# Patient Record
Sex: MALE | Race: WHITE | NOT HISPANIC OR LATINO | URBAN - METROPOLITAN AREA
[De-identification: names, ages, dates, MRNs, and addresses within clinical notes are randomized per-mention and may not be internally consistent; named-entity substitution may affect disease eponyms.]

---

## 2023-10-09 DIAGNOSIS — I10 ESSENTIAL HYPERTENSION, BENIGN: Primary | ICD-10-CM

## 2023-10-09 RX ORDER — TORSEMIDE 20 MG/1
20 TABLET ORAL 2 TIMES DAILY
COMMUNITY
End: 2023-10-11 | Stop reason: SDUPTHER

## 2023-10-11 RX ORDER — TORSEMIDE 20 MG/1
TABLET ORAL
Qty: 240 TABLET | Refills: 1 | Status: SHIPPED | OUTPATIENT
Start: 2023-10-11

## 2023-12-04 ENCOUNTER — APPOINTMENT (OUTPATIENT)
Dept: CARDIOLOGY | Facility: CLINIC | Age: 58
End: 2023-12-04
Payer: COMMERCIAL

## 2024-01-29 ENCOUNTER — APPOINTMENT (OUTPATIENT)
Dept: CARDIOLOGY | Facility: CLINIC | Age: 59
End: 2024-01-29
Payer: COMMERCIAL

## 2024-02-05 ENCOUNTER — APPOINTMENT (OUTPATIENT)
Dept: CARDIOLOGY | Facility: CLINIC | Age: 59
End: 2024-02-05
Payer: COMMERCIAL

## 2024-03-13 DIAGNOSIS — E78.2 MIXED HYPERLIPIDEMIA: Primary | ICD-10-CM

## 2024-03-13 DIAGNOSIS — I10 BENIGN ESSENTIAL HYPERTENSION: ICD-10-CM

## 2024-03-13 PROBLEM — I25.10 CORONARY ARTERY DISEASE INVOLVING NATIVE CORONARY ARTERY OF NATIVE HEART WITHOUT ANGINA PECTORIS: Status: ACTIVE | Noted: 2024-03-13

## 2024-03-13 PROBLEM — E66.9 OBESITY: Status: ACTIVE | Noted: 2024-03-13

## 2024-03-13 PROBLEM — R06.02 SHORTNESS OF BREATH ON EXERTION: Status: ACTIVE | Noted: 2024-03-13

## 2024-03-13 PROBLEM — H54.7 VISION LOSS: Status: ACTIVE | Noted: 2024-03-13

## 2024-03-13 PROBLEM — I51.7 LVH (LEFT VENTRICULAR HYPERTROPHY): Status: ACTIVE | Noted: 2024-03-13

## 2024-03-13 PROBLEM — H52.10 MYOPIA: Status: ACTIVE | Noted: 2019-04-08

## 2024-03-13 RX ORDER — SPIRONOLACTONE 25 MG/1
25 TABLET ORAL 2 TIMES DAILY
Qty: 60 TABLET | Refills: 0 | Status: SHIPPED | OUTPATIENT
Start: 2024-03-13 | End: 2024-04-15 | Stop reason: SDUPTHER

## 2024-03-13 RX ORDER — CARVEDILOL 25 MG/1
25 TABLET ORAL 2 TIMES DAILY
Qty: 60 TABLET | Refills: 0 | Status: SHIPPED | OUTPATIENT
Start: 2024-03-13 | End: 2024-04-15 | Stop reason: SDUPTHER

## 2024-03-13 RX ORDER — SPIRONOLACTONE 25 MG/1
25 TABLET ORAL 2 TIMES DAILY
COMMUNITY
Start: 2024-02-17 | End: 2024-03-13 | Stop reason: SDUPTHER

## 2024-03-13 RX ORDER — HYDRALAZINE HYDROCHLORIDE 100 MG/1
100 TABLET, FILM COATED ORAL 3 TIMES DAILY
Qty: 90 TABLET | Refills: 0 | Status: SHIPPED | OUTPATIENT
Start: 2024-03-13 | End: 2024-04-15 | Stop reason: SDUPTHER

## 2024-03-13 RX ORDER — EZETIMIBE 10 MG/1
10 TABLET ORAL NIGHTLY
Qty: 30 TABLET | Refills: 0 | Status: SHIPPED | OUTPATIENT
Start: 2024-03-13 | End: 2024-04-15 | Stop reason: SDUPTHER

## 2024-03-13 RX ORDER — HYDRALAZINE HYDROCHLORIDE 100 MG/1
100 TABLET, FILM COATED ORAL 3 TIMES DAILY
COMMUNITY
Start: 2024-02-17 | End: 2024-03-13 | Stop reason: SDUPTHER

## 2024-03-13 RX ORDER — CARVEDILOL 25 MG/1
25 TABLET ORAL 2 TIMES DAILY
COMMUNITY
Start: 2024-01-07 | End: 2024-03-13 | Stop reason: SDUPTHER

## 2024-03-13 RX ORDER — EZETIMIBE 10 MG/1
10 TABLET ORAL NIGHTLY
COMMUNITY
Start: 2024-02-17 | End: 2024-03-13 | Stop reason: SDUPTHER

## 2024-04-15 ENCOUNTER — TELEPHONE (OUTPATIENT)
Dept: CARDIOLOGY | Facility: CLINIC | Age: 59
End: 2024-04-15
Payer: COMMERCIAL

## 2024-04-15 DIAGNOSIS — E78.2 MIXED HYPERLIPIDEMIA: ICD-10-CM

## 2024-04-15 DIAGNOSIS — I10 BENIGN ESSENTIAL HYPERTENSION: ICD-10-CM

## 2024-04-15 RX ORDER — CARVEDILOL 25 MG/1
25 TABLET ORAL 2 TIMES DAILY
Qty: 180 TABLET | Refills: 3 | Status: SHIPPED | OUTPATIENT
Start: 2024-04-15

## 2024-04-15 RX ORDER — HYDRALAZINE HYDROCHLORIDE 100 MG/1
100 TABLET, FILM COATED ORAL 3 TIMES DAILY
Qty: 270 TABLET | Refills: 3 | Status: SHIPPED | OUTPATIENT
Start: 2024-04-15

## 2024-04-15 RX ORDER — EZETIMIBE 10 MG/1
10 TABLET ORAL NIGHTLY
Qty: 90 TABLET | Refills: 3 | Status: SHIPPED | OUTPATIENT
Start: 2024-04-15

## 2024-04-15 RX ORDER — SPIRONOLACTONE 25 MG/1
25 TABLET ORAL 2 TIMES DAILY
Qty: 180 TABLET | Refills: 3 | Status: SHIPPED | OUTPATIENT
Start: 2024-04-15

## 2024-04-22 ENCOUNTER — OFFICE VISIT (OUTPATIENT)
Dept: CARDIOLOGY | Facility: CLINIC | Age: 59
End: 2024-04-22
Payer: COMMERCIAL

## 2024-04-22 ENCOUNTER — HOSPITAL ENCOUNTER (OUTPATIENT)
Dept: CARDIOLOGY | Facility: CLINIC | Age: 59
Discharge: HOME | End: 2024-04-22
Payer: COMMERCIAL

## 2024-04-22 VITALS
BODY MASS INDEX: 37.85 KG/M2 | HEIGHT: 71 IN | DIASTOLIC BLOOD PRESSURE: 72 MMHG | WEIGHT: 270.4 LBS | SYSTOLIC BLOOD PRESSURE: 132 MMHG | OXYGEN SATURATION: 98 % | HEART RATE: 78 BPM

## 2024-04-22 DIAGNOSIS — I25.10 CORONARY ARTERY DISEASE INVOLVING NATIVE CORONARY ARTERY OF NATIVE HEART WITHOUT ANGINA PECTORIS: ICD-10-CM

## 2024-04-22 DIAGNOSIS — I51.7 LVH (LEFT VENTRICULAR HYPERTROPHY): ICD-10-CM

## 2024-04-22 DIAGNOSIS — E78.2 MIXED HYPERLIPIDEMIA: ICD-10-CM

## 2024-04-22 DIAGNOSIS — I10 BENIGN ESSENTIAL HYPERTENSION: Primary | ICD-10-CM

## 2024-04-22 PROCEDURE — 93010 ELECTROCARDIOGRAM REPORT: CPT | Performed by: INTERNAL MEDICINE

## 2024-04-22 PROCEDURE — 99215 OFFICE O/P EST HI 40 MIN: CPT | Performed by: NURSE PRACTITIONER

## 2024-04-22 PROCEDURE — 3078F DIAST BP <80 MM HG: CPT | Performed by: NURSE PRACTITIONER

## 2024-04-22 PROCEDURE — 93005 ELECTROCARDIOGRAM TRACING: CPT

## 2024-04-22 PROCEDURE — 1036F TOBACCO NON-USER: CPT | Performed by: NURSE PRACTITIONER

## 2024-04-22 PROCEDURE — 3075F SYST BP GE 130 - 139MM HG: CPT | Performed by: NURSE PRACTITIONER

## 2024-04-22 ASSESSMENT — COLUMBIA-SUICIDE SEVERITY RATING SCALE - C-SSRS
6. HAVE YOU EVER DONE ANYTHING, STARTED TO DO ANYTHING, OR PREPARED TO DO ANYTHING TO END YOUR LIFE?: NO
1. IN THE PAST MONTH, HAVE YOU WISHED YOU WERE DEAD OR WISHED YOU COULD GO TO SLEEP AND NOT WAKE UP?: NO
2. HAVE YOU ACTUALLY HAD ANY THOUGHTS OF KILLING YOURSELF?: NO

## 2024-04-22 ASSESSMENT — PATIENT HEALTH QUESTIONNAIRE - PHQ9
2. FEELING DOWN, DEPRESSED OR HOPELESS: SEVERAL DAYS
1. LITTLE INTEREST OR PLEASURE IN DOING THINGS: NOT AT ALL
SUM OF ALL RESPONSES TO PHQ9 QUESTIONS 1 AND 2: 1

## 2024-04-22 ASSESSMENT — LIFESTYLE VARIABLES: HOW OFTEN DO YOU HAVE A DRINK CONTAINING ALCOHOL: NEVER

## 2024-04-22 ASSESSMENT — PAIN SCALES - GENERAL: PAINLEVEL: 0-NO PAIN

## 2024-04-22 NOTE — PROGRESS NOTES
Subjective   Colin Leonard is a 58 y.o. male.    Chief Complaint:  Increased stress    HPI  Mr. Leonard is seen to re-establish cardiovascular care.  He is seen in collaboration with Dr. Saravia.  Steven was last seen 2 years ago and has risk factors for coronary disease of type 2 diabetes hypertension and obesity and CKD.  He describes several years of increased stress due to a estrangement from his daughter.  He has been relatively noncompliant with healthcare follow-up.  However since January he states he has been able to lose about 20 pounds per his report and is trying to take better care of himself.  He is compliant with medications although I am uncertain that he is on both diuretics.  He denies any symptoms referable to angina.  He does not routinely exercise but is able to walk for activity and go up and down stairs  necessary without chest pain or significant shortness of breath.    Review of Systems   Constitutional: Negative.   Cardiovascular: Negative.    Respiratory: Negative.     All other systems reviewed and are negative.      Objective   Vitals reviewed.   Constitutional:       Appearance: Healthy appearance. Not in distress.   Eyes:      Pupils: Pupils are equal, round, and reactive to light.   Neck:      Vascular: No JVR. JVD normal.   Pulmonary:      Effort: Pulmonary effort is normal.      Breath sounds: Normal breath sounds. No wheezing. No rhonchi. No rales.   Chest:      Chest wall: Not tender to palpatation.   Cardiovascular:      Normal rate. Regular rhythm. Normal S1. Normal S2.       Murmurs: There is no murmur.      No gallop.  No click. No rub.   Pulses:     Intact distal pulses.   Edema:     Peripheral edema absent.   Abdominal:      Tenderness: There is no abdominal tenderness.   Musculoskeletal: Normal range of motion.         General: No tenderness. Skin:     General: Skin is warm and dry.   Neurological:      General: No focal deficit present.      Mental Status: Alert and  oriented to person, place and time.       ECG obtained and reviewed shows normal sinus rhythm with an age undetermined possible anterior infarct ventricular rate is 75 bpm.  Unchanged compared with 2/2023    Lab Review:   Lab Results   Component Value Date     04/29/2022    K 5.0 04/29/2022     04/29/2022    CO2 24 04/29/2022     (HH) 04/29/2022    CREATININE 6.90 (H) 04/29/2022    GLUCOSE 158 (H) 04/29/2022    CALCIUM 9.9 04/29/2022     Lab Results   Component Value Date    WBC 10.8 02/16/2022    HGB 10.7 (L) 02/16/2022    HCT 33.2 (L) 02/16/2022    MCV 89 02/16/2022     02/16/2022     Lab Results   Component Value Date    CHOL 97 02/01/2022    TRIG 117 02/01/2022    HDL 30.2 (A) 02/01/2022       Assessment/Plan   The encounter diagnosis was Benign essential hypertension.  Mr. Leonard is a 58 year old  male with a past medical history significant for hypertension, hyperlipidemia, diabetes, elevated coronary artery calcium score of 1014.84 in 3/2020 and CKD. Echocardiogram 2/2022 showed normal LV function with an EF of 60-65% with severe concentric LVH. LA is severely dilated. Right sided structures were poorly visualized.  He presents to re-establish cardiac care and to get medication refills.  He denies any specific cardiac concerns and denies any symptoms referable to angina.  He is active at work and can climb stairs without concern.  BP is improved after sitting for 30 min as he was rather anxious during this visit. He is very emotional and tearful during our visit discussing his current estranged relationship with his daughter.  Other VS are stable.  ECG is stable when compared to previous.  He has not had labs drawn for some time.  I would like him to obtain fasting labs as well as CMP and CBC. We will continue all medication for now and clarify his diuretic medication.  He will followup in 6 months. We will discuss repeat ischemic evaluation and medication adjustment  depending on his labs results and interim symptoms.  He knows to call for any concerns or questions.

## 2024-04-23 RX ORDER — ASPIRIN 81 MG/1
81 TABLET ORAL DAILY
COMMUNITY

## 2024-04-23 ASSESSMENT — ENCOUNTER SYMPTOMS
RESPIRATORY NEGATIVE: 1
CONSTITUTIONAL NEGATIVE: 1
CARDIOVASCULAR NEGATIVE: 1

## 2024-04-25 LAB
ATRIAL RATE: 75 BPM
P AXIS: 23 DEGREES
P OFFSET: 190 MS
P ONSET: 121 MS
PR INTERVAL: 194 MS
Q ONSET: 218 MS
QRS COUNT: 12 BEATS
QRS DURATION: 92 MS
QT INTERVAL: 432 MS
QTC CALCULATION(BAZETT): 482 MS
QTC FREDERICIA: 465 MS
R AXIS: 41 DEGREES
T AXIS: -9 DEGREES
T OFFSET: 434 MS
VENTRICULAR RATE: 75 BPM

## 2024-07-21 ENCOUNTER — APPOINTMENT (OUTPATIENT)
Dept: CARDIOLOGY | Facility: HOSPITAL | Age: 59
End: 2024-07-21
Payer: COMMERCIAL

## 2024-07-21 ENCOUNTER — HOSPITAL ENCOUNTER (INPATIENT)
Facility: HOSPITAL | Age: 59
LOS: 11 days | Discharge: HOME HEALTH | End: 2024-08-02
Attending: STUDENT IN AN ORGANIZED HEALTH CARE EDUCATION/TRAINING PROGRAM | Admitting: INTERNAL MEDICINE
Payer: COMMERCIAL

## 2024-07-21 ENCOUNTER — DOCUMENTATION (OUTPATIENT)
Dept: CARDIOLOGY | Facility: HOSPITAL | Age: 59
End: 2024-07-21
Payer: COMMERCIAL

## 2024-07-21 ENCOUNTER — APPOINTMENT (OUTPATIENT)
Dept: RADIOLOGY | Facility: HOSPITAL | Age: 59
End: 2024-07-21
Payer: COMMERCIAL

## 2024-07-21 DIAGNOSIS — I21.4 NSTEMI (NON-ST ELEVATED MYOCARDIAL INFARCTION) (MULTI): Primary | ICD-10-CM

## 2024-07-21 DIAGNOSIS — I25.10 CORONARY ARTERY DISEASE INVOLVING NATIVE CORONARY ARTERY OF NATIVE HEART WITHOUT ANGINA PECTORIS: ICD-10-CM

## 2024-07-21 DIAGNOSIS — R53.1 GENERALIZED WEAKNESS: ICD-10-CM

## 2024-07-21 DIAGNOSIS — N18.5 STAGE 5 CHRONIC KIDNEY DISEASE NOT ON CHRONIC DIALYSIS (MULTI): ICD-10-CM

## 2024-07-21 DIAGNOSIS — M86.072 ACUTE HEMATOGENOUS OSTEOMYELITIS OF LEFT FOOT (MULTI): ICD-10-CM

## 2024-07-21 DIAGNOSIS — M86.172 OSTEOMYELITIS OF ANKLE OR FOOT, LEFT, ACUTE (MULTI): ICD-10-CM

## 2024-07-21 DIAGNOSIS — I21.3 STEMI (ST ELEVATION MYOCARDIAL INFARCTION) (MULTI): ICD-10-CM

## 2024-07-21 LAB
ALBUMIN SERPL-MCNC: 3.5 G/DL (ref 3.5–5)
ALP BLD-CCNC: 85 U/L (ref 35–125)
ALT SERPL-CCNC: 27 U/L (ref 5–40)
ANION GAP SERPL CALC-SCNC: 17 MMOL/L
APTT PPP: 33.4 SECONDS (ref 22–32.5)
AST SERPL-CCNC: 24 U/L (ref 5–40)
BASOPHILS # BLD AUTO: 0.04 X10*3/UL (ref 0–0.1)
BASOPHILS NFR BLD AUTO: 0.2 %
BILIRUB SERPL-MCNC: 0.6 MG/DL (ref 0.1–1.2)
BUN SERPL-MCNC: 61 MG/DL (ref 8–25)
CALCIUM SERPL-MCNC: 9.6 MG/DL (ref 8.5–10.4)
CHLORIDE SERPL-SCNC: 102 MMOL/L (ref 97–107)
CO2 SERPL-SCNC: 14 MMOL/L (ref 24–31)
CREAT SERPL-MCNC: 6.4 MG/DL (ref 0.4–1.6)
EGFRCR SERPLBLD CKD-EPI 2021: 9 ML/MIN/1.73M*2
EOSINOPHIL # BLD AUTO: 0.15 X10*3/UL (ref 0–0.7)
EOSINOPHIL NFR BLD AUTO: 0.8 %
ERYTHROCYTE [DISTWIDTH] IN BLOOD BY AUTOMATED COUNT: 14.6 % (ref 11.5–14.5)
FLUAV RNA RESP QL NAA+PROBE: NOT DETECTED
FLUBV RNA RESP QL NAA+PROBE: NOT DETECTED
GLUCOSE SERPL-MCNC: 162 MG/DL (ref 65–99)
HCT VFR BLD AUTO: 29.1 % (ref 41–52)
HGB BLD-MCNC: 9.3 G/DL (ref 13.5–17.5)
IMM GRANULOCYTES # BLD AUTO: 0.09 X10*3/UL (ref 0–0.7)
IMM GRANULOCYTES NFR BLD AUTO: 0.5 % (ref 0–0.9)
LACTATE BLDV-SCNC: 1.9 MMOL/L (ref 0.4–2)
LYMPHOCYTES # BLD AUTO: 0.26 X10*3/UL (ref 1.2–4.8)
LYMPHOCYTES NFR BLD AUTO: 1.4 %
MAGNESIUM SERPL-MCNC: 1.8 MG/DL (ref 1.6–3.1)
MCH RBC QN AUTO: 29.1 PG (ref 26–34)
MCHC RBC AUTO-ENTMCNC: 32 G/DL (ref 32–36)
MCV RBC AUTO: 91 FL (ref 80–100)
MONOCYTES # BLD AUTO: 0.72 X10*3/UL (ref 0.1–1)
MONOCYTES NFR BLD AUTO: 3.9 %
NEUTROPHILS # BLD AUTO: 17.37 X10*3/UL (ref 1.2–7.7)
NEUTROPHILS NFR BLD AUTO: 93.2 %
NRBC BLD-RTO: 0 /100 WBCS (ref 0–0)
NT-PROBNP SERPL-MCNC: 8252 PG/ML (ref 0–177)
PLATELET # BLD AUTO: 335 X10*3/UL (ref 150–450)
POTASSIUM SERPL-SCNC: 4.6 MMOL/L (ref 3.4–5.1)
PROT SERPL-MCNC: 7.4 G/DL (ref 5.9–7.9)
RBC # BLD AUTO: 3.2 X10*6/UL (ref 4.5–5.9)
SARS-COV-2 RNA RESP QL NAA+PROBE: NOT DETECTED
SODIUM SERPL-SCNC: 133 MMOL/L (ref 133–145)
TROPONIN T SERPL-MCNC: 363 NG/L
WBC # BLD AUTO: 18.6 X10*3/UL (ref 4.4–11.3)

## 2024-07-21 PROCEDURE — 85730 THROMBOPLASTIN TIME PARTIAL: CPT | Performed by: STUDENT IN AN ORGANIZED HEALTH CARE EDUCATION/TRAINING PROGRAM

## 2024-07-21 PROCEDURE — 93010 ELECTROCARDIOGRAM REPORT: CPT | Performed by: INTERNAL MEDICINE

## 2024-07-21 PROCEDURE — 2500000004 HC RX 250 GENERAL PHARMACY W/ HCPCS (ALT 636 FOR OP/ED): Performed by: STUDENT IN AN ORGANIZED HEALTH CARE EDUCATION/TRAINING PROGRAM

## 2024-07-21 PROCEDURE — 96374 THER/PROPH/DIAG INJ IV PUSH: CPT

## 2024-07-21 PROCEDURE — 84484 ASSAY OF TROPONIN QUANT: CPT | Performed by: STUDENT IN AN ORGANIZED HEALTH CARE EDUCATION/TRAINING PROGRAM

## 2024-07-21 PROCEDURE — 93005 ELECTROCARDIOGRAM TRACING: CPT

## 2024-07-21 PROCEDURE — 36415 COLL VENOUS BLD VENIPUNCTURE: CPT | Performed by: STUDENT IN AN ORGANIZED HEALTH CARE EDUCATION/TRAINING PROGRAM

## 2024-07-21 PROCEDURE — 80053 COMPREHEN METABOLIC PANEL: CPT | Performed by: STUDENT IN AN ORGANIZED HEALTH CARE EDUCATION/TRAINING PROGRAM

## 2024-07-21 PROCEDURE — 87636 SARSCOV2 & INF A&B AMP PRB: CPT | Performed by: STUDENT IN AN ORGANIZED HEALTH CARE EDUCATION/TRAINING PROGRAM

## 2024-07-21 PROCEDURE — 71045 X-RAY EXAM CHEST 1 VIEW: CPT

## 2024-07-21 PROCEDURE — 2500000002 HC RX 250 W HCPCS SELF ADMINISTERED DRUGS (ALT 637 FOR MEDICARE OP, ALT 636 FOR OP/ED): Performed by: STUDENT IN AN ORGANIZED HEALTH CARE EDUCATION/TRAINING PROGRAM

## 2024-07-21 PROCEDURE — 83605 ASSAY OF LACTIC ACID: CPT | Performed by: STUDENT IN AN ORGANIZED HEALTH CARE EDUCATION/TRAINING PROGRAM

## 2024-07-21 PROCEDURE — 85025 COMPLETE CBC W/AUTO DIFF WBC: CPT | Performed by: STUDENT IN AN ORGANIZED HEALTH CARE EDUCATION/TRAINING PROGRAM

## 2024-07-21 PROCEDURE — 71045 X-RAY EXAM CHEST 1 VIEW: CPT | Performed by: RADIOLOGY

## 2024-07-21 PROCEDURE — 83735 ASSAY OF MAGNESIUM: CPT | Performed by: STUDENT IN AN ORGANIZED HEALTH CARE EDUCATION/TRAINING PROGRAM

## 2024-07-21 PROCEDURE — 83880 ASSAY OF NATRIURETIC PEPTIDE: CPT | Performed by: STUDENT IN AN ORGANIZED HEALTH CARE EDUCATION/TRAINING PROGRAM

## 2024-07-21 PROCEDURE — 87040 BLOOD CULTURE FOR BACTERIA: CPT | Mod: WESLAB | Performed by: STUDENT IN AN ORGANIZED HEALTH CARE EDUCATION/TRAINING PROGRAM

## 2024-07-21 PROCEDURE — 96361 HYDRATE IV INFUSION ADD-ON: CPT

## 2024-07-21 PROCEDURE — 99254 IP/OBS CNSLTJ NEW/EST MOD 60: CPT | Performed by: INTERNAL MEDICINE

## 2024-07-21 PROCEDURE — 99285 EMERGENCY DEPT VISIT HI MDM: CPT | Mod: 25

## 2024-07-21 RX ORDER — HEPARIN SODIUM 5000 [USP'U]/ML
4000 INJECTION, SOLUTION INTRAVENOUS; SUBCUTANEOUS ONCE
Status: COMPLETED | OUTPATIENT
Start: 2024-07-21 | End: 2024-07-21

## 2024-07-21 RX ADMIN — HEPARIN SODIUM 4000 UNITS: 5000 INJECTION, SOLUTION INTRAVENOUS; SUBCUTANEOUS at 22:25

## 2024-07-21 RX ADMIN — TICAGRELOR 180 MG: 90 TABLET ORAL at 22:25

## 2024-07-21 RX ADMIN — SODIUM CHLORIDE 1000 ML: 900 INJECTION, SOLUTION INTRAVENOUS at 22:18

## 2024-07-21 ASSESSMENT — PAIN SCALES - GENERAL: PAINLEVEL_OUTOF10: 0 - NO PAIN

## 2024-07-21 ASSESSMENT — PAIN DESCRIPTION - PROGRESSION: CLINICAL_PROGRESSION: NOT CHANGED

## 2024-07-21 ASSESSMENT — LIFESTYLE VARIABLES
TOTAL SCORE: 0
EVER FELT BAD OR GUILTY ABOUT YOUR DRINKING: NO
EVER HAD A DRINK FIRST THING IN THE MORNING TO STEADY YOUR NERVES TO GET RID OF A HANGOVER: NO
HAVE PEOPLE ANNOYED YOU BY CRITICIZING YOUR DRINKING: NO
HAVE YOU EVER FELT YOU SHOULD CUT DOWN ON YOUR DRINKING: NO

## 2024-07-21 ASSESSMENT — PAIN - FUNCTIONAL ASSESSMENT: PAIN_FUNCTIONAL_ASSESSMENT: 0-10

## 2024-07-22 ENCOUNTER — APPOINTMENT (OUTPATIENT)
Dept: CARDIOLOGY | Facility: HOSPITAL | Age: 59
End: 2024-07-22
Payer: COMMERCIAL

## 2024-07-22 ENCOUNTER — APPOINTMENT (OUTPATIENT)
Dept: RADIOLOGY | Facility: HOSPITAL | Age: 59
End: 2024-07-22
Payer: COMMERCIAL

## 2024-07-22 PROBLEM — D72.829 LEUKOCYTOSIS: Status: ACTIVE | Noted: 2024-07-22

## 2024-07-22 PROBLEM — E11.9 TYPE 2 DIABETES MELLITUS (MULTI): Status: ACTIVE | Noted: 2024-07-22
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
## 2024-07-22 PROBLEM — I21.4 NSTEMI (NON-ST ELEVATED MYOCARDIAL INFARCTION) (MULTI): Status: ACTIVE | Noted: 2024-07-22

## 2024-07-22 PROBLEM — N18.5 STAGE 5 CHRONIC KIDNEY DISEASE NOT ON CHRONIC DIALYSIS (MULTI): Status: ACTIVE | Noted: 2024-07-22

## 2024-07-22 LAB
ABO GROUP (TYPE) IN BLOOD: NORMAL
ALBUMIN SERPL-MCNC: 3.3 G/DL (ref 3.5–5)
ALP BLD-CCNC: 85 U/L (ref 35–125)
ALT SERPL-CCNC: 32 U/L (ref 5–40)
ANION GAP SERPL CALC-SCNC: 15 MMOL/L
ANTIBODY SCREEN: NORMAL
AORTIC VALVE MEAN GRADIENT: 10.6 MMHG
AORTIC VALVE PEAK VELOCITY: 2.26 M/S
AST SERPL-CCNC: 39 U/L (ref 5–40)
AV PEAK GRADIENT: 20.4 MMHG
AVA (PEAK VEL): 1.35 CM2
AVA (VTI): 1.43 CM2
BASOPHILS # BLD AUTO: 0.06 X10*3/UL (ref 0–0.1)
BASOPHILS NFR BLD AUTO: 0.3 %
BILIRUB SERPL-MCNC: 0.6 MG/DL (ref 0.1–1.2)
BUN SERPL-MCNC: 64 MG/DL (ref 8–25)
CALCIUM SERPL-MCNC: 9.4 MG/DL (ref 8.5–10.4)
CHLORIDE SERPL-SCNC: 104 MMOL/L (ref 97–107)
CK SERPL-CCNC: 1000 U/L (ref 24–195)
CO2 SERPL-SCNC: 15 MMOL/L (ref 24–31)
CREAT SERPL-MCNC: 6.3 MG/DL (ref 0.4–1.6)
CRP SERPL-MCNC: 24.7 MG/DL (ref 0–2)
EGFRCR SERPLBLD CKD-EPI 2021: 9 ML/MIN/1.73M*2
EJECTION FRACTION APICAL 4 CHAMBER: 47.6
EJECTION FRACTION: 63 %
EOSINOPHIL # BLD AUTO: 0.01 X10*3/UL (ref 0–0.7)
EOSINOPHIL NFR BLD AUTO: 0 %
ERYTHROCYTE [DISTWIDTH] IN BLOOD BY AUTOMATED COUNT: 14.7 % (ref 11.5–14.5)
ERYTHROCYTE [SEDIMENTATION RATE] IN BLOOD BY WESTERGREN METHOD: 60 MM/H (ref 0–20)
EST. AVERAGE GLUCOSE BLD GHB EST-MCNC: 111 MG/DL
GLUCOSE BLD MANUAL STRIP-MCNC: 176 MG/DL (ref 74–99)
GLUCOSE SERPL-MCNC: 190 MG/DL (ref 65–99)
HBA1C MFR BLD: 5.5 %
HCT VFR BLD AUTO: 28.2 % (ref 41–52)
HGB BLD-MCNC: 9 G/DL (ref 13.5–17.5)
IMM GRANULOCYTES # BLD AUTO: 0.23 X10*3/UL (ref 0–0.7)
IMM GRANULOCYTES NFR BLD AUTO: 1 % (ref 0–0.9)
INR PPP: 1.2 (ref 0.9–1.2)
LEFT ATRIUM VOLUME AREA LENGTH INDEX BSA: 36.7 ML/M2
LEFT VENTRICLE INTERNAL DIMENSION DIASTOLE: 3.86 CM (ref 3.5–6)
LEFT VENTRICULAR OUTFLOW TRACT DIAMETER: 2 CM
LV EJECTION FRACTION BIPLANE: 57 %
LYMPHOCYTES # BLD AUTO: 0.94 X10*3/UL (ref 1.2–4.8)
LYMPHOCYTES NFR BLD AUTO: 3.9 %
MAGNESIUM SERPL-MCNC: 2.2 MG/DL (ref 1.6–3.1)
MCH RBC QN AUTO: 29.6 PG (ref 26–34)
MCHC RBC AUTO-ENTMCNC: 31.9 G/DL (ref 32–36)
MCV RBC AUTO: 93 FL (ref 80–100)
MITRAL VALVE E/A RATIO: 0.83
MITRAL VALVE E/E' RATIO: 11.4
MONOCYTES # BLD AUTO: 1.55 X10*3/UL (ref 0.1–1)
MONOCYTES NFR BLD AUTO: 6.5 %
NEUTROPHILS # BLD AUTO: 21.19 X10*3/UL (ref 1.2–7.7)
NEUTROPHILS NFR BLD AUTO: 88.3 %
NRBC BLD-RTO: 0 /100 WBCS (ref 0–0)
PLATELET # BLD AUTO: 334 X10*3/UL (ref 150–450)
POTASSIUM SERPL-SCNC: 4.7 MMOL/L (ref 3.4–5.1)
PROT SERPL-MCNC: 7.2 G/DL (ref 5.9–7.9)
PROTHROMBIN TIME: 12.6 SECONDS (ref 9.3–12.7)
RBC # BLD AUTO: 3.04 X10*6/UL (ref 4.5–5.9)
RH FACTOR (ANTIGEN D): NORMAL
RIGHT VENTRICLE FREE WALL PEAK S': 14.8 CM/S
SODIUM SERPL-SCNC: 134 MMOL/L (ref 133–145)
TRICUSPID ANNULAR PLANE SYSTOLIC EXCURSION: 2.5 CM
TROPONIN T SERPL-MCNC: 343 NG/L
TROPONIN T SERPL-MCNC: 403 NG/L
WBC # BLD AUTO: 24 X10*3/UL (ref 4.4–11.3)

## 2024-07-22 PROCEDURE — 86140 C-REACTIVE PROTEIN: CPT | Performed by: INTERNAL MEDICINE

## 2024-07-22 PROCEDURE — 86850 RBC ANTIBODY SCREEN: CPT | Performed by: INTERNAL MEDICINE

## 2024-07-22 PROCEDURE — 2500000002 HC RX 250 W HCPCS SELF ADMINISTERED DRUGS (ALT 637 FOR MEDICARE OP, ALT 636 FOR OP/ED): Performed by: INTERNAL MEDICINE

## 2024-07-22 PROCEDURE — 99232 SBSQ HOSP IP/OBS MODERATE 35: CPT | Performed by: NURSE PRACTITIONER

## 2024-07-22 PROCEDURE — 80053 COMPREHEN METABOLIC PANEL: CPT | Performed by: INTERNAL MEDICINE

## 2024-07-22 PROCEDURE — 2500000001 HC RX 250 WO HCPCS SELF ADMINISTERED DRUGS (ALT 637 FOR MEDICARE OP): Performed by: INTERNAL MEDICINE

## 2024-07-22 PROCEDURE — 2060000001 HC INTERMEDIATE ICU ROOM DAILY

## 2024-07-22 PROCEDURE — 93306 TTE W/DOPPLER COMPLETE: CPT | Performed by: INTERNAL MEDICINE

## 2024-07-22 PROCEDURE — 82947 ASSAY GLUCOSE BLOOD QUANT: CPT

## 2024-07-22 PROCEDURE — 87070 CULTURE OTHR SPECIMN AEROBIC: CPT | Mod: WESLAB | Performed by: INTERNAL MEDICINE

## 2024-07-22 PROCEDURE — 83735 ASSAY OF MAGNESIUM: CPT | Performed by: INTERNAL MEDICINE

## 2024-07-22 PROCEDURE — 85025 COMPLETE CBC W/AUTO DIFF WBC: CPT | Performed by: INTERNAL MEDICINE

## 2024-07-22 PROCEDURE — 85610 PROTHROMBIN TIME: CPT | Performed by: INTERNAL MEDICINE

## 2024-07-22 PROCEDURE — 84484 ASSAY OF TROPONIN QUANT: CPT | Performed by: STUDENT IN AN ORGANIZED HEALTH CARE EDUCATION/TRAINING PROGRAM

## 2024-07-22 PROCEDURE — 2500000004 HC RX 250 GENERAL PHARMACY W/ HCPCS (ALT 636 FOR OP/ED): Performed by: INTERNAL MEDICINE

## 2024-07-22 PROCEDURE — 2500000001 HC RX 250 WO HCPCS SELF ADMINISTERED DRUGS (ALT 637 FOR MEDICARE OP): Performed by: STUDENT IN AN ORGANIZED HEALTH CARE EDUCATION/TRAINING PROGRAM

## 2024-07-22 PROCEDURE — 93306 TTE W/DOPPLER COMPLETE: CPT

## 2024-07-22 PROCEDURE — 73630 X-RAY EXAM OF FOOT: CPT | Mod: LEFT SIDE | Performed by: RADIOLOGY

## 2024-07-22 PROCEDURE — 87205 SMEAR GRAM STAIN: CPT | Mod: WESLAB | Performed by: INTERNAL MEDICINE

## 2024-07-22 PROCEDURE — 73630 X-RAY EXAM OF FOOT: CPT | Mod: LT

## 2024-07-22 PROCEDURE — 84075 ASSAY ALKALINE PHOSPHATASE: CPT | Performed by: INTERNAL MEDICINE

## 2024-07-22 PROCEDURE — 83036 HEMOGLOBIN GLYCOSYLATED A1C: CPT | Performed by: INTERNAL MEDICINE

## 2024-07-22 PROCEDURE — 36415 COLL VENOUS BLD VENIPUNCTURE: CPT | Performed by: INTERNAL MEDICINE

## 2024-07-22 PROCEDURE — 82550 ASSAY OF CK (CPK): CPT | Performed by: INTERNAL MEDICINE

## 2024-07-22 PROCEDURE — 85652 RBC SED RATE AUTOMATED: CPT | Performed by: INTERNAL MEDICINE

## 2024-07-22 PROCEDURE — 36415 COLL VENOUS BLD VENIPUNCTURE: CPT | Performed by: STUDENT IN AN ORGANIZED HEALTH CARE EDUCATION/TRAINING PROGRAM

## 2024-07-22 RX ORDER — DEXTROSE 50 % IN WATER (D50W) INTRAVENOUS SYRINGE
12.5
Status: DISCONTINUED | OUTPATIENT
Start: 2024-07-22 | End: 2024-08-02 | Stop reason: HOSPADM

## 2024-07-22 RX ORDER — DEXTROSE 50 % IN WATER (D50W) INTRAVENOUS SYRINGE
25
Status: DISCONTINUED | OUTPATIENT
Start: 2024-07-22 | End: 2024-08-02 | Stop reason: HOSPADM

## 2024-07-22 RX ORDER — GUAIFENESIN 600 MG/1
600 TABLET, EXTENDED RELEASE ORAL EVERY 12 HOURS PRN
Status: DISCONTINUED | OUTPATIENT
Start: 2024-07-22 | End: 2024-08-02 | Stop reason: HOSPADM

## 2024-07-22 RX ORDER — SPIRONOLACTONE 25 MG/1
25 TABLET ORAL 2 TIMES DAILY
Status: DISCONTINUED | OUTPATIENT
Start: 2024-07-22 | End: 2024-07-22

## 2024-07-22 RX ORDER — SODIUM BICARBONATE 650 MG/1
1300 TABLET ORAL 3 TIMES DAILY
Status: DISCONTINUED | OUTPATIENT
Start: 2024-07-22 | End: 2024-08-02 | Stop reason: HOSPADM

## 2024-07-22 RX ORDER — ACETAMINOPHEN 325 MG/1
650 TABLET ORAL EVERY 4 HOURS PRN
Status: DISCONTINUED | OUTPATIENT
Start: 2024-07-22 | End: 2024-08-02 | Stop reason: HOSPADM

## 2024-07-22 RX ORDER — ONDANSETRON 4 MG/1
4 TABLET, ORALLY DISINTEGRATING ORAL EVERY 8 HOURS PRN
Status: DISCONTINUED | OUTPATIENT
Start: 2024-07-22 | End: 2024-07-23

## 2024-07-22 RX ORDER — POLYETHYLENE GLYCOL 3350 17 G/17G
17 POWDER, FOR SOLUTION ORAL DAILY PRN
Status: DISCONTINUED | OUTPATIENT
Start: 2024-07-22 | End: 2024-08-02 | Stop reason: HOSPADM

## 2024-07-22 RX ORDER — ACETAMINOPHEN 500 MG
1000 TABLET ORAL ONCE
Status: COMPLETED | OUTPATIENT
Start: 2024-07-22 | End: 2024-07-22

## 2024-07-22 RX ORDER — EZETIMIBE 10 MG/1
10 TABLET ORAL NIGHTLY
Status: DISCONTINUED | OUTPATIENT
Start: 2024-07-22 | End: 2024-08-02 | Stop reason: HOSPADM

## 2024-07-22 RX ORDER — ACETAMINOPHEN 160 MG/5ML
650 SOLUTION ORAL EVERY 4 HOURS PRN
Status: DISCONTINUED | OUTPATIENT
Start: 2024-07-22 | End: 2024-08-02 | Stop reason: HOSPADM

## 2024-07-22 RX ORDER — ACETAMINOPHEN 650 MG/1
650 SUPPOSITORY RECTAL EVERY 4 HOURS PRN
Status: DISCONTINUED | OUTPATIENT
Start: 2024-07-22 | End: 2024-08-02 | Stop reason: HOSPADM

## 2024-07-22 RX ORDER — ATORVASTATIN CALCIUM 40 MG/1
40 TABLET, FILM COATED ORAL NIGHTLY
Status: DISCONTINUED | OUTPATIENT
Start: 2024-07-22 | End: 2024-08-02 | Stop reason: HOSPADM

## 2024-07-22 RX ORDER — ASPIRIN 81 MG/1
81 TABLET ORAL DAILY
Status: DISCONTINUED | OUTPATIENT
Start: 2024-07-22 | End: 2024-08-02 | Stop reason: HOSPADM

## 2024-07-22 RX ORDER — HYDRALAZINE HYDROCHLORIDE 50 MG/1
100 TABLET, FILM COATED ORAL 3 TIMES DAILY
Status: DISCONTINUED | OUTPATIENT
Start: 2024-07-22 | End: 2024-08-02 | Stop reason: HOSPADM

## 2024-07-22 RX ORDER — SODIUM CHLORIDE 9 MG/ML
75 INJECTION, SOLUTION INTRAVENOUS CONTINUOUS
Status: DISCONTINUED | OUTPATIENT
Start: 2024-07-22 | End: 2024-07-22

## 2024-07-22 RX ORDER — ONDANSETRON HYDROCHLORIDE 2 MG/ML
4 INJECTION, SOLUTION INTRAVENOUS EVERY 8 HOURS PRN
Status: DISCONTINUED | OUTPATIENT
Start: 2024-07-22 | End: 2024-07-23

## 2024-07-22 RX ORDER — HEPARIN SODIUM 5000 [USP'U]/ML
5000 INJECTION, SOLUTION INTRAVENOUS; SUBCUTANEOUS EVERY 8 HOURS
Status: DISCONTINUED | OUTPATIENT
Start: 2024-07-22 | End: 2024-07-25

## 2024-07-22 RX ORDER — TORSEMIDE 20 MG/1
20 TABLET ORAL DAILY
Status: DISCONTINUED | OUTPATIENT
Start: 2024-07-22 | End: 2024-08-02 | Stop reason: HOSPADM

## 2024-07-22 RX ORDER — CARVEDILOL 25 MG/1
25 TABLET ORAL 2 TIMES DAILY
Status: DISCONTINUED | OUTPATIENT
Start: 2024-07-22 | End: 2024-08-02 | Stop reason: HOSPADM

## 2024-07-22 RX ORDER — PANTOPRAZOLE SODIUM 20 MG/1
20 TABLET, DELAYED RELEASE ORAL
Status: DISCONTINUED | OUTPATIENT
Start: 2024-07-22 | End: 2024-08-02 | Stop reason: HOSPADM

## 2024-07-22 RX ORDER — SODIUM BICARBONATE 650 MG/1
650 TABLET ORAL 2 TIMES DAILY
Status: DISCONTINUED | OUTPATIENT
Start: 2024-07-22 | End: 2024-07-22

## 2024-07-22 RX ORDER — LINEZOLID 2 MG/ML
600 INJECTION, SOLUTION INTRAVENOUS ONCE
Status: COMPLETED | OUTPATIENT
Start: 2024-07-22 | End: 2024-07-22

## 2024-07-22 RX ORDER — DAPTOMYCIN IN SODIUM CHLORIDE 500 MG/50ML
500 INJECTION, SOLUTION INTRAVENOUS
Status: DISCONTINUED | OUTPATIENT
Start: 2024-07-22 | End: 2024-07-23

## 2024-07-22 RX ORDER — ASPIRIN 81 MG/1
81 TABLET ORAL DAILY
Status: DISCONTINUED | OUTPATIENT
Start: 2024-07-22 | End: 2024-07-22

## 2024-07-22 RX ORDER — GUAIFENESIN/DEXTROMETHORPHAN 100-10MG/5
5 SYRUP ORAL EVERY 4 HOURS PRN
Status: DISCONTINUED | OUTPATIENT
Start: 2024-07-22 | End: 2024-08-02 | Stop reason: HOSPADM

## 2024-07-22 RX ADMIN — HYDRALAZINE HYDROCHLORIDE 100 MG: 50 TABLET ORAL at 20:40

## 2024-07-22 RX ADMIN — HEPARIN SODIUM 5000 UNITS: 5000 INJECTION, SOLUTION INTRAVENOUS; SUBCUTANEOUS at 06:23

## 2024-07-22 RX ADMIN — EZETIMIBE 10 MG: 10 TABLET ORAL at 02:15

## 2024-07-22 RX ADMIN — ACETAMINOPHEN 650 MG: 325 TABLET ORAL at 11:11

## 2024-07-22 RX ADMIN — CARVEDILOL 25 MG: 25 TABLET, FILM COATED ORAL at 02:15

## 2024-07-22 RX ADMIN — PIPERACILLIN SODIUM AND TAZOBACTAM SODIUM 2.25 G: 2; .25 INJECTION, SOLUTION INTRAVENOUS at 15:09

## 2024-07-22 RX ADMIN — PIPERACILLIN SODIUM AND TAZOBACTAM SODIUM 2.25 G: 2; .25 INJECTION, SOLUTION INTRAVENOUS at 01:14

## 2024-07-22 RX ADMIN — PIPERACILLIN SODIUM AND TAZOBACTAM SODIUM 2.25 G: 2; .25 INJECTION, SOLUTION INTRAVENOUS at 22:18

## 2024-07-22 RX ADMIN — EZETIMIBE 10 MG: 10 TABLET ORAL at 20:40

## 2024-07-22 RX ADMIN — ATORVASTATIN CALCIUM 40 MG: 40 TABLET, FILM COATED ORAL at 20:40

## 2024-07-22 RX ADMIN — SODIUM CHLORIDE 75 ML/HR: 900 INJECTION, SOLUTION INTRAVENOUS at 03:06

## 2024-07-22 RX ADMIN — DAPTOMYCIN IN SODIUM CHLORIDE 500 MG: 500 INJECTION, SOLUTION INTRAVENOUS at 16:19

## 2024-07-22 RX ADMIN — ASPIRIN 81 MG: 81 TABLET, COATED ORAL at 11:12

## 2024-07-22 RX ADMIN — ACETAMINOPHEN 650 MG: 325 TABLET ORAL at 22:18

## 2024-07-22 RX ADMIN — CARVEDILOL 25 MG: 25 TABLET, FILM COATED ORAL at 20:40

## 2024-07-22 RX ADMIN — SODIUM BICARBONATE 650 MG TABLET 650 MG: at 11:11

## 2024-07-22 RX ADMIN — ACETAMINOPHEN 1000 MG: 500 TABLET ORAL at 00:40

## 2024-07-22 RX ADMIN — PANTOPRAZOLE SODIUM 20 MG: 20 TABLET, DELAYED RELEASE ORAL at 06:23

## 2024-07-22 RX ADMIN — LINEZOLID 600 MG: 600 INJECTION, SOLUTION INTRAVENOUS at 02:08

## 2024-07-22 RX ADMIN — HYDRALAZINE HYDROCHLORIDE 100 MG: 50 TABLET ORAL at 11:11

## 2024-07-22 RX ADMIN — TORSEMIDE 20 MG: 20 TABLET ORAL at 11:12

## 2024-07-22 RX ADMIN — SODIUM BICARBONATE 650 MG TABLET 1300 MG: at 20:40

## 2024-07-22 SDOH — SOCIAL STABILITY: SOCIAL NETWORK: IN A TYPICAL WEEK, HOW MANY TIMES DO YOU TALK ON THE PHONE WITH FAMILY, FRIENDS, OR NEIGHBORS?: THREE TIMES A WEEK

## 2024-07-22 SDOH — SOCIAL STABILITY: SOCIAL NETWORK: HOW OFTEN DO YOU ATTEND MEETINGS OF THE CLUBS OR ORGANIZATIONS YOU BELONG TO?: NEVER

## 2024-07-22 SDOH — SOCIAL STABILITY: SOCIAL INSECURITY: ARE YOU OR HAVE YOU BEEN THREATENED OR ABUSED PHYSICALLY, EMOTIONALLY, OR SEXUALLY BY ANYONE?: NO

## 2024-07-22 SDOH — SOCIAL STABILITY: SOCIAL NETWORK
DO YOU BELONG TO ANY CLUBS OR ORGANIZATIONS SUCH AS CHURCH GROUPS UNIONS, FRATERNAL OR ATHLETIC GROUPS, OR SCHOOL GROUPS?: NO

## 2024-07-22 SDOH — SOCIAL STABILITY: SOCIAL INSECURITY: ABUSE: ADULT

## 2024-07-22 SDOH — SOCIAL STABILITY: SOCIAL INSECURITY: ARE THERE ANY APPARENT SIGNS OF INJURIES/BEHAVIORS THAT COULD BE RELATED TO ABUSE/NEGLECT?: NO

## 2024-07-22 SDOH — HEALTH STABILITY: MENTAL HEALTH
DO YOU FEEL STRESS - TENSE, RESTLESS, NERVOUS, OR ANXIOUS, OR UNABLE TO SLEEP AT NIGHT BECAUSE YOUR MIND IS TROUBLED ALL THE TIME - THESE DAYS?: ONLY A LITTLE

## 2024-07-22 SDOH — SOCIAL STABILITY: SOCIAL INSECURITY: ARE YOU MARRIED, WIDOWED, DIVORCED, SEPARATED, NEVER MARRIED, OR LIVING WITH A PARTNER?: DIVORCED

## 2024-07-22 SDOH — ECONOMIC STABILITY: INCOME INSECURITY: IN THE PAST 12 MONTHS HAS THE ELECTRIC, GAS, OIL, OR WATER COMPANY THREATENED TO SHUT OFF SERVICES IN YOUR HOME?: NO

## 2024-07-22 SDOH — SOCIAL STABILITY: SOCIAL NETWORK
DO YOU BELONG TO ANY CLUBS OR ORGANIZATIONS SUCH AS CHURCH GROUPS, UNIONS, FRATERNAL OR ATHLETIC GROUPS, OR SCHOOL GROUPS?: NO

## 2024-07-22 SDOH — SOCIAL STABILITY: SOCIAL INSECURITY
WITHIN THE LAST YEAR, HAVE YOU BEEN KICKED, HIT, SLAPPED, OR OTHERWISE PHYSICALLY HURT BY YOUR PARTNER OR EX-PARTNER?: NO

## 2024-07-22 SDOH — ECONOMIC STABILITY: TRANSPORTATION INSECURITY
IN THE PAST 12 MONTHS, HAS THE LACK OF TRANSPORTATION KEPT YOU FROM MEDICAL APPOINTMENTS OR FROM GETTING MEDICATIONS?: NO

## 2024-07-22 SDOH — HEALTH STABILITY: MENTAL HEALTH: HOW OFTEN DO YOU HAVE SIX OR MORE DRINKS ON ONE OCCASION?: NEVER

## 2024-07-22 SDOH — ECONOMIC STABILITY: HOUSING INSECURITY: IN THE LAST 12 MONTHS, WAS THERE A TIME WHEN YOU WERE NOT ABLE TO PAY THE MORTGAGE OR RENT ON TIME?: NO

## 2024-07-22 SDOH — SOCIAL STABILITY: SOCIAL INSECURITY: WERE YOU ABLE TO COMPLETE ALL THE BEHAVIORAL HEALTH SCREENINGS?: YES

## 2024-07-22 SDOH — SOCIAL STABILITY: SOCIAL INSECURITY: WITHIN THE LAST YEAR, HAVE YOU BEEN AFRAID OF YOUR PARTNER OR EX-PARTNER?: NO

## 2024-07-22 SDOH — ECONOMIC STABILITY: FOOD INSECURITY: WITHIN THE PAST 12 MONTHS, THE FOOD YOU BOUGHT JUST DIDN'T LAST AND YOU DIDN'T HAVE MONEY TO GET MORE.: NEVER TRUE

## 2024-07-22 SDOH — ECONOMIC STABILITY: FOOD INSECURITY: WITHIN THE PAST 12 MONTHS, YOU WORRIED THAT YOUR FOOD WOULD RUN OUT BEFORE YOU GOT MONEY TO BUY MORE.: NEVER TRUE

## 2024-07-22 SDOH — SOCIAL STABILITY: SOCIAL INSECURITY: WITHIN THE LAST YEAR, HAVE YOU BEEN HUMILIATED OR EMOTIONALLY ABUSED IN OTHER WAYS BY YOUR PARTNER OR EX-PARTNER?: NO

## 2024-07-22 SDOH — HEALTH STABILITY: MENTAL HEALTH
STRESS IS WHEN SOMEONE FEELS TENSE, NERVOUS, ANXIOUS, OR CAN'T SLEEP AT NIGHT BECAUSE THEIR MIND IS TROUBLED. HOW STRESSED ARE YOU?: ONLY A LITTLE

## 2024-07-22 SDOH — ECONOMIC STABILITY: HOUSING INSECURITY: AT ANY TIME IN THE PAST 12 MONTHS, WERE YOU HOMELESS OR LIVING IN A SHELTER (INCLUDING NOW)?: NO

## 2024-07-22 SDOH — ECONOMIC STABILITY: TRANSPORTATION INSECURITY
IN THE PAST 12 MONTHS, HAS LACK OF TRANSPORTATION KEPT YOU FROM MEETINGS, WORK, OR FROM GETTING THINGS NEEDED FOR DAILY LIVING?: NO

## 2024-07-22 SDOH — SOCIAL STABILITY: SOCIAL INSECURITY
WITHIN THE LAST YEAR, HAVE YOU BEEN RAPED OR FORCED TO HAVE ANY KIND OF SEXUAL ACTIVITY BY YOUR PARTNER OR EX-PARTNER?: NO

## 2024-07-22 SDOH — ECONOMIC STABILITY: TRANSPORTATION INSECURITY: IN THE PAST 12 MONTHS, HAS LACK OF TRANSPORTATION KEPT YOU FROM MEDICAL APPOINTMENTS OR FROM GETTING MEDICATIONS?: NO

## 2024-07-22 SDOH — HEALTH STABILITY: MENTAL HEALTH: HOW OFTEN DO YOU HAVE 6 OR MORE DRINKS ON ONE OCCASION?: NEVER

## 2024-07-22 SDOH — SOCIAL STABILITY: SOCIAL INSECURITY: DO YOU FEEL ANYONE HAS EXPLOITED OR TAKEN ADVANTAGE OF YOU FINANCIALLY OR OF YOUR PERSONAL PROPERTY?: NO

## 2024-07-22 SDOH — SOCIAL STABILITY: SOCIAL NETWORK: HOW OFTEN DO YOU ATTEND CHURCH OR RELIGIOUS SERVICES?: NEVER

## 2024-07-22 SDOH — ECONOMIC STABILITY: FOOD INSECURITY: WITHIN THE PAST 12 MONTHS, YOU WORRIED THAT YOUR FOOD WOULD RUN OUT BEFORE YOU GOT THE MONEY TO BUY MORE.: NEVER TRUE

## 2024-07-22 SDOH — HEALTH STABILITY: MENTAL HEALTH: HOW OFTEN DO YOU HAVE A DRINK CONTAINING ALCOHOL?: NEVER

## 2024-07-22 SDOH — SOCIAL STABILITY: SOCIAL NETWORK: HOW OFTEN DO YOU ATTENT MEETINGS OF THE CLUB OR ORGANIZATION YOU BELONG TO?: NEVER

## 2024-07-22 SDOH — HEALTH STABILITY: MENTAL HEALTH: HOW MANY DRINKS CONTAINING ALCOHOL DO YOU HAVE ON A TYPICAL DAY WHEN YOU ARE DRINKING?: PATIENT DOES NOT DRINK

## 2024-07-22 SDOH — HEALTH STABILITY: PHYSICAL HEALTH
HOW OFTEN DO YOU NEED TO HAVE SOMEONE HELP YOU WHEN YOU READ INSTRUCTIONS, PAMPHLETS, OR OTHER WRITTEN MATERIAL FROM YOUR DOCTOR OR PHARMACY?: NEVER

## 2024-07-22 SDOH — ECONOMIC STABILITY: FOOD INSECURITY: HOW HARD IS IT FOR YOU TO PAY FOR THE VERY BASICS LIKE FOOD, HOUSING, MEDICAL CARE, AND HEATING?: NOT VERY HARD

## 2024-07-22 SDOH — HEALTH STABILITY: PHYSICAL HEALTH: ON AVERAGE, HOW MANY DAYS PER WEEK DO YOU ENGAGE IN MODERATE TO STRENUOUS EXERCISE (LIKE A BRISK WALK)?: 0 DAYS

## 2024-07-22 SDOH — ECONOMIC STABILITY: INCOME INSECURITY: IN THE LAST 12 MONTHS, WAS THERE A TIME WHEN YOU WERE NOT ABLE TO PAY THE MORTGAGE OR RENT ON TIME?: NO

## 2024-07-22 SDOH — SOCIAL STABILITY: SOCIAL INSECURITY: HAVE YOU HAD ANY THOUGHTS OF HARMING ANYONE ELSE?: NO

## 2024-07-22 SDOH — SOCIAL STABILITY: SOCIAL NETWORK: HOW OFTEN DO YOU GET TOGETHER WITH FRIENDS OR RELATIVES?: ONCE A WEEK

## 2024-07-22 SDOH — HEALTH STABILITY: PHYSICAL HEALTH: ON AVERAGE, HOW MANY MINUTES DO YOU ENGAGE IN EXERCISE AT THIS LEVEL?: 0 MIN

## 2024-07-22 SDOH — SOCIAL STABILITY: SOCIAL INSECURITY
WITHIN THE LAST YEAR, HAVE TO BEEN RAPED OR FORCED TO HAVE ANY KIND OF SEXUAL ACTIVITY BY YOUR PARTNER OR EX-PARTNER?: NO

## 2024-07-22 SDOH — ECONOMIC STABILITY: HOUSING INSECURITY: IN THE PAST 12 MONTHS, HOW MANY TIMES HAVE YOU MOVED WHERE YOU WERE LIVING?: 0

## 2024-07-22 SDOH — ECONOMIC STABILITY: INCOME INSECURITY: IN THE PAST 12 MONTHS, HAS THE ELECTRIC, GAS, OIL, OR WATER COMPANY THREATENED TO SHUT OFF SERVICE IN YOUR HOME?: NO

## 2024-07-22 SDOH — SOCIAL STABILITY: SOCIAL NETWORK: ARE YOU MARRIED, WIDOWED, DIVORCED, SEPARATED, NEVER MARRIED, OR LIVING WITH A PARTNER?: DIVORCED

## 2024-07-22 SDOH — ECONOMIC STABILITY: INCOME INSECURITY: HOW HARD IS IT FOR YOU TO PAY FOR THE VERY BASICS LIKE FOOD, HOUSING, MEDICAL CARE, AND HEATING?: NOT VERY HARD

## 2024-07-22 SDOH — SOCIAL STABILITY: SOCIAL INSECURITY: HAVE YOU HAD THOUGHTS OF HARMING ANYONE ELSE?: NO

## 2024-07-22 SDOH — SOCIAL STABILITY: SOCIAL INSECURITY: DO YOU FEEL UNSAFE GOING BACK TO THE PLACE WHERE YOU ARE LIVING?: YES

## 2024-07-22 SDOH — SOCIAL STABILITY: SOCIAL INSECURITY: DOES ANYONE TRY TO KEEP YOU FROM HAVING/CONTACTING OTHER FRIENDS OR DOING THINGS OUTSIDE YOUR HOME?: NO

## 2024-07-22 SDOH — SOCIAL STABILITY: SOCIAL INSECURITY: HAS ANYONE EVER THREATENED TO HURT YOUR FAMILY OR YOUR PETS?: NO

## 2024-07-22 SDOH — HEALTH STABILITY: MENTAL HEALTH: HOW MANY STANDARD DRINKS CONTAINING ALCOHOL DO YOU HAVE ON A TYPICAL DAY?: PATIENT DOES NOT DRINK

## 2024-07-22 ASSESSMENT — COGNITIVE AND FUNCTIONAL STATUS - GENERAL
MOBILITY SCORE: 24
PATIENT BASELINE BEDBOUND: NO
DAILY ACTIVITIY SCORE: 24

## 2024-07-22 ASSESSMENT — LIFESTYLE VARIABLES
HOW OFTEN DO YOU HAVE A DRINK CONTAINING ALCOHOL: NEVER
SKIP TO QUESTIONS 9-10: 1
HOW MANY STANDARD DRINKS CONTAINING ALCOHOL DO YOU HAVE ON A TYPICAL DAY: PATIENT DOES NOT DRINK
AUDIT-C TOTAL SCORE: 0
HOW OFTEN DO YOU HAVE 6 OR MORE DRINKS ON ONE OCCASION: NEVER
AUDIT-C TOTAL SCORE: 0
PRESCIPTION_ABUSE_PAST_12_MONTHS: NO
SUBSTANCE_ABUSE_PAST_12_MONTHS: NO
SKIP TO QUESTIONS 9-10: 1
AUDIT-C TOTAL SCORE: 0

## 2024-07-22 ASSESSMENT — PATIENT HEALTH QUESTIONNAIRE - PHQ9
1. LITTLE INTEREST OR PLEASURE IN DOING THINGS: NOT AT ALL
SUM OF ALL RESPONSES TO PHQ9 QUESTIONS 1 & 2: 0
2. FEELING DOWN, DEPRESSED OR HOPELESS: NOT AT ALL

## 2024-07-22 ASSESSMENT — ACTIVITIES OF DAILY LIVING (ADL)
JUDGMENT_ADEQUATE_SAFELY_COMPLETE_DAILY_ACTIVITIES: YES
HEARING - RIGHT EAR: DIFFICULTY WITH NOISE
GROOMING: INDEPENDENT
BATHING: INDEPENDENT
PATIENT'S MEMORY ADEQUATE TO SAFELY COMPLETE DAILY ACTIVITIES?: YES
WALKS IN HOME: INDEPENDENT
ADEQUATE_TO_COMPLETE_ADL: YES
HEARING - LEFT EAR: DIFFICULTY WITH NOISE
FEEDING YOURSELF: INDEPENDENT
LACK_OF_TRANSPORTATION: NO
TOILETING: INDEPENDENT
DRESSING YOURSELF: INDEPENDENT

## 2024-07-22 ASSESSMENT — ENCOUNTER SYMPTOMS
WOUND: 1
FATIGUE: 1

## 2024-07-22 ASSESSMENT — PAIN - FUNCTIONAL ASSESSMENT
PAIN_FUNCTIONAL_ASSESSMENT: 0-10

## 2024-07-22 ASSESSMENT — PAIN SCALES - GENERAL
PAINLEVEL_OUTOF10: 0 - NO PAIN
PAINLEVEL_OUTOF10: 3
PAINLEVEL_OUTOF10: 0 - NO PAIN
PAINLEVEL_OUTOF10: 2

## 2024-07-22 ASSESSMENT — PAIN DESCRIPTION - DESCRIPTORS: DESCRIPTORS: ACHING

## 2024-07-22 ASSESSMENT — PAIN DESCRIPTION - LOCATION: LOCATION: HEAD

## 2024-07-22 NOTE — PROGRESS NOTES
"Colin Leonard is a 58 y.o. male on day 2 of admission presenting with NSTEMI (non-ST elevated myocardial infarction) (Multi).    Subjective   Fatigued, awakens to verbal command, quickly returns to sleep.  No obvious distress.        Objective     Physical Exam  Vitals and nursing note reviewed.   Constitutional:       General: He is not in acute distress.     Appearance: He is obese. He is not ill-appearing or toxic-appearing.   HENT:      Head: Normocephalic and atraumatic.      Mouth/Throat:      Mouth: Mucous membranes are moist.      Pharynx: Oropharynx is clear.   Cardiovascular:      Rate and Rhythm: Normal rate and regular rhythm.      Pulses: Normal pulses.      Heart sounds: Normal heart sounds. No murmur heard.     No friction rub. No gallop.   Pulmonary:      Effort: Pulmonary effort is normal.      Breath sounds: Normal breath sounds. No wheezing, rhonchi or rales.   Abdominal:      General: Bowel sounds are normal.      Palpations: Abdomen is soft.   Musculoskeletal:      Cervical back: Normal range of motion.      Right lower leg: No edema.      Left lower leg: No edema.      Comments: Significant redness warmth and edema noted to second digit on left foot.  There is a clean dry dressing to the left foot   Skin:     General: Skin is warm and dry.      Capillary Refill: Capillary refill takes less than 2 seconds.   Neurological:      Mental Status: He is alert. Mental status is at baseline.         Last Recorded Vitals  Blood pressure 147/88, pulse 64, temperature 36.8 °C (98.2 °F), temperature source Temporal, resp. rate 19, height 1.803 m (5' 11\"), weight 119 kg (261 lb 7.5 oz), SpO2 95%.  Intake/Output last 3 Shifts:  No intake/output data recorded.    Relevant Results  Results for orders placed or performed during the hospital encounter of 07/21/24 (from the past 24 hour(s))   CBC and Auto Differential   Result Value Ref Range    WBC 18.6 (H) 4.4 - 11.3 x10*3/uL    nRBC 0.0 0.0 - 0.0 /100 WBCs "    RBC 3.20 (L) 4.50 - 5.90 x10*6/uL    Hemoglobin 9.3 (L) 13.5 - 17.5 g/dL    Hematocrit 29.1 (L) 41.0 - 52.0 %    MCV 91 80 - 100 fL    MCH 29.1 26.0 - 34.0 pg    MCHC 32.0 32.0 - 36.0 g/dL    RDW 14.6 (H) 11.5 - 14.5 %    Platelets 335 150 - 450 x10*3/uL    Neutrophils % 93.2 40.0 - 80.0 %    Immature Granulocytes %, Automated 0.5 0.0 - 0.9 %    Lymphocytes % 1.4 13.0 - 44.0 %    Monocytes % 3.9 2.0 - 10.0 %    Eosinophils % 0.8 0.0 - 6.0 %    Basophils % 0.2 0.0 - 2.0 %    Neutrophils Absolute 17.37 (H) 1.20 - 7.70 x10*3/uL    Immature Granulocytes Absolute, Automated 0.09 0.00 - 0.70 x10*3/uL    Lymphocytes Absolute 0.26 (L) 1.20 - 4.80 x10*3/uL    Monocytes Absolute 0.72 0.10 - 1.00 x10*3/uL    Eosinophils Absolute 0.15 0.00 - 0.70 x10*3/uL    Basophils Absolute 0.04 0.00 - 0.10 x10*3/uL   Comprehensive metabolic panel   Result Value Ref Range    Glucose 162 (H) 65 - 99 mg/dL    Sodium 133 133 - 145 mmol/L    Potassium 4.6 3.4 - 5.1 mmol/L    Chloride 102 97 - 107 mmol/L    Bicarbonate 14 (L) 24 - 31 mmol/L    Urea Nitrogen 61 (H) 8 - 25 mg/dL    Creatinine 6.40 (H) 0.40 - 1.60 mg/dL    eGFR 9 (L) >60 mL/min/1.73m*2    Calcium 9.6 8.5 - 10.4 mg/dL    Albumin 3.5 3.5 - 5.0 g/dL    Alkaline Phosphatase 85 35 - 125 U/L    Total Protein 7.4 5.9 - 7.9 g/dL    AST 24 5 - 40 U/L    Bilirubin, Total 0.6 0.1 - 1.2 mg/dL    ALT 27 5 - 40 U/L    Anion Gap 17 <=19 mmol/L   Magnesium   Result Value Ref Range    Magnesium 1.80 1.60 - 3.10 mg/dL   Sars-CoV-2 PCR   Result Value Ref Range    Coronavirus 2019, PCR Not Detected Not Detected   Influenza A, and B PCR   Result Value Ref Range    Flu A Result Not Detected Not Detected    Flu B Result Not Detected Not Detected   NT Pro-BNP   Result Value Ref Range    PROBNP 8,252 (H) 0 - 177 pg/mL   Serial Troponin, Initial ([facility])   Result Value Ref Range    Troponin T, High Sensitivity 363 (HH) <=14 ng/L   aPTT   Result Value Ref Range    aPTT 33.4 (H) 22.0 - 32.5 seconds   Blood  Gas Lactic Acid, Venous   Result Value Ref Range    POCT Lactate, Venous 1.9 0.4 - 2.0 mmol/L   Serial Troponin, 2 Hour ([facility])   Result Value Ref Range    Troponin T, High Sensitivity 403 (HH) <=14 ng/L   Serial Troponin, 6 Hour ([facility])   Result Value Ref Range    Troponin T, High Sensitivity 343 (HH) <=14 ng/L   CBC and Auto Differential   Result Value Ref Range    WBC 24.0 (H) 4.4 - 11.3 x10*3/uL    nRBC 0.0 0.0 - 0.0 /100 WBCs    RBC 3.04 (L) 4.50 - 5.90 x10*6/uL    Hemoglobin 9.0 (L) 13.5 - 17.5 g/dL    Hematocrit 28.2 (L) 41.0 - 52.0 %    MCV 93 80 - 100 fL    MCH 29.6 26.0 - 34.0 pg    MCHC 31.9 (L) 32.0 - 36.0 g/dL    RDW 14.7 (H) 11.5 - 14.5 %    Platelets 334 150 - 450 x10*3/uL    Neutrophils % 88.3 40.0 - 80.0 %    Immature Granulocytes %, Automated 1.0 (H) 0.0 - 0.9 %    Lymphocytes % 3.9 13.0 - 44.0 %    Monocytes % 6.5 2.0 - 10.0 %    Eosinophils % 0.0 0.0 - 6.0 %    Basophils % 0.3 0.0 - 2.0 %    Neutrophils Absolute 21.19 (H) 1.20 - 7.70 x10*3/uL    Immature Granulocytes Absolute, Automated 0.23 0.00 - 0.70 x10*3/uL    Lymphocytes Absolute 0.94 (L) 1.20 - 4.80 x10*3/uL    Monocytes Absolute 1.55 (H) 0.10 - 1.00 x10*3/uL    Eosinophils Absolute 0.01 0.00 - 0.70 x10*3/uL    Basophils Absolute 0.06 0.00 - 0.10 x10*3/uL   Comprehensive metabolic panel   Result Value Ref Range    Glucose 190 (H) 65 - 99 mg/dL    Sodium 134 133 - 145 mmol/L    Potassium 4.7 3.4 - 5.1 mmol/L    Chloride 104 97 - 107 mmol/L    Bicarbonate 15 (L) 24 - 31 mmol/L    Urea Nitrogen 64 (H) 8 - 25 mg/dL    Creatinine 6.30 (H) 0.40 - 1.60 mg/dL    eGFR 9 (L) >60 mL/min/1.73m*2    Calcium 9.4 8.5 - 10.4 mg/dL    Albumin 3.3 (L) 3.5 - 5.0 g/dL    Alkaline Phosphatase 85 35 - 125 U/L    Total Protein 7.2 5.9 - 7.9 g/dL    AST 39 5 - 40 U/L    Bilirubin, Total 0.6 0.1 - 1.2 mg/dL    ALT 32 5 - 40 U/L    Anion Gap 15 <=19 mmol/L   Sedimentation rate, automated   Result Value Ref Range    Sedimentation Rate 60 (H) 0 - 20 mm/h   C-reactive  protein   Result Value Ref Range    C-Reactive Protein 24.70 (H) 0.00 - 2.00 mg/dL   Magnesium   Result Value Ref Range    Magnesium 2.20 1.60 - 3.10 mg/dL   Protime-INR   Result Value Ref Range    Protime 12.6 9.3 - 12.7 seconds    INR 1.2 0.9 - 1.2   Type and screen   Result Value Ref Range    ABO TYPE O     Rh TYPE POS     ANTIBODY SCREEN NEG    Hemoglobin A1c   Result Value Ref Range    Hemoglobin A1C 5.5 See below %    Estimated Average Glucose 111 Not Established mg/dL         Assessment/Plan   Principal Problem:    NSTEMI (non-ST elevated myocardial infarction) (Multi)  Active Problems:    Obesity    LVH (left ventricular hypertrophy)    Mixed hyperlipidemia    Benign essential hypertension    Coronary artery disease involving native coronary artery of native heart without angina pectoris    Fall    Leukocytosis    Stage 5 chronic kidney disease not on chronic dialysis (Multi)    Type 2 diabetes mellitus (Multi)    Weakness, malaise, shortness of breath and diaphoresis  Essential hypertension  Hyperlipidemia  Diabetes mellitus  Chronic kidney disease  Anemia of chronic disease  Family history of ischemic heart disease     7/21: Noted in history of present illness patient currently does not scribe any chest pain or discomfort.  Has had mild shortness of breath symptoms over the last 4 days which she states feels like he is COVID-19 infection 4 years ago.  His twelve-lead EKG reveals a left bundle branch block pattern.  The fact that the patient has no chest discomfort and an EKG with a left bundle branch block pattern I do not feel this meets criteria for an ST segment elevation myocardial infarction.  Also he has chronic kidney disease with a serum creatinine 2022 6.9.  Thus cardiac catheterization would certainly carry with it significant risk of nephrotoxicity.  His hemodynamically stable and again has no chest discomfort.  Code STEMI will be deactivated and emergency department begin workup for the  patient's current medical illness.  Cardiology service will certainly be available for consultation if needed.    7/22: As above, stable overnight.  Was initially called as a code STEMI, found to be a left bundle branch block, patient had no chest pain or symptoms suggestive advancing coronary artery disease.  He does have elevated troponins which are relative to his chronic kidney disease with hemodialysis.  Creatinine is morning 6.3.  Hemoglobin low but stable at 9.0.  On telemetry monitor overnight the patient is noted to have a couple episodes of nonsustained SVT. He does remain on carvedilol 25 mg p.o. twice daily.  If he continues to have difficulty with SVT will consider electrophysiology consult.  His last echocardiogram on record is from 2022.  Was noted to have a preserved ejection fraction at that time.  Given his fatigue and malaise, will check an echo this hospitalization.  Will follow with you.    I spent 35 minutes in the professional and overall care of this patient.      Grey Fontana, APRN-CNP

## 2024-07-22 NOTE — PROGRESS NOTES
Colin Leonard is a 58 year old male presenting with weakness, NSTEMI.       07/22/24 1636   Current Planned Discharge Disposition   Current Planned Discharge Disposition Home     He lives with his 19 year old son in a single level house. No use of assistive devices. He is independent with ADLs, daily tasks, employed and drives. PCP is Bony Ballard.  ADOD 07/25/2024  Patient to have Echo.   Plan is to discharge home with no needs, he has no transportation needs.     Sheba Koch RN-BSN

## 2024-07-22 NOTE — PROGRESS NOTES
07/22/24 1632   Physical Activity   On average, how many days per week do you engage in moderate to strenuous exercise (like a brisk walk)? 0 days   On average, how many minutes do you engage in exercise at this level? 0 min   Financial Resource Strain   How hard is it for you to pay for the very basics like food, housing, medical care, and heating? Not very   Housing Stability   In the last 12 months, was there a time when you were not able to pay the mortgage or rent on time? N   In the past 12 months, how many times have you moved where you were living? 0   At any time in the past 12 months, were you homeless or living in a shelter (including now)? N   Transportation Needs   In the past 12 months, has lack of transportation kept you from medical appointments or from getting medications? no   In the past 12 months, has lack of transportation kept you from meetings, work, or from getting things needed for daily living? No   Food Insecurity   Within the past 12 months, you worried that your food would run out before you got the money to buy more. Never true   Within the past 12 months, the food you bought just didn't last and you didn't have money to get more. Never true   Stress   Do you feel stress - tense, restless, nervous, or anxious, or unable to sleep at night because your mind is troubled all the time - these days? Only a littl   Social Connections   In a typical week, how many times do you talk on the phone with family, friends, or neighbors? Three   How often do you get together with friends or relatives? Once   How often do you attend [employer] or [employer]ze services? Never   Do you belong to any clubs or organizations such as [employer] groups, unions, fraternal or athletic groups, or school groups? No   How often do you attend meetings of the clubs or organizations you belong to? Never   Are you , , , , never , or living with a partner?    Intimate Partner  Violence   Within the last year, have you been afraid of your partner or ex-partner? No   Within the last year, have you been humiliated or emotionally abused in other ways by your partner or ex-partner? No   Within the last year, have you been kicked, hit, slapped, or otherwise physically hurt by your partner or ex-partner? No   Within the last year, have you been raped or forced to have any kind of sexual activity by your partner or ex-partner? No   Alcohol Use   Q1: How often do you have a drink containing alcohol? Never   Q2: How many drinks containing alcohol do you have on a typical day when you are drinking? None   Q3: How often do you have six or more drinks on one occasion? Never   Utilities   In the past 12 months has the electric, gas, oil, or water company threatened to shut off services in your home? No   Health Literacy   How often do you need to have someone help you when you read instructions, pamphlets, or other written material from your doctor or pharmacy? Never

## 2024-07-22 NOTE — PROGRESS NOTES
07/22/24 1635   [facility] Disability Status   Are you deaf or do you have serious difficulty hearing? N   Are you blind or do you have serious difficulty seeing, even when wearing glasses? N   Because of a physical, mental, or emotional condition, do you have serious difficulty concentrating, remembering, or making decisions? (5 years old or older) N   Do you have serious difficulty walking or climbing stairs? N   Do you have serious difficulty dressing or bathing? N   Because of a physical, mental, or emotional condition, do you have serious difficulty doing errands alone such as visiting the doctor? N

## 2024-07-22 NOTE — CARE PLAN
The patient's goals for the shift include      The clinical goals for the shift include stay afebrile

## 2024-07-22 NOTE — PROGRESS NOTES
"Spiritual Care Visit    Clinical Encounter Type  Visited With: Patient  Routine Visit: Introduction    Yazdanism Encounters  Yazdanism Needs: Sacred text, Spiritual care brochure, Prayer     Annotation:  provided emotional and spiritual support for the patient. Patient was resting when the  arrived. Patient welcomed the visit today. Patient is spiritual and was sharing how the gospel and his relationship with \"Asim Chidi\" is so influential . Patient shared his life review and his current family dynamics. His son and sister are consistent in his life.   encourage Colin to reflect upon his jer and find confidence in God's presence during his hospital stay.  offered a prayer of blessing as requested by the patient.  reviewed the way to obtain spiritual care support as desired.                                           "

## 2024-07-22 NOTE — CONSULTS
Inpatient consult to Infectious Diseases  Consult performed by: Garry Rodrigez MD  Consult ordered by: Tc Gooden MD            Primary MD: Bony Ballard MD    Reason For Consult  Infected left foot ulcer    History Of Present Illness  Colin Leonard is a 58 y.o. male presenting with fatigue and generalized weakness  He has a background history of type 2 diabetes, chronic kidney disease stage V.  Onset was a couple of days prior to presentation, gradual, constant, interval worsening.  He has had left foot ulcer for quite some time.  He has mild left foot pain.  He denies any fever or chills.  He got a dose of Zyvox and Zosyn.       Past Medical History  He has no past medical history on file.    Surgical History  He has no past surgical history on file.     Social History     Occupational History    Not on file   Tobacco Use    Smoking status: Never    Smokeless tobacco: Never   Substance and Sexual Activity    Alcohol use: Not on file    Drug use: Not on file    Sexual activity: Not on file     Travel History   Travel since 06/22/24    No documented travel since 06/22/24           Family History  No family history on file.  Allergies  Amlodipine besylate     Immunization History   Administered Date(s) Administered    Flu vaccine (IIV4), preservative free *Check age/dose* 09/07/2020    Pfizer Purple Cap SARS-CoV-2 03/31/2021, 04/21/2021    Td vaccine, age 7 years and older (TDVAX) 03/22/2005     Medications  Home medications:  Medications Prior to Admission   Medication Sig Dispense Refill Last Dose    aspirin 81 mg EC tablet Take 1 tablet (81 mg) by mouth once daily.       carvedilol (Coreg) 25 mg tablet Take 1 tablet (25 mg) by mouth 2 times a day. 180 tablet 3     ezetimibe (Zetia) 10 mg tablet Take 1 tablet (10 mg) by mouth once daily at bedtime. 90 tablet 3     hydrALAZINE (Apresoline) 100 mg tablet Take 1 tablet (100 mg) by mouth 3 times a day. 270 tablet 3     spironolactone (Aldactone) 25 mg  "tablet Take 1 tablet (25 mg) by mouth 2 times a day. 180 tablet 3     torsemide (Demadex) 20 mg tablet Take 2 Tablets by Mouth Twice Daily 240 tablet 1      Current medications:  Scheduled medications  aspirin, 81 mg, oral, Daily  atorvastatin, 40 mg, oral, Nightly  carvedilol, 25 mg, oral, BID  ezetimibe, 10 mg, oral, Nightly  heparin (porcine), 5,000 Units, subcutaneous, q8h  hydrALAZINE, 100 mg, oral, TID  pantoprazole, 20 mg, oral, Daily before breakfast  perflutren lipid microspheres, 0.5-10 mL of dilution, intravenous, Once in imaging  sodium bicarbonate, 650 mg, oral, BID  torsemide, 20 mg, oral, Daily      Continuous medications  sodium chloride 0.9%, 75 mL/hr, Last Rate: 75 mL/hr (07/22/24 0306)      PRN medications  PRN medications: acetaminophen **OR** acetaminophen **OR** acetaminophen, benzocaine-menthol, dextromethorphan-guaifenesin, dextrose, dextrose, glucagon, glucagon, guaiFENesin, ondansetron ODT **OR** ondansetron, polyethylene glycol    Review of Systems   Constitutional:  Positive for fatigue.   Skin:  Positive for wound.   All other systems reviewed and are negative.       Objective  Range of Vitals (last 24 hours)  Heart Rate:  []   Temp:  [36.8 °C (98.2 °F)-38.6 °C (101.5 °F)]   Resp:  [13-25]   BP: (115-185)/()   Height:  [180.3 cm (5' 11\")]   Weight:  [119 kg (261 lb 7.5 oz)]   SpO2:  [95 %-97 %]   Daily Weight  07/21/24 : 119 kg (261 lb 7.5 oz)    Body mass index is 36.47 kg/m².     Physical Exam  Constitutional:       Appearance: Normal appearance.   HENT:      Head: Normocephalic and atraumatic.      Nose: Nose normal.   Eyes:      General: No scleral icterus.     Extraocular Movements: Extraocular movements intact.      Conjunctiva/sclera: Conjunctivae normal.   Cardiovascular:      Rate and Rhythm: Normal rate and regular rhythm.   Pulmonary:      Effort: Pulmonary effort is normal.      Breath sounds: Normal breath sounds.   Abdominal:      General: Bowel sounds are " "normal.      Palpations: Abdomen is soft.   Musculoskeletal:      Cervical back: Normal range of motion and neck supple.      Right lower leg: No edema.      Left lower leg: No edema.   Feet:      Left foot:      Skin integrity: Ulcer and callus present.      Comments: Left second met head  Skin:     General: Skin is warm and dry.   Neurological:      Mental Status: He is alert and oriented to person, place, and time.   Psychiatric:         Mood and Affect: Mood normal.         Behavior: Behavior normal.          Relevant Results  Outside Hospital Results    Labs  Results from last 72 hours   Lab Units 07/22/24  0619 07/21/24  2217   WBC AUTO x10*3/uL 24.0* 18.6*   HEMOGLOBIN g/dL 9.0* 9.3*   HEMATOCRIT % 28.2* 29.1*   PLATELETS AUTO x10*3/uL 334 335   NEUTROS PCT AUTO % 88.3 93.2   LYMPHS PCT AUTO % 3.9 1.4   MONOS PCT AUTO % 6.5 3.9   EOS PCT AUTO % 0.0 0.8     Results from last 72 hours   Lab Units 07/22/24  0619 07/21/24 2217   SODIUM mmol/L 134 133   POTASSIUM mmol/L 4.7 4.6   CHLORIDE mmol/L 104 102   CO2 mmol/L 15* 14*   BUN mg/dL 64* 61*   CREATININE mg/dL 6.30* 6.40*   GLUCOSE mg/dL 190* 162*   CALCIUM mg/dL 9.4 9.6   ANION GAP mmol/L 15 17   EGFR mL/min/1.73m*2 9* 9*     Results from last 72 hours   Lab Units 07/22/24  0619 07/21/24 2217   ALK PHOS U/L 85 85   BILIRUBIN TOTAL mg/dL 0.6 0.6   PROTEIN TOTAL g/dL 7.2 7.4   ALT U/L 32 27   AST U/L 39 24   ALBUMIN g/dL 3.3* 3.5     Estimated Creatinine Clearance: 16.4 mL/min (A) (by C-G formula based on SCr of 6.3 mg/dL (H)).  C-Reactive Protein   Date Value Ref Range Status   07/22/2024 24.70 (H) 0.00 - 2.00 mg/dL Final     CRP   Date Value Ref Range Status   09/09/2020 5.32 (A) mg/dL Final     Comment:     REF VALUE  < 1.00     09/08/2020 9.54 (A) mg/dL Final     Comment:     REF VALUE  < 1.00       Sedimentation Rate   Date Value Ref Range Status   07/22/2024 60 (H) 0 - 20 mm/h Final     No results found for: \"HIV1X2\", \"HIVCONF\", \"UIAQDM1KH\"  No results " "found for: \"HEPCABINIT\", \"HEPCAB\", \"HCVPCRQUANT\"  Microbiology  Blood cultures pending-7/21/2024  Imaging  Transthoracic Echo (TTE) Complete    Result Date: 7/22/2024           [place] [id number]            Phone [phone] TRANSTHORACIC ECHOCARDIOGRAM REPORT Patient Name:      ODALYS MELVIN     Reading Physician:    [id number] Kade Pedraza DO Study Date:        7/22/2024             Ordering Provider:    [id number] GERMAN KENNEY MRN/PID:           [id number]              Fellow: Accession#:        [id number]          Nurse: Date of Birth/Age: 1965 / 58 years Sonographer:          Vera Wynn RDCS Gender:            M                     Additional Staff: Height:            180.34 cm             Admit Date: Weight:            118.39 kg             Admission Status:     Inpatient -                                                                Routine BSA / BMI:         2.36 m2 / 36.40 kg/m2 Department Location:  [facility] Blood Pressure: 147 /88 mmHg Study Type:    TRANSTHORACIC ECHO (TTE) COMPLETE Diagnosis/ICD: Atherosclerotic heart disease of native coronary 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 Study Detail: The following Echo studies were performed: 2D, M-Mode, color flow               and Doppler. Unable to obtain suprasternal notch view.  PHYSICIAN INTERPRETATION: Left Ventricle: The left ventricular systolic function is normal, with a visually estimated ejection fraction of 60-65%. There are no regional wall motion abnormalities. The left ventricular cavity size is normal. Left " ventricular diastolic filling was indeterminate. Left Atrium: The left atrium is mildly dilated. Right Ventricle: The right ventricle is normal in size. There is normal right ventricular global systolic function. Right Atrium: The right atrium is normal in size. Aortic Valve: The aortic valve is trileaflet. There is evidence of mild aortic valve stenosis. The aortic valve dimensionless index is 0.45. There is no evidence of aortic valve regurgitation. The peak instantaneous gradient of the aortic valve is 20.4 mmHg. The mean gradient of the aortic valve is 10.6 mmHg. Mitral Valve: The mitral valve is normal in structure. There is mild mitral valve regurgitation. Tricuspid Valve: The tricuspid valve is structurally normal. There is trace tricuspid regurgitation. Pulmonic Valve: The pulmonic valve is not well visualized. The pulmonic valve regurgitation was not well visualized. Pericardium: There is no pericardial effusion noted. Aorta: The aortic root is normal.  CONCLUSIONS:  1. The left ventricular systolic function is normal, with a visually estimated ejection fraction of 60-65%.  2. Left ventricular diastolic filling was indeterminate.  3. There is normal right ventricular global systolic function.  4. Mild aortic valve stenosis.  5. Aortic stenosis mean gradient 10 mm Hg, peak gradient 40 mm Hg and ADÁN 1.43 cm2.  6. Aortic valve dimensionless index 0.43. QUANTITATIVE DATA SUMMARY: 2D MEASUREMENTS:                           Normal Ranges: LAs:           4.76 cm    (2.7-4.0cm) IVSd:          1.70 cm    (0.6-1.1cm) LVPWd:         1.51 cm    (0.6-1.1cm) LVIDd:         3.86 cm    (3.9-5.9cm) LVIDs:         2.85 cm LV Mass Index: 104.8 g/m2 LV % FS        26.2 % LA VOLUME:                               Normal Ranges: LA Vol A4C:        71.7 ml    (22+/-6mL/m2) LA Vol A2C:        92.5 ml LA Vol BP:         86.7 ml LA Vol Index A4C:  30.4 ml/m2 LA Vol Index A2C:  39.2 ml/m2 LA Vol Index BP:   36.7 ml/m2 LA Area A4C:        22.5 cm2 LA Area A2C:       27.2 cm2 LA Major Axis A4C: 6.0 cm LA Major Axis A2C: 6.8 cm LA Volume Index:   36.7 ml/m2 LA Vol A4C:        72.0 ml LA Vol A2C:        92.0 ml RA VOLUME BY A/L METHOD:                               Normal Ranges: RA Vol A4C:        39.5 ml    (8.3-19.5ml) RA Vol Index A4C:  16.7 ml/m2 RA Area A4C:       15.4 cm2 RA Major Axis A4C: 5.1 cm AORTA MEASUREMENTS:                      Normal Ranges: Ao Sinus, d: 3.30 cm (2.1-3.5cm) Ao STJ, d:   3.00 cm (1.7-3.4cm) Asc Ao, d:   3.80 cm (2.1-3.4cm) LV SYSTOLIC FUNCTION BY 2D PLANIMETRY (MOD):                      Normal Ranges: EF-A4C View:    48 % (>=55%) EF-A2C View:    65 % EF-Biplane:     57 % EF-Visual:      63 % LV EF Reported: 63 % LV DIASTOLIC FUNCTION:                         Normal Ranges: MV Peak E:    0.82 m/s  (0.7-1.2 m/s) MV Peak A:    0.99 m/s  (0.42-0.7 m/s) E/A Ratio:    0.83      (1.0-2.2) MV e'         0.071 m/s (>8.0) MV lateral e' 0.08 m/s MV medial e'  0.06 m/s E/e' Ratio:   11.61     (<8.0) MITRAL VALVE:                 Normal Ranges: MV DT: 221 msec (150-240msec) AORTIC VALVE:                                    Normal Ranges: AoV Vmax:                2.26 m/s  (<=1.7m/s) AoV Peak P.4 mmHg (<20mmHg) AoV Mean PG:             10.6 mmHg (1.7-11.5mmHg) LVOT Max Cirilo:            0.97 m/s  (<=1.1m/s) AoV VTI:                 46.31 cm  (18-25cm) LVOT VTI:                21.06 cm LVOT Diameter:           2.00 cm   (1.8-2.4cm) AoV Area, VTI:           1.43 cm2  (2.5-5.5cm2) AoV Area,Vmax:           1.35 cm2  (2.5-4.5cm2) AoV Dimensionless Index: 0.45  RIGHT VENTRICLE: TAPSE: 25.1 mm RV s'  0.15 m/s TRICUSPID VALVE/RVSP:                   Normal Ranges: IVC Diam: 2.02 cm AORTA: Asc Ao Diam 3.83 cm  [id number] [facility] [place] Electronically signed on 2024 at 10:49:35 AM  ** Final **     XR foot left 3+ views    Result Date: 2024  Interpreted By:  Ely Alanis, STUDY: XR FOOT LEFT 3+ VIEWS;  2024 2:44 am    INDICATION: Signs/Symptoms:Rule out infection.   COMPARISON: None.   ACCESSION NUMBER(S): [id number]   ORDERING CLINICIAN: BRENNAN SOLORIO   FINDINGS: 3 views of the left foot were obtained.   There is diffuse osteopenia. There is no acute fracture or dislocation. Degenerative changes are noted at the 1st metatarsophalangeal joint with hallux valgus. There is cortical lucency at the bases of the 2nd and 3rd distal phalanges. Degenerative changes are noted at the midfoot. There is calcaneal enthesopathy. Vascular calcifications are present. There is diffuse soft tissue swelling at the left foot. There is small amount of gas at the plantar soft tissues overlying the bases of the toes.       1. Cortical lucency at the bases of the distal phalanges of the left 2nd and 3rd toes, underlying osteomyelitis cannot be excluded. Contrast-enhanced MRI can help in further evaluation. 2. Diffuse soft tissue edema at the left foot. Small amount of gas at the plantar soft tissues overlying the bases of the toes, suggestive of ulcer.       MACRO: None.   Signed by: Ely Alanis 7/22/2024 2:52 AM Dictation workstation:   [id number]    XR chest 1 view    Result Date: 7/21/2024  Interpreted By:  Makeda Gurrola, STUDY: XR CHEST 1 VIEW;  7/21/2024 10:23 pm   INDICATION: Signs/Symptoms:weakness.   COMPARISON: 02/14/2022   ACCESSION NUMBER(S): [id number]   ORDERING CLINICIAN: MIHAELA AUGUSTIN   FINDINGS:     CARDIOMEDIASTINAL SILHOUETTE: Stable cardiomegaly.   LUNGS: No pulmonary consolidation, pleural effusion or pneumothorax. Low lung volumes with bronchovascular crowding.   ABDOMEN: No remarkable upper abdominal findings.   BONES: No acute osseous abnormality.       No acute cardiopulmonary process.   MACRO: None   Signed by: Makeda Gurrola 7/21/2024 10:43 PM Dictation workstation:   [id number]     Assessment/Plan   Chronic kidney disease stage V  Type 2 diabetes with peripheral angiopathy without gangrene  Left diabetic  foot ulcer, Brown 2 versus 3  Left foot cellulitis    IV daptomycin  CK level  IV Zosyn  Podiatry consult  MRI left foot-after evaluation by podiatry  Nephrology consult  Local care  Offloading  Monitor temperature and WBC    Garry Rodrgiez MD

## 2024-07-22 NOTE — PROGRESS NOTES
07/22/24 1634   Discharge Planning   Living Arrangements Children   Support Systems Family members   Assistance Needed none   Type of Residence Private residence   Number of Stairs to Enter Residence 2   Number of Stairs Within Residence 0   Do you have animals or pets at home? No   Who is requesting discharge planning? Provider   Home or Post Acute Services None   Expected Discharge Disposition Home   Does the patient need discharge transport arranged? No   Patient Choice   Provider Choice list and CMS website (https://medicare.gov/care-compare#search) for post-acute Quality and Resource Measure Data were provided and reviewed with: Other (Comment)  (no skilled needs)   Patient / Family choosing to utilize agency / facility established prior to hospitalization No

## 2024-07-22 NOTE — PROGRESS NOTES
Referred by Dr. Yan ref. provider found for No chief complaint on file.     History Of Present Illness:    Colin Leonard is a 58 y.o. male presenting with fatigue, weakness and shortness of breath.  Patient does have a number of cardiovascular risk factors.  He has a history of diabetes and chronic kidney disease as well as hypertension and an elevated coronary artery calcium score.  He has seen Dr. Mahendra Saravia risk modification and cardiology follow-up in the past.  He states that on Wednesday of this week he developed fatigue weakness some shortness of breath which felt like his COVID-19 infection back in 2020.  Also had episodes of diaphoresis on and on.  No chest pain or typical anginal type symptoms.  Today's symptoms seem to worsen where he was sweating more today than he had previously in the week and his son became quite concerned and called 911 and brought him to the [facility] emergency department.  Code STEMI was activated and I reported to the emergency department to evaluate the patient.  This time he is hemodynamically stable and has no chest pain or discomfort.  He states he had mild shortness of breath over the last 4 days but this has not changed.  His twelve-lead EKG reveals a sinus tachycardia with a left bundle branch block pattern..       Past Medical History:  Essential hypertension  Hyperlipidemia  Diabetes mellitus  Chronic kidney disease    Past Surgical History:  Any stone procedure and ureter surgical procedure many years ago.      Social History:  He reports that he has never smoked. He has never used smokeless tobacco. No history on file for alcohol use and drug use.  Rarely drinks alcohol  Unmarried    Family History:  Other is still alive at age 86 but he did have bypass surgery at the age of 50.  Alive at age 85 and has a history of atrial fibrillation.     Allergies:  Amlodipine besylate    Outpatient Medications:  Current Outpatient Medications   Medication Instructions     aspirin 81 mg, oral, Daily    carvedilol (COREG) 25 mg, oral, 2 times daily    ezetimibe (ZETIA) 10 mg, oral, Nightly    hydrALAZINE (APRESOLINE) 100 mg, oral, 3 times daily    spironolactone (ALDACTONE) 25 mg, oral, 2 times daily    torsemide (Demadex) 20 mg tablet Take 2 Tablets by Mouth Twice Daily        Last Recorded Vitals:  There were no vitals filed for this visit.    Physical Exam:  Constitutional:       Appearance: Not in distress.   Eyes:      Conjunctiva/sclera: Conjunctivae normal.   HENT:    Mouth/Throat:      Pharynx: Oropharynx is clear.   Neck:      Vascular: No carotid bruit. JVD normal.   Pulmonary:      Breath sounds: Normal breath sounds. No wheezing. No rales.   Cardiovascular:      Regular rhythm.      Murmurs: There is no murmur.      No gallop.  No click. No rub.   Abdominal:      Palpations: Abdomen is soft.      Tenderness: There is no abdominal tenderness.   Musculoskeletal:         General: No deformity. Neurological:      General: No focal deficit present.             Last Labs:  CBC -  Lab Results   Component Value Date    WBC 18.6 (H) 07/21/2024    HGB 9.3 (L) 07/21/2024    HCT 29.1 (L) 07/21/2024    MCV 91 07/21/2024     07/21/2024       CMP -  Lab Results   Component Value Date    CALCIUM 9.9 04/29/2022    PHOS 6.6 (H) 04/29/2022    PROT 7.0 02/14/2022    ALBUMIN 4.1 04/29/2022    AST 10 02/14/2022    ALT 10 02/14/2022    ALKPHOS 71 02/14/2022    BILITOT 0.8 02/14/2022       LIPID PANEL -   Lab Results   Component Value Date    CHOL 97 02/01/2022    TRIG 117 02/01/2022    HDL 30.2 (A) 02/01/2022    CHHDL 3.2 02/01/2022    LDLF 43 02/01/2022    VLDL 23 02/01/2022    NHDL 152 09/24/2019       RENAL FUNCTION PANEL -   Lab Results   Component Value Date    GLUCOSE 158 (H) 04/29/2022     04/29/2022    K 5.0 04/29/2022     04/29/2022    CO2 24 04/29/2022    ANIONGAP 17 04/29/2022     (HH) 04/29/2022    CREATININE 6.90 (H) 04/29/2022    GFRMALE 9 (A) 04/29/2022     "CALCIUM 9.9 04/29/2022    PHOS 6.6 (H) 04/29/2022    ALBUMIN 4.1 04/29/2022        Lab Results   Component Value Date     (H) 02/14/2022    HGBA1C 5.5 02/16/2022       Last Cardiology Tests:  ECG:  ECG 12 lead (Clinic Performed) 04/25/2024    EKG of 7/21/2024 with sinus tachycardia left bundle branch block pattern  Echo:  No results found for this or any previous visit from the past 1095 days.      Ejection Fractions:  No results found for: \"EF\"    Cath:  No results found for this or any previous visit from the past 1095 days.      Stress Test:  No results found for this or any previous visit from the past 1095 days.      Cardiac Imaging:  No results found for this or any previous visit from the past 1095 days.            Assessment/Plan     Weakness, malaise, shortness of breath and diaphoresis  Essential hypertension  Hyperlipidemia  Diabetes mellitus  Chronic kidney disease  Family history of ischemic heart disease    7/21: Noted in history of present illness patient currently does not scribe any chest pain or discomfort.  Has had mild shortness of breath symptoms over the last 4 days which she states feels like he is COVID-19 infection 4 years ago.  His twelve-lead EKG reveals a left bundle branch block pattern.  The fact that the patient has no chest discomfort and an EKG with a left bundle branch block pattern I do not feel this meets criteria for an ST segment elevation myocardial infarction.  Also he has chronic kidney disease with a serum creatinine 2022 6.9.  Thus cardiac catheterization would certainly carry with it significant risk of nephrotoxicity.  His hemodynamically stable and again has no chest discomfort.  Code STEMI will be deactivated and emergency department begin workup for the patient's current medical illness.  Cardiology service will certainly be available for consultation if needed.        Kade Pedraza,   "

## 2024-07-22 NOTE — PROGRESS NOTES
Colin Melvin is a 58 y.o. male on day 0 of admission presenting with NSTEMI (non-ST elevated myocardial infarction) (Multi).      Subjective   Patient reports he feels well and is resting. He denies any pain, says his foot does not bother him. Reports foot will occasionally 'throb' but only lasts a few seconds. Reports wound has been present for 2-3 wks. No CP or SOB.        Objective     Last Recorded Vitals  /71 (BP Location: Right arm, Patient Position: Lying)   Pulse 79   Temp 36.8 °C (98.2 °F) (Temporal)   Resp 18   Wt 120 kg (264 lb 5.3 oz)   SpO2 98%   Intake/Output last 3 Shifts:    Intake/Output Summary (Last 24 hours) at 7/22/2024 1626  Last data filed at 7/22/2024 1509  Gross per 24 hour   Intake 0 ml   Output 325 ml   Net -325 ml       Admission Weight  Weight: 119 kg (261 lb 7.5 oz) (07/21/24 2214)    Daily Weight  07/22/24 : 120 kg (264 lb 5.3 oz)    Image Results  Transthoracic Echo (TTE) [place] [address]             Phone [phone]    TRANSTHORACIC ECHOCARDIOGRAM REPORT    Patient Name:      COLIN MELVIN     Reading Physician:    [id number] [facility]  Study Date:        7/22/2024             Ordering Provider:    [id number] GERMAN KENNEY  MRN/PID:           [id number]              Fellow:  Accession#:        [id number]          Nurse:  Date of Birth/Age: 1965 / 58 years Sonographer:          Vera Wynn RDCS  Gender:            M                     Additional Staff:  Height:            180.34 cm             Admit Date:  Weight:            118.39 kg             Admission Status:     Inpatient -                                                                 Routine  BSA / BMI:         2.36 m2 / 36.40 kg/m2  Department Location:  [facility]  Blood Pressure: 147 /88 mmHg    Study Type:    TRANSTHORACIC ECHO (TTE) COMPLETE  Diagnosis/ICD: Atherosclerotic heart disease of native coronary artery without                 angina pectoris-I25.10  Indication:    Dyspnea, weakness fatigue  CPT Codes:     Echo Complete w Full [id number]    Patient History:  Pertinent History: LVH, HLD, HTN, CAD, Nstemi, SOB CKD, DM.    Study Detail: The following Echo studies were performed: 2D, M-Mode, color flow                and Doppler. Unable to obtain suprasternal notch view.       PHYSICIAN INTERPRETATION:  Left Ventricle: The left ventricular systolic function is normal, with a visually estimated ejection fraction of 60-65%. There are no regional wall motion abnormalities. The left ventricular cavity size is normal. Left ventricular diastolic filling was indeterminate.  Left Atrium: The left atrium is mildly dilated.  Right Ventricle: The right ventricle is normal in size. There is normal right ventricular global systolic function.  Right Atrium: The right atrium is normal in size.  Aortic Valve: The aortic valve is trileaflet. There is evidence of mild aortic valve stenosis.  The aortic valve dimensionless index is 0.45. There is no evidence of aortic valve regurgitation. The peak instantaneous gradient of the aortic valve is 20.4 mmHg. The mean gradient of the aortic valve is 10.6 mmHg.  Mitral Valve: The mitral valve is normal in structure. There is mild mitral valve regurgitation.  Tricuspid Valve: The tricuspid valve is structurally normal. There is trace tricuspid regurgitation.  Pulmonic Valve: The pulmonic valve is not well visualized. The pulmonic valve regurgitation was not well visualized.  Pericardium: There is no pericardial effusion noted.  Aorta: The aortic root is normal.       CONCLUSIONS:   1. The left ventricular systolic function is normal, with a visually estimated ejection fraction of 60-65%.   2. Left  ventricular diastolic filling was indeterminate.   3. There is normal right ventricular global systolic function.   4. Mild aortic valve stenosis.   5. Aortic stenosis mean gradient 10 mm Hg, peak gradient 40 mm Hg and ADÁN 1.43 cm2.   6. Aortic valve dimensionless index 0.43.    QUANTITATIVE DATA SUMMARY:  2D MEASUREMENTS:                            Normal Ranges:  LAs:           4.76 cm    (2.7-4.0cm)  IVSd:          1.70 cm    (0.6-1.1cm)  LVPWd:         1.51 cm    (0.6-1.1cm)  LVIDd:         3.86 cm    (3.9-5.9cm)  LVIDs:         2.85 cm  LV Mass Index: 104.8 g/m2  LV % FS        26.2 %    LA VOLUME:                                Normal Ranges:  LA Vol A4C:        71.7 ml    (22+/-6mL/m2)  LA Vol A2C:        92.5 ml  LA Vol BP:         86.7 ml  LA Vol Index A4C:  30.4 ml/m2  LA Vol Index A2C:  39.2 ml/m2  LA Vol Index BP:   36.7 ml/m2  LA Area A4C:       22.5 cm2  LA Area A2C:       27.2 cm2  LA Major Axis A4C: 6.0 cm  LA Major Axis A2C: 6.8 cm  LA Volume Index:   36.7 ml/m2  LA Vol A4C:        72.0 ml  LA Vol A2C:        92.0 ml    RA VOLUME BY A/L METHOD:                                Normal Ranges:  RA Vol A4C:        39.5 ml    (8.3-19.5ml)  RA Vol Index A4C:  16.7 ml/m2  RA Area A4C:       15.4 cm2  RA Major Axis A4C: 5.1 cm    AORTA MEASUREMENTS:                       Normal Ranges:  Ao Sinus, d: 3.30 cm (2.1-3.5cm)  Ao STJ, d:   3.00 cm (1.7-3.4cm)  Asc Ao, d:   3.80 cm (2.1-3.4cm)    LV SYSTOLIC FUNCTION BY 2D PLANIMETRY (MOD):                       Normal Ranges:  EF-A4C View:    48 % (>=55%)  EF-A2C View:    65 %  EF-Biplane:     57 %  EF-Visual:      63 %  LV EF Reported: 63 %    LV DIASTOLIC FUNCTION:                          Normal Ranges:  MV Peak E:    0.82 m/s  (0.7-1.2 m/s)  MV Peak A:    0.99 m/s  (0.42-0.7 m/s)  E/A Ratio:    0.83      (1.0-2.2)  MV e'         0.071 m/s (>8.0)  MV lateral e' 0.08 m/s  MV medial e'  0.06 m/s  E/e' Ratio:   11.61     (<8.0)    MITRAL VALVE:                   Normal Ranges:  MV DT: 221 msec (150-240msec)    AORTIC VALVE:                                     Normal Ranges:  AoV Vmax:                2.26 m/s  (<=1.7m/s)  AoV Peak P.4 mmHg (<20mmHg)  AoV Mean PG:             10.6 mmHg (1.7-11.5mmHg)  LVOT Max Cirilo:            0.97 m/s  (<=1.1m/s)  AoV VTI:                 46.31 cm  (18-25cm)  LVOT VTI:                21.06 cm  LVOT Diameter:           2.00 cm   (1.8-2.4cm)  AoV Area, VTI:           1.43 cm2  (2.5-5.5cm2)  AoV Area,Vmax:           1.35 cm2  (2.5-4.5cm2)  AoV Dimensionless Index: 0.45       RIGHT VENTRICLE:  TAPSE: 25.1 mm  RV s'  0.15 m/s    TRICUSPID VALVE/RVSP:                    Normal Ranges:  IVC Diam: 2.02 cm    AORTA:  Asc Ao Diam 3.83 cm       [address] [facility]  Electronically signed on 2024 at 10:49:35 AM       ** Final **  XR foot left 3+ views  Narrative: Interpreted By:  Ely Alanis,   STUDY:  XR FOOT LEFT 3+ VIEWS;  2024 2:44 am      INDICATION:  Signs/Symptoms:Rule out infection.      COMPARISON:  None.      ACCESSION NUMBER(S):  [id number]      ORDERING CLINICIAN:  BRENNAN SOLORIO      FINDINGS:  3 views of the left foot were obtained.      There is diffuse osteopenia. There is no acute fracture or  dislocation. Degenerative changes are noted at the 1st  metatarsophalangeal joint with hallux valgus. There is cortical  lucency at the bases of the 2nd and 3rd distal phalanges.  Degenerative changes are noted at the midfoot. There is calcaneal  enthesopathy. Vascular calcifications are present. There is diffuse  soft tissue swelling at the left foot. There is small amount of gas  at the plantar soft tissues overlying the bases of the toes.      Impression: 1. Cortical lucency at the bases of the distal phalanges of the left  2nd and 3rd toes, underlying osteomyelitis cannot be excluded.  Contrast-enhanced MRI can help in further evaluation.  2. Diffuse soft tissue edema at the left foot. Small amount of gas at  the  plantar soft tissues overlying the bases of the toes, suggestive  of ulcer.              MACRO:  None.      Signed by: Elymark Alanis 7/22/2024 2:52 AM  Dictation workstation:   [id number]      Physical Exam  General: alert, no diaphoresis   Lungs: CTA BL   Heart: RRR,  no LE edema BL   GI: abdomen soft, nontender, nondistended, BS present   MSK: no joint effusion or deformity   Skin: no rashes, erythema, or ecchymosis   Neuro: grossly normal cognition, motor strength, sensation      Relevant Results               Assessment/Plan                  Principal Problem:    NSTEMI (non-ST elevated myocardial infarction) (Multi)  Active Problems:    Obesity    LVH (left ventricular hypertrophy)    Mixed hyperlipidemia    Benign essential hypertension    Coronary artery disease involving native coronary artery of native heart without angina pectoris    Fall    Leukocytosis    Stage 5 chronic kidney disease not on chronic dialysis (Multi)    Type 2 diabetes mellitus (Multi)    Mechanical fall at home  - no obvious injury. No syncope.    NSTEMI vs Troponin elevation with CKD 5  - cardio feels more likely CKD 5 causing troponin elevation. No further cardiac work up recommended    Left sided diabetic foot ulcer  - podiatry to see. Culture ordered. Likely will need MRI but deferring to podiatry.  - broad sectrum abx and ID on consult  - initially tachycardic, febrile- now resolved    CKD 5  - nephrology saw patient. He has previously followed with Dr. Goldberg but hasn't seen him in about 1 yr. Has not been initiated on dialysis as of yet and no urgent needs for dialysis. IVF stopped. Dr. Bangura following.    Metabolic acidosis  - bicarb therapy increased by Dr. Bangura     DM type 2  - SSI, acuchecks    HLD  - statin, zetia    DVT ppx  - heparin         Isabelle Cook DO

## 2024-07-22 NOTE — H&P
History Of Present Illness      Colin Leonard is a 58 y.o. male presenting with Fatigue and Weakness.      Patient seen by Dr. Kade Pedraza in the ED.     Per Dr. Pedraza he has a history of diabetes and chronic kidney disease as well as hypertension and an elevated coronary artery calcium score.  He has seen Dr. Mahendra Saravia risk modification and cardiology follow-up in the past.  He states that on Wednesday of this week he developed fatigue weakness some shortness of breath which felt like his COVID-19 infection back in 2020.  Also had episodes of diaphoresis on and on.  No chest pain or typical anginal type symptoms.  Today's symptoms seem to worsen where he was sweating more today than he had previously in the week and his son became quite concerned and called 911 and brought him to the [facility] emergency department.  Code STEMI was activated and Dr. Pedraza reported to the emergency department to evaluate the patient.  This time he is hemodynamically stable and has no chest pain or discomfort.  He states he had mild shortness of breath over the last 4 days but this has not changed.  His twelve-lead EKG reveals a sinus tachycardia with a left bundle branch block pattern.      STEMI was cancelled.       In the ED the patient was given Tylenol 1 g.  Given heparin 4000 units subcu bolus.  He was given Brilinta 180 mg x 1.  He was given a normal saline bolus 1 L.      Upon arrival emergency room the patient's vital signs noted for Tmax 38.6, pulse rate 116, respiratory rate 25, /79.  Saturating 96% on room air.       Patient's ED diagnostic workup noted for a marked leukocytosis of 18.6.  The H&H was low normal at 9.3/29.1.  The platelet count was 335.  There is neutrophil predominance.  Patient's blood chemistry noted for an elevated glucose of 162.  The bicarbonate was low at 14.  The BUN and creatinine were 61/6.4, respectively.  Added on coagulation profile.  Cultures were obtained in the ED.  Rapid  influenza and coronavirus testing were negative.  Lactic acid level was normal at 1.9.  proBNP was elevated 8,252.  The patient's first troponin level was 363 followed by 403, respectively.  The patient denied any chest pain.    Patient's EKG noted for normal sinus rhythm at 75 bpm.  Possible anterior infarct.  QTc 482 ms.      No acute cardiopulmonary process noted on the chest x-ray.      Patient's son is Mr. River Leonard. He can reached at [phone].      We obtained a x-ray of the patient's left foot.      The following was noted:        IMPRESSION:      1. Cortical lucency at the bases of the distal phalanges of the left  2nd and 3rd toes, underlying osteomyelitis cannot be excluded.  Contrast-enhanced MRI can help in further evaluation.  2. Diffuse soft tissue edema at the left foot. Small amount of gas at  the plantar soft tissues overlying the bases of the toes, suggestive  of ulcer.          Past Medical History      Hypertension  CKD stage IV/V  Dyslipidemia  Diabetes mellitus type 2   · Benign essential hypertension (401.1) (I10)   · CKD (chronic kidney disease) stage 4, GFR 15-29 ml/min (585.4) (N18.4)   · Contact dermatitis (692.9) (L25.9)   · Contact dermatitis due to poison ivy (692.6) (L23.7)   · Diabetes mellitus (250.00) (E11.9)   · High blood pressure (401.9) (I10)   · History of obesity (V12.29) (Z86.39)   · HLD (hyperlipidemia) (272.4) (E78.5)   · Hyperuricemia (790.6) (E79.0)   · Leg swelling (729.81) (M79.89)   · LVH (left ventricular hypertrophy) (429.3) (I51.7)   · Myopia with presbyopia of both eyes (367.1,367.4) (H52.13,H52.4)   · Myopia, bilateral (367.1) (H52.13)   · Obesity, morbid (more than 100 lbs over ideal weight or BMI > 40) (278.01) (E66.01)   · Screen for colon cancer (V76.51) (Z12.11)   · Shortness of breath on exertion (786.05) (R06.02)   · Uncontrolled diabetes mellitus (250.02) (E11.65)   · Vision loss (369.9) (H54.7)   · Vitreous floaters of right eye (379.24)  (H43.391)        Surgical History        No past surgical history on file.         Social History      He reports that he has never smoked. He has never used smokeless tobacco. No history on file for alcohol use and drug use.    Problems    ·  (V61.03) (Z63.5)   · No alcohol use   · Non-smoker (V49.89) (Z78.9)  Works as an      Patient's son is Mr. River Leonard. He can reached at [phone]        Family History        Mother    · Family history of arthritis (V17.7) (Z82.61)   · Family history of cardiac disorder (V17.49) (Z82.49)  Father    · Family history of cardiac disorder (V17.49) (Z82.49)         Allergies        Amlodipine besylate        Review of Systems      14-point ROS otherwise negative, as per HPI/Interval History.    General: No change in weight. No weakenss. No Fevers/Chills/Night Sweats   Skin: No skin/hair/nail changes. No rashes or sores.  Head:  No trauma. No Headache/nasuea/vomitting.   Eyes: No visual changes. No tearing. No itching.   Ears: No hearing loss. No tinnitus. No vertigo. No discharge.  Nose, Sinuses: No rhinorrhea, No nasal congestion. No epistaxis.  Mouth, Throat, Neck: No bleeding gums, hoarseness, sore throat or swollen neck  Cardiac: No palpitations. No HUTCHINS. No PND. No Orthopnea.   Respiratory: No Shortness of Breath. No wheezing. No cough. No hemoptysis.   GI: No nausea/vomiting. No indigestion. No diarrhea. No constipation.   Extremities: No numbness or tingling. No paresthesias.   Urinary: No change in urinary frequency. No change in hesitancy. No hematuria. No incontinence.           Physical Exam        Constitutional:  Pleasant  Eyes: PERRL, EOMI,   ENMT: mucous membranes moist  Head/Neck: Neck supple, No JVD,   Respiratory/Thorax: Patent airways, CTAB,   Cardiovascular: Regular, rate and rhythm, no murmurs  Gastrointestinal: Soft, non-distended, +BS.  Musculoskeletal: ROM intact, no joint swelling, normal strength  Extremities: peripheral pulses  "intact; no edema. Left third toe is tender and swollen. Little feeling. Left Foot wrapped in dressing.   Neurological: Alert and Oriented x 3; no focal deficits; gross motor and sensation intact; CN II-XII intact. No asterixis.  Psychological: Appropriate mood and behavior  Skin: No lesions, No rashes.         Last Recorded Vitals  Blood pressure 115/79, pulse (!) 105, temperature (!) 38.6 °C (101.5 °F), temperature source Oral, resp. rate (!) 25, height 1.803 m (5' 11\"), weight 119 kg (261 lb 7.5 oz), SpO2 97%.    Relevant Results    Lab Results   Component Value Date    WBC 18.6 (H) 07/21/2024    HGB 9.3 (L) 07/21/2024    HCT 29.1 (L) 07/21/2024    MCV 91 07/21/2024     07/21/2024       Lab Results   Component Value Date    GLUCOSE 162 (H) 07/21/2024    CALCIUM 9.6 07/21/2024     07/21/2024    K 4.6 07/21/2024    CO2 14 (L) 07/21/2024     07/21/2024    BUN 61 (H) 07/21/2024    CREATININE 6.40 (H) 07/21/2024       Lab Results   Component Value Date    HGBA1C 5.5 02/16/2022         No CT head results found for the past 12 months      Scheduled medications  aspirin, 81 mg, oral, Daily  carvedilol, 25 mg, oral, BID  ezetimibe, 10 mg, oral, Nightly  heparin (porcine), 5,000 Units, subcutaneous, q8h  hydrALAZINE, 100 mg, oral, TID  linezolid, 600 mg, intravenous, Once  piperacillin-tazobactam, 2.25 g, intravenous, Once  torsemide, 20 mg, oral, Daily      Continuous medications     PRN medications  PRN medications: acetaminophen **OR** acetaminophen **OR** acetaminophen, benzocaine-menthol, dextromethorphan-guaifenesin, dextrose, dextrose, glucagon, glucagon, guaiFENesin, ondansetron **OR** ondansetron, polyethylene glycol        Assessment/Plan   Principal Problem:    NSTEMI (non-ST elevated myocardial infarction) (Multi)  Active Problems:    Obesity    LVH (left ventricular hypertrophy)    Mixed hyperlipidemia    Benign essential hypertension    Coronary artery disease involving native coronary " artery of native heart without angina pectoris    Fall    Leukocytosis    Stage 5 chronic kidney disease not on chronic dialysis (Multi)    Type 2 diabetes mellitus (Multi)          Colin Leonard is a 58 y.o. male presenting with Fatigue and Weakness.  Patient admitted for further evaluation and management.          Mechanical Fall at home    Denies any head trauma  No rib fractures noted on the chest x-ray  Denies any symptoms consistent with syncope      Possible NSTEMI versus Troponin Elevation 2/2 CKD 5 suggesting Type 2 Demand Physiology    Continue with cardiac monitoring  Patient evaluated by Dr. Patterson  Patient followed up with Dr. Saravia on April 27, 2020 following was noted : despite his significantly elevated coronary calcium score and borderline stress test. His EF by nuclear stress test was reduced but his echo last summer showed a normal EF. He continues to remain active and is getting around without any cardiac complaints such as angina or dyspnea. He denies any palpitations, stroke or TIA symptomatology.   Management per Cardiology  Continue home ASA and Statin therapy       LBBB    Management per cardiology      Leukocytosis/Fever    Will evaluate for infection of the left diabetic foot      Left-sided Diabetic Foot Ulcer    ESR/CRP added on  ID consult to evaluate the patient. Appreciate Recs.  Podiatry Consult   Defer Vascular evaluation to Podiatry   X-ray imaging originally not obtained by ED team  Obtain an x-ray of the  left  foot  Will defer MRI to podiatry service  Empiric ATB x 1 prior to ID Evaluation  Woundcare   Offloading       CKD Stage IV/V    Continue to hold all Nephrotoxic agents  Discontinued his Aldactone considering what his creatinine level currently is  Continue to Monitor Renal Function (BUN/Cr) + Urine Output.   Nephrology consultation for restratification prior to any contrast load  Holding torsemide home dose for now  Gentle IVF  He appears to be Oliguric        Metabolic Acidosis/Assuming Anemia of Kidney Disease    I'm going to start him on Oral NaHCO3 Therapy  Nephrology team to adjust as needed  EPO agents per Nephrology to maintain Hgb > 10      Diabetes Mellitus type 2    POCT every 4 hourly  Insulin sliding scale  Hypoglycemia protocol  Hemoglobin A1c for a.m.      Hypertension    Continue to monitor BP and adjust antihypertensive medications accordingly      Dyslipidemia    Continue home Zetia therapy  Continue statin therapy      GI + DVT Prophylaxis      PPI oral dose  Heparin subcu          This Dictation was Transcribed using a Nuance Dragon Voice Recognition System Device (with Compatible Computer + Software) and as such may contain Grammatical Errors and Unintentional Typing Misprints.      I spent 35 minutes in the professional and overall care of this patient.      Tc Gooden MD

## 2024-07-22 NOTE — CONSULTS
Inpatient consult to Nephrology  Consult performed by: Des Bangura MD  Consult ordered by: Tc Gooden MD  Reason for consult: Stage 5 CKD        History Of Present Illness  Colin Leonard is a 58-year-old  man with history of stage V CKD, type 2 diabetes mellitus, hypertension and HFpEF, admitted with generalized weakness and exertional dyspnea.  He also reported poorly-healing left foot ulcer but no fever or chills.  He has had poor appetite but no nausea or vomiting.    Renal consultation has been requested for management of advanced CKD.  I reviewed his past records and he has had progressive CKD over the last couple of years attributed to diabetic kidney disease.  He had been under the care of Dr. Goldberg but has not followed up in the office for almost a year.  His serum creatinine was in the 6s in 2022 and has been hovering between 6.3-6.4 mg/dL on this admission.  He has been on a loop diuretic and reports decent urine volumes.    Of note, he is not on a RAAS inhibitor or SGLT2 inhibitor and does not take NSAIDs.     Past Medical History  Stage V CKD  Type 2 diabetes mellitus  Hypertension  Hyperlipidemia  HFpEF    Surgical History  He has no past surgical history on file.     Social History  He reports that he has never smoked. He has never used smokeless tobacco. No history on file for alcohol use and drug use.    Family History  No known family history of kidney disease     Medications  Scheduled medications  aspirin, 81 mg, oral, Daily  atorvastatin, 40 mg, oral, Nightly  carvedilol, 25 mg, oral, BID  daptomycin, 500 mg, intravenous, q48h  ezetimibe, 10 mg, oral, Nightly  heparin (porcine), 5,000 Units, subcutaneous, q8h  hydrALAZINE, 100 mg, oral, TID  pantoprazole, 20 mg, oral, Daily before breakfast  perflutren lipid microspheres, 0.5-10 mL of dilution, intravenous, Once in imaging  piperacillin-tazobactam, 2.25 g, intravenous, q8h  sodium bicarbonate, 650 mg, oral,  "BID  torsemide, 20 mg, oral, Daily      Continuous medications     PRN medications  PRN medications: acetaminophen **OR** acetaminophen **OR** acetaminophen, benzocaine-menthol, dextromethorphan-guaifenesin, dextrose, dextrose, glucagon, glucagon, guaiFENesin, ondansetron ODT **OR** ondansetron, polyethylene glycol    Allergies  Amlodipine besylate    Review of Systems  10 point ROS negative except as stated in HPI.     Physical Exam  Vitals 24HR  Heart Rate:  []   Temp:  [36.5 °C (97.7 °F)-38.6 °C (101.5 °F)]   Resp:  [13-25]   BP: (115-185)/()   Height:  [180.3 cm (5' 11\")]   Weight:  [119 kg (261 lb 7.5 oz)-120 kg (264 lb 5.3 oz)]   SpO2:  [95 %-99 %]     General: Middle-aged man, not in distress  Eyes: Pale, anicteric  Neck: Supple, no JVD  Lungs: Clear bilaterally  Heart: S1 and S2, regular  Extremities dressing over L foot with trace proximal edema, no RLE edema  Neuro: Awake and interactive, no asterixis       I&O 24HR    Intake/Output Summary (Last 24 hours) at 7/22/2024 1455  Last data filed at 7/22/2024 1228  Gross per 24 hour   Intake 0 ml   Output --   Net 0 ml       Relevant Results  Results for orders placed or performed during the hospital encounter of 07/21/24 (from the past 24 hour(s))   CBC and Auto Differential   Result Value Ref Range    WBC 18.6 (H) 4.4 - 11.3 x10*3/uL    nRBC 0.0 0.0 - 0.0 /100 WBCs    RBC 3.20 (L) 4.50 - 5.90 x10*6/uL    Hemoglobin 9.3 (L) 13.5 - 17.5 g/dL    Hematocrit 29.1 (L) 41.0 - 52.0 %    MCV 91 80 - 100 fL    MCH 29.1 26.0 - 34.0 pg    MCHC 32.0 32.0 - 36.0 g/dL    RDW 14.6 (H) 11.5 - 14.5 %    Platelets 335 150 - 450 x10*3/uL    Neutrophils % 93.2 40.0 - 80.0 %    Immature Granulocytes %, Automated 0.5 0.0 - 0.9 %    Lymphocytes % 1.4 13.0 - 44.0 %    Monocytes % 3.9 2.0 - 10.0 %    Eosinophils % 0.8 0.0 - 6.0 %    Basophils % 0.2 0.0 - 2.0 %    Neutrophils Absolute 17.37 (H) 1.20 - 7.70 x10*3/uL    Immature Granulocytes Absolute, Automated 0.09 0.00 - " 0.70 x10*3/uL    Lymphocytes Absolute 0.26 (L) 1.20 - 4.80 x10*3/uL    Monocytes Absolute 0.72 0.10 - 1.00 x10*3/uL    Eosinophils Absolute 0.15 0.00 - 0.70 x10*3/uL    Basophils Absolute 0.04 0.00 - 0.10 x10*3/uL   Comprehensive metabolic panel   Result Value Ref Range    Glucose 162 (H) 65 - 99 mg/dL    Sodium 133 133 - 145 mmol/L    Potassium 4.6 3.4 - 5.1 mmol/L    Chloride 102 97 - 107 mmol/L    Bicarbonate 14 (L) 24 - 31 mmol/L    Urea Nitrogen 61 (H) 8 - 25 mg/dL    Creatinine 6.40 (H) 0.40 - 1.60 mg/dL    eGFR 9 (L) >60 mL/min/1.73m*2    Calcium 9.6 8.5 - 10.4 mg/dL    Albumin 3.5 3.5 - 5.0 g/dL    Alkaline Phosphatase 85 35 - 125 U/L    Total Protein 7.4 5.9 - 7.9 g/dL    AST 24 5 - 40 U/L    Bilirubin, Total 0.6 0.1 - 1.2 mg/dL    ALT 27 5 - 40 U/L    Anion Gap 17 <=19 mmol/L   Magnesium   Result Value Ref Range    Magnesium 1.80 1.60 - 3.10 mg/dL   Sars-CoV-2 PCR   Result Value Ref Range    Coronavirus 2019, PCR Not Detected Not Detected   Influenza A, and B PCR   Result Value Ref Range    Flu A Result Not Detected Not Detected    Flu B Result Not Detected Not Detected   NT Pro-BNP   Result Value Ref Range    PROBNP 8,252 (H) 0 - 177 pg/mL   Serial Troponin, Initial ([facility])   Result Value Ref Range    Troponin T, High Sensitivity 363 (HH) <=14 ng/L   aPTT   Result Value Ref Range    aPTT 33.4 (H) 22.0 - 32.5 seconds   Blood Gas Lactic Acid, Venous   Result Value Ref Range    POCT Lactate, Venous 1.9 0.4 - 2.0 mmol/L   Blood Culture    Specimen: Peripheral Venipuncture; Blood culture   Result Value Ref Range    Blood Culture Loaded on Instrument - Culture in progress    Blood Culture    Specimen: Peripheral Venipuncture; Blood culture   Result Value Ref Range    Blood Culture Loaded on Instrument - Culture in progress    Serial Troponin, 2 Hour ([facility])   Result Value Ref Range    Troponin T, High Sensitivity 403 (HH) <=14 ng/L   Serial Troponin, 6 Hour ([facility])   Result Value Ref Range    Troponin T, High  Sensitivity 343 (HH) <=14 ng/L   CBC and Auto Differential   Result Value Ref Range    WBC 24.0 (H) 4.4 - 11.3 x10*3/uL    nRBC 0.0 0.0 - 0.0 /100 WBCs    RBC 3.04 (L) 4.50 - 5.90 x10*6/uL    Hemoglobin 9.0 (L) 13.5 - 17.5 g/dL    Hematocrit 28.2 (L) 41.0 - 52.0 %    MCV 93 80 - 100 fL    MCH 29.6 26.0 - 34.0 pg    MCHC 31.9 (L) 32.0 - 36.0 g/dL    RDW 14.7 (H) 11.5 - 14.5 %    Platelets 334 150 - 450 x10*3/uL    Neutrophils % 88.3 40.0 - 80.0 %    Immature Granulocytes %, Automated 1.0 (H) 0.0 - 0.9 %    Lymphocytes % 3.9 13.0 - 44.0 %    Monocytes % 6.5 2.0 - 10.0 %    Eosinophils % 0.0 0.0 - 6.0 %    Basophils % 0.3 0.0 - 2.0 %    Neutrophils Absolute 21.19 (H) 1.20 - 7.70 x10*3/uL    Immature Granulocytes Absolute, Automated 0.23 0.00 - 0.70 x10*3/uL    Lymphocytes Absolute 0.94 (L) 1.20 - 4.80 x10*3/uL    Monocytes Absolute 1.55 (H) 0.10 - 1.00 x10*3/uL    Eosinophils Absolute 0.01 0.00 - 0.70 x10*3/uL    Basophils Absolute 0.06 0.00 - 0.10 x10*3/uL   Comprehensive metabolic panel   Result Value Ref Range    Glucose 190 (H) 65 - 99 mg/dL    Sodium 134 133 - 145 mmol/L    Potassium 4.7 3.4 - 5.1 mmol/L    Chloride 104 97 - 107 mmol/L    Bicarbonate 15 (L) 24 - 31 mmol/L    Urea Nitrogen 64 (H) 8 - 25 mg/dL    Creatinine 6.30 (H) 0.40 - 1.60 mg/dL    eGFR 9 (L) >60 mL/min/1.73m*2    Calcium 9.4 8.5 - 10.4 mg/dL    Albumin 3.3 (L) 3.5 - 5.0 g/dL    Alkaline Phosphatase 85 35 - 125 U/L    Total Protein 7.2 5.9 - 7.9 g/dL    AST 39 5 - 40 U/L    Bilirubin, Total 0.6 0.1 - 1.2 mg/dL    ALT 32 5 - 40 U/L    Anion Gap 15 <=19 mmol/L   Sedimentation rate, automated   Result Value Ref Range    Sedimentation Rate 60 (H) 0 - 20 mm/h   C-reactive protein   Result Value Ref Range    C-Reactive Protein 24.70 (H) 0.00 - 2.00 mg/dL   Magnesium   Result Value Ref Range    Magnesium 2.20 1.60 - 3.10 mg/dL   Protime-INR   Result Value Ref Range    Protime 12.6 9.3 - 12.7 seconds    INR 1.2 0.9 - 1.2   Type and screen   Result  Value Ref Range    ABO TYPE O     Rh TYPE POS     ANTIBODY SCREEN NEG    Hemoglobin A1c   Result Value Ref Range    Hemoglobin A1C 5.5 See below %    Estimated Average Glucose 111 Not Established mg/dL   Transthoracic Echo (TTE) Complete   Result Value Ref Range    AV pk gillian 2.26 m/s    LVOT diam 2.00 cm    AV mn grad 10.6 mmHg    MV E/A ratio 0.83     Tricuspid annular plane systolic excursion 2.5 cm    LV Biplane EF 57 %    LA vol index A/L 36.7 ml/m2    MV avg E/e' ratio 11.40     LV EF 63 %    RV free wall pk S' 14.80 cm/s    LVIDd 3.86 cm    AV pk grad 20.4 mmHg    Aortic Valve Area by Continuity of VTI 1.43 cm2    Aortic Valve Area by Continuity of Peak Velocity 1.35 cm2    LV A4C EF 47.6    Creatine Kinase   Result Value Ref Range    Creatine Kinase 1,000 (H) 24 - 195 U/L          Assessment/Plan   58-year-old  man with history of stage V CKD, type 2 diabetes mellitus, hypertension and HFpEF, admitted with generalized weakness and exertional dyspnea.     Stage IV CKD  Hypertension  Metabolic acidosis  Anemia in CKD, at goal    He has a history of progressive CKD attributed to diabetic kidney disease, and had been under the care of Dr. Goldberg but has not followed up in the office for almost a year.  His baseline SCr was in the 6s in 2022 and has been hovering between 6.3-6.4 mg/dL on this admission.      Although fatigue and poor appetite could represent uremic symptoms, there may be other explanations for these symptoms (? 2/2 foot infection).  He is not overtly hypervolemic and serum K is controlled.  He has no urgent need for dialysis at this time.      I will discontinue IV fluids and leave him on torsemide as ordered.    Increase serum bicarbonate to 1300 mg p.o. 3 times daily.    He is close to ESRD and had been educated regarding options for renal replacement therapy.  I spoke with Dr. Goldberg who stated that patient was previously thinking about peritoneal dialysis and was also being evaluated  for preemptive kidney transplantation a couple years ago.  He will need close follow-up at discharge.    Dose meds for GFR of less than 15 mL/min and avoid potential nephrotoxins as much as possible.    Thank you for the opportunity to participate in his care.      Des Bangura MD

## 2024-07-23 ENCOUNTER — APPOINTMENT (OUTPATIENT)
Dept: RADIOLOGY | Facility: HOSPITAL | Age: 59
End: 2024-07-23
Payer: COMMERCIAL

## 2024-07-23 ENCOUNTER — PREP FOR PROCEDURE (OUTPATIENT)
Dept: PODIATRY | Facility: HOSPITAL | Age: 59
End: 2024-07-23

## 2024-07-23 DIAGNOSIS — M86.072 ACUTE HEMATOGENOUS OSTEOMYELITIS OF LEFT FOOT (MULTI): Primary | ICD-10-CM

## 2024-07-23 LAB
ALBUMIN SERPL-MCNC: 3 G/DL (ref 3.5–5)
ANION GAP SERPL CALC-SCNC: 15 MMOL/L
BUN SERPL-MCNC: 65 MG/DL (ref 8–25)
CALCIUM SERPL-MCNC: 9.2 MG/DL (ref 8.5–10.4)
CHLORIDE SERPL-SCNC: 105 MMOL/L (ref 97–107)
CO2 SERPL-SCNC: 16 MMOL/L (ref 24–31)
CREAT SERPL-MCNC: 6.5 MG/DL (ref 0.4–1.6)
EGFRCR SERPLBLD CKD-EPI 2021: 9 ML/MIN/1.73M*2
ERYTHROCYTE [DISTWIDTH] IN BLOOD BY AUTOMATED COUNT: 14.9 % (ref 11.5–14.5)
GLUCOSE BLD MANUAL STRIP-MCNC: 145 MG/DL (ref 74–99)
GLUCOSE BLD MANUAL STRIP-MCNC: 146 MG/DL (ref 74–99)
GLUCOSE BLD MANUAL STRIP-MCNC: 151 MG/DL (ref 74–99)
GLUCOSE BLD MANUAL STRIP-MCNC: 157 MG/DL (ref 74–99)
GLUCOSE BLD MANUAL STRIP-MCNC: 162 MG/DL (ref 74–99)
GLUCOSE BLD MANUAL STRIP-MCNC: 190 MG/DL (ref 74–99)
GLUCOSE SERPL-MCNC: 138 MG/DL (ref 65–99)
HCT VFR BLD AUTO: 28.4 % (ref 41–52)
HGB BLD-MCNC: 8.5 G/DL (ref 13.5–17.5)
MCH RBC QN AUTO: 28.5 PG (ref 26–34)
MCHC RBC AUTO-ENTMCNC: 29.9 G/DL (ref 32–36)
MCV RBC AUTO: 95 FL (ref 80–100)
NRBC BLD-RTO: 0 /100 WBCS (ref 0–0)
PHOSPHATE SERPL-MCNC: 3.7 MG/DL (ref 2.5–4.5)
PLATELET # BLD AUTO: 309 X10*3/UL (ref 150–450)
POTASSIUM SERPL-SCNC: 4.5 MMOL/L (ref 3.4–5.1)
RBC # BLD AUTO: 2.98 X10*6/UL (ref 4.5–5.9)
SODIUM SERPL-SCNC: 136 MMOL/L (ref 133–145)
WBC # BLD AUTO: 14.6 X10*3/UL (ref 4.4–11.3)

## 2024-07-23 PROCEDURE — 2500000004 HC RX 250 GENERAL PHARMACY W/ HCPCS (ALT 636 FOR OP/ED): Performed by: INTERNAL MEDICINE

## 2024-07-23 PROCEDURE — 2500000001 HC RX 250 WO HCPCS SELF ADMINISTERED DRUGS (ALT 637 FOR MEDICARE OP): Performed by: HOSPITALIST

## 2024-07-23 PROCEDURE — 2500000002 HC RX 250 W HCPCS SELF ADMINISTERED DRUGS (ALT 637 FOR MEDICARE OP, ALT 636 FOR OP/ED): Performed by: INTERNAL MEDICINE

## 2024-07-23 PROCEDURE — 99232 SBSQ HOSP IP/OBS MODERATE 35: CPT | Performed by: NURSE PRACTITIONER

## 2024-07-23 PROCEDURE — 73718 MRI LOWER EXTREMITY W/O DYE: CPT | Mod: LT

## 2024-07-23 PROCEDURE — 2500000001 HC RX 250 WO HCPCS SELF ADMINISTERED DRUGS (ALT 637 FOR MEDICARE OP): Performed by: INTERNAL MEDICINE

## 2024-07-23 PROCEDURE — 36415 COLL VENOUS BLD VENIPUNCTURE: CPT | Performed by: INTERNAL MEDICINE

## 2024-07-23 PROCEDURE — 82947 ASSAY GLUCOSE BLOOD QUANT: CPT

## 2024-07-23 PROCEDURE — 85027 COMPLETE CBC AUTOMATED: CPT | Performed by: INTERNAL MEDICINE

## 2024-07-23 PROCEDURE — 2060000001 HC INTERMEDIATE ICU ROOM DAILY

## 2024-07-23 PROCEDURE — 73718 MRI LOWER EXTREMITY W/O DYE: CPT | Mod: LEFT SIDE | Performed by: RADIOLOGY

## 2024-07-23 PROCEDURE — 80069 RENAL FUNCTION PANEL: CPT | Performed by: INTERNAL MEDICINE

## 2024-07-23 RX ORDER — LINEZOLID 2 MG/ML
600 INJECTION, SOLUTION INTRAVENOUS EVERY 12 HOURS SCHEDULED
Status: DISCONTINUED | OUTPATIENT
Start: 2024-07-23 | End: 2024-07-26

## 2024-07-23 RX ORDER — OXYCODONE HYDROCHLORIDE 5 MG/1
5 TABLET ORAL EVERY 6 HOURS PRN
Status: DISCONTINUED | OUTPATIENT
Start: 2024-07-23 | End: 2024-07-31

## 2024-07-23 RX ADMIN — EZETIMIBE 10 MG: 10 TABLET ORAL at 21:13

## 2024-07-23 RX ADMIN — PIPERACILLIN SODIUM AND TAZOBACTAM SODIUM 2.25 G: 2; .25 INJECTION, SOLUTION INTRAVENOUS at 16:49

## 2024-07-23 RX ADMIN — PIPERACILLIN SODIUM AND TAZOBACTAM SODIUM 2.25 G: 2; .25 INJECTION, SOLUTION INTRAVENOUS at 22:00

## 2024-07-23 RX ADMIN — PIPERACILLIN SODIUM AND TAZOBACTAM SODIUM 2.25 G: 2; .25 INJECTION, SOLUTION INTRAVENOUS at 06:14

## 2024-07-23 RX ADMIN — LINEZOLID 600 MG: 600 INJECTION, SOLUTION INTRAVENOUS at 22:42

## 2024-07-23 RX ADMIN — SODIUM BICARBONATE 650 MG TABLET 1300 MG: at 09:28

## 2024-07-23 RX ADMIN — PANTOPRAZOLE SODIUM 20 MG: 20 TABLET, DELAYED RELEASE ORAL at 06:14

## 2024-07-23 RX ADMIN — HYDRALAZINE HYDROCHLORIDE 100 MG: 50 TABLET ORAL at 21:13

## 2024-07-23 RX ADMIN — SODIUM BICARBONATE 650 MG TABLET 1300 MG: at 16:38

## 2024-07-23 RX ADMIN — HYDRALAZINE HYDROCHLORIDE 100 MG: 50 TABLET ORAL at 09:28

## 2024-07-23 RX ADMIN — TORSEMIDE 20 MG: 20 TABLET ORAL at 09:28

## 2024-07-23 RX ADMIN — SODIUM BICARBONATE 650 MG TABLET 1300 MG: at 21:13

## 2024-07-23 RX ADMIN — ASPIRIN 81 MG: 81 TABLET, COATED ORAL at 09:28

## 2024-07-23 RX ADMIN — ATORVASTATIN CALCIUM 40 MG: 40 TABLET, FILM COATED ORAL at 21:13

## 2024-07-23 RX ADMIN — OXYCODONE 5 MG: 5 TABLET ORAL at 16:39

## 2024-07-23 RX ADMIN — ACETAMINOPHEN 650 MG: 325 TABLET ORAL at 06:17

## 2024-07-23 RX ADMIN — HYDRALAZINE HYDROCHLORIDE 100 MG: 50 TABLET ORAL at 16:38

## 2024-07-23 RX ADMIN — CARVEDILOL 25 MG: 25 TABLET, FILM COATED ORAL at 09:28

## 2024-07-23 RX ADMIN — CARVEDILOL 25 MG: 25 TABLET, FILM COATED ORAL at 21:13

## 2024-07-23 ASSESSMENT — COGNITIVE AND FUNCTIONAL STATUS - GENERAL
DAILY ACTIVITIY SCORE: 23
CLIMB 3 TO 5 STEPS WITH RAILING: A LITTLE
WALKING IN HOSPITAL ROOM: A LITTLE
MOBILITY SCORE: 22
HELP NEEDED FOR BATHING: A LITTLE

## 2024-07-23 ASSESSMENT — PAIN DESCRIPTION - LOCATION
LOCATION: TOE (COMMENT WHICH ONE)
LOCATION: HEAD

## 2024-07-23 ASSESSMENT — PAIN - FUNCTIONAL ASSESSMENT: PAIN_FUNCTIONAL_ASSESSMENT: 0-10

## 2024-07-23 ASSESSMENT — PAIN SCALES - GENERAL
PAINLEVEL_OUTOF10: 0 - NO PAIN
PAINLEVEL_OUTOF10: 7
PAINLEVEL_OUTOF10: 3

## 2024-07-23 ASSESSMENT — PAIN DESCRIPTION - ORIENTATION: ORIENTATION: LEFT

## 2024-07-23 ASSESSMENT — PAIN SCALES - PAIN ASSESSMENT IN ADVANCED DEMENTIA (PAINAD): TOTALSCORE: MEDICATION (SEE MAR)

## 2024-07-23 NOTE — PROGRESS NOTES
07/23/24 1250   Discharge Planning   Expected Discharge Disposition Home  (T[facility] will follow for possible ATB needs upon discharge)     ** do not discharge without speaking to care coordination**

## 2024-07-23 NOTE — CARE PLAN
The patient's goals for the shift include  rest and safety.    Problem: Skin  Goal: Decreased wound size/increased tissue granulation at next dressing change  Outcome: Progressing     Problem: Skin  Goal: Participates in plan/prevention/treatment measures  Outcome: Progressing     The clinical goals for the shift include stay afebrile    Over the shift, the patient did make progress towards goal.

## 2024-07-23 NOTE — PROGRESS NOTES
Colin Melvin is a 58 y.o. male on day 1 of admission presenting with NSTEMI (non-ST elevated myocardial infarction) (Multi).      Subjective   Patient reports he's doing okay. Says foot was very painful earlier but not as bad now. Has pain meds ordered but hasn't taken them so far. =       Objective     Last Recorded Vitals  /71 (BP Location: Right arm, Patient Position: Lying)   Pulse 73   Temp 36.5 °C (97.7 °F) (Temporal)   Resp 19   Wt 122 kg (268 lb 1.3 oz)   SpO2 98%   Intake/Output last 3 Shifts:    Intake/Output Summary (Last 24 hours) at 7/23/2024 1433  Last data filed at 7/23/2024 0935  Gross per 24 hour   Intake 290 ml   Output 700 ml   Net -410 ml       Admission Weight  Weight: 119 kg (261 lb 7.5 oz) (07/21/24 2214)    Daily Weight  07/23/24 : 122 kg (268 lb 1.3 oz)    Image Results  Transthoracic Echo (TTE) [place] [address]             Phone [phone]    TRANSTHORACIC ECHOCARDIOGRAM REPORT    Patient Name:      COLIN MELVIN     Reading Physician:    [id number] [facility]                                                                   Study Date:        7/22/2024             Ordering Provider:    [id number] GERMAN KENNEY  MRN/PID:           [id number]              Fellow:  Accession#:        [id number]          Nurse:  Date of Birth/Age: 1965 / 58 years Sonographer:          Vera Wynn RDCS  Gender:            M                     Additional Staff:  Height:            180.34 cm             Admit Date:  Weight:            118.39 kg             Admission Status:     Inpatient -                                                                 Routine  BSA / BMI:         2.36 m2 / 36.40 kg/m2 Department Location:  [facility]  Blood Pressure: 147 /88 mmHg    Study Type:     TRANSTHORACIC ECHO (TTE) COMPLETE  Diagnosis/ICD: Atherosclerotic heart disease of native coronary artery without                 angina pectoris-I25.10  Indication:    Dyspnea, weakness fatigue  CPT Codes:     Echo Complete w Full Doppler-[id number]    Patient History:  Pertinent History: LVH, HLD, HTN, CAD, Nstemi, SOB CKD, DM.    Study Detail: The following Echo studies were performed: 2D, M-Mode, color flow                and Doppler. Unable to obtain suprasternal notch view.       PHYSICIAN INTERPRETATION:  Left Ventricle: The left ventricular systolic function is normal, with a visually estimated ejection fraction of 60-65%. There are no regional wall motion abnormalities. The left ventricular cavity size is normal. Left ventricular diastolic filling was indeterminate.  Left Atrium: The left atrium is mildly dilated.  Right Ventricle: The right ventricle is normal in size. There is normal right ventricular global systolic function.  Right Atrium: The right atrium is normal in size.  Aortic Valve: The aortic valve is trileaflet. There is evidence of mild aortic valve stenosis.  The aortic valve dimensionless index is 0.45. There is no evidence of aortic valve regurgitation. The peak instantaneous gradient of the aortic valve is 20.4 mmHg. The mean gradient of the aortic valve is 10.6 mmHg.  Mitral Valve: The mitral valve is normal in structure. There is mild mitral valve regurgitation.  Tricuspid Valve: The tricuspid valve is structurally normal. There is trace tricuspid regurgitation.  Pulmonic Valve: The pulmonic valve is not well visualized. The pulmonic valve regurgitation was not well visualized.  Pericardium: There is no pericardial effusion noted.  Aorta: The aortic root is normal.       CONCLUSIONS:   1. The left ventricular systolic function is normal, with a visually estimated ejection fraction of 60-65%.   2. Left ventricular diastolic filling was indeterminate.   3. There is normal right ventricular  global systolic function.   4. Mild aortic valve stenosis.   5. Aortic stenosis mean gradient 10 mm Hg, peak gradient 40 mm Hg and ADÁN 1.43 cm2.   6. Aortic valve dimensionless index 0.43.    QUANTITATIVE DATA SUMMARY:  2D MEASUREMENTS:                            Normal Ranges:  LAs:           4.76 cm    (2.7-4.0cm)  IVSd:          1.70 cm    (0.6-1.1cm)  LVPWd:         1.51 cm    (0.6-1.1cm)  LVIDd:         3.86 cm    (3.9-5.9cm)  LVIDs:         2.85 cm  LV Mass Index: 104.8 g/m2  LV % FS        26.2 %    LA VOLUME:                                Normal Ranges:  LA Vol A4C:        71.7 ml    (22+/-6mL/m2)  LA Vol A2C:        92.5 ml  LA Vol BP:         86.7 ml  LA Vol Index A4C:  30.4 ml/m2  LA Vol Index A2C:  39.2 ml/m2  LA Vol Index BP:   36.7 ml/m2  LA Area A4C:       22.5 cm2  LA Area A2C:       27.2 cm2  LA Major Axis A4C: 6.0 cm  LA Major Axis A2C: 6.8 cm  LA Volume Index:   36.7 ml/m2  LA Vol A4C:        72.0 ml  LA Vol A2C:        92.0 ml    RA VOLUME BY A/L METHOD:                                Normal Ranges:  RA Vol A4C:        39.5 ml    (8.3-19.5ml)  RA Vol Index A4C:  16.7 ml/m2  RA Area A4C:       15.4 cm2  RA Major Axis A4C: 5.1 cm    AORTA MEASUREMENTS:                       Normal Ranges:  Ao Sinus, d: 3.30 cm (2.1-3.5cm)  Ao STJ, d:   3.00 cm (1.7-3.4cm)  Asc Ao, d:   3.80 cm (2.1-3.4cm)    LV SYSTOLIC FUNCTION BY 2D PLANIMETRY (MOD):                       Normal Ranges:  EF-A4C View:    48 % (>=55%)  EF-A2C View:    65 %  EF-Biplane:     57 %  EF-Visual:      63 %  LV EF Reported: 63 %    LV DIASTOLIC FUNCTION:                          Normal Ranges:  MV Peak E:    0.82 m/s  (0.7-1.2 m/s)  MV Peak A:    0.99 m/s  (0.42-0.7 m/s)  E/A Ratio:    0.83      (1.0-2.2)  MV e'         0.071 m/s (>8.0)  MV lateral e' 0.08 m/s  MV medial e'  0.06 m/s  E/e' Ratio:   11.61     (<8.0)    MITRAL VALVE:                  Normal Ranges:  MV DT: 221 msec (150-240msec)    AORTIC VALVE:                                      Normal Ranges:  AoV Vmax:                2.26 m/s  (<=1.7m/s)  AoV Peak P.4 mmHg (<20mmHg)  AoV Mean PG:             10.6 mmHg (1.7-11.5mmHg)  LVOT Max Cirilo:            0.97 m/s  (<=1.1m/s)  AoV VTI:                 46.31 cm  (18-25cm)  LVOT VTI:                21.06 cm  LVOT Diameter:           2.00 cm   (1.8-2.4cm)  AoV Area, VTI:           1.43 cm2  (2.5-5.5cm2)  AoV Area,Vmax:           1.35 cm2  (2.5-4.5cm2)  AoV Dimensionless Index: 0.45       RIGHT VENTRICLE:  TAPSE: 25.1 mm  RV s'  0.15 m/s    TRICUSPID VALVE/RVSP:                    Normal Ranges:  IVC Diam: 2.02 cm    AORTA:  Asc Ao Diam 3.83 cm       [id number] [facility]   Electronically signed on 2024 at 10:49:35 AM       ** Final **  XR foot left 3+ views  Narrative: Interpreted By:  Ely Alanis,   STUDY:  XR FOOT LEFT 3+ VIEWS;  2024 2:44 am      INDICATION:  Signs/Symptoms:Rule out infection.      COMPARISON:  None.      ACCESSION NUMBER(S):  [id number]      ORDERING CLINICIAN:  BRENNAN SOLORIO      FINDINGS:  3 views of the left foot were obtained.      There is diffuse osteopenia. There is no acute fracture or  dislocation. Degenerative changes are noted at the 1st  metatarsophalangeal joint with hallux valgus. There is cortical  lucency at the bases of the 2nd and 3rd distal phalanges.  Degenerative changes are noted at the midfoot. There is calcaneal  enthesopathy. Vascular calcifications are present. There is diffuse  soft tissue swelling at the left foot. There is small amount of gas  at the plantar soft tissues overlying the bases of the toes.      Impression: 1. Cortical lucency at the bases of the distal phalanges of the left  2nd and 3rd toes, underlying osteomyelitis cannot be excluded.  Contrast-enhanced MRI can help in further evaluation.  2. Diffuse soft tissue edema at the left foot. Small amount of gas at  the plantar soft tissues overlying the bases of the toes, suggestive  of ulcer.               MACRO:  None.      Signed by: Ely Alanis 7/22/2024 2:52 AM  Dictation workstation:   [id number]      Physical Exam  General: alert, no diaphoresis   Lungs: CTA BL   Heart: RRR,  no LE edema BL   GI: abdomen soft, nontender, nondistended, BS present   MSK: no joint effusion or deformity   Skin: left 2nd digit of foot is swollen and erythematous, extending proximally to mid dorsal foot   Neuro: grossly normal cognition, motor strength, sensation      Relevant Results               Assessment/Plan                  Principal Problem:    NSTEMI (non-ST elevated myocardial infarction) (Multi)  Active Problems:    Obesity    LVH (left ventricular hypertrophy)    Mixed hyperlipidemia    Benign essential hypertension    Coronary artery disease involving native coronary artery of native heart without angina pectoris    Fall    Leukocytosis    Stage 5 chronic kidney disease not on chronic dialysis (Multi)    Type 2 diabetes mellitus (Multi)    Mechanical fall at home  - no obvious injury. No syncope.    NSTEMI vs Troponin elevation with CKD 5  - cardio feels more likely CKD 5 causing troponin elevation. No further cardiac work up recommended    Left sided diabetic foot ulcer  - Culture sent.   - needs MRI-- will order today  - Podiatry consulted but not seen patient yet (for 2 days), called office but they did not think podiatrist was rounding today. Will consult new podiatrist and  to call in consult immediately. MRI being done this afternoon.  - broad sectrum abx and ID on consult  - initially tachycardic, febrile- now resolved    CKD 5  - nephrology saw patient. He has previously followed with Dr. Goldberg but hasn't seen him in about 1 yr. Has not been initiated on dialysis as of yet and no urgent needs for dialysis. IVF stopped. Dr. Bangura following.    Metabolic acidosis  - bicarb therapy increased by Dr. Bangura     DM type 2  - SSI, acuchecks    HLD  - statin, zetia    DVT ppx  - heparin         Isabelle J  Joey, DO

## 2024-07-23 NOTE — PROGRESS NOTES
Colin Leonard is a 58 y.o. male on day 1 of admission presenting with NSTEMI (non-ST elevated myocardial infarction) (Multi).    Subjective   Interval History:   Afebrile  Resting comfortably        Review of Systems   All other systems reviewed and are negative.      Objective   Range of Vitals (last 24 hours)  Heart Rate:  [66-87]   Temp:  [36.2 °C (97.2 °F)-36.9 °C (98.4 °F)]   Resp:  [16-18]   BP: (122-143)/(50-71)   Weight:  [120 kg (264 lb 5.3 oz)-122 kg (268 lb 1.3 oz)]   SpO2:  [98 %-100 %]   Daily Weight  07/23/24 : 122 kg (268 lb 1.3 oz)    Body mass index is 37.39 kg/m².    Physical Exam  Constitutional:       Appearance: Normal appearance.   HENT:      Head: Normocephalic and atraumatic.      Nose: Nose normal.   Eyes:      General: No scleral icterus.     Extraocular Movements: Extraocular movements intact.      Conjunctiva/sclera: Conjunctivae normal.   Cardiovascular:      Rate and Rhythm: Normal rate and regular rhythm.   Pulmonary:      Effort: Pulmonary effort is normal.      Breath sounds: Normal breath sounds.   Abdominal:      General: Bowel sounds are normal.      Palpations: Abdomen is soft.   Musculoskeletal:      Cervical back: Normal range of motion and neck supple.      Right lower leg: No edema.      Left lower leg: No edema.   Feet:      Left foot:      Skin integrity: Ulcer and callus present.      Comments: Left second met head  Skin:     General: Skin is warm and dry.   Neurological:      Mental Status: He is alert and oriented to person, place, and time.   Psychiatric:         Mood and Affect: Mood normal.         Behavior: Behavior normal.        Antibiotics  DAPTOmycin - 500 mg/50 mL  piperacillin-tazobactam - 2.25 gram/50 mL    Relevant Results  Labs  Results from last 72 hours   Lab Units 07/23/24  0439 07/22/24  0619 07/21/24  2217   WBC AUTO x10*3/uL 14.6* 24.0* 18.6*   HEMOGLOBIN g/dL 8.5* 9.0* 9.3*   HEMATOCRIT % 28.4* 28.2* 29.1*   PLATELETS AUTO x10*3/uL 309 334 335    NEUTROS PCT AUTO %  --  88.3 93.2   LYMPHS PCT AUTO %  --  3.9 1.4   MONOS PCT AUTO %  --  6.5 3.9   EOS PCT AUTO %  --  0.0 0.8     Results from last 72 hours   Lab Units 07/23/24 0439 07/22/24 0619 07/21/24  2217   SODIUM mmol/L 136 134 133   POTASSIUM mmol/L 4.5 4.7 4.6   CHLORIDE mmol/L 105 104 102   CO2 mmol/L 16* 15* 14*   BUN mg/dL 65* 64* 61*   CREATININE mg/dL 6.50* 6.30* 6.40*   GLUCOSE mg/dL 138* 190* 162*   CALCIUM mg/dL 9.2 9.4 9.6   ANION GAP mmol/L 15 15 17   EGFR mL/min/1.73m*2 9* 9* 9*   PHOSPHORUS mg/dL 3.7  --   --      Results from last 72 hours   Lab Units 07/23/24 0439 07/22/24 0619 07/21/24 2217   ALK PHOS U/L  --  85 85   BILIRUBIN TOTAL mg/dL  --  0.6 0.6   PROTEIN TOTAL g/dL  --  7.2 7.4   ALT U/L  --  32 27   AST U/L  --  39 24   ALBUMIN g/dL 3.0* 3.3* 3.5     Estimated Creatinine Clearance: 16.1 mL/min (A) (by C-G formula based on SCr of 6.5 mg/dL (H)).  C-Reactive Protein   Date Value Ref Range Status   07/22/2024 24.70 (H) 0.00 - 2.00 mg/dL Final     CRP   Date Value Ref Range Status   09/09/2020 5.32 (A) mg/dL Final     Comment:     REF VALUE  < 1.00     09/08/2020 9.54 (A) mg/dL Final     Comment:     REF VALUE  < 1.00       Microbiology  Reviewed-blood and wound cultures pending  Imaging  Transthoracic Echo (TTE) Complete    Result Date: 7/22/2024           [address] [address]            Phone [phone] TRANSTHORACIC ECHOCARDIOGRAM REPORT Patient Name:      ODALYS MELVIN     Reading Physician:    [id number] Kade Pedraza DO Study Date:        7/22/2024             Ordering Provider:    [id number] GERMAN KENNEY MRN/PID:           [id number]              Fellow: Accession#:        [id number]          Nurse: Date of Birth/Age: 1965 / 58 years Sonographer:          Vera Wynn                                                                 [facility] Gender:            M                     Additional Staff: Height:            180.34 cm             Admit Date: Weight:            118.39 kg             Admission Status:     Inpatient -                                                                Routine BSA / BMI:         2.36 m2 / 36.40 kg/m2 Department Location:  [facility] Blood Pressure: 147 /88 mmHg Study Type:    TRANSTHORACIC ECHO (TTE) COMPLETE Diagnosis/ICD: Atherosclerotic heart disease of native coronary artery without                angina pectoris-I25.10 Indication:    Dyspnea, weakness fatigue CPT Codes:     Echo Complete w Full Doppler-[id number] Patient History: Pertinent History: LVH, HLD, HTN, CAD, Nstemi, SOB CKD, DM. Study Detail: The following Echo studies were performed: 2D, M-Mode, color flow               and Doppler. Unable to obtain suprasternal notch view.  PHYSICIAN INTERPRETATION: Left Ventricle: The left ventricular systolic function is normal, with a visually estimated ejection fraction of 60-65%. There are no regional wall motion abnormalities. The left ventricular cavity size is normal. Left ventricular diastolic filling was indeterminate. Left Atrium: The left atrium is mildly dilated. Right Ventricle: The right ventricle is normal in size. There is normal right ventricular global systolic function. Right Atrium: The right atrium is normal in size. Aortic Valve: The aortic valve is trileaflet. There is evidence of mild aortic valve stenosis. The aortic valve dimensionless index is 0.45. There is no evidence of aortic valve regurgitation. The peak instantaneous gradient of the aortic valve is 20.4 mmHg. The mean gradient of the aortic valve is 10.6 mmHg. Mitral Valve: The mitral valve is normal in structure. There is mild mitral valve regurgitation. Tricuspid Valve: The tricuspid valve is structurally normal. There is trace tricuspid regurgitation. Pulmonic Valve: The pulmonic valve is not  well visualized. The pulmonic valve regurgitation was not well visualized. Pericardium: There is no pericardial effusion noted. Aorta: The aortic root is normal.  CONCLUSIONS:  1. The left ventricular systolic function is normal, with a visually estimated ejection fraction of 60-65%.  2. Left ventricular diastolic filling was indeterminate.  3. There is normal right ventricular global systolic function.  4. Mild aortic valve stenosis.  5. Aortic stenosis mean gradient 10 mm Hg, peak gradient 40 mm Hg and ADÁN 1.43 cm2.  6. Aortic valve dimensionless index 0.43. QUANTITATIVE DATA SUMMARY: 2D MEASUREMENTS:                           Normal Ranges: LAs:           4.76 cm    (2.7-4.0cm) IVSd:          1.70 cm    (0.6-1.1cm) LVPWd:         1.51 cm    (0.6-1.1cm) LVIDd:         3.86 cm    (3.9-5.9cm) LVIDs:         2.85 cm LV Mass Index: 104.8 g/m2 LV % FS        26.2 % LA VOLUME:                               Normal Ranges: LA Vol A4C:        71.7 ml    (22+/-6mL/m2) LA Vol A2C:        92.5 ml LA Vol BP:         86.7 ml LA Vol Index A4C:  30.4 ml/m2 LA Vol Index A2C:  39.2 ml/m2 LA Vol Index BP:   36.7 ml/m2 LA Area A4C:       22.5 cm2 LA Area A2C:       27.2 cm2 LA Major Axis A4C: 6.0 cm LA Major Axis A2C: 6.8 cm LA Volume Index:   36.7 ml/m2 LA Vol A4C:        72.0 ml LA Vol A2C:        92.0 ml RA VOLUME BY A/L METHOD:                               Normal Ranges: RA Vol A4C:        39.5 ml    (8.3-19.5ml) RA Vol Index A4C:  16.7 ml/m2 RA Area A4C:       15.4 cm2 RA Major Axis A4C: 5.1 cm AORTA MEASUREMENTS:                      Normal Ranges: Ao Sinus, d: 3.30 cm (2.1-3.5cm) Ao STJ, d:   3.00 cm (1.7-3.4cm) Asc Ao, d:   3.80 cm (2.1-3.4cm) LV SYSTOLIC FUNCTION BY 2D PLANIMETRY (MOD):                      Normal Ranges: EF-A4C View:    48 % (>=55%) EF-A2C View:    65 % EF-Biplane:     57 % EF-Visual:      63 % LV EF Reported: 63 % LV DIASTOLIC FUNCTION:                         Normal Ranges: MV Peak E:    0.82 m/s   (0.7-1.2 m/s) MV Peak A:    0.99 m/s  (0.42-0.7 m/s) E/A Ratio:    0.83      (1.0-2.2) MV e'         0.071 m/s (>8.0) MV lateral e' 0.08 m/s MV medial e'  0.06 m/s E/e' Ratio:   11.61     (<8.0) MITRAL VALVE:                 Normal Ranges: MV DT: 221 msec (150-240msec) AORTIC VALVE:                                    Normal Ranges: AoV Vmax:                2.26 m/s  (<=1.7m/s) AoV Peak P.4 mmHg (<20mmHg) AoV Mean PG:             10.6 mmHg (1.7-11.5mmHg) LVOT Max Cirilo:            0.97 m/s  (<=1.1m/s) AoV VTI:                 46.31 cm  (18-25cm) LVOT VTI:                21.06 cm LVOT Diameter:           2.00 cm   (1.8-2.4cm) AoV Area, VTI:           1.43 cm2  (2.5-5.5cm2) AoV Area,Vmax:           1.35 cm2  (2.5-4.5cm2) AoV Dimensionless Index: 0.45  RIGHT VENTRICLE: TAPSE: 25.1 mm RV s'  0.15 m/s TRICUSPID VALVE/RVSP:                   Normal Ranges: IVC Diam: 2.02 cm AORTA: Asc Ao Diam 3.83 cm  [id number] [facility] Electronically signed on 2024 at 10:49:35 AM  ** Final **     XR foot left 3+ views    Result Date: 2024  Interpreted By:  Ely Alanis, STUDY: XR FOOT LEFT 3+ VIEWS;  2024 2:44 am   INDICATION: Signs/Symptoms:Rule out infection.   COMPARISON: None.   ACCESSION NUMBER(S): [id number]   ORDERING CLINICIAN: BRENNAN SOLORIO   FINDINGS: 3 views of the left foot were obtained.   There is diffuse osteopenia. There is no acute fracture or dislocation. Degenerative changes are noted at the 1st metatarsophalangeal joint with hallux valgus. There is cortical lucency at the bases of the 2nd and 3rd distal phalanges. Degenerative changes are noted at the midfoot. There is calcaneal enthesopathy. Vascular calcifications are present. There is diffuse soft tissue swelling at the left foot. There is small amount of gas at the plantar soft tissues overlying the bases of the toes.       1. Cortical lucency at the bases of the distal phalanges of the left 2nd and 3rd toes, underlying  osteomyelitis cannot be excluded. Contrast-enhanced MRI can help in further evaluation. 2. Diffuse soft tissue edema at the left foot. Small amount of gas at the plantar soft tissues overlying the bases of the toes, suggestive of ulcer.       MACRO: None.   Signed by: Ely Alanis 7/22/2024 2:52 AM Dictation workstation:   [id number]    XR chest 1 view    Result Date: 7/21/2024  Interpreted By:  Makeda Gurrola, STUDY: XR CHEST 1 VIEW;  7/21/2024 10:23 pm   INDICATION: Signs/Symptoms:weakness.   COMPARISON: 02/14/2022   ACCESSION NUMBER(S): [id number]   ORDERING CLINICIAN: MIHAELA AUGUSTIN   FINDINGS:     CARDIOMEDIASTINAL SILHOUETTE: Stable cardiomegaly.   LUNGS: No pulmonary consolidation, pleural effusion or pneumothorax. Low lung volumes with bronchovascular crowding.   ABDOMEN: No remarkable upper abdominal findings.   BONES: No acute osseous abnormality.       No acute cardiopulmonary process.   MACRO: None   Signed by: Makeda Gurrola 7/21/2024 10:43 PM Dictation workstation:   [id number]       Assessment/Plan     Chronic kidney disease stage V  Type 2 diabetes with peripheral angiopathy without gangrene  Left diabetic foot ulcer, Brown 2 versus 3  Left foot cellulitis, rule out osteomyelitis  Elevated CK     Discontinue daptomycin  IV Zyvox-avoid medications that interact, Zofran discontinued   IV Zosyn  Podiatry follow-up  MRI left foot-after evaluation by podiatry  Nephrology follow-up  Local care  Offloading  Monitor temperature and WBC    Garry Rodrigez MD

## 2024-07-23 NOTE — PROGRESS NOTES
Colin Leonard is a 58 y.o. male on day 1 of admission presenting with NSTEMI (non-ST elevated myocardial infarction) (Multi).      Subjective   No new overnight events.  Denies chest pain or dyspnea at rest.  Stable BP.  No nausea or vomiting.       Scheduled medications  aspirin, 81 mg, oral, Daily  atorvastatin, 40 mg, oral, Nightly  carvedilol, 25 mg, oral, BID  daptomycin, 500 mg, intravenous, q48h  ezetimibe, 10 mg, oral, Nightly  heparin (porcine), 5,000 Units, subcutaneous, q8h  hydrALAZINE, 100 mg, oral, TID  pantoprazole, 20 mg, oral, Daily before breakfast  perflutren lipid microspheres, 0.5-10 mL of dilution, intravenous, Once in imaging  piperacillin-tazobactam, 2.25 g, intravenous, q8h  sodium bicarbonate, 1,300 mg, oral, TID  torsemide, 20 mg, oral, Daily      Continuous medications     PRN medications  PRN medications: acetaminophen **OR** acetaminophen **OR** acetaminophen, benzocaine-menthol, dextromethorphan-guaifenesin, dextrose, dextrose, glucagon, glucagon, guaiFENesin, ondansetron ODT **OR** ondansetron, oxyCODONE, polyethylene glycol      Objective     Vitals 24HR  Heart Rate:  [66-87]   Temp:  [36.2 °C (97.2 °F)-36.9 °C (98.4 °F)]   Resp:  [16-19]   BP: (122-143)/(59-73)   Weight:  [122 kg (268 lb 1.3 oz)]   SpO2:  [98 %-100 %]     General: Middle-aged man, not in distress  Eyes: Pale, anicteric  Lungs: Clear bilaterally  Heart: S1 and S2, regular  Abdomen: Soft, nontender  Extremities: Dressing over L foot with LLE edema, no RLE edema  Neuro: Awake and interactive, no asterixis      Intake/Output last 3 Shifts:    Intake/Output Summary (Last 24 hours) at 7/23/2024 7571  Last data filed at 7/23/2024 0935  Gross per 24 hour   Intake 290 ml   Output 375 ml   Net -85 ml       Relevant Results    Results for orders placed or performed during the hospital encounter of 07/21/24 (from the past 24 hour(s))   Tissue/Wound Culture/Smear    Specimen: Wound/Tissue; Tissue/Biopsy   Result Value Ref  Range    Gram Stain (4+) Abundant Polymorphonuclear leukocytes (A)     Gram Stain (2+) Few Gram positive cocci (A)    POCT GLUCOSE   Result Value Ref Range    POCT Glucose 176 (H) 74 - 99 mg/dL   POCT GLUCOSE   Result Value Ref Range    POCT Glucose 145 (H) 74 - 99 mg/dL   Renal Function Panel   Result Value Ref Range    Glucose 138 (H) 65 - 99 mg/dL    Sodium 136 133 - 145 mmol/L    Potassium 4.5 3.4 - 5.1 mmol/L    Chloride 105 97 - 107 mmol/L    Bicarbonate 16 (L) 24 - 31 mmol/L    Urea Nitrogen 65 (H) 8 - 25 mg/dL    Creatinine 6.50 (H) 0.40 - 1.60 mg/dL    eGFR 9 (L) >60 mL/min/1.73m*2    Calcium 9.2 8.5 - 10.4 mg/dL    Phosphorus 3.7 2.5 - 4.5 mg/dL    Albumin 3.0 (L) 3.5 - 5.0 g/dL    Anion Gap 15 <=19 mmol/L   CBC   Result Value Ref Range    WBC 14.6 (H) 4.4 - 11.3 x10*3/uL    nRBC 0.0 0.0 - 0.0 /100 WBCs    RBC 2.98 (L) 4.50 - 5.90 x10*6/uL    Hemoglobin 8.5 (L) 13.5 - 17.5 g/dL    Hematocrit 28.4 (L) 41.0 - 52.0 %    MCV 95 80 - 100 fL    MCH 28.5 26.0 - 34.0 pg    MCHC 29.9 (L) 32.0 - 36.0 g/dL    RDW 14.9 (H) 11.5 - 14.5 %    Platelets 309 150 - 450 x10*3/uL   POCT GLUCOSE   Result Value Ref Range    POCT Glucose 157 (H) 74 - 99 mg/dL   POCT GLUCOSE   Result Value Ref Range    POCT Glucose 190 (H) 74 - 99 mg/dL   POCT GLUCOSE   Result Value Ref Range    POCT Glucose 146 (H) 74 - 99 mg/dL   POCT GLUCOSE   Result Value Ref Range    POCT Glucose 151 (H) 74 - 99 mg/dL       Assessment/Plan      Stage V CKD  Hypertension  Metabolic acidosis  Anemia in CKD    SCr holding at his baseline around the mid to high 6s.  Serum K is controlled and he is not overtly uremic at this time.  No urgent need for dialysis at this time but approaching ESRD and will need close follow-up at discharge.    He will remain on a loop diuretic and other current antihypertensives.    Continue sodium bicarbonate supplementation as recently revised.    Continue other care.      Des Bangura MD

## 2024-07-23 NOTE — CONSULTS
Consults    Reason For Consult  Osteomyelitis left foot     History Of Present Illness  Colin Leonard is a 58 y.o. male presenting with new onset osteomyelitis left foot.  Patient states that he initially felt fever chills and malaise going into this past weekend.  He notes feeling nauseous and passing out on the couch at which point his family members alerted EMS patient was brought to the hospital with an NSTEMI.  Upon further examination it was determined the patient had changes consistent with abscess and underlying bone infection of his left foot.  Patient denies having noticed an underlying wound to his left foot.  Patient states that he is a diabetic of 3 to 4 years duration but notes that he is been controlled with diet.  Patient is alert and oriented at the time of bedside evaluation and in no acute distress     Past Medical History  He has no past medical history on file.    Surgical History  He has no past surgical history on file.     Social History  He reports that he has never smoked. He has never used smokeless tobacco. No history on file for alcohol use and drug use.    Family History  No family history on file.     Allergies  Amlodipine besylate    Review of Systems    REVIEW OF SYSTEMS  GENERAL:  Negative for malaise, significant weight loss  Objective:   Vasc: DP and PT pulses are palpable bilateral.  CFT is less than 3 seconds bilateral.  Skin temperature is warm to cool proximal to distal bilateral.      Neuro:  Light touch is intact to the foot bilateral.  Protective sensation is diminished to the foot when tested with the 5.07 SWM bilateral.  There is no clonus noted.  The hallux is downgoing bilateral.      Ortho: Muscle strength is 5/5 for all pedal groups tested.  Ankle joint, subtalar joint, 1st MPJ and lesser MPJ ROM is full and without pain or crepitus.  The foot type is rectus bilateral off weight bearing.  There are no structural deformities noted.    Derm: Circumferential erythema  "and edema to the base of the left second digit.  No appreciable crepitus on physical evaluation however the left second digit is Dr. Lytic swollen and warm.  There is a full-thickness ulceration to the base of the patient's left second metatarsal head there is pronounced hyperkeratotic tissue surrounding the ulcer.  The wound has mild serous drainage the wound bed measures 2 x 1.5 cm in dimension with significant undermining.  The wound bed communicates down to the level of joint capsule second metatarsal phalangeal joint.  No evidence of proximal lymphangitic streaking no evidence of tender lymphadenopathy to the left ankle or popliteal region     Physical Exam     Last Recorded Vitals  Blood pressure 134/73, pulse 76, temperature 36.2 °C (97.2 °F), temperature source Temporal, resp. rate 18, height 1.803 m (5' 11\"), weight 122 kg (268 lb 1.3 oz), SpO2 99%.    Relevant Results  ESR 60-7/22  CRP 24.7 - 7/22  WBC 14.6 down from 24.6 on 7/22    XR  There is diffuse osteopenia. There is no acute fracture or  dislocation. Degenerative changes are noted at the 1st  metatarsophalangeal joint with hallux valgus. There is cortical  lucency at the bases of the 2nd and 3rd distal phalanges.  Degenerative changes are noted at the midfoot. There is calcaneal  enthesopathy. Vascular calcifications are present. There is diffuse  soft tissue swelling at the left foot. There is small amount of gas  at the plantar soft tissues overlying the bases of the toes.      IMPRESSION:  1. Cortical lucency at the bases of the distal phalanges of the left  2nd and 3rd toes, underlying osteomyelitis cannot be excluded.  Contrast-enhanced MRI can help in further evaluation.  2. Diffuse soft tissue edema at the left foot. Small amount of gas at  the plantar soft tissues overlying the bases of the toes, suggestive  of ulcer.       Assessment/Plan   Acute osteomyelitis left foot  Abscess left foot with cellulitis   Non-healing chronic ulceration " with joint involvement   Leukocytosis down trending  DM II c Complication     Patient seen and evaluated bedside I discussed x-ray findings concerning for acute osteomyelitis of the second and third digits. I explained the need for surgical incision and debridement with possible amputation of second and third digit.  I explained at this time he is responding to IV antibiotics having down trended his white count from 24.6-14.6.  At this time we will get an MRI to determine the extent of the bone infection if the bone infection is relegated to the second and third digits we will proceed with incision and drainage with disarticulation of the left second and third digit however if the MRI demonstrates more proximal spread there is possibility for possible resection of metatarsals again pending MRI results.  I explained that this procedure will likely need to be staged into an initial incision and debridement with amputation and a subsequent closure once the interoperative cultures can finalized.  Plan for surgical resection of the affected digits and incision and debridement Thursday morning once MRI results have finalized    However if white count increases or proximal spread worsens within the next wealth to 24 hours we will consider urgent amputation and incision with debridement.  Patient understands and agrees.  Patient will be n.p.o. tomorrow at midnight we will reevaluate tomorrow morning to discuss MRI findings and determine extent of bone infection.  X-ray reviewed at this time the wound deficit is likely being called soft tissue gas there is no evidence of proximal soft tissue crepitus or emphysema on x-ray however there are extensive calcifications noted in the patient's vasculature. will continue to follow, dressing changes per podiatry      I spent 30 minutes in the professional and overall care of this patient.

## 2024-07-23 NOTE — PROGRESS NOTES
"Colin Leonard is a 58 y.o. male on day 2 of admission presenting with NSTEMI (non-ST elevated myocardial infarction) (Multi).    Subjective   Alert and oriented x 3, denies complaints chest pain or pressure, palpitations or feeling rapid heart rate.       Objective     Physical Exam  Vitals and nursing note reviewed.   Constitutional:       Appearance: He is obese. He is not ill-appearing.   HENT:      Head: Normocephalic and atraumatic.      Nose: Nose normal.      Mouth/Throat:      Mouth: Mucous membranes are moist.      Pharynx: Oropharynx is clear.   Cardiovascular:      Rate and Rhythm: Normal rate and regular rhythm.      Pulses: Normal pulses.      Heart sounds: Normal heart sounds.   Pulmonary:      Effort: Pulmonary effort is normal.      Breath sounds: Normal breath sounds.   Abdominal:      General: Bowel sounds are normal.      Palpations: Abdomen is soft.   Musculoskeletal:         General: Normal range of motion.      Cervical back: Normal range of motion.      Comments: Redness, edema and warmth noted to second digit left foot.  Clean dry dressing to left ankle.  Patient maintains movement and sensation distally   Skin:     General: Skin is warm and dry.      Capillary Refill: Capillary refill takes less than 2 seconds.   Neurological:      Mental Status: He is alert and oriented to person, place, and time. Mental status is at baseline.   Psychiatric:         Mood and Affect: Mood normal.         Behavior: Behavior normal.         Thought Content: Thought content normal.         Judgment: Judgment normal.         Last Recorded Vitals  Blood pressure 134/62, pulse 66, temperature 36.2 °C (97.2 °F), temperature source Temporal, resp. rate 18, height 1.803 m (5' 11\"), weight 122 kg (268 lb 1.3 oz), SpO2 99%.  Intake/Output last 3 Shifts:  I/O last 3 completed shifts:  In: 290 (2.4 mL/kg) [P.O.:240; IV Piggyback:50]  Out: 325 (2.7 mL/kg) [Urine:325 (0.1 mL/kg/hr)]  Weight: 121.6 kg     Relevant " Results  Transthoracic Echo (TTE) Complete    Result Date: 7/22/2024           [facility] [id number] [place], [place] [address]            Phone [phone] TRANSTHORACIC ECHOCARDIOGRAM REPORT Patient Name:      ODALYS MELVIN     Reading Physician:    [id number] Kade Pedraza DO Study Date:        7/22/2024             Ordering Provider:    [id number] GERMAN KENNEY MRN/PID:           [id number]              Fellow: Accession#:        [id number]          Nurse: Date of Birth/Age: 1965 / 58 years Sonographer:          Vera Wynn RDCS Gender:            M                     Additional Staff: Height:            180.34 cm             Admit Date: Weight:            118.39 kg             Admission Status:     Inpatient -                                                                Routine BSA / BMI:         2.36 m2 / 36.40 kg/m2 Department Location:  [facility] ([place]) Blood Pressure: 147 /88 mmHg Study Type:    TRANSTHORACIC ECHO (TTE) COMPLETE Diagnosis/ICD: Atherosclerotic heart disease of native coronary artery without                angina pectoris-I25.10 Indication:    Dyspnea, weakness fatigue CPT Codes:     Echo Complete w Full [id number] Patient History: Pertinent History: LVH, HLD, HTN, CAD, Nstemi, SOB CKD, DM. Study Detail: The following Echo studies were performed: 2D, M-Mode, color flow               and Doppler. Unable to obtain suprasternal notch view.  PHYSICIAN INTERPRETATION: Left Ventricle: The left ventricular systolic function is normal, with a visually estimated ejection fraction of 60-65%. There are no regional wall motion abnormalities. The left ventricular cavity size is normal. Left ventricular diastolic filling was indeterminate. Left Atrium: The left atrium is mildly dilated. Right  Ventricle: The right ventricle is normal in size. There is normal right ventricular global systolic function. Right Atrium: The right atrium is normal in size. Aortic Valve: The aortic valve is trileaflet. There is evidence of mild aortic valve stenosis. The aortic valve dimensionless index is 0.45. There is no evidence of aortic valve regurgitation. The peak instantaneous gradient of the aortic valve is 20.4 mmHg. The mean gradient of the aortic valve is 10.6 mmHg. Mitral Valve: The mitral valve is normal in structure. There is mild mitral valve regurgitation. Tricuspid Valve: The tricuspid valve is structurally normal. There is trace tricuspid regurgitation. Pulmonic Valve: The pulmonic valve is not well visualized. The pulmonic valve regurgitation was not well visualized. Pericardium: There is no pericardial effusion noted. Aorta: The aortic root is normal.  CONCLUSIONS:  1. The left ventricular systolic function is normal, with a visually estimated ejection fraction of 60-65%.  2. Left ventricular diastolic filling was indeterminate.  3. There is normal right ventricular global systolic function.  4. Mild aortic valve stenosis.  5. Aortic stenosis mean gradient 10 mm Hg, peak gradient 40 mm Hg and ADÁN 1.43 cm2.  6. Aortic valve dimensionless index 0.43. QUANTITATIVE DATA SUMMARY: 2D MEASUREMENTS:                           Normal Ranges: LAs:           4.76 cm    (2.7-4.0cm) IVSd:          1.70 cm    (0.6-1.1cm) LVPWd:         1.51 cm    (0.6-1.1cm) LVIDd:         3.86 cm    (3.9-5.9cm) LVIDs:         2.85 cm LV Mass Index: 104.8 g/m2 LV % FS        26.2 % LA VOLUME:                               Normal Ranges: LA Vol A4C:        71.7 ml    (22+/-6mL/m2) LA Vol A2C:        92.5 ml LA Vol BP:         86.7 ml LA Vol Index A4C:  30.4 ml/m2 LA Vol Index A2C:  39.2 ml/m2 LA Vol Index BP:   36.7 ml/m2 LA Area A4C:       22.5 cm2 LA Area A2C:       27.2 cm2 LA Major Axis A4C: 6.0 cm LA Major Axis A2C: 6.8 cm LA Volume  Index:   36.7 ml/m2 LA Vol A4C:        72.0 ml LA Vol A2C:        92.0 ml RA VOLUME BY A/L METHOD:                               Normal Ranges: RA Vol A4C:        39.5 ml    (8.3-19.5ml) RA Vol Index A4C:  16.7 ml/m2 RA Area A4C:       15.4 cm2 RA Major Axis A4C: 5.1 cm AORTA MEASUREMENTS:                      Normal Ranges: Ao Sinus, d: 3.30 cm (2.1-3.5cm) Ao STJ, d:   3.00 cm (1.7-3.4cm) Asc Ao, d:   3.80 cm (2.1-3.4cm) LV SYSTOLIC FUNCTION BY 2D PLANIMETRY (MOD):                      Normal Ranges: EF-A4C View:    48 % (>=55%) EF-A2C View:    65 % EF-Biplane:     57 % EF-Visual:      63 % LV EF Reported: 63 % LV DIASTOLIC FUNCTION:                         Normal Ranges: MV Peak E:    0.82 m/s  (0.7-1.2 m/s) MV Peak A:    0.99 m/s  (0.42-0.7 m/s) E/A Ratio:    0.83      (1.0-2.2) MV e'         0.071 m/s (>8.0) MV lateral e' 0.08 m/s MV medial e'  0.06 m/s E/e' Ratio:   11.61     (<8.0) MITRAL VALVE:                 Normal Ranges: MV DT: 221 msec (150-240msec) AORTIC VALVE:                                    Normal Ranges: AoV Vmax:                2.26 m/s  (<=1.7m/s) AoV Peak P.4 mmHg (<20mmHg) AoV Mean PG:             10.6 mmHg (1.7-11.5mmHg) LVOT Max Cirilo:            0.97 m/s  (<=1.1m/s) AoV VTI:                 46.31 cm  (18-25cm) LVOT VTI:                21.06 cm LVOT Diameter:           2.00 cm   (1.8-2.4cm) AoV Area, VTI:           1.43 cm2  (2.5-5.5cm2) AoV Area,Vmax:           1.35 cm2  (2.5-4.5cm2) AoV Dimensionless Index: 0.45  RIGHT VENTRICLE: TAPSE: 25.1 mm RV s'  0.15 m/s TRICUSPID VALVE/RVSP:                   Normal Ranges: IVC Diam: 2.02 cm AORTA: Asc Ao Diam 3.83 cm  [id number] Kade Pedraza DO Electronically signed on 2024 at 10:49:35 AM  ** Final **      Results for orders placed or performed during the hospital encounter of 24 (from the past 24 hour(s))   Creatine Kinase   Result Value Ref Range    Creatine Kinase 1,000 (H) 24 - 195 U/L   Tissue/Wound Culture/Smear     Specimen: Wound/Tissue; Tissue/Biopsy   Result Value Ref Range    Gram Stain (4+) Abundant Polymorphonuclear leukocytes (A)     Gram Stain (2+) Few Gram positive cocci (A)    POCT GLUCOSE   Result Value Ref Range    POCT Glucose 176 (H) 74 - 99 mg/dL   POCT GLUCOSE   Result Value Ref Range    POCT Glucose 145 (H) 74 - 99 mg/dL   Renal Function Panel   Result Value Ref Range    Glucose 138 (H) 65 - 99 mg/dL    Sodium 136 133 - 145 mmol/L    Potassium 4.5 3.4 - 5.1 mmol/L    Chloride 105 97 - 107 mmol/L    Bicarbonate 16 (L) 24 - 31 mmol/L    Urea Nitrogen 65 (H) 8 - 25 mg/dL    Creatinine 6.50 (H) 0.40 - 1.60 mg/dL    eGFR 9 (L) >60 mL/min/1.73m*2    Calcium 9.2 8.5 - 10.4 mg/dL    Phosphorus 3.7 2.5 - 4.5 mg/dL    Albumin 3.0 (L) 3.5 - 5.0 g/dL    Anion Gap 15 <=19 mmol/L   CBC   Result Value Ref Range    WBC 14.6 (H) 4.4 - 11.3 x10*3/uL    nRBC 0.0 0.0 - 0.0 /100 WBCs    RBC 2.98 (L) 4.50 - 5.90 x10*6/uL    Hemoglobin 8.5 (L) 13.5 - 17.5 g/dL    Hematocrit 28.4 (L) 41.0 - 52.0 %    MCV 95 80 - 100 fL    MCH 28.5 26.0 - 34.0 pg    MCHC 29.9 (L) 32.0 - 36.0 g/dL    RDW 14.9 (H) 11.5 - 14.5 %    Platelets 309 150 - 450 x10*3/uL   POCT GLUCOSE   Result Value Ref Range    POCT Glucose 157 (H) 74 - 99 mg/dL   POCT GLUCOSE   Result Value Ref Range    POCT Glucose 190 (H) 74 - 99 mg/dL       Assessment/Plan   Principal Problem:    NSTEMI (non-ST elevated myocardial infarction) (Multi)  Active Problems:    Obesity    LVH (left ventricular hypertrophy)    Mixed hyperlipidemia    Benign essential hypertension    Coronary artery disease involving native coronary artery of native heart without angina pectoris    Fall    Leukocytosis    Stage 5 chronic kidney disease not on chronic dialysis (Multi)    Type 2 diabetes mellitus (Multi)    Weakness, malaise, shortness of breath and diaphoresis  Essential hypertension  Hyperlipidemia  Diabetes mellitus  Chronic kidney disease  Anemia of chronic disease  Family history of ischemic  heart disease     7/21: Noted in history of present illness patient currently does not scribe any chest pain or discomfort.  Has had mild shortness of breath symptoms over the last 4 days which she states feels like he is COVID-19 infection 4 years ago.  His twelve-lead EKG reveals a left bundle branch block pattern.  The fact that the patient has no chest discomfort and an EKG with a left bundle branch block pattern I do not feel this meets criteria for an ST segment elevation myocardial infarction.  Also he has chronic kidney disease with a serum creatinine 2022 6.9.  Thus cardiac catheterization would certainly carry with it significant risk of nephrotoxicity.  His hemodynamically stable and again has no chest discomfort.  Code STEMI will be deactivated and emergency department begin workup for the patient's current medical illness.  Cardiology service will certainly be available for consultation if needed.     7/22: As above, stable overnight.  Was initially called as a code STEMI, found to be a left bundle branch block, patient had no chest pain or symptoms suggestive advancing coronary artery disease.  He does have elevated troponins which are relative to his chronic kidney disease with hemodialysis.  Creatinine is morning 6.3.  Hemoglobin low but stable at 9.0.  On telemetry monitor overnight the patient is noted to have a couple episodes of nonsustained SVT. He does remain on carvedilol 25 mg p.o. twice daily.  If he continues to have difficulty with SVT will consider electrophysiology consult.  His last echocardiogram on record is from 2022.  Was noted to have a preserved ejection fraction at that time.  Given his fatigue and malaise, will check an echo this hospitalization.  Will follow with you.    7/23: Stable overnight.  Denies complaints chest pain or pressure, palpitations or feeling rapid heart rate.  His creatinine remains elevated at most recent of 6.5.  He did undergo echocardiographic yesterday  which revealed a preserved ejection fraction with no new wall motion abnormalities.  He does have a noted mild attic valve stenosis with a peak pressure of 40 a mean of 10 and an ADÁN of 1.43 with a dimensionless index of 0.43.  This is in line with his chronic values.  His blood pressure has been stable with most recent 134/62.  He is generally euvolemic on examination.  He does continue with redness warmth and edema to his second digit on his left foot.  Receiving treatment as managed by admitting.  Overall is stable from a cardiac perspective, do not feel he had an acute cardiac event. Will sign off.  Patient to follow-up with his cardiologist Dr. Mahendra Saravia as per current outpatient schedule.      I spent 35 minutes in the professional and overall care of this patient.      Grey Fontana, SARAH-CNP

## 2024-07-23 NOTE — CARE PLAN
Problem: Skin  Goal: Decreased wound size/increased tissue granulation at next dressing change  Outcome: Progressing  Goal: Participates in plan/prevention/treatment measures  Outcome: Progressing  Goal: Prevent/manage excess moisture  Outcome: Progressing  Goal: Prevent/minimize sheer/friction injuries  Outcome: Progressing  Goal: Promote/optimize nutrition  Outcome: Progressing  Goal: Promote skin healing  Outcome: Progressing  Goal: Absence of infection at discharge  Outcome: Progressing   The patient's goals for the shift include      The clinical goals for the shift include have mri done

## 2024-07-23 NOTE — PROGRESS NOTES
Spiritual Care Visit    Clinical Encounter Type  Visited With: Patient  Routine Visit: Introduction    Buddhist Encounters  Buddhist Needs: Literature, Spiritual care brochure, Prayer     Spiritual care Note:     Patient received a visit from the Spiritual care volunteer while admitted.  This patient was triaged for their emotional and spiritual need consistent with their belief system and supported accordingly.

## 2024-07-23 NOTE — CONSULTS
"Nutrition Assessement Note    Nutrition Assessment    Reason for Assessment: Admission nursing screening (Pressure injury)    Reason for Hospital Admission:  Colin Leonard is a 58 y.o. male who is admitted for NSTEMI, fatigue and weakness. Patient reported presence of wound to left foot for 2-3 weeks. He is agreeable to Francisco J to aid in wound healing, will order BID. DM2 well controlled, A1c 5.5. Patient is agreeable to education on heart healthy diet at this time. Will continue to monitor.     No past medical history on file.   No past surgical history on file.    Nutrition History:  Food and Nutrient History: Patient reports good appetite and intake with no changes from when at home. Pt states his baseline as 2 meals/day.  Energy Intake: Good > 75 %  Food Allergies/Intolerances:  None  GI Symptoms: None  Oral Problems: None    Anthropometrics:  Ht: 180.3 cm (5' 11\"), Wt: 122 kg (268 lb 1.3 oz), BMI: 37.41  IBW/kg (Dietitian Calculated): 78.2 kg  Percent of IBW: 156 %  Adjusted Body Weight (kg): 89.1 kg    Weight Change:  Daily Weight  07/23/24 : 122 kg (268 lb 1.3 oz)  04/22/24 : 123 kg (270 lb 6.4 oz)  04/04/22 : 118 kg (261 lb)  02/28/22 : 123 kg (271 lb 8 oz)  02/22/22 : 124 kg (274 lb)  08/19/21 : 128 kg (282 lb 9.6 oz)  03/08/21 : 126 kg (278 lb)  02/22/21 : 126 kg (278 lb 6 oz)  10/19/20 : 126 kg (277 lb 1 oz)  10/01/20 : 124 kg (273 lb)     Weight History / % Weight Change: Patient reports a UBW of 260#. States he has not had any recent changes in wt.  Significant Weight Loss: No     Nutrition Focused Physical Exam Findings:      Nutrition Significant Labs:  Lab Results   Component Value Date    WBC 14.6 (H) 07/23/2024    HGB 8.5 (L) 07/23/2024    HCT 28.4 (L) 07/23/2024     07/23/2024    CHOL 97 02/01/2022    TRIG 117 02/01/2022    HDL 30.2 (A) 02/01/2022    ALT 32 07/22/2024    AST 39 07/22/2024     07/23/2024    K 4.5 07/23/2024     07/23/2024    CREATININE 6.50 (H) 07/23/2024    " BUN 65 (H) 07/23/2024    CO2 16 (L) 07/23/2024    TSH 1.81 08/07/2019    INR 1.2 07/22/2024    HGBA1C 5.5 07/22/2024     Nutrition Specific Medications:  aspirin, 81 mg, oral, Daily  atorvastatin, 40 mg, oral, Nightly  carvedilol, 25 mg, oral, BID  daptomycin, 500 mg, intravenous, q48h  ezetimibe, 10 mg, oral, Nightly  heparin (porcine), 5,000 Units, subcutaneous, q8h  hydrALAZINE, 100 mg, oral, TID  pantoprazole, 20 mg, oral, Daily before breakfast  perflutren lipid microspheres, 0.5-10 mL of dilution, intravenous, Once in imaging  piperacillin-tazobactam, 2.25 g, intravenous, q8h  sodium bicarbonate, 1,300 mg, oral, TID  torsemide, 20 mg, oral, Daily       Dietary Orders (From admission, onward)       Start     Ordered    07/23/24 1420  Oral nutritional supplements  Until discontinued        Comments: Fruit punch   Question Answer Comment   Deliver with Breakfast    Deliver with Dinner    Select supplement: Francisco J        07/23/24 1419    07/22/24 1346  Adult diet Regular  Diet effective now        Question:  Diet type  Answer:  Regular    07/22/24 1345                   Estimated Needs:   Estimated Energy Needs  Total Energy Estimated Needs (kCal): 2340 kCal  Total Estimated Energy Need per Day (kCal/kg): 30 kCal/kg  Method for Estimating Needs: IBW    Estimated Protein Needs  Total Protein Estimated Needs (g):  (78-94)  Total Protein Estimated Needs (g/kg):  (1-1.2)  Method for Estimating Needs: IBW    Estimated Fluid Needs  Total Fluid Estimated Needs (mL): 1950 mL  Total Fluid Estimated Needs (mL/kg): 25 mL/kg  Method for Estimating Needs: 1 mL/kcal      Nutrition Diagnosis   Nutrition Diagnosis:  Malnutrition Diagnosis  Patient has Malnutrition Diagnosis: No    Nutrition Diagnosis  Patient has Nutrition Diagnosis: Yes  Diagnosis Status (1): New  Nutrition Diagnosis 1: Increased nutrient needs  Related to (1): increased demand for nutrients  As Evidenced by (1): NSTEMI; Wounds  Additional Nutrition Diagnosis:  Diagnosis 2  Diagnosis Status (2): New  Nutrition Diagnosis 2: Food and nutrition related knowledge deficit  Related to (2): lack of prior exposure to accurate nutrition related information  As Evidenced by (2): Requested educaiton on heart healthy diet     Nutrition Interventions/Recommendations   Nutrition Interventions and Recommendations:    Nutrition Prescription:  Individualized Nutrition Prescription Provided for : 2340 calories/day and 117g protein/day to be providd via diet and supplements    Nutrition Interventions:   Food and/or Nutrient Delivery Interventions  Interventions: Meals and snacks, Medical food supplement  Meals and Snacks: Fat-modified diet, Mineral-modified diet  Goal: Recommend cardiac diet  Medical Food Supplement: Commercial beverage  Goal: adam BID to aid in wound healing (fruit punch)    Education Documentation  Nutrition Care Manual, taught by Autumn Katz RD, LD at 7/23/2024  2:18 PM.  Learner: Patient  Readiness: Acceptance  Method: Explanation, Handout  Response: Verbalizes Understanding  Comment: Provided handout and discussed heart heathy nutrition. Patient verbalizes understanding.           Nutrition Monitoring and Evaluation   Monitoring/Evaluation:   Food/Nutrient Related History Monitoring  Monitoring and Evaluation Plan: Energy intake  Energy Intake: Estimated energy intake  Criteria: pt will plan meals within recommended prescribed guidelines    Body Composition/Growth/Weight History  Monitoring and Evaluation Plan: Weight     Nutrition Focused Physical Findings  Monitoring and Evaluation Plan: Skin  Skin: Impaired wound healing  Criteria: pt will show signs of improvement in skin integrity       Time Spent/Follow-up:   Follow Up  Time Spent (min): 40 minutes  Last Date of Nutrition Visit: 07/23/24  Nutrition Follow-Up Needed?: 5-7 days  Follow up Comment: 7/29/24

## 2024-07-24 LAB
ALBUMIN SERPL-MCNC: 2.8 G/DL (ref 3.5–5)
ANION GAP SERPL CALC-SCNC: 12 MMOL/L
BUN SERPL-MCNC: 67 MG/DL (ref 8–25)
CALCIUM SERPL-MCNC: 8.7 MG/DL (ref 8.5–10.4)
CHLORIDE SERPL-SCNC: 104 MMOL/L (ref 97–107)
CO2 SERPL-SCNC: 17 MMOL/L (ref 24–31)
CREAT SERPL-MCNC: 6.5 MG/DL (ref 0.4–1.6)
EGFRCR SERPLBLD CKD-EPI 2021: 9 ML/MIN/1.73M*2
ERYTHROCYTE [DISTWIDTH] IN BLOOD BY AUTOMATED COUNT: 14.9 % (ref 11.5–14.5)
FOLATE SERPL-MCNC: 5 NG/ML (ref 4.2–19.9)
GLUCOSE BLD MANUAL STRIP-MCNC: 106 MG/DL (ref 74–99)
GLUCOSE BLD MANUAL STRIP-MCNC: 147 MG/DL (ref 74–99)
GLUCOSE BLD MANUAL STRIP-MCNC: 159 MG/DL (ref 74–99)
GLUCOSE BLD MANUAL STRIP-MCNC: 163 MG/DL (ref 74–99)
GLUCOSE BLD MANUAL STRIP-MCNC: 169 MG/DL (ref 74–99)
GLUCOSE BLD MANUAL STRIP-MCNC: 171 MG/DL (ref 74–99)
GLUCOSE SERPL-MCNC: 134 MG/DL (ref 65–99)
HCT VFR BLD AUTO: 23.9 % (ref 41–52)
HGB BLD-MCNC: 7.6 G/DL (ref 13.5–17.5)
IRON SATN MFR SERPL: 17 % (ref 12–50)
IRON SERPL-MCNC: 27 UG/DL (ref 45–160)
MCH RBC QN AUTO: 28.9 PG (ref 26–34)
MCHC RBC AUTO-ENTMCNC: 31.8 G/DL (ref 32–36)
MCV RBC AUTO: 91 FL (ref 80–100)
NRBC BLD-RTO: 0 /100 WBCS (ref 0–0)
PHOSPHATE SERPL-MCNC: 3.4 MG/DL (ref 2.5–4.5)
PLATELET # BLD AUTO: 299 X10*3/UL (ref 150–450)
POTASSIUM SERPL-SCNC: 4.6 MMOL/L (ref 3.4–5.1)
RBC # BLD AUTO: 2.63 X10*6/UL (ref 4.5–5.9)
SODIUM SERPL-SCNC: 133 MMOL/L (ref 133–145)
TIBC SERPL-MCNC: 161 UG/DL (ref 228–428)
UIBC SERPL-MCNC: 134 UG/DL (ref 110–370)
VIT B12 SERPL-MCNC: 1185 PG/ML (ref 211–946)
WBC # BLD AUTO: 12.1 X10*3/UL (ref 4.4–11.3)

## 2024-07-24 PROCEDURE — 2500000004 HC RX 250 GENERAL PHARMACY W/ HCPCS (ALT 636 FOR OP/ED): Performed by: INTERNAL MEDICINE

## 2024-07-24 PROCEDURE — 2500000001 HC RX 250 WO HCPCS SELF ADMINISTERED DRUGS (ALT 637 FOR MEDICARE OP): Performed by: HOSPITALIST

## 2024-07-24 PROCEDURE — 82607 VITAMIN B-12: CPT | Performed by: HOSPITALIST

## 2024-07-24 PROCEDURE — 2500000004 HC RX 250 GENERAL PHARMACY W/ HCPCS (ALT 636 FOR OP/ED): Performed by: HOSPITALIST

## 2024-07-24 PROCEDURE — 83540 ASSAY OF IRON: CPT | Performed by: HOSPITALIST

## 2024-07-24 PROCEDURE — 85027 COMPLETE CBC AUTOMATED: CPT | Performed by: HOSPITALIST

## 2024-07-24 PROCEDURE — 36415 COLL VENOUS BLD VENIPUNCTURE: CPT | Performed by: NURSE PRACTITIONER

## 2024-07-24 PROCEDURE — 80069 RENAL FUNCTION PANEL: CPT | Performed by: INTERNAL MEDICINE

## 2024-07-24 PROCEDURE — 82746 ASSAY OF FOLIC ACID SERUM: CPT | Performed by: HOSPITALIST

## 2024-07-24 PROCEDURE — 2500000002 HC RX 250 W HCPCS SELF ADMINISTERED DRUGS (ALT 637 FOR MEDICARE OP, ALT 636 FOR OP/ED): Performed by: HOSPITALIST

## 2024-07-24 PROCEDURE — 87040 BLOOD CULTURE FOR BACTERIA: CPT | Mod: WESLAB | Performed by: NURSE PRACTITIONER

## 2024-07-24 PROCEDURE — 1200000002 HC GENERAL ROOM WITH TELEMETRY DAILY

## 2024-07-24 PROCEDURE — 36415 COLL VENOUS BLD VENIPUNCTURE: CPT | Performed by: HOSPITALIST

## 2024-07-24 PROCEDURE — 2500000001 HC RX 250 WO HCPCS SELF ADMINISTERED DRUGS (ALT 637 FOR MEDICARE OP): Performed by: INTERNAL MEDICINE

## 2024-07-24 PROCEDURE — 82947 ASSAY GLUCOSE BLOOD QUANT: CPT

## 2024-07-24 RX ORDER — PROCHLORPERAZINE EDISYLATE 5 MG/ML
5 INJECTION INTRAMUSCULAR; INTRAVENOUS EVERY 6 HOURS PRN
Status: DISCONTINUED | OUTPATIENT
Start: 2024-07-24 | End: 2024-08-02 | Stop reason: HOSPADM

## 2024-07-24 RX ADMIN — HYDRALAZINE HYDROCHLORIDE 100 MG: 50 TABLET ORAL at 08:51

## 2024-07-24 RX ADMIN — EZETIMIBE 10 MG: 10 TABLET ORAL at 21:21

## 2024-07-24 RX ADMIN — ASPIRIN 81 MG: 81 TABLET, COATED ORAL at 08:51

## 2024-07-24 RX ADMIN — LINEZOLID 600 MG: 600 INJECTION, SOLUTION INTRAVENOUS at 21:48

## 2024-07-24 RX ADMIN — SODIUM BICARBONATE 650 MG TABLET 1300 MG: at 14:13

## 2024-07-24 RX ADMIN — HYDRALAZINE HYDROCHLORIDE 100 MG: 50 TABLET ORAL at 21:21

## 2024-07-24 RX ADMIN — LINEZOLID 600 MG: 600 INJECTION, SOLUTION INTRAVENOUS at 08:51

## 2024-07-24 RX ADMIN — PIPERACILLIN SODIUM AND TAZOBACTAM SODIUM 2.25 G: 2; .25 INJECTION, SOLUTION INTRAVENOUS at 06:00

## 2024-07-24 RX ADMIN — TORSEMIDE 20 MG: 20 TABLET ORAL at 08:51

## 2024-07-24 RX ADMIN — HEPARIN SODIUM 5000 UNITS: 5000 INJECTION, SOLUTION INTRAVENOUS; SUBCUTANEOUS at 14:13

## 2024-07-24 RX ADMIN — HYDRALAZINE HYDROCHLORIDE 100 MG: 50 TABLET ORAL at 14:13

## 2024-07-24 RX ADMIN — SODIUM BICARBONATE 650 MG TABLET 1300 MG: at 08:51

## 2024-07-24 RX ADMIN — CARVEDILOL 25 MG: 25 TABLET, FILM COATED ORAL at 21:21

## 2024-07-24 RX ADMIN — HEPARIN SODIUM 5000 UNITS: 5000 INJECTION, SOLUTION INTRAVENOUS; SUBCUTANEOUS at 21:21

## 2024-07-24 RX ADMIN — CARVEDILOL 25 MG: 25 TABLET, FILM COATED ORAL at 08:51

## 2024-07-24 RX ADMIN — PIPERACILLIN SODIUM AND TAZOBACTAM SODIUM 2.25 G: 2; .25 INJECTION, SOLUTION INTRAVENOUS at 14:13

## 2024-07-24 RX ADMIN — SODIUM BICARBONATE 650 MG TABLET 1300 MG: at 21:21

## 2024-07-24 RX ADMIN — PIPERACILLIN SODIUM AND TAZOBACTAM SODIUM 2.25 G: 2; .25 INJECTION, SOLUTION INTRAVENOUS at 21:21

## 2024-07-24 RX ADMIN — ATORVASTATIN CALCIUM 40 MG: 40 TABLET, FILM COATED ORAL at 21:21

## 2024-07-24 ASSESSMENT — COGNITIVE AND FUNCTIONAL STATUS - GENERAL
MOBILITY SCORE: 22
DAILY ACTIVITIY SCORE: 23
CLIMB 3 TO 5 STEPS WITH RAILING: A LITTLE
MOBILITY SCORE: 24
DAILY ACTIVITIY SCORE: 23
HELP NEEDED FOR BATHING: A LITTLE
HELP NEEDED FOR BATHING: A LITTLE
CLIMB 3 TO 5 STEPS WITH RAILING: A LOT
WALKING IN HOSPITAL ROOM: A LITTLE
DAILY ACTIVITIY SCORE: 23
MOBILITY SCORE: 22
HELP NEEDED FOR BATHING: A LITTLE

## 2024-07-24 ASSESSMENT — PAIN SCALES - PAIN ASSESSMENT IN ADVANCED DEMENTIA (PAINAD)
BREATHING: NORMAL
FACIALEXPRESSION: SMILING OR INEXPRESSIVE
BODYLANGUAGE: RELAXED
CONSOLABILITY: NO NEED TO CONSOLE
TOTALSCORE: 0

## 2024-07-24 ASSESSMENT — PAIN DESCRIPTION - DESCRIPTORS: DESCRIPTORS: ACHING

## 2024-07-24 ASSESSMENT — PAIN SCALES - GENERAL
PAINLEVEL_OUTOF10: 0 - NO PAIN
PAINLEVEL_OUTOF10: 4
PAINLEVEL_OUTOF10: 0 - NO PAIN

## 2024-07-24 ASSESSMENT — PAIN - FUNCTIONAL ASSESSMENT
PAIN_FUNCTIONAL_ASSESSMENT: 0-10
PAIN_FUNCTIONAL_ASSESSMENT: 0-10

## 2024-07-24 ASSESSMENT — PAIN SCALES - WONG BAKER: WONGBAKER_NUMERICALRESPONSE: NO HURT

## 2024-07-24 NOTE — PROGRESS NOTES
Colin Leonard is a 58 y.o. male on day 2 of admission presenting with NSTEMI (non-ST elevated myocardial infarction) (Multi).      Subjective   Patient reports he's doing okay. Reports foot pain well controlled. Reports postnasal drip in morning that makes him cough, but reports this is chronic. Has a basin with sputum in it next to the bed, some spots of blood noted.       Objective     Last Recorded Vitals  /63 (BP Location: Left arm, Patient Position: Lying)   Pulse 82   Temp 37.1 °C (98.8 °F) (Temporal)   Resp 18   Wt 122 kg (268 lb 15.4 oz)   SpO2 97%   Intake/Output last 3 Shifts:    Intake/Output Summary (Last 24 hours) at 7/24/2024 1107  Last data filed at 7/24/2024 0042  Gross per 24 hour   Intake --   Output 875 ml   Net -875 ml       Admission Weight  Weight: 119 kg (261 lb 7.5 oz) (07/21/24 2214)    Daily Weight  07/24/24 : 122 kg (268 lb 15.4 oz)    Image Results  MR foot left wo IV contrast  Narrative: Interpreted By:  Ricardo Nino,   STUDY:  MRI of the    left foot without contrast dated  7/23/2024.      INDICATION:  Signs/Symptoms:osteomyelitis/wound      COMPARISON:  None.      ACCESSION NUMBER(S):  [id number]      ORDERING CLINICIAN:  NOE DIAZ      TECHNIQUE:  Multiplanar multisequence MRI of the    left forefoot was performed  without intravenous gadolinium based contrast.      FINDINGS:  OSSEOUS STRUCTURES AND JOINTS:      There is increased T2/STIR signal intensity in the diaphysis into the  head the 2nd metatarsal. There is increased T2/STIR signal intensity  within the phalanges of the 2nd digit. There is some mottled low T1  signal intensity in the head of the 2nd metatarsal and in the  proximal phalanx of the 2nd digit. There is question of a degree  disruption of the cortex at the head of the 2nd metatarsal and along  the plantar diaphysis and base of the proximal phalanx of the 2nd  digit. There is a large volume 2nd digit metatarsophalangeal joint  effusion  with bulging of the joint capsule. Severe degenerative  changes seen of the 1st digit metatarsophalangeal joint. There is  some marginal erosion along the medial side of the head of the 1st  metatarsal which can be seen with sequelae of crystal arthropathy.  Mild degenerative changes seen in the midfoot.      SOFT TISSUES:      There is an ulcer at the plantar aspect of the forefoot superficial  to the 2nd digit metatarsophalangeal joint. As seen on this  noncontrast examination there appears to be a fistula tract through  the soft tissues extending to the 2nd digit metatarsophalangeal  joint. The fistula tract also appears to at least partially envelop  the flexor digitorum tendon sheath to the 2nd digit and possibly  involves the tendon sheath such as seen image 16 of the sagittal  plane. There is a degree of generalized ill-defined increased T2  signal intensity in the musculature. Generalized atrophy is seen in  the musculature. Reticular increased T2 signal intensity is seen in  the subcutaneous tissues of the visualized lower extremity greatest  on the dorsum of the foot and in the 2nd toe.      Impression: 1. Ulcer at the plantar forefoot with fistula tract of the 2nd digit  metatarsophalangeal joint with large volume 2nd digit  metatarsophalangeal joint effusion compatible with septic arthritis.  There are findings which are felt to be compatible with osteomyelitis  at least involving the head of the 2nd metatarsal and the base into  the diaphysis the proximal phalanx of the 2nd digit with the reactive  marrow changes seen in the distal metadiaphysis of the 2nd metatarsal  suspicious for higher likelihood of osteomyelitis involvement.  2. Reactive marrow changes in the middle and distal phalanx of the  2nd digit, which given not directly adjacent to the wound/ulcer are  probably of lower likelihood osteomyelitis.  3. The above described fistula tract partially envelops the flexor  digitorum tendon sheath of  the 2nd digit at the level of the  metatarsophalangeal joint and there may be a component of associated  at least focal infectious tenosynovitis.  4. Reactive edema in the musculature of the foot overall likely  related to ischemic/diabetic myopathy although a component of  infectious myositis particularly of the intrinsic musculature  adjacent to the distal aspect of the 2nd metatarsal is not excluded.  5. Cellulitis and/or lymphedema of the visualized lower extremity.      Signed by: Ricardo Nino 7/24/2024 8:41 AM  Dictation workstation:   [id number]      Physical Exam  General: alert, no diaphoresis   Lungs: CTA BL   Heart: RRR,  no LE edema BL   GI: abdomen soft, nontender, nondistended, BS present   MSK: no joint effusion or deformity   Skin: left 2nd digit of foot is swollen and erythematous, extending proximally to mid dorsal foot   Neuro: grossly normal cognition, motor strength, sensation      Relevant Results               Assessment/Plan                  Principal Problem:    NSTEMI (non-ST elevated myocardial infarction) (Multi)  Active Problems:    Obesity    LVH (left ventricular hypertrophy)    Mixed hyperlipidemia    Benign essential hypertension    Coronary artery disease involving native coronary artery of native heart without angina pectoris    Fall    Leukocytosis    Stage 5 chronic kidney disease not on chronic dialysis (Multi)    Type 2 diabetes mellitus (Multi)    Acute hematogenous osteomyelitis of left foot (Multi)    Mechanical fall at home  - no obvious injury. No syncope.    NSTEMI vs Troponin elevation with CKD 5  - cardio feels more likely CKD 5 causing troponin elevation. No further cardiac work up recommended    Left sided diabetic foot ulcer  Gram negative bacteremia  - Wound culture with mixed microbial including staph aureus. 2/2 blood cultures came back this morning with gram negative rods (drawn on 7/22). Repeat blood cultures sent  - remains on zyvox, zosyn  - MRI showed  multiple findings- ulcer with fistula tract second digit with likely septic arthritis 2nd digit septic arthritis. With reactive edema. Osteomyelitis is lower likelihood per read. See full report for more detail  - Dr. Rodrigez following closely  - plan for surgery tomorrow with Dr. Munoz, podiatry. Discussed with him yesterday  - NPO at midnight    CKD 5 - stable  - nephrology saw patient. He has previously followed with Dr. Goldberg but hasn't seen him in about 1 yr. Has not been initiated on dialysis as of yet and no urgent needs for dialysis. IVF stopped. Dr. Bangura following. Patient will need to follow up with Dr. Goldberg as outpatient    Metabolic acidosis  - bicarb therapy by Dr. Bangura     DM type 2  - SSI, acuchecks    HLD  - statin, zetia    DVT ppx  - heparin         Isabelle Cook DO

## 2024-07-24 NOTE — CARE PLAN
The patient's goals for the shift include      Problem: Skin  Goal: Decreased wound size/increased tissue granulation at next dressing change  Outcome: Progressing     Problem: Skin  Goal: Promote/optimize nutrition  Outcome: Progressing     Problem: Fall/Injury  Goal: Not fall by end of shift  Outcome: Progressing     The clinical goals for the shift include have mri done    Over the shift, the patient did make progress towards goals.  All needs met, patient safety maintained.

## 2024-07-24 NOTE — CARE PLAN
Problem: Skin  Goal: Decreased wound size/increased tissue granulation at next dressing change  7/24/2024 1026 by Marni Handley RN  Outcome: Progressing     Problem: Skin  Goal: Participates in plan/prevention/treatment measures  7/24/2024 1026 by Marni Handley RN  Outcome: Progressing     Problem: Skin  Goal: Promote/optimize nutrition  7/24/2024 1026 by Marni Handley RN  Outcome: Progressing     Problem: Skin  Goal: Promote skin healing  7/24/2024 1026 by Marni Handley RN  Outcome: Progressing     Problem: Skin  Goal: Absence of infection at discharge  7/24/2024 1026 by Marni Handley RN  Outcome: Progressing     Problem: Fall/Injury  Goal: Not fall by end of shift  7/24/2024 1026 by Marni Handley RN  Outcome: Progressing     Problem: Fall/Injury  Goal: Be free from injury by end of the shift  7/24/2024 1026 by Marni Handley RN  Outcome: Progressing     Problem: Pain  Goal: Performs ADL's with improved pain control throughout shift  Outcome: Progressing     Problem: Pain  Goal: Walks with improved pain control throughout the shift  Outcome: Progressing

## 2024-07-24 NOTE — CARE PLAN
The patient's goals for the shift include      The clinical goals for the shift include Patient will increase mobility.      Problem: Skin  Goal: Decreased wound size/increased tissue granulation at next dressing change  Outcome: Progressing  Flowsheets (Taken 7/24/2024 1706)  Decreased wound size/increased tissue granulation at next dressing change: Promote sleep for wound healing  Goal: Participates in plan/prevention/treatment measures  Outcome: Progressing  Flowsheets (Taken 7/24/2024 1026 by Marni Handley RN)  Participates in plan/prevention/treatment measures:   Elevate heels   Increase activity/out of bed for meals  Goal: Prevent/manage excess moisture  Outcome: Progressing  Flowsheets (Taken 7/24/2024 1026 by Marni Handley RN)  Prevent/manage excess moisture: Moisturize dry skin  Goal: Prevent/minimize sheer/friction injuries  Outcome: Progressing  Flowsheets (Taken 7/24/2024 1026 by Marni Handley RN)  Prevent/minimize sheer/friction injuries: HOB 30 degrees or less  Goal: Promote/optimize nutrition  Outcome: Progressing  Flowsheets (Taken 7/24/2024 1026 by Marni Handley RN)  Promote/optimize nutrition:   Consume > 50% meals/supplements   Monitor/record intake including meals  Goal: Promote skin healing  Outcome: Progressing  Flowsheets (Taken 7/24/2024 1026 by Marni Handley, RN)  Promote skin healing: Assess skin/pad under line(s)/device(s)  Goal: Absence of infection at discharge  Outcome: Progressing  Flowsheets (Taken 7/24/2024 1026 by Marni Handley RN)  Absence of infection at discharge:   Assess and monitor for signs and symptoms of infection   Monitor lab/diagnostic results   Monitor all insertion sites i.e., indwelling lines, tubes and drains   Administer medications as ordered     Problem: Fall/Injury  Goal: Not fall by end of shift  Outcome: Progressing  Goal: Be free from injury by end of the shift  Outcome: Progressing  Goal: Verbalize understanding of personal risk factors for fall in the  hospital  Outcome: Progressing  Goal: Verbalize understanding of risk factor reduction measures to prevent injury from fall in the home  Outcome: Progressing  Goal: Use assistive devices by end of the shift  Outcome: Progressing  Goal: Pace activities to prevent fatigue by end of the shift  Outcome: Progressing     Problem: Pain  Goal: Takes deep breaths with improved pain control throughout the shift  Outcome: Progressing  Goal: Turns in bed with improved pain control throughout the shift  Outcome: Progressing  Goal: Walks with improved pain control throughout the shift  Outcome: Progressing  Goal: Performs ADL's with improved pain control throughout shift  Outcome: Progressing  Goal: Participates in PT with improved pain control throughout the shift  Outcome: Progressing  Goal: Free from opioid side effects throughout the shift  Outcome: Progressing  Goal: Free from acute confusion related to pain meds throughout the shift  Outcome: Progressing     Problem: Pain - Adult  Goal: Verbalizes/displays adequate comfort level or baseline comfort level  Outcome: Progressing  Flowsheets (Taken 7/24/2024 1706)  Verbalizes/displays adequate comfort level or baseline comfort level:   Encourage patient to monitor pain and request assistance   Assess pain using appropriate pain scale   Administer analgesics based on type and severity of pain and evaluate response   Implement non-pharmacological measures as appropriate and evaluate response   Consider cultural and social influences on pain and pain management   Notify Licensed Independent Practitioner if interventions unsuccessful or patient reports new pain     Problem: Safety - Adult  Goal: Free from fall injury  Outcome: Progressing  Flowsheets (Taken 7/24/2024 1706)  Free from fall injury:   Instruct family/caregiver on patient safety   Based on caregiver fall risk screen, instruct family/caregiver to ask for assistance with transferring infant if caregiver noted to have  fall risk factors     Problem: Discharge Planning  Goal: Discharge to home or other facility with appropriate resources  Outcome: Progressing  Flowsheets (Taken 7/24/2024 1706)  Discharge to home or other facility with appropriate resources:   Identify barriers to discharge with patient and caregiver   Arrange for needed discharge resources and transportation as appropriate   Identify discharge learning needs (meds, wound care, etc)   Refer to discharge planning if patient needs post-hospital services based on physician order or complex needs related to functional status, cognitive ability or social support system     Problem: Chronic Conditions and Co-morbidities  Goal: Patient's chronic conditions and co-morbidity symptoms are monitored and maintained or improved  Outcome: Progressing  Flowsheets (Taken 7/24/2024 1706)  Care Plan - Patient's Chronic Conditions and Co-Morbidity Symptoms are Monitored and Maintained or Improved:   Monitor and assess patient's chronic conditions and comorbid symptoms for stability, deterioration, or improvement   Collaborate with multidisciplinary team to address chronic and comorbid conditions and prevent exacerbation or deterioration   Update acute care plan with appropriate goals if chronic or comorbid symptoms are exacerbated and prevent overall improvement and discharge

## 2024-07-24 NOTE — PROGRESS NOTES
Colin Leonard is a 58 y.o. male on day 2 of admission presenting with NSTEMI (non-ST elevated myocardial infarction) (Multi).    Subjective   Interval History:   Afebrile, no chills  Denies shortness of breath  Denies nausea, vomiting, diarrhea        Review of Systems   All other systems reviewed and are negative.      Objective   Range of Vitals (last 24 hours)  Heart Rate:  [73-84]   Temp:  [36.2 °C (97.2 °F)-37.1 °C (98.8 °F)]   Resp:  [18-19]   BP: (127-148)/(63-90)   Weight:  [122 kg (268 lb 15.4 oz)]   SpO2:  [95 %-99 %]   Daily Weight  07/24/24 : 122 kg (268 lb 15.4 oz)    Body mass index is 37.51 kg/m².    Physical Exam  Constitutional:       Appearance: Normal appearance.   HENT:      Head: Normocephalic and atraumatic.      Nose: Nose normal.   Eyes:      General: No scleral icterus.     Extraocular Movements: Extraocular movements intact.      Conjunctiva/sclera: Conjunctivae normal.   Cardiovascular:      Rate and Rhythm: Normal rate and regular rhythm.   Pulmonary:      Effort: Pulmonary effort is normal.      Breath sounds: Normal breath sounds.   Abdominal:      General: Bowel sounds are normal.      Palpations: Abdomen is soft.   Musculoskeletal:      Cervical back: Normal range of motion and neck supple.      Right lower leg: No edema.      Left lower leg: No edema.   Feet:      Left foot:      Skin integrity: Ulcer and callus present.      Comments: Left second met head  Skin:     General: Skin is warm and dry.   Neurological:      Mental Status: He is alert and oriented to person, place, and time.   Psychiatric:         Mood and Affect: Mood normal.         Behavior: Behavior normal.        Antibiotics  linezolid - 600 mg/300 mL  piperacillin-tazobactam - 2.25 gram/50 mL    Relevant Results  Labs  Results from last 72 hours   Lab Units 07/24/24  0514 07/23/24  0439 07/22/24  0619 07/21/24  2217   WBC AUTO x10*3/uL 12.1* 14.6* 24.0* 18.6*   HEMOGLOBIN g/dL 7.6* 8.5* 9.0* 9.3*   HEMATOCRIT %  23.9* 28.4* 28.2* 29.1*   PLATELETS AUTO x10*3/uL 299 309 334 335   NEUTROS PCT AUTO %  --   --  88.3 93.2   LYMPHS PCT AUTO %  --   --  3.9 1.4   MONOS PCT AUTO %  --   --  6.5 3.9   EOS PCT AUTO %  --   --  0.0 0.8     Results from last 72 hours   Lab Units 07/24/24  0514 07/23/24 0439 07/22/24 0619   SODIUM mmol/L 133 136 134   POTASSIUM mmol/L 4.6 4.5 4.7   CHLORIDE mmol/L 104 105 104   CO2 mmol/L 17* 16* 15*   BUN mg/dL 67* 65* 64*   CREATININE mg/dL 6.50* 6.50* 6.30*   GLUCOSE mg/dL 134* 138* 190*   CALCIUM mg/dL 8.7 9.2 9.4   ANION GAP mmol/L 12 15 15   EGFR mL/min/1.73m*2 9* 9* 9*   PHOSPHORUS mg/dL 3.4 3.7  --      Results from last 72 hours   Lab Units 07/24/24 0514 07/23/24 0439 07/22/24 0619 07/21/24  2217   ALK PHOS U/L  --   --  85 85   BILIRUBIN TOTAL mg/dL  --   --  0.6 0.6   PROTEIN TOTAL g/dL  --   --  7.2 7.4   ALT U/L  --   --  32 27   AST U/L  --   --  39 24   ALBUMIN g/dL 2.8* 3.0* 3.3* 3.5     Estimated Creatinine Clearance: 16.1 mL/min (A) (by C-G formula based on SCr of 6.5 mg/dL (H)).  C-Reactive Protein   Date Value Ref Range Status   07/22/2024 24.70 (H) 0.00 - 2.00 mg/dL Final     CRP   Date Value Ref Range Status   09/09/2020 5.32 (A) mg/dL Final     Comment:     REF VALUE  < 1.00     09/08/2020 9.54 (A) mg/dL Final     Comment:     REF VALUE  < 1.00       Microbiology  Wound culture pending with staph aureus  Blood cultures pending with gram negative cocci x 2  Imaging  MR foot left wo IV contrast    Result Date: 7/24/2024  Interpreted By:  Ricardo Nino, STUDY: MRI of the    left foot without contrast dated  7/23/2024.   INDICATION: Signs/Symptoms:osteomyelitis/wound   COMPARISON: None.   ACCESSION NUMBER(S): [id number]   ORDERING CLINICIAN: NOE DIAZ   TECHNIQUE: Multiplanar multisequence MRI of the    left forefoot was performed without intravenous gadolinium based contrast.   FINDINGS: OSSEOUS STRUCTURES AND JOINTS:   There is increased T2/STIR signal intensity in  the diaphysis into the head the 2nd metatarsal. There is increased T2/STIR signal intensity within the phalanges of the 2nd digit. There is some mottled low T1 signal intensity in the head of the 2nd metatarsal and in the proximal phalanx of the 2nd digit. There is question of a degree disruption of the cortex at the head of the 2nd metatarsal and along the plantar diaphysis and base of the proximal phalanx of the 2nd digit. There is a large volume 2nd digit metatarsophalangeal joint effusion with bulging of the joint capsule. Severe degenerative changes seen of the 1st digit metatarsophalangeal joint. There is some marginal erosion along the medial side of the head of the 1st metatarsal which can be seen with sequelae of crystal arthropathy. Mild degenerative changes seen in the midfoot.   SOFT TISSUES:   There is an ulcer at the plantar aspect of the forefoot superficial to the 2nd digit metatarsophalangeal joint. As seen on this noncontrast examination there appears to be a fistula tract through the soft tissues extending to the 2nd digit metatarsophalangeal joint. The fistula tract also appears to at least partially envelop the flexor digitorum tendon sheath to the 2nd digit and possibly involves the tendon sheath such as seen image 16 of the sagittal plane. There is a degree of generalized ill-defined increased T2 signal intensity in the musculature. Generalized atrophy is seen in the musculature. Reticular increased T2 signal intensity is seen in the subcutaneous tissues of the visualized lower extremity greatest on the dorsum of the foot and in the 2nd toe.       1. Ulcer at the plantar forefoot with fistula tract of the 2nd digit metatarsophalangeal joint with large volume 2nd digit metatarsophalangeal joint effusion compatible with septic arthritis. There are findings which are felt to be compatible with osteomyelitis at least involving the head of the 2nd metatarsal and the base into the diaphysis the  proximal phalanx of the 2nd digit with the reactive marrow changes seen in the distal metadiaphysis of the 2nd metatarsal suspicious for higher likelihood of osteomyelitis involvement. 2. Reactive marrow changes in the middle and distal phalanx of the 2nd digit, which given not directly adjacent to the wound/ulcer are probably of lower likelihood osteomyelitis. 3. The above described fistula tract partially envelops the flexor digitorum tendon sheath of the 2nd digit at the level of the metatarsophalangeal joint and there may be a component of associated at least focal infectious tenosynovitis. 4. Reactive edema in the musculature of the foot overall likely related to ischemic/diabetic myopathy although a component of infectious myositis particularly of the intrinsic musculature adjacent to the distal aspect of the 2nd metatarsal is not excluded. 5. Cellulitis and/or lymphedema of the visualized lower extremity.   Signed by: Ricardo Nino 7/24/2024 8:41 AM Dictation workstation:   [id number]      Assessment/Plan   Bacteremia-gram negative cocci  Chronic kidney disease stage V  Type 2 diabetes with peripheral angiopathy without gangrene  Left diabetic foot ulcer, Brown 3- MRI suspicious osteomyelitis at 2nd metatarsal  Left foot cellulitis, rule out osteomyelitis  Elevated CK       IV Zyvox-avoid medications that interact, Zofran discontinued   IV Zosyn  Follow-up finalized blood cultures and wound culture  Repeat blood cultures   Podiatry follow-up   Nephrology follow-up  Local care  Offloading  Monitor temperature and WBC  Further recommendations based on pending work-up    Discussed with Dr Rodrigez    Total time spent caring for the patient today was 20 minutes.  This includes time spent before the visit reviewing the chart, time spent during the visit, and time spent after the visit on documentation.    Lora Watters, APRN-CNP

## 2024-07-24 NOTE — PROGRESS NOTES
Colin Leonard is a 58 y.o. male on day 2 of admission presenting with NSTEMI (non-ST elevated myocardial infarction) (Multi).      Subjective   No new overnight events.  Appetite has been good.  No nausea or vomiting.  No dyspnea at rest.       Scheduled medications  aspirin, 81 mg, oral, Daily  atorvastatin, 40 mg, oral, Nightly  carvedilol, 25 mg, oral, BID  ezetimibe, 10 mg, oral, Nightly  heparin (porcine), 5,000 Units, subcutaneous, q8h  hydrALAZINE, 100 mg, oral, TID  linezolid, 600 mg, intravenous, q12h NHAN  pantoprazole, 20 mg, oral, Daily before breakfast  perflutren lipid microspheres, 0.5-10 mL of dilution, intravenous, Once in imaging  piperacillin-tazobactam, 2.25 g, intravenous, q8h  sodium bicarbonate, 1,300 mg, oral, TID  torsemide, 20 mg, oral, Daily      Continuous medications     PRN medications  PRN medications: acetaminophen **OR** acetaminophen **OR** acetaminophen, benzocaine-menthol, dextromethorphan-guaifenesin, dextrose, dextrose, glucagon, glucagon, guaiFENesin, oxyCODONE, polyethylene glycol      Objective     Vitals 24HR  Heart Rate:  [73-84]   Temp:  [36.2 °C (97.2 °F)-37.1 °C (98.8 °F)]   Resp:  [18-19]   BP: (127-148)/(63-90)   Weight:  [122 kg (268 lb 15.4 oz)]   SpO2:  [95 %-99 %]     General: Middle-aged man, not in distress  Eyes: Pale, anicteric  Lungs: Clear bilaterally  Heart: S1 and S2, regular  Abdomen: Soft, nontender  Extremities: Dressing over left foot with proximal edema  Neuro: Awake and interactive, no asterixis      Intake/Output last 3 Shifts:    Intake/Output Summary (Last 24 hours) at 7/24/2024 1017  Last data filed at 7/24/2024 0042  Gross per 24 hour   Intake --   Output 875 ml   Net -875 ml       Relevant Results    Results for orders placed or performed during the hospital encounter of 07/21/24 (from the past 24 hour(s))   POCT GLUCOSE   Result Value Ref Range    POCT Glucose 146 (H) 74 - 99 mg/dL   POCT GLUCOSE   Result Value Ref Range    POCT Glucose 151  (H) 74 - 99 mg/dL   POCT GLUCOSE   Result Value Ref Range    POCT Glucose 162 (H) 74 - 99 mg/dL   POCT GLUCOSE   Result Value Ref Range    POCT Glucose 171 (H) 74 - 99 mg/dL   POCT GLUCOSE   Result Value Ref Range    POCT Glucose 147 (H) 74 - 99 mg/dL   Renal Function Panel   Result Value Ref Range    Glucose 134 (H) 65 - 99 mg/dL    Sodium 133 133 - 145 mmol/L    Potassium 4.6 3.4 - 5.1 mmol/L    Chloride 104 97 - 107 mmol/L    Bicarbonate 17 (L) 24 - 31 mmol/L    Urea Nitrogen 67 (H) 8 - 25 mg/dL    Creatinine 6.50 (H) 0.40 - 1.60 mg/dL    eGFR 9 (L) >60 mL/min/1.73m*2    Calcium 8.7 8.5 - 10.4 mg/dL    Phosphorus 3.4 2.5 - 4.5 mg/dL    Albumin 2.8 (L) 3.5 - 5.0 g/dL    Anion Gap 12 <=19 mmol/L   CBC   Result Value Ref Range    WBC 12.1 (H) 4.4 - 11.3 x10*3/uL    nRBC 0.0 0.0 - 0.0 /100 WBCs    RBC 2.63 (L) 4.50 - 5.90 x10*6/uL    Hemoglobin 7.6 (L) 13.5 - 17.5 g/dL    Hematocrit 23.9 (L) 41.0 - 52.0 %    MCV 91 80 - 100 fL    MCH 28.9 26.0 - 34.0 pg    MCHC 31.8 (L) 32.0 - 36.0 g/dL    RDW 14.9 (H) 11.5 - 14.5 %    Platelets 299 150 - 450 x10*3/uL   POCT GLUCOSE   Result Value Ref Range    POCT Glucose 106 (H) 74 - 99 mg/dL         Assessment/Plan      Stage V CKD  Hypertension  Metabolic acidosis, improving  Anemia in CKD    Renal function has remained stable around his baseline.  Serum K is controlled and he is not clinically overloaded.  He has no overt uremic symptoms or other indication for dialysis at this time.    No objection to discharge from renal point of view.  He will need close follow-up with Dr. Goldberg (his outpatient nephrologist) at discharge.    Continue other care.      Des Bangura MD

## 2024-07-25 ENCOUNTER — ANESTHESIA EVENT (OUTPATIENT)
Dept: OPERATING ROOM | Facility: HOSPITAL | Age: 59
End: 2024-07-25

## 2024-07-25 ENCOUNTER — ANESTHESIA (OUTPATIENT)
Dept: OPERATING ROOM | Facility: HOSPITAL | Age: 59
End: 2024-07-25

## 2024-07-25 LAB
ALBUMIN SERPL-MCNC: 2.8 G/DL (ref 3.5–5)
ANION GAP SERPL CALC-SCNC: 15 MMOL/L
B-LACTAMASE ORGANISM ISLT: POSITIVE
BACTERIA SPEC CULT: ABNORMAL
BUN SERPL-MCNC: 72 MG/DL (ref 8–25)
CALCIUM SERPL-MCNC: 8.9 MG/DL (ref 8.5–10.4)
CHLORIDE SERPL-SCNC: 103 MMOL/L (ref 97–107)
CO2 SERPL-SCNC: 17 MMOL/L (ref 24–31)
CREAT SERPL-MCNC: 6.5 MG/DL (ref 0.4–1.6)
EGFRCR SERPLBLD CKD-EPI 2021: 9 ML/MIN/1.73M*2
ERYTHROCYTE [DISTWIDTH] IN BLOOD BY AUTOMATED COUNT: 14.6 % (ref 11.5–14.5)
GLUCOSE BLD MANUAL STRIP-MCNC: 115 MG/DL (ref 74–99)
GLUCOSE BLD MANUAL STRIP-MCNC: 119 MG/DL (ref 74–99)
GLUCOSE BLD MANUAL STRIP-MCNC: 136 MG/DL (ref 74–99)
GLUCOSE BLD MANUAL STRIP-MCNC: 173 MG/DL (ref 74–99)
GLUCOSE BLD MANUAL STRIP-MCNC: 85 MG/DL (ref 74–99)
GLUCOSE SERPL-MCNC: 140 MG/DL (ref 65–99)
GRAM STN SPEC: ABNORMAL
GRAM STN SPEC: ABNORMAL
HCT VFR BLD AUTO: 23.4 % (ref 41–52)
HGB BLD-MCNC: 7.4 G/DL (ref 13.5–17.5)
MCH RBC QN AUTO: 28.7 PG (ref 26–34)
MCHC RBC AUTO-ENTMCNC: 31.6 G/DL (ref 32–36)
MCV RBC AUTO: 91 FL (ref 80–100)
NRBC BLD-RTO: 0 /100 WBCS (ref 0–0)
PHOSPHATE SERPL-MCNC: 3.6 MG/DL (ref 2.5–4.5)
PLATELET # BLD AUTO: 289 X10*3/UL (ref 150–450)
POTASSIUM SERPL-SCNC: 4.3 MMOL/L (ref 3.4–5.1)
RBC # BLD AUTO: 2.58 X10*6/UL (ref 4.5–5.9)
SODIUM SERPL-SCNC: 135 MMOL/L (ref 133–145)
WBC # BLD AUTO: 10.6 X10*3/UL (ref 4.4–11.3)

## 2024-07-25 PROCEDURE — 87015 SPECIMEN INFECT AGNT CONCNTJ: CPT | Mod: WESLAB | Performed by: STUDENT IN AN ORGANIZED HEALTH CARE EDUCATION/TRAINING PROGRAM

## 2024-07-25 PROCEDURE — 82947 ASSAY GLUCOSE BLOOD QUANT: CPT

## 2024-07-25 PROCEDURE — 1210000001 HC SEMI-PRIVATE ROOM DAILY

## 2024-07-25 PROCEDURE — 3600000003 HC OR TIME - INITIAL BASE CHARGE - PROCEDURE LEVEL THREE: Performed by: STUDENT IN AN ORGANIZED HEALTH CARE EDUCATION/TRAINING PROGRAM

## 2024-07-25 PROCEDURE — 87116 MYCOBACTERIA CULTURE: CPT | Mod: WESLAB | Performed by: STUDENT IN AN ORGANIZED HEALTH CARE EDUCATION/TRAINING PROGRAM

## 2024-07-25 PROCEDURE — 3600000008 HC OR TIME - EACH INCREMENTAL 1 MINUTE - PROCEDURE LEVEL THREE: Performed by: STUDENT IN AN ORGANIZED HEALTH CARE EDUCATION/TRAINING PROGRAM

## 2024-07-25 PROCEDURE — 87102 FUNGUS ISOLATION CULTURE: CPT | Mod: WESLAB | Performed by: STUDENT IN AN ORGANIZED HEALTH CARE EDUCATION/TRAINING PROGRAM

## 2024-07-25 PROCEDURE — 2500000004 HC RX 250 GENERAL PHARMACY W/ HCPCS (ALT 636 FOR OP/ED): Performed by: HOSPITALIST

## 2024-07-25 PROCEDURE — 87070 CULTURE OTHR SPECIMN AEROBIC: CPT | Mod: WESLAB | Performed by: STUDENT IN AN ORGANIZED HEALTH CARE EDUCATION/TRAINING PROGRAM

## 2024-07-25 PROCEDURE — 88311 DECALCIFY TISSUE: CPT | Performed by: PATHOLOGY

## 2024-07-25 PROCEDURE — 87205 SMEAR GRAM STAIN: CPT | Mod: WESLAB | Performed by: STUDENT IN AN ORGANIZED HEALTH CARE EDUCATION/TRAINING PROGRAM

## 2024-07-25 PROCEDURE — 85027 COMPLETE CBC AUTOMATED: CPT | Performed by: HOSPITALIST

## 2024-07-25 PROCEDURE — 3700000002 HC GENERAL ANESTHESIA TIME - EACH INCREMENTAL 1 MINUTE: Performed by: STUDENT IN AN ORGANIZED HEALTH CARE EDUCATION/TRAINING PROGRAM

## 2024-07-25 PROCEDURE — 88305 TISSUE EXAM BY PATHOLOGIST: CPT | Performed by: PATHOLOGY

## 2024-07-25 PROCEDURE — 2500000002 HC RX 250 W HCPCS SELF ADMINISTERED DRUGS (ALT 637 FOR MEDICARE OP, ALT 636 FOR OP/ED): Performed by: STUDENT IN AN ORGANIZED HEALTH CARE EDUCATION/TRAINING PROGRAM

## 2024-07-25 PROCEDURE — 2500000002 HC RX 250 W HCPCS SELF ADMINISTERED DRUGS (ALT 637 FOR MEDICARE OP, ALT 636 FOR OP/ED): Performed by: HOSPITALIST

## 2024-07-25 PROCEDURE — 2500000004 HC RX 250 GENERAL PHARMACY W/ HCPCS (ALT 636 FOR OP/ED): Performed by: STUDENT IN AN ORGANIZED HEALTH CARE EDUCATION/TRAINING PROGRAM

## 2024-07-25 PROCEDURE — 88305 TISSUE EXAM BY PATHOLOGIST: CPT | Mod: TC | Performed by: STUDENT IN AN ORGANIZED HEALTH CARE EDUCATION/TRAINING PROGRAM

## 2024-07-25 PROCEDURE — 2500000001 HC RX 250 WO HCPCS SELF ADMINISTERED DRUGS (ALT 637 FOR MEDICARE OP): Performed by: HOSPITALIST

## 2024-07-25 PROCEDURE — 0Y6U0Z0 DETACHMENT AT LEFT 3RD TOE, COMPLETE, OPEN APPROACH: ICD-10-PCS | Performed by: INTERNAL MEDICINE

## 2024-07-25 PROCEDURE — 84100 ASSAY OF PHOSPHORUS: CPT | Performed by: HOSPITALIST

## 2024-07-25 PROCEDURE — 87206 SMEAR FLUORESCENT/ACID STAI: CPT | Mod: WESLAB | Performed by: STUDENT IN AN ORGANIZED HEALTH CARE EDUCATION/TRAINING PROGRAM

## 2024-07-25 PROCEDURE — 3700000001 HC GENERAL ANESTHESIA TIME - INITIAL BASE CHARGE: Performed by: STUDENT IN AN ORGANIZED HEALTH CARE EDUCATION/TRAINING PROGRAM

## 2024-07-25 PROCEDURE — 2500000001 HC RX 250 WO HCPCS SELF ADMINISTERED DRUGS (ALT 637 FOR MEDICARE OP): Performed by: STUDENT IN AN ORGANIZED HEALTH CARE EDUCATION/TRAINING PROGRAM

## 2024-07-25 PROCEDURE — 80069 RENAL FUNCTION PANEL: CPT | Performed by: HOSPITALIST

## 2024-07-25 PROCEDURE — 2720000007 HC OR 272 NO HCPCS: Performed by: STUDENT IN AN ORGANIZED HEALTH CARE EDUCATION/TRAINING PROGRAM

## 2024-07-25 PROCEDURE — 7100000001 HC RECOVERY ROOM TIME - INITIAL BASE CHARGE: Performed by: STUDENT IN AN ORGANIZED HEALTH CARE EDUCATION/TRAINING PROGRAM

## 2024-07-25 PROCEDURE — 2780000003 HC OR 278 NO HCPCS: Performed by: STUDENT IN AN ORGANIZED HEALTH CARE EDUCATION/TRAINING PROGRAM

## 2024-07-25 PROCEDURE — 36415 COLL VENOUS BLD VENIPUNCTURE: CPT | Performed by: HOSPITALIST

## 2024-07-25 PROCEDURE — 7100000002 HC RECOVERY ROOM TIME - EACH INCREMENTAL 1 MINUTE: Performed by: STUDENT IN AN ORGANIZED HEALTH CARE EDUCATION/TRAINING PROGRAM

## 2024-07-25 PROCEDURE — C1889 IMPLANT/INSERT DEVICE, NOC: HCPCS | Performed by: STUDENT IN AN ORGANIZED HEALTH CARE EDUCATION/TRAINING PROGRAM

## 2024-07-25 PROCEDURE — 0Y6S0Z0 DETACHMENT AT LEFT 2ND TOE, COMPLETE, OPEN APPROACH: ICD-10-PCS | Performed by: INTERNAL MEDICINE

## 2024-07-25 PROCEDURE — 2500000005 HC RX 250 GENERAL PHARMACY W/O HCPCS: Performed by: STUDENT IN AN ORGANIZED HEALTH CARE EDUCATION/TRAINING PROGRAM

## 2024-07-25 DEVICE — RESORBABLE MINI BEAD KIT
Type: IMPLANTABLE DEVICE | Site: FOOT | Status: FUNCTIONAL
Brand: OSTEOSET

## 2024-07-25 RX ORDER — ONDANSETRON HYDROCHLORIDE 2 MG/ML
4 INJECTION, SOLUTION INTRAVENOUS ONCE AS NEEDED
Status: DISCONTINUED | OUTPATIENT
Start: 2024-07-25 | End: 2024-07-25 | Stop reason: HOSPADM

## 2024-07-25 RX ORDER — BUPIVACAINE HYDROCHLORIDE 5 MG/ML
INJECTION, SOLUTION PERINEURAL AS NEEDED
Status: DISCONTINUED | OUTPATIENT
Start: 2024-07-25 | End: 2024-07-25 | Stop reason: HOSPADM

## 2024-07-25 RX ORDER — PROPOFOL 10 MG/ML
INJECTION, EMULSION INTRAVENOUS AS NEEDED
Status: DISCONTINUED | OUTPATIENT
Start: 2024-07-25 | End: 2024-07-25

## 2024-07-25 RX ORDER — MIDAZOLAM HYDROCHLORIDE 1 MG/ML
INJECTION, SOLUTION INTRAMUSCULAR; INTRAVENOUS AS NEEDED
Status: DISCONTINUED | OUTPATIENT
Start: 2024-07-25 | End: 2024-07-25

## 2024-07-25 RX ORDER — LIDOCAINE HYDROCHLORIDE 10 MG/ML
INJECTION INFILTRATION; PERINEURAL AS NEEDED
Status: DISCONTINUED | OUTPATIENT
Start: 2024-07-25 | End: 2024-07-25

## 2024-07-25 RX ORDER — FENTANYL CITRATE 50 UG/ML
INJECTION, SOLUTION INTRAMUSCULAR; INTRAVENOUS AS NEEDED
Status: DISCONTINUED | OUTPATIENT
Start: 2024-07-25 | End: 2024-07-25

## 2024-07-25 RX ORDER — SODIUM CHLORIDE, SODIUM LACTATE, POTASSIUM CHLORIDE, CALCIUM CHLORIDE 600; 310; 30; 20 MG/100ML; MG/100ML; MG/100ML; MG/100ML
100 INJECTION, SOLUTION INTRAVENOUS CONTINUOUS
Status: DISCONTINUED | OUTPATIENT
Start: 2024-07-25 | End: 2024-07-25 | Stop reason: HOSPADM

## 2024-07-25 RX ORDER — VANCOMYCIN HYDROCHLORIDE 1 G/20ML
INJECTION, POWDER, LYOPHILIZED, FOR SOLUTION INTRAVENOUS AS NEEDED
Status: DISCONTINUED | OUTPATIENT
Start: 2024-07-25 | End: 2024-07-25 | Stop reason: HOSPADM

## 2024-07-25 RX ORDER — LIDOCAINE HYDROCHLORIDE 10 MG/ML
0.1 INJECTION INFILTRATION; PERINEURAL ONCE
Status: DISCONTINUED | OUTPATIENT
Start: 2024-07-25 | End: 2024-07-25 | Stop reason: HOSPADM

## 2024-07-25 RX ORDER — KETAMINE HYDROCHLORIDE 50 MG/ML
INJECTION, SOLUTION INTRAMUSCULAR; INTRAVENOUS AS NEEDED
Status: DISCONTINUED | OUTPATIENT
Start: 2024-07-25 | End: 2024-07-25

## 2024-07-25 RX ORDER — HEPARIN SODIUM 5000 [USP'U]/ML
5000 INJECTION, SOLUTION INTRAVENOUS; SUBCUTANEOUS EVERY 8 HOURS
Status: DISCONTINUED | OUTPATIENT
Start: 2024-07-26 | End: 2024-08-02 | Stop reason: HOSPADM

## 2024-07-25 RX ORDER — HYDROMORPHONE HYDROCHLORIDE 0.2 MG/ML
0.1 INJECTION INTRAMUSCULAR; INTRAVENOUS; SUBCUTANEOUS EVERY 5 MIN PRN
Status: DISCONTINUED | OUTPATIENT
Start: 2024-07-25 | End: 2024-07-25 | Stop reason: HOSPADM

## 2024-07-25 RX ORDER — ALBUTEROL SULFATE 0.83 MG/ML
2.5 SOLUTION RESPIRATORY (INHALATION) ONCE AS NEEDED
Status: DISCONTINUED | OUTPATIENT
Start: 2024-07-25 | End: 2024-07-25 | Stop reason: HOSPADM

## 2024-07-25 RX ORDER — LIDOCAINE HYDROCHLORIDE 20 MG/ML
INJECTION, SOLUTION INFILTRATION; PERINEURAL AS NEEDED
Status: DISCONTINUED | OUTPATIENT
Start: 2024-07-25 | End: 2024-07-25 | Stop reason: HOSPADM

## 2024-07-25 RX ORDER — HYDRALAZINE HYDROCHLORIDE 20 MG/ML
5 INJECTION INTRAMUSCULAR; INTRAVENOUS EVERY 30 MIN PRN
Status: DISCONTINUED | OUTPATIENT
Start: 2024-07-25 | End: 2024-07-25 | Stop reason: HOSPADM

## 2024-07-25 RX ORDER — HYDROMORPHONE HYDROCHLORIDE 0.2 MG/ML
0.2 INJECTION INTRAMUSCULAR; INTRAVENOUS; SUBCUTANEOUS EVERY 5 MIN PRN
Status: DISCONTINUED | OUTPATIENT
Start: 2024-07-25 | End: 2024-07-25 | Stop reason: HOSPADM

## 2024-07-25 RX ADMIN — HYDRALAZINE HYDROCHLORIDE 100 MG: 50 TABLET ORAL at 21:27

## 2024-07-25 RX ADMIN — CARVEDILOL 25 MG: 25 TABLET, FILM COATED ORAL at 21:27

## 2024-07-25 RX ADMIN — LINEZOLID 600 MG: 600 INJECTION, SOLUTION INTRAVENOUS at 09:06

## 2024-07-25 RX ADMIN — ASPIRIN 81 MG: 81 TABLET, COATED ORAL at 09:52

## 2024-07-25 RX ADMIN — PIPERACILLIN SODIUM AND TAZOBACTAM SODIUM 2.25 G: 2; .25 INJECTION, SOLUTION INTRAVENOUS at 06:18

## 2024-07-25 RX ADMIN — ACETAMINOPHEN 650 MG: 325 TABLET ORAL at 06:36

## 2024-07-25 RX ADMIN — LINEZOLID 600 MG: 600 INJECTION, SOLUTION INTRAVENOUS at 21:26

## 2024-07-25 RX ADMIN — TORSEMIDE 20 MG: 20 TABLET ORAL at 09:52

## 2024-07-25 RX ADMIN — SODIUM BICARBONATE 650 MG TABLET 1300 MG: at 09:52

## 2024-07-25 RX ADMIN — HEPARIN SODIUM 5000 UNITS: 5000 INJECTION, SOLUTION INTRAVENOUS; SUBCUTANEOUS at 06:18

## 2024-07-25 RX ADMIN — PIPERACILLIN SODIUM AND TAZOBACTAM SODIUM 2.25 G: 2; .25 INJECTION, SOLUTION INTRAVENOUS at 15:34

## 2024-07-25 RX ADMIN — EZETIMIBE 10 MG: 10 TABLET ORAL at 21:27

## 2024-07-25 RX ADMIN — HYDRALAZINE HYDROCHLORIDE 100 MG: 50 TABLET ORAL at 09:52

## 2024-07-25 RX ADMIN — PANTOPRAZOLE SODIUM 20 MG: 20 TABLET, DELAYED RELEASE ORAL at 06:36

## 2024-07-25 RX ADMIN — CARVEDILOL 25 MG: 25 TABLET, FILM COATED ORAL at 09:52

## 2024-07-25 RX ADMIN — SODIUM BICARBONATE 650 MG TABLET 1300 MG: at 21:26

## 2024-07-25 RX ADMIN — ATORVASTATIN CALCIUM 40 MG: 40 TABLET, FILM COATED ORAL at 21:26

## 2024-07-25 SDOH — HEALTH STABILITY: MENTAL HEALTH: CURRENT SMOKER: 0

## 2024-07-25 ASSESSMENT — COGNITIVE AND FUNCTIONAL STATUS - GENERAL
MOBILITY SCORE: 24
MOBILITY SCORE: 24
DAILY ACTIVITIY SCORE: 24
DAILY ACTIVITIY SCORE: 24

## 2024-07-25 ASSESSMENT — PAIN SCALES - GENERAL
PAINLEVEL_OUTOF10: 0 - NO PAIN
PAINLEVEL_OUTOF10: 3
PAINLEVEL_OUTOF10: 0 - NO PAIN

## 2024-07-25 ASSESSMENT — PAIN SCALES - WONG BAKER: WONGBAKER_NUMERICALRESPONSE: HURTS LITTLE BIT

## 2024-07-25 ASSESSMENT — PAIN DESCRIPTION - LOCATION: LOCATION: HEAD

## 2024-07-25 NOTE — CARE PLAN
"The patient's goals for the shift include  \"LIVE THROUGH THIS SURGERY\"    The clinical goals for the shift include UNEVENTFUL PROCEDURE    Over the shift, the patient did make progress toward the PLAN OF CARE goals. Barriers to progression include DISEASE PROCESS. Recommendations to address these barriers include TREAT SIGNS & SYMPTOMS, EVALUATE EFFECTIVENESS.    "

## 2024-07-25 NOTE — PERIOPERATIVE NURSING NOTE
Patient received to [facility] #6 from OR. Anesthesia at bedside. Report received. Initial assessment complete.  VSS on Cardiac Monitor.  Patient denies pain. Resting comfortably at present.

## 2024-07-25 NOTE — ANESTHESIA PREPROCEDURE EVALUATION
Patient: Colin Leonard    Procedure Information       Date/Time: 07/25/24 1230    Procedure: Amputation Foot (Left: Foot)    Location: [facility] OR 05 / Virtual [facility]    Surgeons: Gavin Munoz DPM        No past medical history on file.     Relevant Problems   Anesthesia (within normal limits)      Cardiac   (+) Benign essential hypertension   (+) Coronary artery disease involving native coronary artery of native heart without angina pectoris   (+) Mixed hyperlipidemia   (+) NSTEMI (non-ST elevated myocardial infarction) (Multi)      Pulmonary   (+) Shortness of breath on exertion      Endocrine   (+) Obesity   (+) Type 2 diabetes mellitus (Multi)      HEENT   (+) Vision loss      ID   (+) Acute hematogenous osteomyelitis of left foot (Multi)   No past surgical history on file.     Clinical information reviewed:    Allergies  Meds  Problems              NPO Detail:  NPO/Void Status  Carbohydrate Drink Given Prior to Surgery? : N  Date of Last Liquid: 07/24/24  Time of Last Liquid: 0000  Date of Last Solid: 07/24/24  Time of Last Solid: 0000  Time of Last Void: 1000         Physical Exam    Airway  Mallampati: II  TM distance: >3 FB  Neck ROM: full     Cardiovascular    Dental    Pulmonary    Abdominal            Anesthesia Plan    History of general anesthesia?: yes  History of complications of general anesthesia?: no    ASA 3     general     The patient is not a current smoker.  Patient was not previously instructed to abstain from smoking on day of procedure.  Patient did not smoke on day of procedure.    intravenous induction   Postoperative administration of opioids is intended.  Anesthetic plan and risks discussed with patient.    Plan discussed with attending.

## 2024-07-25 NOTE — NURSING NOTE
Received patient back to room 438 from PACU s/p amputation 2nd toe left foot. Left foot wrapped with ace bandage. Dressing to left foot clean, dry, intact. Patient denies pain and numbness or tingling. Patient awake/alert, respirations even and unlabored, IV fluids infusing to 18g IV right antecubital space. IV site healthy. Bed low position, wheels locked, nurse call button within reach. Reviewed postop orders with patient to include no weight bearing to left foot until instructed otherwise by physician. Urinal placed within reach. No complaints or concerns communicated. No injuries, bleeding or distress noted.

## 2024-07-25 NOTE — NURSING NOTE
This RN dropping in to provide update on surgery schedule per , 12:30-13:45    Pt observed sitting up along side of bed...

## 2024-07-25 NOTE — NURSING NOTE
Pt observed reclined in bed, alert and oriented x 4, on room air with easy respirations, symmetrical chest expansions, exposed skin appropriate for ethnic background (with the exception of the observed Left Foot, which is dressed, dressing c/d/i, lower leg marked to monitor advancement of edema and erythema).    Pt denying pain/immediate needs.    Pt is NPO, awaiting surgery with Dr. Munoz (I&D), left foot bacteremia, gram negative cocci, with suspected osteomyelitis.    ID consulted, pt receiving 2 IV antibiotics...    This RN observing Dr. Rodrigez, ID arriving to bedside...    This RN noting that pt has a male visitor at the bedside...

## 2024-07-25 NOTE — PROGRESS NOTES
Colin Leonard is a 58 y.o. male on day 3 of admission presenting with NSTEMI (non-ST elevated myocardial infarction) (Multi).      Subjective   No new overnight events.  Scheduled for foot surgery today.  Stable BP.  No dyspnea at rest.       Scheduled medications  aspirin, 81 mg, oral, Daily  atorvastatin, 40 mg, oral, Nightly  carvedilol, 25 mg, oral, BID  ezetimibe, 10 mg, oral, Nightly  heparin (porcine), 5,000 Units, subcutaneous, q8h  hydrALAZINE, 100 mg, oral, TID  linezolid, 600 mg, intravenous, q12h NHAN  pantoprazole, 20 mg, oral, Daily before breakfast  perflutren lipid microspheres, 0.5-10 mL of dilution, intravenous, Once in imaging  piperacillin-tazobactam, 2.25 g, intravenous, q8h  sodium bicarbonate, 1,300 mg, oral, TID  torsemide, 20 mg, oral, Daily      Continuous medications     PRN medications  PRN medications: acetaminophen **OR** acetaminophen **OR** acetaminophen, benzocaine-menthol, dextromethorphan-guaifenesin, dextrose, dextrose, glucagon, glucagon, guaiFENesin, oxyCODONE, polyethylene glycol, prochlorperazine      Objective     Vitals 24HR  Heart Rate:  [71-79]   Temp:  [36.7 °C (98.1 °F)-37.6 °C (99.7 °F)]   Resp:  [16-18]   BP: (127-147)/(63-78)   SpO2:  [93 %-99 %]     General: middle-aged man, not in distress  Eyes: pale, anicteric  Lungs: clear bilaterally  Heart: S1 and S2, regular  Abdomen: soft, non tender  Extremities: no RLE edema, dressing over L foot with proximal edema  Neuro: awake and interactive      Intake/Output last 3 Shifts:    Intake/Output Summary (Last 24 hours) at 7/25/2024 0904  Last data filed at 7/25/2024 0618  Gross per 24 hour   Intake 680 ml   Output 1250 ml   Net -570 ml       Relevant Results    Results for orders placed or performed during the hospital encounter of 07/21/24 (from the past 24 hour(s))   Blood Culture    Specimen: Peripheral Venipuncture; Blood culture   Result Value Ref Range    Blood Culture Loaded on Instrument - Culture in progress     Blood Culture    Specimen: Peripheral Venipuncture; Blood culture   Result Value Ref Range    Blood Culture Loaded on Instrument - Culture in progress    POCT GLUCOSE   Result Value Ref Range    POCT Glucose 159 (H) 74 - 99 mg/dL   POCT GLUCOSE   Result Value Ref Range    POCT Glucose 163 (H) 74 - 99 mg/dL   POCT GLUCOSE   Result Value Ref Range    POCT Glucose 169 (H) 74 - 99 mg/dL   CBC   Result Value Ref Range    WBC 10.6 4.4 - 11.3 x10*3/uL    nRBC 0.0 0.0 - 0.0 /100 WBCs    RBC 2.58 (L) 4.50 - 5.90 x10*6/uL    Hemoglobin 7.4 (L) 13.5 - 17.5 g/dL    Hematocrit 23.4 (L) 41.0 - 52.0 %    MCV 91 80 - 100 fL    MCH 28.7 26.0 - 34.0 pg    MCHC 31.6 (L) 32.0 - 36.0 g/dL    RDW 14.6 (H) 11.5 - 14.5 %    Platelets 289 150 - 450 x10*3/uL   Renal Function Panel   Result Value Ref Range    Glucose 140 (H) 65 - 99 mg/dL    Sodium 135 133 - 145 mmol/L    Potassium 4.3 3.4 - 5.1 mmol/L    Chloride 103 97 - 107 mmol/L    Bicarbonate 17 (L) 24 - 31 mmol/L    Urea Nitrogen 72 (H) 8 - 25 mg/dL    Creatinine 6.50 (H) 0.40 - 1.60 mg/dL    eGFR 9 (L) >60 mL/min/1.73m*2    Calcium 8.9 8.5 - 10.4 mg/dL    Phosphorus 3.6 2.5 - 4.5 mg/dL    Albumin 2.8 (L) 3.5 - 5.0 g/dL    Anion Gap 15 <=19 mmol/L   POCT GLUCOSE   Result Value Ref Range    POCT Glucose 119 (H) 74 - 99 mg/dL         Assessment/Plan      Stage 5 CKD  Hypertension  Metabolic acidosis, on bicarb  Anemia in CKD, on ALFREDA    Stable renal function at his baseline and lytes are controlled. Approaching ESRD but no urgent need for dialysis at this time.    Scheduled for foot surgery today and I will continue to follow closely post-op.    Continue other care.      Des Bangura MD

## 2024-07-25 NOTE — ANESTHESIA POSTPROCEDURE EVALUATION
Patient: Colin Leonard    Procedure Summary       Date: 07/25/24 Room / Location: [facility] OR 05 / Virtual [facility]    Anesthesia Start: 1251 Anesthesia Stop: 1429    Procedure: Amputation Foot (Left: Foot) Diagnosis:       Acute hematogenous osteomyelitis of left foot (Multi)      (Acute hematogenous osteomyelitis of left foot (Multi) [M86.072])    Surgeons: Gavin Munoz DPM Responsible Provider: Lennox Bhatti MD    Anesthesia Type: general ASA Status: 3            Anesthesia Type: general    Vitals Value Taken Time   /72 07/25/24 1501   Temp 36 °C (96.8 °F) 07/25/24 1430   Pulse 61 07/25/24 1502   Resp 18 07/25/24 1502   SpO2 97 % 07/25/24 1502   Vitals shown include unfiled device data.    Anesthesia Post Evaluation    Patient location during evaluation: PACU  Patient participation: complete - patient participated  Level of consciousness: awake and alert  Pain management: adequate  Airway patency: patent  Cardiovascular status: acceptable  Respiratory status: acceptable  Hydration status: acceptable  Postoperative Nausea and Vomiting: none        There were no known notable events for this encounter.

## 2024-07-25 NOTE — PROGRESS NOTES
Colin Leonard is a 58 y.o. male on day 3 of admission presenting with NSTEMI (non-ST elevated myocardial infarction) (Multi).    Subjective   Interval History:   Afebrile, no chills  Seen prior to surgery  Brother present  No chest pain or shortness of breath.  No nausea vomiting or diarrhea        Review of Systems   All other systems reviewed and are negative.      Objective   Range of Vitals (last 24 hours)  Heart Rate:  [71-79]   Temp:  [36.7 °C (98.1 °F)-37.6 °C (99.7 °F)]   Resp:  [16-18]   BP: (127-147)/(63-78)   SpO2:  [93 %-99 %]   Daily Weight  07/24/24 : 122 kg (268 lb 15.4 oz)    Body mass index is 37.51 kg/m².    Physical Exam  Constitutional:       Appearance: Normal appearance.   HENT:      Head: Normocephalic and atraumatic.      Nose: Nose normal.   Eyes:      General: No scleral icterus.     Extraocular Movements: Extraocular movements intact.      Conjunctiva/sclera: Conjunctivae normal.   Cardiovascular:      Rate and Rhythm: Normal rate and regular rhythm.   Pulmonary:      Effort: Pulmonary effort is normal.      Breath sounds: Normal breath sounds.   Abdominal:      General: Bowel sounds are normal.      Palpations: Abdomen is soft.   Musculoskeletal:      Cervical back: Normal range of motion and neck supple.      Right lower leg: No edema.      Left lower leg: No edema.   Feet:      Left foot:      Skin integrity: Ulcer and callus present.      Comments: Left second met head  Skin:     General: Skin is warm and dry.   Neurological:      Mental Status: He is alert and oriented to person, place, and time.   Psychiatric:         Mood and Affect: Mood normal.         Behavior: Behavior normal.        Antibiotics  linezolid - 600 mg/300 mL  piperacillin-tazobactam - 2.25 gram/50 mL    Relevant Results  Labs  Results from last 72 hours   Lab Units 07/25/24  0537 07/24/24  0514 07/23/24  0439   WBC AUTO x10*3/uL 10.6 12.1* 14.6*   HEMOGLOBIN g/dL 7.4* 7.6* 8.5*   HEMATOCRIT % 23.4* 23.9* 28.4*    PLATELETS AUTO x10*3/uL 289 299 309     Results from last 72 hours   Lab Units 07/25/24  0537 07/24/24  0514 07/23/24  0439   SODIUM mmol/L 135 133 136   POTASSIUM mmol/L 4.3 4.6 4.5   CHLORIDE mmol/L 103 104 105   CO2 mmol/L 17* 17* 16*   BUN mg/dL 72* 67* 65*   CREATININE mg/dL 6.50* 6.50* 6.50*   GLUCOSE mg/dL 140* 134* 138*   CALCIUM mg/dL 8.9 8.7 9.2   ANION GAP mmol/L 15 12 15   EGFR mL/min/1.73m*2 9* 9* 9*   PHOSPHORUS mg/dL 3.6 3.4 3.7     Results from last 72 hours   Lab Units 07/25/24  0537 07/24/24  0514 07/23/24  0439   ALBUMIN g/dL 2.8* 2.8* 3.0*     Estimated Creatinine Clearance: 16.1 mL/min (A) (by C-G formula based on SCr of 6.5 mg/dL (H)).  C-Reactive Protein   Date Value Ref Range Status   07/22/2024 24.70 (H) 0.00 - 2.00 mg/dL Final     CRP   Date Value Ref Range Status   09/09/2020 5.32 (A) mg/dL Final     Comment:     REF VALUE  < 1.00     09/08/2020 9.54 (A) mg/dL Final     Comment:     REF VALUE  < 1.00       Microbiology  Susceptibility data from last 14 days.  Collected Specimen Info Organism Clindamycin Erythromycin Oxacillin Tetracycline Trimethoprim/Sulfamethoxazole Vancomycin   07/22/24 Tissue/Biopsy from Wound/Tissue Methicillin Susceptible Staphylococcus aureus (MSSA)  R  R  S  S  S  S     Mixed Gram-Positive Bacteria            Susceptibility data from last 90 days.  Collected Specimen Info Organism Clindamycin Erythromycin Oxacillin Tetracycline Trimethoprim/Sulfamethoxazole Vancomycin   07/22/24 Tissue/Biopsy from Wound/Tissue Methicillin Susceptible Staphylococcus aureus (MSSA)  R  R  S  S  S  S     Mixed Anaerobic Bacteria           Mixed Gram-Positive Bacteria          07/21/24 Blood culture from Peripheral Venipuncture Streptococcus viridans group         07/21/24 Blood culture from Peripheral Venipuncture Streptococcus viridans group             Imaging  MR foot left wo IV contrast    Result Date: 7/24/2024  Interpreted By:  Ricardo Nino, STUDY: MRI of the    left foot  without contrast dated  7/23/2024.   INDICATION: Signs/Symptoms:osteomyelitis/wound   COMPARISON: None.   ACCESSION NUMBER(S): [id number]   ORDERING CLINICIAN: NOE DIAZ   TECHNIQUE: Multiplanar multisequence MRI of the    left forefoot was performed without intravenous gadolinium based contrast.   FINDINGS: OSSEOUS STRUCTURES AND JOINTS:   There is increased T2/STIR signal intensity in the diaphysis into the head the 2nd metatarsal. There is increased T2/STIR signal intensity within the phalanges of the 2nd digit. There is some mottled low T1 signal intensity in the head of the 2nd metatarsal and in the proximal phalanx of the 2nd digit. There is question of a degree disruption of the cortex at the head of the 2nd metatarsal and along the plantar diaphysis and base of the proximal phalanx of the 2nd digit. There is a large volume 2nd digit metatarsophalangeal joint effusion with bulging of the joint capsule. Severe degenerative changes seen of the 1st digit metatarsophalangeal joint. There is some marginal erosion along the medial side of the head of the 1st metatarsal which can be seen with sequelae of crystal arthropathy. Mild degenerative changes seen in the midfoot.   SOFT TISSUES:   There is an ulcer at the plantar aspect of the forefoot superficial to the 2nd digit metatarsophalangeal joint. As seen on this noncontrast examination there appears to be a fistula tract through the soft tissues extending to the 2nd digit metatarsophalangeal joint. The fistula tract also appears to at least partially envelop the flexor digitorum tendon sheath to the 2nd digit and possibly involves the tendon sheath such as seen image 16 of the sagittal plane. There is a degree of generalized ill-defined increased T2 signal intensity in the musculature. Generalized atrophy is seen in the musculature. Reticular increased T2 signal intensity is seen in the subcutaneous tissues of the visualized lower extremity greatest on  the dorsum of the foot and in the 2nd toe.       1. Ulcer at the plantar forefoot with fistula tract of the 2nd digit metatarsophalangeal joint with large volume 2nd digit metatarsophalangeal joint effusion compatible with septic arthritis. There are findings which are felt to be compatible with osteomyelitis at least involving the head of the 2nd metatarsal and the base into the diaphysis the proximal phalanx of the 2nd digit with the reactive marrow changes seen in the distal metadiaphysis of the 2nd metatarsal suspicious for higher likelihood of osteomyelitis involvement. 2. Reactive marrow changes in the middle and distal phalanx of the 2nd digit, which given not directly adjacent to the wound/ulcer are probably of lower likelihood osteomyelitis. 3. The above described fistula tract partially envelops the flexor digitorum tendon sheath of the 2nd digit at the level of the metatarsophalangeal joint and there may be a component of associated at least focal infectious tenosynovitis. 4. Reactive edema in the musculature of the foot overall likely related to ischemic/diabetic myopathy although a component of infectious myositis particularly of the intrinsic musculature adjacent to the distal aspect of the 2nd metatarsal is not excluded. 5. Cellulitis and/or lymphedema of the visualized lower extremity.   Signed by: Ricardo Nino 7/24/2024 8:41 AM Dictation workstation:   [id number]    Transthoracic Echo (TTE) Complete    Result Date: 7/22/2024           [place] [address]            Phone [phone] TRANSTHORACIC ECHOCARDIOGRAM REPORT Patient Name:      ODALYS MELVIN     Reading Physician:    [id number] Kade Pedraza DO Study Date:        7/22/2024             Ordering Provider:    [id number] GERMAN KENNEY MRN/PID:           [id number]               Fellow: Accession#:        [id number]          Nurse: Date of Birth/Age: 1965 / 58 years Sonographer:          Vera Wynn RDCS Gender:            M                     Additional Staff: Height:            180.34 cm             Admit Date: Weight:            118.39 kg             Admission Status:     Inpatient -                                                                Routine BSA / BMI:         2.36 m2 / 36.40 kg/m2 Department Location:  [facility] Blood Pressure: 147 /88 mmHg Study Type:    TRANSTHORACIC ECHO (TTE) COMPLETE Diagnosis/ICD: Atherosclerotic heart disease of native coronary artery without                angina pectoris-I25.10 Indication:    Dyspnea, weakness fatigue CPT Codes:     Echo Complete w Full [id number] Patient History: Pertinent History: LVH, HLD, HTN, CAD, Nstemi, SOB CKD, DM. Study Detail: The following Echo studies were performed: 2D, M-Mode, color flow               and Doppler. Unable to obtain suprasternal notch view.  PHYSICIAN INTERPRETATION: Left Ventricle: The left ventricular systolic function is normal, with a visually estimated ejection fraction of 60-65%. There are no regional wall motion abnormalities. The left ventricular cavity size is normal. Left ventricular diastolic filling was indeterminate. Left Atrium: The left atrium is mildly dilated. Right Ventricle: The right ventricle is normal in size. There is normal right ventricular global systolic function. Right Atrium: The right atrium is normal in size. Aortic Valve: The aortic valve is trileaflet. There is evidence of mild aortic valve stenosis. The aortic valve dimensionless index is 0.45. There is no evidence of aortic valve regurgitation. The peak instantaneous gradient of the aortic valve is 20.4 mmHg. The mean gradient of the aortic valve is 10.6 mmHg. Mitral Valve: The mitral valve is normal in structure. There is mild mitral  valve regurgitation. Tricuspid Valve: The tricuspid valve is structurally normal. There is trace tricuspid regurgitation. Pulmonic Valve: The pulmonic valve is not well visualized. The pulmonic valve regurgitation was not well visualized. Pericardium: There is no pericardial effusion noted. Aorta: The aortic root is normal.  CONCLUSIONS:  1. The left ventricular systolic function is normal, with a visually estimated ejection fraction of 60-65%.  2. Left ventricular diastolic filling was indeterminate.  3. There is normal right ventricular global systolic function.  4. Mild aortic valve stenosis.  5. Aortic stenosis mean gradient 10 mm Hg, peak gradient 40 mm Hg and ADÁN 1.43 cm2.  6. Aortic valve dimensionless index 0.43. QUANTITATIVE DATA SUMMARY: 2D MEASUREMENTS:                           Normal Ranges: LAs:           4.76 cm    (2.7-4.0cm) IVSd:          1.70 cm    (0.6-1.1cm) LVPWd:         1.51 cm    (0.6-1.1cm) LVIDd:         3.86 cm    (3.9-5.9cm) LVIDs:         2.85 cm LV Mass Index: 104.8 g/m2 LV % FS        26.2 % LA VOLUME:                               Normal Ranges: LA Vol A4C:        71.7 ml    (22+/-6mL/m2) LA Vol A2C:        92.5 ml LA Vol BP:         86.7 ml LA Vol Index A4C:  30.4 ml/m2 LA Vol Index A2C:  39.2 ml/m2 LA Vol Index BP:   36.7 ml/m2 LA Area A4C:       22.5 cm2 LA Area A2C:       27.2 cm2 LA Major Axis A4C: 6.0 cm LA Major Axis A2C: 6.8 cm LA Volume Index:   36.7 ml/m2 LA Vol A4C:        72.0 ml LA Vol A2C:        92.0 ml RA VOLUME BY A/L METHOD:                               Normal Ranges: RA Vol A4C:        39.5 ml    (8.3-19.5ml) RA Vol Index A4C:  16.7 ml/m2 RA Area A4C:       15.4 cm2 RA Major Axis A4C: 5.1 cm AORTA MEASUREMENTS:                      Normal Ranges: Ao Sinus, d: 3.30 cm (2.1-3.5cm) Ao STJ, d:   3.00 cm (1.7-3.4cm) Asc Ao, d:   3.80 cm (2.1-3.4cm) LV SYSTOLIC FUNCTION BY 2D PLANIMETRY (MOD):                      Normal Ranges: EF-A4C View:    48 % (>=55%) EF-A2C View:     65 % EF-Biplane:     57 % EF-Visual:      63 % LV EF Reported: 63 % LV DIASTOLIC FUNCTION:                         Normal Ranges: MV Peak E:    0.82 m/s  (0.7-1.2 m/s) MV Peak A:    0.99 m/s  (0.42-0.7 m/s) E/A Ratio:    0.83      (1.0-2.2) MV e'         0.071 m/s (>8.0) MV lateral e' 0.08 m/s MV medial e'  0.06 m/s E/e' Ratio:   11.61     (<8.0) MITRAL VALVE:                 Normal Ranges: MV DT: 221 msec (150-240msec) AORTIC VALVE:                                    Normal Ranges: AoV Vmax:                2.26 m/s  (<=1.7m/s) AoV Peak P.4 mmHg (<20mmHg) AoV Mean PG:             10.6 mmHg (1.7-11.5mmHg) LVOT Max Cirilo:            0.97 m/s  (<=1.1m/s) AoV VTI:                 46.31 cm  (18-25cm) LVOT VTI:                21.06 cm LVOT Diameter:           2.00 cm   (1.8-2.4cm) AoV Area, VTI:           1.43 cm2  (2.5-5.5cm2) AoV Area,Vmax:           1.35 cm2  (2.5-4.5cm2) AoV Dimensionless Index: 0.45  RIGHT VENTRICLE: TAPSE: 25.1 mm RV s'  0.15 m/s TRICUSPID VALVE/RVSP:                   Normal Ranges: IVC Diam: 2.02 cm AORTA: Asc Ao Diam 3.83 cm  [id number] [facility] Electronically signed on 2024 at 10:49:35 AM  ** Final **     XR foot left 3+ views    Result Date: 2024  Interpreted By:  Ely Alanis, STUDY: XR FOOT LEFT 3+ VIEWS;  2024 2:44 am   INDICATION: Signs/Symptoms:Rule out infection.   COMPARISON: None.   ACCESSION NUMBER(S): [id number]   ORDERING CLINICIAN: BRENNAN SOLORIO   FINDINGS: 3 views of the left foot were obtained.   There is diffuse osteopenia. There is no acute fracture or dislocation. Degenerative changes are noted at the 1st metatarsophalangeal joint with hallux valgus. There is cortical lucency at the bases of the 2nd and 3rd distal phalanges. Degenerative changes are noted at the midfoot. There is calcaneal enthesopathy. Vascular calcifications are present. There is diffuse soft tissue swelling at the left foot. There is small amount of gas at the plantar  soft tissues overlying the bases of the toes.       1. Cortical lucency at the bases of the distal phalanges of the left 2nd and 3rd toes, underlying osteomyelitis cannot be excluded. Contrast-enhanced MRI can help in further evaluation. 2. Diffuse soft tissue edema at the left foot. Small amount of gas at the plantar soft tissues overlying the bases of the toes, suggestive of ulcer.       MACRO: None.   Signed by: Ely Alanis 7/22/2024 2:52 AM Dictation workstation:   [id number]    XR chest 1 view    Result Date: 7/21/2024  Interpreted By:  Makeda Gurrola, STUDY: XR CHEST 1 VIEW;  7/21/2024 10:23 pm   INDICATION: Signs/Symptoms:weakness.   COMPARISON: 02/14/2022   ACCESSION NUMBER(S): [id number]   ORDERING CLINICIAN: MIHAELA AUGUSTIN   FINDINGS:     CARDIOMEDIASTINAL SILHOUETTE: Stable cardiomegaly.   LUNGS: No pulmonary consolidation, pleural effusion or pneumothorax. Low lung volumes with bronchovascular crowding.   ABDOMEN: No remarkable upper abdominal findings.   BONES: No acute osseous abnormality.       No acute cardiopulmonary process.   MACRO: None   Signed by: Makeda Gurrola 7/21/2024 10:43 PM Dictation workstation:   [id number]     Assessment/Plan   Streptococcus viridans/gram-negative cocci bacteremia  Chronic kidney disease stage V  Type 2 diabetes with peripheral angiopathy without gangrene  Left diabetic foot ulcer, Brown 3- MRI suspicious osteomyelitis at 2nd metatarsal  Left foot osteomyelitis-wound culture growing MSSA  Elevated CK        IV Zyvox-avoid medications that interact, Zofran discontinued   IV Zosyn  Follow-up finalized blood cultures   Follow-up repeat blood cultures  Podiatry follow-up   Nephrology follow-up  Local care  Offloading  Monitor temperature and WBC  Further recommendations based on intraoperative cultures  Will finalize antibiotic plan after discussion with podiatry    Garry Rodrigez MD

## 2024-07-25 NOTE — PROGRESS NOTES
Colin Leonard is a 58 y.o. male on day 3 of admission presenting with NSTEMI (non-ST elevated myocardial infarction) (Multi).      Subjective   Seen after surgery. Denies any pain. Awaiting food.       Objective     Last Recorded Vitals  /69 (BP Location: Left arm, Patient Position: Sitting)   Pulse 67   Temp 36.4 °C (97.5 °F) (Oral)   Resp 17   Wt 122 kg (268 lb 15.4 oz)   SpO2 100%   Intake/Output last 3 Shifts:    Intake/Output Summary (Last 24 hours) at 7/25/2024 1631  Last data filed at 7/25/2024 1422  Gross per 24 hour   Intake 880 ml   Output 1250 ml   Net -370 ml       Admission Weight  Weight: 119 kg (261 lb 7.5 oz) (07/21/24 2214)    Daily Weight  07/24/24 : 122 kg (268 lb 15.4 oz)    Image Results  MR foot left wo IV contrast  Narrative: Interpreted By:  Ricardo Nino,   STUDY:  MRI of the    left foot without contrast dated  7/23/2024.      INDICATION:  Signs/Symptoms:osteomyelitis/wound      COMPARISON:  None.      ACCESSION NUMBER(S):  [id number]      ORDERING CLINICIAN:  NOE DIAZ      TECHNIQUE:  Multiplanar multisequence MRI of the    left forefoot was performed  without intravenous gadolinium based contrast.      FINDINGS:  OSSEOUS STRUCTURES AND JOINTS:      There is increased T2/STIR signal intensity in the diaphysis into the  head the 2nd metatarsal. There is increased T2/STIR signal intensity  within the phalanges of the 2nd digit. There is some mottled low T1  signal intensity in the head of the 2nd metatarsal and in the  proximal phalanx of the 2nd digit. There is question of a degree  disruption of the cortex at the head of the 2nd metatarsal and along  the plantar diaphysis and base of the proximal phalanx of the 2nd  digit. There is a large volume 2nd digit metatarsophalangeal joint  effusion with bulging of the joint capsule. Severe degenerative  changes seen of the 1st digit metatarsophalangeal joint. There is  some marginal erosion along the medial side of the  head of the 1st  metatarsal which can be seen with sequelae of crystal arthropathy.  Mild degenerative changes seen in the midfoot.      SOFT TISSUES:      There is an ulcer at the plantar aspect of the forefoot superficial  to the 2nd digit metatarsophalangeal joint. As seen on this  noncontrast examination there appears to be a fistula tract through  the soft tissues extending to the 2nd digit metatarsophalangeal  joint. The fistula tract also appears to at least partially envelop  the flexor digitorum tendon sheath to the 2nd digit and possibly  involves the tendon sheath such as seen image 16 of the sagittal  plane. There is a degree of generalized ill-defined increased T2  signal intensity in the musculature. Generalized atrophy is seen in  the musculature. Reticular increased T2 signal intensity is seen in  the subcutaneous tissues of the visualized lower extremity greatest  on the dorsum of the foot and in the 2nd toe.      Impression: 1. Ulcer at the plantar forefoot with fistula tract of the 2nd digit  metatarsophalangeal joint with large volume 2nd digit  metatarsophalangeal joint effusion compatible with septic arthritis.  There are findings which are felt to be compatible with osteomyelitis  at least involving the head of the 2nd metatarsal and the base into  the diaphysis the proximal phalanx of the 2nd digit with the reactive  marrow changes seen in the distal metadiaphysis of the 2nd metatarsal  suspicious for higher likelihood of osteomyelitis involvement.  2. Reactive marrow changes in the middle and distal phalanx of the  2nd digit, which given not directly adjacent to the wound/ulcer are  probably of lower likelihood osteomyelitis.  3. The above described fistula tract partially envelops the flexor  digitorum tendon sheath of the 2nd digit at the level of the  metatarsophalangeal joint and there may be a component of associated  at least focal infectious tenosynovitis.  4. Reactive edema in the  musculature of the foot overall likely  related to ischemic/diabetic myopathy although a component of  infectious myositis particularly of the intrinsic musculature  adjacent to the distal aspect of the 2nd metatarsal is not excluded.  5. Cellulitis and/or lymphedema of the visualized lower extremity.      Signed by: Ricardo Nino 7/24/2024 8:41 AM  Dictation workstation:   [id number]      Physical Exam  General: alert, no diaphoresis   Lungs: CTA BL   Heart: RRR,  no LE edema BL   GI: abdomen soft, nontender, nondistended, BS present   MSK: no joint effusion or deformity   Skin: left 2nd digit of foot is swollen and erythematous, extending proximally to mid dorsal foot   Neuro: grossly normal cognition, motor strength, sensation      Relevant Results               Assessment/Plan                  Principal Problem:    NSTEMI (non-ST elevated myocardial infarction) (Multi)  Active Problems:    Obesity    LVH (left ventricular hypertrophy)    Mixed hyperlipidemia    Benign essential hypertension    Coronary artery disease involving native coronary artery of native heart without angina pectoris    Fall    Leukocytosis    Stage 5 chronic kidney disease not on chronic dialysis (Multi)    Type 2 diabetes mellitus (Multi)    Acute hematogenous osteomyelitis of left foot (Multi)    Mechanical fall at home  - no obvious injury. No syncope.    NSTEMI vs Troponin elevation with CKD 5  - cardio feels more likely CKD 5 causing troponin elevation. No further cardiac work up recommended    Left sided diabetic foot ulcer  Mixed gram neg/gram pos bacteremia  - Wound culture with mixed microbial including staph aureus. 2/2 blood cultures came back this morning with gram negative rods (drawn on 7/22) and GPC. Repeat blood cultures sent  - remains on zyvox, zosyn  - MRI showed multiple findings- ulcer with fistula tract second digit with likely septic arthritis 2nd digit septic arthritis. With reactive edema. Osteomyelitis is lower  likelihood per read. See full report for more detail  - Dr. Rodrigez following closely-- on zyvox and zosyn  - s/p surgery today with Dr. Munoz    CKD 5 - stable  - nephrology saw patient. He has previously followed with Dr. Goldberg but hasn't seen him in about 1 yr. Has not been initiated on dialysis as of yet and no urgent needs for dialysis. IVF stopped. Dr. Bangura following. Patient will need to follow up with Dr. Goldberg as outpatient.   - started back on torsemide    Metabolic acidosis  - bicarb therapy by Dr. Bangura     DM type 2  - SSI, acuchecks    HLD  - statin, zetia    DVT ppx  - heparin         Isabelle Cook DO

## 2024-07-25 NOTE — PROGRESS NOTES
07/25/24 1650   Discharge Planning   Expected Discharge Disposition HH Services     Left foot surgery today.  On IV antibiotics. Waiting for recommendation from ID.  Will follow for possible IV antibiotic needs when discharged.

## 2024-07-25 NOTE — NURSING NOTE
Patient taken to surgery via bed. No complaints or concerns communicated. No injuries, bleeding or distress noted. Patient tearful. He had voiced concern earlier about losing his toes. His parents at bedside and offered up prayer with him which seemed to improve his spirits some.

## 2024-07-25 NOTE — CARE PLAN
The patient's goals for the shift include      The clinical goals for the shift include increase mobility

## 2024-07-25 NOTE — ED PROVIDER NOTES
HPI   Chief Complaint   Patient presents with    Weakness, Gen     Pt presents to ED with weakness and shortness of breath x1 week, states that he slid out of bed and was unable to get up at home with sons assistance. Pt also has tunneling wound on L foot that he has had for 3 weeks.        58-year-old male presents with weakness.  Patient was brought in by EMS after having developed significant weakness and slid out of bed.  Patient was unable to be lifted by his son.  When EMS arrived they noted the patient to be diaphoretic and short of breath.  Twelve-lead shows concern for possible STEMI and patient arrived as a STEMI alert.  Patient denies chest pain.  States last time he felt this way he was positive for COVID, is concerned that he may be COVID-positive again today.  Denies testing himself recently.              Patient History   No past medical history on file.  No past surgical history on file.  No family history on file.  Social History     Tobacco Use    Smoking status: Never    Smokeless tobacco: Never   Substance Use Topics    Alcohol use: Not on file    Drug use: Not on file       Physical Exam   ED Triage Vitals   Temp Heart Rate Resp BP   07/21/24 2214 07/21/24 2214 07/21/24 2214 07/21/24 2214   (!) 38.6 °C (101.5 °F) (!) 116 19 115/79      SpO2 Temp Source Heart Rate Source Patient Position   07/21/24 2214 07/21/24 2214 07/21/24 2214 07/22/24 1225   96 % Oral Monitor Lying      BP Location FiO2 (%)     07/22/24 1225 --     Right arm        Physical Exam  Vitals and nursing note reviewed.   Constitutional:       General: He is not in acute distress.     Appearance: He is ill-appearing and diaphoretic.   HENT:      Head: Normocephalic and atraumatic.      Mouth/Throat:      Mouth: Mucous membranes are moist.      Pharynx: Oropharynx is clear.   Eyes:      Extraocular Movements: Extraocular movements intact.      Conjunctiva/sclera: Conjunctivae normal.      Pupils: Pupils are equal, round, and  reactive to light.   Cardiovascular:      Rate and Rhythm: Regular rhythm. Tachycardia present.   Pulmonary:      Effort: Pulmonary effort is normal. No respiratory distress.      Breath sounds: Normal breath sounds.   Abdominal:      General: There is no distension.      Palpations: Abdomen is soft.      Tenderness: There is no abdominal tenderness. There is no guarding or rebound.   Musculoskeletal:         General: No swelling or deformity. Normal range of motion.      Cervical back: Normal range of motion and neck supple.   Feet:      Left foot:      Skin integrity: Ulcer (bottom of left foot) present.   Skin:     General: Skin is warm.      Capillary Refill: Capillary refill takes less than 2 seconds.   Neurological:      General: No focal deficit present.      Mental Status: He is alert and oriented to person, place, and time. Mental status is at baseline.   Psychiatric:         Mood and Affect: Mood normal.         Behavior: Behavior normal.           ED Course & [facility]   ED Course as of 07/25/24 0620   Sun Jul 21, 2024   2159 Pre-hospital EKG reviewed, no obvious STEMI as it does not meet Sgarbossa criteria.  ST depression to I and aVL []   2213 ECG 12 Lead  Performed at  2213, HR of 129, NSR, NAD, QTc 544.  ST elevation in V2-V5 but does not meet sgarbossa criteria.  ST depression to I and aVL    Reviewed and interpreted by me at time performed   []   Mon Jul 22, 2024   0106 Patient unable to tolerate full 30cc/kkg bolus due to renal disease and concern for fluid overload.  Patient also not in shock, given 1 L of IV fluid [JM]      ED Course User Index  [JM] Mary Em MD         Diagnoses as of 07/25/24 0620   NSTEMI (non-ST elevated myocardial infarction) (Multi)   Generalized weakness                       No data recorded                      Medical Decision Making  58 y.o. male presents with weakness. I have considered the following conditions in my assessment of this patient's  weakness: Stroke, TIA, electrolyte abnormalities (hypo/hyperkalemia, hypo/hypernatremia, hypo/hyperglycemia, hypomagnesemia, hypo/hypercalcemia), volume depletion, anemia, metabolic causes, infectious etiology, medication side effect.  Given concern for possible STEMI, STEMI alert called in the ED, and patient given heparin and Brilinta bolus.  EKG sent over to cardiologist and cardiology evaluated patient bedside.  At this time cancel code STEMI as it appears to be related to a left bundle branch block.  Patient also denies any tachycardiac chest pain, only notes significant weakness.  Elevated leukocytosis.  Sepsis criteria initiated.  Patient given broad-spectrum antibiotics.  Culture sent.  COVID-negative on screening.  Troponin significantly elevated but patient noted to have renal disease and likely poor clearance of troponin enzyme.  No obvious source of infection on chest x-ray.  Patient found to have diabetic ulcer to sole of left foot.  Likely source of patient's infection today.  Patient admitted for further management and antibiotic therapy.     I personally spent a total of 45 minutes of critical care time in obtaining history, performing a physical exam, bedside monitoring of interventions, collecting and interpreting tests and discussion with consultants but excluding time spent performing procedures, treating other patients and teaching time.           Clinical Concern sepsis          Procedure  Procedures     Mary Em MD  07/25/24 4992

## 2024-07-26 LAB
ALBUMIN SERPL-MCNC: 2.9 G/DL (ref 3.5–5)
ANION GAP SERPL CALC-SCNC: 13 MMOL/L
BUN SERPL-MCNC: 72 MG/DL (ref 8–25)
CALCIUM SERPL-MCNC: 8.8 MG/DL (ref 8.5–10.4)
CHLORIDE SERPL-SCNC: 105 MMOL/L (ref 97–107)
CO2 SERPL-SCNC: 18 MMOL/L (ref 24–31)
CREAT SERPL-MCNC: 6.8 MG/DL (ref 0.4–1.6)
EGFRCR SERPLBLD CKD-EPI 2021: 9 ML/MIN/1.73M*2
ERYTHROCYTE [DISTWIDTH] IN BLOOD BY AUTOMATED COUNT: 14.6 % (ref 11.5–14.5)
GLUCOSE BLD MANUAL STRIP-MCNC: 122 MG/DL (ref 74–99)
GLUCOSE BLD MANUAL STRIP-MCNC: 125 MG/DL (ref 74–99)
GLUCOSE BLD MANUAL STRIP-MCNC: 150 MG/DL (ref 74–99)
GLUCOSE BLD MANUAL STRIP-MCNC: 159 MG/DL (ref 74–99)
GLUCOSE BLD MANUAL STRIP-MCNC: 170 MG/DL (ref 74–99)
GLUCOSE BLD MANUAL STRIP-MCNC: 210 MG/DL (ref 74–99)
GLUCOSE SERPL-MCNC: 159 MG/DL (ref 65–99)
HCT VFR BLD AUTO: 22.3 % (ref 41–52)
HGB BLD-MCNC: 7 G/DL (ref 13.5–17.5)
MCH RBC QN AUTO: 28.9 PG (ref 26–34)
MCHC RBC AUTO-ENTMCNC: 31.4 G/DL (ref 32–36)
MCV RBC AUTO: 92 FL (ref 80–100)
NRBC BLD-RTO: 0 /100 WBCS (ref 0–0)
PHOSPHATE SERPL-MCNC: 3.8 MG/DL (ref 2.5–4.5)
PLATELET # BLD AUTO: 296 X10*3/UL (ref 150–450)
POTASSIUM SERPL-SCNC: 4.5 MMOL/L (ref 3.4–5.1)
RBC # BLD AUTO: 2.42 X10*6/UL (ref 4.5–5.9)
SODIUM SERPL-SCNC: 136 MMOL/L (ref 133–145)
WBC # BLD AUTO: 10.8 X10*3/UL (ref 4.4–11.3)

## 2024-07-26 PROCEDURE — 82947 ASSAY GLUCOSE BLOOD QUANT: CPT

## 2024-07-26 PROCEDURE — 2500000001 HC RX 250 WO HCPCS SELF ADMINISTERED DRUGS (ALT 637 FOR MEDICARE OP): Performed by: STUDENT IN AN ORGANIZED HEALTH CARE EDUCATION/TRAINING PROGRAM

## 2024-07-26 PROCEDURE — 1210000001 HC SEMI-PRIVATE ROOM DAILY

## 2024-07-26 PROCEDURE — 80069 RENAL FUNCTION PANEL: CPT | Performed by: STUDENT IN AN ORGANIZED HEALTH CARE EDUCATION/TRAINING PROGRAM

## 2024-07-26 PROCEDURE — 84100 ASSAY OF PHOSPHORUS: CPT | Performed by: STUDENT IN AN ORGANIZED HEALTH CARE EDUCATION/TRAINING PROGRAM

## 2024-07-26 PROCEDURE — 36415 COLL VENOUS BLD VENIPUNCTURE: CPT | Performed by: STUDENT IN AN ORGANIZED HEALTH CARE EDUCATION/TRAINING PROGRAM

## 2024-07-26 PROCEDURE — 85027 COMPLETE CBC AUTOMATED: CPT | Performed by: STUDENT IN AN ORGANIZED HEALTH CARE EDUCATION/TRAINING PROGRAM

## 2024-07-26 PROCEDURE — 2500000004 HC RX 250 GENERAL PHARMACY W/ HCPCS (ALT 636 FOR OP/ED): Performed by: STUDENT IN AN ORGANIZED HEALTH CARE EDUCATION/TRAINING PROGRAM

## 2024-07-26 PROCEDURE — 2500000002 HC RX 250 W HCPCS SELF ADMINISTERED DRUGS (ALT 637 FOR MEDICARE OP, ALT 636 FOR OP/ED): Performed by: STUDENT IN AN ORGANIZED HEALTH CARE EDUCATION/TRAINING PROGRAM

## 2024-07-26 PROCEDURE — 97530 THERAPEUTIC ACTIVITIES: CPT | Mod: GO

## 2024-07-26 PROCEDURE — 97165 OT EVAL LOW COMPLEX 30 MIN: CPT | Mod: GO

## 2024-07-26 RX ADMIN — EZETIMIBE 10 MG: 10 TABLET ORAL at 21:40

## 2024-07-26 RX ADMIN — TORSEMIDE 20 MG: 20 TABLET ORAL at 09:01

## 2024-07-26 RX ADMIN — PIPERACILLIN SODIUM AND TAZOBACTAM SODIUM 2.25 G: 2; .25 INJECTION, SOLUTION INTRAVENOUS at 15:41

## 2024-07-26 RX ADMIN — SODIUM BICARBONATE 650 MG TABLET 1300 MG: at 15:42

## 2024-07-26 RX ADMIN — HYDRALAZINE HYDROCHLORIDE 100 MG: 50 TABLET ORAL at 21:00

## 2024-07-26 RX ADMIN — PIPERACILLIN SODIUM AND TAZOBACTAM SODIUM 2.25 G: 2; .25 INJECTION, SOLUTION INTRAVENOUS at 05:52

## 2024-07-26 RX ADMIN — SODIUM BICARBONATE 650 MG TABLET 1300 MG: at 21:40

## 2024-07-26 RX ADMIN — CARVEDILOL 25 MG: 25 TABLET, FILM COATED ORAL at 21:40

## 2024-07-26 RX ADMIN — OXYCODONE 5 MG: 5 TABLET ORAL at 15:46

## 2024-07-26 RX ADMIN — LINEZOLID 600 MG: 600 INJECTION, SOLUTION INTRAVENOUS at 09:46

## 2024-07-26 RX ADMIN — ASPIRIN 81 MG: 81 TABLET, COATED ORAL at 09:01

## 2024-07-26 RX ADMIN — SODIUM BICARBONATE 650 MG TABLET 1300 MG: at 09:00

## 2024-07-26 RX ADMIN — HEPARIN SODIUM 5000 UNITS: 5000 INJECTION, SOLUTION INTRAVENOUS; SUBCUTANEOUS at 15:42

## 2024-07-26 RX ADMIN — HEPARIN SODIUM 5000 UNITS: 5000 INJECTION, SOLUTION INTRAVENOUS; SUBCUTANEOUS at 21:39

## 2024-07-26 RX ADMIN — PANTOPRAZOLE SODIUM 20 MG: 20 TABLET, DELAYED RELEASE ORAL at 05:52

## 2024-07-26 RX ADMIN — PIPERACILLIN SODIUM AND TAZOBACTAM SODIUM 2.25 G: 2; .25 INJECTION, SOLUTION INTRAVENOUS at 00:18

## 2024-07-26 RX ADMIN — PIPERACILLIN SODIUM AND TAZOBACTAM SODIUM 2.25 G: 2; .25 INJECTION, SOLUTION INTRAVENOUS at 21:43

## 2024-07-26 RX ADMIN — HYDRALAZINE HYDROCHLORIDE 100 MG: 50 TABLET ORAL at 09:01

## 2024-07-26 RX ADMIN — ATORVASTATIN CALCIUM 40 MG: 40 TABLET, FILM COATED ORAL at 21:40

## 2024-07-26 RX ADMIN — CARVEDILOL 25 MG: 25 TABLET, FILM COATED ORAL at 09:01

## 2024-07-26 RX ADMIN — OXYCODONE 5 MG: 5 TABLET ORAL at 05:52

## 2024-07-26 RX ADMIN — HYDRALAZINE HYDROCHLORIDE 100 MG: 50 TABLET ORAL at 15:42

## 2024-07-26 ASSESSMENT — ACTIVITIES OF DAILY LIVING (ADL)
BATHING_ASSISTANCE: MODERATE
EFFECT OF PAIN ON DAILY ACTIVITIES: YES
ADL_ASSISTANCE: INDEPENDENT

## 2024-07-26 ASSESSMENT — PAIN SCALES - GENERAL
PAINLEVEL_OUTOF10: 6
PAINLEVEL_OUTOF10: 0 - NO PAIN
PAINLEVEL_OUTOF10: 8
PAINLEVEL_OUTOF10: 0 - NO PAIN

## 2024-07-26 ASSESSMENT — COGNITIVE AND FUNCTIONAL STATUS - GENERAL
DAILY ACTIVITIY SCORE: 19
HELP NEEDED FOR BATHING: A LOT
TOILETING: A LITTLE
DRESSING REGULAR LOWER BODY CLOTHING: A LOT
STANDING UP FROM CHAIR USING ARMS: A LITTLE
CLIMB 3 TO 5 STEPS WITH RAILING: A LITTLE
DAILY ACTIVITIY SCORE: 24
WALKING IN HOSPITAL ROOM: A LITTLE
MOBILITY SCORE: 21

## 2024-07-26 ASSESSMENT — PAIN - FUNCTIONAL ASSESSMENT
PAIN_FUNCTIONAL_ASSESSMENT: 0-10
PAIN_FUNCTIONAL_ASSESSMENT: 0-10
PAIN_FUNCTIONAL_ASSESSMENT: WONG-BAKER FACES
PAIN_FUNCTIONAL_ASSESSMENT: 0-10
PAIN_FUNCTIONAL_ASSESSMENT: 0-10

## 2024-07-26 ASSESSMENT — PAIN DESCRIPTION - DESCRIPTORS
DESCRIPTORS: ACHING
DESCRIPTORS: ACHING

## 2024-07-26 ASSESSMENT — PAIN SCALES - WONG BAKER: WONGBAKER_NUMERICALRESPONSE: NO HURT

## 2024-07-26 NOTE — PROGRESS NOTES
Colin Leonard is a 58 y.o. male on day 4 of admission presenting with NSTEMI (non-ST elevated myocardial infarction) (Multi).      Subjective   Patient examined sitting up in bed with brother at bedside.  He states that his pain is well-controlled and his left foot after surgery yesterday.  He states that podiatry told him they plan to take him to the OR on Monday for closure.  Denies any chest pain, shortness of breath, or abdominal pain.  He further denies any headache, dizziness, nausea/vomiting.       Objective     Last Recorded Vitals  /75 (BP Location: Left arm, Patient Position: Sitting)   Pulse 73   Temp 36.9 °C (98.4 °F) (Oral)   Resp 16   Wt 122 kg (268 lb 15.4 oz)   SpO2 98%   Intake/Output last 3 Shifts:    Intake/Output Summary (Last 24 hours) at 7/26/2024 1038  Last data filed at 7/26/2024 0900  Gross per 24 hour   Intake 900 ml   Output 650 ml   Net 250 ml       Admission Weight  Weight: 119 kg (261 lb 7.5 oz) (07/21/24 2214)    Daily Weight  07/24/24 : 122 kg (268 lb 15.4 oz)    Image Results  MR foot left wo IV contrast  Narrative: Interpreted By:  Ricardo Nino,   STUDY:  MRI of the    left foot without contrast dated  7/23/2024.      INDICATION:  Signs/Symptoms:osteomyelitis/wound      COMPARISON:  None.      ACCESSION NUMBER(S):  [id number]      ORDERING CLINICIAN:  NOE DIAZ      TECHNIQUE:  Multiplanar multisequence MRI of the    left forefoot was performed  without intravenous gadolinium based contrast.      FINDINGS:  OSSEOUS STRUCTURES AND JOINTS:      There is increased T2/STIR signal intensity in the diaphysis into the  head the 2nd metatarsal. There is increased T2/STIR signal intensity  within the phalanges of the 2nd digit. There is some mottled low T1  signal intensity in the head of the 2nd metatarsal and in the  proximal phalanx of the 2nd digit. There is question of a degree  disruption of the cortex at the head of the 2nd metatarsal and along  the plantar  diaphysis and base of the proximal phalanx of the 2nd  digit. There is a large volume 2nd digit metatarsophalangeal joint  effusion with bulging of the joint capsule. Severe degenerative  changes seen of the 1st digit metatarsophalangeal joint. There is  some marginal erosion along the medial side of the head of the 1st  metatarsal which can be seen with sequelae of crystal arthropathy.  Mild degenerative changes seen in the midfoot.      SOFT TISSUES:      There is an ulcer at the plantar aspect of the forefoot superficial  to the 2nd digit metatarsophalangeal joint. As seen on this  noncontrast examination there appears to be a fistula tract through  the soft tissues extending to the 2nd digit metatarsophalangeal  joint. The fistula tract also appears to at least partially envelop  the flexor digitorum tendon sheath to the 2nd digit and possibly  involves the tendon sheath such as seen image 16 of the sagittal  plane. There is a degree of generalized ill-defined increased T2  signal intensity in the musculature. Generalized atrophy is seen in  the musculature. Reticular increased T2 signal intensity is seen in  the subcutaneous tissues of the visualized lower extremity greatest  on the dorsum of the foot and in the 2nd toe.      Impression: 1. Ulcer at the plantar forefoot with fistula tract of the 2nd digit  metatarsophalangeal joint with large volume 2nd digit  metatarsophalangeal joint effusion compatible with septic arthritis.  There are findings which are felt to be compatible with osteomyelitis  at least involving the head of the 2nd metatarsal and the base into  the diaphysis the proximal phalanx of the 2nd digit with the reactive  marrow changes seen in the distal metadiaphysis of the 2nd metatarsal  suspicious for higher likelihood of osteomyelitis involvement.  2. Reactive marrow changes in the middle and distal phalanx of the  2nd digit, which given not directly adjacent to the wound/ulcer are  probably  of lower likelihood osteomyelitis.  3. The above described fistula tract partially envelops the flexor  digitorum tendon sheath of the 2nd digit at the level of the  metatarsophalangeal joint and there may be a component of associated  at least focal infectious tenosynovitis.  4. Reactive edema in the musculature of the foot overall likely  related to ischemic/diabetic myopathy although a component of  infectious myositis particularly of the intrinsic musculature  adjacent to the distal aspect of the 2nd metatarsal is not excluded.  5. Cellulitis and/or lymphedema of the visualized lower extremity.      Signed by: Ricardo Nino 7/24/2024 8:41 AM  Dictation workstation:   [id number]      Physical Exam  Constitutional: No acute distress, calm, cooperative  Cardiovascular: Regular rhythm and rate,   Respiratory: Lungs clear to auscultation,   Gastrointestinal: Bowel sounds positive x 4, soft, nontender  Neurologic: Alert and oriented x 3 equal strength bilaterally  Musculoskeletal: Able to move all extremities, no edema  Skin: Foot dressing dry and intact  Relevant Results               Assessment/Plan                  Principal Problem:    NSTEMI (non-ST elevated myocardial infarction) (Multi)  Active Problems:    Obesity    LVH (left ventricular hypertrophy)    Mixed hyperlipidemia    Benign essential hypertension    Coronary artery disease involving native coronary artery of native heart without angina pectoris    Fall    Leukocytosis    Stage 5 chronic kidney disease not on chronic dialysis (Multi)    Type 2 diabetes mellitus (Multi)    Acute hematogenous osteomyelitis of left foot (Multi)    Mechanical fall at home  No obvious injury  No syncope    NSTEMI versus troponin elevation with CKD 5  Cardiology feels more likely CKD 5 causing troponin elevation  No further cardiac workup recommended    Left-sided diabetic foot ulcer  Mixed gram-negative/gram-positive bacteremia  Wound culture with mixed microbial  including Staph aureus  2/2 blood cultures came back gram-negative rods drawn on 7/22/2024  Repeat cultures no growth x 1 day  MRI with multiple findings, ulcer with fistulous track second digit likely septic arthritis, reactive edema, low likelihood of osteomyelitis  ID following closely, on Zyvox and Zosyn  Status post partial amputation yesterday  Follow tissue cultures  Plan is for closure on Monday    CKD 5  Stable  Previously followed with Dr. Goldberg  No urgent need for dialysis  Nephrology following    Hyperlipidemia  Statin, Zetia    DVT prophylaxis  Subcutaneous heparin                Michael Smith, APRN-CNP

## 2024-07-26 NOTE — CARE PLAN
The patient's goals for the shift include      The clinical goals for the shift include safety    Over the shift, the patient did not make progress toward the following goals. B  Problem: Skin  Goal: Decreased wound size/increased tissue granulation at next dressing change  Outcome: Progressing  Flowsheets (Taken 7/25/2024 2323)  Decreased wound size/increased tissue granulation at next dressing change: Promote sleep for wound healing  Goal: Participates in plan/prevention/treatment measures  Outcome: Progressing  Flowsheets (Taken 7/25/2024 2323)  Participates in plan/prevention/treatment measures:   Elevate heels   Increase activity/out of bed for meals  Goal: Prevent/manage excess moisture  Outcome: Progressing  Flowsheets (Taken 7/25/2024 2323)  Prevent/manage excess moisture: Moisturize dry skin  Goal: Prevent/minimize sheer/friction injuries  Outcome: Progressing  Flowsheets (Taken 7/25/2024 2323)  Prevent/minimize sheer/friction injuries: HOB 30 degrees or less  Goal: Promote/optimize nutrition  Outcome: Progressing  Flowsheets (Taken 7/25/2024 2323)  Promote/optimize nutrition:   Consume > 50% meals/supplements   Monitor/record intake including meals  Goal: Promote skin healing  Outcome: Progressing  Flowsheets (Taken 7/25/2024 2323)  Promote skin healing: Assess skin/pad under line(s)/device(s)  Goal: Absence of infection at discharge  Outcome: Progressing  Flowsheets (Taken 7/25/2024 2323)  Absence of infection at discharge:   Assess and monitor for signs and symptoms of infection   Monitor lab/diagnostic results   Administer medications as ordered

## 2024-07-26 NOTE — NURSING NOTE
Patient currently sitting on side of bed eating breakfast. Inform patient he has order for interventional radiology consult for Violet catheter placement. Explain this type of IV access device typically used for long term IV antibiotic therapy. Patient states no one spoke to him about this and he has no intention of having this catheter placed without a face-to-face discussion with the physician. Patient is frowning and tone of voice is terse as he states this.  Dr. Rodrigez on the unit. Notify him of this. Dr. Rodrigez states he will speak with the patient and goes to patient room.

## 2024-07-26 NOTE — PROGRESS NOTES
Colin Leonard is a 58 y.o. male on day 4 of admission presenting with NSTEMI (non-ST elevated myocardial infarction) (Multi).    Subjective   Interval History:   Afebrile, no chills  No foot pain  No chest pain or shortness of breath.  No nausea vomiting or diarrhea   Patient discussed with Dr. Munoz, IV antibiotics for up to 6 weeks    Review of Systems   All other systems reviewed and are negative.      Objective   Range of Vitals (last 24 hours)  Heart Rate:  [59-73]   Temp:  [35.8 °C (96.4 °F)-36.4 °C (97.5 °F)]   Resp:  [12-18]   BP: (114-152)/(69-95)   SpO2:  [97 %-100 %]   Daily Weight  07/24/24 : 122 kg (268 lb 15.4 oz)    Body mass index is 37.51 kg/m².    Physical Exam  Constitutional:       Appearance: Normal appearance.   HENT:      Head: Normocephalic and atraumatic.      Nose: Nose normal.   Eyes:      General: No scleral icterus.     Extraocular Movements: Extraocular movements intact.      Conjunctiva/sclera: Conjunctivae normal.   Cardiovascular:      Rate and Rhythm: Normal rate and regular rhythm.   Pulmonary:      Effort: Pulmonary effort is normal.      Breath sounds: Normal breath sounds.   Abdominal:      General: Bowel sounds are normal.      Palpations: Abdomen is soft.   Musculoskeletal:      Cervical back: Normal range of motion and neck supple.      Right lower leg: No edema.      Left lower leg: No edema.   Feet:      Left foot:      Skin integrity: Ulcer and callus present.      Comments: Left second met head  Skin:     General: Skin is warm and dry.   Neurological:      Mental Status: He is alert and oriented to person, place, and time.   Psychiatric:         Mood and Affect: Mood normal.         Behavior: Behavior normal.        Antibiotics  linezolid - 600 mg/300 mL  piperacillin-tazobactam - 2.25 gram/50 mL    Relevant Results  Labs  Results from last 72 hours   Lab Units 07/26/24  0600 07/25/24  0537 07/24/24  0514   WBC AUTO x10*3/uL 10.8 10.6 12.1*   HEMOGLOBIN g/dL 7.0*  7.4* 7.6*   HEMATOCRIT % 22.3* 23.4* 23.9*   PLATELETS AUTO x10*3/uL 296 289 299     Results from last 72 hours   Lab Units 07/26/24  0600 07/25/24  0537 07/24/24  0514   SODIUM mmol/L 136 135 133   POTASSIUM mmol/L 4.5 4.3 4.6   CHLORIDE mmol/L 105 103 104   CO2 mmol/L 18* 17* 17*   BUN mg/dL 72* 72* 67*   CREATININE mg/dL 6.80* 6.50* 6.50*   GLUCOSE mg/dL 159* 140* 134*   CALCIUM mg/dL 8.8 8.9 8.7   ANION GAP mmol/L 13 15 12   EGFR mL/min/1.73m*2 9* 9* 9*   PHOSPHORUS mg/dL 3.8 3.6 3.4     Results from last 72 hours   Lab Units 07/26/24  0600 07/25/24  0537 07/24/24  0514   ALBUMIN g/dL 2.9* 2.8* 2.8*     Estimated Creatinine Clearance: 15.4 mL/min (A) (by C-G formula based on SCr of 6.8 mg/dL (H)).  C-Reactive Protein   Date Value Ref Range Status   07/22/2024 24.70 (H) 0.00 - 2.00 mg/dL Final     CRP   Date Value Ref Range Status   09/09/2020 5.32 (A) mg/dL Final     Comment:     REF VALUE  < 1.00     09/08/2020 9.54 (A) mg/dL Final     Comment:     REF VALUE  < 1.00       Microbiology  Susceptibility data from last 14 days.  Collected Specimen Info Organism Clindamycin Erythromycin Oxacillin Tetracycline Trimethoprim/Sulfamethoxazole Vancomycin   07/22/24 Tissue/Biopsy from Wound/Tissue Methicillin Susceptible Staphylococcus aureus (MSSA)  R  R  S  S  S  S     Mixed Anaerobic Bacteria           Mixed Gram-Positive Bacteria          07/21/24 Blood culture from Peripheral Venipuncture Streptococcus viridans group         07/21/24 Blood culture from Peripheral Venipuncture Streptococcus viridans group           Imaging  MR foot left wo IV contrast    Result Date: 7/24/2024  Interpreted By:  Ricardo Nino, STUDY: MRI of the    left foot without contrast dated  7/23/2024.   INDICATION: Signs/Symptoms:osteomyelitis/wound   COMPARISON: None.   ACCESSION NUMBER(S): [id number]   ORDERING CLINICIAN: NOE DIAZ   TECHNIQUE: Multiplanar multisequence MRI of the    left forefoot was performed without intravenous  gadolinium based contrast.   FINDINGS: OSSEOUS STRUCTURES AND JOINTS:   There is increased T2/STIR signal intensity in the diaphysis into the head the 2nd metatarsal. There is increased T2/STIR signal intensity within the phalanges of the 2nd digit. There is some mottled low T1 signal intensity in the head of the 2nd metatarsal and in the proximal phalanx of the 2nd digit. There is question of a degree disruption of the cortex at the head of the 2nd metatarsal and along the plantar diaphysis and base of the proximal phalanx of the 2nd digit. There is a large volume 2nd digit metatarsophalangeal joint effusion with bulging of the joint capsule. Severe degenerative changes seen of the 1st digit metatarsophalangeal joint. There is some marginal erosion along the medial side of the head of the 1st metatarsal which can be seen with sequelae of crystal arthropathy. Mild degenerative changes seen in the midfoot.   SOFT TISSUES:   There is an ulcer at the plantar aspect of the forefoot superficial to the 2nd digit metatarsophalangeal joint. As seen on this noncontrast examination there appears to be a fistula tract through the soft tissues extending to the 2nd digit metatarsophalangeal joint. The fistula tract also appears to at least partially envelop the flexor digitorum tendon sheath to the 2nd digit and possibly involves the tendon sheath such as seen image 16 of the sagittal plane. There is a degree of generalized ill-defined increased T2 signal intensity in the musculature. Generalized atrophy is seen in the musculature. Reticular increased T2 signal intensity is seen in the subcutaneous tissues of the visualized lower extremity greatest on the dorsum of the foot and in the 2nd toe.       1. Ulcer at the plantar forefoot with fistula tract of the 2nd digit metatarsophalangeal joint with large volume 2nd digit metatarsophalangeal joint effusion compatible with septic arthritis. There are findings which are felt to be  compatible with osteomyelitis at least involving the head of the 2nd metatarsal and the base into the diaphysis the proximal phalanx of the 2nd digit with the reactive marrow changes seen in the distal metadiaphysis of the 2nd metatarsal suspicious for higher likelihood of osteomyelitis involvement. 2. Reactive marrow changes in the middle and distal phalanx of the 2nd digit, which given not directly adjacent to the wound/ulcer are probably of lower likelihood osteomyelitis. 3. The above described fistula tract partially envelops the flexor digitorum tendon sheath of the 2nd digit at the level of the metatarsophalangeal joint and there may be a component of associated at least focal infectious tenosynovitis. 4. Reactive edema in the musculature of the foot overall likely related to ischemic/diabetic myopathy although a component of infectious myositis particularly of the intrinsic musculature adjacent to the distal aspect of the 2nd metatarsal is not excluded. 5. Cellulitis and/or lymphedema of the visualized lower extremity.   Signed by: Ricardo Nino 7/24/2024 8:41 AM Dictation workstation:   [id number]    Transthoracic Echo (TTE) Complete    Result Date: 7/22/2024           [place] [address]            Phone [phone] TRANSTHORACIC ECHOCARDIOGRAM REPORT Patient Name:      ODALYS MELVIN     Reading Physician:    [id number] Kade Pedraza DO Study Date:        7/22/2024             Ordering Provider:    [id number] GERMAN KENNEY MRN/PID:           [id number]              Fellow: Accession#:        [id number]          Nurse: Date of Birth/Age: 1965 / 58 years Sonographer:          Vera Wynn RDCS Gender:            M                     Additional Staff: Height:             180.34 cm             Admit Date: Weight:            118.39 kg             Admission Status:     Inpatient -                                                                Routine BSA / BMI:         2.36 m2 / 36.40 kg/m2 Department Location:  [facility] Blood Pressure: 147 /88 mmHg Study Type:    TRANSTHORACIC ECHO (TTE) COMPLETE Diagnosis/ICD: Atherosclerotic heart disease of native coronary artery without                angina pectoris-I25.10 Indication:    Dyspnea, weakness fatigue CPT Codes:     Echo Complete w Full [id number] Patient History: Pertinent History: LVH, HLD, HTN, CAD, Nstemi, SOB CKD, DM. Study Detail: The following Echo studies were performed: 2D, M-Mode, color flow               and Doppler. Unable to obtain suprasternal notch view.  PHYSICIAN INTERPRETATION: Left Ventricle: The left ventricular systolic function is normal, with a visually estimated ejection fraction of 60-65%. There are no regional wall motion abnormalities. The left ventricular cavity size is normal. Left ventricular diastolic filling was indeterminate. Left Atrium: The left atrium is mildly dilated. Right Ventricle: The right ventricle is normal in size. There is normal right ventricular global systolic function. Right Atrium: The right atrium is normal in size. Aortic Valve: The aortic valve is trileaflet. There is evidence of mild aortic valve stenosis. The aortic valve dimensionless index is 0.45. There is no evidence of aortic valve regurgitation. The peak instantaneous gradient of the aortic valve is 20.4 mmHg. The mean gradient of the aortic valve is 10.6 mmHg. Mitral Valve: The mitral valve is normal in structure. There is mild mitral valve regurgitation. Tricuspid Valve: The tricuspid valve is structurally normal. There is trace tricuspid regurgitation. Pulmonic Valve: The pulmonic valve is not well visualized. The pulmonic valve regurgitation was not well visualized. Pericardium: There is no pericardial  effusion noted. Aorta: The aortic root is normal.  CONCLUSIONS:  1. The left ventricular systolic function is normal, with a visually estimated ejection fraction of 60-65%.  2. Left ventricular diastolic filling was indeterminate.  3. There is normal right ventricular global systolic function.  4. Mild aortic valve stenosis.  5. Aortic stenosis mean gradient 10 mm Hg, peak gradient 40 mm Hg and ADÁN 1.43 cm2.  6. Aortic valve dimensionless index 0.43. QUANTITATIVE DATA SUMMARY: 2D MEASUREMENTS:                           Normal Ranges: LAs:           4.76 cm    (2.7-4.0cm) IVSd:          1.70 cm    (0.6-1.1cm) LVPWd:         1.51 cm    (0.6-1.1cm) LVIDd:         3.86 cm    (3.9-5.9cm) LVIDs:         2.85 cm LV Mass Index: 104.8 g/m2 LV % FS        26.2 % LA VOLUME:                               Normal Ranges: LA Vol A4C:        71.7 ml    (22+/-6mL/m2) LA Vol A2C:        92.5 ml LA Vol BP:         86.7 ml LA Vol Index A4C:  30.4 ml/m2 LA Vol Index A2C:  39.2 ml/m2 LA Vol Index BP:   36.7 ml/m2 LA Area A4C:       22.5 cm2 LA Area A2C:       27.2 cm2 LA Major Axis A4C: 6.0 cm LA Major Axis A2C: 6.8 cm LA Volume Index:   36.7 ml/m2 LA Vol A4C:        72.0 ml LA Vol A2C:        92.0 ml RA VOLUME BY A/L METHOD:                               Normal Ranges: RA Vol A4C:        39.5 ml    (8.3-19.5ml) RA Vol Index A4C:  16.7 ml/m2 RA Area A4C:       15.4 cm2 RA Major Axis A4C: 5.1 cm AORTA MEASUREMENTS:                      Normal Ranges: Ao Sinus, d: 3.30 cm (2.1-3.5cm) Ao STJ, d:   3.00 cm (1.7-3.4cm) Asc Ao, d:   3.80 cm (2.1-3.4cm) LV SYSTOLIC FUNCTION BY 2D PLANIMETRY (MOD):                      Normal Ranges: EF-A4C View:    48 % (>=55%) EF-A2C View:    65 % EF-Biplane:     57 % EF-Visual:      63 % LV EF Reported: 63 % LV DIASTOLIC FUNCTION:                         Normal Ranges: MV Peak E:    0.82 m/s  (0.7-1.2 m/s) MV Peak A:    0.99 m/s  (0.42-0.7 m/s) E/A Ratio:    0.83      (1.0-2.2) MV e'         0.071 m/s (>8.0)  MV lateral e' 0.08 m/s MV medial e'  0.06 m/s E/e' Ratio:   11.61     (<8.0) MITRAL VALVE:                 Normal Ranges: MV DT: 221 msec (150-240msec) AORTIC VALVE:                                    Normal Ranges: AoV Vmax:                2.26 m/s  (<=1.7m/s) AoV Peak P.4 mmHg (<20mmHg) AoV Mean PG:             10.6 mmHg (1.7-11.5mmHg) LVOT Max Cirilo:            0.97 m/s  (<=1.1m/s) AoV VTI:                 46.31 cm  (18-25cm) LVOT VTI:                21.06 cm LVOT Diameter:           2.00 cm   (1.8-2.4cm) AoV Area, VTI:           1.43 cm2  (2.5-5.5cm2) AoV Area,Vmax:           1.35 cm2  (2.5-4.5cm2) AoV Dimensionless Index: 0.45  RIGHT VENTRICLE: TAPSE: 25.1 mm RV s'  0.15 m/s TRICUSPID VALVE/RVSP:                   Normal Ranges: IVC Diam: 2.02 cm AORTA: Asc Ao Diam 3.83 cm  [id number] [facility] Electronically signed on 2024 at 10:49:35 AM  ** Final **     XR foot left 3+ views    Result Date: 2024  Interpreted By:  Ely Alanis, STUDY: XR FOOT LEFT 3+ VIEWS;  2024 2:44 am   INDICATION: Signs/Symptoms:Rule out infection.   COMPARISON: None.   ACCESSION NUMBER(S): [id number]   ORDERING CLINICIAN: BRENNAN SOLORIO   FINDINGS: 3 views of the left foot were obtained.   There is diffuse osteopenia. There is no acute fracture or dislocation. Degenerative changes are noted at the 1st metatarsophalangeal joint with hallux valgus. There is cortical lucency at the bases of the 2nd and 3rd distal phalanges. Degenerative changes are noted at the midfoot. There is calcaneal enthesopathy. Vascular calcifications are present. There is diffuse soft tissue swelling at the left foot. There is small amount of gas at the plantar soft tissues overlying the bases of the toes.       1. Cortical lucency at the bases of the distal phalanges of the left 2nd and 3rd toes, underlying osteomyelitis cannot be excluded. Contrast-enhanced MRI can help in further evaluation. 2. Diffuse soft tissue edema at the  left foot. Small amount of gas at the plantar soft tissues overlying the bases of the toes, suggestive of ulcer.       MACRO: None.   Signed by: Ely Alanis 7/22/2024 2:52 AM Dictation workstation:   [id number]    XR chest 1 view    Result Date: 7/21/2024  Interpreted By:  Makeda Gurrola, STUDY: XR CHEST 1 VIEW;  7/21/2024 10:23 pm   INDICATION: Signs/Symptoms:weakness.   COMPARISON: 02/14/2022   ACCESSION NUMBER(S): [id number]   ORDERING CLINICIAN: MIHAELA AUGUSTIN   FINDINGS:     CARDIOMEDIASTINAL SILHOUETTE: Stable cardiomegaly.   LUNGS: No pulmonary consolidation, pleural effusion or pneumothorax. Low lung volumes with bronchovascular crowding.   ABDOMEN: No remarkable upper abdominal findings.   BONES: No acute osseous abnormality.       No acute cardiopulmonary process.   MACRO: None   Signed by: Makeda Gurrola 7/21/2024 10:43 PM Dictation workstation:   [id number]        Assessment/Plan   Streptococcus viridans/gram-negative cocci bacteremia  Chronic kidney disease stage V  Type 2 diabetes with peripheral angiopathy without gangrene  Left diabetic foot ulcer, Brown 3- MRI suspicious osteomyelitis at 2nd metatarsal  Left foot osteomyelitis-wound culture growing MSSA  Elevated CK        Discontinue Zyvox  IV Zosyn-coverage for Streptococcus viridans, MSSA, and gram-negative cocci  Violet catheter placement-patient will think about it  Follow-up repeat blood cultures  Podiatry follow-up   Nephrology follow-up  Local care  Offloading  Monitor temperature and WBC  Further recommendations based on intraoperative cultures  Will finalize antibiotic plan after final culture result    Garry Rodrigez MD

## 2024-07-26 NOTE — PROGRESS NOTES
07/26/24 1408   Discharge Planning   Expected Discharge Disposition  Services     POD 1 left foot surgery.  Per patient, plan is for surgical  wound closure on Monday. ID following.  Waiting for final recommendations from ID  for long term antibiotics.     DC PLAN IS NOT SECURE

## 2024-07-26 NOTE — NURSING NOTE
Patient's older brother at bedside. Patient states he has decided not to have the Violet catheter placed at this time.

## 2024-07-26 NOTE — PROGRESS NOTES
Colin Leonard is a 58 y.o. male on day 4 of admission presenting with NSTEMI (non-ST elevated myocardial infarction) (Multi).      Subjective   Underwent left foot surgery yesterday.  Stable BP.  No dyspnea at rest.  Appetite remains good.  No nausea or vomiting.       Scheduled medications  aspirin, 81 mg, oral, Daily  atorvastatin, 40 mg, oral, Nightly  carvedilol, 25 mg, oral, BID  ezetimibe, 10 mg, oral, Nightly  heparin (porcine), 5,000 Units, subcutaneous, q8h  hydrALAZINE, 100 mg, oral, TID  pantoprazole, 20 mg, oral, Daily before breakfast  perflutren lipid microspheres, 0.5-10 mL of dilution, intravenous, Once in imaging  piperacillin-tazobactam, 2.25 g, intravenous, q8h  sodium bicarbonate, 1,300 mg, oral, TID  torsemide, 20 mg, oral, Daily      Continuous medications     PRN medications  PRN medications: acetaminophen **OR** acetaminophen **OR** acetaminophen, benzocaine-menthol, dextromethorphan-guaifenesin, dextrose, dextrose, glucagon, glucagon, guaiFENesin, oxyCODONE, polyethylene glycol, prochlorperazine      Objective     Vitals 24HR  Heart Rate:  [59-73]   Temp:  [35.8 °C (96.4 °F)-36.9 °C (98.4 °F)]   Resp:  [12-18]   BP: (114-152)/(69-95)   SpO2:  [97 %-100 %]     General: Middle-aged man, not in distress  Eyes: Pale, anicteric  Lungs: Clear bilaterally  Heart: S1 and S2, regular  Abdomen: Soft, nontender  Extremities: Heavy dressing over left lower extremity, no RLE edema  Neuro: Awake and interactive, no asterixis      Intake/Output last 3 Shifts:    Intake/Output Summary (Last 24 hours) at 7/26/2024 1000  Last data filed at 7/26/2024 0900  Gross per 24 hour   Intake 900 ml   Output 650 ml   Net 250 ml       Relevant Results    Results for orders placed or performed during the hospital encounter of 07/21/24 (from the past 24 hour(s))   POCT GLUCOSE   Result Value Ref Range    POCT Glucose 136 (H) 74 - 99 mg/dL   Fungal Culture/Smear    Specimen: DIGIT SECOND, LEFT FOOT; Tissue   Result  Value Ref Range    Fungal Smear No fungal elements seen    Tissue/Wound Culture/Smear    Specimen: DIGIT SECOND, LEFT FOOT; Tissue   Result Value Ref Range    Tissue/Wound Culture/Smear No growth to date     Gram Stain (4+) Abundant Polymorphonuclear leukocytes     Gram Stain No organisms seen    Fungal Culture/Smear    Specimen: DIGIT SECOND, LEFT FOOT; Tissue   Result Value Ref Range    Fungal Smear No fungal elements seen    Tissue/Wound Culture/Smear    Specimen: DIGIT SECOND, LEFT FOOT; Tissue   Result Value Ref Range    Tissue/Wound Culture/Smear No growth to date     Gram Stain No polymorphonuclear leukocytes seen     Gram Stain No organisms seen    POCT GLUCOSE   Result Value Ref Range    POCT Glucose 115 (H) 74 - 99 mg/dL   POCT GLUCOSE   Result Value Ref Range    POCT Glucose 85 74 - 99 mg/dL   POCT GLUCOSE   Result Value Ref Range    POCT Glucose 173 (H) 74 - 99 mg/dL   POCT GLUCOSE   Result Value Ref Range    POCT Glucose 210 (H) 74 - 99 mg/dL   POCT GLUCOSE   Result Value Ref Range    POCT Glucose 159 (H) 74 - 99 mg/dL   CBC   Result Value Ref Range    WBC 10.8 4.4 - 11.3 x10*3/uL    nRBC 0.0 0.0 - 0.0 /100 WBCs    RBC 2.42 (L) 4.50 - 5.90 x10*6/uL    Hemoglobin 7.0 (L) 13.5 - 17.5 g/dL    Hematocrit 22.3 (L) 41.0 - 52.0 %    MCV 92 80 - 100 fL    MCH 28.9 26.0 - 34.0 pg    MCHC 31.4 (L) 32.0 - 36.0 g/dL    RDW 14.6 (H) 11.5 - 14.5 %    Platelets 296 150 - 450 x10*3/uL   Renal Function Panel   Result Value Ref Range    Glucose 159 (H) 65 - 99 mg/dL    Sodium 136 133 - 145 mmol/L    Potassium 4.5 3.4 - 5.1 mmol/L    Chloride 105 97 - 107 mmol/L    Bicarbonate 18 (L) 24 - 31 mmol/L    Urea Nitrogen 72 (H) 8 - 25 mg/dL    Creatinine 6.80 (H) 0.40 - 1.60 mg/dL    eGFR 9 (L) >60 mL/min/1.73m*2    Calcium 8.8 8.5 - 10.4 mg/dL    Phosphorus 3.8 2.5 - 4.5 mg/dL    Albumin 2.9 (L) 3.5 - 5.0 g/dL    Anion Gap 13 <=19 mmol/L   POCT GLUCOSE   Result Value Ref Range    POCT Glucose 125 (H) 74 - 99 mg/dL          Assessment/Plan      Stage V CKD  Hypertension  Metabolic acidosis, on bicarb  Anemia in CKD  L foot infection    SCr was marginally higher today but not very significant in terms of change in GFR.  He has no overt uremic symptoms or other indication for dialysis at this time.    Hb is drifting downward and I will defer to the primary service regarding PRBC transfusion.  Recent iron studies suggest combination of functional iron deficiency and anemia of chronic disease.  I will give a dose of Aranesp 60 mcg subcu and will avoid IV iron in the setting of an active infection.    Continue other care.      Des Bangura MD

## 2024-07-26 NOTE — NURSING NOTE
Mrs. Naylor is a 67 yo AAF here for high risk screening colonoscopy (sister with colon cancer in her 50s).   Podiatry at bedside now changing postop left foot dressing.

## 2024-07-26 NOTE — PROGRESS NOTES
Occupational Therapy    Evaluation/Treatment    Patient Name: Colin Leonard  MRN: [id number]  : 1965  Today's Date: 24  Time Calculation  Start Time: 1059  Stop Time: 1130  Time Calculation (min): 31 min       Assessment:  OT Assessment: OT order received,chart reviewed, evaluation completed. Pt demonstrated impaired functional mobility limited by NWB L LE and ADLs, would benefit from acute OT services to faciliate return to PLOF  Prognosis: Good  Barriers to Discharge: Decreased caregiver support, Inaccessible home environment  Evaluation/Treatment Tolerance: Patient limited by fatigue  Medical Staff Made Aware: Yes  End of Session Communication: Bedside nurse  End of Session Patient Position: Bed, 2 rail up, Alarm on  OT Assessment Results: Decreased ADL status, Decreased safe judgment during ADL, Decreased endurance, Decreased functional mobility, Decreased IADLs  Prognosis: Good  Barriers to Discharge: Decreased caregiver support, Inaccessible home environment  Evaluation/Treatment Tolerance: Patient limited by fatigue  Medical Staff Made Aware: Yes  Strengths: Premorbid level of function  Barriers to Participation: Attitude of self  Plan:  Treatment Interventions: ADL retraining, Functional transfer training, Endurance training, Patient/family training, Equipment evaluation/education, Compensatory technique education  OT Frequency: 3 times per week  OT Discharge Recommendations: Low intensity level of continued care  Equipment Recommended upon Discharge: Standard walker  OT Recommended Transfer Status: Minimal assist  OT - OK to Discharge: Yes  Treatment Interventions: ADL retraining, Functional transfer training, Endurance training, Patient/family training, Equipment evaluation/education, Compensatory technique education    Subjective     General:   OT Received On: 24  General  Reason for Referral: activities of daily living, Pt is a 59 yo male admit with NSTEMI also found to have L foot  infection, s/p L toe amputations, NWB L foot  Referred By: Isabelle Cook DO  Past Medical History Relevant to Rehab: Hypertension  CKD stage IV/V  Dyslipidemia  Diabetes mellitus type 2 , Benign essential hypertension,  CKD stage 4, obesity,  HLD Hyperuricemia, LVH Myopia with presbyopia of both eyes,· Vision loss  Family/Caregiver Present: No  Prior to Session Communication: Bedside nurse  Patient Position Received: Bed, 2 rail up, Alarm off, not on at start of session  Preferred Learning Style: auditory  General Comment: Pt cleared by nursing, agreeable to OT evaluation.  Precautions:  Hearing/Visual Limitations: WFL  LE Weight Bearing Status: Left Non-Weight Bearing  Medical Precautions: Fall precautions  Precautions Comment: Pt educated on NWB precautions, poor carryover during treatment  Vital Signs:  SpO2: 100 % (on RA)  Pain:  Pain Assessment  Pain Assessment: 0-10  0-10 (Numeric) Pain Score: 0 - No pain    Objective   Cognition:  Overall Cognitive Status: Within Functional Limits  Orientation Level: Oriented X4  Safety Judgment: Decreased awareness of need for assistance  Cognition Comments: Pt with delayed responses, flat affect, not making eye contact and hanging head down during entire assessment. Per nursing, pt's family has been trying to get him to speak with a psychologist but he refuses.  Safety/Judgement:  (impaired)  Insight: Mild  Impulsive: Mildly  Processing Speed: Delayed           Home Living:  Type of Home: House  Lives With:  (19 yr old son)  Home Adaptive Equipment: Walker rolling or standard  Home Layout: Two level (does not use upstairs or basement)  Home Access: Stairs to enter with rails  Entrance Stairs-Rails: Both  Entrance Stairs-Number of Steps: 2  Bathroom Shower/Tub: Walk-in shower  Bathroom Equipment: Grab bars in shower, Shower chair with back  Home Living Comments: denies falls  Prior Function:  Level of [place]: Independent with ADLs and functional transfers,  Independent with homemaking with ambulation  ADL Assistance: Independent  Homemaking Assistance: Independent  Ambulatory Assistance: Independent  IADL History:     ADL:  Eating Assistance: Independent  Grooming Assistance: Minimal  Grooming Deficit: Standing with assistive device  Bathing Assistance: Moderate  UE Dressing Assistance: Minimal  LE Dressing Assistance: Maximal  Toileting Assistance with Device: Maximal  Functional Assistance:  (limitede by NWB precautions)    Activity Tolerance:  Endurance: Tolerates 10 - 20 min exercise with multiple rests  Activity Tolerance Comments: Pt required extended rest break during functional mobility  Rate of Perceived Exertion (RPE): 6/10  Functional Standing Tolerance:     Bed Mobility/Transfers: Bed Mobility  Bed Mobility: Yes  Bed Mobility 1  Bed Mobility 1: Supine to sitting, Sitting to supine  Level of Assistance 1: Close supervision  Bed Mobility Comments 1: cues for transfer, bed rail use    Transfers  Transfer: Yes  Transfer 1  Transfer From 1: Bed to  Transfer to 1: Stand  Technique 1: Sit to stand  Transfer Device 1: Walker  Transfer Level of Assistance 1: Minimum assistance  Trials/Comments 1: Pt stood from bed with min A and SW support with cues for NWB  Transfers 2  Transfer From 2: Stand to  Transfer to 2: Chair with arms  Technique 2: Stand to sit, Sit to stand  Transfer Device 2: Walker  Transfer Level of Assistance 2: Minimum assistance  Trials/Comments 2: Pt transferred to chair for seated rest break with min A, stood with min A and cues for safe hand placement and transfer technique and NWB precautions.      Functional Mobility:  Functional Mobility  Functional Mobility Performed: Yes  Functional Mobility 1  Surface 1: Level tile  Device 1: Standard walker  Assistance 1: Maximum verbal cues, Moderate assistance  Quality of Functional Mobility 1: Inconsistent stride length  Comments 1: Pt edu on NWB precautions and sequencing, pt required max VCs  throughout to maintain NWB as pt attempts to step through L LE, towards bathroom in room, pt taking multiple small hops despite cues for safe sequencing. Pt required seasted rest break,then performed functional mobility back to EOB with improved sequencing and safety requiring min A and mod VCs.    Sensation:  Light Touch: Partial deficits in the LLE  Strength:  Strength Comments: 5/5 B UES    Coordination:  Movements are Fluid and Coordinated: Yes (for upper body)   Hand Function:  Hand Function  Gross Grasp: Functional  Coordination: Functional  Extremities: RUE   RUE : Within Functional Limits and LUE   LUE: Within Functional Limits    Outcome Measures: [facility] Daily Activity  Putting on and taking off regular lower body clothing: A lot  Bathing (including washing, rinsing, drying): A lot  Putting on and taking off regular upper body clothing: None  Toileting, which includes using toilet, bedpan or urinal: A little  Taking care of personal grooming such as brushing teeth: None  Eating Meals: None  Daily Activity - Total Score: 19      Education Documentation  Body Mechanics, taught by Vera Arriola OT at 7/26/2024  1:03 PM.  Learner: Patient  Readiness: Acceptance  Method: Explanation  Response: Verbalizes Understanding  Comment: Pt edu on OT POC and NWB precautions    Precautions, taught by Vera Arriola OT at 7/26/2024  1:03 PM.  Learner: Patient  Readiness: Acceptance  Method: Explanation  Response: Verbalizes Understanding  Comment: Pt edu on OT POC and NWB precautions    ADL Training, taught by Vera Arriola OT at 7/26/2024  1:03 PM.  Learner: Patient  Readiness: Acceptance  Method: Explanation  Response: Verbalizes Understanding  Comment: Pt edu on OT POC and NWB precautions    Goals:  Encounter Problems       Encounter Problems (Active)       OT Goals       ADLs (Progressing)       Start:  07/26/24    Expected End:  08/09/24       Pt will complete ADL tasks with Mod I, using AE as needed, in order to  complete self-care tasks.           Functional transfers (Progressing)       Start:  07/26/24    Expected End:  08/09/24       Pt will perform functional transfers at mod ind level with SW.           Functional mobility (Progressing)       Start:  07/26/24    Expected End:  08/09/24       Pt will perform functional mobility household distance at mod ind level with SW

## 2024-07-27 LAB
ALBUMIN SERPL-MCNC: 3.5 G/DL (ref 3.5–5)
ANION GAP SERPL CALC-SCNC: 15 MMOL/L
BACTERIA SPEC CULT: NORMAL
BACTERIA SPEC CULT: NORMAL
BUN SERPL-MCNC: 72 MG/DL (ref 8–25)
CALCIUM SERPL-MCNC: 9.7 MG/DL (ref 8.5–10.4)
CHLORIDE SERPL-SCNC: 103 MMOL/L (ref 97–107)
CO2 SERPL-SCNC: 20 MMOL/L (ref 24–31)
CREAT SERPL-MCNC: 6.8 MG/DL (ref 0.4–1.6)
EGFRCR SERPLBLD CKD-EPI 2021: 9 ML/MIN/1.73M*2
ERYTHROCYTE [DISTWIDTH] IN BLOOD BY AUTOMATED COUNT: 14.8 % (ref 11.5–14.5)
GLUCOSE BLD MANUAL STRIP-MCNC: 127 MG/DL (ref 74–99)
GLUCOSE BLD MANUAL STRIP-MCNC: 136 MG/DL (ref 74–99)
GLUCOSE BLD MANUAL STRIP-MCNC: 150 MG/DL (ref 74–99)
GLUCOSE SERPL-MCNC: 138 MG/DL (ref 65–99)
GRAM STN SPEC: NORMAL
HCT VFR BLD AUTO: 26.8 % (ref 41–52)
HGB BLD-MCNC: 8.4 G/DL (ref 13.5–17.5)
MCH RBC QN AUTO: 29.1 PG (ref 26–34)
MCHC RBC AUTO-ENTMCNC: 31.3 G/DL (ref 32–36)
MCV RBC AUTO: 93 FL (ref 80–100)
NRBC BLD-RTO: 0 /100 WBCS (ref 0–0)
PHOSPHATE SERPL-MCNC: 4.4 MG/DL (ref 2.5–4.5)
PLATELET # BLD AUTO: 359 X10*3/UL (ref 150–450)
POTASSIUM SERPL-SCNC: 4.6 MMOL/L (ref 3.4–5.1)
RBC # BLD AUTO: 2.89 X10*6/UL (ref 4.5–5.9)
SODIUM SERPL-SCNC: 138 MMOL/L (ref 133–145)
WBC # BLD AUTO: 11.5 X10*3/UL (ref 4.4–11.3)

## 2024-07-27 PROCEDURE — 80069 RENAL FUNCTION PANEL: CPT | Performed by: STUDENT IN AN ORGANIZED HEALTH CARE EDUCATION/TRAINING PROGRAM

## 2024-07-27 PROCEDURE — 82947 ASSAY GLUCOSE BLOOD QUANT: CPT

## 2024-07-27 PROCEDURE — 1210000001 HC SEMI-PRIVATE ROOM DAILY

## 2024-07-27 PROCEDURE — 85027 COMPLETE CBC AUTOMATED: CPT | Performed by: STUDENT IN AN ORGANIZED HEALTH CARE EDUCATION/TRAINING PROGRAM

## 2024-07-27 PROCEDURE — 2500000001 HC RX 250 WO HCPCS SELF ADMINISTERED DRUGS (ALT 637 FOR MEDICARE OP): Performed by: STUDENT IN AN ORGANIZED HEALTH CARE EDUCATION/TRAINING PROGRAM

## 2024-07-27 PROCEDURE — 2500000004 HC RX 250 GENERAL PHARMACY W/ HCPCS (ALT 636 FOR OP/ED): Performed by: STUDENT IN AN ORGANIZED HEALTH CARE EDUCATION/TRAINING PROGRAM

## 2024-07-27 PROCEDURE — 36415 COLL VENOUS BLD VENIPUNCTURE: CPT | Performed by: STUDENT IN AN ORGANIZED HEALTH CARE EDUCATION/TRAINING PROGRAM

## 2024-07-27 PROCEDURE — 6350000001 HC RX 635 EPOETIN >10,000 UNITS: Mod: JZ | Performed by: INTERNAL MEDICINE

## 2024-07-27 PROCEDURE — 2500000002 HC RX 250 W HCPCS SELF ADMINISTERED DRUGS (ALT 637 FOR MEDICARE OP, ALT 636 FOR OP/ED): Performed by: STUDENT IN AN ORGANIZED HEALTH CARE EDUCATION/TRAINING PROGRAM

## 2024-07-27 RX ADMIN — ATORVASTATIN CALCIUM 40 MG: 40 TABLET, FILM COATED ORAL at 21:00

## 2024-07-27 RX ADMIN — HYDRALAZINE HYDROCHLORIDE 100 MG: 50 TABLET ORAL at 08:27

## 2024-07-27 RX ADMIN — SODIUM BICARBONATE 650 MG TABLET 1300 MG: at 21:23

## 2024-07-27 RX ADMIN — SODIUM BICARBONATE 650 MG TABLET 1300 MG: at 15:10

## 2024-07-27 RX ADMIN — PANTOPRAZOLE SODIUM 20 MG: 20 TABLET, DELAYED RELEASE ORAL at 05:52

## 2024-07-27 RX ADMIN — TORSEMIDE 20 MG: 20 TABLET ORAL at 08:27

## 2024-07-27 RX ADMIN — CARVEDILOL 25 MG: 25 TABLET, FILM COATED ORAL at 08:27

## 2024-07-27 RX ADMIN — PIPERACILLIN SODIUM AND TAZOBACTAM SODIUM 2.25 G: 2; .25 INJECTION, SOLUTION INTRAVENOUS at 15:10

## 2024-07-27 RX ADMIN — HEPARIN SODIUM 5000 UNITS: 5000 INJECTION, SOLUTION INTRAVENOUS; SUBCUTANEOUS at 15:10

## 2024-07-27 RX ADMIN — EZETIMIBE 10 MG: 10 TABLET ORAL at 21:24

## 2024-07-27 RX ADMIN — ASPIRIN 81 MG: 81 TABLET, COATED ORAL at 08:27

## 2024-07-27 RX ADMIN — HEPARIN SODIUM 5000 UNITS: 5000 INJECTION, SOLUTION INTRAVENOUS; SUBCUTANEOUS at 21:24

## 2024-07-27 RX ADMIN — HYDRALAZINE HYDROCHLORIDE 100 MG: 50 TABLET ORAL at 21:23

## 2024-07-27 RX ADMIN — HEPARIN SODIUM 5000 UNITS: 5000 INJECTION, SOLUTION INTRAVENOUS; SUBCUTANEOUS at 05:52

## 2024-07-27 RX ADMIN — HYDRALAZINE HYDROCHLORIDE 100 MG: 50 TABLET ORAL at 15:10

## 2024-07-27 RX ADMIN — SODIUM BICARBONATE 650 MG TABLET 1300 MG: at 08:28

## 2024-07-27 RX ADMIN — CARVEDILOL 25 MG: 25 TABLET, FILM COATED ORAL at 21:24

## 2024-07-27 RX ADMIN — PIPERACILLIN SODIUM AND TAZOBACTAM SODIUM 2.25 G: 2; .25 INJECTION, SOLUTION INTRAVENOUS at 05:52

## 2024-07-27 RX ADMIN — PIPERACILLIN SODIUM AND TAZOBACTAM SODIUM 2.25 G: 2; .25 INJECTION, SOLUTION INTRAVENOUS at 21:27

## 2024-07-27 RX ADMIN — EPOETIN ALFA-EPBX 10000 UNITS: 10000 INJECTION, SOLUTION INTRAVENOUS; SUBCUTANEOUS at 15:10

## 2024-07-27 ASSESSMENT — COGNITIVE AND FUNCTIONAL STATUS - GENERAL
DRESSING REGULAR LOWER BODY CLOTHING: A LOT
TOILETING: A LITTLE
STANDING UP FROM CHAIR USING ARMS: A LITTLE
WALKING IN HOSPITAL ROOM: A LITTLE
MOBILITY SCORE: 21
CLIMB 3 TO 5 STEPS WITH RAILING: A LITTLE
DAILY ACTIVITIY SCORE: 19
HELP NEEDED FOR BATHING: A LOT

## 2024-07-27 ASSESSMENT — PAIN SCALES - GENERAL: PAINLEVEL_OUTOF10: 0 - NO PAIN

## 2024-07-27 NOTE — PROGRESS NOTES
Colin Leonard is a 58 y.o. male on day 5 of admission presenting with NSTEMI (non-ST elevated myocardial infarction) (Multi).      Subjective   Patient examined sitting on edge of bed with son at bedside.  Patient states that his left foot pain is well-controlled.       Objective     Last Recorded Vitals  /72 (BP Location: Left arm, Patient Position: Lying)   Pulse 63   Temp 36.5 °C (97.7 °F) (Oral)   Resp 18   Wt 122 kg (268 lb 15.4 oz)   SpO2 100%   Intake/Output last 3 Shifts:    Intake/Output Summary (Last 24 hours) at 7/27/2024 1330  Last data filed at 7/27/2024 1300  Gross per 24 hour   Intake 305 ml   Output 1000 ml   Net -695 ml       Admission Weight  Weight: 119 kg (261 lb 7.5 oz) (07/21/24 2214)    Daily Weight  07/24/24 : 122 kg (268 lb 15.4 oz)    Image Results  MR foot left wo IV contrast  Narrative: Interpreted By:  Ricardo Nino,   STUDY:  MRI of the    left foot without contrast dated  7/23/2024.      INDICATION:  Signs/Symptoms:osteomyelitis/wound      COMPARISON:  None.      ACCESSION NUMBER(S):  [id number]      ORDERING CLINICIAN:  NOE DIAZ      TECHNIQUE:  Multiplanar multisequence MRI of the    left forefoot was performed  without intravenous gadolinium based contrast.      FINDINGS:  OSSEOUS STRUCTURES AND JOINTS:      There is increased T2/STIR signal intensity in the diaphysis into the  head the 2nd metatarsal. There is increased T2/STIR signal intensity  within the phalanges of the 2nd digit. There is some mottled low T1  signal intensity in the head of the 2nd metatarsal and in the  proximal phalanx of the 2nd digit. There is question of a degree  disruption of the cortex at the head of the 2nd metatarsal and along  the plantar diaphysis and base of the proximal phalanx of the 2nd  digit. There is a large volume 2nd digit metatarsophalangeal joint  effusion with bulging of the joint capsule. Severe degenerative  changes seen of the 1st digit metatarsophalangeal  joint. There is  some marginal erosion along the medial side of the head of the 1st  metatarsal which can be seen with sequelae of crystal arthropathy.  Mild degenerative changes seen in the midfoot.      SOFT TISSUES:      There is an ulcer at the plantar aspect of the forefoot superficial  to the 2nd digit metatarsophalangeal joint. As seen on this  noncontrast examination there appears to be a fistula tract through  the soft tissues extending to the 2nd digit metatarsophalangeal  joint. The fistula tract also appears to at least partially envelop  the flexor digitorum tendon sheath to the 2nd digit and possibly  involves the tendon sheath such as seen image 16 of the sagittal  plane. There is a degree of generalized ill-defined increased T2  signal intensity in the musculature. Generalized atrophy is seen in  the musculature. Reticular increased T2 signal intensity is seen in  the subcutaneous tissues of the visualized lower extremity greatest  on the dorsum of the foot and in the 2nd toe.      Impression: 1. Ulcer at the plantar forefoot with fistula tract of the 2nd digit  metatarsophalangeal joint with large volume 2nd digit  metatarsophalangeal joint effusion compatible with septic arthritis.  There are findings which are felt to be compatible with osteomyelitis  at least involving the head of the 2nd metatarsal and the base into  the diaphysis the proximal phalanx of the 2nd digit with the reactive  marrow changes seen in the distal metadiaphysis of the 2nd metatarsal  suspicious for higher likelihood of osteomyelitis involvement.  2. Reactive marrow changes in the middle and distal phalanx of the  2nd digit, which given not directly adjacent to the wound/ulcer are  probably of lower likelihood osteomyelitis.  3. The above described fistula tract partially envelops the flexor  digitorum tendon sheath of the 2nd digit at the level of the  metatarsophalangeal joint and there may be a component of  associated  at least focal infectious tenosynovitis.  4. Reactive edema in the musculature of the foot overall likely  related to ischemic/diabetic myopathy although a component of  infectious myositis particularly of the intrinsic musculature  adjacent to the distal aspect of the 2nd metatarsal is not excluded.  5. Cellulitis and/or lymphedema of the visualized lower extremity.      Signed by: Ricardo Nino 7/24/2024 8:41 AM  Dictation workstation:   [id number]      Physical Exam  Constitutional: No acute distress, calm, cooperative  Cardiovascular: Regular rhythm and rate, no lower extremity edema  Respiratory: Lungs clear to auscultation,   Gastrointestinal: Bowel sounds positive x 4, soft, nontender  Neurologic: Alert and oriented x 3 equal strength bilaterally  Musculoskeletal: Able to move all extremities  Skin: Left foot dressing dry and intact  Relevant Results               Assessment/Plan                  Principal Problem:    NSTEMI (non-ST elevated myocardial infarction) (Multi)  Active Problems:    Obesity    LVH (left ventricular hypertrophy)    Mixed hyperlipidemia    Benign essential hypertension    Coronary artery disease involving native coronary artery of native heart without angina pectoris    Fall    Leukocytosis    Stage 5 chronic kidney disease not on chronic dialysis (Multi)    Type 2 diabetes mellitus (Multi)    Acute hematogenous osteomyelitis of left foot (Multi)    Mechanical fall at home  No obvious injury  No syncope     NSTEMI versus troponin elevation with CKD 5  Cardiology feels more likely CKD 5 causing troponin elevation  No further cardiac workup recommended     Left-sided diabetic foot ulcer  Mixed gram-negative/gram-positive bacteremia  Wound culture with mixed microbial including Staph aureus  2/2 blood cultures came back gram-negative rods drawn on 7/22/2024  Repeat cultures no growth x 2 day  MRI with multiple findings, ulcer with fistulous track second digit likely septic  arthritis, reactive edema, low likelihood of osteomyelitis  ID following closely, on Zosyn  Status post partial amputation yesterday  Follow tissue cultures  Plan is for closure on Monday     CKD 5  Stable  Previously followed with Dr. Goldberg  No urgent need for dialysis  Nephrology following     Hyperlipidemia  Statin, Zetia     DVT prophylaxis  Subcutaneous heparin              Michael Smith, APRN-CNP

## 2024-07-27 NOTE — CARE PLAN
Problem: Skin  Goal: Decreased wound size/increased tissue granulation at next dressing change  Outcome: Progressing  Flowsheets (Taken 7/25/2024 2323 by Mireya Narvaez LPN)  Decreased wound size/increased tissue granulation at next dressing change: Promote sleep for wound healing   The patient's goals for the shift include      The clinical goals for the shift include Safety    Over the shift, the patient did not make progress toward the following goals. Barriers to progression include . Recommendations to address these barriers include .

## 2024-07-27 NOTE — NURSING NOTE
Pt requested to wait to run iv bcz he want to sit in chair. I told him I can bring IV over but he prefers to wait. Pt also requested a shower.

## 2024-07-27 NOTE — NURSING NOTE
Assumed care of patient at this time, patient is resting in bed with brake in place and call light in reach and denies any needs at this time.

## 2024-07-27 NOTE — PROGRESS NOTES
Colin Leonard is a 58 y.o. male on day 5 of admission presenting with NSTEMI (non-ST elevated myocardial infarction) (Multi).    Subjective   Interval History:   Afebrile, no chills  No foot pain  No chest pain or shortness of breath.  No nausea vomiting or diarrhea   He reports still thinking about Violet catheter placement         Objective   Range of Vitals (last 24 hours)  Heart Rate:  [63-74]   Temp:  [36.4 °C (97.5 °F)-36.9 °C (98.4 °F)]   Resp:  [17-18]   BP: (141-162)/(69-73)   SpO2:  [99 %-100 %]   Daily Weight  07/24/24 : 122 kg (268 lb 15.4 oz)    Body mass index is 37.51 kg/m².    Physical Exam  Constitutional:       Appearance: Normal appearance.   HENT:      Head: Normocephalic and atraumatic.      Nose: Nose normal.   Eyes:      General: No scleral icterus.     Extraocular Movements: Extraocular movements intact.      Conjunctiva/sclera: Conjunctivae normal.   Cardiovascular:      Rate and Rhythm: Normal rate and regular rhythm.   Pulmonary:      Effort: Pulmonary effort is normal.      Breath sounds: Normal breath sounds.   Abdominal:      General: Bowel sounds are normal.      Palpations: Abdomen is soft.   Musculoskeletal:      Cervical back: Normal range of motion and neck supple.      Right lower leg: No edema.      Left lower leg: No edema.   Feet:      Left foot:      Skin integrity: Ulcer and callus present.      Comments: Left second met head-dressing intact   Skin:     General: Skin is warm and dry.   Neurological:      Mental Status: He is alert and oriented to person, place, and time.   Psychiatric:         Mood and Affect: Mood normal.         Behavior: Behavior normal.        Antibiotics  piperacillin-tazobactam - 2.25 gram/50 mL    Relevant Results  Labs  Results from last 72 hours   Lab Units 07/27/24  0645 07/26/24  0600 07/25/24  0537   WBC AUTO x10*3/uL 11.5* 10.8 10.6   HEMOGLOBIN g/dL 8.4* 7.0* 7.4*   HEMATOCRIT % 26.8* 22.3* 23.4*   PLATELETS AUTO x10*3/uL 359 296 289      Results from last 72 hours   Lab Units 07/27/24  0645 07/26/24  0600 07/25/24  0537   SODIUM mmol/L 138 136 135   POTASSIUM mmol/L 4.6 4.5 4.3   CHLORIDE mmol/L 103 105 103   CO2 mmol/L 20* 18* 17*   BUN mg/dL 72* 72* 72*   CREATININE mg/dL 6.80* 6.80* 6.50*   GLUCOSE mg/dL 138* 159* 140*   CALCIUM mg/dL 9.7 8.8 8.9   ANION GAP mmol/L 15 13 15   EGFR mL/min/1.73m*2 9* 9* 9*   PHOSPHORUS mg/dL 4.4 3.8 3.6     Results from last 72 hours   Lab Units 07/27/24  0645 07/26/24  0600 07/25/24  0537   ALBUMIN g/dL 3.5 2.9* 2.8*     Estimated Creatinine Clearance: 15.4 mL/min (A) (by C-G formula based on SCr of 6.8 mg/dL (H)).  C-Reactive Protein   Date Value Ref Range Status   07/22/2024 24.70 (H) 0.00 - 2.00 mg/dL Final     CRP   Date Value Ref Range Status   09/09/2020 5.32 (A) mg/dL Final     Comment:     REF VALUE  < 1.00     09/08/2020 9.54 (A) mg/dL Final     Comment:     REF VALUE  < 1.00       Microbiology  Susceptibility data from last 14 days.  Collected Specimen Info Organism Clindamycin Erythromycin Oxacillin Tetracycline Trimethoprim/Sulfamethoxazole Vancomycin   07/22/24 Tissue/Biopsy from Wound/Tissue Methicillin Susceptible Staphylococcus aureus (MSSA)  R  R  S  S  S  S     Mixed Anaerobic Bacteria           Mixed Gram-Positive Bacteria          07/21/24 Blood culture from Peripheral Venipuncture Streptococcus anginosus group           Peptococcus niger         07/21/24 Blood culture from Peripheral Venipuncture Streptococcus viridans group           Imaging  MR foot left wo IV contrast    Result Date: 7/24/2024  Interpreted By:  Ricardo Nino, STUDY: MRI of the    left foot without contrast dated  7/23/2024.   INDICATION: Signs/Symptoms:osteomyelitis/wound   COMPARISON: None.   ACCESSION NUMBER(S): [id number]   ORDERING CLINICIAN: NOE DIAZ   TECHNIQUE: Multiplanar multisequence MRI of the    left forefoot was performed without intravenous gadolinium based contrast.   FINDINGS: OSSEOUS  STRUCTURES AND JOINTS:   There is increased T2/STIR signal intensity in the diaphysis into the head the 2nd metatarsal. There is increased T2/STIR signal intensity within the phalanges of the 2nd digit. There is some mottled low T1 signal intensity in the head of the 2nd metatarsal and in the proximal phalanx of the 2nd digit. There is question of a degree disruption of the cortex at the head of the 2nd metatarsal and along the plantar diaphysis and base of the proximal phalanx of the 2nd digit. There is a large volume 2nd digit metatarsophalangeal joint effusion with bulging of the joint capsule. Severe degenerative changes seen of the 1st digit metatarsophalangeal joint. There is some marginal erosion along the medial side of the head of the 1st metatarsal which can be seen with sequelae of crystal arthropathy. Mild degenerative changes seen in the midfoot.   SOFT TISSUES:   There is an ulcer at the plantar aspect of the forefoot superficial to the 2nd digit metatarsophalangeal joint. As seen on this noncontrast examination there appears to be a fistula tract through the soft tissues extending to the 2nd digit metatarsophalangeal joint. The fistula tract also appears to at least partially envelop the flexor digitorum tendon sheath to the 2nd digit and possibly involves the tendon sheath such as seen image 16 of the sagittal plane. There is a degree of generalized ill-defined increased T2 signal intensity in the musculature. Generalized atrophy is seen in the musculature. Reticular increased T2 signal intensity is seen in the subcutaneous tissues of the visualized lower extremity greatest on the dorsum of the foot and in the 2nd toe.       1. Ulcer at the plantar forefoot with fistula tract of the 2nd digit metatarsophalangeal joint with large volume 2nd digit metatarsophalangeal joint effusion compatible with septic arthritis. There are findings which are felt to be compatible with osteomyelitis at least involving  the head of the 2nd metatarsal and the base into the diaphysis the proximal phalanx of the 2nd digit with the reactive marrow changes seen in the distal metadiaphysis of the 2nd metatarsal suspicious for higher likelihood of osteomyelitis involvement. 2. Reactive marrow changes in the middle and distal phalanx of the 2nd digit, which given not directly adjacent to the wound/ulcer are probably of lower likelihood osteomyelitis. 3. The above described fistula tract partially envelops the flexor digitorum tendon sheath of the 2nd digit at the level of the metatarsophalangeal joint and there may be a component of associated at least focal infectious tenosynovitis. 4. Reactive edema in the musculature of the foot overall likely related to ischemic/diabetic myopathy although a component of infectious myositis particularly of the intrinsic musculature adjacent to the distal aspect of the 2nd metatarsal is not excluded. 5. Cellulitis and/or lymphedema of the visualized lower extremity.   Signed by: Ricardo Nino 7/24/2024 8:41 AM Dictation workstation:   [id number]    Transthoracic Echo (TTE) Complete    Result Date: 7/22/2024           [place] [address]            Phone [phone] TRANSTHORACIC ECHOCARDIOGRAM REPORT Patient Name:      ODALYS MELVIN     Reading Physician:    [id number] Kade Pedraza DO Study Date:        7/22/2024             Ordering Provider:    [id number] GERMAN KENNEY MRN/PID:           [id number]              Fellow: Accession#:        [id number]          Nurse: Date of Birth/Age: 1965 / 58 years Sonographer:          Vera Wynn RDCS Gender:            M                     Additional Staff: Height:            180.34 cm             Admit Date: Weight:             118.39 kg             Admission Status:     Inpatient -                                                                Routine BSA / BMI:         2.36 m2 / 36.40 kg/m2 Department Location:  [facility] Blood Pressure: 147 /88 mmHg Study Type:    TRANSTHORACIC ECHO (TTE) COMPLETE Diagnosis/ICD: Atherosclerotic heart disease of native coronary artery without                angina pectoris-I25.10 Indication:    Dyspnea, weakness fatigue CPT Codes:     Echo Complete w Full [id number] Patient History: Pertinent History: LVH, HLD, HTN, CAD, Nstemi, SOB CKD, DM. Study Detail: The following Echo studies were performed: 2D, M-Mode, color flow               and Doppler. Unable to obtain suprasternal notch view.  PHYSICIAN INTERPRETATION: Left Ventricle: The left ventricular systolic function is normal, with a visually estimated ejection fraction of 60-65%. There are no regional wall motion abnormalities. The left ventricular cavity size is normal. Left ventricular diastolic filling was indeterminate. Left Atrium: The left atrium is mildly dilated. Right Ventricle: The right ventricle is normal in size. There is normal right ventricular global systolic function. Right Atrium: The right atrium is normal in size. Aortic Valve: The aortic valve is trileaflet. There is evidence of mild aortic valve stenosis. The aortic valve dimensionless index is 0.45. There is no evidence of aortic valve regurgitation. The peak instantaneous gradient of the aortic valve is 20.4 mmHg. The mean gradient of the aortic valve is 10.6 mmHg. Mitral Valve: The mitral valve is normal in structure. There is mild mitral valve regurgitation. Tricuspid Valve: The tricuspid valve is structurally normal. There is trace tricuspid regurgitation. Pulmonic Valve: The pulmonic valve is not well visualized. The pulmonic valve regurgitation was not well visualized. Pericardium: There is no pericardial effusion noted. Aorta: The aortic root is normal.   CONCLUSIONS:  1. The left ventricular systolic function is normal, with a visually estimated ejection fraction of 60-65%.  2. Left ventricular diastolic filling was indeterminate.  3. There is normal right ventricular global systolic function.  4. Mild aortic valve stenosis.  5. Aortic stenosis mean gradient 10 mm Hg, peak gradient 40 mm Hg and ADÁN 1.43 cm2.  6. Aortic valve dimensionless index 0.43. QUANTITATIVE DATA SUMMARY: 2D MEASUREMENTS:                           Normal Ranges: LAs:           4.76 cm    (2.7-4.0cm) IVSd:          1.70 cm    (0.6-1.1cm) LVPWd:         1.51 cm    (0.6-1.1cm) LVIDd:         3.86 cm    (3.9-5.9cm) LVIDs:         2.85 cm LV Mass Index: 104.8 g/m2 LV % FS        26.2 % LA VOLUME:                               Normal Ranges: LA Vol A4C:        71.7 ml    (22+/-6mL/m2) LA Vol A2C:        92.5 ml LA Vol BP:         86.7 ml LA Vol Index A4C:  30.4 ml/m2 LA Vol Index A2C:  39.2 ml/m2 LA Vol Index BP:   36.7 ml/m2 LA Area A4C:       22.5 cm2 LA Area A2C:       27.2 cm2 LA Major Axis A4C: 6.0 cm LA Major Axis A2C: 6.8 cm LA Volume Index:   36.7 ml/m2 LA Vol A4C:        72.0 ml LA Vol A2C:        92.0 ml RA VOLUME BY A/L METHOD:                               Normal Ranges: RA Vol A4C:        39.5 ml    (8.3-19.5ml) RA Vol Index A4C:  16.7 ml/m2 RA Area A4C:       15.4 cm2 RA Major Axis A4C: 5.1 cm AORTA MEASUREMENTS:                      Normal Ranges: Ao Sinus, d: 3.30 cm (2.1-3.5cm) Ao STJ, d:   3.00 cm (1.7-3.4cm) Asc Ao, d:   3.80 cm (2.1-3.4cm) LV SYSTOLIC FUNCTION BY 2D PLANIMETRY (MOD):                      Normal Ranges: EF-A4C View:    48 % (>=55%) EF-A2C View:    65 % EF-Biplane:     57 % EF-Visual:      63 % LV EF Reported: 63 % LV DIASTOLIC FUNCTION:                         Normal Ranges: MV Peak E:    0.82 m/s  (0.7-1.2 m/s) MV Peak A:    0.99 m/s  (0.42-0.7 m/s) E/A Ratio:    0.83      (1.0-2.2) MV e'         0.071 m/s (>8.0) MV lateral e' 0.08 m/s MV medial e'  0.06 m/s E/e'  Ratio:   11.61     (<8.0) MITRAL VALVE:                 Normal Ranges: MV DT: 221 msec (150-240msec) AORTIC VALVE:                                    Normal Ranges: AoV Vmax:                2.26 m/s  (<=1.7m/s) AoV Peak P.4 mmHg (<20mmHg) AoV Mean PG:             10.6 mmHg (1.7-11.5mmHg) LVOT Max Cirilo:            0.97 m/s  (<=1.1m/s) AoV VTI:                 46.31 cm  (18-25cm) LVOT VTI:                21.06 cm LVOT Diameter:           2.00 cm   (1.8-2.4cm) AoV Area, VTI:           1.43 cm2  (2.5-5.5cm2) AoV Area,Vmax:           1.35 cm2  (2.5-4.5cm2) AoV Dimensionless Index: 0.45  RIGHT VENTRICLE: TAPSE: 25.1 mm RV s'  0.15 m/s TRICUSPID VALVE/RVSP:                   Normal Ranges: IVC Diam: 2.02 cm AORTA: Asc Ao Diam 3.83 cm  [id number] [facility] Electronically signed on 2024 at 10:49:35 AM  ** Final **     XR foot left 3+ views    Result Date: 2024  Interpreted By:  Ely Alanis, STUDY: XR FOOT LEFT 3+ VIEWS;  2024 2:44 am   INDICATION: Signs/Symptoms:Rule out infection.   COMPARISON: None.   ACCESSION NUMBER(S): [id number]   ORDERING CLINICIAN: BRENNAN SOLORIO   FINDINGS: 3 views of the left foot were obtained.   There is diffuse osteopenia. There is no acute fracture or dislocation. Degenerative changes are noted at the 1st metatarsophalangeal joint with hallux valgus. There is cortical lucency at the bases of the 2nd and 3rd distal phalanges. Degenerative changes are noted at the midfoot. There is calcaneal enthesopathy. Vascular calcifications are present. There is diffuse soft tissue swelling at the left foot. There is small amount of gas at the plantar soft tissues overlying the bases of the toes.       1. Cortical lucency at the bases of the distal phalanges of the left 2nd and 3rd toes, underlying osteomyelitis cannot be excluded. Contrast-enhanced MRI can help in further evaluation. 2. Diffuse soft tissue edema at the left foot. Small amount of gas at the plantar soft  tissues overlying the bases of the toes, suggestive of ulcer.       MACRO: None.   Signed by: Ely Alanis 7/22/2024 2:52 AM Dictation workstation:   [id number]    XR chest 1 view    Result Date: 7/21/2024  Interpreted By:  Makeda Gurrola, STUDY: XR CHEST 1 VIEW;  7/21/2024 10:23 pm   INDICATION: Signs/Symptoms:weakness.   COMPARISON: 02/14/2022   ACCESSION NUMBER(S): [id number]   ORDERING CLINICIAN: MIHAELA AUGUSTIN   FINDINGS:     CARDIOMEDIASTINAL SILHOUETTE: Stable cardiomegaly.   LUNGS: No pulmonary consolidation, pleural effusion or pneumothorax. Low lung volumes with bronchovascular crowding.   ABDOMEN: No remarkable upper abdominal findings.   BONES: No acute osseous abnormality.       No acute cardiopulmonary process.   MACRO: None   Signed by: Makeda Gurrola 7/21/2024 10:43 PM Dictation workstation:   [id number]        Assessment/Plan   Streptococcus viridans/gram-negative cocci bacteremia  Chronic kidney disease stage V  Type 2 diabetes with peripheral angiopathy without gangrene  Left diabetic foot ulcer, Brown 3- MRI suspicious osteomyelitis at 2nd metatarsal  Left foot osteomyelitis-wound culture growing MSSA  Elevated CK          IV Zosyn-coverage for Streptococcus viridans, MSSA, and gram-negative cocci  Violet catheter placement-patient will think about it  Follow-up repeat blood cultures  Podiatry follow-up   Nephrology follow-up  Local care  Offloading  Monitor temperature and WBC  Further recommendations based on intraoperative cultures  Will finalize antibiotic plan after final culture result  Patient discussed with Dr. Munoz, IV antibiotics for up to 6 weeks    Total time spent caring for the patient today was 20 minutes. This includes time spent before the visit reviewing the chart, time spent during the visit, and time spent after the visit on documentation     Karine Pineda, APRN-CNP

## 2024-07-27 NOTE — PROGRESS NOTES
Colin Leonard is a 58 y.o. male on day 5 of admission presenting with NSTEMI (non-ST elevated myocardial infarction) (Multi).      Subjective   No new overnight events.  Stable BP.  No dyspnea at rest.  No nausea or vomiting.       Scheduled medications  aspirin, 81 mg, oral, Daily  atorvastatin, 40 mg, oral, Nightly  carvedilol, 25 mg, oral, BID  ezetimibe, 10 mg, oral, Nightly  heparin (porcine), 5,000 Units, subcutaneous, q8h  hydrALAZINE, 100 mg, oral, TID  pantoprazole, 20 mg, oral, Daily before breakfast  perflutren lipid microspheres, 0.5-10 mL of dilution, intravenous, Once in imaging  piperacillin-tazobactam, 2.25 g, intravenous, q8h  sodium bicarbonate, 1,300 mg, oral, TID  torsemide, 20 mg, oral, Daily      Continuous medications     PRN medications  PRN medications: acetaminophen **OR** acetaminophen **OR** acetaminophen, benzocaine-menthol, dextromethorphan-guaifenesin, dextrose, dextrose, glucagon, glucagon, guaiFENesin, oxyCODONE, polyethylene glycol, prochlorperazine      Objective     Vitals 24HR  Heart Rate:  [63-74]   Temp:  [36.4 °C (97.5 °F)-36.9 °C (98.4 °F)]   Resp:  [17-18]   BP: (141-162)/(69-73)   SpO2:  [99 %-100 %]     General: Middle-aged man, not in distress  Eyes: Pale, anicteric  Lungs: Clear bilaterally  Heart: S1 and S2, regular  Abdomen: Soft, nontender  Extremities: Dressing over L lower leg, no RLE edema  Neuro: Awake and interactive, no asterixis      Relevant Results    Results for orders placed or performed during the hospital encounter of 07/21/24 (from the past 24 hour(s))   POCT GLUCOSE   Result Value Ref Range    POCT Glucose 170 (H) 74 - 99 mg/dL   POCT GLUCOSE   Result Value Ref Range    POCT Glucose 122 (H) 74 - 99 mg/dL   POCT GLUCOSE   Result Value Ref Range    POCT Glucose 150 (H) 74 - 99 mg/dL   CBC   Result Value Ref Range    WBC 11.5 (H) 4.4 - 11.3 x10*3/uL    nRBC 0.0 0.0 - 0.0 /100 WBCs    RBC 2.89 (L) 4.50 - 5.90 x10*6/uL    Hemoglobin 8.4 (L) 13.5 - 17.5  g/dL    Hematocrit 26.8 (L) 41.0 - 52.0 %    MCV 93 80 - 100 fL    MCH 29.1 26.0 - 34.0 pg    MCHC 31.3 (L) 32.0 - 36.0 g/dL    RDW 14.8 (H) 11.5 - 14.5 %    Platelets 359 150 - 450 x10*3/uL   Renal Function Panel   Result Value Ref Range    Glucose 138 (H) 65 - 99 mg/dL    Sodium 138 133 - 145 mmol/L    Potassium 4.6 3.4 - 5.1 mmol/L    Chloride 103 97 - 107 mmol/L    Bicarbonate 20 (L) 24 - 31 mmol/L    Urea Nitrogen 72 (H) 8 - 25 mg/dL    Creatinine 6.80 (H) 0.40 - 1.60 mg/dL    eGFR 9 (L) >60 mL/min/1.73m*2    Calcium 9.7 8.5 - 10.4 mg/dL    Phosphorus 4.4 2.5 - 4.5 mg/dL    Albumin 3.5 3.5 - 5.0 g/dL    Anion Gap 15 <=19 mmol/L   POCT GLUCOSE   Result Value Ref Range    POCT Glucose 127 (H) 74 - 99 mg/dL         Assessment/Plan      Stage V CKD  Hypertension  Metabolic acidosis, improving  Anemia in CKD    Renal function has remained relatively stable at his baseline.  As previously noted, he is approaching ESRD but no urgent indication for dialysis at this time.  He will remain on torsemide for volume/BP control and will need to start RRT planning with his primary nephrologist as soon as he is discharged.    Continue bicarbonate supplementation.    Will start ALFREDA therapy and may need to continue this as outpatient.    Continue other care      Des Bangura MD

## 2024-07-27 NOTE — NURSING NOTE
During rounds pt was found sitting at bedside with best friend in the room. Pt is A/Ox3, verbal with clear speech. Pt denies pain and has no concerns other than to sleep through the night, he requested all meds at bedtime and VS done at that time as well. Call light and essentials Wnr.

## 2024-07-28 ENCOUNTER — PREP FOR PROCEDURE (OUTPATIENT)
Dept: PODIATRY | Facility: HOSPITAL | Age: 59
End: 2024-07-28
Payer: COMMERCIAL

## 2024-07-28 DIAGNOSIS — M86.172 OSTEOMYELITIS OF ANKLE OR FOOT, LEFT, ACUTE (MULTI): Primary | ICD-10-CM

## 2024-07-28 LAB
ALBUMIN SERPL-MCNC: 2.9 G/DL (ref 3.5–5)
ANION GAP SERPL CALC-SCNC: 15 MMOL/L
BACTERIA BLD AEROBE CULT: ABNORMAL
BACTERIA BLD AEROBE CULT: ABNORMAL
BACTERIA BLD CULT: ABNORMAL
BACTERIA BLD CULT: ABNORMAL
BACTERIA BLD CULT: NORMAL
BACTERIA BLD CULT: NORMAL
BUN SERPL-MCNC: 77 MG/DL (ref 8–25)
CALCIUM SERPL-MCNC: 9 MG/DL (ref 8.5–10.4)
CHLORIDE SERPL-SCNC: 105 MMOL/L (ref 97–107)
CO2 SERPL-SCNC: 19 MMOL/L (ref 24–31)
CREAT SERPL-MCNC: 6.8 MG/DL (ref 0.4–1.6)
EGFRCR SERPLBLD CKD-EPI 2021: 9 ML/MIN/1.73M*2
ERYTHROCYTE [DISTWIDTH] IN BLOOD BY AUTOMATED COUNT: 15 % (ref 11.5–14.5)
GLUCOSE BLD MANUAL STRIP-MCNC: 128 MG/DL (ref 74–99)
GLUCOSE BLD MANUAL STRIP-MCNC: 158 MG/DL (ref 74–99)
GLUCOSE SERPL-MCNC: 128 MG/DL (ref 65–99)
GRAM STN SPEC: ABNORMAL
GRAM STN SPEC: ABNORMAL
HCT VFR BLD AUTO: 23.9 % (ref 41–52)
HGB BLD-MCNC: 7.5 G/DL (ref 13.5–17.5)
MCH RBC QN AUTO: 29.2 PG (ref 26–34)
MCHC RBC AUTO-ENTMCNC: 31.4 G/DL (ref 32–36)
MCV RBC AUTO: 93 FL (ref 80–100)
NRBC BLD-RTO: 0 /100 WBCS (ref 0–0)
PHOSPHATE SERPL-MCNC: 4.6 MG/DL (ref 2.5–4.5)
PLATELET # BLD AUTO: 320 X10*3/UL (ref 150–450)
POTASSIUM SERPL-SCNC: 4.3 MMOL/L (ref 3.4–5.1)
RBC # BLD AUTO: 2.57 X10*6/UL (ref 4.5–5.9)
SODIUM SERPL-SCNC: 139 MMOL/L (ref 133–145)
WBC # BLD AUTO: 11.6 X10*3/UL (ref 4.4–11.3)

## 2024-07-28 PROCEDURE — 2500000002 HC RX 250 W HCPCS SELF ADMINISTERED DRUGS (ALT 637 FOR MEDICARE OP, ALT 636 FOR OP/ED): Performed by: STUDENT IN AN ORGANIZED HEALTH CARE EDUCATION/TRAINING PROGRAM

## 2024-07-28 PROCEDURE — 97116 GAIT TRAINING THERAPY: CPT | Mod: GP

## 2024-07-28 PROCEDURE — 36415 COLL VENOUS BLD VENIPUNCTURE: CPT | Performed by: STUDENT IN AN ORGANIZED HEALTH CARE EDUCATION/TRAINING PROGRAM

## 2024-07-28 PROCEDURE — 80069 RENAL FUNCTION PANEL: CPT | Performed by: STUDENT IN AN ORGANIZED HEALTH CARE EDUCATION/TRAINING PROGRAM

## 2024-07-28 PROCEDURE — 82947 ASSAY GLUCOSE BLOOD QUANT: CPT

## 2024-07-28 PROCEDURE — 85027 COMPLETE CBC AUTOMATED: CPT | Performed by: STUDENT IN AN ORGANIZED HEALTH CARE EDUCATION/TRAINING PROGRAM

## 2024-07-28 PROCEDURE — 2500000004 HC RX 250 GENERAL PHARMACY W/ HCPCS (ALT 636 FOR OP/ED): Performed by: STUDENT IN AN ORGANIZED HEALTH CARE EDUCATION/TRAINING PROGRAM

## 2024-07-28 PROCEDURE — 2500000001 HC RX 250 WO HCPCS SELF ADMINISTERED DRUGS (ALT 637 FOR MEDICARE OP): Performed by: STUDENT IN AN ORGANIZED HEALTH CARE EDUCATION/TRAINING PROGRAM

## 2024-07-28 PROCEDURE — 97161 PT EVAL LOW COMPLEX 20 MIN: CPT | Mod: GP

## 2024-07-28 PROCEDURE — 1210000001 HC SEMI-PRIVATE ROOM DAILY

## 2024-07-28 RX ADMIN — HEPARIN SODIUM 5000 UNITS: 5000 INJECTION, SOLUTION INTRAVENOUS; SUBCUTANEOUS at 22:08

## 2024-07-28 RX ADMIN — HYDRALAZINE HYDROCHLORIDE 100 MG: 50 TABLET ORAL at 16:19

## 2024-07-28 RX ADMIN — ATORVASTATIN CALCIUM 40 MG: 40 TABLET, FILM COATED ORAL at 22:08

## 2024-07-28 RX ADMIN — PIPERACILLIN SODIUM AND TAZOBACTAM SODIUM 2.25 G: 2; .25 INJECTION, SOLUTION INTRAVENOUS at 16:45

## 2024-07-28 RX ADMIN — SODIUM BICARBONATE 650 MG TABLET 1300 MG: at 16:19

## 2024-07-28 RX ADMIN — TORSEMIDE 20 MG: 20 TABLET ORAL at 08:38

## 2024-07-28 RX ADMIN — CARVEDILOL 25 MG: 25 TABLET, FILM COATED ORAL at 22:08

## 2024-07-28 RX ADMIN — PANTOPRAZOLE SODIUM 20 MG: 20 TABLET, DELAYED RELEASE ORAL at 06:33

## 2024-07-28 RX ADMIN — HYDRALAZINE HYDROCHLORIDE 100 MG: 50 TABLET ORAL at 22:08

## 2024-07-28 RX ADMIN — SODIUM BICARBONATE 650 MG TABLET 1300 MG: at 22:08

## 2024-07-28 RX ADMIN — PIPERACILLIN SODIUM AND TAZOBACTAM SODIUM 2.25 G: 2; .25 INJECTION, SOLUTION INTRAVENOUS at 06:32

## 2024-07-28 RX ADMIN — HYDRALAZINE HYDROCHLORIDE 100 MG: 50 TABLET ORAL at 08:38

## 2024-07-28 RX ADMIN — SODIUM BICARBONATE 650 MG TABLET 1300 MG: at 08:38

## 2024-07-28 RX ADMIN — HEPARIN SODIUM 5000 UNITS: 5000 INJECTION, SOLUTION INTRAVENOUS; SUBCUTANEOUS at 06:33

## 2024-07-28 RX ADMIN — CARVEDILOL 25 MG: 25 TABLET, FILM COATED ORAL at 08:39

## 2024-07-28 RX ADMIN — HEPARIN SODIUM 5000 UNITS: 5000 INJECTION, SOLUTION INTRAVENOUS; SUBCUTANEOUS at 16:20

## 2024-07-28 RX ADMIN — PIPERACILLIN SODIUM AND TAZOBACTAM SODIUM 2.25 G: 2; .25 INJECTION, SOLUTION INTRAVENOUS at 22:09

## 2024-07-28 RX ADMIN — ASPIRIN 81 MG: 81 TABLET, COATED ORAL at 08:38

## 2024-07-28 RX ADMIN — EZETIMIBE 10 MG: 10 TABLET ORAL at 22:08

## 2024-07-28 ASSESSMENT — COGNITIVE AND FUNCTIONAL STATUS - GENERAL
HELP NEEDED FOR BATHING: A LOT
STANDING UP FROM CHAIR USING ARMS: A LITTLE
CLIMB 3 TO 5 STEPS WITH RAILING: A LOT
DAILY ACTIVITIY SCORE: 19
WALKING IN HOSPITAL ROOM: A LITTLE
TOILETING: A LITTLE
DRESSING REGULAR LOWER BODY CLOTHING: A LOT
CLIMB 3 TO 5 STEPS WITH RAILING: A LITTLE
MOBILITY SCORE: 20
MOBILITY SCORE: 21
WALKING IN HOSPITAL ROOM: A LITTLE
STANDING UP FROM CHAIR USING ARMS: A LITTLE

## 2024-07-28 ASSESSMENT — PAIN SCALES - GENERAL
PAINLEVEL_OUTOF10: 0 - NO PAIN

## 2024-07-28 ASSESSMENT — PAIN - FUNCTIONAL ASSESSMENT: PAIN_FUNCTIONAL_ASSESSMENT: 0-10

## 2024-07-28 NOTE — PROGRESS NOTES
Physical Therapy    Physical Therapy Evaluation & Treatment    Patient Name: Colin Leonard  MRN: [id number]  Today's Date: 7/28/2024   Time Calculation  Start Time: 1330  Stop Time: 1350  Time Calculation (min): 20 min    Assessment/Plan   PT Assessment  PT Assessment Results: Decreased strength, Decreased range of motion, Decreased endurance, Decreased mobility, Impaired balance  Rehab Prognosis: Good   IP OR SWING BED PT PLAN  Inpatient or Swing Bed: Inpatient  PT Plan  Treatment/Interventions: Bed mobility, Transfer training, Gait training, Stair training, Balance training, Neuromuscular re-education, Strengthening, Endurance training, Range of motion, Therapeutic exercise, Therapeutic activity  PT Plan: Ongoing PT  PT Frequency: 6 times per week  PT Discharge Recommendations: Low intensity level of continued care  Equipment Recommended upon Discharge: Standard walker  PT Recommended Transfer Status: Assistive device  PT - OK to Discharge: Yes    Pt demonstrates the ability to ambulate within the room ( short distances) with a standard walker. NWB on LLE.      Subjective     General Visit Information:  General  Reason for Referral: 58 y.o. male presenting with Fatigue and Weakness; s/p amputation 2nd toe left foot since being admitted  Home Living:  Home Living  Type of Home: House  Lives With: Adult children  Home Adaptive Equipment: Walker rolling or standard, Cane  Home Layout: Multi-level  Prior Level of Function:  Prior Function Per Pt/Caregiver Report  Level of [place]: Independent with ADLs and functional transfers  Precautions:     Vital Signs:       Objective   Pain:  Pain Assessment  Pain Assessment: 0-10  0-10 (Numeric) Pain Score: 0 - No pain  Cognition:  Cognition  Overall Cognitive Status: Within Functional Limits    General Assessments:          Activity Tolerance  Endurance: Tolerates less than 10 min exercise, no significant change in vital signs    Sensation  Light Touch: No apparent  deficits            Perception  Inattention/Neglect: Appears intact      Coordination  Movements are Fluid and Coordinated: Yes    Postural Control  Postural Control: Within Functional Limits    Static Sitting Balance  Static Sitting-Balance Support: No upper extremity supported  Dynamic Sitting Balance  Dynamic Sitting-Balance Support: No upper extremity supported    Static Standing Balance  Static Standing-Balance Support: Bilateral upper extremity supported  Static Standing-Level of Assistance: Modified independent  Dynamic Standing Balance  Dynamic Standing-Balance Support: Bilateral upper extremity supported  Functional Assessments:            Transfers  Transfer: Yes  Transfer 1  Transfer From 1: Sit to  Transfer to 1: Stand  Transfer Device 1: Walker (standard walker)  Transfer Level of Assistance 1: Modified independent  Transfers 2  Transfer From 2: Stand to  Transfer to 2: Sit  Transfer Device 2: Walker  Transfer Level of Assistance 2: Modified independent    Ambulation/Gait Training  Ambulation/Gait Training Performed: Yes  Ambulation/Gait Training 1  Surface 1: Level tile  Device 1: Standard walker  Assistance 1: Modified independent  Quality of Gait 1:  (NWB on L leg)  Comments/Distance (ft) 1: able to demonstrate short distance ambulation safely (15-25  ft)       Extremity/Trunk Assessments:  RLE   RLE : Within Functional Limits  LLE   LLE : Exceptions to WFL  Strength LLE  LLE Overall Strength: Greater than or equal to 3/5 as evidenced by functional mobility  Treatments:  Ambulation/Gait Training  Ambulation/Gait Training Performed: Yes  Ambulation/Gait Training 1  Surface 1: Level tile  Device 1: Standard walker  Assistance 1: Modified independent  Quality of Gait 1:  (NWB on L leg)  Comments/Distance (ft) 1: able to demonstrate short distance ambulation safely (15-25  ft)  Transfers  Transfer: Yes  Transfer 1  Transfer From 1: Sit to  Transfer to 1: Stand  Transfer Device 1: Walker (standard  walker)  Transfer Level of Assistance 1: Modified independent  Transfers 2  Transfer From 2: Stand to  Transfer to 2: Sit  Transfer Device 2: Walker  Transfer Level of Assistance 2: Modified independent  Outcome Measures:  [facility] Basic Mobility  Turning from your back to your side while in a flat bed without using bedrails: None  Moving from lying on your back to sitting on the side of a flat bed without using bedrails: None  Moving to and from bed to chair (including a wheelchair): None  Standing up from a chair using your arms (e.g. wheelchair or bedside chair): A little  To walk in hospital room: A little  Climbing 3-5 steps with railing: A lot  Basic Mobility - Total Score: 20    Encounter Problems       Encounter Problems (Active)       Mobility       LTG - Patient will ambulate household distance       Start:  07/28/24               Pain          Pain - Adult              Education Documentation  Handouts, taught by Viktoriya Estrada PT at 7/28/2024  4:15 PM.  Learner: Patient  Readiness: Eager  Method: Explanation, Demonstration, Handout  Response: Verbalizes Understanding, Demonstrated Understanding    Precautions, taught by Viktoriya Estrada PT at 7/28/2024  4:15 PM.  Learner: Patient  Readiness: Eager  Method: Explanation, Demonstration, Handout  Response: Verbalizes Understanding, Demonstrated Understanding    Body Mechanics, taught by Viktoriya Estrada PT at 7/28/2024  4:15 PM.  Learner: Patient  Readiness: Eager  Method: Explanation, Demonstration, Handout  Response: Verbalizes Understanding, Demonstrated Understanding    Home Exercise Program, taught by Viktoriya Estrada PT at 7/28/2024  4:15 PM.  Learner: Patient  Readiness: Eager  Method: Explanation, Demonstration, Handout  Response: Verbalizes Understanding, Demonstrated Understanding    Mobility Training, taught by Viktoriya Estrada PT at 7/28/2024  4:15 PM.  Learner: Patient  Readiness: Eager  Method: Explanation, Demonstration, Handout  Response:  Verbalizes Understanding, Demonstrated Understanding    Body Mechanics, taught by Viktoriya Estrada PT at 7/28/2024  4:15 PM.  Learner: Patient  Readiness: Eager  Method: Explanation, Demonstration, Handout  Response: Verbalizes Understanding, Demonstrated Understanding    Precautions, taught by Viktoriya Estrada PT at 7/28/2024  4:15 PM.  Learner: Patient  Readiness: Eager  Method: Explanation, Demonstration, Handout  Response: Verbalizes Understanding, Demonstrated Understanding    ADL Training, taught by Viktoriya Estrada PT at 7/28/2024  4:15 PM.  Learner: Patient  Readiness: Eager  Method: Explanation, Demonstration, Handout  Response: Verbalizes Understanding, Demonstrated Understanding    Education Comments  No comments found.

## 2024-07-28 NOTE — PROGRESS NOTES
Colin Leonard is a 58 y.o. male on day 6 of admission presenting with NSTEMI (non-ST elevated myocardial infarction) (Multi).      Subjective   Patient examined sitting up in bed.  No complaints of pain in his left foot.  He denies chest pain, shortness of breath, or abdominal pain.       Objective     Last Recorded Vitals  /81 (BP Location: Left arm, Patient Position: Lying)   Pulse 73   Temp 36.2 °C (97.2 °F) (Oral)   Resp 17   Wt 122 kg (268 lb 15.4 oz)   SpO2 99%   Intake/Output last 3 Shifts:    Intake/Output Summary (Last 24 hours) at 7/28/2024 1107  Last data filed at 7/28/2024 0702  Gross per 24 hour   Intake 50 ml   Output 900 ml   Net -850 ml       Admission Weight  Weight: 119 kg (261 lb 7.5 oz) (07/21/24 2214)    Daily Weight  07/24/24 : 122 kg (268 lb 15.4 oz)    Image Results  MR foot left wo IV contrast  Narrative: Interpreted By:  Ricardo Nino,   STUDY:  MRI of the    left foot without contrast dated  7/23/2024.      INDICATION:  Signs/Symptoms:osteomyelitis/wound      COMPARISON:  None.      ACCESSION NUMBER(S):  [id number]      ORDERING CLINICIAN:  NOE DIAZ      TECHNIQUE:  Multiplanar multisequence MRI of the    left forefoot was performed  without intravenous gadolinium based contrast.      FINDINGS:  OSSEOUS STRUCTURES AND JOINTS:      There is increased T2/STIR signal intensity in the diaphysis into the  head the 2nd metatarsal. There is increased T2/STIR signal intensity  within the phalanges of the 2nd digit. There is some mottled low T1  signal intensity in the head of the 2nd metatarsal and in the  proximal phalanx of the 2nd digit. There is question of a degree  disruption of the cortex at the head of the 2nd metatarsal and along  the plantar diaphysis and base of the proximal phalanx of the 2nd  digit. There is a large volume 2nd digit metatarsophalangeal joint  effusion with bulging of the joint capsule. Severe degenerative  changes seen of the 1st digit  metatarsophalangeal joint. There is  some marginal erosion along the medial side of the head of the 1st  metatarsal which can be seen with sequelae of crystal arthropathy.  Mild degenerative changes seen in the midfoot.      SOFT TISSUES:      There is an ulcer at the plantar aspect of the forefoot superficial  to the 2nd digit metatarsophalangeal joint. As seen on this  noncontrast examination there appears to be a fistula tract through  the soft tissues extending to the 2nd digit metatarsophalangeal  joint. The fistula tract also appears to at least partially envelop  the flexor digitorum tendon sheath to the 2nd digit and possibly  involves the tendon sheath such as seen image 16 of the sagittal  plane. There is a degree of generalized ill-defined increased T2  signal intensity in the musculature. Generalized atrophy is seen in  the musculature. Reticular increased T2 signal intensity is seen in  the subcutaneous tissues of the visualized lower extremity greatest  on the dorsum of the foot and in the 2nd toe.      Impression: 1. Ulcer at the plantar forefoot with fistula tract of the 2nd digit  metatarsophalangeal joint with large volume 2nd digit  metatarsophalangeal joint effusion compatible with septic arthritis.  There are findings which are felt to be compatible with osteomyelitis  at least involving the head of the 2nd metatarsal and the base into  the diaphysis the proximal phalanx of the 2nd digit with the reactive  marrow changes seen in the distal metadiaphysis of the 2nd metatarsal  suspicious for higher likelihood of osteomyelitis involvement.  2. Reactive marrow changes in the middle and distal phalanx of the  2nd digit, which given not directly adjacent to the wound/ulcer are  probably of lower likelihood osteomyelitis.  3. The above described fistula tract partially envelops the flexor  digitorum tendon sheath of the 2nd digit at the level of the  metatarsophalangeal joint and there may be a  component of associated  at least focal infectious tenosynovitis.  4. Reactive edema in the musculature of the foot overall likely  related to ischemic/diabetic myopathy although a component of  infectious myositis particularly of the intrinsic musculature  adjacent to the distal aspect of the 2nd metatarsal is not excluded.  5. Cellulitis and/or lymphedema of the visualized lower extremity.      Signed by: Ricardo Nino 7/24/2024 8:41 AM  Dictation workstation:   [id number]      Physical Exam  Constitutional: No acute distress, calm, cooperative  HEENT: PERRL, normocephalic, atraumatic, mucous membranes moist  Cardiovascular: Regular rhythm and rate, no lower extremity edema  Respiratory: Lungs clear to auscultation,   Gastrointestinal: Bowel sounds positive x 4, soft, nontender  Neurologic: Alert and oriented x 3 equal strength bilaterally  Musculoskeletal: Able to move all extremities, no edema  Skin: Left foot dressing intact  Relevant Results               Assessment/Plan      Mechanical fall at home  No obvious injury  No syncope     NSTEMI versus troponin elevation with CKD 5  Cardiology feels more likely CKD 5 causing troponin elevation  No further cardiac workup recommended     Left-sided diabetic foot ulcer  Mixed gram-negative/gram-positive bacteremia  Wound culture with mixed microbial including Staph aureus  2/2 blood cultures came back gram-negative rods drawn on 7/22/2024  Repeat cultures no growth x 3 day  MRI with multiple findings, ulcer with fistulous track second digit likely septic arthritis, reactive edema, low likelihood of osteomyelitis  ID following closely, on Zosyn  Status post partial amputation postop day 2  Follow tissue cultures  Plan is for closure on Monday with wound VAC placement     CKD 5  Stable  Previously followed with Dr. Goldberg  No urgent need for dialysis  Nephrology following     Hyperlipidemia  Statin, Zetia     DVT prophylaxis  Subcutaneous heparin            Principal  Problem:    NSTEMI (non-ST elevated myocardial infarction) (Multi)  Active Problems:    Obesity    LVH (left ventricular hypertrophy)    Mixed hyperlipidemia    Benign essential hypertension    Coronary artery disease involving native coronary artery of native heart without angina pectoris    Fall    Leukocytosis    Stage 5 chronic kidney disease not on chronic dialysis (Multi)    Type 2 diabetes mellitus (Multi)    Acute hematogenous osteomyelitis of left foot (Multi)                  Michael Smith, APRN-CNP

## 2024-07-28 NOTE — PROGRESS NOTES
Brief renal note  Chart/labs reviewed - renal function remains stable at his baseline and lytes are controlled.  No new recommendations today and we will continue to follow.    Continue other care.    Dr. Max will assume care from tomorrow.      Des Bangura MD  07/28/24  2:42 PM

## 2024-07-28 NOTE — CARE PLAN
Problem: Fall/Injury  Goal: Be free from injury by end of the shift  Outcome: Progressing     Problem: Fall/Injury  Goal: Not fall by end of shift  Outcome: Progressing   The patient's goals for the shift include      The clinical goals for the shift include increase mobility    Over the shift, the patient did not make progress toward the following goals. Barriers to progression include . Recommendations to address these barriers include .

## 2024-07-28 NOTE — PROGRESS NOTES
Colin Leonard is a 58 y.o. male on day 6 of admission presenting with NSTEMI (non-ST elevated myocardial infarction) (Multi).    Subjective   Interval History:   Afebrile, no chills  No foot pain  No chest pain or shortness of breath.  No nausea vomiting or diarrhea       Objective   Range of Vitals (last 24 hours)  Heart Rate:  [73-83]   Temp:  [36.2 °C (97.2 °F)-37 °C (98.6 °F)]   Resp:  [17-18]   BP: (153-176)/(74-93)   SpO2:  [99 %-100 %]   Daily Weight  07/24/24 : 122 kg (268 lb 15.4 oz)    Body mass index is 37.51 kg/m².    Physical Exam  Constitutional:       Appearance: Normal appearance.   HENT:      Head: Normocephalic and atraumatic.      Nose: Nose normal.   Eyes:      General: No scleral icterus.     Extraocular Movements: Extraocular movements intact.      Conjunctiva/sclera: Conjunctivae normal.   Cardiovascular:      Rate and Rhythm: Normal rate and regular rhythm.   Pulmonary:      Effort: Pulmonary effort is normal.      Breath sounds: Normal breath sounds.   Abdominal:      General: Bowel sounds are normal.      Palpations: Abdomen is soft.   Musculoskeletal:      Cervical back: Normal range of motion and neck supple.      Right lower leg: No edema.      Left lower leg: No edema.   Feet:      Left foot:      Skin integrity: Ulcer and callus present.      Comments: Left second met head-dressing intact   Skin:     General: Skin is warm and dry.   Neurological:      Mental Status: He is alert and oriented to person, place, and time.   Psychiatric:         Mood and Affect: Mood normal.         Behavior: Behavior normal.        Antibiotics  piperacillin-tazobactam - 2.25 gram/50 mL    Relevant Results  Labs  Results from last 72 hours   Lab Units 07/28/24  0629 07/27/24  0645 07/26/24  0600   WBC AUTO x10*3/uL 11.6* 11.5* 10.8   HEMOGLOBIN g/dL 7.5* 8.4* 7.0*   HEMATOCRIT % 23.9* 26.8* 22.3*   PLATELETS AUTO x10*3/uL 320 359 296     Results from last 72 hours   Lab Units 07/28/24  0629  07/27/24  0645 07/26/24  0600   SODIUM mmol/L 139 138 136   POTASSIUM mmol/L 4.3 4.6 4.5   CHLORIDE mmol/L 105 103 105   CO2 mmol/L 19* 20* 18*   BUN mg/dL 77* 72* 72*   CREATININE mg/dL 6.80* 6.80* 6.80*   GLUCOSE mg/dL 128* 138* 159*   CALCIUM mg/dL 9.0 9.7 8.8   ANION GAP mmol/L 15 15 13   EGFR mL/min/1.73m*2 9* 9* 9*   PHOSPHORUS mg/dL 4.6* 4.4 3.8     Results from last 72 hours   Lab Units 07/28/24  0629 07/27/24  0645 07/26/24  0600   ALBUMIN g/dL 2.9* 3.5 2.9*     Estimated Creatinine Clearance: 15.4 mL/min (A) (by C-G formula based on SCr of 6.8 mg/dL (H)).  C-Reactive Protein   Date Value Ref Range Status   07/22/2024 24.70 (H) 0.00 - 2.00 mg/dL Final     CRP   Date Value Ref Range Status   09/09/2020 5.32 (A) mg/dL Final     Comment:     REF VALUE  < 1.00     09/08/2020 9.54 (A) mg/dL Final     Comment:     REF VALUE  < 1.00       Microbiology  Susceptibility data from last 14 days.  Collected Specimen Info Organism Clindamycin Erythromycin Oxacillin Tetracycline Trimethoprim/Sulfamethoxazole Vancomycin   07/22/24 Tissue/Biopsy from Wound/Tissue Methicillin Susceptible Staphylococcus aureus (MSSA)  R  R  S  S  S  S     Mixed Anaerobic Bacteria           Mixed Gram-Positive Bacteria          07/21/24 Blood culture from Peripheral Venipuncture Streptococcus anginosus group           Peptococcus niger         07/21/24 Blood culture from Peripheral Venipuncture Streptococcus viridans group           Imaging  MR foot left wo IV contrast    Result Date: 7/24/2024  Interpreted By:  Ricardo Nino, STUDY: MRI of the    left foot without contrast dated  7/23/2024.   INDICATION: Signs/Symptoms:osteomyelitis/wound   COMPARISON: None.   ACCESSION NUMBER(S): [id number]   ORDERING CLINICIAN: NOE DIAZ   TECHNIQUE: Multiplanar multisequence MRI of the    left forefoot was performed without intravenous gadolinium based contrast.   FINDINGS: OSSEOUS STRUCTURES AND JOINTS:   There is increased T2/STIR signal  intensity in the diaphysis into the head the 2nd metatarsal. There is increased T2/STIR signal intensity within the phalanges of the 2nd digit. There is some mottled low T1 signal intensity in the head of the 2nd metatarsal and in the proximal phalanx of the 2nd digit. There is question of a degree disruption of the cortex at the head of the 2nd metatarsal and along the plantar diaphysis and base of the proximal phalanx of the 2nd digit. There is a large volume 2nd digit metatarsophalangeal joint effusion with bulging of the joint capsule. Severe degenerative changes seen of the 1st digit metatarsophalangeal joint. There is some marginal erosion along the medial side of the head of the 1st metatarsal which can be seen with sequelae of crystal arthropathy. Mild degenerative changes seen in the midfoot.   SOFT TISSUES:   There is an ulcer at the plantar aspect of the forefoot superficial to the 2nd digit metatarsophalangeal joint. As seen on this noncontrast examination there appears to be a fistula tract through the soft tissues extending to the 2nd digit metatarsophalangeal joint. The fistula tract also appears to at least partially envelop the flexor digitorum tendon sheath to the 2nd digit and possibly involves the tendon sheath such as seen image 16 of the sagittal plane. There is a degree of generalized ill-defined increased T2 signal intensity in the musculature. Generalized atrophy is seen in the musculature. Reticular increased T2 signal intensity is seen in the subcutaneous tissues of the visualized lower extremity greatest on the dorsum of the foot and in the 2nd toe.       1. Ulcer at the plantar forefoot with fistula tract of the 2nd digit metatarsophalangeal joint with large volume 2nd digit metatarsophalangeal joint effusion compatible with septic arthritis. There are findings which are felt to be compatible with osteomyelitis at least involving the head of the 2nd metatarsal and the base into the  diaphysis the proximal phalanx of the 2nd digit with the reactive marrow changes seen in the distal metadiaphysis of the 2nd metatarsal suspicious for higher likelihood of osteomyelitis involvement. 2. Reactive marrow changes in the middle and distal phalanx of the 2nd digit, which given not directly adjacent to the wound/ulcer are probably of lower likelihood osteomyelitis. 3. The above described fistula tract partially envelops the flexor digitorum tendon sheath of the 2nd digit at the level of the metatarsophalangeal joint and there may be a component of associated at least focal infectious tenosynovitis. 4. Reactive edema in the musculature of the foot overall likely related to ischemic/diabetic myopathy although a component of infectious myositis particularly of the intrinsic musculature adjacent to the distal aspect of the 2nd metatarsal is not excluded. 5. Cellulitis and/or lymphedema of the visualized lower extremity.   Signed by: Ricardo Nino 7/24/2024 8:41 AM Dictation workstation:   [id number]    Transthoracic Echo (TTE) Complete    Result Date: 7/22/2024           [place] [id number]            Phone [phone] TRANSTHORACIC ECHOCARDIOGRAM REPORT Patient Name:      ODALYS MELVIN     Reading Physician:    [id number] Kade Pedraza DO Study Date:        7/22/2024             Ordering Provider:    [id number] GERMAN KENNEY MRN/PID:           [id number]              Fellow: Accession#:        [id number]          Nurse: Date of Birth/Age: 1965 / 58 years Sonographer:          Vera Wynn RDCS Gender:            M                     Additional Staff: Height:            180.34 cm             Admit Date: Weight:            118.39 kg             Admission Status:      Inpatient -                                                                Routine BSA / BMI:         2.36 m2 / 36.40 kg/m2 Department Location:  [facility] Blood Pressure: 147 /88 mmHg Study Type:    TRANSTHORACIC ECHO (TTE) COMPLETE Diagnosis/ICD: Atherosclerotic heart disease of native coronary artery without                angina pectoris-I25.10 Indication:    Dyspnea, weakness fatigue CPT Codes:     Echo Complete w Full [id number] Patient History: Pertinent History: LVH, HLD, HTN, CAD, Nstemi, SOB CKD, DM. Study Detail: The following Echo studies were performed: 2D, M-Mode, color flow               and Doppler. Unable to obtain suprasternal notch view.  PHYSICIAN INTERPRETATION: Left Ventricle: The left ventricular systolic function is normal, with a visually estimated ejection fraction of 60-65%. There are no regional wall motion abnormalities. The left ventricular cavity size is normal. Left ventricular diastolic filling was indeterminate. Left Atrium: The left atrium is mildly dilated. Right Ventricle: The right ventricle is normal in size. There is normal right ventricular global systolic function. Right Atrium: The right atrium is normal in size. Aortic Valve: The aortic valve is trileaflet. There is evidence of mild aortic valve stenosis. The aortic valve dimensionless index is 0.45. There is no evidence of aortic valve regurgitation. The peak instantaneous gradient of the aortic valve is 20.4 mmHg. The mean gradient of the aortic valve is 10.6 mmHg. Mitral Valve: The mitral valve is normal in structure. There is mild mitral valve regurgitation. Tricuspid Valve: The tricuspid valve is structurally normal. There is trace tricuspid regurgitation. Pulmonic Valve: The pulmonic valve is not well visualized. The pulmonic valve regurgitation was not well visualized. Pericardium: There is no pericardial effusion noted. Aorta: The aortic root is normal.  CONCLUSIONS:  1. The left ventricular systolic  function is normal, with a visually estimated ejection fraction of 60-65%.  2. Left ventricular diastolic filling was indeterminate.  3. There is normal right ventricular global systolic function.  4. Mild aortic valve stenosis.  5. Aortic stenosis mean gradient 10 mm Hg, peak gradient 40 mm Hg and ADÁN 1.43 cm2.  6. Aortic valve dimensionless index 0.43. QUANTITATIVE DATA SUMMARY: 2D MEASUREMENTS:                           Normal Ranges: LAs:           4.76 cm    (2.7-4.0cm) IVSd:          1.70 cm    (0.6-1.1cm) LVPWd:         1.51 cm    (0.6-1.1cm) LVIDd:         3.86 cm    (3.9-5.9cm) LVIDs:         2.85 cm LV Mass Index: 104.8 g/m2 LV % FS        26.2 % LA VOLUME:                               Normal Ranges: LA Vol A4C:        71.7 ml    (22+/-6mL/m2) LA Vol A2C:        92.5 ml LA Vol BP:         86.7 ml LA Vol Index A4C:  30.4 ml/m2 LA Vol Index A2C:  39.2 ml/m2 LA Vol Index BP:   36.7 ml/m2 LA Area A4C:       22.5 cm2 LA Area A2C:       27.2 cm2 LA Major Axis A4C: 6.0 cm LA Major Axis A2C: 6.8 cm LA Volume Index:   36.7 ml/m2 LA Vol A4C:        72.0 ml LA Vol A2C:        92.0 ml RA VOLUME BY A/L METHOD:                               Normal Ranges: RA Vol A4C:        39.5 ml    (8.3-19.5ml) RA Vol Index A4C:  16.7 ml/m2 RA Area A4C:       15.4 cm2 RA Major Axis A4C: 5.1 cm AORTA MEASUREMENTS:                      Normal Ranges: Ao Sinus, d: 3.30 cm (2.1-3.5cm) Ao STJ, d:   3.00 cm (1.7-3.4cm) Asc Ao, d:   3.80 cm (2.1-3.4cm) LV SYSTOLIC FUNCTION BY 2D PLANIMETRY (MOD):                      Normal Ranges: EF-A4C View:    48 % (>=55%) EF-A2C View:    65 % EF-Biplane:     57 % EF-Visual:      63 % LV EF Reported: 63 % LV DIASTOLIC FUNCTION:                         Normal Ranges: MV Peak E:    0.82 m/s  (0.7-1.2 m/s) MV Peak A:    0.99 m/s  (0.42-0.7 m/s) E/A Ratio:    0.83      (1.0-2.2) MV e'         0.071 m/s (>8.0) MV lateral e' 0.08 m/s MV medial e'  0.06 m/s E/e' Ratio:   11.61     (<8.0) MITRAL VALVE:                  Normal Ranges: MV DT: 221 msec (150-240msec) AORTIC VALVE:                                    Normal Ranges: AoV Vmax:                2.26 m/s  (<=1.7m/s) AoV Peak P.4 mmHg (<20mmHg) AoV Mean PG:             10.6 mmHg (1.7-11.5mmHg) LVOT Max Cirilo:            0.97 m/s  (<=1.1m/s) AoV VTI:                 46.31 cm  (18-25cm) LVOT VTI:                21.06 cm LVOT Diameter:           2.00 cm   (1.8-2.4cm) AoV Area, VTI:           1.43 cm2  (2.5-5.5cm2) AoV Area,Vmax:           1.35 cm2  (2.5-4.5cm2) AoV Dimensionless Index: 0.45  RIGHT VENTRICLE: TAPSE: 25.1 mm RV s'  0.15 m/s TRICUSPID VALVE/RVSP:                   Normal Ranges: IVC Diam: 2.02 cm AORTA: Asc Ao Diam 3.83 cm  [id number] [facility] Electronically signed on 2024 at 10:49:35 AM  ** Final **     XR foot left 3+ views    Result Date: 2024  Interpreted By:  Ely Alanis, STUDY: XR FOOT LEFT 3+ VIEWS;  2024 2:44 am   INDICATION: Signs/Symptoms:Rule out infection.   COMPARISON: None.   ACCESSION NUMBER(S): [id number]   ORDERING CLINICIAN: BRENNAN SOLORIO   FINDINGS: 3 views of the left foot were obtained.   There is diffuse osteopenia. There is no acute fracture or dislocation. Degenerative changes are noted at the 1st metatarsophalangeal joint with hallux valgus. There is cortical lucency at the bases of the 2nd and 3rd distal phalanges. Degenerative changes are noted at the midfoot. There is calcaneal enthesopathy. Vascular calcifications are present. There is diffuse soft tissue swelling at the left foot. There is small amount of gas at the plantar soft tissues overlying the bases of the toes.       1. Cortical lucency at the bases of the distal phalanges of the left 2nd and 3rd toes, underlying osteomyelitis cannot be excluded. Contrast-enhanced MRI can help in further evaluation. 2. Diffuse soft tissue edema at the left foot. Small amount of gas at the plantar soft tissues overlying the bases of the toes, suggestive  of ulcer.       MACRO: None.   Signed by: Ely Alanis 7/22/2024 2:52 AM Dictation workstation:   [id number]    XR chest 1 view    Result Date: 7/21/2024  Interpreted By:  Makeda Gurrola, STUDY: XR CHEST 1 VIEW;  7/21/2024 10:23 pm   INDICATION: Signs/Symptoms:weakness.   COMPARISON: 02/14/2022   ACCESSION NUMBER(S): [id number]   ORDERING CLINICIAN: MIHAELA AUGUSTIN   FINDINGS:     CARDIOMEDIASTINAL SILHOUETTE: Stable cardiomegaly.   LUNGS: No pulmonary consolidation, pleural effusion or pneumothorax. Low lung volumes with bronchovascular crowding.   ABDOMEN: No remarkable upper abdominal findings.   BONES: No acute osseous abnormality.       No acute cardiopulmonary process.   MACRO: None   Signed by: Makeda Gurrola 7/21/2024 10:43 PM Dictation workstation:   [id number]        Assessment/Plan   Streptococcus viridans/gram-negative cocci bacteremia  Chronic kidney disease stage V  Type 2 diabetes with peripheral angiopathy without gangrene  Left diabetic foot ulcer, Brown 3- MRI suspicious osteomyelitis at 2nd metatarsal  Left foot osteomyelitis-wound culture growing MSSA  Elevated CK          IV Zosyn-coverage for Streptococcus viridans, MSSA, and gram-negative cocci  Violet catheter placement-patient will think about it  Follow-up repeat blood cultures  Podiatry follow-up   Nephrology follow-up  Local care  Offloading  Monitor temperature and WBC  Further recommendations based on intraoperative cultures  Will finalize antibiotic plan after final culture result  Dr. Rodrigez discussed with Dr. Munoz, IV antibiotics for up to 6 weeks    Discussed with Dr. Rodrigez    Total time spent caring for the patient today was 20 minutes. This includes time spent before the visit reviewing the chart, time spent during the visit, and time spent after the visit on documentation     Karine Pineda, APRN-CNP

## 2024-07-28 NOTE — CARE PLAN
Problem: Skin  Goal: Participates in plan/prevention/treatment measures  Outcome: Progressing  Flowsheets (Taken 7/27/2024 1319 by Olga Hoover RN)  Participates in plan/prevention/treatment measures:   Discuss with provider PT/OT consult   Elevate heels   Increase activity/out of bed for meals   The patient's goals for the shift include      The clinical goals for the shift include maintain safety improve mobility    Over the shift, the patient did not make progress toward the following goals. Barriers to progression include . Recommendations to address these barriers include .

## 2024-07-29 ENCOUNTER — ANESTHESIA EVENT (OUTPATIENT)
Dept: OPERATING ROOM | Facility: HOSPITAL | Age: 59
End: 2024-07-29
Payer: COMMERCIAL

## 2024-07-29 ENCOUNTER — ANESTHESIA (OUTPATIENT)
Dept: OPERATING ROOM | Facility: HOSPITAL | Age: 59
End: 2024-07-29
Payer: COMMERCIAL

## 2024-07-29 ENCOUNTER — APPOINTMENT (OUTPATIENT)
Dept: CARDIOLOGY | Facility: HOSPITAL | Age: 59
End: 2024-07-29
Payer: COMMERCIAL

## 2024-07-29 PROBLEM — M86.172: Status: ACTIVE | Noted: 2024-07-28

## 2024-07-29 LAB
ALBUMIN SERPL-MCNC: 2.9 G/DL (ref 3.5–5)
ANION GAP SERPL CALC-SCNC: 12 MMOL/L
BACTERIA BLD AEROBE CULT: ABNORMAL
BACTERIA BLD AEROBE CULT: ABNORMAL
BACTERIA BLD CULT: ABNORMAL
BACTERIA BLD CULT: ABNORMAL
BUN SERPL-MCNC: 74 MG/DL (ref 8–25)
CALCIUM SERPL-MCNC: 8.9 MG/DL (ref 8.5–10.4)
CHLORIDE SERPL-SCNC: 103 MMOL/L (ref 97–107)
CO2 SERPL-SCNC: 21 MMOL/L (ref 24–31)
CREAT SERPL-MCNC: 6.8 MG/DL (ref 0.4–1.6)
EGFRCR SERPLBLD CKD-EPI 2021: 9 ML/MIN/1.73M*2
ERYTHROCYTE [DISTWIDTH] IN BLOOD BY AUTOMATED COUNT: 14.9 % (ref 11.5–14.5)
GLUCOSE BLD MANUAL STRIP-MCNC: 129 MG/DL (ref 74–99)
GLUCOSE BLD MANUAL STRIP-MCNC: 168 MG/DL (ref 74–99)
GLUCOSE BLD MANUAL STRIP-MCNC: 212 MG/DL (ref 74–99)
GLUCOSE SERPL-MCNC: 130 MG/DL (ref 65–99)
GRAM STAIN: ABNORMAL
GRAM STN SPEC: ABNORMAL
GRAM STN SPEC: ABNORMAL
HCT VFR BLD AUTO: 23.2 % (ref 41–52)
HGB BLD-MCNC: 7.1 G/DL (ref 13.5–17.5)
LABORATORY COMMENT REPORT: ABNORMAL
LABORATORY COMMENT REPORT: ABNORMAL
LABORATORY COMMENT REPORT: NORMAL
MCH RBC QN AUTO: 28.5 PG (ref 26–34)
MCHC RBC AUTO-ENTMCNC: 30.6 G/DL (ref 32–36)
MCV RBC AUTO: 93 FL (ref 80–100)
NRBC BLD-RTO: 0.1 /100 WBCS (ref 0–0)
PATH REPORT.FINAL DX SPEC: NORMAL
PATH REPORT.GROSS SPEC: NORMAL
PATH REPORT.RELEVANT HX SPEC: NORMAL
PATH REPORT.TOTAL CANCER: NORMAL
PHOSPHATE SERPL-MCNC: 3.9 MG/DL (ref 2.5–4.5)
PLATELET # BLD AUTO: 325 X10*3/UL (ref 150–450)
POTASSIUM SERPL-SCNC: 4.5 MMOL/L (ref 3.4–5.1)
RBC # BLD AUTO: 2.49 X10*6/UL (ref 4.5–5.9)
SODIUM SERPL-SCNC: 136 MMOL/L (ref 133–145)
WBC # BLD AUTO: 15.1 X10*3/UL (ref 4.4–11.3)

## 2024-07-29 PROCEDURE — 3700000001 HC GENERAL ANESTHESIA TIME - INITIAL BASE CHARGE: Performed by: STUDENT IN AN ORGANIZED HEALTH CARE EDUCATION/TRAINING PROGRAM

## 2024-07-29 PROCEDURE — 3700000002 HC GENERAL ANESTHESIA TIME - EACH INCREMENTAL 1 MINUTE: Performed by: STUDENT IN AN ORGANIZED HEALTH CARE EDUCATION/TRAINING PROGRAM

## 2024-07-29 PROCEDURE — 2500000004 HC RX 250 GENERAL PHARMACY W/ HCPCS (ALT 636 FOR OP/ED): Performed by: NURSE ANESTHETIST, CERTIFIED REGISTERED

## 2024-07-29 PROCEDURE — 2500000004 HC RX 250 GENERAL PHARMACY W/ HCPCS (ALT 636 FOR OP/ED)

## 2024-07-29 PROCEDURE — 80069 RENAL FUNCTION PANEL: CPT | Performed by: STUDENT IN AN ORGANIZED HEALTH CARE EDUCATION/TRAINING PROGRAM

## 2024-07-29 PROCEDURE — 93010 ELECTROCARDIOGRAM REPORT: CPT | Performed by: INTERNAL MEDICINE

## 2024-07-29 PROCEDURE — 3600000002 HC OR TIME - INITIAL BASE CHARGE - PROCEDURE LEVEL TWO: Performed by: STUDENT IN AN ORGANIZED HEALTH CARE EDUCATION/TRAINING PROGRAM

## 2024-07-29 PROCEDURE — 36415 COLL VENOUS BLD VENIPUNCTURE: CPT | Performed by: STUDENT IN AN ORGANIZED HEALTH CARE EDUCATION/TRAINING PROGRAM

## 2024-07-29 PROCEDURE — 6350000001 HC RX 635 EPOETIN >10,000 UNITS: Mod: JZ | Performed by: INTERNAL MEDICINE

## 2024-07-29 PROCEDURE — 7100000001 HC RECOVERY ROOM TIME - INITIAL BASE CHARGE: Performed by: STUDENT IN AN ORGANIZED HEALTH CARE EDUCATION/TRAINING PROGRAM

## 2024-07-29 PROCEDURE — 2500000002 HC RX 250 W HCPCS SELF ADMINISTERED DRUGS (ALT 637 FOR MEDICARE OP, ALT 636 FOR OP/ED): Performed by: STUDENT IN AN ORGANIZED HEALTH CARE EDUCATION/TRAINING PROGRAM

## 2024-07-29 PROCEDURE — 2500000001 HC RX 250 WO HCPCS SELF ADMINISTERED DRUGS (ALT 637 FOR MEDICARE OP)

## 2024-07-29 PROCEDURE — 3600000007 HC OR TIME - EACH INCREMENTAL 1 MINUTE - PROCEDURE LEVEL TWO: Performed by: STUDENT IN AN ORGANIZED HEALTH CARE EDUCATION/TRAINING PROGRAM

## 2024-07-29 PROCEDURE — 2500000005 HC RX 250 GENERAL PHARMACY W/O HCPCS: Performed by: STUDENT IN AN ORGANIZED HEALTH CARE EDUCATION/TRAINING PROGRAM

## 2024-07-29 PROCEDURE — 2500000002 HC RX 250 W HCPCS SELF ADMINISTERED DRUGS (ALT 637 FOR MEDICARE OP, ALT 636 FOR OP/ED)

## 2024-07-29 PROCEDURE — 87070 CULTURE OTHR SPECIMN AEROBIC: CPT | Mod: WESLAB | Performed by: STUDENT IN AN ORGANIZED HEALTH CARE EDUCATION/TRAINING PROGRAM

## 2024-07-29 PROCEDURE — 93005 ELECTROCARDIOGRAM TRACING: CPT

## 2024-07-29 PROCEDURE — 2500000001 HC RX 250 WO HCPCS SELF ADMINISTERED DRUGS (ALT 637 FOR MEDICARE OP): Performed by: STUDENT IN AN ORGANIZED HEALTH CARE EDUCATION/TRAINING PROGRAM

## 2024-07-29 PROCEDURE — 1210000001 HC SEMI-PRIVATE ROOM DAILY

## 2024-07-29 PROCEDURE — 2500000004 HC RX 250 GENERAL PHARMACY W/ HCPCS (ALT 636 FOR OP/ED): Performed by: STUDENT IN AN ORGANIZED HEALTH CARE EDUCATION/TRAINING PROGRAM

## 2024-07-29 PROCEDURE — 7100000002 HC RECOVERY ROOM TIME - EACH INCREMENTAL 1 MINUTE: Performed by: STUDENT IN AN ORGANIZED HEALTH CARE EDUCATION/TRAINING PROGRAM

## 2024-07-29 PROCEDURE — 2500000001 HC RX 250 WO HCPCS SELF ADMINISTERED DRUGS (ALT 637 FOR MEDICARE OP): Performed by: ANESTHESIOLOGY

## 2024-07-29 PROCEDURE — 87075 CULTR BACTERIA EXCEPT BLOOD: CPT | Mod: WESLAB | Performed by: STUDENT IN AN ORGANIZED HEALTH CARE EDUCATION/TRAINING PROGRAM

## 2024-07-29 PROCEDURE — 82947 ASSAY GLUCOSE BLOOD QUANT: CPT

## 2024-07-29 PROCEDURE — 85027 COMPLETE CBC AUTOMATED: CPT | Performed by: NURSE PRACTITIONER

## 2024-07-29 PROCEDURE — 2720000007 HC OR 272 NO HCPCS: Performed by: STUDENT IN AN ORGANIZED HEALTH CARE EDUCATION/TRAINING PROGRAM

## 2024-07-29 RX ORDER — LIDOCAINE HYDROCHLORIDE 20 MG/ML
INJECTION, SOLUTION INFILTRATION; PERINEURAL AS NEEDED
Status: DISCONTINUED | OUTPATIENT
Start: 2024-07-29 | End: 2024-07-29 | Stop reason: HOSPADM

## 2024-07-29 RX ORDER — ONDANSETRON HYDROCHLORIDE 2 MG/ML
4 INJECTION, SOLUTION INTRAVENOUS ONCE AS NEEDED
Status: DISCONTINUED | OUTPATIENT
Start: 2024-07-29 | End: 2024-07-30 | Stop reason: HOSPADM

## 2024-07-29 RX ORDER — ALBUTEROL SULFATE 0.83 MG/ML
2.5 SOLUTION RESPIRATORY (INHALATION) ONCE AS NEEDED
Status: DISCONTINUED | OUTPATIENT
Start: 2024-07-29 | End: 2024-07-30 | Stop reason: HOSPADM

## 2024-07-29 RX ORDER — DIPHENHYDRAMINE HYDROCHLORIDE 50 MG/ML
12.5 INJECTION INTRAMUSCULAR; INTRAVENOUS ONCE AS NEEDED
Status: DISCONTINUED | OUTPATIENT
Start: 2024-07-29 | End: 2024-07-30 | Stop reason: HOSPADM

## 2024-07-29 RX ORDER — MIDAZOLAM HYDROCHLORIDE 1 MG/ML
INJECTION, SOLUTION INTRAMUSCULAR; INTRAVENOUS AS NEEDED
Status: DISCONTINUED | OUTPATIENT
Start: 2024-07-29 | End: 2024-07-29

## 2024-07-29 RX ORDER — IPRATROPIUM BROMIDE 0.5 MG/2.5ML
500 SOLUTION RESPIRATORY (INHALATION) ONCE
Status: DISCONTINUED | OUTPATIENT
Start: 2024-07-29 | End: 2024-07-30 | Stop reason: HOSPADM

## 2024-07-29 RX ORDER — HYDRALAZINE HYDROCHLORIDE 20 MG/ML
5 INJECTION INTRAMUSCULAR; INTRAVENOUS EVERY 30 MIN PRN
Status: DISCONTINUED | OUTPATIENT
Start: 2024-07-29 | End: 2024-07-30 | Stop reason: HOSPADM

## 2024-07-29 RX ORDER — SODIUM CHLORIDE, SODIUM LACTATE, POTASSIUM CHLORIDE, CALCIUM CHLORIDE 600; 310; 30; 20 MG/100ML; MG/100ML; MG/100ML; MG/100ML
100 INJECTION, SOLUTION INTRAVENOUS CONTINUOUS
Status: DISCONTINUED | OUTPATIENT
Start: 2024-07-29 | End: 2024-07-31

## 2024-07-29 RX ORDER — PROPOFOL 10 MG/ML
INJECTION, EMULSION INTRAVENOUS AS NEEDED
Status: DISCONTINUED | OUTPATIENT
Start: 2024-07-29 | End: 2024-07-29

## 2024-07-29 RX ORDER — OXYCODONE HYDROCHLORIDE 5 MG/1
5 TABLET ORAL EVERY 4 HOURS PRN
Status: DISCONTINUED | OUTPATIENT
Start: 2024-07-29 | End: 2024-07-30 | Stop reason: HOSPADM

## 2024-07-29 RX ORDER — MEPERIDINE HYDROCHLORIDE 25 MG/ML
12.5 INJECTION INTRAMUSCULAR; INTRAVENOUS; SUBCUTANEOUS EVERY 10 MIN PRN
Status: DISCONTINUED | OUTPATIENT
Start: 2024-07-29 | End: 2024-07-30 | Stop reason: HOSPADM

## 2024-07-29 RX ADMIN — GUAIFENESIN 600 MG: 600 TABLET ORAL at 19:56

## 2024-07-29 RX ADMIN — PIPERACILLIN SODIUM AND TAZOBACTAM SODIUM 2.25 G: 2; .25 INJECTION, SOLUTION INTRAVENOUS at 06:24

## 2024-07-29 RX ADMIN — OXYCODONE 5 MG: 5 TABLET ORAL at 01:17

## 2024-07-29 RX ADMIN — EPOETIN ALFA-EPBX 10000 UNITS: 10000 INJECTION, SOLUTION INTRAVENOUS; SUBCUTANEOUS at 08:59

## 2024-07-29 RX ADMIN — ATORVASTATIN CALCIUM 40 MG: 40 TABLET, FILM COATED ORAL at 21:26

## 2024-07-29 RX ADMIN — EZETIMIBE 10 MG: 10 TABLET ORAL at 21:26

## 2024-07-29 RX ADMIN — HYDRALAZINE HYDROCHLORIDE 100 MG: 50 TABLET ORAL at 16:52

## 2024-07-29 RX ADMIN — ASPIRIN 81 MG: 81 TABLET, COATED ORAL at 08:59

## 2024-07-29 RX ADMIN — PANTOPRAZOLE SODIUM 20 MG: 20 TABLET, DELAYED RELEASE ORAL at 06:24

## 2024-07-29 RX ADMIN — SODIUM BICARBONATE 650 MG TABLET 1300 MG: at 08:59

## 2024-07-29 RX ADMIN — TORSEMIDE 20 MG: 20 TABLET ORAL at 09:00

## 2024-07-29 RX ADMIN — CARVEDILOL 25 MG: 25 TABLET, FILM COATED ORAL at 08:59

## 2024-07-29 RX ADMIN — ACETAMINOPHEN 650 MG: 325 TABLET ORAL at 19:58

## 2024-07-29 RX ADMIN — CARVEDILOL 25 MG: 25 TABLET, FILM COATED ORAL at 21:26

## 2024-07-29 RX ADMIN — PIPERACILLIN SODIUM AND TAZOBACTAM SODIUM 2.25 G: 2; .25 INJECTION, SOLUTION INTRAVENOUS at 21:27

## 2024-07-29 RX ADMIN — HYDRALAZINE HYDROCHLORIDE 100 MG: 50 TABLET ORAL at 08:59

## 2024-07-29 RX ADMIN — SODIUM BICARBONATE 650 MG TABLET 1300 MG: at 16:53

## 2024-07-29 RX ADMIN — HYDRALAZINE HYDROCHLORIDE 100 MG: 50 TABLET ORAL at 21:26

## 2024-07-29 RX ADMIN — HEPARIN SODIUM 5000 UNITS: 5000 INJECTION, SOLUTION INTRAVENOUS; SUBCUTANEOUS at 06:24

## 2024-07-29 RX ADMIN — OXYCODONE HYDROCHLORIDE 5 MG: 5 TABLET ORAL at 16:54

## 2024-07-29 RX ADMIN — HEPARIN SODIUM 5000 UNITS: 5000 INJECTION, SOLUTION INTRAVENOUS; SUBCUTANEOUS at 21:26

## 2024-07-29 RX ADMIN — SODIUM BICARBONATE 650 MG TABLET 1300 MG: at 21:26

## 2024-07-29 SDOH — HEALTH STABILITY: MENTAL HEALTH: CURRENT SMOKER: 0

## 2024-07-29 ASSESSMENT — PAIN DESCRIPTION - LOCATION
LOCATION: CHEST
LOCATION: FOOT

## 2024-07-29 ASSESSMENT — PAIN - FUNCTIONAL ASSESSMENT
PAIN_FUNCTIONAL_ASSESSMENT: 0-10

## 2024-07-29 ASSESSMENT — PAIN SCALES - GENERAL
PAINLEVEL_OUTOF10: 0 - NO PAIN
PAINLEVEL_OUTOF10: 2
PAINLEVEL_OUTOF10: 0 - NO PAIN
PAINLEVEL_OUTOF10: 5 - MODERATE PAIN
PAINLEVEL_OUTOF10: 3
PAIN_LEVEL: 0
PAINLEVEL_OUTOF10: 1
PAINLEVEL_OUTOF10: 0 - NO PAIN

## 2024-07-29 ASSESSMENT — PAIN DESCRIPTION - DESCRIPTORS: DESCRIPTORS: DISCOMFORT

## 2024-07-29 ASSESSMENT — PAIN DESCRIPTION - ORIENTATION: ORIENTATION: LEFT

## 2024-07-29 NOTE — ANESTHESIA PREPROCEDURE EVALUATION
Patient: Cloin Leonard    Procedure Information       Date/Time: 07/29/24 1400    Procedure: Closure Surgical Wound Lower Extremity (Left: Foot)    Location: [facility] / [facility]    Surgeons: Gavin Munoz DPM            Relevant Problems   Anesthesia (within normal limits)      Cardiac  1. The left ventricular systolic function is normal, with a visually estimated ejection fraction of 60-65%.   2. Left ventricular diastolic filling was indeterminate.   3. There is normal right ventricular global systolic function.   4. Mild aortic valve stenosis.   5. Aortic stenosis mean gradient 10 mm Hg, peak gradient 40 mm Hg and ADÁN 1.43 cm2.     (+) Benign essential hypertension   (+) Coronary artery disease involving native coronary artery of native heart without angina pectoris   (+) Mixed hyperlipidemia   (+) NSTEMI (non-ST elevated myocardial infarction) (Multi)      Pulmonary   (+) Shortness of breath on exertion      Neuro (within normal limits)      GI (within normal limits)      /Renal (within normal limits)      Liver (within normal limits)      Endocrine   (+) Obesity   (+) Type 2 diabetes mellitus (Multi)      Hematology (within normal limits)      Musculoskeletal (within normal limits)      HEENT   (+) Vision loss      ID   (+) Acute hematogenous osteomyelitis of left foot (Multi)   (+) Osteomyelitis of ankle or foot, left, acute (Multi)      Skin (within normal limits)      GYN (within normal limits)       Clinical information reviewed:    Allergies  Meds  Problems            Allergies   Allergen Reactions    Amlodipine Besylate Swelling     edema legs     ,hmed  No past medical history on file.  No past surgical history on file.    NPO Detail:  No data recorded     Physical Exam    Airway  Mallampati: II  TM distance: >3 FB  Neck ROM: full     Cardiovascular    Dental    Pulmonary    Abdominal            Anesthesia Plan    History of general anesthesia?: yes  History of complications of  general anesthesia?: no    ASA 3     general     The patient is not a current smoker.  Patient was not previously instructed to abstain from smoking on day of procedure.  Patient did not smoke on day of procedure.  Education provided regarding risk of obstructive sleep apnea.  intravenous induction   Anesthetic plan and risks discussed with patient.    Plan discussed with CRNA and CAA.

## 2024-07-29 NOTE — PROGRESS NOTES
Colin Leonard is a 58 y.o. male on day 7 of admission presenting with NSTEMI (non-ST elevated myocardial infarction) (Multi).      Subjective   Patient seen and examined.  Denies acute events overnight.  No chest pain or shortness of breath.  No acute distress.       Objective     Last Recorded Vitals  /84 (BP Location: Left arm, Patient Position: Lying)   Pulse 82   Temp 36.5 °C (97.7 °F) (Oral)   Resp 18   Wt 122 kg (268 lb 15.4 oz)   SpO2 99%   Intake/Output last 3 Shifts:    Intake/Output Summary (Last 24 hours) at 7/29/2024 1207  Last data filed at 7/29/2024 1028  Gross per 24 hour   Intake 100 ml   Output 620 ml   Net -520 ml       Admission Weight  Weight: 119 kg (261 lb 7.5 oz) (07/21/24 2214)    Daily Weight  07/24/24 : 122 kg (268 lb 15.4 oz)    Image Results  MR foot left wo IV contrast  Narrative: Interpreted By:  Ricardo Nino,   STUDY:  MRI of the    left foot without contrast dated  7/23/2024.      INDICATION:  Signs/Symptoms:osteomyelitis/wound      COMPARISON:  None.      ACCESSION NUMBER(S):  [id number]      ORDERING CLINICIAN:  NOE DIAZ      TECHNIQUE:  Multiplanar multisequence MRI of the    left forefoot was performed  without intravenous gadolinium based contrast.      FINDINGS:  OSSEOUS STRUCTURES AND JOINTS:      There is increased T2/STIR signal intensity in the diaphysis into the  head the 2nd metatarsal. There is increased T2/STIR signal intensity  within the phalanges of the 2nd digit. There is some mottled low T1  signal intensity in the head of the 2nd metatarsal and in the  proximal phalanx of the 2nd digit. There is question of a degree  disruption of the cortex at the head of the 2nd metatarsal and along  the plantar diaphysis and base of the proximal phalanx of the 2nd  digit. There is a large volume 2nd digit metatarsophalangeal joint  effusion with bulging of the joint capsule. Severe degenerative  changes seen of the 1st digit metatarsophalangeal joint.  There is  some marginal erosion along the medial side of the head of the 1st  metatarsal which can be seen with sequelae of crystal arthropathy.  Mild degenerative changes seen in the midfoot.      SOFT TISSUES:      There is an ulcer at the plantar aspect of the forefoot superficial  to the 2nd digit metatarsophalangeal joint. As seen on this  noncontrast examination there appears to be a fistula tract through  the soft tissues extending to the 2nd digit metatarsophalangeal  joint. The fistula tract also appears to at least partially envelop  the flexor digitorum tendon sheath to the 2nd digit and possibly  involves the tendon sheath such as seen image 16 of the sagittal  plane. There is a degree of generalized ill-defined increased T2  signal intensity in the musculature. Generalized atrophy is seen in  the musculature. Reticular increased T2 signal intensity is seen in  the subcutaneous tissues of the visualized lower extremity greatest  on the dorsum of the foot and in the 2nd toe.      Impression: 1. Ulcer at the plantar forefoot with fistula tract of the 2nd digit  metatarsophalangeal joint with large volume 2nd digit  metatarsophalangeal joint effusion compatible with septic arthritis.  There are findings which are felt to be compatible with osteomyelitis  at least involving the head of the 2nd metatarsal and the base into  the diaphysis the proximal phalanx of the 2nd digit with the reactive  marrow changes seen in the distal metadiaphysis of the 2nd metatarsal  suspicious for higher likelihood of osteomyelitis involvement.  2. Reactive marrow changes in the middle and distal phalanx of the  2nd digit, which given not directly adjacent to the wound/ulcer are  probably of lower likelihood osteomyelitis.  3. The above described fistula tract partially envelops the flexor  digitorum tendon sheath of the 2nd digit at the level of the  metatarsophalangeal joint and there may be a component of associated  at least  focal infectious tenosynovitis.  4. Reactive edema in the musculature of the foot overall likely  related to ischemic/diabetic myopathy although a component of  infectious myositis particularly of the intrinsic musculature  adjacent to the distal aspect of the 2nd metatarsal is not excluded.  5. Cellulitis and/or lymphedema of the visualized lower extremity.      Signed by: Ricardo Nino 7/24/2024 8:41 AM  Dictation workstation:   [id number]      Physical Exam  Vitals and nursing note reviewed.   Constitutional:       Appearance: Normal appearance.   HENT:      Head: Normocephalic and atraumatic.      Mouth/Throat:      Mouth: Mucous membranes are moist.   Eyes:      Extraocular Movements: Extraocular movements intact.      Pupils: Pupils are equal, round, and reactive to light.   Cardiovascular:      Rate and Rhythm: Normal rate.      Pulses: Normal pulses.   Pulmonary:      Effort: Pulmonary effort is normal.      Breath sounds: Normal breath sounds.   Abdominal:      General: Bowel sounds are normal.      Palpations: Abdomen is soft.   Musculoskeletal:         General: Normal range of motion.      Cervical back: Normal range of motion and neck supple.   Skin:     General: Skin is warm and dry.      Capillary Refill: Capillary refill takes less than 2 seconds.   Neurological:      General: No focal deficit present.      Mental Status: He is alert and oriented to person, place, and time.   Psychiatric:         Mood and Affect: Mood normal.         Relevant Results               Assessment/Plan        Principal Problem:    NSTEMI (non-ST elevated myocardial infarction) (Multi)  Active Problems:    Obesity    LVH (left ventricular hypertrophy)    Mixed hyperlipidemia    Benign essential hypertension    Coronary artery disease involving native coronary artery of native heart without angina pectoris    Fall    Leukocytosis    Stage 5 chronic kidney disease not on chronic dialysis (Multi)    Type 2 diabetes mellitus  (Multi)    Acute hematogenous osteomyelitis of left foot (Multi)    Osteomyelitis of ankle or foot, left, acute (Multi)    Mechanical fall at home  Per chart review, no obvious injury  Does not appear to be syncopal event  Falls precautions  PT OT evaluation     NSTEMI versus troponin elevation   Cardiology feels more likely CKD 5 causing troponin elevation  No further cardiac workup recommended     Left-sided diabetic foot ulcer  Mixed gram-negative/gram-positive bacteremia  Wound culture with mixed microbial including Staph aureus  2/2 blood cultures came back gram-negative rods drawn on 7/22/2024  Repeat cultures no growth x 4 day  MRI with multiple findings, ulcer with fistulous track second digit likely septic arthritis, reactive edema, low likelihood of osteomyelitis  ID following closely, recommendations appreciated  Status post partial amputation postop day 3  Follow tissue cultures  Tentative plan is for closure today with wound VAC placement  Tentative plan for home catheter tomorrow will be n.p.o. after midnight for this     Chronic kidney disease stage V  Stable-- no urgent need for dialysis at this time  Renally dose meds, avoid nephrotoxic medication when possible, monitor kidney function daily  Nephrology following     Hyperlipidemia  Continue statin, Zetia     DVT/GI  Heparin subcu, PPI              Charu Sutherland, APRN-CNP

## 2024-07-29 NOTE — PROGRESS NOTES
Colin Leonard is a 58 y.o. male on day 7 of admission presenting with NSTEMI (non-ST elevated myocardial infarction) (Multi).    Subjective   Interval History:   Awaiting surgery today  Violet catheter placement tomorrow   Afebrile, no chills  No foot pain  No chest pain or shortness of breath.  No nausea vomiting or diarrhea   Discusses with nursing       Objective   Range of Vitals (last 24 hours)  Heart Rate:  [72-83]   Temp:  [36.5 °C (97.7 °F)-37 °C (98.6 °F)]   Resp:  [16-18]   BP: (152-166)/(65-84)   SpO2:  [99 %-100 %]   Daily Weight  07/24/24 : 122 kg (268 lb 15.4 oz)    Body mass index is 37.51 kg/m².    Physical Exam  Constitutional:       Appearance: Normal appearance.   HENT:      Head: Normocephalic and atraumatic.      Nose: Nose normal.   Eyes:      General: No scleral icterus.     Extraocular Movements: Extraocular movements intact.      Conjunctiva/sclera: Conjunctivae normal.   Cardiovascular:      Rate and Rhythm: Normal rate and regular rhythm.   Pulmonary:      Effort: Pulmonary effort is normal.      Breath sounds: Normal breath sounds.   Abdominal:      General: Bowel sounds are normal.      Palpations: Abdomen is soft.   Musculoskeletal:      Cervical back: Normal range of motion and neck supple.      Right lower leg: No edema.      Left lower leg: No edema.   Feet:      Left foot:      Skin integrity: Ulcer and callus present.      Comments: Left second met head-dressing intact   Skin:     General: Skin is warm and dry.   Neurological:      Mental Status: He is alert and oriented to person, place, and time.   Psychiatric:         Mood and Affect: Mood normal.         Behavior: Behavior normal.        Antibiotics  piperacillin-tazobactam - 2.25 gram/50 mL    Relevant Results  Labs  Results from last 72 hours   Lab Units 07/29/24  0524 07/28/24  0629 07/27/24  0645   WBC AUTO x10*3/uL 15.1* 11.6* 11.5*   HEMOGLOBIN g/dL 7.1* 7.5* 8.4*   HEMATOCRIT % 23.2* 23.9* 26.8*   PLATELETS AUTO  x10*3/uL 325 320 359     Results from last 72 hours   Lab Units 07/29/24  0523 07/28/24  0629 07/27/24  0645   SODIUM mmol/L 136 139 138   POTASSIUM mmol/L 4.5 4.3 4.6   CHLORIDE mmol/L 103 105 103   CO2 mmol/L 21* 19* 20*   BUN mg/dL 74* 77* 72*   CREATININE mg/dL 6.80* 6.80* 6.80*   GLUCOSE mg/dL 130* 128* 138*   CALCIUM mg/dL 8.9 9.0 9.7   ANION GAP mmol/L 12 15 15   EGFR mL/min/1.73m*2 9* 9* 9*   PHOSPHORUS mg/dL 3.9 4.6* 4.4     Results from last 72 hours   Lab Units 07/29/24  0523 07/28/24  0629 07/27/24  0645   ALBUMIN g/dL 2.9* 2.9* 3.5     Estimated Creatinine Clearance: 15.4 mL/min (A) (by C-G formula based on SCr of 6.8 mg/dL (H)).  C-Reactive Protein   Date Value Ref Range Status   07/22/2024 24.70 (H) 0.00 - 2.00 mg/dL Final     CRP   Date Value Ref Range Status   09/09/2020 5.32 (A) mg/dL Final     Comment:     REF VALUE  < 1.00     09/08/2020 9.54 (A) mg/dL Final     Comment:     REF VALUE  < 1.00       Microbiology  Susceptibility data from last 14 days.  Collected Specimen Info Organism Ampicillin/Sulbactam Ceftriaxone Clindamycin Erythromycin Meropenem Oxacillin Penicillin Tetracycline Trimethoprim/Sulfamethoxazole Vancomycin   07/22/24 Tissue/Biopsy from Wound/Tissue Methicillin Susceptible Staphylococcus aureus (MSSA)    R  R   S   S  S  S     Mixed Anaerobic Bacteria               Mixed Gram-Positive Bacteria              07/21/24 Blood culture from Peripheral Venipuncture Peptococcus niger  S  S  S   S   S        Streptococcus anginosus group             07/21/24 Blood culture from Peripheral Venipuncture Gemella morbillorum               Slackia exigua               Imaging  MR foot left wo IV contrast    Result Date: 7/24/2024  Interpreted By:  Ricardo Nino, STUDY: MRI of the    left foot without contrast dated  7/23/2024.   INDICATION: Signs/Symptoms:osteomyelitis/wound   COMPARISON: None.   ACCESSION NUMBER(S): [id number]   ORDERING CLINICIAN: NOE DIAZ   TECHNIQUE: Multiplanar  multisequence MRI of the    left forefoot was performed without intravenous gadolinium based contrast.   FINDINGS: OSSEOUS STRUCTURES AND JOINTS:   There is increased T2/STIR signal intensity in the diaphysis into the head the 2nd metatarsal. There is increased T2/STIR signal intensity within the phalanges of the 2nd digit. There is some mottled low T1 signal intensity in the head of the 2nd metatarsal and in the proximal phalanx of the 2nd digit. There is question of a degree disruption of the cortex at the head of the 2nd metatarsal and along the plantar diaphysis and base of the proximal phalanx of the 2nd digit. There is a large volume 2nd digit metatarsophalangeal joint effusion with bulging of the joint capsule. Severe degenerative changes seen of the 1st digit metatarsophalangeal joint. There is some marginal erosion along the medial side of the head of the 1st metatarsal which can be seen with sequelae of crystal arthropathy. Mild degenerative changes seen in the midfoot.   SOFT TISSUES:   There is an ulcer at the plantar aspect of the forefoot superficial to the 2nd digit metatarsophalangeal joint. As seen on this noncontrast examination there appears to be a fistula tract through the soft tissues extending to the 2nd digit metatarsophalangeal joint. The fistula tract also appears to at least partially envelop the flexor digitorum tendon sheath to the 2nd digit and possibly involves the tendon sheath such as seen image 16 of the sagittal plane. There is a degree of generalized ill-defined increased T2 signal intensity in the musculature. Generalized atrophy is seen in the musculature. Reticular increased T2 signal intensity is seen in the subcutaneous tissues of the visualized lower extremity greatest on the dorsum of the foot and in the 2nd toe.       1. Ulcer at the plantar forefoot with fistula tract of the 2nd digit metatarsophalangeal joint with large volume 2nd digit metatarsophalangeal joint effusion  compatible with septic arthritis. There are findings which are felt to be compatible with osteomyelitis at least involving the head of the 2nd metatarsal and the base into the diaphysis the proximal phalanx of the 2nd digit with the reactive marrow changes seen in the distal metadiaphysis of the 2nd metatarsal suspicious for higher likelihood of osteomyelitis involvement. 2. Reactive marrow changes in the middle and distal phalanx of the 2nd digit, which given not directly adjacent to the wound/ulcer are probably of lower likelihood osteomyelitis. 3. The above described fistula tract partially envelops the flexor digitorum tendon sheath of the 2nd digit at the level of the metatarsophalangeal joint and there may be a component of associated at least focal infectious tenosynovitis. 4. Reactive edema in the musculature of the foot overall likely related to ischemic/diabetic myopathy although a component of infectious myositis particularly of the intrinsic musculature adjacent to the distal aspect of the 2nd metatarsal is not excluded. 5. Cellulitis and/or lymphedema of the visualized lower extremity.   Signed by: Ricardo Nino 7/24/2024 8:41 AM Dictation workstation:   [id number]    Transthoracic Echo (TTE) Complete    Result Date: 7/22/2024           [place] [id number]            Phone [phone] TRANSTHORACIC ECHOCARDIOGRAM REPORT Patient Name:      ODALYS Friedman Physician:    [id number] Kade Pedraza DO Study Date:        7/22/2024             Ordering Provider:    [id number] GERMAN KENNEY MRN/PID:           [id number]              Fellow: Accession#:        [id number]          Nurse: Date of Birth/Age: 1965 / 58 years Sonographer:          Vera Wynn                                                                ABE  Gender:            M                     Additional Staff: Height:            180.34 cm             Admit Date: Weight:            118.39 kg             Admission Status:     Inpatient -                                                                Routine BSA / BMI:         2.36 m2 / 36.40 kg/m2 Department Location:  [facility] Blood Pressure: 147 /88 mmHg Study Type:    TRANSTHORACIC ECHO (TTE) COMPLETE Diagnosis/ICD: Atherosclerotic heart disease of native coronary artery without                angina pectoris-I25.10 Indication:    Dyspnea, weakness fatigue CPT Codes:     Echo Complete w Full [id number] Patient History: Pertinent History: LVH, HLD, HTN, CAD, Nstemi, SOB CKD, DM. Study Detail: The following Echo studies were performed: 2D, M-Mode, color flow               and Doppler. Unable to obtain suprasternal notch view.  PHYSICIAN INTERPRETATION: Left Ventricle: The left ventricular systolic function is normal, with a visually estimated ejection fraction of 60-65%. There are no regional wall motion abnormalities. The left ventricular cavity size is normal. Left ventricular diastolic filling was indeterminate. Left Atrium: The left atrium is mildly dilated. Right Ventricle: The right ventricle is normal in size. There is normal right ventricular global systolic function. Right Atrium: The right atrium is normal in size. Aortic Valve: The aortic valve is trileaflet. There is evidence of mild aortic valve stenosis. The aortic valve dimensionless index is 0.45. There is no evidence of aortic valve regurgitation. The peak instantaneous gradient of the aortic valve is 20.4 mmHg. The mean gradient of the aortic valve is 10.6 mmHg. Mitral Valve: The mitral valve is normal in structure. There is mild mitral valve regurgitation. Tricuspid Valve: The tricuspid valve is structurally normal. There is trace tricuspid regurgitation. Pulmonic Valve: The pulmonic valve is not well visualized. The pulmonic valve  regurgitation was not well visualized. Pericardium: There is no pericardial effusion noted. Aorta: The aortic root is normal.  CONCLUSIONS:  1. The left ventricular systolic function is normal, with a visually estimated ejection fraction of 60-65%.  2. Left ventricular diastolic filling was indeterminate.  3. There is normal right ventricular global systolic function.  4. Mild aortic valve stenosis.  5. Aortic stenosis mean gradient 10 mm Hg, peak gradient 40 mm Hg and ADÁN 1.43 cm2.  6. Aortic valve dimensionless index 0.43. QUANTITATIVE DATA SUMMARY: 2D MEASUREMENTS:                           Normal Ranges: LAs:           4.76 cm    (2.7-4.0cm) IVSd:          1.70 cm    (0.6-1.1cm) LVPWd:         1.51 cm    (0.6-1.1cm) LVIDd:         3.86 cm    (3.9-5.9cm) LVIDs:         2.85 cm LV Mass Index: 104.8 g/m2 LV % FS        26.2 % LA VOLUME:                               Normal Ranges: LA Vol A4C:        71.7 ml    (22+/-6mL/m2) LA Vol A2C:        92.5 ml LA Vol BP:         86.7 ml LA Vol Index A4C:  30.4 ml/m2 LA Vol Index A2C:  39.2 ml/m2 LA Vol Index BP:   36.7 ml/m2 LA Area A4C:       22.5 cm2 LA Area A2C:       27.2 cm2 LA Major Axis A4C: 6.0 cm LA Major Axis A2C: 6.8 cm LA Volume Index:   36.7 ml/m2 LA Vol A4C:        72.0 ml LA Vol A2C:        92.0 ml RA VOLUME BY A/L METHOD:                               Normal Ranges: RA Vol A4C:        39.5 ml    (8.3-19.5ml) RA Vol Index A4C:  16.7 ml/m2 RA Area A4C:       15.4 cm2 RA Major Axis A4C: 5.1 cm AORTA MEASUREMENTS:                      Normal Ranges: Ao Sinus, d: 3.30 cm (2.1-3.5cm) Ao STJ, d:   3.00 cm (1.7-3.4cm) Asc Ao, d:   3.80 cm (2.1-3.4cm) LV SYSTOLIC FUNCTION BY 2D PLANIMETRY (MOD):                      Normal Ranges: EF-A4C View:    48 % (>=55%) EF-A2C View:    65 % EF-Biplane:     57 % EF-Visual:      63 % LV EF Reported: 63 % LV DIASTOLIC FUNCTION:                         Normal Ranges: MV Peak E:    0.82 m/s  (0.7-1.2 m/s) MV Peak A:    0.99 m/s   (0.42-0.7 m/s) E/A Ratio:    0.83      (1.0-2.2) MV e'         0.071 m/s (>8.0) MV lateral e' 0.08 m/s MV medial e'  0.06 m/s E/e' Ratio:   11.61     (<8.0) MITRAL VALVE:                 Normal Ranges: MV DT: 221 msec (150-240msec) AORTIC VALVE:                                    Normal Ranges: AoV Vmax:                2.26 m/s  (<=1.7m/s) AoV Peak P.4 mmHg (<20mmHg) AoV Mean PG:             10.6 mmHg (1.7-11.5mmHg) LVOT Max Cirilo:            0.97 m/s  (<=1.1m/s) AoV VTI:                 46.31 cm  (18-25cm) LVOT VTI:                21.06 cm LVOT Diameter:           2.00 cm   (1.8-2.4cm) AoV Area, VTI:           1.43 cm2  (2.5-5.5cm2) AoV Area,Vmax:           1.35 cm2  (2.5-4.5cm2) AoV Dimensionless Index: 0.45  RIGHT VENTRICLE: TAPSE: 25.1 mm RV s'  0.15 m/s TRICUSPID VALVE/RVSP:                   Normal Ranges: IVC Diam: 2.02 cm AORTA: Asc Ao Diam 3.83 cm  [id number] [facility] Electronically signed on 2024 at 10:49:35 AM  ** Final **     XR foot left 3+ views    Result Date: 2024  Interpreted By:  Ely Alanis, STUDY: XR FOOT LEFT 3+ VIEWS;  2024 2:44 am   INDICATION: Signs/Symptoms:Rule out infection.   COMPARISON: None.   ACCESSION NUMBER(S): [id number]   ORDERING CLINICIAN: BRENNAN SOLORIO   FINDINGS: 3 views of the left foot were obtained.   There is diffuse osteopenia. There is no acute fracture or dislocation. Degenerative changes are noted at the 1st metatarsophalangeal joint with hallux valgus. There is cortical lucency at the bases of the 2nd and 3rd distal phalanges. Degenerative changes are noted at the midfoot. There is calcaneal enthesopathy. Vascular calcifications are present. There is diffuse soft tissue swelling at the left foot. There is small amount of gas at the plantar soft tissues overlying the bases of the toes.       1. Cortical lucency at the bases of the distal phalanges of the left 2nd and 3rd toes, underlying osteomyelitis cannot be excluded.  Contrast-enhanced MRI can help in further evaluation. 2. Diffuse soft tissue edema at the left foot. Small amount of gas at the plantar soft tissues overlying the bases of the toes, suggestive of ulcer.       MACRO: None.   Signed by: Ely Alanis 7/22/2024 2:52 AM Dictation workstation:   [id number]    XR chest 1 view    Result Date: 7/21/2024  Interpreted By:  Makeda Gurrola, STUDY: XR CHEST 1 VIEW;  7/21/2024 10:23 pm   INDICATION: Signs/Symptoms:weakness.   COMPARISON: 02/14/2022   ACCESSION NUMBER(S): [id number]   ORDERING CLINICIAN: MIHAELA AUGUSTIN   FINDINGS:     CARDIOMEDIASTINAL SILHOUETTE: Stable cardiomegaly.   LUNGS: No pulmonary consolidation, pleural effusion or pneumothorax. Low lung volumes with bronchovascular crowding.   ABDOMEN: No remarkable upper abdominal findings.   BONES: No acute osseous abnormality.       No acute cardiopulmonary process.   MACRO: None   Signed by: Makeda Gurrola 7/21/2024 10:43 PM Dictation workstation:   [id number]        Assessment/Plan   Streptococcus viridans/gram-negative cocci bacteremia  Chronic kidney disease stage V  Type 2 diabetes with peripheral angiopathy without gangrene  Left diabetic foot ulcer, Brown 3- MRI suspicious osteomyelitis at 2nd metatarsal  Left foot osteomyelitis-wound culture growing MSSA  Elevated CK          IV Zosyn-coverage for Streptococcus viridans, MSSA, and gram-negative cocci  Violet catheter placement tomorrow   Follow-up repeat blood cultures  Podiatry follow-up   Nephrology follow-up  Local care  Offloading  Monitor temperature and WBC  Further recommendations based on intraoperative cultures  Will finalize antibiotic plan after final culture result  Dr. Rodrigez discussed with Dr. Munoz, IV antibiotics for up to 6 weeks      Total time spent caring for the patient today was 20 minutes. This includes time spent before the visit reviewing the chart, time spent during the visit, and time spent after the visit on  documentation     Karine Pineda, APRN-CNP

## 2024-07-29 NOTE — NURSING NOTE
"Patient returned from pacu family present, patient placed on Sequential machine, with wound vac in place 2-3/10 pain given pain meds per scale VS obtained, podiatry updated that patient needs meds released Charu LOPEZ also aware. Patient refusing BS reporting \"AMA\" I dont want to be poked,   "

## 2024-07-29 NOTE — PROGRESS NOTES
"Nutrition Follow up Note    Nutrition Assessment      Spoke with pt at bedside. Pt reported to be eating well and denied any nutrition related questions. 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 Pt is planned to go to the OR for closure today-- Pt is NPO.    Nutrition History:  Food and Nutrient History: Patient reports good appetite and intake with no changes from when at home. Pt states his baseline as 2 meals/day.  Energy Intake: Good > 75 %  Food Allergies/Intolerances:  None  GI Symptoms: None  Oral Problems: None    Anthropometrics:  Ht: 180.3 cm (5' 11\"), Wt: 122 kg (268 lb 15.4 oz), BMI: 37.53  IBW/kg (Dietitian Calculated): 78.2 kg  Percent of IBW: 156 %  Adjusted Body Weight (kg): 89.1 kg    Weight Change:  Daily Weight  07/24/24 : 122 kg (268 lb 15.4 oz)  04/22/24 : 123 kg (270 lb 6.4 oz)  04/04/22 : 118 kg (261 lb)  02/28/22 : 123 kg (271 lb 8 oz)  02/22/22 : 124 kg (274 lb)  08/19/21 : 128 kg (282 lb 9.6 oz)  03/08/21 : 126 kg (278 lb)  02/22/21 : 126 kg (278 lb 6 oz)  10/19/20 : 126 kg (277 lb 1 oz)  10/01/20 : 124 kg (273 lb)     Weight History / % Weight Change: Patient reports a UBW of 260#. States he has not had any recent changes in wt.             Nutrition Focused Physical Exam Findings:                       Nutrition Significant Labs:  Lab Results   Component Value Date    WBC 15.1 (H) 07/29/2024    HGB 7.1 (L) 07/29/2024    HCT 23.2 (L) 07/29/2024     07/29/2024    CHOL 97 02/01/2022    TRIG 117 02/01/2022    HDL 30.2 (A) 02/01/2022    ALT 32 07/22/2024    AST 39 07/22/2024     07/29/2024    K 4.5 07/29/2024     07/29/2024    CREATININE 6.80 (H) 07/29/2024    BUN 74 (H) 07/29/2024    CO2 21 (L) 07/29/2024    TSH 1.81 08/07/2019    INR 1.2 07/22/2024    HGBA1C 5.5 07/22/2024     Nutrition Specific Medications:  [Transfer Hold] aspirin, 81 mg, oral, Daily  [Transfer Hold] atorvastatin, 40 mg, oral, Nightly  [Transfer Hold] carvedilol, 25 mg, oral, BID  [Transfer Hold] epoetin " [place] or biosimilar, 10,000 Units, intravenous, Once per day on Monday Wednesday Friday  [Transfer Hold] ezetimibe, 10 mg, oral, Nightly  [Transfer Hold] heparin (porcine), 5,000 Units, subcutaneous, q8h  [Transfer Hold] hydrALAZINE, 100 mg, oral, TID  [Transfer Hold] pantoprazole, 20 mg, oral, Daily before breakfast  [Transfer Hold] perflutren lipid microspheres, 0.5-10 mL of dilution, intravenous, Once in imaging  [Transfer Hold] piperacillin-tazobactam, 2.25 g, intravenous, q8h  [Transfer Hold] sodium bicarbonate, 1,300 mg, oral, TID  [Transfer Hold] torsemide, 20 mg, oral, Daily      Dietary Orders (From admission, onward)       Start     Ordered    07/29/24 0001  NPO Diet; Effective now  Diet effective now         07/28/24 1020    07/23/24 1420  Oral nutritional supplements  Until discontinued        Comments: Fruit punch   Question Answer Comment   Deliver with Breakfast    Deliver with Dinner    Select supplement: Francisco J        07/23/24 1419                  Estimated Needs:   Estimated Energy Needs  Total Energy Estimated Needs (kCal): 2340 kCal  Total Estimated Energy Need per Day (kCal/kg): 30 kCal/kg  Method for Estimating Needs: IBW    Estimated Protein Needs  Total Protein Estimated Needs (g):  (78-94)  Total Protein Estimated Needs (g/kg):  (1-1.2)  Method for Estimating Needs: IBW    Estimated Fluid Needs  Total Fluid Estimated Needs (mL): 1950 mL  Total Fluid Estimated Needs (mL/kg): 25 mL/kg  Method for Estimating Needs: 1 mL/kcal        Nutrition Diagnosis   Nutrition Diagnosis:  Malnutrition Diagnosis  Patient has Malnutrition Diagnosis: No    Nutrition Diagnosis  Patient has Nutrition Diagnosis: Yes  Diagnosis Status (1): Ongoing  Nutrition Diagnosis 1: Increased nutrient needs  Related to (1): increased demand for nutrients  As Evidenced by (1): NSTEMI; Wounds  Additional Nutrition Diagnosis: Diagnosis 2  Diagnosis Status (2): Resolved  Nutrition Diagnosis 2: Food and nutrition related knowledge  deficit  Related to (2): lack of prior exposure to accurate nutrition related information  As Evidenced by (2): Requested educaiton on heart healthy diet       Nutrition Interventions/Recommendations   Nutrition Interventions and Recommendations:    Nutrition Prescription:  Individualized Nutrition Prescription Provided for : 2340 calories/day and 117g protein/day to be providd via diet and supplements    Nutrition Interventions:   Food and/or Nutrient Delivery Interventions  Interventions: Meals and snacks, Medical food supplement  Meals and Snacks: Fat-modified diet, Mineral-modified diet  Goal: resume cardiac diet once able  Medical Food Supplement: Commercial beverage  Goal: resume once able--adam BID to aid in wound healing    Education Documentation  No documentation found.           Nutrition Monitoring and Evaluation   Monitoring/Evaluation:   Food/Nutrient Related History Monitoring  Monitoring and Evaluation Plan: Energy intake  Energy Intake: Estimated energy intake  Criteria: pt will plan meals within recommended prescribed guidelines      Nutrition Focused Physical Findings  Monitoring and Evaluation Plan: Skin  Skin: Impaired wound healing  Criteria: pt will show signs of improvement in skin integrity            Time Spent/Follow-up:   Follow Up  Time Spent (min): 30 minutes  Last Date of Nutrition Visit: 07/29/24  Nutrition Follow-Up Needed?: 7-10 days  Follow up Comment: 8/5/24

## 2024-07-30 ENCOUNTER — APPOINTMENT (OUTPATIENT)
Dept: CARDIOLOGY | Facility: HOSPITAL | Age: 59
End: 2024-07-30
Payer: COMMERCIAL

## 2024-07-30 LAB
ALBUMIN SERPL-MCNC: 3.2 G/DL (ref 3.5–5)
ANION GAP SERPL CALC-SCNC: 15 MMOL/L
BUN SERPL-MCNC: 73 MG/DL (ref 8–25)
CALCIUM SERPL-MCNC: 9.4 MG/DL (ref 8.5–10.4)
CHLORIDE SERPL-SCNC: 104 MMOL/L (ref 97–107)
CO2 SERPL-SCNC: 21 MMOL/L (ref 24–31)
CREAT SERPL-MCNC: 6.8 MG/DL (ref 0.4–1.6)
CRP SERPL-MCNC: 9.1 MG/DL (ref 0–2)
EGFRCR SERPLBLD CKD-EPI 2021: 9 ML/MIN/1.73M*2
ERYTHROCYTE [DISTWIDTH] IN BLOOD BY AUTOMATED COUNT: 14.9 % (ref 11.5–14.5)
ERYTHROCYTE [SEDIMENTATION RATE] IN BLOOD BY WESTERGREN METHOD: 64 MM/H (ref 0–20)
GLUCOSE BLD MANUAL STRIP-MCNC: 128 MG/DL (ref 74–99)
GLUCOSE BLD MANUAL STRIP-MCNC: 172 MG/DL (ref 74–99)
GLUCOSE BLD MANUAL STRIP-MCNC: 188 MG/DL (ref 74–99)
GLUCOSE SERPL-MCNC: 127 MG/DL (ref 65–99)
HCT VFR BLD AUTO: 24 % (ref 41–52)
HGB BLD-MCNC: 7.4 G/DL (ref 13.5–17.5)
MCH RBC QN AUTO: 29.1 PG (ref 26–34)
MCHC RBC AUTO-ENTMCNC: 30.8 G/DL (ref 32–36)
MCV RBC AUTO: 95 FL (ref 80–100)
NRBC BLD-RTO: 0.3 /100 WBCS (ref 0–0)
PHOSPHATE SERPL-MCNC: 4.1 MG/DL (ref 2.5–4.5)
PLATELET # BLD AUTO: 339 X10*3/UL (ref 150–450)
POTASSIUM SERPL-SCNC: 4.7 MMOL/L (ref 3.4–5.1)
RBC # BLD AUTO: 2.54 X10*6/UL (ref 4.5–5.9)
SODIUM SERPL-SCNC: 140 MMOL/L (ref 133–145)
WBC # BLD AUTO: 18 X10*3/UL (ref 4.4–11.3)

## 2024-07-30 PROCEDURE — 2780000003 HC OR 278 NO HCPCS

## 2024-07-30 PROCEDURE — 2500000005 HC RX 250 GENERAL PHARMACY W/O HCPCS: Performed by: ANESTHESIOLOGY

## 2024-07-30 PROCEDURE — 2500000001 HC RX 250 WO HCPCS SELF ADMINISTERED DRUGS (ALT 637 FOR MEDICARE OP)

## 2024-07-30 PROCEDURE — 2500000004 HC RX 250 GENERAL PHARMACY W/ HCPCS (ALT 636 FOR OP/ED): Performed by: NURSE PRACTITIONER

## 2024-07-30 PROCEDURE — 77001 FLUOROGUIDE FOR VEIN DEVICE: CPT | Performed by: RADIOLOGY

## 2024-07-30 PROCEDURE — 2500000004 HC RX 250 GENERAL PHARMACY W/ HCPCS (ALT 636 FOR OP/ED)

## 2024-07-30 PROCEDURE — 99152 MOD SED SAME PHYS/QHP 5/>YRS: CPT

## 2024-07-30 PROCEDURE — 82947 ASSAY GLUCOSE BLOOD QUANT: CPT

## 2024-07-30 PROCEDURE — 76937 US GUIDE VASCULAR ACCESS: CPT | Performed by: RADIOLOGY

## 2024-07-30 PROCEDURE — 85652 RBC SED RATE AUTOMATED: CPT | Performed by: STUDENT IN AN ORGANIZED HEALTH CARE EDUCATION/TRAINING PROGRAM

## 2024-07-30 PROCEDURE — 2500000005 HC RX 250 GENERAL PHARMACY W/O HCPCS: Performed by: RADIOLOGY

## 2024-07-30 PROCEDURE — C1751 CATH, INF, PER/CENT/MIDLINE: HCPCS

## 2024-07-30 PROCEDURE — 80069 RENAL FUNCTION PANEL: CPT

## 2024-07-30 PROCEDURE — 2720000007 HC OR 272 NO HCPCS

## 2024-07-30 PROCEDURE — 86140 C-REACTIVE PROTEIN: CPT | Performed by: STUDENT IN AN ORGANIZED HEALTH CARE EDUCATION/TRAINING PROGRAM

## 2024-07-30 PROCEDURE — 36558 INSERT TUNNELED CV CATH: CPT | Performed by: RADIOLOGY

## 2024-07-30 PROCEDURE — 85027 COMPLETE CBC AUTOMATED: CPT

## 2024-07-30 PROCEDURE — 2500000001 HC RX 250 WO HCPCS SELF ADMINISTERED DRUGS (ALT 637 FOR MEDICARE OP): Performed by: ANESTHESIOLOGY

## 2024-07-30 PROCEDURE — 77001 FLUOROGUIDE FOR VEIN DEVICE: CPT

## 2024-07-30 PROCEDURE — 2500000004 HC RX 250 GENERAL PHARMACY W/ HCPCS (ALT 636 FOR OP/ED): Performed by: RADIOLOGY

## 2024-07-30 PROCEDURE — 36558 INSERT TUNNELED CV CATH: CPT

## 2024-07-30 PROCEDURE — 36415 COLL VENOUS BLD VENIPUNCTURE: CPT

## 2024-07-30 PROCEDURE — 2500000002 HC RX 250 W HCPCS SELF ADMINISTERED DRUGS (ALT 637 FOR MEDICARE OP, ALT 636 FOR OP/ED)

## 2024-07-30 PROCEDURE — 76937 US GUIDE VASCULAR ACCESS: CPT

## 2024-07-30 PROCEDURE — 1210000001 HC SEMI-PRIVATE ROOM DAILY

## 2024-07-30 PROCEDURE — 99152 MOD SED SAME PHYS/QHP 5/>YRS: CPT | Performed by: RADIOLOGY

## 2024-07-30 RX ORDER — LIDOCAINE HYDROCHLORIDE 10 MG/ML
INJECTION, SOLUTION EPIDURAL; INFILTRATION; INTRACAUDAL; PERINEURAL AS NEEDED
Status: DISCONTINUED | OUTPATIENT
Start: 2024-07-30 | End: 2024-08-02 | Stop reason: HOSPADM

## 2024-07-30 RX ORDER — FENTANYL CITRATE 50 UG/ML
INJECTION, SOLUTION INTRAMUSCULAR; INTRAVENOUS AS NEEDED
Status: DISCONTINUED | OUTPATIENT
Start: 2024-07-30 | End: 2024-08-02 | Stop reason: HOSPADM

## 2024-07-30 RX ORDER — MIDAZOLAM HYDROCHLORIDE 1 MG/ML
INJECTION, SOLUTION INTRAMUSCULAR; INTRAVENOUS AS NEEDED
Status: DISCONTINUED | OUTPATIENT
Start: 2024-07-30 | End: 2024-08-02 | Stop reason: HOSPADM

## 2024-07-30 RX ADMIN — OXYCODONE HYDROCHLORIDE 5 MG: 5 TABLET ORAL at 07:55

## 2024-07-30 RX ADMIN — HEPARIN SODIUM 5000 UNITS: 5000 INJECTION, SOLUTION INTRAVENOUS; SUBCUTANEOUS at 14:38

## 2024-07-30 RX ADMIN — HYDRALAZINE HYDROCHLORIDE 100 MG: 50 TABLET ORAL at 11:21

## 2024-07-30 RX ADMIN — CARVEDILOL 25 MG: 25 TABLET, FILM COATED ORAL at 21:32

## 2024-07-30 RX ADMIN — OXYCODONE HYDROCHLORIDE 5 MG: 5 TABLET ORAL at 15:37

## 2024-07-30 RX ADMIN — LIDOCAINE HYDROCHLORIDE 5 ML: 10 INJECTION, SOLUTION EPIDURAL; INFILTRATION; INTRACAUDAL; PERINEURAL at 10:41

## 2024-07-30 RX ADMIN — OXYCODONE 5 MG: 5 TABLET ORAL at 02:56

## 2024-07-30 RX ADMIN — HYDRALAZINE HYDROCHLORIDE 100 MG: 50 TABLET ORAL at 14:38

## 2024-07-30 RX ADMIN — LIDOCAINE HYDROCHLORIDE 5 ML: 10 INJECTION, SOLUTION EPIDURAL; INFILTRATION; INTRACAUDAL; PERINEURAL at 10:42

## 2024-07-30 RX ADMIN — CARVEDILOL 25 MG: 25 TABLET, FILM COATED ORAL at 11:21

## 2024-07-30 RX ADMIN — TORSEMIDE 20 MG: 20 TABLET ORAL at 11:20

## 2024-07-30 RX ADMIN — SODIUM BICARBONATE 650 MG TABLET 1300 MG: at 14:38

## 2024-07-30 RX ADMIN — HYDRALAZINE HYDROCHLORIDE 100 MG: 50 TABLET ORAL at 21:31

## 2024-07-30 RX ADMIN — EZETIMIBE 10 MG: 10 TABLET ORAL at 21:31

## 2024-07-30 RX ADMIN — OXYCODONE HYDROCHLORIDE 5 MG: 5 TABLET ORAL at 11:56

## 2024-07-30 RX ADMIN — ASPIRIN 81 MG: 81 TABLET, COATED ORAL at 11:21

## 2024-07-30 RX ADMIN — FENTANYL CITRATE 50 MCG: 0.05 INJECTION, SOLUTION INTRAMUSCULAR; INTRAVENOUS at 10:40

## 2024-07-30 RX ADMIN — HEPARIN SODIUM 5000 UNITS: 5000 INJECTION, SOLUTION INTRAVENOUS; SUBCUTANEOUS at 21:30

## 2024-07-30 RX ADMIN — MIDAZOLAM HYDROCHLORIDE 1 MG: 1 INJECTION, SOLUTION INTRAMUSCULAR; INTRAVENOUS at 10:40

## 2024-07-30 RX ADMIN — PIPERACILLIN SODIUM AND TAZOBACTAM SODIUM 2.25 G: 2; .25 INJECTION, SOLUTION INTRAVENOUS at 05:48

## 2024-07-30 RX ADMIN — ATORVASTATIN CALCIUM 40 MG: 40 TABLET, FILM COATED ORAL at 21:31

## 2024-07-30 RX ADMIN — SODIUM BICARBONATE 650 MG TABLET 1300 MG: at 11:21

## 2024-07-30 RX ADMIN — SODIUM CHLORIDE 3 G: 900 INJECTION INTRAVENOUS at 17:02

## 2024-07-30 RX ADMIN — Medication 3 PERCENT: at 10:24

## 2024-07-30 RX ADMIN — SODIUM BICARBONATE 650 MG TABLET 1300 MG: at 21:30

## 2024-07-30 ASSESSMENT — PAIN SCALES - GENERAL
PAINLEVEL_OUTOF10: 0 - NO PAIN
PAINLEVEL_OUTOF10: 7
PAINLEVEL_OUTOF10: 0 - NO PAIN
PAINLEVEL_OUTOF10: 4
PAINLEVEL_OUTOF10: 5 - MODERATE PAIN
PAINLEVEL_OUTOF10: 5 - MODERATE PAIN
PAINLEVEL_OUTOF10: 3
PAINLEVEL_OUTOF10: 8
PAINLEVEL_OUTOF10: 8
PAINLEVEL_OUTOF10: 5 - MODERATE PAIN

## 2024-07-30 ASSESSMENT — PAIN DESCRIPTION - ORIENTATION
ORIENTATION: LEFT

## 2024-07-30 ASSESSMENT — COGNITIVE AND FUNCTIONAL STATUS - GENERAL
DRESSING REGULAR UPPER BODY CLOTHING: A LITTLE
MOBILITY SCORE: 20
STANDING UP FROM CHAIR USING ARMS: A LITTLE
MOVING TO AND FROM BED TO CHAIR: A LITTLE
PERSONAL GROOMING: A LITTLE
CLIMB 3 TO 5 STEPS WITH RAILING: A LITTLE
DAILY ACTIVITIY SCORE: 19
DRESSING REGULAR LOWER BODY CLOTHING: A LITTLE
TOILETING: A LITTLE
WALKING IN HOSPITAL ROOM: A LITTLE
HELP NEEDED FOR BATHING: A LITTLE

## 2024-07-30 ASSESSMENT — PAIN - FUNCTIONAL ASSESSMENT
PAIN_FUNCTIONAL_ASSESSMENT: 0-10

## 2024-07-30 ASSESSMENT — PAIN DESCRIPTION - LOCATION
LOCATION: FOOT

## 2024-07-30 ASSESSMENT — PAIN DESCRIPTION - DESCRIPTORS
DESCRIPTORS: ACHING;SORE
DESCRIPTORS: NAGGING
DESCRIPTORS: ACHING;SORE

## 2024-07-30 NOTE — POST-PROCEDURE NOTE
Interventional Radiology Brief Postprocedure Note    Attending: Sid Mcelroy MD     Assistant: none    Diagnosis: need for long term abx    Description of procedure: Successful placement of a right internal jugular vein tunneled central venous catheter. The catheter was trimmed to 26 cm. The catheter tip position was confirmed with fluoroscopy and the catheter is ready to use.        Anesthesia:  Moderate sedation      Complications: None    Estimated Blood Loss: minimal    Medications  As of 07/30/24 1140      sodium chloride 0.9 % bolus 1,000 mL (mL/hr) Total volume:  Not documented* Dosing weight:  119   *Total volume has not been documented. View each administration to see the amount administered.     Date/Time Rate/Dose/Volume Action       07/21/24  2218 1,000 mL - 999 mL/hr (over 60 min) New Bag      2318  (over 60 min) Stopped               ticagrelor (Brilinta) tablet 180 mg (mg) Total dose:  180 mg Dosing weight:  119      Date/Time Rate/Dose/Volume Action       07/21/24  2225 180 mg Given               heparin (porcine) injection 4,000 Units (Units) Total dose:  4,000 Units Dosing weight:  119      Date/Time Rate/Dose/Volume Action       07/21/24  2225 4,000 Units Given               heparin (porcine) injection 5,000 Units (Units) Total dose:  20,000 Units* Dosing weight:  119   *Administration not included in total     Date/Time Rate/Dose/Volume Action       07/22/24  0623 5,000 Units Given      1400 *5,000 Units Missed      2200 *5,000 Units Missed     07/23/24  0600 *5,000 Units Missed      1400 *5,000 Units Missed      2200 *5,000 Units Missed     07/24/24  0600 *5,000 Units Missed      1413 5,000 Units Given      2121 5,000 Units Given     07/25/24  0618 5,000 Units Given      1142 *Not included in total MAR Hold      1400 *Not included in total Automatically Held      1525 *Not included in total MAR Unhold               heparin (porcine) injection 5,000 Units (Units) Total dose:  50,000 Units*  Dosing weight:  122   *Administration not included in total     Date/Time Rate/Dose/Volume Action       07/26/24  1542 5,000 Units Given      2139 5,000 Units Given     07/27/24  0552 5,000 Units Given      1510 5,000 Units Given      2124 5,000 Units Given     07/28/24  0633 5,000 Units Given      1620 5,000 Units Given      2208 5,000 Units Given     07/29/24  0624 5,000 Units Given      1400 *5,000 Units Missed      1449 *Not included in total MAR Hold      1702 *Not included in total MAR Unhold      2126 5,000 Units Given     07/30/24  0600 *5,000 Units Missed               acetaminophen (Tylenol) tablet 1,000 mg (mg) Total dose:  1,000 mg Dosing weight:  119      Date/Time Rate/Dose/Volume Action       07/22/24  0040 1,000 mg Given               glucagon (Glucagen) injection 1 mg (mg) Total dose:  Cannot be calculated* Dosing weight:  119   *Administration dose not documented     Date/Time Rate/Dose/Volume Action       07/25/24  1142 *Not included in total MAR Hold      1525 *Not included in total MAR Unhold     07/29/24  1449 *Not included in total MAR Hold      1702 *Not included in total MAR Unhold               glucagon (Glucagen) injection 1 mg (mg) Total dose:  Cannot be calculated* Dosing weight:  119   *Administration dose not documented     Date/Time Rate/Dose/Volume Action       07/25/24  1142 *Not included in total MAR Hold      1525 *Not included in total MAR Unhold     07/29/24  1449 *Not included in total MAR Hold      1702 *Not included in total MAR Unhold               dextrose 50 % injection 25 g (g) Total dose:  Cannot be calculated* Dosing weight:  119   *Administration dose not documented     Date/Time Rate/Dose/Volume Action       07/25/24  1142 *Not included in total MAR Hold      1525 *Not included in total MAR Unhold     07/29/24  1449 *Not included in total MAR Hold      1702 *Not included in total MAR Unhold               dextrose 50 % injection 12.5 g (g) Total dose:  Cannot be  calculated* Dosing weight:  119   *Administration dose not documented     Date/Time Rate/Dose/Volume Action       07/25/24  1142 *Not included in total MAR Hold      1525 *Not included in total MAR Unhold     07/29/24  1449 *Not included in total MAR Hold      1702 *Not included in total MAR Unhold               piperacillin-tazobactam (Zosyn) 2.25 g in dextrose (iso) IV 50 mL (mL/hr) Total volume:  Not documented* Dosing weight:  119   *Total volume has not been documented. View each administration to see the amount administered.     Date/Time Rate/Dose/Volume Action       07/22/24  0114 2.25 g - 100 mL/hr (over 30 min) New Bag      0144  (over 30 min) Stopped               piperacillin-tazobactam (Zosyn) 2.25 g in dextrose (iso) IV 50 mL (mL/hr) Total dose:  18 g* Dosing weight:  120   *From user-documented volume     Date/Time Rate/Dose/Volume Action       07/22/24  1509 2.25 g - 100 mL/hr (over 30 min) New Bag      1539  (over 30 min) Stopped      2218 2.25 g - 100 mL/hr (over 30 min) New Bag      2248 50 mL Stopped     07/23/24  0614 2.25 g - 100 mL/hr (over 30 min) New Bag      0644  (over 30 min) Stopped      1649 2.25 g - 100 mL/hr (over 30 min) New Bag      1719  (over 30 min) Stopped      2200 2.25 g - 100 mL/hr (over 30 min) New Bag      2230  (over 30 min) Stopped     07/24/24  0600 2.25 g - 100 mL/hr (over 30 min) New Bag      0630  (over 30 min) Stopped      1413 2.25 g - 100 mL/hr (over 30 min) New Bag      1443  (over 30 min) Stopped      2121 2.25 g - 100 mL/hr (over 30 min) New Bag      2151  (over 30 min) Stopped     07/25/24  0618 2.25 g - 100 mL/hr (over 30 min) New Bag      0648  (over 30 min) Stopped      1142 *Not included in total MAR Hold      1400 *Not included in total Automatically Held      1525 *Not included in total MAR Unhold      1534 2.25 g - 100 mL/hr (over 30 min) New Bag      1604 50 mL Stopped     07/26/24  0018 2.25 g - 100 mL/hr (over 30 min) New Bag      0048 50 mL Stopped       0552 2.25 g - 100 mL/hr (over 30 min) New Bag      0622  (over 30 min) Stopped      1541 2.25 g - 100 mL/hr (over 30 min) New Bag      1611  (over 30 min) Stopped      2143 2.25 g - 100 mL/hr (over 30 min) New Bag      2213  (over 30 min) Stopped     07/27/24  0552 2.25 g - 100 mL/hr (over 30 min) New Bag      1510 2.25 g - 100 mL/hr (over 30 min) New Bag      1540  (over 30 min) Stopped      2127 2.25 g - 100 mL/hr (over 30 min) New Bag      2157  (over 30 min) Stopped     07/28/24  0632 2.25 g - 100 mL/hr (over 30 min) New Bag      0702 50 mL Stopped      1645 2.25 g - 100 mL/hr (over 30 min) New Bag      1715 50 mL Stopped      2209 2.25 g - 100 mL/hr (over 30 min) - 50 mL New Bag      2239 0 mL Stopped     07/29/24  0624 2.25 g - 100 mL/hr (over 30 min) New Bag      0654  (over 30 min) Stopped      1400 *2.25 g - 100 mL/hr (over 30 min) Missed      1449 *Not included in total MAR Hold      1702 *Not included in total MAR Unhold      2126 50 mL       2127 2.25 g - 100 mL/hr (over 30 min) - 50 mL New Bag      2157  (over 30 min) Stopped     07/30/24  0548 2.25 g - 100 mL/hr (over 30 min) New Bag      0618  (over 30 min) Stopped               linezolid (Zyvox)  mg in 300 mL (mg) Total dose:  600 mg Dosing weight:  119      Date/Time Rate/Dose/Volume Action       07/22/24  0208 600 mg (over 120 min) New Bag      0408  (over 120 min) Stopped               linezolid (Zyvox)  mg in 300 mL (mg) Total dose:  600 mg* Dosing weight:  122   *From user-documented volume     Date/Time Rate/Dose/Volume Action       07/23/24  2242 600 mg (over 120 min) New Bag     07/24/24  0042  (over 120 min) Stopped      0851 600 mg (over 120 min) New Bag      1051  (over 120 min) Stopped      2148 600 mg (over 120 min) New Bag      2348  (over 120 min) Stopped     07/25/24  0906 600 mg (over 120 min) New Bag      1106  (over 120 min) Stopped      1142 *Not included in total MAR Hold      1525 *Not included in total MAR  Unhold      2126 600 mg (over 120 min) New Bag      2326 300 mL Stopped     07/26/24  0946 600 mg (over 120 min) New Bag      1146  (over 120 min) Stopped               carvedilol (Coreg) tablet 25 mg (mg) Total dose:  425 mg*   *Administration not included in total     Date/Time Rate/Dose/Volume Action       07/22/24  0215 25 mg Given      0900 *25 mg Missed      2040 25 mg Given     07/23/24  0928 25 mg Given      2113 25 mg Given     07/24/24  0851 25 mg Given      2121 25 mg Given     07/25/24  0952 25 mg Given      1142 *Not included in total MAR Hold      1525 *Not included in total MAR Unhold      2127 25 mg Given     07/26/24  0901 25 mg Given      2140 25 mg Given     07/27/24  0827 25 mg Given      2124 25 mg Given     07/28/24  0839 25 mg Given      2208 25 mg Given     07/29/24  0859 25 mg Given      1449 *Not included in total MAR Hold      1702 *Not included in total MAR Unhold      2126 25 mg Given     07/30/24  1121 25 mg Given               ezetimibe (Zetia) tablet 10 mg (mg) Total dose:  90 mg      Date/Time Rate/Dose/Volume Action       07/22/24  0215 10 mg Given      2040 10 mg Given     07/23/24  2113 10 mg Given     07/24/24  2121 10 mg Given     07/25/24  1142 *Not included in total MAR Hold      1525 *Not included in total MAR Unhold      2127 10 mg Given     07/26/24  2140 10 mg Given     07/27/24  2124 10 mg Given     07/28/24  2208 10 mg Given     07/29/24  1449 *Not included in total MAR Hold      1702 *Not included in total MAR Unhold      2126 10 mg Given               hydrALAZINE (Apresoline) tablet 100 mg (mg) Total dose:  2,300 mg*   *Administration not included in total     Date/Time Rate/Dose/Volume Action       07/22/24  1111 100 mg Given      1500 *100 mg Missed      2040 100 mg Given     07/23/24  0928 100 mg Given      1638 100 mg Given      2113 100 mg Given     07/24/24  0851 100 mg Given      1413 100 mg Given      2121 100 mg Given     07/25/24  0952 100 mg Given      1142  *Not included in total MAR Hold      1500 *Not included in total Automatically Held      1525 *Not included in total MAR Unhold      2127 100 mg Given     07/26/24  0901 100 mg Given      1542 100 mg Given      2100 100 mg Given     07/27/24  0827 100 mg Given      1510 100 mg Given      2123 100 mg Given     07/28/24  0838 100 mg Given      1619 100 mg Given      2208 100 mg Given     07/29/24  0859 100 mg Given      1449 *Not included in total MAR Hold      1652 100 mg Given      1702 *Not included in total MAR Unhold      2126 100 mg Given     07/30/24  1121 100 mg Given               torsemide (Demadex) tablet 20 mg (mg) Total dose:  180 mg      Date/Time Rate/Dose/Volume Action       07/22/24  1112 20 mg Given     07/23/24  0928 20 mg Given     07/24/24  0851 20 mg Given     07/25/24  0952 20 mg Given      1142 *Not included in total MAR Hold      1525 *Not included in total MAR Unhold     07/26/24  0901 20 mg Given     07/27/24  0827 20 mg Given     07/28/24  0838 20 mg Given     07/29/24  0900 20 mg Given      1449 *Not included in total MAR Hold      1702 *Not included in total MAR Unhold     07/30/24  1120 20 mg Given               acetaminophen (Tylenol) tablet 650 mg (mg) Total dose:  3,250 mg Dosing weight:  119      Date/Time Rate/Dose/Volume Action       07/22/24  1111 650 mg Given      2218 650 mg Given     07/23/24  0617 650 mg Given     07/25/24  0636 650 mg Given      1142 *Not included in total MAR Hold      1525 *Not included in total MAR Unhold     07/29/24  1449 *Not included in total MAR Hold      1702 *Not included in total MAR Unhold      1958 650 mg Given               acetaminophen (Tylenol) oral liquid 650 mg (mg) Total dose:  Cannot be calculated* Dosing weight:  119   *Administration dose not documented     Date/Time Rate/Dose/Volume Action       07/22/24  1111 *Not included in total See Alternative      2218 *Not included in total See Alternative     07/23/24  0617 *Not included in  total See Alternative     07/25/24  0636 *Not included in total See Alternative      1142 *Not included in total MAR Hold      1525 *Not included in total MAR Unhold     07/29/24  1449 *Not included in total MAR Hold      1702 *Not included in total MAR Unhold      1958 *Not included in total See Alternative               acetaminophen (Tylenol) suppository 650 mg (mg) Total dose:  Cannot be calculated* Dosing weight:  119   *Administration dose not documented     Date/Time Rate/Dose/Volume Action       07/22/24  1111 *Not included in total See Alternative      2218 *Not included in total See Alternative     07/23/24  0617 *Not included in total See Alternative     07/25/24  0636 *Not included in total See Alternative      1142 *Not included in total MAR Hold      1525 *Not included in total MAR Unhold     07/29/24  1449 *Not included in total MAR Hold      1702 *Not included in total MAR Unhold      1958 *Not included in total See Alternative               polyethylene glycol (Glycolax, Miralax) packet 17 g (g) Total dose:  Cannot be calculated* Dosing weight:  119   *Administration dose not documented     Date/Time Rate/Dose/Volume Action       07/25/24  1142 *Not included in total MAR Hold      1525 *Not included in total MAR Unhold     07/29/24  1449 *Not included in total MAR Hold      1702 *Not included in total MAR Unhold               benzocaine-menthol (Cepastat Sore Throat) lozenge 1 lozenge (lozenge) Total dose:  Cannot be calculated* Dosing weight:  119   *Administration dose not documented     Date/Time Rate/Dose/Volume Action       07/25/24  1142 *Not included in total MAR Hold      1525 *Not included in total MAR Unhold     07/29/24  1449 *Not included in total MAR Hold      1702 *Not included in total MAR Unhold               dextromethorphan-guaifenesin (Robitussin DM)  mg/5 mL oral liquid 5 mL (mL) Total dose:  Cannot be calculated* Dosing weight:  119   *Administration dose not  documented     Date/Time Rate/Dose/Volume Action       07/25/24  1142 *Not included in total MAR Hold      1525 *Not included in total MAR Unhold     07/29/24  1449 *Not included in total MAR Hold      1702 *Not included in total MAR Unhold               guaiFENesin (Mucinex) 12 hr tablet 600 mg (mg) Total dose:  600 mg Dosing weight:  119      Date/Time Rate/Dose/Volume Action       07/25/24  1142 *Not included in total MAR Hold      1525 *Not included in total MAR Unhold     07/29/24  1449 *Not included in total MAR Hold      1702 *Not included in total MAR Unhold      1956 600 mg Given               atorvastatin (Lipitor) tablet 40 mg (mg) Total dose:  320 mg* Dosing weight:  119   *Administration not included in total     Date/Time Rate/Dose/Volume Action       07/22/24  0215 *40 mg Missed      2040 40 mg Given     07/23/24  2113 40 mg Given     07/24/24  2121 40 mg Given     07/25/24  1142 *Not included in total MAR Hold      1525 *Not included in total MAR Unhold      2126 40 mg Given     07/26/24  2140 40 mg Given     07/27/24  2100 40 mg Given     07/28/24  2208 40 mg Given     07/29/24  1449 *Not included in total MAR Hold      1702 *Not included in total MAR Unhold      2126 40 mg Given               aspirin EC tablet 81 mg (mg) Total dose:  729 mg Dosing weight:  119      Date/Time Rate/Dose/Volume Action       07/22/24  1112 81 mg Given     07/23/24  0928 81 mg Given     07/24/24  0851 81 mg Given     07/25/24  0952 81 mg Given      1142 *Not included in total MAR Hold      1525 *Not included in total MAR Unhold     07/26/24  0901 81 mg Given     07/27/24  0827 81 mg Given     07/28/24  0838 81 mg Given     07/29/24  0859 81 mg Given      1449 *Not included in total MAR Hold      1702 *Not included in total MAR Unhold     07/30/24  1121 81 mg Given               pantoprazole (ProtoNix) EC tablet 20 mg (mg) Total dose:  140 mg* Dosing weight:  119   *Administration not included in total     Date/Time  Rate/Dose/Volume Action       07/22/24  0623 20 mg Given     07/23/24  0614 20 mg Given     07/24/24  0600 *20 mg Missed     07/25/24  0636 20 mg Given      1142 *Not included in total MAR Hold      1525 *Not included in total MAR Unhold     07/26/24  0552 20 mg Given     07/27/24  0552 20 mg Given     07/28/24  0633 20 mg Given     07/29/24  0624 20 mg Given      1449 *Not included in total MAR Hold      1702 *Not included in total MAR Unhold     07/30/24  0600 *20 mg Missed               sodium chloride 0.9% infusion (mL/hr) Total volume:  Not documented* Dosing weight:  119   *Total volume has not been documented. View each administration to see the amount administered.     Date/Time Rate/Dose/Volume Action       07/22/24  0306 75 mL/hr New Bag      1502  Stopped               sodium bicarbonate tablet 650 mg (mg) Total dose:  650 mg Dosing weight:  119      Date/Time Rate/Dose/Volume Action       07/22/24  1111 650 mg Given               sodium bicarbonate tablet 1,300 mg (mg) Total dose:  28,600 mg Dosing weight:  120      Date/Time Rate/Dose/Volume Action       07/22/24  2040 1,300 mg Given     07/23/24  0928 1,300 mg Given      1638 1,300 mg Given      2113 1,300 mg Given     07/24/24  0851 1,300 mg Given      1413 1,300 mg Given      2121 1,300 mg Given     07/25/24  0952 1,300 mg Given      1142 *Not included in total MAR Hold      1500 *Not included in total Automatically Held      1525 *Not included in total MAR Unhold      2126 1,300 mg Given     07/26/24  0900 1,300 mg Given      1542 1,300 mg Given      2140 1,300 mg Given     07/27/24  0828 1,300 mg Given      1510 1,300 mg Given      2123 1,300 mg Given     07/28/24  0838 1,300 mg Given      1619 1,300 mg Given      2208 1,300 mg Given     07/29/24  0859 1,300 mg Given      1449 *Not included in total MAR Hold      1653 1,300 mg Given      1702 *Not included in total MAR Unhold      2126 1,300 mg Given     07/30/24  1121 1,300 mg Given                perflutren lipid microspheres (Definity) injection 0.5-10 mL of dilution (mL of dilution) Total dose:  Cannot be calculated* Dosing weight:  119   *Administration dose not documented     Date/Time Rate/Dose/Volume Action       07/25/24  1142 *Not included in total MAR Hold      1525 *Not included in total MAR Unhold     07/29/24  1449 *Not included in total MAR Hold      1702 *Not included in total MAR Unhold               DAPTOmycin (Cubicin) 500 mg in sodium chloride 0.9% IV 50 mL (mL/hr) Total volume:  Not documented* Dosing weight:  93.2   *Total volume has not been documented. View each administration to see the amount administered.     Date/Time Rate/Dose/Volume Action       07/22/24  1619 500 mg - 100 mL/hr (over 30 min) New Bag      1649  (over 30 min) Stopped               oxyCODONE (Roxicodone) immediate release tablet 5 mg (mg) Total dose:  35 mg* Dosing weight:  122   *Administration not included in total     Date/Time Rate/Dose/Volume Action       07/23/24  1639 5 mg Given     07/25/24  1142 *Not included in total MAR Hold      1525 *Not included in total MAR Unhold     07/26/24  0552 5 mg Given      1546 5 mg Given     07/29/24  0117 5 mg Given      1449 *Not included in total MAR Hold      1650 *5 mg Missed      1654 5 mg Given      1702 *Not included in total MAR Unhold     07/30/24  0256 5 mg Given      0755 5 mg Given               prochlorperazine (Compazine) injection 5 mg (mg) Total dose:  Cannot be calculated* Dosing weight:  122   *Administration dose not documented     Date/Time Rate/Dose/Volume Action       07/25/24  1142 *Not included in total MAR Hold      1525 *Not included in total MAR Unhold     07/29/24  1449 *Not included in total MAR Hold      1702 *Not included in total MAR Unhold               lidocaine (Xylocaine) 10 mg/mL (1 %) injection 0.1 mL (mL) Total volume:  0 mL* Dosing weight:  122   *Administration not included in total     Date/Time Rate/Dose/Volume Action        07/25/24  1515 *0.1 mL Missed               lactated Ringer's infusion (mL/hr) Total volume:  Not documented* Dosing weight:  122   *Total volume has not been documented. View each administration to see the amount administered.     Date/Time Rate/Dose/Volume Action       07/25/24  1515 *100 mL/hr Missed     07/29/24  1715 *100 mL/hr Missed               darbepoetin china (Aranesp) injection 60 mcg (mcg) Total dose:  0 mcg* Dosing weight:  122   *Administration not included in total     Date/Time Rate/Dose/Volume Action       07/26/24  1030 *60 mcg Missed               epoetin china-epbx (Retacrit) injection 10,000 Units (Units) Total dose:  20,000 Units Dosing weight:  122      Date/Time Rate/Dose/Volume Action       07/27/24  1510 10,000 Units Given     07/29/24  0859 10,000 Units Given      1449 *Not included in total MAR Hold      1702 *Not included in total MAR Unhold               oxygen (O2) therapy (percent) Total dose:  3 percent Dosing weight:  122      Date/Time Rate/Dose/Volume Action       07/30/24  1024 3 percent Given               ipratropium (Atrovent) 0.02 % nebulizer solution 500 mcg (mcg) Total dose:  0 mcg* Dosing weight:  122   *Administration not included in total     Date/Time Rate/Dose/Volume Action       07/29/24  1715 *500 mcg Missed               midazolam (Versed) injection (mg) Total dose:  1 mg      Date/Time Rate/Dose/Volume Action       07/30/24  1040 1 mg Given               fentaNYL PF (Sublimaze) injection (mcg) Total dose:  50 mcg      Date/Time Rate/Dose/Volume Action       07/30/24  1040 50 mcg Given               lidocaine PF (Xylocaine) 10 mg/mL (1 %) injection (mL) Total volume:  10 mL      Date/Time Rate/Dose/Volume Action       07/30/24  1041 5 mL Given      1042 5 mL Given                   No specimens collected      See detailed result report with images in PACS.    The patient tolerated the procedure well without incident or complication and is in stable condition.

## 2024-07-30 NOTE — NURSING NOTE
Assumed patient care. BSSR received from previous Nurse. Seen patient lying on bed awake, no distress or discomfort noted. Wound vac on and working accordingly. Safety measures ensured and call light in reach.   Plan of care ongoing.

## 2024-07-30 NOTE — CARE PLAN
The patient's goals for the shift include  adequate pain control    The clinical goals for the shift include Pain control. Adequate sleep    Over the shift, the patient did not make progress toward the following goals. Barriers to progression include adequate pain regimen. Recommendations to address these barriers include timely administration of pain meds.

## 2024-07-30 NOTE — PROGRESS NOTES
CONSULT PROGRESS NOTES    SERVICE DATE: 7/30/2024   SERVICE TIME: 3:46 PM    CONSULTING SERVICE: Nephrology    ASSESSMENT AND PLAN   1.  Chronic kidney disease stage V  2.  Essential hypertension  3.  Acidosis  4.  Anemia of chronic kidney disease    His serum creatinine is unchanged since this admission; it has been unchanged over the past 2 years.  The acidosis is mild and he is on bicarbonate therapy.  His blood pressure is stable.  He is getting generous dose of erythropoietin stimulating agents.  No new recommendations today.  Trend daily labs, continue supportive care.  I will follow him while here.    SUBJECTIVE  INTERVAL HPI: He describes no pain in his left lower extremity.  He has no major dyspnea.  His loose stools are more loose, but no diarrhea.  There are no fevers.  No nausea.  Family at bedside.  He had a tunneled catheter placed just now    MEDICATIONS:  ampicillin-sulbactam, 3 g, intravenous, q12h NHAN  aspirin, 81 mg, oral, Daily  atorvastatin, 40 mg, oral, Nightly  carvedilol, 25 mg, oral, BID  epoetin [place] or biosimilar, 10,000 Units, intravenous, Once per day on Monday Wednesday Friday  ezetimibe, 10 mg, oral, Nightly  heparin (porcine), 5,000 Units, subcutaneous, q8h  hydrALAZINE, 100 mg, oral, TID  ipratropium, 500 mcg, nebulization, Once  pantoprazole, 20 mg, oral, Daily before breakfast  perflutren lipid microspheres, 0.5-10 mL of dilution, intravenous, Once in imaging  sodium bicarbonate, 1,300 mg, oral, TID  torsemide, 20 mg, oral, Daily       lactated Ringer's, 100 mL/hr       PRN medications: acetaminophen **OR** acetaminophen **OR** acetaminophen, albuterol, benzocaine-menthol, dextromethorphan-guaifenesin, dextrose, dextrose, diphenhydrAMINE, fentaNYL PF, glucagon, glucagon, guaiFENesin, hydrALAZINE, HYDROmorphone, HYDROmorphone, lidocaine PF, meperidine, midazolam, ondansetron, oxyCODONE, oxyCODONE, oxygen, polyethylene glycol, prochlorperazine, promethazine (Phenergan) 6.25 mg in  sodium chloride 0.9% 50 mL IV     OBJECTIVE  PHYSICAL EXAM:   Heart Rate:  [72-91]   Temp:  [36.3 °C (97.3 °F)-36.8 °C (98.2 °F)]   Resp:  [9-22]   BP: (118-188)/()   SpO2:  [96 %-100 %]   Body mass index is 37.51 kg/m².  This is an obese white man who appears in no acute distress  Regular heart sounds  Breath sounds are clear  Soft abdomen  Right internal jugular honed catheter in place  He has very trace left lower extremity pretibial edema, none on the right  His left foot is under bandage and connected to a wound vacuum  There is no [place] catheter in place  No obvious joint deformities  Moist mucosa  Hearing intact  Phonation intact  Appropriate affect    DATA:   Labs:  Results for orders placed or performed during the hospital encounter of 07/21/24 (from the past 96 hour(s))   POCT GLUCOSE   Result Value Ref Range    POCT Glucose 122 (H) 74 - 99 mg/dL   POCT GLUCOSE   Result Value Ref Range    POCT Glucose 150 (H) 74 - 99 mg/dL   CBC   Result Value Ref Range    WBC 11.5 (H) 4.4 - 11.3 x10*3/uL    nRBC 0.0 0.0 - 0.0 /100 WBCs    RBC 2.89 (L) 4.50 - 5.90 x10*6/uL    Hemoglobin 8.4 (L) 13.5 - 17.5 g/dL    Hematocrit 26.8 (L) 41.0 - 52.0 %    MCV 93 80 - 100 fL    MCH 29.1 26.0 - 34.0 pg    MCHC 31.3 (L) 32.0 - 36.0 g/dL    RDW 14.8 (H) 11.5 - 14.5 %    Platelets 359 150 - 450 x10*3/uL   Renal Function Panel   Result Value Ref Range    Glucose 138 (H) 65 - 99 mg/dL    Sodium 138 133 - 145 mmol/L    Potassium 4.6 3.4 - 5.1 mmol/L    Chloride 103 97 - 107 mmol/L    Bicarbonate 20 (L) 24 - 31 mmol/L    Urea Nitrogen 72 (H) 8 - 25 mg/dL    Creatinine 6.80 (H) 0.40 - 1.60 mg/dL    eGFR 9 (L) >60 mL/min/1.73m*2    Calcium 9.7 8.5 - 10.4 mg/dL    Phosphorus 4.4 2.5 - 4.5 mg/dL    Albumin 3.5 3.5 - 5.0 g/dL    Anion Gap 15 <=19 mmol/L   POCT GLUCOSE   Result Value Ref Range    POCT Glucose 127 (H) 74 - 99 mg/dL   POCT GLUCOSE   Result Value Ref Range    POCT Glucose 136 (H) 74 - 99 mg/dL   POCT GLUCOSE   Result Value  Ref Range    POCT Glucose 150 (H) 74 - 99 mg/dL   CBC   Result Value Ref Range    WBC 11.6 (H) 4.4 - 11.3 x10*3/uL    nRBC 0.0 0.0 - 0.0 /100 WBCs    RBC 2.57 (L) 4.50 - 5.90 x10*6/uL    Hemoglobin 7.5 (L) 13.5 - 17.5 g/dL    Hematocrit 23.9 (L) 41.0 - 52.0 %    MCV 93 80 - 100 fL    MCH 29.2 26.0 - 34.0 pg    MCHC 31.4 (L) 32.0 - 36.0 g/dL    RDW 15.0 (H) 11.5 - 14.5 %    Platelets 320 150 - 450 x10*3/uL   Renal Function Panel   Result Value Ref Range    Glucose 128 (H) 65 - 99 mg/dL    Sodium 139 133 - 145 mmol/L    Potassium 4.3 3.4 - 5.1 mmol/L    Chloride 105 97 - 107 mmol/L    Bicarbonate 19 (L) 24 - 31 mmol/L    Urea Nitrogen 77 (H) 8 - 25 mg/dL    Creatinine 6.80 (H) 0.40 - 1.60 mg/dL    eGFR 9 (L) >60 mL/min/1.73m*2    Calcium 9.0 8.5 - 10.4 mg/dL    Phosphorus 4.6 (H) 2.5 - 4.5 mg/dL    Albumin 2.9 (L) 3.5 - 5.0 g/dL    Anion Gap 15 <=19 mmol/L   POCT GLUCOSE   Result Value Ref Range    POCT Glucose 128 (H) 74 - 99 mg/dL   POCT GLUCOSE   Result Value Ref Range    POCT Glucose 158 (H) 74 - 99 mg/dL   Renal Function Panel   Result Value Ref Range    Glucose 130 (H) 65 - 99 mg/dL    Sodium 136 133 - 145 mmol/L    Potassium 4.5 3.4 - 5.1 mmol/L    Chloride 103 97 - 107 mmol/L    Bicarbonate 21 (L) 24 - 31 mmol/L    Urea Nitrogen 74 (H) 8 - 25 mg/dL    Creatinine 6.80 (H) 0.40 - 1.60 mg/dL    eGFR 9 (L) >60 mL/min/1.73m*2    Calcium 8.9 8.5 - 10.4 mg/dL    Phosphorus 3.9 2.5 - 4.5 mg/dL    Albumin 2.9 (L) 3.5 - 5.0 g/dL    Anion Gap 12 <=19 mmol/L   CBC   Result Value Ref Range    WBC 15.1 (H) 4.4 - 11.3 x10*3/uL    nRBC 0.1 (H) 0.0 - 0.0 /100 WBCs    RBC 2.49 (L) 4.50 - 5.90 x10*6/uL    Hemoglobin 7.1 (L) 13.5 - 17.5 g/dL    Hematocrit 23.2 (L) 41.0 - 52.0 %    MCV 93 80 - 100 fL    MCH 28.5 26.0 - 34.0 pg    MCHC 30.6 (L) 32.0 - 36.0 g/dL    RDW 14.9 (H) 11.5 - 14.5 %    Platelets 325 150 - 450 x10*3/uL   POCT GLUCOSE   Result Value Ref Range    POCT Glucose 168 (H) 74 - 99 mg/dL   POCT GLUCOSE   Result Value  Ref Range    POCT Glucose 129 (H) 74 - 99 mg/dL   Tissue/Wound Culture/Smear    Specimen: SOFT TISSUE BIOPSY; Swab   Result Value Ref Range    Tissue/Wound Culture/Smear No growth to date     Gram Stain (2+) Few Polymorphonuclear leukocytes     Gram Stain No organisms seen    POCT GLUCOSE   Result Value Ref Range    POCT Glucose 212 (H) 74 - 99 mg/dL   Renal Function Panel   Result Value Ref Range    Glucose 127 (H) 65 - 99 mg/dL    Sodium 140 133 - 145 mmol/L    Potassium 4.7 3.4 - 5.1 mmol/L    Chloride 104 97 - 107 mmol/L    Bicarbonate 21 (L) 24 - 31 mmol/L    Urea Nitrogen 73 (H) 8 - 25 mg/dL    Creatinine 6.80 (H) 0.40 - 1.60 mg/dL    eGFR 9 (L) >60 mL/min/1.73m*2    Calcium 9.4 8.5 - 10.4 mg/dL    Phosphorus 4.1 2.5 - 4.5 mg/dL    Albumin 3.2 (L) 3.5 - 5.0 g/dL    Anion Gap 15 <=19 mmol/L   CBC   Result Value Ref Range    WBC 18.0 (H) 4.4 - 11.3 x10*3/uL    nRBC 0.3 (H) 0.0 - 0.0 /100 WBCs    RBC 2.54 (L) 4.50 - 5.90 x10*6/uL    Hemoglobin 7.4 (L) 13.5 - 17.5 g/dL    Hematocrit 24.0 (L) 41.0 - 52.0 %    MCV 95 80 - 100 fL    MCH 29.1 26.0 - 34.0 pg    MCHC 30.8 (L) 32.0 - 36.0 g/dL    RDW 14.9 (H) 11.5 - 14.5 %    Platelets 339 150 - 450 x10*3/uL   POCT GLUCOSE   Result Value Ref Range    POCT Glucose 128 (H) 74 - 99 mg/dL   POCT GLUCOSE   Result Value Ref Range    POCT Glucose 188 (H) 74 - 99 mg/dL   C-reactive protein   Result Value Ref Range    C-Reactive Protein 9.10 (H) 0.00 - 2.00 mg/dL   Sedimentation rate, automated   Result Value Ref Range    Sedimentation Rate 64 (H) 0 - 20 mm/h         SIGNATURE: Boo Max MD PATIENT NAME: Colin Leonard   DATE: July 30, 2024 MRN: [id number]   TIME: 3:46 PM PAGER: [phone]

## 2024-07-30 NOTE — PROGRESS NOTES
TCC spoke to patient and sister POA at the bedside. Explained to patient that insurance info in the computer that patient provided at admission is inactive and is now showing self pay. Pt states this is incorrect and will speak to employer to correct insurance info. Pt will need a wound vac and long term IV atb x6 weeks. Pt will need an INTERNAL referral placed for IV home infusion. TCC to follow.     DISCHARGE PLAN NOT SECURE. DO NOT DISCHARGE WITHOUT SPEAKING TO CARE COORDINATION.        07/30/24 1516   Current Planned Discharge Disposition   Current Planned Discharge Disposition Home H

## 2024-07-30 NOTE — CONSULTS
Consults    Reason For Consult  Left foot acute osteomyelitis with abscess. S/P delayed closure with wound vac application left lower extremity     History Of Present Illness  Colin Leonard is a 58 y.o. male presenting with left foot osteomyelitis. Patient doing well and alert and oriented x3. NAD     Past Medical History  He has no past medical history on file.    Surgical History  He has no past surgical history on file.     Social History  He reports that he has never smoked. He has never used smokeless tobacco. No history on file for alcohol use and drug use.    Family History  No family history on file.     Allergies  Amlodipine besylate    Review of Systems    REVIEW OF SYSTEMS  GENERAL:  Negative for malaise, significant weight loss, fever  HEENT:  No changes in hearing or vision, no nose bleeds or other nasal problems and Negative for frequent or significant headaches  NECK:  Negative for lumps, goiter, pain and significant neck swelling  RESPIRATORY:  Negative for cough, wheezing and shortness of breath  CARDIOVASCULAR:  Negative for chest pain, leg swelling and palpitations  GI:  Negative for abdominal discomfort, blood in stools or black stools and change in bowel habits  :  Negative for dysuria, frequency and incontinence  MUSCULOSKELETAL:  Negative for joint pain or swelling, back pain, and muscle pain.  SKIN:  Negative for lesions, rash, and itching  PSYCH:  Negative for sleep disturbance, mood disorder and recent psychosocial stressors  HEMATOLOGY/LYMPHOLOGY:  Negative for prolonged bleeding, bruising easily, and swollen nodes.  ENDOCRINE:  Negative for cold or heat intolerance, polyuria, polydipsia and goiter  NEURO: Negative, denies any burning, tingling or numbness     Objective:   Vasc: DP and PT pulses are palpable bilateral.  CFT is less than 3 seconds bilateral.  Skin temperature is warm to cool proximal to distal bilateral.      Neuro:  Light touch is intact to the foot bilateral.   "Protective sensation is intact to the foot when tested with the 5.07 SWM bilateral.  There is no clonus noted.  The hallux is downgoing bilateral.      Derm: Dressing C/D/I, VAC in place and functioning well under low continuous pressure. No evidence of localized infection left foot including proximal lymphangitic streaking, erythema, edema, or drainage.       Physical Exam     Last Recorded Vitals  Blood pressure (!) 184/89, pulse 74, temperature 36.3 °C (97.3 °F), temperature source Oral, resp. rate 16, height 1.803 m (5' 11\"), weight 122 kg (268 lb 15.4 oz), SpO2 100%.    Relevant Results  WBC 18   Post-lavage Cx I&D 7/25 negative for growth   Post-lavage Cx Delayed closure negative for growth -preliminary      Assessment/Plan   S/P delayed closure left foot with Vac application   Osteomyelitis left foot   Abscess left foot   Sepsis MSSA   DM II c complication   CKD   PVD    Patient seen and evaluated. Reviewed cultures from 7/30 preliminary results indicate no bacterial growth, this follows the initial cultures from the amputation right second ray and debridement of the right foot 7/25 which have finalized with no apparent bacterial growth. At this time I noted the patients Leukocytosis up to 18 today from 15 yesterday could likely be stress related. However at this time I believe we have sufficient source control as evidenced by the negative growth from the interoperative cultures and decrease clinical signs of infection including no evidence of Johanne-wound erythema, warmth, edema or drainage. Given the patient's ongoing chronic health conditions including diabetes, chronic kidney disease and some underlying peripheral vascular disease as noted interoperably during the initial debridement. The patient likely will have challenges healing the remaining incision. I recommend the wound vac be continued outpatient with home health care period I recommend the patient follow in my office every week for continued close " monitoring and weekly wound care.     IV antibiotics per infectious disease we'll continue to follow 7/30 cultures. Recommend the patient continue non weight bearing for the duration of his hospital stay we will transition to heel weight bearing upon discharge. I advised the patient to utilize offloading measures such as a knee scooter to limit pressure to the surgical extremity. patient understands and agrees.  Will continue to follow    I spent 20 minutes in the professional and overall care of this patient.

## 2024-07-30 NOTE — PROGRESS NOTES
"Physical Therapy                 Therapy Communication Note    Patient Name: Colin Leonard  MRN: [id number]  Today's Date: 7/30/2024     Discipline: Physical Therapy    Missed Visit Reason: Missed Visit Reason: Patient refused (Pt declined participation with therapy stating, \"I'm going to pass today I need to make work phone calls.\")    Missed Time: Attempt    "

## 2024-07-30 NOTE — CARE PLAN
The patient's goals for the shift include      The clinical goals for the shift include Pain control. Adequate sleep    Over the shift, the patient did not make progress toward the following goals. Barriers to progression include . Recommendations to address these barriers include .      Problem: Skin  Goal: Decreased wound size/increased tissue granulation at next dressing change  Outcome: Progressing  Goal: Participates in plan/prevention/treatment measures  Outcome: Progressing  Goal: Prevent/manage excess moisture  Outcome: Progressing  Goal: Prevent/minimize sheer/friction injuries  Outcome: Progressing  Goal: Promote/optimize nutrition  Outcome: Progressing  Goal: Promote skin healing  Outcome: Progressing  Goal: Absence of infection at discharge  Outcome: Progressing     Problem: Fall/Injury  Goal: Not fall by end of shift  Outcome: Progressing  Goal: Be free from injury by end of the shift  Outcome: Progressing  Goal: Verbalize understanding of personal risk factors for fall in the hospital  Outcome: Progressing  Goal: Verbalize understanding of risk factor reduction measures to prevent injury from fall in the home  Outcome: Progressing  Goal: Use assistive devices by end of the shift  Outcome: Progressing  Goal: Pace activities to prevent fatigue by end of the shift  Outcome: Progressing     Problem: Pain  Goal: Takes deep breaths with improved pain control throughout the shift  Outcome: Progressing  Goal: Turns in bed with improved pain control throughout the shift  Outcome: Progressing  Goal: Walks with improved pain control throughout the shift  Outcome: Progressing  Goal: Performs ADL's with improved pain control throughout shift  Outcome: Progressing  Goal: Participates in PT with improved pain control throughout the shift  Outcome: Progressing  Goal: Free from opioid side effects throughout the shift  Outcome: Progressing  Goal: Free from acute confusion related to pain meds throughout the  shift  Outcome: Progressing     Problem: Pain - Adult  Goal: Verbalizes/displays adequate comfort level or baseline comfort level  Outcome: Progressing     Problem: Safety - Adult  Goal: Free from fall injury  Outcome: Progressing     Problem: Discharge Planning  Goal: Discharge to home or other facility with appropriate resources  Outcome: Progressing     Problem: Chronic Conditions and Co-morbidities  Goal: Patient's chronic conditions and co-morbidity symptoms are monitored and maintained or improved  Outcome: Progressing     Problem: Diabetes  Goal: Achieve decreasing blood glucose levels by end of shift  Outcome: Progressing  Goal: Increase stability of blood glucose readings by end of shift  Outcome: Progressing  Goal: Decrease in ketones present in urine by end of shift  Outcome: Progressing  Goal: Maintain electrolyte levels within acceptable range throughout shift  Outcome: Progressing  Goal: Maintain glucose levels >70mg/dl to <250mg/dl throughout shift  Outcome: Progressing  Goal: No changes in neurological exam by end of shift  Outcome: Progressing  Goal: Learn about and adhere to nutrition recommendations by end of shift  Outcome: Progressing  Goal: Vital signs within normal range for age by end of shift  Outcome: Progressing  Goal: Increase self care and/or family involovement by end of shift  Outcome: Progressing  Goal: Receive DSME education by end of shift  Outcome: Progressing     Problem: Pain  Goal: STG - Patient verbalizes a reduction in pain level  Outcome: Progressing

## 2024-07-30 NOTE — PROGRESS NOTES
Occupational Therapy                 Therapy Communication Note    Patient Name: Colin Leonard  MRN: [id number]  Today's Date: 7/30/2024     Discipline: Occupational Therapy    Missed Visit Reason: Missed Visit Reason: Patient refused (Pt adamantly declined participation in therapy session this date. Education and encouragement provided however pt contined to decline.)    Missed Time: Attempt at 1403    Comment:

## 2024-07-30 NOTE — PROGRESS NOTES
Colin Leonard is a 58 y.o. male on day 8 of admission presenting with NSTEMI (non-ST elevated myocardial infarction) (Multi).    Subjective   Interval History:   Violet catheter placed  Afebrile, no chills  No foot pain  No chest pain or shortness of breath.  No nausea vomiting or diarrhea        Objective   Range of Vitals (last 24 hours)  Heart Rate:  [72-91]   Temp:  [35.8 °C (96.4 °F)-36.8 °C (98.2 °F)]   Resp:  [9-24]   BP: (118-188)/()   SpO2:  [95 %-100 %]   Daily Weight  07/24/24 : 122 kg (268 lb 15.4 oz)    Body mass index is 37.51 kg/m².    Physical Exam  Constitutional:       Appearance: Normal appearance.   HENT:      Head: Normocephalic and atraumatic.      Nose: Nose normal.   Eyes:      General: No scleral icterus.     Extraocular Movements: Extraocular movements intact.      Conjunctiva/sclera: Conjunctivae normal.   Cardiovascular:      Rate and Rhythm: Normal rate and regular rhythm.   Pulmonary:      Effort: Pulmonary effort is normal.      Breath sounds: Normal breath sounds.   Abdominal:      General: Bowel sounds are normal.      Palpations: Abdomen is soft.   Musculoskeletal:      Cervical back: Normal range of motion and neck supple.      Right lower leg: No edema.      Left lower leg: No edema.   Feet:      Left foot:      Comments: Left foot dressing intact  Skin:     General: Skin is warm and dry.   Neurological:      Mental Status: He is alert and oriented to person, place, and time.   Psychiatric:         Mood and Affect: Mood normal.         Behavior: Behavior normal.        Antibiotics  piperacillin-tazobactam - 2.25 gram/50 mL    Relevant Results  Labs  Results from last 72 hours   Lab Units 07/30/24  0627 07/29/24  0524 07/28/24  0629   WBC AUTO x10*3/uL 18.0* 15.1* 11.6*   HEMOGLOBIN g/dL 7.4* 7.1* 7.5*   HEMATOCRIT % 24.0* 23.2* 23.9*   PLATELETS AUTO x10*3/uL 339 325 320     Results from last 72 hours   Lab Units 07/30/24  0627 07/29/24  0523 07/28/24  0629   SODIUM mmol/L  140 136 139   POTASSIUM mmol/L 4.7 4.5 4.3   CHLORIDE mmol/L 104 103 105   CO2 mmol/L 21* 21* 19*   BUN mg/dL 73* 74* 77*   CREATININE mg/dL 6.80* 6.80* 6.80*   GLUCOSE mg/dL 127* 130* 128*   CALCIUM mg/dL 9.4 8.9 9.0   ANION GAP mmol/L 15 12 15   EGFR mL/min/1.73m*2 9* 9* 9*   PHOSPHORUS mg/dL 4.1 3.9 4.6*     Results from last 72 hours   Lab Units 07/30/24  0627 07/29/24  0523 07/28/24  0629   ALBUMIN g/dL 3.2* 2.9* 2.9*     Estimated Creatinine Clearance: 15.4 mL/min (A) (by C-G formula based on SCr of 6.8 mg/dL (H)).  C-Reactive Protein   Date Value Ref Range Status   07/22/2024 24.70 (H) 0.00 - 2.00 mg/dL Final     CRP   Date Value Ref Range Status   09/09/2020 5.32 (A) mg/dL Final     Comment:     REF VALUE  < 1.00     09/08/2020 9.54 (A) mg/dL Final     Comment:     REF VALUE  < 1.00       Microbiology  Susceptibility data from last 14 days.  Collected Specimen Info Organism Ampicillin/Sulbactam Ceftriaxone Clindamycin Erythromycin Meropenem Oxacillin Penicillin Tetracycline Trimethoprim/Sulfamethoxazole Vancomycin   07/22/24 Tissue/Biopsy from Wound/Tissue Methicillin Susceptible Staphylococcus aureus (MSSA)    R  R   S   S  S  S     Mixed Anaerobic Bacteria               Mixed Gram-Positive Bacteria              07/21/24 Blood culture from Peripheral Venipuncture Peptococcus niger  S  S  S   S   S        Streptococcus anginosus group             07/21/24 Blood culture from Peripheral Venipuncture Gemella morbillorum               Slackia exigua               Imaging  MR foot left wo IV contrast    Result Date: 7/24/2024  Interpreted By:  Ricardo Nino, STUDY: MRI of the    left foot without contrast dated  7/23/2024.   INDICATION: Signs/Symptoms:osteomyelitis/wound   COMPARISON: None.   ACCESSION NUMBER(S): [id number]   ORDERING CLINICIAN: NOE DIAZ   TECHNIQUE: Multiplanar multisequence MRI of the    left forefoot was performed without intravenous gadolinium based contrast.   FINDINGS: OSSEOUS  STRUCTURES AND JOINTS:   There is increased T2/STIR signal intensity in the diaphysis into the head the 2nd metatarsal. There is increased T2/STIR signal intensity within the phalanges of the 2nd digit. There is some mottled low T1 signal intensity in the head of the 2nd metatarsal and in the proximal phalanx of the 2nd digit. There is question of a degree disruption of the cortex at the head of the 2nd metatarsal and along the plantar diaphysis and base of the proximal phalanx of the 2nd digit. There is a large volume 2nd digit metatarsophalangeal joint effusion with bulging of the joint capsule. Severe degenerative changes seen of the 1st digit metatarsophalangeal joint. There is some marginal erosion along the medial side of the head of the 1st metatarsal which can be seen with sequelae of crystal arthropathy. Mild degenerative changes seen in the midfoot.   SOFT TISSUES:   There is an ulcer at the plantar aspect of the forefoot superficial to the 2nd digit metatarsophalangeal joint. As seen on this noncontrast examination there appears to be a fistula tract through the soft tissues extending to the 2nd digit metatarsophalangeal joint. The fistula tract also appears to at least partially envelop the flexor digitorum tendon sheath to the 2nd digit and possibly involves the tendon sheath such as seen image 16 of the sagittal plane. There is a degree of generalized ill-defined increased T2 signal intensity in the musculature. Generalized atrophy is seen in the musculature. Reticular increased T2 signal intensity is seen in the subcutaneous tissues of the visualized lower extremity greatest on the dorsum of the foot and in the 2nd toe.       1. Ulcer at the plantar forefoot with fistula tract of the 2nd digit metatarsophalangeal joint with large volume 2nd digit metatarsophalangeal joint effusion compatible with septic arthritis. There are findings which are felt to be compatible with osteomyelitis at least involving  the head of the 2nd metatarsal and the base into the diaphysis the proximal phalanx of the 2nd digit with the reactive marrow changes seen in the distal metadiaphysis of the 2nd metatarsal suspicious for higher likelihood of osteomyelitis involvement. 2. Reactive marrow changes in the middle and distal phalanx of the 2nd digit, which given not directly adjacent to the wound/ulcer are probably of lower likelihood osteomyelitis. 3. The above described fistula tract partially envelops the flexor digitorum tendon sheath of the 2nd digit at the level of the metatarsophalangeal joint and there may be a component of associated at least focal infectious tenosynovitis. 4. Reactive edema in the musculature of the foot overall likely related to ischemic/diabetic myopathy although a component of infectious myositis particularly of the intrinsic musculature adjacent to the distal aspect of the 2nd metatarsal is not excluded. 5. Cellulitis and/or lymphedema of the visualized lower extremity.   Signed by: Ricardo Nino 7/24/2024 8:41 AM Dictation workstation:   [id number]    Transthoracic Echo (TTE) Complete    Result Date: 7/22/2024           [place] [address]            Phone [phone] TRANSTHORACIC ECHOCARDIOGRAM REPORT Patient Name:      ODALYS MELVIN     Reading Physician:    [id number] Kade Pedraza DO Study Date:        7/22/2024             Ordering Provider:    [id number] GERMAN KENNEY MRN/PID:           [id number]              Fellow: Accession#:        [id number]          Nurse: Date of Birth/Age: 1965 / 58 years Sonographer:          Vera Wynn RDCS Gender:            M                     Additional Staff: Height:            180.34 cm             Admit Date: Weight:             118.39 kg             Admission Status:     Inpatient -                                                                Routine BSA / BMI:         2.36 m2 / 36.40 kg/m2 Department Location:  [facility] Blood Pressure: 147 /88 mmHg Study Type:    TRANSTHORACIC ECHO (TTE) COMPLETE Diagnosis/ICD: Atherosclerotic heart disease of native coronary artery without                angina pectoris-I25.10 Indication:    Dyspnea, weakness fatigue CPT Codes:     Echo Complete w Full Doppler-[id number] Patient History: Pertinent History: LVH, HLD, HTN, CAD, Nstemi, SOB CKD, DM. Study Detail: The following Echo studies were performed: 2D, M-Mode, color flow               and Doppler. Unable to obtain suprasternal notch view.  PHYSICIAN INTERPRETATION: Left Ventricle: The left ventricular systolic function is normal, with a visually estimated ejection fraction of 60-65%. There are no regional wall motion abnormalities. The left ventricular cavity size is normal. Left ventricular diastolic filling was indeterminate. Left Atrium: The left atrium is mildly dilated. Right Ventricle: The right ventricle is normal in size. There is normal right ventricular global systolic function. Right Atrium: The right atrium is normal in size. Aortic Valve: The aortic valve is trileaflet. There is evidence of mild aortic valve stenosis. The aortic valve dimensionless index is 0.45. There is no evidence of aortic valve regurgitation. The peak instantaneous gradient of the aortic valve is 20.4 mmHg. The mean gradient of the aortic valve is 10.6 mmHg. Mitral Valve: The mitral valve is normal in structure. There is mild mitral valve regurgitation. Tricuspid Valve: The tricuspid valve is structurally normal. There is trace tricuspid regurgitation. Pulmonic Valve: The pulmonic valve is not well visualized. The pulmonic valve regurgitation was not well visualized. Pericardium: There is no pericardial effusion noted. Aorta: The aortic root is normal.   CONCLUSIONS:  1. The left ventricular systolic function is normal, with a visually estimated ejection fraction of 60-65%.  2. Left ventricular diastolic filling was indeterminate.  3. There is normal right ventricular global systolic function.  4. Mild aortic valve stenosis.  5. Aortic stenosis mean gradient 10 mm Hg, peak gradient 40 mm Hg and ADÁN 1.43 cm2.  6. Aortic valve dimensionless index 0.43. QUANTITATIVE DATA SUMMARY: 2D MEASUREMENTS:                           Normal Ranges: LAs:           4.76 cm    (2.7-4.0cm) IVSd:          1.70 cm    (0.6-1.1cm) LVPWd:         1.51 cm    (0.6-1.1cm) LVIDd:         3.86 cm    (3.9-5.9cm) LVIDs:         2.85 cm LV Mass Index: 104.8 g/m2 LV % FS        26.2 % LA VOLUME:                               Normal Ranges: LA Vol A4C:        71.7 ml    (22+/-6mL/m2) LA Vol A2C:        92.5 ml LA Vol BP:         86.7 ml LA Vol Index A4C:  30.4 ml/m2 LA Vol Index A2C:  39.2 ml/m2 LA Vol Index BP:   36.7 ml/m2 LA Area A4C:       22.5 cm2 LA Area A2C:       27.2 cm2 LA Major Axis A4C: 6.0 cm LA Major Axis A2C: 6.8 cm LA Volume Index:   36.7 ml/m2 LA Vol A4C:        72.0 ml LA Vol A2C:        92.0 ml RA VOLUME BY A/L METHOD:                               Normal Ranges: RA Vol A4C:        39.5 ml    (8.3-19.5ml) RA Vol Index A4C:  16.7 ml/m2 RA Area A4C:       15.4 cm2 RA Major Axis A4C: 5.1 cm AORTA MEASUREMENTS:                      Normal Ranges: Ao Sinus, d: 3.30 cm (2.1-3.5cm) Ao STJ, d:   3.00 cm (1.7-3.4cm) Asc Ao, d:   3.80 cm (2.1-3.4cm) LV SYSTOLIC FUNCTION BY 2D PLANIMETRY (MOD):                      Normal Ranges: EF-A4C View:    48 % (>=55%) EF-A2C View:    65 % EF-Biplane:     57 % EF-Visual:      63 % LV EF Reported: 63 % LV DIASTOLIC FUNCTION:                         Normal Ranges: MV Peak E:    0.82 m/s  (0.7-1.2 m/s) MV Peak A:    0.99 m/s  (0.42-0.7 m/s) E/A Ratio:    0.83      (1.0-2.2) MV e'         0.071 m/s (>8.0) MV lateral e' 0.08 m/s MV medial e'  0.06 m/s E/e'  Ratio:   11.61     (<8.0) MITRAL VALVE:                 Normal Ranges: MV DT: 221 msec (150-240msec) AORTIC VALVE:                                    Normal Ranges: AoV Vmax:                2.26 m/s  (<=1.7m/s) AoV Peak P.4 mmHg (<20mmHg) AoV Mean PG:             10.6 mmHg (1.7-11.5mmHg) LVOT Max Cirilo:            0.97 m/s  (<=1.1m/s) AoV VTI:                 46.31 cm  (18-25cm) LVOT VTI:                21.06 cm LVOT Diameter:           2.00 cm   (1.8-2.4cm) AoV Area, VTI:           1.43 cm2  (2.5-5.5cm2) AoV Area,Vmax:           1.35 cm2  (2.5-4.5cm2) AoV Dimensionless Index: 0.45  RIGHT VENTRICLE: TAPSE: 25.1 mm RV s'  0.15 m/s TRICUSPID VALVE/RVSP:                   Normal Ranges: IVC Diam: 2.02 cm AORTA: Asc Ao Diam 3.83 cm  [id number] [facility] Electronically signed on 2024 at 10:49:35 AM  ** Final **     XR foot left 3+ views    Result Date: 2024  Interpreted By:  Ely Alanis, STUDY: XR FOOT LEFT 3+ VIEWS;  2024 2:44 am   INDICATION: Signs/Symptoms:Rule out infection.   COMPARISON: None.   ACCESSION NUMBER(S): [id number]   ORDERING CLINICIAN: BRENNAN SOLORIO   FINDINGS: 3 views of the left foot were obtained.   There is diffuse osteopenia. There is no acute fracture or dislocation. Degenerative changes are noted at the 1st metatarsophalangeal joint with hallux valgus. There is cortical lucency at the bases of the 2nd and 3rd distal phalanges. Degenerative changes are noted at the midfoot. There is calcaneal enthesopathy. Vascular calcifications are present. There is diffuse soft tissue swelling at the left foot. There is small amount of gas at the plantar soft tissues overlying the bases of the toes.       1. Cortical lucency at the bases of the distal phalanges of the left 2nd and 3rd toes, underlying osteomyelitis cannot be excluded. Contrast-enhanced MRI can help in further evaluation. 2. Diffuse soft tissue edema at the left foot. Small amount of gas at the plantar soft  tissues overlying the bases of the toes, suggestive of ulcer.       MACRO: None.   Signed by: Ely Alanis 7/22/2024 2:52 AM Dictation workstation:   [id number]    XR chest 1 view    Result Date: 7/21/2024  Interpreted By:  Makeda Gurrola, STUDY: XR CHEST 1 VIEW;  7/21/2024 10:23 pm   INDICATION: Signs/Symptoms:weakness.   COMPARISON: 02/14/2022   ACCESSION NUMBER(S): [id number]   ORDERING CLINICIAN: MIHAELA AUGUSTIN   FINDINGS:     CARDIOMEDIASTINAL SILHOUETTE: Stable cardiomegaly.   LUNGS: No pulmonary consolidation, pleural effusion or pneumothorax. Low lung volumes with bronchovascular crowding.   ABDOMEN: No remarkable upper abdominal findings.   BONES: No acute osseous abnormality.       No acute cardiopulmonary process.   MACRO: None   Signed by: Makeda Gurrola 7/21/2024 10:43 PM Dictation workstation:   [id number]        Assessment/Plan   Streptococcus viridans/gram-negative cocci bacteremia  Chronic kidney disease stage V  Type 2 diabetes with peripheral angiopathy without gangrene  Left diabetic foot ulcer, Brown 3- MRI suspicious osteomyelitis at 2nd metatarsal  Left foot osteomyelitis-wound culture growing MSSA  Elevated CK          Discontinue IV Zosyn-coverage for Streptococcus viridans, MSSA, and gram-negative cocci  Start IV Unasyn-cover MSSA, streptococcus, slackia exigua-anaerobic gram positive   Podiatry follow-up   Nephrology follow-up  Follow up intraoperative culture   Local care  Offloading  Monitor temperature and WBC  Further recommendations based on intraoperative cultures      Violet catheter placed 7/30  Long term plan IV Unasyn for total of 6 weeks till 9/5/2024  Please CBC with differential, CMP    Total time spent caring for the patient today was 20 minutes. This includes time spent before the visit reviewing the chart, time spent during the visit, and time spent after the visit on documentation     Karine Pineda, SARAH-CNP

## 2024-07-30 NOTE — ANESTHESIA POSTPROCEDURE EVALUATION
Patient: Colin Leonard    Procedure Summary       Date: 07/29/24 Room / Location: [facility] OR 12 / Virtual [facility] OR    Anesthesia Start: 1449 Anesthesia Stop: 1540    Procedure: Closure Surgical Wound Lower Extremity (Left: Foot) Diagnosis:       Osteomyelitis of ankle or foot, left, acute (Multi)      (Osteomyelitis of ankle or foot, left, acute (Multi) [M86.172])    Surgeons: Gavin Munoz DPM Responsible Provider: Bernardo Contreras MD    Anesthesia Type: MAC ASA Status: 3            Anesthesia Type: MAC    Vitals Value Taken Time   /74 07/29/24 2122   Temp 36.7 °C (98.1 °F) 07/29/24 2122   Pulse 91 07/29/24 2122   Resp 16 07/29/24 2122   SpO2 100 % 07/29/24 2122       Anesthesia Post Evaluation    Patient location during evaluation: PACU  Patient participation: complete - patient participated  Level of consciousness: awake  Pain score: 0  Pain management: adequate  Multimodal analgesia pain management approach  Airway patency: patent  Two or more strategies used to mitigate risk of obstructive sleep apnea  Cardiovascular status: acceptable  Respiratory status: acceptable  Hydration status: acceptable  Postoperative Nausea and Vomiting: none        There were no known notable events for this encounter.

## 2024-07-31 LAB
ACID FAST STN SPEC: NORMAL
ACID FAST STN SPEC: NORMAL
ALBUMIN SERPL-MCNC: 3.2 G/DL (ref 3.5–5)
ANION GAP SERPL CALC-SCNC: 13 MMOL/L
ANION GAP SERPL CALC-SCNC: 14 MMOL/L
ATRIAL RATE: 129 BPM
BACTERIA SPEC CULT: NORMAL
BASOPHILS # BLD AUTO: 0.09 X10*3/UL (ref 0–0.1)
BASOPHILS NFR BLD AUTO: 0.5 %
BUN SERPL-MCNC: 64 MG/DL (ref 8–25)
BUN SERPL-MCNC: 71 MG/DL (ref 8–25)
CALCIUM SERPL-MCNC: 8.1 MG/DL (ref 8.5–10.4)
CALCIUM SERPL-MCNC: 9.5 MG/DL (ref 8.5–10.4)
CHLORIDE SERPL-SCNC: 104 MMOL/L (ref 97–107)
CHLORIDE SERPL-SCNC: 109 MMOL/L (ref 97–107)
CO2 SERPL-SCNC: 18 MMOL/L (ref 24–31)
CO2 SERPL-SCNC: 20 MMOL/L (ref 24–31)
CREAT SERPL-MCNC: 5.8 MG/DL (ref 0.4–1.6)
CREAT SERPL-MCNC: 6.7 MG/DL (ref 0.4–1.6)
EGFRCR SERPLBLD CKD-EPI 2021: 10 ML/MIN/1.73M*2
EGFRCR SERPLBLD CKD-EPI 2021: 9 ML/MIN/1.73M*2
EOSINOPHIL # BLD AUTO: 0.51 X10*3/UL (ref 0–0.7)
EOSINOPHIL NFR BLD AUTO: 2.7 %
ERYTHROCYTE [DISTWIDTH] IN BLOOD BY AUTOMATED COUNT: 15.3 % (ref 11.5–14.5)
GLUCOSE BLD MANUAL STRIP-MCNC: 126 MG/DL (ref 74–99)
GLUCOSE BLD MANUAL STRIP-MCNC: 167 MG/DL (ref 74–99)
GLUCOSE BLD MANUAL STRIP-MCNC: 187 MG/DL (ref 74–99)
GLUCOSE BLD MANUAL STRIP-MCNC: 187 MG/DL (ref 74–99)
GLUCOSE SERPL-MCNC: 114 MG/DL (ref 65–99)
GLUCOSE SERPL-MCNC: 126 MG/DL (ref 65–99)
GRAM STN SPEC: NORMAL
GRAM STN SPEC: NORMAL
HCT VFR BLD AUTO: 23.5 % (ref 41–52)
HGB BLD-MCNC: 7.1 G/DL (ref 13.5–17.5)
IMM GRANULOCYTES # BLD AUTO: 0.76 X10*3/UL (ref 0–0.7)
IMM GRANULOCYTES NFR BLD AUTO: 4 % (ref 0–0.9)
LYMPHOCYTES # BLD AUTO: 2.13 X10*3/UL (ref 1.2–4.8)
LYMPHOCYTES NFR BLD AUTO: 11.3 %
MCH RBC QN AUTO: 28.7 PG (ref 26–34)
MCHC RBC AUTO-ENTMCNC: 30.2 G/DL (ref 32–36)
MCV RBC AUTO: 95 FL (ref 80–100)
MONOCYTES # BLD AUTO: 1.29 X10*3/UL (ref 0.1–1)
MONOCYTES NFR BLD AUTO: 6.8 %
MYCOBACTERIUM SPEC CULT: NORMAL
MYCOBACTERIUM SPEC CULT: NORMAL
NEUTROPHILS # BLD AUTO: 14.09 X10*3/UL (ref 1.2–7.7)
NEUTROPHILS NFR BLD AUTO: 74.7 %
NRBC BLD-RTO: 0 /100 WBCS (ref 0–0)
P AXIS: 23 DEGREES
P OFFSET: 179 MS
P ONSET: 144 MS
PHOSPHATE SERPL-MCNC: 3.7 MG/DL (ref 2.5–4.5)
PLATELET # BLD AUTO: 327 X10*3/UL (ref 150–450)
POTASSIUM SERPL-SCNC: 3.8 MMOL/L (ref 3.4–5.1)
POTASSIUM SERPL-SCNC: 4.3 MMOL/L (ref 3.4–5.1)
PR INTERVAL: 140 MS
Q ONSET: 214 MS
QRS COUNT: 22 BEATS
QRS DURATION: 138 MS
QT INTERVAL: 372 MS
QTC CALCULATION(BAZETT): 544 MS
QTC FREDERICIA: 480 MS
R AXIS: -26 DEGREES
RBC # BLD AUTO: 2.47 X10*6/UL (ref 4.5–5.9)
SODIUM SERPL-SCNC: 138 MMOL/L (ref 133–145)
SODIUM SERPL-SCNC: 140 MMOL/L (ref 133–145)
T AXIS: 98 DEGREES
T OFFSET: 400 MS
VENTRICULAR RATE: 129 BPM
WBC # BLD AUTO: 18.9 X10*3/UL (ref 4.4–11.3)

## 2024-07-31 PROCEDURE — 85025 COMPLETE CBC W/AUTO DIFF WBC: CPT

## 2024-07-31 PROCEDURE — 80069 RENAL FUNCTION PANEL: CPT

## 2024-07-31 PROCEDURE — 6350000001 HC RX 635 EPOETIN >10,000 UNITS: Mod: JZ

## 2024-07-31 PROCEDURE — 0JH63XZ INSERTION OF TUNNELED VASCULAR ACCESS DEVICE INTO CHEST SUBCUTANEOUS TISSUE AND FASCIA, PERCUTANEOUS APPROACH: ICD-10-PCS | Performed by: RADIOLOGY

## 2024-07-31 PROCEDURE — 97535 SELF CARE MNGMENT TRAINING: CPT | Mod: GO,CO

## 2024-07-31 PROCEDURE — 2500000004 HC RX 250 GENERAL PHARMACY W/ HCPCS (ALT 636 FOR OP/ED): Performed by: NURSE PRACTITIONER

## 2024-07-31 PROCEDURE — 2500000001 HC RX 250 WO HCPCS SELF ADMINISTERED DRUGS (ALT 637 FOR MEDICARE OP)

## 2024-07-31 PROCEDURE — 2500000004 HC RX 250 GENERAL PHARMACY W/ HCPCS (ALT 636 FOR OP/ED)

## 2024-07-31 PROCEDURE — 02HV33Z INSERTION OF INFUSION DEVICE INTO SUPERIOR VENA CAVA, PERCUTANEOUS APPROACH: ICD-10-PCS | Performed by: RADIOLOGY

## 2024-07-31 PROCEDURE — 82947 ASSAY GLUCOSE BLOOD QUANT: CPT

## 2024-07-31 PROCEDURE — 2500000002 HC RX 250 W HCPCS SELF ADMINISTERED DRUGS (ALT 637 FOR MEDICARE OP, ALT 636 FOR OP/ED)

## 2024-07-31 PROCEDURE — 97116 GAIT TRAINING THERAPY: CPT | Mod: GP

## 2024-07-31 PROCEDURE — 80048 BASIC METABOLIC PNL TOTAL CA: CPT | Mod: CCI

## 2024-07-31 PROCEDURE — 97530 THERAPEUTIC ACTIVITIES: CPT | Mod: GO,CO

## 2024-07-31 PROCEDURE — 1210000001 HC SEMI-PRIVATE ROOM DAILY

## 2024-07-31 RX ORDER — OXYCODONE HYDROCHLORIDE 5 MG/1
5 TABLET ORAL EVERY 4 HOURS PRN
Status: DISCONTINUED | OUTPATIENT
Start: 2024-07-31 | End: 2024-08-02 | Stop reason: HOSPADM

## 2024-07-31 RX ADMIN — HEPARIN SODIUM 5000 UNITS: 5000 INJECTION, SOLUTION INTRAVENOUS; SUBCUTANEOUS at 05:57

## 2024-07-31 RX ADMIN — EPOETIN ALFA-EPBX 10000 UNITS: 10000 INJECTION, SOLUTION INTRAVENOUS; SUBCUTANEOUS at 08:40

## 2024-07-31 RX ADMIN — SODIUM CHLORIDE, SODIUM LACTATE, POTASSIUM CHLORIDE, AND CALCIUM CHLORIDE 100 ML/HR: 600; 310; 30; 20 INJECTION, SOLUTION INTRAVENOUS at 03:48

## 2024-07-31 RX ADMIN — SODIUM CHLORIDE 3 G: 900 INJECTION INTRAVENOUS at 08:40

## 2024-07-31 RX ADMIN — OXYCODONE 5 MG: 5 TABLET ORAL at 08:40

## 2024-07-31 RX ADMIN — SODIUM CHLORIDE 3 G: 900 INJECTION INTRAVENOUS at 21:42

## 2024-07-31 RX ADMIN — SODIUM BICARBONATE 650 MG TABLET 1300 MG: at 15:01

## 2024-07-31 RX ADMIN — EZETIMIBE 10 MG: 10 TABLET ORAL at 21:41

## 2024-07-31 RX ADMIN — OXYCODONE HYDROCHLORIDE 5 MG: 5 TABLET ORAL at 13:00

## 2024-07-31 RX ADMIN — HEPARIN SODIUM 5000 UNITS: 5000 INJECTION, SOLUTION INTRAVENOUS; SUBCUTANEOUS at 21:41

## 2024-07-31 RX ADMIN — OXYCODONE HYDROCHLORIDE 5 MG: 5 TABLET ORAL at 17:29

## 2024-07-31 RX ADMIN — HYDRALAZINE HYDROCHLORIDE 100 MG: 50 TABLET ORAL at 21:41

## 2024-07-31 RX ADMIN — PANTOPRAZOLE SODIUM 20 MG: 20 TABLET, DELAYED RELEASE ORAL at 05:57

## 2024-07-31 RX ADMIN — ACETAMINOPHEN 650 MG: 325 TABLET ORAL at 21:42

## 2024-07-31 RX ADMIN — CARVEDILOL 25 MG: 25 TABLET, FILM COATED ORAL at 21:42

## 2024-07-31 RX ADMIN — ATORVASTATIN CALCIUM 40 MG: 40 TABLET, FILM COATED ORAL at 21:42

## 2024-07-31 RX ADMIN — SODIUM BICARBONATE 650 MG TABLET 1300 MG: at 08:40

## 2024-07-31 RX ADMIN — HYDRALAZINE HYDROCHLORIDE 100 MG: 50 TABLET ORAL at 08:40

## 2024-07-31 RX ADMIN — HEPARIN SODIUM 5000 UNITS: 5000 INJECTION, SOLUTION INTRAVENOUS; SUBCUTANEOUS at 15:01

## 2024-07-31 RX ADMIN — TORSEMIDE 20 MG: 20 TABLET ORAL at 08:40

## 2024-07-31 RX ADMIN — HYDRALAZINE HYDROCHLORIDE 100 MG: 50 TABLET ORAL at 15:01

## 2024-07-31 RX ADMIN — SODIUM BICARBONATE 650 MG TABLET 1300 MG: at 21:42

## 2024-07-31 RX ADMIN — ASPIRIN 81 MG: 81 TABLET, COATED ORAL at 08:40

## 2024-07-31 RX ADMIN — CARVEDILOL 25 MG: 25 TABLET, FILM COATED ORAL at 08:40

## 2024-07-31 RX ADMIN — ACETAMINOPHEN 650 MG: 325 TABLET ORAL at 15:51

## 2024-07-31 ASSESSMENT — PAIN - FUNCTIONAL ASSESSMENT
PAIN_FUNCTIONAL_ASSESSMENT: 0-10

## 2024-07-31 ASSESSMENT — COGNITIVE AND FUNCTIONAL STATUS - GENERAL
MOBILITY SCORE: 24
HELP NEEDED FOR BATHING: A LITTLE
CLIMB 3 TO 5 STEPS WITH RAILING: A LOT
MOBILITY SCORE: 19
TOILETING: A LITTLE
PERSONAL GROOMING: A LITTLE
DRESSING REGULAR LOWER BODY CLOTHING: A LITTLE
DRESSING REGULAR UPPER BODY CLOTHING: A LITTLE
CLIMB 3 TO 5 STEPS WITH RAILING: A LOT
MOBILITY SCORE: 16
TOILETING: A LOT
HELP NEEDED FOR BATHING: A LOT
MOVING TO AND FROM BED TO CHAIR: A LITTLE
DAILY ACTIVITIY SCORE: 19
HELP NEEDED FOR BATHING: A LITTLE
DAILY ACTIVITIY SCORE: 23
PERSONAL GROOMING: A LITTLE
DRESSING REGULAR LOWER BODY CLOTHING: A LOT
TURNING FROM BACK TO SIDE WHILE IN FLAT BAD: A LITTLE
DAILY ACTIVITIY SCORE: 16
DRESSING REGULAR UPPER BODY CLOTHING: A LITTLE
STANDING UP FROM CHAIR USING ARMS: A LITTLE
MOVING FROM LYING ON BACK TO SITTING ON SIDE OF FLAT BED WITH BEDRAILS: A LITTLE
WALKING IN HOSPITAL ROOM: A LOT
STANDING UP FROM CHAIR USING ARMS: A LITTLE
MOVING TO AND FROM BED TO CHAIR: A LITTLE
WALKING IN HOSPITAL ROOM: A LITTLE

## 2024-07-31 ASSESSMENT — PAIN DESCRIPTION - LOCATION
LOCATION: FOOT
LOCATION: FOOT
LOCATION: HEAD
LOCATION: FOOT

## 2024-07-31 ASSESSMENT — PAIN SCALES - GENERAL
PAINLEVEL_OUTOF10: 0 - NO PAIN
PAINLEVEL_OUTOF10: 0 - NO PAIN
PAINLEVEL_OUTOF10: 4
PAINLEVEL_OUTOF10: 0 - NO PAIN
PAINLEVEL_OUTOF10: 8
PAINLEVEL_OUTOF10: 4
PAINLEVEL_OUTOF10: 0 - NO PAIN
PAINLEVEL_OUTOF10: 4
PAINLEVEL_OUTOF10: 0 - NO PAIN
PAINLEVEL_OUTOF10: 7
PAINLEVEL_OUTOF10: 7

## 2024-07-31 ASSESSMENT — ACTIVITIES OF DAILY LIVING (ADL): HOME_MANAGEMENT_TIME_ENTRY: 28

## 2024-07-31 ASSESSMENT — PAIN DESCRIPTION - ORIENTATION
ORIENTATION: LEFT

## 2024-07-31 ASSESSMENT — PAIN DESCRIPTION - DESCRIPTORS: DESCRIPTORS: ACHING;SORE

## 2024-07-31 NOTE — PROGRESS NOTES
Colin Leonard is a 58 y.o. male on day 9 of admission presenting with NSTEMI (non-ST elevated myocardial infarction) (Multi).    Subjective   Interval History:   Remains afebrile  Denies shortness of breath  Denies nausea, vomiting, diarrhea      Objective   Range of Vitals (last 24 hours)  Heart Rate:  [79-91]   Temp:  [36.5 °C (97.7 °F)-36.9 °C (98.4 °F)]   Resp:  [16-18]   BP: (142-176)/(68-87)   SpO2:  [98 %-100 %]   Daily Weight  07/24/24 : 122 kg (268 lb 15.4 oz)    Body mass index is 37.51 kg/m².    Physical Exam  Constitutional:       Appearance: Normal appearance.   HENT:      Head: Normocephalic and atraumatic.      Nose: Nose normal.   Eyes:      General: No scleral icterus.     Extraocular Movements: Extraocular movements intact.      Conjunctiva/sclera: Conjunctivae normal.   Cardiovascular:      Rate and Rhythm: Normal rate and regular rhythm.   Pulmonary:      Effort: Pulmonary effort is normal.      Breath sounds: Normal breath sounds.   Abdominal:      General: Bowel sounds are normal.      Palpations: Abdomen is soft.   Musculoskeletal:      Cervical back: Normal range of motion and neck supple.      Right lower leg: No edema.      Left lower leg: No edema.   Feet:      Left foot:      Comments: Left foot dressing intact  Skin:     General: Skin is warm and dry.   Neurological:      Mental Status: He is alert and oriented to person, place, and time.   Psychiatric:         Mood and Affect: Mood normal.         Behavior: Behavior normal.        Antibiotics  ampicillin-sulbactam (Unasyn) IV 3 g in 100 mL NS (ADV/MBP)    Relevant Results  Labs  Results from last 72 hours   Lab Units 07/31/24  0851 07/30/24  0627 07/29/24  0524   WBC AUTO x10*3/uL 18.9* 18.0* 15.1*   HEMOGLOBIN g/dL 7.1* 7.4* 7.1*   HEMATOCRIT % 23.5* 24.0* 23.2*   PLATELETS AUTO x10*3/uL 327 339 325   NEUTROS PCT AUTO % 74.7  --   --    LYMPHS PCT AUTO % 11.3  --   --    MONOS PCT AUTO % 6.8  --   --    EOS PCT AUTO % 2.7  --   --       Results from last 72 hours   Lab Units 07/31/24  0851 07/30/24  0627 07/29/24  0523   SODIUM mmol/L 140  138 140 136   POTASSIUM mmol/L 3.8  4.3 4.7 4.5   CHLORIDE mmol/L 109*  104 104 103   CO2 mmol/L 18*  20* 21* 21*   BUN mg/dL 64*  71* 73* 74*   CREATININE mg/dL 5.80*  6.70* 6.80* 6.80*   GLUCOSE mg/dL 114*  126* 127* 130*   CALCIUM mg/dL 8.1*  9.5 9.4 8.9   ANION GAP mmol/L 13  14 15 12   EGFR mL/min/1.73m*2 10*  9* 9* 9*   PHOSPHORUS mg/dL 3.7 4.1 3.9     Results from last 72 hours   Lab Units 07/31/24  0851 07/30/24 0627 07/29/24  0523   ALBUMIN g/dL 3.2* 3.2* 2.9*     Estimated Creatinine Clearance: 15.6 mL/min (A) (by C-G formula based on SCr of 6.7 mg/dL (H)).  C-Reactive Protein   Date Value Ref Range Status   07/30/2024 9.10 (H) 0.00 - 2.00 mg/dL Final   07/22/2024 24.70 (H) 0.00 - 2.00 mg/dL Final     CRP   Date Value Ref Range Status   09/09/2020 5.32 (A) mg/dL Final     Comment:     REF VALUE  < 1.00       Microbiology  Susceptibility data from last 14 days.  Collected Specimen Info Organism Ampicillin/Sulbactam Ceftriaxone Clindamycin Erythromycin Meropenem Oxacillin Penicillin Tetracycline Trimethoprim/Sulfamethoxazole Vancomycin   07/22/24 Tissue/Biopsy from Wound/Tissue Methicillin Susceptible Staphylococcus aureus (MSSA)    R  R   S   S  S  S     Mixed Anaerobic Bacteria               Mixed Gram-Positive Bacteria              07/21/24 Blood culture from Peripheral Venipuncture Peptococcus niger  S  S  S   S   S        Streptococcus anginosus group             07/21/24 Blood culture from Peripheral Venipuncture Gemella morbillorum               [place]               Imaging  Electrocardiogram, 12-lead PRN ACS symptoms    Result Date: 7/31/2024   Poor data quality, interpretation may be adversely affected Sinus tachycardia with occasional Premature ventricular complexes Nonspecific intraventricular block Abnormal ECG    IR CVC tunneled    Result Date: 7/31/2024  Interpreted  By:  Sid Mcelroy, STUDY: IR CVC TUNNELED;  7/30/2024 11:13 am   INDICATION: Signs/Symptoms:kevin catheter placement.   COMPARISON: None.   ACCESSION NUMBER(S): [id number]   ORDERING CLINICIAN: GIBSON ROMO   TECHNIQUE: INTERVENTIONALIST(S): Sid Mcelroy MD   CONSENT: The patient/patient's POA/next of kin was informed of the nature of the proposed procedure. The purposes, alternatives, risks, and benefits were explained and discussed. All questions were answered and consent was obtained.   RADIATION EXPOSURE: Fluoroscopy time: 0 1 min. Dose: 0.83 mGy.   SEDATION: Moderate conscious IV sedation services (supervision of administration, induction, and maintenance) were provided by the physician performing the procedure with intravenous fentanyl and versed for 18 minutes. The physician was assisted by an independent trained observer, an interventional radiology nurse, in the continuous monitoring of patient level of consciousness and physiologic status.   MEDICATION/CONTRAST: No additional   TIME OUT: A time out was performed immediately prior to procedure start with the interventional team, correctly identifying the patient name, date of birth, MRN, procedure, anatomy (including marking of site and side), patient position, procedure consent form, relevant laboratory and imaging test results, antibiotic administration, safety precautions, and procedure-specific equipment needs.   COMPLICATIONS: No immediate adverse events identified.   FINDINGS: Maximum sterile barrier technique was implemented. In the recumbent position, the patient was positioned on the angiography table. The right supraclavicular and infraclavicular cutaneous tissues were prepared and draped in usual sterile manner.   The supraclavicular access site was evaluated with gray-scale ultrasound, which demonstrated a widely patent right internal jugular vein, which was selected as the target vein for access. Lidocaine was used for local anesthesia. Under  direct ultrasound guidance, the targeted vein was accessed using a 21 gauge micropuncture needle. The needle was exchanged over wire for a 018 guidewire, which was inserted to secure location. The micro-access needle was removed over the guidewire and exchanged for the peel-away sheath. The catheter length was measured at this time using the 018 wire and the catheter was trimmed to 26 cm.   Following additional infusion of lidocaine, a small incision was made and the catheter was tunneled to the venous access site. The catheter was pulled through and the cuff was embedded into the tunneled tract.   The catheter was placed through the peel-away sheath and the peel-away sheath was removed.   A fluoroscopic spot image of the chest was acquired in the AP projection to confirm optimal course of the catheter and location of the catheter tip. The catheter tip is positioned in the cavoatrial junction.   The catheter aspirated and flushed easily.   The venous access incision site was closed with Liquiband and the catheter was secured with suture. Sterile dressings were subsequently applied.   The patient tolerated the procedure without complication.       1. Successful placement of a single lumen right tunneled chest small bore central venous catheter. The catheter is ready to use.   I was present for and/or performed the critical portions of the procedure and immediately available throughout the entire procedure.   I personally reviewed the image(s) / study and resident interpretation. I agree with the findings as stated.   Performed and dictated at [facility].   MACRO: None   Signed by: Sid Mcelroy 7/31/2024 10:58 AM Dictation workstation:   [id number]    MR foot left wo IV contrast    Result Date: 7/24/2024  Interpreted By:  Ricardo Nino, STUDY: MRI of the    left foot without contrast dated  7/23/2024.   INDICATION: Signs/Symptoms:osteomyelitis/wound   COMPARISON: None.   ACCESSION  NUMBER(S): [id number]   ORDERING CLINICIAN: NOE DIAZ   TECHNIQUE: Multiplanar multisequence MRI of the    left forefoot was performed without intravenous gadolinium based contrast.   FINDINGS: OSSEOUS STRUCTURES AND JOINTS:   There is increased T2/STIR signal intensity in the diaphysis into the head the 2nd metatarsal. There is increased T2/STIR signal intensity within the phalanges of the 2nd digit. There is some mottled low T1 signal intensity in the head of the 2nd metatarsal and in the proximal phalanx of the 2nd digit. There is question of a degree disruption of the cortex at the head of the 2nd metatarsal and along the plantar diaphysis and base of the proximal phalanx of the 2nd digit. There is a large volume 2nd digit metatarsophalangeal joint effusion with bulging of the joint capsule. Severe degenerative changes seen of the 1st digit metatarsophalangeal joint. There is some marginal erosion along the medial side of the head of the 1st metatarsal which can be seen with sequelae of crystal arthropathy. Mild degenerative changes seen in the midfoot.   SOFT TISSUES:   There is an ulcer at the plantar aspect of the forefoot superficial to the 2nd digit metatarsophalangeal joint. As seen on this noncontrast examination there appears to be a fistula tract through the soft tissues extending to the 2nd digit metatarsophalangeal joint. The fistula tract also appears to at least partially envelop the flexor digitorum tendon sheath to the 2nd digit and possibly involves the tendon sheath such as seen image 16 of the sagittal plane. There is a degree of generalized ill-defined increased T2 signal intensity in the musculature. Generalized atrophy is seen in the musculature. Reticular increased T2 signal intensity is seen in the subcutaneous tissues of the visualized lower extremity greatest on the dorsum of the foot and in the 2nd toe.       1. Ulcer at the plantar forefoot with fistula tract of the 2nd digit  metatarsophalangeal joint with large volume 2nd digit metatarsophalangeal joint effusion compatible with septic arthritis. There are findings which are felt to be compatible with osteomyelitis at least involving the head of the 2nd metatarsal and the base into the diaphysis the proximal phalanx of the 2nd digit with the reactive marrow changes seen in the distal metadiaphysis of the 2nd metatarsal suspicious for higher likelihood of osteomyelitis involvement. 2. Reactive marrow changes in the middle and distal phalanx of the 2nd digit, which given not directly adjacent to the wound/ulcer are probably of lower likelihood osteomyelitis. 3. The above described fistula tract partially envelops the flexor digitorum tendon sheath of the 2nd digit at the level of the metatarsophalangeal joint and there may be a component of associated at least focal infectious tenosynovitis. 4. Reactive edema in the musculature of the foot overall likely related to ischemic/diabetic myopathy although a component of infectious myositis particularly of the intrinsic musculature adjacent to the distal aspect of the 2nd metatarsal is not excluded. 5. Cellulitis and/or lymphedema of the visualized lower extremity.   Signed by: Ricardo Nino 7/24/2024 8:41 AM Dictation workstation:   [id number]    Transthoracic Echo (TTE) Complete    Result Date: 7/22/2024           [place] [address]            Phone [phone] TRANSTHORACIC ECHOCARDIOGRAM REPORT Patient Name:      ODALYS Friedman Physician:    [id number] [facility] Study Date:        7/22/2024             Ordering Provider:    [id number] GERMAN KENNEY MRN/PID:           [id number]              Fellow: Accession#:        [id number]          Nurse: Date of Birth/Age: 1965 / 58 years Sonographer:           [facility] Gender:            M                     Additional Staff: Height:            180.34 cm             Admit Date: Weight:            118.39 kg             Admission Status:     Inpatient -                                                                Routine BSA / BMI:         2.36 m2 / 36.40 kg/m2 Department Location:  [facility] Blood Pressure: 147 /88 mmHg Study Type:    TRANSTHORACIC ECHO (TTE) COMPLETE Diagnosis/ICD: Atherosclerotic heart disease of native coronary artery without                angina pectoris-I25.10 Indication:    Dyspnea, weakness fatigue CPT Codes:     Echo Complete w Full Doppler-[id number] Patient History: Pertinent History: LVH, HLD, HTN, CAD, Nstemi, SOB CKD, DM. Study Detail: The following Echo studies were performed: 2D, M-Mode, color flow               and Doppler. Unable to obtain suprasternal notch view.  PHYSICIAN INTERPRETATION: Left Ventricle: The left ventricular systolic function is normal, with a visually estimated ejection fraction of 60-65%. There are no regional wall motion abnormalities. The left ventricular cavity size is normal. Left ventricular diastolic filling was indeterminate. Left Atrium: The left atrium is mildly dilated. Right Ventricle: The right ventricle is normal in size. There is normal right ventricular global systolic function. Right Atrium: The right atrium is normal in size. Aortic Valve: The aortic valve is trileaflet. There is evidence of mild aortic valve stenosis. The aortic valve dimensionless index is 0.45. There is no evidence of aortic valve regurgitation. The peak instantaneous gradient of the aortic valve is 20.4 mmHg. The mean gradient of the aortic valve is 10.6 mmHg. Mitral Valve: The mitral valve is normal in structure. There is mild mitral valve regurgitation. Tricuspid Valve: The tricuspid valve is structurally normal. There is trace tricuspid  regurgitation. Pulmonic Valve: The pulmonic valve is not well visualized. The pulmonic valve regurgitation was not well visualized. Pericardium: There is no pericardial effusion noted. Aorta: The aortic root is normal.  CONCLUSIONS:  1. The left ventricular systolic function is normal, with a visually estimated ejection fraction of 60-65%.  2. Left ventricular diastolic filling was indeterminate.  3. There is normal right ventricular global systolic function.  4. Mild aortic valve stenosis.  5. Aortic stenosis mean gradient 10 mm Hg, peak gradient 40 mm Hg and ADÁN 1.43 cm2.  6. Aortic valve dimensionless index 0.43. QUANTITATIVE DATA SUMMARY: 2D MEASUREMENTS:                           Normal Ranges: LAs:           4.76 cm    (2.7-4.0cm) IVSd:          1.70 cm    (0.6-1.1cm) LVPWd:         1.51 cm    (0.6-1.1cm) LVIDd:         3.86 cm    (3.9-5.9cm) LVIDs:         2.85 cm LV Mass Index: 104.8 g/m2 LV % FS        26.2 % LA VOLUME:                               Normal Ranges: LA Vol A4C:        71.7 ml    (22+/-6mL/m2) LA Vol A2C:        92.5 ml LA Vol BP:         86.7 ml LA Vol Index A4C:  30.4 ml/m2 LA Vol Index A2C:  39.2 ml/m2 LA Vol Index BP:   36.7 ml/m2 LA Area A4C:       22.5 cm2 LA Area A2C:       27.2 cm2 LA Major Axis A4C: 6.0 cm LA Major Axis A2C: 6.8 cm LA Volume Index:   36.7 ml/m2 LA Vol A4C:        72.0 ml LA Vol A2C:        92.0 ml RA VOLUME BY A/L METHOD:                               Normal Ranges: RA Vol A4C:        39.5 ml    (8.3-19.5ml) RA Vol Index A4C:  16.7 ml/m2 RA Area A4C:       15.4 cm2 RA Major Axis A4C: 5.1 cm AORTA MEASUREMENTS:                      Normal Ranges: Ao Sinus, d: 3.30 cm (2.1-3.5cm) Ao STJ, d:   3.00 cm (1.7-3.4cm) Asc Ao, d:   3.80 cm (2.1-3.4cm) LV SYSTOLIC FUNCTION BY 2D PLANIMETRY (MOD):                      Normal Ranges: EF-A4C View:    48 % (>=55%) EF-A2C View:    65 % EF-Biplane:     57 % EF-Visual:      63 % LV EF Reported: 63 % LV DIASTOLIC FUNCTION:                          Normal Ranges: MV Peak E:    0.82 m/s  (0.7-1.2 m/s) MV Peak A:    0.99 m/s  (0.42-0.7 m/s) E/A Ratio:    0.83      (1.0-2.2) MV e'         0.071 m/s (>8.0) MV lateral e' 0.08 m/s MV medial e'  0.06 m/s E/e' Ratio:   11.61     (<8.0) MITRAL VALVE:                 Normal Ranges: MV DT: 221 msec (150-240msec) AORTIC VALVE:                                    Normal Ranges: AoV Vmax:                2.26 m/s  (<=1.7m/s) AoV Peak P.4 mmHg (<20mmHg) AoV Mean PG:             10.6 mmHg (1.7-11.5mmHg) LVOT Max Cirilo:            0.97 m/s  (<=1.1m/s) AoV VTI:                 46.31 cm  (18-25cm) LVOT VTI:                21.06 cm LVOT Diameter:           2.00 cm   (1.8-2.4cm) AoV Area, VTI:           1.43 cm2  (2.5-5.5cm2) AoV Area,Vmax:           1.35 cm2  (2.5-4.5cm2) AoV Dimensionless Index: 0.45  RIGHT VENTRICLE: TAPSE: 25.1 mm RV s'  0.15 m/s TRICUSPID VALVE/RVSP:                   Normal Ranges: IVC Diam: 2.02 cm AORTA: Asc Ao Diam 3.83 cm  [id number] [facility] Electronically signed on 2024 at 10:49:35 AM  ** Final **     XR foot left 3+ views    Result Date: 2024  Interpreted By:  Ely Alanis, STUDY: XR FOOT LEFT 3+ VIEWS;  2024 2:44 am   INDICATION: Signs/Symptoms:Rule out infection.   COMPARISON: None.   ACCESSION NUMBER(S): [id number]   ORDERING CLINICIAN: BRENNAN SOLORIO   FINDINGS: 3 views of the left foot were obtained.   There is diffuse osteopenia. There is no acute fracture or dislocation. Degenerative changes are noted at the 1st metatarsophalangeal joint with hallux valgus. There is cortical lucency at the bases of the 2nd and 3rd distal phalanges. Degenerative changes are noted at the midfoot. There is calcaneal enthesopathy. Vascular calcifications are present. There is diffuse soft tissue swelling at the left foot. There is small amount of gas at the plantar soft tissues overlying the bases of the toes.       1. Cortical lucency at the bases of the distal phalanges  of the left 2nd and 3rd toes, underlying osteomyelitis cannot be excluded. Contrast-enhanced MRI can help in further evaluation. 2. Diffuse soft tissue edema at the left foot. Small amount of gas at the plantar soft tissues overlying the bases of the toes, suggestive of ulcer.       MACRO: None.   Signed by: Ely Alanis 7/22/2024 2:52 AM Dictation workstation:   [id number]    XR chest 1 view    Result Date: 7/21/2024  Interpreted By:  Makeda Gurrola, STUDY: XR CHEST 1 VIEW;  7/21/2024 10:23 pm   INDICATION: Signs/Symptoms:weakness.   COMPARISON: 02/14/2022   ACCESSION NUMBER(S): [id number]   ORDERING CLINICIAN: MIHAELA AUGUSTIN   FINDINGS:     CARDIOMEDIASTINAL SILHOUETTE: Stable cardiomegaly.   LUNGS: No pulmonary consolidation, pleural effusion or pneumothorax. Low lung volumes with bronchovascular crowding.   ABDOMEN: No remarkable upper abdominal findings.   BONES: No acute osseous abnormality.       No acute cardiopulmonary process.   MACRO: None   Signed by: Makeda Gurrola 7/21/2024 10:43 PM Dictation workstation:   [id number]        Assessment/Plan   Streptococcus viridans/gram-negative cocci bacteremia  Chronic kidney disease stage V  Type 2 diabetes with peripheral angiopathy without gangrene  Left diabetic foot ulcer, Brown 3- MRI suspicious osteomyelitis at 2nd metatarsal  Left foot osteomyelitis  Elevated CK        Continue IV Unasyn-cover MSSA, streptococcus, slackia exigua-anaerobic gram positive   Podiatry follow-up   Nephrology follow-up  Follow up pending cultures-intraoperative cultures negative to date  Local care  Offloading  Monitor temperature and WBC  Violet catheter placed 7/30  Long term plan IV Unasyn for total of 6 weeks till 9/5/2024  Weekly CBC with differential, CMP    Total time spent caring for the patient today was 20 minutes. This includes time spent before the visit reviewing the chart, time spent during the visit, and time spent after the visit on documentation      Lora FUENTES Staff, APRN-CNP

## 2024-07-31 NOTE — CARE PLAN
Problem: Skin  Goal: Participates in plan/prevention/treatment measures  Outcome: Progressing  Flowsheets (Taken 7/31/2024 0010)  Participates in plan/prevention/treatment measures:   Discuss with provider PT/OT consult   Increase activity/out of bed for meals   Elevate heels     Problem: Skin  Goal: Prevent/manage excess moisture  Outcome: Progressing     Problem: Skin  Goal: Decreased wound size/increased tissue granulation at next dressing change  Outcome: Progressing   The patient's goals for the shift include      The clinical goals for the shift include pain control    Over the shift, the patient did not make progress toward the following goals. Barriers to progression include . Recommendations to address these barriers include .

## 2024-07-31 NOTE — CARE PLAN
The patient's goals for the shift include      The clinical goals for the shift include pain control    Over the shift, the patient did not make progress toward the following goals. Barriers to progression include timely administration of pain medication. Recommendations to address these barriers include hourly rounding.

## 2024-07-31 NOTE — NURSING NOTE
Pt has a R chest tunneled line, drsg dry and intact (dated 7/30) without any redness, swelling or drainage, lumen has brisk blood return and flushes easily with NS, clamped and curos cap applied.

## 2024-07-31 NOTE — PROGRESS NOTES
Colin Leonard is a 58 y.o. male on day 9 of admission presenting with NSTEMI (non-ST elevated myocardial infarction) (Multi).      Subjective   Patient seen and examined.  Family at bedside.  Denies acute events overnight.  No chest pain or shortness of breath.  No acute distress.  Asking about discharge planning.       Objective     Last Recorded Vitals  /83 (BP Location: Left arm, Patient Position: Sitting)   Pulse 92   Temp 36.7 °C (98.1 °F) (Oral)   Resp 17   Wt 122 kg (268 lb 15.4 oz)   SpO2 100%   Intake/Output last 3 Shifts:    Intake/Output Summary (Last 24 hours) at 7/31/2024 1609  Last data filed at 7/31/2024 1326  Gross per 24 hour   Intake 3753.33 ml   Output 1870 ml   Net 1883.33 ml       Admission Weight  Weight: 119 kg (261 lb 7.5 oz) (07/21/24 2214)    Daily Weight  07/24/24 : 122 kg (268 lb 15.4 oz)    Image Results      Physical Exam  Vitals and nursing note reviewed.   Constitutional:       Appearance: Normal appearance.   HENT:      Head: Normocephalic and atraumatic.      Mouth/Throat:      Mouth: Mucous membranes are moist.   Eyes:      Extraocular Movements: Extraocular movements intact.      Pupils: Pupils are equal, round, and reactive to light.   Cardiovascular:      Rate and Rhythm: Normal rate.      Pulses: Normal pulses.   Pulmonary:      Effort: Pulmonary effort is normal.      Breath sounds: Normal breath sounds.   Abdominal:      General: Bowel sounds are normal.      Palpations: Abdomen is soft.   Musculoskeletal:         General: Normal range of motion.      Cervical back: Normal range of motion and neck supple.   Skin:     General: Skin is warm and dry.      Capillary Refill: Capillary refill takes less than 2 seconds.   Neurological:      General: No focal deficit present.      Mental Status: He is alert and oriented to person, place, and time.   Psychiatric:         Mood and Affect: Mood normal.         Relevant Results               Assessment/Plan         Principal Problem:    NSTEMI (non-ST elevated myocardial infarction) (Multi)  Active Problems:    Obesity    LVH (left ventricular hypertrophy)    Mixed hyperlipidemia    Benign essential hypertension    Coronary artery disease involving native coronary artery of native heart without angina pectoris    Fall    Leukocytosis    Stage 5 chronic kidney disease not on chronic dialysis (Multi)    Type 2 diabetes mellitus (Multi)    Acute hematogenous osteomyelitis of left foot (Multi)    Osteomyelitis of ankle or foot, left, acute (Multi)    Mechanical fall at home  Per chart review, no obvious injury  Does not appear to be syncopal event  Falls precautions  PT OT evaluation     NSTEMI versus troponin elevation   Cardiology feels more likely CKD 5 causing troponin elevation  No further cardiac workup recommended     Left-sided diabetic foot ulcer  Mixed gram-negative/gram-positive bacteremia  Wound culture with mixed microbial including Staph aureus  2/2 blood cultures came back gram-negative rods drawn on 7/22/2024  Repeat cultures no growth to date  MRI with multiple findings, ulcer with fistulous track second digit likely septic arthritis, reactive edema, low likelihood of osteomyelitis  ID following closely, recommendations appreciated  S/p partial amputation   Follow tissue cultures    Chronic kidney disease stage V  Stable-- no urgent need for dialysis at this time  Renally dose meds, avoid nephrotoxic medication when possible, monitor kidney function daily  Nephrology following     Hyperlipidemia  Continue statin, Zetia     DVT/GI  Heparin subcu, PPI              Charu Sutherland, APRN-CNP

## 2024-07-31 NOTE — PROGRESS NOTES
Colin Leonard is a 58 y.o. male on day 9 of admission presenting with NSTEMI (non-ST elevated myocardial infarction) (Multi).      Subjective   Patient seen and examined.  Observed working with OT.  Denies acute events overnight.  No chest pain or shortness of breath.  No acute distress.         Objective     Last Recorded Vitals  /83 (BP Location: Left arm, Patient Position: Sitting)   Pulse 92   Temp 36.7 °C (98.1 °F) (Oral)   Resp 17   Wt 122 kg (268 lb 15.4 oz)   SpO2 100%   Intake/Output last 3 Shifts:    Intake/Output Summary (Last 24 hours) at 7/31/2024 1605  Last data filed at 7/31/2024 1326  Gross per 24 hour   Intake 3753.33 ml   Output 1870 ml   Net 1883.33 ml       Admission Weight  Weight: 119 kg (261 lb 7.5 oz) (07/21/24 2214)    Daily Weight  07/24/24 : 122 kg (268 lb 15.4 oz)    Image Results  Results for orders placed or performed during the hospital encounter of 07/21/24 (from the past 24 hour(s))   POCT GLUCOSE   Result Value Ref Range    POCT Glucose 172 (H) 74 - 99 mg/dL   POCT GLUCOSE   Result Value Ref Range    POCT Glucose 126 (H) 74 - 99 mg/dL   Renal Function Panel   Result Value Ref Range    Glucose 126 (H) 65 - 99 mg/dL    Sodium 138 133 - 145 mmol/L    Potassium 4.3 3.4 - 5.1 mmol/L    Chloride 104 97 - 107 mmol/L    Bicarbonate 20 (L) 24 - 31 mmol/L    Urea Nitrogen 71 (H) 8 - 25 mg/dL    Creatinine 6.70 (H) 0.40 - 1.60 mg/dL    eGFR 9 (L) >60 mL/min/1.73m*2    Calcium 9.5 8.5 - 10.4 mg/dL    Phosphorus 3.7 2.5 - 4.5 mg/dL    Albumin 3.2 (L) 3.5 - 5.0 g/dL    Anion Gap 14 <=19 mmol/L   Basic metabolic panel   Result Value Ref Range    Glucose 114 (H) 65 - 99 mg/dL    Sodium 140 133 - 145 mmol/L    Potassium 3.8 3.4 - 5.1 mmol/L    Chloride 109 (H) 97 - 107 mmol/L    Bicarbonate 18 (L) 24 - 31 mmol/L    Urea Nitrogen 64 (H) 8 - 25 mg/dL    Creatinine 5.80 (H) 0.40 - 1.60 mg/dL    eGFR 10 (L) >60 mL/min/1.73m*2    Calcium 8.1 (L) 8.5 - 10.4 mg/dL    Anion Gap 13 <=19 mmol/L    CBC and Auto Differential   Result Value Ref Range    WBC 18.9 (H) 4.4 - 11.3 x10*3/uL    nRBC 0.0 0.0 - 0.0 /100 WBCs    RBC 2.47 (L) 4.50 - 5.90 x10*6/uL    Hemoglobin 7.1 (L) 13.5 - 17.5 g/dL    Hematocrit 23.5 (L) 41.0 - 52.0 %    MCV 95 80 - 100 fL    MCH 28.7 26.0 - 34.0 pg    MCHC 30.2 (L) 32.0 - 36.0 g/dL    RDW 15.3 (H) 11.5 - 14.5 %    Platelets 327 150 - 450 x10*3/uL    Neutrophils % 74.7 40.0 - 80.0 %    Immature Granulocytes %, Automated 4.0 (H) 0.0 - 0.9 %    Lymphocytes % 11.3 13.0 - 44.0 %    Monocytes % 6.8 2.0 - 10.0 %    Eosinophils % 2.7 0.0 - 6.0 %    Basophils % 0.5 0.0 - 2.0 %    Neutrophils Absolute 14.09 (H) 1.20 - 7.70 x10*3/uL    Immature Granulocytes Absolute, Automated 0.76 (H) 0.00 - 0.70 x10*3/uL    Lymphocytes Absolute 2.13 1.20 - 4.80 x10*3/uL    Monocytes Absolute 1.29 (H) 0.10 - 1.00 x10*3/uL    Eosinophils Absolute 0.51 0.00 - 0.70 x10*3/uL    Basophils Absolute 0.09 0.00 - 0.10 x10*3/uL   POCT GLUCOSE   Result Value Ref Range    POCT Glucose 187 (H) 74 - 99 mg/dL        Physical Exam  Vitals and nursing note reviewed.   Constitutional:       Appearance: Normal appearance.   HENT:      Head: Normocephalic and atraumatic.      Mouth/Throat:      Mouth: Mucous membranes are moist.   Eyes:      Extraocular Movements: Extraocular movements intact.      Pupils: Pupils are equal, round, and reactive to light.   Cardiovascular:      Rate and Rhythm: Normal rate.      Pulses: Normal pulses.   Pulmonary:      Effort: Pulmonary effort is normal.      Breath sounds: Normal breath sounds.   Abdominal:      General: Bowel sounds are normal.      Palpations: Abdomen is soft.   Musculoskeletal:         General: Normal range of motion.      Cervical back: Normal range of motion and neck supple.   Skin:     General: Skin is warm and dry.      Capillary Refill: Capillary refill takes less than 2 seconds.   Neurological:      General: No focal deficit present.      Mental Status: He is alert and  oriented to person, place, and time.   Psychiatric:         Mood and Affect: Mood normal.         Relevant Results               Assessment/Plan        Principal Problem:    NSTEMI (non-ST elevated myocardial infarction) (Multi)  Active Problems:    Obesity    LVH (left ventricular hypertrophy)    Mixed hyperlipidemia    Benign essential hypertension    Coronary artery disease involving native coronary artery of native heart without angina pectoris    Fall    Leukocytosis    Stage 5 chronic kidney disease not on chronic dialysis (Multi)    Type 2 diabetes mellitus (Multi)    Acute hematogenous osteomyelitis of left foot (Multi)    Osteomyelitis of ankle or foot, left, acute (Multi)    Mechanical fall at home  Per chart review, no obvious injury  Does not appear to be syncopal event  Falls precautions  PT OT evaluation     NSTEMI versus troponin elevation   Cardiology feels more likely CKD 5 causing troponin elevation  No further cardiac workup recommended     Left-sided diabetic foot ulcer  Mixed gram-negative/gram-positive bacteremia  Wound culture with mixed microbial including Staph aureus  2/2 blood cultures came back gram-negative rods drawn on 7/22/2024  Repeat cultures no growth to date  MRI with multiple findings, ulcer with fistulous track second digit likely septic arthritis, reactive edema, low likelihood of osteomyelitis  ID following closely, recommendations appreciated  S/p partial amputation left lower extremity  S/p home placement  Follow up tissue cultures-negative to date  Long-term plan IV Unasyn for 6 weeks per ID     Chronic kidney disease stage V  Stable-- no urgent need for dialysis at this time  Renally dose meds, avoid nephrotoxic medication when possible, monitor kidney function daily  Nephrology following     Hyperlipidemia  Continue statin, Zetia     DVT/GI  Heparin subcu, PPI    7/31/2024: Medically stable at this time.  Nephrology have signed off.  At this point waiting for insurance  coverage for home health care/IV antibiotics              Charu Sutherland, APRN-CNP

## 2024-07-31 NOTE — CARE PLAN
Phoned pt in his room to discuss his insurance; pt said that he phoned his insurance yesterday and they are going to look into reinstating his insurance  Ordered wound vac through the  site--Still need the wound vac order with the settings; notified podiatrist Dr. Munoz through [facility]  Will also need home IV abx order--plan is to send pt home with IV ABX x 6 weeks. [facility].    DISCHARGE PLAN: HOME WITH HHC--DO NOT DISCHARGE PATIENT BEFORE SPEAKING WITH CARE COORDINATION; PT NEEDS HIS INSURANCE RE-ACTIVATED--NEED TO DISPENSE THE WOUND VAC TO THE PT FOR HOME USE--NEED TO SET UP HOME HEALTH CARE/ HOME INFUSION.

## 2024-07-31 NOTE — PROGRESS NOTES
Occupational Therapy    OT Treatment    Patient Name: Colin Leonard  MRN: [id number]  Today's Date: 7/31/2024  Time Calculation  Start Time: 1508  Stop Time: 1556  Time Calculation (min): 48 min        Assessment:  OT Assessment: Pt progressing with established POC however non compliant to NWB LLE.  Will continue to address remaining deficits with skilled OT intervention as Pt agreeable to .  Prognosis: Good  Barriers to Discharge: Decreased caregiver support, Inaccessible home environment (2 interior steps)  Evaluation/Treatment Tolerance: Patient tolerated treatment well  Medical Staff Made Aware: Yes  End of Session Communication: Bedside nurse (Pts current discomfort)  End of Session Patient Position: Bed, 2 rail up (supine call light in reach Pt demonstrate use all needs met. Pts father present)  OT Assessment Results: Decreased ADL status, Decreased safe judgment during ADL, Decreased endurance, Decreased functional mobility, Decreased IADLs  Prognosis: Good  Barriers to Discharge: Decreased caregiver support, Inaccessible home environment (2 interior steps)  Evaluation/Treatment Tolerance: Patient tolerated treatment well  Medical Staff Made Aware: Yes  Strengths: Ability to acquire knowledge, Premorbid level of function  Barriers to Participation: Attitude of self, Coping skills, Comorbidities, Insight into problems, Housing layout  Plan:  Treatment Interventions: ADL retraining, Functional transfer training, Patient/family training, Equipment evaluation/education, Compensatory technique education  OT Frequency: 3 times per week  OT Discharge Recommendations: Low intensity level of continued care  Equipment Recommended upon Discharge: Standard walker (drop arm BSC, grab bar bathroom at toilet/shower, TTB possible w/c)  OT Recommended Transfer Status: Minimal assist  OT - OK to Discharge: Yes  Treatment Interventions: ADL retraining, Functional transfer training, Patient/family training, Equipment  evaluation/education, Compensatory technique education    Subjective   Previous Visit Info:  OT Last Visit  OT Received On: 07/31/24  General:  General  Reason for Referral: 58 y.o. male presenting with Fatigue and Weakness; s/p amputation 2nd toe left foot since being admitted  Referred By: Isabelle Cook DO  Past Medical History Relevant to Rehab: Hypertension  CKD stage IV/V  Dyslipidemia  Diabetes mellitus type 2 , Benign essential hypertension,  CKD stage 4, obesity,  HLD Hyperuricemia, LVH Myopia with presbyopia of both eyes,· Vision loss  Family/Caregiver Present: Yes  Caregiver Feedback: Pts father  Prior to Session Communication: Bedside nurse  Patient Position Received: Bed, 2 rail up, Alarm off, not on at start of session (seated edge of bed)  Preferred Learning Style: verbal  General Comment: Cleared by NSG.  Pt agreeable  to skilled OT intervention seated edge of bed.  Precautions:  Hearing/Visual Limitations: slight [place]  LE Weight Bearing Status: Left Non-Weight Bearing  Medical Precautions: Fall precautions  Precautions Comment: +LLE wound vac  Pain:  Pain Assessment  Pain Assessment: 0-10  0-10 (Numeric) Pain Score: 4  Pain Type: Acute pain  Pain Location: Foot  Pain Orientation: Left  Pain Interventions: Repositioned, Environmental changes, Relaxation technique, Therapeutic presence (Discussion with NSG)  Response to Interventions: Pt actively participating in treatment session  Objective    Cognition:  Cognition  Overall Cognitive Status: Within Functional Limits  Orientation Level: Oriented X4  Safety Judgment: Decreased awareness of need for assistance  Cognition Comments: Pt with flat affect, limited active conversation limited inintiation of conversation  Coordination:  Movements are Fluid and Coordinated: Yes  Activities of Daily Living:      LE Dressing  LE Dressing: Yes  LE Dressing Adaptive Equipment: Sock aide, Reacher  Pants Level of Assistance: Moderate assistance (instructed Pt  with LH reacher thread pants BLE effortful,  stance phase  assist waist  management non compliant NWB LLE)  LE Dressing Where Assessed: Edge of bed    Toileting  Toileting Level of Assistance: Minimum assistance  Where Assessed: Bedside commode (drop arm)  Toileting Comments: assist hygiene in stance Pt adjusting clothing prior/after in stance at  non compliant NWB LLE  Functional Standing Tolerance:  Time: 30 seconds  Activity: ADL skills  Functional Standing Tolerance Comments: max vc compliance to NWB LLE at   Bed Mobility/Transfers: Bed Mobility  Bed Mobility: Yes  Bed Mobility 1  Bed Mobility 1: Sitting to supine  Level of Assistance 1: Close supervision  Bed Mobility Comments 1: vc wound vac line  bed to neutral  Bed Mobility 2  Bed Mobility  2: Supine to sitting  Level of Assistance 2: Modified independent  Bed Mobility Comments 2: bed to neutral    Transfers  Transfer: Yes  Transfer 1  Transfer From 1: Bed to  Transfer to 1: Stand  Technique 1: Sit to stand, Stand to sit  Transfer Device 1: Walker  Transfer Level of Assistance 1: Minimum assistance  Trials/Comments 1: Pt non compliant NWB LLE vc technique    Toilet Transfers  Toilet Transfer From: Bed  Toilet Transfer Type: To and from  Toilet Transfer to: Drop-arm commode  Toilet Transfer Technique: To right, To left  Toilet Transfers: Minimal assistance  Toilet Transfers Comments: asssit vc and walkeer mamagement Pt presenting B feet outside device non compliant to NWB LLE       Shower Transfers  Shower Transfers Comments: educated Pt with home going option cast protector, TTB and grab bar placement handout issuance  for device and installation  by community resource    Outcome Measures:[facility] Daily Activity  Putting on and taking off regular lower body clothing: A lot  Bathing (including washing, rinsing, drying): A lot  Putting on and taking off regular upper body clothing: A little  Toileting, which includes using toilet, bedpan or urinal: A  lot  Taking care of personal grooming such as brushing teeth: A little  Eating Meals: None  Daily Activity - Total Score: 16    Education Documentation  Body Mechanics, taught by BRIANNE Nash at 7/31/2024  4:14 PM.  Learner: Father, Patient  Readiness: Acceptance  Method: Explanation, Demonstration, Handout, Teach-back  Response: Verbalizes Understanding, Needs Reinforcement, Demonstrated Understanding  Comment: Instructed Pt with application NWB to LLE with safety in transfers, adaptive ADL skills, homegoing adaptations, community resouce, balance stretegies    Precautions, taught by BRIANNE Nash at 7/31/2024  4:14 PM.  Learner: Father, Patient  Readiness: Acceptance  Method: Explanation, Demonstration, Handout, Teach-back  Response: Verbalizes Understanding, Needs Reinforcement, Demonstrated Understanding  Comment: Instructed Pt with application NWB to LLE with safety in transfers, adaptive ADL skills, homegoing adaptations, community resouce, balance stretegies    ADL Training, taught by BRIANNE Nash at 7/31/2024  4:14 PM.  Learner: Father, Patient  Readiness: Acceptance  Method: Explanation, Demonstration, Handout, Teach-back  Response: Verbalizes Understanding, Needs Reinforcement, Demonstrated Understanding  Comment: Instructed Pt with application NWB to LLE with safety in transfers, adaptive ADL skills, homegoing adaptations, community resouce, balance stretegies    Education Comments  No comments found.        OP EDUCATION:       Goals:  Encounter Problems       Encounter Problems (Active)       OT Goals       ADLs (Progressing)       Start:  07/26/24    Expected End:  08/09/24       Pt will complete ADL tasks with Mod I, using AE as needed, in order to complete self-care tasks.           Functional transfers (Progressing)       Start:  07/26/24    Expected End:  08/09/24       Pt will perform functional transfers at mod ind level with SW.           Functional mobility (Progressing)        Start:  07/26/24    Expected End:  08/09/24       Pt will perform functional mobility household distance at mod ind level with SW

## 2024-07-31 NOTE — PROGRESS NOTES
CONSULT PROGRESS NOTES    SERVICE DATE: 7/31/2024   SERVICE TIME: 12:18 PM    CONSULTING SERVICE: Nephrology    ASSESSMENT AND PLAN   1.  Chronic kidney disease stage V  2.  Essential hypertension  3.  Acidosis  4.  Anemia of chronic kidney disease    His serum creatinine improved with the IV fluids, which are no longer needed, stop for now.  He has had a stable serum creatinine over the past 2 years.  The acidosis is mild and he is on bicarbonate therapy.  His blood pressure is stable.  He is getting generous dose of erythropoietin stimulating agents.  Blood pressure is running high  No new recommendations today.  At this point, no further need for daily labs, continue supportive care.  I will follow him while here.  He needs to follow-up with Dr. Goldberg upon discharge.  I stressed this with him.    SUBJECTIVE  INTERVAL HPI: He is trying to sort out his insurance so he can be discharged.  He is on broad-spectrum antibiotics.    MEDICATIONS:  ampicillin-sulbactam, 3 g, intravenous, q12h NHAN  aspirin, 81 mg, oral, Daily  atorvastatin, 40 mg, oral, Nightly  carvedilol, 25 mg, oral, BID  epoetin [place] or biosimilar, 10,000 Units, intravenous, Once per day on Monday Wednesday Friday  ezetimibe, 10 mg, oral, Nightly  heparin (porcine), 5,000 Units, subcutaneous, q8h  hydrALAZINE, 100 mg, oral, TID  pantoprazole, 20 mg, oral, Daily before breakfast  perflutren lipid microspheres, 0.5-10 mL of dilution, intravenous, Once in imaging  sodium bicarbonate, 1,300 mg, oral, TID  torsemide, 20 mg, oral, Daily             PRN medications: acetaminophen **OR** acetaminophen **OR** acetaminophen, benzocaine-menthol, dextromethorphan-guaifenesin, dextrose, dextrose, fentaNYL PF, glucagon, glucagon, guaiFENesin, lidocaine PF, midazolam, oxyCODONE, polyethylene glycol, prochlorperazine     OBJECTIVE  PHYSICAL EXAM:   Heart Rate:  [79-91]   Temp:  [36.5 °C (97.7 °F)-36.9 °C (98.4 °F)]   Resp:  [16-18]   BP: (142-176)/(68-87)   SpO2:  [98  %-100 %]   Body mass index is 37.51 kg/m².  This is an obese white man who appears in no acute distress  Regular heart sounds  Breath sounds are clear  Soft abdomen  Right internal jugular honed catheter in place  He has very trace left lower extremity pretibial edema, none on the right  His left foot is under bandage and connected to a wound vacuum  There is no [place] catheter in place  No obvious joint deformities  Moist mucosa  Hearing intact  Phonation intact  Appropriate affect    DATA:   Labs:  Results for orders placed or performed during the hospital encounter of 07/21/24 (from the past 96 hour(s))   POCT GLUCOSE   Result Value Ref Range    POCT Glucose 136 (H) 74 - 99 mg/dL   POCT GLUCOSE   Result Value Ref Range    POCT Glucose 150 (H) 74 - 99 mg/dL   CBC   Result Value Ref Range    WBC 11.6 (H) 4.4 - 11.3 x10*3/uL    nRBC 0.0 0.0 - 0.0 /100 WBCs    RBC 2.57 (L) 4.50 - 5.90 x10*6/uL    Hemoglobin 7.5 (L) 13.5 - 17.5 g/dL    Hematocrit 23.9 (L) 41.0 - 52.0 %    MCV 93 80 - 100 fL    MCH 29.2 26.0 - 34.0 pg    MCHC 31.4 (L) 32.0 - 36.0 g/dL    RDW 15.0 (H) 11.5 - 14.5 %    Platelets 320 150 - 450 x10*3/uL   Renal Function Panel   Result Value Ref Range    Glucose 128 (H) 65 - 99 mg/dL    Sodium 139 133 - 145 mmol/L    Potassium 4.3 3.4 - 5.1 mmol/L    Chloride 105 97 - 107 mmol/L    Bicarbonate 19 (L) 24 - 31 mmol/L    Urea Nitrogen 77 (H) 8 - 25 mg/dL    Creatinine 6.80 (H) 0.40 - 1.60 mg/dL    eGFR 9 (L) >60 mL/min/1.73m*2    Calcium 9.0 8.5 - 10.4 mg/dL    Phosphorus 4.6 (H) 2.5 - 4.5 mg/dL    Albumin 2.9 (L) 3.5 - 5.0 g/dL    Anion Gap 15 <=19 mmol/L   POCT GLUCOSE   Result Value Ref Range    POCT Glucose 128 (H) 74 - 99 mg/dL   POCT GLUCOSE   Result Value Ref Range    POCT Glucose 158 (H) 74 - 99 mg/dL   Renal Function Panel   Result Value Ref Range    Glucose 130 (H) 65 - 99 mg/dL    Sodium 136 133 - 145 mmol/L    Potassium 4.5 3.4 - 5.1 mmol/L    Chloride 103 97 - 107 mmol/L    Bicarbonate 21 (L) 24 -  31 mmol/L    Urea Nitrogen 74 (H) 8 - 25 mg/dL    Creatinine 6.80 (H) 0.40 - 1.60 mg/dL    eGFR 9 (L) >60 mL/min/1.73m*2    Calcium 8.9 8.5 - 10.4 mg/dL    Phosphorus 3.9 2.5 - 4.5 mg/dL    Albumin 2.9 (L) 3.5 - 5.0 g/dL    Anion Gap 12 <=19 mmol/L   CBC   Result Value Ref Range    WBC 15.1 (H) 4.4 - 11.3 x10*3/uL    nRBC 0.1 (H) 0.0 - 0.0 /100 WBCs    RBC 2.49 (L) 4.50 - 5.90 x10*6/uL    Hemoglobin 7.1 (L) 13.5 - 17.5 g/dL    Hematocrit 23.2 (L) 41.0 - 52.0 %    MCV 93 80 - 100 fL    MCH 28.5 26.0 - 34.0 pg    MCHC 30.6 (L) 32.0 - 36.0 g/dL    RDW 14.9 (H) 11.5 - 14.5 %    Platelets 325 150 - 450 x10*3/uL   POCT GLUCOSE   Result Value Ref Range    POCT Glucose 168 (H) 74 - 99 mg/dL   POCT GLUCOSE   Result Value Ref Range    POCT Glucose 129 (H) 74 - 99 mg/dL   Tissue/Wound Culture/Smear    Specimen: SOFT TISSUE BIOPSY; Swab   Result Value Ref Range    Tissue/Wound Culture/Smear No growth aerobically and anaerobically     Gram Stain (2+) Few Polymorphonuclear leukocytes     Gram Stain No organisms seen    POCT GLUCOSE   Result Value Ref Range    POCT Glucose 212 (H) 74 - 99 mg/dL   Renal Function Panel   Result Value Ref Range    Glucose 127 (H) 65 - 99 mg/dL    Sodium 140 133 - 145 mmol/L    Potassium 4.7 3.4 - 5.1 mmol/L    Chloride 104 97 - 107 mmol/L    Bicarbonate 21 (L) 24 - 31 mmol/L    Urea Nitrogen 73 (H) 8 - 25 mg/dL    Creatinine 6.80 (H) 0.40 - 1.60 mg/dL    eGFR 9 (L) >60 mL/min/1.73m*2    Calcium 9.4 8.5 - 10.4 mg/dL    Phosphorus 4.1 2.5 - 4.5 mg/dL    Albumin 3.2 (L) 3.5 - 5.0 g/dL    Anion Gap 15 <=19 mmol/L   CBC   Result Value Ref Range    WBC 18.0 (H) 4.4 - 11.3 x10*3/uL    nRBC 0.3 (H) 0.0 - 0.0 /100 WBCs    RBC 2.54 (L) 4.50 - 5.90 x10*6/uL    Hemoglobin 7.4 (L) 13.5 - 17.5 g/dL    Hematocrit 24.0 (L) 41.0 - 52.0 %    MCV 95 80 - 100 fL    MCH 29.1 26.0 - 34.0 pg    MCHC 30.8 (L) 32.0 - 36.0 g/dL    RDW 14.9 (H) 11.5 - 14.5 %    Platelets 339 150 - 450 x10*3/uL   POCT GLUCOSE   Result Value Ref  Range    POCT Glucose 128 (H) 74 - 99 mg/dL   POCT GLUCOSE   Result Value Ref Range    POCT Glucose 188 (H) 74 - 99 mg/dL   C-reactive protein   Result Value Ref Range    C-Reactive Protein 9.10 (H) 0.00 - 2.00 mg/dL   Sedimentation rate, automated   Result Value Ref Range    Sedimentation Rate 64 (H) 0 - 20 mm/h   POCT GLUCOSE   Result Value Ref Range    POCT Glucose 172 (H) 74 - 99 mg/dL   POCT GLUCOSE   Result Value Ref Range    POCT Glucose 126 (H) 74 - 99 mg/dL   Renal Function Panel   Result Value Ref Range    Glucose 126 (H) 65 - 99 mg/dL    Sodium 138 133 - 145 mmol/L    Potassium 4.3 3.4 - 5.1 mmol/L    Chloride 104 97 - 107 mmol/L    Bicarbonate 20 (L) 24 - 31 mmol/L    Urea Nitrogen 71 (H) 8 - 25 mg/dL    Creatinine 6.70 (H) 0.40 - 1.60 mg/dL    eGFR 9 (L) >60 mL/min/1.73m*2    Calcium 9.5 8.5 - 10.4 mg/dL    Phosphorus 3.7 2.5 - 4.5 mg/dL    Albumin 3.2 (L) 3.5 - 5.0 g/dL    Anion Gap 14 <=19 mmol/L   Basic metabolic panel   Result Value Ref Range    Glucose 114 (H) 65 - 99 mg/dL    Sodium 140 133 - 145 mmol/L    Potassium 3.8 3.4 - 5.1 mmol/L    Chloride 109 (H) 97 - 107 mmol/L    Bicarbonate 18 (L) 24 - 31 mmol/L    Urea Nitrogen 64 (H) 8 - 25 mg/dL    Creatinine 5.80 (H) 0.40 - 1.60 mg/dL    eGFR 10 (L) >60 mL/min/1.73m*2    Calcium 8.1 (L) 8.5 - 10.4 mg/dL    Anion Gap 13 <=19 mmol/L   CBC and Auto Differential   Result Value Ref Range    WBC 18.9 (H) 4.4 - 11.3 x10*3/uL    nRBC 0.0 0.0 - 0.0 /100 WBCs    RBC 2.47 (L) 4.50 - 5.90 x10*6/uL    Hemoglobin 7.1 (L) 13.5 - 17.5 g/dL    Hematocrit 23.5 (L) 41.0 - 52.0 %    MCV 95 80 - 100 fL    MCH 28.7 26.0 - 34.0 pg    MCHC 30.2 (L) 32.0 - 36.0 g/dL    RDW 15.3 (H) 11.5 - 14.5 %    Platelets 327 150 - 450 x10*3/uL    Neutrophils % 74.7 40.0 - 80.0 %    Immature Granulocytes %, Automated 4.0 (H) 0.0 - 0.9 %    Lymphocytes % 11.3 13.0 - 44.0 %    Monocytes % 6.8 2.0 - 10.0 %    Eosinophils % 2.7 0.0 - 6.0 %    Basophils % 0.5 0.0 - 2.0 %    Neutrophils  Absolute 14.09 (H) 1.20 - 7.70 x10*3/uL    Immature Granulocytes Absolute, Automated 0.76 (H) 0.00 - 0.70 x10*3/uL    Lymphocytes Absolute 2.13 1.20 - 4.80 x10*3/uL    Monocytes Absolute 1.29 (H) 0.10 - 1.00 x10*3/uL    Eosinophils Absolute 0.51 0.00 - 0.70 x10*3/uL    Basophils Absolute 0.09 0.00 - 0.10 x10*3/uL   POCT GLUCOSE   Result Value Ref Range    POCT Glucose 187 (H) 74 - 99 mg/dL         SIGNATURE: Boo Max MD PATIENT NAME: Colin Leonard   DATE: July 31, 2024 MRN: [id number]   TIME: 12:18 PM PAGER: [phone]

## 2024-07-31 NOTE — PROGRESS NOTES
Physical Therapy    Physical Therapy Treatment    Patient Name: Colin Leonard  MRN: [id number]  Today's Date: 7/31/2024  Time Calculation  Start Time: 0915  Stop Time: 0930  Time Calculation (min): 15 min    Assessment/Plan   PT Assessment  PT Assessment Results: Decreased strength, Decreased endurance, Decreased range of motion, Impaired balance, Decreased mobility, Decreased safety awareness, Orthopedic restrictions, Decreased skin integrity  Rehab Prognosis: Good  Barriers to Discharge: medical status  Evaluation/Treatment Tolerance: Patient tolerated treatment well  Medical Staff Made Aware: Yes  Strengths: Ability to acquire knowledge, Premorbid level of function  Barriers to Participation: Attitude of self, Comorbidities  End of Session Communication: Bedside nurse  Assessment Comment: Pt demonstrates improved gait quality/tolerance, requires motivation to participate, good ability to ambulate short distances with LLE NWB; will continue to treat as tolerated.  End of Session Patient Position: Bed, 2 rail up, Alarm off, not on at start of session (seated on EOB, RN aware)  PT Plan  Inpatient/Swing Bed or Outpatient: Inpatient  PT Plan  Treatment/Interventions: Bed mobility, Transfer training, Gait training, Stair training, Balance training, Neuromuscular re-education, Strengthening, Endurance training, Range of motion, Therapeutic exercise, Therapeutic activity  PT Plan: Ongoing PT  PT Frequency: 4 times per week  PT Discharge Recommendations: Low intensity level of continued care  Equipment Recommended upon Discharge: Standard walker  PT Recommended Transfer Status: Contact guard, Assistive device  PT - OK to Discharge: Yes    General Visit Information:   PT  Visit  PT Received On: 07/31/24  Response to Previous Treatment: Patient with no complaints from previous session.  General  Reason for Referral: 58 y.o. male presenting with Fatigue and Weakness; s/p amputation 2nd toe left foot since being  admitted  Referred By: Isabelle Cook DO  Past Medical History Relevant to Rehab: Hypertension  CKD stage IV/V  Dyslipidemia  Diabetes mellitus type 2 , Benign essential hypertension,  CKD stage 4, obesity,  HLD Hyperuricemia, LVH Myopia with presbyopia of both eyes,· Vision loss  Missed Visit: No  Missed Visit Reason: Other (Comment)  Family/Caregiver Present: No  Prior to Session Communication: Bedside nurse  Patient Position Received: Bed, 2 rail up, Alarm off, not on at start of session (seated on EOB)  Preferred Learning Style: verbal  General Comment: Pt cleared for therapy via RN, received in sitting on EOB, NAD, agreeable to participate in therapy. (+) LLE wound vac    Subjective   Precautions:  Precautions  Hearing/Visual Limitations: WFL  LE Weight Bearing Status: Left Non-Weight Bearing  Medical Precautions: Fall precautions  Precautions Comment: +LLE wound vac    Objective   Pain:  Pain Assessment  Pain Assessment: 0-10  0-10 (Numeric) Pain Score: 0 - No pain  Cognition:  Cognition  Overall Cognitive Status: Within Functional Limits  Orientation Level: Oriented X4  Insight: Mild  Coordination:  Movements are Fluid and Coordinated: Yes  Postural Control:  Postural Control  Postural Control: Within Functional Limits  Static Sitting Balance  Static Sitting-Balance Support: Feet supported, No upper extremity supported  Static Sitting-Level of Assistance: Independent  Static Standing Balance  Static Standing-Balance Support: Bilateral upper extremity supported  Static Standing-Level of Assistance: Close supervision  Extremity/Trunk Assessments:  RLE   RLE : Within Functional Limits  LLE   LLE :  (ankle AROM limited d/t recent sx)  Activity Tolerance:  Activity Tolerance  Endurance: Tolerates 10 - 20 min exercise with multiple rests  Activity Tolerance Comments: fair+  Treatments:  Therapeutic Exercise  Therapeutic Exercise Performed: No    Bed Mobility  Bed Mobility: No    Ambulation/Gait  "Training  Ambulation/Gait Training Performed: Yes  Ambulation/Gait Training 1  Surface 1: Level tile  Device 1: Standard walker  Assistance 1: Close supervision, Contact guard  Quality of Gait 1: Decreased step length, Forward flexed posture (\"hop-to\" pattern, good ability to maintain LLE NWB with minimal cueing for sequencing/safety, intermittent cueing to decrease francisco and to remain within LILLY of SW)  Comments/Distance (ft) 1: 10' x 2; distance limited d/t fatigue  Transfers  Transfer: Yes  Transfer 1  Transfer From 1: Sit to  Transfer to 1: Stand  Technique 1: Sit to stand  Transfer Device 1: Walker  Transfer Level of Assistance 1: Close supervision  Trials/Comments 1: supervision for safety  Transfers 2  Transfer From 2: Stand to  Transfer to 2: Sit  Technique 2: Stand to sit  Transfer Device 2: Walker  Transfer Level of Assistance 2: Close supervision  Trials/Comments 2: pt abruptly sitting despite cueing from therapist for sequencing/safety, fair eccentric control noted    Stairs  Stairs: No (educated on proper sequencing to negotiate 2 stairs into home; understanding verbalized)    Outcome Measures:  [facility] Basic Mobility  Turning from your back to your side while in a flat bed without using bedrails: None  Moving from lying on your back to sitting on the side of a flat bed without using bedrails: None  Moving to and from bed to chair (including a wheelchair): A little  Standing up from a chair using your arms (e.g. wheelchair or bedside chair): A little  To walk in hospital room: A little  Climbing 3-5 steps with railing: A lot  Basic Mobility - Total Score: 19    Education Documentation  Handouts, taught by Geovanna Ernandez PT at 7/31/2024 10:24 AM.  Learner: Patient  Readiness: Acceptance  Method: Demonstration, Explanation  Response: Needs Reinforcement    Precautions, taught by Geovanna Ernandez PT at 7/31/2024 10:24 AM.  Learner: Patient  Readiness: Acceptance  Method: Demonstration, " Explanation  Response: Needs Reinforcement    Body Mechanics, taught by Geovanna Ernandez PT at 7/31/2024 10:24 AM.  Learner: Patient  Readiness: Acceptance  Method: Demonstration, Explanation  Response: Needs Reinforcement    Home Exercise Program, taught by Geovanna Ernandez PT at 7/31/2024 10:24 AM.  Learner: Patient  Readiness: Acceptance  Method: Demonstration, Explanation  Response: Needs Reinforcement    Mobility Training, taught by Geovanna Ernandez PT at 7/31/2024 10:24 AM.  Learner: Patient  Readiness: Acceptance  Method: Demonstration, Explanation  Response: Needs Reinforcement    Education Comments  No comments found.        OP EDUCATION:  Outpatient Education  Individual(s) Educated: Patient  Education Provided: Fall Risk, POC, Post-Op Precautions  Risk and Benefits Discussed with Patient/Caregiver/Other: yes  Patient/Caregiver Demonstrated Understanding: yes  Plan of Care Discussed and Agreed Upon: yes  Patient Response to Education: Patient/Caregiver Verbalized Understanding of Information    Encounter Problems       Encounter Problems (Active)       Mobility       Pt will ambulate 50' with use of standard walker and modified independence. (Progressing)       Start:  07/28/24    Expected End:  08/31/24               Mobility       STG - Patient will ambulate up and down 2 steps with use of B handrails and supervision for safety. (Not Progressing)       Start:  07/31/24    Expected End:  08/31/24               PT Transfers       STG - Patient maintains LLE non-weight bearing status during transfers (Progressing)       Start:  07/31/24    Expected End:  08/31/24               Pain          Pain - Adult

## 2024-07-31 NOTE — DOCUMENTATION CLARIFICATION NOTE
"    PATIENT:               ODALYS MELVIN  ACCT #:                  [id number]  MRN:                       [id number]  :                       1965  ADMIT DATE:       2024 10:12 PM  DISCH DATE:  RESPONDING PROVIDER #:        [id number]          PROVIDER RESPONSE TEXT:    Bacteremia without sepsis    CDI QUERY TEXT:    Clarification        Instruction:  Based on your assessment of the patient and the clinical information, please provide the requested documentation by clicking on the appropriate radio button and enter any additional information if prompted.    Question: Sepsis was documented in the medical record. Based on the documentation and the clinical information, can the diagnosis be further clarified as    When answering this query, please exercise your independent professional judgment. The fact that a question is being asked, does not imply that any particular answer is desired or expected.    The patient's clinical indicators include:  Clinical Information: 58-year-old male presents with weakness.  Patient was brought in by EMS after having developed significant weakness and slid out of bed.  Patient was unable to be lifted by his son.    Documented Diagnosis: Sepsis    Clinical Indicators:  -Vital Signs: : 38.6, 116, 19, 115/79, [id number]  -WBC: 18.6, 24.0, 14.6, 12.1  -Microbiology Results: wound: MSSA  -Band Neutrophil Count/percent Band Neutrophil:  -Lactic acid: 1.9  -BUN/Creat: : 61/6.40  -Blood cultures: : First set Positive aerobic and anaerobic bottles: Gamella morbillorum, slackia exigua  -Bilirubin: 0.6  -MAP: 92  -Tu Coma Scale:  -PAO2/FIO2: n/a  -Procalcitonin: n/a  -Platelets: 335  -Other clinical indicators:  : ED documentation: \"Sepsis criteria initiated. Patient given broad-spectrum antibiotics.\" \"Clinical concern sepsis\"    : ID PN: \"IV Zosyn-coverage for Streptococcus viridans, MSSA, and gram-negative cocci\"    Treatment: Daptomycin 500 mg x 1, " Linezolid 600 mg q 12 hrs, Zosyn 2.25 grams q 8 hrs, ID consult    Risk Factors: Left foot osteomyelitis, CKD, DM, Obesity, NSTEMI  Options provided:  -- Sepsis was a differential diagnosis and ruled out after study  -- Sepsis with cardiac organ dysfunction of NSTEMI  -- Sepsis with other organ dysfunction, Please specify sepsis associated organ dysfunction below  -- Bacteremia without sepsis  -- Other - I will add my own diagnosis  -- Refer to Clinical Documentation Reviewer    Query created by: Curt Corbett on 7/30/2024 8:28 AM      Electronically signed by:  GIBSON SMITH-CNP 7/31/2024 6:14 PM

## 2024-08-01 ENCOUNTER — HOME HEALTH ADMISSION (OUTPATIENT)
Dept: HOME HEALTH SERVICES | Facility: HOME HEALTH | Age: 59
End: 2024-08-01
Payer: COMMERCIAL

## 2024-08-01 LAB
ANION GAP SERPL CALC-SCNC: 14 MMOL/L
BUN SERPL-MCNC: 72 MG/DL (ref 8–25)
CALCIUM SERPL-MCNC: 9 MG/DL (ref 8.5–10.4)
CHLORIDE SERPL-SCNC: 103 MMOL/L (ref 97–107)
CO2 SERPL-SCNC: 21 MMOL/L (ref 24–31)
CREAT SERPL-MCNC: 7 MG/DL (ref 0.4–1.6)
EGFRCR SERPLBLD CKD-EPI 2021: 8 ML/MIN/1.73M*2
ERYTHROCYTE [DISTWIDTH] IN BLOOD BY AUTOMATED COUNT: 15.8 % (ref 11.5–14.5)
FUNGUS SPEC CULT: NORMAL
FUNGUS SPEC CULT: NORMAL
FUNGUS SPEC FUNGUS STN: NORMAL
FUNGUS SPEC FUNGUS STN: NORMAL
GLUCOSE BLD MANUAL STRIP-MCNC: 143 MG/DL (ref 74–99)
GLUCOSE BLD MANUAL STRIP-MCNC: 157 MG/DL (ref 74–99)
GLUCOSE BLD MANUAL STRIP-MCNC: 159 MG/DL (ref 74–99)
GLUCOSE BLD MANUAL STRIP-MCNC: 167 MG/DL (ref 74–99)
GLUCOSE SERPL-MCNC: 150 MG/DL (ref 65–99)
HCT VFR BLD AUTO: 22.7 % (ref 41–52)
HGB BLD-MCNC: 7 G/DL (ref 13.5–17.5)
MCH RBC QN AUTO: 29.4 PG (ref 26–34)
MCHC RBC AUTO-ENTMCNC: 30.8 G/DL (ref 32–36)
MCV RBC AUTO: 95 FL (ref 80–100)
NRBC BLD-RTO: 0.3 /100 WBCS (ref 0–0)
PLATELET # BLD AUTO: 312 X10*3/UL (ref 150–450)
POTASSIUM SERPL-SCNC: 4.2 MMOL/L (ref 3.4–5.1)
RBC # BLD AUTO: 2.38 X10*6/UL (ref 4.5–5.9)
SODIUM SERPL-SCNC: 138 MMOL/L (ref 133–145)
WBC # BLD AUTO: 17.1 X10*3/UL (ref 4.4–11.3)

## 2024-08-01 PROCEDURE — 2500000004 HC RX 250 GENERAL PHARMACY W/ HCPCS (ALT 636 FOR OP/ED): Performed by: NURSE PRACTITIONER

## 2024-08-01 PROCEDURE — 85027 COMPLETE CBC AUTOMATED: CPT | Performed by: NURSE PRACTITIONER

## 2024-08-01 PROCEDURE — 99232 SBSQ HOSP IP/OBS MODERATE 35: CPT | Performed by: NURSE PRACTITIONER

## 2024-08-01 PROCEDURE — 2500000001 HC RX 250 WO HCPCS SELF ADMINISTERED DRUGS (ALT 637 FOR MEDICARE OP)

## 2024-08-01 PROCEDURE — 1210000001 HC SEMI-PRIVATE ROOM DAILY

## 2024-08-01 PROCEDURE — 82947 ASSAY GLUCOSE BLOOD QUANT: CPT

## 2024-08-01 PROCEDURE — 2500000002 HC RX 250 W HCPCS SELF ADMINISTERED DRUGS (ALT 637 FOR MEDICARE OP, ALT 636 FOR OP/ED)

## 2024-08-01 PROCEDURE — 2500000004 HC RX 250 GENERAL PHARMACY W/ HCPCS (ALT 636 FOR OP/ED)

## 2024-08-01 PROCEDURE — 82565 ASSAY OF CREATININE: CPT | Performed by: NURSE PRACTITIONER

## 2024-08-01 PROCEDURE — 82947 ASSAY GLUCOSE BLOOD QUANT: CPT | Performed by: NURSE PRACTITIONER

## 2024-08-01 RX ADMIN — HEPARIN SODIUM 5000 UNITS: 5000 INJECTION, SOLUTION INTRAVENOUS; SUBCUTANEOUS at 15:59

## 2024-08-01 RX ADMIN — HYDRALAZINE HYDROCHLORIDE 100 MG: 50 TABLET ORAL at 09:24

## 2024-08-01 RX ADMIN — SODIUM BICARBONATE 650 MG TABLET 1300 MG: at 20:50

## 2024-08-01 RX ADMIN — ATORVASTATIN CALCIUM 40 MG: 40 TABLET, FILM COATED ORAL at 20:49

## 2024-08-01 RX ADMIN — SODIUM BICARBONATE 650 MG TABLET 1300 MG: at 15:59

## 2024-08-01 RX ADMIN — SODIUM CHLORIDE 3 G: 900 INJECTION INTRAVENOUS at 20:49

## 2024-08-01 RX ADMIN — HYDRALAZINE HYDROCHLORIDE 100 MG: 50 TABLET ORAL at 20:49

## 2024-08-01 RX ADMIN — TORSEMIDE 20 MG: 20 TABLET ORAL at 09:25

## 2024-08-01 RX ADMIN — HEPARIN SODIUM 5000 UNITS: 5000 INJECTION, SOLUTION INTRAVENOUS; SUBCUTANEOUS at 06:12

## 2024-08-01 RX ADMIN — PANTOPRAZOLE SODIUM 20 MG: 20 TABLET, DELAYED RELEASE ORAL at 06:12

## 2024-08-01 RX ADMIN — EZETIMIBE 10 MG: 10 TABLET ORAL at 20:50

## 2024-08-01 RX ADMIN — SODIUM CHLORIDE 3 G: 900 INJECTION INTRAVENOUS at 13:03

## 2024-08-01 RX ADMIN — HYDRALAZINE HYDROCHLORIDE 100 MG: 50 TABLET ORAL at 15:59

## 2024-08-01 RX ADMIN — ACETAMINOPHEN 650 MG: 325 TABLET ORAL at 09:25

## 2024-08-01 RX ADMIN — HEPARIN SODIUM 5000 UNITS: 5000 INJECTION, SOLUTION INTRAVENOUS; SUBCUTANEOUS at 21:01

## 2024-08-01 RX ADMIN — CARVEDILOL 25 MG: 25 TABLET, FILM COATED ORAL at 20:49

## 2024-08-01 RX ADMIN — CARVEDILOL 25 MG: 25 TABLET, FILM COATED ORAL at 09:24

## 2024-08-01 RX ADMIN — OXYCODONE HYDROCHLORIDE 5 MG: 5 TABLET ORAL at 01:23

## 2024-08-01 RX ADMIN — ASPIRIN 81 MG: 81 TABLET, COATED ORAL at 09:24

## 2024-08-01 ASSESSMENT — PAIN - FUNCTIONAL ASSESSMENT
PAIN_FUNCTIONAL_ASSESSMENT: 0-10

## 2024-08-01 ASSESSMENT — COGNITIVE AND FUNCTIONAL STATUS - GENERAL
TURNING FROM BACK TO SIDE WHILE IN FLAT BAD: TOTAL
PERSONAL GROOMING: TOTAL
MOVING FROM LYING ON BACK TO SITTING ON SIDE OF FLAT BED WITH BEDRAILS: TOTAL
EATING MEALS: TOTAL
DAILY ACTIVITIY SCORE: 15
TOILETING: TOTAL

## 2024-08-01 ASSESSMENT — PAIN SCALES - GENERAL
PAINLEVEL_OUTOF10: 0 - NO PAIN
PAINLEVEL_OUTOF10: 9
PAINLEVEL_OUTOF10: 0 - NO PAIN

## 2024-08-01 ASSESSMENT — PAIN SCALES - WONG BAKER
WONGBAKER_NUMERICALRESPONSE: NO HURT

## 2024-08-01 NOTE — PROGRESS NOTES
Occupational Therapy                 Therapy Communication Note    Patient Name: Colin Leonard  MRN: [id number]  Today's Date: 8/1/2024     Discipline: Occupational Therapy    Missed Visit Reason: Missed Visit Reason: Patient refused (treatment attempted with pt declining and requesting to return at later time)    Missed Time: Attempt    Comment:

## 2024-08-01 NOTE — PROGRESS NOTES
CONSULT PROGRESS NOTES    SERVICE DATE: 8/1/2024   SERVICE TIME: 12:14 PM    CONSULTING SERVICE: Nephrology    ASSESSMENT AND PLAN   1.  Chronic kidney disease stage V  2.  Essential hypertension  3.  Acidosis  4.  Anemia of chronic kidney disease    His serum creatinine improved with the IV fluids, went back up to his usual range after IV fluid cessation yesterday.  He has had a stable serum creatinine over the past 2 years.  The acidosis is mild and he is on bicarbonate therapy.  His blood pressure is stable.  He is getting generous dose of erythropoietin stimulating agents.  Blood pressure is running high.  No new recommendations today.  At this point, no further need for daily labs, continue supportive care.  I will follow him while here.  He needs to follow-up with Dr. Goldberg upon discharge.  I stressed this with him.    SUBJECTIVE  INTERVAL HPI: He has no new complaints or issues.    MEDICATIONS:  ampicillin-sulbactam, 3 g, intravenous, q12h NHAN  aspirin, 81 mg, oral, Daily  atorvastatin, 40 mg, oral, Nightly  carvedilol, 25 mg, oral, BID  epoetin [place] or biosimilar, 10,000 Units, intravenous, Once per day on Monday Wednesday Friday  ezetimibe, 10 mg, oral, Nightly  heparin (porcine), 5,000 Units, subcutaneous, q8h  hydrALAZINE, 100 mg, oral, TID  pantoprazole, 20 mg, oral, Daily before breakfast  perflutren lipid microspheres, 0.5-10 mL of dilution, intravenous, Once in imaging  sodium bicarbonate, 1,300 mg, oral, TID  torsemide, 20 mg, oral, Daily             PRN medications: acetaminophen **OR** acetaminophen **OR** acetaminophen, benzocaine-menthol, dextromethorphan-guaifenesin, dextrose, dextrose, fentaNYL PF, glucagon, glucagon, guaiFENesin, lidocaine PF, midazolam, oxyCODONE, polyethylene glycol, prochlorperazine     OBJECTIVE  PHYSICAL EXAM:   Heart Rate:  [79-92]   Temp:  [36.4 °C (97.5 °F)-36.7 °C (98.1 °F)]   Resp:  [16-18]   BP: (135-153)/(70-99)   SpO2:  [96 %-100 %]   Body mass index is 37.51  kg/m².  This is an obese white man who appears in no acute distress  Regular heart sounds  Breath sounds are clear  Soft abdomen  Right internal jugular honed catheter in place  He has very trace left lower extremity pretibial edema, none on the right  His left foot is under bandage and connected to a wound vacuum  There is no [place] catheter in place  No obvious joint deformities  Moist mucosa  Hearing intact  Phonation intact  Appropriate affect    DATA:   Labs:  Results for orders placed or performed during the hospital encounter of 07/21/24 (from the past 96 hour(s))   POCT GLUCOSE   Result Value Ref Range    POCT Glucose 158 (H) 74 - 99 mg/dL   Renal Function Panel   Result Value Ref Range    Glucose 130 (H) 65 - 99 mg/dL    Sodium 136 133 - 145 mmol/L    Potassium 4.5 3.4 - 5.1 mmol/L    Chloride 103 97 - 107 mmol/L    Bicarbonate 21 (L) 24 - 31 mmol/L    Urea Nitrogen 74 (H) 8 - 25 mg/dL    Creatinine 6.80 (H) 0.40 - 1.60 mg/dL    eGFR 9 (L) >60 mL/min/1.73m*2    Calcium 8.9 8.5 - 10.4 mg/dL    Phosphorus 3.9 2.5 - 4.5 mg/dL    Albumin 2.9 (L) 3.5 - 5.0 g/dL    Anion Gap 12 <=19 mmol/L   CBC   Result Value Ref Range    WBC 15.1 (H) 4.4 - 11.3 x10*3/uL    nRBC 0.1 (H) 0.0 - 0.0 /100 WBCs    RBC 2.49 (L) 4.50 - 5.90 x10*6/uL    Hemoglobin 7.1 (L) 13.5 - 17.5 g/dL    Hematocrit 23.2 (L) 41.0 - 52.0 %    MCV 93 80 - 100 fL    MCH 28.5 26.0 - 34.0 pg    MCHC 30.6 (L) 32.0 - 36.0 g/dL    RDW 14.9 (H) 11.5 - 14.5 %    Platelets 325 150 - 450 x10*3/uL   POCT GLUCOSE   Result Value Ref Range    POCT Glucose 168 (H) 74 - 99 mg/dL   POCT GLUCOSE   Result Value Ref Range    POCT Glucose 129 (H) 74 - 99 mg/dL   Tissue/Wound Culture/Smear    Specimen: SOFT TISSUE BIOPSY; Swab   Result Value Ref Range    Tissue/Wound Culture/Smear No growth aerobically and anaerobically     Gram Stain (2+) Few Polymorphonuclear leukocytes     Gram Stain No organisms seen    POCT GLUCOSE   Result Value Ref Range    POCT Glucose 212 (H) 74 -  99 mg/dL   Renal Function Panel   Result Value Ref Range    Glucose 127 (H) 65 - 99 mg/dL    Sodium 140 133 - 145 mmol/L    Potassium 4.7 3.4 - 5.1 mmol/L    Chloride 104 97 - 107 mmol/L    Bicarbonate 21 (L) 24 - 31 mmol/L    Urea Nitrogen 73 (H) 8 - 25 mg/dL    Creatinine 6.80 (H) 0.40 - 1.60 mg/dL    eGFR 9 (L) >60 mL/min/1.73m*2    Calcium 9.4 8.5 - 10.4 mg/dL    Phosphorus 4.1 2.5 - 4.5 mg/dL    Albumin 3.2 (L) 3.5 - 5.0 g/dL    Anion Gap 15 <=19 mmol/L   CBC   Result Value Ref Range    WBC 18.0 (H) 4.4 - 11.3 x10*3/uL    nRBC 0.3 (H) 0.0 - 0.0 /100 WBCs    RBC 2.54 (L) 4.50 - 5.90 x10*6/uL    Hemoglobin 7.4 (L) 13.5 - 17.5 g/dL    Hematocrit 24.0 (L) 41.0 - 52.0 %    MCV 95 80 - 100 fL    MCH 29.1 26.0 - 34.0 pg    MCHC 30.8 (L) 32.0 - 36.0 g/dL    RDW 14.9 (H) 11.5 - 14.5 %    Platelets 339 150 - 450 x10*3/uL   POCT GLUCOSE   Result Value Ref Range    POCT Glucose 128 (H) 74 - 99 mg/dL   POCT GLUCOSE   Result Value Ref Range    POCT Glucose 188 (H) 74 - 99 mg/dL   C-reactive protein   Result Value Ref Range    C-Reactive Protein 9.10 (H) 0.00 - 2.00 mg/dL   Sedimentation rate, automated   Result Value Ref Range    Sedimentation Rate 64 (H) 0 - 20 mm/h   POCT GLUCOSE   Result Value Ref Range    POCT Glucose 172 (H) 74 - 99 mg/dL   POCT GLUCOSE   Result Value Ref Range    POCT Glucose 126 (H) 74 - 99 mg/dL   Renal Function Panel   Result Value Ref Range    Glucose 126 (H) 65 - 99 mg/dL    Sodium 138 133 - 145 mmol/L    Potassium 4.3 3.4 - 5.1 mmol/L    Chloride 104 97 - 107 mmol/L    Bicarbonate 20 (L) 24 - 31 mmol/L    Urea Nitrogen 71 (H) 8 - 25 mg/dL    Creatinine 6.70 (H) 0.40 - 1.60 mg/dL    eGFR 9 (L) >60 mL/min/1.73m*2    Calcium 9.5 8.5 - 10.4 mg/dL    Phosphorus 3.7 2.5 - 4.5 mg/dL    Albumin 3.2 (L) 3.5 - 5.0 g/dL    Anion Gap 14 <=19 mmol/L   Basic metabolic panel   Result Value Ref Range    Glucose 114 (H) 65 - 99 mg/dL    Sodium 140 133 - 145 mmol/L    Potassium 3.8 3.4 - 5.1 mmol/L    Chloride 109  (H) 97 - 107 mmol/L    Bicarbonate 18 (L) 24 - 31 mmol/L    Urea Nitrogen 64 (H) 8 - 25 mg/dL    Creatinine 5.80 (H) 0.40 - 1.60 mg/dL    eGFR 10 (L) >60 mL/min/1.73m*2    Calcium 8.1 (L) 8.5 - 10.4 mg/dL    Anion Gap 13 <=19 mmol/L   CBC and Auto Differential   Result Value Ref Range    WBC 18.9 (H) 4.4 - 11.3 x10*3/uL    nRBC 0.0 0.0 - 0.0 /100 WBCs    RBC 2.47 (L) 4.50 - 5.90 x10*6/uL    Hemoglobin 7.1 (L) 13.5 - 17.5 g/dL    Hematocrit 23.5 (L) 41.0 - 52.0 %    MCV 95 80 - 100 fL    MCH 28.7 26.0 - 34.0 pg    MCHC 30.2 (L) 32.0 - 36.0 g/dL    RDW 15.3 (H) 11.5 - 14.5 %    Platelets 327 150 - 450 x10*3/uL    Neutrophils % 74.7 40.0 - 80.0 %    Immature Granulocytes %, Automated 4.0 (H) 0.0 - 0.9 %    Lymphocytes % 11.3 13.0 - 44.0 %    Monocytes % 6.8 2.0 - 10.0 %    Eosinophils % 2.7 0.0 - 6.0 %    Basophils % 0.5 0.0 - 2.0 %    Neutrophils Absolute 14.09 (H) 1.20 - 7.70 x10*3/uL    Immature Granulocytes Absolute, Automated 0.76 (H) 0.00 - 0.70 x10*3/uL    Lymphocytes Absolute 2.13 1.20 - 4.80 x10*3/uL    Monocytes Absolute 1.29 (H) 0.10 - 1.00 x10*3/uL    Eosinophils Absolute 0.51 0.00 - 0.70 x10*3/uL    Basophils Absolute 0.09 0.00 - 0.10 x10*3/uL   POCT GLUCOSE   Result Value Ref Range    POCT Glucose 187 (H) 74 - 99 mg/dL   POCT GLUCOSE   Result Value Ref Range    POCT Glucose 167 (H) 74 - 99 mg/dL   POCT GLUCOSE   Result Value Ref Range    POCT Glucose 187 (H) 74 - 99 mg/dL   POCT GLUCOSE   Result Value Ref Range    POCT Glucose 143 (H) 74 - 99 mg/dL   CBC   Result Value Ref Range    WBC 17.1 (H) 4.4 - 11.3 x10*3/uL    nRBC 0.3 (H) 0.0 - 0.0 /100 WBCs    RBC 2.38 (L) 4.50 - 5.90 x10*6/uL    Hemoglobin 7.0 (L) 13.5 - 17.5 g/dL    Hematocrit 22.7 (L) 41.0 - 52.0 %    MCV 95 80 - 100 fL    MCH 29.4 26.0 - 34.0 pg    MCHC 30.8 (L) 32.0 - 36.0 g/dL    RDW 15.8 (H) 11.5 - 14.5 %    Platelets 312 150 - 450 x10*3/uL   Basic Metabolic Panel   Result Value Ref Range    Glucose 150 (H) 65 - 99 mg/dL    Sodium 138 133  - 145 mmol/L    Potassium 4.2 3.4 - 5.1 mmol/L    Chloride 103 97 - 107 mmol/L    Bicarbonate 21 (L) 24 - 31 mmol/L    Urea Nitrogen 72 (H) 8 - 25 mg/dL    Creatinine 7.00 (H) 0.40 - 1.60 mg/dL    eGFR 8 (L) >60 mL/min/1.73m*2    Calcium 9.0 8.5 - 10.4 mg/dL    Anion Gap 14 <=19 mmol/L   POCT GLUCOSE   Result Value Ref Range    POCT Glucose 157 (H) 74 - 99 mg/dL         SIGNATURE: Boo Max MD PATIENT NAME: Colin Leonard   DATE: August 1, 2024 MRN: [id number]   TIME: 12:14 PM PAGER: [phone]

## 2024-08-01 NOTE — PROGRESS NOTES
Colin Leonard is a 58 y.o. male on day 10 of admission presenting with NSTEMI (non-ST elevated myocardial infarction) (Multi).    Subjective   Interval History:   Remains afebrile  Denies shortness of breath  Denies nausea, vomiting, diarrhea  Patient's son at the bedside  Seen with primary NP at bedside      Objective   Range of Vitals (last 24 hours)  Heart Rate:  [79-92]   Temp:  [36.4 °C (97.5 °F)-37 °C (98.6 °F)]   Resp:  [16-18]   BP: (135-155)/(70-99)   SpO2:  [96 %-100 %]   Daily Weight  07/24/24 : 122 kg (268 lb 15.4 oz)    Body mass index is 37.51 kg/m².    Physical Exam  Constitutional:       Appearance: Normal appearance.   HENT:      Head: Normocephalic and atraumatic.      Nose: Nose normal.   Eyes:      General: No scleral icterus.     Extraocular Movements: Extraocular movements intact.      Conjunctiva/sclera: Conjunctivae normal.   Cardiovascular:      Rate and Rhythm: Normal rate and regular rhythm.   Pulmonary:      Effort: Pulmonary effort is normal.      Breath sounds: Normal breath sounds.   Abdominal:      General: Bowel sounds are normal.      Palpations: Abdomen is soft.   Musculoskeletal:      Cervical back: Normal range of motion and neck supple.      Right lower leg: No edema.      Left lower leg: No edema.   Feet:      Left foot:      Comments: Left foot dressing intact  Skin:     General: Skin is warm and dry.   Neurological:      Mental Status: He is alert and oriented to person, place, and time.   Psychiatric:         Mood and Affect: Mood normal.         Behavior: Behavior normal.        Antibiotics  ampicillin-sulbactam (Unasyn) IV 3 g in 100 mL NS (ADV/MBP)  AMPICILLIN-SULBACTAM IV 3 G  ML NS (ADV/MBP)    Relevant Results  Labs  Results from last 72 hours   Lab Units 08/01/24  0831 07/31/24  0851 07/30/24  0627   WBC AUTO x10*3/uL 17.1* 18.9* 18.0*   HEMOGLOBIN g/dL 7.0* 7.1* 7.4*   HEMATOCRIT % 22.7* 23.5* 24.0*   PLATELETS AUTO x10*3/uL 312 327 339   NEUTROS PCT AUTO %   --  74.7  --    LYMPHS PCT AUTO %  --  11.3  --    MONOS PCT AUTO %  --  6.8  --    EOS PCT AUTO %  --  2.7  --      Results from last 72 hours   Lab Units 08/01/24  0905 07/31/24  0851 07/30/24  0627   SODIUM mmol/L 138 140  138 140   POTASSIUM mmol/L 4.2 3.8  4.3 4.7   CHLORIDE mmol/L 103 109*  104 104   CO2 mmol/L 21* 18*  20* 21*   BUN mg/dL 72* 64*  71* 73*   CREATININE mg/dL 7.00* 5.80*  6.70* 6.80*   GLUCOSE mg/dL 150* 114*  126* 127*   CALCIUM mg/dL 9.0 8.1*  9.5 9.4   ANION GAP mmol/L 14 13  14 15   EGFR mL/min/1.73m*2 8* 10*  9* 9*   PHOSPHORUS mg/dL  --  3.7 4.1     Results from last 72 hours   Lab Units 07/31/24  0851 07/30/24  0627   ALBUMIN g/dL 3.2* 3.2*     Estimated Creatinine Clearance: 14.9 mL/min (A) (by C-G formula based on SCr of 7 mg/dL (H)).  C-Reactive Protein   Date Value Ref Range Status   07/30/2024 9.10 (H) 0.00 - 2.00 mg/dL Final   07/22/2024 24.70 (H) 0.00 - 2.00 mg/dL Final     CRP   Date Value Ref Range Status   09/09/2020 5.32 (A) mg/dL Final     Comment:     REF VALUE  < 1.00       Microbiology  Susceptibility data from last 14 days.  Collected Specimen Info Organism Ampicillin/Sulbactam Ceftriaxone Clindamycin Erythromycin Meropenem Oxacillin Penicillin Tetracycline Trimethoprim/Sulfamethoxazole Vancomycin   07/22/24 Tissue/Biopsy from Wound/Tissue Methicillin Susceptible Staphylococcus aureus (MSSA)    R  R   S   S  S  S     Mixed Anaerobic Bacteria               Mixed Gram-Positive Bacteria              07/21/24 Blood culture from Peripheral Venipuncture Peptococcus niger  S  S  S   S   S        Streptococcus anginosus group             07/21/24 Blood culture from Peripheral Venipuncture Gemella morbillorum               [place]               Imaging  Electrocardiogram, 12-lead PRN ACS symptoms    Result Date: 7/31/2024  Poor data quality, interpretation may be adversely affected Sinus tachycardia with occasional Premature ventricular complexes Nonspecific  intraventricular block Abnormal ECG Confirmed by Jag Dorsey ([id number]) on 7/31/2024 5:03:47 PM    IR CVC tunneled    Result Date: 7/31/2024  Interpreted By:  Sid Mcelroy, STUDY: IR CVC TUNNELED;  7/30/2024 11:13 am   INDICATION: Signs/Symptoms:kevin catheter placement.   COMPARISON: None.   ACCESSION NUMBER(S): [id number]   ORDERING CLINICIAN: GIBSON ROMO   TECHNIQUE: INTERVENTIONALIST(S): Sid Mcelroy MD   CONSENT: The patient/patient's POA/next of kin was informed of the nature of the proposed procedure. The purposes, alternatives, risks, and benefits were explained and discussed. All questions were answered and consent was obtained.   RADIATION EXPOSURE: Fluoroscopy time: 0 1 min. Dose: 0.83 mGy.   SEDATION: Moderate conscious IV sedation services (supervision of administration, induction, and maintenance) were provided by the physician performing the procedure with intravenous fentanyl and versed for 18 minutes. The physician was assisted by an independent trained observer, an interventional radiology nurse, in the continuous monitoring of patient level of consciousness and physiologic status.   MEDICATION/CONTRAST: No additional   TIME OUT: A time out was performed immediately prior to procedure start with the interventional team, correctly identifying the patient name, date of birth, MRN, procedure, anatomy (including marking of site and side), patient position, procedure consent form, relevant laboratory and imaging test results, antibiotic administration, safety precautions, and procedure-specific equipment needs.   COMPLICATIONS: No immediate adverse events identified.   FINDINGS: Maximum sterile barrier technique was implemented. In the recumbent position, the patient was positioned on the angiography table. The right supraclavicular and infraclavicular cutaneous tissues were prepared and draped in usual sterile manner.   The supraclavicular access site was evaluated with gray-scale ultrasound, which  demonstrated a widely patent right internal jugular vein, which was selected as the target vein for access. Lidocaine was used for local anesthesia. Under direct ultrasound guidance, the targeted vein was accessed using a 21 gauge micropuncture needle. The needle was exchanged over wire for a 018 guidewire, which was inserted to secure location. The micro-access needle was removed over the guidewire and exchanged for the peel-away sheath. The catheter length was measured at this time using the 018 wire and the catheter was trimmed to 26 cm.   Following additional infusion of lidocaine, a small incision was made and the catheter was tunneled to the venous access site. The catheter was pulled through and the cuff was embedded into the tunneled tract.   The catheter was placed through the peel-away sheath and the peel-away sheath was removed.   A fluoroscopic spot image of the chest was acquired in the AP projection to confirm optimal course of the catheter and location of the catheter tip. The catheter tip is positioned in the cavoatrial junction.   The catheter aspirated and flushed easily.   The venous access incision site was closed with Liquiband and the catheter was secured with suture. Sterile dressings were subsequently applied.   The patient tolerated the procedure without complication.       1. Successful placement of a single lumen right tunneled chest small bore central venous catheter. The catheter is ready to use.   I was present for and/or performed the critical portions of the procedure and immediately available throughout the entire procedure.   I personally reviewed the image(s) / study and resident interpretation. I agree with the findings as stated.   Performed and dictated at [facility].   MACRO: None   Signed by: Sid Mcelroy 7/31/2024 10:58 AM Dictation workstation:   [id number]    MR foot left wo IV contrast    Result Date: 7/24/2024  Interpreted By:  Trung  Ricardo, STUDY: MRI of the    left foot without contrast dated  7/23/2024.   INDICATION: Signs/Symptoms:osteomyelitis/wound   COMPARISON: None.   ACCESSION NUMBER(S): [id number]   ORDERING CLINICIAN: NOE DIAZ   TECHNIQUE: Multiplanar multisequence MRI of the    left forefoot was performed without intravenous gadolinium based contrast.   FINDINGS: OSSEOUS STRUCTURES AND JOINTS:   There is increased T2/STIR signal intensity in the diaphysis into the head the 2nd metatarsal. There is increased T2/STIR signal intensity within the phalanges of the 2nd digit. There is some mottled low T1 signal intensity in the head of the 2nd metatarsal and in the proximal phalanx of the 2nd digit. There is question of a degree disruption of the cortex at the head of the 2nd metatarsal and along the plantar diaphysis and base of the proximal phalanx of the 2nd digit. There is a large volume 2nd digit metatarsophalangeal joint effusion with bulging of the joint capsule. Severe degenerative changes seen of the 1st digit metatarsophalangeal joint. There is some marginal erosion along the medial side of the head of the 1st metatarsal which can be seen with sequelae of crystal arthropathy. Mild degenerative changes seen in the midfoot.   SOFT TISSUES:   There is an ulcer at the plantar aspect of the forefoot superficial to the 2nd digit metatarsophalangeal joint. As seen on this noncontrast examination there appears to be a fistula tract through the soft tissues extending to the 2nd digit metatarsophalangeal joint. The fistula tract also appears to at least partially envelop the flexor digitorum tendon sheath to the 2nd digit and possibly involves the tendon sheath such as seen image 16 of the sagittal plane. There is a degree of generalized ill-defined increased T2 signal intensity in the musculature. Generalized atrophy is seen in the musculature. Reticular increased T2 signal intensity is seen in the subcutaneous tissues of the  visualized lower extremity greatest on the dorsum of the foot and in the 2nd toe.       1. Ulcer at the plantar forefoot with fistula tract of the 2nd digit metatarsophalangeal joint with large volume 2nd digit metatarsophalangeal joint effusion compatible with septic arthritis. There are findings which are felt to be compatible with osteomyelitis at least involving the head of the 2nd metatarsal and the base into the diaphysis the proximal phalanx of the 2nd digit with the reactive marrow changes seen in the distal metadiaphysis of the 2nd metatarsal suspicious for higher likelihood of osteomyelitis involvement. 2. Reactive marrow changes in the middle and distal phalanx of the 2nd digit, which given not directly adjacent to the wound/ulcer are probably of lower likelihood osteomyelitis. 3. The above described fistula tract partially envelops the flexor digitorum tendon sheath of the 2nd digit at the level of the metatarsophalangeal joint and there may be a component of associated at least focal infectious tenosynovitis. 4. Reactive edema in the musculature of the foot overall likely related to ischemic/diabetic myopathy although a component of infectious myositis particularly of the intrinsic musculature adjacent to the distal aspect of the 2nd metatarsal is not excluded. 5. Cellulitis and/or lymphedema of the visualized lower extremity.   Signed by: Ricardo Nino 7/24/2024 8:41 AM Dictation workstation:   [id number]    Transthoracic Echo (TTE) Complete    Result Date: 7/22/2024           [address] [address]            Phone [phone] TRANSTHORACIC ECHOCARDIOGRAM REPORT Patient Name:      ODALYS MELVIN     Reading Physician:    [id number] Kade Pedraza DO Study Date:        7/22/2024             Ordering Provider:    [id number] ASH                                                                 PABLITO MRN/PID:           [id number]              Fellow: Accession#:        [id number]          Nurse: Date of Birth/Age: 1965 / 58 years Sonographer:          Vera Wynn RDCS Gender:            M                     Additional Staff: Height:            180.34 cm             Admit Date: Weight:            118.39 kg             Admission Status:     Inpatient -                                                                Routine BSA / BMI:         2.36 m2 / 36.40 kg/m2 Department Location:  [facility] Blood Pressure: 147 /88 mmHg Study Type:    TRANSTHORACIC ECHO (TTE) COMPLETE Diagnosis/ICD: Atherosclerotic heart disease of native coronary artery without                angina pectoris-I25.10 Indication:    Dyspnea, weakness fatigue CPT Codes:     Echo Complete w Full Doppler-[id number] Patient History: Pertinent History: LVH, HLD, HTN, CAD, Nstemi, SOB CKD, DM. Study Detail: The following Echo studies were performed: 2D, M-Mode, color flow               and Doppler. Unable to obtain suprasternal notch view.  PHYSICIAN INTERPRETATION: Left Ventricle: The left ventricular systolic function is normal, with a visually estimated ejection fraction of 60-65%. There are no regional wall motion abnormalities. The left ventricular cavity size is normal. Left ventricular diastolic filling was indeterminate. Left Atrium: The left atrium is mildly dilated. Right Ventricle: The right ventricle is normal in size. There is normal right ventricular global systolic function. Right Atrium: The right atrium is normal in size. Aortic Valve: The aortic valve is trileaflet. There is evidence of mild aortic valve stenosis. The aortic valve dimensionless index is 0.45. There is no evidence of aortic valve regurgitation. The peak instantaneous gradient of the aortic valve is 20.4 mmHg. The mean gradient of the aortic valve is 10.6 mmHg. Mitral Valve: The mitral  valve is normal in structure. There is mild mitral valve regurgitation. Tricuspid Valve: The tricuspid valve is structurally normal. There is trace tricuspid regurgitation. Pulmonic Valve: The pulmonic valve is not well visualized. The pulmonic valve regurgitation was not well visualized. Pericardium: There is no pericardial effusion noted. Aorta: The aortic root is normal.  CONCLUSIONS:  1. The left ventricular systolic function is normal, with a visually estimated ejection fraction of 60-65%.  2. Left ventricular diastolic filling was indeterminate.  3. There is normal right ventricular global systolic function.  4. Mild aortic valve stenosis.  5. Aortic stenosis mean gradient 10 mm Hg, peak gradient 40 mm Hg and ADÁN 1.43 cm2.  6. Aortic valve dimensionless index 0.43. QUANTITATIVE DATA SUMMARY: 2D MEASUREMENTS:                           Normal Ranges: LAs:           4.76 cm    (2.7-4.0cm) IVSd:          1.70 cm    (0.6-1.1cm) LVPWd:         1.51 cm    (0.6-1.1cm) LVIDd:         3.86 cm    (3.9-5.9cm) LVIDs:         2.85 cm LV Mass Index: 104.8 g/m2 LV % FS        26.2 % LA VOLUME:                               Normal Ranges: LA Vol A4C:        71.7 ml    (22+/-6mL/m2) LA Vol A2C:        92.5 ml LA Vol BP:         86.7 ml LA Vol Index A4C:  30.4 ml/m2 LA Vol Index A2C:  39.2 ml/m2 LA Vol Index BP:   36.7 ml/m2 LA Area A4C:       22.5 cm2 LA Area A2C:       27.2 cm2 LA Major Axis A4C: 6.0 cm LA Major Axis A2C: 6.8 cm LA Volume Index:   36.7 ml/m2 LA Vol A4C:        72.0 ml LA Vol A2C:        92.0 ml RA VOLUME BY A/L METHOD:                               Normal Ranges: RA Vol A4C:        39.5 ml    (8.3-19.5ml) RA Vol Index A4C:  16.7 ml/m2 RA Area A4C:       15.4 cm2 RA Major Axis A4C: 5.1 cm AORTA MEASUREMENTS:                      Normal Ranges: Ao Sinus, d: 3.30 cm (2.1-3.5cm) Ao STJ, d:   3.00 cm (1.7-3.4cm) Asc Ao, d:   3.80 cm (2.1-3.4cm) LV SYSTOLIC FUNCTION BY 2D PLANIMETRY (MOD):                       Normal Ranges: EF-A4C View:    48 % (>=55%) EF-A2C View:    65 % EF-Biplane:     57 % EF-Visual:      63 % LV EF Reported: 63 % LV DIASTOLIC FUNCTION:                         Normal Ranges: MV Peak E:    0.82 m/s  (0.7-1.2 m/s) MV Peak A:    0.99 m/s  (0.42-0.7 m/s) E/A Ratio:    0.83      (1.0-2.2) MV e'         0.071 m/s (>8.0) MV lateral e' 0.08 m/s MV medial e'  0.06 m/s E/e' Ratio:   11.61     (<8.0) MITRAL VALVE:                 Normal Ranges: MV DT: 221 msec (150-240msec) AORTIC VALVE:                                    Normal Ranges: AoV Vmax:                2.26 m/s  (<=1.7m/s) AoV Peak P.4 mmHg (<20mmHg) AoV Mean PG:             10.6 mmHg (1.7-11.5mmHg) LVOT Max Cirilo:            0.97 m/s  (<=1.1m/s) AoV VTI:                 46.31 cm  (18-25cm) LVOT VTI:                21.06 cm LVOT Diameter:           2.00 cm   (1.8-2.4cm) AoV Area, VTI:           1.43 cm2  (2.5-5.5cm2) AoV Area,Vmax:           1.35 cm2  (2.5-4.5cm2) AoV Dimensionless Index: 0.45  RIGHT VENTRICLE: TAPSE: 25.1 mm RV s'  0.15 m/s TRICUSPID VALVE/RVSP:                   Normal Ranges: IVC Diam: 2.02 cm AORTA: Asc Ao Diam 3.83 cm  [id number] [facility]  Electronically signed on 2024 at 10:49:35 AM  ** Final **     XR foot left 3+ views    Result Date: 2024  Interpreted By:  Ely Alanis, STUDY: XR FOOT LEFT 3+ VIEWS;  2024 2:44 am   INDICATION: Signs/Symptoms:Rule out infection.   COMPARISON: None.   ACCESSION NUMBER(S): [id number]   ORDERING CLINICIAN: BRENNAN SOLORIO   FINDINGS: 3 views of the left foot were obtained.   There is diffuse osteopenia. There is no acute fracture or dislocation. Degenerative changes are noted at the 1st metatarsophalangeal joint with hallux valgus. There is cortical lucency at the bases of the 2nd and 3rd distal phalanges. Degenerative changes are noted at the midfoot. There is calcaneal enthesopathy. Vascular calcifications are present. There is diffuse soft tissue swelling at the  left foot. There is small amount of gas at the plantar soft tissues overlying the bases of the toes.       1. Cortical lucency at the bases of the distal phalanges of the left 2nd and 3rd toes, underlying osteomyelitis cannot be excluded. Contrast-enhanced MRI can help in further evaluation. 2. Diffuse soft tissue edema at the left foot. Small amount of gas at the plantar soft tissues overlying the bases of the toes, suggestive of ulcer.       MACRO: None.   Signed by: Ely Alanis 7/22/2024 2:52 AM Dictation workstation:   [id number]    XR chest 1 view    Result Date: 7/21/2024  Interpreted By:  Makeda Gurrola, STUDY: XR CHEST 1 VIEW;  7/21/2024 10:23 pm   INDICATION: Signs/Symptoms:weakness.   COMPARISON: 02/14/2022   ACCESSION NUMBER(S): [id number]   ORDERING CLINICIAN: MIHAELA AUGUSTIN   FINDINGS:     CARDIOMEDIASTINAL SILHOUETTE: Stable cardiomegaly.   LUNGS: No pulmonary consolidation, pleural effusion or pneumothorax. Low lung volumes with bronchovascular crowding.   ABDOMEN: No remarkable upper abdominal findings.   BONES: No acute osseous abnormality.       No acute cardiopulmonary process.   MACRO: None   Signed by: Makeda Gurrola 7/21/2024 10:43 PM Dictation workstation:   [id number]          Assessment/Plan   Streptococcus viridans/gram-negative cocci bacteremia  Chronic kidney disease stage V  Type 2 diabetes with peripheral angiopathy without gangrene  Left diabetic foot ulcer, Brown 3- MRI suspicious osteomyelitis at 2nd metatarsal  Left foot osteomyelitis-s/p delayed closure of left foot         Continue IV Unasyn-cover MSSA, streptococcus, slackia exigua-anaerobic gram positive   Follow up pending cultures-intraoperative cultures negative to date  Local care  Offloading  Monitor temperature and WBC  Violet catheter placed 7/30  Long term plan IV Unasyn for total of 6 weeks till 9/5/2024-scripts printed and placed on the chart, see discharge rec  Weekly CBC with differential, CMP    Total  time spent caring for the patient today was 15 minutes. This includes time spent before the visit reviewing the chart, time spent during the visit, and time spent after the visit on documentation     Lora Watters, APRN-CNP

## 2024-08-01 NOTE — PROGRESS NOTES
Colin Leonard is a 58 y.o. male on day 10 of admission presenting with NSTEMI (non-ST elevated myocardial infarction) (Multi).      Subjective   Patient seen and examined. Awake/alert/oriented. Sitting up in a chair. Denies chest pain, shortness of breath, nausea, or vomiting. No abdominal discomfort.        Objective     Last Recorded Vitals  /75 (BP Location: Right arm, Patient Position: Sitting)   Pulse 80   Temp 37 °C (98.6 °F) (Oral)   Resp 18   Wt 122 kg (268 lb 15.4 oz)   SpO2 100%   Intake/Output last 3 Shifts:    Intake/Output Summary (Last 24 hours) at 8/1/2024 1327  Last data filed at 8/1/2024 1303  Gross per 24 hour   Intake 1080 ml   Output 1675 ml   Net -595 ml       Admission Weight  Weight: 119 kg (261 lb 7.5 oz) (07/21/24 2214)    Daily Weight  07/24/24 : 122 kg (268 lb 15.4 oz)    Image Results  Electrocardiogram, 12-lead PRN ACS symptoms   Poor data quality, interpretation may be adversely affected  Sinus tachycardia with occasional Premature ventricular complexes  Nonspecific intraventricular block  Abnormal ECG    Confirmed by Jag Dorsey ([id number]) on 7/31/2024 5:03:47 PM  IR CVC tunneled  Narrative: Interpreted By:  Sid Mcelroy,   STUDY:  IR CVC TUNNELED;  7/30/2024 11:13 am      INDICATION:  Signs/Symptoms:kevin catheter placement.      COMPARISON:  None.      ACCESSION NUMBER(S):  [id number]      ORDERING CLINICIAN:  GIBSON ROMO      TECHNIQUE:  INTERVENTIONALIST(S):  Sid Mcelroy MD      CONSENT:  The patient/patient's POA/next of kin was informed of the nature of  the proposed procedure. The purposes, alternatives, risks, and  benefits were explained and discussed. All questions were answered  and consent was obtained.      RADIATION EXPOSURE:  Fluoroscopy time: 0 1 min.  Dose: 0.83 mGy.      SEDATION:  Moderate conscious IV sedation services (supervision of  administration, induction, and maintenance) were provided by the  physician performing the procedure with  intravenous fentanyl and  versed for 18 minutes. The physician was assisted by an independent  trained observer, an interventional radiology nurse, in the  continuous monitoring of patient level of consciousness and  physiologic status.      MEDICATION/CONTRAST:  No additional      TIME OUT:  A time out was performed immediately prior to procedure start with  the interventional team, correctly identifying the patient name, date  of birth, MRN, procedure, anatomy (including marking of site and  side), patient position, procedure consent form, relevant laboratory  and imaging test results, antibiotic administration, safety  precautions, and procedure-specific equipment needs.      COMPLICATIONS:  No immediate adverse events identified.      FINDINGS:  Maximum sterile barrier technique was implemented. In the recumbent  position, the patient was positioned on the angiography table. The  right supraclavicular and infraclavicular cutaneous tissues were  prepared and draped in usual sterile manner.      The supraclavicular access site was evaluated with gray-scale  ultrasound, which demonstrated a widely patent right internal jugular  vein, which was selected as the target vein for access. Lidocaine was  used for local anesthesia. Under direct ultrasound guidance, the  targeted vein was accessed using a 21 gauge micropuncture needle. The  needle was exchanged over wire for a 018 guidewire, which was  inserted to secure location. The micro-access needle was removed over  the guidewire and exchanged for the peel-away sheath. The catheter  length was measured at this time using the 018 wire and the catheter  was trimmed to 26 cm.      Following additional infusion of lidocaine, a small incision was made  and the catheter was tunneled to the venous access site. The catheter  was pulled through and the cuff was embedded into the tunneled tract.      The catheter was placed through the peel-away sheath and the  peel-away sheath  was removed.      A fluoroscopic spot image of the chest was acquired in the AP  projection to confirm optimal course of the catheter and location of  the catheter tip. The catheter tip is positioned in the cavoatrial  junction.      The catheter aspirated and flushed easily.      The venous access incision site was closed with Liquiband and the  catheter was secured with suture. Sterile dressings were subsequently  applied.      The patient tolerated the procedure without complication.      Impression: 1. Successful placement of a single lumen right tunneled chest small  bore central venous catheter. The catheter is ready to use.      I was present for and/or performed the critical portions of the  procedure and immediately available throughout the entire procedure.      I personally reviewed the image(s) / study and resident  interpretation. I agree with the findings as stated.      Performed and dictated at [facility].      MACRO:  None      Signed by: Sid Mcelroy 7/31/2024 10:58 AM  Dictation workstation:   [id number]      Physical Exam  Vitals reviewed.   Constitutional:       Appearance: Normal appearance.   HENT:      Head: Normocephalic and atraumatic.   Eyes:      Extraocular Movements: Extraocular movements intact.      Conjunctiva/sclera: Conjunctivae normal.   Cardiovascular:      Rate and Rhythm: Normal rate and regular rhythm.   Pulmonary:      Effort: Pulmonary effort is normal.      Breath sounds: No wheezing, rhonchi or rales.      Comments: Breath sounds clear, diminished T/O  Abdominal:      General: Bowel sounds are normal.      Palpations: Abdomen is soft.      Tenderness: There is no abdominal tenderness.   Skin:     General: Skin is warm and dry.      Comments: Dressing to LE CDI   Neurological:      General: No focal deficit present.      Mental Status: He is alert and oriented to person, place, and time.         Relevant Results  Lab Results   Component  Value Date    GLUCOSE 150 (H) 08/01/2024    CALCIUM 9.0 08/01/2024     08/01/2024    K 4.2 08/01/2024    CO2 21 (L) 08/01/2024     08/01/2024    BUN 72 (H) 08/01/2024    CREATININE 7.00 (H) 08/01/2024     Lab Results   Component Value Date    WBC 17.1 (H) 08/01/2024    HGB 7.0 (L) 08/01/2024    HCT 22.7 (L) 08/01/2024    MCV 95 08/01/2024     08/01/2024     Electrocardiogram, 12-lead PRN ACS symptoms    Result Date: 7/31/2024   Poor data quality, interpretation may be adversely affected Sinus tachycardia with occasional Premature ventricular complexes Nonspecific intraventricular block Abnormal ECG Confirmed by Jag Dorsey ([id number]) on 7/31/2024 5:03:47 PM    IR CVC tunneled  Result Date: 7/31/2024  1. Successful placement of a single lumen right tunneled chest small bore central venous catheter. The catheter is ready to use.   I was present for and/or performed the critical portions of the procedure and immediately available throughout the entire procedure.   I personally reviewed the image(s) / study and resident interpretation. I agree with the findings as stated.   Performed and dictated at [facility].   MACRO: None   Signed by: Sid Mcelroy 7/31/2024 10:58 AM Dictation workstation:   [id number]    MR foot left wo IV contrast  Result Date: 7/24/2024  1. Ulcer at the plantar forefoot with fistula tract of the 2nd digit metatarsophalangeal joint with large volume 2nd digit metatarsophalangeal joint effusion compatible with septic arthritis. There are findings which are felt to be compatible with osteomyelitis at least involving the head of the 2nd metatarsal and the base into the diaphysis the proximal phalanx of the 2nd digit with the reactive marrow changes seen in the distal metadiaphysis of the 2nd metatarsal suspicious for higher likelihood of osteomyelitis involvement. 2. Reactive marrow changes in the middle and distal phalanx of the 2nd digit, which  given not directly adjacent to the wound/ulcer are probably of lower likelihood osteomyelitis. 3. The above described fistula tract partially envelops the flexor digitorum tendon sheath of the 2nd digit at the level of the metatarsophalangeal joint and there may be a component of associated at least focal infectious tenosynovitis. 4. Reactive edema in the musculature of the foot overall likely related to ischemic/diabetic myopathy although a component of infectious myositis particularly of the intrinsic musculature adjacent to the distal aspect of the 2nd metatarsal is not excluded. 5. Cellulitis and/or lymphedema of the visualized lower extremity.   Signed by: Ricardo Nino 7/24/2024 8:41 AM Dictation workstation:   [id number]    Transthoracic Echo (TTE) Complete  Result Date: 7/22/2024  CONCLUSIONS:  1. The left ventricular systolic function is normal, with a visually estimated ejection fraction of 60-65%.  2. Left ventricular diastolic filling was indeterminate.  3. There is normal right ventricular global systolic function.  4. Mild aortic valve stenosis.  5. Aortic stenosis mean gradient 10 mm Hg, peak gradient 40 mm Hg and ADÁN 1.43 cm2.  6. Aortic valve dimensionless index 0.43.     XR foot left 3+ views  Result Date: 7/22/2024  1. Cortical lucency at the bases of the distal phalanges of the left 2nd and 3rd toes, underlying osteomyelitis cannot be excluded. Contrast-enhanced MRI can help in further evaluation. 2. Diffuse soft tissue edema at the left foot. Small amount of gas at the plantar soft tissues overlying the bases of the toes, suggestive of ulcer.       MACRO: None.   Signed by: Ely Alanis 7/22/2024 2:52 AM Dictation workstation:   [id number]    XR chest 1 view  Result Date: 7/21/2024  No acute cardiopulmonary process.   MACRO: None   Signed by: Makeda Gurrola 7/21/2024 10:43 PM Dictation workstation:   [id number]         Assessment/Plan        This patient has a central line   Reason for  the central line remaining today? Parenteral medication    Left-sided diabetic foot ulcer  7/21/24 blood cultures grew Gemella morbillorum, slackia exigua  7/24/24 blood cultures negative: final  7/22/24 Wound culture with MSSA  MRI with multiple findings, ulcer with fistulous track second digit likely septic arthritis, reactive edema, low likelihood of osteomyelitis  ID following closely, recommendations appreciated  S/p partial amputation left lower extremity  S/p kevin placement  Long-term plan IV Unasyn for 6 weeks per ID-Discussed with ID CNP, to place script on the chart in preparation for discharge home with [facility]    Mechanical fall   Falls precautions  PT OT evaluation     Elevated troponin  Cardiology feels more likely CKD 5 causing troponin elevation  No further cardiac workup recommended     Chronic kidney disease stage V  Stable- no urgent need for dialysis at this time  Renally dose meds, avoid nephrotoxic medication when possible, monitor kidney function daily  Nephrology following     Hyperlipidemia  Continue statin, Zetia     DVT/GI  Heparin subcu, PPI    Plan  Continue IV ATB, plan for 6 weeks Unasyn, ID placing script on the chart  Wound vac-did reach out to podiatry for wound vac orders and settings for discharge  Discharge planning to home with [facility] when arrangements made. Patient is medically stable for discharge.                 SARAH Olmstead-CNP

## 2024-08-01 NOTE — CARE PLAN
Problem: Skin  Goal: Prevent/manage excess moisture  Flowsheets (Taken 7/31/2024 2225)  Prevent/manage excess moisture:   Cleanse incontinence/protect with barrier cream   Follow provider orders for dressing changes   Moisturize dry skin   Monitor for/manage infection if present   Use wicking fabric (obtain order)   The patient's goals for the shift include      The clinical goals for the shift include adequate pain control    Over the shift, the patient did not make progress toward the following goals. Barriers to progression include . Recommendations to address these barriers include .

## 2024-08-01 NOTE — CARE PLAN
[facility] /home infusion accepted pt for long term IV ABX therapy. Pt said that his sister Chelo and his son River will be willing to learn the IV therapy  Wound vac order with settings faxed to [facility] liaabdirizak Elliott who is covering for Lelia Villalba; fax number is [phone]; waiting for response.  Pt will need the wound vac delivered to his room prior to discharge   Pt has PICC line in upper right chest.      DISCHARGE PLAN: HOME WITH [facility]/INFUSION--DO NOT DISCHARGE PATIENT BEFORE SPEAKING WITH CARE COORDINATION; NEED WOUND VAC DELIVERED TO PTS ROOM PRIOR TO DISCHARGE

## 2024-08-02 ENCOUNTER — TELEPHONE (OUTPATIENT)
Dept: HOME HEALTH SERVICES | Facility: HOME HEALTH | Age: 59
End: 2024-08-02
Payer: COMMERCIAL

## 2024-08-02 ENCOUNTER — DOCUMENTATION (OUTPATIENT)
Dept: PHARMACY | Facility: CLINIC | Age: 59
End: 2024-08-02

## 2024-08-02 ENCOUNTER — HOME INFUSION (OUTPATIENT)
Dept: INFUSION THERAPY | Age: 59
End: 2024-08-02
Payer: COMMERCIAL

## 2024-08-02 VITALS
RESPIRATION RATE: 17 BRPM | WEIGHT: 268.96 LBS | OXYGEN SATURATION: 98 % | TEMPERATURE: 99 F | BODY MASS INDEX: 37.65 KG/M2 | DIASTOLIC BLOOD PRESSURE: 89 MMHG | HEIGHT: 71 IN | SYSTOLIC BLOOD PRESSURE: 160 MMHG | HEART RATE: 84 BPM

## 2024-08-02 LAB
ATRIAL RATE: 74 BPM
GLUCOSE BLD MANUAL STRIP-MCNC: 116 MG/DL (ref 74–99)
GLUCOSE BLD MANUAL STRIP-MCNC: 132 MG/DL (ref 74–99)
GLUCOSE BLD MANUAL STRIP-MCNC: 175 MG/DL (ref 74–99)
P AXIS: 18 DEGREES
P OFFSET: 188 MS
P ONSET: 134 MS
PR INTERVAL: 160 MS
Q ONSET: 214 MS
QRS COUNT: 12 BEATS
QRS DURATION: 92 MS
QT INTERVAL: 436 MS
QTC CALCULATION(BAZETT): 483 MS
QTC FREDERICIA: 467 MS
R AXIS: -10 DEGREES
T AXIS: 31 DEGREES
T OFFSET: 432 MS
VENTRICULAR RATE: 74 BPM

## 2024-08-02 PROCEDURE — 6350000001 HC RX 635 EPOETIN >10,000 UNITS: Mod: JZ

## 2024-08-02 PROCEDURE — 82947 ASSAY GLUCOSE BLOOD QUANT: CPT

## 2024-08-02 PROCEDURE — 2500000001 HC RX 250 WO HCPCS SELF ADMINISTERED DRUGS (ALT 637 FOR MEDICARE OP)

## 2024-08-02 PROCEDURE — 99239 HOSP IP/OBS DSCHRG MGMT >30: CPT | Performed by: NURSE PRACTITIONER

## 2024-08-02 PROCEDURE — 2500000004 HC RX 250 GENERAL PHARMACY W/ HCPCS (ALT 636 FOR OP/ED)

## 2024-08-02 PROCEDURE — 2500000002 HC RX 250 W HCPCS SELF ADMINISTERED DRUGS (ALT 637 FOR MEDICARE OP, ALT 636 FOR OP/ED)

## 2024-08-02 PROCEDURE — 2500000004 HC RX 250 GENERAL PHARMACY W/ HCPCS (ALT 636 FOR OP/ED): Performed by: NURSE PRACTITIONER

## 2024-08-02 RX ORDER — ACETAMINOPHEN 325 MG/1
650 TABLET ORAL EVERY 4 HOURS PRN
Start: 2024-08-02

## 2024-08-02 RX ORDER — SODIUM BICARBONATE 650 MG/1
1300 TABLET ORAL 3 TIMES DAILY
Qty: 180 TABLET | Refills: 1 | Status: SHIPPED | OUTPATIENT
Start: 2024-08-02

## 2024-08-02 RX ORDER — OXYCODONE HYDROCHLORIDE 5 MG/1
5 TABLET ORAL EVERY 4 HOURS PRN
Qty: 15 TABLET | Refills: 0 | Status: SHIPPED | OUTPATIENT
Start: 2024-08-02 | End: 2024-08-05

## 2024-08-02 RX ADMIN — TORSEMIDE 20 MG: 20 TABLET ORAL at 09:23

## 2024-08-02 RX ADMIN — HEPARIN SODIUM 5000 UNITS: 5000 INJECTION, SOLUTION INTRAVENOUS; SUBCUTANEOUS at 13:49

## 2024-08-02 RX ADMIN — SODIUM CHLORIDE 3 G: 900 INJECTION INTRAVENOUS at 10:19

## 2024-08-02 RX ADMIN — EPOETIN ALFA-EPBX 10000 UNITS: 10000 INJECTION, SOLUTION INTRAVENOUS; SUBCUTANEOUS at 09:23

## 2024-08-02 RX ADMIN — HYDRALAZINE HYDROCHLORIDE 100 MG: 50 TABLET ORAL at 09:23

## 2024-08-02 RX ADMIN — PANTOPRAZOLE SODIUM 20 MG: 20 TABLET, DELAYED RELEASE ORAL at 05:54

## 2024-08-02 RX ADMIN — HEPARIN SODIUM 5000 UNITS: 5000 INJECTION, SOLUTION INTRAVENOUS; SUBCUTANEOUS at 05:54

## 2024-08-02 RX ADMIN — CARVEDILOL 25 MG: 25 TABLET, FILM COATED ORAL at 09:23

## 2024-08-02 RX ADMIN — SODIUM BICARBONATE 650 MG TABLET 1300 MG: at 14:01

## 2024-08-02 RX ADMIN — SODIUM CHLORIDE 3 G: 900 INJECTION INTRAVENOUS at 18:15

## 2024-08-02 RX ADMIN — SODIUM BICARBONATE 650 MG TABLET 1300 MG: at 09:23

## 2024-08-02 RX ADMIN — HYDRALAZINE HYDROCHLORIDE 100 MG: 50 TABLET ORAL at 14:01

## 2024-08-02 RX ADMIN — ASPIRIN 81 MG: 81 TABLET, COATED ORAL at 09:23

## 2024-08-02 RX ADMIN — ACETAMINOPHEN 650 MG: 325 TABLET ORAL at 18:18

## 2024-08-02 ASSESSMENT — PAIN DESCRIPTION - ORIENTATION: ORIENTATION: LEFT

## 2024-08-02 ASSESSMENT — PAIN DESCRIPTION - LOCATION: LOCATION: FOOT

## 2024-08-02 ASSESSMENT — PAIN SCALES - GENERAL
PAINLEVEL_OUTOF10: 0 - NO PAIN
PAINLEVEL_OUTOF10: 2

## 2024-08-02 NOTE — PROGRESS NOTES
Physical Therapy                 Therapy Communication Note    Patient Name: Colin Leonard  MRN: [id number]  Today's Date: 8/2/2024     Discipline: Physical Therapy    Missed Visit Reason: Missed Visit Reason: Other (Comment) (Attempted to see pt a second time for PT session, however, pt on the phone at this time.)    Missed Time: Attempt    Comment:

## 2024-08-02 NOTE — PROGRESS NOTES
SPOKE W/ PT - DELIVERY IS SCHEDULED FOR FRIDAY BY 8 PM.  ASKED PT IF THERE ARE ANY QUESTIONS TO A PHARMACIST. PT SAID: NO QUESTIONS.

## 2024-08-02 NOTE — CARE PLAN
Problem: Skin  Goal: Decreased wound size/increased tissue granulation at next dressing change  Outcome: Progressing  Goal: Participates in plan/prevention/treatment measures  Outcome: Progressing  Goal: Prevent/manage excess moisture  Outcome: Progressing  Goal: Prevent/minimize sheer/friction injuries  Outcome: Progressing  Goal: Promote/optimize nutrition  Outcome: Progressing  Goal: Promote skin healing  Outcome: Progressing  Goal: Absence of infection at discharge  Outcome: Progressing     Problem: Fall/Injury  Goal: Not fall by end of shift  Outcome: Progressing  Goal: Be free from injury by end of the shift  Outcome: Progressing  Goal: Verbalize understanding of personal risk factors for fall in the hospital  Outcome: Progressing  Goal: Verbalize understanding of risk factor reduction measures to prevent injury from fall in the home  Outcome: Progressing  Goal: Use assistive devices by end of the shift  Outcome: Progressing  Goal: Pace activities to prevent fatigue by end of the shift  Outcome: Progressing     Problem: Pain  Goal: Takes deep breaths with improved pain control throughout the shift  Outcome: Progressing  Goal: Turns in bed with improved pain control throughout the shift  Outcome: Progressing  Goal: Walks with improved pain control throughout the shift  Outcome: Progressing  Goal: Performs ADL's with improved pain control throughout shift  Outcome: Progressing  Goal: Participates in PT with improved pain control throughout the shift  Outcome: Progressing  Goal: Free from opioid side effects throughout the shift  Outcome: Progressing  Goal: Free from acute confusion related to pain meds throughout the shift  Outcome: Progressing     Problem: Pain - Adult  Goal: Verbalizes/displays adequate comfort level or baseline comfort level  Outcome: Progressing     Problem: Safety - Adult  Goal: Free from fall injury  Outcome: Progressing     Problem: Discharge Planning  Goal: Discharge to home or other  facility with appropriate resources  Outcome: Progressing     Problem: Chronic Conditions and Co-morbidities  Goal: Patient's chronic conditions and co-morbidity symptoms are monitored and maintained or improved  Outcome: Progressing     Problem: Diabetes  Goal: Achieve decreasing blood glucose levels by end of shift  Outcome: Progressing  Goal: Increase stability of blood glucose readings by end of shift  Outcome: Progressing  Goal: Decrease in ketones present in urine by end of shift  Outcome: Progressing  Goal: Maintain electrolyte levels within acceptable range throughout shift  Outcome: Progressing  Goal: Maintain glucose levels >70mg/dl to <250mg/dl throughout shift  Outcome: Progressing  Goal: No changes in neurological exam by end of shift  Outcome: Progressing  Goal: Learn about and adhere to nutrition recommendations by end of shift  Outcome: Progressing  Goal: Vital signs within normal range for age by end of shift  Outcome: Progressing  Goal: Increase self care and/or family involovement by end of shift  Outcome: Progressing  Goal: Receive DSME education by end of shift  Outcome: Progressing     Problem: Pain  Goal: STG - Patient verbalizes a reduction in pain level  Outcome: Progressing   The patient's goals for the shift include      The clinical goals for the shift include safety

## 2024-08-02 NOTE — PROGRESS NOTES
CONSULT PROGRESS NOTES    SERVICE DATE: 8/2/2024   SERVICE TIME: 12:38 PM    CONSULTING SERVICE: Nephrology    ASSESSMENT AND PLAN   1.  Chronic kidney disease stage V  2.  Essential hypertension  3.  Acidosis  4.  Anemia of chronic kidney disease    His serum creatinine has been stable around 7.0 since hospitalization, frankly has been around there for the past 2 years as well despite his noncompliance.  The acidosis is mild, but he is requiring bicarbonate therapy, which she should continue at discharge.  His blood pressure is stable.  He is getting generous dose of erythropoietin stimulating agents.  These will need to be coordinated with his outpatient nephrologist, Dr. Goldberg.  No new recommendations today.  At this point, no further need for daily labs, continue supportive care.  No objection to discharge from my perspective.    SUBJECTIVE  INTERVAL HPI: He has no new complaints or issues.  Has a decent appetite.  No major pain.    MEDICATIONS:  ampicillin-sulbactam, 3 g, intravenous, q12h NHAN  aspirin, 81 mg, oral, Daily  atorvastatin, 40 mg, oral, Nightly  carvedilol, 25 mg, oral, BID  epoetin [place] or biosimilar, 10,000 Units, intravenous, Once per day on Monday Wednesday Friday  ezetimibe, 10 mg, oral, Nightly  heparin (porcine), 5,000 Units, subcutaneous, q8h  hydrALAZINE, 100 mg, oral, TID  pantoprazole, 20 mg, oral, Daily before breakfast  perflutren lipid microspheres, 0.5-10 mL of dilution, intravenous, Once in imaging  sodium bicarbonate, 1,300 mg, oral, TID  torsemide, 20 mg, oral, Daily             PRN medications: acetaminophen **OR** acetaminophen **OR** acetaminophen, benzocaine-menthol, dextromethorphan-guaifenesin, dextrose, dextrose, fentaNYL PF, glucagon, glucagon, guaiFENesin, lidocaine PF, midazolam, oxyCODONE, polyethylene glycol, prochlorperazine     OBJECTIVE  PHYSICAL EXAM:   Heart Rate:  [76-88]   Temp:  [36.6 °C (97.9 °F)-37.3 °C (99.1 °F)]   Resp:  [17-18]   BP: (146-165)/(70-87)    SpO2:  [97 %-100 %]   Body mass index is 37.51 kg/m².  This is an obese white man who appears in no acute distress  Regular heart sounds  Breath sounds are clear  Soft abdomen  Right internal jugular honed catheter in place  He has very trace left lower extremity pretibial edema, none on the right  His left foot is under bandage and connected to a wound vacuum  There is no [place] catheter in place  No obvious joint deformities  Moist mucosa  Hearing intact  Phonation intact  Appropriate affect    DATA:   Labs:  Results for orders placed or performed during the hospital encounter of 07/21/24 (from the past 96 hour(s))   ECG 12 Lead   Result Value Ref Range    Ventricular Rate 74 BPM    Atrial Rate 74 BPM    HI Interval 160 ms    QRS Duration 92 ms    QT Interval 436 ms    QTC Calculation(Bazett) 483 ms    P Axis 18 degrees    R Axis -10 degrees    T Axis 31 degrees    QRS Count 12 beats    Q Onset 214 ms    P Onset 134 ms    P Offset 188 ms    T Offset 432 ms    QTC Fredericia 467 ms   Tissue/Wound Culture/Smear    Specimen: SOFT TISSUE BIOPSY; Swab   Result Value Ref Range    Tissue/Wound Culture/Smear No growth aerobically and anaerobically     Gram Stain (2+) Few Polymorphonuclear leukocytes     Gram Stain No organisms seen    POCT GLUCOSE   Result Value Ref Range    POCT Glucose 212 (H) 74 - 99 mg/dL   Renal Function Panel   Result Value Ref Range    Glucose 127 (H) 65 - 99 mg/dL    Sodium 140 133 - 145 mmol/L    Potassium 4.7 3.4 - 5.1 mmol/L    Chloride 104 97 - 107 mmol/L    Bicarbonate 21 (L) 24 - 31 mmol/L    Urea Nitrogen 73 (H) 8 - 25 mg/dL    Creatinine 6.80 (H) 0.40 - 1.60 mg/dL    eGFR 9 (L) >60 mL/min/1.73m*2    Calcium 9.4 8.5 - 10.4 mg/dL    Phosphorus 4.1 2.5 - 4.5 mg/dL    Albumin 3.2 (L) 3.5 - 5.0 g/dL    Anion Gap 15 <=19 mmol/L   CBC   Result Value Ref Range    WBC 18.0 (H) 4.4 - 11.3 x10*3/uL    nRBC 0.3 (H) 0.0 - 0.0 /100 WBCs    RBC 2.54 (L) 4.50 - 5.90 x10*6/uL    Hemoglobin 7.4 (L) 13.5 -  17.5 g/dL    Hematocrit 24.0 (L) 41.0 - 52.0 %    MCV 95 80 - 100 fL    MCH 29.1 26.0 - 34.0 pg    MCHC 30.8 (L) 32.0 - 36.0 g/dL    RDW 14.9 (H) 11.5 - 14.5 %    Platelets 339 150 - 450 x10*3/uL   POCT GLUCOSE   Result Value Ref Range    POCT Glucose 128 (H) 74 - 99 mg/dL   POCT GLUCOSE   Result Value Ref Range    POCT Glucose 188 (H) 74 - 99 mg/dL   C-reactive protein   Result Value Ref Range    C-Reactive Protein 9.10 (H) 0.00 - 2.00 mg/dL   Sedimentation rate, automated   Result Value Ref Range    Sedimentation Rate 64 (H) 0 - 20 mm/h   POCT GLUCOSE   Result Value Ref Range    POCT Glucose 172 (H) 74 - 99 mg/dL   POCT GLUCOSE   Result Value Ref Range    POCT Glucose 126 (H) 74 - 99 mg/dL   Renal Function Panel   Result Value Ref Range    Glucose 126 (H) 65 - 99 mg/dL    Sodium 138 133 - 145 mmol/L    Potassium 4.3 3.4 - 5.1 mmol/L    Chloride 104 97 - 107 mmol/L    Bicarbonate 20 (L) 24 - 31 mmol/L    Urea Nitrogen 71 (H) 8 - 25 mg/dL    Creatinine 6.70 (H) 0.40 - 1.60 mg/dL    eGFR 9 (L) >60 mL/min/1.73m*2    Calcium 9.5 8.5 - 10.4 mg/dL    Phosphorus 3.7 2.5 - 4.5 mg/dL    Albumin 3.2 (L) 3.5 - 5.0 g/dL    Anion Gap 14 <=19 mmol/L   Basic metabolic panel   Result Value Ref Range    Glucose 114 (H) 65 - 99 mg/dL    Sodium 140 133 - 145 mmol/L    Potassium 3.8 3.4 - 5.1 mmol/L    Chloride 109 (H) 97 - 107 mmol/L    Bicarbonate 18 (L) 24 - 31 mmol/L    Urea Nitrogen 64 (H) 8 - 25 mg/dL    Creatinine 5.80 (H) 0.40 - 1.60 mg/dL    eGFR 10 (L) >60 mL/min/1.73m*2    Calcium 8.1 (L) 8.5 - 10.4 mg/dL    Anion Gap 13 <=19 mmol/L   CBC and Auto Differential   Result Value Ref Range    WBC 18.9 (H) 4.4 - 11.3 x10*3/uL    nRBC 0.0 0.0 - 0.0 /100 WBCs    RBC 2.47 (L) 4.50 - 5.90 x10*6/uL    Hemoglobin 7.1 (L) 13.5 - 17.5 g/dL    Hematocrit 23.5 (L) 41.0 - 52.0 %    MCV 95 80 - 100 fL    MCH 28.7 26.0 - 34.0 pg    MCHC 30.2 (L) 32.0 - 36.0 g/dL    RDW 15.3 (H) 11.5 - 14.5 %    Platelets 327 150 - 450 x10*3/uL    Neutrophils  % 74.7 40.0 - 80.0 %    Immature Granulocytes %, Automated 4.0 (H) 0.0 - 0.9 %    Lymphocytes % 11.3 13.0 - 44.0 %    Monocytes % 6.8 2.0 - 10.0 %    Eosinophils % 2.7 0.0 - 6.0 %    Basophils % 0.5 0.0 - 2.0 %    Neutrophils Absolute 14.09 (H) 1.20 - 7.70 x10*3/uL    Immature Granulocytes Absolute, Automated 0.76 (H) 0.00 - 0.70 x10*3/uL    Lymphocytes Absolute 2.13 1.20 - 4.80 x10*3/uL    Monocytes Absolute 1.29 (H) 0.10 - 1.00 x10*3/uL    Eosinophils Absolute 0.51 0.00 - 0.70 x10*3/uL    Basophils Absolute 0.09 0.00 - 0.10 x10*3/uL   POCT GLUCOSE   Result Value Ref Range    POCT Glucose 187 (H) 74 - 99 mg/dL   POCT GLUCOSE   Result Value Ref Range    POCT Glucose 167 (H) 74 - 99 mg/dL   POCT GLUCOSE   Result Value Ref Range    POCT Glucose 187 (H) 74 - 99 mg/dL   POCT GLUCOSE   Result Value Ref Range    POCT Glucose 143 (H) 74 - 99 mg/dL   CBC   Result Value Ref Range    WBC 17.1 (H) 4.4 - 11.3 x10*3/uL    nRBC 0.3 (H) 0.0 - 0.0 /100 WBCs    RBC 2.38 (L) 4.50 - 5.90 x10*6/uL    Hemoglobin 7.0 (L) 13.5 - 17.5 g/dL    Hematocrit 22.7 (L) 41.0 - 52.0 %    MCV 95 80 - 100 fL    MCH 29.4 26.0 - 34.0 pg    MCHC 30.8 (L) 32.0 - 36.0 g/dL    RDW 15.8 (H) 11.5 - 14.5 %    Platelets 312 150 - 450 x10*3/uL   Basic Metabolic Panel   Result Value Ref Range    Glucose 150 (H) 65 - 99 mg/dL    Sodium 138 133 - 145 mmol/L    Potassium 4.2 3.4 - 5.1 mmol/L    Chloride 103 97 - 107 mmol/L    Bicarbonate 21 (L) 24 - 31 mmol/L    Urea Nitrogen 72 (H) 8 - 25 mg/dL    Creatinine 7.00 (H) 0.40 - 1.60 mg/dL    eGFR 8 (L) >60 mL/min/1.73m*2    Calcium 9.0 8.5 - 10.4 mg/dL    Anion Gap 14 <=19 mmol/L   POCT GLUCOSE   Result Value Ref Range    POCT Glucose 157 (H) 74 - 99 mg/dL   POCT GLUCOSE   Result Value Ref Range    POCT Glucose 159 (H) 74 - 99 mg/dL   POCT GLUCOSE   Result Value Ref Range    POCT Glucose 167 (H) 74 - 99 mg/dL   POCT GLUCOSE   Result Value Ref Range    POCT Glucose 116 (H) 74 - 99 mg/dL   POCT GLUCOSE   Result Value  Ref Range    POCT Glucose 175 (H) 74 - 99 mg/dL         SIGNATURE: Boo Max MD PATIENT NAME: Colin Leonard   DATE: August 2, 2024 MRN: [id number]   TIME: 12:38 PM PAGER: [phone]

## 2024-08-02 NOTE — PROGRESS NOTES
Physical Therapy                 Therapy Communication Note    Patient Name: Colin Leonard  MRN: [id number]  Today's Date: 8/2/2024     Discipline: Physical Therapy    Missed Visit Reason: Missed Visit Reason: Other (Comment) (Approached pt for PT session. Pt awaiting arrive of lunch tray and requesting that therapist return later if able.)    Missed Time: Attempt    Comment:

## 2024-08-02 NOTE — PROGRESS NOTES
Colin Leonard is a 58 y.o. male on day 11 of admission presenting with NSTEMI (non-ST elevated myocardial infarction) (Multi).      Subjective   Patient seen and examined. Awake/alert/oriented. Sitting up in a chair. Denies chest pain, shortness of breath, nausea, or vomiting. No abdominal discomfort.        Objective     Last Recorded Vitals  /72 (BP Location: Left arm, Patient Position: Lying)   Pulse 82   Temp 36.7 °C (98.1 °F) (Oral)   Resp 18   Wt 122 kg (268 lb 15.4 oz)   SpO2 97%   Intake/Output last 3 Shifts:    Intake/Output Summary (Last 24 hours) at 8/2/2024 1108  Last data filed at 8/2/2024 1029  Gross per 24 hour   Intake 1180 ml   Output 2850 ml   Net -1670 ml       Admission Weight  Weight: 119 kg (261 lb 7.5 oz) (07/21/24 2214)    Daily Weight  07/24/24 : 122 kg (268 lb 15.4 oz)    Image Results  Electrocardiogram, 12-lead PRN ACS symptoms   Poor data quality, interpretation may be adversely affected  Sinus tachycardia with occasional Premature ventricular complexes  Nonspecific intraventricular block  Abnormal ECG    Confirmed by Jag Dorsey ([id number]) on 7/31/2024 5:03:47 PM  IR CVC tunneled  Narrative: Interpreted By:  Sid Mcelroy,   STUDY:  IR CVC TUNNELED;  7/30/2024 11:13 am      INDICATION:  Signs/Symptoms:kevin catheter placement.      COMPARISON:  None.      ACCESSION NUMBER(S):  [id number]      ORDERING CLINICIAN:  GIBSON ROMO      TECHNIQUE:  INTERVENTIONALIST(S):  Sid Mcelroy MD      CONSENT:  The patient/patient's POA/next of kin was informed of the nature of  the proposed procedure. The purposes, alternatives, risks, and  benefits were explained and discussed. All questions were answered  and consent was obtained.      RADIATION EXPOSURE:  Fluoroscopy time: 0 1 min.  Dose: 0.83 mGy.      SEDATION:  Moderate conscious IV sedation services (supervision of  administration, induction, and maintenance) were provided by the  physician performing the procedure with  intravenous fentanyl and  versed for 18 minutes. The physician was assisted by an independent  trained observer, an interventional radiology nurse, in the  continuous monitoring of patient level of consciousness and  physiologic status.      MEDICATION/CONTRAST:  No additional      TIME OUT:  A time out was performed immediately prior to procedure start with  the interventional team, correctly identifying the patient name, date  of birth, MRN, procedure, anatomy (including marking of site and  side), patient position, procedure consent form, relevant laboratory  and imaging test results, antibiotic administration, safety  precautions, and procedure-specific equipment needs.      COMPLICATIONS:  No immediate adverse events identified.      FINDINGS:  Maximum sterile barrier technique was implemented. In the recumbent  position, the patient was positioned on the angiography table. The  right supraclavicular and infraclavicular cutaneous tissues were  prepared and draped in usual sterile manner.      The supraclavicular access site was evaluated with gray-scale  ultrasound, which demonstrated a widely patent right internal jugular  vein, which was selected as the target vein for access. Lidocaine was  used for local anesthesia. Under direct ultrasound guidance, the  targeted vein was accessed using a 21 gauge micropuncture needle. The  needle was exchanged over wire for a 018 guidewire, which was  inserted to secure location. The micro-access needle was removed over  the guidewire and exchanged for the peel-away sheath. The catheter  length was measured at this time using the 018 wire and the catheter  was trimmed to 26 cm.      Following additional infusion of lidocaine, a small incision was made  and the catheter was tunneled to the venous access site. The catheter  was pulled through and the cuff was embedded into the tunneled tract.      The catheter was placed through the peel-away sheath and the  peel-away sheath  was removed.      A fluoroscopic spot image of the chest was acquired in the AP  projection to confirm optimal course of the catheter and location of  the catheter tip. The catheter tip is positioned in the cavoatrial  junction.      The catheter aspirated and flushed easily.      The venous access incision site was closed with Liquiband and the  catheter was secured with suture. Sterile dressings were subsequently  applied.      The patient tolerated the procedure without complication.      Impression: 1. Successful placement of a single lumen right tunneled chest small  bore central venous catheter. The catheter is ready to use.      I was present for and/or performed the critical portions of the  procedure and immediately available throughout the entire procedure.      I personally reviewed the image(s) / study and resident  interpretation. I agree with the findings as stated.      Performed and dictated at [facility].      MACRO:  None      Signed by: Sid Mcelroy 7/31/2024 10:58 AM  Dictation workstation:   [id number]      Physical Exam  Vitals reviewed.   Constitutional:       Appearance: Normal appearance.   HENT:      Head: Normocephalic and atraumatic.   Eyes:      Extraocular Movements: Extraocular movements intact.      Conjunctiva/sclera: Conjunctivae normal.   Cardiovascular:      Rate and Rhythm: Normal rate and regular rhythm.   Pulmonary:      Effort: Pulmonary effort is normal.      Breath sounds: No wheezing, rhonchi or rales.      Comments: Breath sounds clear, diminished T/O  Abdominal:      General: Bowel sounds are normal.      Palpations: Abdomen is soft.      Tenderness: There is no abdominal tenderness.   Skin:     General: Skin is warm and dry.      Comments: Dressing to LE CDI   Neurological:      General: No focal deficit present.      Mental Status: He is alert and oriented to person, place, and time.         Relevant Results  Lab Results   Component  Value Date    GLUCOSE 150 (H) 08/01/2024    CALCIUM 9.0 08/01/2024     08/01/2024    K 4.2 08/01/2024    CO2 21 (L) 08/01/2024     08/01/2024    BUN 72 (H) 08/01/2024    CREATININE 7.00 (H) 08/01/2024     Lab Results   Component Value Date    WBC 17.1 (H) 08/01/2024    HGB 7.0 (L) 08/01/2024    HCT 22.7 (L) 08/01/2024    MCV 95 08/01/2024     08/01/2024     Electrocardiogram, 12-lead PRN ACS symptoms    Result Date: 7/31/2024   Poor data quality, interpretation may be adversely affected Sinus tachycardia with occasional Premature ventricular complexes Nonspecific intraventricular block Abnormal ECG Confirmed by Jag Dorsey ([id number]) on 7/31/2024 5:03:47 PM    IR CVC tunneled  Result Date: 7/31/2024  1. Successful placement of a single lumen right tunneled chest small bore central venous catheter. The catheter is ready to use.   I was present for and/or performed the critical portions of the procedure and immediately available throughout the entire procedure.   I personally reviewed the image(s) / study and resident interpretation. I agree with the findings as stated.   Performed and dictated at [facility].   MACRO: None   Signed by: Sid Mcelroy 7/31/2024 10:58 AM Dictation workstation:   [id number]    MR foot left wo IV contrast  Result Date: 7/24/2024  1. Ulcer at the plantar forefoot with fistula tract of the 2nd digit metatarsophalangeal joint with large volume 2nd digit metatarsophalangeal joint effusion compatible with septic arthritis. There are findings which are felt to be compatible with osteomyelitis at least involving the head of the 2nd metatarsal and the base into the diaphysis the proximal phalanx of the 2nd digit with the reactive marrow changes seen in the distal metadiaphysis of the 2nd metatarsal suspicious for higher likelihood of osteomyelitis involvement. 2. Reactive marrow changes in the middle and distal phalanx of the 2nd digit, which  given not directly adjacent to the wound/ulcer are probably of lower likelihood osteomyelitis. 3. The above described fistula tract partially envelops the flexor digitorum tendon sheath of the 2nd digit at the level of the metatarsophalangeal joint and there may be a component of associated at least focal infectious tenosynovitis. 4. Reactive edema in the musculature of the foot overall likely related to ischemic/diabetic myopathy although a component of infectious myositis particularly of the intrinsic musculature adjacent to the distal aspect of the 2nd metatarsal is not excluded. 5. Cellulitis and/or lymphedema of the visualized lower extremity.   Signed by: Ricardo Nino 7/24/2024 8:41 AM Dictation workstation:   [id number]    Transthoracic Echo (TTE) Complete  Result Date: 7/22/2024  CONCLUSIONS:  1. The left ventricular systolic function is normal, with a visually estimated ejection fraction of 60-65%.  2. Left ventricular diastolic filling was indeterminate.  3. There is normal right ventricular global systolic function.  4. Mild aortic valve stenosis.  5. Aortic stenosis mean gradient 10 mm Hg, peak gradient 40 mm Hg and ADÁN 1.43 cm2.  6. Aortic valve dimensionless index 0.43.     XR foot left 3+ views  Result Date: 7/22/2024  1. Cortical lucency at the bases of the distal phalanges of the left 2nd and 3rd toes, underlying osteomyelitis cannot be excluded. Contrast-enhanced MRI can help in further evaluation. 2. Diffuse soft tissue edema at the left foot. Small amount of gas at the plantar soft tissues overlying the bases of the toes, suggestive of ulcer.       MACRO: None.   Signed by: Ely Alanis 7/22/2024 2:52 AM Dictation workstation:   [id number]    XR chest 1 view  Result Date: 7/21/2024  No acute cardiopulmonary process.   MACRO: None   Signed by: Makeda Gurrola 7/21/2024 10:43 PM Dictation workstation:   [id number]         Assessment/Plan        This patient has a central line   Reason for  the central line remaining today? Parenteral medication    Left-sided diabetic foot ulcer  7/21/24 blood cultures grew Gemella morbillorum, slackia exigua  7/24/24 blood cultures negative: final  7/22/24 Wound culture with MSSA  MRI with multiple findings, ulcer with fistulous track second digit likely septic arthritis, reactive edema, low likelihood of osteomyelitis  ID following closely, recommendations appreciated  S/p partial amputation left lower extremity  S/p kevin placement  Long-term plan IV Unasyn for 6 weeks per ID-script on the chart   Awaiting wound vac orders for discharge by podiatry    Mechanical fall   Falls precautions  PT OT evaluation     Elevated troponin  Cardiology feels more likely CKD 5 causing troponin elevation  No further cardiac workup recommended     Chronic kidney disease stage V  Stable- no urgent need for dialysis at this time  Renally dose meds, avoid nephrotoxic medication when possible, monitor kidney function daily  Nephrology following     Hyperlipidemia  Continue statin, Zetia     DVT/GI  Heparin subcu, PPI    Plan  Continue IV ATB, plan for 6 weeks Unasyn, ID placing script on the chart  Wound vac-did reach out to podiatry for wound vac orders and settings for discharge  Discharge planning to home with [facility] when arrangements made. Patient is medically stable for discharge.                 Dina Sterling, APRN-CNP

## 2024-08-02 NOTE — CARE PLAN
Waiting for Dr. Munoz to sign the wound vac paperwork electronically;spoke to the office and they will give message to Dr. Munoz  13:35 per Dr. Munozs office, the  signed the form; notified  liaison Mayda Eli via text.  Waiting for  to respond with which VAC is to be dispensed to the pt.  Spoke with pharmacy via [facility] they are checking nursing availability now; gave  Home infusion the pts address (confirmed by pt) and his cell phone number. Pts son will be at home after 4 today to receive the IV ABX  17:58 IV infusion all ready for home; med delivered, SOC is tomorrow AM; called [facility] and spoke to the manager; she said that they will review the wound vac order in the morning, not to hold up a discharge, they will take care of it.  Called  and spoke with a rep at [phone] and they confirmed that the order is complete, they have the serial/barcode for the wound vac [id number]  Wound vac taken to the floor; nurses are aware; pt is discharged home  18:58 pt signed proof of delivery; document faxed to Mayda Eli from  to her fax [phone]    Discharge plan: HOME WITH [facility]; HOME INFUSION --

## 2024-08-02 NOTE — TELEPHONE ENCOUNTER
Hi Dr. Munoz,    The wound vac orders in the chart for this pt did not have a frequency for dressing changes. Per TCC, she stated that she spoke to you on the phone and you stated that the dressing changes are 3x/week, cont suction 125mmHg, black foam. If you could verify that this is correct and sign this encounter, we would appreciate it.     Thank you,    Betty Redman RN  [facility] Intake RN  
[id number] Comprehensive

## 2024-08-02 NOTE — HOSPITAL COURSE
Colin Leonard is a 58 y.o. male with a past medical history of HTN, CKD stage IV/V, DM, and dyslipidemia who presented to [facility] with fatigue and weakness. Patient seen by Dr. Kade Pedraza in the ED. Per Dr. Pedraza he has an elevated coronary artery calcium score.  Code STEMI was activated and Dr. Pedraza reported to the emergency department to evaluate the patient. His twelve-lead EKG reveals a sinus tachycardia with a left bundle branch block pattern. No chest pain at that time. STEMI was cancelled. Patient's ED diagnostic workup noted for a marked leukocytosis of 18.6.  The H&H was low normal at 9.3/29.1.  Patient's blood chemistry noted for an elevated glucose of 162.  The bicarbonate was low at 14.  The BUN and creatinine were 61/6.4, respectively.  Cultures were obtained in the ED.  Rapid influenza and coronavirus testing were negative.  Lactic acid level was normal at 1.9.  proBNP was elevated 8,252.  The patient's first troponin level was 363 followed by 403, respectively. Patient's EKG noted for normal sinus rhythm at 75 bpm.  Possible anterior infarct.  QTc 482 ms. No acute cardiopulmonary process noted on the chest x-ray. X-ray of the patient's left foot: 1. Cortical lucency at the bases of the distal phalanges of the left 2nd and 3rd toes, underlying osteomyelitis cannot be excluded. Diffuse soft tissue edema at the left foot. Small amount of gas at the plantar soft tissues overlying the bases of the toes, suggestive of ulcer.    Admitted to [facility] for further evaluation and treatment. 7/21/24 blood cultures grew Gemella morbillorum, slackia exigua 7/24/24 blood cultures negative: final 7/22/24 Wound culture with MSSA. MRI with multiple findings, ulcer with fistulous track second digit likely septic arthritis, reactive edema, low likelihood of osteomyelitis. S/p partial amputation left lower extremity. S/p kevin placement. Long-term plan IV Unasyn for 6 weeks per ID-script on the chart. Wound  vac orders for discharge by podiatry were received. Cleared by ID and podiatry for discharge. Cardiology felt likely CKD 5 causing elevated troponin, no further work up recommended. Nephrology has been following, CKD stable, no urgent need for dialysis, follow up outpatient. Cleared by nephrology for discharge. Home health care to be arranged for IV ATB and wound vac. Cleared by all consultants. Medically stable for discharge.

## 2024-08-02 NOTE — DISCHARGE SUMMARY
Discharge Diagnosis  Diabetic foot ulcer    Issues Requiring Follow-Up  Follow up with PCP, ID, podiatry, nephrology outpatient    Test Results Pending At Discharge  Pending Labs       Order Current Status    AFB Culture/Smear Preliminary result    AFB Culture/Smear Preliminary result    Fungal Culture/Smear Preliminary result    Fungal Culture/Smear Preliminary result            Hospital Course  Colin Leonard is a 58 y.o. male with a past medical history of HTN, CKD stage IV/V, DM, and dyslipidemia who presented to [facility] with fatigue and weakness. Patient seen by Dr. Kade Pedraza in the ED. Per Dr. Pedraza he has an elevated coronary artery calcium score.  Code STEMI was activated and Dr. Pedraza reported to the emergency department to evaluate the patient. His twelve-lead EKG reveals a sinus tachycardia with a left bundle branch block pattern. No chest pain at that time. STEMI was cancelled. Patient's ED diagnostic workup noted for a marked leukocytosis of 18.6.  The H&H was low normal at 9.3/29.1.  Patient's blood chemistry noted for an elevated glucose of 162.  The bicarbonate was low at 14.  The BUN and creatinine were 61/6.4, respectively.  Cultures were obtained in the ED.  Rapid influenza and coronavirus testing were negative.  Lactic acid level was normal at 1.9.  proBNP was elevated 8,252.  The patient's first troponin level was 363 followed by 403, respectively. Patient's EKG noted for normal sinus rhythm at 75 bpm.  Possible anterior infarct.  QTc 482 ms. No acute cardiopulmonary process noted on the chest x-ray. X-ray of the patient's left foot: 1. Cortical lucency at the bases of the distal phalanges of the left 2nd and 3rd toes, underlying osteomyelitis cannot be excluded. Diffuse soft tissue edema at the left foot. Small amount of gas at the plantar soft tissues overlying the bases of the toes, suggestive of ulcer.    Admitted to [facility] for further evaluation and treatment. 7/21/24 blood  cultures grew Gemella morbillorum, jacky mercadoa 7/24/24 blood cultures negative: final 7/22/24 Wound culture with MSSA. MRI with multiple findings, ulcer with fistulous track second digit likely septic arthritis, reactive edema, low likelihood of osteomyelitis. S/p partial amputation left lower extremity. S/p kevin placement. Long-term plan IV Unasyn for 6 weeks per ID-script on the chart. Wound vac orders for discharge by podiatry were received. Cleared by ID and podiatry for discharge. Cardiology felt likely CKD 5 causing elevated troponin, no further work up recommended. Nephrology has been following, CKD stable, no urgent need for dialysis, follow up outpatient. Cleared by nephrology for discharge. Home health care to be arranged for IV ATB and wound vac. Cleared by all consultants. Medically stable for discharge.        Pertinent Physical Exam At Time of Discharge  Physical Exam  Vitals reviewed.   Constitutional:       Appearance: Normal appearance.   HENT:      Head: Normocephalic and atraumatic.   Eyes:      Extraocular Movements: Extraocular movements intact.      Conjunctiva/sclera: Conjunctivae normal.   Cardiovascular:      Rate and Rhythm: Normal rate and regular rhythm.   Pulmonary:      Effort: Pulmonary effort is normal.      Breath sounds: No wheezing, rhonchi or rales.      Comments: Breath sounds clear, diminished T/O  Abdominal:      General: Bowel sounds are normal.      Palpations: Abdomen is soft.      Tenderness: There is no abdominal tenderness.   Skin:     General: Skin is warm and dry.      Comments: Dressing to LE [facility]   Neurological:      General: No focal deficit present.      Mental Status: He is alert and oriented to person, place, and time.     Home Medications     Medication List      START taking these medications     acetaminophen 325 mg tablet; Commonly known as: Tylenol; Take 2 tablets   (650 mg) by mouth every 4 hours if needed for mild pain (1 - 3).   ampicillin-sulbactam 3 g  in sodium chloride 0.9 % 100 mL IV; Infuse 3 g   at 200 mL/hr over 30 minutes into a venous catheter every 12 hours.   oxyCODONE 5 mg immediate release tablet; Commonly known as: Roxicodone;   Take 1 tablet (5 mg) by mouth every 4 hours if needed for severe pain (7 -   10) for up to 3 days.   sodium bicarbonate 650 mg tablet; Take 2 tablets (1,300 mg) by mouth 3   times a day.     CONTINUE taking these medications     aspirin 81 mg EC tablet   carvedilol 25 mg tablet; Commonly known as: Coreg; Take 1 tablet (25 mg)   by mouth 2 times a day.   ezetimibe 10 mg tablet; Commonly known as: Zetia; Take 1 tablet (10 mg)   by mouth once daily at bedtime.   hydrALAZINE 100 mg tablet; Commonly known as: Apresoline; Take 1 tablet   (100 mg) by mouth 3 times a day.   torsemide 20 mg tablet; Commonly known as: Demadex; Take 2 Tablets by   Mouth Twice Daily     STOP taking these medications     spironolactone 25 mg tablet; Commonly known as: Aldactone     ASK your doctor about these medications     alteplase 2 mg injection; Commonly known as: Cathflo Activase; 2 mL (2   mg) by intra-catheter route 1 time for 1 dose.; Ask about: Should I take   this medication?       Outpatient Follow-Up  Future Appointments   Date Time Provider Department Center   8/3/2024  9:00 AM Alexandra Sidhu RN [facility]   10/28/2024  2:30 PM Mahendra Saravia MD [address]     Time spent on discharge: 35 minutes    SARAH Olmstead-CNP

## 2024-08-02 NOTE — PROGRESS NOTES
DIAGNOSIS OM of L foot  Allergies   Allergen Reactions    Amlodipine Besylate Swelling     edema legs      REVIEW OF LABS AT DISCHARGE  LINE INFO: PT HAS A SL tunneled PICC TO BE MANAGED PER [facility] PROTOCOL  PT ORDERED Unasyn 3 gm q12 THRU 9/5  LABS ORDERED INCLUDE CBC/diff, CMP  SYSTEM gravity (MB+)  CARE PLAN DONE    Colin Leonard IS A 58 y.o. male ORDERED Unasyn  FOLLOWED BY Dr Rodrigez    [facility] spoke with pt, informed him of medication name, delivery, dosing, etc. He verbalized understanding of instruction regarding medication and receipt/storage of package. Verified delivery address as [address] [id number] and stated that son would be home after 4 pm to accept delivery.  to call on way, delivery ok by 8 pm.    RX DISPENSED THE FOLLOWING WITH SUPPLIES TO MATCH WITH DELIVERY 8/2:  14x Unasyn MB+  DOS 8/3-8/9    FOLLOW UP 8/9 PROGRESS, LABS, DELIVERY STRAIGHT

## 2024-08-03 ENCOUNTER — HOME CARE VISIT (OUTPATIENT)
Dept: HOME HEALTH SERVICES | Facility: HOME HEALTH | Age: 59
End: 2024-08-03
Payer: COMMERCIAL

## 2024-08-03 VITALS
HEART RATE: 76 BPM | SYSTOLIC BLOOD PRESSURE: 148 MMHG | DIASTOLIC BLOOD PRESSURE: 90 MMHG | OXYGEN SATURATION: 98 % | TEMPERATURE: 98.2 F | RESPIRATION RATE: 12 BRPM

## 2024-08-03 PROCEDURE — G0299 HHS/HOSPICE OF RN EA 15 MIN: HCPCS

## 2024-08-03 PROCEDURE — 99602 HOME NFS VISIT EACH ADDL HR: CPT

## 2024-08-03 ASSESSMENT — ENCOUNTER SYMPTOMS
PAIN LOCATION - PAIN SEVERITY: 0/10
PAIN LOCATION - PAIN DURATION: VARIES
PAIN LOCATION - PAIN FREQUENCY: INTERMITTENT
DEPRESSION: 0
LAST BOWEL MOVEMENT: 67054
SPUTUM PRODUCTION: 1
DRY SKIN: 1
PAIN LOCATION - RELIEVING FACTORS: MEDICATIONS
LOSS OF SENSATION IN FEET: 0
PAIN: 1
PAIN LOCATION - EXACERBATING FACTORS: UNSURE
LOWEST PAIN SEVERITY IN PAST 24 HOURS: 0/10
COUGH: 1
PAIN LOCATION: LEFT FOOT
OCCASIONAL FEELINGS OF UNSTEADINESS: 0
HIGHEST PAIN SEVERITY IN PAST 24 HOURS: 3/10
SUBJECTIVE PAIN PROGRESSION: GRADUALLY IMPROVING
LOWER EXTREMITY EDEMA: 1
RHINORRHEA: 1
APPETITE LEVEL: FAIR
PAIN LOCATION - PAIN QUALITY: THROBBING
CHANGE IN APPETITE: UNCHANGED
PAIN SEVERITY GOAL: 0/10
SPUTUM COLOR: CLEAR
FORGETFULNESS: 1
FATIGUE: 1
STOOL FREQUENCY: DAILY
PERSON REPORTING PAIN: PATIENT
BOWEL PATTERN NORMAL: 1

## 2024-08-03 ASSESSMENT — PAIN SCALES - PAIN ASSESSMENT IN ADVANCED DEMENTIA (PAINAD)
BREATHING: 0
BODYLANGUAGE: 0 - RELAXED.
FACIALEXPRESSION: 0
CONSOLABILITY: 0 - NO NEED TO CONSOLE.
NEGVOCALIZATION: 0
BODYLANGUAGE: 0
NEGVOCALIZATION: 0 - NONE.
CONSOLABILITY: 0
FACIALEXPRESSION: 0 - SMILING OR INEXPRESSIVE.
TOTALSCORE: 0

## 2024-08-03 ASSESSMENT — ACTIVITIES OF DAILY LIVING (ADL)
FEEDING: MINIMUM ASSIST
OASIS_M1830: 05
ENTERING_EXITING_HOME: MODERATE ASSIST
FEEDING ASSESSED: 1

## 2024-08-05 ENCOUNTER — PATIENT OUTREACH (OUTPATIENT)
Dept: CARE COORDINATION | Facility: CLINIC | Age: 59
End: 2024-08-05
Payer: COMMERCIAL

## 2024-08-05 LAB
FUNGUS SPEC CULT: NORMAL
FUNGUS SPEC CULT: NORMAL
FUNGUS SPEC FUNGUS STN: NORMAL
FUNGUS SPEC FUNGUS STN: NORMAL

## 2024-08-05 NOTE — PROGRESS NOTES
Discharge Facility: [facility]  Discharge Diagnosis: NSTEMI, diabetic foot ulcer, Osteomyelitis left foot, Sepsis MSSA, S/p partial amputation left lower extremity.   Admission Date: 7/22/24  Discharge Date: 8/2/24    PCP Appointment Date: no appointments, message sent to office  Specialist Appointment Date: 8/5/24 Podiatry, needs nephrology and infectious disease  Hospital Encounter and Summary Linked: Yes  See discharge assessment below for further details    Medications  Medications reviewed with patient/caregiver?: Yes (8/5/2024 11:53 AM)  Is the patient having any side effects they believe may be caused by any medication additions or changes?: No (8/5/2024 11:53 AM)  Does the patient have all medications ordered at discharge?: Yes (8/5/2024 11:53 AM)  Medication Comments: New/changed medications reviewed. START acetaminophen, ampicillin-sulbactam, oxycODONE, sodium bicarbonate. STOP spironolactone (8/5/2024 11:53 AM)    Appointments  Does the patient have a primary care provider?: Yes (8/5/2024 11:53 AM)  Care Management Interventions: Verified appointment date/time/provider (8/5/2024 11:53 AM)  Care Management Interventions: Advised patient to keep appointment (8/5/2024 11:53 AM)    Self Management  What is the home health agency?:  (8/5/2024 11:53 AM)  What Durable Medical Equipment (DME) was ordered?: IV antibiotics, Wound Vac (8/5/2024 11:53 AM)    Patient Teaching  Does the patient have access to their discharge instructions?: Yes (8/5/2024 11:53 AM)  Care Management Interventions: Reviewed instructions with patient (8/5/2024 11:53 AM)  What is the patient's perception of their health status since discharge?: Improving (8/5/2024 11:53 AM)  Patient/Caregiver Education Comments: Patient states he is doing okay, wound vac in place, family assisting him with IV antibiotic administration. Patient sees Dr. Munoz today. Reports pain is controlled primarily with tylenol but has oxycodone if needed. Encouraged  to call if needed. (8/5/2024 11:53 AM)

## 2024-08-07 LAB
ACID FAST STN SPEC: NORMAL
ACID FAST STN SPEC: NORMAL
MYCOBACTERIUM SPEC CULT: NORMAL
MYCOBACTERIUM SPEC CULT: NORMAL

## 2024-08-08 ENCOUNTER — HOME INFUSION (OUTPATIENT)
Dept: INFUSION THERAPY | Age: 59
End: 2024-08-08
Payer: COMMERCIAL

## 2024-08-08 ENCOUNTER — DOCUMENTATION (OUTPATIENT)
Dept: PHARMACY | Facility: CLINIC | Age: 59
End: 2024-08-08

## 2024-08-08 ENCOUNTER — HOME CARE VISIT (OUTPATIENT)
Dept: HOME HEALTH SERVICES | Facility: HOME HEALTH | Age: 59
End: 2024-08-08
Payer: COMMERCIAL

## 2024-08-08 PROCEDURE — 80053 COMPREHEN METABOLIC PANEL: CPT

## 2024-08-08 PROCEDURE — G0299 HHS/HOSPICE OF RN EA 15 MIN: HCPCS

## 2024-08-08 PROCEDURE — 85025 COMPLETE CBC W/AUTO DIFF WBC: CPT

## 2024-08-08 NOTE — PROGRESS NOTES
Colin Leonard is receiving Unasyn 3 gm q12 for L foot OM thru 9/5     Followed by Dr Rodrigez    No new labs to review.    RN notes from 8/3 indicate sister is retired RN and will help with administration of ABX.     RX spoke with sister, stated they only had doses for today and tomorrow AM. They are requesting delivery today for another week of meds and supplies/flushes to match.    Dispensing 8/8 with supplies to match   15x Unasyn MB+  DOS 8/9(pm)-8/16    Follow up 8/16 check labs, send straight

## 2024-08-08 NOTE — PROGRESS NOTES
SPOKE W/ PT - DELIVERY IS SCHEDULED FOR THURSDAY BY 9 PM.  ASKED PT IF THERE ARE ANY QUESTIONS TO A PHARMACIST. PT SAID: NO QUESTIONS.

## 2024-08-10 ENCOUNTER — HOME CARE VISIT (OUTPATIENT)
Dept: HOME HEALTH SERVICES | Facility: HOME HEALTH | Age: 59
End: 2024-08-10
Payer: COMMERCIAL

## 2024-08-10 VITALS
RESPIRATION RATE: 18 BRPM | OXYGEN SATURATION: 98 % | DIASTOLIC BLOOD PRESSURE: 80 MMHG | SYSTOLIC BLOOD PRESSURE: 140 MMHG | TEMPERATURE: 97.6 F | HEART RATE: 76 BPM

## 2024-08-10 PROCEDURE — G0300 HHS/HOSPICE OF LPN EA 15 MIN: HCPCS

## 2024-08-10 SDOH — ECONOMIC STABILITY: GENERAL

## 2024-08-10 ASSESSMENT — PAIN SCALES - PAIN ASSESSMENT IN ADVANCED DEMENTIA (PAINAD)
NEGVOCALIZATION: 0
FACIALEXPRESSION: 0
FACIALEXPRESSION: 0 - SMILING OR INEXPRESSIVE.
CONSOLABILITY: 0 - NO NEED TO CONSOLE.
TOTALSCORE: 0
NEGVOCALIZATION: 0 - NONE.
CONSOLABILITY: 0
BREATHING: 0
BODYLANGUAGE: 0 - RELAXED.
BODYLANGUAGE: 0

## 2024-08-10 ASSESSMENT — ENCOUNTER SYMPTOMS
DENIES PAIN: 1
LAST BOWEL MOVEMENT: 67062
PERSON REPORTING PAIN: PATIENT
CHANGE IN APPETITE: UNCHANGED
APPETITE LEVEL: GOOD
STOOL FREQUENCY: DAILY
BOWEL PATTERN NORMAL: 1

## 2024-08-10 ASSESSMENT — ACTIVITIES OF DAILY LIVING (ADL): MONEY MANAGEMENT (EXPENSES/BILLS): INDEPENDENT

## 2024-08-12 NOTE — OP NOTE
Closure Surgical Wound Lower Extremity (L) Operative Note     Date: 2024  OR Location: [facility]    Name: Colin Leonard, : 1965, Age: 58 y.o., MRN: [id number], Sex: male    Diagnosis  Pre-op Diagnosis      * Osteomyelitis of ankle or foot, left, acute (Multi) [M86.172] Post-op Diagnosis     * Osteomyelitis of ankle or foot, left, acute (Multi) [M86.172]     Procedures  Closure Surgical Wound Lower Extremity  66237 - SC SECONDARY CLOSURE SURG WOUND/DEHSN XTNSV/COMP      Surgeons      * Gavin Munoz - Primary    Resident/Fellow/Other Assistant:  Surgeons and Role:  * No surgeons found with a matching role *    Procedure Summary  Anesthesia: Monitor Anesthesia Care  ASA: III  Anesthesia Staff: Anesthesiologist: Bernardo Contreras MD  CRNA: SARAH Lopez-CRNA  Estimated Blood Loss: 10mL  Intra-op Medications:   Administrations occurring from 1400 to 1530 on 24:   Medication Name Total Dose   lidocaine (Xylocaine) 20 mg/mL (2 %) injection 10 mg   aspirin EC tablet 81 mg Cannot be calculated   atorvastatin (Lipitor) tablet 40 mg Cannot be calculated   benzocaine-menthol (Cepastat Sore Throat) lozenge 1 lozenge Cannot be calculated   carvedilol (Coreg) tablet 25 mg Cannot be calculated   dextromethorphan-guaifenesin (Robitussin DM)  mg/5 mL oral liquid 5 mL Cannot be calculated   dextrose 50 % injection 12.5 g Cannot be calculated   dextrose 50 % injection 25 g Cannot be calculated   epoetin china-epbx (Retacrit) injection 10,000 Units Cannot be calculated   ezetimibe (Zetia) tablet 10 mg Cannot be calculated   glucagon (Glucagen) injection 1 mg Cannot be calculated   glucagon (Glucagen) injection 1 mg Cannot be calculated   guaiFENesin (Mucinex) 12 hr tablet 600 mg Cannot be calculated   heparin (porcine) injection 5,000 Units Cannot be calculated   hydrALAZINE (Apresoline) tablet 100 mg Cannot be calculated   oxyCODONE (Roxicodone) immediate release tablet 5 mg Cannot be calculated    pantoprazole (ProtoNix) EC tablet 20 mg Cannot be calculated   perflutren lipid microspheres (Definity) injection 0.5-10 mL of dilution Cannot be calculated   piperacillin-tazobactam (Zosyn) 2.25 g in dextrose (iso) IV 50 mL Cannot be calculated   polyethylene glycol (Glycolax, Miralax) packet 17 g Cannot be calculated   prochlorperazine (Compazine) injection 5 mg Cannot be calculated   sodium bicarbonate tablet 1,300 mg Cannot be calculated   torsemide (Demadex) tablet 20 mg Cannot be calculated         Intraprocedure I/O Totals       None           Specimen:   ID Type Source Tests Collected by Time   A : post lavage soft tissue left foot Swab SOFT TISSUE BIOPSY TISSUE/WOUND CULTURE/SMEAR Gavin Munoz DPM 2024 [id number]        Staff:   Circulator: Alisha Thomas Person: Shadi Khanub Person: Jeanna         Drains and/or Catheters: * None in log *    Tourniquet Times:   * Missing tourniquet times found for documented tourniquets in lo *     Implants:     Findings:       Indications: Colin Leonard is an 58 y.o. male who is having surgery for Osteomyelitis of ankle or foot, left, acute (Multi) [M86.172].     The patient was seen in the preoperative area. The risks, benefits, complications, treatment options, non-operative alternatives, expected recovery and outcomes were discussed with the patient. The possibilities of reaction to medication, pulmonary aspiration, injury to surrounding structures, bleeding, recurrent infection, the need for additional procedures, failure to diagnose a condition, and creating a complication requiring transfusion or operation were discussed with the patient. The patient concurred with the proposed plan, giving informed consent.  The site of surgery was properly noted/marked if necessary per policy. The patient has been actively warmed in preoperative area. Preoperative antibiotics are not indicated. Venous thrombosis prophylaxis are not indicated.    Procedure  Details:     Patient was seen and evaluated in the preoperative holding area where the chart was reviewed and signed medical clearance was obtained in chart interoperative and preoperative postoperative course explain in detail to the patient patient had no further questions at this time.   Patient was placed in the OR table in the supine position. The left lower extremity was prepped and draped in the usual sterile fashion after Ariadne substation was induced and infiltrated block was performed in circumferential midfoot block fashion. At this time surgical timeout was performed while party for agreement. This incision was irrigated with copious amounts of normal saline antibiotic implant was removed from the incision the incision measured 2.5 by 1.5 by 1.5 centimeters in dimension down to the level of bone and was closed in a delayed fashion. With 4-0 nylon suture and a simple interrupted suture fashion there was a residual 1.5cm deficit in the incision line which was dressed with a wound vac under low continuous pressure at 125mmHg. Patient tolerated well without complication     Complications:  None; patient tolerated the procedure well.    Disposition: PACU - hemodynamically stable.  Condition: stable         Additional Details:     Attending Attestation: I was present and scrubbed for the entire procedure.    Gavin Munoz  Phone Number: [phone]

## 2024-08-12 NOTE — OP NOTE
Amputation Foot (L) Operative Note     Date: 2024  OR Location: [facility]    Name: Colin Leonard : 1965, Age: 58 y.o., MRN: [id number], Sex: male    Diagnosis  Pre-op Diagnosis      * Acute hematogenous osteomyelitis of left foot (Multi) [M86.072] Post-op Diagnosis     * Acute hematogenous osteomyelitis of left foot (Multi) [M86.072]     Procedures  Amputation Foot  12101 - TX AMPUTATION FOOT MIDTARSAL      Surgeons      * Gavin Munoz - Primary    Resident/Fellow/Other Assistant:  Surgeons and Role:  * No surgeons found with a matching role *    Procedure Summary  Anesthesia: General  ASA: III  Anesthesia Staff: Anesthesiologist: Lennox Bhatti MD; Chin Garcia DO  CRNA: SARAH Lopez-CRNA  Estimated Blood Loss: 150mL  Intra-op Medications:   Administrations occurring from 1230 to 1345 on 24:   Medication Name Total Dose   lidocaine (Xylocaine) 20 mg/mL (2 %) injection 10 mL   BUPivacaine HCl (Marcaine) 0.5 % (5 mg/mL) injection 10 mL         Intraprocedure I/O Totals       None           Specimen:   ID Type Source Tests Collected by Time   1 : Left foot, Second digit Tissue DIGIT SECOND, LEFT FOOT SURGICAL PATHOLOGY EXAM Gavin Munoz, [facility] 2024 1331   A : Left foot, prelavage second metatarsal Tissue DIGIT SECOND, LEFT FOOT AFB CULTURE/SMEAR, FUNGAL CULTURE/SMEAR, TISSUE/WOUND CULTURE/SMEAR EMIL Orr 2024 1334   B : Left foot,  Post lavage second metatarsal Tissue DIGIT SECOND, LEFT FOOT AFB CULTURE/SMEAR, FUNGAL CULTURE/SMEAR, TISSUE/WOUND CULTURE/SMEAR Gavin Munoz, [facility] 2024 1339        Staff:   Connieulator: Meagan  Circulator: Han  Scrub Person: Alycia  Scrub Person: Shadi  Scrub Person: Shahram         Drains and/or Catheters: * None in log *    Tourniquet Times:     Total Tourniquet Time Documented:  Calf (Left) - 35 minutes  Total: Calf (Left) - 35 minutes      Implants:  Implants       Type Name Action Serial No.      Graft  MINI BEAD KIT, OSTEOSET, BONE, RESORBABLE, FAST CURE, [facility] - [id number] Implanted               Findings: Septic joint, OM left 2nd metatarsal     Indications: Colin Leonard is an 58 y.o. male who is having surgery for Acute hematogenous osteomyelitis of left foot (Multi) [M86.072].     The patient was seen in the preoperative area. The risks, benefits, complications, treatment options, non-operative alternatives, expected recovery and outcomes were discussed with the patient. The possibilities of reaction to medication, pulmonary aspiration, injury to surrounding structures, bleeding, recurrent infection, the need for additional procedures, failure to diagnose a condition, and creating a complication requiring transfusion or operation were discussed with the patient. The patient concurred with the proposed plan, giving informed consent.  The site of surgery was properly noted/marked if necessary per policy. The patient has been actively warmed in preoperative area. Preoperative antibiotics have been ordered and given within 1 hours of incision. Venous thrombosis prophylaxis are not indicated.    Procedure Details:     Patient presented to the hospital after syncopal event was treated for nstemi noted to have ongoing foot infection with sepsis. Patient was seen in evaluated MRI findings demonstrated osteomyelitis to the 2nd digit and 2nd metatarsal explain to patient need for staged procedure including resection of the infected digit clearance of the septic joint resection of the metatarsal and to await intraoperative cultures. Patient understands and agrees all risk benefits alternatives and complications including but not limited to delayed healing non healing prosperous recurrence possibility for further surgery possible amputation including but not limited to transmitted tarsul amputation or BKA we're explained to the patient patient understood and agreed which proceed with surgery    description of the  procedure patient was seen and evaluated in the preoperative holding area where the chart was reviewed and signed medical clearance was obtained in chart interoperative and preoperative postoperative course explain in detail to the patient patient had no further questions at this time.     Patient was placed on the order table in the supine position the left foot and ankle were prepped and draped in the usual sterile fashion. At this time surgical timeout was performed all parties were in agreement the left foot and ankle were prepped and draped in the usual sterile fashion Ariadne sedation was induced at this time and a circumferential mid foot block was performed with .5% Marcaine plaine. At this time the left 2nd digit was incised with 15 blade circumferentially at the base of the left 2nd digit three CC's of purulence was drained from the 2nd metatarsal phalangeal joint. The 2nd digit was sent as pathologic specimen. At this time the 2nd metatarsal head was examined it was noted to demonstrate a barry of changes and chondromalacia consistent with osteomyelitis. The second metatarsal head was then resected the incision underwent sharp non selective debridement of all devitalized tissues down to the level of bone. At this time pulse lavage irrigation was utilized to irrigate the incision a clean proximal margin was taken from the left second metatarsal. The incision was then packed with antibiotic laced beads 4 by 4 guys Abd curlex ace. All neurovascular structures were identified and retracted as they were encountered while leaders were cauterized as necessary decent intraoperative bleeding appreciable approximate blood loss less than 150CC's    patient tolerated the procedure well and without complication patient was transferred to the floor in stable condition         Complications:  None; patient tolerated the procedure well.    Disposition: PACU - hemodynamically stable.  Condition: stable         Additional  Details:     Attending Attestation: I was present and scrubbed for the entire procedure.    Gavin Munoz  Phone Number: [phone]

## 2024-08-13 ENCOUNTER — HOME CARE VISIT (OUTPATIENT)
Dept: HOME HEALTH SERVICES | Facility: HOME HEALTH | Age: 59
End: 2024-08-13
Payer: COMMERCIAL

## 2024-08-13 PROCEDURE — G0299 HHS/HOSPICE OF RN EA 15 MIN: HCPCS

## 2024-08-13 PROCEDURE — 85025 COMPLETE CBC W/AUTO DIFF WBC: CPT

## 2024-08-13 PROCEDURE — 80053 COMPREHEN METABOLIC PANEL: CPT

## 2024-08-14 ENCOUNTER — APPOINTMENT (OUTPATIENT)
Dept: PRIMARY CARE | Facility: CLINIC | Age: 59
End: 2024-08-14
Payer: COMMERCIAL

## 2024-08-15 ENCOUNTER — HOME INFUSION (OUTPATIENT)
Dept: INFUSION THERAPY | Age: 59
End: 2024-08-15
Payer: COMMERCIAL

## 2024-08-15 ENCOUNTER — DOCUMENTATION (OUTPATIENT)
Dept: PHARMACY | Facility: CLINIC | Age: 59
End: 2024-08-15

## 2024-08-15 NOTE — PROGRESS NOTES
Colin Leonard is receiving Unasyn 3 gm q12 for L foot OM thru 9/5     Followed by Dr Rodrigez     Labs from 8/13 reviewed, creatinine 6.60 (normal for this pt)     RN notes from 8/3 indicate sister is retired RN and will help with administration of ABX.     Dispensing 8/15 with supplies to match for OVN delivery:  14x Unasyn MB+  DOS 8/17-8/23     Follow up 8/23 check labs, send straight

## 2024-08-17 ENCOUNTER — HOME CARE VISIT (OUTPATIENT)
Dept: HOME HEALTH SERVICES | Facility: HOME HEALTH | Age: 59
End: 2024-08-17
Payer: COMMERCIAL

## 2024-08-17 VITALS
OXYGEN SATURATION: 98 % | SYSTOLIC BLOOD PRESSURE: 148 MMHG | TEMPERATURE: 97.6 F | RESPIRATION RATE: 20 BRPM | DIASTOLIC BLOOD PRESSURE: 88 MMHG | HEART RATE: 88 BPM

## 2024-08-17 PROCEDURE — G0300 HHS/HOSPICE OF LPN EA 15 MIN: HCPCS

## 2024-08-17 ASSESSMENT — ENCOUNTER SYMPTOMS
CHANGE IN APPETITE: UNCHANGED
APPETITE LEVEL: GOOD
DENIES PAIN: 1

## 2024-08-18 ASSESSMENT — ENCOUNTER SYMPTOMS
PAIN: 1
PERSON REPORTING PAIN: PATIENT
MUSCLE WEAKNESS: 1
LOWER EXTREMITY EDEMA: 1
APPETITE LEVEL: GOOD
CHANGE IN APPETITE: UNCHANGED
PAIN LOCATION - PAIN SEVERITY: 4/10
PAIN LOCATION: LEFT FOOT

## 2024-08-19 ENCOUNTER — APPOINTMENT (OUTPATIENT)
Dept: PRIMARY CARE | Facility: CLINIC | Age: 59
End: 2024-08-19
Payer: COMMERCIAL

## 2024-08-19 ENCOUNTER — LAB (OUTPATIENT)
Dept: LAB | Facility: LAB | Age: 59
End: 2024-08-19
Payer: COMMERCIAL

## 2024-08-19 VITALS
WEIGHT: 268 LBS | HEART RATE: 81 BPM | TEMPERATURE: 97.8 F | BODY MASS INDEX: 37.38 KG/M2 | DIASTOLIC BLOOD PRESSURE: 69 MMHG | SYSTOLIC BLOOD PRESSURE: 133 MMHG

## 2024-08-19 DIAGNOSIS — E11.59 TYPE 2 DIABETES MELLITUS WITH OTHER CIRCULATORY COMPLICATION, WITHOUT LONG-TERM CURRENT USE OF INSULIN (MULTI): Primary | ICD-10-CM

## 2024-08-19 DIAGNOSIS — I10 BENIGN ESSENTIAL HYPERTENSION: ICD-10-CM

## 2024-08-19 DIAGNOSIS — M86.172 OSTEOMYELITIS OF ANKLE OR FOOT, LEFT, ACUTE (MULTI): ICD-10-CM

## 2024-08-19 DIAGNOSIS — N18.5 STAGE 5 CHRONIC KIDNEY DISEASE NOT ON CHRONIC DIALYSIS (MULTI): ICD-10-CM

## 2024-08-19 DIAGNOSIS — I25.10 CORONARY ARTERY DISEASE INVOLVING NATIVE CORONARY ARTERY OF NATIVE HEART WITHOUT ANGINA PECTORIS: ICD-10-CM

## 2024-08-19 DIAGNOSIS — E11.59 TYPE 2 DIABETES MELLITUS WITH OTHER CIRCULATORY COMPLICATION, WITHOUT LONG-TERM CURRENT USE OF INSULIN (MULTI): ICD-10-CM

## 2024-08-19 LAB
CHOLEST SERPL-MCNC: 143 MG/DL (ref 0–199)
CHOLESTEROL/HDL RATIO: 4.4
HDLC SERPL-MCNC: 32.7 MG/DL
LDLC SERPL CALC-MCNC: 88 MG/DL
NON HDL CHOLESTEROL: 110 MG/DL (ref 0–149)
TRIGL SERPL-MCNC: 110 MG/DL (ref 0–149)
VLDL: 22 MG/DL (ref 0–40)

## 2024-08-19 PROCEDURE — 3048F LDL-C <100 MG/DL: CPT | Performed by: INTERNAL MEDICINE

## 2024-08-19 PROCEDURE — 99215 OFFICE O/P EST HI 40 MIN: CPT | Performed by: INTERNAL MEDICINE

## 2024-08-19 PROCEDURE — 3075F SYST BP GE 130 - 139MM HG: CPT | Performed by: INTERNAL MEDICINE

## 2024-08-19 PROCEDURE — 3044F HG A1C LEVEL LT 7.0%: CPT | Performed by: INTERNAL MEDICINE

## 2024-08-19 PROCEDURE — 3078F DIAST BP <80 MM HG: CPT | Performed by: INTERNAL MEDICINE

## 2024-08-19 PROCEDURE — 80061 LIPID PANEL: CPT

## 2024-08-19 ASSESSMENT — PAIN SCALES - GENERAL: PAINLEVEL: 0-NO PAIN

## 2024-08-19 NOTE — PROGRESS NOTES
Subjective   Patient ID: Colin Leonard is a 58 y.o. male who presents for Follow-up.  Colin is in to see me after hospitalization.    He was hospitalized for sepsis due to osteomyelitis of the second toe of left foot.  He underwent amputation during the hospitalization in late July.  His wound appears to be healing well.  He remains on twice daily intravenous Unasyn.  He has a wound VAC.  He has had ongoing follow-up with podiatry who are satisfied with treatment clusters.    He had previously been cared for by my son.  He has a diagnosis of hypertension and diabetes and CKD 5.  He had not been seen in the office in a while.    His medications are reviewed from discharge and are appropriately documented in the medical record.  He comes in today, accompanied by his Sister Chelo, who is a retired nurse who is helping him manage his care since he has been home.  He lives with his 19-year-old son who is a college student.    He says that he feels pretty well.  He certainly feels better than he did when he presented for hospitalization.  He says that he is eating well.  He does not have pain in his leg.  I do not see peripheral arterial studies performed from the hospital.    His cardiologist is Mahendra Saravia.  His nephrologist is Ulises Goldberg.    While hospitalized, he was apparently receiving erythropoietin.  He has not been receiving this as an outpatient.    Current Outpatient Medications   Medication Instructions   • 0.9 % sodium chloride (sodium chloride 0.9%) solution 10 mL, intravenous, 2 times daily, 10ml flush prior and after antibiotic, SASH method, 20ml after blood draw, 10ml daily for maintenance   • acetaminophen (TYLENOL) 650 mg, oral, Every 4 hours PRN   • alteplase (CATHFLO ACTIVASE) 2 mg, intra-catheter, Once as needed   • ampicillin-sulbactam 3 g in sodium chloride 0.9 % 100 mL IV 3 g, intravenous, Every 12 hours scheduled   • aspirin 81 mg, oral, Daily   • carvedilol (COREG) 25 mg, oral, 2  times daily   • ezetimibe (ZETIA) 10 mg, oral, Nightly   • heparin, porcine, PF, (Hep Flush-10, PF,) 10 unit/mL solution 5 mL, intravenous push, 2 times daily, SASH method, after ns flush   • hydrALAZINE (APRESOLINE) 100 mg, oral, 3 times daily   • sodium bicarbonate 1,300 mg, oral, 3 times daily   • torsemide (Demadex) 20 mg tablet Take 2 Tablets by Mouth Twice Daily     Review of Systems  All other systems are reviewed and are without complaint.    Objective   /69 (BP Location: Left arm, Patient Position: Sitting, BP Cuff Size: Adult)   Pulse 81   Temp 36.6 °C (97.8 °F) (Oral)   Wt 122 kg (268 lb)   BMI 37.38 kg/m²   Physical Exam  There is mild pallor.  Lungs are clear to auscultation and percussion.  There is a soft systolic cardiac murmur.  His heart rate is regular.  There is no peripheral edema.  The wound VAC on left foot was not taken down.  There are no focal neurologic deficits.    Assessment/Plan   Problem List Items Addressed This Visit             ICD-10-CM    Benign essential hypertension I10     Adequate control of blood pressure.         Relevant Orders    Follow Up In [facility] - Pharmacy    Coronary artery disease involving native coronary artery of native heart without angina pectoris I25.10     Coronary artery calcium score greater than thousand in 2020.  Only on ezetimibe, no statin.  Check lipids.  Likely prescribed atorvastatin 80 mg daily after reviewing results.         Relevant Orders    Referral to Vascular Medicine    Follow Up In [facility] - Pharmacy    Stage 5 chronic kidney disease not on chronic dialysis (Multi) N18.5     Needs to get back to nephrology ASAP.         Relevant Orders    Referral to Vascular Medicine    Follow Up In [facility] - Pharmacy    Type 2 diabetes mellitus (Multi) - Primary E11.9     Interestingly, hemoglobin A1c was 5.5 during most recent hospitalization.  He is on no diabetic medications.         Relevant Orders     Lipid Panel    Referral to Vascular Medicine    Follow Up In [facility] - Pharmacy    Osteomyelitis of ankle or foot, left, acute (Multi) M86.172    Relevant Orders    Referral to Vascular Medicine    Follow Up In [facility] - Pharmacy

## 2024-08-19 NOTE — ASSESSMENT & PLAN NOTE
Interestingly, hemoglobin A1c was 5.5 during most recent hospitalization.  He is on no diabetic medications.

## 2024-08-19 NOTE — ASSESSMENT & PLAN NOTE
Coronary artery calcium score greater than thousand in 2020.  Only on ezetimibe, no statin.  Check lipids.  Likely prescribed atorvastatin 80 mg daily after reviewing results.

## 2024-08-20 ENCOUNTER — LAB REQUISITION (OUTPATIENT)
Dept: LAB | Facility: HOSPITAL | Age: 59
End: 2024-08-20
Payer: COMMERCIAL

## 2024-08-20 ENCOUNTER — HOME CARE VISIT (OUTPATIENT)
Dept: HOME HEALTH SERVICES | Facility: HOME HEALTH | Age: 59
End: 2024-08-20
Payer: COMMERCIAL

## 2024-08-20 VITALS
OXYGEN SATURATION: 98 % | HEART RATE: 80 BPM | DIASTOLIC BLOOD PRESSURE: 98 MMHG | TEMPERATURE: 98.6 F | RESPIRATION RATE: 20 BRPM | SYSTOLIC BLOOD PRESSURE: 148 MMHG

## 2024-08-20 DIAGNOSIS — Z79.2 LONG TERM (CURRENT) USE OF ANTIBIOTICS: ICD-10-CM

## 2024-08-20 DIAGNOSIS — I25.10 CORONARY ARTERY DISEASE INVOLVING NATIVE CORONARY ARTERY OF NATIVE HEART WITHOUT ANGINA PECTORIS: ICD-10-CM

## 2024-08-20 DIAGNOSIS — E11.59 TYPE 2 DIABETES MELLITUS WITH OTHER CIRCULATORY COMPLICATION, WITHOUT LONG-TERM CURRENT USE OF INSULIN (MULTI): Primary | ICD-10-CM

## 2024-08-20 LAB
ALBUMIN SERPL-MCNC: 3.9 G/DL (ref 3.5–5)
ALP BLD-CCNC: 52 U/L (ref 35–125)
ALT SERPL-CCNC: 8 U/L (ref 5–40)
ANION GAP SERPL CALC-SCNC: 12 MMOL/L
AST SERPL-CCNC: 10 U/L (ref 5–40)
BASOPHILS # BLD AUTO: 0.05 X10*3/UL (ref 0–0.1)
BASOPHILS NFR BLD AUTO: 0.7 %
BILIRUB SERPL-MCNC: 0.2 MG/DL (ref 0.1–1.2)
BUN SERPL-MCNC: 52 MG/DL (ref 8–25)
CALCIUM SERPL-MCNC: 9.3 MG/DL (ref 8.5–10.4)
CHLORIDE SERPL-SCNC: 111 MMOL/L (ref 97–107)
CO2 SERPL-SCNC: 18 MMOL/L (ref 24–31)
CREAT SERPL-MCNC: 6.2 MG/DL (ref 0.4–1.6)
EGFRCR SERPLBLD CKD-EPI 2021: 10 ML/MIN/1.73M*2
EOSINOPHIL # BLD AUTO: 0.4 X10*3/UL (ref 0–0.7)
EOSINOPHIL NFR BLD AUTO: 5.5 %
ERYTHROCYTE [DISTWIDTH] IN BLOOD BY AUTOMATED COUNT: 15.9 % (ref 11.5–14.5)
GLUCOSE SERPL-MCNC: 94 MG/DL (ref 65–99)
HCT VFR BLD AUTO: 26.3 % (ref 41–52)
HGB BLD-MCNC: 8.1 G/DL (ref 13.5–17.5)
IMM GRANULOCYTES # BLD AUTO: 0.03 X10*3/UL (ref 0–0.7)
IMM GRANULOCYTES NFR BLD AUTO: 0.4 % (ref 0–0.9)
LYMPHOCYTES # BLD AUTO: 1.4 X10*3/UL (ref 1.2–4.8)
LYMPHOCYTES NFR BLD AUTO: 19.3 %
MCH RBC QN AUTO: 28.9 PG (ref 26–34)
MCHC RBC AUTO-ENTMCNC: 30.8 G/DL (ref 32–36)
MCV RBC AUTO: 94 FL (ref 80–100)
MONOCYTES # BLD AUTO: 0.5 X10*3/UL (ref 0.1–1)
MONOCYTES NFR BLD AUTO: 6.9 %
NEUTROPHILS # BLD AUTO: 4.87 X10*3/UL (ref 1.2–7.7)
NEUTROPHILS NFR BLD AUTO: 67.2 %
NRBC BLD-RTO: 0 /100 WBCS (ref 0–0)
PLATELET # BLD AUTO: 294 X10*3/UL (ref 150–450)
POTASSIUM SERPL-SCNC: 5 MMOL/L (ref 3.4–5.1)
PROT SERPL-MCNC: 6.7 G/DL (ref 5.9–7.9)
RBC # BLD AUTO: 2.8 X10*6/UL (ref 4.5–5.9)
SODIUM SERPL-SCNC: 141 MMOL/L (ref 133–145)
WBC # BLD AUTO: 7.3 X10*3/UL (ref 4.4–11.3)

## 2024-08-20 PROCEDURE — G0299 HHS/HOSPICE OF RN EA 15 MIN: HCPCS

## 2024-08-20 PROCEDURE — 85025 COMPLETE CBC W/AUTO DIFF WBC: CPT

## 2024-08-20 PROCEDURE — 80053 COMPREHEN METABOLIC PANEL: CPT

## 2024-08-20 RX ORDER — ATORVASTATIN CALCIUM 40 MG/1
40 TABLET, FILM COATED ORAL DAILY
Qty: 90 TABLET | Refills: 3 | Status: SHIPPED | OUTPATIENT
Start: 2024-08-20 | End: 2025-08-20

## 2024-08-20 ASSESSMENT — PAIN SCALES - PAIN ASSESSMENT IN ADVANCED DEMENTIA (PAINAD)
FACIALEXPRESSION: 0
CONSOLABILITY: 0
BODYLANGUAGE: 0 - RELAXED.
FACIALEXPRESSION: 0 - SMILING OR INEXPRESSIVE.
BODYLANGUAGE: 0
CONSOLABILITY: 0 - NO NEED TO CONSOLE.
BREATHING: 0
NEGVOCALIZATION: 0
TOTALSCORE: 0
NEGVOCALIZATION: 0 - NONE.

## 2024-08-20 ASSESSMENT — ENCOUNTER SYMPTOMS
STOOL FREQUENCY: LESS THAN DAILY
LOWER EXTREMITY EDEMA: 1
DRY SKIN: 1
OCCASIONAL FEELINGS OF UNSTEADINESS: 0
LAST BOWEL MOVEMENT: 67072
SHORTNESS OF BREATH: 1
APPETITE LEVEL: GOOD
LOSS OF SENSATION IN FEET: 0
BOWEL PATTERN NORMAL: 1
DYSPNEA ACTIVITY LEVEL: AFTER AMBULATING 10 - 20 FT
CHANGE IN APPETITE: UNCHANGED
DENIES PAIN: 1
PERSON REPORTING PAIN: PATIENT
MUSCLE WEAKNESS: 1

## 2024-08-20 NOTE — RESULT ENCOUNTER NOTE
I am not surprised that the cholesterol is a little higher than we want to see.  I am sending a prescription for atorvastatin (Lipitor) to your pharmacy. I think that 40mg will accomplish our goal.  Good [place].    Bony Ballard MD

## 2024-08-21 ENCOUNTER — HOME CARE VISIT (OUTPATIENT)
Dept: HOME HEALTH SERVICES | Facility: HOME HEALTH | Age: 59
End: 2024-08-21
Payer: COMMERCIAL

## 2024-08-22 ENCOUNTER — HOME INFUSION (OUTPATIENT)
Dept: INFUSION THERAPY | Age: 59
End: 2024-08-22
Payer: COMMERCIAL

## 2024-08-22 ENCOUNTER — DOCUMENTATION (OUTPATIENT)
Dept: PHARMACY | Facility: CLINIC | Age: 59
End: 2024-08-22

## 2024-08-22 NOTE — PROGRESS NOTES
"Colin Leonard is receiving Unasyn 3 gm q12 for L foot OM thru 9/5     Followed by Dr Rodrigez     Labs from 8/20 reviewed, creatinine 6.20 (normal for pt) otherwise unremarkable     RN notes from 8/3 indicate sister is retired RN and will help with administration of ABX.  RN notes from 8/20 indicates patient has lots of anxiety about wound, supplies, and meds, but otherwise no issues with line or infusion.    Tel call from patient and caregiver, states that they wasted two doses/lines due to \"lots of bubbles\". Has a dose for this evening 8/22 but none for tomorrow. Agreeable to delivery today any time of medications with supplies to match.  to call on the way.     Dispensing 8/22 with supplies to match for straight delivery:  16x Unasyn MB+  DOS 8/24-8/30     Follow up 8/29 check labs, send OVN remainder  "

## 2024-08-24 ENCOUNTER — APPOINTMENT (OUTPATIENT)
Dept: HOME HEALTH SERVICES | Facility: HOME HEALTH | Age: 59
End: 2024-08-24
Payer: COMMERCIAL

## 2024-08-25 NOTE — DOCUMENTATION CLARIFICATION NOTE
"    PATIENT:               ODALYS MELVIN  ACCT #:                  [id number]  MRN:                       [id number]  :                       1965  ADMIT DATE:       2024 10:12 PM  DISCH DATE:        2024 7:12 PM  RESPONDING PROVIDER #:        [id number]          PROVIDER RESPONSE TEXT:    NSTEMI ..    CDI QUERY TEXT:    Clarification        Instruction:    Based on your assessment of the patient and the clinical information, please provide the requested documentation by clicking on the appropriate radio button and enter any additional information if prompted.    Question: Please further clarify the diagnosis of NSTEMI as    When answering this query, please exercise your independent professional judgment. The fact that a question is being asked, does not imply that any particular answer is desired or expected.    The patient's clinical indicators include:  Clinical Information: 58-year-old male presents with weakness.  Patient was brought in by EMS after having developed significant weakness and slid out of bed.    Documented Diagnosis: NSTEMI    Clinical Indicators and Documentation:  -Vital Signs: : 38.3, 116, 19, 115/79, map 92, pox 96 on NRB  -Troponin trend: 363, 403, 343  -ECHO findings: The left ventricular systolic function is normal, with a visually estimated ejection fraction of 60-65percent.  Left ventricular diastolic filling was indeterminate.  There is normal right ventricular global systolic function.  Mild aortic valve stenosis.  Aortic stenosis mean gradient 10 mm Hg, peak gradient 40 mm Hg and ADÁN 1.43 cm2.  .Aortic valve dimensionless index 0.43.  -Physical Exam or Documented/Presenting symptoms:  -Cardiac Interventions: Echocardiogram, 12 lead ECG  -Cardiac Consult:   \"Was initially called as a code STEMI, found to be a left bundle branch block, patient had no chest pain or symptoms suggestive advancing coronary artery disease.  He does have elevated troponins which are " "relative to his chronic kidney disease with hemodialysis.\" N Addi CNP 07/22  \"He seems rather stable from a cardiac standpoint.  He tells me he never had any chest pain.  High-sensitivity troponins are elevated but with a relatively insignificant trend and more likely reflect his advanced chronic kidney disease rather than a primary cardiac process.  His echocardiogram shows normal left ventricular size and function and trivial valvular disease.  I would not recommend any further cardiac workup at this juncture.\" Dr Dorsey 07/22 07/22-08/02: Hospitalist PN: \"58 y.o. male on day 11 of admission presenting with NSTEMI\"    08/02: D/C summary: \"Cardiology felt likely CKD 5 causing elevated troponin, no further work up recommended.\"    Treatment: Cardiac enzymes, Cardiology consult, 2D echo, Brillinta 180 mg, Heparin 4000 units IV    Risk Factors: CKD stage 5, HTN, DM, Obesity, Dyslipidemia  Options provided:  -- NSTEMI, Please specify additional information below  -- Acute myocardial injury  -- Other - I will add my own diagnosis  -- Refer to Clinical Documentation Reviewer    Query created by: Curt Corbett on 8/19/2024 7:35 AM      Electronically signed by:  SAGRARIO MAC MD 8/25/2024 7:38 PM          "

## 2024-08-27 ENCOUNTER — PATIENT OUTREACH (OUTPATIENT)
Dept: PRIMARY CARE | Facility: CLINIC | Age: 59
End: 2024-08-27
Payer: COMMERCIAL

## 2024-08-27 NOTE — PROGRESS NOTES
Unable to reach patient for call back after patient's follow up appointment with PCP.   STEVEM with call back number for patient to call if needed   If no voicemail available call attempts x 2 were made to contact the patient to assist with any questions or concerns patient may have.

## 2024-08-28 ENCOUNTER — HOME CARE VISIT (OUTPATIENT)
Dept: HOME HEALTH SERVICES | Facility: HOME HEALTH | Age: 59
End: 2024-08-28
Payer: COMMERCIAL

## 2024-08-28 VITALS
RESPIRATION RATE: 20 BRPM | OXYGEN SATURATION: 98 % | TEMPERATURE: 98.5 F | HEART RATE: 88 BPM | DIASTOLIC BLOOD PRESSURE: 86 MMHG | SYSTOLIC BLOOD PRESSURE: 142 MMHG

## 2024-08-28 PROCEDURE — G0299 HHS/HOSPICE OF RN EA 15 MIN: HCPCS

## 2024-08-28 PROCEDURE — 85025 COMPLETE CBC W/AUTO DIFF WBC: CPT

## 2024-08-28 PROCEDURE — 80053 COMPREHEN METABOLIC PANEL: CPT

## 2024-08-28 ASSESSMENT — PAIN SCALES - PAIN ASSESSMENT IN ADVANCED DEMENTIA (PAINAD)
NEGVOCALIZATION: 0
FACIALEXPRESSION: 0
NEGVOCALIZATION: 0 - NONE.
BODYLANGUAGE: 0 - RELAXED.
TOTALSCORE: 0
FACIALEXPRESSION: 0 - SMILING OR INEXPRESSIVE.
BODYLANGUAGE: 0
CONSOLABILITY: 0 - NO NEED TO CONSOLE.
CONSOLABILITY: 0
BREATHING: 0

## 2024-08-28 ASSESSMENT — ENCOUNTER SYMPTOMS
OCCASIONAL FEELINGS OF UNSTEADINESS: 1
MUSCLE WEAKNESS: 1
LOSS OF SENSATION IN FEET: 0
DESCRIPTION OF MEMORY LOSS: SHORT TERM
LOWER EXTREMITY EDEMA: 1
DEPRESSION: 0
APPETITE LEVEL: GOOD
PERSON REPORTING PAIN: PATIENT
DENIES PAIN: 1
CHANGE IN APPETITE: UNCHANGED
DRY SKIN: 1

## 2024-08-29 ENCOUNTER — HOME INFUSION (OUTPATIENT)
Dept: INFUSION THERAPY | Age: 59
End: 2024-08-29
Payer: COMMERCIAL

## 2024-08-29 NOTE — PROGRESS NOTES
Colin Leonard is receiving Unasyn 3 gm q12 for L foot OM thru 9/5     Followed by Dr Rodrigez     8/28 labs reviewed - Scr stable at 6.2     RPh left VM for pt stating that pharmacy would deliver remainder of meds with supplies to match Fri 8/30. Pt to call with any questions.    Mixing 8/29 and dispensing 8/30 with supplies to match   12x Unasyn MB+  DOS 8/31-9/5     Follow up 9/5 POC Rain

## 2024-08-30 ENCOUNTER — DOCUMENTATION (OUTPATIENT)
Dept: PHARMACY | Facility: CLINIC | Age: 59
End: 2024-08-30

## 2024-08-30 NOTE — PROGRESS NOTES
CALLED PT AND LEFT VM - DELIVERY IS SCHEDULED FOR FRIDAY BY  8  PM.  LET PT KNOW WE'RE SENDING STND SUPPLIES AND ASKED TO CALL W/ ANY QUESTIONS.

## 2024-08-31 ENCOUNTER — HOME CARE VISIT (OUTPATIENT)
Dept: HOME HEALTH SERVICES | Facility: HOME HEALTH | Age: 59
End: 2024-08-31
Payer: COMMERCIAL

## 2024-09-01 ENCOUNTER — HOME CARE VISIT (OUTPATIENT)
Dept: HOME HEALTH SERVICES | Facility: HOME HEALTH | Age: 59
End: 2024-09-01
Payer: COMMERCIAL

## 2024-09-03 ENCOUNTER — LAB REQUISITION (OUTPATIENT)
Dept: LAB | Facility: LAB | Age: 59
End: 2024-09-03
Payer: COMMERCIAL

## 2024-09-03 ENCOUNTER — HOME CARE VISIT (OUTPATIENT)
Dept: HOME HEALTH SERVICES | Facility: HOME HEALTH | Age: 59
End: 2024-09-03
Payer: COMMERCIAL

## 2024-09-03 VITALS
DIASTOLIC BLOOD PRESSURE: 78 MMHG | HEART RATE: 80 BPM | OXYGEN SATURATION: 98 % | SYSTOLIC BLOOD PRESSURE: 140 MMHG | TEMPERATURE: 98.2 F | RESPIRATION RATE: 18 BRPM

## 2024-09-03 DIAGNOSIS — Z79.2 LONG TERM (CURRENT) USE OF ANTIBIOTICS: ICD-10-CM

## 2024-09-03 LAB
ALBUMIN SERPL-MCNC: 3.6 G/DL (ref 3.5–5)
ALP BLD-CCNC: 59 U/L (ref 35–125)
ALT SERPL-CCNC: 7 U/L (ref 5–40)
ANION GAP SERPL CALC-SCNC: 15 MMOL/L
AST SERPL-CCNC: 9 U/L (ref 5–40)
BASOPHILS # BLD AUTO: 0.05 X10*3/UL (ref 0–0.1)
BASOPHILS NFR BLD AUTO: 0.5 %
BILIRUB SERPL-MCNC: <0.2 MG/DL (ref 0.1–1.2)
BUN SERPL-MCNC: 66 MG/DL (ref 8–25)
CALCIUM SERPL-MCNC: 9 MG/DL (ref 8.5–10.4)
CHLORIDE SERPL-SCNC: 108 MMOL/L (ref 97–107)
CO2 SERPL-SCNC: 16 MMOL/L (ref 24–31)
CREAT SERPL-MCNC: 6.1 MG/DL (ref 0.4–1.6)
EGFRCR SERPLBLD CKD-EPI 2021: 10 ML/MIN/1.73M*2
EOSINOPHIL # BLD AUTO: 0.49 X10*3/UL (ref 0–0.7)
EOSINOPHIL NFR BLD AUTO: 4.5 %
ERYTHROCYTE [DISTWIDTH] IN BLOOD BY AUTOMATED COUNT: 15 % (ref 11.5–14.5)
GLUCOSE SERPL-MCNC: 166 MG/DL (ref 65–99)
HCT VFR BLD AUTO: 26.4 % (ref 41–52)
HGB BLD-MCNC: 7.8 G/DL (ref 13.5–17.5)
IMM GRANULOCYTES # BLD AUTO: 0.09 X10*3/UL (ref 0–0.7)
IMM GRANULOCYTES NFR BLD AUTO: 0.8 % (ref 0–0.9)
LYMPHOCYTES # BLD AUTO: 1.52 X10*3/UL (ref 1.2–4.8)
LYMPHOCYTES NFR BLD AUTO: 14 %
MCH RBC QN AUTO: 28.1 PG (ref 26–34)
MCHC RBC AUTO-ENTMCNC: 29.5 G/DL (ref 32–36)
MCV RBC AUTO: 95 FL (ref 80–100)
MONOCYTES # BLD AUTO: 0.65 X10*3/UL (ref 0.1–1)
MONOCYTES NFR BLD AUTO: 6 %
NEUTROPHILS # BLD AUTO: 8.07 X10*3/UL (ref 1.2–7.7)
NEUTROPHILS NFR BLD AUTO: 74.2 %
NRBC BLD-RTO: 0 /100 WBCS (ref 0–0)
PLATELET # BLD AUTO: 298 X10*3/UL (ref 150–450)
POTASSIUM SERPL-SCNC: 5 MMOL/L (ref 3.4–5.1)
PROT SERPL-MCNC: 6.4 G/DL (ref 5.9–7.9)
RBC # BLD AUTO: 2.78 X10*6/UL (ref 4.5–5.9)
SODIUM SERPL-SCNC: 139 MMOL/L (ref 133–145)
WBC # BLD AUTO: 10.9 X10*3/UL (ref 4.4–11.3)

## 2024-09-03 PROCEDURE — 85025 COMPLETE CBC W/AUTO DIFF WBC: CPT

## 2024-09-03 PROCEDURE — G0299 HHS/HOSPICE OF RN EA 15 MIN: HCPCS

## 2024-09-03 PROCEDURE — 80053 COMPREHEN METABOLIC PANEL: CPT

## 2024-09-03 ASSESSMENT — PAIN SCALES - PAIN ASSESSMENT IN ADVANCED DEMENTIA (PAINAD)
NEGVOCALIZATION: 0 - NONE.
TOTALSCORE: 0
NEGVOCALIZATION: 0
FACIALEXPRESSION: 0
BODYLANGUAGE: 0
CONSOLABILITY: 0
FACIALEXPRESSION: 0 - SMILING OR INEXPRESSIVE.
CONSOLABILITY: 0 - NO NEED TO CONSOLE.
BODYLANGUAGE: 0 - RELAXED.
BREATHING: 0

## 2024-09-03 ASSESSMENT — ENCOUNTER SYMPTOMS
DEPRESSION: 0
DRY SKIN: 1
LOWER EXTREMITY EDEMA: 1
DENIES PAIN: 1
OCCASIONAL FEELINGS OF UNSTEADINESS: 1
DESCRIPTION OF MEMORY LOSS: SHORT TERM
CHANGE IN APPETITE: UNCHANGED
APPETITE LEVEL: GOOD
PERSON REPORTING PAIN: PATIENT
LOSS OF SENSATION IN FEET: 0

## 2024-09-04 ENCOUNTER — HOME INFUSION (OUTPATIENT)
Dept: INFUSION THERAPY | Age: 59
End: 2024-09-04
Payer: COMMERCIAL

## 2024-09-04 DIAGNOSIS — M86.172 OSTEOMYELITIS OF ANKLE OR FOOT, LEFT, ACUTE (MULTI): Primary | ICD-10-CM

## 2024-09-04 NOTE — PROGRESS NOTES
Reviewed chart and patient ordered IV antibiotics through 09/05/24    Received orders from Dr Rodrigez via telephone  Stop antibiotic as ordered after Thursday's doses. IR scheduled to remove line. No Further Pharmacy needs per MD office.    Order entered into EPIC. Gold copy not sent to intake. Routed to RN Team as FYI    Follow up 09/09/24 to confirm line removed. Discharge patient from home Infusion Pharmacy after line confirmed removed

## 2024-09-05 DIAGNOSIS — M86.172 OSTEOMYELITIS OF ANKLE OR FOOT, LEFT, ACUTE (MULTI): ICD-10-CM

## 2024-09-07 ENCOUNTER — HOME CARE VISIT (OUTPATIENT)
Dept: HOME HEALTH SERVICES | Facility: HOME HEALTH | Age: 59
End: 2024-09-07
Payer: COMMERCIAL

## 2024-09-07 VITALS
DIASTOLIC BLOOD PRESSURE: 70 MMHG | RESPIRATION RATE: 18 BRPM | SYSTOLIC BLOOD PRESSURE: 128 MMHG | TEMPERATURE: 97.3 F | HEART RATE: 72 BPM | OXYGEN SATURATION: 100 %

## 2024-09-07 PROCEDURE — G0300 HHS/HOSPICE OF LPN EA 15 MIN: HCPCS

## 2024-09-07 ASSESSMENT — ENCOUNTER SYMPTOMS
CHANGE IN APPETITE: UNCHANGED
DENIES PAIN: 1
APPETITE LEVEL: GOOD
BOWEL PATTERN NORMAL: 1

## 2024-09-09 ENCOUNTER — HOSPITAL ENCOUNTER (OUTPATIENT)
Dept: RADIOLOGY | Facility: HOSPITAL | Age: 59
Discharge: HOME | End: 2024-09-09
Payer: COMMERCIAL

## 2024-09-09 ENCOUNTER — HOME INFUSION (OUTPATIENT)
Dept: INFUSION THERAPY | Age: 59
End: 2024-09-09
Payer: COMMERCIAL

## 2024-09-09 VITALS
SYSTOLIC BLOOD PRESSURE: 147 MMHG | HEART RATE: 72 BPM | OXYGEN SATURATION: 97 % | RESPIRATION RATE: 16 BRPM | DIASTOLIC BLOOD PRESSURE: 87 MMHG

## 2024-09-09 DIAGNOSIS — M86.472 CHRONIC OSTEOMYELITIS WITH DRAINING SINUS, LEFT ANKLE AND FOOT (MULTI): ICD-10-CM

## 2024-09-09 PROCEDURE — 36589 REMOVAL TUNNELED CV CATH: CPT | Performed by: RADIOLOGY

## 2024-09-09 PROCEDURE — 36589 REMOVAL TUNNELED CV CATH: CPT

## 2024-09-09 NOTE — NURSING NOTE
Right upper chest single lumen kevin removed by dr Chao without difficulty, pt tolerated it well. 4x4 and tegaderm applied to site.

## 2024-09-09 NOTE — PROGRESS NOTES
Rph reviewed chart and IR has removed Violet line. No further Pharmacy needs.  Discharge from Pharmacy Service.

## 2024-09-10 ENCOUNTER — HOME CARE VISIT (OUTPATIENT)
Dept: HOME HEALTH SERVICES | Facility: HOME HEALTH | Age: 59
End: 2024-09-10
Payer: COMMERCIAL

## 2024-09-10 VITALS
OXYGEN SATURATION: 98 % | TEMPERATURE: 97.8 F | HEART RATE: 76 BPM | RESPIRATION RATE: 16 BRPM | DIASTOLIC BLOOD PRESSURE: 78 MMHG | SYSTOLIC BLOOD PRESSURE: 138 MMHG

## 2024-09-10 PROCEDURE — G0299 HHS/HOSPICE OF RN EA 15 MIN: HCPCS

## 2024-09-10 ASSESSMENT — ENCOUNTER SYMPTOMS
SHORTNESS OF BREATH: 1
BOWEL PATTERN NORMAL: 1
STOOL FREQUENCY: DAILY
APPETITE LEVEL: GOOD
LOSS OF SENSATION IN FEET: 0
DENIES PAIN: 1
LAST BOWEL MOVEMENT: 67086
CHANGE IN APPETITE: UNCHANGED
PERSON REPORTING PAIN: PATIENT
DYSPNEA ACTIVITY LEVEL: AFTER AMBULATING 10 - 20 FT
AGITATION: 1
DEPRESSION: 0
MUSCLE WEAKNESS: 1
DRY SKIN: 1
OCCASIONAL FEELINGS OF UNSTEADINESS: 1

## 2024-09-10 ASSESSMENT — PAIN SCALES - PAIN ASSESSMENT IN ADVANCED DEMENTIA (PAINAD)
CONSOLABILITY: 0 - NO NEED TO CONSOLE.
CONSOLABILITY: 0
BODYLANGUAGE: 0
FACIALEXPRESSION: 0 - SMILING OR INEXPRESSIVE.
TOTALSCORE: 0
NEGVOCALIZATION: 0 - NONE.
BREATHING: 0
FACIALEXPRESSION: 0
NEGVOCALIZATION: 0
BODYLANGUAGE: 0 - RELAXED.

## 2024-09-14 ENCOUNTER — HOME CARE VISIT (OUTPATIENT)
Dept: HOME HEALTH SERVICES | Facility: HOME HEALTH | Age: 59
End: 2024-09-14
Payer: COMMERCIAL

## 2024-09-16 ENCOUNTER — APPOINTMENT (OUTPATIENT)
Dept: PHARMACY | Facility: HOSPITAL | Age: 59
End: 2024-09-16
Payer: COMMERCIAL

## 2024-09-16 DIAGNOSIS — I10 BENIGN ESSENTIAL HYPERTENSION: ICD-10-CM

## 2024-09-16 DIAGNOSIS — E78.2 MIXED HYPERLIPIDEMIA: Primary | ICD-10-CM

## 2024-09-16 DIAGNOSIS — M86.172 OSTEOMYELITIS OF ANKLE OR FOOT, LEFT, ACUTE (MULTI): ICD-10-CM

## 2024-09-16 DIAGNOSIS — I25.10 CORONARY ARTERY DISEASE INVOLVING NATIVE CORONARY ARTERY OF NATIVE HEART WITHOUT ANGINA PECTORIS: ICD-10-CM

## 2024-09-16 DIAGNOSIS — N18.5 STAGE 5 CHRONIC KIDNEY DISEASE NOT ON CHRONIC DIALYSIS (MULTI): ICD-10-CM

## 2024-09-16 DIAGNOSIS — E11.59 TYPE 2 DIABETES MELLITUS WITH OTHER CIRCULATORY COMPLICATION, WITHOUT LONG-TERM CURRENT USE OF INSULIN: ICD-10-CM

## 2024-09-16 RX ORDER — EZETIMIBE 10 MG/1
10 TABLET ORAL NIGHTLY
Qty: 90 TABLET | Refills: 3 | Status: SHIPPED | OUTPATIENT
Start: 2024-09-16

## 2024-09-16 NOTE — PROGRESS NOTES
Clinical Pharmacy Appointment    Patient ID: Colin Leonard is a 58 y.o. male who presents for Diabetes, Coronary Artery Disease, Hypertension, and Chronic Kidney Disease.    Pt is here for First appointment.     Referring Provider: Bony Ballard MD  PCP: Maurice Ballard MD   Last visit with PCP: 24   Next visit with PCP: Not Scheduled       Subjective       HPI      CHRONIC KIDNEY DISEASE  Stage: 5  Last eGFR: 10 mL/min/m2  Last Scr: 6.10 mg/dL     Does patient follow with nephrology? Yes - Has not seen nephro in 2 years     Any renally adjusted medications in medication list? yes    HTN history:  HTN diagnosis: yes  Current Regimen  Carvedilol 25 mg BID  Hydralazine 100 mg TID   BP Cuff at home? yes  HTN at goal? yes; 125-130/80-90 mmHg    HLD history:  Diagnosis? yes  Current Regimen:  Atorvastatin 40 mg once daily   Ezetimibe 10 mg once daily  At goal? yes  Current LDL: 88 mg/dL  Current T mg/dL     DM history:  Diagnosis? yes  At goal? yes; 5.5% (goal <7%)  Home BG readings: 120-129 mg/dL   Current Regimen:  Managed with lifestyle     Drug Interactions  No relevant drug interactions were noted.    Medication System Management  Patient's preferred pharmacy: [id number] [place], [place]   Adherence/Organization: Yes  Affordability/Accessibility: None reported -- patient reported medications are affordable, briefly discussed [facility] however none of his current medications are on the [facility] formulary for coverage      Objective   Allergies   Allergen Reactions    Amlodipine Besylate Swelling     edema legs     Social History     Social History Narrative    Not on file      Medication Review  Current Outpatient Medications   Medication Instructions    0.9 % sodium chloride (sodium chloride 0.9%) solution 10 mL, intravenous, 2 times daily, 10ml flush prior and after antibiotic, SASH method, 20ml after blood draw, 10ml daily for maintenance    acetaminophen (TYLENOL) 650 mg, oral, Every  4 hours PRN    alteplase (CATHFLO ACTIVASE) 2 mg, intra-catheter, Once as needed    aspirin 81 mg, oral, Daily    atorvastatin (LIPITOR) 40 mg, oral, Daily    carvedilol (COREG) 25 mg, oral, 2 times daily    ezetimibe (ZETIA) 10 mg, oral, Nightly    heparin, porcine, PF, (Hep Flush-10, PF,) 10 unit/mL solution 5 mL, intravenous push, 2 times daily, SASH method, after ns flush    hydrALAZINE (APRESOLINE) 100 mg, oral, 3 times daily    sodium bicarbonate 1,300 mg, oral, 3 times daily    torsemide (Demadex) 20 mg tablet Take 2 Tablets by Mouth Twice Daily      Vitals  BP Readings from Last 2 Encounters:   09/17/24 138/88   09/10/24 138/78     BMI Readings from Last 1 Encounters:   08/19/24 37.38 kg/m²      Labs  A1C  Lab Results   Component Value Date    HGBA1C 5.5 07/22/2024    HGBA1C 5.5 02/16/2022    HGBA1C 6.8 09/07/2020     BMP  Lab Results   Component Value Date    CALCIUM 9.0 09/03/2024     09/03/2024    K 5.0 09/03/2024    CO2 16 (L) 09/03/2024     (H) 09/03/2024    BUN 66 (H) 09/03/2024    CREATININE 6.10 (H) 09/03/2024    EGFR 10 (L) 09/03/2024     LFTs  Lab Results   Component Value Date    ALT 7 09/03/2024    AST 9 09/03/2024    ALKPHOS 59 09/03/2024    BILITOT <0.2 09/03/2024     FLP  Lab Results   Component Value Date    TRIG 110 08/19/2024    CHOL 143 08/19/2024    LDLF 43 02/01/2022    LDLCALC 88 08/19/2024    HDL 32.7 08/19/2024     Urine Microalbumin  Lab Results   Component Value Date    MICROALBCREA 1,753.2 (H) 02/15/2022     Weight Management  Wt Readings from Last 3 Encounters:   08/19/24 122 kg (268 lb)   07/24/24 122 kg (268 lb 15.4 oz)   04/22/24 123 kg (270 lb 6.4 oz)      There is no height or weight on file to calculate BMI.     Assessment/Plan   Problem List Items Addressed This Visit       Benign essential hypertension    Coronary artery disease involving native coronary artery of native heart without angina pectoris    Stage 5 chronic kidney disease not on chronic dialysis  (Multi)    Type 2 diabetes mellitus (Multi)    Osteomyelitis of ankle or foot, left, acute (Multi)   CONTINUE all current pharmacotherapy as directed   Reviewed list of patient's medication list and confirmed all renally dosed appropriately   Due to patient's egfr patient would not be eligible for an SGLT2 at this time  Considered a GLP1 for DM, CAD, and weight benefit however due to the patient's egfr would put him at an increased risk for an TEJA and therefore [facility] decided against  Emphasized the importance of getting back in to see nephrology  Offered to place a referral for nephrology however the patient stated he is already established with Dr. Goldberg and will be giving him a call soon  CONTINUE monitoring BG and BP   CONTINUE making healthy diet and lifestyle choices    Follow up with Clinical Pharmacy Team as needed by PCP or patient    Time spent with pt: Total length of time 15 (minutes) of the encounter and more than 50% was spent counseling the patient.    Continue all meds under the continuation of care with the referring provider and clinical pharmacy team.    Please reach out to the Clinical Pharmacy Team if there are any further questions.     Verbal consent to manage patient's drug therapy was obtained from patient. They were informed they may decline to participate or withdraw from participation in pharmacy services at any time.    Yaneth Toledo, PharmD  Clinical Pharmacy Specialist   [phone]

## 2024-09-17 ENCOUNTER — HOME CARE VISIT (OUTPATIENT)
Dept: HOME HEALTH SERVICES | Facility: HOME HEALTH | Age: 59
End: 2024-09-17
Payer: COMMERCIAL

## 2024-09-17 VITALS
SYSTOLIC BLOOD PRESSURE: 138 MMHG | TEMPERATURE: 98 F | DIASTOLIC BLOOD PRESSURE: 88 MMHG | OXYGEN SATURATION: 98 % | RESPIRATION RATE: 20 BRPM | HEART RATE: 72 BPM

## 2024-09-17 PROCEDURE — G0299 HHS/HOSPICE OF RN EA 15 MIN: HCPCS

## 2024-09-17 ASSESSMENT — ENCOUNTER SYMPTOMS
OCCASIONAL FEELINGS OF UNSTEADINESS: 1
MUSCLE WEAKNESS: 1
PAIN LOCATION - PAIN FREQUENCY: INTERMITTENT
CHANGE IN APPETITE: UNCHANGED
LOWEST PAIN SEVERITY IN PAST 24 HOURS: 0/10
PAIN LOCATION - PAIN DURATION: VARIES
PAIN: 1
PAIN SEVERITY GOAL: 0/10
APPETITE LEVEL: GOOD
SPUTUM COLOR: WHITE
LOSS OF SENSATION IN FEET: 1
PAIN LOCATION - PAIN QUALITY: ACHE
PAIN LOCATION - EXACERBATING FACTORS: WALKING
SUBJECTIVE PAIN PROGRESSION: UNCHANGED
SPUTUM CONSISTENCY: THICK
HIGHEST PAIN SEVERITY IN PAST 24 HOURS: 3/10
DRY SKIN: 1
FATIGUE: 1
PAIN LOCATION - RELIEVING FACTORS: REST
DESCRIPTION OF MEMORY LOSS: SHORT TERM
SPUTUM AMOUNT: SCANT
AGITATION: 1
FORGETFULNESS: 1
PAIN LOCATION: LEFT KNEE
DEPRESSION: 0
SPUTUM PRODUCTION: 1
PAIN LOCATION - PAIN SEVERITY: 2/10

## 2024-09-17 ASSESSMENT — PAIN SCALES - PAIN ASSESSMENT IN ADVANCED DEMENTIA (PAINAD)
BREATHING: 0
NEGVOCALIZATION: 0 - NONE.
NEGVOCALIZATION: 0
BODYLANGUAGE: 0
FACIALEXPRESSION: 0 - SMILING OR INEXPRESSIVE.
BODYLANGUAGE: 0 - RELAXED.
CONSOLABILITY: 0
CONSOLABILITY: 0 - NO NEED TO CONSOLE.
FACIALEXPRESSION: 0
TOTALSCORE: 0

## 2024-09-17 ASSESSMENT — ACTIVITIES OF DAILY LIVING (ADL)
AMBULATION ASSISTANCE: 1
AMBULATION ASSISTANCE: STAND BY ASSIST
MONEY MANAGEMENT (EXPENSES/BILLS): INDEPENDENT

## 2024-09-18 LAB
ACID FAST STN SPEC: NORMAL
MYCOBACTERIUM SPEC CULT: NORMAL

## 2024-09-19 ENCOUNTER — HOME CARE VISIT (OUTPATIENT)
Dept: HOME HEALTH SERVICES | Facility: HOME HEALTH | Age: 59
End: 2024-09-19
Payer: COMMERCIAL

## 2024-09-19 LAB
ACID FAST STN SPEC: NORMAL
MYCOBACTERIUM SPEC CULT: NORMAL

## 2024-09-20 ENCOUNTER — PATIENT OUTREACH (OUTPATIENT)
Dept: PRIMARY CARE | Facility: CLINIC | Age: 59
End: 2024-09-20
Payer: COMMERCIAL

## 2024-09-20 NOTE — PROGRESS NOTES
Unable to reach patient for discharge follow up call.   LVM with call back number for patient to call if needed   If no voicemail available call attempts x 2 were made to contact the patient to assist with any questions or concerns patient may have.\

## 2024-09-24 ENCOUNTER — HOME CARE VISIT (OUTPATIENT)
Dept: HOME HEALTH SERVICES | Facility: HOME HEALTH | Age: 59
End: 2024-09-24
Payer: COMMERCIAL

## 2024-09-24 ASSESSMENT — ACTIVITIES OF DAILY LIVING (ADL)
HOME_HEALTH_OASIS: 00
OASIS_M1830: 00

## 2024-09-26 ENCOUNTER — HOSPITAL ENCOUNTER (OUTPATIENT)
Dept: VASCULAR MEDICINE | Facility: CLINIC | Age: 59
Discharge: HOME | End: 2024-09-26
Payer: COMMERCIAL

## 2024-09-26 DIAGNOSIS — I82.402 ACUTE EMBOLISM AND THROMBOSIS OF UNSPECIFIED DEEP VEINS OF LEFT LOWER EXTREMITY (MULTI): ICD-10-CM

## 2024-09-26 DIAGNOSIS — I82.409 ACUTE EMBOLISM AND THROMBOSIS OF UNSPECIFIED DEEP VEINS OF UNSPECIFIED LOWER EXTREMITY (MULTI): ICD-10-CM

## 2024-09-26 PROCEDURE — 93971 EXTREMITY STUDY: CPT

## 2024-09-26 PROCEDURE — 93971 EXTREMITY STUDY: CPT | Performed by: SURGERY

## 2024-10-24 ENCOUNTER — HOSPITAL ENCOUNTER (OUTPATIENT)
Dept: RADIOLOGY | Facility: HOSPITAL | Age: 59
Discharge: HOME | End: 2024-10-24
Payer: COMMERCIAL

## 2024-10-24 DIAGNOSIS — S83.232A COMPLEX TEAR OF MEDIAL MENISCUS, CURRENT INJURY, LEFT KNEE, INITIAL ENCOUNTER: ICD-10-CM

## 2024-10-24 PROCEDURE — 73721 MRI JNT OF LWR EXTRE W/O DYE: CPT | Mod: LT

## 2024-10-28 ENCOUNTER — OFFICE VISIT (OUTPATIENT)
Dept: CARDIOLOGY | Facility: CLINIC | Age: 59
End: 2024-10-28
Payer: COMMERCIAL

## 2024-10-28 VITALS
DIASTOLIC BLOOD PRESSURE: 80 MMHG | WEIGHT: 252.4 LBS | OXYGEN SATURATION: 98 % | BODY MASS INDEX: 36.13 KG/M2 | HEART RATE: 72 BPM | HEIGHT: 70 IN | SYSTOLIC BLOOD PRESSURE: 155 MMHG

## 2024-10-28 DIAGNOSIS — E78.2 MIXED HYPERLIPIDEMIA: Primary | ICD-10-CM

## 2024-10-28 DIAGNOSIS — I10 BENIGN ESSENTIAL HYPERTENSION: Primary | ICD-10-CM

## 2024-10-28 PROCEDURE — 93005 ELECTROCARDIOGRAM TRACING: CPT | Performed by: INTERNAL MEDICINE

## 2024-10-28 PROCEDURE — 99214 OFFICE O/P EST MOD 30 MIN: CPT | Performed by: INTERNAL MEDICINE

## 2024-10-28 PROCEDURE — 3075F SYST BP GE 130 - 139MM HG: CPT | Performed by: INTERNAL MEDICINE

## 2024-10-28 PROCEDURE — 99214 OFFICE O/P EST MOD 30 MIN: CPT | Mod: 25 | Performed by: INTERNAL MEDICINE

## 2024-10-28 PROCEDURE — 3044F HG A1C LEVEL LT 7.0%: CPT | Performed by: INTERNAL MEDICINE

## 2024-10-28 PROCEDURE — 3078F DIAST BP <80 MM HG: CPT | Performed by: INTERNAL MEDICINE

## 2024-10-28 PROCEDURE — 93010 ELECTROCARDIOGRAM REPORT: CPT | Performed by: INTERNAL MEDICINE

## 2024-10-28 PROCEDURE — 1036F TOBACCO NON-USER: CPT | Performed by: INTERNAL MEDICINE

## 2024-10-28 PROCEDURE — 3008F BODY MASS INDEX DOCD: CPT | Performed by: INTERNAL MEDICINE

## 2024-10-28 PROCEDURE — 3048F LDL-C <100 MG/DL: CPT | Performed by: INTERNAL MEDICINE

## 2024-10-28 ASSESSMENT — PAIN SCALES - GENERAL: PAINLEVEL_OUTOF10: 4

## 2024-10-29 LAB
ATRIAL RATE: 72 BPM
P AXIS: 34 DEGREES
P OFFSET: 187 MS
P ONSET: 134 MS
PR INTERVAL: 170 MS
Q ONSET: 219 MS
QRS COUNT: 12 BEATS
QRS DURATION: 82 MS
QT INTERVAL: 430 MS
QTC CALCULATION(BAZETT): 470 MS
QTC FREDERICIA: 457 MS
R AXIS: 4 DEGREES
T AXIS: 23 DEGREES
T OFFSET: 434 MS
VENTRICULAR RATE: 72 BPM

## 2024-11-11 RX ORDER — ATORVASTATIN CALCIUM 20 MG/1
20 TABLET, FILM COATED ORAL DAILY
Qty: 90 TABLET | Refills: 3 | Status: SHIPPED | OUTPATIENT
Start: 2024-11-11 | End: 2025-11-11

## 2025-03-03 ENCOUNTER — APPOINTMENT (OUTPATIENT)
Dept: RADIOLOGY | Facility: HOSPITAL | Age: 60
End: 2025-03-03
Payer: COMMERCIAL

## 2025-03-03 ENCOUNTER — HOSPITAL ENCOUNTER (INPATIENT)
Facility: HOSPITAL | Age: 60
LOS: 5 days | Discharge: HOME | End: 2025-03-09
Attending: STUDENT IN AN ORGANIZED HEALTH CARE EDUCATION/TRAINING PROGRAM | Admitting: STUDENT IN AN ORGANIZED HEALTH CARE EDUCATION/TRAINING PROGRAM
Payer: COMMERCIAL

## 2025-03-03 ENCOUNTER — APPOINTMENT (OUTPATIENT)
Dept: RADIOLOGY | Facility: HOSPITAL | Age: 60
DRG: 418 | End: 2025-03-03
Payer: COMMERCIAL

## 2025-03-03 DIAGNOSIS — K80.42 CHOLEDOCHOLITHIASIS WITH ACUTE CHOLECYSTITIS: Primary | ICD-10-CM

## 2025-03-03 DIAGNOSIS — R42 VERTIGO: ICD-10-CM

## 2025-03-03 DIAGNOSIS — K80.00 ACUTE CALCULOUS CHOLECYSTITIS: ICD-10-CM

## 2025-03-03 LAB
ALBUMIN SERPL BCP-MCNC: 3.7 G/DL (ref 3.4–5)
ALP SERPL-CCNC: 102 U/L (ref 33–136)
ALT SERPL W P-5'-P-CCNC: 194 U/L (ref 10–52)
ANION GAP SERPL CALCULATED.3IONS-SCNC: 15 MMOL/L (ref 10–20)
AST SERPL W P-5'-P-CCNC: 267 U/L (ref 9–39)
BASOPHILS # BLD AUTO: 0.02 X10*3/UL (ref 0–0.1)
BASOPHILS NFR BLD AUTO: 0.1 %
BILIRUB SERPL-MCNC: 1 MG/DL (ref 0–1.2)
BUN SERPL-MCNC: 80 MG/DL (ref 6–23)
CALCIUM SERPL-MCNC: 9.1 MG/DL (ref 8.6–10.3)
CHLORIDE SERPL-SCNC: 112 MMOL/L (ref 98–107)
CO2 SERPL-SCNC: 17 MMOL/L (ref 21–32)
CREAT SERPL-MCNC: 6.49 MG/DL (ref 0.5–1.3)
EGFRCR SERPLBLD CKD-EPI 2021: 9 ML/MIN/1.73M*2
EOSINOPHIL # BLD AUTO: 0.05 X10*3/UL (ref 0–0.7)
EOSINOPHIL NFR BLD AUTO: 0.3 %
ERYTHROCYTE [DISTWIDTH] IN BLOOD BY AUTOMATED COUNT: 14.6 % (ref 11.5–14.5)
FLUAV RNA RESP QL NAA+PROBE: NOT DETECTED
FLUBV RNA RESP QL NAA+PROBE: NOT DETECTED
GLUCOSE SERPL-MCNC: 180 MG/DL (ref 74–99)
HCT VFR BLD AUTO: 28.4 % (ref 41–52)
HGB BLD-MCNC: 8.9 G/DL (ref 13.5–17.5)
IMM GRANULOCYTES # BLD AUTO: 0.07 X10*3/UL (ref 0–0.7)
IMM GRANULOCYTES NFR BLD AUTO: 0.5 % (ref 0–0.9)
LACTATE SERPL-SCNC: 0.9 MMOL/L (ref 0.4–2)
LIPASE SERPL-CCNC: 49 U/L (ref 9–82)
LYMPHOCYTES # BLD AUTO: 0.23 X10*3/UL (ref 1.2–4.8)
LYMPHOCYTES NFR BLD AUTO: 1.6 %
MAGNESIUM SERPL-MCNC: 1.97 MG/DL (ref 1.6–2.4)
MCH RBC QN AUTO: 28.3 PG (ref 26–34)
MCHC RBC AUTO-ENTMCNC: 31.3 G/DL (ref 32–36)
MCV RBC AUTO: 90 FL (ref 80–100)
MONOCYTES # BLD AUTO: 0.86 X10*3/UL (ref 0.1–1)
MONOCYTES NFR BLD AUTO: 5.8 %
NEUTROPHILS # BLD AUTO: 13.54 X10*3/UL (ref 1.2–7.7)
NEUTROPHILS NFR BLD AUTO: 91.7 %
NRBC BLD-RTO: 0 /100 WBCS (ref 0–0)
PHOSPHATE SERPL-MCNC: 4.8 MG/DL (ref 2.5–4.9)
PLATELET # BLD AUTO: 262 X10*3/UL (ref 150–450)
POTASSIUM SERPL-SCNC: 4.6 MMOL/L (ref 3.5–5.3)
PROT SERPL-MCNC: 6.2 G/DL (ref 6.4–8.2)
RBC # BLD AUTO: 3.15 X10*6/UL (ref 4.5–5.9)
SARS-COV-2 RNA RESP QL NAA+PROBE: NOT DETECTED
SODIUM SERPL-SCNC: 139 MMOL/L (ref 136–145)
WBC # BLD AUTO: 14.8 X10*3/UL (ref 4.4–11.3)

## 2025-03-03 PROCEDURE — 96375 TX/PRO/DX INJ NEW DRUG ADDON: CPT

## 2025-03-03 PROCEDURE — 96372 THER/PROPH/DIAG INJ SC/IM: CPT | Performed by: STUDENT IN AN ORGANIZED HEALTH CARE EDUCATION/TRAINING PROGRAM

## 2025-03-03 PROCEDURE — 2500000004 HC RX 250 GENERAL PHARMACY W/ HCPCS (ALT 636 FOR OP/ED): Performed by: STUDENT IN AN ORGANIZED HEALTH CARE EDUCATION/TRAINING PROGRAM

## 2025-03-03 PROCEDURE — 74176 CT ABD & PELVIS W/O CONTRAST: CPT | Performed by: RADIOLOGY

## 2025-03-03 PROCEDURE — 84100 ASSAY OF PHOSPHORUS: CPT | Performed by: STUDENT IN AN ORGANIZED HEALTH CARE EDUCATION/TRAINING PROGRAM

## 2025-03-03 PROCEDURE — 83735 ASSAY OF MAGNESIUM: CPT | Performed by: STUDENT IN AN ORGANIZED HEALTH CARE EDUCATION/TRAINING PROGRAM

## 2025-03-03 PROCEDURE — 83690 ASSAY OF LIPASE: CPT | Performed by: PHYSICIAN ASSISTANT

## 2025-03-03 PROCEDURE — 85025 COMPLETE CBC W/AUTO DIFF WBC: CPT | Performed by: PHYSICIAN ASSISTANT

## 2025-03-03 PROCEDURE — 74176 CT ABD & PELVIS W/O CONTRAST: CPT

## 2025-03-03 PROCEDURE — 99285 EMERGENCY DEPT VISIT HI MDM: CPT | Mod: 25 | Performed by: STUDENT IN AN ORGANIZED HEALTH CARE EDUCATION/TRAINING PROGRAM

## 2025-03-03 PROCEDURE — 87636 SARSCOV2 & INF A&B AMP PRB: CPT | Performed by: STUDENT IN AN ORGANIZED HEALTH CARE EDUCATION/TRAINING PROGRAM

## 2025-03-03 PROCEDURE — 80053 COMPREHEN METABOLIC PANEL: CPT | Performed by: PHYSICIAN ASSISTANT

## 2025-03-03 PROCEDURE — 83605 ASSAY OF LACTIC ACID: CPT | Performed by: STUDENT IN AN ORGANIZED HEALTH CARE EDUCATION/TRAINING PROGRAM

## 2025-03-03 RX ORDER — ONDANSETRON HYDROCHLORIDE 2 MG/ML
4 INJECTION, SOLUTION INTRAVENOUS ONCE
Status: COMPLETED | OUTPATIENT
Start: 2025-03-03 | End: 2025-03-03

## 2025-03-03 RX ORDER — DICYCLOMINE HYDROCHLORIDE 10 MG/ML
20 INJECTION INTRAMUSCULAR ONCE
Status: COMPLETED | OUTPATIENT
Start: 2025-03-03 | End: 2025-03-03

## 2025-03-03 RX ADMIN — DICYCLOMINE HYDROCHLORIDE 20 MG: 20 INJECTION, SOLUTION INTRAMUSCULAR at 22:24

## 2025-03-03 RX ADMIN — ONDANSETRON 4 MG: 2 INJECTION, SOLUTION INTRAMUSCULAR; INTRAVENOUS at 22:24

## 2025-03-03 ASSESSMENT — PAIN DESCRIPTION - DESCRIPTORS: DESCRIPTORS: DULL;ACHING

## 2025-03-03 ASSESSMENT — LIFESTYLE VARIABLES
EVER FELT BAD OR GUILTY ABOUT YOUR DRINKING: NO
TOTAL SCORE: 0
HAVE YOU EVER FELT YOU SHOULD CUT DOWN ON YOUR DRINKING: NO
HAVE PEOPLE ANNOYED YOU BY CRITICIZING YOUR DRINKING: NO
EVER HAD A DRINK FIRST THING IN THE MORNING TO STEADY YOUR NERVES TO GET RID OF A HANGOVER: NO

## 2025-03-03 ASSESSMENT — PAIN DESCRIPTION - FREQUENCY: FREQUENCY: CONSTANT/CONTINUOUS

## 2025-03-03 ASSESSMENT — PAIN SCALES - GENERAL: PAINLEVEL_OUTOF10: 7

## 2025-03-03 ASSESSMENT — PAIN - FUNCTIONAL ASSESSMENT: PAIN_FUNCTIONAL_ASSESSMENT: 0-10

## 2025-03-03 ASSESSMENT — PAIN DESCRIPTION - LOCATION: LOCATION: ABDOMEN

## 2025-03-03 ASSESSMENT — PAIN DESCRIPTION - ORIENTATION: ORIENTATION: UPPER

## 2025-03-03 ASSESSMENT — PAIN DESCRIPTION - PAIN TYPE: TYPE: ACUTE PAIN

## 2025-03-04 ENCOUNTER — APPOINTMENT (OUTPATIENT)
Dept: RADIOLOGY | Facility: HOSPITAL | Age: 60
DRG: 418 | End: 2025-03-04
Payer: COMMERCIAL

## 2025-03-04 ENCOUNTER — ANESTHESIA EVENT (OUTPATIENT)
Dept: OPERATING ROOM | Facility: HOSPITAL | Age: 60
End: 2025-03-04
Payer: COMMERCIAL

## 2025-03-04 ENCOUNTER — ANESTHESIA (OUTPATIENT)
Dept: OPERATING ROOM | Facility: HOSPITAL | Age: 60
End: 2025-03-04
Payer: COMMERCIAL

## 2025-03-04 PROBLEM — N20.0 RIGHT RENAL STONE: Status: ACTIVE | Noted: 2025-03-04

## 2025-03-04 PROBLEM — D64.9 ANEMIA: Status: ACTIVE | Noted: 2025-03-04

## 2025-03-04 PROBLEM — R79.89 ELEVATED LFTS: Status: ACTIVE | Noted: 2025-03-04

## 2025-03-04 PROBLEM — K80.42 CHOLEDOCHOLITHIASIS WITH ACUTE CHOLECYSTITIS: Status: ACTIVE | Noted: 2025-03-04

## 2025-03-04 PROBLEM — I25.10 CAD (CORONARY ARTERY DISEASE): Status: ACTIVE | Noted: 2025-03-04

## 2025-03-04 PROBLEM — K80.00 ACUTE CALCULOUS CHOLECYSTITIS: Status: ACTIVE | Noted: 2025-03-03

## 2025-03-04 PROBLEM — Z29.9 ENCOUNTER FOR DEEP VEIN THROMBOSIS (DVT) PROPHYLAXIS: Status: ACTIVE | Noted: 2025-03-04

## 2025-03-04 LAB
ABO GROUP (TYPE) IN BLOOD: NORMAL
ALBUMIN SERPL BCP-MCNC: 3.5 G/DL (ref 3.4–5)
ALP SERPL-CCNC: 88 U/L (ref 33–136)
ALT SERPL W P-5'-P-CCNC: 190 U/L (ref 10–52)
ANION GAP SERPL CALCULATED.3IONS-SCNC: 13 MMOL/L (ref 10–20)
ANTIBODY SCREEN: NORMAL
APPEARANCE UR: CLEAR
AST SERPL W P-5'-P-CCNC: 144 U/L (ref 9–39)
BACTERIA #/AREA URNS AUTO: ABNORMAL /HPF
BILIRUB DIRECT SERPL-MCNC: 0.1 MG/DL (ref 0–0.3)
BILIRUB SERPL-MCNC: 0.5 MG/DL (ref 0–1.2)
BILIRUB UR STRIP.AUTO-MCNC: NEGATIVE MG/DL
BUN SERPL-MCNC: 73 MG/DL (ref 6–23)
CALCIUM SERPL-MCNC: 8.6 MG/DL (ref 8.6–10.3)
CHLORIDE SERPL-SCNC: 114 MMOL/L (ref 98–107)
CO2 SERPL-SCNC: 16 MMOL/L (ref 21–32)
COLOR UR: ABNORMAL
CREAT SERPL-MCNC: 6.56 MG/DL (ref 0.5–1.3)
EGFRCR SERPLBLD CKD-EPI 2021: 9 ML/MIN/1.73M*2
ERYTHROCYTE [DISTWIDTH] IN BLOOD BY AUTOMATED COUNT: 14.8 % (ref 11.5–14.5)
EST. AVERAGE GLUCOSE BLD GHB EST-MCNC: 108 MG/DL
FERRITIN SERPL-MCNC: 109 NG/ML (ref 20–300)
GLUCOSE BLD MANUAL STRIP-MCNC: 181 MG/DL (ref 74–99)
GLUCOSE BLD MANUAL STRIP-MCNC: 92 MG/DL (ref 74–99)
GLUCOSE BLD MANUAL STRIP-MCNC: 95 MG/DL (ref 74–99)
GLUCOSE SERPL-MCNC: 118 MG/DL (ref 74–99)
GLUCOSE UR STRIP.AUTO-MCNC: ABNORMAL MG/DL
HBA1C MFR BLD: 5.4 %
HCT VFR BLD AUTO: 24.5 % (ref 41–52)
HGB BLD-MCNC: 7.8 G/DL (ref 13.5–17.5)
HOLD SPECIMEN: NORMAL
IRON SATN MFR SERPL: 8 % (ref 25–45)
IRON SERPL-MCNC: 19 UG/DL (ref 35–150)
KETONES UR STRIP.AUTO-MCNC: NEGATIVE MG/DL
LEUKOCYTE ESTERASE UR QL STRIP.AUTO: NEGATIVE
MCH RBC QN AUTO: 28.3 PG (ref 26–34)
MCHC RBC AUTO-ENTMCNC: 31.8 G/DL (ref 32–36)
MCV RBC AUTO: 89 FL (ref 80–100)
MUCOUS THREADS #/AREA URNS AUTO: ABNORMAL /LPF
NITRITE UR QL STRIP.AUTO: NEGATIVE
NRBC BLD-RTO: 0 /100 WBCS (ref 0–0)
PH UR STRIP.AUTO: 5.5 [PH]
PLATELET # BLD AUTO: 254 X10*3/UL (ref 150–450)
POTASSIUM SERPL-SCNC: 5 MMOL/L (ref 3.5–5.3)
PROT SERPL-MCNC: 5.7 G/DL (ref 6.4–8.2)
PROT UR STRIP.AUTO-MCNC: ABNORMAL MG/DL
RBC # BLD AUTO: 2.76 X10*6/UL (ref 4.5–5.9)
RBC # UR STRIP.AUTO: NEGATIVE MG/DL
RBC #/AREA URNS AUTO: ABNORMAL /HPF
RH FACTOR (ANTIGEN D): NORMAL
SODIUM SERPL-SCNC: 138 MMOL/L (ref 136–145)
SP GR UR STRIP.AUTO: 1.01
TIBC SERPL-MCNC: 247 UG/DL (ref 240–445)
UIBC SERPL-MCNC: 228 UG/DL (ref 110–370)
UROBILINOGEN UR STRIP.AUTO-MCNC: NORMAL MG/DL
WBC # BLD AUTO: 21 X10*3/UL (ref 4.4–11.3)
WBC #/AREA URNS AUTO: ABNORMAL /HPF

## 2025-03-04 PROCEDURE — 82947 ASSAY GLUCOSE BLOOD QUANT: CPT

## 2025-03-04 PROCEDURE — 80048 BASIC METABOLIC PNL TOTAL CA: CPT | Performed by: STUDENT IN AN ORGANIZED HEALTH CARE EDUCATION/TRAINING PROGRAM

## 2025-03-04 PROCEDURE — 86900 BLOOD TYPING SEROLOGIC ABO: CPT | Performed by: NURSE PRACTITIONER

## 2025-03-04 PROCEDURE — 99232 SBSQ HOSP IP/OBS MODERATE 35: CPT | Performed by: INTERNAL MEDICINE

## 2025-03-04 PROCEDURE — 82728 ASSAY OF FERRITIN: CPT | Performed by: INTERNAL MEDICINE

## 2025-03-04 PROCEDURE — 76705 ECHO EXAM OF ABDOMEN: CPT | Performed by: RADIOLOGY

## 2025-03-04 PROCEDURE — 96365 THER/PROPH/DIAG IV INF INIT: CPT

## 2025-03-04 PROCEDURE — 2500000002 HC RX 250 W HCPCS SELF ADMINISTERED DRUGS (ALT 637 FOR MEDICARE OP, ALT 636 FOR OP/ED): Performed by: STUDENT IN AN ORGANIZED HEALTH CARE EDUCATION/TRAINING PROGRAM

## 2025-03-04 PROCEDURE — 1200000002 HC GENERAL ROOM WITH TELEMETRY DAILY

## 2025-03-04 PROCEDURE — 86901 BLOOD TYPING SEROLOGIC RH(D): CPT | Performed by: NURSE PRACTITIONER

## 2025-03-04 PROCEDURE — 82248 BILIRUBIN DIRECT: CPT | Performed by: INTERNAL MEDICINE

## 2025-03-04 PROCEDURE — 2500000004 HC RX 250 GENERAL PHARMACY W/ HCPCS (ALT 636 FOR OP/ED): Performed by: PHYSICIAN ASSISTANT

## 2025-03-04 PROCEDURE — 2500000004 HC RX 250 GENERAL PHARMACY W/ HCPCS (ALT 636 FOR OP/ED): Performed by: STUDENT IN AN ORGANIZED HEALTH CARE EDUCATION/TRAINING PROGRAM

## 2025-03-04 PROCEDURE — 97161 PT EVAL LOW COMPLEX 20 MIN: CPT | Mod: GP

## 2025-03-04 PROCEDURE — 2500000001 HC RX 250 WO HCPCS SELF ADMINISTERED DRUGS (ALT 637 FOR MEDICARE OP): Performed by: STUDENT IN AN ORGANIZED HEALTH CARE EDUCATION/TRAINING PROGRAM

## 2025-03-04 PROCEDURE — 83550 IRON BINDING TEST: CPT | Performed by: INTERNAL MEDICINE

## 2025-03-04 PROCEDURE — 81001 URINALYSIS AUTO W/SCOPE: CPT | Performed by: PHYSICIAN ASSISTANT

## 2025-03-04 PROCEDURE — 85027 COMPLETE CBC AUTOMATED: CPT | Performed by: STUDENT IN AN ORGANIZED HEALTH CARE EDUCATION/TRAINING PROGRAM

## 2025-03-04 PROCEDURE — 83540 ASSAY OF IRON: CPT | Performed by: INTERNAL MEDICINE

## 2025-03-04 PROCEDURE — 2500000004 HC RX 250 GENERAL PHARMACY W/ HCPCS (ALT 636 FOR OP/ED): Performed by: INTERNAL MEDICINE

## 2025-03-04 PROCEDURE — 76705 ECHO EXAM OF ABDOMEN: CPT

## 2025-03-04 PROCEDURE — 36415 COLL VENOUS BLD VENIPUNCTURE: CPT | Performed by: STUDENT IN AN ORGANIZED HEALTH CARE EDUCATION/TRAINING PROGRAM

## 2025-03-04 PROCEDURE — 99223 1ST HOSP IP/OBS HIGH 75: CPT | Performed by: STUDENT IN AN ORGANIZED HEALTH CARE EDUCATION/TRAINING PROGRAM

## 2025-03-04 PROCEDURE — 83036 HEMOGLOBIN GLYCOSYLATED A1C: CPT | Mod: WESLAB | Performed by: INTERNAL MEDICINE

## 2025-03-04 PROCEDURE — 87040 BLOOD CULTURE FOR BACTERIA: CPT | Mod: WESLAB | Performed by: STUDENT IN AN ORGANIZED HEALTH CARE EDUCATION/TRAINING PROGRAM

## 2025-03-04 RX ORDER — HYDRALAZINE HYDROCHLORIDE 50 MG/1
100 TABLET, FILM COATED ORAL 3 TIMES DAILY
Status: DISCONTINUED | OUTPATIENT
Start: 2025-03-04 | End: 2025-03-09 | Stop reason: HOSPADM

## 2025-03-04 RX ORDER — SODIUM CHLORIDE 9 MG/ML
100 INJECTION, SOLUTION INTRAVENOUS CONTINUOUS
Status: DISCONTINUED | OUTPATIENT
Start: 2025-03-04 | End: 2025-03-04

## 2025-03-04 RX ORDER — TORSEMIDE 20 MG/1
20 TABLET ORAL
Status: DISCONTINUED | OUTPATIENT
Start: 2025-03-04 | End: 2025-03-09 | Stop reason: HOSPADM

## 2025-03-04 RX ORDER — POLYETHYLENE GLYCOL 3350 17 G/17G
17 POWDER, FOR SOLUTION ORAL DAILY
Status: DISCONTINUED | OUTPATIENT
Start: 2025-03-04 | End: 2025-03-09 | Stop reason: HOSPADM

## 2025-03-04 RX ORDER — PROCHLORPERAZINE MALEATE 10 MG
10 TABLET ORAL EVERY 6 HOURS PRN
Status: DISCONTINUED | OUTPATIENT
Start: 2025-03-04 | End: 2025-03-09 | Stop reason: HOSPADM

## 2025-03-04 RX ORDER — PROCHLORPERAZINE 25 MG/1
25 SUPPOSITORY RECTAL EVERY 12 HOURS PRN
Status: DISCONTINUED | OUTPATIENT
Start: 2025-03-04 | End: 2025-03-09 | Stop reason: HOSPADM

## 2025-03-04 RX ORDER — CARVEDILOL 25 MG/1
25 TABLET ORAL
Status: DISCONTINUED | OUTPATIENT
Start: 2025-03-04 | End: 2025-03-09 | Stop reason: HOSPADM

## 2025-03-04 RX ORDER — HEPARIN SODIUM 5000 [USP'U]/ML
5000 INJECTION, SOLUTION INTRAVENOUS; SUBCUTANEOUS EVERY 8 HOURS
Status: DISCONTINUED | OUTPATIENT
Start: 2025-03-04 | End: 2025-03-05

## 2025-03-04 RX ORDER — PROCHLORPERAZINE EDISYLATE 5 MG/ML
10 INJECTION INTRAMUSCULAR; INTRAVENOUS EVERY 6 HOURS PRN
Status: DISCONTINUED | OUTPATIENT
Start: 2025-03-04 | End: 2025-03-09 | Stop reason: HOSPADM

## 2025-03-04 RX ORDER — TALC
3 POWDER (GRAM) TOPICAL NIGHTLY PRN
Status: DISCONTINUED | OUTPATIENT
Start: 2025-03-04 | End: 2025-03-09 | Stop reason: HOSPADM

## 2025-03-04 RX ORDER — SODIUM BICARBONATE 650 MG/1
1300 TABLET ORAL 2 TIMES DAILY
Status: DISCONTINUED | OUTPATIENT
Start: 2025-03-04 | End: 2025-03-05

## 2025-03-04 RX ORDER — DEXTROSE MONOHYDRATE AND SODIUM CHLORIDE 5; .9 G/100ML; G/100ML
75 INJECTION, SOLUTION INTRAVENOUS CONTINUOUS
Status: DISCONTINUED | OUTPATIENT
Start: 2025-03-04 | End: 2025-03-05

## 2025-03-04 RX ORDER — EZETIMIBE 10 MG/1
10 TABLET ORAL NIGHTLY
Status: DISCONTINUED | OUTPATIENT
Start: 2025-03-04 | End: 2025-03-09 | Stop reason: HOSPADM

## 2025-03-04 RX ORDER — ASPIRIN 81 MG/1
81 TABLET ORAL DAILY
Status: DISCONTINUED | OUTPATIENT
Start: 2025-03-04 | End: 2025-03-09 | Stop reason: HOSPADM

## 2025-03-04 RX ADMIN — HYDRALAZINE HYDROCHLORIDE 100 MG: 50 TABLET ORAL at 20:05

## 2025-03-04 RX ADMIN — HEPARIN SODIUM 5000 UNITS: 5000 INJECTION, SOLUTION INTRAVENOUS; SUBCUTANEOUS at 06:09

## 2025-03-04 RX ADMIN — PIPERACILLIN SODIUM AND TAZOBACTAM SODIUM 2.25 G: 2; .25 INJECTION, SOLUTION INTRAVENOUS at 20:05

## 2025-03-04 RX ADMIN — DEXTROSE MONOHYDRATE AND SODIUM CHLORIDE 75 ML/HR: 5; .9 INJECTION, SOLUTION INTRAVENOUS at 14:09

## 2025-03-04 RX ADMIN — ASPIRIN 81 MG: 81 TABLET, COATED ORAL at 09:12

## 2025-03-04 RX ADMIN — SODIUM CHLORIDE 100 ML/HR: 900 INJECTION, SOLUTION INTRAVENOUS at 12:11

## 2025-03-04 RX ADMIN — SODIUM CHLORIDE 1000 ML: 900 INJECTION, SOLUTION INTRAVENOUS at 00:26

## 2025-03-04 RX ADMIN — HYDRALAZINE HYDROCHLORIDE 100 MG: 50 TABLET ORAL at 16:08

## 2025-03-04 RX ADMIN — PIPERACILLIN SODIUM AND TAZOBACTAM SODIUM 2.25 G: 2; .25 INJECTION, SOLUTION INTRAVENOUS at 09:11

## 2025-03-04 RX ADMIN — CARVEDILOL 25 MG: 25 TABLET, FILM COATED ORAL at 09:12

## 2025-03-04 RX ADMIN — TORSEMIDE 20 MG: 20 TABLET ORAL at 09:12

## 2025-03-04 RX ADMIN — PIPERACILLIN SODIUM AND TAZOBACTAM SODIUM 2.25 G: 2; .25 INJECTION, SOLUTION INTRAVENOUS at 00:25

## 2025-03-04 RX ADMIN — EZETIMIBE 10 MG: 10 TABLET ORAL at 20:05

## 2025-03-04 RX ADMIN — PIPERACILLIN SODIUM AND TAZOBACTAM SODIUM 2.25 G: 2; .25 INJECTION, SOLUTION INTRAVENOUS at 14:09

## 2025-03-04 RX ADMIN — SODIUM BICARBONATE 650 MG TABLET 1300 MG: at 20:05

## 2025-03-04 RX ADMIN — CARVEDILOL 25 MG: 25 TABLET, FILM COATED ORAL at 16:08

## 2025-03-04 RX ADMIN — SODIUM BICARBONATE 650 MG TABLET 1300 MG: at 12:10

## 2025-03-04 RX ADMIN — HYDRALAZINE HYDROCHLORIDE 100 MG: 50 TABLET ORAL at 09:12

## 2025-03-04 SDOH — SOCIAL STABILITY: SOCIAL INSECURITY: WITHIN THE LAST YEAR, HAVE YOU BEEN AFRAID OF YOUR PARTNER OR EX-PARTNER?: NO

## 2025-03-04 SDOH — ECONOMIC STABILITY: FOOD INSECURITY: WITHIN THE PAST 12 MONTHS, THE FOOD YOU BOUGHT JUST DIDN'T LAST AND YOU DIDN'T HAVE MONEY TO GET MORE.: NEVER TRUE

## 2025-03-04 SDOH — ECONOMIC STABILITY: INCOME INSECURITY: IN THE PAST 12 MONTHS HAS THE ELECTRIC, GAS, OIL, OR WATER COMPANY THREATENED TO SHUT OFF SERVICES IN YOUR HOME?: NO

## 2025-03-04 SDOH — ECONOMIC STABILITY: FOOD INSECURITY: WITHIN THE PAST 12 MONTHS, YOU WORRIED THAT YOUR FOOD WOULD RUN OUT BEFORE YOU GOT THE MONEY TO BUY MORE.: NEVER TRUE

## 2025-03-04 SDOH — SOCIAL STABILITY: SOCIAL INSECURITY: DO YOU FEEL UNSAFE GOING BACK TO THE PLACE WHERE YOU ARE LIVING?: NO

## 2025-03-04 SDOH — SOCIAL STABILITY: SOCIAL INSECURITY: HAVE YOU HAD ANY THOUGHTS OF HARMING ANYONE ELSE?: NO

## 2025-03-04 SDOH — SOCIAL STABILITY: SOCIAL INSECURITY: DO YOU FEEL ANYONE HAS EXPLOITED OR TAKEN ADVANTAGE OF YOU FINANCIALLY OR OF YOUR PERSONAL PROPERTY?: NO

## 2025-03-04 SDOH — SOCIAL STABILITY: SOCIAL INSECURITY: ABUSE: ADULT

## 2025-03-04 SDOH — SOCIAL STABILITY: SOCIAL INSECURITY: ARE THERE ANY APPARENT SIGNS OF INJURIES/BEHAVIORS THAT COULD BE RELATED TO ABUSE/NEGLECT?: NO

## 2025-03-04 SDOH — SOCIAL STABILITY: SOCIAL INSECURITY: WITHIN THE LAST YEAR, HAVE YOU BEEN HUMILIATED OR EMOTIONALLY ABUSED IN OTHER WAYS BY YOUR PARTNER OR EX-PARTNER?: NO

## 2025-03-04 SDOH — SOCIAL STABILITY: SOCIAL INSECURITY: ARE YOU OR HAVE YOU BEEN THREATENED OR ABUSED PHYSICALLY, EMOTIONALLY, OR SEXUALLY BY ANYONE?: NO

## 2025-03-04 SDOH — SOCIAL STABILITY: SOCIAL INSECURITY: HAS ANYONE EVER THREATENED TO HURT YOUR FAMILY OR YOUR PETS?: NO

## 2025-03-04 SDOH — SOCIAL STABILITY: SOCIAL INSECURITY: DOES ANYONE TRY TO KEEP YOU FROM HAVING/CONTACTING OTHER FRIENDS OR DOING THINGS OUTSIDE YOUR HOME?: NO

## 2025-03-04 SDOH — SOCIAL STABILITY: SOCIAL INSECURITY: WERE YOU ABLE TO COMPLETE ALL THE BEHAVIORAL HEALTH SCREENINGS?: YES

## 2025-03-04 SDOH — SOCIAL STABILITY: SOCIAL INSECURITY: HAVE YOU HAD THOUGHTS OF HARMING ANYONE ELSE?: NO

## 2025-03-04 ASSESSMENT — PAIN - FUNCTIONAL ASSESSMENT
PAIN_FUNCTIONAL_ASSESSMENT: 0-10

## 2025-03-04 ASSESSMENT — COGNITIVE AND FUNCTIONAL STATUS - GENERAL
MOBILITY SCORE: 24
MOBILITY SCORE: 24
CLIMB 3 TO 5 STEPS WITH RAILING: A LITTLE
DAILY ACTIVITIY SCORE: 24
PATIENT BASELINE BEDBOUND: NO
DAILY ACTIVITIY SCORE: 24
EATING MEALS: A LITTLE
MOBILITY SCORE: 24
MOBILITY SCORE: 23
DAILY ACTIVITIY SCORE: 23

## 2025-03-04 ASSESSMENT — ENCOUNTER SYMPTOMS
PSYCHIATRIC NEGATIVE: 1
ENDOCRINE NEGATIVE: 1
NEUROLOGICAL NEGATIVE: 1
ALLERGIC/IMMUNOLOGIC NEGATIVE: 1
CARDIOVASCULAR NEGATIVE: 1
GASTROINTESTINAL NEGATIVE: 1
EYES NEGATIVE: 1
MUSCULOSKELETAL NEGATIVE: 1
HEMATOLOGIC/LYMPHATIC NEGATIVE: 1
CONSTITUTIONAL NEGATIVE: 1

## 2025-03-04 ASSESSMENT — PAIN SCALES - GENERAL
PAINLEVEL_OUTOF10: 1
PAINLEVEL_OUTOF10: 0 - NO PAIN
PAINLEVEL_OUTOF10: 1

## 2025-03-04 ASSESSMENT — PAIN DESCRIPTION - DESCRIPTORS: DESCRIPTORS: DULL

## 2025-03-04 ASSESSMENT — ACTIVITIES OF DAILY LIVING (ADL)
HEARING - RIGHT EAR: DIFFICULTY WITH NOISE
DRESSING YOURSELF: INDEPENDENT
ADEQUATE_TO_COMPLETE_ADL: YES
PATIENT'S MEMORY ADEQUATE TO SAFELY COMPLETE DAILY ACTIVITIES?: YES
BATHING: INDEPENDENT
JUDGMENT_ADEQUATE_SAFELY_COMPLETE_DAILY_ACTIVITIES: YES
LACK_OF_TRANSPORTATION: NO
GROOMING: INDEPENDENT
FEEDING YOURSELF: INDEPENDENT
ADL_ASSISTANCE: INDEPENDENT
TOILETING: INDEPENDENT
WALKS IN HOME: INDEPENDENT
HEARING - LEFT EAR: DIFFICULTY WITH NOISE

## 2025-03-04 ASSESSMENT — PATIENT HEALTH QUESTIONNAIRE - PHQ9
SUM OF ALL RESPONSES TO PHQ9 QUESTIONS 1 & 2: 0
1. LITTLE INTEREST OR PLEASURE IN DOING THINGS: NOT AT ALL
2. FEELING DOWN, DEPRESSED OR HOPELESS: NOT AT ALL

## 2025-03-04 ASSESSMENT — LIFESTYLE VARIABLES
SKIP TO QUESTIONS 9-10: 1
AUDIT-C TOTAL SCORE: 0
HOW OFTEN DO YOU HAVE A DRINK CONTAINING ALCOHOL: NEVER
AUDIT-C TOTAL SCORE: 0
HOW OFTEN DO YOU HAVE 6 OR MORE DRINKS ON ONE OCCASION: NEVER
HOW MANY STANDARD DRINKS CONTAINING ALCOHOL DO YOU HAVE ON A TYPICAL DAY: PATIENT DOES NOT DRINK

## 2025-03-04 NOTE — NURSING NOTE
Pt resting in bed. No complaints or concerns. Call light within reach.  Pt continues on IV fluids and atb per order. Pt to have isis sandoval.

## 2025-03-04 NOTE — PROGRESS NOTES
TCC Attempted to see pt, pt asleep at this time   Will re attempt at a later time     Viviana Rowland RN

## 2025-03-04 NOTE — PROGRESS NOTES
0858Physical Therapy    Physical Therapy Evaluation    Patient Name: Colin Leonard  MRN: [id number]  Department: [facility]  Room: [facility]  Today's Date: 3/4/2025   Time Calculation  Start Time: 0858  Stop Time: 0908  Time Calculation (min): 10 min    Assessment/Plan   PT Assessment  Rehab Prognosis: Good  Barriers to Discharge Home: No anticipated barriers  Evaluation/Treatment Tolerance: Patient tolerated treatment well  Medical Staff Made Aware: Yes  Barriers to Participation:  (none)  End of Session Communication: Bedside nurse  Assessment Comment: Anticipate safe discharge home with family support; no further PT needs. encouraged pt to continue amb in halls and sitting up in chair until discharge home.  End of Session Patient Position:  (sitting on EOB)  IP OR SWING BED PT PLAN  Inpatient or Swing Bed: Inpatient  PT Plan  PT Plan: PT Eval only  PT Eval Only Reason: Safe to return home  PT Discharge Recommendations: No further acute PT  Equipment Recommended upon Discharge:  (none)  PT Recommended Transfer Status: Independent  PT - OK to Discharge: Yes    Subjective   General Visit Information:  General  Reason for Referral: pt is a 60 y/o male admitted with choledocholithiasis with acute cholecystitis; pt c/o nausea, vomiting and R UQ pain. impaired mobility  Referred By: Dr. Fregoso  Past Medical History Relevant to Rehab: DM; CKD; HTN; NSTEMI; obesity; osteomyelitis; Left toe amputation; leukocytosis  Family/Caregiver Present: No  Prior to Session Communication: Bedside nurse  Patient Position Received:  (sitting on EOB upon arrival)  General Comment: pt alert and cooperative    Home Living:  Home Living  Type of Home: House  Lives With:  (adult son)  Home Adaptive Equipment: None  Home Layout: Multi-level, Laundry main level  Home Access:  3 entry stairs with no railing  Bathroom Shower/Tub: Walk-in shower  Bathroom Equipment: Grab bars in shower, Tub transfer bench  Home Living Comments: pt also has 12  stairs with 1 railing to an upstairs and 12 stairs with 1 railing to a basement - but DOES NOT USE; pt stays on MAIN FLOOR    Prior Level of Function:  Prior Function Per Pt/Caregiver Report  Level of [place]: Independent with ADLs and functional transfers, Independent with homemaking with ambulation  Receives Help From:  (son can assist if needed)  ADL Assistance: Independent  Homemaking Assistance: Independent  Ambulatory Assistance: Independent  Vocational: Full time employment ()  Prior Function Comments: pt reported being active and driving in community    Precautions:  Precautions  Hearing/Visual Limitations: no glasses  Medical Precautions: No known precautions/limitation             Objective   Pain:  Pain Assessment  Pain Assessment:  (1/10 abdomen;)    Cognition:  Cognition  Overall Cognitive Status: Within Functional Limits  Orientation Level: Oriented X4  Safety/Judgement: Within Functional Limits    General Assessments:    Activity Tolerance  Endurance:  (WNL)    Sensation  Sensation Comment: denies any numbness/tingling       Coordination  Coordination Comment: WNL    Postural Control  Posture Comment: rounded shoulders    Static Sitting Balance  Static Sitting-Comment/Number of Minutes: WNL  Dynamic Sitting Balance  Dynamic Sitting-Comments: WNL    Static Standing Balance  Static Standing-Comment/Number of Minutes: GOOD+  Dynamic Standing Balance  Dynamic Standing-Comments: GOOD+      Functional Assessments:  Bed Mobility  Bed Mobility:  (N/A)    Transfers  Transfer:  (sit <-> stand with DIST SUP/INDEPENDENT)    Ambulation/Gait Training  Ambulation/Gait Training Performed:  (pt amb 75' x 1 without assistive device with DIST SUP/INDEPENDENT. pt presented with slow steady gait, wider LILLY and mild lateral sway. pt unexpectedly turned around and tried to amb backward. VC given to turn forward and SBA for safety. no balance deficits noted.)    Stairs  Stairs:  (deferred)    Extremity/Trunk  Assessments:  RUE   RUE :  (WFL with 4-/5 strength)  LUE   LUE:  (WFL with 4-/5 strength)  RLE   RLE :  (WFL with 4-/5 strength; mild swelling noted)  LLE   LLE :  (WFL with 4-/5 strength; mild swelling noted)      Outcome Measures:  [facility] Basic Mobility  Turning from your back to your side while in a flat bed without using bedrails: None  Moving from lying on your back to sitting on the side of a flat bed without using bedrails: None  Moving to and from bed to chair (including a wheelchair): None  Standing up from a chair using your arms (e.g. wheelchair or bedside chair): None  To walk in hospital room: None  Climbing 3-5 steps with railing: A little  Basic Mobility - Total Score: 23    Encounter Problems       Encounter Problems (Active)       Pain - Adult              Education Documentation  Precautions, taught by Tyson Jha PT at 3/4/2025 10:24 AM.  Learner: Patient  Readiness: Eager  Method: Explanation  Response: Verbalizes Understanding  Comment: educated pt on safety awareness during mobility    Mobility Training, taught by Tyson Jha PT at 3/4/2025 10:24 AM.  Learner: Patient  Readiness: Eager  Method: Explanation  Response: Verbalizes Understanding  Comment: educated pt on safety awareness during mobility    Education Comments  No comments found.

## 2025-03-04 NOTE — CONSULTS
Inpatient consult to Gastroenterology  Consult performed by: Lexi Gunn, APRN-CNP  Consult ordered by: Gage Fregoso MD        Reason For Consult  Abdominal Pain    History Of Present Illness  Colin Leonard is a 59 y.o. male with past medical history of HTN, stage V CKD, T2DM, vasculopath, CAD, osteomyelitis of the left foot s/p toe amputation, LVH, HLD, NSTEMI and other comorbidities presented with complaints of abdominal pain.  He stated similar episode of abdominal pain approximately 2 weeks ago with flu like symptoms.  Denies any aggravating factors.  Reports abdominal pain has improved, however he still feel nauseous with no vomiting. No diarrhea or constipation. No fevers or chills. Labs shows a leukocytosis with a white cell of 14.8, elevation in LFTs  an AST of 267, , normal bili.  Normal lipase, normal lactate. CT abdomen pelvis without IV contrast shows cholelithiasis. No evidence of gallbladder duct dilation. RUQ Ultrasound showed non distended gallbladder with small gravel like gallstones. No sludge, pericholecystic fluid, intrahepatic biliary dilation, nondilated CBD.       Past Medical History  He has a past medical history of Coronary artery disease, Diabetes mellitus (Multi), DVT (deep venous thrombosis) (Multi), Hypertension, and MI (myocardial infarction) (Multi).    Surgical History  He has a past surgical history that includes Amputation.     Social History  He reports that he has never smoked. He has never used smokeless tobacco. No history on file for alcohol use and drug use.    Family History  No family history on file.     Allergies  Amlodipine besylate    Review of Systems   Constitutional: Negative.    HENT: Negative.     Eyes: Negative.    Cardiovascular: Negative.    Gastrointestinal: Negative.    Endocrine: Negative.    Genitourinary: Negative.    Musculoskeletal: Negative.    Skin: Negative.    Allergic/Immunologic: Negative.    Neurological: Negative.   "  Hematological: Negative.    Psychiatric/Behavioral: Negative.          Physical Exam  HENT:      Head: Normocephalic.      Right Ear: Tympanic membrane normal.      Nose: Nose normal.      Mouth/Throat:      Mouth: Mucous membranes are moist.   Cardiovascular:      Rate and Rhythm: Normal rate.      Pulses: Normal pulses.   Pulmonary:      Effort: Pulmonary effort is normal.   Abdominal:      Tenderness: There is abdominal tenderness.   Musculoskeletal:         General: Normal range of motion.      Cervical back: Normal range of motion.   Skin:     General: Skin is warm.      Capillary Refill: Capillary refill takes less than 2 seconds.   Neurological:      General: No focal deficit present.      Mental Status: He is alert.   Psychiatric:         Mood and Affect: Mood normal.          Last Recorded Vitals  Blood pressure 152/84, pulse 67, temperature 36.8 °C (98.2 °F), temperature source Oral, resp. rate 18, height 1.803 m (5' 11\"), weight 115 kg (254 lb 10.1 oz), SpO2 98%.    Relevant Results  US right upper quadrant    Result Date: 3/4/2025  Interpreted By:  Miguelina Brooke, STUDY: US RIGHT UPPER QUADRANT;  3/4/2025 8:32 am   INDICATION: Signs/Symptoms:eval for acute cholecystitis.   COMPARISON: CT abdomen and pelvis 03/03/2025   ACCESSION NUMBER(S): [id number]   ORDERING CLINICIAN: WAQAR CRENSHAW   TECHNIQUE: Multiple images of the abdomen were obtained.   FINDINGS: LIVER: The echogenicity of the liver is within normal limits. There is no hepatic mass. The sagittal dimension of the right lobe of the liver is 17.8 cm, nonenlarged.   GALLBLADDER: The gallbladder is nondistended. The gallbladder wall is not thickened. There are small gravel-like gallstones in the proximal body of the gallbladder. There is no sludge. There is no pericholecystic fluid. There is no sonographic Thompson's sign   BILE DUCTS: There is no intrahepatic biliary dilatation. The common bile duct is  nondilated measuring 0.2 cm.   PANCREAS: Only " a small part of the body of the pancreas is seen and is unremarkable. There is obscuration of most of the pancreas secondary to shadowing from bowel gas.   RIGHT KIDNEY: The right kidney is  normal in size measuring 11.2 cm in length.   The echogenicity of the cortex is within normal limits. There is no renal mass. There is no intrarenal calculus or hydronephrosis.       Cholelithiasis   MACRO: None   Signed by: Miguelina Brooke 3/4/2025 8:59 AM Dictation workstation:   [id number]    CT abdomen pelvis wo IV contrast    Result Date: 3/3/2025  Interpreted By:  Alvaro Nicholas, STUDY: CT ABDOMEN PELVIS WO IV CONTRAST;  3/3/2025 10:53 pm   INDICATION: Signs/Symptoms:nausea, vomiting, epigastric pain.   COMPARISON: None.   ACCESSION NUMBER(S): [id number]   ORDERING CLINICIAN: ELVIS REYNOSO   TECHNIQUE: Contiguous axial images of the abdomen and pelvis were obtained without iodinated contrast. Coronal and sagittal reformatted images were reconstructed from the axial data.   FINDINGS: There is mild bronchiectasis seen lung bases.   Noncontrast appearance of the liver, adrenals pancreas and spleen are unremarkable. Probable cholelithiasis incidentally seen.   No hydronephrosis or hydroureter. Nonobstructing subcentimeter calculus seen right kidney. Bladder within normal limits.   Moderate calcification aorta without aneurysm.   No small bowel obstruction. No appendicitis or colitis. Mild diverticulosis.   No free fluid. Scattered nonenlarged periaortic lymph nodes are incidentally seen.   Moderate spondylotic degeneration seen axial skeleton.       No acute process identified in the abdomen or pelvis.   Incidental note made of nonobstructing right renal calculus as well as cholelithiasis.     MACRO: None.   Signed by: Alvaro Nicholas 3/3/2025 11:24 PM Dictation workstation:   [id number]    Scheduled medications  aspirin, 81 mg, oral, Daily  carvedilol, 25 mg, oral, BID  ezetimibe, 10 mg, oral, Nightly  heparin (porcine),  5,000 Units, subcutaneous, q8h  hydrALAZINE, 100 mg, oral, TID  piperacillin-tazobactam, 2.25 g, intravenous, q6h  polyethylene glycol, 17 g, oral, Daily  sodium bicarbonate, 1,300 mg, oral, BID  [Held by provider] torsemide, 20 mg, oral, BID      Continuous medications  sodium chloride 0.9%, 100 mL/hr      PRN medications  PRN medications: melatonin, prochlorperazine **OR** prochlorperazine **OR** prochlorperazine  Results for orders placed or performed during the hospital encounter of 03/03/25 (from the past 24 hours)   CBC and Auto Differential   Result Value Ref Range    WBC 14.8 (H) 4.4 - 11.3 x10*3/uL    nRBC 0.0 0.0 - 0.0 /100 WBCs    RBC 3.15 (L) 4.50 - 5.90 x10*6/uL    Hemoglobin 8.9 (L) 13.5 - 17.5 g/dL    Hematocrit 28.4 (L) 41.0 - 52.0 %    MCV 90 80 - 100 fL    MCH 28.3 26.0 - 34.0 pg    MCHC 31.3 (L) 32.0 - 36.0 g/dL    RDW 14.6 (H) 11.5 - 14.5 %    Platelets 262 150 - 450 x10*3/uL    Neutrophils % 91.7 40.0 - 80.0 %    Immature Granulocytes %, Automated 0.5 0.0 - 0.9 %    Lymphocytes % 1.6 13.0 - 44.0 %    Monocytes % 5.8 2.0 - 10.0 %    Eosinophils % 0.3 0.0 - 6.0 %    Basophils % 0.1 0.0 - 2.0 %    Neutrophils Absolute 13.54 (H) 1.20 - 7.70 x10*3/uL    Immature Granulocytes Absolute, Automated 0.07 0.00 - 0.70 x10*3/uL    Lymphocytes Absolute 0.23 (L) 1.20 - 4.80 x10*3/uL    Monocytes Absolute 0.86 0.10 - 1.00 x10*3/uL    Eosinophils Absolute 0.05 0.00 - 0.70 x10*3/uL    Basophils Absolute 0.02 0.00 - 0.10 x10*3/uL   Comprehensive metabolic panel   Result Value Ref Range    Glucose 180 (H) 74 - 99 mg/dL    Sodium 139 136 - 145 mmol/L    Potassium 4.6 3.5 - 5.3 mmol/L    Chloride 112 (H) 98 - 107 mmol/L    Bicarbonate 17 (L) 21 - 32 mmol/L    Anion Gap 15 10 - 20 mmol/L    Urea Nitrogen 80 (H) 6 - 23 mg/dL    Creatinine 6.49 (H) 0.50 - 1.30 mg/dL    eGFR 9 (L) >60 mL/min/1.73m*2    Calcium 9.1 8.6 - 10.3 mg/dL    Albumin 3.7 3.4 - 5.0 g/dL    Alkaline Phosphatase 102 33 - 136 U/L    Total Protein 6.2  (L) 6.4 - 8.2 g/dL     (H) 9 - 39 U/L    Bilirubin, Total 1.0 0.0 - 1.2 mg/dL     (H) 10 - 52 U/L   Lipase   Result Value Ref Range    Lipase 49 9 - 82 U/L   Lactate   Result Value Ref Range    Lactate 0.9 0.4 - 2.0 mmol/L   Magnesium   Result Value Ref Range    Magnesium 1.97 1.60 - 2.40 mg/dL   Phosphorus   Result Value Ref Range    Phosphorus 4.8 2.5 - 4.9 mg/dL   Sars-CoV-2 and Influenza A/B PCR   Result Value Ref Range    Flu A Result Not Detected Not Detected    Flu B Result Not Detected Not Detected    Coronavirus 2019, PCR Not Detected Not Detected   Blood Culture    Specimen: Peripheral Venipuncture; Blood culture   Result Value Ref Range    Blood Culture Loaded on Instrument - Culture in progress    Blood Culture    Specimen: Peripheral Venipuncture; Blood culture   Result Value Ref Range    Blood Culture Loaded on Instrument - Culture in progress    Urinalysis with Reflex Culture and Microscopic   Result Value Ref Range    Color, Urine Light-Yellow Light-Yellow, Yellow, Dark-Yellow    Appearance, Urine Clear Clear    Specific Gravity, Urine 1.013 1.005 - 1.035    pH, Urine 5.5 5.0, 5.5, 6.0, 6.5, 7.0, 7.5, 8.0    Protein, Urine 100 (2+) (A) NEGATIVE, 10 (TRACE), 20 (TRACE) mg/dL    Glucose, Urine 300 (3+) (A) Normal mg/dL    Blood, Urine NEGATIVE NEGATIVE mg/dL    Ketones, Urine NEGATIVE NEGATIVE mg/dL    Bilirubin, Urine NEGATIVE NEGATIVE mg/dL    Urobilinogen, Urine Normal Normal mg/dL    Nitrite, Urine NEGATIVE NEGATIVE    Leukocyte Esterase, Urine NEGATIVE NEGATIVE   Urinalysis Microscopic   Result Value Ref Range    WBC, Urine 1-5 1-5, NONE /HPF    RBC, Urine NONE NONE, 1-2, 3-5 /HPF    Bacteria, Urine 1+ (A) NONE SEEN /HPF    Mucus, Urine FEW Reference range not established. /LPF   CBC   Result Value Ref Range    WBC 21.0 (H) 4.4 - 11.3 x10*3/uL    nRBC 0.0 0.0 - 0.0 /100 WBCs    RBC 2.76 (L) 4.50 - 5.90 x10*6/uL    Hemoglobin 7.8 (L) 13.5 - 17.5 g/dL    Hematocrit 24.5 (L) 41.0 - 52.0  %    MCV 89 80 - 100 fL    MCH 28.3 26.0 - 34.0 pg    MCHC 31.8 (L) 32.0 - 36.0 g/dL    RDW 14.8 (H) 11.5 - 14.5 %    Platelets 254 150 - 450 x10*3/uL   Basic metabolic panel   Result Value Ref Range    Glucose 118 (H) 74 - 99 mg/dL    Sodium 138 136 - 145 mmol/L    Potassium 5.0 3.5 - 5.3 mmol/L    Chloride 114 (H) 98 - 107 mmol/L    Bicarbonate 16 (L) 21 - 32 mmol/L    Anion Gap 13 10 - 20 mmol/L    Urea Nitrogen 73 (H) 6 - 23 mg/dL    Creatinine 6.56 (H) 0.50 - 1.30 mg/dL    eGFR 9 (L) >60 mL/min/1.73m*2    Calcium 8.6 8.6 - 10.3 mg/dL   Ferritin   Result Value Ref Range    Ferritin 109 20 - 300 ng/mL   Iron and TIBC   Result Value Ref Range    Iron 19 (L) 35 - 150 ug/dL    UIBC 228 110 - 370 ug/dL    TIBC 247 240 - 445 ug/dL    % Saturation 8 (L) 25 - 45 %        Assessment/Plan   Abdominal pain/Elevated LFTs  Presented with right upper quadrant abdominal pain, CT/US imaging consistent with cholelithiasis. No choledocholithiasis noted. No indication for ERCP at this time. Elevated LFTs related to gallbladder etiology. Patient was noted to have leukocytosis on admission IV antibiotics were started per primary team.  Patient reports overall pain has improved. Monitor LFTs.  Keep n.p.o. until surgery evaluation.  Defer further recommendations per surgery.     Lexi Gunn, APRN-CNP

## 2025-03-04 NOTE — CARE PLAN
The patient's goals for the shift include  to feel better, pain control    The clinical goals for the shift include pain control        Problem: Pain - Adult  Goal: Verbalizes/displays adequate comfort level or baseline comfort level  Outcome: Progressing     Problem: Safety - Adult  Goal: Free from fall injury  Outcome: Progressing     Problem: Discharge Planning  Goal: Discharge to home or other facility with appropriate resources  Outcome: Progressing     Problem: Chronic Conditions and Co-morbidities  Goal: Patient's chronic conditions and co-morbidity symptoms are monitored and maintained or improved  Outcome: Progressing     Problem: Nutrition  Goal: Nutrient intake appropriate for maintaining nutritional needs  Outcome: Progressing     Problem: Pain  Goal: Takes deep breaths with improved pain control throughout the shift  Outcome: Progressing  Goal: Turns in bed with improved pain control throughout the shift  Outcome: Progressing  Goal: Walks with improved pain control throughout the shift  Outcome: Progressing  Goal: Performs ADL's with improved pain control throughout shift  Outcome: Progressing  Goal: Free from opioid side effects throughout the shift  Outcome: Progressing

## 2025-03-04 NOTE — CONSULTS
Inpatient consult to Acute Care Surgery  Consult performed by: Miguelina Cadena, APRN-CNP  Consult ordered by: Marie Harris MD    Reason For Consult  Acute cholecystitis, gallstone, right upper quadrant pain, leukocytosis     [facility]     History Of Present Illness  Colin Leonard is a 59 y.o. male on day 0 of admission presenting with  right upper quadrant abdominal pain and was admitted with choledocholithiasis with acute cholecystitis, stage V chronic kidney disease not on chronic dialysis, type 2 diabetes, benign essential hypertension.  We were asked to see the patient as above.    Patient states that he presented with a chief complaint of right upper quadrant abdominal pain.  This initially started for the first time ever about 2 weeks ago on Friday when he had the flu-he is not sure what kind of flu he had whether it was GI or respiratory.  At that time he also had nausea and vomiting.  Right upper quadrant abdominal pain 2 weeks ago on Friday lasted for about a day or so and then went away and he has been essentially fine up until when it started again yesterday around 1530/1600 and it was gradual, intermittent, the same no worse, no radiation, describes as cramping, upon ER presentation was a 4-5 and is currently nothing.  Did again have concurrent nausea but no vomiting this time.  Says that he is also been battling a cold as well as some sinus stuff and phlegm.  He is not nauseated now.  Says he feels fine just tired.  Is passing gas.  Last bowel movement was yesterday x 2 and 1 was normal and formed and the other 1 was a little runny-both without blood.  Has never had EGD before.  Last colonoscopy was about 10 years ago for the indications of screening and was negative and says is due this year.  Denies eating anything out of the ordinary, any sick contacts or any recent travels.  Denies ever previously being diagnosed with gallbladder disease. He has never had this right upper  "quadrant abdominal pain before.  He has had nephrolithiasis several times and his current symptoms feel nothing like his nephrolithiasis symptoms.  Denies any food intolerances in general.  Denies any postprandial symptoms.  Says sometimes if he eats something that is heavy, fatty, greasy, or fried he gets a sour stomach.  Denies ever having jaundice or icterus.    Denies any history of elevated LFTs.  Denies ever being a heavy drinker.  Denies any current alcohol.  Is on Lipitor.  Denies any known liver disease, hepatitis, hepatitis A/B/C, cirrhosis.  Denies ever having mono.    Denies taking any anticoagulants or prescription antiplatelet medications prior to admission.  Please see his below past medical and past surgical history.  Please also see his labs and imaging that are noted.  Denies shortness of breath, feeling dizzy or lightheaded.      Past Medical History  Anemia  Asthma as a child  CAD with NSTEMI x 1 \"a few to several years ago\" no stents  Diabetes  DVT-patient states \"they said I did\" but he does not recall-I do not see any ultrasounds in our system  Hypertension  Nephrolithiasis  Osteomyelitis of left foot    Surgical History  1988 right ureter resection  7/25/2024 MS amputation foot midtarsal  7/29/2024 closure surgical wound left lower extremity     Social History  From home with his son    Never smoker  Never been a heavy drinker  Alcohol wise has 5/year if that  No drugs    Family History  Mother-cholecystectomy  Sister-cholecystectomy     Allergies  Allergies   Allergen Reactions    Amlodipine Besylate Swelling     edema legs        Review of Systems  1) Anesthetic complications: No known personal or family history of anesthetic complications  2) General: No significant unintentional weight loss or gain, fevers, chills, weakness, fatigue, appetite loss.  3) HEENT: Negative.  4) Cardiac: No angina or leg edema.  5) Pulmonary: No wheezing, sob.  6) GI: As per HPI.  7) Hematologic: No " "known personal or family history of bleeding diathesis.  8) Endocrine: No thyroid disorders.  Positive diabetes.  9) : No dysuria, hematuria, or frequency.  10) Musculoskeletal: No acute muscle/bone/joint pain/stiffness/swelling.  11) Neuro: No history of seizures or strokes.  12) Psych: No anxiety or depression        Physical Exam  1) VS-noted as documented.  2) General-laying in bed in no acute distress. Not septic appearing. Pleasant and cooperative. Looks fine and comfortable.   3) Neuro-Awake, alert, oriented to person, place, and time. Speech is normal. Affect is normal. Follows commands appropriately.  4) HEENT-Head normocephalic and externally atraumatic. Conjunctiva pink. Sclera anicteric. MMM.  5) Heart-RRR.   6) Lungs-Clear. Speaks in complete sentences and does not have any accessory muscle use.  7) Extremities-No edema. The patient's extremities are warm and normal color.  8) Skin-Warm and dry. No diaphoresis or jaundice.  9) Psych-Normal mood. Appropriate affect.   10) Abdomen-Soft. Positive bowel sounds. Non distended.  Nontender except for only right upper quadrant patient rates a 2 with no guarding or rebound.  Negative Thompson's.   No obvious hernias      Vital signs in last 24 hours:  Temp:  [36.3 °C (97.3 °F)-36.8 °C (98.2 °F)] 36.3 °C (97.3 °F)  Heart Rate:  [67-93] 67  Resp:  [16-19] 17  BP: (132-162)/(70-88) 155/80  Heart Rate:  [67-93]   Temp:  [36.3 °C (97.3 °F)-36.8 °C (98.2 °F)]   Resp:  [16-19]   BP: (132-162)/(70-88)   Height:  [180.3 cm (5' 11\")]   Weight:  [113 kg (250 lb)-115 kg (254 lb 10.1 oz)]   SpO2:  [97 %-100 %]      Intake/Output last 3 Shifts:  I/O last 3 completed shifts:  In: 1050 (9.1 mL/kg) [IV Piggyback:1050]  Out: - (0 mL/kg)   Weight: 115.5 kg     Scheduled medications  aspirin, 81 mg, oral, Daily  carvedilol, 25 mg, oral, BID  ezetimibe, 10 mg, oral, Nightly  [Held by provider] heparin (porcine), 5,000 Units, subcutaneous, q8h  hydrALAZINE, 100 mg, oral, " TID  piperacillin-tazobactam, 2.25 g, intravenous, q6h  polyethylene glycol, 17 g, oral, Daily  sodium bicarbonate, 1,300 mg, oral, BID  [Held by provider] torsemide, 20 mg, oral, BID    Continuous medications  D5 % and 0.9 % sodium chloride, 75 mL/hr, Last Rate: 75 mL/hr (03/04/25 1409)    PRN medications  PRN medications: melatonin, prochlorperazine **OR** prochlorperazine **OR** prochlorperazine    Relevant Results  Results from last 7 days   Lab Units 03/04/25  0606 03/03/25  2218   WBC AUTO x10*3/uL 21.0* 14.8*   HEMOGLOBIN g/dL 7.8* 8.9*   HEMATOCRIT % 24.5* 28.4*   PLATELETS AUTO x10*3/uL 254 262      Results from last 7 days   Lab Units 03/04/25  0606 03/03/25  2218   SODIUM mmol/L 138 139   POTASSIUM mmol/L 5.0 4.6   CHLORIDE mmol/L 114* 112*   CO2 mmol/L 16* 17*   BUN mg/dL 73* 80*   CREATININE mg/dL 6.56* 6.49*   GLUCOSE mg/dL 118* 180*   CALCIUM mg/dL 8.6 9.1      US right upper quadrant    Result Date: 3/4/2025  Interpreted By:  Miguelina Brooke, STUDY: US RIGHT UPPER QUADRANT;  3/4/2025 8:32 am   INDICATION: Signs/Symptoms:eval for acute cholecystitis.   COMPARISON: CT abdomen and pelvis 03/03/2025   ACCESSION NUMBER(S): [id number]   ORDERING CLINICIAN: WAQAR CRENSHAW   TECHNIQUE: Multiple images of the abdomen were obtained.   FINDINGS: LIVER: The echogenicity of the liver is within normal limits. There is no hepatic mass. The sagittal dimension of the right lobe of the liver is 17.8 cm, nonenlarged.   GALLBLADDER: The gallbladder is nondistended. The gallbladder wall is not thickened. There are small gravel-like gallstones in the proximal body of the gallbladder. There is no sludge. There is no pericholecystic fluid. There is no sonographic Thompson's sign   BILE DUCTS: There is no intrahepatic biliary dilatation. The common bile duct is  nondilated measuring 0.2 cm.   PANCREAS: Only a small part of the body of the pancreas is seen and is unremarkable. There is obscuration of most of the pancreas  secondary to shadowing from bowel gas.   RIGHT KIDNEY: The right kidney is  normal in size measuring 11.2 cm in length.   The echogenicity of the cortex is within normal limits. There is no renal mass. There is no intrarenal calculus or hydronephrosis.       Cholelithiasis   MACRO: None   Signed by: Miguelina Brooke 3/4/2025 8:59 AM Dictation workstation:   [id number]    CT abdomen pelvis wo IV contrast    Result Date: 3/3/2025  Interpreted By:  Alvaro Nicholas, STUDY: CT ABDOMEN PELVIS WO IV CONTRAST;  3/3/2025 10:53 pm   INDICATION: Signs/Symptoms:nausea, vomiting, epigastric pain.   COMPARISON: None.   ACCESSION NUMBER(S): [id number]   ORDERING CLINICIAN: ELVIS REYNOSO   TECHNIQUE: Contiguous axial images of the abdomen and pelvis were obtained without iodinated contrast. Coronal and sagittal reformatted images were reconstructed from the axial data.   FINDINGS: There is mild bronchiectasis seen lung bases.   Noncontrast appearance of the liver, adrenals pancreas and spleen are unremarkable. Probable cholelithiasis incidentally seen.   No hydronephrosis or hydroureter. Nonobstructing subcentimeter calculus seen right kidney. Bladder within normal limits.   Moderate calcification aorta without aneurysm.   No small bowel obstruction. No appendicitis or colitis. Mild diverticulosis.   No free fluid. Scattered nonenlarged periaortic lymph nodes are incidentally seen.   Moderate spondylotic degeneration seen axial skeleton.       No acute process identified in the abdomen or pelvis.   Incidental note made of nonobstructing right renal calculus as well as cholelithiasis.     MACRO: None.   Signed by: Alvaro Nicholas 3/3/2025 11:24 PM Dictation workstation:   [id number]      Assessment/Plan   Assessment & Plan  Benign essential hypertension    Stage 5 chronic kidney disease not on chronic dialysis (Multi)    Type 2 diabetes mellitus    Acute calculous cholecystitis  Vital signs stable  Imaging and labs as above  For  cholecystectomy today  It was discussed with patient that potential risks of cholecystectomy include but are not limited to infection, hernia, open procedure, bile leak requiring ERCP and/or IR drainage, CBD injury, bile salt diarrhea, injury to surrounding structures, chronic pain, wound site complications, pneumonia, DVT, PE, possibilities of reaction to medication, pulmonary aspiration, injury to surrounding structures, bleeding, recurrent infection, need for additional procedures/surgeries, failure to diagnose a condition, creating a complication requiring transfusion or operation.  It was also discussed with the patient about cholecystitis, cholelithiasis, cholangitis, choledocholithiasis.  Consent obtained and form filled out  Case request placed  Surgery desk called  I-S  Morning labs ordered  Patient is already n.p.o. with IV fluids and on Zosyn  Heparin held for surgery and type and screen ordered due to anemia    CAD (coronary artery disease)  With history of NSTEMI     Anemia  Patient chronically anemic in the computer dating back to 2020-patient states nobody ever said anything to him about being anemic  Last colonoscopy was 10 years ago and states he is due for another one this year  Discussed with patient and showed him and discussed with him to follow-up with his PCP about  Asymptomatic  Treatment per IM    Leukocytosis  Due to/see acute calculus cholecystitis    Right renal stone  Nonobstructing on 3/3 CT  Patient has a history of nephrolithiasis multiple times    Elevated LFTs  Due to/see acute calculus cholecystitis    Encounter for deep vein thrombosis (DVT) prophylaxis  Per IM-currently on heparin-I placed on hold for cholecystectomy today due to patient's anemia  Patient already ordered SCDs   Out of bed and and ambulate      SARAH Beckford-CNP

## 2025-03-04 NOTE — NURSING NOTE
Pt arrived to room 420, transported via cart.  Pt slid over fromcart with minimal assistance.  Pt is A&O x4, resp even and unlabored, room air.  No obvious deficits noted.  Pt brequested urinal.  Vss.  Calllight bwithin reach, bed low and locked.

## 2025-03-04 NOTE — ASSESSMENT & PLAN NOTE
Per IM-currently on heparin-I placed on hold for cholecystectomy today due to patient's anemia  Patient already ordered SCDs   Out of bed and and ambulate

## 2025-03-04 NOTE — PROGRESS NOTES
Colin Leonard is a 59 y.o. male on day 0 of admission presenting with Choledocholithiasis with acute cholecystitis.      Subjective   Patient presented with right upper quadrant pain.  Patient stated he was nauseated.  He denied fever or chills.       Objective     Last Recorded Vitals  /84 (BP Location: Left arm, Patient Position: Sitting)   Pulse 67   Temp 36.8 °C (98.2 °F) (Oral)   Resp 18   Wt 115 kg (254 lb 10.1 oz)   SpO2 98%   Intake/Output last 3 Shifts:    Intake/Output Summary (Last 24 hours) at 3/4/2025 1059  Last data filed at 3/4/2025 0941  Gross per 24 hour   Intake 1100 ml   Output --   Net 1100 ml       Admission Weight  Weight: 113 kg (250 lb) (03/03/25 2144)    Daily Weight  03/04/25 : 115 kg (254 lb 10.1 oz)    Image Results  US right upper quadrant  Narrative: Interpreted By:  Miguelina Brooke,   STUDY:  US RIGHT UPPER QUADRANT;  3/4/2025 8:32 am      INDICATION:  Signs/Symptoms:eval for acute cholecystitis.      COMPARISON:  CT abdomen and pelvis 03/03/2025      ACCESSION NUMBER(S):  [id number]      ORDERING CLINICIAN:  WAQAR CRENSHAW      TECHNIQUE:  Multiple images of the abdomen were obtained.      FINDINGS:  LIVER:  The echogenicity of the liver is within normal limits.  There is no hepatic mass.  The sagittal dimension of the right lobe of the liver is 17.8 cm,  nonenlarged.      GALLBLADDER:  The gallbladder is nondistended.  The gallbladder wall is not thickened.  There are small gravel-like gallstones in the proximal body of the  gallbladder. There is no sludge.  There is no pericholecystic fluid.  There is no sonographic Thompson's sign      BILE DUCTS:  There is no intrahepatic biliary dilatation.  The common bile duct is  nondilated measuring 0.2 cm.      PANCREAS:  Only a small part of the body of the pancreas is seen and is  unremarkable. There is obscuration of most of the pancreas secondary  to shadowing from bowel gas.      RIGHT KIDNEY:  The right kidney is  normal in  size measuring 11.2 cm in length.      The echogenicity of the cortex is within normal limits.  There is no renal mass.  There is no intrarenal calculus or hydronephrosis.      Impression: Cholelithiasis      MACRO:  None      Signed by: Miguelina Brooke 3/4/2025 8:59 AM  Dictation workstation:   [id number]      Physical Exam    General: In moderate distress,, cooperating during physical exam.  HEENT: Pupils are equal and reactive to light and commendation , oral mucosa dry, no JVD   Cardiovascular: Normal sinus rhythm, no MRG.  Lungs: Clear to auscultation bilaterally, no wheezing, no crackles, no dullness to percussion.  Abdomen: Slight tenderness on the right upper quadrant, no hepatosplenomegaly appreciated, , positive bowel sounds, positive bowel movement.  Neuro: Alert and oriented x3, strength in upper and lower extremities , sensation intact.  Psych: Patient had great insight was going on  Musculoskeletal: No swelling in lower extremities, no limitation in range of motion.  Vascular: Pulses are intact in upper and lower extremities  Skin: No petechiae, ecchymosis or other stigmata for dermatology disease.   Assessment/Plan        Cholelithiasis  Ultrasound of right upper quadrant revealed cholelithiasis.  Cover with broad-spectrum antibiotics.  Patient is on Zosyn  Continue with pain medication  Consult to general surgery   Waiting for general surgery to see him today.  Pain medication and antiemetics medication as needed.    Chronic kidney disease stage V  Patient see Dr. Goldberg in regular basis  Evaluated by Dr. Max from nephrology service.   monitor close  Challenge gently with fluids  Torsemide on hold    Hypertension  Continue with current medication.    Dehydration  Challenge gently with fluid    Diabetes mellitus type 2  Patient stated he is not on any medication, diet control  Check hemoglobin A1c    Leukocytosis  Secondary to acute cholecystitis.  Cover with antibiotics  Check CBC and CMP in  AM.    Anemia of chronic disease  Monitor close  Transfuse for hemoglobin less than 7    History of kidney stone    DVT prophylaxis    Marie Harris MD

## 2025-03-04 NOTE — ASSESSMENT & PLAN NOTE
Vital signs stable  Imaging and labs as above  For cholecystectomy today  It was discussed with patient that potential risks of cholecystectomy include but are not limited to infection, hernia, open procedure, bile leak requiring ERCP and/or IR drainage, CBD injury, bile salt diarrhea, injury to surrounding structures, chronic pain, wound site complications, pneumonia, DVT, PE, possibilities of reaction to medication, pulmonary aspiration, injury to surrounding structures, bleeding, recurrent infection, need for additional procedures/surgeries, failure to diagnose a condition, creating a complication requiring transfusion or operation.  It was also discussed with the patient about cholecystitis, cholelithiasis, cholangitis, choledocholithiasis.  Consent obtained and form filled out  Case request placed  Surgery desk called  I-S  Morning labs ordered  Patient is already n.p.o. with IV fluids and on Zosyn  Heparin held for surgery and type and screen ordered due to anemia

## 2025-03-04 NOTE — ED TRIAGE NOTES
Patient arrived to ED from home with c/o abd pain with N/V/D off and on for the past 2 wks. Patient reports he had toes removed in July and he was septic in Aug and has not bounced back since. Patient reports he has been sick off and on and has not felt well. Patient reports when he was sick a few days ago his emesis was brown and yellow. Patient denies chest pain but reports a thoracic pain. Patient stable at this time.

## 2025-03-04 NOTE — PROGRESS NOTES
03/04/25 1325   Discharge Planning   Expected Discharge Disposition Home     Per chart review pt independent from home, therapy saw patient and discharged due to independence. Patient has no needs from case management. Please consult if needs arise

## 2025-03-04 NOTE — ASSESSMENT & PLAN NOTE
Patient chronically anemic in the computer dating back to 2020-patient states nobody ever said anything to him about being anemic  Last colonoscopy was 10 years ago and states he is due for another one this year  Discussed with patient and showed him and discussed with him to follow-up with his PCP about  Asymptomatic  Treatment per IM

## 2025-03-04 NOTE — NURSING NOTE
Pt A&O x4 currently resting in bed. No complaints or concerns. Call light within reach. Pt continues on atb per order. Pt npo for test today

## 2025-03-04 NOTE — CARE PLAN
The patient's goals for the shift include  see Drs and rest    The clinical goals for the shift include pain control

## 2025-03-04 NOTE — ED PROVIDER NOTES
HPI   Chief Complaint   Patient presents with    Abdominal Pain    Nausea    Vomiting    Diarrhea       HPI  This is a 59-year-old male presenting to the emergency department for evaluation of right upper quadrant abdominal pain.  Patient states over the last 10 days he has been having on and off right upper quadrant abdominal pain that has progressed prompting him to come to the emergency department.  He has been nauseous but no vomiting.  No diarrhea or constipation.  No fevers or chills.  No urinary complaints.  No previous history of abdominal surgeries.    Please see HPI for pertinent positive and negative ROS.       Patient History   Past Medical History:   Diagnosis Date    Coronary artery disease     Diabetes mellitus (Multi)     DVT (deep venous thrombosis) (Multi)     Hypertension     MI (myocardial infarction) (Multi)      Past Surgical History:   Procedure Laterality Date    AMPUTATION       No family history on file.  Social History     Tobacco Use    Smoking status: Never    Smokeless tobacco: Never   Substance Use Topics    Alcohol use: Not on file    Drug use: Not on file       Physical Exam   ED Triage Vitals [03/03/25 2144]   Temperature Heart Rate Respirations BP   36.7 °C (98.1 °F) 93 19 142/77      Pulse Ox Temp Source Heart Rate Source Patient Position   100 % Oral Monitor --      BP Location FiO2 (%)     -- --       Physical Exam  GENERAL APPEARANCE: Awake and alert. No acute respiratory distress.   VITAL SIGNS: As per the nurses' triage record.  HEENT: Normocephalic, atraumatic.   NECK: Soft, nontender and supple  CHEST: Nontender to palpation. Clear to auscultation bilaterally. Symmetric rise and fall of chest wall.   HEART: Clear S1 and S2. Regular rate and rhythm. Strong and equal pulses in the extremities.  ABDOMEN: Soft, tenderness to palpation in the right upper quadrant, nondistended, positive bowel sounds  MUSCULOSKELETAL: Full gross active range of motion. Ambulating on own with no  acute difficulties  NEUROLOGICAL: Awake, alert and oriented x 3. Motor power intact in the upper and lower extremities. Sensation is intact to light touch in the upper and lower extremities. Patient answering questions appropriately.   IMMUNOLOGICAL: No lymphatic streaking noted  DERMATOLOGIC: Warm and dry  PYSCH: Cooperative with appropriate mood and affect.    ED Course & MDM   ED Course as of 03/04/25 0134   Tue Mar 04, 2025   0007 Epigastric pain with nausea and vomiting, signs of cholelithiasis on CT, and elevated liver enzymes, bilirubin today is 1.0, 6 months ago was 0.3, so this may represent elevation bilirubin as well, concerns for early ascending infection, as the patient also has a white count, covered with Zosyn [AS]   0011 Patient symptoms have greatly improved after IM Bentyl and oral Zofran. [SH]   0106 Discussed with GI who confirms that as the patient is afebrile and hemodynamically stable at this time, does not require emergent ERCP, and is stable for admission for right upper quadrant ultrasound in the morning and further management [AS]      ED Course User Index  [AS] Sydnie Workman DO  [SH] Angelina Antoine PA-C         Diagnoses as of 03/04/25 0134   Choledocholithiasis with acute cholecystitis                 No data recorded     Tu Coma Scale Score: 15 (03/03/25 2209 : Junaid Sotelo RN)                           Medical Decision Making  Parts of this chart have been completed using voice recognition software. Please excuse any errors of transcription.  My thought process and reason for plan has been formulated from the time that I saw the patient until the time of disposition and is not specific to one specific moment during their visit and furthermore my MDM encompasses this entire chart and not only this text box.      HPI: Detailed above.    Exam: A medically appropriate exam performed, outlined above, given the known history and presentation.    History obtained from:  Patient      Medications given during visit:  Medications   ondansetron (Zofran) injection 4 mg (4 mg intravenous Given 3/3/25 2224)   dicyclomine (Bentyl) injection 20 mg (20 mg intramuscular Given 3/3/25 2224)   sodium chloride 0.9 % bolus 1,000 mL (0 mL intravenous Stopped 3/4/25 0056)   piperacillin-tazobactam (Zosyn) 2.25 g in dextrose (iso) IV 50 mL (0 g intravenous Stopped 3/4/25 0055)        Diagnostic/tests  Labs Reviewed   CBC WITH AUTO DIFFERENTIAL - Abnormal       Result Value    WBC 14.8 (*)     nRBC 0.0      RBC 3.15 (*)     Hemoglobin 8.9 (*)     Hematocrit 28.4 (*)     MCV 90      MCH 28.3      MCHC 31.3 (*)     RDW 14.6 (*)     Platelets 262      Neutrophils % 91.7      Immature Granulocytes %, Automated 0.5      Lymphocytes % 1.6      Monocytes % 5.8      Eosinophils % 0.3      Basophils % 0.1      Neutrophils Absolute 13.54 (*)     Immature Granulocytes Absolute, Automated 0.07      Lymphocytes Absolute 0.23 (*)     Monocytes Absolute 0.86      Eosinophils Absolute 0.05      Basophils Absolute 0.02     COMPREHENSIVE METABOLIC PANEL - Abnormal    Glucose 180 (*)     Sodium 139      Potassium 4.6      Chloride 112 (*)     Bicarbonate 17 (*)     Anion Gap 15      Urea Nitrogen 80 (*)     Creatinine 6.49 (*)     eGFR 9 (*)     Calcium 9.1      Albumin 3.7      Alkaline Phosphatase 102      Total Protein 6.2 (*)      (*)     Bilirubin, Total 1.0       (*)    LIPASE - Normal    Lipase 49      Narrative:     Venipuncture immediately after or during the administration of Metamizole may lead to falsely low results. Testing should be performed immediately prior to Metamizole dosing.   LACTATE - Normal    Lactate 0.9      Narrative:     Venipuncture immediately after or during the administration of Metamizole may lead to falsely low results. Testing should be performed immediately prior to Metamizole dosing.   MAGNESIUM - Normal    Magnesium 1.97     PHOSPHORUS - Normal    Phosphorus 4.8      SARS-COV-2 AND INFLUENZA A/B PCR - Normal    Flu A Result Not Detected      Flu B Result Not Detected      Coronavirus 2019, PCR Not Detected      Narrative:     This assay is an FDA-cleared, in vitro diagnostic nucleic acid amplification test for the qualitative detection and differentiation of SARS CoV-2/ Influenza A/B from nasopharyngeal specimens collected from individuals with signs and symptoms of respiratory tract infections, and has been validated for use at [facility]. Negative results do not preclude COVID-19/ Influenza A/B infections and should not be used as the sole basis for diagnosis, treatment, or other management decisions. Testing for SARS CoV-2 is recommended only for patients who meet current clinical and/or epidemiological criteria defined by federal, state, or local public health directives.   BLOOD CULTURE   BLOOD CULTURE   URINALYSIS WITH REFLEX CULTURE AND MICROSCOPIC    Narrative:     The following orders were created for panel order Urinalysis with Reflex Culture and Microscopic.  Procedure                               Abnormality         Status                     ---------                               -----------         ------                     Urinalysis with Reflex C...[[id number]]                                                 Extra Urine Gray Tube[[id number]]                                                         Please view results for these tests on the individual orders.   URINALYSIS WITH REFLEX CULTURE AND MICROSCOPIC   EXTRA URINE GRAY TUBE      CT abdomen pelvis wo IV contrast   Final Result   No acute process identified in the abdomen or pelvis.        Incidental note made of nonobstructing right renal calculus as well   as cholelithiasis.             MACRO:   None.        Signed by: Alvaro Nicholas 3/3/2025 11:24 PM   Dictation workstation:   [id number]      US right upper quadrant    (Results Pending)        Considerations/further MDM:  Patient  was seen in conjucntion with my supervising physician,  Dr. Workman. Please refer to her note.    Differential diagnosis includes but was not limited to acute cholecystitis versus cholangitis versus cholelithiasis versus choledocholithiasis versus pancreatitis versus gastritis    CBC shows a leukocytosis with a white cell of 14.8.  Patient is afebrile, no tachycardia, not meeting SIRS criteria.  Significant elevation in liver enzymes.  Total bilirubin 6 months ago was 0.3, today is 1.0.  Lipase is not elevated at 49.  CT abdomen pelvis without IV contrast shows cholelithiasis.  No evidence of gallbladder duct dilation on CT scanning.  Discussed case with gastroenterology on-call who states patient does not require emergent ERCP at this time and is safe to stay at [facility] for GI evaluation and further management the morning.  Patient was given 1 L IV normal saline, IV Zosyn, IM Bentyl and IV Zofran.  Patient was admitted in stable condition.    Procedure  Procedures     Angelina Antoine PA-C  03/04/25 0134

## 2025-03-04 NOTE — H&P
History Of Present Illness  Colin Leonard is a 59 y.o. male presenting with upper quadrant abdominal pain.    This is a 59-year-old male with past medical history of HTN, stage V CKD, T2DM, vasculopath, CAD, osteomyelitis of the left foot s/p toe amputation, LVH, HLD, NSTEMI and other comorbidities presented to the ED for evaluation of abdominal pain.  Reported abdominal pain started approximately 2 weeks ago while he was at work along with coughing up bile and dry heaving.  At that time he had flulike symptoms temperature of 102, took a couple days off work improved.  Sometime around 330 this afternoon felt severe cramping of the right upper quadrant which was unbearable followed by nausea with dry heaving.  He denies chest pain, shortness of breath, fever, chills, hematuria or hematochezia.    In the ED on admission VSS, labs notable for glucose 180, chloride 112, bicarb 17, BUN 80, CR 6.49, EGFR 9, , , WBC 14.8, hemoglobin 8.9, HCT 28.4, flu A, flu B coronavirus negative.  Urinalysis with 2+ proteinuria and 3+ glucosuria otherwise noninfectious.    CT abdomen pelvis: Impression  No acute process identified in the abdomen or pelvis.   Incidental note made of nonobstructing right renal calculus as well   as cholelithiasis.     ED patient was given Zofran, IM Bentyl along with IV Zofran.  GI was consulted, it was reported that no emergent ERCP required.  Right upper quadrant ultrasound was ordered.    Patient admitted for abdominal pain secondary to cholelithiasis.     Past Medical History  Past Medical History:   Diagnosis Date    Coronary artery disease     Diabetes mellitus (Multi)     DVT (deep venous thrombosis) (Multi)     Hypertension     MI (myocardial infarction) (Multi)        Surgical History  Past Surgical History:   Procedure Laterality Date    AMPUTATION          Social History  He reports that he has never smoked. He has never used smokeless tobacco. No history on file for alcohol  "use and drug use.    Family History  No family history on file.     Allergies  Amlodipine besylate    Review of Systems   General: no fatigue, no malaise, no fevers/chills   HENT: no rhinorrhea, no sore throat, no ear pain   Eyes: no change in vision, denies eye pain or discharge   Lungs: no SOB, no cough, no hemoptysis   CV: no chest pain, no palpitations, no leg edema   Abd: nausea, no vomiting, no constipation/diarrhea, abdominal pain   : no dysuria, no frequency, no nocturia, no flank pain   Endocrine: no polydipsia/polyuria, no hot or cold intolerance   Neuro: no headaches, no syncope, no seizures   MSK: no back pain, no neck pain, no joint problems   Psych: no anxiety, no depression, no hallucinations    Physical Exam   General: alert, no diaphoresis, on room air   HENT: mucous membranes moist, external ears normal, no rhinorrhea   Eyes: no icterus or injection, no discharge   Lungs: CTA BL   Heart: RRR, no murmurs, no LE edema BL   GI: abdomen soft, right upper quadrant tenderness, nondistended, BS present   MSK: no joint effusion or deformity   Skin: no rashes, erythema, or ecchymosis   Neuro: grossly normal cognition, motor strength, sensation    Last Recorded Vitals  Blood pressure 132/78, pulse 76, temperature 36.4 °C (97.5 °F), temperature source Oral, resp. rate 17, height 1.803 m (5' 11\"), weight 113 kg (250 lb), SpO2 98%.    Relevant Results  Scheduled medications  aspirin, 81 mg, oral, Daily  carvedilol, 25 mg, oral, BID  ezetimibe, 10 mg, oral, Nightly  heparin (porcine), 5,000 Units, subcutaneous, q8h  hydrALAZINE, 100 mg, oral, TID  polyethylene glycol, 17 g, oral, Daily  torsemide, 20 mg, oral, BID      Continuous medications     PRN medications  PRN medications: melatonin, prochlorperazine **OR** prochlorperazine **OR** prochlorperazine  Results for orders placed or performed during the hospital encounter of 03/03/25 (from the past 24 hours)   CBC and Auto Differential   Result Value Ref Range "    WBC 14.8 (H) 4.4 - 11.3 x10*3/uL    nRBC 0.0 0.0 - 0.0 /100 WBCs    RBC 3.15 (L) 4.50 - 5.90 x10*6/uL    Hemoglobin 8.9 (L) 13.5 - 17.5 g/dL    Hematocrit 28.4 (L) 41.0 - 52.0 %    MCV 90 80 - 100 fL    MCH 28.3 26.0 - 34.0 pg    MCHC 31.3 (L) 32.0 - 36.0 g/dL    RDW 14.6 (H) 11.5 - 14.5 %    Platelets 262 150 - 450 x10*3/uL    Neutrophils % 91.7 40.0 - 80.0 %    Immature Granulocytes %, Automated 0.5 0.0 - 0.9 %    Lymphocytes % 1.6 13.0 - 44.0 %    Monocytes % 5.8 2.0 - 10.0 %    Eosinophils % 0.3 0.0 - 6.0 %    Basophils % 0.1 0.0 - 2.0 %    Neutrophils Absolute 13.54 (H) 1.20 - 7.70 x10*3/uL    Immature Granulocytes Absolute, Automated 0.07 0.00 - 0.70 x10*3/uL    Lymphocytes Absolute 0.23 (L) 1.20 - 4.80 x10*3/uL    Monocytes Absolute 0.86 0.10 - 1.00 x10*3/uL    Eosinophils Absolute 0.05 0.00 - 0.70 x10*3/uL    Basophils Absolute 0.02 0.00 - 0.10 x10*3/uL   Comprehensive metabolic panel   Result Value Ref Range    Glucose 180 (H) 74 - 99 mg/dL    Sodium 139 136 - 145 mmol/L    Potassium 4.6 3.5 - 5.3 mmol/L    Chloride 112 (H) 98 - 107 mmol/L    Bicarbonate 17 (L) 21 - 32 mmol/L    Anion Gap 15 10 - 20 mmol/L    Urea Nitrogen 80 (H) 6 - 23 mg/dL    Creatinine 6.49 (H) 0.50 - 1.30 mg/dL    eGFR 9 (L) >60 mL/min/1.73m*2    Calcium 9.1 8.6 - 10.3 mg/dL    Albumin 3.7 3.4 - 5.0 g/dL    Alkaline Phosphatase 102 33 - 136 U/L    Total Protein 6.2 (L) 6.4 - 8.2 g/dL     (H) 9 - 39 U/L    Bilirubin, Total 1.0 0.0 - 1.2 mg/dL     (H) 10 - 52 U/L   Lipase   Result Value Ref Range    Lipase 49 9 - 82 U/L   Lactate   Result Value Ref Range    Lactate 0.9 0.4 - 2.0 mmol/L   Magnesium   Result Value Ref Range    Magnesium 1.97 1.60 - 2.40 mg/dL   Phosphorus   Result Value Ref Range    Phosphorus 4.8 2.5 - 4.9 mg/dL   Sars-CoV-2 and Influenza A/B PCR   Result Value Ref Range    Flu A Result Not Detected Not Detected    Flu B Result Not Detected Not Detected    Coronavirus 2019, PCR Not Detected Not Detected    Urinalysis with Reflex Culture and Microscopic   Result Value Ref Range    Color, Urine Light-Yellow Light-Yellow, Yellow, Dark-Yellow    Appearance, Urine Clear Clear    Specific Gravity, Urine 1.013 1.005 - 1.035    pH, Urine 5.5 5.0, 5.5, 6.0, 6.5, 7.0, 7.5, 8.0    Protein, Urine 100 (2+) (A) NEGATIVE, 10 (TRACE), 20 (TRACE) mg/dL    Glucose, Urine 300 (3+) (A) Normal mg/dL    Blood, Urine NEGATIVE NEGATIVE mg/dL    Ketones, Urine NEGATIVE NEGATIVE mg/dL    Bilirubin, Urine NEGATIVE NEGATIVE mg/dL    Urobilinogen, Urine Normal Normal mg/dL    Nitrite, Urine NEGATIVE NEGATIVE    Leukocyte Esterase, Urine NEGATIVE NEGATIVE   Urinalysis Microscopic   Result Value Ref Range    WBC, Urine 1-5 1-5, NONE /HPF    RBC, Urine NONE NONE, 1-2, 3-5 /HPF    Bacteria, Urine 1+ (A) NONE SEEN /HPF    Mucus, Urine FEW Reference range not established. /LPF     CT abdomen pelvis wo IV contrast    Result Date: 3/3/2025  Interpreted By:  Alvaro Nicholas, STUDY: CT ABDOMEN PELVIS WO IV CONTRAST;  3/3/2025 10:53 pm   INDICATION: Signs/Symptoms:nausea, vomiting, epigastric pain.   COMPARISON: None.   ACCESSION NUMBER(S): [id number]   ORDERING CLINICIAN: ELVIS REYNOSO   TECHNIQUE: Contiguous axial images of the abdomen and pelvis were obtained without iodinated contrast. Coronal and sagittal reformatted images were reconstructed from the axial data.   FINDINGS: There is mild bronchiectasis seen lung bases.   Noncontrast appearance of the liver, adrenals pancreas and spleen are unremarkable. Probable cholelithiasis incidentally seen.   No hydronephrosis or hydroureter. Nonobstructing subcentimeter calculus seen right kidney. Bladder within normal limits.   Moderate calcification aorta without aneurysm.   No small bowel obstruction. No appendicitis or colitis. Mild diverticulosis.   No free fluid. Scattered nonenlarged periaortic lymph nodes are incidentally seen.   Moderate spondylotic degeneration seen axial skeleton.       No acute  process identified in the abdomen or pelvis.   Incidental note made of nonobstructing right renal calculus as well as cholelithiasis.     MACRO: None.   Signed by: Alvaro Nicholas 3/3/2025 11:24 PM Dictation workstation:   [id number]     Assessment/Plan     59-year-old male who presented with right upper quadrant abdominal pain, CT imaging consistent with cholelithiasis.  Suspicion of acute cholecystitis.  GI was consulted by ED staff, no emergent ERCP at this time.  Right upper quadrant ultrasound was ordered and pending.  On Zosyn, GI consulted.  Assessment & Plan  Choledocholithiasis with acute cholecystitis  Choledocholithiasis with suspected acute cholecystitis, evident by CT abdomen pelvis and right upper quadrant abdominal pain and history of subjective fever.  LFTs elevated.  ED staff discussed case with GI, no emergent ERCP at this time.  Right upper quadrant ultrasound ordered and pending  Pain control  Continue Zosyn  GI consulted    Stage 5 chronic kidney disease not on chronic dialysis (Multi)  Currently not on dialysis, patient reports making urine  BUN 80, CR 6.49, EGFR 9 which are at baseline  Continue torsemide  Nephrology consulted    Type 2 diabetes mellitus  Not on medications.  Last A1c A1c 5.5% which has been stable    Benign essential hypertension  Continue home meds    DVT and GI prophylaxis: SCD and PPI    CODE STATUS: Full code    I spent 60 minutes in the professional and overall care of this patient.      Gage Fregoso MD

## 2025-03-04 NOTE — ASSESSMENT & PLAN NOTE
Currently not on dialysis, patient reports making urine  BUN 80, CR 6.49, EGFR 9 which are at baseline  Continue torsemide  Nephrology consulted

## 2025-03-04 NOTE — ANESTHESIA PREPROCEDURE EVALUATION
Patient: Colin Leonard    Procedure Information       Date/Time: 03/04/25 1640    Procedure: CHOLECYSTECTOMY, LAPAROSCOPIC    Location: [facility] / [facility]    Surgeons: Monique Presley MD            Relevant Problems   Anesthesia (within normal limits)      Cardiac   (+) Benign essential hypertension   (+) CAD (coronary artery disease)   (+) Coronary artery disease involving native coronary artery of native heart without angina pectoris   (+) Mixed hyperlipidemia   (+) NSTEMI (non-ST elevated myocardial infarction) (Multi)      Pulmonary   (+) Shortness of breath on exertion      Neuro (within normal limits)      GI (within normal limits)      /Renal   (+) Right renal stone      Liver   (+) Acute calculous cholecystitis   (+) Choledocholithiasis with acute cholecystitis   (+) Elevated LFTs      Endocrine   (+) Obesity   (+) Type 2 diabetes mellitus      Hematology   (+) Anemia      Musculoskeletal (within normal limits)      HEENT   (+) Vision loss      ID   (+) Acute hematogenous osteomyelitis of left foot (Multi)   (+) Osteomyelitis of ankle or foot, left, acute (Multi)      Skin (within normal limits)      GYN (within normal limits)       Clinical information reviewed:   Tobacco  Allergies  Meds   Med Hx  Surg Hx   Fam Hx  Soc Hx      Vitals:    03/04/25 1553   BP: 155/80   Pulse: 67   Resp: 17   Temp: 36.3 °C (97.3 °F)   SpO2: 100%       Past Surgical History:   Procedure Laterality Date    AMPUTATION       Past Medical History:   Diagnosis Date    Coronary artery disease     Diabetes mellitus (Multi)     DVT (deep venous thrombosis) (Multi)     Hypertension     MI (myocardial infarction) (Multi)        Current Facility-Administered Medications:     aspirin EC tablet 81 mg, 81 mg, oral, Daily, Gage Fregoso MD, 81 mg at 03/04/25 0912    carvedilol (Coreg) tablet 25 mg, 25 mg, oral, BID, Gage Fregoso MD, 25 mg at 03/04/25 1608    D5 % and 0.9 % sodium chloride infusion, 75 mL/hr, intravenous,  Continuous, Marie Harris MD, Last Rate: 75 mL/hr at 03/04/25 1409, 75 mL/hr at 03/04/25 1409    ezetimibe (Zetia) tablet 10 mg, 10 mg, oral, Nightly, Gage Fregoso MD    [Held by provider] heparin (porcine) injection 5,000 Units, 5,000 Units, subcutaneous, q8h, Gage Fregoso MD, 5,000 Units at 03/04/25 0609    hydrALAZINE (Apresoline) tablet 100 mg, 100 mg, oral, TID, Gage Fregoso MD, 100 mg at 03/04/25 1608    melatonin tablet 3 mg, 3 mg, oral, Nightly PRN, Gage Fregoso MD    piperacillin-tazobactam (Zosyn) 2.25 g in dextrose (iso) IV 50 mL, 2.25 g, intravenous, q6h, Gage Fregoso MD, Stopped at 03/04/25 1439    polyethylene glycol (Glycolax, Miralax) packet 17 g, 17 g, oral, Daily, Gage Fregoso MD    prochlorperazine (Compazine) tablet 10 mg, 10 mg, oral, q6h PRN **OR** prochlorperazine (Compazine) injection 10 mg, 10 mg, intravenous, q6h PRN **OR** prochlorperazine (Compazine) suppository 25 mg, 25 mg, rectal, q12h PRN, Gage Fregoso MD    sodium bicarbonate tablet 1,300 mg, 1,300 mg, oral, BID, Boo Max MD, 1,300 mg at 03/04/25 1210    [Held by provider] torsemide (Demadex) tablet 20 mg, 20 mg, oral, BID, Gage Fregoso MD, 20 mg at 03/04/25 0912  Prior to Admission medications    Medication Sig Start Date End Date Taking? Authorizing Provider   0.9 % sodium chloride (sodium chloride 0.9%) solution Infuse 10 mL into a venous catheter 2 times a day. 10ml flush prior and after antibiotic, SASH method, 20ml after blood draw, 10ml daily for maintenance  Indications: flush  Patient not taking: Reported on 10/28/2024    Historical Provider, MD   acetaminophen (Tylenol) 325 mg tablet Take 2 tablets (650 mg) by mouth every 4 hours if needed for mild pain (1 - 3).  Patient not taking: Reported on 10/28/2024 8/2/24   SARAH Olmstead-CNP   alteplase (Cathflo Activase) 1 mg/mL injection 2 mg by intra-catheter route 1 time if needed (blocked catheter). Indications: blocked PICC    Historical  Provider, MD   aspirin 81 mg EC tablet Take 1 tablet (81 mg) by mouth once daily.    Historical Provider, MD   atorvastatin (Lipitor) 20 mg tablet Take 1 tablet (20 mg) by mouth once daily. 11/11/24 11/11/25  Mahendra Saravia MD   carvedilol (Coreg) 25 mg tablet Take 1 tablet (25 mg) by mouth 2 times a day. 4/15/24   Mahendra Saravia MD   ezetimibe (Zetia) 10 mg tablet Take 1 tablet (10 mg) by mouth once daily at bedtime. 9/16/24   Mahendra Saravia MD   heparin, porcine, PF, (Hep Flush-10, PF,) 10 unit/mL solution 5 mL by intravenous push route 2 times a day. SASH method, after ns flush  Indications: prevent clot from blocking an intravenous catheter  Patient not taking: Reported on 10/28/2024    Historical Provider, MD   hydrALAZINE (Apresoline) 100 mg tablet Take 1 tablet (100 mg) by mouth 3 times a day. 4/15/24   Mahendra Saravia MD   sodium bicarbonate 650 mg tablet Take 2 tablets (1,300 mg) by mouth 3 times a day.  Patient not taking: Reported on 9/16/2024 8/2/24   SARAH Olmstead-CNP   torsemide (Demadex) 20 mg tablet Take 2 Tablets by Mouth Twice Daily 10/11/23   Mahendra Saravia MD     Allergies   Allergen Reactions    Amlodipine Besylate Swelling     edema legs     Social History     Tobacco Use    Smoking status: Never    Smokeless tobacco: Never   Substance Use Topics    Alcohol use: Not on file         Chemistry    Lab Results   Component Value Date/Time     03/04/2025 0606    K 5.0 03/04/2025 0606     (H) 03/04/2025 0606    CO2 16 (L) 03/04/2025 0606    BUN 73 (H) 03/04/2025 0606    CREATININE 6.56 (H) 03/04/2025 0606    Lab Results   Component Value Date/Time    CALCIUM 8.6 03/04/2025 0606    ALKPHOS 88 03/04/2025 0606     (H) 03/04/2025 0606     (H) 03/04/2025 0606    BILITOT 0.5 03/04/2025 0606          Lab Results   Component Value Date/Time    WBC 21.0 (H) 03/04/2025 0606    HGB 7.8 (L) 03/04/2025 0606    HCT 24.5 (L) 03/04/2025 0606    PLT  254 03/04/2025 0606     Lab Results   Component Value Date/Time    PROTIME 12.6 07/22/2024 0619    INR 1.2 07/22/2024 0619     Encounter Date: 10/28/24   ECG 12 lead (Clinic Performed)   Result Value    Ventricular Rate 72    Atrial Rate 72    DE Interval 170    QRS Duration 82    QT Interval 430    QTC Calculation(Bazett) 470    P Axis 34    R Axis 4    T Axis 23    QRS Count 12    Q Onset 219    P Onset 134    P Offset 187    T Offset 434    QTC Fredericia 457    Narrative    Normal sinus rhythm  Possible Anterior infarct (cited on or before 29-JUL-2024)  Abnormal ECG  When compared with ECG of 29-JUL-2024 14:28,  No significant change was found  Confirmed by Mahendra Saravia ([id number]) on 10/29/2024 9:31:31 AM       NPO Detail:  No data recorded     PHYSICAL EXAM    Anesthesia Plan    History of general anesthesia?: yes  History of complications of general anesthesia?: no    ASA 3     general

## 2025-03-04 NOTE — CONSULTS
CONSULT: Nephrology SERVICE    SERVICE DATE: 3/4/2025   SERVICE TIME:  10:33 AM    REASON FOR CONSULT: Chronic kidney disease  REQUESTING PHYSICIAN: Dr. Fregoso  PRIMARY CARE PHYSICIAN: Maurice Ballard MD    ASSESSMENT AND PLAN  59-year-old man with hypertension, diabetes, coronary disease, and advanced CKD admitted with presumed acute cholecystitis.  1.  Chronic kidney disease stage V  2.  Chronic metabolic acidosis  3.  Essential hypertension  4.  Anemia of chronic kidney disease    He has advanced CKD.  This has been stable for years.  I do not believe he is uremic, despite his GI symptoms.  More concerned about symptomatic gallstones given his leukocytosis, history of fever, and pain.  Antiemetics appropriate.  I would hold his diuretic therapy at present given his GI symptoms and poor oral intake.  May even need a trial of IV fluids.  He needs chronic bicarbonate therapy.  Check iron studies, he will probably need IV iron and erythropoietin stimulating agents.  The blood pressure is currently stable.  Trend daily labs, continue supportive care.  I would not give this man any IV iodine contrast.  I will continue to follow him while here.  Thank you for the consultation.     SUBJECTIVE  Mr. Leonard is a 59 y.o. man with a history of hypertension, diabetes, coronary artery disease, and chronic kidney disease stage V admitted to the hospital with abdominal pain, consulted for chronic kidney disease management.  I saw him roughly 6 months ago.  He had stable serum creatinine in the sixes for the past several years.  Indeed, this is where his current creatinine is.  He does not follow with nephrology.  He generally had been noncompliant in the outpatient setting.  He does not take his bicarbonate therapy.  He does not use any erythropoietin stimulating agents or IV iron.  A week or 2 ago he developed a fever and he believes this was influenza.  He had dry heaves and mild nausea.  His appetite was somewhat poor.  He  "has not had diarrhea.  This progressed into bilateral upper quadrant abdominal discomfort.  He has no dysgeusia.  There is no dysuria.  He has no excessive fatigue.  CT scan here demonstrates gallstones and a sonogram confirms this.  He had a mild white count elevation.  He is currently NPO.  He received his usual dose of torsemide this morning.  He does not use NSAIDs.  He had some loose stools yesterday.    PAST MEDICAL HISTORY:   Past Medical History:   Diagnosis Date    Coronary artery disease     Diabetes mellitus (Multi)     DVT (deep venous thrombosis) (Multi)     Hypertension     MI (myocardial infarction) (Multi)      PAST SURGICAL HISTORY:   Past Surgical History:   Procedure Laterality Date    AMPUTATION       FAMILY HISTORY: No family history on file.  SOCIAL HISTORY:   Social History     Tobacco Use    Smoking status: Never    Smokeless tobacco: Never     MEDICATIONS:  aspirin, 81 mg, oral, Daily  carvedilol, 25 mg, oral, BID  ezetimibe, 10 mg, oral, Nightly  heparin (porcine), 5,000 Units, subcutaneous, q8h  hydrALAZINE, 100 mg, oral, TID  piperacillin-tazobactam, 2.25 g, intravenous, q6h  polyethylene glycol, 17 g, oral, Daily  torsemide, 20 mg, oral, BID           PRN medications: melatonin, prochlorperazine **OR** prochlorperazine **OR** prochlorperazine   CURRENT ALLERGIES:   Allergies as of 03/03/2025 - Reviewed 03/03/2025   Allergen Reaction Noted    Amlodipine besylate Swelling 10/14/2011       COMPLETE REVIEW OF SYSTEMS:    Full ROS was negative unless mentioned above    OBJECTIVE  PHYSICAL EXAM  Heart Rate:  [67-93]   Temp:  [36.4 °C (97.5 °F)-36.8 °C (98.2 °F)]   Resp:  [16-19]   BP: (132-162)/(70-88)   Height:  [180.3 cm (5' 11\")]   Weight:  [113 kg (250 lb)-115 kg (254 lb 10.1 oz)]   SpO2:  [97 %-100 %]    Body mass index is 35.51 kg/m².  This is a well-appearing white man, no acute distress  There is no asterixis  No obvious skin rashes  Hearing intact  Phonation intact  Moist " mucosa  Normal S1/normal S2  Lungs are clear to auscultation bilaterally  Abdomen is soft, nondistended, nontender, positive bowel sounds  No Elmore catheter in place, no suprapubic tenderness to palpation  No extremity edema  Moves 4 limbs spontaneously  No obvious joint deformities  No lymphadenopathy    DATA:   Labs:  Results for orders placed or performed during the hospital encounter of 03/03/25 (from the past 96 hours)   CBC and Auto Differential   Result Value Ref Range    WBC 14.8 (H) 4.4 - 11.3 x10*3/uL    nRBC 0.0 0.0 - 0.0 /100 WBCs    RBC 3.15 (L) 4.50 - 5.90 x10*6/uL    Hemoglobin 8.9 (L) 13.5 - 17.5 g/dL    Hematocrit 28.4 (L) 41.0 - 52.0 %    MCV 90 80 - 100 fL    MCH 28.3 26.0 - 34.0 pg    MCHC 31.3 (L) 32.0 - 36.0 g/dL    RDW 14.6 (H) 11.5 - 14.5 %    Platelets 262 150 - 450 x10*3/uL    Neutrophils % 91.7 40.0 - 80.0 %    Immature Granulocytes %, Automated 0.5 0.0 - 0.9 %    Lymphocytes % 1.6 13.0 - 44.0 %    Monocytes % 5.8 2.0 - 10.0 %    Eosinophils % 0.3 0.0 - 6.0 %    Basophils % 0.1 0.0 - 2.0 %    Neutrophils Absolute 13.54 (H) 1.20 - 7.70 x10*3/uL    Immature Granulocytes Absolute, Automated 0.07 0.00 - 0.70 x10*3/uL    Lymphocytes Absolute 0.23 (L) 1.20 - 4.80 x10*3/uL    Monocytes Absolute 0.86 0.10 - 1.00 x10*3/uL    Eosinophils Absolute 0.05 0.00 - 0.70 x10*3/uL    Basophils Absolute 0.02 0.00 - 0.10 x10*3/uL   Comprehensive metabolic panel   Result Value Ref Range    Glucose 180 (H) 74 - 99 mg/dL    Sodium 139 136 - 145 mmol/L    Potassium 4.6 3.5 - 5.3 mmol/L    Chloride 112 (H) 98 - 107 mmol/L    Bicarbonate 17 (L) 21 - 32 mmol/L    Anion Gap 15 10 - 20 mmol/L    Urea Nitrogen 80 (H) 6 - 23 mg/dL    Creatinine 6.49 (H) 0.50 - 1.30 mg/dL    eGFR 9 (L) >60 mL/min/1.73m*2    Calcium 9.1 8.6 - 10.3 mg/dL    Albumin 3.7 3.4 - 5.0 g/dL    Alkaline Phosphatase 102 33 - 136 U/L    Total Protein 6.2 (L) 6.4 - 8.2 g/dL     (H) 9 - 39 U/L    Bilirubin, Total 1.0 0.0 - 1.2 mg/dL      (H) 10 - 52 U/L   Lipase   Result Value Ref Range    Lipase 49 9 - 82 U/L   Lactate   Result Value Ref Range    Lactate 0.9 0.4 - 2.0 mmol/L   Magnesium   Result Value Ref Range    Magnesium 1.97 1.60 - 2.40 mg/dL   Phosphorus   Result Value Ref Range    Phosphorus 4.8 2.5 - 4.9 mg/dL   Sars-CoV-2 and Influenza A/B PCR   Result Value Ref Range    Flu A Result Not Detected Not Detected    Flu B Result Not Detected Not Detected    Coronavirus 2019, PCR Not Detected Not Detected   Blood Culture    Specimen: Peripheral Venipuncture; Blood culture   Result Value Ref Range    Blood Culture Loaded on Instrument - Culture in progress    Urinalysis with Reflex Culture and Microscopic   Result Value Ref Range    Color, Urine Light-Yellow Light-Yellow, Yellow, Dark-Yellow    Appearance, Urine Clear Clear    Specific Gravity, Urine 1.013 1.005 - 1.035    pH, Urine 5.5 5.0, 5.5, 6.0, 6.5, 7.0, 7.5, 8.0    Protein, Urine 100 (2+) (A) NEGATIVE, 10 (TRACE), 20 (TRACE) mg/dL    Glucose, Urine 300 (3+) (A) Normal mg/dL    Blood, Urine NEGATIVE NEGATIVE mg/dL    Ketones, Urine NEGATIVE NEGATIVE mg/dL    Bilirubin, Urine NEGATIVE NEGATIVE mg/dL    Urobilinogen, Urine Normal Normal mg/dL    Nitrite, Urine NEGATIVE NEGATIVE    Leukocyte Esterase, Urine NEGATIVE NEGATIVE   Urinalysis Microscopic   Result Value Ref Range    WBC, Urine 1-5 1-5, NONE /HPF    RBC, Urine NONE NONE, 1-2, 3-5 /HPF    Bacteria, Urine 1+ (A) NONE SEEN /HPF    Mucus, Urine FEW Reference range not established. /LPF   CBC   Result Value Ref Range    WBC 21.0 (H) 4.4 - 11.3 x10*3/uL    nRBC 0.0 0.0 - 0.0 /100 WBCs    RBC 2.76 (L) 4.50 - 5.90 x10*6/uL    Hemoglobin 7.8 (L) 13.5 - 17.5 g/dL    Hematocrit 24.5 (L) 41.0 - 52.0 %    MCV 89 80 - 100 fL    MCH 28.3 26.0 - 34.0 pg    MCHC 31.8 (L) 32.0 - 36.0 g/dL    RDW 14.8 (H) 11.5 - 14.5 %    Platelets 254 150 - 450 x10*3/uL   Basic metabolic panel   Result Value Ref Range    Glucose 118 (H) 74 - 99 mg/dL    Sodium 138  136 - 145 mmol/L    Potassium 5.0 3.5 - 5.3 mmol/L    Chloride 114 (H) 98 - 107 mmol/L    Bicarbonate 16 (L) 21 - 32 mmol/L    Anion Gap 13 10 - 20 mmol/L    Urea Nitrogen 73 (H) 6 - 23 mg/dL    Creatinine 6.56 (H) 0.50 - 1.30 mg/dL    eGFR 9 (L) >60 mL/min/1.73m*2    Calcium 8.6 8.6 - 10.3 mg/dL       SIGNATURE: Boo Max MD PATIENT NAME: Colin Leonard   DATE: March 4, 2025 MRN: [id number]   TIME: 10:33 AM PAGER: [phone]

## 2025-03-04 NOTE — PROGRESS NOTES
Occupational Therapy                 Therapy Communication Note; OT Screen    Patient Name: Colin Leonard  MRN: [id number]  Department: [facility]  Room: 424/424-B  Today's Date: 3/4/2025     Discipline: Occupational Therapy           (Patient is at baseline level of function and independent with ADLs and functional mobility. no need for skilled OT at this time.)

## 2025-03-04 NOTE — ASSESSMENT & PLAN NOTE
Choledocholithiasis with suspected acute cholecystitis, evident by CT abdomen pelvis and right upper quadrant abdominal pain and history of subjective fever.  LFTs elevated.  ED staff discussed case with GI, no emergent ERCP at this time.  Right upper quadrant ultrasound ordered and pending  Pain control  Continue Zosyn  GI consulted

## 2025-03-05 ENCOUNTER — ANESTHESIA (OUTPATIENT)
Dept: OPERATING ROOM | Facility: HOSPITAL | Age: 60
End: 2025-03-05
Payer: COMMERCIAL

## 2025-03-05 ENCOUNTER — ANESTHESIA EVENT (OUTPATIENT)
Dept: OPERATING ROOM | Facility: HOSPITAL | Age: 60
End: 2025-03-05
Payer: COMMERCIAL

## 2025-03-05 LAB
ALBUMIN SERPL BCP-MCNC: 3.3 G/DL (ref 3.4–5)
ALP SERPL-CCNC: 71 U/L (ref 33–136)
ALT SERPL W P-5'-P-CCNC: 112 U/L (ref 10–52)
ANION GAP SERPL CALCULATED.3IONS-SCNC: 15 MMOL/L (ref 10–20)
AST SERPL W P-5'-P-CCNC: 33 U/L (ref 9–39)
BASOPHILS # BLD AUTO: 0.04 X10*3/UL (ref 0–0.1)
BASOPHILS NFR BLD AUTO: 0.3 %
BILIRUB SERPL-MCNC: 0.4 MG/DL (ref 0–1.2)
BUN SERPL-MCNC: 77 MG/DL (ref 6–23)
CALCIUM SERPL-MCNC: 8.5 MG/DL (ref 8.6–10.3)
CHLORIDE SERPL-SCNC: 113 MMOL/L (ref 98–107)
CO2 SERPL-SCNC: 15 MMOL/L (ref 21–32)
CREAT SERPL-MCNC: 6.61 MG/DL (ref 0.5–1.3)
EGFRCR SERPLBLD CKD-EPI 2021: 9 ML/MIN/1.73M*2
EOSINOPHIL # BLD AUTO: 0.55 X10*3/UL (ref 0–0.7)
EOSINOPHIL NFR BLD AUTO: 4.6 %
ERYTHROCYTE [DISTWIDTH] IN BLOOD BY AUTOMATED COUNT: 14.8 % (ref 11.5–14.5)
GLUCOSE BLD MANUAL STRIP-MCNC: 105 MG/DL (ref 74–99)
GLUCOSE BLD MANUAL STRIP-MCNC: 109 MG/DL (ref 74–99)
GLUCOSE SERPL-MCNC: 107 MG/DL (ref 74–99)
HCT VFR BLD AUTO: 23.5 % (ref 41–52)
HGB BLD-MCNC: 7.4 G/DL (ref 13.5–17.5)
IMM GRANULOCYTES # BLD AUTO: 0.08 X10*3/UL (ref 0–0.7)
IMM GRANULOCYTES NFR BLD AUTO: 0.7 % (ref 0–0.9)
LYMPHOCYTES # BLD AUTO: 1.28 X10*3/UL (ref 1.2–4.8)
LYMPHOCYTES NFR BLD AUTO: 10.7 %
MCH RBC QN AUTO: 28.6 PG (ref 26–34)
MCHC RBC AUTO-ENTMCNC: 31.5 G/DL (ref 32–36)
MCV RBC AUTO: 91 FL (ref 80–100)
MONOCYTES # BLD AUTO: 0.78 X10*3/UL (ref 0.1–1)
MONOCYTES NFR BLD AUTO: 6.5 %
NEUTROPHILS # BLD AUTO: 9.23 X10*3/UL (ref 1.2–7.7)
NEUTROPHILS NFR BLD AUTO: 77.2 %
NRBC BLD-RTO: 0 /100 WBCS (ref 0–0)
PHOSPHATE SERPL-MCNC: 4.9 MG/DL (ref 2.5–4.9)
PLATELET # BLD AUTO: 240 X10*3/UL (ref 150–450)
POTASSIUM SERPL-SCNC: 5 MMOL/L (ref 3.5–5.3)
PROT SERPL-MCNC: 6 G/DL (ref 6.4–8.2)
RBC # BLD AUTO: 2.59 X10*6/UL (ref 4.5–5.9)
SODIUM SERPL-SCNC: 138 MMOL/L (ref 136–145)
WBC # BLD AUTO: 12 X10*3/UL (ref 4.4–11.3)

## 2025-03-05 PROCEDURE — 7100000001 HC RECOVERY ROOM TIME - INITIAL BASE CHARGE: Performed by: SURGERY

## 2025-03-05 PROCEDURE — A47562 PR LAP,CHOLECYSTECTOMY: Performed by: ANESTHESIOLOGY

## 2025-03-05 PROCEDURE — 99232 SBSQ HOSP IP/OBS MODERATE 35: CPT | Performed by: INTERNAL MEDICINE

## 2025-03-05 PROCEDURE — 2780000003 HC OR 278 NO HCPCS: Performed by: SURGERY

## 2025-03-05 PROCEDURE — 2500000005 HC RX 250 GENERAL PHARMACY W/O HCPCS

## 2025-03-05 PROCEDURE — 1100000001 HC PRIVATE ROOM DAILY

## 2025-03-05 PROCEDURE — 3600000009 HC OR TIME - EACH INCREMENTAL 1 MINUTE - PROCEDURE LEVEL FOUR: Performed by: SURGERY

## 2025-03-05 PROCEDURE — 7100000002 HC RECOVERY ROOM TIME - EACH INCREMENTAL 1 MINUTE: Performed by: SURGERY

## 2025-03-05 PROCEDURE — 80053 COMPREHEN METABOLIC PANEL: CPT | Performed by: INTERNAL MEDICINE

## 2025-03-05 PROCEDURE — 84100 ASSAY OF PHOSPHORUS: CPT | Performed by: INTERNAL MEDICINE

## 2025-03-05 PROCEDURE — 2500000004 HC RX 250 GENERAL PHARMACY W/ HCPCS (ALT 636 FOR OP/ED): Performed by: SURGERY

## 2025-03-05 PROCEDURE — 3600000004 HC OR TIME - INITIAL BASE CHARGE - PROCEDURE LEVEL FOUR: Performed by: SURGERY

## 2025-03-05 PROCEDURE — 2720000007 HC OR 272 NO HCPCS: Performed by: SURGERY

## 2025-03-05 PROCEDURE — 0FT44ZZ RESECTION OF GALLBLADDER, PERCUTANEOUS ENDOSCOPIC APPROACH: ICD-10-PCS | Performed by: SURGERY

## 2025-03-05 PROCEDURE — 2500000004 HC RX 250 GENERAL PHARMACY W/ HCPCS (ALT 636 FOR OP/ED): Performed by: ANESTHESIOLOGY

## 2025-03-05 PROCEDURE — 2500000002 HC RX 250 W HCPCS SELF ADMINISTERED DRUGS (ALT 637 FOR MEDICARE OP, ALT 636 FOR OP/ED): Performed by: SURGERY

## 2025-03-05 PROCEDURE — 36415 COLL VENOUS BLD VENIPUNCTURE: CPT | Performed by: INTERNAL MEDICINE

## 2025-03-05 PROCEDURE — 6350000001 HC RX 635 EPOETIN >10,000 UNITS: Mod: JZ | Performed by: SURGERY

## 2025-03-05 PROCEDURE — 3700000002 HC GENERAL ANESTHESIA TIME - EACH INCREMENTAL 1 MINUTE: Performed by: SURGERY

## 2025-03-05 PROCEDURE — 2500000004 HC RX 250 GENERAL PHARMACY W/ HCPCS (ALT 636 FOR OP/ED): Performed by: STUDENT IN AN ORGANIZED HEALTH CARE EDUCATION/TRAINING PROGRAM

## 2025-03-05 PROCEDURE — 85025 COMPLETE CBC W/AUTO DIFF WBC: CPT | Performed by: INTERNAL MEDICINE

## 2025-03-05 PROCEDURE — 88304 TISSUE EXAM BY PATHOLOGIST: CPT | Mod: TC,WESLAB | Performed by: SURGERY

## 2025-03-05 PROCEDURE — 2500000004 HC RX 250 GENERAL PHARMACY W/ HCPCS (ALT 636 FOR OP/ED): Performed by: INTERNAL MEDICINE

## 2025-03-05 PROCEDURE — 82947 ASSAY GLUCOSE BLOOD QUANT: CPT

## 2025-03-05 PROCEDURE — 2500000001 HC RX 250 WO HCPCS SELF ADMINISTERED DRUGS (ALT 637 FOR MEDICARE OP): Performed by: STUDENT IN AN ORGANIZED HEALTH CARE EDUCATION/TRAINING PROGRAM

## 2025-03-05 PROCEDURE — 3700000001 HC GENERAL ANESTHESIA TIME - INITIAL BASE CHARGE: Performed by: SURGERY

## 2025-03-05 PROCEDURE — 2500000001 HC RX 250 WO HCPCS SELF ADMINISTERED DRUGS (ALT 637 FOR MEDICARE OP): Performed by: SURGERY

## 2025-03-05 PROCEDURE — 47562 LAPAROSCOPIC CHOLECYSTECTOMY: CPT | Performed by: SURGERY

## 2025-03-05 PROCEDURE — A4550 SURGICAL TRAYS: HCPCS | Performed by: SURGERY

## 2025-03-05 RX ORDER — FENTANYL CITRATE 50 UG/ML
INJECTION, SOLUTION INTRAMUSCULAR; INTRAVENOUS AS NEEDED
Status: DISCONTINUED | OUTPATIENT
Start: 2025-03-05 | End: 2025-03-05

## 2025-03-05 RX ORDER — ONDANSETRON HYDROCHLORIDE 2 MG/ML
INJECTION, SOLUTION INTRAVENOUS AS NEEDED
Status: DISCONTINUED | OUTPATIENT
Start: 2025-03-05 | End: 2025-03-05

## 2025-03-05 RX ORDER — FENTANYL CITRATE 50 UG/ML
25 INJECTION, SOLUTION INTRAMUSCULAR; INTRAVENOUS EVERY 5 MIN PRN
Status: DISCONTINUED | OUTPATIENT
Start: 2025-03-05 | End: 2025-03-05 | Stop reason: HOSPADM

## 2025-03-05 RX ORDER — LIDOCAINE HYDROCHLORIDE 10 MG/ML
0.1 INJECTION, SOLUTION INFILTRATION; PERINEURAL ONCE
Status: DISCONTINUED | OUTPATIENT
Start: 2025-03-05 | End: 2025-03-05 | Stop reason: HOSPADM

## 2025-03-05 RX ORDER — SODIUM BICARBONATE 650 MG/1
1300 TABLET ORAL 3 TIMES DAILY
Status: DISCONTINUED | OUTPATIENT
Start: 2025-03-05 | End: 2025-03-09 | Stop reason: HOSPADM

## 2025-03-05 RX ORDER — FENTANYL CITRATE 50 UG/ML
50 INJECTION, SOLUTION INTRAMUSCULAR; INTRAVENOUS EVERY 5 MIN PRN
Status: DISCONTINUED | OUTPATIENT
Start: 2025-03-05 | End: 2025-03-05 | Stop reason: HOSPADM

## 2025-03-05 RX ORDER — ACETAMINOPHEN 325 MG/1
650 TABLET ORAL EVERY 6 HOURS PRN
Status: DISCONTINUED | OUTPATIENT
Start: 2025-03-05 | End: 2025-03-06

## 2025-03-05 RX ORDER — DEXMEDETOMIDINE HYDROCHLORIDE 100 UG/ML
INJECTION, SOLUTION INTRAVENOUS AS NEEDED
Status: DISCONTINUED | OUTPATIENT
Start: 2025-03-05 | End: 2025-03-05

## 2025-03-05 RX ORDER — OXYCODONE HYDROCHLORIDE 5 MG/1
5 TABLET ORAL EVERY 6 HOURS PRN
Status: DISCONTINUED | OUTPATIENT
Start: 2025-03-05 | End: 2025-03-09 | Stop reason: HOSPADM

## 2025-03-05 RX ORDER — LIDOCAINE HYDROCHLORIDE AND EPINEPHRINE 10; 10 UG/ML; MG/ML
INJECTION, SOLUTION INFILTRATION; PERINEURAL AS NEEDED
Status: DISCONTINUED | OUTPATIENT
Start: 2025-03-05 | End: 2025-03-05 | Stop reason: HOSPADM

## 2025-03-05 RX ORDER — PROPOFOL 10 MG/ML
INJECTION, EMULSION INTRAVENOUS AS NEEDED
Status: DISCONTINUED | OUTPATIENT
Start: 2025-03-05 | End: 2025-03-05

## 2025-03-05 RX ORDER — ROCURONIUM BROMIDE 10 MG/ML
INJECTION, SOLUTION INTRAVENOUS AS NEEDED
Status: DISCONTINUED | OUTPATIENT
Start: 2025-03-05 | End: 2025-03-05

## 2025-03-05 RX ORDER — LIDOCAINE HYDROCHLORIDE 20 MG/ML
INJECTION, SOLUTION INFILTRATION; PERINEURAL AS NEEDED
Status: DISCONTINUED | OUTPATIENT
Start: 2025-03-05 | End: 2025-03-05

## 2025-03-05 RX ORDER — SODIUM CHLORIDE, SODIUM LACTATE, POTASSIUM CHLORIDE, CALCIUM CHLORIDE 600; 310; 30; 20 MG/100ML; MG/100ML; MG/100ML; MG/100ML
100 INJECTION, SOLUTION INTRAVENOUS CONTINUOUS
Status: DISCONTINUED | OUTPATIENT
Start: 2025-03-05 | End: 2025-03-05 | Stop reason: HOSPADM

## 2025-03-05 RX ORDER — HYDRALAZINE HYDROCHLORIDE 20 MG/ML
5 INJECTION INTRAMUSCULAR; INTRAVENOUS EVERY 6 HOURS PRN
Status: DISCONTINUED | OUTPATIENT
Start: 2025-03-05 | End: 2025-03-09 | Stop reason: HOSPADM

## 2025-03-05 RX ORDER — INDOCYANINE GREEN AND WATER 25 MG
2.5 KIT INJECTION ONCE
Status: COMPLETED | OUTPATIENT
Start: 2025-03-05 | End: 2025-03-05

## 2025-03-05 RX ORDER — HEPARIN SODIUM 5000 [USP'U]/ML
5000 INJECTION, SOLUTION INTRAVENOUS; SUBCUTANEOUS EVERY 8 HOURS
Status: DISCONTINUED | OUTPATIENT
Start: 2025-03-05 | End: 2025-03-08

## 2025-03-05 RX ORDER — PHENYLEPHRINE HCL IN 0.9% NACL 1 MG/10 ML
SYRINGE (ML) INTRAVENOUS AS NEEDED
Status: DISCONTINUED | OUTPATIENT
Start: 2025-03-05 | End: 2025-03-05

## 2025-03-05 RX ORDER — INDOCYANINE GREEN AND WATER 25 MG
KIT INJECTION
Status: COMPLETED
Start: 2025-03-05 | End: 2025-03-05

## 2025-03-05 RX ORDER — DEXTROSE MONOHYDRATE AND SODIUM CHLORIDE 5; .9 G/100ML; G/100ML
75 INJECTION, SOLUTION INTRAVENOUS CONTINUOUS
Status: ACTIVE | OUTPATIENT
Start: 2025-03-05 | End: 2025-03-05

## 2025-03-05 RX ORDER — MIDAZOLAM HYDROCHLORIDE 1 MG/ML
INJECTION, SOLUTION INTRAMUSCULAR; INTRAVENOUS AS NEEDED
Status: DISCONTINUED | OUTPATIENT
Start: 2025-03-05 | End: 2025-03-05

## 2025-03-05 RX ORDER — HYDROMORPHONE HYDROCHLORIDE 2 MG/ML
0.4 INJECTION, SOLUTION INTRAMUSCULAR; INTRAVENOUS; SUBCUTANEOUS EVERY 10 MIN PRN
Status: DISCONTINUED | OUTPATIENT
Start: 2025-03-05 | End: 2025-03-05 | Stop reason: HOSPADM

## 2025-03-05 RX ORDER — CEFAZOLIN 1 G/1
INJECTION, POWDER, FOR SOLUTION INTRAVENOUS AS NEEDED
Status: DISCONTINUED | OUTPATIENT
Start: 2025-03-05 | End: 2025-03-05

## 2025-03-05 RX ORDER — MORPHINE SULFATE 2 MG/ML
2 INJECTION, SOLUTION INTRAMUSCULAR; INTRAVENOUS EVERY 4 HOURS PRN
Status: DISCONTINUED | OUTPATIENT
Start: 2025-03-05 | End: 2025-03-09 | Stop reason: HOSPADM

## 2025-03-05 RX ADMIN — IRON SUCROSE 200 MG: 20 INJECTION, SOLUTION INTRAVENOUS at 15:59

## 2025-03-05 RX ADMIN — FENTANYL CITRATE 50 MCG: 50 INJECTION, SOLUTION INTRAMUSCULAR; INTRAVENOUS at 13:24

## 2025-03-05 RX ADMIN — HYDRALAZINE HYDROCHLORIDE 100 MG: 50 TABLET ORAL at 16:00

## 2025-03-05 RX ADMIN — SODIUM CHLORIDE, POTASSIUM CHLORIDE, SODIUM LACTATE AND CALCIUM CHLORIDE: 600; 310; 30; 20 INJECTION, SOLUTION INTRAVENOUS at 12:17

## 2025-03-05 RX ADMIN — SODIUM BICARBONATE 1300 MG: 650 TABLET ORAL at 15:59

## 2025-03-05 RX ADMIN — EZETIMIBE 10 MG: 10 TABLET ORAL at 21:28

## 2025-03-05 RX ADMIN — ROCURONIUM BROMIDE 50 MG: 10 INJECTION, SOLUTION INTRAVENOUS at 12:17

## 2025-03-05 RX ADMIN — LIDOCAINE HYDROCHLORIDE 80 MG: 20 INJECTION, SOLUTION INFILTRATION; PERINEURAL at 12:17

## 2025-03-05 RX ADMIN — FENTANYL CITRATE 100 MCG: 50 INJECTION, SOLUTION INTRAMUSCULAR; INTRAVENOUS at 12:17

## 2025-03-05 RX ADMIN — MORPHINE SULFATE 2 MG: 2 INJECTION, SOLUTION INTRAMUSCULAR; INTRAVENOUS at 21:45

## 2025-03-05 RX ADMIN — HYDRALAZINE HYDROCHLORIDE 100 MG: 50 TABLET ORAL at 21:25

## 2025-03-05 RX ADMIN — HYDRALAZINE HYDROCHLORIDE 100 MG: 50 TABLET ORAL at 08:57

## 2025-03-05 RX ADMIN — ONDANSETRON HYDROCHLORIDE 4 MG: 2 INJECTION INTRAMUSCULAR; INTRAVENOUS at 13:21

## 2025-03-05 RX ADMIN — INDOCYANINE GREEN AND WATER 2.5 MG: KIT at 11:47

## 2025-03-05 RX ADMIN — PIPERACILLIN SODIUM AND TAZOBACTAM SODIUM 2.25 G: 2; .25 INJECTION, SOLUTION INTRAVENOUS at 01:38

## 2025-03-05 RX ADMIN — PROPOFOL 200 MG: 10 INJECTION, EMULSION INTRAVENOUS at 12:17

## 2025-03-05 RX ADMIN — PIPERACILLIN SODIUM AND TAZOBACTAM SODIUM 2.25 G: 2; .25 INJECTION, SOLUTION INTRAVENOUS at 21:26

## 2025-03-05 RX ADMIN — CARVEDILOL 25 MG: 25 TABLET, FILM COATED ORAL at 16:00

## 2025-03-05 RX ADMIN — PIPERACILLIN SODIUM AND TAZOBACTAM SODIUM 2.25 G: 2; .25 INJECTION, SOLUTION INTRAVENOUS at 16:17

## 2025-03-05 RX ADMIN — DEXMEDETOMIDINE HYDROCHLORIDE 20 MCG: 100 INJECTION, SOLUTION, CONCENTRATE INTRAVENOUS at 12:33

## 2025-03-05 RX ADMIN — DEXTROSE MONOHYDRATE AND SODIUM CHLORIDE 75 ML/HR: 5; .9 INJECTION, SOLUTION INTRAVENOUS at 01:08

## 2025-03-05 RX ADMIN — MORPHINE SULFATE 2 MG: 2 INJECTION, SOLUTION INTRAMUSCULAR; INTRAVENOUS at 17:57

## 2025-03-05 RX ADMIN — SUGAMMADEX 200 MG: 100 INJECTION, SOLUTION INTRAVENOUS at 13:21

## 2025-03-05 RX ADMIN — CEFAZOLIN 2 G: 330 INJECTION, POWDER, FOR SOLUTION INTRAMUSCULAR; INTRAVENOUS at 12:29

## 2025-03-05 RX ADMIN — HYDRALAZINE HYDROCHLORIDE 5 MG: 20 INJECTION INTRAMUSCULAR; INTRAVENOUS at 16:24

## 2025-03-05 RX ADMIN — Medication 100 MCG: at 13:02

## 2025-03-05 RX ADMIN — SODIUM BICARBONATE 1300 MG: 650 TABLET ORAL at 21:25

## 2025-03-05 RX ADMIN — CARVEDILOL 25 MG: 25 TABLET, FILM COATED ORAL at 08:57

## 2025-03-05 RX ADMIN — FENTANYL CITRATE 50 MCG: 50 INJECTION, SOLUTION INTRAMUSCULAR; INTRAVENOUS at 13:29

## 2025-03-05 RX ADMIN — PIPERACILLIN SODIUM AND TAZOBACTAM SODIUM 2.25 G: 2; .25 INJECTION, SOLUTION INTRAVENOUS at 08:57

## 2025-03-05 RX ADMIN — MIDAZOLAM 2 MG: 1 INJECTION INTRAMUSCULAR; INTRAVENOUS at 12:11

## 2025-03-05 RX ADMIN — EPOETIN ALFA-EPBX 40000 UNITS: 40000 INJECTION, SOLUTION INTRAVENOUS; SUBCUTANEOUS at 16:17

## 2025-03-05 SDOH — HEALTH STABILITY: MENTAL HEALTH: CURRENT SMOKER: 0

## 2025-03-05 ASSESSMENT — COGNITIVE AND FUNCTIONAL STATUS - GENERAL
MOBILITY SCORE: 24
DAILY ACTIVITIY SCORE: 24
DAILY ACTIVITIY SCORE: 24
MOBILITY SCORE: 24

## 2025-03-05 ASSESSMENT — PAIN SCALES - GENERAL
PAINLEVEL_OUTOF10: 0 - NO PAIN
PAINLEVEL_OUTOF10: 4
PAINLEVEL_OUTOF10: 0 - NO PAIN
PAINLEVEL_OUTOF10: 0 - NO PAIN
PAINLEVEL_OUTOF10: 6
PAINLEVEL_OUTOF10: 5 - MODERATE PAIN
PAINLEVEL_OUTOF10: 10 - WORST POSSIBLE PAIN
PAINLEVEL_OUTOF10: 8
PAINLEVEL_OUTOF10: 0 - NO PAIN

## 2025-03-05 ASSESSMENT — PAIN DESCRIPTION - DESCRIPTORS
DESCRIPTORS: ACHING
DESCRIPTORS: ACHING;TIGHTNESS

## 2025-03-05 ASSESSMENT — PAIN - FUNCTIONAL ASSESSMENT
PAIN_FUNCTIONAL_ASSESSMENT: 0-10
PAIN_FUNCTIONAL_ASSESSMENT: CPOT (CRITICAL CARE PAIN OBSERVATION TOOL)
PAIN_FUNCTIONAL_ASSESSMENT: 0-10

## 2025-03-05 NOTE — INTERVAL H&P NOTE
The patient presented with recurrent episodes of postprandial, right upper quadrant abdominal pain.  We reviewed his ultrasound, and CT scan which showed cholelithiasis.  He also had elevation of his white blood cell count and a fever to 102 °F.  He had normal bilirubin, with some elevation of his AST and ALT.  I explained to him the risks and benefits of going to operating room for a laparoscopic cholecystectomy.  These risks include risk of bleeding, infection, injury to the bile ducts, or injury to surround structures.  The patient agrees to proceed with the operation.  We will take him to the operating room this afternoon.  Continue n.p.o. and antibiotics

## 2025-03-05 NOTE — PROGRESS NOTES
Colin Leonard is a 59 y.o. male on day 1 of admission presenting with Choledocholithiasis with acute cholecystitis.      Subjective   Patient denied right upper quadrant pain.    He denied nausea or vomiting.       Objective     Last Recorded Vitals  /84 (BP Location: Right arm, Patient Position: Sitting)   Pulse 76   Temp 36.7 °C (98.1 °F) (Oral)   Resp 18   Wt 115 kg (254 lb 10.1 oz)   SpO2 100%   Intake/Output last 3 Shifts:    Intake/Output Summary (Last 24 hours) at 3/5/2025 1108  Last data filed at 3/5/2025 0900  Gross per 24 hour   Intake 1021.67 ml   Output 860 ml   Net 161.67 ml       Admission Weight  Weight: 113 kg (250 lb) (03/03/25 2144)    Daily Weight  03/04/25 : 115 kg (254 lb 10.1 oz)    Image Results  US right upper quadrant  Narrative: Interpreted By:  Miguelina Brooke,   STUDY:  US RIGHT UPPER QUADRANT;  3/4/2025 8:32 am      INDICATION:  Signs/Symptoms:eval for acute cholecystitis.      COMPARISON:  CT abdomen and pelvis 03/03/2025      ACCESSION NUMBER(S):  [id number]      ORDERING CLINICIAN:  WAQAR CRENSHAW      TECHNIQUE:  Multiple images of the abdomen were obtained.      FINDINGS:  LIVER:  The echogenicity of the liver is within normal limits.  There is no hepatic mass.  The sagittal dimension of the right lobe of the liver is 17.8 cm,  nonenlarged.      GALLBLADDER:  The gallbladder is nondistended.  The gallbladder wall is not thickened.  There are small gravel-like gallstones in the proximal body of the  gallbladder. There is no sludge.  There is no pericholecystic fluid.  There is no sonographic Thompson's sign      BILE DUCTS:  There is no intrahepatic biliary dilatation.  The common bile duct is  nondilated measuring 0.2 cm.      PANCREAS:  Only a small part of the body of the pancreas is seen and is  unremarkable. There is obscuration of most of the pancreas secondary  to shadowing from bowel gas.      RIGHT KIDNEY:  The right kidney is  normal in size measuring 11.2 cm in  length.      The echogenicity of the cortex is within normal limits.  There is no renal mass.  There is no intrarenal calculus or hydronephrosis.      Impression: Cholelithiasis      MACRO:  None      Signed by: Miguelina Mendy 3/4/2025 8:59 AM  Dictation workstation:   [id number]      Physical Exam    General: In non acute distress, pleasant, cooperating during physical exam.  HEENT: Pupils are equal and reactive to light and commendation , oral mucosa moist, no JVD o  Cardiovascular: Normal sinus rhythm, no MRG.  Lungs: Clear to auscultation bilaterally, no wheezing, no crackles, no dullness to percussion.  Abdomen: No hepatosplenomegaly appreciated, soft , not tender, positive bowel sounds, positive bowel movement.  Neuro: Alert and oriented x3, strength in upper and lower extremities , sensation intact.  Psych: Patient had great insight was going on  Musculoskeletal: No swelling in lower extremities, no limitation in range of motion.  Vascular: Pulses are intact in upper and lower extremities  Skin: No petechiae, ecchymosis or other stigmata for dermatology disease.     Assessment/Plan        Cholelithiasis  Ultrasound of right upper quadrant revealed cholelithiasis.  Continue with Zosyn  Patient feels comfortable on as needed pain meds  Evaluated by Dr. Zhang.  Plan for laparoscopic cholecystectomy today    Chronic kidney disease stage V  Patient see Dr. Goldberg in regular basis  Discussed in detail with  Dr. Max   Challenge gently with fluids  Torsemide on hold     Hypertension  Continue with current medication.     Dehydration  Challenge gently with fluid     Diabetes mellitus type 2  The patient is not on any medication, diet control   hemoglobin A1c 5.4     Leukocytosis improved  Secondary to acute cholecystitis.  Cover with antibiotics  Check CBC and CMP in AM.     Anemia of chronic disease  Monitor close  Transfuse for hemoglobin less than 7     History of kidney stone     Plan for surgical  intervention today.  Monitor on telemetry bed       Marie Harris MD

## 2025-03-05 NOTE — CARE PLAN
The patient's goals for the shift include      The clinical goals for the shift include obtain surgery

## 2025-03-05 NOTE — NURSING NOTE
Assumed care of patient at this time, patient sitting on edge of bed and denies any needs remains NPO

## 2025-03-05 NOTE — CARE PLAN
The patient's goals for the shift include  rest    The clinical goals for the shift include pt comfort and safety      Problem: Pain - Adult  Goal: Verbalizes/displays adequate comfort level or baseline comfort level  Outcome: Progressing     Problem: Safety - Adult  Goal: Free from fall injury  Outcome: Progressing     Problem: Discharge Planning  Goal: Discharge to home or other facility with appropriate resources  Outcome: Progressing     Problem: Chronic Conditions and Co-morbidities  Goal: Patient's chronic conditions and co-morbidity symptoms are monitored and maintained or improved  Outcome: Progressing     Problem: Nutrition  Goal: Nutrient intake appropriate for maintaining nutritional needs  Outcome: Progressing     Problem: Pain  Goal: Takes deep breaths with improved pain control throughout the shift  Outcome: Progressing  Goal: Turns in bed with improved pain control throughout the shift  Outcome: Progressing  Goal: Walks with improved pain control throughout the shift  Outcome: Progressing  Goal: Performs ADL's with improved pain control throughout shift  Outcome: Progressing  Goal: Free from opioid side effects throughout the shift  Outcome: Progressing

## 2025-03-05 NOTE — NURSING NOTE
Pt arrived to room 443. Drowsy, butalert. Family at bedside. I.s at bedside encouraged and educated. Scds on and working. Lap sites cdi. Ice in place. Call light within reach.

## 2025-03-05 NOTE — ANESTHESIA PROCEDURE NOTES
Airway  Date/Time: 3/5/2025 12:23 PM  Urgency: elective    Airway not difficult    Staffing  Performed: attending   Authorized by: Kade Thompson MD    Performed by: Kade Thompson MD  Patient location during procedure: OR    Indications and Patient Condition  Indications for airway management: anesthesia  Spontaneous ventilation: present  Preoxygenated: yes  Patient position: sniffing  MILS maintained throughout  Mask difficulty assessment: 1 - vent by mask    Final Airway Details  Final airway type: endotracheal airway      Successful airway: ETT  Cuffed: yes   Successful intubation technique: direct laryngoscopy  Facilitating devices/methods: intubating stylet  Endotracheal tube insertion site: oral  Blade: Victoriano  Blade size: #3  ETT size (mm): 8.0  Cormack-Lehane Classification: grade I - full view of glottis  Placement verified by: chest auscultation and capnometry   Measured from: lips  Number of attempts at approach: 1  Ventilation between attempts: none  Number of other approaches attempted: 0

## 2025-03-05 NOTE — OP NOTE
CHOLECYSTECTOMY, LAPAROSCOPIC Operative Note     Date: 3/3/2025 - 3/5/2025  OR Location: [facility]    Name: Colin Leonard, : 1965, Age: 59 y.o., MRN: [id number], Sex: male    Diagnosis  Pre-op Diagnosis      * Choledocholithiasis with acute cholecystitis [K80.42]     * Acute calculous cholecystitis [K80.00] Post-op Diagnosis     * Choledocholithiasis with acute cholecystitis [K80.42]     * Acute calculous cholecystitis [K80.00]     Procedures  CHOLECYSTECTOMY, LAPAROSCOPIC  [id number] - [place] LAPAROSCOPY SURG CHOLECYSTECTOMY      Surgeons      * Enoch Zhang V - Primary    Resident/Fellow/Other Assistant:  Surgeons and Role:  * No surgeons found with a matching role *    Staff:   Erinub Person: Nicki  Circulator: Mary  Surgical Assistant: Galen Thomas Person: Paris    Anesthesia Staff: Anesthesiologist: Kade Thompson MD  CRNA: SARAH Reddy-SANYA    Procedure Summary  Anesthesia: Anesthesia type not filed in the log.  ASA: III  Estimated Blood Loss: 20 mL  Intra-op Medications:   Administrations occurring from 1230 to 1435 on 25:   Medication Name Total Dose   lidocaine-epinephrine (Xylocaine W/EPI) 1 %-1:100,000 injection 30 mL   aspirin EC tablet 81 mg Cannot be calculated   dexmedeTOMIDine (Precedex) 100 mcg/mL 2 mL single dose vial 20 mcg   fentaNYL PF 0.05 mg/mL 50 mcg   heparin (porcine) injection 5,000 Units Cannot be calculated   ondansetron 2 mg/mL 4 mg   phenylephrine 100 mcg/mL syringe 10 mL (prefilled) 100 mcg   piperacillin-tazobactam (Zosyn) 2.25 g in dextrose (iso) IV 50 mL Cannot be calculated   sugammadex (Bridion) 200 mg/2 mL injection 200 mg              Anesthesia Record               Intraprocedure I/O Totals       None           Specimen:   ID Type Source Tests Collected by Time   1 : gallbladder Tissue GALLBLADDER CHOLECYSTECTOMY SURGICAL PATHOLOGY EXAM Enoch SOLIS MD 3/5/2025 1304            Findings: Mild acute on chronic cholecystitis     Indications: Colin  NILSA Leonard is an 59 y.o. male who is having surgery for Choledocholithiasis with acute cholecystitis [K80.42]  Acute calculous cholecystitis [K80.00].  He presented to the emergency department 2 days ago with epigastric and right upper quadrant postprandial abdominal pain.  CT scan and ultrasound showed gallstones, and findings concerning for acute cholecystitis.  He was admitted, and started on IV antibiotics.  He was also noted to have worsening of his chronic kidney disease, and mild elevation of his LFTs.  I explained to him the risks and benefits going operating for laparoscopic cholecystectomy.  These risks included the risk of bleeding, infection, injury to the bile ducts, or injury to surrounding structures.  The patient agreed to proceed with the operation.    The patient was seen in the preoperative area. The risks, benefits, complications, treatment options, non-operative alternatives, expected recovery and outcomes were discussed with the patient. The possibilities of reaction to medication, pulmonary aspiration, injury to surrounding structures, bleeding, recurrent infection, the need for additional procedures, failure to diagnose a condition, and creating a complication requiring transfusion or operation were discussed with the patient. The patient concurred with the proposed plan, giving informed consent.  The site of surgery was properly noted/marked if necessary per policy. The patient has been actively warmed in preoperative area. Preoperative antibiotics have been ordered and given within 1 hours of incision. Venous thrombosis prophylaxis have been ordered including bilateral sequential compression devices    Procedure Details:     The patient was taken to the operating room, placed supine on the operating table.  Time-out was performed.  General anesthesia was induced.  He  received antibiotics.  The abdomen was prepped and draped in a sterile fashion.  We made a Supraumbilical midline incision,  "placed a 12 mm Leonard trocar.  We established insufflation. We placed a 5 mm subxiphoid port and then two 5 mm right subcostal ports.  We retracted the gallbladder up and over the right lobe of the liver.  There were attachments of the omentum to the gallbladder suggesting chronic inflammation, these were taken down.  We then retracted the infundibulum laterally and inferiorly, and this nicely exposed the triangle of Calot.  We circumferentially dissected the cystic duct and cystic artery free from surrounding structures, thus exposing the cystic duct and cystic artery along with the lower 1/3 of the gallbladder fossa, thus achieving the \"critical view.\"  The patient also received pre-operative ICG dye injection, and we used the spy function on the laparoscope to help identify the cystic duct, Common Bile Duct, and common hepatic duct during our dissection.  We doubly ligated the cystic artery using clips and divided it.  We then doubly ligated the cystic duct and divided it.  We then excised the gallbladder from the inferior aspect of the liver using the cautery.  Once excised, we removed the gallbladder specimen using an Endo Catch bag.  We re-established insufflation and after ensuring hemostasis, we removed our ports, closed our midline fascial defect using 0-Vicryl, and then closed the skin using 4-0 subcuticular Monocryl stitches followed by Dermabond glue.  The patient tolerated the procedure without difficulty and was returned to the recovery room in stable condition.          Complications:  None; patient tolerated the procedure well.    Disposition: PACU - hemodynamically stable.  Condition: stable         Task Performed by RNFA or Surgical Assistant:  No qualified resident was available to assist.     An assistant was used throughout the case and assisted in retraction, visualization, and improved the flow of the case.    This was a laparoscopic case and the assistant was used for maneuvering the camera " and visualization.    Attending Attestation: I was present and scrubbed for the entire procedure.    Enoch Zhang V  Phone Number: [phone]

## 2025-03-05 NOTE — ANESTHESIA PROCEDURE NOTES
Airway  Date/Time: 3/5/2025 12:23 PM    Staffing  Authorized by: Kade Thompson MD    Performed by: SARAH Reddy-SANYA

## 2025-03-05 NOTE — ANESTHESIA PREPROCEDURE EVALUATION
Patient: Colin Leonard    Procedure Information       Date/Time: 03/05/25 1230    Procedure: CHOLECYSTECTOMY, LAPAROSCOPIC    Location: [facility] / [facility]    Surgeons: Enoch SOLIS MD            Relevant Problems   Cardiac   (+) Benign essential hypertension   (+) CAD (coronary artery disease)   (+) Coronary artery disease involving native coronary artery of native heart without angina pectoris   (+) Mixed hyperlipidemia   (+) NSTEMI (non-ST elevated myocardial infarction) (Multi)      Pulmonary   (+) Shortness of breath on exertion      /Renal   (+) Right renal stone      Liver   (+) Acute calculous cholecystitis   (+) Choledocholithiasis with acute cholecystitis   (+) Elevated LFTs      Endocrine   (+) Obesity   (+) Type 2 diabetes mellitus      Hematology   (+) Anemia      HEENT   (+) Vision loss      ID   (+) Acute hematogenous osteomyelitis of left foot (Multi)   (+) Osteomyelitis of ankle or foot, left, acute (Multi)       Clinical information reviewed:   Tobacco  Allergies  Meds   Med Hx  Surg Hx   Fam Hx  Soc Hx      Took beta blocker today, had appt. 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 : N  Date of Last Liquid: 03/05/25  Time of Last Liquid: 0900 (Sips with meds)  Date of Last Solid: 03/04/25  Time of Last Solid: 1930  Last Intake Type: Clear fluids  Time of Last Void: 1109         Physical Exam    Airway  Mallampati: II  TM distance: >3 FB  Neck ROM: full     Cardiovascular - normal exam     Dental - normal exam     Pulmonary - normal exam     Abdominal - normal exam             Anesthesia Plan    History of general anesthesia?: yes  History of complications of general anesthesia?: no    ASA 3     general   (ETT)  The patient is not a current  smoker.    intravenous induction   Anesthetic plan and risks discussed with patient.    Plan discussed with CRNA and CAA.

## 2025-03-05 NOTE — PROGRESS NOTES
CONSULT PROGRESS NOTES    SERVICE DATE: 3/5/2025   SERVICE TIME: 10:51 AM    CONSULTING SERVICE: Nephrology    ASSESSMENT AND PLAN   59-year-old man with hypertension, diabetes, coronary disease, and advanced CKD admitted with presumed acute cholecystitis.  1.  Chronic kidney disease stage V  2.  Chronic metabolic acidosis  3.  Essential hypertension  4.  Anemia of chronic kidney disease     He has advanced CKD.  This has been stable for years.  I do not believe he is uremic, despite his GI symptoms.  More concerned about symptomatic gallstones given his leukocytosis, history of fever, and pain.  Appreciate surgical consultation and cholecystectomy today.    I would hold his diuretic therapy at present given his GI symptoms and poor oral intake.  No objection to postoperative IV fluids.  Increase the chronic bicarbonate therapy.  Iron stores are low, begin IV iron load and dose with erythropoietin stimulating agent today.  The blood pressure is currently stable.  Trend daily labs, continue supportive care.  I would not give this man any IV iodine contrast.  I will continue to follow him while here.    SUBJECTIVE  INTERVAL HPI: He awaits gallbladder surgery today.  He tolerated a turkey sandwich yesterday with no nausea or vomiting overnight.  He did not have a bowel movement.  He has no fevers.  His friend is at bedside.  He generally feels okay and endorses no new complaints.  He has no dyspnea.    MEDICATIONS:  aspirin, 81 mg, oral, Daily  carvedilol, 25 mg, oral, BID  epoetin [place] or biosimilar, 40,000 Units, subcutaneous, Once  ezetimibe, 10 mg, oral, Nightly  [Held by provider] heparin (porcine), 5,000 Units, subcutaneous, q8h  hydrALAZINE, 100 mg, oral, TID  iron sucrose, 200 mg, intravenous, Daily  piperacillin-tazobactam, 2.25 g, intravenous, q6h  polyethylene glycol, 17 g, oral, Daily  sodium bicarbonate, 1,300 mg, oral, TID  [Held by provider] torsemide, 20 mg, oral, BID       D5 % and 0.9 % sodium  chloride, 75 mL/hr, Last Rate: 75 mL/hr (03/05/25 0108)       PRN medications: melatonin, prochlorperazine **OR** prochlorperazine **OR** prochlorperazine     OBJECTIVE  PHYSICAL EXAM:   Heart Rate:  [67-76]   Temp:  [36.3 °C (97.3 °F)-36.8 °C (98.2 °F)]   Resp:  [17-19]   BP: (140-162)/(68-86)   SpO2:  [98 %-100 %]   Body mass index is 35.51 kg/m².  This is a well-appearing white man, no acute distress  There is no asterixis  No obvious skin rashes  Hearing intact  Phonation intact  Moist mucosa  Normal S1/normal S2  Lungs are clear to auscultation bilaterally  Abdomen is soft, nondistended, nontender, positive bowel sounds  No Elmore catheter in place, no suprapubic tenderness to palpation  No extremity edema  Moves 4 limbs spontaneously  No obvious joint deformities  No lymphadenopathy    DATA:   Labs:  Results for orders placed or performed during the hospital encounter of 03/03/25 (from the past 96 hours)   CBC and Auto Differential   Result Value Ref Range    WBC 14.8 (H) 4.4 - 11.3 x10*3/uL    nRBC 0.0 0.0 - 0.0 /100 WBCs    RBC 3.15 (L) 4.50 - 5.90 x10*6/uL    Hemoglobin 8.9 (L) 13.5 - 17.5 g/dL    Hematocrit 28.4 (L) 41.0 - 52.0 %    MCV 90 80 - 100 fL    MCH 28.3 26.0 - 34.0 pg    MCHC 31.3 (L) 32.0 - 36.0 g/dL    RDW 14.6 (H) 11.5 - 14.5 %    Platelets 262 150 - 450 x10*3/uL    Neutrophils % 91.7 40.0 - 80.0 %    Immature Granulocytes %, Automated 0.5 0.0 - 0.9 %    Lymphocytes % 1.6 13.0 - 44.0 %    Monocytes % 5.8 2.0 - 10.0 %    Eosinophils % 0.3 0.0 - 6.0 %    Basophils % 0.1 0.0 - 2.0 %    Neutrophils Absolute 13.54 (H) 1.20 - 7.70 x10*3/uL    Immature Granulocytes Absolute, Automated 0.07 0.00 - 0.70 x10*3/uL    Lymphocytes Absolute 0.23 (L) 1.20 - 4.80 x10*3/uL    Monocytes Absolute 0.86 0.10 - 1.00 x10*3/uL    Eosinophils Absolute 0.05 0.00 - 0.70 x10*3/uL    Basophils Absolute 0.02 0.00 - 0.10 x10*3/uL   Comprehensive metabolic panel   Result Value Ref Range    Glucose 180 (H) 74 - 99 mg/dL     Sodium 139 136 - 145 mmol/L    Potassium 4.6 3.5 - 5.3 mmol/L    Chloride 112 (H) 98 - 107 mmol/L    Bicarbonate 17 (L) 21 - 32 mmol/L    Anion Gap 15 10 - 20 mmol/L    Urea Nitrogen 80 (H) 6 - 23 mg/dL    Creatinine 6.49 (H) 0.50 - 1.30 mg/dL    eGFR 9 (L) >60 mL/min/1.73m*2    Calcium 9.1 8.6 - 10.3 mg/dL    Albumin 3.7 3.4 - 5.0 g/dL    Alkaline Phosphatase 102 33 - 136 U/L    Total Protein 6.2 (L) 6.4 - 8.2 g/dL     (H) 9 - 39 U/L    Bilirubin, Total 1.0 0.0 - 1.2 mg/dL     (H) 10 - 52 U/L   Lipase   Result Value Ref Range    Lipase 49 9 - 82 U/L   Lactate   Result Value Ref Range    Lactate 0.9 0.4 - 2.0 mmol/L   Magnesium   Result Value Ref Range    Magnesium 1.97 1.60 - 2.40 mg/dL   Phosphorus   Result Value Ref Range    Phosphorus 4.8 2.5 - 4.9 mg/dL   Sars-CoV-2 and Influenza A/B PCR   Result Value Ref Range    Flu A Result Not Detected Not Detected    Flu B Result Not Detected Not Detected    Coronavirus 2019, PCR Not Detected Not Detected   Blood Culture    Specimen: Peripheral Venipuncture; Blood culture   Result Value Ref Range    Blood Culture Loaded on Instrument - Culture in progress    Blood Culture    Specimen: Peripheral Venipuncture; Blood culture   Result Value Ref Range    Blood Culture No growth at 1 day    Urinalysis with Reflex Culture and Microscopic   Result Value Ref Range    Color, Urine Light-Yellow Light-Yellow, Yellow, Dark-Yellow    Appearance, Urine Clear Clear    Specific Gravity, Urine 1.013 1.005 - 1.035    pH, Urine 5.5 5.0, 5.5, 6.0, 6.5, 7.0, 7.5, 8.0    Protein, Urine 100 (2+) (A) NEGATIVE, 10 (TRACE), 20 (TRACE) mg/dL    Glucose, Urine 300 (3+) (A) Normal mg/dL    Blood, Urine NEGATIVE NEGATIVE mg/dL    Ketones, Urine NEGATIVE NEGATIVE mg/dL    Bilirubin, Urine NEGATIVE NEGATIVE mg/dL    Urobilinogen, Urine Normal Normal mg/dL    Nitrite, Urine NEGATIVE NEGATIVE    Leukocyte Esterase, Urine NEGATIVE NEGATIVE   Extra Urine Gray Tube   Result Value Ref Range     Extra Tube Hold for add-ons.    Urinalysis Microscopic   Result Value Ref Range    WBC, Urine 1-5 1-5, NONE /HPF    RBC, Urine NONE NONE, 1-2, 3-5 /HPF    Bacteria, Urine 1+ (A) NONE SEEN /HPF    Mucus, Urine FEW Reference range not established. /LPF   CBC   Result Value Ref Range    WBC 21.0 (H) 4.4 - 11.3 x10*3/uL    nRBC 0.0 0.0 - 0.0 /100 WBCs    RBC 2.76 (L) 4.50 - 5.90 x10*6/uL    Hemoglobin 7.8 (L) 13.5 - 17.5 g/dL    Hematocrit 24.5 (L) 41.0 - 52.0 %    MCV 89 80 - 100 fL    MCH 28.3 26.0 - 34.0 pg    MCHC 31.8 (L) 32.0 - 36.0 g/dL    RDW 14.8 (H) 11.5 - 14.5 %    Platelets 254 150 - 450 x10*3/uL   Basic metabolic panel   Result Value Ref Range    Glucose 118 (H) 74 - 99 mg/dL    Sodium 138 136 - 145 mmol/L    Potassium 5.0 3.5 - 5.3 mmol/L    Chloride 114 (H) 98 - 107 mmol/L    Bicarbonate 16 (L) 21 - 32 mmol/L    Anion Gap 13 10 - 20 mmol/L    Urea Nitrogen 73 (H) 6 - 23 mg/dL    Creatinine 6.56 (H) 0.50 - 1.30 mg/dL    eGFR 9 (L) >60 mL/min/1.73m*2    Calcium 8.6 8.6 - 10.3 mg/dL   Ferritin   Result Value Ref Range    Ferritin 109 20 - 300 ng/mL   Iron and TIBC   Result Value Ref Range    Iron 19 (L) 35 - 150 ug/dL    UIBC 228 110 - 370 ug/dL    TIBC 247 240 - 445 ug/dL    % Saturation 8 (L) 25 - 45 %   Hemoglobin A1c   Result Value Ref Range    Hemoglobin A1C 5.4 See comment %    Estimated Average Glucose 108 Not Established mg/dL   Hepatic function panel   Result Value Ref Range    Albumin 3.5 3.4 - 5.0 g/dL    Bilirubin, Total 0.5 0.0 - 1.2 mg/dL    Bilirubin, Direct 0.1 0.0 - 0.3 mg/dL    Alkaline Phosphatase 88 33 - 136 U/L     (H) 10 - 52 U/L     (H) 9 - 39 U/L    Total Protein 5.7 (L) 6.4 - 8.2 g/dL   POCT GLUCOSE   Result Value Ref Range    POCT Glucose 95 74 - 99 mg/dL   Type and screen   Result Value Ref Range    ABO TYPE O     Rh TYPE POS     ANTIBODY SCREEN NEG    POCT GLUCOSE   Result Value Ref Range    POCT Glucose 92 74 - 99 mg/dL   POCT GLUCOSE   Result Value Ref Range     POCT Glucose 181 (H) 74 - 99 mg/dL   Comprehensive Metabolic Panel   Result Value Ref Range    Glucose 107 (H) 74 - 99 mg/dL    Sodium 138 136 - 145 mmol/L    Potassium 5.0 3.5 - 5.3 mmol/L    Chloride 113 (H) 98 - 107 mmol/L    Bicarbonate 15 (L) 21 - 32 mmol/L    Anion Gap 15 10 - 20 mmol/L    Urea Nitrogen 77 (H) 6 - 23 mg/dL    Creatinine 6.61 (H) 0.50 - 1.30 mg/dL    eGFR 9 (L) >60 mL/min/1.73m*2    Calcium 8.5 (L) 8.6 - 10.3 mg/dL    Albumin 3.3 (L) 3.4 - 5.0 g/dL    Alkaline Phosphatase 71 33 - 136 U/L    Total Protein 6.0 (L) 6.4 - 8.2 g/dL    AST 33 9 - 39 U/L    Bilirubin, Total 0.4 0.0 - 1.2 mg/dL     (H) 10 - 52 U/L   Phosphorus   Result Value Ref Range    Phosphorus 4.9 2.5 - 4.9 mg/dL   CBC and Auto Differential   Result Value Ref Range    WBC 12.0 (H) 4.4 - 11.3 x10*3/uL    nRBC 0.0 0.0 - 0.0 /100 WBCs    RBC 2.59 (L) 4.50 - 5.90 x10*6/uL    Hemoglobin 7.4 (L) 13.5 - 17.5 g/dL    Hematocrit 23.5 (L) 41.0 - 52.0 %    MCV 91 80 - 100 fL    MCH 28.6 26.0 - 34.0 pg    MCHC 31.5 (L) 32.0 - 36.0 g/dL    RDW 14.8 (H) 11.5 - 14.5 %    Platelets 240 150 - 450 x10*3/uL    Neutrophils % 77.2 40.0 - 80.0 %    Immature Granulocytes %, Automated 0.7 0.0 - 0.9 %    Lymphocytes % 10.7 13.0 - 44.0 %    Monocytes % 6.5 2.0 - 10.0 %    Eosinophils % 4.6 0.0 - 6.0 %    Basophils % 0.3 0.0 - 2.0 %    Neutrophils Absolute 9.23 (H) 1.20 - 7.70 x10*3/uL    Immature Granulocytes Absolute, Automated 0.08 0.00 - 0.70 x10*3/uL    Lymphocytes Absolute 1.28 1.20 - 4.80 x10*3/uL    Monocytes Absolute 0.78 0.10 - 1.00 x10*3/uL    Eosinophils Absolute 0.55 0.00 - 0.70 x10*3/uL    Basophils Absolute 0.04 0.00 - 0.10 x10*3/uL   POCT GLUCOSE   Result Value Ref Range    POCT Glucose 105 (H) 74 - 99 mg/dL         SIGNATURE: Boo Max MD PATIENT NAME: Colin Leonard   DATE: March 5, 2025 MRN: [id number]   TIME: 10:51 AM PAGER: [phone]

## 2025-03-06 LAB
ALBUMIN SERPL BCP-MCNC: 3.7 G/DL (ref 3.4–5)
ALP SERPL-CCNC: 67 U/L (ref 33–136)
ALT SERPL W P-5'-P-CCNC: 70 U/L (ref 10–52)
ANION GAP SERPL CALCULATED.3IONS-SCNC: 17 MMOL/L (ref 10–20)
AST SERPL W P-5'-P-CCNC: 18 U/L (ref 9–39)
BASOPHILS # BLD AUTO: 0.02 X10*3/UL (ref 0–0.1)
BASOPHILS NFR BLD AUTO: 0.2 %
BILIRUB SERPL-MCNC: 0.5 MG/DL (ref 0–1.2)
BUN SERPL-MCNC: 74 MG/DL (ref 6–23)
CALCIUM SERPL-MCNC: 9 MG/DL (ref 8.6–10.3)
CHLORIDE SERPL-SCNC: 109 MMOL/L (ref 98–107)
CO2 SERPL-SCNC: 16 MMOL/L (ref 21–32)
CREAT SERPL-MCNC: 6.84 MG/DL (ref 0.5–1.3)
EGFRCR SERPLBLD CKD-EPI 2021: 9 ML/MIN/1.73M*2
EOSINOPHIL # BLD AUTO: 0 X10*3/UL (ref 0–0.7)
EOSINOPHIL NFR BLD AUTO: 0 %
ERYTHROCYTE [DISTWIDTH] IN BLOOD BY AUTOMATED COUNT: 14.9 % (ref 11.5–14.5)
GLUCOSE SERPL-MCNC: 160 MG/DL (ref 74–99)
HCT VFR BLD AUTO: 27 % (ref 41–52)
HGB BLD-MCNC: 8.4 G/DL (ref 13.5–17.5)
IMM GRANULOCYTES # BLD AUTO: 0.1 X10*3/UL (ref 0–0.7)
IMM GRANULOCYTES NFR BLD AUTO: 0.8 % (ref 0–0.9)
LYMPHOCYTES # BLD AUTO: 0.56 X10*3/UL (ref 1.2–4.8)
LYMPHOCYTES NFR BLD AUTO: 4.7 %
MCH RBC QN AUTO: 28.5 PG (ref 26–34)
MCHC RBC AUTO-ENTMCNC: 31.1 G/DL (ref 32–36)
MCV RBC AUTO: 92 FL (ref 80–100)
MONOCYTES # BLD AUTO: 0.65 X10*3/UL (ref 0.1–1)
MONOCYTES NFR BLD AUTO: 5.5 %
NEUTROPHILS # BLD AUTO: 10.51 X10*3/UL (ref 1.2–7.7)
NEUTROPHILS NFR BLD AUTO: 88.8 %
NRBC BLD-RTO: 0 /100 WBCS (ref 0–0)
PHOSPHATE SERPL-MCNC: 4.6 MG/DL (ref 2.5–4.9)
PLATELET # BLD AUTO: 252 X10*3/UL (ref 150–450)
POTASSIUM SERPL-SCNC: 4.7 MMOL/L (ref 3.5–5.3)
PROT SERPL-MCNC: 6.6 G/DL (ref 6.4–8.2)
RBC # BLD AUTO: 2.95 X10*6/UL (ref 4.5–5.9)
SODIUM SERPL-SCNC: 137 MMOL/L (ref 136–145)
WBC # BLD AUTO: 11.8 X10*3/UL (ref 4.4–11.3)

## 2025-03-06 PROCEDURE — 99232 SBSQ HOSP IP/OBS MODERATE 35: CPT | Performed by: INTERNAL MEDICINE

## 2025-03-06 PROCEDURE — 2500000005 HC RX 250 GENERAL PHARMACY W/O HCPCS: Performed by: SURGERY

## 2025-03-06 PROCEDURE — 2500000001 HC RX 250 WO HCPCS SELF ADMINISTERED DRUGS (ALT 637 FOR MEDICARE OP): Performed by: SURGERY

## 2025-03-06 PROCEDURE — 2500000004 HC RX 250 GENERAL PHARMACY W/ HCPCS (ALT 636 FOR OP/ED): Performed by: INTERNAL MEDICINE

## 2025-03-06 PROCEDURE — 85025 COMPLETE CBC W/AUTO DIFF WBC: CPT | Performed by: SURGERY

## 2025-03-06 PROCEDURE — 2500000002 HC RX 250 W HCPCS SELF ADMINISTERED DRUGS (ALT 637 FOR MEDICARE OP, ALT 636 FOR OP/ED): Performed by: SURGERY

## 2025-03-06 PROCEDURE — 2500000001 HC RX 250 WO HCPCS SELF ADMINISTERED DRUGS (ALT 637 FOR MEDICARE OP): Performed by: PHYSICIAN ASSISTANT

## 2025-03-06 PROCEDURE — 84100 ASSAY OF PHOSPHORUS: CPT | Performed by: SURGERY

## 2025-03-06 PROCEDURE — 36415 COLL VENOUS BLD VENIPUNCTURE: CPT | Performed by: SURGERY

## 2025-03-06 PROCEDURE — 80053 COMPREHEN METABOLIC PANEL: CPT | Performed by: SURGERY

## 2025-03-06 PROCEDURE — 2500000004 HC RX 250 GENERAL PHARMACY W/ HCPCS (ALT 636 FOR OP/ED): Performed by: SURGERY

## 2025-03-06 PROCEDURE — 1100000001 HC PRIVATE ROOM DAILY

## 2025-03-06 RX ORDER — ACETAMINOPHEN 325 MG/1
650 TABLET ORAL EVERY 6 HOURS
Status: DISCONTINUED | OUTPATIENT
Start: 2025-03-06 | End: 2025-03-09 | Stop reason: HOSPADM

## 2025-03-06 RX ORDER — ACETAMINOPHEN 650 MG/1
650 SUPPOSITORY RECTAL EVERY 6 HOURS
Status: DISCONTINUED | OUTPATIENT
Start: 2025-03-06 | End: 2025-03-09 | Stop reason: HOSPADM

## 2025-03-06 RX ORDER — METHOCARBAMOL 500 MG/1
500 TABLET, FILM COATED ORAL EVERY 8 HOURS SCHEDULED
Status: DISCONTINUED | OUTPATIENT
Start: 2025-03-06 | End: 2025-03-09 | Stop reason: HOSPADM

## 2025-03-06 RX ORDER — ACETAMINOPHEN 160 MG/5ML
650 SOLUTION ORAL EVERY 6 HOURS
Status: DISCONTINUED | OUTPATIENT
Start: 2025-03-06 | End: 2025-03-09 | Stop reason: HOSPADM

## 2025-03-06 RX ADMIN — Medication 3 MG: at 20:38

## 2025-03-06 RX ADMIN — ACETAMINOPHEN 650 MG: 325 TABLET ORAL at 14:13

## 2025-03-06 RX ADMIN — MORPHINE SULFATE 2 MG: 2 INJECTION, SOLUTION INTRAMUSCULAR; INTRAVENOUS at 02:08

## 2025-03-06 RX ADMIN — MORPHINE SULFATE 2 MG: 2 INJECTION, SOLUTION INTRAMUSCULAR; INTRAVENOUS at 06:12

## 2025-03-06 RX ADMIN — SODIUM BICARBONATE 1300 MG: 650 TABLET ORAL at 20:39

## 2025-03-06 RX ADMIN — HYDRALAZINE HYDROCHLORIDE 100 MG: 50 TABLET ORAL at 20:38

## 2025-03-06 RX ADMIN — HYDRALAZINE HYDROCHLORIDE 5 MG: 20 INJECTION INTRAMUSCULAR; INTRAVENOUS at 00:48

## 2025-03-06 RX ADMIN — OXYCODONE HYDROCHLORIDE 5 MG: 5 TABLET ORAL at 08:11

## 2025-03-06 RX ADMIN — CARVEDILOL 25 MG: 25 TABLET, FILM COATED ORAL at 16:37

## 2025-03-06 RX ADMIN — SODIUM BICARBONATE 1300 MG: 650 TABLET ORAL at 14:14

## 2025-03-06 RX ADMIN — PIPERACILLIN SODIUM AND TAZOBACTAM SODIUM 2.25 G: 2; .25 INJECTION, SOLUTION INTRAVENOUS at 20:38

## 2025-03-06 RX ADMIN — PIPERACILLIN SODIUM AND TAZOBACTAM SODIUM 2.25 G: 2; .25 INJECTION, SOLUTION INTRAVENOUS at 08:13

## 2025-03-06 RX ADMIN — PIPERACILLIN SODIUM AND TAZOBACTAM SODIUM 2.25 G: 2; .25 INJECTION, SOLUTION INTRAVENOUS at 02:54

## 2025-03-06 RX ADMIN — SODIUM BICARBONATE 1300 MG: 650 TABLET ORAL at 08:12

## 2025-03-06 RX ADMIN — CARVEDILOL 25 MG: 25 TABLET, FILM COATED ORAL at 08:12

## 2025-03-06 RX ADMIN — PIPERACILLIN SODIUM AND TAZOBACTAM SODIUM 2.25 G: 2; .25 INJECTION, SOLUTION INTRAVENOUS at 14:14

## 2025-03-06 RX ADMIN — EZETIMIBE 10 MG: 10 TABLET ORAL at 20:39

## 2025-03-06 RX ADMIN — ASPIRIN 81 MG: 81 TABLET, COATED ORAL at 08:12

## 2025-03-06 RX ADMIN — HYDRALAZINE HYDROCHLORIDE 100 MG: 50 TABLET ORAL at 08:13

## 2025-03-06 RX ADMIN — HYDRALAZINE HYDROCHLORIDE 100 MG: 50 TABLET ORAL at 14:14

## 2025-03-06 RX ADMIN — ACETAMINOPHEN 650 MG: 325 TABLET ORAL at 20:39

## 2025-03-06 RX ADMIN — POLYETHYLENE GLYCOL 3350 17 G: 17 POWDER, FOR SOLUTION ORAL at 08:13

## 2025-03-06 RX ADMIN — OXYCODONE HYDROCHLORIDE 5 MG: 5 TABLET ORAL at 00:31

## 2025-03-06 RX ADMIN — IRON SUCROSE 200 MG: 20 INJECTION, SOLUTION INTRAVENOUS at 06:11

## 2025-03-06 ASSESSMENT — PAIN SCALES - GENERAL
PAINLEVEL_OUTOF10: 7
PAINLEVEL_OUTOF10: 1
PAINLEVEL_OUTOF10: 7
PAINLEVEL_OUTOF10: 4
PAINLEVEL_OUTOF10: 7
PAINLEVEL_OUTOF10: 0 - NO PAIN
PAINLEVEL_OUTOF10: 5 - MODERATE PAIN
PAINLEVEL_OUTOF10: 0 - NO PAIN
PAIN_LEVEL: 3
PAINLEVEL_OUTOF10: 6
PAINLEVEL_OUTOF10: 0 - NO PAIN
PAINLEVEL_OUTOF10: 6
PAINLEVEL_OUTOF10: 6

## 2025-03-06 ASSESSMENT — COGNITIVE AND FUNCTIONAL STATUS - GENERAL
DAILY ACTIVITIY SCORE: 24
HELP NEEDED FOR BATHING: A LITTLE
CLIMB 3 TO 5 STEPS WITH RAILING: A LITTLE
CLIMB 3 TO 5 STEPS WITH RAILING: A LITTLE
DAILY ACTIVITIY SCORE: 23
HELP NEEDED FOR BATHING: A LITTLE
DAILY ACTIVITIY SCORE: 23
MOBILITY SCORE: 24
MOBILITY SCORE: 23
MOBILITY SCORE: 23

## 2025-03-06 ASSESSMENT — PAIN - FUNCTIONAL ASSESSMENT
PAIN_FUNCTIONAL_ASSESSMENT: 0-10
PAIN_FUNCTIONAL_ASSESSMENT: FLACC (FACE, LEGS, ACTIVITY, CRY, CONSOLABILITY)
PAIN_FUNCTIONAL_ASSESSMENT: 0-10
PAIN_FUNCTIONAL_ASSESSMENT: FLACC (FACE, LEGS, ACTIVITY, CRY, CONSOLABILITY)
PAIN_FUNCTIONAL_ASSESSMENT: 0-10

## 2025-03-06 ASSESSMENT — PAIN DESCRIPTION - DESCRIPTORS
DESCRIPTORS: ACHING;DULL
DESCRIPTORS: ACHING;DULL
DESCRIPTORS: ACHING;TIGHTNESS
DESCRIPTORS: ACHING;DULL
DESCRIPTORS: ACHING;DULL

## 2025-03-06 ASSESSMENT — PAIN SCALES - WONG BAKER
WONGBAKER_NUMERICALRESPONSE: HURTS LITTLE MORE
WONGBAKER_NUMERICALRESPONSE: HURTS LITTLE BIT

## 2025-03-06 ASSESSMENT — PAIN DESCRIPTION - LOCATION: LOCATION: ABDOMEN

## 2025-03-06 NOTE — PROGRESS NOTES
CONSULT PROGRESS NOTES    SERVICE DATE: 3/6/2025   SERVICE TIME: 1:07 PM    CONSULTING SERVICE: Nephrology    ASSESSMENT AND PLAN   59-year-old man with hypertension, diabetes, coronary disease, and advanced CKD admitted with presumed acute cholecystitis.  1.  Chronic kidney disease stage V  2.  Chronic metabolic acidosis  3.  Essential hypertension  4.  Anemia of chronic kidney disease     He has advanced CKD.  This has been stable for years.  I do not believe he is uremic, despite his GI symptoms.  Status post cholecystectomy yesterday.    I would hold his diuretic therapy at present given his GI symptoms and poor oral intake.    Increase the chronic bicarbonate therapy.  Iron stores are low, he is on IV iron load and dose with erythropoietin stimulating agent on 3/5.  The blood pressure is currently stable.  Trend daily labs, continue supportive care.  Case discussed with general surgery.  I will continue to follow him while here.  There should be no objection to discharge from my perspective.    SUBJECTIVE  INTERVAL HPI: He had considerable pain last night.  He had a bite of a turkey sandwich.  He ate some eggs and turkey sausage this morning.  He is taking a laxative to try to move his bowels.  His pain is more well-controlled.  He has no dyspnea.    MEDICATIONS:  aspirin, 81 mg, oral, Daily  carvedilol, 25 mg, oral, BID  ezetimibe, 10 mg, oral, Nightly  [Held by provider] heparin (porcine), 5,000 Units, subcutaneous, q8h  hydrALAZINE, 100 mg, oral, TID  iron sucrose, 200 mg, intravenous, Daily  piperacillin-tazobactam, 2.25 g, intravenous, q6h  polyethylene glycol, 17 g, oral, Daily  sodium bicarbonate, 1,300 mg, oral, TID  [Held by provider] torsemide, 20 mg, oral, BID             PRN medications: acetaminophen, hydrALAZINE, melatonin, morphine, oxyCODONE, prochlorperazine **OR** prochlorperazine **OR** prochlorperazine     OBJECTIVE  PHYSICAL EXAM:   Heart Rate:  [63-75]   Temp:  [35.9 °C (96.6 °F)-36.7 °C  (98.1 °F)]   Resp:  [16-21]   BP: (111-195)/(62-97)   SpO2:  [93 %-99 %]   Body mass index is 35.51 kg/m².  This is a well-appearing white man, no acute distress  There is no asterixis  No obvious skin rashes  Hearing intact  Phonation intact  Moist mucosa  Normal S1/normal S2  Lungs are clear to auscultation bilaterally  Abdomen is soft, nondistended, nontender, positive bowel sounds  No Elmore catheter in place, no suprapubic tenderness to palpation  No extremity edema  Moves 4 limbs spontaneously  No obvious joint deformities  No lymphadenopathy    DATA:   Labs:  Results for orders placed or performed during the hospital encounter of 03/03/25 (from the past 96 hours)   CBC and Auto Differential   Result Value Ref Range    WBC 14.8 (H) 4.4 - 11.3 x10*3/uL    nRBC 0.0 0.0 - 0.0 /100 WBCs    RBC 3.15 (L) 4.50 - 5.90 x10*6/uL    Hemoglobin 8.9 (L) 13.5 - 17.5 g/dL    Hematocrit 28.4 (L) 41.0 - 52.0 %    MCV 90 80 - 100 fL    MCH 28.3 26.0 - 34.0 pg    MCHC 31.3 (L) 32.0 - 36.0 g/dL    RDW 14.6 (H) 11.5 - 14.5 %    Platelets 262 150 - 450 x10*3/uL    Neutrophils % 91.7 40.0 - 80.0 %    Immature Granulocytes %, Automated 0.5 0.0 - 0.9 %    Lymphocytes % 1.6 13.0 - 44.0 %    Monocytes % 5.8 2.0 - 10.0 %    Eosinophils % 0.3 0.0 - 6.0 %    Basophils % 0.1 0.0 - 2.0 %    Neutrophils Absolute 13.54 (H) 1.20 - 7.70 x10*3/uL    Immature Granulocytes Absolute, Automated 0.07 0.00 - 0.70 x10*3/uL    Lymphocytes Absolute 0.23 (L) 1.20 - 4.80 x10*3/uL    Monocytes Absolute 0.86 0.10 - 1.00 x10*3/uL    Eosinophils Absolute 0.05 0.00 - 0.70 x10*3/uL    Basophils Absolute 0.02 0.00 - 0.10 x10*3/uL   Comprehensive metabolic panel   Result Value Ref Range    Glucose 180 (H) 74 - 99 mg/dL    Sodium 139 136 - 145 mmol/L    Potassium 4.6 3.5 - 5.3 mmol/L    Chloride 112 (H) 98 - 107 mmol/L    Bicarbonate 17 (L) 21 - 32 mmol/L    Anion Gap 15 10 - 20 mmol/L    Urea Nitrogen 80 (H) 6 - 23 mg/dL    Creatinine 6.49 (H) 0.50 - 1.30 mg/dL     eGFR 9 (L) >60 mL/min/1.73m*2    Calcium 9.1 8.6 - 10.3 mg/dL    Albumin 3.7 3.4 - 5.0 g/dL    Alkaline Phosphatase 102 33 - 136 U/L    Total Protein 6.2 (L) 6.4 - 8.2 g/dL     (H) 9 - 39 U/L    Bilirubin, Total 1.0 0.0 - 1.2 mg/dL     (H) 10 - 52 U/L   Lipase   Result Value Ref Range    Lipase 49 9 - 82 U/L   Lactate   Result Value Ref Range    Lactate 0.9 0.4 - 2.0 mmol/L   Magnesium   Result Value Ref Range    Magnesium 1.97 1.60 - 2.40 mg/dL   Phosphorus   Result Value Ref Range    Phosphorus 4.8 2.5 - 4.9 mg/dL   Sars-CoV-2 and Influenza A/B PCR   Result Value Ref Range    Flu A Result Not Detected Not Detected    Flu B Result Not Detected Not Detected    Coronavirus 2019, PCR Not Detected Not Detected   Blood Culture    Specimen: Peripheral Venipuncture; Blood culture   Result Value Ref Range    Blood Culture No growth at 2 days    Blood Culture    Specimen: Peripheral Venipuncture; Blood culture   Result Value Ref Range    Blood Culture No growth at 2 days    Urinalysis with Reflex Culture and Microscopic   Result Value Ref Range    Color, Urine Light-Yellow Light-Yellow, Yellow, Dark-Yellow    Appearance, Urine Clear Clear    Specific Gravity, Urine 1.013 1.005 - 1.035    pH, Urine 5.5 5.0, 5.5, 6.0, 6.5, 7.0, 7.5, 8.0    Protein, Urine 100 (2+) (A) NEGATIVE, 10 (TRACE), 20 (TRACE) mg/dL    Glucose, Urine 300 (3+) (A) Normal mg/dL    Blood, Urine NEGATIVE NEGATIVE mg/dL    Ketones, Urine NEGATIVE NEGATIVE mg/dL    Bilirubin, Urine NEGATIVE NEGATIVE mg/dL    Urobilinogen, Urine Normal Normal mg/dL    Nitrite, Urine NEGATIVE NEGATIVE    Leukocyte Esterase, Urine NEGATIVE NEGATIVE   Extra Urine Gray Tube   Result Value Ref Range    Extra Tube Hold for add-ons.    Urinalysis Microscopic   Result Value Ref Range    WBC, Urine 1-5 1-5, NONE /HPF    RBC, Urine NONE NONE, 1-2, 3-5 /HPF    Bacteria, Urine 1+ (A) NONE SEEN /HPF    Mucus, Urine FEW Reference range not established. /LPF   CBC   Result  Value Ref Range    WBC 21.0 (H) 4.4 - 11.3 x10*3/uL    nRBC 0.0 0.0 - 0.0 /100 WBCs    RBC 2.76 (L) 4.50 - 5.90 x10*6/uL    Hemoglobin 7.8 (L) 13.5 - 17.5 g/dL    Hematocrit 24.5 (L) 41.0 - 52.0 %    MCV 89 80 - 100 fL    MCH 28.3 26.0 - 34.0 pg    MCHC 31.8 (L) 32.0 - 36.0 g/dL    RDW 14.8 (H) 11.5 - 14.5 %    Platelets 254 150 - 450 x10*3/uL   Basic metabolic panel   Result Value Ref Range    Glucose 118 (H) 74 - 99 mg/dL    Sodium 138 136 - 145 mmol/L    Potassium 5.0 3.5 - 5.3 mmol/L    Chloride 114 (H) 98 - 107 mmol/L    Bicarbonate 16 (L) 21 - 32 mmol/L    Anion Gap 13 10 - 20 mmol/L    Urea Nitrogen 73 (H) 6 - 23 mg/dL    Creatinine 6.56 (H) 0.50 - 1.30 mg/dL    eGFR 9 (L) >60 mL/min/1.73m*2    Calcium 8.6 8.6 - 10.3 mg/dL   Ferritin   Result Value Ref Range    Ferritin 109 20 - 300 ng/mL   Iron and TIBC   Result Value Ref Range    Iron 19 (L) 35 - 150 ug/dL    UIBC 228 110 - 370 ug/dL    TIBC 247 240 - 445 ug/dL    % Saturation 8 (L) 25 - 45 %   Hemoglobin A1c   Result Value Ref Range    Hemoglobin A1C 5.4 See comment %    Estimated Average Glucose 108 Not Established mg/dL   Hepatic function panel   Result Value Ref Range    Albumin 3.5 3.4 - 5.0 g/dL    Bilirubin, Total 0.5 0.0 - 1.2 mg/dL    Bilirubin, Direct 0.1 0.0 - 0.3 mg/dL    Alkaline Phosphatase 88 33 - 136 U/L     (H) 10 - 52 U/L     (H) 9 - 39 U/L    Total Protein 5.7 (L) 6.4 - 8.2 g/dL   POCT GLUCOSE   Result Value Ref Range    POCT Glucose 95 74 - 99 mg/dL   Type and screen   Result Value Ref Range    ABO TYPE O     Rh TYPE POS     ANTIBODY SCREEN NEG    POCT GLUCOSE   Result Value Ref Range    POCT Glucose 92 74 - 99 mg/dL   POCT GLUCOSE   Result Value Ref Range    POCT Glucose 181 (H) 74 - 99 mg/dL   Comprehensive Metabolic Panel   Result Value Ref Range    Glucose 107 (H) 74 - 99 mg/dL    Sodium 138 136 - 145 mmol/L    Potassium 5.0 3.5 - 5.3 mmol/L    Chloride 113 (H) 98 - 107 mmol/L    Bicarbonate 15 (L) 21 - 32 mmol/L     Anion Gap 15 10 - 20 mmol/L    Urea Nitrogen 77 (H) 6 - 23 mg/dL    Creatinine 6.61 (H) 0.50 - 1.30 mg/dL    eGFR 9 (L) >60 mL/min/1.73m*2    Calcium 8.5 (L) 8.6 - 10.3 mg/dL    Albumin 3.3 (L) 3.4 - 5.0 g/dL    Alkaline Phosphatase 71 33 - 136 U/L    Total Protein 6.0 (L) 6.4 - 8.2 g/dL    AST 33 9 - 39 U/L    Bilirubin, Total 0.4 0.0 - 1.2 mg/dL     (H) 10 - 52 U/L   Phosphorus   Result Value Ref Range    Phosphorus 4.9 2.5 - 4.9 mg/dL   CBC and Auto Differential   Result Value Ref Range    WBC 12.0 (H) 4.4 - 11.3 x10*3/uL    nRBC 0.0 0.0 - 0.0 /100 WBCs    RBC 2.59 (L) 4.50 - 5.90 x10*6/uL    Hemoglobin 7.4 (L) 13.5 - 17.5 g/dL    Hematocrit 23.5 (L) 41.0 - 52.0 %    MCV 91 80 - 100 fL    MCH 28.6 26.0 - 34.0 pg    MCHC 31.5 (L) 32.0 - 36.0 g/dL    RDW 14.8 (H) 11.5 - 14.5 %    Platelets 240 150 - 450 x10*3/uL    Neutrophils % 77.2 40.0 - 80.0 %    Immature Granulocytes %, Automated 0.7 0.0 - 0.9 %    Lymphocytes % 10.7 13.0 - 44.0 %    Monocytes % 6.5 2.0 - 10.0 %    Eosinophils % 4.6 0.0 - 6.0 %    Basophils % 0.3 0.0 - 2.0 %    Neutrophils Absolute 9.23 (H) 1.20 - 7.70 x10*3/uL    Immature Granulocytes Absolute, Automated 0.08 0.00 - 0.70 x10*3/uL    Lymphocytes Absolute 1.28 1.20 - 4.80 x10*3/uL    Monocytes Absolute 0.78 0.10 - 1.00 x10*3/uL    Eosinophils Absolute 0.55 0.00 - 0.70 x10*3/uL    Basophils Absolute 0.04 0.00 - 0.10 x10*3/uL   POCT GLUCOSE   Result Value Ref Range    POCT Glucose 105 (H) 74 - 99 mg/dL   POCT GLUCOSE   Result Value Ref Range    POCT Glucose 109 (H) 74 - 99 mg/dL   Comprehensive Metabolic Panel   Result Value Ref Range    Glucose 160 (H) 74 - 99 mg/dL    Sodium 137 136 - 145 mmol/L    Potassium 4.7 3.5 - 5.3 mmol/L    Chloride 109 (H) 98 - 107 mmol/L    Bicarbonate 16 (L) 21 - 32 mmol/L    Anion Gap 17 10 - 20 mmol/L    Urea Nitrogen 74 (H) 6 - 23 mg/dL    Creatinine 6.84 (H) 0.50 - 1.30 mg/dL    eGFR 9 (L) >60 mL/min/1.73m*2    Calcium 9.0 8.6 - 10.3 mg/dL    Albumin 3.7  3.4 - 5.0 g/dL    Alkaline Phosphatase 67 33 - 136 U/L    Total Protein 6.6 6.4 - 8.2 g/dL    AST 18 9 - 39 U/L    Bilirubin, Total 0.5 0.0 - 1.2 mg/dL    ALT 70 (H) 10 - 52 U/L   Phosphorus   Result Value Ref Range    Phosphorus 4.6 2.5 - 4.9 mg/dL   CBC and Auto Differential   Result Value Ref Range    WBC 11.8 (H) 4.4 - 11.3 x10*3/uL    nRBC 0.0 0.0 - 0.0 /100 WBCs    RBC 2.95 (L) 4.50 - 5.90 x10*6/uL    Hemoglobin 8.4 (L) 13.5 - 17.5 g/dL    Hematocrit 27.0 (L) 41.0 - 52.0 %    MCV 92 80 - 100 fL    MCH 28.5 26.0 - 34.0 pg    MCHC 31.1 (L) 32.0 - 36.0 g/dL    RDW 14.9 (H) 11.5 - 14.5 %    Platelets 252 150 - 450 x10*3/uL    Neutrophils % 88.8 40.0 - 80.0 %    Immature Granulocytes %, Automated 0.8 0.0 - 0.9 %    Lymphocytes % 4.7 13.0 - 44.0 %    Monocytes % 5.5 2.0 - 10.0 %    Eosinophils % 0.0 0.0 - 6.0 %    Basophils % 0.2 0.0 - 2.0 %    Neutrophils Absolute 10.51 (H) 1.20 - 7.70 x10*3/uL    Immature Granulocytes Absolute, Automated 0.10 0.00 - 0.70 x10*3/uL    Lymphocytes Absolute 0.56 (L) 1.20 - 4.80 x10*3/uL    Monocytes Absolute 0.65 0.10 - 1.00 x10*3/uL    Eosinophils Absolute 0.00 0.00 - 0.70 x10*3/uL    Basophils Absolute 0.02 0.00 - 0.10 x10*3/uL         SIGNATURE: Boo Max MD PATIENT NAME: Colin Leonard   DATE: March 6, 2025 MRN: [id number]   TIME: 1:07 PM PAGER: [phone]

## 2025-03-06 NOTE — CARE PLAN
Problem: Pain - Adult  Goal: Verbalizes/displays adequate comfort level or baseline comfort level  Outcome: Progressing     Problem: Safety - Adult  Goal: Free from fall injury  Outcome: Progressing     Problem: Discharge Planning  Goal: Discharge to home or other facility with appropriate resources  Outcome: Progressing     Problem: Chronic Conditions and Co-morbidities  Goal: Patient's chronic conditions and co-morbidity symptoms are monitored and maintained or improved  Outcome: Progressing     Problem: Nutrition  Goal: Nutrient intake appropriate for maintaining nutritional needs  Outcome: Progressing     Problem: Pain  Goal: Takes deep breaths with improved pain control throughout the shift  Outcome: Progressing  Goal: Turns in bed with improved pain control throughout the shift  Outcome: Progressing  Goal: Walks with improved pain control throughout the shift  Outcome: Progressing  Goal: Performs ADL's with improved pain control throughout shift  Outcome: Progressing  Goal: Free from opioid side effects throughout the shift  Outcome: Progressing     Problem: Diabetes  Goal: Achieve decreasing blood glucose levels by end of shift  Outcome: Progressing  Goal: Increase stability of blood glucose readings by end of shift  Outcome: Progressing  Goal: Decrease in ketones present in urine by end of shift  Outcome: Progressing  Goal: Maintain electrolyte levels within acceptable range throughout shift  Outcome: Progressing  Goal: Maintain glucose levels >70mg/dl to <250mg/dl throughout shift  Outcome: Progressing  Goal: No changes in neurological exam by end of shift  Outcome: Progressing  Goal: Learn about and adhere to nutrition recommendations by end of shift  Outcome: Progressing  Goal: Vital signs within normal range for age by end of shift  Outcome: Progressing  Goal: Increase self care and/or family involovement by end of shift  Outcome: Progressing  Goal: Receive DSME education by end of shift  Outcome:  Progressing     Problem: Fall/Injury  Goal: Not fall by end of shift  Outcome: Progressing  Goal: Be free from injury by end of the shift  Outcome: Progressing  Goal: Verbalize understanding of personal risk factors for fall in the hospital  Outcome: Progressing  Goal: Verbalize understanding of risk factor reduction measures to prevent injury from fall in the home  Outcome: Progressing  Goal: Use assistive devices by end of the shift  Outcome: Progressing  Goal: Pace activities to prevent fatigue by end of the shift  Outcome: Progressing   The patient's goals for the shift include      The clinical goals for the shift include manage pain    Over the shift, the patient did make progress toward the goals.

## 2025-03-06 NOTE — NURSING NOTE
Bp elevated oral hydralazine and pain med to be given and recheck bp prior to iv hydralazine dr clark notified

## 2025-03-06 NOTE — ANESTHESIA POSTPROCEDURE EVALUATION
Patient: Colin Leonard    Procedure Summary       Date: 03/05/25 Room / Location: [facility] OR 11 / Virtual [facility] OR    Anesthesia Start: 1211 Anesthesia Stop: 1333    Procedure: CHOLECYSTECTOMY, LAPAROSCOPIC Diagnosis:       Choledocholithiasis with acute cholecystitis      Acute calculous cholecystitis      (Choledocholithiasis with acute cholecystitis [K80.42])      (Acute calculous cholecystitis [K80.00])    Surgeons: Enoch SOLIS MD Responsible Provider: Kade Thompson MD    Anesthesia Type: general ASA Status: 3            Anesthesia Type: general    Vitals Value Taken Time   /97 03/05/25 1417   Temp 35.9 °C (96.6 °F) 03/05/25 1333   Pulse 68 03/05/25 1420   Resp 17 03/05/25 1420   SpO2 90 % 03/05/25 1420   Vitals shown include unfiled device data.    Anesthesia Post Evaluation    Patient location during evaluation: bedside  Patient participation: complete - patient participated  Level of consciousness: awake  Pain score: 3  Pain management: adequate  Multimodal analgesia pain management approach  Airway patency: patent  Two or more strategies used to mitigate risk of obstructive sleep apnea  Cardiovascular status: acceptable  Respiratory status: acceptable  Hydration status: acceptable  Postoperative Nausea and Vomiting: none        No notable events documented.

## 2025-03-06 NOTE — PROGRESS NOTES
Pt anticipated to dc home without skilled needs.     03/06/25 1202   Discharge Planning   Expected Discharge Disposition Home

## 2025-03-06 NOTE — DISCHARGE INSTRUCTIONS
Keep incisions clean and dry.  You have dissolvable stitches and waterproof glue over top.  You can shower and let soap and water run over the incisions.  No soaking in a tub or pool until seen in clinic for follow-up.  The glue will fall off in the next 1 to 2 weeks.    Take the pain medication as prescribed.  You should take Tylenol first and if still having pain, then use the medications that were prescribed.  You can use a heat or an ice pack over the incisions for additional relief.    No lifting more than 10 pounds.  This will prevent a hernia from forming at the incisions.

## 2025-03-06 NOTE — PROGRESS NOTES
"Colin Leonard is a 59 y.o. male on day 2 of admission presenting with Choledocholithiasis with acute cholecystitis.    Subjective   Patient sitting up in bed. States he is still requiring a lot of pain medication for control of pain. Is tolerating diet. No BM yet though passing flatus. Would like to stay one more night to get better control of pain.        Objective     Physical Exam  HENT:      Head: Atraumatic.      Nose: Nose normal.      Mouth/Throat:      Mouth: Mucous membranes are moist.   Eyes:      Extraocular Movements: Extraocular movements intact.   Cardiovascular:      Rate and Rhythm: Normal rate.   Pulmonary:      Effort: Pulmonary effort is normal. No respiratory distress.   Abdominal:      Palpations: Abdomen is soft.      Tenderness: There is abdominal tenderness.      Comments: Surgical incisions CDI without sxs infection, appropriately tender      Musculoskeletal:         General: Normal range of motion.      Cervical back: Normal range of motion.   Skin:     General: Skin is warm and dry.   Neurological:      General: No focal deficit present.      Mental Status: He is alert and oriented to person, place, and time.   Psychiatric:         Mood and Affect: Mood normal.         Behavior: Behavior normal.         Last Recorded Vitals  Blood pressure 171/76, pulse 74, temperature 36.1 °C (97 °F), temperature source Oral, resp. rate 17, height 1.803 m (5' 11\"), weight 115 kg (254 lb 10.1 oz), SpO2 98%.  Intake/Output last 3 Shifts:  I/O last 3 completed shifts:  In: 686.3 (5.9 mL/kg) [P.O.:30; I.V.:486.3 (4.2 mL/kg); IV Piggyback:170]  Out: 2860 (24.8 mL/kg) [Urine:2860 (0.7 mL/kg/hr)]  Weight: 115.5 kg     Relevant Results               Lab Results   Component Value Date    WBC 11.8 (H) 03/06/2025    HGB 8.4 (L) 03/06/2025    HCT 27.0 (L) 03/06/2025    MCV 92 03/06/2025     03/06/2025       Lab Results   Component Value Date    GLUCOSE 160 (H) 03/06/2025    CALCIUM 9.0 03/06/2025     " 03/06/2025    K 4.7 03/06/2025    CO2 16 (L) 03/06/2025     (H) 03/06/2025    BUN 74 (H) 03/06/2025    CREATININE 6.84 (H) 03/06/2025                      Assessment/Plan   Assessment & Plan  Benign essential hypertension    Stage 5 chronic kidney disease not on chronic dialysis (Multi)    Type 2 diabetes mellitus    Acute calculous cholecystitis      CAD (coronary artery disease)      Anemia      Leukocytosis      Right renal stone      Elevated LFTs      Encounter for deep vein thrombosis (DVT) prophylaxis    3/6: POD 1 laparoscopic cholecystectomy. Patient progressing. Would like better pain control before dc. Changed to scheduled tylenol as well as added robaxin. Continue PRN medications. Plan for dc tomorrow per primary team. Should follow up in our office in 2 weeks. Discussed with Dr. Zhang.        I spent 30 minutes in the professional and overall care of this patient.      Ayde Luna PA-C

## 2025-03-06 NOTE — CARE PLAN
The patient's goals for the shift include      The clinical goals for the shift include manage pain    Over the shift, the patient did not make progress toward the following goals. Barriers to progression include pain control. Recommendations to address these barriers include pain.

## 2025-03-06 NOTE — NURSING NOTE
Pt refusing to walk halls. Friend at bedside upset with all the spam calls coming to room on house phone.  Enc pt to turn phone off

## 2025-03-06 NOTE — NURSING NOTE
Rounded on pt. Pt laying in bed, eyes closed, respirations even and unlabored. Pt appears to be sleeping and in no apparent pain or distress. Dressing c/d/intact, puncture sites unchanged since initial assessment. Besides persistent pain, uneventful night with no changes since prior assessment. Call bell and belongings are placed in reach. Telemetry leads connected and reading on monitor. Bed alarm refused. Continuing to monitor.

## 2025-03-06 NOTE — PROGRESS NOTES
Colin Leonard is a 59 y.o. male on day 2 of admission presenting with Choledocholithiasis with acute cholecystitis.      Subjective   Patient complain of abdominal pain.  He had laparoscopic cholecystectomy on 3/5/2025  Diet was introduced today.       Objective     Last Recorded Vitals  /69 (BP Location: Right arm, Patient Position: Lying)   Pulse 75   Temp 36.4 °C (97.5 °F) (Oral)   Resp 18   Wt 115 kg (254 lb 10.1 oz)   SpO2 97%   Intake/Output last 3 Shifts:    Intake/Output Summary (Last 24 hours) at 3/6/2025 0923  Last data filed at 3/6/2025 0853  Gross per 24 hour   Intake 610 ml   Output 2200 ml   Net -1590 ml       Admission Weight  Weight: 113 kg (250 lb) (03/03/25 2144)    Daily Weight  03/04/25 : 115 kg (254 lb 10.1 oz)    Image Results  US right upper quadrant  Narrative: Interpreted By:  Miguelina Brooke,   STUDY:  US RIGHT UPPER QUADRANT;  3/4/2025 8:32 am      INDICATION:  Signs/Symptoms:eval for acute cholecystitis.      COMPARISON:  CT abdomen and pelvis 03/03/2025      ACCESSION NUMBER(S):  [id number]      ORDERING CLINICIAN:  WAQAR CRENSHAW      TECHNIQUE:  Multiple images of the abdomen were obtained.      FINDINGS:  LIVER:  The echogenicity of the liver is within normal limits.  There is no hepatic mass.  The sagittal dimension of the right lobe of the liver is 17.8 cm,  nonenlarged.      GALLBLADDER:  The gallbladder is nondistended.  The gallbladder wall is not thickened.  There are small gravel-like gallstones in the proximal body of the  gallbladder. There is no sludge.  There is no pericholecystic fluid.  There is no sonographic Thompson's sign      BILE DUCTS:  There is no intrahepatic biliary dilatation.  The common bile duct is  nondilated measuring 0.2 cm.      PANCREAS:  Only a small part of the body of the pancreas is seen and is  unremarkable. There is obscuration of most of the pancreas secondary  to shadowing from bowel gas.      RIGHT KIDNEY:  The right kidney is   normal in size measuring 11.2 cm in length.      The echogenicity of the cortex is within normal limits.  There is no renal mass.  There is no intrarenal calculus or hydronephrosis.      Impression: Cholelithiasis      MACRO:  None      Signed by: Miguelina Brooke 3/4/2025 8:59 AM  Dictation workstation:   [id number]      Physical Exam    General: In moderate distress, pleasant, cooperating during physical exam.  HEENT: Pupils are equal and reactive to light and commendation , oral mucosa moist, no JVD   Cardiovascular: Normal sinus rhythm, no MRG.  Lungs: Clear to auscultation bilaterally, no wheezing, no crackles, no dullness to percussion.  Abdomen: Positive bowel sounds, no hepatosplenomegaly appreciated, slight tenderness on abdomen .  Neuro: Alert and oriented x3, strength in upper and lower extremities , sensation intact.  Psych: Patient had great insight was going on  Musculoskeletal: No swelling in lower extremities, no limitation in range of motion.  Vascular: Pulses are intact in upper and lower extremities  Skin: No petechiae, ecchymosis or other stigmata for dermatology disease.     Assessment/Plan        Cholelithiasis  Ultrasound of right upper quadrant revealed cholelithiasis.  Status post laparoscopy, postop day 1  Continue with Zosyn  Patient feels comfortable on as needed pain meds  General Surgery on the case  Continue with pain medication as needed    Chronic kidney disease stage V  Patient see Dr. Goldberg in regular basis  Discussed in detail with  Dr. Max   Challenge gently with fluids  Torsemide on hold     Hypertension  Not well-controlled  Continue with current medication.  Patient is on IV hydralazine as needed for systolic blood pressure more than 180 mmhg     Diabetes mellitus type 2  The patient is not on any medication, diet control   hemoglobin A1c 5.4     Leukocytosis improved  Secondary to acute cholecystitis.  Cover with antibiotics  Check CBC and CMP in AM.     Anemia of  chronic disease  Monitor close  Transfuse for hemoglobin less than 7     History of kidney stone     Continue with pain medication as needed  Monitor close  Check CBC and CMP in AM.  Waiting for consult to see him today.  I expect the patient to get discharge in a.m.     Marie Harris MD

## 2025-03-06 NOTE — NURSING NOTE
Pt. stated that he wanted to sleep and refuses to walk. Pt. educated on the complications of not walking. Pt. states understanding education provided.

## 2025-03-06 NOTE — NURSING NOTE
Rounded on pt. Pt sitting at edge of bed stating it was more comfortable than laying down in bed, alert, aox4. Pt acknowledges deep aching pain about 7/10. I offer pt oxycodone to which he is receptive. Pt requests ginger ale and cranberry juice which I provide. Pt has no other needs or complaints at this time. Dressing over umbilicus c/d/intact. Pt oriented to call bell and it and belongings are placed in reach. Telemetry leads connected and reading on monitor. Bed alarm refused. Continuing to monitor.

## 2025-03-06 NOTE — NURSING NOTE
Took over care of pt at this time. Pt ambulating about bed with walker. Pt's son and brother at bedside. Pt alert, aox4. Four puncture sites including umbilicus closed with sealant, jillian, no drainage except for umbilicus that shows small red ooze of serosanguinous drainage. Mepilex border place over umbilicus. Pt acknowledges abdominal discomfort about 5/10, improved from earlier 10/10 about 1.5 hours ago. Pt denies need for pain intervention at this time and has no other needs or complaints. Bowel sounds present. Pt oriented to call bell and it and belongings are placed in reach. Bed alarm refused. Continuing to monitor.

## 2025-03-07 LAB
ALBUMIN SERPL BCP-MCNC: 3.3 G/DL (ref 3.4–5)
ALP SERPL-CCNC: 54 U/L (ref 33–136)
ALT SERPL W P-5'-P-CCNC: 32 U/L (ref 10–52)
ANION GAP SERPL CALCULATED.3IONS-SCNC: 14 MMOL/L (ref 10–20)
AST SERPL W P-5'-P-CCNC: 11 U/L (ref 9–39)
BILIRUB SERPL-MCNC: 0.4 MG/DL (ref 0–1.2)
BUN SERPL-MCNC: 75 MG/DL (ref 6–23)
CALCIUM SERPL-MCNC: 8.5 MG/DL (ref 8.6–10.3)
CHLORIDE SERPL-SCNC: 108 MMOL/L (ref 98–107)
CO2 SERPL-SCNC: 18 MMOL/L (ref 21–32)
CREAT SERPL-MCNC: 7.28 MG/DL (ref 0.5–1.3)
EGFRCR SERPLBLD CKD-EPI 2021: 8 ML/MIN/1.73M*2
ERYTHROCYTE [DISTWIDTH] IN BLOOD BY AUTOMATED COUNT: 14.9 % (ref 11.5–14.5)
GLUCOSE SERPL-MCNC: 114 MG/DL (ref 74–99)
HCT VFR BLD AUTO: 24 % (ref 41–52)
HGB BLD-MCNC: 7.3 G/DL (ref 13.5–17.5)
MCH RBC QN AUTO: 28 PG (ref 26–34)
MCHC RBC AUTO-ENTMCNC: 30.4 G/DL (ref 32–36)
MCV RBC AUTO: 92 FL (ref 80–100)
NRBC BLD-RTO: 0.2 /100 WBCS (ref 0–0)
PHOSPHATE SERPL-MCNC: 4.6 MG/DL (ref 2.5–4.9)
PLATELET # BLD AUTO: 246 X10*3/UL (ref 150–450)
POTASSIUM SERPL-SCNC: 4.3 MMOL/L (ref 3.5–5.3)
PROT SERPL-MCNC: 6.1 G/DL (ref 6.4–8.2)
RBC # BLD AUTO: 2.61 X10*6/UL (ref 4.5–5.9)
SODIUM SERPL-SCNC: 136 MMOL/L (ref 136–145)
WBC # BLD AUTO: 13.3 X10*3/UL (ref 4.4–11.3)

## 2025-03-07 PROCEDURE — 2500000004 HC RX 250 GENERAL PHARMACY W/ HCPCS (ALT 636 FOR OP/ED): Performed by: INTERNAL MEDICINE

## 2025-03-07 PROCEDURE — 80053 COMPREHEN METABOLIC PANEL: CPT | Performed by: SURGERY

## 2025-03-07 PROCEDURE — 36415 COLL VENOUS BLD VENIPUNCTURE: CPT | Performed by: SURGERY

## 2025-03-07 PROCEDURE — 99232 SBSQ HOSP IP/OBS MODERATE 35: CPT | Performed by: INTERNAL MEDICINE

## 2025-03-07 PROCEDURE — 2500000004 HC RX 250 GENERAL PHARMACY W/ HCPCS (ALT 636 FOR OP/ED): Performed by: SURGERY

## 2025-03-07 PROCEDURE — 2500000002 HC RX 250 W HCPCS SELF ADMINISTERED DRUGS (ALT 637 FOR MEDICARE OP, ALT 636 FOR OP/ED): Performed by: SURGERY

## 2025-03-07 PROCEDURE — 2500000005 HC RX 250 GENERAL PHARMACY W/O HCPCS: Performed by: SURGERY

## 2025-03-07 PROCEDURE — 2500000001 HC RX 250 WO HCPCS SELF ADMINISTERED DRUGS (ALT 637 FOR MEDICARE OP): Performed by: SURGERY

## 2025-03-07 PROCEDURE — 84100 ASSAY OF PHOSPHORUS: CPT | Performed by: SURGERY

## 2025-03-07 PROCEDURE — 2500000001 HC RX 250 WO HCPCS SELF ADMINISTERED DRUGS (ALT 637 FOR MEDICARE OP): Performed by: PHYSICIAN ASSISTANT

## 2025-03-07 PROCEDURE — 1100000001 HC PRIVATE ROOM DAILY

## 2025-03-07 PROCEDURE — 85027 COMPLETE CBC AUTOMATED: CPT | Performed by: SURGERY

## 2025-03-07 RX ORDER — SODIUM CHLORIDE 9 MG/ML
100 INJECTION, SOLUTION INTRAVENOUS CONTINUOUS
Status: DISCONTINUED | OUTPATIENT
Start: 2025-03-07 | End: 2025-03-08

## 2025-03-07 RX ADMIN — HYDRALAZINE HYDROCHLORIDE 100 MG: 50 TABLET ORAL at 08:37

## 2025-03-07 RX ADMIN — PIPERACILLIN SODIUM AND TAZOBACTAM SODIUM 2.25 G: 2; .25 INJECTION, SOLUTION INTRAVENOUS at 23:44

## 2025-03-07 RX ADMIN — ASPIRIN 81 MG: 81 TABLET, COATED ORAL at 08:36

## 2025-03-07 RX ADMIN — Medication 3 MG: at 20:57

## 2025-03-07 RX ADMIN — SODIUM CHLORIDE 100 ML/HR: 900 INJECTION, SOLUTION INTRAVENOUS at 23:44

## 2025-03-07 RX ADMIN — ACETAMINOPHEN 650 MG: 325 TABLET ORAL at 14:39

## 2025-03-07 RX ADMIN — SODIUM BICARBONATE 1300 MG: 650 TABLET ORAL at 20:57

## 2025-03-07 RX ADMIN — PIPERACILLIN SODIUM AND TAZOBACTAM SODIUM 2.25 G: 2; .25 INJECTION, SOLUTION INTRAVENOUS at 16:08

## 2025-03-07 RX ADMIN — ACETAMINOPHEN 650 MG: 325 TABLET ORAL at 08:36

## 2025-03-07 RX ADMIN — EZETIMIBE 10 MG: 10 TABLET ORAL at 20:57

## 2025-03-07 RX ADMIN — HYDRALAZINE HYDROCHLORIDE 100 MG: 50 TABLET ORAL at 14:39

## 2025-03-07 RX ADMIN — SODIUM CHLORIDE 100 ML/HR: 900 INJECTION, SOLUTION INTRAVENOUS at 09:07

## 2025-03-07 RX ADMIN — SODIUM BICARBONATE 1300 MG: 650 TABLET ORAL at 14:39

## 2025-03-07 RX ADMIN — HYDRALAZINE HYDROCHLORIDE 100 MG: 50 TABLET ORAL at 20:56

## 2025-03-07 RX ADMIN — PIPERACILLIN SODIUM AND TAZOBACTAM SODIUM 2.25 G: 2; .25 INJECTION, SOLUTION INTRAVENOUS at 02:08

## 2025-03-07 RX ADMIN — IRON SUCROSE 200 MG: 20 INJECTION, SOLUTION INTRAVENOUS at 06:48

## 2025-03-07 RX ADMIN — CARVEDILOL 25 MG: 25 TABLET, FILM COATED ORAL at 08:37

## 2025-03-07 RX ADMIN — CARVEDILOL 25 MG: 25 TABLET, FILM COATED ORAL at 16:08

## 2025-03-07 RX ADMIN — ACETAMINOPHEN 650 MG: 325 TABLET ORAL at 02:08

## 2025-03-07 RX ADMIN — PIPERACILLIN SODIUM AND TAZOBACTAM SODIUM 2.25 G: 2; .25 INJECTION, SOLUTION INTRAVENOUS at 08:38

## 2025-03-07 RX ADMIN — SODIUM BICARBONATE 1300 MG: 650 TABLET ORAL at 08:37

## 2025-03-07 RX ADMIN — ACETAMINOPHEN 650 MG: 325 TABLET ORAL at 20:56

## 2025-03-07 ASSESSMENT — COGNITIVE AND FUNCTIONAL STATUS - GENERAL
MOBILITY SCORE: 24
MOBILITY SCORE: 24
DAILY ACTIVITIY SCORE: 24
DAILY ACTIVITIY SCORE: 24

## 2025-03-07 ASSESSMENT — PAIN SCALES - GENERAL
PAINLEVEL_OUTOF10: 0 - NO PAIN

## 2025-03-07 ASSESSMENT — PAIN - FUNCTIONAL ASSESSMENT
PAIN_FUNCTIONAL_ASSESSMENT: 0-10
PAIN_FUNCTIONAL_ASSESSMENT: 0-10

## 2025-03-07 NOTE — PROGRESS NOTES
Await nephrology clearance, pt will return home at [place].      03/07/25 1003   Discharge Planning   Expected Discharge Disposition Home

## 2025-03-07 NOTE — PROGRESS NOTES
"Colin Leonard is a 59 y.o. male on day 3 of admission presenting with Choledocholithiasis with acute cholecystitis.    Subjective   Patient sitting up in bed. Feeling much better today, passed BM. Anxious for discharge.        Objective     Physical Exam  HENT:      Head: Atraumatic.      Nose: Nose normal.      Mouth/Throat:      Mouth: Mucous membranes are moist.   Eyes:      Extraocular Movements: Extraocular movements intact.   Cardiovascular:      Rate and Rhythm: Normal rate.   Pulmonary:      Effort: Pulmonary effort is normal. No respiratory distress.   Abdominal:      Palpations: Abdomen is soft.      Tenderness: There is abdominal tenderness.      Comments: Surgical incisions CDI without sxs infection, appropriately tender      Musculoskeletal:         General: Normal range of motion.      Cervical back: Normal range of motion.   Skin:     General: Skin is warm and dry.   Neurological:      General: No focal deficit present.      Mental Status: He is alert and oriented to person, place, and time.   Psychiatric:         Mood and Affect: Mood normal.         Behavior: Behavior normal.         Last Recorded Vitals  Blood pressure (!) 184/83, pulse 69, temperature 36.1 °C (97 °F), temperature source Oral, resp. rate 16, height 1.803 m (5' 11\"), weight 115 kg (254 lb 10.1 oz), SpO2 98%.  Intake/Output last 3 Shifts:  I/O last 3 completed shifts:  In: 910 (7.9 mL/kg) [P.O.:600; IV Piggyback:310]  Out: 1550 (13.4 mL/kg) [Urine:1550 (0.4 mL/kg/hr)]  Weight: 115.5 kg     Relevant Results               Lab Results   Component Value Date    WBC 13.3 (H) 03/07/2025    HGB 7.3 (L) 03/07/2025    HCT 24.0 (L) 03/07/2025    MCV 92 03/07/2025     03/07/2025       Lab Results   Component Value Date    GLUCOSE 114 (H) 03/07/2025    CALCIUM 8.5 (L) 03/07/2025     03/07/2025    K 4.3 03/07/2025    CO2 18 (L) 03/07/2025     (H) 03/07/2025    BUN 75 (H) 03/07/2025    CREATININE 7.28 (H) 03/07/2025 "                      Assessment/Plan   Assessment & Plan  Benign essential hypertension    Stage 5 chronic kidney disease not on chronic dialysis (Multi)    Type 2 diabetes mellitus    Acute calculous cholecystitis      CAD (coronary artery disease)      Anemia      Leukocytosis      Right renal stone      Elevated LFTs      Encounter for deep vein thrombosis (DVT) prophylaxis    3/7: POD 2. Patient pain under control today. Bfx and tolerating regular diet. Can dc from surgery standpoint. Follow up in 2 weeks.     3/6: POD 1 laparoscopic cholecystectomy. Patient progressing. Would like better pain control before dc. Changed to scheduled tylenol as well as added robaxin. Continue PRN medications. Plan for dc tomorrow per primary team. Should follow up in our office in 2 weeks. Discussed with Dr. Zhang.        3/5: Per IM-currently on heparin-I placed on hold for cholecystectomy today due to patient's anemia  Patient already ordered SCDs   Out of bed and and ambulate        I spent 30 minutes in the professional and overall care of this patient.      Ayde Luna PA-C

## 2025-03-07 NOTE — PROGRESS NOTES
CONSULT PROGRESS NOTES    SERVICE DATE: 3/7/2025   SERVICE TIME: 11:44 AM    CONSULTING SERVICE: Nephrology    ASSESSMENT AND PLAN   59-year-old man with hypertension, diabetes, coronary disease, and advanced CKD admitted with presumed acute cholecystitis.  1.  Acute renal failure on chronic kidney disease stage V  2.  Chronic metabolic acidosis  3.  Essential hypertension  4.  Anemia of chronic kidney disease     He has advanced CKD.  This has been stable for years.  I do not believe he is uremic, despite his GI symptoms.  Status post cholecystectomy yesterday.    We are holding his diuretic therapy at present given his GI symptoms and poor oral intake.    I have increased his chronic bicarbonate therapy.  Iron stores are low, he is on IV iron load and dose with erythropoietin stimulating agent on 3/5.  The blood pressure is now elevated.  He has had a prior reaction to calcium channel blocker, says he got severe swelling.  Trend daily labs, continue supportive care.  Significant increase in the serum creatinine overnight.  Agree with trial of IV fluids overnight.  Dr. Bangura is available tomorrow.    SUBJECTIVE  INTERVAL HPI: He had loose stools after taking MiraLAX yesterday.  He says every time he eats he has to have a bowel movement.  He has some gas pains.  He is very anxious and wants to be discharged.  He has no dysuria.  Blood pressure has been elevated.    MEDICATIONS:  acetaminophen, 650 mg, oral, q6h   Or  acetaminophen, 650 mg, nasogastric tube, q6h   Or  acetaminophen, 650 mg, rectal, q6h  aspirin, 81 mg, oral, Daily  carvedilol, 25 mg, oral, BID  ezetimibe, 10 mg, oral, Nightly  [Held by provider] heparin (porcine), 5,000 Units, subcutaneous, q8h  hydrALAZINE, 100 mg, oral, TID  iron sucrose, 200 mg, intravenous, Daily  methocarbamol, 500 mg, oral, q8h NHAN  piperacillin-tazobactam, 2.25 g, intravenous, q8h  polyethylene glycol, 17 g, oral, Daily  sodium bicarbonate, 1,300 mg, oral, TID  [Held by  provider] torsemide, 20 mg, oral, BID       sodium chloride 0.9%, 100 mL/hr, Last Rate: 100 mL/hr (03/07/25 1142)         PRN medications: hydrALAZINE, melatonin, morphine, oxyCODONE, prochlorperazine **OR** prochlorperazine **OR** prochlorperazine     OBJECTIVE  PHYSICAL EXAM:   Heart Rate:  [69-73]   Temp:  [36.1 °C (97 °F)-37.6 °C (99.7 °F)]   Resp:  [16-20]   BP: (141-187)/(66-83)   SpO2:  [92 %-99 %]   Body mass index is 35.51 kg/m².  This is a well-appearing white man, no acute distress  There is no asterixis  No obvious skin rashes  Hearing intact  Phonation intact  Moist mucosa  Normal S1/normal S2  Lungs are clear to auscultation bilaterally  Abdomen is soft, nondistended, nontender, positive bowel sounds  No Elmore catheter in place, no suprapubic tenderness to palpation  No extremity edema  Moves 4 limbs spontaneously  No obvious joint deformities  No lymphadenopathy    DATA:   Labs:  Results for orders placed or performed during the hospital encounter of 03/03/25 (from the past 96 hours)   CBC and Auto Differential   Result Value Ref Range    WBC 14.8 (H) 4.4 - 11.3 x10*3/uL    nRBC 0.0 0.0 - 0.0 /100 WBCs    RBC 3.15 (L) 4.50 - 5.90 x10*6/uL    Hemoglobin 8.9 (L) 13.5 - 17.5 g/dL    Hematocrit 28.4 (L) 41.0 - 52.0 %    MCV 90 80 - 100 fL    MCH 28.3 26.0 - 34.0 pg    MCHC 31.3 (L) 32.0 - 36.0 g/dL    RDW 14.6 (H) 11.5 - 14.5 %    Platelets 262 150 - 450 x10*3/uL    Neutrophils % 91.7 40.0 - 80.0 %    Immature Granulocytes %, Automated 0.5 0.0 - 0.9 %    Lymphocytes % 1.6 13.0 - 44.0 %    Monocytes % 5.8 2.0 - 10.0 %    Eosinophils % 0.3 0.0 - 6.0 %    Basophils % 0.1 0.0 - 2.0 %    Neutrophils Absolute 13.54 (H) 1.20 - 7.70 x10*3/uL    Immature Granulocytes Absolute, Automated 0.07 0.00 - 0.70 x10*3/uL    Lymphocytes Absolute 0.23 (L) 1.20 - 4.80 x10*3/uL    Monocytes Absolute 0.86 0.10 - 1.00 x10*3/uL    Eosinophils Absolute 0.05 0.00 - 0.70 x10*3/uL    Basophils Absolute 0.02 0.00 - 0.10 x10*3/uL    Comprehensive metabolic panel   Result Value Ref Range    Glucose 180 (H) 74 - 99 mg/dL    Sodium 139 136 - 145 mmol/L    Potassium 4.6 3.5 - 5.3 mmol/L    Chloride 112 (H) 98 - 107 mmol/L    Bicarbonate 17 (L) 21 - 32 mmol/L    Anion Gap 15 10 - 20 mmol/L    Urea Nitrogen 80 (H) 6 - 23 mg/dL    Creatinine 6.49 (H) 0.50 - 1.30 mg/dL    eGFR 9 (L) >60 mL/min/1.73m*2    Calcium 9.1 8.6 - 10.3 mg/dL    Albumin 3.7 3.4 - 5.0 g/dL    Alkaline Phosphatase 102 33 - 136 U/L    Total Protein 6.2 (L) 6.4 - 8.2 g/dL     (H) 9 - 39 U/L    Bilirubin, Total 1.0 0.0 - 1.2 mg/dL     (H) 10 - 52 U/L   Lipase   Result Value Ref Range    Lipase 49 9 - 82 U/L   Lactate   Result Value Ref Range    Lactate 0.9 0.4 - 2.0 mmol/L   Magnesium   Result Value Ref Range    Magnesium 1.97 1.60 - 2.40 mg/dL   Phosphorus   Result Value Ref Range    Phosphorus 4.8 2.5 - 4.9 mg/dL   Sars-CoV-2 and Influenza A/B PCR   Result Value Ref Range    Flu A Result Not Detected Not Detected    Flu B Result Not Detected Not Detected    Coronavirus 2019, PCR Not Detected Not Detected   Blood Culture    Specimen: Peripheral Venipuncture; Blood culture   Result Value Ref Range    Blood Culture No growth at 3 days    Blood Culture    Specimen: Peripheral Venipuncture; Blood culture   Result Value Ref Range    Blood Culture No growth at 3 days    Urinalysis with Reflex Culture and Microscopic   Result Value Ref Range    Color, Urine Light-Yellow Light-Yellow, Yellow, Dark-Yellow    Appearance, Urine Clear Clear    Specific Gravity, Urine 1.013 1.005 - 1.035    pH, Urine 5.5 5.0, 5.5, 6.0, 6.5, 7.0, 7.5, 8.0    Protein, Urine 100 (2+) (A) NEGATIVE, 10 (TRACE), 20 (TRACE) mg/dL    Glucose, Urine 300 (3+) (A) Normal mg/dL    Blood, Urine NEGATIVE NEGATIVE mg/dL    Ketones, Urine NEGATIVE NEGATIVE mg/dL    Bilirubin, Urine NEGATIVE NEGATIVE mg/dL    Urobilinogen, Urine Normal Normal mg/dL    Nitrite, Urine NEGATIVE NEGATIVE    Leukocyte Esterase, Urine  NEGATIVE NEGATIVE   Extra Urine Gray Tube   Result Value Ref Range    Extra Tube Hold for add-ons.    Urinalysis Microscopic   Result Value Ref Range    WBC, Urine 1-5 1-5, NONE /HPF    RBC, Urine NONE NONE, 1-2, 3-5 /HPF    Bacteria, Urine 1+ (A) NONE SEEN /HPF    Mucus, Urine FEW Reference range not established. /LPF   CBC   Result Value Ref Range    WBC 21.0 (H) 4.4 - 11.3 x10*3/uL    nRBC 0.0 0.0 - 0.0 /100 WBCs    RBC 2.76 (L) 4.50 - 5.90 x10*6/uL    Hemoglobin 7.8 (L) 13.5 - 17.5 g/dL    Hematocrit 24.5 (L) 41.0 - 52.0 %    MCV 89 80 - 100 fL    MCH 28.3 26.0 - 34.0 pg    MCHC 31.8 (L) 32.0 - 36.0 g/dL    RDW 14.8 (H) 11.5 - 14.5 %    Platelets 254 150 - 450 x10*3/uL   Basic metabolic panel   Result Value Ref Range    Glucose 118 (H) 74 - 99 mg/dL    Sodium 138 136 - 145 mmol/L    Potassium 5.0 3.5 - 5.3 mmol/L    Chloride 114 (H) 98 - 107 mmol/L    Bicarbonate 16 (L) 21 - 32 mmol/L    Anion Gap 13 10 - 20 mmol/L    Urea Nitrogen 73 (H) 6 - 23 mg/dL    Creatinine 6.56 (H) 0.50 - 1.30 mg/dL    eGFR 9 (L) >60 mL/min/1.73m*2    Calcium 8.6 8.6 - 10.3 mg/dL   Ferritin   Result Value Ref Range    Ferritin 109 20 - 300 ng/mL   Iron and TIBC   Result Value Ref Range    Iron 19 (L) 35 - 150 ug/dL    UIBC 228 110 - 370 ug/dL    TIBC 247 240 - 445 ug/dL    % Saturation 8 (L) 25 - 45 %   Hemoglobin A1c   Result Value Ref Range    Hemoglobin A1C 5.4 See comment %    Estimated Average Glucose 108 Not Established mg/dL   Hepatic function panel   Result Value Ref Range    Albumin 3.5 3.4 - 5.0 g/dL    Bilirubin, Total 0.5 0.0 - 1.2 mg/dL    Bilirubin, Direct 0.1 0.0 - 0.3 mg/dL    Alkaline Phosphatase 88 33 - 136 U/L     (H) 10 - 52 U/L     (H) 9 - 39 U/L    Total Protein 5.7 (L) 6.4 - 8.2 g/dL   POCT GLUCOSE   Result Value Ref Range    POCT Glucose 95 74 - 99 mg/dL   Type and screen   Result Value Ref Range    ABO TYPE O     Rh TYPE POS     ANTIBODY SCREEN NEG    POCT GLUCOSE   Result Value Ref Range    POCT  Glucose 92 74 - 99 mg/dL   POCT GLUCOSE   Result Value Ref Range    POCT Glucose 181 (H) 74 - 99 mg/dL   Comprehensive Metabolic Panel   Result Value Ref Range    Glucose 107 (H) 74 - 99 mg/dL    Sodium 138 136 - 145 mmol/L    Potassium 5.0 3.5 - 5.3 mmol/L    Chloride 113 (H) 98 - 107 mmol/L    Bicarbonate 15 (L) 21 - 32 mmol/L    Anion Gap 15 10 - 20 mmol/L    Urea Nitrogen 77 (H) 6 - 23 mg/dL    Creatinine 6.61 (H) 0.50 - 1.30 mg/dL    eGFR 9 (L) >60 mL/min/1.73m*2    Calcium 8.5 (L) 8.6 - 10.3 mg/dL    Albumin 3.3 (L) 3.4 - 5.0 g/dL    Alkaline Phosphatase 71 33 - 136 U/L    Total Protein 6.0 (L) 6.4 - 8.2 g/dL    AST 33 9 - 39 U/L    Bilirubin, Total 0.4 0.0 - 1.2 mg/dL     (H) 10 - 52 U/L   Phosphorus   Result Value Ref Range    Phosphorus 4.9 2.5 - 4.9 mg/dL   CBC and Auto Differential   Result Value Ref Range    WBC 12.0 (H) 4.4 - 11.3 x10*3/uL    nRBC 0.0 0.0 - 0.0 /100 WBCs    RBC 2.59 (L) 4.50 - 5.90 x10*6/uL    Hemoglobin 7.4 (L) 13.5 - 17.5 g/dL    Hematocrit 23.5 (L) 41.0 - 52.0 %    MCV 91 80 - 100 fL    MCH 28.6 26.0 - 34.0 pg    MCHC 31.5 (L) 32.0 - 36.0 g/dL    RDW 14.8 (H) 11.5 - 14.5 %    Platelets 240 150 - 450 x10*3/uL    Neutrophils % 77.2 40.0 - 80.0 %    Immature Granulocytes %, Automated 0.7 0.0 - 0.9 %    Lymphocytes % 10.7 13.0 - 44.0 %    Monocytes % 6.5 2.0 - 10.0 %    Eosinophils % 4.6 0.0 - 6.0 %    Basophils % 0.3 0.0 - 2.0 %    Neutrophils Absolute 9.23 (H) 1.20 - 7.70 x10*3/uL    Immature Granulocytes Absolute, Automated 0.08 0.00 - 0.70 x10*3/uL    Lymphocytes Absolute 1.28 1.20 - 4.80 x10*3/uL    Monocytes Absolute 0.78 0.10 - 1.00 x10*3/uL    Eosinophils Absolute 0.55 0.00 - 0.70 x10*3/uL    Basophils Absolute 0.04 0.00 - 0.10 x10*3/uL   POCT GLUCOSE   Result Value Ref Range    POCT Glucose 105 (H) 74 - 99 mg/dL   POCT GLUCOSE   Result Value Ref Range    POCT Glucose 109 (H) 74 - 99 mg/dL   Comprehensive Metabolic Panel   Result Value Ref Range    Glucose 160 (H) 74 - 99  mg/dL    Sodium 137 136 - 145 mmol/L    Potassium 4.7 3.5 - 5.3 mmol/L    Chloride 109 (H) 98 - 107 mmol/L    Bicarbonate 16 (L) 21 - 32 mmol/L    Anion Gap 17 10 - 20 mmol/L    Urea Nitrogen 74 (H) 6 - 23 mg/dL    Creatinine 6.84 (H) 0.50 - 1.30 mg/dL    eGFR 9 (L) >60 mL/min/1.73m*2    Calcium 9.0 8.6 - 10.3 mg/dL    Albumin 3.7 3.4 - 5.0 g/dL    Alkaline Phosphatase 67 33 - 136 U/L    Total Protein 6.6 6.4 - 8.2 g/dL    AST 18 9 - 39 U/L    Bilirubin, Total 0.5 0.0 - 1.2 mg/dL    ALT 70 (H) 10 - 52 U/L   Phosphorus   Result Value Ref Range    Phosphorus 4.6 2.5 - 4.9 mg/dL   CBC and Auto Differential   Result Value Ref Range    WBC 11.8 (H) 4.4 - 11.3 x10*3/uL    nRBC 0.0 0.0 - 0.0 /100 WBCs    RBC 2.95 (L) 4.50 - 5.90 x10*6/uL    Hemoglobin 8.4 (L) 13.5 - 17.5 g/dL    Hematocrit 27.0 (L) 41.0 - 52.0 %    MCV 92 80 - 100 fL    MCH 28.5 26.0 - 34.0 pg    MCHC 31.1 (L) 32.0 - 36.0 g/dL    RDW 14.9 (H) 11.5 - 14.5 %    Platelets 252 150 - 450 x10*3/uL    Neutrophils % 88.8 40.0 - 80.0 %    Immature Granulocytes %, Automated 0.8 0.0 - 0.9 %    Lymphocytes % 4.7 13.0 - 44.0 %    Monocytes % 5.5 2.0 - 10.0 %    Eosinophils % 0.0 0.0 - 6.0 %    Basophils % 0.2 0.0 - 2.0 %    Neutrophils Absolute 10.51 (H) 1.20 - 7.70 x10*3/uL    Immature Granulocytes Absolute, Automated 0.10 0.00 - 0.70 x10*3/uL    Lymphocytes Absolute 0.56 (L) 1.20 - 4.80 x10*3/uL    Monocytes Absolute 0.65 0.10 - 1.00 x10*3/uL    Eosinophils Absolute 0.00 0.00 - 0.70 x10*3/uL    Basophils Absolute 0.02 0.00 - 0.10 x10*3/uL   CBC   Result Value Ref Range    WBC 13.3 (H) 4.4 - 11.3 x10*3/uL    nRBC 0.2 (H) 0.0 - 0.0 /100 WBCs    RBC 2.61 (L) 4.50 - 5.90 x10*6/uL    Hemoglobin 7.3 (L) 13.5 - 17.5 g/dL    Hematocrit 24.0 (L) 41.0 - 52.0 %    MCV 92 80 - 100 fL    MCH 28.0 26.0 - 34.0 pg    MCHC 30.4 (L) 32.0 - 36.0 g/dL    RDW 14.9 (H) 11.5 - 14.5 %    Platelets 246 150 - 450 x10*3/uL   Comprehensive Metabolic Panel   Result Value Ref Range    Glucose 114  (H) 74 - 99 mg/dL    Sodium 136 136 - 145 mmol/L    Potassium 4.3 3.5 - 5.3 mmol/L    Chloride 108 (H) 98 - 107 mmol/L    Bicarbonate 18 (L) 21 - 32 mmol/L    Anion Gap 14 10 - 20 mmol/L    Urea Nitrogen 75 (H) 6 - 23 mg/dL    Creatinine 7.28 (H) 0.50 - 1.30 mg/dL    eGFR 8 (L) >60 mL/min/1.73m*2    Calcium 8.5 (L) 8.6 - 10.3 mg/dL    Albumin 3.3 (L) 3.4 - 5.0 g/dL    Alkaline Phosphatase 54 33 - 136 U/L    Total Protein 6.1 (L) 6.4 - 8.2 g/dL    AST 11 9 - 39 U/L    Bilirubin, Total 0.4 0.0 - 1.2 mg/dL    ALT 32 10 - 52 U/L   Phosphorus   Result Value Ref Range    Phosphorus 4.6 2.5 - 4.9 mg/dL         SIGNATURE: Boo Max MD PATIENT NAME: Colin Leonard   DATE: March 7, 2025 MRN: [id number]   TIME: 11:44 AM PAGER: [phone]

## 2025-03-07 NOTE — NURSING NOTE
Assumed care of pt around this time.  Pt is alert, lying in bed, watching tv during BSSR.  No IV fluids running.  Telemetry monitoring in place.  No O2 noted.  He denies any pain or needs at the moment.  Call light and belongings within reach.

## 2025-03-07 NOTE — CARE PLAN
The patient's goals for the shift include      The clinical goals for the shift include Pain management, stable VS, ATB therapy

## 2025-03-07 NOTE — CARE PLAN
The patient's goals for the shift include      The clinical goals for the shift include Pain management, stable VS, ATB therapy    Over the shift, the patient did make progress toward the following goals.       Problem: Pain - Adult  Goal: Verbalizes/displays adequate comfort level or baseline comfort level  Outcome: Progressing     Problem: Safety - Adult  Goal: Free from fall injury  Outcome: Progressing     Problem: Discharge Planning  Goal: Discharge to home or other facility with appropriate resources  Outcome: Progressing     Problem: Chronic Conditions and Co-morbidities  Goal: Patient's chronic conditions and co-morbidity symptoms are monitored and maintained or improved  Outcome: Progressing     Problem: Nutrition  Goal: Nutrient intake appropriate for maintaining nutritional needs  Outcome: Progressing     Problem: Pain  Goal: Takes deep breaths with improved pain control throughout the shift  Outcome: Progressing     Problem: Pain  Goal: Turns in bed with improved pain control throughout the shift  Outcome: Progressing     Problem: Pain  Goal: Walks with improved pain control throughout the shift  Outcome: Progressing     Problem: Pain  Goal: Free from opioid side effects throughout the shift  Outcome: Progressing     Problem: Diabetes  Goal: Achieve decreasing blood glucose levels by end of shift  Outcome: Progressing     Problem: Diabetes  Goal: Increase stability of blood glucose readings by end of shift  Outcome: Progressing

## 2025-03-07 NOTE — PROGRESS NOTES
Colin Leonard is a 59 y.o. male on day 3 of admission presenting with Choledocholithiasis with acute cholecystitis.      Subjective   Patient feels better today.  He had bowel movement.  Patient is tolerating food well       Objective     Last Recorded Vitals  BP (!) 184/83 (BP Location: Right arm, Patient Position: Sitting)   Pulse 69   Temp 36.1 °C (97 °F) (Oral)   Resp 16   Wt 115 kg (254 lb 10.1 oz)   SpO2 98%   Intake/Output last 3 Shifts:    Intake/Output Summary (Last 24 hours) at 3/7/2025 0856  Last data filed at 3/7/2025 0240  Gross per 24 hour   Intake 390 ml   Output 450 ml   Net -60 ml       Admission Weight  Weight: 113 kg (250 lb) (03/03/25 2144)    Daily Weight  03/04/25 : 115 kg (254 lb 10.1 oz)    Image Results  US right upper quadrant  Narrative: Interpreted By:  Miguelina Brooke,   STUDY:  US RIGHT UPPER QUADRANT;  3/4/2025 8:32 am      INDICATION:  Signs/Symptoms:eval for acute cholecystitis.      COMPARISON:  CT abdomen and pelvis 03/03/2025      ACCESSION NUMBER(S):  [id number]      ORDERING CLINICIAN:  WAQAR CRENSHAW      TECHNIQUE:  Multiple images of the abdomen were obtained.      FINDINGS:  LIVER:  The echogenicity of the liver is within normal limits.  There is no hepatic mass.  The sagittal dimension of the right lobe of the liver is 17.8 cm,  nonenlarged.      GALLBLADDER:  The gallbladder is nondistended.  The gallbladder wall is not thickened.  There are small gravel-like gallstones in the proximal body of the  gallbladder. There is no sludge.  There is no pericholecystic fluid.  There is no sonographic Thompson's sign      BILE DUCTS:  There is no intrahepatic biliary dilatation.  The common bile duct is  nondilated measuring 0.2 cm.      PANCREAS:  Only a small part of the body of the pancreas is seen and is  unremarkable. There is obscuration of most of the pancreas secondary  to shadowing from bowel gas.      RIGHT KIDNEY:  The right kidney is  normal in size measuring 11.2  cm in length.      The echogenicity of the cortex is within normal limits.  There is no renal mass.  There is no intrarenal calculus or hydronephrosis.      Impression: Cholelithiasis      MACRO:  None      Signed by: Miguelina Mendy 3/4/2025 8:59 AM  Dictation workstation:   [id number]      Physical Exam    General: In moderate distress, pleasant, cooperating during physical exam.  HEENT: Pupils are equal and reactive to light and commendation , oral mucosa moist, no JVD   Cardiovascular: Normal sinus rhythm, no MRG.  Lungs: Clear to auscultation bilaterally, no wheezing, no crackles, no dullness to percussion.  Abdomen: Positive bowel sounds, no hepatosplenomegaly appreciated, slight tenderness on abdomen .  Positive bowel movement  Neuro: Alert and oriented x3, strength in upper and lower extremities , sensation intact.  Psych: Patient had great insight was going on  Musculoskeletal: No swelling in lower extremities, no limitation in range of motion.  Vascular: Pulses are intact in upper and lower extremities  Skin: No petechiae, ecchymosis or other stigmata for dermatology disease.     Assessment/Plan        Cholelithiasis  Ultrasound of right upper quadrant revealed cholelithiasis.  Status post laparoscopy, postop day 2  Continue with Zosyn  Patient feels comfortable on as needed pain meds  General Surgery on the case  Continue with pain medication as needed    Chronic kidney disease stage V  Patient see Dr. Goldberg in regular basis  Discussed in detail with  Dr. Max   Creatinine elevated today  Challenge gently with fluids  Torsemide on hold     Hypertension  Not well-controlled  Continue with current medication.  Patient is on IV hydralazine as needed for systolic blood pressure more than 180 mmhg     Diabetes mellitus type 2  The patient is not on any medication, diet control   hemoglobin A1c 5.4     Leukocytosis improved  Secondary to acute cholecystitis.  Cover with antibiotics  Check CBC and CMP in  AM.     Anemia of chronic disease  Monitor close  Transfuse for hemoglobin less than 7     History of kidney stone     Continue with pain medication as needed  Monitor close  Check CBC and CMP in a.m.  waiting for nephrology to see him today  Continue gently with fluids    Marie Harris MD

## 2025-03-07 NOTE — DOCUMENTATION CLARIFICATION NOTE
"    PATIENT:               ODALYS MELVIN  ACCT #:                  [id number]  MRN:                       [id number]  :                       1965  ADMIT DATE:       3/3/2025 9:51 PM  DISCH DATE:  RESPONDING PROVIDER #:        [id number]          PROVIDER RESPONSE TEXT:    Acute cholecystitis, cholelithiasis.    CDI QUERY TEXT:    Clarification        Instruction:    Based on your assessment of the patient and the clinical information, please provide the requested documentation by clicking on the appropriate radio button and enter any additional information if prompted.    Question: Please further clarify patient hepatic status as    When answering this query, please exercise your independent professional judgment. The fact that a question is being asked, does not imply that any particular answer is desired or expected.    The patient's clinical indicators include:  Clinical Information: 59 y.o. male on day 0 of admission presenting with  right upper quadrant abdominal pain and was admitted with choledocholithiasis with acute cholecystitis, stage V chronic kidney disease not on chronic dialysis, type 2 diabetes, benign essential hypertension.    Clinical Indicators:    : ,  Bilirubin 1.0, wbc 14.8, anc 13.54  : ,     : ED: \"Epigastric pain with nausea and vomiting, signs of cholelithiasis on CT, and elevated liver enzymes, bilirubin today is 1.0, 6 months ago was 0.3, so this may represent elevation bilirubin as well, concerns for early ascending infection, as the patient also has a white count, covered with Zosyn\"    Treatment: Laparoscopic cholecystectomy, monitor LFTs, GI consult    Risk Factors: Choledocholithiasis with acute cholecystitis, acute calculous cholecytitis  Options provided:  -- Acute liver failure  -- Subacute liver failure  -- Shock Liver  -- Other - I will add my own diagnosis  -- Refer to Clinical Documentation Reviewer    Query created by: Chelle, " Curt on 3/7/2025 8:21 AM      Electronically signed by:  KIRK BARBER MD 3/7/2025 9:34 AM

## 2025-03-08 ENCOUNTER — APPOINTMENT (OUTPATIENT)
Dept: RADIOLOGY | Facility: HOSPITAL | Age: 60
DRG: 418 | End: 2025-03-08
Payer: COMMERCIAL

## 2025-03-08 LAB
ALBUMIN SERPL BCP-MCNC: 3.4 G/DL (ref 3.4–5)
ALP SERPL-CCNC: 51 U/L (ref 33–136)
ALT SERPL W P-5'-P-CCNC: 17 U/L (ref 10–52)
ANION GAP SERPL CALCULATED.3IONS-SCNC: 14 MMOL/L (ref 10–20)
AST SERPL W P-5'-P-CCNC: 9 U/L (ref 9–39)
BACTERIA BLD CULT: NORMAL
BACTERIA BLD CULT: NORMAL
BASOPHILS # BLD AUTO: 0.08 X10*3/UL (ref 0–0.1)
BASOPHILS NFR BLD AUTO: 0.6 %
BILIRUB SERPL-MCNC: 0.4 MG/DL (ref 0–1.2)
BUN SERPL-MCNC: 67 MG/DL (ref 6–23)
CALCIUM SERPL-MCNC: 8.6 MG/DL (ref 8.6–10.3)
CHLORIDE SERPL-SCNC: 110 MMOL/L (ref 98–107)
CO2 SERPL-SCNC: 19 MMOL/L (ref 21–32)
CREAT SERPL-MCNC: 7.26 MG/DL (ref 0.5–1.3)
EGFRCR SERPLBLD CKD-EPI 2021: 8 ML/MIN/1.73M*2
EOSINOPHIL # BLD AUTO: 0.56 X10*3/UL (ref 0–0.7)
EOSINOPHIL NFR BLD AUTO: 4.4 %
ERYTHROCYTE [DISTWIDTH] IN BLOOD BY AUTOMATED COUNT: 14.9 % (ref 11.5–14.5)
GLUCOSE BLD MANUAL STRIP-MCNC: 107 MG/DL (ref 74–99)
GLUCOSE SERPL-MCNC: 118 MG/DL (ref 74–99)
HCT VFR BLD AUTO: 25 % (ref 41–52)
HGB BLD-MCNC: 7.7 G/DL (ref 13.5–17.5)
IMM GRANULOCYTES # BLD AUTO: 0.3 X10*3/UL (ref 0–0.7)
IMM GRANULOCYTES NFR BLD AUTO: 2.4 % (ref 0–0.9)
LYMPHOCYTES # BLD AUTO: 1.83 X10*3/UL (ref 1.2–4.8)
LYMPHOCYTES NFR BLD AUTO: 14.3 %
MCH RBC QN AUTO: 28.2 PG (ref 26–34)
MCHC RBC AUTO-ENTMCNC: 30.8 G/DL (ref 32–36)
MCV RBC AUTO: 92 FL (ref 80–100)
MONOCYTES # BLD AUTO: 1.04 X10*3/UL (ref 0.1–1)
MONOCYTES NFR BLD AUTO: 8.2 %
NEUTROPHILS # BLD AUTO: 8.95 X10*3/UL (ref 1.2–7.7)
NEUTROPHILS NFR BLD AUTO: 70.1 %
NRBC BLD-RTO: 0.3 /100 WBCS (ref 0–0)
PHOSPHATE SERPL-MCNC: 4 MG/DL (ref 2.5–4.9)
PLATELET # BLD AUTO: 265 X10*3/UL (ref 150–450)
POTASSIUM SERPL-SCNC: 4.1 MMOL/L (ref 3.5–5.3)
PROT SERPL-MCNC: 5.9 G/DL (ref 6.4–8.2)
RBC # BLD AUTO: 2.73 X10*6/UL (ref 4.5–5.9)
SODIUM SERPL-SCNC: 139 MMOL/L (ref 136–145)
WBC # BLD AUTO: 12.8 X10*3/UL (ref 4.4–11.3)

## 2025-03-08 PROCEDURE — 80053 COMPREHEN METABOLIC PANEL: CPT | Performed by: SURGERY

## 2025-03-08 PROCEDURE — 1100000001 HC PRIVATE ROOM DAILY

## 2025-03-08 PROCEDURE — 2500000001 HC RX 250 WO HCPCS SELF ADMINISTERED DRUGS (ALT 637 FOR MEDICARE OP): Performed by: SURGERY

## 2025-03-08 PROCEDURE — 82947 ASSAY GLUCOSE BLOOD QUANT: CPT

## 2025-03-08 PROCEDURE — 2500000004 HC RX 250 GENERAL PHARMACY W/ HCPCS (ALT 636 FOR OP/ED): Performed by: SURGERY

## 2025-03-08 PROCEDURE — 84100 ASSAY OF PHOSPHORUS: CPT | Performed by: SURGERY

## 2025-03-08 PROCEDURE — 2500000001 HC RX 250 WO HCPCS SELF ADMINISTERED DRUGS (ALT 637 FOR MEDICARE OP): Performed by: PHYSICIAN ASSISTANT

## 2025-03-08 PROCEDURE — 36415 COLL VENOUS BLD VENIPUNCTURE: CPT | Performed by: SURGERY

## 2025-03-08 PROCEDURE — 2500000002 HC RX 250 W HCPCS SELF ADMINISTERED DRUGS (ALT 637 FOR MEDICARE OP, ALT 636 FOR OP/ED): Performed by: SURGERY

## 2025-03-08 PROCEDURE — 70450 CT HEAD/BRAIN W/O DYE: CPT

## 2025-03-08 PROCEDURE — 2500000004 HC RX 250 GENERAL PHARMACY W/ HCPCS (ALT 636 FOR OP/ED): Performed by: INTERNAL MEDICINE

## 2025-03-08 PROCEDURE — 2500000005 HC RX 250 GENERAL PHARMACY W/O HCPCS: Performed by: SURGERY

## 2025-03-08 PROCEDURE — 85025 COMPLETE CBC W/AUTO DIFF WBC: CPT | Performed by: INTERNAL MEDICINE

## 2025-03-08 PROCEDURE — 99232 SBSQ HOSP IP/OBS MODERATE 35: CPT | Performed by: INTERNAL MEDICINE

## 2025-03-08 PROCEDURE — 2500000002 HC RX 250 W HCPCS SELF ADMINISTERED DRUGS (ALT 637 FOR MEDICARE OP, ALT 636 FOR OP/ED): Performed by: INTERNAL MEDICINE

## 2025-03-08 RX ORDER — OXYCODONE HYDROCHLORIDE 5 MG/1
5 TABLET ORAL EVERY 6 HOURS PRN
Qty: 12 TABLET | Refills: 0 | Status: SHIPPED | OUTPATIENT
Start: 2025-03-08 | End: 2025-03-11

## 2025-03-08 RX ORDER — AMOXICILLIN AND CLAVULANATE POTASSIUM 500; 125 MG/1; MG/1
1 TABLET, FILM COATED ORAL 2 TIMES DAILY
Qty: 8 TABLET | Refills: 0 | Status: SHIPPED | OUTPATIENT
Start: 2025-03-08 | End: 2025-03-12

## 2025-03-08 RX ORDER — ACETAMINOPHEN 325 MG/1
650 TABLET ORAL EVERY 6 HOURS
Qty: 15 TABLET | Refills: 0 | Status: SHIPPED | OUTPATIENT
Start: 2025-03-08

## 2025-03-08 RX ORDER — MECLIZINE HCL 25MG 25 MG/1
25 TABLET, CHEWABLE ORAL EVERY 8 HOURS PRN
Status: DISCONTINUED | OUTPATIENT
Start: 2025-03-08 | End: 2025-03-09 | Stop reason: HOSPADM

## 2025-03-08 RX ORDER — METHOCARBAMOL 500 MG/1
500 TABLET, FILM COATED ORAL EVERY 8 HOURS PRN
Qty: 12 TABLET | Refills: 0 | Status: SHIPPED | OUTPATIENT
Start: 2025-03-08 | End: 2025-03-13

## 2025-03-08 RX ORDER — HEPARIN SODIUM 5000 [USP'U]/ML
5000 INJECTION, SOLUTION INTRAVENOUS; SUBCUTANEOUS EVERY 12 HOURS
Status: DISCONTINUED | OUTPATIENT
Start: 2025-03-08 | End: 2025-03-09 | Stop reason: HOSPADM

## 2025-03-08 RX ADMIN — IRON SUCROSE 200 MG: 20 INJECTION, SOLUTION INTRAVENOUS at 06:43

## 2025-03-08 RX ADMIN — PIPERACILLIN SODIUM AND TAZOBACTAM SODIUM 2.25 G: 2; .25 INJECTION, SOLUTION INTRAVENOUS at 17:09

## 2025-03-08 RX ADMIN — HYDRALAZINE HYDROCHLORIDE 100 MG: 50 TABLET ORAL at 14:03

## 2025-03-08 RX ADMIN — PROCHLORPERAZINE EDISYLATE 10 MG: 5 INJECTION INTRAMUSCULAR; INTRAVENOUS at 08:57

## 2025-03-08 RX ADMIN — EZETIMIBE 10 MG: 10 TABLET ORAL at 22:24

## 2025-03-08 RX ADMIN — Medication 3 MG: at 22:24

## 2025-03-08 RX ADMIN — SODIUM BICARBONATE 1300 MG: 650 TABLET ORAL at 22:24

## 2025-03-08 RX ADMIN — ACETAMINOPHEN 650 MG: 325 TABLET ORAL at 01:55

## 2025-03-08 RX ADMIN — HYDRALAZINE HYDROCHLORIDE 100 MG: 50 TABLET ORAL at 22:24

## 2025-03-08 RX ADMIN — MECLIZINE HYDROCHLORIDE 25 MG: 25 TABLET, CHEWABLE ORAL at 16:27

## 2025-03-08 RX ADMIN — ACETAMINOPHEN 650 MG: 325 TABLET ORAL at 14:03

## 2025-03-08 RX ADMIN — PROCHLORPERAZINE EDISYLATE 10 MG: 5 INJECTION INTRAMUSCULAR; INTRAVENOUS at 16:16

## 2025-03-08 RX ADMIN — SODIUM BICARBONATE 1300 MG: 650 TABLET ORAL at 14:03

## 2025-03-08 RX ADMIN — CARVEDILOL 25 MG: 25 TABLET, FILM COATED ORAL at 17:09

## 2025-03-08 RX ADMIN — ACETAMINOPHEN 650 MG: 325 TABLET ORAL at 22:24

## 2025-03-08 RX ADMIN — PIPERACILLIN SODIUM AND TAZOBACTAM SODIUM 2.25 G: 2; .25 INJECTION, SOLUTION INTRAVENOUS at 09:01

## 2025-03-08 ASSESSMENT — COGNITIVE AND FUNCTIONAL STATUS - GENERAL
CLIMB 3 TO 5 STEPS WITH RAILING: A LITTLE
WALKING IN HOSPITAL ROOM: A LITTLE
DAILY ACTIVITIY SCORE: 24
MOBILITY SCORE: 24
MOBILITY SCORE: 22
DAILY ACTIVITIY SCORE: 24

## 2025-03-08 ASSESSMENT — PAIN - FUNCTIONAL ASSESSMENT
PAIN_FUNCTIONAL_ASSESSMENT: 0-10

## 2025-03-08 ASSESSMENT — PAIN SCALES - GENERAL
PAINLEVEL_OUTOF10: 0 - NO PAIN

## 2025-03-08 NOTE — CARE PLAN
The patient's goals for the shift include go home     The clinical goals for the shift include Decrease in nausea    Over the shift, the patient did not make progress toward the following goals. Barriers to progression include patient is staying another night with dc tomorrow. Recommendations to address these barriers include cluster care as much as possible and provide antiemetics as needed.

## 2025-03-08 NOTE — NURSING NOTE
Patient is currently laying back down in bed, says he still does not feel good. Is refusing his morning medications at this time as he is afraid of throwing up again. Hospitalist was in to see the patient so he is aware. Waiting on nephrology to see the patient and see what they would like to do. Patient denied any other needs at this time and says he wants to sleep, door closed to promote rest.

## 2025-03-08 NOTE — NURSING NOTE
Patient complaining about how much has been going on and how often people have been in his room. Stated he needs some sleep and does not want to be bothered for awhile. Assured patient he has a few hours before the next med pass and several hours before his next IV antibiotic will be due so he will hopefully be able to get a nap in before shift change. Will pass on to shift change to [facility] as much as possible. Patient denied any other needs at this time. Patient stated his dizziness is slightly improved from earlier. Call light is in reach.

## 2025-03-08 NOTE — NURSING NOTE
Patient is asking about when he will be being discharged today. Told him we were still waiting on Nephrology to come and see him to give the okay for discharge. Patient is agreeable to wait at this time. Stated he is feeling better then he was this morning and is going to order himself some food. Patient denied any other needs at this time, Call light is in reach.

## 2025-03-08 NOTE — PROGRESS NOTES
Colin Leonard is a 59 y.o. male on day 4 of admission presenting with Choledocholithiasis with acute cholecystitis.      Subjective   Patient denies fever or chills.  He denied abdominal pain.  Patient stated he was moving on the bed and got up and felt dizzy .  He denied headache, numbness or ting upper or lower extremities.  He denied blurry vision.  Patient denied active chest pain or shortness of breath.      Objective     Last Recorded Vitals  /81 (BP Location: Left arm, Patient Position: Lying)   Pulse 72   Temp 36.7 °C (98.1 °F) (Oral)   Resp 17   Wt 115 kg (254 lb 10.1 oz)   SpO2 96%   Intake/Output last 3 Shifts:    Intake/Output Summary (Last 24 hours) at 3/8/2025 0940  Last data filed at 3/8/2025 0901  Gross per 24 hour   Intake 1941.66 ml   Output 1300 ml   Net 641.66 ml       Admission Weight  Weight: 113 kg (250 lb) (03/03/25 2144)    Daily Weight  03/04/25 : 115 kg (254 lb 10.1 oz)    Image Results  US right upper quadrant  Narrative: Interpreted By:  Miguelina Brooke,   STUDY:  US RIGHT UPPER QUADRANT;  3/4/2025 8:32 am      INDICATION:  Signs/Symptoms:eval for acute cholecystitis.      COMPARISON:  CT abdomen and pelvis 03/03/2025      ACCESSION NUMBER(S):  [id number]      ORDERING CLINICIAN:  WAQAR CRENSHAW      TECHNIQUE:  Multiple images of the abdomen were obtained.      FINDINGS:  LIVER:  The echogenicity of the liver is within normal limits.  There is no hepatic mass.  The sagittal dimension of the right lobe of the liver is 17.8 cm,  nonenlarged.      GALLBLADDER:  The gallbladder is nondistended.  The gallbladder wall is not thickened.  There are small gravel-like gallstones in the proximal body of the  gallbladder. There is no sludge.  There is no pericholecystic fluid.  There is no sonographic Thompson's sign      BILE DUCTS:  There is no intrahepatic biliary dilatation.  The common bile duct is  nondilated measuring 0.2 cm.      PANCREAS:  Only a small part of the body of the  pancreas is seen and is  unremarkable. There is obscuration of most of the pancreas secondary  to shadowing from bowel gas.      RIGHT KIDNEY:  The right kidney is  normal in size measuring 11.2 cm in length.      The echogenicity of the cortex is within normal limits.  There is no renal mass.  There is no intrarenal calculus or hydronephrosis.      Impression: Cholelithiasis      MACRO:  None      Signed by: Miguelina Brooke 3/4/2025 8:59 AM  Dictation workstation:   [id number]      Physical Exam    General: In moderate distress, pleasant, cooperating during physical exam.  HEENT: Pupils are equal and reactive to light and commendation , oral mucosa moist, no JVD   Cardiovascular: Normal sinus rhythm, no MRG.  Lungs: Clear to auscultation bilaterally, no wheezing, no crackles, no dullness to percussion.  Abdomen: Positive bowel sounds, no hepatosplenomegaly appreciated, slight tenderness on abdomen .  Positive bowel movement  Neuro: Alert and oriented x3, strength in upper and lower extremities , sensation intact.  Psych: Patient had great insight was going on  Musculoskeletal: No swelling in lower extremities, no limitation in range of motion.  Vascular: Pulses are intact in upper and lower extremities  Skin: No petechiae, ecchymosis or other stigmata for dermatology disease.     Assessment/Plan        Cholelithiasis  Ultrasound of right upper quadrant revealed cholelithiasis.  Status post laparoscopy, postop day 3  Continue with Zosyn  Patient feels comfortable on as needed pain meds  General Surgery on the case  Continue with pain medication as needed    Chronic kidney disease stage V  Patient see Dr. Goldberg in regular basis  Discussed in detail with  Dr. Max   Creatinine elevated today  Challenge gently with fluids  Torsemide on hold     Hypertension  Not well-controlled  Continue with current medication.  Patient is on IV hydralazine as needed for systolic blood pressure more than 180 mmhg     Diabetes  mellitus type 2  The patient is not on any medication, diet control   hemoglobin A1c 5.4     Leukocytosis improved  Secondary to acute cholecystitis.  Cover with antibiotics  Check CBC and CMP in AM.     Anemia of chronic disease  Monitor close  Transfuse for hemoglobin less than 7     History of kidney stone     Continue with pain medication as needed  Monitor close  Check CBC and CMP in a.m.    Continue gently with fluids  Expect the patient to get discharge in 24 hours    Addendum  16 :16    Patient has episode of vertigo intermittently  CT brain  Consult Dr. Alfaro  Add meclizine as needed    Marie Harris MD

## 2025-03-08 NOTE — NURSING NOTE
"Obtained bedside shift report from nightshift RN with patient laying in bed sleeping on arrival and through report. Patient was supposed to DC yesterday but his CR went up so they restarted fluids and will recheck labs this morning. Patient made a comment to the nightshift RN overnight that he would be leaving today \"With or without the doctor's approval\" Patient is still sleeping at this time, call light is in reach.  "

## 2025-03-08 NOTE — NURSING NOTE
Urology in to see the patient and patient is agreeable to stay another night and get repeat lab work again in the AM to see if there is any improvement. Patient stated he absolutely has to leave tomorrow though. Hospitalist updated with plan to stay another night. Patient denied any other needs at this time and call light is in reach.

## 2025-03-08 NOTE — NURSING NOTE
Provider came out of patient's room and stated he is having another episode of dizziness so they are going to order a CT for him.

## 2025-03-08 NOTE — PROGRESS NOTES
Colin Leonard is a 59 y.o. male on day 4 of admission presenting with Choledocholithiasis with acute cholecystitis.      Subjective   Eager to go home.  No dyspnea at rest.  Had episode of nausea yesterday but not vomiting.       Scheduled medications  acetaminophen, 650 mg, oral, q6h   Or  acetaminophen, 650 mg, nasogastric tube, q6h   Or  acetaminophen, 650 mg, rectal, q6h  aspirin, 81 mg, oral, Daily  carvedilol, 25 mg, oral, BID  ezetimibe, 10 mg, oral, Nightly  heparin (porcine), 5,000 Units, subcutaneous, q12h  hydrALAZINE, 100 mg, oral, TID  iron sucrose, 200 mg, intravenous, Daily  methocarbamol, 500 mg, oral, q8h NHAN  piperacillin-tazobactam, 2.25 g, intravenous, q8h  polyethylene glycol, 17 g, oral, Daily  sodium bicarbonate, 1,300 mg, oral, TID  [Held by provider] torsemide, 20 mg, oral, BID      Continuous medications  sodium chloride 0.9%, 100 mL/hr, Last Rate: 100 mL/hr (03/07/25 2344)      PRN medications  PRN medications: hydrALAZINE, melatonin, morphine, oxyCODONE, prochlorperazine **OR** prochlorperazine **OR** prochlorperazine      Objective     Vitals 24HR  Heart Rate:  [68-72]   Temp:  [36.6 °C (97.9 °F)-36.9 °C (98.4 °F)]   Resp:  [17-19]   BP: (161-177)/(74-83)   SpO2:  [96 %-100 %]     General: Middle-aged man, not in distress  Eyes: Pale, anicteric  Lungs: Clear bilaterally  Heart: S1 and S2, regular  Abdomen: Soft, nontender  Extremities: Bilateral lower extremity edema  Neuro: Awake and interactive, nonfocal    Intake/Output last 3 Shifts:    Intake/Output Summary (Last 24 hours) at 3/8/2025 1407  Last data filed at 3/8/2025 1153  Gross per 24 hour   Intake 1403.33 ml   Output 1100 ml   Net 303.33 ml       Relevant Results    Results for orders placed or performed during the hospital encounter of 03/03/25 (from the past 24 hours)   Comprehensive Metabolic Panel   Result Value Ref Range    Glucose 118 (H) 74 - 99 mg/dL    Sodium 139 136 - 145 mmol/L    Potassium 4.1 3.5 - 5.3 mmol/L     Chloride 110 (H) 98 - 107 mmol/L    Bicarbonate 19 (L) 21 - 32 mmol/L    Anion Gap 14 10 - 20 mmol/L    Urea Nitrogen 67 (H) 6 - 23 mg/dL    Creatinine 7.26 (H) 0.50 - 1.30 mg/dL    eGFR 8 (L) >60 mL/min/1.73m*2    Calcium 8.6 8.6 - 10.3 mg/dL    Albumin 3.4 3.4 - 5.0 g/dL    Alkaline Phosphatase 51 33 - 136 U/L    Total Protein 5.9 (L) 6.4 - 8.2 g/dL    AST 9 9 - 39 U/L    Bilirubin, Total 0.4 0.0 - 1.2 mg/dL    ALT 17 10 - 52 U/L   Phosphorus   Result Value Ref Range    Phosphorus 4.0 2.5 - 4.9 mg/dL   CBC and Auto Differential   Result Value Ref Range    WBC 12.8 (H) 4.4 - 11.3 x10*3/uL    nRBC 0.3 (H) 0.0 - 0.0 /100 WBCs    RBC 2.73 (L) 4.50 - 5.90 x10*6/uL    Hemoglobin 7.7 (L) 13.5 - 17.5 g/dL    Hematocrit 25.0 (L) 41.0 - 52.0 %    MCV 92 80 - 100 fL    MCH 28.2 26.0 - 34.0 pg    MCHC 30.8 (L) 32.0 - 36.0 g/dL    RDW 14.9 (H) 11.5 - 14.5 %    Platelets 265 150 - 450 x10*3/uL    Neutrophils % 70.1 40.0 - 80.0 %    Immature Granulocytes %, Automated 2.4 (H) 0.0 - 0.9 %    Lymphocytes % 14.3 13.0 - 44.0 %    Monocytes % 8.2 2.0 - 10.0 %    Eosinophils % 4.4 0.0 - 6.0 %    Basophils % 0.6 0.0 - 2.0 %    Neutrophils Absolute 8.95 (H) 1.20 - 7.70 x10*3/uL    Immature Granulocytes Absolute, Automated 0.30 0.00 - 0.70 x10*3/uL    Lymphocytes Absolute 1.83 1.20 - 4.80 x10*3/uL    Monocytes Absolute 1.04 (H) 0.10 - 1.00 x10*3/uL    Eosinophils Absolute 0.56 0.00 - 0.70 x10*3/uL    Basophils Absolute 0.08 0.00 - 0.10 x10*3/uL   POCT GLUCOSE   Result Value Ref Range    POCT Glucose 107 (H) 74 - 99 mg/dL           Assessment/Plan      Nonoliguric TEJA on stage V CKD  Hypertension  Edema  Metabolic acidosis, on bicarb  Anemia in CKD    SCr appears to be plateauing and I hope to see a downward trend in the next 1 to 2 days.  He has no overt uremic symptoms or other indication for dialysis at this time.    Peripheral edema noted and I will discontinue IV fluids.    Continue other care.    Patient is eager to go home but I  have encouraged him to wait until there is clear evidence of renal recovery.      Des Bangura MD

## 2025-03-08 NOTE — NURSING NOTE
Called into patient's room and patient sitting up at edge of bed vomiting into trash can. Patient stated he woke up and felt very dizzy and then started dry heaving. Patient also stated he felt like he needed to have a BM. Patient assisted up to bathroom and was then medicated for nausea. Patient sitting back up in chair for short period of time and then stated he felt like he needed to go again to the bathroom. Assisted again waiting in room with patient.

## 2025-03-08 NOTE — NURSING NOTE
Assumed care of pt around this time.  Pt is alert, sitting in the chair, listening to a game on his phone during BSSR.  IV fluids running at 100ml/hr.  Telemetry monitoring in place.  Denies any needs at the moment.  Will be continuing to monitor.

## 2025-03-08 NOTE — CARE PLAN
The patient's goals for the shift include      The clinical goals for the shift include IV fluid hydration, stable VS and labs    Over the shift, the patient did make progress toward the following goals.       Problem: Pain - Adult  Goal: Verbalizes/displays adequate comfort level or baseline comfort level  Outcome: Progressing     Problem: Safety - Adult  Goal: Free from fall injury  Outcome: Progressing     Problem: Discharge Planning  Goal: Discharge to home or other facility with appropriate resources  Outcome: Progressing     Problem: Chronic Conditions and Co-morbidities  Goal: Patient's chronic conditions and co-morbidity symptoms are monitored and maintained or improved  Outcome: Progressing     Problem: Nutrition  Goal: Nutrient intake appropriate for maintaining nutritional needs  Outcome: Progressing     Problem: Pain  Goal: Takes deep breaths with improved pain control throughout the shift  Outcome: Progressing     Problem: Pain  Goal: Turns in bed with improved pain control throughout the shift  Outcome: Progressing     Problem: Pain  Goal: Walks with improved pain control throughout the shift  Outcome: Progressing     Problem: Pain  Goal: Free from opioid side effects throughout the shift  Outcome: Progressing     Problem: Fall/Injury  Goal: Pace activities to prevent fatigue by end of the shift  Outcome: Progressing     Problem: Fall/Injury  Goal: Not fall by end of shift  Outcome: Progressing     Problem: Fall/Injury  Goal: Be free from injury by end of the shift  Outcome: Progressing

## 2025-03-09 VITALS
TEMPERATURE: 97.7 F | SYSTOLIC BLOOD PRESSURE: 178 MMHG | HEART RATE: 74 BPM | RESPIRATION RATE: 18 BRPM | DIASTOLIC BLOOD PRESSURE: 87 MMHG | BODY MASS INDEX: 35.65 KG/M2 | OXYGEN SATURATION: 98 % | HEIGHT: 71 IN | WEIGHT: 254.63 LBS

## 2025-03-09 LAB
ALBUMIN SERPL BCP-MCNC: 3.2 G/DL (ref 3.4–5)
ALP SERPL-CCNC: 50 U/L (ref 33–136)
ALT SERPL W P-5'-P-CCNC: 13 U/L (ref 10–52)
ANION GAP SERPL CALCULATED.3IONS-SCNC: 14 MMOL/L (ref 10–20)
AST SERPL W P-5'-P-CCNC: 12 U/L (ref 9–39)
BASOPHILS # BLD AUTO: 0.07 X10*3/UL (ref 0–0.1)
BASOPHILS NFR BLD AUTO: 0.6 %
BILIRUB SERPL-MCNC: 0.4 MG/DL (ref 0–1.2)
BUN SERPL-MCNC: 59 MG/DL (ref 6–23)
CALCIUM SERPL-MCNC: 8.7 MG/DL (ref 8.6–10.3)
CHLORIDE SERPL-SCNC: 113 MMOL/L (ref 98–107)
CO2 SERPL-SCNC: 18 MMOL/L (ref 21–32)
CREAT SERPL-MCNC: 6.96 MG/DL (ref 0.5–1.3)
EGFRCR SERPLBLD CKD-EPI 2021: 8 ML/MIN/1.73M*2
EOSINOPHIL # BLD AUTO: 0.62 X10*3/UL (ref 0–0.7)
EOSINOPHIL NFR BLD AUTO: 5.5 %
ERYTHROCYTE [DISTWIDTH] IN BLOOD BY AUTOMATED COUNT: 15.1 % (ref 11.5–14.5)
GLUCOSE SERPL-MCNC: 106 MG/DL (ref 74–99)
HCT VFR BLD AUTO: 23.7 % (ref 41–52)
HGB BLD-MCNC: 7.5 G/DL (ref 13.5–17.5)
IMM GRANULOCYTES # BLD AUTO: 0.2 X10*3/UL (ref 0–0.7)
IMM GRANULOCYTES NFR BLD AUTO: 1.8 % (ref 0–0.9)
LYMPHOCYTES # BLD AUTO: 1.38 X10*3/UL (ref 1.2–4.8)
LYMPHOCYTES NFR BLD AUTO: 12.1 %
MCH RBC QN AUTO: 28.8 PG (ref 26–34)
MCHC RBC AUTO-ENTMCNC: 31.6 G/DL (ref 32–36)
MCV RBC AUTO: 91 FL (ref 80–100)
MONOCYTES # BLD AUTO: 1.03 X10*3/UL (ref 0.1–1)
MONOCYTES NFR BLD AUTO: 9.1 %
NEUTROPHILS # BLD AUTO: 8.07 X10*3/UL (ref 1.2–7.7)
NEUTROPHILS NFR BLD AUTO: 70.9 %
NRBC BLD-RTO: 0 /100 WBCS (ref 0–0)
PHOSPHATE SERPL-MCNC: 4.6 MG/DL (ref 2.5–4.9)
PLATELET # BLD AUTO: 231 X10*3/UL (ref 150–450)
POTASSIUM SERPL-SCNC: 4 MMOL/L (ref 3.5–5.3)
PROT SERPL-MCNC: 5.7 G/DL (ref 6.4–8.2)
RBC # BLD AUTO: 2.6 X10*6/UL (ref 4.5–5.9)
SODIUM SERPL-SCNC: 141 MMOL/L (ref 136–145)
WBC # BLD AUTO: 11.4 X10*3/UL (ref 4.4–11.3)

## 2025-03-09 PROCEDURE — 80053 COMPREHEN METABOLIC PANEL: CPT | Performed by: SURGERY

## 2025-03-09 PROCEDURE — 85025 COMPLETE CBC W/AUTO DIFF WBC: CPT | Performed by: INTERNAL MEDICINE

## 2025-03-09 PROCEDURE — 99239 HOSP IP/OBS DSCHRG MGMT >30: CPT | Performed by: INTERNAL MEDICINE

## 2025-03-09 PROCEDURE — 2500000001 HC RX 250 WO HCPCS SELF ADMINISTERED DRUGS (ALT 637 FOR MEDICARE OP): Performed by: SURGERY

## 2025-03-09 PROCEDURE — 36415 COLL VENOUS BLD VENIPUNCTURE: CPT | Performed by: SURGERY

## 2025-03-09 PROCEDURE — 2500000004 HC RX 250 GENERAL PHARMACY W/ HCPCS (ALT 636 FOR OP/ED): Performed by: SURGERY

## 2025-03-09 PROCEDURE — 84100 ASSAY OF PHOSPHORUS: CPT | Performed by: INTERNAL MEDICINE

## 2025-03-09 RX ADMIN — CARVEDILOL 25 MG: 25 TABLET, FILM COATED ORAL at 09:30

## 2025-03-09 RX ADMIN — HYDRALAZINE HYDROCHLORIDE 100 MG: 50 TABLET ORAL at 09:30

## 2025-03-09 RX ADMIN — ASPIRIN 81 MG: 81 TABLET, COATED ORAL at 09:30

## 2025-03-09 RX ADMIN — SODIUM BICARBONATE 1300 MG: 650 TABLET ORAL at 09:29

## 2025-03-09 ASSESSMENT — COGNITIVE AND FUNCTIONAL STATUS - GENERAL
MOBILITY SCORE: 24
DAILY ACTIVITIY SCORE: 24

## 2025-03-09 ASSESSMENT — PAIN - FUNCTIONAL ASSESSMENT: PAIN_FUNCTIONAL_ASSESSMENT: 0-10

## 2025-03-09 ASSESSMENT — PAIN SCALES - GENERAL: PAINLEVEL_OUTOF10: 0 - NO PAIN

## 2025-03-09 NOTE — NURSING NOTE
Went in to do morning med pass and patient is sitting up in chair with clothing on. Removed heart monitor, refusing antibiotics. Says he does not want to wait to see nephrology and wants to leave now. Attending provider on floor so spoke with him to update him. He will be in to see patient shortly.

## 2025-03-09 NOTE — NURSING NOTE
Patient provided with discharge papers as well as follow up instructions. Went over discharge medications as well which have been called into his personal pharmacy already. IV was removed from the Rt forearm and patient tolerated well. Patient denied any other questions at this time, call light is in reach. Told him to ring when his ride was here to pick him up.

## 2025-03-09 NOTE — CARE PLAN
The patient's goals for the shift include      The clinical goals for the shift include Decrease nausea and dizziness, rest, stable VS    Over the shift, the patient did make progress toward the following goals.       Problem: Pain - Adult  Goal: Verbalizes/displays adequate comfort level or baseline comfort level  Outcome: Progressing     Problem: Safety - Adult  Goal: Free from fall injury  Outcome: Progressing     Problem: Discharge Planning  Goal: Discharge to home or other facility with appropriate resources  Outcome: Progressing     Problem: Chronic Conditions and Co-morbidities  Goal: Patient's chronic conditions and co-morbidity symptoms are monitored and maintained or improved  Outcome: Progressing     Problem: Pain  Goal: Takes deep breaths with improved pain control throughout the shift  Outcome: Progressing     Problem: Pain  Goal: Turns in bed with improved pain control throughout the shift  Outcome: Progressing     Problem: Pain  Goal: Free from opioid side effects throughout the shift  Outcome: Progressing     Problem: Diabetes  Goal: No changes in neurological exam by end of shift  Outcome: Progressing     Problem: Diabetes  Goal: Vital signs within normal range for age by end of shift  Outcome: Progressing     Problem: Fall/Injury  Goal: Pace activities to prevent fatigue by end of the shift  Outcome: Progressing

## 2025-03-09 NOTE — NURSING NOTE
Attempted to give patient his night time medications, but he was asleep and asked if he could take them later, I told him I can wait and I'd come back a little later to see how he's doing.

## 2025-03-09 NOTE — DISCHARGE SUMMARY
Discharge Diagnosis  Choledocholithiasis with acute cholecystitis    Issues Requiring Follow-Up  Cholelithiasis  Chronic kidney disease stage V  Hypertension      Discharge Meds     Medication List      START taking these medications     amoxicillin-pot clavulanate 500-125 mg tablet; Commonly known as:   Augmentin; Take 1 tablet by mouth 2 times a day for 4 days.   methocarbamol 500 mg tablet; Commonly known as: Robaxin; Take 1 tablet   (500 mg) by mouth every 8 hours if needed for muscle spasms for up to 5   days.   oxyCODONE 5 mg immediate release tablet; Commonly known as: Roxicodone;   Take 1 tablet (5 mg) by mouth every 6 hours if needed for severe pain (7 -   10) for up to 3 days.   sodium bicarbonate 650 mg tablet; Take 2 tablets (1,300 mg) by mouth 3   times a day.     CHANGE how you take these medications     acetaminophen 325 mg tablet; Commonly known as: Tylenol; Take 2 tablets   (650 mg) by mouth every 6 hours.; What changed: when to take this, reasons   to take this     CONTINUE taking these medications     aspirin 81 mg EC tablet   carvedilol 25 mg tablet; Commonly known as: Coreg; Take 1 tablet (25 mg)   by mouth 2 times a day.   ezetimibe 10 mg tablet; Commonly known as: Zetia; Take 1 tablet (10 mg)   by mouth once daily at bedtime.   hydrALAZINE 100 mg tablet; Commonly known as: Apresoline; Take 1 tablet   (100 mg) by mouth 3 times a day.     STOP taking these medications     alteplase 2 mg injection; Commonly known as: Cathflo Activase   atorvastatin 20 mg tablet; Commonly known as: Lipitor   Hep Flush-10 (PF) 10 unit/mL solution; Generic drug: heparin, porcine   (PF)   sodium chloride 0.9% solution   torsemide 20 mg tablet; Commonly known as: Demadex       Test Results Pending At Discharge  Pending Labs       Order Current Status    Surgical Pathology Exam In process            Hospital Course     Patient is a 59 years old male with past medical history of hypertension, stage V chronic kidney  disease who see Dr. Goldberg in regularly basis, diabetes mellitus type 2 on a diet.  Patient presented to ED complaining of abdominal pain.  CT abdomen pelvis done on admission revealed cholelithiasis, nonobstructing right renal calculus.  Patient had leukocytosis on admission, elevated liver enzymes.  He complained of feeling nauseated and right upper quadrant pain.  Ultrasound right upper quadrant revealed cholelithiasis.  Patient was evaluated by Dr. Zhang general surgery.  Patient had laparoscopic cholecystectomy.  She was treated with antibiotics.  He has been on Zosyn.I will discharge him on Augmentin for few more days   Patient has chronic kidney disease stage V.  Patient see Dr. Goldberg in regular basis.  After few days in hospital patient was feeling quite more comfortable.  He tolerated the diet well.  Dr. Max  was consulted from nephrology services.  Patient is on sodium bicarbonate.  Torsemide was on hold for few days.  Discussed with nephrology on the case today.  Continue with sodium bicarbonate, resume torsemide.  Patient was advised to see Dr. Goldberg in 1 - 2 weeks  During hospital stay patient developed vertigo, BPPV.  CT brain no acute intracranial findings.  Patient was evaluated by Dr. Alfaro..  Patient was advised to have vestibular therapy if he continued to have vertigo.  I advised the patient to follow-up with general surgery in 2 weeks, Dr. Goldberg and his primary care physician.  Patient is clinically hemodynamic stable to get discharged today.      Pertinent Physical Exam At Time of Discharge  Physical Exam  General: In non acute distress, cooperating during physical exam.  HEENT: Pupils are equal and reactive to light and commendation , oral mucosa moist, no JVD oral mucosa is moist.  Cardiovascular: Normal sinus rhythm, no MRG.  Lungs: Clear to auscultation bilaterally, no wheezing, no crackles, no dullness to percussion.  Abdomen: No hepatosplenomegaly appreciated, soft , not tender,  positive bowel sounds, positive bowel movement.  Neuro: Alert and oriented x3, strength in upper and lower extremities , sensation intact.  Psych: Patient had great insight was going on  Musculoskeletal: Trace edema in both lower extremities.  Vascular: Pulses are intact in upper and lower extremities  Skin: No petechiae, ecchymosis or other stigmata for dermatology disease.   Outpatient Follow-Up  No future appointments.    Follow-up with general surgery in 2 weeks  Follow-up with Dr. Goldberg, nephrologist in 2 weeks  Follow-up with PCP in 2 weeks    Time spent discharging patient 38 minutes  Marie Harris MD

## 2025-03-09 NOTE — NURSING NOTE
"Obtained shift report from nightshift RN, we did report outside the patient's room as he again said he was \"tired of people bothering him\" Patient refused his morning labs earlier lab will be up to try again later. Patient wants to DC today will see what providers say when they round later.  "

## 2025-03-09 NOTE — NURSING NOTE
Assumed care of pt around this time.  Pt is lying in bed trying to get sleep, does not want to be bothered right now.  No IV fluids running.  Telemetry monitoring in place.  Will be continuing to monitor.

## 2025-03-10 ENCOUNTER — HOSPITAL ENCOUNTER (INPATIENT)
Facility: HOSPITAL | Age: 60
LOS: 3 days | Discharge: HOME | End: 2025-03-13
Attending: STUDENT IN AN ORGANIZED HEALTH CARE EDUCATION/TRAINING PROGRAM | Admitting: SURGERY
Payer: COMMERCIAL

## 2025-03-10 ENCOUNTER — APPOINTMENT (OUTPATIENT)
Dept: RADIOLOGY | Facility: HOSPITAL | Age: 60
End: 2025-03-10
Payer: COMMERCIAL

## 2025-03-10 ENCOUNTER — APPOINTMENT (OUTPATIENT)
Dept: CARDIOLOGY | Facility: HOSPITAL | Age: 60
End: 2025-03-10
Payer: COMMERCIAL

## 2025-03-10 ENCOUNTER — PATIENT OUTREACH (OUTPATIENT)
Dept: PRIMARY CARE | Facility: CLINIC | Age: 60
End: 2025-03-10
Payer: COMMERCIAL

## 2025-03-10 DIAGNOSIS — E87.79 OTHER HYPERVOLEMIA: ICD-10-CM

## 2025-03-10 DIAGNOSIS — J18.9 PNEUMONIA DUE TO INFECTIOUS ORGANISM, UNSPECIFIED LATERALITY, UNSPECIFIED PART OF LUNG: ICD-10-CM

## 2025-03-10 DIAGNOSIS — N18.5 STAGE 5 CHRONIC KIDNEY DISEASE NOT ON CHRONIC DIALYSIS (MULTI): ICD-10-CM

## 2025-03-10 DIAGNOSIS — I16.1 HYPERTENSIVE EMERGENCY: Primary | ICD-10-CM

## 2025-03-10 DIAGNOSIS — I21.4 NSTEMI (NON-ST ELEVATED MYOCARDIAL INFARCTION) (MULTI): ICD-10-CM

## 2025-03-10 DIAGNOSIS — I22.2 SUBSEQUENT NON-ST ELEVATION (NSTEMI) MYOCARDIAL INFARCTION: ICD-10-CM

## 2025-03-10 LAB
ALBUMIN SERPL BCP-MCNC: 4 G/DL (ref 3.4–5)
ALP SERPL-CCNC: 66 U/L (ref 33–136)
ALT SERPL W P-5'-P-CCNC: 12 U/L (ref 10–52)
ANION GAP BLDA CALCULATED.4IONS-SCNC: 13 MMO/L (ref 10–25)
ANION GAP SERPL CALCULATED.3IONS-SCNC: 15 MMOL/L (ref 10–20)
APPEARANCE UR: CLEAR
APTT PPP: 31.3 SECONDS (ref 22–32.5)
APTT PPP: 63.9 SECONDS (ref 22–32.5)
ARTERIAL PATENCY WRIST A: POSITIVE
AST SERPL W P-5'-P-CCNC: 17 U/L (ref 9–39)
BASE EXCESS BLDA CALC-SCNC: -6.2 MMOL/L (ref -2–3)
BASOPHILS # BLD AUTO: 0.12 X10*3/UL (ref 0–0.1)
BASOPHILS NFR BLD AUTO: 0.5 %
BILIRUB SERPL-MCNC: 0.9 MG/DL (ref 0–1.2)
BILIRUB UR STRIP.AUTO-MCNC: NEGATIVE MG/DL
BNP SERPL-MCNC: 2726 PG/ML (ref 0–99)
BODY TEMPERATURE: 37 DEGREES CELSIUS
BUN SERPL-MCNC: 52 MG/DL (ref 6–23)
CA-I BLDA-SCNC: 1.35 MMOL/L (ref 1.1–1.33)
CALCIUM SERPL-MCNC: 9.8 MG/DL (ref 8.6–10.3)
CARDIAC TROPONIN I PNL SERPL HS: 1325 NG/L (ref 0–20)
CARDIAC TROPONIN I PNL SERPL HS: 710 NG/L (ref 0–20)
CARDIAC TROPONIN I PNL SERPL HS: 912 NG/L (ref 0–20)
CHLORIDE BLDA-SCNC: 113 MMOL/L (ref 98–107)
CHLORIDE SERPL-SCNC: 113 MMOL/L (ref 98–107)
CO2 SERPL-SCNC: 16 MMOL/L (ref 21–32)
COLOR UR: COLORLESS
CPAP: 10 CM H2O
CREAT SERPL-MCNC: 6.79 MG/DL (ref 0.5–1.3)
EGFRCR SERPLBLD CKD-EPI 2021: 9 ML/MIN/1.73M*2
EOSINOPHIL # BLD AUTO: 0.34 X10*3/UL (ref 0–0.7)
EOSINOPHIL NFR BLD AUTO: 1.5 %
ERYTHROCYTE [DISTWIDTH] IN BLOOD BY AUTOMATED COUNT: 15.6 % (ref 11.5–14.5)
FLUAV RNA RESP QL NAA+PROBE: NOT DETECTED
FLUBV RNA RESP QL NAA+PROBE: NOT DETECTED
GLUCOSE BLDA-MCNC: 155 MG/DL (ref 74–99)
GLUCOSE SERPL-MCNC: 144 MG/DL (ref 74–99)
GLUCOSE UR STRIP.AUTO-MCNC: ABNORMAL MG/DL
HCO3 BLDA-SCNC: 16.7 MMOL/L (ref 22–26)
HCT VFR BLD AUTO: 29.1 % (ref 41–52)
HCT VFR BLD EST: 27 % (ref 41–52)
HGB BLD-MCNC: 9.1 G/DL (ref 13.5–17.5)
HGB BLDA-MCNC: 8.9 G/DL (ref 13.5–17.5)
HOLD SPECIMEN: NORMAL
IMM GRANULOCYTES # BLD AUTO: 0.34 X10*3/UL (ref 0–0.7)
IMM GRANULOCYTES NFR BLD AUTO: 1.5 % (ref 0–0.9)
INHALED O2 CONCENTRATION: 35 %
KETONES UR STRIP.AUTO-MCNC: NEGATIVE MG/DL
LACTATE BLDA-SCNC: 1 MMOL/L (ref 0.4–2)
LACTATE SERPL-SCNC: 1.5 MMOL/L (ref 0.4–2)
LEUKOCYTE ESTERASE UR QL STRIP.AUTO: NEGATIVE
LYMPHOCYTES # BLD AUTO: 0.85 X10*3/UL (ref 1.2–4.8)
LYMPHOCYTES NFR BLD AUTO: 3.9 %
MAGNESIUM SERPL-MCNC: 1.69 MG/DL (ref 1.6–2.4)
MCH RBC QN AUTO: 28.4 PG (ref 26–34)
MCHC RBC AUTO-ENTMCNC: 31.3 G/DL (ref 32–36)
MCV RBC AUTO: 91 FL (ref 80–100)
MONOCYTES # BLD AUTO: 1.2 X10*3/UL (ref 0.1–1)
MONOCYTES NFR BLD AUTO: 5.4 %
MRSA DNA SPEC QL NAA+PROBE: NOT DETECTED
NEUTROPHILS # BLD AUTO: 19.21 X10*3/UL (ref 1.2–7.7)
NEUTROPHILS NFR BLD AUTO: 87.2 %
NITRITE UR QL STRIP.AUTO: NEGATIVE
NRBC BLD-RTO: 0 /100 WBCS (ref 0–0)
OXYHGB MFR BLDA: 96.2 % (ref 94–98)
PCO2 BLDA: 24 MM HG (ref 38–42)
PH BLDA: 7.45 PH (ref 7.38–7.42)
PH UR STRIP.AUTO: 6 [PH]
PLATELET # BLD AUTO: 345 X10*3/UL (ref 150–450)
PO2 BLDA: 84 MM HG (ref 85–95)
POTASSIUM BLDA-SCNC: 4.2 MMOL/L (ref 3.5–5.3)
POTASSIUM SERPL-SCNC: 4 MMOL/L (ref 3.5–5.3)
PROT SERPL-MCNC: 6.8 G/DL (ref 6.4–8.2)
PROT UR STRIP.AUTO-MCNC: ABNORMAL MG/DL
RBC # BLD AUTO: 3.2 X10*6/UL (ref 4.5–5.9)
RBC # UR STRIP.AUTO: NEGATIVE MG/DL
RBC #/AREA URNS AUTO: NORMAL /HPF
RSV RNA RESP QL NAA+PROBE: NOT DETECTED
SAO2 % BLDA: 98 % (ref 94–100)
SARS-COV-2 RNA RESP QL NAA+PROBE: NOT DETECTED
SODIUM BLDA-SCNC: 138 MMOL/L (ref 136–145)
SODIUM SERPL-SCNC: 140 MMOL/L (ref 136–145)
SP GR UR STRIP.AUTO: 1.01
SPECIMEN DRAWN FROM PATIENT: ABNORMAL
UROBILINOGEN UR STRIP.AUTO-MCNC: NORMAL MG/DL
VENTILATOR MODE: ABNORMAL
WBC # BLD AUTO: 22.1 X10*3/UL (ref 4.4–11.3)
WBC #/AREA URNS AUTO: NORMAL /HPF

## 2025-03-10 PROCEDURE — 2500000004 HC RX 250 GENERAL PHARMACY W/ HCPCS (ALT 636 FOR OP/ED)

## 2025-03-10 PROCEDURE — 85730 THROMBOPLASTIN TIME PARTIAL: CPT

## 2025-03-10 PROCEDURE — 99291 CRITICAL CARE FIRST HOUR: CPT | Performed by: STUDENT IN AN ORGANIZED HEALTH CARE EDUCATION/TRAINING PROGRAM

## 2025-03-10 PROCEDURE — 83735 ASSAY OF MAGNESIUM: CPT

## 2025-03-10 PROCEDURE — 84295 ASSAY OF SERUM SODIUM: CPT

## 2025-03-10 PROCEDURE — 2500000001 HC RX 250 WO HCPCS SELF ADMINISTERED DRUGS (ALT 637 FOR MEDICARE OP)

## 2025-03-10 PROCEDURE — 84145 PROCALCITONIN (PCT): CPT | Mod: WESLAB

## 2025-03-10 PROCEDURE — 83880 ASSAY OF NATRIURETIC PEPTIDE: CPT | Performed by: STUDENT IN AN ORGANIZED HEALTH CARE EDUCATION/TRAINING PROGRAM

## 2025-03-10 PROCEDURE — 93010 ELECTROCARDIOGRAM REPORT: CPT | Performed by: INTERNAL MEDICINE

## 2025-03-10 PROCEDURE — 87075 CULTR BACTERIA EXCEPT BLOOD: CPT | Mod: WESLAB | Performed by: STUDENT IN AN ORGANIZED HEALTH CARE EDUCATION/TRAINING PROGRAM

## 2025-03-10 PROCEDURE — 99291 CRITICAL CARE FIRST HOUR: CPT

## 2025-03-10 PROCEDURE — 2500000004 HC RX 250 GENERAL PHARMACY W/ HCPCS (ALT 636 FOR OP/ED): Performed by: STUDENT IN AN ORGANIZED HEALTH CARE EDUCATION/TRAINING PROGRAM

## 2025-03-10 PROCEDURE — 94799 UNLISTED PULMONARY SVC/PX: CPT

## 2025-03-10 PROCEDURE — 71250 CT THORAX DX C-: CPT | Mod: FOREIGN READ | Performed by: RADIOLOGY

## 2025-03-10 PROCEDURE — 71045 X-RAY EXAM CHEST 1 VIEW: CPT

## 2025-03-10 PROCEDURE — 36415 COLL VENOUS BLD VENIPUNCTURE: CPT | Performed by: STUDENT IN AN ORGANIZED HEALTH CARE EDUCATION/TRAINING PROGRAM

## 2025-03-10 PROCEDURE — 81001 URINALYSIS AUTO W/SCOPE: CPT | Performed by: STUDENT IN AN ORGANIZED HEALTH CARE EDUCATION/TRAINING PROGRAM

## 2025-03-10 PROCEDURE — 80069 RENAL FUNCTION PANEL: CPT | Mod: CCI | Performed by: STUDENT IN AN ORGANIZED HEALTH CARE EDUCATION/TRAINING PROGRAM

## 2025-03-10 PROCEDURE — 36600 WITHDRAWAL OF ARTERIAL BLOOD: CPT

## 2025-03-10 PROCEDURE — 84484 ASSAY OF TROPONIN QUANT: CPT | Performed by: SURGERY

## 2025-03-10 PROCEDURE — 2500000004 HC RX 250 GENERAL PHARMACY W/ HCPCS (ALT 636 FOR OP/ED): Performed by: SURGERY

## 2025-03-10 PROCEDURE — 2500000005 HC RX 250 GENERAL PHARMACY W/O HCPCS: Performed by: STUDENT IN AN ORGANIZED HEALTH CARE EDUCATION/TRAINING PROGRAM

## 2025-03-10 PROCEDURE — 2500000002 HC RX 250 W HCPCS SELF ADMINISTERED DRUGS (ALT 637 FOR MEDICARE OP, ALT 636 FOR OP/ED)

## 2025-03-10 PROCEDURE — 87640 STAPH A DNA AMP PROBE: CPT

## 2025-03-10 PROCEDURE — 83605 ASSAY OF LACTIC ACID: CPT | Performed by: STUDENT IN AN ORGANIZED HEALTH CARE EDUCATION/TRAINING PROGRAM

## 2025-03-10 PROCEDURE — 71045 X-RAY EXAM CHEST 1 VIEW: CPT | Mod: FOREIGN READ | Performed by: RADIOLOGY

## 2025-03-10 PROCEDURE — 82435 ASSAY OF BLOOD CHLORIDE: CPT | Performed by: STUDENT IN AN ORGANIZED HEALTH CARE EDUCATION/TRAINING PROGRAM

## 2025-03-10 PROCEDURE — 83735 ASSAY OF MAGNESIUM: CPT | Performed by: STUDENT IN AN ORGANIZED HEALTH CARE EDUCATION/TRAINING PROGRAM

## 2025-03-10 PROCEDURE — 84484 ASSAY OF TROPONIN QUANT: CPT | Performed by: STUDENT IN AN ORGANIZED HEALTH CARE EDUCATION/TRAINING PROGRAM

## 2025-03-10 PROCEDURE — 87205 SMEAR GRAM STAIN: CPT | Mod: WESLAB

## 2025-03-10 PROCEDURE — 99285 EMERGENCY DEPT VISIT HI MDM: CPT | Mod: 25 | Performed by: STUDENT IN AN ORGANIZED HEALTH CARE EDUCATION/TRAINING PROGRAM

## 2025-03-10 PROCEDURE — 87899 AGENT NOS ASSAY W/OPTIC: CPT | Mod: WESLAB

## 2025-03-10 PROCEDURE — 87449 NOS EACH ORGANISM AG IA: CPT | Mod: WESLAB

## 2025-03-10 PROCEDURE — 2020000001 HC ICU ROOM DAILY

## 2025-03-10 PROCEDURE — 96375 TX/PRO/DX INJ NEW DRUG ADDON: CPT

## 2025-03-10 PROCEDURE — 85025 COMPLETE CBC W/AUTO DIFF WBC: CPT | Performed by: STUDENT IN AN ORGANIZED HEALTH CARE EDUCATION/TRAINING PROGRAM

## 2025-03-10 PROCEDURE — 74176 CT ABD & PELVIS W/O CONTRAST: CPT | Mod: FOREIGN READ | Performed by: RADIOLOGY

## 2025-03-10 PROCEDURE — 94660 CPAP INITIATION&MGMT: CPT

## 2025-03-10 PROCEDURE — 93005 ELECTROCARDIOGRAM TRACING: CPT

## 2025-03-10 PROCEDURE — 71250 CT THORAX DX C-: CPT

## 2025-03-10 PROCEDURE — 9420000001 HC RT PATIENT EDUCATION 5 MIN

## 2025-03-10 PROCEDURE — 96365 THER/PROPH/DIAG IV INF INIT: CPT

## 2025-03-10 PROCEDURE — 87637 SARSCOV2&INF A&B&RSV AMP PRB: CPT

## 2025-03-10 RX ORDER — NITROGLYCERIN 20 MG/100ML
5-200 INJECTION INTRAVENOUS CONTINUOUS
Status: DISCONTINUED | OUTPATIENT
Start: 2025-03-10 | End: 2025-03-10

## 2025-03-10 RX ORDER — ACETAMINOPHEN 325 MG/1
650 TABLET ORAL EVERY 6 HOURS PRN
Status: DISCONTINUED | OUTPATIENT
Start: 2025-03-10 | End: 2025-03-13 | Stop reason: HOSPADM

## 2025-03-10 RX ORDER — HEPARIN SODIUM 5000 [USP'U]/ML
4000 INJECTION, SOLUTION INTRAVENOUS; SUBCUTANEOUS ONCE
Status: COMPLETED | OUTPATIENT
Start: 2025-03-10 | End: 2025-03-10

## 2025-03-10 RX ORDER — VANCOMYCIN HYDROCHLORIDE 1 G/20ML
INJECTION, POWDER, LYOPHILIZED, FOR SOLUTION INTRAVENOUS DAILY PRN
Status: DISCONTINUED | OUTPATIENT
Start: 2025-03-10 | End: 2025-03-11

## 2025-03-10 RX ORDER — FUROSEMIDE 10 MG/ML
60 INJECTION INTRAMUSCULAR; INTRAVENOUS ONCE
Status: COMPLETED | OUTPATIENT
Start: 2025-03-10 | End: 2025-03-10

## 2025-03-10 RX ORDER — FUROSEMIDE 10 MG/ML
100 INJECTION INTRAMUSCULAR; INTRAVENOUS ONCE
Status: COMPLETED | OUTPATIENT
Start: 2025-03-10 | End: 2025-03-10

## 2025-03-10 RX ORDER — ASPIRIN 81 MG/1
81 TABLET ORAL DAILY
Status: DISCONTINUED | OUTPATIENT
Start: 2025-03-11 | End: 2025-03-13 | Stop reason: HOSPADM

## 2025-03-10 RX ORDER — VANCOMYCIN 2 G/400ML
2 INJECTION, SOLUTION INTRAVENOUS ONCE
Status: DISCONTINUED | OUTPATIENT
Start: 2025-03-10 | End: 2025-03-10

## 2025-03-10 RX ORDER — MAGNESIUM SULFATE HEPTAHYDRATE 40 MG/ML
2 INJECTION, SOLUTION INTRAVENOUS ONCE
Status: COMPLETED | OUTPATIENT
Start: 2025-03-10 | End: 2025-03-11

## 2025-03-10 RX ORDER — ONDANSETRON HYDROCHLORIDE 2 MG/ML
4 INJECTION, SOLUTION INTRAVENOUS EVERY 6 HOURS PRN
Status: DISCONTINUED | OUTPATIENT
Start: 2025-03-10 | End: 2025-03-10

## 2025-03-10 RX ORDER — HYDRALAZINE HYDROCHLORIDE 50 MG/1
100 TABLET, FILM COATED ORAL 3 TIMES DAILY
Status: DISCONTINUED | OUTPATIENT
Start: 2025-03-10 | End: 2025-03-13 | Stop reason: HOSPADM

## 2025-03-10 RX ORDER — VANCOMYCIN 1.5 G/300ML
1500 INJECTION, SOLUTION INTRAVENOUS ONCE
Status: COMPLETED | OUTPATIENT
Start: 2025-03-10 | End: 2025-03-10

## 2025-03-10 RX ORDER — SODIUM CHLORIDE 9 MG/ML
150 INJECTION, SOLUTION INTRAVENOUS CONTINUOUS
Status: DISCONTINUED | OUTPATIENT
Start: 2025-03-10 | End: 2025-03-10

## 2025-03-10 RX ORDER — HYDRALAZINE HYDROCHLORIDE 20 MG/ML
10 INJECTION INTRAMUSCULAR; INTRAVENOUS ONCE
Status: COMPLETED | OUTPATIENT
Start: 2025-03-10 | End: 2025-03-10

## 2025-03-10 RX ORDER — MAGNESIUM SULFATE HEPTAHYDRATE 40 MG/ML
2 INJECTION, SOLUTION INTRAVENOUS ONCE
Status: COMPLETED | OUTPATIENT
Start: 2025-03-10 | End: 2025-03-10

## 2025-03-10 RX ORDER — HEPARIN SODIUM 5000 [USP'U]/ML
2000-4000 INJECTION, SOLUTION INTRAVENOUS; SUBCUTANEOUS AS NEEDED
Status: DISCONTINUED | OUTPATIENT
Start: 2025-03-10 | End: 2025-03-11

## 2025-03-10 RX ORDER — FUROSEMIDE 10 MG/ML
40 INJECTION INTRAMUSCULAR; INTRAVENOUS ONCE
Status: COMPLETED | OUTPATIENT
Start: 2025-03-10 | End: 2025-03-10

## 2025-03-10 RX ORDER — POLYETHYLENE GLYCOL 3350 17 G/17G
17 POWDER, FOR SOLUTION ORAL DAILY PRN
Status: DISCONTINUED | OUTPATIENT
Start: 2025-03-11 | End: 2025-03-13 | Stop reason: HOSPADM

## 2025-03-10 RX ORDER — TORSEMIDE 20 MG/1
20 TABLET ORAL
Status: DISCONTINUED | OUTPATIENT
Start: 2025-03-10 | End: 2025-03-10 | Stop reason: SDDI

## 2025-03-10 RX ORDER — EZETIMIBE 10 MG/1
10 TABLET ORAL NIGHTLY
Status: DISCONTINUED | OUTPATIENT
Start: 2025-03-10 | End: 2025-03-13 | Stop reason: HOSPADM

## 2025-03-10 RX ORDER — SODIUM BICARBONATE 650 MG/1
1300 TABLET ORAL 3 TIMES DAILY
Status: DISCONTINUED | OUTPATIENT
Start: 2025-03-10 | End: 2025-03-13 | Stop reason: HOSPADM

## 2025-03-10 RX ORDER — ASPIRIN 81 MG/1
81 TABLET ORAL DAILY
Status: DISCONTINUED | OUTPATIENT
Start: 2025-03-10 | End: 2025-03-10

## 2025-03-10 RX ORDER — ONDANSETRON HYDROCHLORIDE 2 MG/ML
4 INJECTION, SOLUTION INTRAVENOUS EVERY 4 HOURS PRN
Status: DISCONTINUED | OUTPATIENT
Start: 2025-03-10 | End: 2025-03-13 | Stop reason: HOSPADM

## 2025-03-10 RX ORDER — OXYCODONE HYDROCHLORIDE 5 MG/1
5 TABLET ORAL EVERY 6 HOURS PRN
Status: DISCONTINUED | OUTPATIENT
Start: 2025-03-10 | End: 2025-03-13 | Stop reason: HOSPADM

## 2025-03-10 RX ORDER — HEPARIN SODIUM 10000 [USP'U]/100ML
0-4000 INJECTION, SOLUTION INTRAVENOUS CONTINUOUS
Status: DISCONTINUED | OUTPATIENT
Start: 2025-03-10 | End: 2025-03-11

## 2025-03-10 RX ORDER — METHOCARBAMOL 500 MG/1
500 TABLET, FILM COATED ORAL EVERY 8 HOURS PRN
Status: DISCONTINUED | OUTPATIENT
Start: 2025-03-10 | End: 2025-03-13 | Stop reason: HOSPADM

## 2025-03-10 RX ORDER — NITROGLYCERIN 20 MG/100ML
5-200 INJECTION INTRAVENOUS CONTINUOUS
Status: DISCONTINUED | OUTPATIENT
Start: 2025-03-10 | End: 2025-03-11

## 2025-03-10 RX ORDER — CARVEDILOL 25 MG/1
25 TABLET ORAL 2 TIMES DAILY
Status: DISCONTINUED | OUTPATIENT
Start: 2025-03-10 | End: 2025-03-13 | Stop reason: HOSPADM

## 2025-03-10 RX ADMIN — HYDRALAZINE HYDROCHLORIDE 100 MG: 50 TABLET ORAL at 18:47

## 2025-03-10 RX ADMIN — ONDANSETRON 4 MG: 2 INJECTION, SOLUTION INTRAMUSCULAR; INTRAVENOUS at 19:19

## 2025-03-10 RX ADMIN — SODIUM BICARBONATE 1300 MG: 650 TABLET ORAL at 20:51

## 2025-03-10 RX ADMIN — NITROGLYCERIN 5 MCG/MIN: 20 INJECTION INTRAVENOUS at 14:41

## 2025-03-10 RX ADMIN — FUROSEMIDE 40 MG: 10 INJECTION, SOLUTION INTRAMUSCULAR; INTRAVENOUS at 16:49

## 2025-03-10 RX ADMIN — MAGNESIUM SULFATE IN WATER FOR 2 G: 40 INJECTION INTRAVENOUS at 19:38

## 2025-03-10 RX ADMIN — HEPARIN SODIUM 4000 UNITS: 5000 INJECTION, SOLUTION INTRAVENOUS; SUBCUTANEOUS at 17:29

## 2025-03-10 RX ADMIN — ACETAMINOPHEN 650 MG: 325 TABLET, FILM COATED ORAL at 23:15

## 2025-03-10 RX ADMIN — PIPERACILLIN SODIUM AND TAZOBACTAM SODIUM 4.5 G: 4; .5 INJECTION, SOLUTION INTRAVENOUS at 14:03

## 2025-03-10 RX ADMIN — EZETIMIBE 10 MG: 10 TABLET ORAL at 20:54

## 2025-03-10 RX ADMIN — Medication 21 PERCENT: at 20:14

## 2025-03-10 RX ADMIN — VANCOMYCIN 1.5 G: 1.5 INJECTION, SOLUTION INTRAVENOUS at 14:34

## 2025-03-10 RX ADMIN — FUROSEMIDE 100 MG: 10 INJECTION, SOLUTION INTRAMUSCULAR; INTRAVENOUS at 20:51

## 2025-03-10 RX ADMIN — MEROPENEM 500 MG: 500 INJECTION, POWDER, FOR SOLUTION INTRAVENOUS at 18:45

## 2025-03-10 RX ADMIN — HYDRALAZINE HYDROCHLORIDE 10 MG: 20 INJECTION INTRAMUSCULAR; INTRAVENOUS at 13:38

## 2025-03-10 RX ADMIN — MAGNESIUM SULFATE IN WATER FOR 2 G: 40 INJECTION INTRAVENOUS at 22:15

## 2025-03-10 RX ADMIN — SODIUM BICARBONATE 1300 MG: 650 TABLET ORAL at 18:45

## 2025-03-10 RX ADMIN — SODIUM CHLORIDE 150 ML/HR: 9 INJECTION, SOLUTION INTRAVENOUS at 14:03

## 2025-03-10 RX ADMIN — HEPARIN SODIUM 1000 UNITS/HR: 10000 INJECTION, SOLUTION INTRAVENOUS at 17:29

## 2025-03-10 RX ADMIN — Medication 35 PERCENT: at 13:27

## 2025-03-10 RX ADMIN — FUROSEMIDE 60 MG: 10 INJECTION, SOLUTION INTRAVENOUS at 13:38

## 2025-03-10 RX ADMIN — HYDRALAZINE HYDROCHLORIDE 100 MG: 50 TABLET ORAL at 20:51

## 2025-03-10 SDOH — HEALTH STABILITY: PHYSICAL HEALTH: ON AVERAGE, HOW MANY MINUTES DO YOU ENGAGE IN EXERCISE AT THIS LEVEL?: 0 MIN

## 2025-03-10 SDOH — ECONOMIC STABILITY: INCOME INSECURITY: IN THE PAST 12 MONTHS HAS THE ELECTRIC, GAS, OIL, OR WATER COMPANY THREATENED TO SHUT OFF SERVICES IN YOUR HOME?: NO

## 2025-03-10 SDOH — ECONOMIC STABILITY: FOOD INSECURITY: WITHIN THE PAST 12 MONTHS, THE FOOD YOU BOUGHT JUST DIDN'T LAST AND YOU DIDN'T HAVE MONEY TO GET MORE.: NEVER TRUE

## 2025-03-10 SDOH — SOCIAL STABILITY: SOCIAL INSECURITY: ARE THERE ANY APPARENT SIGNS OF INJURIES/BEHAVIORS THAT COULD BE RELATED TO ABUSE/NEGLECT?: NO

## 2025-03-10 SDOH — SOCIAL STABILITY: SOCIAL INSECURITY: WITHIN THE LAST YEAR, HAVE YOU BEEN HUMILIATED OR EMOTIONALLY ABUSED IN OTHER WAYS BY YOUR PARTNER OR EX-PARTNER?: NO

## 2025-03-10 SDOH — ECONOMIC STABILITY: FOOD INSECURITY: WITHIN THE PAST 12 MONTHS, YOU WORRIED THAT YOUR FOOD WOULD RUN OUT BEFORE YOU GOT THE MONEY TO BUY MORE.: NEVER TRUE

## 2025-03-10 SDOH — SOCIAL STABILITY: SOCIAL INSECURITY: WITHIN THE LAST YEAR, HAVE YOU BEEN AFRAID OF YOUR PARTNER OR EX-PARTNER?: NO

## 2025-03-10 SDOH — SOCIAL STABILITY: SOCIAL INSECURITY: WERE YOU ABLE TO COMPLETE ALL THE BEHAVIORAL HEALTH SCREENINGS?: YES

## 2025-03-10 SDOH — SOCIAL STABILITY: SOCIAL INSECURITY: DOES ANYONE TRY TO KEEP YOU FROM HAVING/CONTACTING OTHER FRIENDS OR DOING THINGS OUTSIDE YOUR HOME?: NO

## 2025-03-10 SDOH — SOCIAL STABILITY: SOCIAL INSECURITY: ARE YOU OR HAVE YOU BEEN THREATENED OR ABUSED PHYSICALLY, EMOTIONALLY, OR SEXUALLY BY ANYONE?: NO

## 2025-03-10 SDOH — SOCIAL STABILITY: SOCIAL INSECURITY: DO YOU FEEL ANYONE HAS EXPLOITED OR TAKEN ADVANTAGE OF YOU FINANCIALLY OR OF YOUR PERSONAL PROPERTY?: NO

## 2025-03-10 SDOH — HEALTH STABILITY: PHYSICAL HEALTH: ON AVERAGE, HOW MANY DAYS PER WEEK DO YOU ENGAGE IN MODERATE TO STRENUOUS EXERCISE (LIKE A BRISK WALK)?: 0 DAYS

## 2025-03-10 SDOH — SOCIAL STABILITY: SOCIAL INSECURITY: HAVE YOU HAD THOUGHTS OF HARMING ANYONE ELSE?: NO

## 2025-03-10 SDOH — SOCIAL STABILITY: SOCIAL INSECURITY: HAVE YOU HAD ANY THOUGHTS OF HARMING ANYONE ELSE?: NO

## 2025-03-10 SDOH — SOCIAL STABILITY: SOCIAL INSECURITY: DO YOU FEEL UNSAFE GOING BACK TO THE PLACE WHERE YOU ARE LIVING?: NO

## 2025-03-10 SDOH — SOCIAL STABILITY: SOCIAL INSECURITY: ABUSE: ADULT

## 2025-03-10 SDOH — SOCIAL STABILITY: SOCIAL INSECURITY: HAS ANYONE EVER THREATENED TO HURT YOUR FAMILY OR YOUR PETS?: NO

## 2025-03-10 ASSESSMENT — COGNITIVE AND FUNCTIONAL STATUS - GENERAL
DAILY ACTIVITIY SCORE: 24
MOBILITY SCORE: 24
PATIENT BASELINE BEDBOUND: NO

## 2025-03-10 ASSESSMENT — ACTIVITIES OF DAILY LIVING (ADL)
HEARING - RIGHT EAR: FUNCTIONAL
WALKS IN HOME: INDEPENDENT
LACK_OF_TRANSPORTATION: NO
PATIENT'S MEMORY ADEQUATE TO SAFELY COMPLETE DAILY ACTIVITIES?: YES
ADEQUATE_TO_COMPLETE_ADL: YES
FEEDING YOURSELF: INDEPENDENT
GROOMING: INDEPENDENT
TOILETING: INDEPENDENT
BATHING: INDEPENDENT
JUDGMENT_ADEQUATE_SAFELY_COMPLETE_DAILY_ACTIVITIES: YES
DRESSING YOURSELF: INDEPENDENT
HEARING - LEFT EAR: FUNCTIONAL

## 2025-03-10 ASSESSMENT — PAIN SCALES - GENERAL
PAINLEVEL_OUTOF10: 3
PAINLEVEL_OUTOF10: 0 - NO PAIN
PAINLEVEL_OUTOF10: 0 - NO PAIN

## 2025-03-10 ASSESSMENT — PAIN - FUNCTIONAL ASSESSMENT
PAIN_FUNCTIONAL_ASSESSMENT: 0-10

## 2025-03-10 ASSESSMENT — PATIENT HEALTH QUESTIONNAIRE - PHQ9
SUM OF ALL RESPONSES TO PHQ9 QUESTIONS 1 & 2: 0
2. FEELING DOWN, DEPRESSED OR HOPELESS: NOT AT ALL
1. LITTLE INTEREST OR PLEASURE IN DOING THINGS: NOT AT ALL

## 2025-03-10 ASSESSMENT — LIFESTYLE VARIABLES
HOW OFTEN DO YOU HAVE 6 OR MORE DRINKS ON ONE OCCASION: NEVER
HOW OFTEN DO YOU HAVE A DRINK CONTAINING ALCOHOL: NEVER
AUDIT-C TOTAL SCORE: 0
AUDIT-C TOTAL SCORE: 0
HOW MANY STANDARD DRINKS CONTAINING ALCOHOL DO YOU HAVE ON A TYPICAL DAY: PATIENT DOES NOT DRINK
SKIP TO QUESTIONS 9-10: 1

## 2025-03-10 ASSESSMENT — COLUMBIA-SUICIDE SEVERITY RATING SCALE - C-SSRS
1. IN THE PAST MONTH, HAVE YOU WISHED YOU WERE DEAD OR WISHED YOU COULD GO TO SLEEP AND NOT WAKE UP?: NO
6. HAVE YOU EVER DONE ANYTHING, STARTED TO DO ANYTHING, OR PREPARED TO DO ANYTHING TO END YOUR LIFE?: NO
2. HAVE YOU ACTUALLY HAD ANY THOUGHTS OF KILLING YOURSELF?: NO
6. HAVE YOU EVER DONE ANYTHING, STARTED TO DO ANYTHING, OR PREPARED TO DO ANYTHING TO END YOUR LIFE?: NO
1. IN THE PAST MONTH, HAVE YOU WISHED YOU WERE DEAD OR WISHED YOU COULD GO TO SLEEP AND NOT WAKE UP?: NO
2. HAVE YOU ACTUALLY HAD ANY THOUGHTS OF KILLING YOURSELF?: NO

## 2025-03-10 ASSESSMENT — PAIN DESCRIPTION - LOCATION: LOCATION: HEAD

## 2025-03-10 NOTE — PROGRESS NOTES
Sepsis Alert @ 1319    -Patient presents to ED with respiratory distress  -, RR 30  -WBC 22.1, Lactate 1.5    -IV Zosyn 4.5 gm given @ 1403  -IV Vancomycin 1.5 gm given @ 1434  -IV NS started @ 1403 (150 mls/hr) pt has CHF    Hayde Adams, PharmD

## 2025-03-10 NOTE — PROGRESS NOTES
Colin Leonard is a 59 y.o. male on day 0 of admission presenting with Hypertensive emergency.    RNCC attempted to meet with patient. He was asleep and on BiPaP. Will try again at a later time.       Sheba Koch RN

## 2025-03-10 NOTE — ED NOTES
Discussed renal fx concerns with MD in regards to IVF and vanco dosing.      Anisha Kunz RN  03/10/25 4027

## 2025-03-10 NOTE — H&P
[facility] Critical Care Medicine       Date:  3/10/2025  Patient:  Colin Leonard  YOB: 1965  MRN:  [id number]   Admit Date:  3/10/2025  Hospital Length of Stay: 0   ICU Length of Stay: 30m       Chief Complaint   Patient presents with   • Respiratory Distress         History of Present Illness:  Colin Leonard is a 59 y.o. year old male patient with past medical history of  HTN, CKD stage V, T2DM.,CAD, HLD, osteomyelitis s/p toe amputation,  who presented to [facility] ED with complaints of shortness of breath that started this morning. Of note, he was discharged from this facility yesterday after being admitted for laparoscopic cholecystectomy.     ED Course:  Initial vital signs- /143, HR 99, Tmax 36.4 , RR 30 , SpO2 85% on RA. Placed on 6L NC, with an increase in SpO2 to 93%. Work-up significant for leukocytosis, WBC 22.1, normocytic anemia. Lactate within normal limits. BNP 2726. Renal failure with creatine at 6.79, which is near his baseline. Troponin elevated at 710-->912. Placed on CPAP 10 FiO2 35%. ABG 7.45/24/84/16. Given hydralazine 10mg IV and furosemide 60mg IV with modest improvement in /103. Subsequently placed on nitroglycerin drip. Triggered sepsis criteria with WBC and RR and was given vancomycin, zosyn and IVF.       Interval ICU Events:  3/10: Arrived to ICU on CPAP and nitroglycerin gtt. Will start on heaprin gtt for NSTEMI and diuretic challenge for treatment of pulmonary edema iso CKD.     Objective     Medical History:  Past Medical History:   Diagnosis Date   • Coronary artery disease    • Diabetes mellitus (Multi)    • DVT (deep venous thrombosis) (Multi)    • Hypertension    • MI (myocardial infarction) (Multi)      Past Surgical History:   Procedure Laterality Date   • AMPUTATION     • CHOLECYSTECTOMY  03/05/2025    Laparoscopic Cholecystectomy     Medications Prior to Admission   Medication Sig Dispense Refill Last Dose/Taking   • acetaminophen (Tylenol)  325 mg tablet Take 2 tablets (650 mg) by mouth every 6 hours. 15 tablet 0 3/9/2025   • carvedilol (Coreg) 25 mg tablet Take 1 tablet (25 mg) by mouth 2 times a day. 180 tablet 3 3/9/2025   • ezetimibe (Zetia) 10 mg tablet Take 1 tablet (10 mg) by mouth once daily at bedtime. 90 tablet 3 3/9/2025   • hydrALAZINE (Apresoline) 100 mg tablet Take 1 tablet (100 mg) by mouth 3 times a day. 270 tablet 3 3/9/2025   • amoxicillin-pot clavulanate (Augmentin) 500-125 mg tablet Take 1 tablet by mouth 2 times a day for 4 days. 8 tablet 0    • aspirin 81 mg EC tablet Take 1 tablet (81 mg) by mouth once daily.   Unknown   • methocarbamol (Robaxin) 500 mg tablet Take 1 tablet (500 mg) by mouth every 8 hours if needed for muscle spasms for up to 5 days. 12 tablet 0    • oxyCODONE (Roxicodone) 5 mg immediate release tablet Take 1 tablet (5 mg) by mouth every 6 hours if needed for severe pain (7 - 10) for up to 3 days. 12 tablet 0    • sodium bicarbonate 650 mg tablet Take 2 tablets (1,300 mg) by mouth 3 times a day. 180 tablet 1    • torsemide (Demadex) 20 mg tablet Take 2 Tablets by Mouth Twice Daily (Patient not taking: Reported on 3/10/2025) 240 tablet 1 Not Taking     Amlodipine besylate  Social History     Tobacco Use   • Smoking status: Never   • Smokeless tobacco: Never     No family history on file.    Hospital Medications:    heparin, 0-4,000 Units/hr  nitroglycerin, 5-200 mcg/min          Current Facility-Administered Medications:   •  heparin (porcine) injection 2,000-4,000 Units, 2,000-4,000 Units, intravenous, PRN, KARISHMA Gutierrez  •  heparin (porcine) injection 4,000 Units, 4,000 Units, intravenous, Once, KARISHMA Gutierrez  •  heparin 25,000 Units in dextrose 5% 250 mL (100 Units/mL) infusion (premix), 0-4,000 Units/hr, intravenous, Continuous, Mary M Farzad, APRN-CNP  •  nitroglycerin (Tridil) 50 mg in dextrose 5% 250 mL (0.2 mg/mL) infusion (premix), 5-200 mcg/min, intravenous, Continuous,  "Jeffrey Sprague PA-C  •  oxygen (O2) therapy, , inhalation, Continuous - Inhalation, Jesus Borden MD, 35 percent at 03/10/25 1327    Review of Systems:  14 point review of systems was completed and negative except for those specially mention in my HPI    Physical Exam:    Heart Rate:  []   Temp:  [36.4 °C (97.5 °F)]   Resp:  [14-32]   BP: (181-223)/()   Height:  [180.3 cm (5' 11\")]   Weight:  [113 kg (250 lb)]   SpO2:  [85 %-100 %]     Physical Exam  Constitutional:       General: He is in acute distress.      Appearance: He is ill-appearing.   Eyes:      Conjunctiva/sclera: Conjunctivae normal.   Cardiovascular:      Rate and Rhythm: Normal rate and regular rhythm.      Pulses: Normal pulses.      Heart sounds: Normal heart sounds.   Pulmonary:      Effort: Tachypnea and respiratory distress present.      Comments: Diminished bilaterally  Abdominal:      Palpations: Abdomen is soft.      Tenderness: There is abdominal tenderness. There is guarding.      Comments: Mild tenderness RUQ s/p lap jr   Musculoskeletal:         General: No tenderness or deformity. Normal range of motion.      Cervical back: Normal range of motion.      Comments: Trace non-pitting edema to BLE   Skin:     General: Skin is warm and dry.      Coloration: Skin is not jaundiced.   Neurological:      Mental Status: He is alert and oriented to person, place, and time.   Psychiatric:         Mood and Affect: Mood normal.         Behavior: Behavior normal.       Objective:    I have reviewed all medications, laboratory results, and imaging pertinent for today's encounter.    S RR:  [14] 14      Intake/Output Summary (Last 24 hours) at 3/10/2025 1638  Last data filed at 3/10/2025 1627  Gross per 24 hour   Intake 360 ml   Output 500 ml   Net -140 ml       Daily Labs:  CBC:   Results from last 7 days   Lab Units 03/10/25  1322 03/09/25  0755   WBC AUTO x10*3/uL 22.1* 11.4*   HEMOGLOBIN g/dL 9.1* 7.5*   HEMATOCRIT % 29.1* 23.7* "   MCV fL 91 91     BMP:    Results from last 7 days   Lab Units 03/10/25  1322 03/09/25  0755 03/04/25  0606 03/03/25  2218   SODIUM mmol/L 140 141   < > 139   POTASSIUM mmol/L 4.0 4.0   < > 4.6   CHLORIDE mmol/L 113* 113*   < > 112*   CO2 mmol/L 16* 18*   < > 17*   BUN mg/dL 52* 59*   < > 80*   CREATININE mg/dL 6.79* 6.96*   < > 6.49*   CALCIUM mg/dL 9.8 8.7   < > 9.1   GLUCOSE mg/dL 144* 106*   < > 180*   MAGNESIUM mg/dL 1.69  --   --  1.97    < > = values in this interval not displayed.     ABG:   Results from last 7 days   Lab Units 03/10/25  1328   POCT PH, ARTERIAL pH 7.45*   POCT PCO2, ARTERIAL mm Hg 24*   POCT PO2, ARTERIAL mm Hg 84*   POCT SO2, ARTERIAL % 98   POCT HCO3 CALCULATED, ARTERIAL mmol/L 16.7*   POCT LACTATE, ARTERIAL mmol/L 1.0      VBG:             Assessment/Plan:    I am currently managing this critically ill patient for the following problems:    Neurology/Psychiatry/Pain Control/Sedation:   No acute issues  - continue home robaxin  - hold home oxycodone  - CAM ICU qshift, sleep-wake hygiene, delirium precautions     Respiratory/ENT:  #acute hypoxic respiratory failure secondary to pulmonary edema vs pneumonia c/f HAP vs atypical pneumonia  -CT chest 3/10 show acute multilobar pneumonia  - Supplemental O2: CPAP   - Titrate FiO2 as tolerated to Maintain SpO2 >92%  - Continuous pulse ox monitoring   - Pulm hygiene  - empiric abx as below    Cardiovascular:   #NSTEMI,  #hypertensive emergency  #acute decompensated heart failure  #Hx CAD, HTN, HLD  - ECHO pending  - EKGs PRN for ACS symptoms, arrhythmias   - nitroglycerin gtt to keep -180  - low intensity heparin gtt  - hold home carvedilol, torsemide  - continue home hydralazine and ASA  - got lasix 100mg, will give additional 100mg q8 x 2    Gastrointestinal:  #s/p laparoscopic cholecystectomy (3/5/25)  - CT abd pelvis with no acute inflammatory changes  - Diet: reg cardiac  - BR: miralax prn  - GI Prophylaxis: not  indicated    Renal/Volume Status/Electrolytes:  #CKD stage V  - Baseline Cr 6-7  - Hourly I/O's, maintain urine output >0.5cc/kg/hr  - Replete electrolytes to maintain K >4.0 and Mg >2.0  - Daily RFP, Mg  - continue home sodium bicarb    Endocrinology:  #T2DM  - Home diabetic regimen: none  - last A1c 3/4/25: 5.4  - Consider adding SSI if glucose persistently >180    Infectious Disease:  #pneumonia- hospital acquired vs multifocal   #leukocytosis  - Antibiotics: vanco (3/10-->*); meropenem (3/10-->*)  - Respiratory culture pending  - Blood cultures, urine strep/legionella pending  - procal:   - Flu/COVID/RSV- negative     Hematology/Oncology:  #leukocytosis  #normocytic anemia  - Baseline Hgb 7-8  - Transfuse if Hgb <7.0 or symptomatic anemia  - Daily CBC    MSK/Skin:  #hx osteomyelitis s/p toe amputation  - PT/OT eval when appropriate  - ICU skin protocol, padded pressure points  - Q2hr turns    Incidental Findings/Outpatient Follow-up Recs:    Ethics/Code Status:  Full cose    :  DVT Prophylaxis: Hep gtt, SCDs  GI Prophylaxis: none  Bowel Regimen: miralax prn  Diet: reg cardiac  CVC: none  Lauren: none  Elmore: none  Restraints: none  Disposition: ICU      Critical Care Time:  60 minutes spent in preparing to see patient (I.e. labs, imaging, etc.), documentation, discussion plan of care with patient/family/caregiver, and/or coordination of care with multidisciplinary team including the attending. Time does not include completion of procedure time.     Plan of care discussed with Dr. Sandhya Panda APRN-CNP  Pulmonary & Critical Care Medicine   [facility]

## 2025-03-10 NOTE — PROGRESS NOTES
Pharmacy Medication History Review    Colin Leonard is a 59 y.o. male. Pharmacy reviewed the patient's [id number] medications and allergies for accuracy.    Medications ADDED:  none  Medications CHANGED:  Torsemide 20mg - not taking  Augmentin - did not start therapy from discharge yesterday  Oxycodone 5mg - did not start therapy from discharge yesterday  Methocarbamol 500mg - did not start therapy from discharge yesterday  Sodium bicarbonate 650mg - did not start therapy from discharge   Medications REMOVED:   None      The list below reflects the updated PTA list. Comments regarding how patient may be taking medications differently can be found in the Admit Orders Activity  Prior to Admission Medications   Prescriptions Last Dose Informant   acetaminophen (Tylenol) 325 mg tablet 3/9/2025 Self, Child   Sig: Take 2 tablets (650 mg) by mouth every 6 hours.   amoxicillin-pot clavulanate (Augmentin) 500-125 mg tablet  Self, Child   Sig: Take 1 tablet by mouth 2 times a day for 4 days.   aspirin 81 mg EC tablet Unknown Self, Child   Sig: Take 1 tablet (81 mg) by mouth once daily.   carvedilol (Coreg) 25 mg tablet 3/9/2025 Self, Child   Sig: Take 1 tablet (25 mg) by mouth 2 times a day.   ezetimibe (Zetia) 10 mg tablet 3/9/2025 Self, Child   Sig: Take 1 tablet (10 mg) by mouth once daily at bedtime.   hydrALAZINE (Apresoline) 100 mg tablet 3/9/2025 Self, Child   Sig: Take 1 tablet (100 mg) by mouth 3 times a day.   methocarbamol (Robaxin) 500 mg tablet  Self, Child   Sig: Take 1 tablet (500 mg) by mouth every 8 hours if needed for muscle spasms for up to 5 days.   oxyCODONE (Roxicodone) 5 mg immediate release tablet  Self, Child   Sig: Take 1 tablet (5 mg) by mouth every 6 hours if needed for severe pain (7 - 10) for up to 3 days.   sodium bicarbonate 650 mg tablet  Self, Child   Sig: Take 2 tablets (1,300 mg) by mouth 3 times a day.   torsemide (Demadex) 20 mg tablet Not Taking Self, Child   Sig: Take 2  Tablets by Mouth Twice Daily   Patient not taking: Reported on 3/10/2025      Facility-Administered Medications: None        The list below reflects the updated allergy list. Please review each documented allergy for additional clarification and justification.  Allergies  Reviewed by Nupur Novak CPhT on 3/10/2025        Severity Reactions Comments    Amlodipine Besylate Not Specified Swelling edema legs            Pharmacy has been updated to [facility] on [address].    Sources used to complete the med history include dispense history, PTA medication list, patient interview. Patient is a fair historian.    Below are additional concerns with the patient's PTA list.  Patient reported taking atorvastatin yesterday. This is a medication stopped at discharge yesterday 03/09/25. Please take note to the medications stopped at discharge yesterday to make sure patient does not continue therapy.    Nupur Novak CPhT-Adv  Please reach out via [employer] Secure Chat for questions

## 2025-03-10 NOTE — CONSULTS
Vancomycin Dosing by Pharmacy- INITIAL    Colin Leonard is a 59 y.o. year old male who Pharmacy has been consulted for vancomycin dosing for pneumonia. Based on the patient's indication and renal status this patient will be dosed based on a goal AUC of 400-600.     Renal function is currently declining.    Visit Vitals  BP (!) 184/109   Pulse 78   Temp 36.4 °C (97.5 °F) (Oral)   Resp (!) 22        Lab Results   Component Value Date    CREATININE 6.79 (H) 03/10/2025    CREATININE 6.96 (H) 2025    CREATININE 7.26 (H) 2025    CREATININE 7.28 (H) 2025        Patient weight is as follows:   Vitals:    03/10/25 1715   Weight: 118 kg (261 lb 0.4 oz)       Cultures:  No results found for the encounter in last 14 days.        No intake/output data recorded.  I/O during current shift:  I/O this shift:  In: 824 [P.O.:464; I.V.:360]  Out: 500 [Urine:500]    Temp (24hrs), Av.4 °C (97.5 °F), Min:36.4 °C (97.5 °F), Max:36.4 °C (97.5 °F)         Assessment/Plan     Patient has already been given a loading dose of 1500 mg in the ED on 03/10/2025 at 1434.  Will initiate vancomycin maintenance,  500 mg every 24 hours beginning 2025 at 1500.    This dosing regimen is predicted by [employer] to result in the following pharmacokinetic parameters:    Loading dose: N/A  Regimen: 500 mg IV every 24 hours.  Start time: 14:34 on 2025  Exposure target: AUC24 (range)400-600 mg/L.hr   [id number]: 330 mg/L.hr  AUC24,ss: 448 mg/L.hr  Probability of AUC24 > 400: 63 %  Ctrough,ss: 16.9 mg/L  Probability of Ctrough,ss > 20: 30 %    Follow-up level will be ordered on 2025 at AM lab draw unless clinically indicated sooner.  Will continue to monitor renal function daily while on vancomycin and order serum creatinine at least every 48 hours if not already ordered.  Follow for continued vancomycin needs, clinical response, and signs/symptoms of toxicity.       Orlando Disla, PharmD

## 2025-03-10 NOTE — ED PROVIDER NOTES
"HPI   Chief Complaint   Patient presents with    Respiratory Distress       Patient presents ED by EMS for respiratory distress and shortness of breath.  EMS notes patient severely tachypneic with wet cough and significant Rales/rhonchi placed on NRB for significant hypoxia in the mid to high 70s with improvement to the mid to high 80s.  EMS notes patient was recently in hospital for surgical procedure of cholecystectomy.  EMS notes sinus tachycardia on monitor.  It appears mildly fluid overloaded.  Patient unable provide any significant history but son states patient coughing up pink-tinged frothy sputum and has not taken his medications this morning.  Patient just got out of the hospital from cholecystectomy.  Son is not aware of any reported fevers.  No other send history can be obtained by patient.  At this time.      History provided by:  EMS personnel and relative          Patient History   Past Medical History:   Diagnosis Date    Coronary artery disease     Diabetes mellitus (Multi)     DVT (deep venous thrombosis) (Multi)     Hypertension     MI (myocardial infarction) (Multi)      Past Surgical History:   Procedure Laterality Date    AMPUTATION      CHOLECYSTECTOMY  03/05/2025    Laparoscopic Cholecystectomy     No family history on file.  Social History     Tobacco Use    Smoking status: Never    Smokeless tobacco: Never   Substance Use Topics    Alcohol use: Not on file    Drug use: Not on file       Physical Exam   /83 (BP Location: Left arm, Patient Position: Lying)   Pulse 75   Temp 36.2 °C (97.2 °F) (Oral)   Resp 16   Ht 1.803 m (5' 11\")   Wt 118 kg (259 lb 11.2 oz)   SpO2 96%   BMI 36.22 kg/m²       Physical Exam  Vitals and nursing note reviewed.   Constitutional:       General: He is in acute distress.      Appearance: Normal appearance. He is ill-appearing and diaphoretic. He is not toxic-appearing.      Comments: Appears in severe distress, mildly diaphoretic, and moderately ill in " appearance but does not appear toxic at this time   HENT:      Mouth/Throat:      Mouth: Mucous membranes are moist.      Pharynx: Oropharynx is clear.   Eyes:      Extraocular Movements: Extraocular movements intact.      Pupils: Pupils are equal, round, and reactive to light.   Cardiovascular:      Rate and Rhythm: Normal rate and regular rhythm.      Pulses: Normal pulses.           Radial pulses are 2+ on the right side and 2+ on the left side.        Dorsalis pedis pulses are 2+ on the right side and 2+ on the left side.      Heart sounds: Normal heart sounds. Heart sounds not distant. No murmur heard.     Comments: Equal and symmetrical distal pulses, equal/symmetrical 2+ pitting edema of distal BLE  Pulmonary:      Effort: Tachypnea, accessory muscle usage and respiratory distress present.      Breath sounds: No decreased air movement. Examination of the right-upper field reveals rales. Examination of the left-upper field reveals rales. Examination of the right-middle field reveals rales. Examination of the left-middle field reveals rales. Examination of the right-lower field reveals rales. Examination of the left-lower field reveals rales. Rales present. No decreased breath sounds.      Comments: Moderate to severe respiratory distress, unable to speak in more than 1-2 word sentences, significant increased WOB and accessory muscle use, severe Rales/crackles throughout bilaterally, significant hypoxia low 80s and placed on CPAP for increased PEEP and ventilatory/oxygenation support  Chest:      Chest wall: No tenderness.   Abdominal:      General: Abdomen is protuberant. A surgical scar is present. There is no distension.      Palpations: Abdomen is soft.      Tenderness: There is abdominal tenderness in the right upper quadrant and right lower quadrant. There is no guarding or rebound.      Comments: Protuberant and tenderness palpation of the right side overlying laparoscopic surgical incisions, incisions  C/D/I with no surrounding erythema, no discharge, abdomen is soft and no increased tympany with percussion, no appreciable pain out of portion to end with exam ,no appreciable abdominal wall rigidity or voluntary guarding and no associated rebound tenderness   Musculoskeletal:      Right lower le+ Edema present.      Left lower leg: Edema present.   Skin:     General: Skin is warm.      Coloration: Skin is pale. Skin is not ashen or cyanotic.      Comments: Mildly pallorous in appearance   Neurological:      General: No focal deficit present.      Mental Status: He is alert and oriented to person, place, and time.           ED Course & MDM   ED Course as of 25 0959   Mon Mar 10, 2025   1321 VS notable for severely hypertensive and tachypneic with hypoxia on presentation in setting of reported shortness of breath/respiratory distress [BC]   1324 I performed a sepsis reperfusion exam on Colin Leonard on 3/10/2025 1:25 PM    A targeted fluid bolus of 0 cc was given, if adult patient did not receive a 30cc/kg fluid bolus, the clinical rationale is concern for fluid overload with significant rhonchi/Rales on exam and hypoxic, also concern for flash pulmonary edema    Jesus Borden MD   [BC]   1325 I personally reviewed and interpreted the EKG @ 1325: NSR 91, normal axis, no appreciable ischemia, prolonged Qtc 489 ms, poor R wave progression in precordial leads, and no prior EKG available for review [BC]   1350 CBC and Auto Differential(!)  Leukocytosis predominant neutrophilia in setting of acute respiratory distress and shortness of breath with improvement in normocytic anemia concerning for underlying infection given recent hospitalization and surgical procedure [BC]   1400 B-Type Natriuretic Peptide(!)  Severely elevated in the setting of respiratory distress consistent with acute HF/fluid overload [BC]   1400 Magnesium  WNL [BC]   1400 Comprehensive Metabolic Panel(!)  Baseline RF and no  significant electrolyte/metabolic derangement, evidence of metabolic acidosis likely secondary to CKD [BC]   1400 Blood Gas Arterial Full Panel(!)  Mild respiratory alkalosis secondary to respiratory distress and tachypnea [BC]   1400 Troponin I Series, High Sensitivity (0, 1 HR)(!!)  Significant elevated troponinemia without evidence of ischemia on EKG and no reported cardiac chest pain, likely T2 MI secondary to hypoxia and acute on chronic HF/fluid overload [BC]   1406 Lactate  WNL, no me concern for septic shock and will provide gentle fluid resuscitation at 150 cc/hour [BC]   1444 Poke with Dr. Max (nephrology) and agrees admission to ICU with pressure control and attempted diuretics, will be on board for consult for potential dialysis [BC]      ED Course User Index  [BC] Jesus Borden MD         Diagnoses as of 03/12/25 0959   Other hypervolemia   Hypertensive emergency                 No data recorded     [place] Coma Scale Score: 15 (03/11/25 2133 : vEa Chance RN)                           Medical Decision Making  Patient presented to the ED for evaluation for respiratory distress and hypoxic and appears to be mildly fluid overloaded with concerning PMHx of recent cholecystectomy (3/3/2025), HTN, DM, DVT, CAD with prior MI, CKD 5, HLD.  I personally reviewed and interpreted VS, labs, images, and EKG which are as stated above in the ED course.    Assessment/evaluation multifactorial consistent with acute on chronic HF and fluid overload resulting in hypoxia and severe respiratory distress, evidence of T2 MI, concern for sepsis given recent surgical procedure with intra-abdominal infection, highly concern for developing ARDS.  No concerning history, clinical evidence/work-up, or exam findings for the considered differentials of ACS/MI, PTX, focal/multifocal lobar PNA, acutely worsening renal function/failure, significant electrolyte derangement respiratory acidosis.  These conditions have been  "thoroughly evaluated and determined to be sufficiently unlikely to be the etiology of patient's presenting symptoms.    Scores Heart 5 (12-15% MACE)    Patient signed out to oncoming provider, Dr. Mary Em, at 9:58 AM in stable but critical condition.    /83 (BP Location: Left arm, Patient Position: Lying)   Pulse 75   Temp 36.2 °C (97.2 °F) (Oral)   Resp 16   Ht 1.803 m (5' 11\")   Wt 118 kg (259 lb 11.2 oz)   SpO2 96%   BMI 36.22 kg/m²     Remaining workup:  Labs Repeat trop and Viral Panel, Images CT A/P, Reassessment, and Call report to MSDU    Patient disposition Medicine Admission ([facility] vs MICU) and alternative disposition NONE.        Per Chart Review: Hospitalization on 3/3/2025 for cholelithiasis complicated by acute cholecystitis, discharge summary significant for imaging obtained noted for acute cholecystitis secondary to cholelithiasis, labs obtained consistent with leukocytosis and elevated LFTs, patient was evaluated by Dr. Zhang (general surgery) and underwent laparoscopic cholecystectomy treated with antibiotics, Dr. Neeraj West (nephrology was consulted given renal function and patient was given sodium bicarb with torsemide held, patient developed vertigo/BPPV and CTh obtained with no acute findings, patient improved throughout hospital course and discharged home with multidisciplinary follow-up instructions, LOS 6 days.      Parts of this chart have been completed using voice-to-text recognition software. Please excuse any errors of transcription that were missed for editing/correcting.    Amount and/or Complexity of Data Reviewed  Independent Historian: EMS     Details: Relative (son); see HPI  External Data Reviewed: notes.     Details: See [facility]  Labs: ordered. Decision-making details documented in ED Course.  Radiology: ordered and independent interpretation performed. Decision-making details documented in ED Course.  ECG/medicine tests: ordered and independent interpretation " performed. Decision-making details documented in ED Course.        Procedure  Procedures     Jesus Borden MD  03/12/25 0901

## 2025-03-11 ENCOUNTER — APPOINTMENT (OUTPATIENT)
Dept: RADIOLOGY | Facility: HOSPITAL | Age: 60
End: 2025-03-11
Payer: COMMERCIAL

## 2025-03-11 ENCOUNTER — APPOINTMENT (OUTPATIENT)
Dept: CARDIOLOGY | Facility: HOSPITAL | Age: 60
End: 2025-03-11
Payer: COMMERCIAL

## 2025-03-11 LAB
ALBUMIN SERPL BCP-MCNC: 3.5 G/DL (ref 3.4–5)
ALBUMIN SERPL BCP-MCNC: 3.6 G/DL (ref 3.4–5)
ALP SERPL-CCNC: 57 U/L (ref 33–136)
ALT SERPL W P-5'-P-CCNC: 11 U/L (ref 10–52)
ANION GAP SERPL CALCULATED.3IONS-SCNC: 15 MMOL/L (ref 10–20)
ANION GAP SERPL CALCULATED.3IONS-SCNC: 15 MMOL/L (ref 10–20)
AORTIC VALVE MEAN GRADIENT: 15 MMHG
AORTIC VALVE PEAK VELOCITY: 2.64 M/S
APTT PPP: 32.8 SECONDS (ref 22–32.5)
APTT PPP: 38.7 SECONDS (ref 22–32.5)
AST SERPL W P-5'-P-CCNC: 13 U/L (ref 9–39)
ATRIAL RATE: 89 BPM
ATRIAL RATE: 91 BPM
AV PEAK GRADIENT: 28 MMHG
AVA (PEAK VEL): 1.39 CM2
AVA (VTI): 1.25 CM2
BACTERIA SPEC RESP CULT: ABNORMAL
BILIRUB DIRECT SERPL-MCNC: 0.2 MG/DL (ref 0–0.3)
BILIRUB SERPL-MCNC: 0.8 MG/DL (ref 0–1.2)
BUN SERPL-MCNC: 52 MG/DL (ref 6–23)
BUN SERPL-MCNC: 55 MG/DL (ref 6–23)
CALCIUM SERPL-MCNC: 9.1 MG/DL (ref 8.6–10.3)
CALCIUM SERPL-MCNC: 9.2 MG/DL (ref 8.6–10.3)
CARDIAC TROPONIN I PNL SERPL HS: 1099 NG/L (ref 0–20)
CHLORIDE SERPL-SCNC: 109 MMOL/L (ref 98–107)
CHLORIDE SERPL-SCNC: 110 MMOL/L (ref 98–107)
CO2 SERPL-SCNC: 19 MMOL/L (ref 21–32)
CO2 SERPL-SCNC: 20 MMOL/L (ref 21–32)
CREAT SERPL-MCNC: 6.62 MG/DL (ref 0.5–1.3)
CREAT SERPL-MCNC: 6.77 MG/DL (ref 0.5–1.3)
EGFRCR SERPLBLD CKD-EPI 2021: 9 ML/MIN/1.73M*2
EGFRCR SERPLBLD CKD-EPI 2021: 9 ML/MIN/1.73M*2
EJECTION FRACTION APICAL 4 CHAMBER: 75.9
EJECTION FRACTION: 63 %
ERYTHROCYTE [DISTWIDTH] IN BLOOD BY AUTOMATED COUNT: 15.8 % (ref 11.5–14.5)
GLUCOSE SERPL-MCNC: 114 MG/DL (ref 74–99)
GLUCOSE SERPL-MCNC: 128 MG/DL (ref 74–99)
GRAM STN SPEC: ABNORMAL
HCT VFR BLD AUTO: 24.7 % (ref 41–52)
HGB BLD-MCNC: 8 G/DL (ref 13.5–17.5)
HOLD SPECIMEN: NORMAL
LABORATORY COMMENT REPORT: NORMAL
LEFT VENTRICLE INTERNAL DIMENSION DIASTOLE: 5.67 CM (ref 3.5–6)
LEFT VENTRICULAR OUTFLOW TRACT DIAMETER: 2.03 CM
LEGIONELLA AG UR QL: NEGATIVE
LV EJECTION FRACTION BIPLANE: 70 %
MAGNESIUM SERPL-MCNC: 2.3 MG/DL (ref 1.6–2.4)
MAGNESIUM SERPL-MCNC: 2.48 MG/DL (ref 1.6–2.4)
MCH RBC QN AUTO: 28.5 PG (ref 26–34)
MCHC RBC AUTO-ENTMCNC: 32.4 G/DL (ref 32–36)
MCV RBC AUTO: 88 FL (ref 80–100)
MITRAL VALVE E/A RATIO: 1.01
NRBC BLD-RTO: 0 /100 WBCS (ref 0–0)
P AXIS: 40 DEGREES
P AXIS: 53 DEGREES
P OFFSET: 188 MS
P OFFSET: 192 MS
P ONSET: 137 MS
P ONSET: 144 MS
PATH REPORT.FINAL DX SPEC: NORMAL
PATH REPORT.GROSS SPEC: NORMAL
PATH REPORT.RELEVANT HX SPEC: NORMAL
PATH REPORT.TOTAL CANCER: NORMAL
PHOSPHATE SERPL-MCNC: 3.6 MG/DL (ref 2.5–4.9)
PHOSPHATE SERPL-MCNC: 4.1 MG/DL (ref 2.5–4.9)
PLATELET # BLD AUTO: 287 X10*3/UL (ref 150–450)
POTASSIUM SERPL-SCNC: 3.7 MMOL/L (ref 3.5–5.3)
POTASSIUM SERPL-SCNC: 3.8 MMOL/L (ref 3.5–5.3)
PR INTERVAL: 148 MS
PR INTERVAL: 160 MS
PROCALCITONIN SERPL-MCNC: 1.02 NG/ML
PROT SERPL-MCNC: 6.2 G/DL (ref 6.4–8.2)
Q ONSET: 217 MS
Q ONSET: 218 MS
QRS COUNT: 15 BEATS
QRS COUNT: 15 BEATS
QRS DURATION: 92 MS
QRS DURATION: 94 MS
QT INTERVAL: 376 MS
QT INTERVAL: 398 MS
QTC CALCULATION(BAZETT): 457 MS
QTC CALCULATION(BAZETT): 489 MS
QTC FREDERICIA: 428 MS
QTC FREDERICIA: 457 MS
R AXIS: 3 DEGREES
R AXIS: 34 DEGREES
RBC # BLD AUTO: 2.81 X10*6/UL (ref 4.5–5.9)
S PNEUM AG UR QL: NEGATIVE
SODIUM SERPL-SCNC: 140 MMOL/L (ref 136–145)
SODIUM SERPL-SCNC: 140 MMOL/L (ref 136–145)
T AXIS: 116 DEGREES
T AXIS: 46 DEGREES
T OFFSET: 405 MS
T OFFSET: 417 MS
VANCOMYCIN SERPL-MCNC: 15 UG/ML (ref 5–20)
VENTRICULAR RATE: 89 BPM
VENTRICULAR RATE: 91 BPM
WBC # BLD AUTO: 15.2 X10*3/UL (ref 4.4–11.3)

## 2025-03-11 PROCEDURE — 99291 CRITICAL CARE FIRST HOUR: CPT

## 2025-03-11 PROCEDURE — 82374 ASSAY BLOOD CARBON DIOXIDE: CPT

## 2025-03-11 PROCEDURE — 2500000001 HC RX 250 WO HCPCS SELF ADMINISTERED DRUGS (ALT 637 FOR MEDICARE OP)

## 2025-03-11 PROCEDURE — 85027 COMPLETE CBC AUTOMATED: CPT

## 2025-03-11 PROCEDURE — 2060000001 HC INTERMEDIATE ICU ROOM DAILY

## 2025-03-11 PROCEDURE — 85730 THROMBOPLASTIN TIME PARTIAL: CPT

## 2025-03-11 PROCEDURE — 2500000001 HC RX 250 WO HCPCS SELF ADMINISTERED DRUGS (ALT 637 FOR MEDICARE OP): Performed by: REGISTERED NURSE

## 2025-03-11 PROCEDURE — 2500000004 HC RX 250 GENERAL PHARMACY W/ HCPCS (ALT 636 FOR OP/ED): Performed by: REGISTERED NURSE

## 2025-03-11 PROCEDURE — 2500000004 HC RX 250 GENERAL PHARMACY W/ HCPCS (ALT 636 FOR OP/ED)

## 2025-03-11 PROCEDURE — 2500000002 HC RX 250 W HCPCS SELF ADMINISTERED DRUGS (ALT 637 FOR MEDICARE OP, ALT 636 FOR OP/ED): Performed by: REGISTERED NURSE

## 2025-03-11 PROCEDURE — 84155 ASSAY OF PROTEIN SERUM: CPT

## 2025-03-11 PROCEDURE — 2500000004 HC RX 250 GENERAL PHARMACY W/ HCPCS (ALT 636 FOR OP/ED): Performed by: SURGERY

## 2025-03-11 PROCEDURE — 71045 X-RAY EXAM CHEST 1 VIEW: CPT | Performed by: RADIOLOGY

## 2025-03-11 PROCEDURE — 86738 MYCOPLASMA ANTIBODY: CPT | Performed by: NURSE PRACTITIONER

## 2025-03-11 PROCEDURE — 93306 TTE W/DOPPLER COMPLETE: CPT | Performed by: INTERNAL MEDICINE

## 2025-03-11 PROCEDURE — 84484 ASSAY OF TROPONIN QUANT: CPT

## 2025-03-11 PROCEDURE — 83735 ASSAY OF MAGNESIUM: CPT

## 2025-03-11 PROCEDURE — 71045 X-RAY EXAM CHEST 1 VIEW: CPT

## 2025-03-11 PROCEDURE — 84100 ASSAY OF PHOSPHORUS: CPT

## 2025-03-11 PROCEDURE — 36415 COLL VENOUS BLD VENIPUNCTURE: CPT

## 2025-03-11 PROCEDURE — 99223 1ST HOSP IP/OBS HIGH 75: CPT

## 2025-03-11 PROCEDURE — 80202 ASSAY OF VANCOMYCIN: CPT | Performed by: PHARMACY

## 2025-03-11 PROCEDURE — C8929 TTE W OR WO FOL WCON,DOPPLER: HCPCS

## 2025-03-11 RX ORDER — TRAZODONE HYDROCHLORIDE 50 MG/1
25 TABLET ORAL ONCE
Status: DISCONTINUED | OUTPATIENT
Start: 2025-03-11 | End: 2025-03-11

## 2025-03-11 RX ORDER — ACETAMINOPHEN 500 MG
5 TABLET ORAL ONCE
Status: COMPLETED | OUTPATIENT
Start: 2025-03-11 | End: 2025-03-11

## 2025-03-11 RX ORDER — LABETALOL HYDROCHLORIDE 5 MG/ML
20 INJECTION, SOLUTION INTRAVENOUS EVERY 4 HOURS PRN
Status: DISCONTINUED | OUTPATIENT
Start: 2025-03-11 | End: 2025-03-13 | Stop reason: HOSPADM

## 2025-03-11 RX ADMIN — SODIUM BICARBONATE 1300 MG: 650 TABLET ORAL at 21:34

## 2025-03-11 RX ADMIN — HYDRALAZINE HYDROCHLORIDE 100 MG: 50 TABLET ORAL at 08:06

## 2025-03-11 RX ADMIN — PERFLUTREN 2 ML OF DILUTION: 6.52 INJECTION, SUSPENSION INTRAVENOUS at 10:48

## 2025-03-11 RX ADMIN — EZETIMIBE 10 MG: 10 TABLET ORAL at 21:34

## 2025-03-11 RX ADMIN — HYDRALAZINE HYDROCHLORIDE 100 MG: 50 TABLET ORAL at 21:34

## 2025-03-11 RX ADMIN — SODIUM BICARBONATE 1300 MG: 650 TABLET ORAL at 08:06

## 2025-03-11 RX ADMIN — ASPIRIN 81 MG: 81 TABLET, COATED ORAL at 08:06

## 2025-03-11 RX ADMIN — SODIUM BICARBONATE 1300 MG: 650 TABLET ORAL at 16:14

## 2025-03-11 RX ADMIN — CARVEDILOL 25 MG: 25 TABLET, FILM COATED ORAL at 08:06

## 2025-03-11 RX ADMIN — HYDRALAZINE HYDROCHLORIDE 100 MG: 50 TABLET ORAL at 16:14

## 2025-03-11 RX ADMIN — MEROPENEM 500 MG: 500 INJECTION, POWDER, FOR SOLUTION INTRAVENOUS at 05:25

## 2025-03-11 RX ADMIN — Medication 5 MG: at 02:24

## 2025-03-11 RX ADMIN — CARVEDILOL 25 MG: 25 TABLET, FILM COATED ORAL at 21:34

## 2025-03-11 RX ADMIN — MEROPENEM 500 MG: 500 INJECTION, POWDER, FOR SOLUTION INTRAVENOUS at 17:30

## 2025-03-11 ASSESSMENT — ENCOUNTER SYMPTOMS
CHILLS: 0
FATIGUE: 0
SPUTUM PRODUCTION: 1
COUGH: 1
VOMITING: 0
FEVER: 0
SHORTNESS OF BREATH: 0
NAUSEA: 0
ABDOMINAL PAIN: 0
DIARRHEA: 0
SHORTNESS OF BREATH: 1
CHEST TIGHTNESS: 0
WOUND: 0

## 2025-03-11 ASSESSMENT — PAIN SCALES - GENERAL
PAINLEVEL_OUTOF10: 0 - NO PAIN

## 2025-03-11 ASSESSMENT — PAIN - FUNCTIONAL ASSESSMENT
PAIN_FUNCTIONAL_ASSESSMENT: 0-10

## 2025-03-11 NOTE — PROGRESS NOTES
Vancomycin Dosing by Pharmacy- Cessation of Therapy    Consult to pharmacy for vancomycin dosing has been discontinued by the prescriber, pharmacy will sign off at this time.    Please call pharmacy if there are further questions or re-enter a consult if vancomycin is resumed.     Mane Izquierdo, IraD

## 2025-03-11 NOTE — CONSULTS
CONSULT: Nephrology SERVICE    SERVICE DATE: 3/11/2025   SERVICE TIME:  12:35 PM    REASON FOR CONSULT: Chronic kidney disease  REQUESTING PHYSICIAN: ICU  PRIMARY CARE PHYSICIAN: Maurice Ballard MD    ASSESSMENT AND PLAN  59-year-old with numerous medical problems including advanced CKD readmitted to the hospital with dyspnea, multilobar pneumonia.  1.  Chronic kidney disease stage V  2.  Hypertensive urgency  3.  Chronic metabolic acidosis  4.  Anemia in chronic kidney disease    While he has advanced CKD, the serum creatinine is actually improving a little bit with IV Lasix bolus dosing.  I am not convinced he needs another dose of Lasix right now.  He has improvement in his respiratory status and oxygen requirement, no peripheral edema, and probable etiology of his dyspnea is more infectious than overload.  Nonetheless, I would recommend Lasix at 80 or 100 mg IV as needed worsening respiratory failure throughout the rest of the day and overnight.  The blood pressures, under considerably improved control and he is off of the nitroglycerin drip.  His bicarbonate level is improved on bicarbonate therapy.  May repeat erythropoietin stimulating agents for him when the blood pressure improves a little more.  Trend daily labs, continue supportive care.  Case discussed with the ICU team.  I will continue to follow this patient.  Thank you for the consultation.     SUBJECTIVE  Mr. Leonard is a 59 y.o. man with a history of hypertension, diabetes, coronary artery disease, and advanced CKD readmitted to the hospital with dyspnea, consulted for chronic kidney disease management.  This patient was discharged from the hospital 2 days ago.  Yesterday he came into the hospital with significant dyspnea.  It started the night before with significant cough.  He woke up in the morning dyspneic.  He had a lot of nasal drainage and phlegm production.  Unclear if he had fevers.  He was only home for less than 24 hours before  readmission.  He underwent cholecystectomy during his last admission and the course was complicated by a mild bout of acute renal failure that improved prior to discharge.  His serum creatinine at baseline runs in the sixes.  His creatinine was up to the sevens, improved a little bit to the high sixes with IV fluids.  He only got fluids for about 24 hours.  He was discharged on sodium bicarbonate, which was new, he had not been taking this in the past.  He was also supposed be taking Augmentin.  It appears that the CT scan is demonstrating multilobar pneumonia.  He has negative viral serologies.  He is markedly hypertensive at admission and required a nitroglycerin drip.  He received roughly 200 mg of IV Lasix.  He had great urine output with net negative fluid balance by about 3-1/2 L.  Feels much better and is off of aggressive oxygen therapy, only now remains on a low-dose nasal cannula.    PAST MEDICAL HISTORY:   Past Medical History:   Diagnosis Date    Coronary artery disease     Diabetes mellitus (Multi)     DVT (deep venous thrombosis) (Multi)     Hypertension     MI (myocardial infarction) (Multi)      PAST SURGICAL HISTORY:   Past Surgical History:   Procedure Laterality Date    AMPUTATION      CHOLECYSTECTOMY  03/05/2025    Laparoscopic Cholecystectomy     FAMILY HISTORY: No family history on file.  SOCIAL HISTORY:   Social History     Tobacco Use    Smoking status: Never    Smokeless tobacco: Never     MEDICATIONS:  aspirin, 81 mg, oral, Daily  carvedilol, 25 mg, oral, BID  ezetimibe, 10 mg, oral, Nightly  hydrALAZINE, 100 mg, oral, TID  meropenem, 500 mg, intravenous, q12h  oxygen, , inhalation, Continuous - Inhalation  sodium bicarbonate, 1,300 mg, oral, TID       heparin, 0-4,000 Units/hr, Last Rate: 1,100 Units/hr (03/11/25 6344)       PRN medications: acetaminophen, benzocaine-menthol, heparin (porcine), labetaloL, methocarbamol, ondansetron, [Held by provider] oxyCODONE, polyethylene glycol  "  CURRENT ALLERGIES:   Allergies as of 03/10/2025 - Reviewed 03/10/2025   Allergen Reaction Noted    Amlodipine besylate Swelling 10/14/2011       COMPLETE REVIEW OF SYSTEMS:    Full ROS was negative unless mentioned above    OBJECTIVE  PHYSICAL EXAM  Heart Rate:  []   Temp:  [36.4 °C (97.5 °F)-37.4 °C (99.3 °F)]   Resp:  [10-32]   BP: (134-223)/()   Height:  [180.3 cm (5' 11\")]   Weight:  [113 kg (250 lb)-118 kg (261 lb 0.4 oz)]   SpO2:  [85 %-100 %]    Body mass index is 36.22 kg/m².  This is a chronically ill-appearing white man who is nontoxic  Fatigued affect  Hearing intact  Phonation intact  Moist mucosa  Normal S1/normal S2  Lungs are clear to auscultation bilaterally  Abdomen is soft, nondistended, nontender, positive bowel sounds  No Elmore catheter in place, no suprapubic tenderness to palpation  No extremity edema  Moves 4 limbs spontaneously  No obvious joint deformities  No lymphadenopathy    DATA:   Labs:  Results for orders placed or performed during the hospital encounter of 03/10/25 (from the past 96 hours)   CBC and Auto Differential   Result Value Ref Range    WBC 22.1 (H) 4.4 - 11.3 x10*3/uL    nRBC 0.0 0.0 - 0.0 /100 WBCs    RBC 3.20 (L) 4.50 - 5.90 x10*6/uL    Hemoglobin 9.1 (L) 13.5 - 17.5 g/dL    Hematocrit 29.1 (L) 41.0 - 52.0 %    MCV 91 80 - 100 fL    MCH 28.4 26.0 - 34.0 pg    MCHC 31.3 (L) 32.0 - 36.0 g/dL    RDW 15.6 (H) 11.5 - 14.5 %    Platelets 345 150 - 450 x10*3/uL    Neutrophils % 87.2 40.0 - 80.0 %    Immature Granulocytes %, Automated 1.5 (H) 0.0 - 0.9 %    Lymphocytes % 3.9 13.0 - 44.0 %    Monocytes % 5.4 2.0 - 10.0 %    Eosinophils % 1.5 0.0 - 6.0 %    Basophils % 0.5 0.0 - 2.0 %    Neutrophils Absolute 19.21 (H) 1.20 - 7.70 x10*3/uL    Immature Granulocytes Absolute, Automated 0.34 0.00 - 0.70 x10*3/uL    Lymphocytes Absolute 0.85 (L) 1.20 - 4.80 x10*3/uL    Monocytes Absolute 1.20 (H) 0.10 - 1.00 x10*3/uL    Eosinophils Absolute 0.34 0.00 - 0.70 x10*3/uL    " Basophils Absolute 0.12 (H) 0.00 - 0.10 x10*3/uL   Comprehensive Metabolic Panel   Result Value Ref Range    Glucose 144 (H) 74 - 99 mg/dL    Sodium 140 136 - 145 mmol/L    Potassium 4.0 3.5 - 5.3 mmol/L    Chloride 113 (H) 98 - 107 mmol/L    Bicarbonate 16 (L) 21 - 32 mmol/L    Anion Gap 15 10 - 20 mmol/L    Urea Nitrogen 52 (H) 6 - 23 mg/dL    Creatinine 6.79 (H) 0.50 - 1.30 mg/dL    eGFR 9 (L) >60 mL/min/1.73m*2    Calcium 9.8 8.6 - 10.3 mg/dL    Albumin 4.0 3.4 - 5.0 g/dL    Alkaline Phosphatase 66 33 - 136 U/L    Total Protein 6.8 6.4 - 8.2 g/dL    AST 17 9 - 39 U/L    Bilirubin, Total 0.9 0.0 - 1.2 mg/dL    ALT 12 10 - 52 U/L   Lactate   Result Value Ref Range    Lactate 1.5 0.4 - 2.0 mmol/L   Blood Culture    Specimen: Peripheral Venipuncture; Blood culture   Result Value Ref Range    Blood Culture Loaded on Instrument - Culture in progress    Blood Culture    Specimen: Peripheral Venipuncture; Blood culture   Result Value Ref Range    Blood Culture Loaded on Instrument - Culture in progress    Magnesium   Result Value Ref Range    Magnesium 1.69 1.60 - 2.40 mg/dL   B-Type Natriuretic Peptide   Result Value Ref Range    BNP 2,726 (H) 0 - 99 pg/mL   Troponin I, High Sensitivity, Initial   Result Value Ref Range    Troponin I, High Sensitivity 710 (HH) 0 - 20 ng/L   ECG 12 lead   Result Value Ref Range    Ventricular Rate 91 BPM    Atrial Rate 91 BPM    MN Interval 148 ms    QRS Duration 92 ms    QT Interval 398 ms    QTC Calculation(Bazett) 489 ms    P Axis 40 degrees    R Axis 34 degrees    T Axis 46 degrees    QRS Count 15 beats    Q Onset 218 ms    P Onset 144 ms    P Offset 192 ms    T Offset 417 ms    QTC Fredericia 457 ms   Blood Gas Arterial Full Panel   Result Value Ref Range    POCT pH, Arterial 7.45 (H) 7.38 - 7.42 pH    POCT pCO2, Arterial 24 (L) 38 - 42 mm Hg    POCT pO2, Arterial 84 (L) 85 - 95 mm Hg    POCT SO2, Arterial 98 94 - 100 %    POCT Oxy Hemoglobin, Arterial 96.2 94.0 - 98.0 %    POCT  Hematocrit Calculated, Arterial 27.0 (L) 41.0 - 52.0 %    POCT Sodium, Arterial 138 136 - 145 mmol/L    POCT Potassium, Arterial 4.2 3.5 - 5.3 mmol/L    POCT Chloride, Arterial 113 (H) 98 - 107 mmol/L    POCT Ionized Calcium, Arterial 1.35 (H) 1.10 - 1.33 mmol/L    POCT Glucose, Arterial 155 (H) 74 - 99 mg/dL    POCT Lactate, Arterial 1.0 0.4 - 2.0 mmol/L    POCT Base Excess, Arterial -6.2 (L) -2.0 - 3.0 mmol/L    POCT HCO3 Calculated, Arterial 16.7 (L) 22.0 - 26.0 mmol/L    POCT Hemoglobin, Arterial 8.9 (L) 13.5 - 17.5 g/dL    POCT Anion Gap, Arterial 13 10 - 25 mmo/L    Patient Temperature 37.0 degrees Celsius    FiO2 35 %    Ventilator Mode CPAP     Cpap 10.0 cm H2O    Site of Arterial Puncture Radial Right     Rivera's Test Positive    Troponin, High Sensitivity, 1 Hour   Result Value Ref Range    Troponin I, High Sensitivity 912 (HH) 0 - 20 ng/L   Respiratory Culture/Smear    Specimen: SPUTUM; Fluid   Result Value Ref Range    Respiratory Culture/Smear (A)      Culture not performed. See Gram stain findings. Recollect if clinically indicated.    Gram Stain (A)      Gram stain indicates specimen contains significant salivary contamination.   aPTT   Result Value Ref Range    aPTT 31.3 22.0 - 32.5 seconds   Procalcitonin   Result Value Ref Range    Procalcitonin 1.02 (H) <=0.07 ng/mL   Sars-CoV-2 and Influenza A/B PCR   Result Value Ref Range    Flu A Result Not Detected Not Detected    Flu B Result Not Detected Not Detected    Coronavirus 2019, PCR Not Detected Not Detected   RSV PCR   Result Value Ref Range    RSV PCR Not Detected Not Detected   Troponin I, High Sensitivity   Result Value Ref Range    Troponin I, High Sensitivity 1,325 (HH) 0 - 20 ng/L   MRSA Surveillance for Vancomycin De-escalation, PCR    Specimen: Anterior Nares; Swab   Result Value Ref Range    MRSA PCR Not Detected Not Detected   ECG 12 Lead   Result Value Ref Range    Ventricular Rate 89 BPM    Atrial Rate 89 BPM    ND Interval 160 ms     QRS Duration 94 ms    QT Interval 376 ms    QTC Calculation(Bazett) 457 ms    P Axis 53 degrees    R Axis 3 degrees    T Axis 116 degrees    QRS Count 15 beats    Q Onset 217 ms    P Onset 137 ms    P Offset 188 ms    T Offset 405 ms    QTC Fredericia 428 ms   Urinalysis with Reflex Culture and Microscopic   Result Value Ref Range    Color, Urine Colorless (N) Light-Yellow, Yellow, Dark-Yellow    Appearance, Urine Clear Clear    Specific Gravity, Urine 1.008 1.005 - 1.035    pH, Urine 6.0 5.0, 5.5, 6.0, 6.5, 7.0, 7.5, 8.0    Protein, Urine 50 (1+) (A) NEGATIVE, 10 (TRACE), 20 (TRACE) mg/dL    Glucose, Urine 100 (1+) (A) Normal mg/dL    Blood, Urine NEGATIVE NEGATIVE mg/dL    Ketones, Urine NEGATIVE NEGATIVE mg/dL    Bilirubin, Urine NEGATIVE NEGATIVE mg/dL    Urobilinogen, Urine Normal Normal mg/dL    Nitrite, Urine NEGATIVE NEGATIVE    Leukocyte Esterase, Urine NEGATIVE NEGATIVE   Extra Urine Gray Tube   Result Value Ref Range    Extra Tube Hold for add-ons.    Streptococcus pneumoniae Antigen, Urine    Specimen: Clean Catch/Voided; Urine   Result Value Ref Range    Streptococcus pneumoniae Ag, Urine Negative Negative   Legionella Antigen, Urine    Specimen: Clean Catch/Voided; Urine   Result Value Ref Range    L. pneumophila Urine Ag Negative Negative   Sterile Cup   Result Value Ref Range    Extra Tube Hold for add-ons.    Urinalysis Microscopic   Result Value Ref Range    WBC, Urine 1-5 1-5, NONE /HPF    RBC, Urine 1-2 NONE, 1-2, 3-5 /HPF   Renal function panel   Result Value Ref Range    Glucose 128 (H) 74 - 99 mg/dL    Sodium 140 136 - 145 mmol/L    Potassium 3.7 3.5 - 5.3 mmol/L    Chloride 110 (H) 98 - 107 mmol/L    Bicarbonate 19 (L) 21 - 32 mmol/L    Anion Gap 15 10 - 20 mmol/L    Urea Nitrogen 52 (H) 6 - 23 mg/dL    Creatinine 6.62 (H) 0.50 - 1.30 mg/dL    eGFR 9 (L) >60 mL/min/1.73m*2    Calcium 9.2 8.6 - 10.3 mg/dL    Phosphorus 3.6 2.5 - 4.9 mg/dL    Albumin 3.6 3.4 - 5.0 g/dL   Magnesium   Result  Value Ref Range    Magnesium 2.48 (H) 1.60 - 2.40 mg/dL   aPTT   Result Value Ref Range    aPTT 63.9 (H) 22.0 - 32.5 seconds   Vancomycin   Result Value Ref Range    Vancomycin 15.0 5.0 - 20.0 ug/mL   CBC   Result Value Ref Range    WBC 15.2 (H) 4.4 - 11.3 x10*3/uL    nRBC 0.0 0.0 - 0.0 /100 WBCs    RBC 2.81 (L) 4.50 - 5.90 x10*6/uL    Hemoglobin 8.0 (L) 13.5 - 17.5 g/dL    Hematocrit 24.7 (L) 41.0 - 52.0 %    MCV 88 80 - 100 fL    MCH 28.5 26.0 - 34.0 pg    MCHC 32.4 32.0 - 36.0 g/dL    RDW 15.8 (H) 11.5 - 14.5 %    Platelets 287 150 - 450 x10*3/uL   Magnesium   Result Value Ref Range    Magnesium 2.30 1.60 - 2.40 mg/dL   Hepatic function panel   Result Value Ref Range    Albumin 3.5 3.4 - 5.0 g/dL    Bilirubin, Total 0.8 0.0 - 1.2 mg/dL    Bilirubin, Direct 0.2 0.0 - 0.3 mg/dL    Alkaline Phosphatase 57 33 - 136 U/L    ALT 11 10 - 52 U/L    AST 13 9 - 39 U/L    Total Protein 6.2 (L) 6.4 - 8.2 g/dL   Troponin I, High Sensitivity   Result Value Ref Range    Troponin I, High Sensitivity 1,099 (HH) 0 - 20 ng/L   Phosphorus   Result Value Ref Range    Phosphorus 4.1 2.5 - 4.9 mg/dL   Basic Metabolic Panel   Result Value Ref Range    Glucose 114 (H) 74 - 99 mg/dL    Sodium 140 136 - 145 mmol/L    Potassium 3.8 3.5 - 5.3 mmol/L    Chloride 109 (H) 98 - 107 mmol/L    Bicarbonate 20 (L) 21 - 32 mmol/L    Anion Gap 15 10 - 20 mmol/L    Urea Nitrogen 55 (H) 6 - 23 mg/dL    Creatinine 6.77 (H) 0.50 - 1.30 mg/dL    eGFR 9 (L) >60 mL/min/1.73m*2    Calcium 9.1 8.6 - 10.3 mg/dL   aPTT   Result Value Ref Range    aPTT 32.8 (H) 22.0 - 32.5 seconds   Transthoracic Echo (TTE) Complete   Result Value Ref Range    AV pk gillian 2.64 m/s    LVOT diam 2.03 cm    AV mn grad 15 mmHg    MV E/A ratio 1.01     LV Biplane EF 70 %    LV EF 63 %    LVIDd 5.67 cm    AV pk grad 28 mmHg    Aortic Valve Area by Continuity of VTI 1.25 cm2    Aortic Valve Area by Continuity of Peak Velocity 1.39 cm2    LV A4C EF 75.9        SIGNATURE: Boo Max,  MD PATIENT NAME: Colin Leonard   DATE: March 11, 2025 MRN: [id number]   TIME: 12:35 PM PAGER: [phone]

## 2025-03-11 NOTE — PROGRESS NOTES
[facility] Critical Care Medicine       Date:  3/11/2025  Patient:  Colin Leonard  YOB: 1965  MRN:  [id number]   Admit Date:  3/10/2025  Hospital Length of Stay: 1   ICU Length of Stay: 20h       Chief Complaint   Patient presents with    Respiratory Distress         History of Present Illness:  Colin Leonard is a 59 y.o. year old male patient with past medical history of  HTN, CKD stage V, T2DM.,CAD, HLD, osteomyelitis s/p toe amputation,  who presented to [facility] ED with complaints of shortness of breath that started this morning. Of note, he was discharged from this facility yesterday after being admitted for laparoscopic cholecystectomy.      ED Course:  Initial vital signs- /143, HR 99, Tmax 36.4 , RR 30 , SpO2 85% on RA. Placed on 6L NC, with an increase in SpO2 to 93%. Work-up significant for leukocytosis, WBC 22.1, normocytic anemia. Lactate within normal limits. BNP 2726. Renal failure with creatine at 6.79, which is near his baseline. Troponin elevated at 710-->912. Placed on CPAP 10 FiO2 35%. ABG 7.45/24/84/16. Given hydralazine 10mg IV and furosemide 60mg IV with modest improvement in /103. Subsequently placed on nitroglycerin drip. Triggered sepsis criteria with WBC and RR and was given vancomycin, zosyn and IVF.     No acute events overnight. Pt feels much improved this am, reports breathing better and cough resolved. Denies chest pain, nausea, vomiting, diarrhea. Reports decreased appetite since admission. Offered supplement drink.         Interval ICU Events:  3/10: Arrived to ICU on CPAP and nitroglycerin gtt. Will start on heaprin gtt for NSTEMI and diuretic challenge for treatment of pulmonary edema iso CKD.    3/11: Weaned to RA overnight, nitroglycerin gtt off. Tolerated diuresis well. Likely downgrade to stepdown unit today.       Objective     Medical History:  Past Medical History:   Diagnosis Date    Coronary artery disease     Diabetes mellitus (Multi)      DVT (deep venous thrombosis) (Multi)     Hypertension     MI (myocardial infarction) (Multi)      Past Surgical History:   Procedure Laterality Date    AMPUTATION      CHOLECYSTECTOMY  03/05/2025    Laparoscopic Cholecystectomy     Medications Prior to Admission   Medication Sig Dispense Refill Last Dose/Taking    acetaminophen (Tylenol) 325 mg tablet Take 2 tablets (650 mg) by mouth every 6 hours. 15 tablet 0 3/9/2025    carvedilol (Coreg) 25 mg tablet Take 1 tablet (25 mg) by mouth 2 times a day. 180 tablet 3 3/9/2025    ezetimibe (Zetia) 10 mg tablet Take 1 tablet (10 mg) by mouth once daily at bedtime. 90 tablet 3 3/9/2025    hydrALAZINE (Apresoline) 100 mg tablet Take 1 tablet (100 mg) by mouth 3 times a day. 270 tablet 3 3/9/2025    amoxicillin-pot clavulanate (Augmentin) 500-125 mg tablet Take 1 tablet by mouth 2 times a day for 4 days. 8 tablet 0     aspirin 81 mg EC tablet Take 1 tablet (81 mg) by mouth once daily.   Unknown    methocarbamol (Robaxin) 500 mg tablet Take 1 tablet (500 mg) by mouth every 8 hours if needed for muscle spasms for up to 5 days. 12 tablet 0     oxyCODONE (Roxicodone) 5 mg immediate release tablet Take 1 tablet (5 mg) by mouth every 6 hours if needed for severe pain (7 - 10) for up to 3 days. 12 tablet 0     sodium bicarbonate 650 mg tablet Take 2 tablets (1,300 mg) by mouth 3 times a day. 180 tablet 1     torsemide (Demadex) 20 mg tablet Take 2 Tablets by Mouth Twice Daily (Patient not taking: Reported on 3/10/2025) 240 tablet 1 Not Taking     Amlodipine besylate  Social History     Tobacco Use    Smoking status: Never    Smokeless tobacco: Never     No family history on file.    Hospital Medications:    heparin, 0-4,000 Units/hr, Last Rate: 1,100 Units/hr (03/11/25 0724)          Current Facility-Administered Medications:     acetaminophen (Tylenol) tablet 650 mg, 650 mg, oral, q6h PRN, Jean-Pierre Martinez, APRN-CNP, 650 mg at 03/10/25 2315    aspirin EC tablet 81 mg, 81 mg, oral,  Daily, SARAH Gutierrez-CNP, 81 mg at 03/11/25 0806    benzocaine-menthol (Cepastat Sore Throat) lozenge 1 lozenge, 1 lozenge, Mouth/Throat, q2h PRN, SARAH Breaux-CNP    carvedilol (Coreg) tablet 25 mg, 25 mg, oral, BID, SARAH Breaux-CNP, 25 mg at 03/11/25 0806    ezetimibe (Zetia) tablet 10 mg, 10 mg, oral, Nightly, SARAH Gutierrez-CNP, 10 mg at 03/10/25 2054    heparin (porcine) injection 2,000-4,000 Units, 2,000-4,000 Units, intravenous, PRN, SARAH Gutierrez-CNP    heparin 25,000 Units in dextrose 5% 250 mL (100 Units/mL) infusion (premix), 0-4,000 Units/hr, intravenous, Continuous, SARAH Gutierrez-CNP, Last Rate: 11 mL/hr at 03/11/25 0724, 1,100 Units/hr at 03/11/25 0724    hydrALAZINE (Apresoline) tablet 100 mg, 100 mg, oral, TID, SARAH Gutierrez-CNP, 100 mg at 03/11/25 0806    labetaloL (Normodyne,Trandate) injection 20 mg, 20 mg, intravenous, q4h PRN, SARAH Breaux-CNP    meropenem (Merrem) 500 mg in sodium chloride 0.9% 100 mL IV, 500 mg, intravenous, q12h, SARAH Gutierrez-CNP, Stopped at 03/11/25 0555    methocarbamol (Robaxin) tablet 500 mg, 500 mg, oral, q8h PRN, SARAH Gutierrez-CNP    ondansetron (Zofran) injection 4 mg, 4 mg, intravenous, q4h PRN, SARAH Breaux-CNP    [Held by provider] oxyCODONE (Roxicodone) immediate release tablet 5 mg, 5 mg, oral, q6h PRN, SARAH Gutierrez-CNP    oxygen (O2) therapy, , inhalation, Continuous - Inhalation, Jesus Borden MD, 21 percent at 03/10/25 2014    polyethylene glycol (Glycolax, Miralax) packet 17 g, 17 g, oral, Daily PRN, KARISHMA Gutierrez    sodium bicarbonate tablet 1,300 mg, 1,300 mg, oral, TID, KARISHMA Gutierrez, 1,300 mg at 03/11/25 0806    Review of Systems:  14 point review of systems was completed and negative except for those specially mention in my HPI    Physical Exam:    Heart Rate:  []   Temp:  [36.4 °C (97.5 °F)-37.4 °C (99.3 °F)]  "  Resp:  [10-32]   BP: (134-223)/()   Height:  [180.3 cm (5' 11\")]   Weight:  [113 kg (250 lb)-118 kg (261 lb 0.4 oz)]   SpO2:  [85 %-100 %]     Physical Exam  Constitutional:       General: He is not in acute distress.  HENT:      Head: Normocephalic.      Mouth/Throat:      Mouth: Mucous membranes are moist.   Eyes:      Conjunctiva/sclera: Conjunctivae normal.   Cardiovascular:      Rate and Rhythm: Normal rate and regular rhythm.      Pulses: Normal pulses.      Heart sounds: Normal heart sounds.   Pulmonary:      Effort: Pulmonary effort is normal. No respiratory distress.      Breath sounds: Normal breath sounds. No wheezing or rhonchi.   Abdominal:      Palpations: Abdomen is soft.      Comments: Mild tenderness RUQ s/p lap jr 3/5   Musculoskeletal:         General: No swelling, deformity or signs of injury. Normal range of motion.      Cervical back: Normal range of motion.   Skin:     General: Skin is warm and dry.   Neurological:      Mental Status: He is alert and oriented to person, place, and time.   Psychiatric:         Mood and Affect: Mood normal.         Behavior: Behavior normal.         Objective:    I have reviewed all medications, laboratory results, and imaging pertinent for today's encounter.    S RR:  [14] 14      Intake/Output Summary (Last 24 hours) at 3/11/2025 1214  Last data filed at 3/11/2025 1000  Gross per 24 hour   Intake 2353.55 ml   Output 4900 ml   Net -2546.45 ml       Daily Labs:  CBC:   Results from last 7 days   Lab Units 03/11/25  0620 03/10/25  1322   WBC AUTO x10*3/uL 15.2* 22.1*   HEMOGLOBIN g/dL 8.0* 9.1*   HEMATOCRIT % 24.7* 29.1*   MCV fL 88 91     BMP:    Results from last 7 days   Lab Units 03/11/25  0620 03/10/25  2315   SODIUM mmol/L 140 140   POTASSIUM mmol/L 3.8 3.7   CHLORIDE mmol/L 109* 110*   CO2 mmol/L 20* 19*   BUN mg/dL 55* 52*   CREATININE mg/dL 6.77* 6.62*   CALCIUM mg/dL 9.1 9.2   GLUCOSE mg/dL 114* 128*   MAGNESIUM mg/dL 2.30 2.48*     ABG: "   Results from last 7 days   Lab Units 03/10/25  1328   POCT PH, ARTERIAL pH 7.45*   POCT PCO2, ARTERIAL mm Hg 24*   POCT PO2, ARTERIAL mm Hg 84*   POCT SO2, ARTERIAL % 98   POCT HCO3 CALCULATED, ARTERIAL mmol/L 16.7*   POCT LACTATE, ARTERIAL mmol/L 1.0      VBG:             Assessment/Plan:    I am currently managing this critically ill patient for the following problems:    Neurology/Psychiatry/Pain Control/Sedation:   No acute issues  - continue home robaxin  - hold home oxycodone  - CAM ICU qshift, sleep-wake hygiene, delirium precautions      Respiratory/ENT:  #acute hypoxic respiratory failure secondary to pulmonary edema vs pneumonia c/f HAP vs atypical pneumonia  -CT chest 3/10 show acute multilobar pneumonia  - Supplemental O2: CPAP   - Titrate FiO2 as tolerated to Maintain SpO2 >92%  - Continuous pulse ox monitoring   - Pulm hygiene  - empiric abx as below     Cardiovascular:   #NSTEMI,  #hypertensive emergency  #acute decompensated heart failure  #Hx CAD, HTN, HLD  - ECHO pending  - EKGs PRN for ACS symptoms, arrhythmias   - nitroglycerin gtt to keep -180--> stopped overnight  - low intensity heparin gtt  - hold home carvedilol, torsemide  - continue home hydralazine and ASA  - got lasix 100mg, will give additional 100mg q8 x 2  - pending cardiology consult for recs     Gastrointestinal:  #s/p laparoscopic cholecystectomy (3/5/25)  - CT abd pelvis with no acute inflammatory changes  - Diet: reg cardiac-- ok to supplement with nepro or mighty shakes (rec per dietitian)  - BR: miralax prn  - GI Prophylaxis: not indicated     Renal/Volume Status/Electrolytes:  #CKD stage V  - Baseline Cr 6-7  - Hourly I/O's, maintain urine output >0.5cc/kg/hr  - Replete electrolytes to maintain K >4.0 and Mg >2.0  - Daily RFP, Mg  - continue home sodium bicarb  - per nephrology: recommend Lasix at 80 or 100 mg IV as needed worsening respiratory failure throughout the rest of the day and overnight      Endocrinology:  #T2DM  - Home diabetic regimen: none  - last A1c 3/4/25: 5.4  - Consider adding SSI if glucose persistently >180     Infectious Disease:  #pneumonia- hospital acquired vs multifocal   #leukocytosis  - Antibiotics: vanco (3/10-->*); meropenem (3/10-->*)  - Respiratory culture canceled d/t salivary contamination  - Blood cultures pending  - urine strep/legionella negative  - procal: 1.02  - Flu/COVID/RSV- negative   - consult placed for ID     Hematology/Oncology:  #leukocytosis  #normocytic anemia  - Baseline Hgb 7-8  - Transfuse if Hgb <7.0 or symptomatic anemia  - Daily CBC     MSK/Skin:  #hx osteomyelitis s/p toe amputation  - PT/OT eval when appropriate  - ICU skin protocol, padded pressure points  - Q2hr turns     Incidental Findings/Outpatient Follow-up Recs:     Ethics/Code Status:  Full code     :  DVT Prophylaxis: Hep gtt, SCDs  GI Prophylaxis: none  Bowel Regimen: miralax prn  Diet: reg cardiac  CVC: none  [place]: none  [place]: none  Restraints: none  Disposition: downgrade to stepdown     Critical Care Time:  45 minutes spent in preparing to see patient (I.e. labs, imaging, etc.), documentation, discussion plan of care with patient/family/caregiver, and/or coordination of care with multidisciplinary team including the attending. Time does not include completion of procedure time.      Plan of care discussed with Dr. Sandhya Panda APRN-CNP  Pulmonary & Critical Care Medicine   [facility]

## 2025-03-11 NOTE — CONSULTS
Inpatient consult to Cardiology  Consult performed by: SARAH John-CNP  Consult ordered by: Jeffrey Sprague PA-C  Reason for consult: NSTEMI, hypertensive urgency        History Of Present Illness:    Colin Leonard is a 59 y.o. male presenting with dyspnea.  He follows with Dr. Saravia for cardiology. He has a past medical history of hypertensive disorder, chronic kidney disease stage V, type 2 diabetes mellitus, coronary artery disease, hyperlipidemia. The patient reports to me that yesterday morning he developed a significant cough and was coughing up significant sputum.  He denies chest pain, pressure or palpitations.  States he was unable to catch his breath because of significant cough and mucus production.  Denies nausea or vomiting. Of note, he was discharged from this facility on 3/9/2024 after undergoing a cholecystectomy.     EKG in the emergency department revealed normal sinus rhythm with nonspecific ST and T wave abnormalities but no evidence of acute ischemia.  Lab work in the emergency department significant for sodium of 140, potassium 4.0, creatinine 6.79 and hemoglobin of 9.1.  High-sensitivity troponin levels trended at 710 and 912.  BNP significantly elevated at 2726.  His blood pressures were significantly elevated in the emergency department with initial systolic blood pressure of 223.  Additionally he was hypoxic with pulse oximetry of 85%. Chest x-ray revealing top normal to mildly enlarged heart configuration, diffuse extenuation of markings both lungs representing unfavorable change when compared to prior study, findings may be related to pulmonary vascular congestion or other interstitial process.  CT of the chest abdomen pelvis without IV contrast revealing acute multilobar pneumonia with findings greater in the bilateral upper lobes, small bilateral pleural effusions, no acute inflammatory changes in the abdomen, mildly prominent retroperitoneal and upper abdominal lymph  nodes and no evidence of diverticulitis.  The patient was given IV vancomycin and IV Zosyn a total of 200 mg of IV furosemide, 4 g IV magnesium, IV hydralazine, and was started on heparin and nitroglycerin drips in the emergency department.  He was admitted to the Intensive Care Unit on telemetry for further assessment and management.    Review of Systems   Cardiovascular:  Negative for chest pain and leg swelling.   Respiratory:  Positive for cough, shortness of breath and sputum production.    All other systems reviewed and are negative.        Last Recorded Vitals:  Vitals:    03/11/25 1000 03/11/25 1015 03/11/25 1030 03/11/25 1100   BP: 158/85 163/88     Pulse: 79 77 78    Resp: 19 19 18    Temp:    36.8 °C (98.2 °F)   TempSrc:    Temporal   SpO2: 92% 91% 91%    Weight:       Height:           Last Labs:  CBC - 3/11/2025:  6:20 AM  15.2 8.0 287    24.7      CMP - 3/11/2025:  6:20 AM  9.1 6.2 13 --- 0.8   4.1 3.5 11 57      PTT - 3/11/2025:  6:20 AM  1.2   12.6 32.8     Troponin I, High Sensitivity   Date/Time Value Ref Range Status   03/11/2025 06:20 AM 1,099 (HH) 0 - 20 ng/L Final     Comment:     Previous result verified on 3/10/2025 1412 on specimen/case [id number] called with component TRPHS for procedure Troponin I, High Sensitivity, Initial with value 710 ng/L.   03/10/2025 07:22 PM 1,325 (HH) 0 - 20 ng/L Final     Comment:     Previous result verified on 3/10/2025 1412 on specimen/case [id number] called with component TRPHS for procedure Troponin I, High Sensitivity, Initial with value 710 ng/L.   03/10/2025 02:57  (HH) 0 - 20 ng/L Final     Comment:     Previous result verified on 3/10/2025 1412 on specimen/case [id number] called with component TRPHS for procedure Troponin I, High Sensitivity, Initial with value 710 ng/L.     BNP   Date/Time Value Ref Range Status   03/10/2025 01:22 PM 2,726 (H) 0 - 99 pg/mL Final   02/14/2022 02:42  (H) 0 - 99 pg/mL Final     Comment:     .   <100 pg/mL - Heart failure unlikely  100-299 pg/mL - Intermediate probability of acute heart  .               failure exacerbation. Correlate with clinical  .               context and patient history.    >=300 pg/mL - Heart Failure likely. Correlate with clinical  .               context and patient history.  BNP testing is performed using different testing   methodology at [facility] than at other   [facility]. Direct result comparisons should   only be made within the same method.     08/06/2019 01:56  (H) 0 - 99 pg/mL Final     Comment:     .  <100 pg/mL - Heart failure unlikely  100-299 pg/mL - Intermediate probability of acute heart  .               failure exacerbation. Correlate with clinical  .               context and patient history.    >=300 pg/mL - Heart Failure likely. Correlate with clinical  .               context and patient history.  BNP testing is performed using different testing   methodology at [facility] than at other   [facility] hospitals. Direct result comparisons should   only be made within the same method.       Hemoglobin A1C   Date/Time Value Ref Range Status   03/04/2025 06:06 AM 5.4 See comment % Final   07/22/2024 06:19 AM 5.5 See below % Final     LDL Calculated   Date/Time Value Ref Range Status   08/19/2024 01:43 PM 88 <=99 mg/dL Final     Comment:                                 Near   Borderline      AGE      Desirable  Optimal    High     High     Very High     0-19 Y     0 - 109     ---    110-129   >/= 130     ----    20-24 Y     0 - 119     ---    120-159   >/= 160     ----      >24 Y     0 -  99   100-129  130-159   160-189     >/=190       VLDL   Date/Time Value Ref Range Status   08/19/2024 01:43 PM 22 0 - 40 mg/dL Final   02/01/2022 12:37 PM 23 0 - 40 mg/dL Final   04/30/2020 01:39 PM 28 0 - 40 mg/dL Final   09/24/2019 02:18 PM 41 (H) 0 - 40 mg/dL Final      Last I/O:  I/O last 3 completed shifts:  In: 1393.1 (11.8 mL/kg) [P.O.:464;  I.V.:729.1 (6.2 mL/kg); IV Piggyback:200]  Out: 3700 (31.3 mL/kg) [Urine:3700 (0.9 mL/kg/hr)]  Dosing Weight: 118.4 kg     Past Cardiology Tests (Last 3 Years):  EKG:  ECG 12 Lead 03/10/2025      ECG 12 lead 03/10/2025      ECG 12 lead (Clinic Performed) 10/28/2024      ECG 12 Lead 07/29/2024      Electrocardiogram, 12-lead PRN ACS symptoms 07/21/2024      ECG 12 lead (Clinic Performed) 04/25/2024    Echo:  Transthoracic Echo (TTE) Complete 03/11/2025      Transthoracic Echo (TTE) Complete 07/22/2024    Ejection Fractions:  EF   Date/Time Value Ref Range Status   03/11/2025 10:47 AM 63 %    07/22/2024 09:36 AM 63 %      Cath:  No results found for this or any previous visit from the past 1095 days.    Stress Test:  No results found for this or any previous visit from the past 1095 days.    Cardiac Imaging:  No results found for this or any previous visit from the past 1095 days.      Past Medical History:  He has a past medical history of Coronary artery disease, Diabetes mellitus (Multi), DVT (deep venous thrombosis) (Multi), Hypertension, and MI (myocardial infarction) (Multi).    Past Surgical History:  He has a past surgical history that includes Amputation and Cholecystectomy (03/05/2025).      Social History:  He reports that he has never smoked. He has never used smokeless tobacco. No history on file for alcohol use and drug use.    Family History:  No family history on file.     Allergies:  Amlodipine besylate    Inpatient Medications:  Scheduled medications   Medication Dose Route Frequency    aspirin  81 mg oral Daily    carvedilol  25 mg oral BID    ezetimibe  10 mg oral Nightly    hydrALAZINE  100 mg oral TID    meropenem  500 mg intravenous q12h    oxygen   inhalation Continuous - Inhalation    sodium bicarbonate  1,300 mg oral TID     PRN medications   Medication    acetaminophen    benzocaine-menthol    heparin (porcine)    labetaloL    methocarbamol    ondansetron    [Held by provider] oxyCODONE     polyethylene glycol     Continuous Medications   Medication Dose Last Rate    heparin  0-4,000 Units/hr 1,100 Units/hr (03/11/25 0379)     Outpatient Medications:  Current Outpatient Medications   Medication Instructions    acetaminophen (TYLENOL) 650 mg, oral, Every 6 hours    amoxicillin-pot clavulanate (Augmentin) 500-125 mg tablet 1 tablet, oral, 2 times daily    aspirin 81 mg, oral, Daily    carvedilol (COREG) 25 mg, oral, 2 times daily    ezetimibe (ZETIA) 10 mg, oral, Nightly    hydrALAZINE (APRESOLINE) 100 mg, oral, 3 times daily    methocarbamol (ROBAXIN) 500 mg, oral, Every 8 hours PRN    oxyCODONE (ROXICODONE) 5 mg, oral, Every 6 hours PRN    sodium bicarbonate 1,300 mg, oral, 3 times daily    torsemide (Demadex) 20 mg tablet Take 2 Tablets by Mouth Twice Daily     Physical Exam  Vitals reviewed.   HENT:      Head: Normocephalic and atraumatic.   Cardiovascular:      Rate and Rhythm: Normal rate and regular rhythm.      Heart sounds: No murmur heard.     No friction rub. No gallop.   Pulmonary:      Effort: Pulmonary effort is normal.      Breath sounds: Normal breath sounds. No wheezing, rhonchi or rales.      Comments: No significant conversational dyspnea noted  Abdominal:      General: Bowel sounds are normal.      Palpations: Abdomen is soft.   Musculoskeletal:      Right lower leg: No edema.      Left lower leg: No edema.   Skin:     General: Skin is warm and dry.      Capillary Refill: Capillary refill takes less than 2 seconds.   Neurological:      Mental Status: He is alert and oriented to person, place, and time.   Psychiatric:         Mood and Affect: Mood normal.         Behavior: Behavior normal.           Assessment/Plan   Type II NSTEMI   Hypertensive Urgency  Multilobar Pneumonia  Chronic Kidney Disease Stage V  Anemia in Chronic Disease    Impression and Plan:    3/11: Patient presenting to the hospital with cough, phlegm production and dyspnea.  He denies any pain or pressure.  Denies  nausea or vomiting.  CT on admission to the hospital revealing multilobar pneumonia.  High-sensitivity troponin level started at 710 trended to 912, 1325 and 1099.  Patient was started on heparin drip for suspected NSTEMI as well as a nitroglycerin drip for his hypertensive urgency.  EKG in the emergency department consistent with the patient's prior, normal sinus rhythm and no evidence of acute ischemia.  Of note, the patient does have baseline elevated troponin levels on my review of his chart.  On my exam the patient is resting comfortably in bed.  He states he feels significantly improved from yesterday.  Blood pressure control has improved with last recorded 163/88.  Nephrology evaluated the patient and recommended no additional IV diuresis at this time, which I agree with.  Echocardiogram completed today revealing a preserved ejection fraction of 60 to 65%, no regional wall motion abnormalities, grade 2 pseudonormal pattern of left ventricular diastolic filling, normal right ventricular global systolic function mild to moderate aortic valve stenosis.  At this time elevation in high-sensitivity troponin likely reflective of a type II injury in the setting of chronic kidney disease stage V, multilobar pneumonia, hypertensive urgency and hypoxia.  I have a low suspicion for acute coronary syndrome at this time and will discontinue the heparin drip.  Additionally, cardiac catheterization would not be recommended at this time given the patient's underlying chronic kidney disease and high risk for contrast nephropathy. Patient is overall improving from the cardiac perspective and we will continue to follow with you.     Peripheral IV 03/10/25 Right;Anterior Wrist (Active)   Site Assessment Clean;Dry;Intact 03/11/25 0800   Dressing Status Clean;Dry;Occlusive 03/11/25 0800   Number of days: 1       Peripheral IV 03/10/25 20 G Right Antecubital (Active)   Site Assessment Clean;Dry;Intact 03/11/25 0800   Dressing Status  Clean;Dry;Occlusive 03/11/25 0800   Number of days: 1       Peripheral IV 03/10/25 20 G Left;Ventral Forearm (Active)   Site Assessment Clean;Dry;Intact 03/11/25 0800   Dressing Status Clean;Dry;Occlusive 03/11/25 0800   Number of days: 1       Code Status:  Full Code          Lea Osborne, APRN-CNP

## 2025-03-11 NOTE — PROGRESS NOTES
Per chart review, patient comes from home where he is independent. At this time there are no needs from case management. If needs arise, please place consult for TCC.     03/11/25 3594   Discharge Planning   Home or Post Acute Services None   Expected Discharge Disposition Home   Does the patient need discharge transport arranged? No

## 2025-03-12 PROBLEM — J96.01 ACUTE HYPOXIC RESPIRATORY FAILURE: Status: ACTIVE | Noted: 2025-03-12

## 2025-03-12 PROBLEM — D63.8 ANEMIA OF CHRONIC DISEASE: Status: ACTIVE | Noted: 2025-03-04

## 2025-03-12 PROBLEM — J18.9 PNEUMONIA: Status: ACTIVE | Noted: 2025-03-12

## 2025-03-12 LAB
ALBUMIN SERPL BCP-MCNC: 3.2 G/DL (ref 3.4–5)
ANION GAP SERPL CALCULATED.3IONS-SCNC: 16 MMOL/L (ref 10–20)
BUN SERPL-MCNC: 60 MG/DL (ref 6–23)
CALCIUM SERPL-MCNC: 9 MG/DL (ref 8.6–10.3)
CHLORIDE SERPL-SCNC: 106 MMOL/L (ref 98–107)
CO2 SERPL-SCNC: 20 MMOL/L (ref 21–32)
CREAT SERPL-MCNC: 7.03 MG/DL (ref 0.5–1.3)
EGFRCR SERPLBLD CKD-EPI 2021: 8 ML/MIN/1.73M*2
ERYTHROCYTE [DISTWIDTH] IN BLOOD BY AUTOMATED COUNT: 15.4 % (ref 11.5–14.5)
GLUCOSE SERPL-MCNC: 114 MG/DL (ref 74–99)
HCT VFR BLD AUTO: 24.7 % (ref 41–52)
HGB BLD-MCNC: 7.7 G/DL (ref 13.5–17.5)
MAGNESIUM SERPL-MCNC: 2.1 MG/DL (ref 1.6–2.4)
MCH RBC QN AUTO: 28.3 PG (ref 26–34)
MCHC RBC AUTO-ENTMCNC: 31.2 G/DL (ref 32–36)
MCV RBC AUTO: 91 FL (ref 80–100)
NRBC BLD-RTO: 0 /100 WBCS (ref 0–0)
PHOSPHATE SERPL-MCNC: 4.2 MG/DL (ref 2.5–4.9)
PLATELET # BLD AUTO: 237 X10*3/UL (ref 150–450)
POTASSIUM SERPL-SCNC: 3.5 MMOL/L (ref 3.5–5.3)
RBC # BLD AUTO: 2.72 X10*6/UL (ref 4.5–5.9)
SODIUM SERPL-SCNC: 138 MMOL/L (ref 136–145)
WBC # BLD AUTO: 9.7 X10*3/UL (ref 4.4–11.3)

## 2025-03-12 PROCEDURE — 2500000002 HC RX 250 W HCPCS SELF ADMINISTERED DRUGS (ALT 637 FOR MEDICARE OP, ALT 636 FOR OP/ED): Performed by: REGISTERED NURSE

## 2025-03-12 PROCEDURE — 99233 SBSQ HOSP IP/OBS HIGH 50: CPT | Performed by: STUDENT IN AN ORGANIZED HEALTH CARE EDUCATION/TRAINING PROGRAM

## 2025-03-12 PROCEDURE — 84100 ASSAY OF PHOSPHORUS: CPT | Performed by: REGISTERED NURSE

## 2025-03-12 PROCEDURE — 2500000001 HC RX 250 WO HCPCS SELF ADMINISTERED DRUGS (ALT 637 FOR MEDICARE OP): Performed by: REGISTERED NURSE

## 2025-03-12 PROCEDURE — 2500000004 HC RX 250 GENERAL PHARMACY W/ HCPCS (ALT 636 FOR OP/ED): Performed by: REGISTERED NURSE

## 2025-03-12 PROCEDURE — 6350000001 HC RX 635 EPOETIN >10,000 UNITS: Mod: JZ | Performed by: INTERNAL MEDICINE

## 2025-03-12 PROCEDURE — 85027 COMPLETE CBC AUTOMATED: CPT | Performed by: REGISTERED NURSE

## 2025-03-12 PROCEDURE — 2060000001 HC INTERMEDIATE ICU ROOM DAILY

## 2025-03-12 PROCEDURE — 2500000001 HC RX 250 WO HCPCS SELF ADMINISTERED DRUGS (ALT 637 FOR MEDICARE OP): Performed by: NURSE PRACTITIONER

## 2025-03-12 PROCEDURE — 36415 COLL VENOUS BLD VENIPUNCTURE: CPT | Performed by: REGISTERED NURSE

## 2025-03-12 PROCEDURE — 2500000005 HC RX 250 GENERAL PHARMACY W/O HCPCS: Performed by: REGISTERED NURSE

## 2025-03-12 PROCEDURE — 99291 CRITICAL CARE FIRST HOUR: CPT | Mod: 25 | Performed by: STUDENT IN AN ORGANIZED HEALTH CARE EDUCATION/TRAINING PROGRAM

## 2025-03-12 PROCEDURE — 83735 ASSAY OF MAGNESIUM: CPT | Performed by: REGISTERED NURSE

## 2025-03-12 RX ORDER — CEFUROXIME AXETIL 250 MG/1
250 TABLET ORAL EVERY 24 HOURS
Status: DISCONTINUED | OUTPATIENT
Start: 2025-03-12 | End: 2025-03-13 | Stop reason: HOSPADM

## 2025-03-12 RX ADMIN — SODIUM BICARBONATE 1300 MG: 650 TABLET ORAL at 15:37

## 2025-03-12 RX ADMIN — MEROPENEM 500 MG: 500 INJECTION, POWDER, FOR SOLUTION INTRAVENOUS at 06:24

## 2025-03-12 RX ADMIN — CEFUROXIME AXETIL 250 MG: 250 TABLET, FILM COATED ORAL at 13:21

## 2025-03-12 RX ADMIN — EPOETIN ALFA-EPBX 40000 UNITS: 40000 INJECTION, SOLUTION INTRAVENOUS; SUBCUTANEOUS at 15:31

## 2025-03-12 RX ADMIN — HYDRALAZINE HYDROCHLORIDE 100 MG: 50 TABLET ORAL at 21:31

## 2025-03-12 RX ADMIN — HYDRALAZINE HYDROCHLORIDE 100 MG: 50 TABLET ORAL at 08:32

## 2025-03-12 RX ADMIN — ASPIRIN 81 MG: 81 TABLET, COATED ORAL at 08:32

## 2025-03-12 RX ADMIN — Medication 21 PERCENT: at 22:08

## 2025-03-12 RX ADMIN — EZETIMIBE 10 MG: 10 TABLET ORAL at 21:31

## 2025-03-12 RX ADMIN — Medication 21 PERCENT: at 07:44

## 2025-03-12 RX ADMIN — SODIUM BICARBONATE 1300 MG: 650 TABLET ORAL at 21:31

## 2025-03-12 RX ADMIN — CARVEDILOL 25 MG: 25 TABLET, FILM COATED ORAL at 21:31

## 2025-03-12 RX ADMIN — HYDRALAZINE HYDROCHLORIDE 100 MG: 50 TABLET ORAL at 15:37

## 2025-03-12 RX ADMIN — CARVEDILOL 25 MG: 25 TABLET, FILM COATED ORAL at 08:32

## 2025-03-12 RX ADMIN — SODIUM BICARBONATE 1300 MG: 650 TABLET ORAL at 08:32

## 2025-03-12 ASSESSMENT — COGNITIVE AND FUNCTIONAL STATUS - GENERAL
MOBILITY SCORE: 24
DAILY ACTIVITIY SCORE: 24

## 2025-03-12 ASSESSMENT — PAIN SCALES - GENERAL
PAINLEVEL_OUTOF10: 0 - NO PAIN
PAINLEVEL_OUTOF10: 0 - NO PAIN

## 2025-03-12 ASSESSMENT — PAIN - FUNCTIONAL ASSESSMENT
PAIN_FUNCTIONAL_ASSESSMENT: 0-10
PAIN_FUNCTIONAL_ASSESSMENT: 0-10

## 2025-03-12 NOTE — NURSING NOTE
I went in and talked with patient. I made him aware that his blood was needed for labs this morning. I also explained to him that I would make sure he was not disturbed afterwards so he could get some rest. He was ok with that plan. He allowed them to draw his labs

## 2025-03-12 NOTE — ASSESSMENT & PLAN NOTE
/143 on arrival, required ICU admission for nitroglycerin gtt  Management per nephrology/cardiology

## 2025-03-12 NOTE — NURSING NOTE
Assumed care of this pt. Pt is sitting in bed, breathing even and unlabored on RA. NAD. BSSR complete. Bed low, call light in reach. Continuing to monitor

## 2025-03-12 NOTE — NURSING NOTE
Pt refuses bed alarm. Encouraged pt to call for assistance when getting up and educated on fall risks and hourly rounding.

## 2025-03-12 NOTE — NURSING NOTE
"Pt refuses 0400 BP check. Pt is agitated, called nurses aid incompetent and says \"she shouldn't be caring for people if she doesn't know what she is doing\" Pt is upset because dinamap failed to read BP with fist attempt and the nurses aid asked if she could take it again to get a reading and pt began yelling at her. The pt was assured the nurses aid is competent and correct in trying to take BP again and educated on importance of monitoring BP.    "

## 2025-03-12 NOTE — NURSING NOTE
Assumed care of patient. Patient is trying to get some sleep this morning. He refused the lab draw. Call light was in place will continue to monitor.

## 2025-03-12 NOTE — ED PROCEDURE NOTE
Procedure  Critical Care    Performed by: Jesus Borden MD  Authorized by: Jesus Borden MD    Critical care provider statement:     Critical care time (minutes):  35    Critical care time was exclusive of:  Separately billable procedures and treating other patients and teaching time    Critical care was necessary to treat or prevent imminent or life-threatening deterioration of the following conditions:  Cardiac failure, circulatory failure, respiratory failure, sepsis, renal failure and metabolic crisis    Critical care was time spent personally by me on the following activities:  Development of treatment plan with patient or surrogate, evaluation of patient's response to treatment, examination of patient, interpretation of cardiac output measurements, obtaining history from patient or surrogate, ordering and performing treatments and interventions, ordering and review of laboratory studies, ordering and review of radiographic studies, pulse oximetry, re-evaluation of patient's condition and review of old charts               Jesus Borden MD  03/12/25 1000

## 2025-03-12 NOTE — PROGRESS NOTES
"Colin Leonard is a 59 y.o. male on day 2 of admission presenting with Hypertensive emergency.    Subjective   Patient sleeping in bed. Per bedside RN patient has been frustrated due to being woken up frequently overnight. Reviewed telemetry, no acute events overnight.        Objective     Physical Exam  Vitals reviewed.   Constitutional:       General: He is sleeping. He is not in acute distress.  HENT:      Head: Normocephalic and atraumatic.   Cardiovascular:      Rate and Rhythm: Normal rate and regular rhythm.      Heart sounds: Normal heart sounds.   Pulmonary:      Effort: Pulmonary effort is normal.      Breath sounds: Normal breath sounds.   Musculoskeletal:      Right lower leg: No edema.      Left lower leg: No edema.   Skin:     General: Skin is warm and dry.   Neurological:      Mental Status: He is easily aroused.         Last Recorded Vitals  Blood pressure 171/83, pulse 75, temperature 36.2 °C (97.2 °F), temperature source Oral, resp. rate 16, height 1.803 m (5' 11\"), weight 118 kg (259 lb 11.2 oz), SpO2 96%.  Intake/Output last 3 Shifts:  I/O last 3 completed shifts:  In: 1649.6 (13.9 mL/kg) [P.O.:1040; I.V.:409.6 (3.5 mL/kg); IV Piggyback:200]  Out: 4800 (40.5 mL/kg) [Urine:4800 (1.1 mL/kg/hr)]  Dosing Weight: 118.4 kg     Relevant Results  Results for orders placed or performed during the hospital encounter of 03/10/25 (from the past 24 hours)   aPTT   Result Value Ref Range    aPTT 38.7 (H) 22.0 - 32.5 seconds   CBC   Result Value Ref Range    WBC 9.7 4.4 - 11.3 x10*3/uL    nRBC 0.0 0.0 - 0.0 /100 WBCs    RBC 2.72 (L) 4.50 - 5.90 x10*6/uL    Hemoglobin 7.7 (L) 13.5 - 17.5 g/dL    Hematocrit 24.7 (L) 41.0 - 52.0 %    MCV 91 80 - 100 fL    MCH 28.3 26.0 - 34.0 pg    MCHC 31.2 (L) 32.0 - 36.0 g/dL    RDW 15.4 (H) 11.5 - 14.5 %    Platelets 237 150 - 450 x10*3/uL   Renal function panel   Result Value Ref Range    Glucose 114 (H) 74 - 99 mg/dL    Sodium 138 136 - 145 mmol/L    Potassium 3.5 3.5 - " 5.3 mmol/L    Chloride 106 98 - 107 mmol/L    Bicarbonate 20 (L) 21 - 32 mmol/L    Anion Gap 16 10 - 20 mmol/L    Urea Nitrogen 60 (H) 6 - 23 mg/dL    Creatinine 7.03 (H) 0.50 - 1.30 mg/dL    eGFR 8 (L) >60 mL/min/1.73m*2    Calcium 9.0 8.6 - 10.3 mg/dL    Phosphorus 4.2 2.5 - 4.9 mg/dL    Albumin 3.2 (L) 3.4 - 5.0 g/dL   Magnesium   Result Value Ref Range    Magnesium 2.10 1.60 - 2.40 mg/dL               Assessment/Plan   Assessment & Plan  Hypertensive emergency    Stage 5 chronic kidney disease not on chronic dialysis (Multi)    Anemia of chronic disease    Elevated troponin    Pneumonia    Acute hypoxic respiratory failure (Multi)    Type II NSTEMI   Hypertensive Urgency  Multilobar Pneumonia  Chronic Kidney Disease Stage V  Anemia in Chronic Disease     Impression and Plan:     3/11: Patient presenting to the hospital with cough, phlegm production and dyspnea.  He denies any pain or pressure.  Denies nausea or vomiting.  CT on admission to the hospital revealing multilobar pneumonia.  High-sensitivity troponin level started at 710 trended to 912, 1325 and 1099.  Patient was started on heparin drip for suspected NSTEMI as well as a nitroglycerin drip for his hypertensive urgency.  EKG in the emergency department consistent with the patient's prior, normal sinus rhythm and no evidence of acute ischemia.  Of note, the patient does have baseline elevated troponin levels on my review of his chart.  On my exam the patient is resting comfortably in bed.  He states he feels significantly improved from yesterday.  Blood pressure control has improved with last recorded 163/88.  Nephrology evaluated the patient and recommended no additional IV diuresis at this time, which I agree with.  Echocardiogram completed today revealing a preserved ejection fraction of 60 to 65%, no regional wall motion abnormalities, grade 2 pseudonormal pattern of left ventricular diastolic filling, normal right ventricular global systolic  function mild to moderate aortic valve stenosis.  At this time elevation in high-sensitivity troponin likely reflective of a type II injury in the setting of chronic kidney disease stage V, multilobar pneumonia, hypertensive urgency and hypoxia.  I have a low suspicion for acute coronary syndrome at this time and will discontinue the heparin drip.  Additionally, cardiac catheterization would not be recommended at this time given the patient's underlying chronic kidney disease and high risk for contrast nephropathy. Patient is overall improving from the cardiac perspective and we will continue to follow with you.     3/12: As above. Patient sleeping in bed at the time my exam.  Appears in no acute distress.  Per bedside RN, the patient is frustrated today as he did not get much sleep last night due to being woken up.  No acute events overnight.  On telemetry remains in sinus rhythm without significant ectopy.  Blood pressures elevated this morning at 171/83, prior to receiving morning medications.  The patient is breathing comfortably on room air with pulse oximetry 96%.  Lab work this morning did reveal a worsening creatinine at 7.03. Nephrology is following. Other lab work revealing a sodium 138, potassium 3.5, hemoglobin of 7.7.  Magnesium normal at 2.10.  Again, do not believe the patient's elevation high-sensitivity troponins is related to acute coronary syndrome but more likely a type II injury due to significantly elevated creatinine and acute hypoxic respiratory failure in the setting of pneumonia.  No further recommendations the cardiac perspective and we will sign off at this time.  The patient should follow-up with Dr. Saravia in the outpatient setting in 2 to 3 weeks following discharge.    Lea Osborne, APRN-CNP

## 2025-03-12 NOTE — CARE PLAN
The patient's goals for the shift include      The clinical goals for the shift include no respiratory distress

## 2025-03-12 NOTE — PROGRESS NOTES
CONSULT PROGRESS NOTES    SERVICE DATE: 3/12/2025   SERVICE TIME: 12:43 PM    CONSULTING SERVICE: Nephrology    ASSESSMENT AND PLAN   59-year-old with numerous medical problems including advanced CKD readmitted to the hospital with dyspnea, multilobar pneumonia.  1.  Chronic kidney disease stage V  2.  Hypertensive urgency  3.  Chronic metabolic acidosis  4.  Anemia in chronic kidney disease     While he has advanced CKD, the serum creatinine is generally stable.  He has been quite fatigued, I wonder if this is subtle uremia.  Defer any further Lasix dosing today given the slight increase in the serum creatinine.  The blood pressure is under considerably improved control and he is on oral medicines.  His bicarbonate level is improved on bicarbonate therapy.  Dosed with erythropoietin stimulating agents today.  Trend daily labs, continue supportive care.  I will continue to follow this patient.    SUBJECTIVE  INTERVAL HPI: He is very tired.  He moved out of the ICU.  He is frustrated about numerous blood pressure checks, blood draws, interruptions in his sleep.  He has no major pain.  His breathing is improved.  He is not on any supplemental oxygen.    MEDICATIONS:  aspirin, 81 mg, oral, Daily  carvedilol, 25 mg, oral, BID  cefuroxime, 250 mg, oral, q24h  ezetimibe, 10 mg, oral, Nightly  hydrALAZINE, 100 mg, oral, TID  oxygen, , inhalation, Continuous - Inhalation  sodium bicarbonate, 1,300 mg, oral, TID           PRN medications: acetaminophen, benzocaine-menthol, labetaloL, methocarbamol, ondansetron, [Held by provider] oxyCODONE, polyethylene glycol     OBJECTIVE  PHYSICAL EXAM:   Heart Rate:  [73-83]   Temp:  [36.2 °C (97.2 °F)-36.9 °C (98.4 °F)]   Resp:  [13-18]   BP: (132-173)/(75-97)   SpO2:  [96 %-98 %]   Body mass index is 36.22 kg/m².  This is a chronically ill-appearing white man who is nontoxic  Fatigued affect  Hearing intact  Phonation intact  Moist mucosa  Normal S1/normal S2  Lungs are clear to  auscultation bilaterally  Abdomen is soft, nondistended, nontender, positive bowel sounds  No Elmore catheter in place, no suprapubic tenderness to palpation  No extremity edema  Moves 4 limbs spontaneously  No obvious joint deformities  No lymphadenopathy    DATA:   Labs:  Results for orders placed or performed during the hospital encounter of 03/10/25 (from the past 96 hours)   CBC and Auto Differential   Result Value Ref Range    WBC 22.1 (H) 4.4 - 11.3 x10*3/uL    nRBC 0.0 0.0 - 0.0 /100 WBCs    RBC 3.20 (L) 4.50 - 5.90 x10*6/uL    Hemoglobin 9.1 (L) 13.5 - 17.5 g/dL    Hematocrit 29.1 (L) 41.0 - 52.0 %    MCV 91 80 - 100 fL    MCH 28.4 26.0 - 34.0 pg    MCHC 31.3 (L) 32.0 - 36.0 g/dL    RDW 15.6 (H) 11.5 - 14.5 %    Platelets 345 150 - 450 x10*3/uL    Neutrophils % 87.2 40.0 - 80.0 %    Immature Granulocytes %, Automated 1.5 (H) 0.0 - 0.9 %    Lymphocytes % 3.9 13.0 - 44.0 %    Monocytes % 5.4 2.0 - 10.0 %    Eosinophils % 1.5 0.0 - 6.0 %    Basophils % 0.5 0.0 - 2.0 %    Neutrophils Absolute 19.21 (H) 1.20 - 7.70 x10*3/uL    Immature Granulocytes Absolute, Automated 0.34 0.00 - 0.70 x10*3/uL    Lymphocytes Absolute 0.85 (L) 1.20 - 4.80 x10*3/uL    Monocytes Absolute 1.20 (H) 0.10 - 1.00 x10*3/uL    Eosinophils Absolute 0.34 0.00 - 0.70 x10*3/uL    Basophils Absolute 0.12 (H) 0.00 - 0.10 x10*3/uL   Comprehensive Metabolic Panel   Result Value Ref Range    Glucose 144 (H) 74 - 99 mg/dL    Sodium 140 136 - 145 mmol/L    Potassium 4.0 3.5 - 5.3 mmol/L    Chloride 113 (H) 98 - 107 mmol/L    Bicarbonate 16 (L) 21 - 32 mmol/L    Anion Gap 15 10 - 20 mmol/L    Urea Nitrogen 52 (H) 6 - 23 mg/dL    Creatinine 6.79 (H) 0.50 - 1.30 mg/dL    eGFR 9 (L) >60 mL/min/1.73m*2    Calcium 9.8 8.6 - 10.3 mg/dL    Albumin 4.0 3.4 - 5.0 g/dL    Alkaline Phosphatase 66 33 - 136 U/L    Total Protein 6.8 6.4 - 8.2 g/dL    AST 17 9 - 39 U/L    Bilirubin, Total 0.9 0.0 - 1.2 mg/dL    ALT 12 10 - 52 U/L   Lactate   Result Value Ref Range     Lactate 1.5 0.4 - 2.0 mmol/L   Blood Culture    Specimen: Peripheral Venipuncture; Blood culture   Result Value Ref Range    Blood Culture No growth at 1 day    Blood Culture    Specimen: Peripheral Venipuncture; Blood culture   Result Value Ref Range    Blood Culture No growth at 1 day    Magnesium   Result Value Ref Range    Magnesium 1.69 1.60 - 2.40 mg/dL   B-Type Natriuretic Peptide   Result Value Ref Range    BNP 2,726 (H) 0 - 99 pg/mL   Troponin I, High Sensitivity, Initial   Result Value Ref Range    Troponin I, High Sensitivity 710 (HH) 0 - 20 ng/L   ECG 12 lead   Result Value Ref Range    Ventricular Rate 91 BPM    Atrial Rate 91 BPM    ME Interval 148 ms    QRS Duration 92 ms    QT Interval 398 ms    QTC Calculation(Bazett) 489 ms    P Axis 40 degrees    R Axis 34 degrees    T Axis 46 degrees    QRS Count 15 beats    Q Onset 218 ms    P Onset 144 ms    P Offset 192 ms    T Offset 417 ms    QTC Fredericia 457 ms   Blood Gas Arterial Full Panel   Result Value Ref Range    POCT pH, Arterial 7.45 (H) 7.38 - 7.42 pH    POCT pCO2, Arterial 24 (L) 38 - 42 mm Hg    POCT pO2, Arterial 84 (L) 85 - 95 mm Hg    POCT SO2, Arterial 98 94 - 100 %    POCT Oxy Hemoglobin, Arterial 96.2 94.0 - 98.0 %    POCT Hematocrit Calculated, Arterial 27.0 (L) 41.0 - 52.0 %    POCT Sodium, Arterial 138 136 - 145 mmol/L    POCT Potassium, Arterial 4.2 3.5 - 5.3 mmol/L    POCT Chloride, Arterial 113 (H) 98 - 107 mmol/L    POCT Ionized Calcium, Arterial 1.35 (H) 1.10 - 1.33 mmol/L    POCT Glucose, Arterial 155 (H) 74 - 99 mg/dL    POCT Lactate, Arterial 1.0 0.4 - 2.0 mmol/L    POCT Base Excess, Arterial -6.2 (L) -2.0 - 3.0 mmol/L    POCT HCO3 Calculated, Arterial 16.7 (L) 22.0 - 26.0 mmol/L    POCT Hemoglobin, Arterial 8.9 (L) 13.5 - 17.5 g/dL    POCT Anion Gap, Arterial 13 10 - 25 mmo/L    Patient Temperature 37.0 degrees Celsius    FiO2 35 %    Ventilator Mode CPAP     Cpap 10.0 cm H2O    Site of Arterial Puncture Radial Right      Rivera's Test Positive    Troponin, High Sensitivity, 1 Hour   Result Value Ref Range    Troponin I, High Sensitivity 912 (HH) 0 - 20 ng/L   Respiratory Culture/Smear    Specimen: SPUTUM; Fluid   Result Value Ref Range    Respiratory Culture/Smear (A)      Culture not performed. See Gram stain findings. Recollect if clinically indicated.    Gram Stain (A)      Gram stain indicates specimen contains significant salivary contamination.   aPTT   Result Value Ref Range    aPTT 31.3 22.0 - 32.5 seconds   Procalcitonin   Result Value Ref Range    Procalcitonin 1.02 (H) <=0.07 ng/mL   Sars-CoV-2 and Influenza A/B PCR   Result Value Ref Range    Flu A Result Not Detected Not Detected    Flu B Result Not Detected Not Detected    Coronavirus 2019, PCR Not Detected Not Detected   RSV PCR   Result Value Ref Range    RSV PCR Not Detected Not Detected   Troponin I, High Sensitivity   Result Value Ref Range    Troponin I, High Sensitivity 1,325 (HH) 0 - 20 ng/L   MRSA Surveillance for Vancomycin De-escalation, PCR    Specimen: Anterior Nares; Swab   Result Value Ref Range    MRSA PCR Not Detected Not Detected   ECG 12 Lead   Result Value Ref Range    Ventricular Rate 89 BPM    Atrial Rate 89 BPM    LA Interval 160 ms    QRS Duration 94 ms    QT Interval 376 ms    QTC Calculation(Bazett) 457 ms    P Axis 53 degrees    R Axis 3 degrees    T Axis 116 degrees    QRS Count 15 beats    Q Onset 217 ms    P Onset 137 ms    P Offset 188 ms    T Offset 405 ms    QTC Fredericia 428 ms   Urinalysis with Reflex Culture and Microscopic   Result Value Ref Range    Color, Urine Colorless (N) Light-Yellow, Yellow, Dark-Yellow    Appearance, Urine Clear Clear    Specific Gravity, Urine 1.008 1.005 - 1.035    pH, Urine 6.0 5.0, 5.5, 6.0, 6.5, 7.0, 7.5, 8.0    Protein, Urine 50 (1+) (A) NEGATIVE, 10 (TRACE), 20 (TRACE) mg/dL    Glucose, Urine 100 (1+) (A) Normal mg/dL    Blood, Urine NEGATIVE NEGATIVE mg/dL    Ketones, Urine NEGATIVE NEGATIVE mg/dL     Bilirubin, Urine NEGATIVE NEGATIVE mg/dL    Urobilinogen, Urine Normal Normal mg/dL    Nitrite, Urine NEGATIVE NEGATIVE    Leukocyte Esterase, Urine NEGATIVE NEGATIVE   Extra Urine Gray Tube   Result Value Ref Range    Extra Tube Hold for add-ons.    Streptococcus pneumoniae Antigen, Urine    Specimen: Clean Catch/Voided; Urine   Result Value Ref Range    Streptococcus pneumoniae Ag, Urine Negative Negative   Legionella Antigen, Urine    Specimen: Clean Catch/Voided; Urine   Result Value Ref Range    L. pneumophila Urine Ag Negative Negative   Sterile Cup   Result Value Ref Range    Extra Tube Hold for add-ons.    Urinalysis Microscopic   Result Value Ref Range    WBC, Urine 1-5 1-5, NONE /HPF    RBC, Urine 1-2 NONE, 1-2, 3-5 /HPF   Renal function panel   Result Value Ref Range    Glucose 128 (H) 74 - 99 mg/dL    Sodium 140 136 - 145 mmol/L    Potassium 3.7 3.5 - 5.3 mmol/L    Chloride 110 (H) 98 - 107 mmol/L    Bicarbonate 19 (L) 21 - 32 mmol/L    Anion Gap 15 10 - 20 mmol/L    Urea Nitrogen 52 (H) 6 - 23 mg/dL    Creatinine 6.62 (H) 0.50 - 1.30 mg/dL    eGFR 9 (L) >60 mL/min/1.73m*2    Calcium 9.2 8.6 - 10.3 mg/dL    Phosphorus 3.6 2.5 - 4.9 mg/dL    Albumin 3.6 3.4 - 5.0 g/dL   Magnesium   Result Value Ref Range    Magnesium 2.48 (H) 1.60 - 2.40 mg/dL   aPTT   Result Value Ref Range    aPTT 63.9 (H) 22.0 - 32.5 seconds   Vancomycin   Result Value Ref Range    Vancomycin 15.0 5.0 - 20.0 ug/mL   CBC   Result Value Ref Range    WBC 15.2 (H) 4.4 - 11.3 x10*3/uL    nRBC 0.0 0.0 - 0.0 /100 WBCs    RBC 2.81 (L) 4.50 - 5.90 x10*6/uL    Hemoglobin 8.0 (L) 13.5 - 17.5 g/dL    Hematocrit 24.7 (L) 41.0 - 52.0 %    MCV 88 80 - 100 fL    MCH 28.5 26.0 - 34.0 pg    MCHC 32.4 32.0 - 36.0 g/dL    RDW 15.8 (H) 11.5 - 14.5 %    Platelets 287 150 - 450 x10*3/uL   Magnesium   Result Value Ref Range    Magnesium 2.30 1.60 - 2.40 mg/dL   Hepatic function panel   Result Value Ref Range    Albumin 3.5 3.4 - 5.0 g/dL    Bilirubin, Total  0.8 0.0 - 1.2 mg/dL    Bilirubin, Direct 0.2 0.0 - 0.3 mg/dL    Alkaline Phosphatase 57 33 - 136 U/L    ALT 11 10 - 52 U/L    AST 13 9 - 39 U/L    Total Protein 6.2 (L) 6.4 - 8.2 g/dL   Troponin I, High Sensitivity   Result Value Ref Range    Troponin I, High Sensitivity 1,099 (HH) 0 - 20 ng/L   Phosphorus   Result Value Ref Range    Phosphorus 4.1 2.5 - 4.9 mg/dL   Basic Metabolic Panel   Result Value Ref Range    Glucose 114 (H) 74 - 99 mg/dL    Sodium 140 136 - 145 mmol/L    Potassium 3.8 3.5 - 5.3 mmol/L    Chloride 109 (H) 98 - 107 mmol/L    Bicarbonate 20 (L) 21 - 32 mmol/L    Anion Gap 15 10 - 20 mmol/L    Urea Nitrogen 55 (H) 6 - 23 mg/dL    Creatinine 6.77 (H) 0.50 - 1.30 mg/dL    eGFR 9 (L) >60 mL/min/1.73m*2    Calcium 9.1 8.6 - 10.3 mg/dL   aPTT   Result Value Ref Range    aPTT 32.8 (H) 22.0 - 32.5 seconds   Transthoracic Echo (TTE) Complete   Result Value Ref Range    AV pk gillian 2.64 m/s    LVOT diam 2.03 cm    AV mn grad 15 mmHg    MV E/A ratio 1.01     LV Biplane EF 70 %    LV EF 63 %    LVIDd 5.67 cm    AV pk grad 28 mmHg    Aortic Valve Area by Continuity of VTI 1.25 cm2    Aortic Valve Area by Continuity of Peak Velocity 1.39 cm2    LV A4C EF 75.9    aPTT   Result Value Ref Range    aPTT 38.7 (H) 22.0 - 32.5 seconds   CBC   Result Value Ref Range    WBC 9.7 4.4 - 11.3 x10*3/uL    nRBC 0.0 0.0 - 0.0 /100 WBCs    RBC 2.72 (L) 4.50 - 5.90 x10*6/uL    Hemoglobin 7.7 (L) 13.5 - 17.5 g/dL    Hematocrit 24.7 (L) 41.0 - 52.0 %    MCV 91 80 - 100 fL    MCH 28.3 26.0 - 34.0 pg    MCHC 31.2 (L) 32.0 - 36.0 g/dL    RDW 15.4 (H) 11.5 - 14.5 %    Platelets 237 150 - 450 x10*3/uL   Renal function panel   Result Value Ref Range    Glucose 114 (H) 74 - 99 mg/dL    Sodium 138 136 - 145 mmol/L    Potassium 3.5 3.5 - 5.3 mmol/L    Chloride 106 98 - 107 mmol/L    Bicarbonate 20 (L) 21 - 32 mmol/L    Anion Gap 16 10 - 20 mmol/L    Urea Nitrogen 60 (H) 6 - 23 mg/dL    Creatinine 7.03 (H) 0.50 - 1.30 mg/dL    eGFR 8 (L)  >60 mL/min/1.73m*2    Calcium 9.0 8.6 - 10.3 mg/dL    Phosphorus 4.2 2.5 - 4.9 mg/dL    Albumin 3.2 (L) 3.4 - 5.0 g/dL   Magnesium   Result Value Ref Range    Magnesium 2.10 1.60 - 2.40 mg/dL         SIGNATURE: Boo Max MD PATIENT NAME: Colin Leonard   DATE: March 12, 2025 MRN: [id number]   TIME: 12:43 PM PAGER: [phone]

## 2025-03-12 NOTE — ASSESSMENT & PLAN NOTE
Multilobar pneumonia  ID following  Continue antibiotics per ID - can transition to PO antibiotics at discharge

## 2025-03-12 NOTE — NURSING NOTE
Transferred to [facility], room 461, oriented to room, vitals taken

## 2025-03-12 NOTE — PROGRESS NOTES
Colin Leonard is a 59 y.o. male on day 2 of admission presenting with Hypertensive emergency.    Subjective   Afebrile  Denies any issues currently-no shortness of breath or cough  States he wants to rest, physical exam was declined at this time    Objective   Range of Vitals (last 24 hours)  Heart Rate:  [70-83]   Temp:  [36.2 °C (97.2 °F)-36.9 °C (98.4 °F)]   Resp:  [13-18]   BP: (132-173)/(75-97)   SpO2:  [95 %-98 %]   Daily Weight  03/11/25 : 118 kg (259 lb 11.2 oz)    Body mass index is 36.22 kg/m².    Physical Exam  Constitutional:       Appearance: Normal appearance.   HENT:      Head: Normocephalic and atraumatic.   Eyes:      Extraocular Movements: Extraocular movements intact.      Conjunctiva/sclera: Conjunctivae normal.   Pulmonary:      Effort: Pulmonary effort is normal.   Abdominal:      General: There is no distension.   Musculoskeletal:         General: Normal range of motion.      Cervical back: Normal range of motion.   Neurological:      Mental Status: He is alert and oriented to person, place, and time.           Antibiotics  amoxicillin-pot clavulanate - 500-125 mg  cefuroxime - 250 mg    Relevant Results  Labs  Results from last 72 hours   Lab Units 03/12/25  0729 03/11/25  0620 03/10/25  1322   WBC AUTO x10*3/uL 9.7 15.2* 22.1*   HEMOGLOBIN g/dL 7.7* 8.0* 9.1*   HEMATOCRIT % 24.7* 24.7* 29.1*   PLATELETS AUTO x10*3/uL 237 287 345   NEUTROS PCT AUTO %  --   --  87.2   LYMPHS PCT AUTO %  --   --  3.9   MONOS PCT AUTO %  --   --  5.4   EOS PCT AUTO %  --   --  1.5     Results from last 72 hours   Lab Units 03/12/25  0729 03/11/25  0620 03/10/25  2315   SODIUM mmol/L 138 140 140   POTASSIUM mmol/L 3.5 3.8 3.7   CHLORIDE mmol/L 106 109* 110*   CO2 mmol/L 20* 20* 19*   BUN mg/dL 60* 55* 52*   CREATININE mg/dL 7.03* 6.77* 6.62*   GLUCOSE mg/dL 114* 114* 128*   CALCIUM mg/dL 9.0 9.1 9.2   ANION GAP mmol/L 16 15 15   EGFR mL/min/1.73m*2 8* 9* 9*   PHOSPHORUS mg/dL 4.2 4.1 3.6     Results from last  72 hours   Lab Units 03/12/25  0729 03/11/25  0620 03/10/25  2315 03/10/25  1322   ALK PHOS U/L  --  57  --  66   BILIRUBIN TOTAL mg/dL  --  0.8  --  0.9   BILIRUBIN DIRECT mg/dL  --  0.2  --   --    PROTEIN TOTAL g/dL  --  6.2*  --  6.8   ALT U/L  --  11  --  12   AST U/L  --  13  --  17   ALBUMIN g/dL 3.2* 3.5 3.6 4.0     Estimated Creatinine Clearance: 14.4 mL/min (A) (by C-G formula based on SCr of 7.03 mg/dL (H)).  C-Reactive Protein   Date Value Ref Range Status   07/30/2024 9.10 (H) 0.00 - 2.00 mg/dL Final   07/22/2024 24.70 (H) 0.00 - 2.00 mg/dL Final     CRP   Date Value Ref Range Status   09/09/2020 5.32 (A) mg/dL Final     Comment:     REF VALUE  < 1.00       Microbiology  No results found for the last 14 days.    Imaging  XR chest 1 view    Result Date: 3/11/2025  Interpreted By:  Hernandez Kelley, STUDY: XR CHEST 1 VIEW; 3/11/2025 5:29 am   INDICATION: Signs/Symptoms:follow up pulm edema   COMPARISON: 03/10/2025.   ACCESSION NUMBER(S): [id number]   ORDERING CLINICIAN: OMAIRA VÁZQUEZ   FINDINGS: The study is limited due to rotation, respiratory motion and poor inspiratory effort, with resultant crowding of the pulmonary vasculature. The cardiac silhouette appears mildly prominent, exaggerated by the technique. There is interval significant improvement of bilateral perihilar interstitial infiltrates. There is no pneumothorax or significant effusion. The osseous structures are unchanged.       Limited study. Improving bilateral perihilar interstitial infiltrates, most likely due to resolving pulmonary edema/CHF. Correlate clinically and further follow-up as needed.   Signed by: Hernandez Kelley 3/11/2025 3:02 PM Dictation workstation:   [id number]    Transthoracic Echo (TTE) Complete    Result Date: 3/11/2025           [place] [address]            Phone [phone] TRANSTHORACIC ECHOCARDIOGRAM REPORT Patient Name:       ODALYS Friedman  Physician:    [id number] Tomi Moser DO Study Date:         3/11/2025            Ordering Provider:    [id number] FOSTER MOORE                                                                ABILIO MRN/PID:            [id number]             Fellow: Accession#:         [id number]         Nurse: Date of Birth/Age:  1965 / 59      Sonographer:          Nathaniel hernandez RDCS Gender Assigned at  M                    Additional Staff: Birth: Height:             177.80 cm            Admit Date: Weight:             117.93 kg            Admission Status:     Inpatient -                                                                Routine BSA / BMI:          2.33 m2 / 37.31      Department Location:  [facility]                     kg/m2 Blood Pressure: 146 /67 mmHg Study Type:    TRANSTHORACIC ECHO (TTE) COMPLETE Diagnosis/ICD: Subsequent non ST elevation (NSTEMI) myocardial infarction-I22.2 Indication:    NSTEMI CPT Codes:     Echo Complete w Full [id number] Patient History: Diabetes:          Yes Pertinent History: CAD, Chest Pain, CHF, HTN, Hyperlipidemia, Dyspnea and                    Syncope. LVH, Renal dx V, NSTEMI, DVT. Study Detail: The following Echo studies were performed: 2D, M-Mode, Doppler and               color flow. Technically challenging study due to poor acoustic               windows and body habitus. Definity used as a contrast agent for               endocardial border definition. Total contrast used for this               procedure was 2 mL via IV push. Unable to obtain RV/RA not well               visualized view.  PHYSICIAN INTERPRETATION: Left Ventricle: Left ventricular ejection fraction is normal, by visual estimate at 60-65%. There is mild concentric left ventricular hypertrophy. There are no regional wall motion abnormalities. The left ventricular cavity size is normal.  There is mild increased septal and mildly increased posterior left ventricular wall thickness. Spectral Doppler shows a Grade II (pseudonormal pattern) of left ventricular diastolic filling with an elevated left atrial pressure. Left Atrium: The left atrial size is mildly dilated. Right Ventricle: The right ventricle is normal in size. There is normal right ventricular global systolic function. Right Atrium: The right atrial size is normal. Aortic Valve: The aortic valve appears abnormal. There is mild to moderate aortic valve cusp calcification. There is mild to moderate aortic valve thickening. There is evidence of mild to moderate aortic valve stenosis. The aortic valve dimensionless index is 0.39. There is no evidence of aortic valve regurgitation. The peak instantaneous gradient of the aortic valve is 28 mmHg. The mean gradient of the aortic valve is 15 mmHg. Mitral Valve: The mitral valve is normal in structure. There is mild mitral valve regurgitation. Tricuspid Valve: The tricuspid valve is structurally normal. There is trace tricuspid regurgitation. Pulmonic Valve: The pulmonic valve is structurally normal. There is no indication of pulmonic valve regurgitation. Pericardium: No pericardial effusion noted. Aorta: The aortic root is normal.  CONCLUSIONS:  1. Left ventricular ejection fraction is normal, by visual estimate at 60-65%.  2. Spectral Doppler shows a Grade II (pseudonormal pattern) of left ventricular diastolic filling with an elevated left atrial pressure.  3. There is normal right ventricular global systolic function.  4. Mild to moderate aortic valve stenosis. QUANTITATIVE DATA SUMMARY:  2D MEASUREMENTS:             Normal Ranges: Ao Root s:       3.93 cm IVSd:            1.25 cm     (0.6-1.1cm) LVPWd:           1.26 cm     (0.6-1.1cm) LVIDd:           5.67 cm     (3.9-5.9cm) LVIDs:           3.75 cm LV Mass Index:   130.8 g/m2 LVEDV Index:     58.65 ml/m2 LV % FS          33.9 %  LEFT ATRIUM:                  Normal Ranges: LA Vol A4C:       128.6 ml LA Vol A2C:       65.2 ml LA Vol Index BSA: 41.5 ml/m2  LV SYSTOLIC FUNCTION:                      Normal Ranges: EF-A4C View:    76 % (>=55%) EF-A2C View:    60 % EF-Biplane:     70 % EF-Visual:      63 % LV EF Reported: 63 %  LV DIASTOLIC FUNCTION:           Normal Ranges: MV Peak E:             1.17 m/s  (0.7-1.2 m/s) MV Peak A:             1.16 m/s  (0.42-0.7 m/s) E/A Ratio:             1.01      (1.0-2.2) MV e'                  0.059 m/s (>8.0) MV lateral e'          0.06 m/s MV medial e'           0.05 m/s E/e' Ratio:            19.88     (<8.0)  MITRAL VALVE:          Normal Ranges: MV DT:        296 msec (150-240msec)  AORTIC VALVE:                      Normal Ranges: AoV Vmax:                2.64 m/s  (<=1.7m/s) AoV Peak P.8 mmHg (<20mmHg) AoV Mean PG:             15.4 mmHg (1.7-11.5mmHg) LVOT Max Cirilo:            1.13 m/s  (<=1.1m/s) AoV VTI:                 66.94 cm  (18-25cm) LVOT VTI:                25.87 cm LVOT Diameter:           2.03 cm   (1.8-2.4cm) AoV Area, VTI:           1.25 cm2  (2.5-5.5cm2) AoV Area,Vmax:           1.39 cm2  (2.5-4.5cm2) AoV Dimensionless Index: 0.39  TRICUSPID VALVE/RVSP:         Normal Ranges: IVC Diam:             2.16 cm  PULMONIC VALVE:          Normal Ranges: PV Max Cirilo:     0.9 m/s  (0.6-0.9m/s) PV Max PG:      3.4 mmHg  [id number] Tomi Shanti LANGSTON Electronically signed on 3/11/2025 at 11:07:24 AM  ** Final **     ECG 12 lead    Result Date: 3/11/2025  Normal sinus rhythm Nonspecific ST and T wave abnormality QTcB >= 480 msec Abnormal ECG When compared with ECG of 28-OCT-2024 15:17, No significant change was found Confirmed by Brent Ellington ([id number]) on 3/11/2025 10:18:44 AM    ECG 12 Lead    Result Date: 3/11/2025  Normal sinus rhythm Nonspecific ST and T wave abnormality Abnormal ECG No previous ECGs available Confirmed by Brent Ellington ([id number]) on 3/11/2025 9:21:45 AM    CT chest abdomen pelvis wo  IV contrast    Result Date: 3/10/2025  STUDY: CT Chest, Abdomen, and Pelvis without IV Contrast; 03/10/2025 3:57 PM INDICATION: Assess for pulmonary infection superimposed on flash pulmonary edema and intra-abdominal infection, status post cholecystectomy.  COMPARISON: XR chest 03/10/2025.  US RUQ abdomen m03/04/2025. CT abdomen/pelvis 03/03/2025.  ACCESSION NUMBER(S): [id number] ORDERING CLINICIAN: KYLE ALANIZ TECHNIQUE: CT of the chest, abdomen, and pelvis was performed.  Contiguous axial images were obtained at 3 mm slice thickness through the chest, abdomen, and pelvis.  Coronal and sagittal reconstructions at 3 mm slice thickness were performed.  No intravenous contrast was administered.  FINDINGS: Please note that the evaluation of vessels, lymph nodes and organs is limited without intravenous contrast. CHEST: MEDIASTINUM: The heart is normal in size without pericardial effusion.  Coronary artery calcifications are identified.  LUNGS/PLEURA: There is no pleural effusion, pleural thickening, or pneumothorax. The airways are patent. Lungs demonstrate multilobar patchy airspace opacity suggesting acute multilobar pneumonia.  There are small bilateral pleural effusions.  LYMPH NODES: Thoracic lymph nodes are not enlarged. ABDOMEN:  LIVER: No hepatomegaly.  Smooth surface contour.  There is fatty liver morphology.  BILE DUCTS: No intrahepatic or extrahepatic biliary ductal dilatation.  GALLBLADDER: The gallbladder is surgically absent. STOMACH: No abnormalities identified.  PANCREAS: No masses or ductal dilatation.  SPLEEN: No splenomegaly or focal splenic lesion.  ADRENAL GLANDS: No thickening or nodules.  KIDNEYS AND URETERS: There is mild bilateral cortical renal atrophy..  No renal or ureteral calculi.  Left renal cyst is demonstrated.  There are small nonobstructive calculus right kidney.  This right extrarenal pelvis. There is no hydronephrosis or ureterectasis.  PELVIS:  BLADDER: No abnormalities  identified.  REPRODUCTIVE ORGANS: No abnormalities identified.  Prostate gland measures 4.9 cm.  BOWEL: No abnormalities identified.  There is mild sigmoid colon diverticulosis without evidence diverticulitis.  There is mild colonic stool.  VESSELS: No abnormalities identified.  Abdominal aorta is normal in caliber.  PERITONEUM/RETROPERITONEUM/LYMPH NODES: No free fluid.  No pneumoperitoneum. There are mildly prominent retroperitoneal lymph nodes and upper abdominal lymph nodes.  ABDOMINAL WALL: No abnormalities identified. SOFT TISSUES: No abnormalities identified.  BONES: No acute fracture or aggressive osseous lesion.  There is disc space narrowing and endplate degenerative changes in the thoracic and lumbar spine with anterior marginal osteophytes.    Chest: Acute multilobar pneumonia.  Findings greater in the bilateral upper lobes.  Small bilateral pleural effusions. Abdomen: No acute inflammatory changes.  Postoperative changes following cholecystectomy. Small nonobstructive calculus right kidney. Mildly prominent retroperitoneal and upper abdominal lymph nodes. Pelvis: No acute inflammatory changes.  Sigmoid colon diverticulosis without evidence diverticulitis. Signed by Wali Barba,     XR chest 1 view    Result Date: 3/10/2025  STUDY: Chest Radiograph;  03/10/2025 02:21 PM INDICATION: Evaluate for flash pulmonary edema. COMPARISON: Chest, single portable view obtained on 07/21/2024 at 22:23 hours (Imaging only; report unavailable). ACCESSION NUMBER(S): [id number] ORDERING CLINICIAN: KYLE ALANIZ TECHNIQUE:  Frontal chest was obtained at 14:21 hours. FINDINGS: CARDIOMEDIASTINAL SILHOUETTE: Cardiomediastinal silhouette is top normal to mildly enlarged normal in size and configuration.  LUNGS: There is diffuse accentuation of markings throughout both lungs representing unfavorable change when compared with the prior.  There is a possible focus of infiltrate or atelectasis in the right base as  well.  ABDOMEN: No remarkable upper abdominal findings.  BONES: No acute osseous changes.    1.Top normal to mildly enlarged heart and configuration. 2.Diffuse accentuation of markings in both lungs representing unfavorable change when compared with the prior. Findings could be related to pulmonary vascular congestion or other interstitial process. Signed by Nohemy Barbosa DO    CT head wo IV contrast    Result Date: 3/8/2025  Interpreted By:  Mahendra Reyes, STUDY: CT HEAD WO IV CONTRAST; ;  3/8/2025 4:50 pm   INDICATION: Signs/Symptoms:vertigo.   COMPARISON: 09/06/2020   ACCESSION NUMBER(S): [id number]   ORDERING CLINICIAN: KIRK BARBER   TECHNIQUE: Contiguous unenhanced axial CT sections are performed from the skull base to the vertex.   FINDINGS: The osseous structures are intact. There is sessile lipoma overlying the right frontal calvarium. There is some mild polyposis along the anterior walls of the maxillary sinuses which are incompletely visualized and mild mucoperiosteal thickening of the ethmoid air cells.   There is moderate generalized parenchymal volume loss with enlargement of the cortical sulci and CSF spaces. There is diffuse hypoattenuation of the cerebral white matter bilaterally which is nonspecific though could represent small-vessel ischemic changes.   There is no sign of parenchymal hematoma or dense extra-axial fluid collection. There is no mass effect or midline shift.       Moderate generalized parenchymal atrophy.   Severe hypoattenuation in the cerebral white matter bilaterally is nonspecific though may reflect small-vessel ischemic changes. The need for further workup should be determined clinically.   No CT evidence of acute intracranial hemorrhage or mass effect.   Paranasal sinusitis as described above.     MACRO: None   Signed by: Mahendra Reyes 3/8/2025 6:43 PM Dictation workstation:   [id number]     right upper quadrant    Result Date: 3/4/2025  Interpreted By:   Miguelina Brooke, STUDY: US RIGHT UPPER QUADRANT;  3/4/2025 8:32 am   INDICATION: Signs/Symptoms:eval for acute cholecystitis.   COMPARISON: CT abdomen and pelvis 03/03/2025   ACCESSION NUMBER(S): [id number]   ORDERING CLINICIAN: WAQAR CRENSHAW   TECHNIQUE: Multiple images of the abdomen were obtained.   FINDINGS: LIVER: The echogenicity of the liver is within normal limits. There is no hepatic mass. The sagittal dimension of the right lobe of the liver is 17.8 cm, nonenlarged.   GALLBLADDER: The gallbladder is nondistended. The gallbladder wall is not thickened. There are small gravel-like gallstones in the proximal body of the gallbladder. There is no sludge. There is no pericholecystic fluid. There is no sonographic Thompson's sign   BILE DUCTS: There is no intrahepatic biliary dilatation. The common bile duct is  nondilated measuring 0.2 cm.   PANCREAS: Only a small part of the body of the pancreas is seen and is unremarkable. There is obscuration of most of the pancreas secondary to shadowing from bowel gas.   RIGHT KIDNEY: The right kidney is  normal in size measuring 11.2 cm in length.   The echogenicity of the cortex is within normal limits. There is no renal mass. There is no intrarenal calculus or hydronephrosis.       Cholelithiasis   MACRO: None   Signed by: Miguelina Brooke 3/4/2025 8:59 AM Dictation workstation:   [id number]    CT abdomen pelvis wo IV contrast    Result Date: 3/3/2025  Interpreted By:  Alvaro Nicholas, STUDY: CT ABDOMEN PELVIS WO IV CONTRAST;  3/3/2025 10:53 pm   INDICATION: Signs/Symptoms:nausea, vomiting, epigastric pain.   COMPARISON: None.   ACCESSION NUMBER(S): [id number]   ORDERING CLINICIAN: ELVIS REYNOSO   TECHNIQUE: Contiguous axial images of the abdomen and pelvis were obtained without iodinated contrast. Coronal and sagittal reformatted images were reconstructed from the axial data.   FINDINGS: There is mild bronchiectasis seen lung bases.   Noncontrast appearance of the liver,  adrenals pancreas and spleen are unremarkable. Probable cholelithiasis incidentally seen.   No hydronephrosis or hydroureter. Nonobstructing subcentimeter calculus seen right kidney. Bladder within normal limits.   Moderate calcification aorta without aneurysm.   No small bowel obstruction. No appendicitis or colitis. Mild diverticulosis.   No free fluid. Scattered nonenlarged periaortic lymph nodes are incidentally seen.   Moderate spondylotic degeneration seen axial skeleton.       No acute process identified in the abdomen or pelvis.   Incidental note made of nonobstructing right renal calculus as well as cholelithiasis.     MACRO: None.   Signed by: Alvaro Nicholas 3/3/2025 11:24 PM Dictation workstation:   [id number]     Assessment/Plan   Acute hypoxic respiratory failure-resolved  Sepsis-triggered with leukocytosis, tachycardia, tachypnea-resolved  Multilobar pneumonia-MRSA PCR negative, streptococcus pneumoniae antigen negative, legionella antigen negative, COVID/Flu/RSV negative  Leukocytosis-resolved  Chronic kidney disease stage V  Recent laparoscopic cholecystectomy     Discontinue IV meropenem  Begin PO cefuroxime-renally dosed  Follow-up Mycoplasma IgM  Follow-up blood cultures  Monitor WBC and temperature  Monitor renal function  Oxygen as needed  Supportive care  Discharge plan is cefuroxime for a total of 7 days     Discussed with Dr Rodrigez     Total time spent caring for the patient today was 15 minutes.  This includes time spent before the visit reviewing the chart, time spent during the visit, and time spent after the visit on documentation.      Lora Watters, APRN-CNP

## 2025-03-12 NOTE — PROGRESS NOTES
Colin Leonard is a 59 y.o. male on day 2 of admission presenting with Hypertensive emergency.      Subjective   States he is tired and frustrated that he is unable to rest due to staff coming in and out of his room. Denies any complaints currently.        Objective     Last Recorded Vitals  /83 (BP Location: Left arm, Patient Position: Lying)   Pulse 75   Temp 36.2 °C (97.2 °F) (Oral)   Resp 16   Wt 118 kg (259 lb 11.2 oz)   SpO2 96%   Intake/Output last 3 Shifts:    Intake/Output Summary (Last 24 hours) at 3/12/2025 1101  Last data filed at 3/12/2025 0240  Gross per 24 hour   Intake 120 ml   Output 400 ml   Net -280 ml       Admission Weight  Weight: 113 kg (250 lb) (03/10/25 1311)    Daily Weight  03/11/25 : 118 kg (259 lb 11.2 oz)    Image Results  XR chest 1 view  Narrative: Interpreted By:  Hernandez Kelley,   STUDY:  XR CHEST 1 VIEW; 3/11/2025 5:29 am      INDICATION:  Signs/Symptoms:follow up pulm edema      COMPARISON:  03/10/2025.      ACCESSION NUMBER(S):  [id number]      ORDERING CLINICIAN:  OMAIRA VÁZQUEZ      FINDINGS:  The study is limited due to rotation, respiratory motion and poor  inspiratory effort, with resultant crowding of the pulmonary  vasculature. The cardiac silhouette appears mildly prominent,  exaggerated by the technique. There is interval significant  improvement of bilateral perihilar interstitial infiltrates. There is  no pneumothorax or significant effusion. The osseous structures are  unchanged.      Impression: Limited study. Improving bilateral perihilar interstitial  infiltrates, most likely due to resolving pulmonary edema/CHF.  Correlate clinically and further follow-up as needed.      Signed by: Hernandez Kelley 3/11/2025 3:02 PM  Dictation workstation:   [id number]  Transthoracic Echo (TTE) [place] [address]             Phone [phone]    TRANSTHORACIC ECHOCARDIOGRAM REPORT    Patient  Name:       ODALYS Friedman Physician:    [id number] Tomi Moser DO  Study Date:         3/11/2025            Ordering Provider:    [id number] FOSTER KNOX  MRN/PID:            [id number]             Fellow:  Accession#:         [id number]         Nurse:  Date of Birth/Age:  1965 / 59      Sonographer:          Nathaniel hernandez RDCS  Gender Assigned at  M                    Additional Staff:  Birth:  Height:             177.80 cm            Admit Date:  Weight:             117.93 kg            Admission Status:     Inpatient -                                                                 Routine  BSA / BMI:          2.33 m2 / 37.31      Department Location:  [facility]                      kg/m2  Blood Pressure: 146 /67 mmHg    Study Type:    TRANSTHORACIC ECHO (TTE) COMPLETE  Diagnosis/ICD: Subsequent non ST elevation (NSTEMI) myocardial infarction-I22.2  Indication:    NSTEMI  CPT Codes:     Echo Complete w Full [id number]    Patient History:  Diabetes:          Yes  Pertinent History: CAD, Chest Pain, CHF, HTN, Hyperlipidemia, Dyspnea and                     Syncope. LVH, Renal dx V, NSTEMI, DVT.    Study Detail: The following Echo studies were performed: 2D, M-Mode, Doppler and                color flow. Technically challenging study due to poor acoustic                windows and body habitus. Definity used as a contrast agent for                endocardial border definition. Total contrast used for this                procedure was 2 mL via IV push. Unable to obtain RV/RA not well                visualized view.       PHYSICIAN INTERPRETATION:  Left Ventricle: Left ventricular ejection fraction is normal, by visual estimate at 60-65%. There is mild concentric left ventricular hypertrophy. There are no  regional wall motion abnormalities. The left ventricular cavity size is normal. There is mild increased septal and mildly increased posterior left ventricular wall thickness. Spectral Doppler shows a Grade II (pseudonormal pattern) of left ventricular diastolic filling with an elevated left atrial pressure.  Left Atrium: The left atrial size is mildly dilated.  Right Ventricle: The right ventricle is normal in size. There is normal right ventricular global systolic function.  Right Atrium: The right atrial size is normal.  Aortic Valve: The aortic valve appears abnormal. There is mild to moderate aortic valve cusp calcification. There is mild to moderate aortic valve thickening. There is evidence of mild to moderate aortic valve stenosis.  The aortic valve dimensionless index is 0.39. There is no evidence of aortic valve regurgitation. The peak instantaneous gradient of the aortic valve is 28 mmHg. The mean gradient of the aortic valve is 15 mmHg.  Mitral Valve: The mitral valve is normal in structure. There is mild mitral valve regurgitation.  Tricuspid Valve: The tricuspid valve is structurally normal. There is trace tricuspid regurgitation.  Pulmonic Valve: The pulmonic valve is structurally normal. There is no indication of pulmonic valve regurgitation.  Pericardium: No pericardial effusion noted.  Aorta: The aortic root is normal.       CONCLUSIONS:   1. Left ventricular ejection fraction is normal, by visual estimate at 60-65%.   2. Spectral Doppler shows a Grade II (pseudonormal pattern) of left ventricular diastolic filling with an elevated left atrial pressure.   3. There is normal right ventricular global systolic function.   4. Mild to moderate aortic valve stenosis.    QUANTITATIVE DATA SUMMARY:     2D MEASUREMENTS:             Normal Ranges:  Ao Root s:       3.93 cm  IVSd:            1.25 cm     (0.6-1.1cm)  LVPWd:           1.26 cm     (0.6-1.1cm)  LVIDd:           5.67 cm     (3.9-5.9cm)  LVIDs:            3.75 cm  LV Mass Index:   130.8 g/m2  LVEDV Index:     58.65 ml/m2  LV % FS          33.9 %       LEFT ATRIUM:                 Normal Ranges:  LA Vol A4C:       128.6 ml  LA Vol A2C:       65.2 ml  LA Vol Index BSA: 41.5 ml/m2       LV SYSTOLIC FUNCTION:                       Normal Ranges:  EF-A4C View:    76 % (>=55%)  EF-A2C View:    60 %  EF-Biplane:     70 %  EF-Visual:      63 %  LV EF Reported: 63 %       LV DIASTOLIC FUNCTION:           Normal Ranges:  MV Peak E:             1.17 m/s  (0.7-1.2 m/s)  MV Peak A:             1.16 m/s  (0.42-0.7 m/s)  E/A Ratio:             1.01      (1.0-2.2)  MV e'                  0.059 m/s (>8.0)  MV lateral e'          0.06 m/s  MV medial e'           0.05 m/s  E/e' Ratio:            19.88     (<8.0)       MITRAL VALVE:          Normal Ranges:  MV DT:        296 msec (150-240msec)       AORTIC VALVE:                      Normal Ranges:  AoV Vmax:                2.64 m/s  (<=1.7m/s)  AoV Peak P.8 mmHg (<20mmHg)  AoV Mean PG:             15.4 mmHg (1.7-11.5mmHg)  LVOT Max Cirilo:            1.13 m/s  (<=1.1m/s)  AoV VTI:                 66.94 cm  (18-25cm)  LVOT VTI:                25.87 cm  LVOT Diameter:           2.03 cm   (1.8-2.4cm)  AoV Area, VTI:           1.25 cm2  (2.5-5.5cm2)  AoV Area,Vmax:           1.39 cm2  (2.5-4.5cm2)  AoV Dimensionless Index: 0.39       TRICUSPID VALVE/RVSP:         Normal Ranges:  IVC Diam:             2.16 cm       PULMONIC VALVE:          Normal Ranges:  PV Max Cirilo:     0.9 m/s  (0.6-0.9m/s)  PV Max PG:      3.4 mmHg       [id number] Tomi Moser DO  Electronically signed on 3/11/2025 at 11:07:24 AM       ** Final **  ECG 12 lead  Normal sinus rhythm  Nonspecific ST and T wave abnormality  QTcB >= 480 msec  Abnormal ECG  When compared with ECG of 28-OCT-2024 15:17,  No significant change was found  Confirmed by Brent Ellington ([id number]) on 3/11/2025 10:18:44 AM  ECG 12 Lead  Normal sinus rhythm  Nonspecific ST and T wave  abnormality  Abnormal ECG  No previous ECGs available  Confirmed by Brent Ellington ([id number]) on 3/11/2025 9:21:45 AM      Physical Exam  Constitutional:       General: He is not in acute distress.     Appearance: He is not toxic-appearing.   HENT:      Head: Normocephalic.      Mouth/Throat:      Pharynx: Oropharynx is clear.   Eyes:      General: No scleral icterus.  Cardiovascular:      Rate and Rhythm: Normal rate.   Pulmonary:      Effort: No respiratory distress.      Breath sounds: No wheezing.      Comments: Room air  Abdominal:      General: There is no distension.      Tenderness: There is no abdominal tenderness.   Musculoskeletal:      Right lower leg: No edema.      Left lower leg: No edema.   Neurological:      Mental Status: He is alert and oriented to person, place, and time.         Relevant Results               Assessment/Plan        Assessment & Plan  Hypertensive emergency  /143 on arrival, required ICU admission for nitroglycerin gtt  Management per nephrology/cardiology   Pneumonia  Multilobar pneumonia  ID following  Continue antibiotics per ID - can transition to PO antibiotics at discharge   Acute hypoxic respiratory failure (Multi)  Due to above  Resolved, stable on room air  Elevated troponin  Suspect demand due to above and underlying renal disease   Management per cardiology   Stage 5 chronic kidney disease not on chronic dialysis (Multi)  Nephrology following and managing   Anemia of chronic disease  Baseline Hgb 7-8  Stable at baseline     Plan:  Discussed with Dr. Rodrigez from ID - ok to discharge from ID perspective on PO antibiotics  Discussed with Lea Osborne CNP from cardiology - heparin gtt discontinued yesterday, low suspicion for ACS  Renal function worsening on AM labs - awaiting nephrology evaluation  Anticipate discharge home with no needs when cleared by consultants               Madalyn Cole MD

## 2025-03-13 ENCOUNTER — PHARMACY VISIT (OUTPATIENT)
Dept: PHARMACY | Facility: CLINIC | Age: 60
End: 2025-03-13
Payer: COMMERCIAL

## 2025-03-13 VITALS
DIASTOLIC BLOOD PRESSURE: 69 MMHG | HEIGHT: 71 IN | HEART RATE: 71 BPM | SYSTOLIC BLOOD PRESSURE: 137 MMHG | WEIGHT: 226.9 LBS | TEMPERATURE: 97.5 F | OXYGEN SATURATION: 99 % | BODY MASS INDEX: 31.77 KG/M2 | RESPIRATION RATE: 18 BRPM

## 2025-03-13 LAB
ALBUMIN SERPL BCP-MCNC: 3.6 G/DL (ref 3.4–5)
ANION GAP SERPL CALCULATED.3IONS-SCNC: 15 MMOL/L (ref 10–20)
BUN SERPL-MCNC: 65 MG/DL (ref 6–23)
CALCIUM SERPL-MCNC: 9.3 MG/DL (ref 8.6–10.3)
CHLORIDE SERPL-SCNC: 104 MMOL/L (ref 98–107)
CO2 SERPL-SCNC: 21 MMOL/L (ref 21–32)
CREAT SERPL-MCNC: 7.14 MG/DL (ref 0.5–1.3)
EGFRCR SERPLBLD CKD-EPI 2021: 8 ML/MIN/1.73M*2
ERYTHROCYTE [DISTWIDTH] IN BLOOD BY AUTOMATED COUNT: 15.4 % (ref 11.5–14.5)
GLUCOSE SERPL-MCNC: 107 MG/DL (ref 74–99)
HCT VFR BLD AUTO: 25.7 % (ref 41–52)
HGB BLD-MCNC: 8 G/DL (ref 13.5–17.5)
M PNEUMO IGM SER IA-ACNC: 0.03 U/L
MAGNESIUM SERPL-MCNC: 2.04 MG/DL (ref 1.6–2.4)
MCH RBC QN AUTO: 28.3 PG (ref 26–34)
MCHC RBC AUTO-ENTMCNC: 31.1 G/DL (ref 32–36)
MCV RBC AUTO: 91 FL (ref 80–100)
NRBC BLD-RTO: 0 /100 WBCS (ref 0–0)
PHOSPHATE SERPL-MCNC: 4 MG/DL (ref 2.5–4.9)
PLATELET # BLD AUTO: 296 X10*3/UL (ref 150–450)
POTASSIUM SERPL-SCNC: 3.6 MMOL/L (ref 3.5–5.3)
RBC # BLD AUTO: 2.83 X10*6/UL (ref 4.5–5.9)
SODIUM SERPL-SCNC: 136 MMOL/L (ref 136–145)
WBC # BLD AUTO: 11.1 X10*3/UL (ref 4.4–11.3)

## 2025-03-13 PROCEDURE — RXMED WILLOW AMBULATORY MEDICATION CHARGE

## 2025-03-13 PROCEDURE — 83735 ASSAY OF MAGNESIUM: CPT | Performed by: REGISTERED NURSE

## 2025-03-13 PROCEDURE — 2500000001 HC RX 250 WO HCPCS SELF ADMINISTERED DRUGS (ALT 637 FOR MEDICARE OP): Performed by: REGISTERED NURSE

## 2025-03-13 PROCEDURE — 2500000004 HC RX 250 GENERAL PHARMACY W/ HCPCS (ALT 636 FOR OP/ED): Performed by: REGISTERED NURSE

## 2025-03-13 PROCEDURE — 2500000005 HC RX 250 GENERAL PHARMACY W/O HCPCS: Performed by: REGISTERED NURSE

## 2025-03-13 PROCEDURE — 80069 RENAL FUNCTION PANEL: CPT | Performed by: REGISTERED NURSE

## 2025-03-13 PROCEDURE — 99239 HOSP IP/OBS DSCHRG MGMT >30: CPT | Performed by: STUDENT IN AN ORGANIZED HEALTH CARE EDUCATION/TRAINING PROGRAM

## 2025-03-13 PROCEDURE — 2500000001 HC RX 250 WO HCPCS SELF ADMINISTERED DRUGS (ALT 637 FOR MEDICARE OP): Performed by: NURSE PRACTITIONER

## 2025-03-13 PROCEDURE — 85027 COMPLETE CBC AUTOMATED: CPT | Performed by: REGISTERED NURSE

## 2025-03-13 PROCEDURE — 36415 COLL VENOUS BLD VENIPUNCTURE: CPT | Performed by: REGISTERED NURSE

## 2025-03-13 RX ORDER — CEFUROXIME AXETIL 250 MG/1
250 TABLET ORAL EVERY 24 HOURS
Qty: 3 TABLET | Refills: 0 | Status: SHIPPED | OUTPATIENT
Start: 2025-03-14 | End: 2025-03-17

## 2025-03-13 RX ORDER — TORSEMIDE 20 MG/1
60 TABLET ORAL DAILY
Qty: 90 TABLET | Refills: 0 | Status: ON HOLD | OUTPATIENT
Start: 2025-03-13

## 2025-03-13 RX ADMIN — SODIUM BICARBONATE 1300 MG: 650 TABLET ORAL at 14:31

## 2025-03-13 RX ADMIN — HYDRALAZINE HYDROCHLORIDE 100 MG: 50 TABLET ORAL at 08:18

## 2025-03-13 RX ADMIN — SODIUM BICARBONATE 1300 MG: 650 TABLET ORAL at 08:18

## 2025-03-13 RX ADMIN — HYDRALAZINE HYDROCHLORIDE 100 MG: 50 TABLET ORAL at 14:31

## 2025-03-13 RX ADMIN — ASPIRIN 81 MG: 81 TABLET, COATED ORAL at 08:18

## 2025-03-13 RX ADMIN — CEFUROXIME AXETIL 250 MG: 250 TABLET, FILM COATED ORAL at 12:22

## 2025-03-13 RX ADMIN — Medication 21 PERCENT: at 07:18

## 2025-03-13 RX ADMIN — CARVEDILOL 25 MG: 25 TABLET, FILM COATED ORAL at 08:18

## 2025-03-13 ASSESSMENT — COGNITIVE AND FUNCTIONAL STATUS - GENERAL
MOBILITY SCORE: 24
DAILY ACTIVITIY SCORE: 24

## 2025-03-13 ASSESSMENT — PAIN - FUNCTIONAL ASSESSMENT
PAIN_FUNCTIONAL_ASSESSMENT: 0-10
PAIN_FUNCTIONAL_ASSESSMENT: 0-10

## 2025-03-13 ASSESSMENT — PAIN SCALES - GENERAL
PAINLEVEL_OUTOF10: 0 - NO PAIN
PAINLEVEL_OUTOF10: 0 - NO PAIN

## 2025-03-13 NOTE — PROGRESS NOTES
Pt anticipated to dc home without further needs.     03/13/25 1311   Discharge Planning   Expected Discharge Disposition Home

## 2025-03-13 NOTE — NURSING NOTE
Pt refuses bed alarm, states he wishes to get up independently as he feels safe to do so. Pt aware of fall risks, encouraged to call for assistance when getting up. Call light in reach.

## 2025-03-13 NOTE — NURSING NOTE
Assumed care of patient. Patient is asleep in bed with no needs at this time. Call light is within reach will continue to monitor.

## 2025-03-13 NOTE — PROGRESS NOTES
CONSULT PROGRESS NOTES    SERVICE DATE: 3/13/2025   SERVICE TIME: 3:15 PM    CONSULTING SERVICE: Nephrology    ASSESSMENT AND PLAN   59-year-old with numerous medical problems including advanced CKD readmitted to the hospital with dyspnea, multilobar pneumonia.  1.  Chronic kidney disease stage V  2.  Hypertensive urgency  3.  Chronic metabolic acidosis  4.  Anemia in chronic kidney disease     While he has advanced CKD, the serum creatinine is generally stable.  He has been quite fatigued, I wonder if this is subtle uremia.  Serum creatinine up a bit today.  He does have peripheral edema and I think he will be dependent on increased diuretics in the outpatient setting, particular given his hypertension.  I would favor torsemide 60 mg daily instead of torsemide 20 mg twice daily, which he had been taking.  The blood pressure is under considerably improved control and he is on oral medicines.  His bicarbonate level is improved on bicarbonate therapy.  Dosed with Retacrit 40,000 units on 3/12.  Trend daily labs, continue supportive care.  He does not have that much time before initiation of dialysis, he has a GFR of 8, but he is done fairly well the last few years with a very low GFR.  I would like him to get access planning, but he has been somewhat resistant to nephrology follow-up in the past.  He would prefer to see me in the office given the location.  I will arrange this.  Case discussed extensively with Dr. Cole.  No objection to discharge today.  Use torsemide 60 mg p.o. daily and sodium bicarbonate as written.    SUBJECTIVE  INTERVAL HPI: He feels much better.  He has no new complaints or issues.  He is anxious about discharge.  He does have some swelling.  He thinks is from hitting his leg.    MEDICATIONS:  aspirin, 81 mg, oral, Daily  carvedilol, 25 mg, oral, BID  cefuroxime, 250 mg, oral, q24h  ezetimibe, 10 mg, oral, Nightly  hydrALAZINE, 100 mg, oral, TID  oxygen, , inhalation, Continuous -  Inhalation  sodium bicarbonate, 1,300 mg, oral, TID           PRN medications: acetaminophen, benzocaine-menthol, labetaloL, methocarbamol, ondansetron, [Held by provider] oxyCODONE, polyethylene glycol     OBJECTIVE  PHYSICAL EXAM:   Heart Rate:  [71-86]   Temp:  [36.4 °C (97.5 °F)-37.1 °C (98.7 °F)]   Resp:  [16-18]   BP: (137-164)/(69-80)   Weight:  [103 kg (226 lb 14.4 oz)]   SpO2:  [98 %-100 %]   Body mass index is 31.65 kg/m².  This is a chronically ill-appearing white man who is nontoxic  Fatigued affect  Hearing intact  Phonation intact  Moist mucosa  Normal S1/normal S2  Lungs are clear to auscultation bilaterally  Abdomen is soft, nondistended, nontender, positive bowel sounds  No Elmore catheter in place, no suprapubic tenderness to palpation  He has bilateral lower extremity pitting edema  Moves 4 limbs spontaneously  No obvious joint deformities  No lymphadenopathy    DATA:   Labs:  Results for orders placed or performed during the hospital encounter of 03/10/25 (from the past 96 hours)   CBC and Auto Differential   Result Value Ref Range    WBC 22.1 (H) 4.4 - 11.3 x10*3/uL    nRBC 0.0 0.0 - 0.0 /100 WBCs    RBC 3.20 (L) 4.50 - 5.90 x10*6/uL    Hemoglobin 9.1 (L) 13.5 - 17.5 g/dL    Hematocrit 29.1 (L) 41.0 - 52.0 %    MCV 91 80 - 100 fL    MCH 28.4 26.0 - 34.0 pg    MCHC 31.3 (L) 32.0 - 36.0 g/dL    RDW 15.6 (H) 11.5 - 14.5 %    Platelets 345 150 - 450 x10*3/uL    Neutrophils % 87.2 40.0 - 80.0 %    Immature Granulocytes %, Automated 1.5 (H) 0.0 - 0.9 %    Lymphocytes % 3.9 13.0 - 44.0 %    Monocytes % 5.4 2.0 - 10.0 %    Eosinophils % 1.5 0.0 - 6.0 %    Basophils % 0.5 0.0 - 2.0 %    Neutrophils Absolute 19.21 (H) 1.20 - 7.70 x10*3/uL    Immature Granulocytes Absolute, Automated 0.34 0.00 - 0.70 x10*3/uL    Lymphocytes Absolute 0.85 (L) 1.20 - 4.80 x10*3/uL    Monocytes Absolute 1.20 (H) 0.10 - 1.00 x10*3/uL    Eosinophils Absolute 0.34 0.00 - 0.70 x10*3/uL    Basophils Absolute 0.12 (H) 0.00 - 0.10  x10*3/uL   Comprehensive Metabolic Panel   Result Value Ref Range    Glucose 144 (H) 74 - 99 mg/dL    Sodium 140 136 - 145 mmol/L    Potassium 4.0 3.5 - 5.3 mmol/L    Chloride 113 (H) 98 - 107 mmol/L    Bicarbonate 16 (L) 21 - 32 mmol/L    Anion Gap 15 10 - 20 mmol/L    Urea Nitrogen 52 (H) 6 - 23 mg/dL    Creatinine 6.79 (H) 0.50 - 1.30 mg/dL    eGFR 9 (L) >60 mL/min/1.73m*2    Calcium 9.8 8.6 - 10.3 mg/dL    Albumin 4.0 3.4 - 5.0 g/dL    Alkaline Phosphatase 66 33 - 136 U/L    Total Protein 6.8 6.4 - 8.2 g/dL    AST 17 9 - 39 U/L    Bilirubin, Total 0.9 0.0 - 1.2 mg/dL    ALT 12 10 - 52 U/L   Lactate   Result Value Ref Range    Lactate 1.5 0.4 - 2.0 mmol/L   Blood Culture    Specimen: Peripheral Venipuncture; Blood culture   Result Value Ref Range    Blood Culture No growth at 2 days    Blood Culture    Specimen: Peripheral Venipuncture; Blood culture   Result Value Ref Range    Blood Culture No growth at 2 days    Magnesium   Result Value Ref Range    Magnesium 1.69 1.60 - 2.40 mg/dL   B-Type Natriuretic Peptide   Result Value Ref Range    BNP 2,726 (H) 0 - 99 pg/mL   Troponin I, High Sensitivity, Initial   Result Value Ref Range    Troponin I, High Sensitivity 710 (HH) 0 - 20 ng/L   ECG 12 lead   Result Value Ref Range    Ventricular Rate 91 BPM    Atrial Rate 91 BPM    CA Interval 148 ms    QRS Duration 92 ms    QT Interval 398 ms    QTC Calculation(Bazett) 489 ms    P Axis 40 degrees    R Axis 34 degrees    T Axis 46 degrees    QRS Count 15 beats    Q Onset 218 ms    P Onset 144 ms    P Offset 192 ms    T Offset 417 ms    QTC Fredericia 457 ms   Blood Gas Arterial Full Panel   Result Value Ref Range    POCT pH, Arterial 7.45 (H) 7.38 - 7.42 pH    POCT pCO2, Arterial 24 (L) 38 - 42 mm Hg    POCT pO2, Arterial 84 (L) 85 - 95 mm Hg    POCT SO2, Arterial 98 94 - 100 %    POCT Oxy Hemoglobin, Arterial 96.2 94.0 - 98.0 %    POCT Hematocrit Calculated, Arterial 27.0 (L) 41.0 - 52.0 %    POCT Sodium, Arterial 138 136  - 145 mmol/L    POCT Potassium, Arterial 4.2 3.5 - 5.3 mmol/L    POCT Chloride, Arterial 113 (H) 98 - 107 mmol/L    POCT Ionized Calcium, Arterial 1.35 (H) 1.10 - 1.33 mmol/L    POCT Glucose, Arterial 155 (H) 74 - 99 mg/dL    POCT Lactate, Arterial 1.0 0.4 - 2.0 mmol/L    POCT Base Excess, Arterial -6.2 (L) -2.0 - 3.0 mmol/L    POCT HCO3 Calculated, Arterial 16.7 (L) 22.0 - 26.0 mmol/L    POCT Hemoglobin, Arterial 8.9 (L) 13.5 - 17.5 g/dL    POCT Anion Gap, Arterial 13 10 - 25 mmo/L    Patient Temperature 37.0 degrees Celsius    FiO2 35 %    Ventilator Mode CPAP     Cpap 10.0 cm H2O    Site of Arterial Puncture Radial Right     Rivera's Test Positive    Troponin, High Sensitivity, 1 Hour   Result Value Ref Range    Troponin I, High Sensitivity 912 (HH) 0 - 20 ng/L   Respiratory Culture/Smear    Specimen: SPUTUM; Fluid   Result Value Ref Range    Respiratory Culture/Smear (A)      Culture not performed. See Gram stain findings. Recollect if clinically indicated.    Gram Stain (A)      Gram stain indicates specimen contains significant salivary contamination.   aPTT   Result Value Ref Range    aPTT 31.3 22.0 - 32.5 seconds   Procalcitonin   Result Value Ref Range    Procalcitonin 1.02 (H) <=0.07 ng/mL   Sars-CoV-2 and Influenza A/B PCR   Result Value Ref Range    Flu A Result Not Detected Not Detected    Flu B Result Not Detected Not Detected    Coronavirus 2019, PCR Not Detected Not Detected   RSV PCR   Result Value Ref Range    RSV PCR Not Detected Not Detected   Troponin I, High Sensitivity   Result Value Ref Range    Troponin I, High Sensitivity 1,325 (HH) 0 - 20 ng/L   MRSA Surveillance for Vancomycin De-escalation, PCR    Specimen: Anterior Nares; Swab   Result Value Ref Range    MRSA PCR Not Detected Not Detected   ECG 12 Lead   Result Value Ref Range    Ventricular Rate 89 BPM    Atrial Rate 89 BPM    DE Interval 160 ms    QRS Duration 94 ms    QT Interval 376 ms    QTC Calculation(Bazett) 457 ms    P Axis 53  degrees    R Axis 3 degrees    T Axis 116 degrees    QRS Count 15 beats    Q Onset 217 ms    P Onset 137 ms    P Offset 188 ms    T Offset 405 ms    QTC Fredericia 428 ms   Urinalysis with Reflex Culture and Microscopic   Result Value Ref Range    Color, Urine Colorless (N) Light-Yellow, Yellow, Dark-Yellow    Appearance, Urine Clear Clear    Specific Gravity, Urine 1.008 1.005 - 1.035    pH, Urine 6.0 5.0, 5.5, 6.0, 6.5, 7.0, 7.5, 8.0    Protein, Urine 50 (1+) (A) NEGATIVE, 10 (TRACE), 20 (TRACE) mg/dL    Glucose, Urine 100 (1+) (A) Normal mg/dL    Blood, Urine NEGATIVE NEGATIVE mg/dL    Ketones, Urine NEGATIVE NEGATIVE mg/dL    Bilirubin, Urine NEGATIVE NEGATIVE mg/dL    Urobilinogen, Urine Normal Normal mg/dL    Nitrite, Urine NEGATIVE NEGATIVE    Leukocyte Esterase, Urine NEGATIVE NEGATIVE   Extra Urine Gray Tube   Result Value Ref Range    Extra Tube Hold for add-ons.    Streptococcus pneumoniae Antigen, Urine    Specimen: Clean Catch/Voided; Urine   Result Value Ref Range    Streptococcus pneumoniae Ag, Urine Negative Negative   Legionella Antigen, Urine    Specimen: Clean Catch/Voided; Urine   Result Value Ref Range    L. pneumophila Urine Ag Negative Negative   Sterile Cup   Result Value Ref Range    Extra Tube Hold for add-ons.    Urinalysis Microscopic   Result Value Ref Range    WBC, Urine 1-5 1-5, NONE /HPF    RBC, Urine 1-2 NONE, 1-2, 3-5 /HPF   Renal function panel   Result Value Ref Range    Glucose 128 (H) 74 - 99 mg/dL    Sodium 140 136 - 145 mmol/L    Potassium 3.7 3.5 - 5.3 mmol/L    Chloride 110 (H) 98 - 107 mmol/L    Bicarbonate 19 (L) 21 - 32 mmol/L    Anion Gap 15 10 - 20 mmol/L    Urea Nitrogen 52 (H) 6 - 23 mg/dL    Creatinine 6.62 (H) 0.50 - 1.30 mg/dL    eGFR 9 (L) >60 mL/min/1.73m*2    Calcium 9.2 8.6 - 10.3 mg/dL    Phosphorus 3.6 2.5 - 4.9 mg/dL    Albumin 3.6 3.4 - 5.0 g/dL   Magnesium   Result Value Ref Range    Magnesium 2.48 (H) 1.60 - 2.40 mg/dL   aPTT   Result Value Ref Range     aPTT 63.9 (H) 22.0 - 32.5 seconds   Vancomycin   Result Value Ref Range    Vancomycin 15.0 5.0 - 20.0 ug/mL   CBC   Result Value Ref Range    WBC 15.2 (H) 4.4 - 11.3 x10*3/uL    nRBC 0.0 0.0 - 0.0 /100 WBCs    RBC 2.81 (L) 4.50 - 5.90 x10*6/uL    Hemoglobin 8.0 (L) 13.5 - 17.5 g/dL    Hematocrit 24.7 (L) 41.0 - 52.0 %    MCV 88 80 - 100 fL    MCH 28.5 26.0 - 34.0 pg    MCHC 32.4 32.0 - 36.0 g/dL    RDW 15.8 (H) 11.5 - 14.5 %    Platelets 287 150 - 450 x10*3/uL   Magnesium   Result Value Ref Range    Magnesium 2.30 1.60 - 2.40 mg/dL   Hepatic function panel   Result Value Ref Range    Albumin 3.5 3.4 - 5.0 g/dL    Bilirubin, Total 0.8 0.0 - 1.2 mg/dL    Bilirubin, Direct 0.2 0.0 - 0.3 mg/dL    Alkaline Phosphatase 57 33 - 136 U/L    ALT 11 10 - 52 U/L    AST 13 9 - 39 U/L    Total Protein 6.2 (L) 6.4 - 8.2 g/dL   Troponin I, High Sensitivity   Result Value Ref Range    Troponin I, High Sensitivity 1,099 (HH) 0 - 20 ng/L   Phosphorus   Result Value Ref Range    Phosphorus 4.1 2.5 - 4.9 mg/dL   Basic Metabolic Panel   Result Value Ref Range    Glucose 114 (H) 74 - 99 mg/dL    Sodium 140 136 - 145 mmol/L    Potassium 3.8 3.5 - 5.3 mmol/L    Chloride 109 (H) 98 - 107 mmol/L    Bicarbonate 20 (L) 21 - 32 mmol/L    Anion Gap 15 10 - 20 mmol/L    Urea Nitrogen 55 (H) 6 - 23 mg/dL    Creatinine 6.77 (H) 0.50 - 1.30 mg/dL    eGFR 9 (L) >60 mL/min/1.73m*2    Calcium 9.1 8.6 - 10.3 mg/dL   aPTT   Result Value Ref Range    aPTT 32.8 (H) 22.0 - 32.5 seconds   Transthoracic Echo (TTE) Complete   Result Value Ref Range    AV pk gillian 2.64 m/s    LVOT diam 2.03 cm    AV mn grad 15 mmHg    MV E/A ratio 1.01     LV Biplane EF 70 %    LV EF 63 %    LVIDd 5.67 cm    AV pk grad 28 mmHg    Aortic Valve Area by Continuity of VTI 1.25 cm2    Aortic Valve Area by Continuity of Peak Velocity 1.39 cm2    LV A4C EF 75.9    aPTT   Result Value Ref Range    aPTT 38.7 (H) 22.0 - 32.5 seconds   CBC   Result Value Ref Range    WBC 9.7 4.4 - 11.3  x10*3/uL    nRBC 0.0 0.0 - 0.0 /100 WBCs    RBC 2.72 (L) 4.50 - 5.90 x10*6/uL    Hemoglobin 7.7 (L) 13.5 - 17.5 g/dL    Hematocrit 24.7 (L) 41.0 - 52.0 %    MCV 91 80 - 100 fL    MCH 28.3 26.0 - 34.0 pg    MCHC 31.2 (L) 32.0 - 36.0 g/dL    RDW 15.4 (H) 11.5 - 14.5 %    Platelets 237 150 - 450 x10*3/uL   Renal function panel   Result Value Ref Range    Glucose 114 (H) 74 - 99 mg/dL    Sodium 138 136 - 145 mmol/L    Potassium 3.5 3.5 - 5.3 mmol/L    Chloride 106 98 - 107 mmol/L    Bicarbonate 20 (L) 21 - 32 mmol/L    Anion Gap 16 10 - 20 mmol/L    Urea Nitrogen 60 (H) 6 - 23 mg/dL    Creatinine 7.03 (H) 0.50 - 1.30 mg/dL    eGFR 8 (L) >60 mL/min/1.73m*2    Calcium 9.0 8.6 - 10.3 mg/dL    Phosphorus 4.2 2.5 - 4.9 mg/dL    Albumin 3.2 (L) 3.4 - 5.0 g/dL   Magnesium   Result Value Ref Range    Magnesium 2.10 1.60 - 2.40 mg/dL   CBC   Result Value Ref Range    WBC 11.1 4.4 - 11.3 x10*3/uL    nRBC 0.0 0.0 - 0.0 /100 WBCs    RBC 2.83 (L) 4.50 - 5.90 x10*6/uL    Hemoglobin 8.0 (L) 13.5 - 17.5 g/dL    Hematocrit 25.7 (L) 41.0 - 52.0 %    MCV 91 80 - 100 fL    MCH 28.3 26.0 - 34.0 pg    MCHC 31.1 (L) 32.0 - 36.0 g/dL    RDW 15.4 (H) 11.5 - 14.5 %    Platelets 296 150 - 450 x10*3/uL   Renal function panel   Result Value Ref Range    Glucose 107 (H) 74 - 99 mg/dL    Sodium 136 136 - 145 mmol/L    Potassium 3.6 3.5 - 5.3 mmol/L    Chloride 104 98 - 107 mmol/L    Bicarbonate 21 21 - 32 mmol/L    Anion Gap 15 10 - 20 mmol/L    Urea Nitrogen 65 (H) 6 - 23 mg/dL    Creatinine 7.14 (H) 0.50 - 1.30 mg/dL    eGFR 8 (L) >60 mL/min/1.73m*2    Calcium 9.3 8.6 - 10.3 mg/dL    Phosphorus 4.0 2.5 - 4.9 mg/dL    Albumin 3.6 3.4 - 5.0 g/dL   Magnesium   Result Value Ref Range    Magnesium 2.04 1.60 - 2.40 mg/dL         SIGNATURE: Boo Max MD PATIENT NAME: Colin Leonard   DATE: March 13, 2025 MRN: [id number]   TIME: 3:15 PM PAGER: [phone]

## 2025-03-13 NOTE — PROGRESS NOTES
Colin Leonard is a 59 y.o. male on day 3 of admission presenting with Hypertensive emergency.    Subjective   Interval History:   Patient seen and examined  Afebrile, no chills  No cough, chest pain or shortness of breath  No nausea vomiting or diarrhea      Review of Systems   All other systems reviewed and are negative.      Objective   Range of Vitals (last 24 hours)  Heart Rate:  [73-86]   Temp:  [36.5 °C (97.7 °F)-37.1 °C (98.7 °F)]   Resp:  [16-17]   BP: (144-164)/(77-80)   Weight:  [103 kg (226 lb 14.4 oz)]   SpO2:  [98 %-100 %]   Daily Weight  03/13/25 : 103 kg (226 lb 14.4 oz)    Body mass index is 31.65 kg/m².    Physical Exam  Constitutional:       Appearance: Normal appearance.   HENT:      Head: Normocephalic and atraumatic.      Nose: Nose normal.   Eyes:      General: No scleral icterus.     Extraocular Movements: Extraocular movements intact.      Conjunctiva/sclera: Conjunctivae normal.   Cardiovascular:      Rate and Rhythm: Normal rate and regular rhythm.      Heart sounds: Normal heart sounds.   Pulmonary:      Effort: Pulmonary effort is normal.      Breath sounds: Normal breath sounds.   Abdominal:      General: Bowel sounds are normal.      Palpations: Abdomen is soft.      Tenderness: There is no abdominal tenderness.   Musculoskeletal:      Cervical back: Normal range of motion and neck supple.      Right lower leg: No edema.      Left lower leg: No edema.   Skin:     General: Skin is warm and dry.   Neurological:      Mental Status: He is alert and oriented to person, place, and time.   Psychiatric:         Behavior: Behavior normal.           Antibiotics  cefuroxime - 250 mg    Relevant Results  Labs  Results from last 72 hours   Lab Units 03/13/25  0425 03/12/25  0729 03/11/25  0620 03/10/25  1322   WBC AUTO x10*3/uL 11.1 9.7 15.2* 22.1*   HEMOGLOBIN g/dL 8.0* 7.7* 8.0* 9.1*   HEMATOCRIT % 25.7* 24.7* 24.7* 29.1*   PLATELETS AUTO x10*3/uL 296 237 287 345   NEUTROS PCT AUTO %  --   --    --  87.2   LYMPHS PCT AUTO %  --   --   --  3.9   MONOS PCT AUTO %  --   --   --  5.4   EOS PCT AUTO %  --   --   --  1.5     Results from last 72 hours   Lab Units 03/13/25 0426 03/12/25 0729 03/11/25 0620   SODIUM mmol/L 136 138 140   POTASSIUM mmol/L 3.6 3.5 3.8   CHLORIDE mmol/L 104 106 109*   CO2 mmol/L 21 20* 20*   BUN mg/dL 65* 60* 55*   CREATININE mg/dL 7.14* 7.03* 6.77*   GLUCOSE mg/dL 107* 114* 114*   CALCIUM mg/dL 9.3 9.0 9.1   ANION GAP mmol/L 15 16 15   EGFR mL/min/1.73m*2 8* 8* 9*   PHOSPHORUS mg/dL 4.0 4.2 4.1     Results from last 72 hours   Lab Units 03/13/25 0426 03/12/25  0729 03/11/25  0620 03/10/25  2315 03/10/25  1322   ALK PHOS U/L  --   --  57  --  66   BILIRUBIN TOTAL mg/dL  --   --  0.8  --  0.9   BILIRUBIN DIRECT mg/dL  --   --  0.2  --   --    PROTEIN TOTAL g/dL  --   --  6.2*  --  6.8   ALT U/L  --   --  11  --  12   AST U/L  --   --  13  --  17   ALBUMIN g/dL 3.6 3.2* 3.5   < > 4.0    < > = values in this interval not displayed.     Estimated Creatinine Clearance: 13.3 mL/min (A) (by C-G formula based on SCr of 7.14 mg/dL (H)).  C-Reactive Protein   Date Value Ref Range Status   07/30/2024 9.10 (H) 0.00 - 2.00 mg/dL Final   07/22/2024 24.70 (H) 0.00 - 2.00 mg/dL Final     CRP   Date Value Ref Range Status   09/09/2020 5.32 (A) mg/dL Final     Comment:     REF VALUE  < 1.00       Microbiology  Reviewed-blood cultures pending  Imaging  XR chest 1 view    Result Date: 3/11/2025  Interpreted By:  Hernandez Kelley, STUDY: XR CHEST 1 VIEW; 3/11/2025 5:29 am   INDICATION: Signs/Symptoms:follow up pulm edema   COMPARISON: 03/10/2025.   ACCESSION NUMBER(S): [id number]   ORDERING CLINICIAN: OMAIRA VÁZQUEZ   FINDINGS: The study is limited due to rotation, respiratory motion and poor inspiratory effort, with resultant crowding of the pulmonary vasculature. The cardiac silhouette appears mildly prominent, exaggerated by the technique. There is interval significant improvement of bilateral  perihilar interstitial infiltrates. There is no pneumothorax or significant effusion. The osseous structures are unchanged.       Limited study. Improving bilateral perihilar interstitial infiltrates, most likely due to resolving pulmonary edema/CHF. Correlate clinically and further follow-up as needed.   Signed by: Hernandez Kelley 3/11/2025 3:02 PM Dictation workstation:   [id number]    Transthoracic Echo (TTE) Complete    Result Date: 3/11/2025           [place] [address]            Phone [phone] TRANSTHORACIC ECHOCARDIOGRAM REPORT Patient Name:       ODALYS MELVIN    Reading Physician:    [id number] Tomi Moser DO Study Date:         3/11/2025            Ordering Provider:    [id number] FOSTER KNOX MRN/PID:            [id number]             Fellow: Accession#:         [id number]         Nurse: Date of Birth/Age:  1965 / 59      Sonographer:          Nathaniel hernandez RDCS Gender Assigned at  M                    Additional Staff: Birth: Height:             177.80 cm            Admit Date: Weight:             117.93 kg            Admission Status:     Inpatient -                                                                Routine BSA / BMI:          2.33 m2 / 37.31      Department Location:  [facility] ICU                     kg/m2 Blood Pressure: 146 /67 mmHg Study Type:    TRANSTHORACIC ECHO (TTE) COMPLETE Diagnosis/ICD: Subsequent non ST elevation (NSTEMI) myocardial infarction-I22.2 Indication:    NSTEMI CPT Codes:     Echo Complete w Full Doppler-[id number] Patient History: Diabetes:          Yes Pertinent History: CAD, Chest Pain, CHF, HTN, Hyperlipidemia, Dyspnea and                    Syncope. LVH, Renal dx V, NSTEMI, DVT. Study Detail: The following Echo studies were  performed: 2D, M-Mode, Doppler and               color flow. Technically challenging study due to poor acoustic               windows and body habitus. Definity used as a contrast agent for               endocardial border definition. Total contrast used for this               procedure was 2 mL via IV push. Unable to obtain RV/RA not well               visualized view.  PHYSICIAN INTERPRETATION: Left Ventricle: Left ventricular ejection fraction is normal, by visual estimate at 60-65%. There is mild concentric left ventricular hypertrophy. There are no regional wall motion abnormalities. The left ventricular cavity size is normal. There is mild increased septal and mildly increased posterior left ventricular wall thickness. Spectral Doppler shows a Grade II (pseudonormal pattern) of left ventricular diastolic filling with an elevated left atrial pressure. Left Atrium: The left atrial size is mildly dilated. Right Ventricle: The right ventricle is normal in size. There is normal right ventricular global systolic function. Right Atrium: The right atrial size is normal. Aortic Valve: The aortic valve appears abnormal. There is mild to moderate aortic valve cusp calcification. There is mild to moderate aortic valve thickening. There is evidence of mild to moderate aortic valve stenosis. The aortic valve dimensionless index is 0.39. There is no evidence of aortic valve regurgitation. The peak instantaneous gradient of the aortic valve is 28 mmHg. The mean gradient of the aortic valve is 15 mmHg. Mitral Valve: The mitral valve is normal in structure. There is mild mitral valve regurgitation. Tricuspid Valve: The tricuspid valve is structurally normal. There is trace tricuspid regurgitation. Pulmonic Valve: The pulmonic valve is structurally normal. There is no indication of pulmonic valve regurgitation. Pericardium: No pericardial effusion noted. Aorta: The aortic root is normal.  CONCLUSIONS:  1. Left ventricular  ejection fraction is normal, by visual estimate at 60-65%.  2. Spectral Doppler shows a Grade II (pseudonormal pattern) of left ventricular diastolic filling with an elevated left atrial pressure.  3. There is normal right ventricular global systolic function.  4. Mild to moderate aortic valve stenosis. QUANTITATIVE DATA SUMMARY:  2D MEASUREMENTS:             Normal Ranges: Ao Root s:       3.93 cm IVSd:            1.25 cm     (0.6-1.1cm) LVPWd:           1.26 cm     (0.6-1.1cm) LVIDd:           5.67 cm     (3.9-5.9cm) LVIDs:           3.75 cm LV Mass Index:   130.8 g/m2 LVEDV Index:     58.65 ml/m2 LV % FS          33.9 %  LEFT ATRIUM:                 Normal Ranges: LA Vol A4C:       128.6 ml LA Vol A2C:       65.2 ml LA Vol Index BSA: 41.5 ml/m2  LV SYSTOLIC FUNCTION:                      Normal Ranges: EF-A4C View:    76 % (>=55%) EF-A2C View:    60 % EF-Biplane:     70 % EF-Visual:      63 % LV EF Reported: 63 %  LV DIASTOLIC FUNCTION:           Normal Ranges: MV Peak E:             1.17 m/s  (0.7-1.2 m/s) MV Peak A:             1.16 m/s  (0.42-0.7 m/s) E/A Ratio:             1.01      (1.0-2.2) MV e'                  0.059 m/s (>8.0) MV lateral e'          0.06 m/s MV medial e'           0.05 m/s E/e' Ratio:            19.88     (<8.0)  MITRAL VALVE:          Normal Ranges: MV DT:        296 msec (150-240msec)  AORTIC VALVE:                      Normal Ranges: AoV Vmax:                2.64 m/s  (<=1.7m/s) AoV Peak P.8 mmHg (<20mmHg) AoV Mean PG:             15.4 mmHg (1.7-11.5mmHg) LVOT Max Cirilo:            1.13 m/s  (<=1.1m/s) AoV VTI:                 66.94 cm  (18-25cm) LVOT VTI:                25.87 cm LVOT Diameter:           2.03 cm   (1.8-2.4cm) AoV Area, VTI:           1.25 cm2  (2.5-5.5cm2) AoV Area,Vmax:           1.39 cm2  (2.5-4.5cm2) AoV Dimensionless Index: 0.39  TRICUSPID VALVE/RVSP:         Normal Ranges: IVC Diam:             2.16 cm  PULMONIC VALVE:          Normal Ranges: PV  Max Cirilo:     0.9 m/s  (0.6-0.9m/s) PV Max PG:      3.4 mmHg  [id number] Tomi Moser DO Electronically signed on 3/11/2025 at 11:07:24 AM  ** Final **     ECG 12 lead    Result Date: 3/11/2025  Normal sinus rhythm Nonspecific ST and T wave abnormality QTcB >= 480 msec Abnormal ECG When compared with ECG of 28-OCT-2024 15:17, No significant change was found Confirmed by Brent Ellington ([id number]) on 3/11/2025 10:18:44 AM    ECG 12 Lead    Result Date: 3/11/2025  Normal sinus rhythm Nonspecific ST and T wave abnormality Abnormal ECG No previous ECGs available Confirmed by Brent Ellington ([id number]) on 3/11/2025 9:21:45 AM    CT chest abdomen pelvis wo IV contrast    Result Date: 3/10/2025  STUDY: CT Chest, Abdomen, and Pelvis without IV Contrast; 03/10/2025 3:57 PM INDICATION: Assess for pulmonary infection superimposed on flash pulmonary edema and intra-abdominal infection, status post cholecystectomy.  COMPARISON: XR chest 03/10/2025.  US RUQ abdomen m03/04/2025. CT abdomen/pelvis 03/03/2025.  ACCESSION NUMBER(S): [id number] ORDERING CLINICIAN: KYLE ALANIZ TECHNIQUE: CT of the chest, abdomen, and pelvis was performed.  Contiguous axial images were obtained at 3 mm slice thickness through the chest, abdomen, and pelvis.  Coronal and sagittal reconstructions at 3 mm slice thickness were performed.  No intravenous contrast was administered.  FINDINGS: Please note that the evaluation of vessels, lymph nodes and organs is limited without intravenous contrast. CHEST: MEDIASTINUM: The heart is normal in size without pericardial effusion.  Coronary artery calcifications are identified.  LUNGS/PLEURA: There is no pleural effusion, pleural thickening, or pneumothorax. The airways are patent. Lungs demonstrate multilobar patchy airspace opacity suggesting acute multilobar pneumonia.  There are small bilateral pleural effusions.  LYMPH NODES: Thoracic lymph nodes are not enlarged. ABDOMEN:  LIVER: No hepatomegaly.  Smooth  surface contour.  There is fatty liver morphology.  BILE DUCTS: No intrahepatic or extrahepatic biliary ductal dilatation.  GALLBLADDER: The gallbladder is surgically absent. STOMACH: No abnormalities identified.  PANCREAS: No masses or ductal dilatation.  SPLEEN: No splenomegaly or focal splenic lesion.  ADRENAL GLANDS: No thickening or nodules.  KIDNEYS AND URETERS: There is mild bilateral cortical renal atrophy..  No renal or ureteral calculi.  Left renal cyst is demonstrated.  There are small nonobstructive calculus right kidney.  This right extrarenal pelvis. There is no hydronephrosis or ureterectasis.  PELVIS:  BLADDER: No abnormalities identified.  REPRODUCTIVE ORGANS: No abnormalities identified.  Prostate gland measures 4.9 cm.  BOWEL: No abnormalities identified.  There is mild sigmoid colon diverticulosis without evidence diverticulitis.  There is mild colonic stool.  VESSELS: No abnormalities identified.  Abdominal aorta is normal in caliber.  PERITONEUM/RETROPERITONEUM/LYMPH NODES: No free fluid.  No pneumoperitoneum. There are mildly prominent retroperitoneal lymph nodes and upper abdominal lymph nodes.  ABDOMINAL WALL: No abnormalities identified. SOFT TISSUES: No abnormalities identified.  BONES: No acute fracture or aggressive osseous lesion.  There is disc space narrowing and endplate degenerative changes in the thoracic and lumbar spine with anterior marginal osteophytes.    Chest: Acute multilobar pneumonia.  Findings greater in the bilateral upper lobes.  Small bilateral pleural effusions. Abdomen: No acute inflammatory changes.  Postoperative changes following cholecystectomy. Small nonobstructive calculus right kidney. Mildly prominent retroperitoneal and upper abdominal lymph nodes. Pelvis: No acute inflammatory changes.  Sigmoid colon diverticulosis without evidence diverticulitis. Signed by Wali Barba DO    XR chest 1 view    Result Date: 3/10/2025  STUDY: Chest Radiograph;   03/10/2025 02:21 PM INDICATION: Evaluate for flash pulmonary edema. COMPARISON: Chest, single portable view obtained on 07/21/2024 at 22:23 hours (Imaging only; report unavailable). ACCESSION NUMBER(S): [id number] ORDERING CLINICIAN: KYLE ALANIZ TECHNIQUE:  Frontal chest was obtained at 14:21 hours. FINDINGS: CARDIOMEDIASTINAL SILHOUETTE: Cardiomediastinal silhouette is top normal to mildly enlarged normal in size and configuration.  LUNGS: There is diffuse accentuation of markings throughout both lungs representing unfavorable change when compared with the prior.  There is a possible focus of infiltrate or atelectasis in the right base as well.  ABDOMEN: No remarkable upper abdominal findings.  BONES: No acute osseous changes.    1.Top normal to mildly enlarged heart and configuration. 2.Diffuse accentuation of markings in both lungs representing unfavorable change when compared with the prior. Findings could be related to pulmonary vascular congestion or other interstitial process. Signed by Nohemy Barbosa DO    CT head wo IV contrast    Result Date: 3/8/2025  Interpreted By:  Mahendra Reyes, STUDY: CT HEAD WO IV CONTRAST; ;  3/8/2025 4:50 pm   INDICATION: Signs/Symptoms:vertigo.   COMPARISON: 09/06/2020   ACCESSION NUMBER(S): [id number]   ORDERING CLINICIAN: KIRK BARBER   TECHNIQUE: Contiguous unenhanced axial CT sections are performed from the skull base to the vertex.   FINDINGS: The osseous structures are intact. There is sessile lipoma overlying the right frontal calvarium. There is some mild polyposis along the anterior walls of the maxillary sinuses which are incompletely visualized and mild mucoperiosteal thickening of the ethmoid air cells.   There is moderate generalized parenchymal volume loss with enlargement of the cortical sulci and CSF spaces. There is diffuse hypoattenuation of the cerebral white matter bilaterally which is nonspecific though could represent small-vessel  ischemic changes.   There is no sign of parenchymal hematoma or dense extra-axial fluid collection. There is no mass effect or midline shift.       Moderate generalized parenchymal atrophy.   Severe hypoattenuation in the cerebral white matter bilaterally is nonspecific though may reflect small-vessel ischemic changes. The need for further workup should be determined clinically.   No CT evidence of acute intracranial hemorrhage or mass effect.   Paranasal sinusitis as described above.     MACRO: None   Signed by: Mahendra Reyes 3/8/2025 6:43 PM Dictation workstation:   [id number]    US right upper quadrant    Result Date: 3/4/2025  Interpreted By:  Miguelina Brooke, STUDY: US RIGHT UPPER QUADRANT;  3/4/2025 8:32 am   INDICATION: Signs/Symptoms:eval for acute cholecystitis.   COMPARISON: CT abdomen and pelvis 03/03/2025   ACCESSION NUMBER(S): [id number]   ORDERING CLINICIAN: WAQAR CRENSHAW   TECHNIQUE: Multiple images of the abdomen were obtained.   FINDINGS: LIVER: The echogenicity of the liver is within normal limits. There is no hepatic mass. The sagittal dimension of the right lobe of the liver is 17.8 cm, nonenlarged.   GALLBLADDER: The gallbladder is nondistended. The gallbladder wall is not thickened. There are small gravel-like gallstones in the proximal body of the gallbladder. There is no sludge. There is no pericholecystic fluid. There is no sonographic Thompson's sign   BILE DUCTS: There is no intrahepatic biliary dilatation. The common bile duct is  nondilated measuring 0.2 cm.   PANCREAS: Only a small part of the body of the pancreas is seen and is unremarkable. There is obscuration of most of the pancreas secondary to shadowing from bowel gas.   RIGHT KIDNEY: The right kidney is  normal in size measuring 11.2 cm in length.   The echogenicity of the cortex is within normal limits. There is no renal mass. There is no intrarenal calculus or hydronephrosis.       Cholelithiasis   MACRO: None   Signed  by: Miguelina Brooke 3/4/2025 8:59 AM Dictation workstation:   [id number]    CT abdomen pelvis wo IV contrast    Result Date: 3/3/2025  Interpreted By:  Alvaro Nicholas, STUDY: CT ABDOMEN PELVIS WO IV CONTRAST;  3/3/2025 10:53 pm   INDICATION: Signs/Symptoms:nausea, vomiting, epigastric pain.   COMPARISON: None.   ACCESSION NUMBER(S): [id number]   ORDERING CLINICIAN: ELVIS REYNOSO   TECHNIQUE: Contiguous axial images of the abdomen and pelvis were obtained without iodinated contrast. Coronal and sagittal reformatted images were reconstructed from the axial data.   FINDINGS: There is mild bronchiectasis seen lung bases.   Noncontrast appearance of the liver, adrenals pancreas and spleen are unremarkable. Probable cholelithiasis incidentally seen.   No hydronephrosis or hydroureter. Nonobstructing subcentimeter calculus seen right kidney. Bladder within normal limits.   Moderate calcification aorta without aneurysm.   No small bowel obstruction. No appendicitis or colitis. Mild diverticulosis.   No free fluid. Scattered nonenlarged periaortic lymph nodes are incidentally seen.   Moderate spondylotic degeneration seen axial skeleton.       No acute process identified in the abdomen or pelvis.   Incidental note made of nonobstructing right renal calculus as well as cholelithiasis.     MACRO: None.   Signed by: Alvaro Nicholas 3/3/2025 11:24 PM Dictation workstation:   [id number]     Assessment/Plan   Acute hypoxic respiratory failure-resolved  Sepsis-triggered with leukocytosis, tachycardia, tachypnea-resolved  Multilobar pneumonia-MRSA PCR negative, streptococcus pneumoniae antigen negative, legionella antigen negative, COVID/Flu/RSV negative  Leukocytosis-resolved  Chronic kidney disease stage V  Recent laparoscopic cholecystectomy     Continue PO cefuroxime-renally dosed  Follow-up Mycoplasma IgM  Follow-up blood cultures  Monitor WBC and temperature  Monitor renal function  Oxygen as needed  Supportive care  Discharge  plan is cefuroxime for a total of 7 days    Garry Rodrigez MD

## 2025-03-13 NOTE — PROGRESS NOTES
Odalys Melvin is a 59 y.o. male on day 3 of admission presenting with Hypertensive emergency.      Subjective   Sitting up in bed. States he feels fine. Denies any complaints currently. Would like to go home.        Objective     Last Recorded Vitals  /69 (BP Location: Right arm, Patient Position: Sitting)   Pulse 71   Temp 36.4 °C (97.5 °F) (Oral)   Resp 17   Wt 103 kg (226 lb 14.4 oz)   SpO2 99%   Intake/Output last 3 Shifts:    Intake/Output Summary (Last 24 hours) at 3/13/2025 1256  Last data filed at 3/13/2025 1243  Gross per 24 hour   Intake 640 ml   Output 1005 ml   Net -365 ml       Admission Weight  Weight: 113 kg (250 lb) (03/10/25 1311)    Daily Weight  03/13/25 : 103 kg (226 lb 14.4 oz)    Image Results  XR chest 1 view  Narrative: Interpreted By:  Hernandez Kelley,   STUDY:  XR CHEST 1 VIEW; 3/11/2025 5:29 am      INDICATION:  Signs/Symptoms:follow up pulm edema      COMPARISON:  03/10/2025.      ACCESSION NUMBER(S):  [id number]      ORDERING CLINICIAN:  OMAIRA VÁZQUEZ      FINDINGS:  The study is limited due to rotation, respiratory motion and poor  inspiratory effort, with resultant crowding of the pulmonary  vasculature. The cardiac silhouette appears mildly prominent,  exaggerated by the technique. There is interval significant  improvement of bilateral perihilar interstitial infiltrates. There is  no pneumothorax or significant effusion. The osseous structures are  unchanged.      Impression: Limited study. Improving bilateral perihilar interstitial  infiltrates, most likely due to resolving pulmonary edema/CHF.  Correlate clinically and further follow-up as needed.      Signed by: Hernandez Kelley 3/11/2025 3:02 PM  Dictation workstation:   [id number]  Transthoracic Echo (TTE) [place] [address]             Phone [phone]    TRANSTHORACIC ECHOCARDIOGRAM REPORT    Patient Name:       ODALYS MELVIN     Reading Physician:    [id number] Tomi Moser DO  Study Date:         3/11/2025            Ordering Provider:    [id number] FOSTER MOORE                                                                 ABILIO  MRN/PID:            [id number]             Fellow:  Accession#:         [id number]         Nurse:  Date of Birth/Age:  1965 / 59      Sonographer:          Nathaniel hernandez                                      RDABE  Gender Assigned at  M                    Additional Staff:  Birth:  Height:             177.80 cm            Admit Date:  Weight:             117.93 kg            Admission Status:     Inpatient -                                                                 Routine  BSA / BMI:          2.33 m2 / 37.31      Department Location:  [facility]                      kg/m2  Blood Pressure: 146 /67 mmHg    Study Type:    TRANSTHORACIC ECHO (TTE) COMPLETE  Diagnosis/ICD: Subsequent non ST elevation (NSTEMI) myocardial infarction-I22.2  Indication:    NSTEMI  CPT Codes:     Echo Complete w Full [id number]    Patient History:  Diabetes:          Yes  Pertinent History: CAD, Chest Pain, CHF, HTN, Hyperlipidemia, Dyspnea and                     Syncope. LVH, Renal dx V, NSTEMI, DVT.    Study Detail: The following Echo studies were performed: 2D, M-Mode, Doppler and                color flow. Technically challenging study due to poor acoustic                windows and body habitus. Definity used as a contrast agent for                endocardial border definition. Total contrast used for this                procedure was 2 mL via IV push. Unable to obtain RV/RA not well                visualized view.       PHYSICIAN INTERPRETATION:  Left Ventricle: Left ventricular ejection fraction is normal, by visual estimate at 60-65%. There is mild concentric left ventricular hypertrophy. There are no regional wall motion abnormalities.  The left ventricular cavity size is normal. There is mild increased septal and mildly increased posterior left ventricular wall thickness. Spectral Doppler shows a Grade II (pseudonormal pattern) of left ventricular diastolic filling with an elevated left atrial pressure.  Left Atrium: The left atrial size is mildly dilated.  Right Ventricle: The right ventricle is normal in size. There is normal right ventricular global systolic function.  Right Atrium: The right atrial size is normal.  Aortic Valve: The aortic valve appears abnormal. There is mild to moderate aortic valve cusp calcification. There is mild to moderate aortic valve thickening. There is evidence of mild to moderate aortic valve stenosis.  The aortic valve dimensionless index is 0.39. There is no evidence of aortic valve regurgitation. The peak instantaneous gradient of the aortic valve is 28 mmHg. The mean gradient of the aortic valve is 15 mmHg.  Mitral Valve: The mitral valve is normal in structure. There is mild mitral valve regurgitation.  Tricuspid Valve: The tricuspid valve is structurally normal. There is trace tricuspid regurgitation.  Pulmonic Valve: The pulmonic valve is structurally normal. There is no indication of pulmonic valve regurgitation.  Pericardium: No pericardial effusion noted.  Aorta: The aortic root is normal.       CONCLUSIONS:   1. Left ventricular ejection fraction is normal, by visual estimate at 60-65%.   2. Spectral Doppler shows a Grade II (pseudonormal pattern) of left ventricular diastolic filling with an elevated left atrial pressure.   3. There is normal right ventricular global systolic function.   4. Mild to moderate aortic valve stenosis.    QUANTITATIVE DATA SUMMARY:     2D MEASUREMENTS:             Normal Ranges:  Ao Root s:       3.93 cm  IVSd:            1.25 cm     (0.6-1.1cm)  LVPWd:           1.26 cm     (0.6-1.1cm)  LVIDd:           5.67 cm     (3.9-5.9cm)  LVIDs:           3.75 cm  LV Mass Index:    130.8 g/m2  LVEDV Index:     58.65 ml/m2  LV % FS          33.9 %       LEFT ATRIUM:                 Normal Ranges:  LA Vol A4C:       128.6 ml  LA Vol A2C:       65.2 ml  LA Vol Index BSA: 41.5 ml/m2       LV SYSTOLIC FUNCTION:                       Normal Ranges:  EF-A4C View:    76 % (>=55%)  EF-A2C View:    60 %  EF-Biplane:     70 %  EF-Visual:      63 %  LV EF Reported: 63 %       LV DIASTOLIC FUNCTION:           Normal Ranges:  MV Peak E:             1.17 m/s  (0.7-1.2 m/s)  MV Peak A:             1.16 m/s  (0.42-0.7 m/s)  E/A Ratio:             1.01      (1.0-2.2)  MV e'                  0.059 m/s (>8.0)  MV lateral e'          0.06 m/s  MV medial e'           0.05 m/s  E/e' Ratio:            19.88     (<8.0)       MITRAL VALVE:          Normal Ranges:  MV DT:        296 msec (150-240msec)       AORTIC VALVE:                      Normal Ranges:  AoV Vmax:                2.64 m/s  (<=1.7m/s)  AoV Peak P.8 mmHg (<20mmHg)  AoV Mean PG:             15.4 mmHg (1.7-11.5mmHg)  LVOT Max Cirilo:            1.13 m/s  (<=1.1m/s)  AoV VTI:                 66.94 cm  (18-25cm)  LVOT VTI:                25.87 cm  LVOT Diameter:           2.03 cm   (1.8-2.4cm)  AoV Area, VTI:           1.25 cm2  (2.5-5.5cm2)  AoV Area,Vmax:           1.39 cm2  (2.5-4.5cm2)  AoV Dimensionless Index: 0.39       TRICUSPID VALVE/RVSP:         Normal Ranges:  IVC Diam:             2.16 cm       PULMONIC VALVE:          Normal Ranges:  PV Max Cirilo:     0.9 m/s  (0.6-0.9m/s)  PV Max PG:      3.4 mmHg       [id number] Tomi Moser DO  Electronically signed on 3/11/2025 at 11:07:24 AM       ** Final **  ECG 12 lead  Normal sinus rhythm  Nonspecific ST and T wave abnormality  QTcB >= 480 msec  Abnormal ECG  When compared with ECG of 28-OCT-2024 15:17,  No significant change was found  Confirmed by Brent Ellington ([id number]) on 3/11/2025 10:18:44 AM  ECG 12 Lead  Normal sinus rhythm  Nonspecific ST and T wave abnormality  Abnormal ECG  No  previous ECGs available  Confirmed by Brent Ellington ([id number]) on 3/11/2025 9:21:45 AM      Physical Exam  Constitutional:       General: He is not in acute distress.     Appearance: He is not toxic-appearing.   HENT:      Head: Normocephalic.      Mouth/Throat:      Pharynx: Oropharynx is clear.   Eyes:      General: No scleral icterus.  Cardiovascular:      Rate and Rhythm: Normal rate.   Pulmonary:      Effort: No respiratory distress.      Breath sounds: No wheezing.   Abdominal:      General: There is no distension.      Palpations: Abdomen is soft.   Musculoskeletal:      Right lower leg: No edema.      Left lower leg: No edema.   Neurological:      Mental Status: He is alert and oriented to person, place, and time.         Relevant Results               Assessment/Plan          Assessment & Plan  Hypertensive emergency  /143 on arrival, required ICU admission for nitroglycerin gtt  Management per nephrology/cardiology   Pneumonia  Multilobar pneumonia  ID following  Continue antibiotics per ID - can transition to PO antibiotics at discharge   Acute hypoxic respiratory failure (Multi)  Due to above  Resolved, stable on room air  Elevated troponin  Suspect demand due to above and underlying renal disease   Management per cardiology   Stage 5 chronic kidney disease not on chronic dialysis (Multi)  Nephrology following and managing   Anemia of chronic disease  Baseline Hgb 7-8  Stable at baseline     Plan:  Renal function slightly worse today but overall stable compared to yesterday.   Awaiting nephrology evaluation today  Continue ceftin x5 days per ID  Anticipate discharge home with no needs when cleared by nephrology               Madalyn Cole MD

## 2025-03-13 NOTE — CARE PLAN
The patient's goals for the shift include sleep    The clinical goals for the shift include stable BP      Problem: Pain - Adult  Goal: Verbalizes/displays adequate comfort level or baseline comfort level  Outcome: Progressing     Problem: Safety - Adult  Goal: Free from fall injury  Outcome: Progressing     Problem: Discharge Planning  Goal: Discharge to home or other facility with appropriate resources  Outcome: Progressing     Problem: Chronic Conditions and Co-morbidities  Goal: Patient's chronic conditions and co-morbidity symptoms are monitored and maintained or improved  Outcome: Progressing     Problem: Nutrition  Goal: Nutrient intake appropriate for maintaining nutritional needs  Outcome: Progressing     Problem: Diabetes  Goal: Achieve decreasing blood glucose levels by end of shift  Outcome: Progressing  Goal: Increase stability of blood glucose readings by end of shift  Outcome: Progressing  Goal: Decrease in ketones present in urine by end of shift  Outcome: Progressing  Goal: Maintain electrolyte levels within acceptable range throughout shift  Outcome: Progressing  Goal: Maintain glucose levels >70mg/dl to <250mg/dl throughout shift  Outcome: Progressing  Goal: No changes in neurological exam by end of shift  Outcome: Progressing  Goal: Learn about and adhere to nutrition recommendations by end of shift  Outcome: Progressing  Goal: Vital signs within normal range for age by end of shift  Outcome: Progressing  Goal: Increase self care and/or family involovement by end of shift  Outcome: Progressing  Goal: Receive DSME education by end of shift  Outcome: Progressing

## 2025-03-13 NOTE — DISCHARGE SUMMARY
Discharge Diagnosis  Hypertensive emergency    Issues Requiring Follow-Up  CKD stage V, hypertension - follow up with nephrology  Type II NSTEMI, HTN - follow up with cardiology     Discharge Meds     Medication List      START taking these medications     cefuroxime 250 mg tablet; Commonly known as: Ceftin; Take 1 tablet (250   mg) by mouth once every 24 hours for 3 doses.; Start taking on: March 14, 2025     CHANGE how you take these medications     torsemide 20 mg tablet; Commonly known as: Demadex; Take 3 tablets (60   mg) by mouth once daily.; What changed: how much to take, how to take   this, when to take this, additional instructions     CONTINUE taking these medications     acetaminophen 325 mg tablet; Commonly known as: Tylenol; Take 2 tablets   (650 mg) by mouth every 6 hours.   aspirin 81 mg EC tablet   carvedilol 25 mg tablet; Commonly known as: Coreg; Take 1 tablet (25 mg)   by mouth 2 times a day.   ezetimibe 10 mg tablet; Commonly known as: Zetia; Take 1 tablet (10 mg)   by mouth once daily at bedtime.   hydrALAZINE 100 mg tablet; Commonly known as: Apresoline; Take 1 tablet   (100 mg) by mouth 3 times a day.   methocarbamol 500 mg tablet; Commonly known as: Robaxin; Take 1 tablet   (500 mg) by mouth every 8 hours if needed for muscle spasms for up to 5   days.   sodium bicarbonate 650 mg tablet; Take 2 tablets (1,300 mg) by mouth 3   times a day.     STOP taking these medications     amoxicillin-pot clavulanate 500-125 mg tablet; Commonly known as:   Augmentin   oxyCODONE 5 mg immediate release tablet; Commonly known as: Roxicodone       Test Results Pending At Discharge  Pending Labs       Order Current Status    Mycoplasma pneumoniae antibody, IgM In process    Blood Culture Preliminary result    Blood Culture Preliminary result            Hospital Course   59yoM hx of HTN, CKD5 not on HD, anemia of chronic disease, HLD, who presented with productive cough, and shortness of breath. CT suggestive  of multilobar pneumonia. Labs showing elevated troponins. Patient was noted to be significantly hypertensive with initial /143 on arrival. Started on nitroglycerin gtt and heparin gtt and admitted to the ICU. Nephrology, cardiology, and ID were consulted. BP improved with resumption of home meds and respiratory status improved with IV lasix and antibiotics. Transferred to the floor on 3/12. Per cardiology, suspect NSTEMI was likely demand ischemia in the setting of hypertensive emergency, low suspicion for ACS. Patient had slight worsening of renal function but it remained stable overall. Antibiotics were de-escalated to ceftin, plan for 5 days per ID. Patient is now medically cleared for discharge home. He was instructed to follow up with his PCP in 1 week, cardiology in 2 weeks, and nephrology in 2 weeks.     Discharged discussed with Dr. Max.     Pertinent Physical Exam At Time of Discharge  Physical Exam  Constitutional:       General: He is not in acute distress.     Appearance: He is not toxic-appearing.   HENT:      Head: Normocephalic.      Mouth/Throat:      Pharynx: Oropharynx is clear.   Eyes:      General: No scleral icterus.  Cardiovascular:      Rate and Rhythm: Normal rate.   Abdominal:      General: There is no distension.      Tenderness: There is no abdominal tenderness.   Musculoskeletal:      Right lower leg: No edema.      Left lower leg: No edema.   Neurological:      Mental Status: He is alert and oriented to person, place, and time.         Outpatient Follow-Up  No future appointments.    Time spent on discharge: 35 minutes    Madalyn Cole MD

## 2025-03-13 NOTE — NURSING NOTE
Pt is lying in bed, denies pain, denies needs. NAD. Pt requests that vital signs not be checked for midnight as we just checked them at 22:36. Pt agreeable for 0400 VS check

## 2025-03-13 NOTE — CARE PLAN
The patient's goals for the shift include sleep    The clinical goals for the shift include stable BP      Problem: Chronic Conditions and Co-morbidities  Goal: Patient's chronic conditions and co-morbidity symptoms are monitored and maintained or improved  Outcome: Progressing

## 2025-03-13 NOTE — NURSING NOTE
This nurse and nurses aid heard a thud and ran to this pts room to find him on his knees holding onto the skin with his underwear around his knees with both legs through one leg whole. Pt states he slipped on water on the floor from shower, states he fell on his right hip and denies hitting his head. Pt is assisted to stand, get into underwear and pants and sit at the edge of the bed. No injuries to right hip or rest of body noted to pt. Pt denies pain. Pt is able to stand and walk independently at this time. VS obtained. Hospitalist Dr AGUSTIN Gooden notified and nursing supervisor notified.

## 2025-03-14 ENCOUNTER — PATIENT OUTREACH (OUTPATIENT)
Dept: PRIMARY CARE | Facility: CLINIC | Age: 60
End: 2025-03-14
Payer: COMMERCIAL

## 2025-03-14 LAB
BACTERIA BLD CULT: NORMAL
BACTERIA BLD CULT: NORMAL

## 2025-03-14 NOTE — PROGRESS NOTES
Discharge Facility:[facility]   Discharge Diagnosis:HTN Emergency   Admission Date:3/10/25  Discharge Date: 3/13/25    PCP Appointment Date:Task to office staff  Specialist Appointment Date: Cardiology and Nephrology   Hospital Encounter and Summary Linked: Yes  See discharge assessment below for further details  ED to Hosp-Admission (Discharged) with Madalyn Cole MD (03/10/2025)     Two attempts were made to reach patient within two business days after discharge. Voicemail left with contact information for patient to call back with any non-emergent questions or concerns.      Lianet Romero LPN

## 2025-03-25 NOTE — DOCUMENTATION CLARIFICATION NOTE
"    PATIENT:               ODALYS MELVIN  ACCT #:                  [id number]  MRN:                       [id number]  :                       1965  ADMIT DATE:       3/10/2025 1:11 PM  DISCH DATE:        3/13/2025 6:00 PM  RESPONDING PROVIDER #:        [id number]          PROVIDER RESPONSE TEXT:    Sepsis 2/2 PNA with cardiac organ dysfunction of Type II NSTEMI    CDI QUERY TEXT:    Clarification        Instruction:    Based on your assessment of the patient and the clinical information, please provide the requested documentation by clicking on the appropriate radio button and enter any additional information if prompted.    Question: Please further clarify if a relationship exists between the Sepsis and acute organ dysfunction    When answering this query, please exercise your independent professional judgment. The fact that a question is being asked, does not imply that any particular answer is desired or expected.    The patient's clinical indicators include:  Clinical Information: 3/10 60 yo male presents ED by EMS for respiratory distress and shortness of breath.  EMS notes patient severely tachypneic with wet cough and significant Rales/rhonchi placed on NRB for significant hypoxia in the mid to high 70s with improvement to the mid to high 80s.  Patient was recently in hospital for surgical procedure of cholecystectomy.    Clinical Indicators:  RR:  30/31/26/21/22/20/20/20 (3/10)  HR:  99/106/81/77/78/80, etc (3/10)  WBC:  (3/10) 22.1/15.2/9.7/11.1 (3/13)  BNP 2726  Troponin:  7/10/912/1325/1099    3/10 ED PN:  \"SpO2 85% on RA. Placed on 6L NC\"    3/10 CT CAP WO:  \"...Lungs demonstrate multilobar patchy airspace opacity suggesting acute  multilobar pneumonia.There are small bilateral pleural effusions.\"    3/11 Renal Consult:  \"He has improvement in his respiratory status and oxygen requirement, no peripheral edema, and probable etiology of his dyspnea is more infectious than overload\"    3/11 TTE: Left " "ventricular ejection fraction is normal, by visual estimate at 60-65%.There is mild concentric left ventricular hypertrophy.There are no regional wall motion abnormalities.The left ventricular cavity size is normal.There is mild increased septal and mildly increased posterior left ventricular wall thickness.Spectral Doppler shows a Grade II (pseudonormal pattern) of left ventricular diastolic filling with an elevated left atrial pressure.    3/13 Renal PN:  \"He has bilateral lower extremity pitting edema\"    3/13 ID PN:  \"Sepsis-triggered with leukocytosis, tachycardia, tachypnea-resolved  Multilobar pneumonia-MRSA PCR negative, streptococcus pneumoniae antigen negative, legionella antigen negative, COVID/Flu/RSV negative\"    3/13 DC Summary:  \"Type II NSTEMI\"    Treatment:  TTE; CXR; CT CAP WO; Serial Labs/Sepsis Bundle; Oxygen; Torsemide Increase to 60 mg PO; Zosyn IF(3/10); NS@150; Vanco IV(3/10); NTG IV; Meropenem IV(3/10-13); Lasix IV 80 mg IV    Risk Factors:  3/3/2025 s/p  laparoscopic cholecystectomy; CAD; DM2; CKD V; HTN;  Options provided:  -- Sepsis 2/2 PNA with cardiac organ dysfunction of Type II NSTEMI  -- Sepsis 2/2 PNA with cardiac organ dysfunction of Acute Diastolic CHF  -- Sepsis 2/2 PNA with multi-system organ dysfunction of Type II NSTEMI and Acute Diastolic CHF  -- Sepsis with other organ dysfunction, Please specify sepsis associated organ dysfunction below  -- No Sepsis, just PNA  -- Other - I will add my own diagnosis  -- Refer to Clinical Documentation Reviewer    Query created by: Chhaya Cramer on 3/24/2025 1:46 PM      Electronically signed by:  KEELEY KENT MD 3/25/2025 2:50 PM          "

## 2025-03-26 ENCOUNTER — PATIENT OUTREACH (OUTPATIENT)
Dept: PRIMARY CARE | Facility: CLINIC | Age: 60
End: 2025-03-26
Payer: COMMERCIAL

## 2025-03-26 NOTE — PROGRESS NOTES
Call regarding appt. with PCP on after hospitalization.  At time of outreach call the patient feels as if their condition has not since last visit.  Patient stated he currently is having runny stool, but will follow up with specialty on 3/27/25.  No questions or concerns at this time.

## 2025-03-27 ENCOUNTER — APPOINTMENT (OUTPATIENT)
Dept: RADIOLOGY | Facility: HOSPITAL | Age: 60
DRG: 372 | End: 2025-03-27
Payer: COMMERCIAL

## 2025-03-27 ENCOUNTER — OFFICE VISIT (OUTPATIENT)
Dept: SURGERY | Facility: CLINIC | Age: 60
End: 2025-03-27
Payer: COMMERCIAL

## 2025-03-27 ENCOUNTER — APPOINTMENT (OUTPATIENT)
Dept: CARDIOLOGY | Facility: HOSPITAL | Age: 60
DRG: 372 | End: 2025-03-27
Payer: COMMERCIAL

## 2025-03-27 ENCOUNTER — HOSPITAL ENCOUNTER (INPATIENT)
Facility: HOSPITAL | Age: 60
LOS: 3 days | Discharge: HOME | DRG: 372 | End: 2025-03-30
Attending: EMERGENCY MEDICINE | Admitting: STUDENT IN AN ORGANIZED HEALTH CARE EDUCATION/TRAINING PROGRAM
Payer: COMMERCIAL

## 2025-03-27 DIAGNOSIS — R11.2 NAUSEA AND VOMITING, UNSPECIFIED VOMITING TYPE: ICD-10-CM

## 2025-03-27 DIAGNOSIS — K81.9 CHOLECYSTITIS: Primary | ICD-10-CM

## 2025-03-27 DIAGNOSIS — R10.9 POSTOPERATIVE ABDOMINAL PAIN: ICD-10-CM

## 2025-03-27 DIAGNOSIS — R53.83 MALAISE AND FATIGUE: ICD-10-CM

## 2025-03-27 DIAGNOSIS — R53.81 MALAISE AND FATIGUE: ICD-10-CM

## 2025-03-27 DIAGNOSIS — A04.72 C. DIFFICILE COLITIS: ICD-10-CM

## 2025-03-27 DIAGNOSIS — G89.18 POSTOPERATIVE ABDOMINAL PAIN: ICD-10-CM

## 2025-03-27 DIAGNOSIS — R06.00 DYSPNEA, UNSPECIFIED TYPE: ICD-10-CM

## 2025-03-27 DIAGNOSIS — K57.92 DIVERTICULITIS: ICD-10-CM

## 2025-03-27 DIAGNOSIS — K52.9 COLITIS: Primary | ICD-10-CM

## 2025-03-27 DIAGNOSIS — N18.9 CHRONIC RENAL FAILURE, UNSPECIFIED CKD STAGE: ICD-10-CM

## 2025-03-27 LAB
ALBUMIN SERPL BCP-MCNC: 3.5 G/DL (ref 3.4–5)
ALP SERPL-CCNC: 62 U/L (ref 33–136)
ALT SERPL W P-5'-P-CCNC: 10 U/L (ref 10–52)
ANION GAP SERPL CALCULATED.3IONS-SCNC: 16 MMOL/L (ref 10–20)
APPEARANCE UR: CLEAR
AST SERPL W P-5'-P-CCNC: 11 U/L (ref 9–39)
BACTERIA #/AREA URNS AUTO: ABNORMAL /HPF
BASOPHILS # BLD AUTO: 0.07 X10*3/UL (ref 0–0.1)
BASOPHILS NFR BLD AUTO: 0.5 %
BILIRUB SERPL-MCNC: 0.5 MG/DL (ref 0–1.2)
BILIRUB UR STRIP.AUTO-MCNC: NEGATIVE MG/DL
BUN SERPL-MCNC: 68 MG/DL (ref 6–23)
CALCIUM SERPL-MCNC: 8.9 MG/DL (ref 8.6–10.3)
CARDIAC TROPONIN I PNL SERPL HS: 12 NG/L (ref 0–20)
CARDIAC TROPONIN I PNL SERPL HS: 14 NG/L (ref 0–20)
CHLORIDE SERPL-SCNC: 109 MMOL/L (ref 98–107)
CO2 SERPL-SCNC: 15 MMOL/L (ref 21–32)
COLOR UR: ABNORMAL
CREAT SERPL-MCNC: 6.91 MG/DL (ref 0.5–1.3)
EGFRCR SERPLBLD CKD-EPI 2021: 8 ML/MIN/1.73M*2
EOSINOPHIL # BLD AUTO: 0.45 X10*3/UL (ref 0–0.7)
EOSINOPHIL NFR BLD AUTO: 2.9 %
ERYTHROCYTE [DISTWIDTH] IN BLOOD BY AUTOMATED COUNT: 15.4 % (ref 11.5–14.5)
FLUAV RNA RESP QL NAA+PROBE: NOT DETECTED
FLUBV RNA RESP QL NAA+PROBE: NOT DETECTED
GLUCOSE SERPL-MCNC: 111 MG/DL (ref 74–99)
GLUCOSE UR STRIP.AUTO-MCNC: ABNORMAL MG/DL
HCT VFR BLD AUTO: 31.6 % (ref 41–52)
HGB BLD-MCNC: 9.8 G/DL (ref 13.5–17.5)
IMM GRANULOCYTES # BLD AUTO: 0.08 X10*3/UL (ref 0–0.7)
IMM GRANULOCYTES NFR BLD AUTO: 0.5 % (ref 0–0.9)
KETONES UR STRIP.AUTO-MCNC: NEGATIVE MG/DL
LACTATE SERPL-SCNC: 0.9 MMOL/L (ref 0.4–2)
LEUKOCYTE ESTERASE UR QL STRIP.AUTO: NEGATIVE
LIPASE SERPL-CCNC: 26 U/L (ref 9–82)
LYMPHOCYTES # BLD AUTO: 0.79 X10*3/UL (ref 1.2–4.8)
LYMPHOCYTES NFR BLD AUTO: 5.1 %
MCH RBC QN AUTO: 27.7 PG (ref 26–34)
MCHC RBC AUTO-ENTMCNC: 31 G/DL (ref 32–36)
MCV RBC AUTO: 89 FL (ref 80–100)
MONOCYTES # BLD AUTO: 1 X10*3/UL (ref 0.1–1)
MONOCYTES NFR BLD AUTO: 6.5 %
MUCOUS THREADS #/AREA URNS AUTO: ABNORMAL /LPF
NEUTROPHILS # BLD AUTO: 12.99 X10*3/UL (ref 1.2–7.7)
NEUTROPHILS NFR BLD AUTO: 84.5 %
NITRITE UR QL STRIP.AUTO: NEGATIVE
NRBC BLD-RTO: 0 /100 WBCS (ref 0–0)
PH UR STRIP.AUTO: 5.5 [PH]
PLATELET # BLD AUTO: 397 X10*3/UL (ref 150–450)
POTASSIUM SERPL-SCNC: 4.6 MMOL/L (ref 3.5–5.3)
PROT SERPL-MCNC: 7 G/DL (ref 6.4–8.2)
PROT UR STRIP.AUTO-MCNC: ABNORMAL MG/DL
RBC # BLD AUTO: 3.54 X10*6/UL (ref 4.5–5.9)
RBC # UR STRIP.AUTO: NEGATIVE MG/DL
RBC #/AREA URNS AUTO: ABNORMAL /HPF
SARS-COV-2 RNA RESP QL NAA+PROBE: NOT DETECTED
SODIUM SERPL-SCNC: 135 MMOL/L (ref 136–145)
SP GR UR STRIP.AUTO: 1.01
SQUAMOUS #/AREA URNS AUTO: ABNORMAL /HPF
UROBILINOGEN UR STRIP.AUTO-MCNC: NORMAL MG/DL
WBC # BLD AUTO: 15.4 X10*3/UL (ref 4.4–11.3)
WBC #/AREA URNS AUTO: ABNORMAL /HPF

## 2025-03-27 PROCEDURE — 1100000001 HC PRIVATE ROOM DAILY

## 2025-03-27 PROCEDURE — 80053 COMPREHEN METABOLIC PANEL: CPT | Performed by: EMERGENCY MEDICINE

## 2025-03-27 PROCEDURE — 96374 THER/PROPH/DIAG INJ IV PUSH: CPT

## 2025-03-27 PROCEDURE — 36415 COLL VENOUS BLD VENIPUNCTURE: CPT | Performed by: EMERGENCY MEDICINE

## 2025-03-27 PROCEDURE — 93005 ELECTROCARDIOGRAM TRACING: CPT

## 2025-03-27 PROCEDURE — 87636 SARSCOV2 & INF A&B AMP PRB: CPT | Performed by: EMERGENCY MEDICINE

## 2025-03-27 PROCEDURE — 2500000002 HC RX 250 W HCPCS SELF ADMINISTERED DRUGS (ALT 637 FOR MEDICARE OP, ALT 636 FOR OP/ED): Performed by: STUDENT IN AN ORGANIZED HEALTH CARE EDUCATION/TRAINING PROGRAM

## 2025-03-27 PROCEDURE — 71045 X-RAY EXAM CHEST 1 VIEW: CPT | Performed by: RADIOLOGY

## 2025-03-27 PROCEDURE — 2500000004 HC RX 250 GENERAL PHARMACY W/ HCPCS (ALT 636 FOR OP/ED): Performed by: STUDENT IN AN ORGANIZED HEALTH CARE EDUCATION/TRAINING PROGRAM

## 2025-03-27 PROCEDURE — 74176 CT ABD & PELVIS W/O CONTRAST: CPT

## 2025-03-27 PROCEDURE — 99285 EMERGENCY DEPT VISIT HI MDM: CPT | Mod: 25 | Performed by: EMERGENCY MEDICINE

## 2025-03-27 PROCEDURE — 96375 TX/PRO/DX INJ NEW DRUG ADDON: CPT

## 2025-03-27 PROCEDURE — 81001 URINALYSIS AUTO W/SCOPE: CPT | Performed by: EMERGENCY MEDICINE

## 2025-03-27 PROCEDURE — 99223 1ST HOSP IP/OBS HIGH 75: CPT | Performed by: STUDENT IN AN ORGANIZED HEALTH CARE EDUCATION/TRAINING PROGRAM

## 2025-03-27 PROCEDURE — 85025 COMPLETE CBC W/AUTO DIFF WBC: CPT | Performed by: EMERGENCY MEDICINE

## 2025-03-27 PROCEDURE — 83690 ASSAY OF LIPASE: CPT | Performed by: EMERGENCY MEDICINE

## 2025-03-27 PROCEDURE — 99024 POSTOP FOLLOW-UP VISIT: CPT | Performed by: SURGERY

## 2025-03-27 PROCEDURE — 83605 ASSAY OF LACTIC ACID: CPT | Performed by: STUDENT IN AN ORGANIZED HEALTH CARE EDUCATION/TRAINING PROGRAM

## 2025-03-27 PROCEDURE — 2500000001 HC RX 250 WO HCPCS SELF ADMINISTERED DRUGS (ALT 637 FOR MEDICARE OP): Performed by: STUDENT IN AN ORGANIZED HEALTH CARE EDUCATION/TRAINING PROGRAM

## 2025-03-27 PROCEDURE — 93010 ELECTROCARDIOGRAM REPORT: CPT | Performed by: INTERNAL MEDICINE

## 2025-03-27 PROCEDURE — 84484 ASSAY OF TROPONIN QUANT: CPT | Performed by: EMERGENCY MEDICINE

## 2025-03-27 PROCEDURE — 71045 X-RAY EXAM CHEST 1 VIEW: CPT

## 2025-03-27 PROCEDURE — 3044F HG A1C LEVEL LT 7.0%: CPT | Performed by: SURGERY

## 2025-03-27 PROCEDURE — 2500000004 HC RX 250 GENERAL PHARMACY W/ HCPCS (ALT 636 FOR OP/ED): Performed by: EMERGENCY MEDICINE

## 2025-03-27 PROCEDURE — 74176 CT ABD & PELVIS W/O CONTRAST: CPT | Performed by: RADIOLOGY

## 2025-03-27 RX ORDER — PROCHLORPERAZINE 25 MG/1
25 SUPPOSITORY RECTAL EVERY 12 HOURS PRN
Status: DISCONTINUED | OUTPATIENT
Start: 2025-03-27 | End: 2025-03-30 | Stop reason: HOSPADM

## 2025-03-27 RX ORDER — FAMOTIDINE 10 MG/ML
20 INJECTION, SOLUTION INTRAVENOUS DAILY
Status: DISCONTINUED | OUTPATIENT
Start: 2025-03-27 | End: 2025-03-30 | Stop reason: HOSPADM

## 2025-03-27 RX ORDER — PROCHLORPERAZINE MALEATE 10 MG
10 TABLET ORAL EVERY 6 HOURS PRN
Status: DISCONTINUED | OUTPATIENT
Start: 2025-03-27 | End: 2025-03-30 | Stop reason: HOSPADM

## 2025-03-27 RX ORDER — ONDANSETRON HYDROCHLORIDE 2 MG/ML
4 INJECTION, SOLUTION INTRAVENOUS ONCE
Status: COMPLETED | OUTPATIENT
Start: 2025-03-27 | End: 2025-03-27

## 2025-03-27 RX ORDER — ONDANSETRON 4 MG/1
4 TABLET, FILM COATED ORAL EVERY 8 HOURS PRN
Status: DISCONTINUED | OUTPATIENT
Start: 2025-03-27 | End: 2025-03-30 | Stop reason: HOSPADM

## 2025-03-27 RX ORDER — ACETAMINOPHEN 325 MG/1
650 TABLET ORAL EVERY 4 HOURS PRN
Status: DISCONTINUED | OUTPATIENT
Start: 2025-03-27 | End: 2025-03-30 | Stop reason: HOSPADM

## 2025-03-27 RX ORDER — ACETAMINOPHEN 650 MG/1
650 SUPPOSITORY RECTAL EVERY 4 HOURS PRN
Status: DISCONTINUED | OUTPATIENT
Start: 2025-03-27 | End: 2025-03-30 | Stop reason: HOSPADM

## 2025-03-27 RX ORDER — MORPHINE SULFATE 2 MG/ML
2 INJECTION, SOLUTION INTRAMUSCULAR; INTRAVENOUS ONCE
Status: COMPLETED | OUTPATIENT
Start: 2025-03-27 | End: 2025-03-27

## 2025-03-27 RX ORDER — TORSEMIDE 20 MG/1
60 TABLET ORAL DAILY
Status: DISCONTINUED | OUTPATIENT
Start: 2025-03-27 | End: 2025-03-30 | Stop reason: HOSPADM

## 2025-03-27 RX ORDER — BISMUTH SUBSALICYLATE 262 MG
1 TABLET,CHEWABLE ORAL DAILY
Status: ON HOLD | COMMUNITY

## 2025-03-27 RX ORDER — CARVEDILOL 25 MG/1
25 TABLET ORAL 2 TIMES DAILY
Status: DISCONTINUED | OUTPATIENT
Start: 2025-03-27 | End: 2025-03-27

## 2025-03-27 RX ORDER — SODIUM CHLORIDE 9 MG/ML
100 INJECTION, SOLUTION INTRAVENOUS CONTINUOUS
Status: ACTIVE | OUTPATIENT
Start: 2025-03-27 | End: 2025-03-28

## 2025-03-27 RX ORDER — VANCOMYCIN HYDROCHLORIDE 125 MG/1
125 CAPSULE ORAL 4 TIMES DAILY
Status: DISCONTINUED | OUTPATIENT
Start: 2025-03-27 | End: 2025-03-29

## 2025-03-27 RX ORDER — FAMOTIDINE 10 MG/ML
20 INJECTION, SOLUTION INTRAVENOUS ONCE
Status: COMPLETED | OUTPATIENT
Start: 2025-03-27 | End: 2025-03-27

## 2025-03-27 RX ORDER — ONDANSETRON HYDROCHLORIDE 2 MG/ML
4 INJECTION, SOLUTION INTRAVENOUS EVERY 8 HOURS PRN
Status: DISCONTINUED | OUTPATIENT
Start: 2025-03-27 | End: 2025-03-30 | Stop reason: HOSPADM

## 2025-03-27 RX ORDER — CARVEDILOL 12.5 MG/1
12.5 TABLET ORAL 2 TIMES DAILY
Status: DISCONTINUED | OUTPATIENT
Start: 2025-03-27 | End: 2025-03-30

## 2025-03-27 RX ORDER — METRONIDAZOLE 500 MG/100ML
500 INJECTION, SOLUTION INTRAVENOUS ONCE
Status: COMPLETED | OUTPATIENT
Start: 2025-03-27 | End: 2025-03-27

## 2025-03-27 RX ORDER — ACETAMINOPHEN 160 MG/5ML
650 SOLUTION ORAL EVERY 4 HOURS PRN
Status: DISCONTINUED | OUTPATIENT
Start: 2025-03-27 | End: 2025-03-30 | Stop reason: HOSPADM

## 2025-03-27 RX ORDER — HYDRALAZINE HYDROCHLORIDE 50 MG/1
100 TABLET, FILM COATED ORAL 3 TIMES DAILY
Status: DISCONTINUED | OUTPATIENT
Start: 2025-03-27 | End: 2025-03-28

## 2025-03-27 RX ORDER — EZETIMIBE 10 MG/1
10 TABLET ORAL NIGHTLY
Status: DISCONTINUED | OUTPATIENT
Start: 2025-03-27 | End: 2025-03-30 | Stop reason: HOSPADM

## 2025-03-27 RX ORDER — FAMOTIDINE 20 MG/1
20 TABLET, FILM COATED ORAL DAILY
Status: DISCONTINUED | OUTPATIENT
Start: 2025-03-27 | End: 2025-03-30 | Stop reason: HOSPADM

## 2025-03-27 RX ORDER — HEPARIN SODIUM 5000 [USP'U]/ML
5000 INJECTION, SOLUTION INTRAVENOUS; SUBCUTANEOUS EVERY 8 HOURS
Status: DISCONTINUED | OUTPATIENT
Start: 2025-03-27 | End: 2025-03-30 | Stop reason: HOSPADM

## 2025-03-27 RX ORDER — ASPIRIN 81 MG/1
81 TABLET ORAL DAILY
Status: DISCONTINUED | OUTPATIENT
Start: 2025-03-27 | End: 2025-03-30 | Stop reason: HOSPADM

## 2025-03-27 RX ORDER — CIPROFLOXACIN 2 MG/ML
400 INJECTION, SOLUTION INTRAVENOUS ONCE
Status: COMPLETED | OUTPATIENT
Start: 2025-03-27 | End: 2025-03-27

## 2025-03-27 RX ORDER — PROCHLORPERAZINE EDISYLATE 5 MG/ML
10 INJECTION INTRAMUSCULAR; INTRAVENOUS EVERY 6 HOURS PRN
Status: DISCONTINUED | OUTPATIENT
Start: 2025-03-27 | End: 2025-03-30 | Stop reason: HOSPADM

## 2025-03-27 RX ORDER — SODIUM BICARBONATE 650 MG/1
1300 TABLET ORAL 3 TIMES DAILY
Status: DISCONTINUED | OUTPATIENT
Start: 2025-03-27 | End: 2025-03-30 | Stop reason: HOSPADM

## 2025-03-27 RX ADMIN — CARVEDILOL 12.5 MG: 12.5 TABLET, FILM COATED ORAL at 20:17

## 2025-03-27 RX ADMIN — MORPHINE SULFATE 2 MG: 2 INJECTION, SOLUTION INTRAMUSCULAR; INTRAVENOUS at 14:53

## 2025-03-27 RX ADMIN — SODIUM CHLORIDE 100 ML/HR: 900 INJECTION, SOLUTION INTRAVENOUS at 17:25

## 2025-03-27 RX ADMIN — EZETIMIBE 10 MG: 10 TABLET ORAL at 20:17

## 2025-03-27 RX ADMIN — ONDANSETRON 4 MG: 2 INJECTION, SOLUTION INTRAMUSCULAR; INTRAVENOUS at 14:53

## 2025-03-27 RX ADMIN — FAMOTIDINE 20 MG: 10 INJECTION, SOLUTION INTRAVENOUS at 14:52

## 2025-03-27 RX ADMIN — SODIUM BICARBONATE 1300 MG: 650 TABLET ORAL at 20:17

## 2025-03-27 RX ADMIN — SODIUM CHLORIDE 500 ML: 900 INJECTION, SOLUTION INTRAVENOUS at 14:53

## 2025-03-27 RX ADMIN — METRONIDAZOLE 500 MG: 500 INJECTION, SOLUTION INTRAVENOUS at 16:23

## 2025-03-27 RX ADMIN — CIPROFLOXACIN 400 MG: 400 INJECTION, SOLUTION INTRAVENOUS at 16:21

## 2025-03-27 RX ADMIN — VANCOMYCIN HYDROCHLORIDE 125 MG: 125 CAPSULE ORAL at 20:17

## 2025-03-27 SDOH — ECONOMIC STABILITY: FOOD INSECURITY: WITHIN THE PAST 12 MONTHS, THE FOOD YOU BOUGHT JUST DIDN'T LAST AND YOU DIDN'T HAVE MONEY TO GET MORE.: NEVER TRUE

## 2025-03-27 SDOH — SOCIAL STABILITY: SOCIAL NETWORK: HOW OFTEN DO YOU ATTEND CHURCH OR RELIGIOUS SERVICES?: PATIENT DECLINED

## 2025-03-27 SDOH — ECONOMIC STABILITY: HOUSING INSECURITY: AT ANY TIME IN THE PAST 12 MONTHS, WERE YOU HOMELESS OR LIVING IN A SHELTER (INCLUDING NOW)?: NO

## 2025-03-27 SDOH — SOCIAL STABILITY: SOCIAL NETWORK
IN A TYPICAL WEEK, HOW MANY TIMES DO YOU TALK ON THE PHONE WITH FAMILY, FRIENDS, OR NEIGHBORS?: MORE THAN THREE TIMES A WEEK

## 2025-03-27 SDOH — HEALTH STABILITY: PHYSICAL HEALTH
HOW OFTEN DO YOU NEED TO HAVE SOMEONE HELP YOU WHEN YOU READ INSTRUCTIONS, PAMPHLETS, OR OTHER WRITTEN MATERIAL FROM YOUR DOCTOR OR PHARMACY?: RARELY

## 2025-03-27 SDOH — SOCIAL STABILITY: SOCIAL INSECURITY: WITHIN THE LAST YEAR, HAVE YOU BEEN AFRAID OF YOUR PARTNER OR EX-PARTNER?: NO

## 2025-03-27 SDOH — HEALTH STABILITY: PHYSICAL HEALTH: ON AVERAGE, HOW MANY DAYS PER WEEK DO YOU ENGAGE IN MODERATE TO STRENUOUS EXERCISE (LIKE A BRISK WALK)?: 2 DAYS

## 2025-03-27 SDOH — ECONOMIC STABILITY: HOUSING INSECURITY: IN THE LAST 12 MONTHS, WAS THERE A TIME WHEN YOU WERE NOT ABLE TO PAY THE MORTGAGE OR RENT ON TIME?: NO

## 2025-03-27 SDOH — SOCIAL STABILITY: SOCIAL INSECURITY: ARE YOU MARRIED, WIDOWED, DIVORCED, SEPARATED, NEVER MARRIED, OR LIVING WITH A PARTNER?: DIVORCED

## 2025-03-27 SDOH — SOCIAL STABILITY: SOCIAL INSECURITY: HAVE YOU HAD ANY THOUGHTS OF HARMING ANYONE ELSE?: NO

## 2025-03-27 SDOH — ECONOMIC STABILITY: INCOME INSECURITY: IN THE PAST 12 MONTHS HAS THE ELECTRIC, GAS, OIL, OR WATER COMPANY THREATENED TO SHUT OFF SERVICES IN YOUR HOME?: NO

## 2025-03-27 SDOH — SOCIAL STABILITY: SOCIAL INSECURITY: HAVE YOU HAD THOUGHTS OF HARMING ANYONE ELSE?: NO

## 2025-03-27 SDOH — HEALTH STABILITY: MENTAL HEALTH
DO YOU FEEL STRESS - TENSE, RESTLESS, NERVOUS, OR ANXIOUS, OR UNABLE TO SLEEP AT NIGHT BECAUSE YOUR MIND IS TROUBLED ALL THE TIME - THESE DAYS?: TO SOME EXTENT

## 2025-03-27 SDOH — HEALTH STABILITY: MENTAL HEALTH: HOW OFTEN DO YOU HAVE SIX OR MORE DRINKS ON ONE OCCASION?: NEVER

## 2025-03-27 SDOH — SOCIAL STABILITY: SOCIAL INSECURITY: WITHIN THE LAST YEAR, HAVE YOU BEEN HUMILIATED OR EMOTIONALLY ABUSED IN OTHER WAYS BY YOUR PARTNER OR EX-PARTNER?: NO

## 2025-03-27 SDOH — ECONOMIC STABILITY: FOOD INSECURITY: WITHIN THE PAST 12 MONTHS, YOU WORRIED THAT YOUR FOOD WOULD RUN OUT BEFORE YOU GOT THE MONEY TO BUY MORE.: NEVER TRUE

## 2025-03-27 SDOH — SOCIAL STABILITY: SOCIAL INSECURITY: ARE THERE ANY APPARENT SIGNS OF INJURIES/BEHAVIORS THAT COULD BE RELATED TO ABUSE/NEGLECT?: NO

## 2025-03-27 SDOH — HEALTH STABILITY: MENTAL HEALTH: HOW MANY DRINKS CONTAINING ALCOHOL DO YOU HAVE ON A TYPICAL DAY WHEN YOU ARE DRINKING?: PATIENT DOES NOT DRINK

## 2025-03-27 SDOH — SOCIAL STABILITY: SOCIAL INSECURITY: HAS ANYONE EVER THREATENED TO HURT YOUR FAMILY OR YOUR PETS?: NO

## 2025-03-27 SDOH — SOCIAL STABILITY: SOCIAL NETWORK: HOW OFTEN DO YOU ATTEND MEETINGS OF THE CLUBS OR ORGANIZATIONS YOU BELONG TO?: PATIENT DECLINED

## 2025-03-27 SDOH — SOCIAL STABILITY: SOCIAL INSECURITY: ARE YOU OR HAVE YOU BEEN THREATENED OR ABUSED PHYSICALLY, EMOTIONALLY, OR SEXUALLY BY ANYONE?: NO

## 2025-03-27 SDOH — SOCIAL STABILITY: SOCIAL INSECURITY: WERE YOU ABLE TO COMPLETE ALL THE BEHAVIORAL HEALTH SCREENINGS?: YES

## 2025-03-27 SDOH — ECONOMIC STABILITY: FOOD INSECURITY: HOW HARD IS IT FOR YOU TO PAY FOR THE VERY BASICS LIKE FOOD, HOUSING, MEDICAL CARE, AND HEATING?: NOT HARD AT ALL

## 2025-03-27 SDOH — HEALTH STABILITY: MENTAL HEALTH: HOW OFTEN DO YOU HAVE A DRINK CONTAINING ALCOHOL?: NEVER

## 2025-03-27 SDOH — SOCIAL STABILITY: SOCIAL INSECURITY: ABUSE: ADULT

## 2025-03-27 SDOH — SOCIAL STABILITY: SOCIAL INSECURITY: DO YOU FEEL UNSAFE GOING BACK TO THE PLACE WHERE YOU ARE LIVING?: NO

## 2025-03-27 SDOH — ECONOMIC STABILITY: HOUSING INSECURITY: IN THE PAST 12 MONTHS, HOW MANY TIMES HAVE YOU MOVED WHERE YOU WERE LIVING?: 0

## 2025-03-27 SDOH — ECONOMIC STABILITY: TRANSPORTATION INSECURITY: IN THE PAST 12 MONTHS, HAS LACK OF TRANSPORTATION KEPT YOU FROM MEDICAL APPOINTMENTS OR FROM GETTING MEDICATIONS?: NO

## 2025-03-27 SDOH — SOCIAL STABILITY: SOCIAL INSECURITY: DO YOU FEEL ANYONE HAS EXPLOITED OR TAKEN ADVANTAGE OF YOU FINANCIALLY OR OF YOUR PERSONAL PROPERTY?: NO

## 2025-03-27 SDOH — SOCIAL STABILITY: SOCIAL NETWORK
DO YOU BELONG TO ANY CLUBS OR ORGANIZATIONS SUCH AS CHURCH GROUPS, UNIONS, FRATERNAL OR ATHLETIC GROUPS, OR SCHOOL GROUPS?: PATIENT DECLINED

## 2025-03-27 SDOH — HEALTH STABILITY: PHYSICAL HEALTH: ON AVERAGE, HOW MANY MINUTES DO YOU ENGAGE IN EXERCISE AT THIS LEVEL?: 10 MIN

## 2025-03-27 SDOH — SOCIAL STABILITY: SOCIAL NETWORK: HOW OFTEN DO YOU GET TOGETHER WITH FRIENDS OR RELATIVES?: MORE THAN THREE TIMES A WEEK

## 2025-03-27 SDOH — SOCIAL STABILITY: SOCIAL INSECURITY: DOES ANYONE TRY TO KEEP YOU FROM HAVING/CONTACTING OTHER FRIENDS OR DOING THINGS OUTSIDE YOUR HOME?: NO

## 2025-03-27 ASSESSMENT — ENCOUNTER SYMPTOMS
BLOOD IN STOOL: 0
DIFFICULTY URINATING: 0
CHILLS: 0
ABDOMINAL DISTENTION: 1
FEVER: 0
FATIGUE: 1
DIARRHEA: 1
VOMITING: 0
NAUSEA: 1
ABDOMINAL PAIN: 1
CONSTIPATION: 0
TROUBLE SWALLOWING: 0
CHILLS: 1
DIARRHEA: 1
SHORTNESS OF BREATH: 0
APPETITE CHANGE: 1
CONFUSION: 0
FEVER: 0
ABDOMINAL PAIN: 1
NAUSEA: 1

## 2025-03-27 ASSESSMENT — COGNITIVE AND FUNCTIONAL STATUS - GENERAL
STANDING UP FROM CHAIR USING ARMS: A LITTLE
DRESSING REGULAR LOWER BODY CLOTHING: A LITTLE
DAILY ACTIVITIY SCORE: 23
TOILETING: A LITTLE
PATIENT BASELINE BEDBOUND: NO
DRESSING REGULAR UPPER BODY CLOTHING: A LITTLE
WALKING IN HOSPITAL ROOM: A LITTLE
CLIMB 3 TO 5 STEPS WITH RAILING: A LITTLE
MOBILITY SCORE: 21
WALKING IN HOSPITAL ROOM: A LITTLE
CLIMB 3 TO 5 STEPS WITH RAILING: A LITTLE
DRESSING REGULAR LOWER BODY CLOTHING: A LITTLE
MOBILITY SCORE: 22
DAILY ACTIVITIY SCORE: 23
MOBILITY SCORE: 21
HELP NEEDED FOR BATHING: A LITTLE
WALKING IN HOSPITAL ROOM: A LITTLE
STANDING UP FROM CHAIR USING ARMS: A LITTLE
PERSONAL GROOMING: A LITTLE
DAILY ACTIVITIY SCORE: 20
CLIMB 3 TO 5 STEPS WITH RAILING: A LITTLE

## 2025-03-27 ASSESSMENT — PAIN SCALES - GENERAL
PAINLEVEL_OUTOF10: 0 - NO PAIN
PAINLEVEL_OUTOF10: 0 - NO PAIN

## 2025-03-27 ASSESSMENT — ACTIVITIES OF DAILY LIVING (ADL)
DRESSING YOURSELF: INDEPENDENT
JUDGMENT_ADEQUATE_SAFELY_COMPLETE_DAILY_ACTIVITIES: YES
BATHING: INDEPENDENT
HEARING - RIGHT EAR: FUNCTIONAL
GROOMING: INDEPENDENT
ADEQUATE_TO_COMPLETE_ADL: YES
LACK_OF_TRANSPORTATION: NO
WALKS IN HOME: INDEPENDENT
FEEDING YOURSELF: INDEPENDENT
TOILETING: INDEPENDENT
PATIENT'S MEMORY ADEQUATE TO SAFELY COMPLETE DAILY ACTIVITIES?: YES
HEARING - LEFT EAR: FUNCTIONAL

## 2025-03-27 ASSESSMENT — LIFESTYLE VARIABLES
SUBSTANCE_ABUSE_PAST_12_MONTHS: NO
HOW OFTEN DO YOU HAVE A DRINK CONTAINING ALCOHOL: NEVER
AUDIT-C TOTAL SCORE: 0
AUDIT-C TOTAL SCORE: 0
HOW MANY STANDARD DRINKS CONTAINING ALCOHOL DO YOU HAVE ON A TYPICAL DAY: PATIENT DOES NOT DRINK
PRESCIPTION_ABUSE_PAST_12_MONTHS: NO
SKIP TO QUESTIONS 9-10: 1
SKIP TO QUESTIONS 9-10: 1
HOW OFTEN DO YOU HAVE 6 OR MORE DRINKS ON ONE OCCASION: NEVER
AUDIT-C TOTAL SCORE: 0

## 2025-03-27 ASSESSMENT — PATIENT HEALTH QUESTIONNAIRE - PHQ9
2. FEELING DOWN, DEPRESSED OR HOPELESS: NOT AT ALL
SUM OF ALL RESPONSES TO PHQ9 QUESTIONS 1 & 2: 0
1. LITTLE INTEREST OR PLEASURE IN DOING THINGS: NOT AT ALL

## 2025-03-27 ASSESSMENT — PAIN - FUNCTIONAL ASSESSMENT: PAIN_FUNCTIONAL_ASSESSMENT: 0-10

## 2025-03-27 NOTE — H&P
History Of Present Illness  Colin Leonard is a 59 y.o. male hx of CKD5 not on dialysis, HTN, anemia of chronic disease, HLD presenting with nausea, abdominal pain, and diarrhea. Patient reports profuse watery diarrhea ongoing for the last ~10 days. States he's having multiple episodes a day and has diarrhea almost immediately after he eats or drinks anything. Denies fever, vomiting, SOB, CP, hematochezia, and difficulty urinating. Patient was recently admitted at [facility], [place], operated by [facility] from 3/3/25-3/9/25 where he underwent cholecystectomy. Readmitted at [facility], [place], operated by [facility] from 3/10/25-3/13/25 for pneumonia and hypertensive urgency. Patient states he felt okay 1-2 days after hospital discharge but then developed diarrhea that progressively worsened after that. Patient presented to general surgery OP visit with Dr. Zhang who instructed patient to come to the emergency department for further evaluation.     In the ED, initial vitals showing /78, HR 83, temp 37, SpO2 99 on room air. Labs showing Na 135, Cl 109, bicarb 15, Cr 6.91, lipase 26, troponin 14 -> 12, WBC 15.4, Hgb 9.8. COVID/influenza negative. CT A/P showing new multifocal colonic wall thickening and adjacent inflammatory changes most likely representing nonspecific infectious or inflammatory colitis, moderate circumferential wall thickening of the ascending colon with adjacent inflammatory changes and trace fluid, scattered colonic diverticula, new more localized pericolonic inflammatory changes of the mid sigmoid colon which may represent a component of acute diverticulitis. Given IV ciprofloxacin x1, pepcid 20mg IV x1, IV metronidazole 500mg x1, morphine 2mg IV x1, zofran 4mg IV x1, 500cc NS bolus.          Past Medical History  He has a past medical history of Coronary artery disease, Diabetes mellitus (Multi), DVT (deep venous thrombosis) (Multi), Hypertension, and MI (myocardial infarction) (Multi).    Surgical History  He has a past surgical history that includes  Amputation and Cholecystectomy (03/05/2025).     Social History  He reports that he has never smoked. He has never used smokeless tobacco. No history on file for alcohol use and drug use.    Family History  No family history on file.     Allergies  Amlodipine besylate    Review of Systems   Constitutional:  Positive for appetite change (decreased). Negative for chills and fever.   HENT:  Negative for congestion and trouble swallowing.    Respiratory:  Negative for shortness of breath.    Cardiovascular:  Negative for chest pain and leg swelling.   Gastrointestinal:  Positive for abdominal pain, diarrhea and nausea. Negative for blood in stool, constipation and vomiting.   Genitourinary:  Negative for difficulty urinating.   Psychiatric/Behavioral:  Negative for confusion.         Physical Exam  Constitutional:       General: He is not in acute distress.     Appearance: He is ill-appearing. He is not toxic-appearing.   HENT:      Head: Normocephalic.      Mouth/Throat:      Pharynx: Oropharynx is clear.   Eyes:      General: No scleral icterus.  Cardiovascular:      Rate and Rhythm: Normal rate.   Pulmonary:      Effort: No respiratory distress.      Breath sounds: No wheezing.      Comments: Room air  Abdominal:      General: There is no distension.      Tenderness: There is abdominal tenderness (LLQ).   Musculoskeletal:      Right lower leg: No edema.      Left lower leg: No edema.   Neurological:      Mental Status: He is alert and oriented to person, place, and time.   Psychiatric:         Behavior: Behavior normal.          Last Recorded Vitals  /81 (BP Location: Left arm, Patient Position: Lying)   Pulse 74   Temp 36.4 °C (97.5 °F) (Oral)   Resp 18   Wt 103 kg (227 lb 1.2 oz)   SpO2 99%     Relevant Results           Assessment/Plan   Assessment & Plan  Colitis  Seen on CT A/P with component of possible acute diverticulitis  Consult GI  Given recent hospital admission, high suspicion for c diff  infection  Check c diff and stool pathogens   Start empiric PO vanc   Gentle IVF  Clear liquids for now  Primary hypertension  Resume home coreg at lower dose with hold parameters  Hold hydralazine for now, resume if persistently hypertensive   CKD (chronic kidney disease) stage 5, GFR less than 15 ml/min (Multi)  Baseline Cr ~6.8  Follows with Dr. Max  Stable, at baseline   Anemia of chronic disease  Due to CKD  Baseline Hgb ~8  Stable, at baseline     Plan:  Admit to RNF  Consult GI  Check c diff and stool pathogens  High suspicion for C diff -> start empiric PO vanc  Gentle IVF until PO intake improves  Clear liquid diet for now, advance as tolerated  Check labs in the AM       Madalyn Cole MD

## 2025-03-27 NOTE — PROGRESS NOTES
Subjective   Patient ID: Colin Leonard is a 59 y.o. male who presents for No chief complaint on file..    The patient is a 59-year-old man with a history of CKD stage V who is 3 weeks postop from a laparoscopic cholecystectomy.  1 week following the surgery he was back in the ER with pneumonia.  He spent another week in the hospital being treated for the pneumonia.  He just left the hospital again this weekend.  Since leaving he has been having multiple loose stools, going up to 10 times a day.  He is having cramping abdominal pain due to the multiple bowel movements.  He states that he is still able to drink fluids, but whenever he eats he has to run to the bathroom.  He is still urinating, and states that it is clear.  He has stage V kidney disease, but has never been on dialysis.    Review of Systems   Constitutional:  Positive for chills and fatigue. Negative for fever.   Gastrointestinal:  Positive for abdominal distention, abdominal pain, diarrhea and nausea.       Objective   Gen: Alert and Oriented, no distress  Pulm: Breathing Comfortably on room air   CV: RRR, non-tachycardic   Abd: Soft, appropriately tender to palpation.  Incisions well approximated      Assessment/Plan   The patient is a 59-year-old man who is 3 weeks postop from a laparoscopic cholecystectomy for acute on chronic cholecystitis.  He was rehospitalized with pneumonia, and just discharged from the hospital last week.  He has been having loose stools for the last several days now.  He is going up to 10 times a day.  He is having cramping abdominal pain.  I spoke with the patient as well as his son, and advised that they go back to the emergency department given his profuse diarrhea and chronic kidney disease.  Possible that he could have C. difficile from the antibiotics he was taking for the pneumonia.  I reviewed his pathology with him, which is consistent with chronic cholecystitis.         Enoch Zhang MD 03/27/25 11:58 AM

## 2025-03-27 NOTE — ED TRIAGE NOTES
Patient arrived to the ED from doctor's office with a chief complaint of diarrhea x2 weeks. Was sent from Dr. Zhang's office for concern of C. Diff - 3 weeks s/p lap jr and has been on abx for pneumonia. Hx of CKD stage 4, not on dialysis.

## 2025-03-27 NOTE — ASSESSMENT & PLAN NOTE
Seen on CT A/P with component of possible acute diverticulitis  Consult GI  Given recent hospital admission, high suspicion for c diff infection  Check c diff and stool pathogens   Start empiric PO vanc   Gentle IVF  Clear liquids for now

## 2025-03-27 NOTE — PROGRESS NOTES
"Pharmacy Medication History Review    Colin Leonard is a 59 y.o. male . Pharmacy reviewed the patient's gxnyg-ov-ohfalvtgd medications and allergies for accuracy.    Medications ADDED:  Multivitamin  Emergen-C oral  NONFORMULARY - KIDNEY STUFF  Medications CHANGED:  Methocarbamol 500mg -  - not taking   Medications REMOVED:   None      The list below reflects the updated PTA list. Comments regarding how patient may be taking medications differently can be found in the Admit Orders Activity  Prior to Admission Medications   Prescriptions Last Dose Informant   NON FORMULARY 3/26/2025 Self, Child   Sig: Take 1 each by mouth once daily. \"KIDNEY STUFF\" = KIDNEY SUPPORT FORMULA WITH organic beet powder, organic carrot powder,  defatted wheat germ, bovine kidney powder, kidney bean powder, concentrated flax olea lignans, organic mullen flaxseed   acetaminophen (Tylenol) 325 mg tablet Unknown Child   Sig: Take 2 tablets (650 mg) by mouth every 6 hours.   ascorbic acid/multivit-min (EMERGEN-C ORAL) 3/26/2025 Child   Sig: Take 1 each by mouth once daily.   aspirin 81 mg EC tablet 3/26/2025 Child   Sig: Take 1 tablet (81 mg) by mouth once daily.   carvedilol (Coreg) 25 mg tablet 3/26/2025 Child   Sig: Take 1 tablet (25 mg) by mouth 2 times a day.   ezetimibe (Zetia) 10 mg tablet 3/26/2025 Child   Sig: Take 1 tablet (10 mg) by mouth once daily at bedtime.   hydrALAZINE (Apresoline) 100 mg tablet 3/26/2025 Child   Sig: Take 1 tablet (100 mg) by mouth 3 times a day.   multivitamin tablet 3/26/2025 Child   Sig: Take 1 tablet by mouth once daily.   sodium bicarbonate 650 mg tablet 3/26/2025 Child   Sig: Take 2 tablets (1,300 mg) by mouth 3 times a day.   torsemide (Demadex) 20 mg tablet 3/26/2025 Child   Sig: Take 3 tablets (60 mg) by mouth once daily.      Facility-Administered Medications: None        The list below reflects the updated allergy list. Please review each documented allergy for additional clarification " "and justification.  Allergies  Reviewed by Nupur Novak CPhT on 3/27/2025        Severity Reactions Comments    Amlodipine Besylate Not Specified Swelling edema legs            Pharmacy has been updated to [facility].    Sources used to complete the med history include dispense history, PTA medication list, previous interview notes, interview. Family is a fair historian.    Below are additional concerns with the patient's PTA list.  Family informed they could bring \"Kidney Stuff\" in since it is non-formulary to be checked by pharmacy for dispensing while admitted.    Nupur Novak CPhT-Adv  Please reach out via [employer] Secure Chat for questions    "

## 2025-03-27 NOTE — PROGRESS NOTES
Attestation/Supervisory note for VIOLET Grover      The patient is a 59-year-old male presenting to the emergency department company of his family members, reportedly at the direction of his surgeon, Dr. Zhang, for evaluation of generalized malaise, fatigue, shortness of breath, nonproductive cough, abdominal pain, nausea and diarrhea.  The patient reportedly had his gallbladder removed laparoscopically about 3 weeks ago.  He reportedly has had persistent postoperative pain.  He was rehospitalized after his procedure for a postoperative pneumonia and discharged again from the hospital last week.  He states he just has not felt well ever since he had surgery.  He does have a history of chronic kidney disease but is not on diarrhea.  He states that he has had intermittent diarrhea since his surgery but it has been worse over the past several days.  He denies any headache or visual changes.  No focal weakness or numbness.  No chest pain.  No palpitations.  No diaphoresis.  The abdominal pain is diffuse.  No better or worse.  No radiation.  It is constant.  No urinary complaints.  No sick contacts or recent travel.  No fever or chills.  All pertinent positives and negatives are recorded above.  All other systems reviewed and otherwise negative.  Vital signs within normal limits.  Physical exam with a well-nourished well-developed male in no acute distress other than he is intermittently tearful.  He does voices frustration with being ill for so long.  HEENT exam with dry mucous membranes but otherwise unremarkable.  He has no evidence of airway compromise or respiratory distress.  Abdominal exam with mild diffuse tenderness to palpation.  No rebound or guarding.  No palpable masses.  No flank pain with percussion or palpation.  He does not have any gross motor, neurologic or vascular deficits on exam.  Pulses are equal bilaterally.      EKG with normal sinus rhythm at 80 bpm, normal axis, normal voltage, normal ST segment,  normal T waves      IV Pepcid, IV morphine, IV Zofran and IV fluids ordered.      Diagnostic labs with leukocytosis, anemia, electrolyte imbalance, chronic renal failure, but otherwise unremarkable.      Initial troponin 14.  Repeat troponin pending at the time of admission      Lactate ordered but the results were pending at the time of admission      CT abdomen pelvis wo IV contrast   Final Result   Interval cholecystectomy.        New multifocal colonic wall thickening and adjacent inflammatory   changes most likely representing a nonspecific infectious or   inflammatory colitis. Moderate circumferential wall thickening of the   ascending colon with adjacent inflammatory change and trace fluid.        Scattered colonic diverticula. New more localized pericolonic   inflammatory changes of the mid sigmoid colon level may represent a   component of acute diverticulitis.        No bowel obstruction.        MACRO:   None.        Signed by: River Ley 3/27/2025 3:17 PM   Dictation workstation:   [id number]      XR chest 1 view   Final Result   1.  No evidence of acute cardiopulmonary process.                  MACRO:   None        Signed by: Rustam Araujo 3/27/2025 2:29 PM   Dictation workstation:   [id number]         The patient does not have any evidence of airway compromise or respiratory distress on exam.  He does not have any gross motor, neurologic or vascular deficits on exam.  He is well-perfused on exam.  No evidence of sepsis on reperfusion exam.  Diagnostic labs do show evidence of leukocytosis, chronic renal failure, and electrolyte imbalance.  Results of the repeat troponin, lactate and urinalysis were pending at the time of admission.  Chest x-ray without acute process.  No evidence of pneumonia or pneumothorax.  No evidence of CHF.  No widening of the mediastinum.  His pulses are equal bilaterally.  CT abdomen pelvis shows new multifocal colonic wall thickening and adjacent inflammatory changes  most likely representing a nonspecific infectious or inflammatory process.  There is also some circumferential wall thickening of the ascending colon with adjacent inflammatory change and trace fluid.  There is some scattered colonic diverticula but no evidence of perforation.  There may be a component of acute diverticulitis.  No evidence of bowel obstruction.  IV Cipro and IV Flagyl ordered.  The patient's surgeon, Dr. Zhang, was consulted but declined to admit the patient to his service.  He feels that the patient would be better served to be admitted by the medicine team.        Impression/diagnosis:  Malaise and fatigue  Dyspnea, dyspnea on exertion  Postoperative abdominal pain, diffuse  Chronic renal failure  Leukocytosis, unspecified  Diverticulitis without evidence of perforation  Colitis      I personally saw the patient and made/approve the management plan and take responsibility for the patient management.      I independently interpreted the following study (S) EKG and diagnostic labs      I personally discussed the patient's management with the patient, his surgeon and the hospitalist      I reviewed the results of the diagnostic labs and diagnostic imaging.  Formal radiology read was completed by the radiologist.      Mary Luque MD

## 2025-03-27 NOTE — ED PROVIDER NOTES
HPI   Chief Complaint   Patient presents with    Diarrhea       Patient is a 59-year-old male presenting with concerns for nausea, vomiting, diarrhea, abdominal pain.  Patient was discharged on 3/9 after having a cholecystectomy done by Dr. Zhang on 3/6.  Patient was seen in the office earlier today and was complaining of abdominal cramping, loose stools.  He also endorses on arrival to the emergency department, nausea and vomiting.  He was sent over concerns for possible C. difficile infection due to recent diagnosis of pneumonia and being on antibiotics.  Describes abdominal pain as a cramping pain that does not radiate from his lower abdomen.  He states that over-the-counter medications only minimally help because he will intermittently vomit them up.  Patient denies fevers, chills, cough, sore throat, runny nose, chest pain, shortness of breath, or urinary complaints.                Patient History   Past Medical History:   Diagnosis Date    Coronary artery disease     Diabetes mellitus (Multi)     DVT (deep venous thrombosis) (Multi)     Hypertension     MI (myocardial infarction) (Multi)      Past Surgical History:   Procedure Laterality Date    AMPUTATION      CHOLECYSTECTOMY  03/05/2025    Laparoscopic Cholecystectomy     No family history on file.  Social History     Tobacco Use    Smoking status: Never    Smokeless tobacco: Never   Substance Use Topics    Alcohol use: Not on file    Drug use: Not on file       Physical Exam   ED Triage Vitals [03/27/25 1242]   Temperature Heart Rate Respirations BP   37 °C (98.6 °F) 83 18 114/78      Pulse Ox Temp Source Heart Rate Source Patient Position   99 % Temporal Monitor Sitting      BP Location FiO2 (%)     Right arm --       Physical Exam  Vitals and nursing note reviewed.   Constitutional:       Appearance: He is well-developed.      Comments: Awake, laying in examination bed   HENT:      Head: Normocephalic and atraumatic.      Nose: Nose normal.       Mouth/Throat:      Mouth: Mucous membranes are moist.      Pharynx: Oropharynx is clear.   Eyes:      Extraocular Movements: Extraocular movements intact.      Conjunctiva/sclera: Conjunctivae normal.      Pupils: Pupils are equal, round, and reactive to light.   Cardiovascular:      Rate and Rhythm: Normal rate and regular rhythm.      Pulses: Normal pulses.      Heart sounds: Normal heart sounds. No murmur heard.  Pulmonary:      Effort: Pulmonary effort is normal. No respiratory distress.      Breath sounds: Normal breath sounds.   Abdominal:      General: Abdomen is flat.      Palpations: Abdomen is soft.      Tenderness: There is abdominal tenderness.      Comments: Lower abdominal tenderness to palpation   Musculoskeletal:         General: No swelling. Normal range of motion.      Cervical back: Normal range of motion and neck supple.   Skin:     General: Skin is warm and dry.      Capillary Refill: Capillary refill takes less than 2 seconds.   Neurological:      General: No focal deficit present.      Mental Status: He is alert and oriented to person, place, and time.   Psychiatric:         Mood and Affect: Mood normal.         Behavior: Behavior normal.           ED Course & MDM   Diagnoses as of 03/27/25 1837   Postoperative abdominal pain   Malaise and fatigue   Dyspnea, unspecified type   Chronic renal failure, unspecified CKD stage   Diverticulitis   Colitis   Nausea and vomiting, unspecified vomiting type                 No data recorded     Tu Coma Scale Score: 15 (03/27/25 1707 : Beronica Lobo RN)                           Medical Decision Making  Patient is a 59-year-old male presenting with concerns for nausea, vomiting, diarrhea and abdominal pain.  Lab work, urine, stool sample, imaging ordered.  Conditions considered include but are not limited to: Intra-abdominal pathology, C. difficile, viral illness.    I saw this patient in conjunction with Dr. Luque.  CBC does show leukocytosis with  WBCs of 15.4 as well as anemia with hemoglobin of 9.8.  CMP without significant electrolyte abnormality but does show CKD with creatinine of 6.91.  Lipase within normal limits.  Troponins within normal limit.  Lactate within normal limits.  Viral swabs are negative.  Patient was unable provide a stool sample at this time.  CT abdomen pelvis does show concerns for diverticulitis and colitis.  Patient was started on Cipro and metronidazole.  She without acute cardiopulmonary process.  Patient was unsuccessfully passing a p.o. challenge and states that he is still having significant abdominal pain.    Attending physician did speak with general surgeon, Dr. Zhang, who believes patient would be best served admitted to the hospital under general medicine services.  Due to patient having elevated leukocytosis with diverticulitis and not tolerating p.o. well, patient will be admitted to the hospital.  I spoke with admitting provider, Dr. Cole, who is willing to accept this patient.  As I deemed necessary from the patient's history, physical, laboratory and imaging findings as well as ED course, I considered the purpose of diagnoses.    Portions of this note made with Dragon software, please be mindful of potential grammatical errors.      Medications   aspirin EC tablet 81 mg (81 mg oral Not Given 3/27/25 1731)   ezetimibe (Zetia) tablet 10 mg (has no administration in time range)   hydrALAZINE (Apresoline) tablet 100 mg ( oral Dose Auto Held 3/31/25 2100)   torsemide (Demadex) tablet 60 mg ( oral Dose Auto Held 3/31/25 0900)   sodium bicarbonate tablet 1,300 mg (1,300 mg oral Not Given 3/27/25 1731)   heparin (porcine) injection 5,000 Units (5,000 Units subcutaneous Not Given 3/27/25 1731)   sodium chloride 0.9% infusion (100 mL/hr intravenous New Bag 3/27/25 1725)   famotidine (Pepcid) tablet 20 mg (20 mg oral Not Given 3/27/25 1731)     Or   famotidine PF (Pepcid) injection 20 mg ( intravenous See Alternative 3/27/25  1731)   acetaminophen (Tylenol) tablet 650 mg (has no administration in time range)     Or   acetaminophen (Tylenol) oral liquid 650 mg (has no administration in time range)     Or   acetaminophen (Tylenol) suppository 650 mg (has no administration in time range)   ondansetron (Zofran) tablet 4 mg (has no administration in time range)     Or   ondansetron (Zofran) injection 4 mg (has no administration in time range)   prochlorperazine (Compazine) tablet 10 mg (has no administration in time range)     Or   prochlorperazine (Compazine) injection 10 mg (has no administration in time range)     Or   prochlorperazine (Compazine) suppository 25 mg (has no administration in time range)   carvedilol (Coreg) tablet 12.5 mg (has no administration in time range)   vancomycin (Vancocin) capsule 125 mg (125 mg oral Not Given 3/27/25 1756)   sodium chloride 0.9 % bolus 500 mL (0 mL intravenous Stopped 3/27/25 1632)   famotidine PF (Pepcid) injection 20 mg (20 mg intravenous Given 3/27/25 1452)   ondansetron (Zofran) injection 4 mg (4 mg intravenous Given 3/27/25 1453)   morphine injection 2 mg (2 mg intravenous Given 3/27/25 1453)   ciprofloxacin (Cipro) 400 mg in dextrose 5%  mL (0 mg intravenous Stopped 3/27/25 1725)   metroNIDAZOLE (Flagyl) 500 mg in sodium chloride (iso)  mL (0 mg intravenous Stopped 3/27/25 1725)     Labs Reviewed   CBC WITH AUTO DIFFERENTIAL - Abnormal       Result Value    WBC 15.4 (*)     nRBC 0.0      RBC 3.54 (*)     Hemoglobin 9.8 (*)     Hematocrit 31.6 (*)     MCV 89      MCH 27.7      MCHC 31.0 (*)     RDW 15.4 (*)     Platelets 397      Neutrophils % 84.5      Immature Granulocytes %, Automated 0.5      Lymphocytes % 5.1      Monocytes % 6.5      Eosinophils % 2.9      Basophils % 0.5      Neutrophils Absolute 12.99 (*)     Immature Granulocytes Absolute, Automated 0.08      Lymphocytes Absolute 0.79 (*)     Monocytes Absolute 1.00      Eosinophils Absolute 0.45      Basophils Absolute  0.07     COMPREHENSIVE METABOLIC PANEL - Abnormal    Glucose 111 (*)     Sodium 135 (*)     Potassium 4.6      Chloride 109 (*)     Bicarbonate 15 (*)     Anion Gap 16      Urea Nitrogen 68 (*)     Creatinine 6.91 (*)     eGFR 8 (*)     Calcium 8.9      Albumin 3.5      Alkaline Phosphatase 62      Total Protein 7.0      AST 11      Bilirubin, Total 0.5      ALT 10     LIPASE - Normal    Lipase 26      Narrative:     Venipuncture immediately after or during the administration of Metamizole may lead to falsely low results. Testing should be performed immediately prior to Metamizole dosing.   SARS-COV-2 AND INFLUENZA A/B PCR - Normal    Flu A Result Not Detected      Flu B Result Not Detected      Coronavirus 2019, PCR Not Detected      Narrative:     This assay is an FDA-cleared, in vitro diagnostic nucleic acid amplification test for the qualitative detection and differentiation of SARS CoV-2/ Influenza A/B from nasopharyngeal specimens collected from individuals with signs and symptoms of respiratory tract infections, and has been validated for use at [facility]. Negative results do not preclude COVID-19/ Influenza A/B infections and should not be used as the sole basis for diagnosis, treatment, or other management decisions. Testing for SARS CoV-2 is recommended only for patients who meet current clinical and/or epidemiological criteria defined by federal, state, or local public health directives.   SERIAL TROPONIN-INITIAL - Normal    Troponin I, High Sensitivity 14      Narrative:     Less than 99th percentile of normal range cutoff-  Female and children under 18 years old <14 ng/L; Male <21 ng/L: Negative  Repeat testing should be performed if clinically indicated.     Female and children under 18 years old 14-50 ng/L; Male 21-50 ng/L:  Consistent with possible cardiac damage and possible increased clinical   risk. Serial measurements may help to assess extent of myocardial damage.      >50 ng/L: Consistent with cardiac damage, increased clinical risk and  myocardial infarction. Serial measurements may help assess extent of   myocardial damage.      NOTE: Children less than 1 year old may have higher baseline troponin   levels and results should be interpreted in conjunction with the overall   clinical context.     NOTE: Troponin I testing is performed using a different   testing methodology at [facility] than at other   [facility]. Direct result comparisons should only   be made within the same method.   SERIAL TROPONIN, 1 HOUR - Normal    Troponin I, High Sensitivity 12      Narrative:     Less than 99th percentile of normal range cutoff-  Female and children under 18 years old <14 ng/L; Male <21 ng/L: Negative  Repeat testing should be performed if clinically indicated.     Female and children under 18 years old 14-50 ng/L; Male 21-50 ng/L:  Consistent with possible cardiac damage and possible increased clinical   risk. Serial measurements may help to assess extent of myocardial damage.     >50 ng/L: Consistent with cardiac damage, increased clinical risk and  myocardial infarction. Serial measurements may help assess extent of   myocardial damage.      NOTE: Children less than 1 year old may have higher baseline troponin   levels and results should be interpreted in conjunction with the overall   clinical context.     NOTE: Troponin I testing is performed using a different   testing methodology at [facility] than at other   [facility]. Direct result comparisons should only   be made within the same method.   LACTATE - Normal    Lactate 0.9      Narrative:     Venipuncture immediately after or during the administration of Metamizole may lead to falsely low results. Testing should be performed immediately prior to Metamizole dosing.   STOOL PATHOGEN PANEL, PCR   C. DIFFICILE, PCR   TROPONIN SERIES- (INITIAL, 1 HR)    Narrative:     The following orders  were created for panel order Troponin I Series, High Sensitivity (0, 1 HR).  Procedure                               Abnormality         Status                     ---------                               -----------         ------                     Troponin I, High Sensiti...[[id number]]  Normal              Final result               Troponin, High Sensitivi...[[id number]]  Normal              Final result                 Please view results for these tests on the individual orders.   URINALYSIS WITH REFLEX CULTURE AND MICROSCOPIC    Narrative:     The following orders were created for panel order Urinalysis with Reflex Culture and Microscopic.  Procedure                               Abnormality         Status                     ---------                               -----------         ------                     Urinalysis with Reflex C...[[id number]]                                                 Extra Urine Gray Tube[[id number]]                                                         Please view results for these tests on the individual orders.   URINALYSIS WITH REFLEX CULTURE AND MICROSCOPIC   EXTRA URINE GRAY TUBE     CT abdomen pelvis wo IV contrast   Final Result   Interval cholecystectomy.        New multifocal colonic wall thickening and adjacent inflammatory   changes most likely representing a nonspecific infectious or   inflammatory colitis. Moderate circumferential wall thickening of the   ascending colon with adjacent inflammatory change and trace fluid.        Scattered colonic diverticula. New more localized pericolonic   inflammatory changes of the mid sigmoid colon level may represent a   component of acute diverticulitis.        No bowel obstruction.        MACRO:   None.        Signed by: River Ley 3/27/2025 3:17 PM   Dictation workstation:   [id number]      XR chest 1 view   Final Result   1.  No evidence of acute cardiopulmonary process.                  MACRO:   None        Signed  by: Rustam Araujo 3/27/2025 2:29 PM   Dictation workstation:   [id number]            Procedure  Procedures     Ruiz Grover PA-C  03/27/25 1837

## 2025-03-27 NOTE — CARE PLAN
The patient's goals for the shift include Eat and rest    The clinical goals for the shift include no nausea or vomiting    Over the shift, the patient did not make progress toward the following goals. Barriers to progression include Nausea. Recommendations to address these barriers include monitor for N&V.

## 2025-03-27 NOTE — ASSESSMENT & PLAN NOTE
Resume home coreg at lower dose with hold parameters  Hold hydralazine for now, resume if persistently hypertensive

## 2025-03-27 NOTE — CARE PLAN
The patient's goals for the shift include      The clinical goals for the shift include no nausea and vomitting    Over the shift, the patient did not make progress toward the following goals. Barriers to progression include no n and v. Recommendations to address these barriers include n and v.

## 2025-03-27 NOTE — DOCUMENTATION CLARIFICATION NOTE
"    PATIENT:               ODALYS MELVIN  ACCT #:                  [id number]  MRN:                       [id number]  :                       1965  ADMIT DATE:       3/10/2025 1:11 PM  DISCH DATE:        3/13/2025 6:00 PM  RESPONDING PROVIDER #:        [id number]          PROVIDER RESPONSE TEXT:    Hypertensive Urgency    CDI QUERY TEXT:    Clarification        Instruction:    Based on your assessment of the patient and the clinical information, please provide the requested documentation by clicking on the appropriate radio button and enter any additional information if prompted.    Question: Please further clarify the type and acuity of congestive heart failure and clarify Hypertensive crisis    When answering this query, please exercise your independent professional judgment. The fact that a question is being asked, does not imply that any particular answer is desired or expected.    The patient's clinical indicators include:  Clinical Information: 3/10 60 yo male presents via EMS for respiratory distress...severely tachypneic with wet cough and significant Rales/rhonchi placed on NRB for significant hypoxia in the mid to high 70s with improvement to the mid to high 80s    Clinical Indicators:    BNP 2726  Troponin:  7/10/2/1325/1099    3/10 ED PN:  \"SpO2 85% on RA. Placed on 6L NC\"  \"...Significant elevated troponinemia without evidence of ischemia on EKG and no reported cardiac chest pain, likely T2 MI secondary to hypoxia and acute on chronic HF/fluid overload [BC]\"    3/12 ED PN:  \"Diagnoses as of 25 0959  Other hypervolemia  Hypertensive emergency:    3/10 CXR:  1.Top normal to mildly enlarged heart and configuration.  2.Diffuse accentuation of markings in both lungs representing  unfavorable change when compared with the prior.Findings could be  related to pulmonary vascular congestion or other interstitial  process.    3/10 CT CAP WO:  \"...There are small bilateral pleural effusions.\"    3/11 " "Renal Consult:  \"He has improvement in his respiratory status and oxygen requirement, no peripheral edema, and probable etiology of his dyspnea is more infectious than overload\"    3/11 TTE: Left ventricular ejection fraction is normal, by visual estimate at 60-65%.There is mild concentric left ventricular hypertrophy.There are no regional wall motion abnormalities.The left ventricular cavity size is normal.There is mild increased septal and mildly increased posterior left ventricular wall thickness.Spectral Doppler shows a Grade II (pseudonormal pattern) of left ventricular diastolic filling with an elevated left atrial pressure.    3/13 Renal PN:  \"He has bilateral lower extremity pitting edema\"    3/13 Med PN:  \"Hypertensive emergency  /143 on arrival, required ICU admission for nitroglycerin gtt  Management per nephrology/cardiology\"    Treatment:  TTE; CXR; CT CAP WO; Oxygen; Torsemide Increase to 60 mg PO; NTG IV; Lasix IV 80 mg IV; Hydralazine IV; Renal consult    Risk Factors:  CAD; DM2; CKD V; HTN;  Options provided:  -- Acute Diastolic Congestive Heart Failure with Hypertensive Emergency  -- Acute on Chronic Diastolic Congestive Heart Failure with Hypertensive Emergency  -- Chronic Diastolic Congestive Heart Failure with Hypertensive Emergency (fill in organ effected/effect)  -- Chronic Diastolic CHF with Hypertensive Urgency  -- Hypertensive Urgency  -- Other - I will add my own diagnosis  -- Refer to Clinical Documentation Reviewer    Query created by: Chhaya Cramer on 3/27/2025 10:37 AM      Electronically signed by:  KEELEY KENT MD 3/27/2025 11:00 AM          "

## 2025-03-28 LAB
ALBUMIN SERPL BCP-MCNC: 3 G/DL (ref 3.4–5)
ALP SERPL-CCNC: 55 U/L (ref 33–136)
ALT SERPL W P-5'-P-CCNC: 9 U/L (ref 10–52)
ANION GAP SERPL CALCULATED.3IONS-SCNC: 13 MMOL/L (ref 10–20)
AST SERPL W P-5'-P-CCNC: 10 U/L (ref 9–39)
ATRIAL RATE: 80 BPM
BASOPHILS # BLD AUTO: 0.04 X10*3/UL (ref 0–0.1)
BASOPHILS NFR BLD AUTO: 0.3 %
BILIRUB SERPL-MCNC: 0.4 MG/DL (ref 0–1.2)
BUN SERPL-MCNC: 64 MG/DL (ref 6–23)
C COLI+JEJ+UPSA DNA STL QL NAA+PROBE: NOT DETECTED
C DIF TOX TCDA+TCDB STL QL NAA+PROBE: DETECTED
C DIFF TOX A+B STL QL IA: POSITIVE
CALCIUM SERPL-MCNC: 8.4 MG/DL (ref 8.6–10.3)
CHLORIDE SERPL-SCNC: 112 MMOL/L (ref 98–107)
CO2 SERPL-SCNC: 16 MMOL/L (ref 21–32)
CREAT SERPL-MCNC: 6.8 MG/DL (ref 0.5–1.3)
EC STX1 GENE STL QL NAA+PROBE: NOT DETECTED
EC STX2 GENE STL QL NAA+PROBE: NOT DETECTED
EGFRCR SERPLBLD CKD-EPI 2021: 9 ML/MIN/1.73M*2
EOSINOPHIL # BLD AUTO: 0.59 X10*3/UL (ref 0–0.7)
EOSINOPHIL NFR BLD AUTO: 4.6 %
ERYTHROCYTE [DISTWIDTH] IN BLOOD BY AUTOMATED COUNT: 15.4 % (ref 11.5–14.5)
GLUCOSE SERPL-MCNC: 85 MG/DL (ref 74–99)
HCT VFR BLD AUTO: 28 % (ref 41–52)
HGB BLD-MCNC: 8.6 G/DL (ref 13.5–17.5)
HOLD SPECIMEN: NORMAL
IMM GRANULOCYTES # BLD AUTO: 0.07 X10*3/UL (ref 0–0.7)
IMM GRANULOCYTES NFR BLD AUTO: 0.5 % (ref 0–0.9)
LYMPHOCYTES # BLD AUTO: 1.06 X10*3/UL (ref 1.2–4.8)
LYMPHOCYTES NFR BLD AUTO: 8.2 %
MCH RBC QN AUTO: 27.2 PG (ref 26–34)
MCHC RBC AUTO-ENTMCNC: 30.7 G/DL (ref 32–36)
MCV RBC AUTO: 89 FL (ref 80–100)
MONOCYTES # BLD AUTO: 1.14 X10*3/UL (ref 0.1–1)
MONOCYTES NFR BLD AUTO: 8.9 %
NEUTROPHILS # BLD AUTO: 9.98 X10*3/UL (ref 1.2–7.7)
NEUTROPHILS NFR BLD AUTO: 77.5 %
NOROVIRUS GI + GII RNA STL NAA+PROBE: NOT DETECTED
NRBC BLD-RTO: 0 /100 WBCS (ref 0–0)
P AXIS: 12 DEGREES
P OFFSET: 189 MS
P ONSET: 124 MS
PLATELET # BLD AUTO: 324 X10*3/UL (ref 150–450)
POTASSIUM SERPL-SCNC: 4.7 MMOL/L (ref 3.5–5.3)
PR INTERVAL: 190 MS
PROT SERPL-MCNC: 5.8 G/DL (ref 6.4–8.2)
Q ONSET: 219 MS
QRS COUNT: 13 BEATS
QRS DURATION: 88 MS
QT INTERVAL: 402 MS
QTC CALCULATION(BAZETT): 463 MS
QTC FREDERICIA: 442 MS
R AXIS: 48 DEGREES
RBC # BLD AUTO: 3.16 X10*6/UL (ref 4.5–5.9)
RV RNA STL NAA+PROBE: NOT DETECTED
SALMONELLA DNA STL QL NAA+PROBE: NOT DETECTED
SHIGELLA DNA SPEC QL NAA+PROBE: NOT DETECTED
SODIUM SERPL-SCNC: 136 MMOL/L (ref 136–145)
T AXIS: 1 DEGREES
T OFFSET: 420 MS
V CHOLERAE DNA STL QL NAA+PROBE: NOT DETECTED
VENTRICULAR RATE: 80 BPM
WBC # BLD AUTO: 12.9 X10*3/UL (ref 4.4–11.3)
Y ENTEROCOL DNA STL QL NAA+PROBE: NOT DETECTED

## 2025-03-28 PROCEDURE — 2500000004 HC RX 250 GENERAL PHARMACY W/ HCPCS (ALT 636 FOR OP/ED): Performed by: STUDENT IN AN ORGANIZED HEALTH CARE EDUCATION/TRAINING PROGRAM

## 2025-03-28 PROCEDURE — 2500000004 HC RX 250 GENERAL PHARMACY W/ HCPCS (ALT 636 FOR OP/ED): Performed by: INTERNAL MEDICINE

## 2025-03-28 PROCEDURE — 99233 SBSQ HOSP IP/OBS HIGH 50: CPT | Performed by: INTERNAL MEDICINE

## 2025-03-28 PROCEDURE — 2500000001 HC RX 250 WO HCPCS SELF ADMINISTERED DRUGS (ALT 637 FOR MEDICARE OP)

## 2025-03-28 PROCEDURE — 87493 C DIFF AMPLIFIED PROBE: CPT | Performed by: STUDENT IN AN ORGANIZED HEALTH CARE EDUCATION/TRAINING PROGRAM

## 2025-03-28 PROCEDURE — 85025 COMPLETE CBC W/AUTO DIFF WBC: CPT | Performed by: STUDENT IN AN ORGANIZED HEALTH CARE EDUCATION/TRAINING PROGRAM

## 2025-03-28 PROCEDURE — 80053 COMPREHEN METABOLIC PANEL: CPT | Performed by: STUDENT IN AN ORGANIZED HEALTH CARE EDUCATION/TRAINING PROGRAM

## 2025-03-28 PROCEDURE — 1100000001 HC PRIVATE ROOM DAILY

## 2025-03-28 PROCEDURE — 87324 CLOSTRIDIUM AG IA: CPT | Mod: WESLAB | Performed by: STUDENT IN AN ORGANIZED HEALTH CARE EDUCATION/TRAINING PROGRAM

## 2025-03-28 PROCEDURE — 2500000002 HC RX 250 W HCPCS SELF ADMINISTERED DRUGS (ALT 637 FOR MEDICARE OP, ALT 636 FOR OP/ED): Performed by: STUDENT IN AN ORGANIZED HEALTH CARE EDUCATION/TRAINING PROGRAM

## 2025-03-28 PROCEDURE — 36415 COLL VENOUS BLD VENIPUNCTURE: CPT | Performed by: STUDENT IN AN ORGANIZED HEALTH CARE EDUCATION/TRAINING PROGRAM

## 2025-03-28 PROCEDURE — 2500000001 HC RX 250 WO HCPCS SELF ADMINISTERED DRUGS (ALT 637 FOR MEDICARE OP): Performed by: STUDENT IN AN ORGANIZED HEALTH CARE EDUCATION/TRAINING PROGRAM

## 2025-03-28 PROCEDURE — 87506 IADNA-DNA/RNA PROBE TQ 6-11: CPT | Mod: WESLAB | Performed by: STUDENT IN AN ORGANIZED HEALTH CARE EDUCATION/TRAINING PROGRAM

## 2025-03-28 PROCEDURE — 2500000005 HC RX 250 GENERAL PHARMACY W/O HCPCS: Performed by: REGISTERED NURSE

## 2025-03-28 RX ORDER — SODIUM CHLORIDE 9 MG/ML
100 INJECTION, SOLUTION INTRAVENOUS CONTINUOUS
Status: ACTIVE | OUTPATIENT
Start: 2025-03-28 | End: 2025-03-29

## 2025-03-28 RX ORDER — L. ACIDOPHILUS/L.BULGARICUS 1MM CELL
1 TABLET ORAL 2 TIMES DAILY
Status: DISCONTINUED | OUTPATIENT
Start: 2025-03-28 | End: 2025-03-30 | Stop reason: HOSPADM

## 2025-03-28 RX ORDER — HYDRALAZINE HYDROCHLORIDE 50 MG/1
50 TABLET, FILM COATED ORAL 3 TIMES DAILY
Status: DISCONTINUED | OUTPATIENT
Start: 2025-03-28 | End: 2025-03-30 | Stop reason: HOSPADM

## 2025-03-28 RX ORDER — TALC
3 POWDER (GRAM) TOPICAL NIGHTLY PRN
Status: DISCONTINUED | OUTPATIENT
Start: 2025-03-28 | End: 2025-03-30 | Stop reason: HOSPADM

## 2025-03-28 RX ADMIN — SODIUM CHLORIDE 100 ML/HR: 900 INJECTION, SOLUTION INTRAVENOUS at 23:26

## 2025-03-28 RX ADMIN — FAMOTIDINE 20 MG: 20 TABLET, FILM COATED ORAL at 08:08

## 2025-03-28 RX ADMIN — HEPARIN SODIUM 5000 UNITS: 5000 INJECTION INTRAVENOUS; SUBCUTANEOUS at 01:45

## 2025-03-28 RX ADMIN — EZETIMIBE 10 MG: 10 TABLET ORAL at 20:26

## 2025-03-28 RX ADMIN — SODIUM CHLORIDE 100 ML/HR: 900 INJECTION, SOLUTION INTRAVENOUS at 15:24

## 2025-03-28 RX ADMIN — VANCOMYCIN HYDROCHLORIDE 125 MG: 125 CAPSULE ORAL at 06:37

## 2025-03-28 RX ADMIN — VANCOMYCIN HYDROCHLORIDE 125 MG: 125 CAPSULE ORAL at 12:28

## 2025-03-28 RX ADMIN — CARVEDILOL 12.5 MG: 12.5 TABLET, FILM COATED ORAL at 20:26

## 2025-03-28 RX ADMIN — Medication 3 MG: at 01:59

## 2025-03-28 RX ADMIN — SODIUM BICARBONATE 1300 MG: 650 TABLET ORAL at 08:08

## 2025-03-28 RX ADMIN — Medication 1 TABLET: at 12:28

## 2025-03-28 RX ADMIN — CARVEDILOL 12.5 MG: 12.5 TABLET, FILM COATED ORAL at 08:08

## 2025-03-28 RX ADMIN — SODIUM BICARBONATE 1300 MG: 650 TABLET ORAL at 15:23

## 2025-03-28 RX ADMIN — ACETAMINOPHEN 650 MG: 325 TABLET, FILM COATED ORAL at 08:08

## 2025-03-28 RX ADMIN — VANCOMYCIN HYDROCHLORIDE 125 MG: 125 CAPSULE ORAL at 20:26

## 2025-03-28 RX ADMIN — VANCOMYCIN HYDROCHLORIDE 125 MG: 125 CAPSULE ORAL at 17:06

## 2025-03-28 RX ADMIN — SODIUM CHLORIDE 100 ML/HR: 900 INJECTION, SOLUTION INTRAVENOUS at 01:47

## 2025-03-28 RX ADMIN — Medication 1 TABLET: at 20:26

## 2025-03-28 RX ADMIN — SODIUM BICARBONATE 1300 MG: 650 TABLET ORAL at 20:27

## 2025-03-28 RX ADMIN — ASPIRIN 81 MG: 81 TABLET, COATED ORAL at 08:08

## 2025-03-28 RX ADMIN — Medication 3 MG: at 23:26

## 2025-03-28 ASSESSMENT — COGNITIVE AND FUNCTIONAL STATUS - GENERAL
CLIMB 3 TO 5 STEPS WITH RAILING: A LITTLE
CLIMB 3 TO 5 STEPS WITH RAILING: A LITTLE
PERSONAL GROOMING: A LITTLE
WALKING IN HOSPITAL ROOM: A LITTLE
DAILY ACTIVITIY SCORE: 19
DAILY ACTIVITIY SCORE: 24
TOILETING: A LITTLE
HELP NEEDED FOR BATHING: A LITTLE
MOBILITY SCORE: 22
DRESSING REGULAR LOWER BODY CLOTHING: A LITTLE
MOBILITY SCORE: 22
WALKING IN HOSPITAL ROOM: A LITTLE
DRESSING REGULAR UPPER BODY CLOTHING: A LITTLE

## 2025-03-28 ASSESSMENT — ENCOUNTER SYMPTOMS
RESPIRATORY NEGATIVE: 1
PSYCHIATRIC NEGATIVE: 1
EYES NEGATIVE: 1
DIARRHEA: 1
CARDIOVASCULAR NEGATIVE: 1
CONSTITUTIONAL NEGATIVE: 1
MUSCULOSKELETAL NEGATIVE: 1
ENDOCRINE NEGATIVE: 1
HEMATOLOGIC/LYMPHATIC NEGATIVE: 1
NEUROLOGICAL NEGATIVE: 1

## 2025-03-28 ASSESSMENT — PAIN SCALES - GENERAL
PAINLEVEL_OUTOF10: 0 - NO PAIN
PAINLEVEL_OUTOF10: 1
PAINLEVEL_OUTOF10: 3

## 2025-03-28 ASSESSMENT — PAIN DESCRIPTION - DESCRIPTORS: DESCRIPTORS: ACHING

## 2025-03-28 ASSESSMENT — PAIN - FUNCTIONAL ASSESSMENT
PAIN_FUNCTIONAL_ASSESSMENT: 0-10
PAIN_FUNCTIONAL_ASSESSMENT: 0-10

## 2025-03-28 NOTE — CARE PLAN
The patient's goals for the shift include Eat and rest    The clinical goals for the shift include no nausea or vomiting    Over the shift, the patient did not make progress toward the following goals. Barriers to progression include liquid stools. Recommendations to address these barriers include stool sent for c diff.

## 2025-03-28 NOTE — PROGRESS NOTES
Colin Leonard is a 59 y.o. male on day 1 of admission presenting with Colitis.      Subjective   Feels better today.  He had 2 small diarrheal bowel movements but is getting better.  Patient thinks because he did not have anything to eat yet.  Denies abdominal pain, admits to mild discomfort in the left lower quadrant.       Objective     Last Recorded Vitals  /80 (BP Location: Left arm, Patient Position: Lying)   Pulse 81   Temp 36.9 °C (98.4 °F) (Oral)   Resp 20   Wt 116 kg (256 lb 9.9 oz)   SpO2 97%   Intake/Output last 3 Shifts:    Intake/Output Summary (Last 24 hours) at 3/28/2025 1318  Last data filed at 3/28/2025 1234  Gross per 24 hour   Intake 3559.99 ml   Output 202 ml   Net 3357.99 ml       Admission Weight  Weight: 103 kg (227 lb 1.2 oz) (03/27/25 1242)    Daily Weight  03/28/25 : 116 kg (256 lb 9.9 oz)    Image Results  ECG 12 lead  Normal sinus rhythm  Possible Anterior infarct , age undetermined  Abnormal ECG  When compared with ECG of 10-MAR-2025 19:14,  Nonspecific T wave abnormality now evident in Inferior leads  Nonspecific T wave abnormality, improved in Lateral leads  Confirmed by Aleks Gooden ([id number]) on 3/28/2025 9:15:48 AM      Physical Exam  Seen with his bedside nurse and [facility] rounds.  Pt is NAD.  Not look toxic cooperative with exam.  In no distress at rest.  A, Ox3.  Face is symmetrical.  Skin - no lesions.  Lungs: clear to auscultations B/L. No wheezes, rales, rhonchi.  Heart: regular S1S2.  Abdomen: soft, NT, ND. BS positive.  Mildly tender in the left upper quadrant  Extr.: no edema, cords, cyanosis.  Moves all extr.   Relevant Results               Assessment/Plan                  Assessment & Plan  Colitis  Seen on CT A/P with component of possible acute diverticulitis  Consult GI  Given recent hospital admission, high suspicion for c diff infection  Check c diff and stool pathogens   Start empiric PO vanc   Gentle IVF  Clear liquids for now  Primary  hypertension  Resume home coreg at lower dose with hold parameters  Hold hydralazine for now, resume if persistently hypertensive   CKD (chronic kidney disease) stage 5, GFR less than 15 ml/min (Multi)  Baseline Cr ~6.8  Follows with Dr. Max  Stable, at baseline   Anemia of chronic disease  Due to CKD  Baseline Hgb ~8  Stable, at baseline       03/28/2025:  Labs reviewed.  C. difficile colitis.  Recently was treated by antibiotics on few occasions.  Clinically is already better.  Continue oral vancomycin.  Followed by gastroenterology.    Dehydration.  Gentle hydration.  Monitor fluid status.  Currently does not seem to be volume overloaded should be able to tolerate IV hydration for another 24 hours.    Acute on chronic renal failure.  Patient has stage V renal disease, he was evaluated by nephrology during previous admissions.  Will consult nephrology.  Hold Demadex for now    Primary hypertension.  Continue medications  Hydralazine is on hold, restart     Monitor BMP and CBC         Judy Espinal MD

## 2025-03-28 NOTE — CARE PLAN
The patient's goals for the shift include Eat and rest    The clinical goals for the shift include no nausea and stable VS    Over the shift, the patient did not make progress toward the following goals. Barriers to progression include weakness. Recommendations to address these barriers include rest.

## 2025-03-28 NOTE — CONSULTS
Inpatient consult to gastroenterology  Consult performed by: Lexi Gunn, APRN-CNP  Consult ordered by: Madalyn Cole MD          Reason For Consult  Colitis     History Of Present Illness  Colin Leonard is a 59 y.o. male past medical history of HTN, stage V CKD, T2DM, vasculopath, CAD, osteomyelitis of the left foot s/p toe amputation, LVH, HLD, NSTEMI and other comorbidities presented with complaints of profuse watery diarrhea for 10 days. Patient was seen by our service this earlier this month for abdominal pain.  He recently had to admissions this month for cholecystectomy, pneumonia.  He has been on antibiotics recently. Patient seen and evaluated this morning he denies any fevers or chills.  He denies any current abdominal pain, nausea, vomiting. No reported melena, hematochezia. CT noted new multifocal colonic wall thickening and adjacent inflammatory changes. Moderate circumferential wall thickening of the ascending colon with adjacent inflammatory change and trace fluid. Localized pericolonic inflammatory changes of the mid sigmoid colon level may represent a component of acute diverticulitis. Labs on admission show leukocytosis at 15.4, stable H&H at 9.8/31.6, elevated BUN/creatinine at 64/6.80.     Past Medical History  He has a past medical history of Coronary artery disease, Diabetes mellitus (Multi), DVT (deep venous thrombosis) (Multi), Hypertension, and MI (myocardial infarction) (Multi).    Surgical History  He has a past surgical history that includes Amputation and Cholecystectomy (03/05/2025).     Social History  He reports that he has never smoked. He has never used smokeless tobacco. No history on file for alcohol use and drug use.    Family History  No family history on file.     Allergies  Amlodipine besylate    Review of Systems   Constitutional: Negative.    HENT: Negative.     Eyes: Negative.    Respiratory: Negative.     Cardiovascular: Negative.    Gastrointestinal:   "Positive for diarrhea.   Endocrine: Negative.    Genitourinary: Negative.    Musculoskeletal: Negative.    Skin: Negative.    Neurological: Negative.    Hematological: Negative.    Psychiatric/Behavioral: Negative.          Physical Exam  HENT:      Head: Normocephalic.      Right Ear: Tympanic membrane normal.      Nose: Nose normal.      Mouth/Throat:      Mouth: Mucous membranes are moist.   Eyes:      Pupils: Pupils are equal, round, and reactive to light.   Cardiovascular:      Rate and Rhythm: Normal rate.      Pulses: Normal pulses.   Pulmonary:      Effort: Pulmonary effort is normal.   Abdominal:      Palpations: Abdomen is soft.   Musculoskeletal:         General: Normal range of motion.      Cervical back: Normal range of motion.   Skin:     General: Skin is warm.   Neurological:      General: No focal deficit present.      Mental Status: He is alert.   Psychiatric:         Mood and Affect: Mood normal.          Last Recorded Vitals  Blood pressure 150/80, pulse 81, temperature 36.9 °C (98.4 °F), temperature source Oral, resp. rate 20, height 1.803 m (5' 10.98\"), weight 116 kg (256 lb 9.9 oz), SpO2 97%.    Relevant Results  ECG 12 lead    Result Date: 3/28/2025  Normal sinus rhythm Possible Anterior infarct , age undetermined Abnormal ECG When compared with ECG of 10-MAR-2025 19:14, Nonspecific T wave abnormality now evident in Inferior leads Nonspecific T wave abnormality, improved in Lateral leads Confirmed by Aleks Gooden ([id number]) on 3/28/2025 9:15:48 AM    CT abdomen pelvis wo IV contrast    Result Date: 3/27/2025  Interpreted By:  River Ley, STUDY: CT ABDOMEN PELVIS WO IV CONTRAST;  3/27/2025 2:55 pm   INDICATION: Signs/Symptoms:Malaise, fatigue, diarrhea, recent surgery, has chronic renal failure.     COMPARISON: 03/03/2025.   ACCESSION NUMBER(S): [id number]   ORDERING CLINICIAN: ANN BOYD   TECHNIQUE: CT of the abdomen and pelvis was performed. No contrast was administered. Coronal and " sagittal reformations were made.   FINDINGS: LOWER CHEST: Bibasilar irregular atelectasis/scarring is seen, right greater than left with mild pleural thickening. Bilateral gynecomastia is partially visualized. Dense coronary artery calcifications and/or stents are seen.   ABDOMEN:   LIVER: No focal hepatic lesion is seen.   BILE DUCTS: Nondilated.   GALLBLADDER: There has been an interval cholecystectomy. No fluid collection is seen in the cholecystectomy bed region.   PANCREAS: Within normal limits.   SPLEEN: Within normal limits.   ADRENAL GLANDS: Within normal limits.   KIDNEYS AND URETERS: No renal or ureteral calculi are seen bilaterally.  No hydroureteronephrosis bilaterally. A left renal upper pole round 5.4 cm cyst is similar to prior.   Urinary bladder is nearly fully decompressed. No appreciable bladder calculi. Small scattered pelvic phleboliths are present.   VESSELS: Dense irregular atherosclerotic calcifications present in the aorta and branch vessels. No aortic aneurysm. Unenhanced IVC is unremarkable.   BOWEL: No bowel obstruction. Appendix is normal.   New multifocal colonic wall thickening is present. There is new moderate circumferential wall thickening of the ascending colon with adjacent inflammatory fat stranding/edema and trace free fluid. Moderate wall thickening is also seen involving segments of the transverse colon, descending colon and sigmoid colon. Scattered colonic diverticula are present. Localized pericolonic inflammatory changes at the mid sigmoid colon could represent a component of acute diverticulitis as well.   PERITONEUM/RETROPERITONEUM/LYMPH NODES: Mild mesenteric edema is present. Trace free fluid is present. No organized fluid collection. No free intraperitoneal air.   No retroperitoneal fluid collection or lymphadenopathy.       ABDOMINAL WALL: Unremarkable.   BONE AND SOFT TISSUE: Thoracolumbar mild dextrocurvature may be partially exaggerated by positioning. Multilevel  disc space narrowing and endplate spurring again seen throughout the visualized spine along with small vacuum discs at multiple levels. Facet arthrosis is present throughout the lumbar spine. Moderate degenerative changes of both hips are present with joint space narrowing and marginal spurring.       Interval cholecystectomy.   New multifocal colonic wall thickening and adjacent inflammatory changes most likely representing a nonspecific infectious or inflammatory colitis. Moderate circumferential wall thickening of the ascending colon with adjacent inflammatory change and trace fluid.   Scattered colonic diverticula. New more localized pericolonic inflammatory changes of the mid sigmoid colon level may represent a component of acute diverticulitis.   No bowel obstruction.   MACRO: None.   Signed by: River Ley 3/27/2025 3:17 PM Dictation workstation:   [id number]    XR chest 1 view    Result Date: 3/27/2025  Interpreted By:  Rustam Araujo, STUDY: XR CHEST 1 VIEW;  3/27/2025 2:05 pm   INDICATION: Signs/Symptoms:Malaise, fatigue.     COMPARISON: 03/11/2025   ACCESSION NUMBER(S): [id number]   ORDERING CLINICIAN: ANN BOYD   FINDINGS: AP portable view of the chest is obtained.  Limited exam due to portable nature. Magnified cardiac silhouette. No infiltrates, effusions or pneumothorax.       1.  No evidence of acute cardiopulmonary process.       MACRO: None   Signed by: Rustam Araujo 3/27/2025 2:29 PM Dictation workstation:   [id number]    Scheduled medications  aspirin, 81 mg, oral, Daily  carvedilol, 12.5 mg, oral, BID  ezetimibe, 10 mg, oral, Nightly  famotidine, 20 mg, oral, Daily   Or  famotidine, 20 mg, intravenous, Daily  heparin (porcine), 5,000 Units, subcutaneous, q8h  [Held by provider] hydrALAZINE, 100 mg, oral, TID  lactobacillus acidophilus, 1 tablet, oral, BID  sodium bicarbonate, 1,300 mg, oral, TID  [Held by provider] torsemide, 60 mg, oral, Daily  vancomycin, 125 mg, oral, 4x  daily      Continuous medications  sodium chloride 0.9%, 100 mL/hr, Last Rate: 100 mL/hr (03/28/25 0610)      PRN medications  PRN medications: acetaminophen **OR** acetaminophen **OR** acetaminophen, melatonin, ondansetron **OR** ondansetron, prochlorperazine **OR** prochlorperazine **OR** prochlorperazine  Results for orders placed or performed during the hospital encounter of 03/27/25 (from the past 24 hours)   CBC and Auto Differential   Result Value Ref Range    WBC 15.4 (H) 4.4 - 11.3 x10*3/uL    nRBC 0.0 0.0 - 0.0 /100 WBCs    RBC 3.54 (L) 4.50 - 5.90 x10*6/uL    Hemoglobin 9.8 (L) 13.5 - 17.5 g/dL    Hematocrit 31.6 (L) 41.0 - 52.0 %    MCV 89 80 - 100 fL    MCH 27.7 26.0 - 34.0 pg    MCHC 31.0 (L) 32.0 - 36.0 g/dL    RDW 15.4 (H) 11.5 - 14.5 %    Platelets 397 150 - 450 x10*3/uL    Neutrophils % 84.5 40.0 - 80.0 %    Immature Granulocytes %, Automated 0.5 0.0 - 0.9 %    Lymphocytes % 5.1 13.0 - 44.0 %    Monocytes % 6.5 2.0 - 10.0 %    Eosinophils % 2.9 0.0 - 6.0 %    Basophils % 0.5 0.0 - 2.0 %    Neutrophils Absolute 12.99 (H) 1.20 - 7.70 x10*3/uL    Immature Granulocytes Absolute, Automated 0.08 0.00 - 0.70 x10*3/uL    Lymphocytes Absolute 0.79 (L) 1.20 - 4.80 x10*3/uL    Monocytes Absolute 1.00 0.10 - 1.00 x10*3/uL    Eosinophils Absolute 0.45 0.00 - 0.70 x10*3/uL    Basophils Absolute 0.07 0.00 - 0.10 x10*3/uL   Comprehensive metabolic panel   Result Value Ref Range    Glucose 111 (H) 74 - 99 mg/dL    Sodium 135 (L) 136 - 145 mmol/L    Potassium 4.6 3.5 - 5.3 mmol/L    Chloride 109 (H) 98 - 107 mmol/L    Bicarbonate 15 (L) 21 - 32 mmol/L    Anion Gap 16 10 - 20 mmol/L    Urea Nitrogen 68 (H) 6 - 23 mg/dL    Creatinine 6.91 (H) 0.50 - 1.30 mg/dL    eGFR 8 (L) >60 mL/min/1.73m*2    Calcium 8.9 8.6 - 10.3 mg/dL    Albumin 3.5 3.4 - 5.0 g/dL    Alkaline Phosphatase 62 33 - 136 U/L    Total Protein 7.0 6.4 - 8.2 g/dL    AST 11 9 - 39 U/L    Bilirubin, Total 0.5 0.0 - 1.2 mg/dL    ALT 10 10 - 52 U/L   Lipase    Result Value Ref Range    Lipase 26 9 - 82 U/L   Sars-CoV-2 and Influenza A/B PCR   Result Value Ref Range    Flu A Result Not Detected Not Detected    Flu B Result Not Detected Not Detected    Coronavirus 2019, PCR Not Detected Not Detected   Troponin I, High Sensitivity, Initial   Result Value Ref Range    Troponin I, High Sensitivity 14 0 - 20 ng/L   Troponin, High Sensitivity, 1 Hour   Result Value Ref Range    Troponin I, High Sensitivity 12 0 - 20 ng/L   ECG 12 lead   Result Value Ref Range    Ventricular Rate 80 BPM    Atrial Rate 80 BPM    TN Interval 190 ms    QRS Duration 88 ms    QT Interval 402 ms    QTC Calculation(Bazett) 463 ms    P Axis 12 degrees    R Axis 48 degrees    T Axis 1 degrees    QRS Count 13 beats    Q Onset 219 ms    P Onset 124 ms    P Offset 189 ms    T Offset 420 ms    QTC Fredericia 442 ms   Lactate   Result Value Ref Range    Lactate 0.9 0.4 - 2.0 mmol/L   Urinalysis with Reflex Culture and Microscopic   Result Value Ref Range    Color, Urine Light-Yellow Light-Yellow, Yellow, Dark-Yellow    Appearance, Urine Clear Clear    Specific Gravity, Urine 1.012 1.005 - 1.035    pH, Urine 5.5 5.0, 5.5, 6.0, 6.5, 7.0, 7.5, 8.0    Protein, Urine 100 (2+) (A) NEGATIVE, 10 (TRACE), 20 (TRACE) mg/dL    Glucose, Urine 70 (1+) (A) Normal mg/dL    Blood, Urine NEGATIVE NEGATIVE mg/dL    Ketones, Urine NEGATIVE NEGATIVE mg/dL    Bilirubin, Urine NEGATIVE NEGATIVE mg/dL    Urobilinogen, Urine Normal Normal mg/dL    Nitrite, Urine NEGATIVE NEGATIVE    Leukocyte Esterase, Urine NEGATIVE NEGATIVE   Extra Urine Gray Tube   Result Value Ref Range    Extra Tube Hold for add-ons.    Urinalysis Microscopic   Result Value Ref Range    WBC, Urine 1-5 1-5, NONE /HPF    RBC, Urine 1-2 NONE, 1-2, 3-5 /HPF    Squamous Epithelial Cells, Urine 1-9 (SPARSE) Reference range not established. /HPF    Bacteria, Urine 1+ (A) NONE SEEN /HPF    Mucus, Urine FEW Reference range not established. /LPF   Comprehensive  metabolic panel   Result Value Ref Range    Glucose 85 74 - 99 mg/dL    Sodium 136 136 - 145 mmol/L    Potassium 4.7 3.5 - 5.3 mmol/L    Chloride 112 (H) 98 - 107 mmol/L    Bicarbonate 16 (L) 21 - 32 mmol/L    Anion Gap 13 10 - 20 mmol/L    Urea Nitrogen 64 (H) 6 - 23 mg/dL    Creatinine 6.80 (H) 0.50 - 1.30 mg/dL    eGFR 9 (L) >60 mL/min/1.73m*2    Calcium 8.4 (L) 8.6 - 10.3 mg/dL    Albumin 3.0 (L) 3.4 - 5.0 g/dL    Alkaline Phosphatase 55 33 - 136 U/L    Total Protein 5.8 (L) 6.4 - 8.2 g/dL    AST 10 9 - 39 U/L    Bilirubin, Total 0.4 0.0 - 1.2 mg/dL    ALT 9 (L) 10 - 52 U/L   CBC and Auto Differential   Result Value Ref Range    WBC 12.9 (H) 4.4 - 11.3 x10*3/uL    nRBC 0.0 0.0 - 0.0 /100 WBCs    RBC 3.16 (L) 4.50 - 5.90 x10*6/uL    Hemoglobin 8.6 (L) 13.5 - 17.5 g/dL    Hematocrit 28.0 (L) 41.0 - 52.0 %    MCV 89 80 - 100 fL    MCH 27.2 26.0 - 34.0 pg    MCHC 30.7 (L) 32.0 - 36.0 g/dL    RDW 15.4 (H) 11.5 - 14.5 %    Platelets 324 150 - 450 x10*3/uL    Neutrophils % 77.5 40.0 - 80.0 %    Immature Granulocytes %, Automated 0.5 0.0 - 0.9 %    Lymphocytes % 8.2 13.0 - 44.0 %    Monocytes % 8.9 2.0 - 10.0 %    Eosinophils % 4.6 0.0 - 6.0 %    Basophils % 0.3 0.0 - 2.0 %    Neutrophils Absolute 9.98 (H) 1.20 - 7.70 x10*3/uL    Immature Granulocytes Absolute, Automated 0.07 0.00 - 0.70 x10*3/uL    Lymphocytes Absolute 1.06 (L) 1.20 - 4.80 x10*3/uL    Monocytes Absolute 1.14 (H) 0.10 - 1.00 x10*3/uL    Eosinophils Absolute 0.59 0.00 - 0.70 x10*3/uL    Basophils Absolute 0.04 0.00 - 0.10 x10*3/uL   C. difficile, PCR    Specimen: Stool   Result Value Ref Range    C. difficile, PCR Detected (A) Not Detected        Assessment/Plan     #Abnormal CT  #Diarrhea  Patient reports diarrhea started approximately 10 days ago.  He states he has been having multiple episodes of profuse watery diarrhea.  He has had multiple recent admissions and been on antibiotics.  C. difficile was positive, EIA pending. Patient was started on  vancomycin.  CT scan findings consistent with C. difficile infection.   - Correct associated electrolyte disturbances   - Treat associated nausea/emesis with ondansetron (Zofran)  - Stool path panel pending  - supportive care  - Advance diet, lactose restricted diet  - Probiotic ordered    Lexi Gunn, APRN-CNP

## 2025-03-29 LAB
ANION GAP SERPL CALCULATED.3IONS-SCNC: 14 MMOL/L (ref 10–20)
BASOPHILS # BLD AUTO: 0.06 X10*3/UL (ref 0–0.1)
BASOPHILS NFR BLD AUTO: 0.4 %
BUN SERPL-MCNC: 57 MG/DL (ref 6–23)
CALCIUM SERPL-MCNC: 8.3 MG/DL (ref 8.6–10.3)
CHLORIDE SERPL-SCNC: 112 MMOL/L (ref 98–107)
CO2 SERPL-SCNC: 15 MMOL/L (ref 21–32)
CREAT SERPL-MCNC: 6.95 MG/DL (ref 0.5–1.3)
EGFRCR SERPLBLD CKD-EPI 2021: 8 ML/MIN/1.73M*2
EOSINOPHIL # BLD AUTO: 0.66 X10*3/UL (ref 0–0.7)
EOSINOPHIL NFR BLD AUTO: 4.3 %
ERYTHROCYTE [DISTWIDTH] IN BLOOD BY AUTOMATED COUNT: 15.5 % (ref 11.5–14.5)
GLUCOSE SERPL-MCNC: 112 MG/DL (ref 74–99)
HCT VFR BLD AUTO: 29.4 % (ref 41–52)
HGB BLD-MCNC: 9.1 G/DL (ref 13.5–17.5)
IMM GRANULOCYTES # BLD AUTO: 0.13 X10*3/UL (ref 0–0.7)
IMM GRANULOCYTES NFR BLD AUTO: 0.8 % (ref 0–0.9)
LYMPHOCYTES # BLD AUTO: 1.01 X10*3/UL (ref 1.2–4.8)
LYMPHOCYTES NFR BLD AUTO: 6.5 %
MCH RBC QN AUTO: 27.2 PG (ref 26–34)
MCHC RBC AUTO-ENTMCNC: 31 G/DL (ref 32–36)
MCV RBC AUTO: 88 FL (ref 80–100)
MONOCYTES # BLD AUTO: 1.2 X10*3/UL (ref 0.1–1)
MONOCYTES NFR BLD AUTO: 7.8 %
NEUTROPHILS # BLD AUTO: 12.37 X10*3/UL (ref 1.2–7.7)
NEUTROPHILS NFR BLD AUTO: 80.2 %
NRBC BLD-RTO: 0 /100 WBCS (ref 0–0)
PLATELET # BLD AUTO: 318 X10*3/UL (ref 150–450)
POTASSIUM SERPL-SCNC: 4.6 MMOL/L (ref 3.5–5.3)
RBC # BLD AUTO: 3.34 X10*6/UL (ref 4.5–5.9)
SODIUM SERPL-SCNC: 136 MMOL/L (ref 136–145)
WBC # BLD AUTO: 15.4 X10*3/UL (ref 4.4–11.3)

## 2025-03-29 PROCEDURE — 2500000004 HC RX 250 GENERAL PHARMACY W/ HCPCS (ALT 636 FOR OP/ED): Performed by: INTERNAL MEDICINE

## 2025-03-29 PROCEDURE — 99232 SBSQ HOSP IP/OBS MODERATE 35: CPT | Performed by: INTERNAL MEDICINE

## 2025-03-29 PROCEDURE — 2500000001 HC RX 250 WO HCPCS SELF ADMINISTERED DRUGS (ALT 637 FOR MEDICARE OP): Performed by: STUDENT IN AN ORGANIZED HEALTH CARE EDUCATION/TRAINING PROGRAM

## 2025-03-29 PROCEDURE — 1100000001 HC PRIVATE ROOM DAILY

## 2025-03-29 PROCEDURE — 2500000005 HC RX 250 GENERAL PHARMACY W/O HCPCS: Performed by: REGISTERED NURSE

## 2025-03-29 PROCEDURE — 2500000002 HC RX 250 W HCPCS SELF ADMINISTERED DRUGS (ALT 637 FOR MEDICARE OP, ALT 636 FOR OP/ED): Performed by: STUDENT IN AN ORGANIZED HEALTH CARE EDUCATION/TRAINING PROGRAM

## 2025-03-29 PROCEDURE — 80048 BASIC METABOLIC PNL TOTAL CA: CPT | Performed by: INTERNAL MEDICINE

## 2025-03-29 PROCEDURE — 2500000004 HC RX 250 GENERAL PHARMACY W/ HCPCS (ALT 636 FOR OP/ED): Performed by: STUDENT IN AN ORGANIZED HEALTH CARE EDUCATION/TRAINING PROGRAM

## 2025-03-29 PROCEDURE — 85025 COMPLETE CBC W/AUTO DIFF WBC: CPT | Performed by: STUDENT IN AN ORGANIZED HEALTH CARE EDUCATION/TRAINING PROGRAM

## 2025-03-29 PROCEDURE — 2500000001 HC RX 250 WO HCPCS SELF ADMINISTERED DRUGS (ALT 637 FOR MEDICARE OP): Performed by: INTERNAL MEDICINE

## 2025-03-29 PROCEDURE — 36415 COLL VENOUS BLD VENIPUNCTURE: CPT | Performed by: STUDENT IN AN ORGANIZED HEALTH CARE EDUCATION/TRAINING PROGRAM

## 2025-03-29 PROCEDURE — 6350000001 HC RX 635 EPOETIN >10,000 UNITS: Performed by: INTERNAL MEDICINE

## 2025-03-29 PROCEDURE — 2500000002 HC RX 250 W HCPCS SELF ADMINISTERED DRUGS (ALT 637 FOR MEDICARE OP, ALT 636 FOR OP/ED): Performed by: INTERNAL MEDICINE

## 2025-03-29 PROCEDURE — 2500000001 HC RX 250 WO HCPCS SELF ADMINISTERED DRUGS (ALT 637 FOR MEDICARE OP)

## 2025-03-29 RX ORDER — VANCOMYCIN HYDROCHLORIDE 125 MG/1
250 CAPSULE ORAL 4 TIMES DAILY
Status: DISCONTINUED | OUTPATIENT
Start: 2025-03-29 | End: 2025-03-29

## 2025-03-29 RX ORDER — CHOLESTYRAMINE 4 G/4.8G
4 POWDER, FOR SUSPENSION ORAL 2 TIMES DAILY
Status: DISCONTINUED | OUTPATIENT
Start: 2025-03-29 | End: 2025-03-30 | Stop reason: HOSPADM

## 2025-03-29 RX ORDER — VANCOMYCIN HYDROCHLORIDE 125 MG/1
125 CAPSULE ORAL 4 TIMES DAILY
Status: DISCONTINUED | OUTPATIENT
Start: 2025-03-29 | End: 2025-03-30 | Stop reason: HOSPADM

## 2025-03-29 RX ORDER — SODIUM CHLORIDE 9 MG/ML
100 INJECTION, SOLUTION INTRAVENOUS CONTINUOUS
Status: ACTIVE | OUTPATIENT
Start: 2025-03-29 | End: 2025-03-30

## 2025-03-29 RX ADMIN — ACETAMINOPHEN 650 MG: 325 TABLET, FILM COATED ORAL at 08:46

## 2025-03-29 RX ADMIN — ACETAMINOPHEN 650 MG: 325 TABLET, FILM COATED ORAL at 23:34

## 2025-03-29 RX ADMIN — EPOETIN ALFA-EPBX 20000 UNITS: 20000 INJECTION, SOLUTION INTRAVENOUS; SUBCUTANEOUS at 18:00

## 2025-03-29 RX ADMIN — FAMOTIDINE 20 MG: 20 TABLET, FILM COATED ORAL at 08:46

## 2025-03-29 RX ADMIN — SODIUM BICARBONATE 1300 MG: 650 TABLET ORAL at 18:00

## 2025-03-29 RX ADMIN — Medication 1 TABLET: at 20:22

## 2025-03-29 RX ADMIN — VANCOMYCIN HYDROCHLORIDE 125 MG: 125 CAPSULE ORAL at 06:52

## 2025-03-29 RX ADMIN — VANCOMYCIN HYDROCHLORIDE 125 MG: 125 CAPSULE ORAL at 13:23

## 2025-03-29 RX ADMIN — Medication 3 MG: at 21:53

## 2025-03-29 RX ADMIN — SODIUM BICARBONATE 1300 MG: 650 TABLET ORAL at 20:22

## 2025-03-29 RX ADMIN — CARVEDILOL 12.5 MG: 12.5 TABLET, FILM COATED ORAL at 08:45

## 2025-03-29 RX ADMIN — VANCOMYCIN HYDROCHLORIDE 125 MG: 125 CAPSULE ORAL at 20:22

## 2025-03-29 RX ADMIN — VANCOMYCIN HYDROCHLORIDE 125 MG: 125 CAPSULE ORAL at 18:00

## 2025-03-29 RX ADMIN — CHOLESTYRAMINE 4 G: 4 POWDER, FOR SUSPENSION ORAL at 20:22

## 2025-03-29 RX ADMIN — HEPARIN SODIUM 5000 UNITS: 5000 INJECTION INTRAVENOUS; SUBCUTANEOUS at 17:59

## 2025-03-29 RX ADMIN — CHOLESTYRAMINE 4 G: 4 POWDER, FOR SUSPENSION ORAL at 13:23

## 2025-03-29 RX ADMIN — EZETIMIBE 10 MG: 10 TABLET ORAL at 20:22

## 2025-03-29 RX ADMIN — CARVEDILOL 12.5 MG: 12.5 TABLET, FILM COATED ORAL at 20:22

## 2025-03-29 RX ADMIN — HEPARIN SODIUM 5000 UNITS: 5000 INJECTION INTRAVENOUS; SUBCUTANEOUS at 08:45

## 2025-03-29 RX ADMIN — ASPIRIN 81 MG: 81 TABLET, COATED ORAL at 08:45

## 2025-03-29 RX ADMIN — SODIUM CHLORIDE 100 ML/HR: 900 INJECTION, SOLUTION INTRAVENOUS at 10:50

## 2025-03-29 RX ADMIN — SODIUM BICARBONATE 1300 MG: 650 TABLET ORAL at 08:45

## 2025-03-29 RX ADMIN — Medication 1 TABLET: at 08:45

## 2025-03-29 ASSESSMENT — COGNITIVE AND FUNCTIONAL STATUS - GENERAL
DAILY ACTIVITIY SCORE: 24
CLIMB 3 TO 5 STEPS WITH RAILING: A LITTLE
MOBILITY SCORE: 22
WALKING IN HOSPITAL ROOM: A LITTLE

## 2025-03-29 ASSESSMENT — PAIN - FUNCTIONAL ASSESSMENT
PAIN_FUNCTIONAL_ASSESSMENT: 0-10

## 2025-03-29 ASSESSMENT — PAIN SCALES - GENERAL
PAINLEVEL_OUTOF10: 3
PAINLEVEL_OUTOF10: 4
PAINLEVEL_OUTOF10: 0 - NO PAIN
PAINLEVEL_OUTOF10: 0 - NO PAIN

## 2025-03-29 ASSESSMENT — PAIN DESCRIPTION - DESCRIPTORS
DESCRIPTORS: ACHING
DESCRIPTORS: ACHING;DISCOMFORT

## 2025-03-29 ASSESSMENT — PAIN DESCRIPTION - LOCATION: LOCATION: GENERALIZED

## 2025-03-29 NOTE — CARE PLAN
The patient's goals for the shift include Eat and rest    The clinical goals for the shift include monitor I&O and stable VS    Over the shift, the patient did not make progress toward the following goals. Barriers to progression include weakness. Recommendations to address these barriers include rest and work with pt/ot.

## 2025-03-29 NOTE — PROGRESS NOTES
Found + EIA and + PCR for C Diff. Presentation consistent with C Diff colitis. Continue oral Vanco x 14 days total. If persistent liquid stools, can add Questran as well. GI will sign off at this time. Please call us with questions or concerns

## 2025-03-29 NOTE — ASSESSMENT & PLAN NOTE
[facility] A DEPARTMENT OF [place] [address] [place] [address]  Dept: [phone]      CC: NTH, HLP, OBESITY, SNORING  Subjective:  The patient is a 44 y.o. year old, , male is in the office for a follow up visit.  Patient is doing quit well from cardiac stand point. He denies any chest pain or discomfort. No orthopnea or PND. Denies any palpitation, dizziness or syncope. He is completely asymptomatic from cardiac stand point.    Past Medical History:   has a past medical history of Abnormal MRI of the head, ED (erectile dysfunction), Esophageal ulcer, Fatty liver, GERD (gastroesophageal reflux disease), History of stress test, Hyperlipidemia, Hypertension, Hypogonadism in male, Hypothyroidism, IFG (impaired fasting glucose), Lung granuloma (HCC), Migraine headache, Obesity, Post laminectomy syndrome, and Tobacco abuse.    Past Surgical History:   has a past surgical history that includes other surgical history (1980); other surgical history (03/22/04); other surgical history (08/26/05); Lumbar spine surgery (09/12); other surgical history (07/17/12, 3/20/12); Upper gastrointestinal endoscopy (05/19/11); Colonoscopy (05/19/11); other surgical history (Bilateral, 7/17/15, 2/20/15, 11/2014, 8/1/14, 4/24/14); other surgical history (Bilateral, 1/08/16); Umbilical hernia repair (02/18/2016); and other surgical history (Bilateral, 07/01/2016).    Home Medications:  Prior to Admission medications    Medication Sig Start Date End Date Taking? Authorizing Provider   oxyCODONE-acetaminophen (PERCOCET)  MG per tablet Take 1 tablet by mouth every 6 hours as needed for Pain. 3/26/24  Yes ProviderDarshan MD   pregabalin (LYRICA) 75 MG capsule Take 2 capsules by mouth 2 times daily. 3/27/24  Yes ProviderDarshan MD   tadalafil (CIALIS) 20 MG tablet TAKE 1 TABLET AS NEEDED FORERECTILE  Due to CKD  Baseline Hgb ~8  Stable, at baseline

## 2025-03-29 NOTE — CARE PLAN
The patient's goals for the shift include hds    The clinical goals for the shift include no nausea and stable VS

## 2025-03-29 NOTE — CONSULTS
CONSULT: Nephrology SERVICE    SERVICE DATE: 3/29/2025   SERVICE TIME:  9:54 AM    REASON FOR CONSULT: Acute on chronic renal failure  REQUESTING PHYSICIAN: Dr. Espinal  PRIMARY CARE PHYSICIAN: Maurice Ballard MD    ASSESSMENT AND PLAN  59-year-old man with numerous medical problems admitted with C. difficile colitis.  1.  Acute renal failure  2.  Chronic metabolic acidosis  3.  Anemia of chronic kidney disease  4.  Chronic kidney disease stage V  5.  Essential hypertension    Acute on chronic renal failure from volume depletion in the context of C. difficile colitis.  I would continue IV fluids for 1 more day.  Hold his diuretics.  I will dose him with erythropoietin stimulating agents given that he is quite anemic and somewhat fatigued.  Hold IV iron for now.  Home antihypertensives otherwise.  No other major electrolyte disturbances, save a chronic acidosis, which is compounded by his diarrhea.  Okay to continue bicarbonate therapy for right now, but this may aggravate his GI symptoms.  Continue daily labs, continue supportive care.  I will continue to follow him.  Thank you for the consultation.     SUBJECTIVE  Mr. Leonard is a 59 y.o. man with a history of coronary artery disease, diabetes, hypertension, advanced CKD admitted with diarrhea, consulted for acute on chronic renal failure.  He has had 2 hospitalizations over the past month, the first involving cholecystectomy, the second involving pneumonia and hypertensive urgency.  He has obviously received numerous rounds of IV antibiotics throughout these hospitalizations.  He now has C. difficile colitis.  Had profuse watery diarrhea.  He denied any hematochezia.  He had some abdominal cramping.  He has no dyspnea.  He has no major swelling.  He supposed to be on outpatient diuretics, antihypertensives, oral bicarbonate therapy.  He has been trying to force himself to eat.  He has been commenced on antibiotic therapy for his C. difficile colitis.    PAST  MEDICAL HISTORY:   Past Medical History:   Diagnosis Date    Coronary artery disease     Diabetes mellitus (Multi)     DVT (deep venous thrombosis) (Multi)     Hypertension     MI (myocardial infarction) (Multi)      PAST SURGICAL HISTORY:   Past Surgical History:   Procedure Laterality Date    AMPUTATION      CHOLECYSTECTOMY  03/05/2025    Laparoscopic Cholecystectomy     FAMILY HISTORY: No family history on file.  SOCIAL HISTORY:   Social History     Tobacco Use    Smoking status: Never    Smokeless tobacco: Never     MEDICATIONS:  aspirin, 81 mg, oral, Daily  carvedilol, 12.5 mg, oral, BID  ezetimibe, 10 mg, oral, Nightly  famotidine, 20 mg, oral, Daily   Or  famotidine, 20 mg, intravenous, Daily  heparin (porcine), 5,000 Units, subcutaneous, q8h  [Held by provider] hydrALAZINE, 50 mg, oral, TID  lactobacillus acidophilus, 1 tablet, oral, BID  sodium bicarbonate, 1,300 mg, oral, TID  [Held by provider] torsemide, 60 mg, oral, Daily  vancomycin, 125 mg, oral, 4x daily       sodium chloride 0.9%, 100 mL/hr, Last Rate: 100 mL/hr (03/29/25 0952)       PRN medications: acetaminophen **OR** acetaminophen **OR** acetaminophen, melatonin, ondansetron **OR** ondansetron, prochlorperazine **OR** prochlorperazine **OR** prochlorperazine   CURRENT ALLERGIES:   Allergies as of 03/27/2025 - Reviewed 03/27/2025   Allergen Reaction Noted    Amlodipine besylate Swelling 10/14/2011       COMPLETE REVIEW OF SYSTEMS:    Full ROS was negative unless mentioned above    OBJECTIVE  PHYSICAL EXAM  Heart Rate:  [81-89]   Temp:  [36.8 °C (98.2 °F)-37.1 °C (98.8 °F)]   Resp:  [16-20]   BP: (118-150)/(64-87)   SpO2:  [96 %-100 %]    Body mass index is 35.81 kg/m².  This is an obese white man who appears in no acute distress  Fatigued affect  No obvious skin rashes  Hearing intact  Phonation intact  Moist mucosa  Regular heart rate  Breathing is unlabored  Abdomen is soft, nondistended, nontender, well-healed surgical scars  No [place] catheter  in place, no suprapubic tenderness to palpation  Trace bilateral lower extremity edema  Moves 4 limbs spontaneously  No obvious joint deformities  No lymphadenopathy    DATA:   Labs:  Results for orders placed or performed during the hospital encounter of 03/27/25 (from the past 96 hours)   CBC and Auto Differential   Result Value Ref Range    WBC 15.4 (H) 4.4 - 11.3 x10*3/uL    nRBC 0.0 0.0 - 0.0 /100 WBCs    RBC 3.54 (L) 4.50 - 5.90 x10*6/uL    Hemoglobin 9.8 (L) 13.5 - 17.5 g/dL    Hematocrit 31.6 (L) 41.0 - 52.0 %    MCV 89 80 - 100 fL    MCH 27.7 26.0 - 34.0 pg    MCHC 31.0 (L) 32.0 - 36.0 g/dL    RDW 15.4 (H) 11.5 - 14.5 %    Platelets 397 150 - 450 x10*3/uL    Neutrophils % 84.5 40.0 - 80.0 %    Immature Granulocytes %, Automated 0.5 0.0 - 0.9 %    Lymphocytes % 5.1 13.0 - 44.0 %    Monocytes % 6.5 2.0 - 10.0 %    Eosinophils % 2.9 0.0 - 6.0 %    Basophils % 0.5 0.0 - 2.0 %    Neutrophils Absolute 12.99 (H) 1.20 - 7.70 x10*3/uL    Immature Granulocytes Absolute, Automated 0.08 0.00 - 0.70 x10*3/uL    Lymphocytes Absolute 0.79 (L) 1.20 - 4.80 x10*3/uL    Monocytes Absolute 1.00 0.10 - 1.00 x10*3/uL    Eosinophils Absolute 0.45 0.00 - 0.70 x10*3/uL    Basophils Absolute 0.07 0.00 - 0.10 x10*3/uL   Comprehensive metabolic panel   Result Value Ref Range    Glucose 111 (H) 74 - 99 mg/dL    Sodium 135 (L) 136 - 145 mmol/L    Potassium 4.6 3.5 - 5.3 mmol/L    Chloride 109 (H) 98 - 107 mmol/L    Bicarbonate 15 (L) 21 - 32 mmol/L    Anion Gap 16 10 - 20 mmol/L    Urea Nitrogen 68 (H) 6 - 23 mg/dL    Creatinine 6.91 (H) 0.50 - 1.30 mg/dL    eGFR 8 (L) >60 mL/min/1.73m*2    Calcium 8.9 8.6 - 10.3 mg/dL    Albumin 3.5 3.4 - 5.0 g/dL    Alkaline Phosphatase 62 33 - 136 U/L    Total Protein 7.0 6.4 - 8.2 g/dL    AST 11 9 - 39 U/L    Bilirubin, Total 0.5 0.0 - 1.2 mg/dL    ALT 10 10 - 52 U/L   Lipase   Result Value Ref Range    Lipase 26 9 - 82 U/L   Sars-CoV-2 and Influenza A/B PCR   Result Value Ref Range    Flu A Result  Not Detected Not Detected    Flu B Result Not Detected Not Detected    Coronavirus 2019, PCR Not Detected Not Detected   Troponin I, High Sensitivity, Initial   Result Value Ref Range    Troponin I, High Sensitivity 14 0 - 20 ng/L   Troponin, High Sensitivity, 1 Hour   Result Value Ref Range    Troponin I, High Sensitivity 12 0 - 20 ng/L   ECG 12 lead   Result Value Ref Range    Ventricular Rate 80 BPM    Atrial Rate 80 BPM    AZ Interval 190 ms    QRS Duration 88 ms    QT Interval 402 ms    QTC Calculation(Bazett) 463 ms    P Axis 12 degrees    R Axis 48 degrees    T Axis 1 degrees    QRS Count 13 beats    Q Onset 219 ms    P Onset 124 ms    P Offset 189 ms    T Offset 420 ms    QTC Fredericia 442 ms   Lactate   Result Value Ref Range    Lactate 0.9 0.4 - 2.0 mmol/L   Urinalysis with Reflex Culture and Microscopic   Result Value Ref Range    Color, Urine Light-Yellow Light-Yellow, Yellow, Dark-Yellow    Appearance, Urine Clear Clear    Specific Gravity, Urine 1.012 1.005 - 1.035    pH, Urine 5.5 5.0, 5.5, 6.0, 6.5, 7.0, 7.5, 8.0    Protein, Urine 100 (2+) (A) NEGATIVE, 10 (TRACE), 20 (TRACE) mg/dL    Glucose, Urine 70 (1+) (A) Normal mg/dL    Blood, Urine NEGATIVE NEGATIVE mg/dL    Ketones, Urine NEGATIVE NEGATIVE mg/dL    Bilirubin, Urine NEGATIVE NEGATIVE mg/dL    Urobilinogen, Urine Normal Normal mg/dL    Nitrite, Urine NEGATIVE NEGATIVE    Leukocyte Esterase, Urine NEGATIVE NEGATIVE   Extra Urine Gray Tube   Result Value Ref Range    Extra Tube Hold for add-ons.    Urinalysis Microscopic   Result Value Ref Range    WBC, Urine 1-5 1-5, NONE /HPF    RBC, Urine 1-2 NONE, 1-2, 3-5 /HPF    Squamous Epithelial Cells, Urine 1-9 (SPARSE) Reference range not established. /HPF    Bacteria, Urine 1+ (A) NONE SEEN /HPF    Mucus, Urine FEW Reference range not established. /LPF   Comprehensive metabolic panel   Result Value Ref Range    Glucose 85 74 - 99 mg/dL    Sodium 136 136 - 145 mmol/L    Potassium 4.7 3.5 - 5.3 mmol/L     Chloride 112 (H) 98 - 107 mmol/L    Bicarbonate 16 (L) 21 - 32 mmol/L    Anion Gap 13 10 - 20 mmol/L    Urea Nitrogen 64 (H) 6 - 23 mg/dL    Creatinine 6.80 (H) 0.50 - 1.30 mg/dL    eGFR 9 (L) >60 mL/min/1.73m*2    Calcium 8.4 (L) 8.6 - 10.3 mg/dL    Albumin 3.0 (L) 3.4 - 5.0 g/dL    Alkaline Phosphatase 55 33 - 136 U/L    Total Protein 5.8 (L) 6.4 - 8.2 g/dL    AST 10 9 - 39 U/L    Bilirubin, Total 0.4 0.0 - 1.2 mg/dL    ALT 9 (L) 10 - 52 U/L   CBC and Auto Differential   Result Value Ref Range    WBC 12.9 (H) 4.4 - 11.3 x10*3/uL    nRBC 0.0 0.0 - 0.0 /100 WBCs    RBC 3.16 (L) 4.50 - 5.90 x10*6/uL    Hemoglobin 8.6 (L) 13.5 - 17.5 g/dL    Hematocrit 28.0 (L) 41.0 - 52.0 %    MCV 89 80 - 100 fL    MCH 27.2 26.0 - 34.0 pg    MCHC 30.7 (L) 32.0 - 36.0 g/dL    RDW 15.4 (H) 11.5 - 14.5 %    Platelets 324 150 - 450 x10*3/uL    Neutrophils % 77.5 40.0 - 80.0 %    Immature Granulocytes %, Automated 0.5 0.0 - 0.9 %    Lymphocytes % 8.2 13.0 - 44.0 %    Monocytes % 8.9 2.0 - 10.0 %    Eosinophils % 4.6 0.0 - 6.0 %    Basophils % 0.3 0.0 - 2.0 %    Neutrophils Absolute 9.98 (H) 1.20 - 7.70 x10*3/uL    Immature Granulocytes Absolute, Automated 0.07 0.00 - 0.70 x10*3/uL    Lymphocytes Absolute 1.06 (L) 1.20 - 4.80 x10*3/uL    Monocytes Absolute 1.14 (H) 0.10 - 1.00 x10*3/uL    Eosinophils Absolute 0.59 0.00 - 0.70 x10*3/uL    Basophils Absolute 0.04 0.00 - 0.10 x10*3/uL   Stool Pathogen Panel, PCR    Specimen: Stool   Result Value Ref Range    Campylobacter Group Not Detected Not Detected    Salmonella species Not Detected Not Detected    Shigella species Not Detected Not Detected    Vibrio Group Not Detected Not Detected    Yersinia Enterocolitica Not Detected Not Detected    Shiga Toxin 1 Not Detected Not Detected    Shiga Toxin 2 Not Detected Not Detected    Norovirus GI/GII Not Detected Not Detected    Rotavirus A Not Detected Not Detected   C. difficile, PCR    Specimen: Stool   Result Value Ref Range    C. difficile,  PCR Detected (A) Not Detected   Clostridium Difficile EIA    Specimen: Stool   Result Value Ref Range    C. difficile (Toxin A/B) Positive (A) Negative, Indeterminate   CBC and Auto Differential   Result Value Ref Range    WBC 15.4 (H) 4.4 - 11.3 x10*3/uL    nRBC 0.0 0.0 - 0.0 /100 WBCs    RBC 3.34 (L) 4.50 - 5.90 x10*6/uL    Hemoglobin 9.1 (L) 13.5 - 17.5 g/dL    Hematocrit 29.4 (L) 41.0 - 52.0 %    MCV 88 80 - 100 fL    MCH 27.2 26.0 - 34.0 pg    MCHC 31.0 (L) 32.0 - 36.0 g/dL    RDW 15.5 (H) 11.5 - 14.5 %    Platelets 318 150 - 450 x10*3/uL    Neutrophils % 80.2 40.0 - 80.0 %    Immature Granulocytes %, Automated 0.8 0.0 - 0.9 %    Lymphocytes % 6.5 13.0 - 44.0 %    Monocytes % 7.8 2.0 - 10.0 %    Eosinophils % 4.3 0.0 - 6.0 %    Basophils % 0.4 0.0 - 2.0 %    Neutrophils Absolute 12.37 (H) 1.20 - 7.70 x10*3/uL    Immature Granulocytes Absolute, Automated 0.13 0.00 - 0.70 x10*3/uL    Lymphocytes Absolute 1.01 (L) 1.20 - 4.80 x10*3/uL    Monocytes Absolute 1.20 (H) 0.10 - 1.00 x10*3/uL    Eosinophils Absolute 0.66 0.00 - 0.70 x10*3/uL    Basophils Absolute 0.06 0.00 - 0.10 x10*3/uL   Basic Metabolic Panel   Result Value Ref Range    Glucose 112 (H) 74 - 99 mg/dL    Sodium 136 136 - 145 mmol/L    Potassium 4.6 3.5 - 5.3 mmol/L    Chloride 112 (H) 98 - 107 mmol/L    Bicarbonate 15 (L) 21 - 32 mmol/L    Anion Gap 14 10 - 20 mmol/L    Urea Nitrogen 57 (H) 6 - 23 mg/dL    Creatinine 6.95 (H) 0.50 - 1.30 mg/dL    eGFR 8 (L) >60 mL/min/1.73m*2    Calcium 8.3 (L) 8.6 - 10.3 mg/dL       SIGNATURE: Boo Max MD PATIENT NAME: Colin Leonard   DATE: March 29, 2025 MRN: [id number]   TIME: 9:54 AM PAGER: [phone]

## 2025-03-29 NOTE — PROGRESS NOTES
Colin Leonard is a 59 y.o. male on day 2 of admission presenting with Colitis.      Subjective   Patient states that he still has multiple small watery bowel movements.  Still has some abdominal discomfort.       Objective     Last Recorded Vitals  /83 (BP Location: Left arm, Patient Position: Sitting)   Pulse 81   Temp 36.8 °C (98.2 °F) (Oral)   Resp 18   Wt 116 kg (256 lb 9.9 oz)   SpO2 99%   Intake/Output last 3 Shifts:    Intake/Output Summary (Last 24 hours) at 3/29/2025 1137  Last data filed at 3/29/2025 0952  Gross per 24 hour   Intake 4618.34 ml   Output 401 ml   Net 4217.34 ml       Admission Weight  Weight: 103 kg (227 lb 1.2 oz) (03/27/25 1242)    Daily Weight  03/28/25 : 116 kg (256 lb 9.9 oz)    Image Results  ECG 12 lead  Normal sinus rhythm  Possible Anterior infarct , age undetermined  Abnormal ECG  When compared with ECG of 10-MAR-2025 19:14,  Nonspecific T wave abnormality now evident in Inferior leads  Nonspecific T wave abnormality, improved in Lateral leads  Confirmed by Aleks Gooden ([id number]) on 3/28/2025 9:15:48 AM      Physical Exam  Seen with his bedside nurse and [facility] rounds.  Pt is NAD.  Not look toxic cooperative with exam.  In no distress at rest.  A, Ox3.  Face is symmetrical.  Skin - no lesions.  Lungs: clear to auscultations B/L. No wheezes, rales, rhonchi.  Heart: regular S1S2.  Abdomen: soft, NT, ND. BS positive.  Mildly tender in the left upper quadrant  Extr.: no edema, cords, cyanosis.  Moves all extr.   Relevant Results               Assessment/Plan                  Assessment & Plan  Colitis  Seen on CT A/P with component of possible acute diverticulitis  Consult GI  Given recent hospital admission, high suspicion for c diff infection  Check c diff and stool pathogens   Start empiric PO vanc   Gentle IVF  Clear liquids for now  Primary hypertension  Resume home coreg at lower dose with hold parameters  Hold hydralazine for now, resume if persistently hypertensive    CKD (chronic kidney disease) stage 5, GFR less than 15 ml/min (Multi)  Baseline Cr ~6.8  Follows with Dr. Max  Stable, at baseline   Anemia of chronic disease  Due to CKD  Baseline Hgb ~8  Stable, at baseline       03/28/2025:  Labs reviewed.  C. difficile colitis.  Recently was treated by antibiotics on few occasions.  Clinically is already better.  Continue oral vancomycin.  Followed by gastroenterology.    Dehydration.  Gentle hydration.  Monitor fluid status.  Currently does not seem to be volume overloaded should be able to tolerate IV hydration for another 24 hours.    Acute on chronic renal failure.  Patient has stage V renal disease, he was evaluated by nephrology during previous admissions.  Will consult nephrology.  Hold Demadex for now    Primary hypertension.  Continue medications  Hydralazine is on hold, restart     Monitor BMP and CBC    03/29/2025:    C. difficile colitis.  Still has a lot of watery diarrhea.  Increase vancomycin to 254 times a day and add Questran.  Chronic renal failure stage V.  Continue IV hydration, monitor electrolytes.  Leukocytosis, worsening.  Patient is not toxic.  Continue current therapy.  Monitor CBC.  Patient is not ready to discharge yet.     Judy Espinal MD

## 2025-03-30 VITALS
TEMPERATURE: 98.2 F | OXYGEN SATURATION: 100 % | RESPIRATION RATE: 16 BRPM | SYSTOLIC BLOOD PRESSURE: 146 MMHG | BODY MASS INDEX: 35.93 KG/M2 | HEIGHT: 71 IN | HEART RATE: 72 BPM | DIASTOLIC BLOOD PRESSURE: 65 MMHG | WEIGHT: 256.62 LBS

## 2025-03-30 PROBLEM — A04.72 C. DIFFICILE COLITIS: Status: RESOLVED | Noted: 2025-03-30 | Resolved: 2025-03-30

## 2025-03-30 PROBLEM — D63.8 ANEMIA OF CHRONIC DISEASE: Status: RESOLVED | Noted: 2025-03-04 | Resolved: 2025-03-30

## 2025-03-30 PROBLEM — A04.72 C. DIFFICILE COLITIS: Status: ACTIVE | Noted: 2025-03-30

## 2025-03-30 PROBLEM — K52.9 COLITIS: Status: RESOLVED | Noted: 2025-03-27 | Resolved: 2025-03-30

## 2025-03-30 LAB
ALBUMIN SERPL BCP-MCNC: 2.8 G/DL (ref 3.4–5)
ANION GAP SERPL CALCULATED.3IONS-SCNC: 10 MMOL/L (ref 10–20)
BASOPHILS # BLD AUTO: 0.05 X10*3/UL (ref 0–0.1)
BASOPHILS NFR BLD AUTO: 0.4 %
BUN SERPL-MCNC: 51 MG/DL (ref 6–23)
CALCIUM SERPL-MCNC: 8.3 MG/DL (ref 8.6–10.3)
CHLORIDE SERPL-SCNC: 114 MMOL/L (ref 98–107)
CO2 SERPL-SCNC: 17 MMOL/L (ref 21–32)
CREAT SERPL-MCNC: 6.98 MG/DL (ref 0.5–1.3)
EGFRCR SERPLBLD CKD-EPI 2021: 8 ML/MIN/1.73M*2
EOSINOPHIL # BLD AUTO: 0.8 X10*3/UL (ref 0–0.7)
EOSINOPHIL NFR BLD AUTO: 6 %
ERYTHROCYTE [DISTWIDTH] IN BLOOD BY AUTOMATED COUNT: 15.5 % (ref 11.5–14.5)
GLUCOSE SERPL-MCNC: 89 MG/DL (ref 74–99)
HCT VFR BLD AUTO: 25.1 % (ref 41–52)
HGB BLD-MCNC: 8.1 G/DL (ref 13.5–17.5)
IMM GRANULOCYTES # BLD AUTO: 0.14 X10*3/UL (ref 0–0.7)
IMM GRANULOCYTES NFR BLD AUTO: 1.1 % (ref 0–0.9)
LYMPHOCYTES # BLD AUTO: 1.5 X10*3/UL (ref 1.2–4.8)
LYMPHOCYTES NFR BLD AUTO: 11.3 %
MCH RBC QN AUTO: 27.5 PG (ref 26–34)
MCHC RBC AUTO-ENTMCNC: 32.3 G/DL (ref 32–36)
MCV RBC AUTO: 85 FL (ref 80–100)
MONOCYTES # BLD AUTO: 1.11 X10*3/UL (ref 0.1–1)
MONOCYTES NFR BLD AUTO: 8.4 %
NEUTROPHILS # BLD AUTO: 9.67 X10*3/UL (ref 1.2–7.7)
NEUTROPHILS NFR BLD AUTO: 72.8 %
NRBC BLD-RTO: 0 /100 WBCS (ref 0–0)
PHOSPHATE SERPL-MCNC: 4.6 MG/DL (ref 2.5–4.9)
PLATELET # BLD AUTO: 327 X10*3/UL (ref 150–450)
POTASSIUM SERPL-SCNC: 4.4 MMOL/L (ref 3.5–5.3)
RBC # BLD AUTO: 2.95 X10*6/UL (ref 4.5–5.9)
SODIUM SERPL-SCNC: 137 MMOL/L (ref 136–145)
WBC # BLD AUTO: 13.3 X10*3/UL (ref 4.4–11.3)

## 2025-03-30 PROCEDURE — 84100 ASSAY OF PHOSPHORUS: CPT | Performed by: INTERNAL MEDICINE

## 2025-03-30 PROCEDURE — 99239 HOSP IP/OBS DSCHRG MGMT >30: CPT | Performed by: INTERNAL MEDICINE

## 2025-03-30 PROCEDURE — 85025 COMPLETE CBC W/AUTO DIFF WBC: CPT | Performed by: STUDENT IN AN ORGANIZED HEALTH CARE EDUCATION/TRAINING PROGRAM

## 2025-03-30 PROCEDURE — 2500000001 HC RX 250 WO HCPCS SELF ADMINISTERED DRUGS (ALT 637 FOR MEDICARE OP): Performed by: STUDENT IN AN ORGANIZED HEALTH CARE EDUCATION/TRAINING PROGRAM

## 2025-03-30 PROCEDURE — 2500000001 HC RX 250 WO HCPCS SELF ADMINISTERED DRUGS (ALT 637 FOR MEDICARE OP)

## 2025-03-30 PROCEDURE — 36415 COLL VENOUS BLD VENIPUNCTURE: CPT | Performed by: INTERNAL MEDICINE

## 2025-03-30 PROCEDURE — 2500000002 HC RX 250 W HCPCS SELF ADMINISTERED DRUGS (ALT 637 FOR MEDICARE OP, ALT 636 FOR OP/ED): Performed by: INTERNAL MEDICINE

## 2025-03-30 PROCEDURE — 2500000004 HC RX 250 GENERAL PHARMACY W/ HCPCS (ALT 636 FOR OP/ED): Performed by: STUDENT IN AN ORGANIZED HEALTH CARE EDUCATION/TRAINING PROGRAM

## 2025-03-30 PROCEDURE — 2500000001 HC RX 250 WO HCPCS SELF ADMINISTERED DRUGS (ALT 637 FOR MEDICARE OP): Performed by: INTERNAL MEDICINE

## 2025-03-30 RX ORDER — CARVEDILOL 25 MG/1
25 TABLET ORAL 2 TIMES DAILY
Status: DISCONTINUED | OUTPATIENT
Start: 2025-03-30 | End: 2025-03-30 | Stop reason: HOSPADM

## 2025-03-30 RX ORDER — CHOLESTYRAMINE 4 G/4.8G
4 POWDER, FOR SUSPENSION ORAL 2 TIMES DAILY
Qty: 20 PACKET | Refills: 0 | Status: ON HOLD | OUTPATIENT
Start: 2025-03-30 | End: 2025-04-09

## 2025-03-30 RX ORDER — L. ACIDOPHILUS/L.BULGARICUS 1MM CELL
2 TABLET ORAL 2 TIMES DAILY
Qty: 120 TABLET | Refills: 0 | Status: SHIPPED | OUTPATIENT
Start: 2025-03-30 | End: 2025-04-01 | Stop reason: SDUPTHER

## 2025-03-30 RX ORDER — VANCOMYCIN HYDROCHLORIDE 125 MG/1
125 CAPSULE ORAL 4 TIMES DAILY
Qty: 46 CAPSULE | Refills: 0 | Status: SHIPPED | OUTPATIENT
Start: 2025-03-30 | End: 2025-04-01 | Stop reason: SDUPTHER

## 2025-03-30 RX ADMIN — CARVEDILOL 12.5 MG: 12.5 TABLET, FILM COATED ORAL at 10:00

## 2025-03-30 RX ADMIN — Medication 1 TABLET: at 09:57

## 2025-03-30 RX ADMIN — SODIUM BICARBONATE 1300 MG: 650 TABLET ORAL at 09:57

## 2025-03-30 RX ADMIN — ACETAMINOPHEN 650 MG: 325 TABLET, FILM COATED ORAL at 09:57

## 2025-03-30 RX ADMIN — FAMOTIDINE 20 MG: 20 TABLET, FILM COATED ORAL at 09:58

## 2025-03-30 RX ADMIN — ASPIRIN 81 MG: 81 TABLET, COATED ORAL at 09:58

## 2025-03-30 RX ADMIN — CHOLESTYRAMINE 4 G: 4 POWDER, FOR SUSPENSION ORAL at 09:57

## 2025-03-30 RX ADMIN — VANCOMYCIN HYDROCHLORIDE 125 MG: 125 CAPSULE ORAL at 09:57

## 2025-03-30 ASSESSMENT — PAIN SCALES - GENERAL: PAINLEVEL_OUTOF10: 3

## 2025-03-30 ASSESSMENT — COGNITIVE AND FUNCTIONAL STATUS - GENERAL
DAILY ACTIVITIY SCORE: 24
MOBILITY SCORE: 24

## 2025-03-30 ASSESSMENT — PAIN DESCRIPTION - DESCRIPTORS: DESCRIPTORS: ACHING

## 2025-03-30 ASSESSMENT — PAIN - FUNCTIONAL ASSESSMENT
PAIN_FUNCTIONAL_ASSESSMENT: FLACC (FACE, LEGS, ACTIVITY, CRY, CONSOLABILITY)
PAIN_FUNCTIONAL_ASSESSMENT: 0-10

## 2025-03-30 NOTE — DISCHARGE SUMMARY
Discharge Diagnosis  C. difficile colitis  Hypertension  Dehydration  Chronic renal disease stage V    Issues Requiring Follow-Up  C. difficile colitis  Chronic renal disease stage V     Discharge Meds     Medication List      START taking these medications     cholestyramine light 4 gram packet; Commonly known as: Prevalite; Take 1   packet (4 g) by mouth 2 times a day for 10 days.   lactobacillus acidophilus tablet tablet; Take 2 tablets by mouth 2 times   a day.   vancomycin 125 mg capsule; Commonly known as: Vancocin; Take 1 capsule   (125 mg) by mouth 4 times a day for 46 doses.     CONTINUE taking these medications     acetaminophen 325 mg tablet; Commonly known as: Tylenol; Take 2 tablets   (650 mg) by mouth every 6 hours.   aspirin 81 mg EC tablet   carvedilol 25 mg tablet; Commonly known as: Coreg; Take 1 tablet (25 mg)   by mouth 2 times a day.   EMERGEN-C ORAL   ezetimibe 10 mg tablet; Commonly known as: Zetia; Take 1 tablet (10 mg)   by mouth once daily at bedtime.   hydrALAZINE 100 mg tablet; Commonly known as: Apresoline; Take 1 tablet   (100 mg) by mouth 3 times a day.   methocarbamol 500 mg tablet; Commonly known as: Robaxin; Take 1 tablet   (500 mg) by mouth every 8 hours if needed for muscle spasms for up to 5   days.   multivitamin tablet   NON FORMULARY   sodium bicarbonate 650 mg tablet; Take 2 tablets (1,300 mg) by mouth 3   times a day.   torsemide 20 mg tablet; Commonly known as: Demadex; Take 3 tablets (60   mg) by mouth once daily.       Test Results Pending At Discharge  Pending Labs       No current pending labs.            Hospital Course   History:  Colin Leonard is a 59 y.o. male hx of CKD5 not on dialysis, HTN, anemia of chronic disease, HLD presenting with nausea, abdominal pain, and diarrhea. Patient reports profuse watery diarrhea ongoing for the last ~10 days. States he's having multiple episodes a day and has diarrhea almost immediately after he eats or drinks anything.  Denies fever, vomiting, SOB, CP, hematochezia, and difficulty urinating. Patient was recently admitted at [facility] from 3/3/25-3/9/25 where he underwent cholecystectomy. Readmitted at [facility] from 3/10/25-3/13/25 for pneumonia and hypertensive urgency. Patient states he felt okay 1-2 days after hospital discharge but then developed diarrhea that progressively worsened after that. Patient presented to general surgery OP visit with Dr. Zhang who instructed patient to come to the emergency department for further evaluation.     CT of the abdomen:  Interval cholecystectomy.      New multifocal colonic wall thickening and adjacent inflammatory  changes most likely representing a nonspecific infectious or  inflammatory colitis. Moderate circumferential wall thickening of the  ascending colon with adjacent inflammatory change and trace fluid.      Scattered colonic diverticula. New more localized pericolonic  inflammatory changes of the mid sigmoid colon level may represent a  component of acute diverticulitis.      No bowel obstruction.    Testing for C. difficile came back positive.    Renal function remained stable    Patient received IV hydration.  He was started oral vancomycin and we added Questran.  Electrolytes were monitored daily as well as renal function.  Patient was seen by Dr. Max who feels that patient can be safely discharged from his standpoint.  Dr. Max recommends to restart diuretics when diarrhea stops completely.      Patient is being discharged home in stable condition.  He will finish oral vancomycin, total 14 days with Questran.    He was warned about possibility of recurrence of diarrhea within few weeks from finishing treatment, and he was advised to contact physician immediately should he have another bouts of diarrhea.      [place] answered.  Total time spent with patient today coordinating discharge, including exam, discussion, paperwork 33 minutes.        Outpatient  Follow-Up  No future appointments.      Judy Espinal MD

## 2025-03-30 NOTE — CARE PLAN
The patient's goals for the shift include discharge    The clinical goals for the shift include discharge

## 2025-03-30 NOTE — NURSING NOTE
"Pt agitated with being here. Wants to go home. Refusing medications at this time, stating \"I'll take them in a while when I'm done it this phone call.\"   "
"Pt requesting to shower explained t pt he cannot no order and since he fell in our shower prior admit he is a safety concern. Pt stated \" I'm going to fucking tejal\" Pt w/ aide provided bath basin and water to wash up on toilet with aides assistance  "
"Went in to inform pt re c diff from stool.  Friend stated that he's sleeping . Explained that I needed to give pt Info. Education provided verbally and paper. Friend at bedside pt stated I could speak in front of him. Pt stated that is was cause for a lawsuit since if was not predisclosed. I informed Pt that c diff can be caused by increase use of antibiotics and frequent hospitalizations. His friend stated that \"I guess we shouldn't come her anymore\" Enc pt to wash hands with soap and water and for visitors to adhere to iso policy.   "
Assumed care of pt at this time.  
Assumed care of pt no complaints voiced no stools noted since admission  
Assumed care of pt. Call light within reach.   
Dr elliott in to see pt  
GI cnp in to see pt  
Order review end of shift completed at this time.   
Pt assisted to br by aide. Pt disconnected his own iv and threw it on the floor. Enc pt to keep iv on  
Pt is agitated at this time , states he can not get comfortable and he is just miserable.   Pt was medicated with tylenol for complaints of general pain, see MAR  IV pump alarming, pt was not moving arm or anything. Pt swearing and screaming at pump, pt disconnected per pt demand to shut the fucking thing up.   
Pt resting quietly family at bedside. Pt very sleepy  
Received pt from er via cart pt pivoted to bed tolerated well. Son and father at bedside  
Resting quietly no complaints  
Son picked up at this time to transfer home. Left with all belongings.   
VSS, pt denies any needs at this time, bed locked and low position. Belongings in reach. Call light in reach. Plan of care ongoing.  
VSS, pt denies any needs at this time, bed locked and low position. Belongings in reach. Call light in reach. Plan of care ongoing.  
urine sent at this Time    
declines

## 2025-03-30 NOTE — PROGRESS NOTES
CONSULT PROGRESS NOTES    SERVICE DATE: 3/30/2025   SERVICE TIME: 10:03 AM    CONSULTING SERVICE: Nephrology    ASSESSMENT AND PLAN   59-year-old man with numerous medical problems admitted with C. difficile colitis.  1.  Acute renal failure  2.  Chronic metabolic acidosis  3.  Anemia of chronic kidney disease  4.  Chronic kidney disease stage V  5.  Essential hypertension     Acute on chronic renal failure from volume depletion in the context of C. difficile colitis.  He has received a few days of IV fluids and we have held his diuretics.  I dosed him with erythropoietin stimulating agents given that he is quite anemic and somewhat fatigued.  Hold IV iron for now.  I increased his carvedilol to 25 twice a day.  He can resume his diuretics when his diarrhea resolves, probably either tomorrow or the next day.  No other major electrolyte disturbances, save a chronic acidosis, which is compounded by his diarrhea.  Needs continuation of his bicarbonate therapy.  Case discussed with Dr. Espinal.  Continue daily labs, continue supportive care.  I have no objection to discharge from my perspective and I will arrange outpatient follow-up.    SUBJECTIVE  INTERVAL HPI: He had a very large formed bowel movement this morning and he was quite happy about it.  He has no dysuria.  There is no dyspnea.  He wants to be discharged.  He has a great appetite.    MEDICATIONS:  aspirin, 81 mg, oral, Daily  carvedilol, 25 mg, oral, BID  cholestyramine light, 4 g, oral, BID  ezetimibe, 10 mg, oral, Nightly  famotidine, 20 mg, oral, Daily   Or  famotidine, 20 mg, intravenous, Daily  heparin (porcine), 5,000 Units, subcutaneous, q8h  [Held by provider] hydrALAZINE, 50 mg, oral, TID  lactobacillus acidophilus, 1 tablet, oral, BID  sodium bicarbonate, 1,300 mg, oral, TID  [Held by provider] torsemide, 60 mg, oral, Daily  vancomycin, 125 mg, oral, 4x daily           PRN medications: acetaminophen **OR** acetaminophen **OR** acetaminophen,  melatonin, ondansetron **OR** ondansetron, prochlorperazine **OR** prochlorperazine **OR** prochlorperazine     OBJECTIVE  PHYSICAL EXAM:   Heart Rate:  [72-87]   Temp:  [36.7 °C (98.1 °F)-37.2 °C (99 °F)]   Resp:  [16-20]   BP: (146-179)/(65-94)   SpO2:  [96 %-100 %]   Body mass index is 35.81 kg/m².  obese white man who appears in no acute distress  Fatigued affect  No obvious skin rashes  Hearing intact  Phonation intact  Moist mucosa  Regular heart rate  Breathing is unlabored  Abdomen is soft, nondistended, nontender, well-healed surgical scars  No Elmore catheter in place, no suprapubic tenderness to palpation  Trace bilateral lower extremity edema  Moves 4 limbs spontaneously  No obvious joint deformities  No lymphadenopathy    DATA:   Labs:  Results for orders placed or performed during the hospital encounter of 03/27/25 (from the past 96 hours)   CBC and Auto Differential   Result Value Ref Range    WBC 15.4 (H) 4.4 - 11.3 x10*3/uL    nRBC 0.0 0.0 - 0.0 /100 WBCs    RBC 3.54 (L) 4.50 - 5.90 x10*6/uL    Hemoglobin 9.8 (L) 13.5 - 17.5 g/dL    Hematocrit 31.6 (L) 41.0 - 52.0 %    MCV 89 80 - 100 fL    MCH 27.7 26.0 - 34.0 pg    MCHC 31.0 (L) 32.0 - 36.0 g/dL    RDW 15.4 (H) 11.5 - 14.5 %    Platelets 397 150 - 450 x10*3/uL    Neutrophils % 84.5 40.0 - 80.0 %    Immature Granulocytes %, Automated 0.5 0.0 - 0.9 %    Lymphocytes % 5.1 13.0 - 44.0 %    Monocytes % 6.5 2.0 - 10.0 %    Eosinophils % 2.9 0.0 - 6.0 %    Basophils % 0.5 0.0 - 2.0 %    Neutrophils Absolute 12.99 (H) 1.20 - 7.70 x10*3/uL    Immature Granulocytes Absolute, Automated 0.08 0.00 - 0.70 x10*3/uL    Lymphocytes Absolute 0.79 (L) 1.20 - 4.80 x10*3/uL    Monocytes Absolute 1.00 0.10 - 1.00 x10*3/uL    Eosinophils Absolute 0.45 0.00 - 0.70 x10*3/uL    Basophils Absolute 0.07 0.00 - 0.10 x10*3/uL   Comprehensive metabolic panel   Result Value Ref Range    Glucose 111 (H) 74 - 99 mg/dL    Sodium 135 (L) 136 - 145 mmol/L    Potassium 4.6 3.5 - 5.3  mmol/L    Chloride 109 (H) 98 - 107 mmol/L    Bicarbonate 15 (L) 21 - 32 mmol/L    Anion Gap 16 10 - 20 mmol/L    Urea Nitrogen 68 (H) 6 - 23 mg/dL    Creatinine 6.91 (H) 0.50 - 1.30 mg/dL    eGFR 8 (L) >60 mL/min/1.73m*2    Calcium 8.9 8.6 - 10.3 mg/dL    Albumin 3.5 3.4 - 5.0 g/dL    Alkaline Phosphatase 62 33 - 136 U/L    Total Protein 7.0 6.4 - 8.2 g/dL    AST 11 9 - 39 U/L    Bilirubin, Total 0.5 0.0 - 1.2 mg/dL    ALT 10 10 - 52 U/L   Lipase   Result Value Ref Range    Lipase 26 9 - 82 U/L   Sars-CoV-2 and Influenza A/B PCR   Result Value Ref Range    Flu A Result Not Detected Not Detected    Flu B Result Not Detected Not Detected    Coronavirus 2019, PCR Not Detected Not Detected   Troponin I, High Sensitivity, Initial   Result Value Ref Range    Troponin I, High Sensitivity 14 0 - 20 ng/L   Troponin, High Sensitivity, 1 Hour   Result Value Ref Range    Troponin I, High Sensitivity 12 0 - 20 ng/L   ECG 12 lead   Result Value Ref Range    Ventricular Rate 80 BPM    Atrial Rate 80 BPM    CT Interval 190 ms    QRS Duration 88 ms    QT Interval 402 ms    QTC Calculation(Bazett) 463 ms    P Axis 12 degrees    R Axis 48 degrees    T Axis 1 degrees    QRS Count 13 beats    Q Onset 219 ms    P Onset 124 ms    P Offset 189 ms    T Offset 420 ms    QTC Fredericia 442 ms   Lactate   Result Value Ref Range    Lactate 0.9 0.4 - 2.0 mmol/L   Urinalysis with Reflex Culture and Microscopic   Result Value Ref Range    Color, Urine Light-Yellow Light-Yellow, Yellow, Dark-Yellow    Appearance, Urine Clear Clear    Specific Gravity, Urine 1.012 1.005 - 1.035    pH, Urine 5.5 5.0, 5.5, 6.0, 6.5, 7.0, 7.5, 8.0    Protein, Urine 100 (2+) (A) NEGATIVE, 10 (TRACE), 20 (TRACE) mg/dL    Glucose, Urine 70 (1+) (A) Normal mg/dL    Blood, Urine NEGATIVE NEGATIVE mg/dL    Ketones, Urine NEGATIVE NEGATIVE mg/dL    Bilirubin, Urine NEGATIVE NEGATIVE mg/dL    Urobilinogen, Urine Normal Normal mg/dL    Nitrite, Urine NEGATIVE NEGATIVE     Leukocyte Esterase, Urine NEGATIVE NEGATIVE   Extra Urine Gray Tube   Result Value Ref Range    Extra Tube Hold for add-ons.    Urinalysis Microscopic   Result Value Ref Range    WBC, Urine 1-5 1-5, NONE /HPF    RBC, Urine 1-2 NONE, 1-2, 3-5 /HPF    Squamous Epithelial Cells, Urine 1-9 (SPARSE) Reference range not established. /HPF    Bacteria, Urine 1+ (A) NONE SEEN /HPF    Mucus, Urine FEW Reference range not established. /LPF   Comprehensive metabolic panel   Result Value Ref Range    Glucose 85 74 - 99 mg/dL    Sodium 136 136 - 145 mmol/L    Potassium 4.7 3.5 - 5.3 mmol/L    Chloride 112 (H) 98 - 107 mmol/L    Bicarbonate 16 (L) 21 - 32 mmol/L    Anion Gap 13 10 - 20 mmol/L    Urea Nitrogen 64 (H) 6 - 23 mg/dL    Creatinine 6.80 (H) 0.50 - 1.30 mg/dL    eGFR 9 (L) >60 mL/min/1.73m*2    Calcium 8.4 (L) 8.6 - 10.3 mg/dL    Albumin 3.0 (L) 3.4 - 5.0 g/dL    Alkaline Phosphatase 55 33 - 136 U/L    Total Protein 5.8 (L) 6.4 - 8.2 g/dL    AST 10 9 - 39 U/L    Bilirubin, Total 0.4 0.0 - 1.2 mg/dL    ALT 9 (L) 10 - 52 U/L   CBC and Auto Differential   Result Value Ref Range    WBC 12.9 (H) 4.4 - 11.3 x10*3/uL    nRBC 0.0 0.0 - 0.0 /100 WBCs    RBC 3.16 (L) 4.50 - 5.90 x10*6/uL    Hemoglobin 8.6 (L) 13.5 - 17.5 g/dL    Hematocrit 28.0 (L) 41.0 - 52.0 %    MCV 89 80 - 100 fL    MCH 27.2 26.0 - 34.0 pg    MCHC 30.7 (L) 32.0 - 36.0 g/dL    RDW 15.4 (H) 11.5 - 14.5 %    Platelets 324 150 - 450 x10*3/uL    Neutrophils % 77.5 40.0 - 80.0 %    Immature Granulocytes %, Automated 0.5 0.0 - 0.9 %    Lymphocytes % 8.2 13.0 - 44.0 %    Monocytes % 8.9 2.0 - 10.0 %    Eosinophils % 4.6 0.0 - 6.0 %    Basophils % 0.3 0.0 - 2.0 %    Neutrophils Absolute 9.98 (H) 1.20 - 7.70 x10*3/uL    Immature Granulocytes Absolute, Automated 0.07 0.00 - 0.70 x10*3/uL    Lymphocytes Absolute 1.06 (L) 1.20 - 4.80 x10*3/uL    Monocytes Absolute 1.14 (H) 0.10 - 1.00 x10*3/uL    Eosinophils Absolute 0.59 0.00 - 0.70 x10*3/uL    Basophils Absolute 0.04  0.00 - 0.10 x10*3/uL   Stool Pathogen Panel, PCR    Specimen: Stool   Result Value Ref Range    Campylobacter Group Not Detected Not Detected    Salmonella species Not Detected Not Detected    Shigella species Not Detected Not Detected    Vibrio Group Not Detected Not Detected    Yersinia Enterocolitica Not Detected Not Detected    Shiga Toxin 1 Not Detected Not Detected    Shiga Toxin 2 Not Detected Not Detected    Norovirus GI/GII Not Detected Not Detected    Rotavirus A Not Detected Not Detected   C. difficile, PCR    Specimen: Stool   Result Value Ref Range    C. difficile, PCR Detected (A) Not Detected   Clostridium Difficile EIA    Specimen: Stool   Result Value Ref Range    C. difficile (Toxin A/B) Positive (A) Negative, Indeterminate   CBC and Auto Differential   Result Value Ref Range    WBC 15.4 (H) 4.4 - 11.3 x10*3/uL    nRBC 0.0 0.0 - 0.0 /100 WBCs    RBC 3.34 (L) 4.50 - 5.90 x10*6/uL    Hemoglobin 9.1 (L) 13.5 - 17.5 g/dL    Hematocrit 29.4 (L) 41.0 - 52.0 %    MCV 88 80 - 100 fL    MCH 27.2 26.0 - 34.0 pg    MCHC 31.0 (L) 32.0 - 36.0 g/dL    RDW 15.5 (H) 11.5 - 14.5 %    Platelets 318 150 - 450 x10*3/uL    Neutrophils % 80.2 40.0 - 80.0 %    Immature Granulocytes %, Automated 0.8 0.0 - 0.9 %    Lymphocytes % 6.5 13.0 - 44.0 %    Monocytes % 7.8 2.0 - 10.0 %    Eosinophils % 4.3 0.0 - 6.0 %    Basophils % 0.4 0.0 - 2.0 %    Neutrophils Absolute 12.37 (H) 1.20 - 7.70 x10*3/uL    Immature Granulocytes Absolute, Automated 0.13 0.00 - 0.70 x10*3/uL    Lymphocytes Absolute 1.01 (L) 1.20 - 4.80 x10*3/uL    Monocytes Absolute 1.20 (H) 0.10 - 1.00 x10*3/uL    Eosinophils Absolute 0.66 0.00 - 0.70 x10*3/uL    Basophils Absolute 0.06 0.00 - 0.10 x10*3/uL   Basic Metabolic Panel   Result Value Ref Range    Glucose 112 (H) 74 - 99 mg/dL    Sodium 136 136 - 145 mmol/L    Potassium 4.6 3.5 - 5.3 mmol/L    Chloride 112 (H) 98 - 107 mmol/L    Bicarbonate 15 (L) 21 - 32 mmol/L    Anion Gap 14 10 - 20 mmol/L    Urea  Nitrogen 57 (H) 6 - 23 mg/dL    Creatinine 6.95 (H) 0.50 - 1.30 mg/dL    eGFR 8 (L) >60 mL/min/1.73m*2    Calcium 8.3 (L) 8.6 - 10.3 mg/dL   CBC and Auto Differential   Result Value Ref Range    WBC 13.3 (H) 4.4 - 11.3 x10*3/uL    nRBC 0.0 0.0 - 0.0 /100 WBCs    RBC 2.95 (L) 4.50 - 5.90 x10*6/uL    Hemoglobin 8.1 (L) 13.5 - 17.5 g/dL    Hematocrit 25.1 (L) 41.0 - 52.0 %    MCV 85 80 - 100 fL    MCH 27.5 26.0 - 34.0 pg    MCHC 32.3 32.0 - 36.0 g/dL    RDW 15.5 (H) 11.5 - 14.5 %    Platelets 327 150 - 450 x10*3/uL    Neutrophils % 72.8 40.0 - 80.0 %    Immature Granulocytes %, Automated 1.1 (H) 0.0 - 0.9 %    Lymphocytes % 11.3 13.0 - 44.0 %    Monocytes % 8.4 2.0 - 10.0 %    Eosinophils % 6.0 0.0 - 6.0 %    Basophils % 0.4 0.0 - 2.0 %    Neutrophils Absolute 9.67 (H) 1.20 - 7.70 x10*3/uL    Immature Granulocytes Absolute, Automated 0.14 0.00 - 0.70 x10*3/uL    Lymphocytes Absolute 1.50 1.20 - 4.80 x10*3/uL    Monocytes Absolute 1.11 (H) 0.10 - 1.00 x10*3/uL    Eosinophils Absolute 0.80 (H) 0.00 - 0.70 x10*3/uL    Basophils Absolute 0.05 0.00 - 0.10 x10*3/uL   Renal Function Panel   Result Value Ref Range    Glucose 89 74 - 99 mg/dL    Sodium 137 136 - 145 mmol/L    Potassium 4.4 3.5 - 5.3 mmol/L    Chloride 114 (H) 98 - 107 mmol/L    Bicarbonate 17 (L) 21 - 32 mmol/L    Anion Gap 10 10 - 20 mmol/L    Urea Nitrogen 51 (H) 6 - 23 mg/dL    Creatinine 6.98 (H) 0.50 - 1.30 mg/dL    eGFR 8 (L) >60 mL/min/1.73m*2    Calcium 8.3 (L) 8.6 - 10.3 mg/dL    Phosphorus 4.6 2.5 - 4.9 mg/dL    Albumin 2.8 (L) 3.4 - 5.0 g/dL         SIGNATURE: Boo Max MD PATIENT NAME: Colin Leonard   DATE: March 30, 2025 MRN: [id number]   TIME: 10:03 AM PAGER: [phone]

## 2025-03-31 ENCOUNTER — PATIENT OUTREACH (OUTPATIENT)
Dept: PRIMARY CARE | Facility: CLINIC | Age: 60
End: 2025-03-31
Payer: COMMERCIAL

## 2025-04-01 ENCOUNTER — PATIENT OUTREACH (OUTPATIENT)
Dept: PRIMARY CARE | Facility: CLINIC | Age: 60
End: 2025-04-01
Payer: COMMERCIAL

## 2025-04-01 DIAGNOSIS — A04.72 C. DIFFICILE COLITIS: ICD-10-CM

## 2025-04-01 RX ORDER — L. ACIDOPHILUS/L.BULGARICUS 1MM CELL
2 TABLET ORAL 2 TIMES DAILY
Qty: 120 TABLET | Refills: 0 | Status: ON HOLD | OUTPATIENT
Start: 2025-04-01 | End: 2025-05-01

## 2025-04-01 RX ORDER — VANCOMYCIN HYDROCHLORIDE 125 MG/1
125 CAPSULE ORAL 4 TIMES DAILY
Qty: 46 CAPSULE | Refills: 0 | Status: ON HOLD | OUTPATIENT
Start: 2025-04-01 | End: 2025-04-13

## 2025-04-01 NOTE — PROGRESS NOTES
Discharge Facility:[facility]  Discharge Diagnosis:    Discharge Diagnosis  C. difficile colitis  Hypertension  Dehydration  Chronic renal disease stage V        Admission Date:3/27/25  Discharge Date: 3/30/25    PCP Appointment Date:Task to office staff   Specialist Appointment Date: Nephrology   Hospital Encounter and Summary Linked: Yes  See discharge assessment below for further details  ED to Hosp-Admission (Discharged) with Judy Espinal MD; Mary Luque MD; Madalyn Cole MD (03/27/2025)     Two attempts were made to reach patient within two business days after discharge. Voicemail left with contact information for patient to call back with any non-emergent questions or concerns.    Lianet Romero LPN

## 2025-04-02 ENCOUNTER — HOSPITAL ENCOUNTER (EMERGENCY)
Facility: HOSPITAL | Age: 60
Discharge: HOME | End: 2025-04-02
Attending: STUDENT IN AN ORGANIZED HEALTH CARE EDUCATION/TRAINING PROGRAM
Payer: COMMERCIAL

## 2025-04-02 ENCOUNTER — APPOINTMENT (OUTPATIENT)
Dept: RADIOLOGY | Facility: HOSPITAL | Age: 60
End: 2025-04-02
Payer: COMMERCIAL

## 2025-04-02 VITALS
DIASTOLIC BLOOD PRESSURE: 78 MMHG | OXYGEN SATURATION: 99 % | HEIGHT: 70 IN | HEART RATE: 81 BPM | TEMPERATURE: 98.4 F | SYSTOLIC BLOOD PRESSURE: 122 MMHG | RESPIRATION RATE: 18 BRPM | BODY MASS INDEX: 35.36 KG/M2 | WEIGHT: 247 LBS

## 2025-04-02 DIAGNOSIS — M79.604 RIGHT LEG PAIN: Primary | ICD-10-CM

## 2025-04-02 DIAGNOSIS — M79.89 LEG SWELLING: ICD-10-CM

## 2025-04-02 LAB
ANION GAP SERPL CALCULATED.3IONS-SCNC: 11 MMOL/L (ref 10–20)
BASOPHILS # BLD AUTO: 0.08 X10*3/UL (ref 0–0.1)
BASOPHILS NFR BLD AUTO: 0.5 %
BNP SERPL-MCNC: 597 PG/ML (ref 0–99)
BUN SERPL-MCNC: 45 MG/DL (ref 6–23)
CALCIUM SERPL-MCNC: 8.8 MG/DL (ref 8.6–10.3)
CHLORIDE SERPL-SCNC: 115 MMOL/L (ref 98–107)
CO2 SERPL-SCNC: 15 MMOL/L (ref 21–32)
CREAT SERPL-MCNC: 6.79 MG/DL (ref 0.5–1.3)
EGFRCR SERPLBLD CKD-EPI 2021: 9 ML/MIN/1.73M*2
EOSINOPHIL # BLD AUTO: 0.4 X10*3/UL (ref 0–0.7)
EOSINOPHIL NFR BLD AUTO: 2.3 %
ERYTHROCYTE [DISTWIDTH] IN BLOOD BY AUTOMATED COUNT: 16.1 % (ref 11.5–14.5)
GLUCOSE SERPL-MCNC: 104 MG/DL (ref 74–99)
HCT VFR BLD AUTO: 28.7 % (ref 41–52)
HGB BLD-MCNC: 8.6 G/DL (ref 13.5–17.5)
IMM GRANULOCYTES # BLD AUTO: 0.3 X10*3/UL (ref 0–0.7)
IMM GRANULOCYTES NFR BLD AUTO: 1.7 % (ref 0–0.9)
LYMPHOCYTES # BLD AUTO: 1.29 X10*3/UL (ref 1.2–4.8)
LYMPHOCYTES NFR BLD AUTO: 7.5 %
MCH RBC QN AUTO: 27 PG (ref 26–34)
MCHC RBC AUTO-ENTMCNC: 30 G/DL (ref 32–36)
MCV RBC AUTO: 90 FL (ref 80–100)
MONOCYTES # BLD AUTO: 1.6 X10*3/UL (ref 0.1–1)
MONOCYTES NFR BLD AUTO: 9.3 %
NEUTROPHILS # BLD AUTO: 13.56 X10*3/UL (ref 1.2–7.7)
NEUTROPHILS NFR BLD AUTO: 78.7 %
NRBC BLD-RTO: 0.1 /100 WBCS (ref 0–0)
PLATELET # BLD AUTO: 396 X10*3/UL (ref 150–450)
POTASSIUM SERPL-SCNC: 4.4 MMOL/L (ref 3.5–5.3)
RBC # BLD AUTO: 3.18 X10*6/UL (ref 4.5–5.9)
SODIUM SERPL-SCNC: 137 MMOL/L (ref 136–145)
WBC # BLD AUTO: 17.2 X10*3/UL (ref 4.4–11.3)

## 2025-04-02 PROCEDURE — 85025 COMPLETE CBC W/AUTO DIFF WBC: CPT

## 2025-04-02 PROCEDURE — 73590 X-RAY EXAM OF LOWER LEG: CPT | Mod: RIGHT SIDE | Performed by: RADIOLOGY

## 2025-04-02 PROCEDURE — 93970 EXTREMITY STUDY: CPT

## 2025-04-02 PROCEDURE — 80048 BASIC METABOLIC PNL TOTAL CA: CPT

## 2025-04-02 PROCEDURE — 93970 EXTREMITY STUDY: CPT | Performed by: RADIOLOGY

## 2025-04-02 PROCEDURE — 83880 ASSAY OF NATRIURETIC PEPTIDE: CPT

## 2025-04-02 PROCEDURE — 99284 EMERGENCY DEPT VISIT MOD MDM: CPT | Mod: 25 | Performed by: STUDENT IN AN ORGANIZED HEALTH CARE EDUCATION/TRAINING PROGRAM

## 2025-04-02 PROCEDURE — 99285 EMERGENCY DEPT VISIT HI MDM: CPT | Mod: 25

## 2025-04-02 PROCEDURE — 36415 COLL VENOUS BLD VENIPUNCTURE: CPT

## 2025-04-02 PROCEDURE — 2500000001 HC RX 250 WO HCPCS SELF ADMINISTERED DRUGS (ALT 637 FOR MEDICARE OP)

## 2025-04-02 PROCEDURE — 73590 X-RAY EXAM OF LOWER LEG: CPT | Mod: RT

## 2025-04-02 RX ORDER — FUROSEMIDE 10 MG/ML
100 INJECTION INTRAMUSCULAR; INTRAVENOUS ONCE
Status: DISCONTINUED | OUTPATIENT
Start: 2025-04-02 | End: 2025-04-02

## 2025-04-02 RX ORDER — OXYCODONE AND ACETAMINOPHEN 5; 325 MG/1; MG/1
1 TABLET ORAL EVERY 6 HOURS PRN
Qty: 9 TABLET | Refills: 0 | Status: ON HOLD | OUTPATIENT
Start: 2025-04-02 | End: 2025-04-05

## 2025-04-02 RX ORDER — OXYCODONE AND ACETAMINOPHEN 5; 325 MG/1; MG/1
1 TABLET ORAL ONCE
Status: COMPLETED | OUTPATIENT
Start: 2025-04-02 | End: 2025-04-02

## 2025-04-02 RX ORDER — TORSEMIDE 20 MG/1
60 TABLET ORAL ONCE
Status: COMPLETED | OUTPATIENT
Start: 2025-04-02 | End: 2025-04-02

## 2025-04-02 RX ADMIN — OXYCODONE HYDROCHLORIDE AND ACETAMINOPHEN 1 TABLET: 5; 325 TABLET ORAL at 11:03

## 2025-04-02 RX ADMIN — TORSEMIDE 60 MG: 20 TABLET ORAL at 15:23

## 2025-04-02 ASSESSMENT — PAIN SCALES - GENERAL: PAINLEVEL_OUTOF10: 10 - WORST POSSIBLE PAIN

## 2025-04-02 ASSESSMENT — COLUMBIA-SUICIDE SEVERITY RATING SCALE - C-SSRS
2. HAVE YOU ACTUALLY HAD ANY THOUGHTS OF KILLING YOURSELF?: NO
1. IN THE PAST MONTH, HAVE YOU WISHED YOU WERE DEAD OR WISHED YOU COULD GO TO SLEEP AND NOT WAKE UP?: NO
6. HAVE YOU EVER DONE ANYTHING, STARTED TO DO ANYTHING, OR PREPARED TO DO ANYTHING TO END YOUR LIFE?: NO

## 2025-04-02 ASSESSMENT — PAIN - FUNCTIONAL ASSESSMENT: PAIN_FUNCTIONAL_ASSESSMENT: 0-10

## 2025-04-02 NOTE — ED TRIAGE NOTES
Pt arrives to ED with c/o RLE swelling and pain starting in his knee and radiating down. Pt has had multiple hospital admissions and was recently discharged home on 3/30. Not on blood thinners. Denies hx of DVT. Noted +2 pitting to R foot

## 2025-04-02 NOTE — DISCHARGE INSTRUCTIONS
Please follow-up closely with primary care provider in the following week.  Continue ice and elevation of lower extremity and additionally continue your diuretic torsemide as prescribed.  Medication sent for more severe pain.  Continue Tylenol as well.

## 2025-04-02 NOTE — ED PROVIDER NOTES
HPI   Chief Complaint   Patient presents with    Leg Swelling       Patient is a 59-year-old male presents emergency department for evaluation of right leg pain and swelling.  Patient had a turbulent last month and has had a multiple recent hospitalizations.  He was initially admitted for choledocholithiasis and had cholecystectomy done and discharge.  He was ultimately readmitted twice for nausea vomiting and on his most recent admission was diagnosed with C. difficile.  He is currently undergoing treatment for this.  He states over the last few days he has had increasing pain in his right lower extremity and swelling when compared to the left.  He has some baseline swelling and is on torsemide.  He denies any chest pain, difficulty breathing, or other symptoms at this time.  He denies any fall or injury.  He states it feels like a tightness dull aching pain with occasional sharp stabbing pain in the distal right lower extremity.  He denies any numbness or tingling.  He denies any back pain abdominal pain, or other symptoms at this time.  He denies any history of blood clots and is not currently on any blood thinners.      History provided by:  Patient   used: No            Patient History   Past Medical History:   Diagnosis Date    Coronary artery disease     Diabetes mellitus (Multi)     DVT (deep venous thrombosis) (Multi)     Hypertension     MI (myocardial infarction) (Multi)      Past Surgical History:   Procedure Laterality Date    AMPUTATION      CHOLECYSTECTOMY  03/05/2025    Laparoscopic Cholecystectomy     No family history on file.  Social History     Tobacco Use    Smoking status: Never    Smokeless tobacco: Never   Substance Use Topics    Alcohol use: Not on file    Drug use: Not on file       Physical Exam   ED Triage Vitals [04/02/25 1036]   Temperature Heart Rate Respirations BP   36.9 °C (98.4 °F) 84 16 130/75      Pulse Ox Temp Source Heart Rate Source Patient Position   99 %  Temporal -- --      BP Location FiO2 (%)     -- --       Physical Exam  Constitutional:       Appearance: Normal appearance.   Cardiovascular:      Rate and Rhythm: Normal rate and regular rhythm.   Pulmonary:      Effort: Pulmonary effort is normal.      Breath sounds: Normal breath sounds.   Abdominal:      General: Abdomen is flat.      Palpations: Abdomen is soft.      Tenderness: There is no abdominal tenderness.   Musculoskeletal:         General: Swelling and tenderness present.      Comments: Right lower extremity with distal pulses intact with decreased range of motion at knee and ankle and right lower extremity due to pain.  Tenderness palpation over circumferential distal calf.  Compartments soft.  No overlying rashes or lesions.   Skin:     General: Skin is warm and dry.   Neurological:      General: No focal deficit present.      Mental Status: He is alert and oriented to person, place, and time.           ED Course & MDM   Diagnoses as of 04/02/25 1728   Right leg pain   Leg swelling                 No data recorded     Tu Coma Scale Score: 15 (04/02/25 1037 : Lora Keane RN)                           Medical Decision Making  Patient is a 59-year-old male presents emergency department for evaluation of right lower extremity swelling and pain.    Lab work done today included BMP, CBC, proBNP.  Lab work with renal insufficiency at baseline, anemia, leukocytosis, elevation of proBNP and mild electrolyte abnormalities.    Scans done today were interpreted/confirmed by radiologist and also interpreted by me which included x-ray right tibia/fibula and ultrasound venous duplex bilateral lower extremities.  X-ray right tibia/fibula shows no acute abnormality in the right tibia/fibula with some degenerative changes noted in the proximal aspects tibia.  Ultrasound venous duplex bilateral lower extremity shows no DVT in the bilateral lower extremities.    Medications given at today's visit include PO  percocet, p.o. torsemide    I saw this patient in conjunction with Dr. Vega.  Patient has some improvement of pain after medications given emergency department.  Does have swelling to bilateral lower extremities worse on the right than the left with pitting edema with no overlying erythema or warmth there is evidence of superimposed infection.  Patient is supposed to be on torsemide diuretics and was not taking it for a long period of time during hospitalizations.  He just restarted it 2 days ago, but did not take it this morning.  He denies any falls or injury and x-rays today reveal no acute bony abnormality.  Ultrasound of bilateral lower extremity shows no evidence for DVT.  Lab work shows chronic renal insufficiency at baseline with anemia and leukocytosis.  Suspect patient's pain related to underlying degenerative disease in combination with swelling of bilateral lower extremities and third spacing due to chronic renal insufficiency in combination with some deconditioning due to recent hospitalizations and illnesses..  Patient was educated on continued elevation and continuing home diuretic medication to help with swelling in the legs that will ultimately help with pain.  No evidence for superimposed infection.  Emergent pathologies were considered for this patient, although I have low suspicion for anything acutely emergent given patient's clinical presentation, history, physical exam, stable vital signs, and relatively unremarkable workup.  Discharging patient home is reasonable plan of care for outpatient management.    All labs, imaging, and diagnostic studies were reviewed by me and patient was counseled on clinical impression, expectations, and plan.  Patient was educated to follow-up with PCP in the following 1-2 days.  All questions from patient were answered. They elicited understanding and were agreeable to course of treatment.  Patient was discharged in stable condition and given strict return  precautions.    Prescriptions given on discharge: PO Percocet    ** Disclaimer:  Parts of this document were written utilizing a voice to text dictation software.  Note may contain minor transcription or typographical errors that were inadvertently transcribed by the computer software.        Procedure  Procedures     Angy Tirado PA-C  04/02/25 1729

## 2025-04-02 NOTE — PROGRESS NOTES
"Pharmacy Medication History Review    Colin Leonard is a 59 y.o. male . Pharmacy reviewed the patient's [id number] medications and allergies for accuracy.    Medications ADDED:  none  Medications CHANGED:  Vancomycin 125mg - not taking - was not picked up from pharmacy  Medications REMOVED:   None      The list below reflects the updated PTA list. Comments regarding how patient may be taking medications differently can be found in the Admit Orders Activity  Prior to Admission Medications   Prescriptions Last Dose Informant   NON FORMULARY  Family Member   Sig: Take 1 each by mouth once daily. \"KIDNEY STUFF\" = KIDNEY SUPPORT FORMULA WITH organic beet powder, organic carrot powder,  defatted wheat germ, bovine kidney powder, kidney bean powder, concentrated flax olea lignans, organic mullen flaxseed   acetaminophen (Tylenol) 325 mg tablet  Family Member   Sig: Take 2 tablets (650 mg) by mouth every 6 hours.   ascorbic acid/multivit-min (EMERGEN-C ORAL)  Family Member   Sig: Take 1 each by mouth once daily.   aspirin 81 mg EC tablet  Family Member   Sig: Take 1 tablet (81 mg) by mouth once daily.   carvedilol (Coreg) 25 mg tablet  Family Member   Sig: Take 1 tablet (25 mg) by mouth 2 times a day.   cholestyramine light (Prevalite) 4 gram packet  Family Member   Sig: Take 1 packet (4 g) by mouth 2 times a day for 10 days.   ezetimibe (Zetia) 10 mg tablet  Family Member   Sig: Take 1 tablet (10 mg) by mouth once daily at bedtime.   hydrALAZINE (Apresoline) 100 mg tablet  Family Member   Sig: Take 1 tablet (100 mg) by mouth 3 times a day.   lactobacillus acidophilus tablet tablet  Family Member   Sig: Take 2 tablets by mouth 2 times a day.   methocarbamol (Robaxin) 500 mg tablet  Self, Child   Sig: Take 1 tablet (500 mg) by mouth every 8 hours if needed for muscle spasms for up to 5 days.   Patient not taking: Reported on 3/27/2025   multivitamin tablet  Family Member   Sig: Take 1 tablet by mouth once daily. "   sodium bicarbonate 650 mg tablet  Family Member   Sig: Take 2 tablets (1,300 mg) by mouth 3 times a day.   torsemide (Demadex) 20 mg tablet  Family Member   Sig: Take 3 tablets (60 mg) by mouth once daily.   vancomycin (Vancocin) 125 mg capsule Not Taking Family Member   Sig: Take 1 capsule (125 mg) by mouth 4 times a day for 46 doses.   Patient not taking: Reported on 4/2/2025      Facility-Administered Medications: None        The list below reflects the updated allergy list. Please review each documented allergy for additional clarification and justification.  Allergies  Reviewed by Lora Keane RN on 4/2/2025        Severity Reactions Comments    Amlodipine Besylate Not Specified Swelling edema legs            Pharmacy has been updated to [facility].    Sources used to complete the med history include previous interviews, PTA medication list, patient/family interview.    Below are additional concerns with the patient's PTA list.  Informant from previous interviews was not present. Family declined to update last doses. Family reports the patient did not start Vancomycin at home due to the pharmacy needing to order.     Nupur Novak, Daphney-Adv  Please reach out via [employer] Secure Chat for questions

## 2025-04-02 NOTE — ED PROVIDER NOTES
Supervisory note:  Patient seen in conjunction with VIOLET Aguilar.  Patient presents with right leg pain/swelling.  He reports that over the last few days, his right leg has full swollen, particularly over the knee and lower leg.  It is hurt so much that he has had trouble putting weight on it.  He was recently hospitalized for cholecystectomy and then for C. difficile.  On examination, there is 1+ pretibial edema of bilateral lower extremities.    Laboratory studies are significant for chronic kidney disease at baseline for patient as well as low hemoglobin, leukocytosis.  This is not significantly changed from 3 days ago.  Ultrasound does not reveal any blood clots in the leg.  Patient was treated with dose of torsemide and was advised to follow-up with primary care physician.  Return precautions given for any worsening symptoms.    I personally saw the patient and made/approved the management plan and take responsiblity for the patient management.  Parts of this chart were completed with dictation software, please excuse any errors in transcription.     Alvaro Vega MD  04/02/25 5383

## 2025-04-04 ENCOUNTER — APPOINTMENT (OUTPATIENT)
Dept: RADIOLOGY | Facility: HOSPITAL | Age: 60
DRG: 392 | End: 2025-04-04
Payer: COMMERCIAL

## 2025-04-04 ENCOUNTER — HOSPITAL ENCOUNTER (INPATIENT)
Facility: HOSPITAL | Age: 60
End: 2025-04-04
Attending: EMERGENCY MEDICINE | Admitting: INTERNAL MEDICINE
Payer: COMMERCIAL

## 2025-04-04 DIAGNOSIS — A04.72 C. DIFFICILE COLITIS: ICD-10-CM

## 2025-04-04 DIAGNOSIS — K57.92 DIVERTICULITIS: ICD-10-CM

## 2025-04-04 DIAGNOSIS — M13.0 POLYARTHROPATHY: Primary | ICD-10-CM

## 2025-04-04 DIAGNOSIS — R53.1 WEAKNESS: ICD-10-CM

## 2025-04-04 DIAGNOSIS — I47.19 ATRIAL TACHYCARDIA (CMS-HCC): ICD-10-CM

## 2025-04-04 DIAGNOSIS — I25.10 CORONARY ARTERY DISEASE INVOLVING NATIVE CORONARY ARTERY OF NATIVE HEART WITHOUT ANGINA PECTORIS: ICD-10-CM

## 2025-04-04 LAB
ALBUMIN SERPL BCP-MCNC: 3.3 G/DL (ref 3.4–5)
ALP SERPL-CCNC: 66 U/L (ref 33–136)
ALT SERPL W P-5'-P-CCNC: 15 U/L (ref 10–52)
ANION GAP SERPL CALC-SCNC: 17 MMOL/L (ref 10–20)
AST SERPL W P-5'-P-CCNC: 16 U/L (ref 9–39)
BILIRUB SERPL-MCNC: 0.5 MG/DL (ref 0–1.2)
BUN SERPL-MCNC: 47 MG/DL (ref 6–23)
CALCIUM SERPL-MCNC: 9.1 MG/DL (ref 8.6–10.3)
CHLORIDE SERPL-SCNC: 108 MMOL/L (ref 98–107)
CO2 SERPL-SCNC: 15 MMOL/L (ref 21–32)
CREAT SERPL-MCNC: 7.51 MG/DL (ref 0.5–1.3)
CRP SERPL-MCNC: 14.35 MG/DL
EGFRCR SERPLBLD CKD-EPI 2021: 8 ML/MIN/1.73M*2
GLUCOSE SERPL-MCNC: 126 MG/DL (ref 74–99)
LACTATE SERPL-SCNC: 1.3 MMOL/L (ref 0.4–2)
POTASSIUM SERPL-SCNC: 4.6 MMOL/L (ref 3.5–5.3)
PROT SERPL-MCNC: 6.3 G/DL (ref 6.4–8.2)
SODIUM SERPL-SCNC: 135 MMOL/L (ref 136–145)

## 2025-04-04 PROCEDURE — 71046 X-RAY EXAM CHEST 2 VIEWS: CPT | Mod: FOREIGN READ | Performed by: RADIOLOGY

## 2025-04-04 PROCEDURE — 99285 EMERGENCY DEPT VISIT HI MDM: CPT | Mod: 25 | Performed by: EMERGENCY MEDICINE

## 2025-04-04 PROCEDURE — 73130 X-RAY EXAM OF HAND: CPT | Mod: LEFT SIDE | Performed by: RADIOLOGY

## 2025-04-04 PROCEDURE — 87040 BLOOD CULTURE FOR BACTERIA: CPT | Mod: AHULAB | Performed by: PHYSICIAN ASSISTANT

## 2025-04-04 PROCEDURE — 73562 X-RAY EXAM OF KNEE 3: CPT | Mod: RIGHT SIDE | Performed by: RADIOLOGY

## 2025-04-04 PROCEDURE — 71046 X-RAY EXAM CHEST 2 VIEWS: CPT

## 2025-04-04 PROCEDURE — 74176 CT ABD & PELVIS W/O CONTRAST: CPT | Mod: FOREIGN READ | Performed by: RADIOLOGY

## 2025-04-04 PROCEDURE — 86140 C-REACTIVE PROTEIN: CPT | Performed by: PHYSICIAN ASSISTANT

## 2025-04-04 PROCEDURE — 73502 X-RAY EXAM HIP UNI 2-3 VIEWS: CPT | Mod: LEFT SIDE | Performed by: STUDENT IN AN ORGANIZED HEALTH CARE EDUCATION/TRAINING PROGRAM

## 2025-04-04 PROCEDURE — 83605 ASSAY OF LACTIC ACID: CPT | Performed by: PHYSICIAN ASSISTANT

## 2025-04-04 PROCEDURE — 2500000004 HC RX 250 GENERAL PHARMACY W/ HCPCS (ALT 636 FOR OP/ED): Performed by: EMERGENCY MEDICINE

## 2025-04-04 PROCEDURE — 80053 COMPREHEN METABOLIC PANEL: CPT | Performed by: PHYSICIAN ASSISTANT

## 2025-04-04 PROCEDURE — 36415 COLL VENOUS BLD VENIPUNCTURE: CPT | Performed by: PHYSICIAN ASSISTANT

## 2025-04-04 PROCEDURE — 73562 X-RAY EXAM OF KNEE 3: CPT | Mod: RT

## 2025-04-04 PROCEDURE — 73130 X-RAY EXAM OF HAND: CPT | Mod: LT

## 2025-04-04 PROCEDURE — 73502 X-RAY EXAM HIP UNI 2-3 VIEWS: CPT | Mod: LT

## 2025-04-04 PROCEDURE — 74176 CT ABD & PELVIS W/O CONTRAST: CPT

## 2025-04-04 RX ORDER — PREDNISONE 20 MG/1
20 TABLET ORAL ONCE
Status: COMPLETED | OUTPATIENT
Start: 2025-04-04 | End: 2025-04-04

## 2025-04-04 RX ADMIN — PREDNISONE 20 MG: 20 TABLET ORAL at 20:47

## 2025-04-04 ASSESSMENT — PAIN DESCRIPTION - PAIN TYPE: TYPE: ACUTE PAIN

## 2025-04-04 ASSESSMENT — PAIN - FUNCTIONAL ASSESSMENT: PAIN_FUNCTIONAL_ASSESSMENT: 0-10

## 2025-04-04 ASSESSMENT — COLUMBIA-SUICIDE SEVERITY RATING SCALE - C-SSRS
6. HAVE YOU EVER DONE ANYTHING, STARTED TO DO ANYTHING, OR PREPARED TO DO ANYTHING TO END YOUR LIFE?: NO
2. HAVE YOU ACTUALLY HAD ANY THOUGHTS OF KILLING YOURSELF?: NO
1. IN THE PAST MONTH, HAVE YOU WISHED YOU WERE DEAD OR WISHED YOU COULD GO TO SLEEP AND NOT WAKE UP?: NO

## 2025-04-04 ASSESSMENT — PAIN DESCRIPTION - LOCATION: LOCATION: LEG

## 2025-04-04 ASSESSMENT — PAIN SCALES - GENERAL: PAINLEVEL_OUTOF10: 6

## 2025-04-04 NOTE — ED TRIAGE NOTES
As provider-in-triage, I performed a medical screening history and physical exam on this patient.  HISTORY OF PRESENT ILLNESS  (include at least one item)     This is a 59-year-old male who has an extensive past medical history including ESRD, hypertension, diabetes.  He had a cholecystectomy and postsurgically he had pneumonia and subsequently developed C. difficile.  He is on oral vancomycin currently.  States that he saw his surgeon at [facility] multiple times and after being readmitted he thought it was healing however he has developed tenderness redness and swelling of his left thumb joint, swelling and tenderness of his right knee, and he has pain in both groins.  It is to the point where he cannot stand.  He also has discomfort in his right axilla.    PHYSICAL EXAM  Vital Signs reviewed.  (include at least one additional item)     Appears chronically ill sitting in wheelchair swollen right knee with obvious effusion.  Left thumb with erythema and slight swelling of the McP joint    Tenderness in the right axilla but no abscess or erythema    Abdominal surgical scars appear well-healed  Lungs are clear      MDM  (describe briefly what was initiated or planned)    Concerning for arthritis, septic arthritis, other inflammatory etiologies will order workup and patient would benefit from further evaluation    I evaluated this patient in triage with the RN. Due to the patients complaint labs and or imaging were ordered by myself in an attempt to expedite patient care however I am not participating in care after evaluation. This is a preliminary assessment. Pt does not appear in acute distress at this time. They will have a full evaluation as soon as possible. They will be cared for by another provider who will possibly order more labs, imaging or interventions. Pt did not have a full ROS or PE completed by myself however below is a summary with reasons for orders.  For the remainder of the patient's workup and ED  course, please refer to the main ED provider note. We discussed need for diagnostic testing including laboratory studies and imaging.  We also discussed that patient may be asked to wait in the waiting room while these tests are pending.  They understand that if they choose to leave without having the testing completed or resulted that we cannot rule out acute life threatening illnesses and the risks involved could lead to worsening condition, permanent disability or even death.

## 2025-04-04 NOTE — ED NOTES
Pt to ED room 11, pt. Placed on cardiac montior and spo2 monitoring. Pt c/o BLE edema and groin pain that started last night.      Suzanne Teixeira RN  04/04/25 2342

## 2025-04-04 NOTE — ED TRIAGE NOTES
Patient presents with arm pit pain, thumb pain, groin pain, patient recently seen for cholecystisis, pneumonia, c-diff, patient still has the leg swelling, patient came in to be evaluated. History htn, diabetes.

## 2025-04-05 ENCOUNTER — APPOINTMENT (OUTPATIENT)
Dept: CARDIOLOGY | Facility: HOSPITAL | Age: 60
DRG: 392 | End: 2025-04-05
Payer: COMMERCIAL

## 2025-04-05 DIAGNOSIS — K57.92 ACUTE DIVERTICULITIS: Primary | ICD-10-CM

## 2025-04-05 PROBLEM — M13.0 POLYARTHROPATHY: Status: ACTIVE | Noted: 2025-04-05

## 2025-04-05 LAB
ALBUMIN SERPL BCP-MCNC: 3.2 G/DL (ref 3.4–5)
ALP SERPL-CCNC: 59 U/L (ref 33–136)
ALT SERPL W P-5'-P-CCNC: 14 U/L (ref 10–52)
ANION GAP SERPL CALC-SCNC: 15 MMOL/L (ref 10–20)
ANION GAP SERPL CALC-SCNC: 17 MMOL/L (ref 10–20)
AST SERPL W P-5'-P-CCNC: 12 U/L (ref 9–39)
BASOPHILS # BLD AUTO: 0.07 X10*3/UL (ref 0–0.1)
BASOPHILS NFR BLD AUTO: 0.4 %
BILIRUB SERPL-MCNC: 0.3 MG/DL (ref 0–1.2)
BUN SERPL-MCNC: 56 MG/DL (ref 6–23)
BUN SERPL-MCNC: 61 MG/DL (ref 6–23)
CALCIUM SERPL-MCNC: 8.6 MG/DL (ref 8.6–10.3)
CALCIUM SERPL-MCNC: 9 MG/DL (ref 8.6–10.3)
CHLORIDE SERPL-SCNC: 107 MMOL/L (ref 98–107)
CHLORIDE SERPL-SCNC: 109 MMOL/L (ref 98–107)
CO2 SERPL-SCNC: 15 MMOL/L (ref 21–32)
CO2 SERPL-SCNC: 16 MMOL/L (ref 21–32)
CREAT SERPL-MCNC: 7.43 MG/DL (ref 0.5–1.3)
CREAT SERPL-MCNC: 7.51 MG/DL (ref 0.5–1.3)
CRP SERPL-MCNC: 12.65 MG/DL
EGFRCR SERPLBLD CKD-EPI 2021: 8 ML/MIN/1.73M*2
EGFRCR SERPLBLD CKD-EPI 2021: 8 ML/MIN/1.73M*2
EOSINOPHIL # BLD AUTO: 0.09 X10*3/UL (ref 0–0.7)
EOSINOPHIL NFR BLD AUTO: 0.5 %
ERYTHROCYTE [DISTWIDTH] IN BLOOD BY AUTOMATED COUNT: 16.3 % (ref 11.5–14.5)
ERYTHROCYTE [DISTWIDTH] IN BLOOD BY AUTOMATED COUNT: 16.5 % (ref 11.5–14.5)
ERYTHROCYTE [SEDIMENTATION RATE] IN BLOOD BY WESTERGREN METHOD: 64 MM/H (ref 0–20)
FERRITIN SERPL-MCNC: 241 NG/ML (ref 20–300)
GLUCOSE SERPL-MCNC: 148 MG/DL (ref 74–99)
GLUCOSE SERPL-MCNC: 166 MG/DL (ref 74–99)
HCT VFR BLD AUTO: 25.3 % (ref 41–52)
HCT VFR BLD AUTO: 26.9 % (ref 41–52)
HGB BLD-MCNC: 8.3 G/DL (ref 13.5–17.5)
HGB BLD-MCNC: 8.4 G/DL (ref 13.5–17.5)
IMM GRANULOCYTES # BLD AUTO: 0.46 X10*3/UL (ref 0–0.7)
IMM GRANULOCYTES NFR BLD AUTO: 2.6 % (ref 0–0.9)
IRON SATN MFR SERPL: 13 % (ref 25–45)
IRON SERPL-MCNC: 23 UG/DL (ref 35–150)
LACTATE SERPL-SCNC: 1.4 MMOL/L (ref 0.4–2)
LYMPHOCYTES # BLD AUTO: 0.6 X10*3/UL (ref 1.2–4.8)
LYMPHOCYTES NFR BLD AUTO: 3.4 %
MAGNESIUM SERPL-MCNC: 1.96 MG/DL (ref 1.6–2.4)
MCH RBC QN AUTO: 26.4 PG (ref 26–34)
MCH RBC QN AUTO: 26.6 PG (ref 26–34)
MCHC RBC AUTO-ENTMCNC: 30.9 G/DL (ref 32–36)
MCHC RBC AUTO-ENTMCNC: 33.2 G/DL (ref 32–36)
MCV RBC AUTO: 80 FL (ref 80–100)
MCV RBC AUTO: 86 FL (ref 80–100)
MONOCYTES # BLD AUTO: 0.45 X10*3/UL (ref 0.1–1)
MONOCYTES NFR BLD AUTO: 2.6 %
NEUTROPHILS # BLD AUTO: 15.77 X10*3/UL (ref 1.2–7.7)
NEUTROPHILS NFR BLD AUTO: 90.5 %
NRBC BLD-RTO: 0 /100 WBCS (ref 0–0)
NRBC BLD-RTO: 0 /100 WBCS (ref 0–0)
PLATELET # BLD AUTO: 383 X10*3/UL (ref 150–450)
PLATELET # BLD AUTO: 396 X10*3/UL (ref 150–450)
POTASSIUM SERPL-SCNC: 3.9 MMOL/L (ref 3.5–5.3)
POTASSIUM SERPL-SCNC: 4.3 MMOL/L (ref 3.5–5.3)
PROT SERPL-MCNC: 6.5 G/DL (ref 6.4–8.2)
RBC # BLD AUTO: 3.12 X10*6/UL (ref 4.5–5.9)
RBC # BLD AUTO: 3.18 X10*6/UL (ref 4.5–5.9)
SODIUM SERPL-SCNC: 135 MMOL/L (ref 136–145)
SODIUM SERPL-SCNC: 136 MMOL/L (ref 136–145)
TIBC SERPL-MCNC: 183 UG/DL (ref 240–445)
UIBC SERPL-MCNC: 160 UG/DL (ref 110–370)
WBC # BLD AUTO: 13.5 X10*3/UL (ref 4.4–11.3)
WBC # BLD AUTO: 17.4 X10*3/UL (ref 4.4–11.3)

## 2025-04-05 PROCEDURE — 82728 ASSAY OF FERRITIN: CPT | Performed by: INTERNAL MEDICINE

## 2025-04-05 PROCEDURE — 36415 COLL VENOUS BLD VENIPUNCTURE: CPT | Performed by: INTERNAL MEDICINE

## 2025-04-05 PROCEDURE — 85652 RBC SED RATE AUTOMATED: CPT | Performed by: PHYSICIAN ASSISTANT

## 2025-04-05 PROCEDURE — 83540 ASSAY OF IRON: CPT | Performed by: INTERNAL MEDICINE

## 2025-04-05 PROCEDURE — 2500000004 HC RX 250 GENERAL PHARMACY W/ HCPCS (ALT 636 FOR OP/ED): Performed by: INTERNAL MEDICINE

## 2025-04-05 PROCEDURE — 85027 COMPLETE CBC AUTOMATED: CPT | Performed by: INTERNAL MEDICINE

## 2025-04-05 PROCEDURE — 83735 ASSAY OF MAGNESIUM: CPT | Performed by: INTERNAL MEDICINE

## 2025-04-05 PROCEDURE — 2500000001 HC RX 250 WO HCPCS SELF ADMINISTERED DRUGS (ALT 637 FOR MEDICARE OP): Performed by: INTERNAL MEDICINE

## 2025-04-05 PROCEDURE — 86200 CCP ANTIBODY: CPT | Mod: AHULAB | Performed by: INTERNAL MEDICINE

## 2025-04-05 PROCEDURE — 80048 BASIC METABOLIC PNL TOTAL CA: CPT | Mod: CCI | Performed by: INTERNAL MEDICINE

## 2025-04-05 PROCEDURE — 86140 C-REACTIVE PROTEIN: CPT | Performed by: INTERNAL MEDICINE

## 2025-04-05 PROCEDURE — 99221 1ST HOSP IP/OBS SF/LOW 40: CPT | Performed by: INTERNAL MEDICINE

## 2025-04-05 PROCEDURE — 1200000002 HC GENERAL ROOM WITH TELEMETRY DAILY

## 2025-04-05 PROCEDURE — 2500000002 HC RX 250 W HCPCS SELF ADMINISTERED DRUGS (ALT 637 FOR MEDICARE OP, ALT 636 FOR OP/ED): Performed by: INTERNAL MEDICINE

## 2025-04-05 PROCEDURE — 2500000004 HC RX 250 GENERAL PHARMACY W/ HCPCS (ALT 636 FOR OP/ED): Performed by: EMERGENCY MEDICINE

## 2025-04-05 PROCEDURE — 93005 ELECTROCARDIOGRAM TRACING: CPT

## 2025-04-05 PROCEDURE — 80048 BASIC METABOLIC PNL TOTAL CA: CPT | Performed by: INTERNAL MEDICINE

## 2025-04-05 PROCEDURE — 83605 ASSAY OF LACTIC ACID: CPT | Performed by: INTERNAL MEDICINE

## 2025-04-05 PROCEDURE — 85025 COMPLETE CBC W/AUTO DIFF WBC: CPT | Performed by: PHYSICIAN ASSISTANT

## 2025-04-05 PROCEDURE — 99222 1ST HOSP IP/OBS MODERATE 55: CPT | Performed by: INTERNAL MEDICINE

## 2025-04-05 PROCEDURE — 36415 COLL VENOUS BLD VENIPUNCTURE: CPT | Performed by: PHYSICIAN ASSISTANT

## 2025-04-05 RX ORDER — HEPARIN SODIUM 5000 [USP'U]/ML
5000 INJECTION, SOLUTION INTRAVENOUS; SUBCUTANEOUS EVERY 8 HOURS
Status: DISPENSED | OUTPATIENT
Start: 2025-04-05

## 2025-04-05 RX ORDER — ACETAMINOPHEN 650 MG/1
650 SUPPOSITORY RECTAL EVERY 4 HOURS PRN
Status: ACTIVE | OUTPATIENT
Start: 2025-04-05

## 2025-04-05 RX ORDER — LEVOFLOXACIN 5 MG/ML
500 INJECTION, SOLUTION INTRAVENOUS ONCE
Status: COMPLETED | OUTPATIENT
Start: 2025-04-05 | End: 2025-04-05

## 2025-04-05 RX ORDER — SODIUM BICARBONATE 650 MG/1
1300 TABLET ORAL 3 TIMES DAILY
Status: DISPENSED | OUTPATIENT
Start: 2025-04-05

## 2025-04-05 RX ORDER — PANTOPRAZOLE SODIUM 40 MG/1
40 TABLET, DELAYED RELEASE ORAL
Status: DISPENSED | OUTPATIENT
Start: 2025-04-05

## 2025-04-05 RX ORDER — METRONIDAZOLE 500 MG/100ML
500 INJECTION, SOLUTION INTRAVENOUS EVERY 8 HOURS
Status: DISPENSED | OUTPATIENT
Start: 2025-04-05

## 2025-04-05 RX ORDER — CHOLESTYRAMINE 4 G/4.8G
4 POWDER, FOR SUSPENSION ORAL 2 TIMES DAILY
Status: DISPENSED | OUTPATIENT
Start: 2025-04-05

## 2025-04-05 RX ORDER — OXYCODONE HYDROCHLORIDE 5 MG/1
5 TABLET ORAL EVERY 6 HOURS PRN
Status: DISPENSED | OUTPATIENT
Start: 2025-04-05

## 2025-04-05 RX ORDER — TORSEMIDE 20 MG/1
60 TABLET ORAL DAILY
Status: DISPENSED | OUTPATIENT
Start: 2025-04-05

## 2025-04-05 RX ORDER — LEVOFLOXACIN 5 MG/ML
750 INJECTION, SOLUTION INTRAVENOUS
Status: DISCONTINUED | OUTPATIENT
Start: 2025-04-05 | End: 2025-04-05 | Stop reason: DRUGHIGH

## 2025-04-05 RX ORDER — ASPIRIN 81 MG/1
81 TABLET ORAL DAILY
Status: DISPENSED | OUTPATIENT
Start: 2025-04-05

## 2025-04-05 RX ORDER — ACETAMINOPHEN 160 MG/5ML
650 SOLUTION ORAL EVERY 4 HOURS PRN
Status: ACTIVE | OUTPATIENT
Start: 2025-04-05

## 2025-04-05 RX ORDER — LEVOFLOXACIN 500 MG/1
500 TABLET, FILM COATED ORAL
Status: DISCONTINUED | OUTPATIENT
Start: 2025-04-05 | End: 2025-04-05

## 2025-04-05 RX ORDER — ACETAMINOPHEN 325 MG/1
650 TABLET ORAL EVERY 4 HOURS PRN
Status: DISPENSED | OUTPATIENT
Start: 2025-04-05

## 2025-04-05 RX ORDER — METRONIDAZOLE 500 MG/100ML
500 INJECTION, SOLUTION INTRAVENOUS ONCE
Status: COMPLETED | OUTPATIENT
Start: 2025-04-05 | End: 2025-04-05

## 2025-04-05 RX ORDER — EZETIMIBE 10 MG/1
10 TABLET ORAL NIGHTLY
Status: DISPENSED | OUTPATIENT
Start: 2025-04-05

## 2025-04-05 RX ORDER — ONDANSETRON HYDROCHLORIDE 2 MG/ML
4 INJECTION, SOLUTION INTRAVENOUS EVERY 8 HOURS PRN
Status: ACTIVE | OUTPATIENT
Start: 2025-04-05

## 2025-04-05 RX ORDER — ATORVASTATIN CALCIUM 20 MG/1
20 TABLET, FILM COATED ORAL DAILY
Status: ON HOLD | COMMUNITY

## 2025-04-05 RX ORDER — PANTOPRAZOLE SODIUM 40 MG/10ML
40 INJECTION, POWDER, LYOPHILIZED, FOR SOLUTION INTRAVENOUS
Status: ACTIVE | OUTPATIENT
Start: 2025-04-05

## 2025-04-05 RX ORDER — ENOXAPARIN SODIUM 100 MG/ML
30 INJECTION SUBCUTANEOUS EVERY 24 HOURS
Status: DISCONTINUED | OUTPATIENT
Start: 2025-04-05 | End: 2025-04-05

## 2025-04-05 RX ORDER — CARVEDILOL 25 MG/1
25 TABLET ORAL 2 TIMES DAILY
Status: DISCONTINUED | OUTPATIENT
Start: 2025-04-05 | End: 2025-04-06

## 2025-04-05 RX ORDER — POLYETHYLENE GLYCOL 3350, SODIUM SULFATE ANHYDROUS, SODIUM BICARBONATE, SODIUM CHLORIDE, POTASSIUM CHLORIDE 236; 22.74; 6.74; 5.86; 2.97 G/4L; G/4L; G/4L; G/4L; G/4L
4000 POWDER, FOR SOLUTION ORAL ONCE
Qty: 4000 ML | Refills: 0 | Status: SHIPPED | OUTPATIENT
Start: 2025-04-05 | End: 2025-04-05

## 2025-04-05 RX ORDER — VANCOMYCIN HYDROCHLORIDE 125 MG/1
125 CAPSULE ORAL 4 TIMES DAILY
Status: DISPENSED | OUTPATIENT
Start: 2025-04-05

## 2025-04-05 RX ORDER — HYDRALAZINE HYDROCHLORIDE 50 MG/1
100 TABLET, FILM COATED ORAL 3 TIMES DAILY
Status: DISPENSED | OUTPATIENT
Start: 2025-04-05

## 2025-04-05 RX ORDER — ONDANSETRON 4 MG/1
4 TABLET, FILM COATED ORAL EVERY 8 HOURS PRN
Status: ACTIVE | OUTPATIENT
Start: 2025-04-05

## 2025-04-05 RX ORDER — LEVOFLOXACIN 5 MG/ML
250 INJECTION, SOLUTION INTRAVENOUS
Status: DISCONTINUED | OUTPATIENT
Start: 2025-04-07 | End: 2025-04-05

## 2025-04-05 RX ORDER — L. ACIDOPHILUS/L.BULGARICUS 1MM CELL
2 TABLET ORAL 2 TIMES DAILY
Status: DISCONTINUED | OUTPATIENT
Start: 2025-04-05 | End: 2025-04-05

## 2025-04-05 RX ADMIN — CHOLESTYRAMINE LIGHT 4 G: 4 POWDER, FOR SUSPENSION ORAL at 20:11

## 2025-04-05 RX ADMIN — ASPIRIN 81 MG: 81 TABLET, COATED ORAL at 08:26

## 2025-04-05 RX ADMIN — HYDRALAZINE HYDROCHLORIDE 100 MG: 25 TABLET ORAL at 08:26

## 2025-04-05 RX ADMIN — VANCOMYCIN HYDROCHLORIDE 125 MG: 125 CAPSULE ORAL at 07:39

## 2025-04-05 RX ADMIN — CARVEDILOL 25 MG: 25 TABLET, FILM COATED ORAL at 08:26

## 2025-04-05 RX ADMIN — SODIUM BICARBONATE 1300 MG: 650 TABLET ORAL at 08:26

## 2025-04-05 RX ADMIN — METRONIDAZOLE 500 MG: 500 INJECTION, SOLUTION INTRAVENOUS at 11:44

## 2025-04-05 RX ADMIN — VANCOMYCIN HYDROCHLORIDE 125 MG: 125 CAPSULE ORAL at 12:49

## 2025-04-05 RX ADMIN — SODIUM BICARBONATE 1300 MG: 650 TABLET ORAL at 16:38

## 2025-04-05 RX ADMIN — TORSEMIDE 60 MG: 20 TABLET ORAL at 08:26

## 2025-04-05 RX ADMIN — Medication 2 CAPSULE: at 20:11

## 2025-04-05 RX ADMIN — VANCOMYCIN HYDROCHLORIDE 125 MG: 125 CAPSULE ORAL at 16:38

## 2025-04-05 RX ADMIN — PANTOPRAZOLE SODIUM 40 MG: 40 TABLET, DELAYED RELEASE ORAL at 07:39

## 2025-04-05 RX ADMIN — HEPARIN SODIUM 5000 UNITS: 5000 INJECTION, SOLUTION INTRAVENOUS; SUBCUTANEOUS at 20:11

## 2025-04-05 RX ADMIN — SODIUM BICARBONATE 1300 MG: 650 TABLET ORAL at 20:11

## 2025-04-05 RX ADMIN — EZETIMIBE 10 MG: 10 TABLET ORAL at 20:11

## 2025-04-05 RX ADMIN — VANCOMYCIN HYDROCHLORIDE 125 MG: 125 CAPSULE ORAL at 20:11

## 2025-04-05 RX ADMIN — METRONIDAZOLE 500 MG: 500 INJECTION, SOLUTION INTRAVENOUS at 02:32

## 2025-04-05 RX ADMIN — CHOLESTYRAMINE LIGHT 4 G: 4 POWDER, FOR SUSPENSION ORAL at 09:58

## 2025-04-05 RX ADMIN — METRONIDAZOLE 500 MG: 500 INJECTION, SOLUTION INTRAVENOUS at 20:14

## 2025-04-05 RX ADMIN — HEPARIN SODIUM 5000 UNITS: 5000 INJECTION, SOLUTION INTRAVENOUS; SUBCUTANEOUS at 07:39

## 2025-04-05 RX ADMIN — HYDRALAZINE HYDROCHLORIDE 100 MG: 25 TABLET ORAL at 16:38

## 2025-04-05 RX ADMIN — CARVEDILOL 25 MG: 25 TABLET, FILM COATED ORAL at 20:11

## 2025-04-05 RX ADMIN — HYDRALAZINE HYDROCHLORIDE 100 MG: 25 TABLET ORAL at 20:11

## 2025-04-05 RX ADMIN — Medication 2 CAPSULE: at 08:26

## 2025-04-05 RX ADMIN — LEVOFLOXACIN 500 MG: 500 INJECTION, SOLUTION INTRAVENOUS at 05:42

## 2025-04-05 SDOH — SOCIAL STABILITY: SOCIAL INSECURITY: ARE YOU OR HAVE YOU BEEN THREATENED OR ABUSED PHYSICALLY, EMOTIONALLY, OR SEXUALLY BY ANYONE?: NO

## 2025-04-05 SDOH — SOCIAL STABILITY: SOCIAL INSECURITY: DOES ANYONE TRY TO KEEP YOU FROM HAVING/CONTACTING OTHER FRIENDS OR DOING THINGS OUTSIDE YOUR HOME?: NO

## 2025-04-05 SDOH — SOCIAL STABILITY: SOCIAL INSECURITY: WERE YOU ABLE TO COMPLETE ALL THE BEHAVIORAL HEALTH SCREENINGS?: YES

## 2025-04-05 SDOH — SOCIAL STABILITY: SOCIAL INSECURITY: HAVE YOU HAD ANY THOUGHTS OF HARMING ANYONE ELSE?: NO

## 2025-04-05 SDOH — SOCIAL STABILITY: SOCIAL INSECURITY: ARE THERE ANY APPARENT SIGNS OF INJURIES/BEHAVIORS THAT COULD BE RELATED TO ABUSE/NEGLECT?: NO

## 2025-04-05 SDOH — SOCIAL STABILITY: SOCIAL INSECURITY: HAVE YOU HAD THOUGHTS OF HARMING ANYONE ELSE?: NO

## 2025-04-05 SDOH — SOCIAL STABILITY: SOCIAL INSECURITY: ABUSE: ADULT

## 2025-04-05 SDOH — SOCIAL STABILITY: SOCIAL INSECURITY: DO YOU FEEL ANYONE HAS EXPLOITED OR TAKEN ADVANTAGE OF YOU FINANCIALLY OR OF YOUR PERSONAL PROPERTY?: NO

## 2025-04-05 SDOH — SOCIAL STABILITY: SOCIAL INSECURITY: HAS ANYONE EVER THREATENED TO HURT YOUR FAMILY OR YOUR PETS?: NO

## 2025-04-05 SDOH — SOCIAL STABILITY: SOCIAL INSECURITY: DO YOU FEEL UNSAFE GOING BACK TO THE PLACE WHERE YOU ARE LIVING?: NO

## 2025-04-05 ASSESSMENT — PAIN - FUNCTIONAL ASSESSMENT
PAIN_FUNCTIONAL_ASSESSMENT: 0-10
PAIN_FUNCTIONAL_ASSESSMENT: 0-10

## 2025-04-05 ASSESSMENT — PATIENT HEALTH QUESTIONNAIRE - PHQ9
2. FEELING DOWN, DEPRESSED OR HOPELESS: NOT AT ALL
1. LITTLE INTEREST OR PLEASURE IN DOING THINGS: NOT AT ALL
SUM OF ALL RESPONSES TO PHQ9 QUESTIONS 1 & 2: 0

## 2025-04-05 ASSESSMENT — ENCOUNTER SYMPTOMS
MYALGIAS: 1
CARDIOVASCULAR NEGATIVE: 1
ACTIVITY CHANGE: 1
PSYCHIATRIC NEGATIVE: 1
RESPIRATORY NEGATIVE: 1
ENDOCRINE NEGATIVE: 1
ABDOMINAL PAIN: 1
ALLERGIC/IMMUNOLOGIC NEGATIVE: 1
HEMATOLOGIC/LYMPHATIC NEGATIVE: 1
NEUROLOGICAL NEGATIVE: 1
ARTHRALGIAS: 1
EYES NEGATIVE: 1

## 2025-04-05 ASSESSMENT — COGNITIVE AND FUNCTIONAL STATUS - GENERAL
DAILY ACTIVITIY SCORE: 20
MOVING FROM LYING ON BACK TO SITTING ON SIDE OF FLAT BED WITH BEDRAILS: A LITTLE
TOILETING: A LITTLE
STANDING UP FROM CHAIR USING ARMS: A LITTLE
HELP NEEDED FOR BATHING: A LITTLE
TURNING FROM BACK TO SIDE WHILE IN FLAT BAD: A LITTLE
WALKING IN HOSPITAL ROOM: A LITTLE
MOBILITY SCORE: 18
MOVING TO AND FROM BED TO CHAIR: A LITTLE
CLIMB 3 TO 5 STEPS WITH RAILING: A LITTLE
DRESSING REGULAR LOWER BODY CLOTHING: A LITTLE
DRESSING REGULAR UPPER BODY CLOTHING: A LITTLE

## 2025-04-05 ASSESSMENT — ACTIVITIES OF DAILY LIVING (ADL)
WALKS IN HOME: NEEDS ASSISTANCE
GROOMING: INDEPENDENT
PATIENT'S MEMORY ADEQUATE TO SAFELY COMPLETE DAILY ACTIVITIES?: YES
TOILETING: INDEPENDENT
HEARING - LEFT EAR: FUNCTIONAL
HEARING - RIGHT EAR: FUNCTIONAL
ASSISTIVE_DEVICE: WALKER
FEEDING YOURSELF: INDEPENDENT
DRESSING YOURSELF: INDEPENDENT
BATHING: INDEPENDENT
JUDGMENT_ADEQUATE_SAFELY_COMPLETE_DAILY_ACTIVITIES: YES
ADEQUATE_TO_COMPLETE_ADL: YES

## 2025-04-05 ASSESSMENT — LIFESTYLE VARIABLES
AUDIT-C TOTAL SCORE: 0
HOW MANY STANDARD DRINKS CONTAINING ALCOHOL DO YOU HAVE ON A TYPICAL DAY: PATIENT DOES NOT DRINK
SUBSTANCE_ABUSE_PAST_12_MONTHS: NO
SKIP TO QUESTIONS 9-10: 1
HOW OFTEN DO YOU HAVE A DRINK CONTAINING ALCOHOL: NEVER
AUDIT-C TOTAL SCORE: 0
HOW OFTEN DO YOU HAVE 6 OR MORE DRINKS ON ONE OCCASION: NEVER
PRESCIPTION_ABUSE_PAST_12_MONTHS: NO

## 2025-04-05 ASSESSMENT — PAIN SCALES - GENERAL
PAINLEVEL_OUTOF10: 2
PAINLEVEL_OUTOF10: 0 - NO PAIN
PAINLEVEL_OUTOF10: 0 - NO PAIN

## 2025-04-05 ASSESSMENT — PAIN DESCRIPTION - DESCRIPTORS: DESCRIPTORS: ACHING

## 2025-04-05 NOTE — PROGRESS NOTES
"Pharmacy Medication History     Source of Information: Patient was taking Spironolactone 25 mg tablet along with torsemide 20 mg tablet but he's not taking spironolactone anymore per his doctor orders .    Additional concerns with the patient's PTA list.   N/A  Notified Provider via Haiku : No    The following updates were made to the Prior to Admission medication list:     Medications ADDED:   Atorvastatin 20 mg tablet  Medications CHANGED:  N/A  Medications REMOVED:   N/A  Medications NOT TAKING:   Acetaminophen 325 mg tablet  Ascorbic acid/ multivit-min ( EMERGEN-C ORAL)  Cholestyramine light 4 gram packet  Methocarbamol 500 mg tablet  NON FORMULARY  Vancomycin 125 mg capsule    Allergy reviewed : Yes    Meds 2 Beds : Yes    Outpatient pharmacy confirmed and updated in chart : Yes    Pharmacy name: [facility] [address]    The list below reflectives the updated PTA list. Please review each medication in order reconciliation for additional clarification and justification.    Prior to Admission Medications   Prescriptions Last Dose Informant   NON FORMULARY Not Taking Family Member   Sig: Take 1 each by mouth once daily. \"KIDNEY STUFF\" = KIDNEY SUPPORT FORMULA WITH organic beet powder, organic carrot powder,  defatted wheat germ, bovine kidney powder, kidney bean powder, concentrated flax olea lignans, organic mullen flaxseed   Patient not taking: Reported on 4/5/2025   acetaminophen (Tylenol) 325 mg tablet Not Taking Family Member   Sig: Take 2 tablets (650 mg) by mouth every 6 hours.   Patient not taking: Reported on 4/5/2025   ascorbic acid/multivit-min (EMERGEN-C ORAL) Not Taking Family Member   Sig: Take 1 each by mouth once daily.   Patient not taking: Reported on 4/5/2025   aspirin 81 mg EC tablet 4/4/2025 Morning Family Member   Sig: Take 1 tablet (81 mg) by mouth once daily.   atorvastatin (Lipitor) 20 mg tablet 4/4/2025    Sig: Take 1 tablet (20 mg) by mouth once daily.   carvedilol " (Coreg) 25 mg tablet 4/4/2025 Morning Family Member   Sig: Take 1 tablet (25 mg) by mouth 2 times a day.   cholestyramine light (Prevalite) 4 gram packet Not Taking Family Member   Sig: Take 1 packet (4 g) by mouth 2 times a day for 10 days.   Patient not taking: Reported on 4/5/2025   ezetimibe (Zetia) 10 mg tablet 4/4/2025 Bedtime Family Member   Sig: Take 1 tablet (10 mg) by mouth once daily at bedtime.   hydrALAZINE (Apresoline) 100 mg tablet 4/5/2025 Morning Family Member   Sig: Take 1 tablet (100 mg) by mouth 3 times a day.   lactobacillus acidophilus tablet tablet 4/4/2025 Morning Family Member   Sig: Take 2 tablets by mouth 2 times a day.   methocarbamol (Robaxin) 500 mg tablet Not Taking Self, Child   Sig: Take 1 tablet (500 mg) by mouth every 8 hours if needed for muscle spasms for up to 5 days.   Patient not taking: Reported on 4/5/2025   multivitamin tablet 4/4/2025 Family Member   Sig: Take 1 tablet by mouth once daily.   oxyCODONE-acetaminophen (Percocet) 5-325 mg tablet 4/4/2025    Sig: Take 1 tablet by mouth every 6 hours if needed for severe pain (7 - 10) for up to 3 days.   sodium bicarbonate 650 mg tablet 4/4/2025 Family Member   Sig: Take 2 tablets (1,300 mg) by mouth 3 times a day.   torsemide (Demadex) 20 mg tablet 4/4/2025 Family Member   Sig: Take 3 tablets (60 mg) by mouth once daily.   vancomycin (Vancocin) 125 mg capsule  Family Member   Sig: Take 1 capsule (125 mg) by mouth 4 times a day for 46 doses.   Patient not taking: Reported on 4/2/2025      Facility-Administered Medications: None       The list below reflectives the updated allergy list. Please review each documented allergy for additional clarification and justification.    Allergies   Allergen Reactions    Amlodipine Besylate Swelling     edema legs          04/05/25 at 9:56 AM - Carolyne Vargas

## 2025-04-05 NOTE — CARE PLAN
Problem: Diabetes  Goal: Maintain glucose levels >70mg/dl to <250mg/dl throughout shift  Outcome: Progressing  Goal: No changes in neurological exam by end of shift  Outcome: Progressing     Problem: Pain - Adult  Goal: Verbalizes/displays adequate comfort level or baseline comfort level  Outcome: Progressing     Problem: Safety - Adult  Goal: Free from fall injury  Outcome: Progressing     Problem: Discharge Planning  Goal: Discharge to home or other facility with appropriate resources  Outcome: Progressing     Problem: Chronic Conditions and Co-morbidities  Goal: Patient's chronic conditions and co-morbidity symptoms are monitored and maintained or improved  Outcome: Progressing     Problem: Nutrition  Goal: Nutrient intake appropriate for maintaining nutritional needs  Outcome: Progressing     Problem: Fall/Injury  Goal: Not fall by end of shift  Outcome: Progressing  Goal: Be free from injury by end of the shift  Outcome: Progressing  Goal: Verbalize understanding of personal risk factors for fall in the hospital  Outcome: Progressing  Goal: Verbalize understanding of risk factor reduction measures to prevent injury from fall in the home  Outcome: Progressing  Goal: Use assistive devices by end of the shift  Outcome: Progressing  Goal: Pace activities to prevent fatigue by end of the shift  Outcome: Progressing   The patient's goals for the shift include wants to rest    The clinical goals for the shift include no signs of sepsis spreading this shift

## 2025-04-05 NOTE — CONSULTS
CONSULT: NEPHROLOGY SERVICE    REASON FOR CONSULT: CKD  Admit Date: 4/4/2025  6:38 PM       HPI: Patient is a 59 y.o. male admitted 4/4/2025 with h/o CKD stage 5 under care of Dr Max, CAD s/p MI, type 2 diabetes mellitus, DVT, hypertension, recent choledocholithiasis s/p cholecystectomy, course complicated with C. difficile colitis and discharged on 3/30/25, coming with R knee pain and edema, difficualty to ambulate. CT abd showing acute diverticulitis, very small R pleural effusion, started on Levaquin and metronidazole. Also had 1 dose of prednisone  He claims his R knee pain and edema is better  Denies SOB  Not taking NSAID, no contrast use   Consulted for CKD, he is very hesitant to speak with me, states he has a Follow up appt with Dr Max this coming week  He denies nausea/itching, good appetite     Past Medical History:   Diagnosis Date    Coronary artery disease     Diabetes mellitus (Multi)     DVT (deep venous thrombosis) (Multi)     Hypertension     MI (myocardial infarction) (Multi)      Allergies: Amlodipine besylate     Past Surgical History:   Procedure Laterality Date    AMPUTATION      CHOLECYSTECTOMY  03/05/2025    Laparoscopic Cholecystectomy       No family history on file.    Social History  He reports that he has never smoked. He has never used smokeless tobacco. No history on file for alcohol use and drug use.    Review of Systems  As above     CURRENT HOSP MEDS:    Current Facility-Administered Medications:     acetaminophen (Tylenol) tablet 650 mg, 650 mg, oral, q4h PRN **OR** acetaminophen (Tylenol) oral liquid 650 mg, 650 mg, oral, q4h PRN **OR** acetaminophen (Tylenol) suppository 650 mg, 650 mg, rectal, q4h PRN, Alvaro EDMONDSON Salomone, DO    aspirin EC tablet 81 mg, 81 mg, oral, Daily, Alvaro EDMONDSON Salomone, DO, 81 mg at 04/05/25 0826    carvedilol (Coreg) tablet 25 mg, 25 mg, oral, BID, Alvaro G Salomone, DO, 25 mg at 04/05/25 0826    cholestyramine light (Prevalite) 4 gram packet 4 g, 4 g,  "oral, BID, Alvaro EDMONDSON Salomone, DO, 4 g at 04/05/25 0958    ezetimibe (Zetia) tablet 10 mg, 10 mg, oral, Nightly, Alvaro Burnhamomone, DO    heparin (porcine) injection 5,000 Units, 5,000 Units, subcutaneous, q8h, Alvaro EDMONDSON Salomone, DO, 5,000 Units at 04/05/25 0739    hydrALAZINE (Apresoline) tablet 100 mg, 100 mg, oral, TID, Alvaro Burnhamomone, DO, 100 mg at 04/05/25 0826    lactobacillus acidophilus capsule 2 capsule, 2 capsule, oral, BID, Alvaro Eastone, DO, 2 capsule at 04/05/25 0826    metroNIDAZOLE (Flagyl) 500 mg in sodium chloride (iso)  mL, 500 mg, intravenous, q8h, Alvaro EDMONDSON Tejomone, DO    ondansetron (Zofran) tablet 4 mg, 4 mg, oral, q8h PRN **OR** ondansetron (Zofran) injection 4 mg, 4 mg, intravenous, q8h PRN, Alvaro EDMONDSON Tejomone, DO    oxyCODONE (Roxicodone) immediate release tablet 5 mg, 5 mg, oral, q6h PRN, Alvaro EDMONDSON Salomone, DO    pantoprazole (ProtoNix) EC tablet 40 mg, 40 mg, oral, Daily before breakfast, 40 mg at 04/05/25 0739 **OR** pantoprazole (Protonix) injection 40 mg, 40 mg, intravenous, Daily before breakfast, Alvaro EDMONDSON Tejomone, DO    sodium bicarbonate tablet 1,300 mg, 1,300 mg, oral, TID, Alvaro EDMONDSON Salomone, DO, 1,300 mg at 04/05/25 0826    torsemide (Demadex) tablet 60 mg, 60 mg, oral, Daily, Alvaro EDMONDSON Salomone, DO, 60 mg at 04/05/25 0826    vancomycin (Vancocin) capsule 125 mg, 125 mg, oral, 4x daily, Alvaro EDMONDSON Salomone, DO, 125 mg at 04/05/25 0739     PHYSICAL EXAM:  BP (!) 173/91   Pulse 88   Temp 37 °C (98.6 °F) (Oral)   Resp 18   Ht 1.803 m (5' 11\")   Wt 109 kg (240 lb)   SpO2 98%   BMI 33.47 kg/m²     Intake/Output Summary (Last 24 hours) at 4/5/2025 1047  Last data filed at 4/5/2025 0900  Gross per 24 hour   Intake 300 ml   Output --   Net 300 ml     Gen: AAO, NAD  Neck: No JVD  Cardiac: RRR  Resp: clear BS  Abd: Soft, non tender, +BS, non distended   Ext: trace LE edema   Neuro: moves 4 ext, no asterixis   Peripheral Pulses: Capillary refill <2secs, strong peripheral pulses.  Skin: Skin color, " texture, turgor normal, no suspicious rashes or lesions.    LABS:   Results for orders placed or performed during the hospital encounter of 04/04/25 (from the past 24 hours)   Comprehensive Metabolic Panel   Result Value Ref Range    Glucose 126 (H) 74 - 99 mg/dL    Sodium 135 (L) 136 - 145 mmol/L    Potassium 4.6 3.5 - 5.3 mmol/L    Chloride 108 (H) 98 - 107 mmol/L    Bicarbonate 15 (L) 21 - 32 mmol/L    Anion Gap 17 10 - 20 mmol/L    Urea Nitrogen 47 (H) 6 - 23 mg/dL    Creatinine 7.51 (H) 0.50 - 1.30 mg/dL    eGFR 8 (L) >60 mL/min/1.73m*2    Calcium 9.1 8.6 - 10.3 mg/dL    Albumin 3.3 (L) 3.4 - 5.0 g/dL    Alkaline Phosphatase 66 33 - 136 U/L    Total Protein 6.3 (L) 6.4 - 8.2 g/dL    AST 16 9 - 39 U/L    Bilirubin, Total 0.5 0.0 - 1.2 mg/dL    ALT 15 10 - 52 U/L   C-reactive protein   Result Value Ref Range    C-Reactive Protein 14.35 (H) <1.00 mg/dL   Lactate   Result Value Ref Range    Lactate 1.3 0.4 - 2.0 mmol/L   Blood Culture    Specimen: Peripheral Venipuncture; Blood culture   Result Value Ref Range    Blood Culture Loaded on Instrument - Culture in progress    Blood Culture    Specimen: Peripheral Venipuncture; Blood culture   Result Value Ref Range    Blood Culture Loaded on Instrument - Culture in progress    CBC and Auto Differential   Result Value Ref Range    WBC 17.4 (H) 4.4 - 11.3 x10*3/uL    nRBC 0.0 0.0 - 0.0 /100 WBCs    RBC 3.12 (L) 4.50 - 5.90 x10*6/uL    Hemoglobin 8.3 (L) 13.5 - 17.5 g/dL    Hematocrit 26.9 (L) 41.0 - 52.0 %    MCV 86 80 - 100 fL    MCH 26.6 26.0 - 34.0 pg    MCHC 30.9 (L) 32.0 - 36.0 g/dL    RDW 16.5 (H) 11.5 - 14.5 %    Platelets 396 150 - 450 x10*3/uL    Neutrophils % 90.5 40.0 - 80.0 %    Immature Granulocytes %, Automated 2.6 (H) 0.0 - 0.9 %    Lymphocytes % 3.4 13.0 - 44.0 %    Monocytes % 2.6 2.0 - 10.0 %    Eosinophils % 0.5 0.0 - 6.0 %    Basophils % 0.4 0.0 - 2.0 %    Neutrophils Absolute 15.77 (H) 1.20 - 7.70 x10*3/uL    Immature Granulocytes Absolute, Automated  0.46 0.00 - 0.70 x10*3/uL    Lymphocytes Absolute 0.60 (L) 1.20 - 4.80 x10*3/uL    Monocytes Absolute 0.45 0.10 - 1.00 x10*3/uL    Eosinophils Absolute 0.09 0.00 - 0.70 x10*3/uL    Basophils Absolute 0.07 0.00 - 0.10 x10*3/uL   Sedimentation rate, automated   Result Value Ref Range    Sedimentation Rate 64 (H) 0 - 20 mm/h       DATA:   Diagnostic tests reviewed for today's visit:    Labs and meds    ASSESSMENT AND PLAN:  - CKD stage 5 (baseline Scr ~6.5-7): kidney function ~baseline  Acceptable volume status on daily torsemide and non uremic but nearing dialysis need  Pt does not want to speak to much with me, not interested in discussing dialysis and only wants to discuss those issues with Dr Max who he will see in a few days   HTN: suboptimal control  AGMA on po bicarbonate   Hb 8,3    PLAN:  - fortunately no need to start dialysis just yet, but soon, will defer additional talks to his usual nephrologist in OP setting  - resume usual BP meds, no need of more aggressive diuretic tx   - getting iron studies    Greatly appreciate the opportunity to assist in the care of this patient. Will continue to follow.     Signature: Hong Munoz MD  Division of Nephrology and Hypertension

## 2025-04-05 NOTE — SIGNIFICANT EVENT
States has had no diarrhea today or yesterday.  Main reason for coming in was right knee pain pain and right in the snuffbox on the right hand.  Pain now is controlled.  Will await final recommendations from ID.  Has no abdominal pain nausea vomiting he does have sinus tachycardia when he is ambulating.

## 2025-04-05 NOTE — ED PROVIDER NOTES
HPI   Chief Complaint   Patient presents with   • Leg Swelling       This is a 59-year-old man with past medical history of CKD, multiple admissions to the hospital following choledocholithiasis then having C. difficile and worsening fluid overload requiring diuresis now with worsening joint pains and difficulty ambulating.  Unable to ambulate.  He was seen a few days prior did have lab work and imaging performed which was negative for venous duplex ultrasounds for DVT and no fractures and was given Lasix with some improvement in his lower extremity edema but now having persistent pain            Patient History   Past Medical History:   Diagnosis Date   • Coronary artery disease    • Diabetes mellitus (Multi)    • DVT (deep venous thrombosis) (Multi)    • Hypertension    • MI (myocardial infarction) (Multi)      Past Surgical History:   Procedure Laterality Date   • AMPUTATION     • CHOLECYSTECTOMY  03/05/2025    Laparoscopic Cholecystectomy     No family history on file.  Social History     Tobacco Use   • Smoking status: Never   • Smokeless tobacco: Never   Substance Use Topics   • Alcohol use: Not on file   • Drug use: Not on file       Physical Exam   ED Triage Vitals [04/04/25 1453]   Temperature Heart Rate Respirations BP   37 °C (98.6 °F) 80 18 155/82      Pulse Ox Temp src Heart Rate Source Patient Position   98 % -- -- --      BP Location FiO2 (%)     -- --       Physical Exam  Vitals and nursing note reviewed.   Constitutional:       General: He is not in acute distress.     Appearance: Normal appearance. He is obese. He is not ill-appearing.   HENT:      Head: Normocephalic and atraumatic.      Nose: Nose normal.      Mouth/Throat:      Mouth: Mucous membranes are moist.      Pharynx: Oropharynx is clear.   Eyes:      Extraocular Movements: Extraocular movements intact.      Conjunctiva/sclera: Conjunctivae normal.      Pupils: Pupils are equal, round, and reactive to light.   Cardiovascular:      Rate  and Rhythm: Normal rate and regular rhythm.      Pulses: Normal pulses.      Heart sounds: Normal heart sounds.   Pulmonary:      Effort: Pulmonary effort is normal.      Breath sounds: Normal breath sounds. No stridor. No wheezing.   Abdominal:      General: Abdomen is flat. Bowel sounds are normal. There is no distension.      Palpations: Abdomen is soft.      Tenderness: There is no abdominal tenderness. There is no right CVA tenderness, left CVA tenderness, guarding or rebound.   Musculoskeletal:         General: Swelling and tenderness present. No deformity.      Cervical back: Normal range of motion and neck supple.      Comments: Able to fully range all the joints.  He is neurovascular intact distal.  Positive tenderness over the bilateral knees and over bilateral wrists and shoulder.  No clear exudate.  No rapidly spreading redness.  No large effusions   Skin:     General: Skin is warm and dry.      Capillary Refill: Capillary refill takes less than 2 seconds.      Coloration: Skin is not pale.      Findings: No bruising.   Neurological:      General: No focal deficit present.      Mental Status: He is alert and oriented to person, place, and time. Mental status is at baseline.      Sensory: No sensory deficit.      Motor: No weakness.   Psychiatric:         Mood and Affect: Mood normal.         Behavior: Behavior normal.         Thought Content: Thought content normal.         Judgment: Judgment normal.           ED Course & MDM   Diagnoses as of 04/05/25 0116   Polyarthropathy   Weakness   Diverticulitis                 No data recorded     [place] Coma Scale Score: 15 (04/04/25 1451 : Ethan Sharma, LEROY)                           Medical Decision Making  IV is placed and labs are drawn including CBC and CMP.Worsening TEJA noted to 7.51 up from 6.79.  His exam does seem more consistent with gout versus pseudogout of the joints versus septic joint seems less likely.  He does have a polyarthropathy.  Not see  clear signs for septic joint at all these points.  I did start him on prednisone and pain control.  Given his inability to ambulate and worsening joint pains he likely require admission for further evaluation and management.  CT scan does show diverticulitis.  Started on Levaquin and Flagyl and admitted to Dr. Seth for further management    Amount and/or Complexity of Data Reviewed  Labs: ordered. Decision-making details documented in ED Course.  Radiology: ordered. Decision-making details documented in ED Course.  ECG/medicine tests: ordered. Decision-making details documented in ED Course.        Procedure  Procedures     Yung Corea MD  04/05/25 0116

## 2025-04-05 NOTE — NURSING NOTE
Patient arrived from ER to room 624, oriented to room and call light, breakfast set up, updated family and patient that plan is ID, renal consult, GI consult, continue antibiotics, elevate rosas legs r/t edema, aware tylenol and oxycodone ordered prn, patient declined pain medicine at this time. Educated isolation for cdiff, use soap and water.

## 2025-04-05 NOTE — CONSULTS
Reason For Consult  Acute diverticulitis    History Of Present Illness  Colin Leonard is a 59 y.o. male with h/o CAD s/p MI, type 2 diabetes mellitus, DVT, HTN, CKD 5, H/O  choledocholithiasis, recent pneumonia, C. difficile colitis (dx on 3/28/25), presented with worsening fluid overload requiring diuresis, with worsening joint pains, difficulty ambulating and pelvic pain. Due to complaints of pelvic pain, CT A/P was obtained and showed acute diverticulitis.  ID and GI consulted.  ID recommends to continue oral vancomycin and IV Flagyl.  Patient denies abdominal pain, nausea, vomiting, fever or chills.  Reports diarrhea because of the C. difficile.  Labs showed leukocytosis, renal function at his baseline, chronic normocytic anemia at baseline.    Colonoscopy 1/13/2020  - One 3 mm polyp in the rectum, removed with a cold snare. Resected and retrieved.  - Diverticulosis in the sigmoid colon.  - Non-bleeding internal hemorrhoids.   Pathology showed hyperplastic polyp.    Past Medical History  He has a past medical history of Coronary artery disease, Diabetes mellitus (Multi), DVT (deep venous thrombosis) (Multi), Hypertension, and MI (myocardial infarction) (Multi).    Surgical History  He has a past surgical history that includes Amputation and Cholecystectomy (03/05/2025).     Social History  He reports that he has never smoked. He has never used smokeless tobacco. No history on file for alcohol use and drug use.    Family History  No family history on file.     Allergies  Amlodipine besylate    Review of Systems  10 systems reviewed and negative other than HPI     Physical Exam  Physical Exam  Vitals reviewed.   Constitutional:       General: He is not in acute distress.     Appearance: Normal appearance. He is obese. He is not ill-appearing.   HENT:      Head: Normocephalic and atraumatic.   Cardiovascular:      Rate and Rhythm: Normal rate and regular rhythm.      Heart sounds: Normal heart sounds.  "  Pulmonary:      Breath sounds: Normal breath sounds.   Abdominal:      General: Bowel sounds are normal. There is no distension.      Palpations: Abdomen is soft.      Tenderness: There is no abdominal tenderness. There is no guarding.   Skin:     General: Skin is warm and dry.   Neurological:      General: No focal deficit present.      Mental Status: He is alert and oriented to person, place, and time.   Psychiatric:         Mood and Affect: Mood normal.         Behavior: Behavior normal.            Last Recorded Vitals  Blood pressure (!) 173/91, pulse 91, temperature 37 °C (98.6 °F), temperature source Oral, resp. rate 16, height 1.803 m (5' 11\"), weight 109 kg (240 lb), SpO2 98%.    Relevant Results      Scheduled medications  aspirin, 81 mg, oral, Daily  carvedilol, 25 mg, oral, BID  cholestyramine light, 4 g, oral, BID  ezetimibe, 10 mg, oral, Nightly  heparin (porcine), 5,000 Units, subcutaneous, q8h  hydrALAZINE, 100 mg, oral, TID  lactobacillus acidophilus, 2 capsule, oral, BID  metroNIDAZOLE, 500 mg, intravenous, q8h  pantoprazole, 40 mg, oral, Daily before breakfast   Or  pantoprazole, 40 mg, intravenous, Daily before breakfast  sodium bicarbonate, 1,300 mg, oral, TID  torsemide, 60 mg, oral, Daily  vancomycin, 125 mg, oral, 4x daily      Continuous medications     PRN medications  PRN medications: acetaminophen **OR** acetaminophen **OR** acetaminophen, ondansetron **OR** ondansetron, oxyCODONE  CT abdomen pelvis wo IV contrast    Result Date: 4/5/2025  STUDY: CT Abdomen and Pelvis without IV Contrast; 04/04/2025 at 11:20 PM INDICATION: Pelvic pain. COMPARISON: XR left hip and pelvis 04/04/25. CT abdomen and pelvis 03/27/25. ACCESSION NUMBER(S): [id number] ORDERING CLINICIAN: JIMENA MAE TECHNIQUE: CT of the abdomen and pelvis was performed.  Contiguous axial images were obtained at 3 mm slice thickness through the abdomen and pelvis. Coronal and sagittal reconstructions at 3 mm slice " thickness were performed. No intravenous contrast was administered.  Automated mA/kV exposure control was utilized and patient examination was performed in strict accordance with principles of ALARA. FINDINGS: Please note that the evaluation of vessels, lymph nodes and organs is limited without intravenous contrast.  LOWER CHEST: No cardiomegaly.  No pericardial effusion.  Lung bases reveals small left effusion with suspected areas of atelectasis.  There is a tiny right effusion.  There is prominent coronary artery calcifications.  ABDOMEN:  LIVER: No hepatomegaly.  Smooth surface contour.  Normal attenuation.  BILE DUCTS: No intrahepatic or extrahepatic biliary ductal dilatation.  GALLBLADDER: The gallbladder is surgically absent.. STOMACH: No abnormalities identified.  PANCREAS: No masses or ductal dilatation.  SPLEEN: No splenomegaly or focal splenic lesion.  ADRENAL GLANDS: No thickening or nodules.  KIDNEYS AND URETERS: Kidneys are normal in size and location.  Nonobstructing midpole right renal calculus is demonstrated.  There is a left renal cyst 5.3 cm.  PELVIS:  BLADDER: No abnormalities identified.  REPRODUCTIVE ORGANS: No abnormalities identified.  BOWEL: Inflammatory changes are demonstrated involving the upper sigmoid colon.  There is a background of diverticular disease.  This is compatible with acute diverticulitis.  Appendix is normal.  Moderate amount of fecal debris is demonstrated of the right colon.  VESSELS: No abnormalities identified.  Abdominal aorta is normal in caliber.  PERITONEUM/RETROPERITONEUM/LYMPH NODES: No free fluid.  No pneumoperitoneum. No lymphadenopathy.  ABDOMINAL WALL: No abnormalities identified. SOFT TISSUES: No abnormalities identified.  BONES: No acute fracture or aggressive osseous lesion.  Advanced disc disease is demonstrated L5-S1.    1.Findings compatible with acute diverticulitis upper sigmoid colon. 2.Nonobstructing right renal calculus. 3.Small left effusion with  suspected areas of atelectasis. Tiny right effusion. 4.Prominent coronary artery calcifications. Signed by Ricardo Haskins DO    XR hip left with pelvis when performed 2 or 3 views    Result Date: 4/4/2025  STUDY: Pelvis and Left Hip Radiographs; 04/04/2025 6:27 PM INDICATION: Left hip pain. COMPARISON: CT chest abdomen pelvis 03/10/2025.  CT abdomen pelvis 03/03/2025. ACCESSION NUMBER(S): [id number] ORDERING CLINICIAN: THIAGO PEARSON TECHNIQUE:  AP view of the pelvis and two view(s) of the left hip. FINDINGS:  PELVIS: The pelvic ring is intact.  There is no acute fracture.  Enthesopathy present.  Vascular calcification is noted.  Degenerative changes of the spine.  Limited evaluation of the sacrum/coccyx due to overlying bowel gas. LEFT HIP: No evidence suggest acute fracture or dislocation.  Degenerative changes present.  Os acetabuli noted.    No evidence of acute fracture.  If clinical suspicion for fracture remains high, CT can be obtained for further evaluation. Signed by Chele Subramanian MD    XR chest 2 views    Result Date: 4/4/2025  STUDY: Chest Radiographs;  04/04/2025, 1828 hrs. INDICATION: 59-year-old male with coronary artery disease. COMPARISON: 03/10/2025 CT Chest, Abdomen, and Pelvis, XR Chest. 07/21/2024 XR Chest. ACCESSION NUMBER(S): [id number] ORDERING CLINICIAN: THIAGO PEARSON TECHNIQUE: Frontal and lateral chest. FINDINGS: CARDIOMEDIASTINAL SILHOUETTE: Prominent in size, early cardiomegaly. PULMONARY VASCULATURE: Pulmonary venous vessels defined, no pulmonary venous vascular congestion. LUNGS: No lung consolidation, however there are bilateral basilar small linear opacities, probably subsegmental atelectasis; no lung mass. PLEURA/HEMIDIAPHRAGM: No pneumothorax, no juxtapleural mass. No hemidiaphragm elevation. CHEST: Frontal shows lungs hypo-inflated, with expected central bronchovascular crowding. -=- ABDOMEN: Upper abdomen without bowel obstruction. SKELETON: No fracture, no osseous  lytic/blastic lesion.    1. Chest shows lungs hypoinflated, with expected central bronchovascular crowding; no lung consolidation or lung mass. 2. Lung bases with probable subsegmental atelectasis. 3. No pulmonary venous congestion, borderline cardiomegaly. Signed by Kyle Nava MD    XR hand left 3+ views    Result Date: 4/4/2025  STUDY: Hand Radiographs; 04/04/2025 6:27 PM INDICATION: Swelling left thumb base. COMPARISON: None. ACCESSION NUMBER(S): [id number] ORDERING CLINICIAN: THIAGO PEARSON TECHNIQUE:  Three view(s) of the left hand. FINDINGS:  There is no displaced fracture.  There is a 6 mm soft tissue calcification along the radial aspect of the head of the first metacarpal and along but no fracture or definite acute bony abnormalities are visible.  The alignment is anatomic.  There are some vascular calcifications at the wrist.  No additional soft tissue abnormality is seen.    There is a 6 mm soft tissue calcification adjacent to the distal first metacarpal but no fracture or acute bony abnormalities are appreciated.. Signed by Galen Ferris MD    XR knee right 3 views    Result Date: 4/4/2025  STUDY: Knee Radiographs; 04/04/2025 6:27 PM INDICATION: Right knee pain and swelling. COMPARISON: None. ACCESSION NUMBER(S): [id number] ORDERING CLINICIAN: THIAGO PEARSON TECHNIQUE:  Three view(s) of the right knee. FINDINGS:  There is no displaced fracture.  There is some fragmentation or soft tissue calcification anterior to the tibial tubercle but the knee otherwise is unremarkable.  The alignment is anatomic.  There are some vascular calcifications in the popliteal artery area but no additional soft tissue abnormalities are seen..  There is no joint effusion.    No acute bony abnormalities. Signed by Galen Ferris MD    Vascular US lower extremity venous duplex bilateral    Result Date: 4/2/2025  Interpreted By:  Arlene Alvarez, STUDY: VASC US LOWER EXTREMITY VENOUS DUPLEX BILATERAL  4/2/2025 12:38  pm   INDICATION: 60 y/o   M with  Signs/Symptoms:bilateral lower extremity swelling, worse on right. LMP:  Unknown.   COMPARISON: None.   ACCESSION NUMBER(S): [id number]   ORDERING CLINICIAN: TSERING HENNING   TECHNIQUE: Routine ultrasound of the  bilateral lower extremity was performed with duplex Doppler (color and spectral) evaluation.   Static images were obtained for remote interpretation.   FINDINGS: THIGH VEINS:  The common femoral, femoral, popliteal, proximal medial saphenous, and deep femoral veins are patent and free of thrombus. The veins are normally compressible.  They demonstrate normal phasic flow and augmentation response.   CALF VEINS: Patent posterior tibial and peroneal veins. Calf swelling and edema.       No deep venous thrombosis of the bilateral lower extremity.   Signed by: Arlene Alvarez 4/2/2025 12:47 PM Dictation workstation:   [id number]    XR tibia fibula right 2 views    Result Date: 4/2/2025  Interpreted By:  Miguelina Brooke, STUDY: XR TIBIA FIBULA RIGHT 2 VIEWS; ;  4/2/2025 11:26 am   INDICATION: Signs/Symptoms:pain, swelling.     COMPARISON: None.   ACCESSION NUMBER(S): [id number]   ORDERING CLINICIAN: TSERING HENNING   FINDINGS: Four views right tibia and fibula: There is no fracture or dislocation. There is fragmentation of the tibial tubercle with smooth well corticated ossification along the infrapatellar tendon near the insertion site on the tibial tubercle. Findings are consistent with remote injury or normal variant. The ankle mortise is intact.   There are vascular calcifications.       No acute bony abnormality right tibia and fibula.     MACRO: None   Signed by: Miguelina Brooke 4/2/2025 11:39 AM Dictation workstation:   [id number]    ECG 12 lead    Result Date: 3/28/2025  Normal sinus rhythm Possible Anterior infarct , age undetermined Abnormal ECG When compared with ECG of 10-MAR-2025 19:14, Nonspecific T wave abnormality now evident in Inferior leads Nonspecific T  wave abnormality, improved in Lateral leads Confirmed by Aleks Gooden ([id number]) on 3/28/2025 9:15:48 AM    CT abdomen pelvis wo IV contrast    Result Date: 3/27/2025  Interpreted By:  River Ley, STUDY: CT ABDOMEN PELVIS WO IV CONTRAST;  3/27/2025 2:55 pm   INDICATION: Signs/Symptoms:Malaise, fatigue, diarrhea, recent surgery, has chronic renal failure.     COMPARISON: 03/03/2025.   ACCESSION NUMBER(S): [id number]   ORDERING CLINICIAN: ANN BOYD   TECHNIQUE: CT of the abdomen and pelvis was performed. No contrast was administered. Coronal and sagittal reformations were made.   FINDINGS: LOWER CHEST: Bibasilar irregular atelectasis/scarring is seen, right greater than left with mild pleural thickening. Bilateral gynecomastia is partially visualized. Dense coronary artery calcifications and/or stents are seen.   ABDOMEN:   LIVER: No focal hepatic lesion is seen.   BILE DUCTS: Nondilated.   GALLBLADDER: There has been an interval cholecystectomy. No fluid collection is seen in the cholecystectomy bed region.   PANCREAS: Within normal limits.   SPLEEN: Within normal limits.   ADRENAL GLANDS: Within normal limits.   KIDNEYS AND URETERS: No renal or ureteral calculi are seen bilaterally.  No hydroureteronephrosis bilaterally. A left renal upper pole round 5.4 cm cyst is similar to prior.   Urinary bladder is nearly fully decompressed. No appreciable bladder calculi. Small scattered pelvic phleboliths are present.   VESSELS: Dense irregular atherosclerotic calcifications present in the aorta and branch vessels. No aortic aneurysm. Unenhanced IVC is unremarkable.   BOWEL: No bowel obstruction. Appendix is normal.   New multifocal colonic wall thickening is present. There is new moderate circumferential wall thickening of the ascending colon with adjacent inflammatory fat stranding/edema and trace free fluid. Moderate wall thickening is also seen involving segments of the transverse colon, descending colon and  sigmoid colon. Scattered colonic diverticula are present. Localized pericolonic inflammatory changes at the mid sigmoid colon could represent a component of acute diverticulitis as well.   PERITONEUM/RETROPERITONEUM/LYMPH NODES: Mild mesenteric edema is present. Trace free fluid is present. No organized fluid collection. No free intraperitoneal air.   No retroperitoneal fluid collection or lymphadenopathy.       ABDOMINAL WALL: Unremarkable.   BONE AND SOFT TISSUE: Thoracolumbar mild dextrocurvature may be partially exaggerated by positioning. Multilevel disc space narrowing and endplate spurring again seen throughout the visualized spine along with small vacuum discs at multiple levels. Facet arthrosis is present throughout the lumbar spine. Moderate degenerative changes of both hips are present with joint space narrowing and marginal spurring.       Interval cholecystectomy.   New multifocal colonic wall thickening and adjacent inflammatory changes most likely representing a nonspecific infectious or inflammatory colitis. Moderate circumferential wall thickening of the ascending colon with adjacent inflammatory change and trace fluid.   Scattered colonic diverticula. New more localized pericolonic inflammatory changes of the mid sigmoid colon level may represent a component of acute diverticulitis.   No bowel obstruction.   MACRO: None.   Signed by: River Ley 3/27/2025 3:17 PM Dictation workstation:   [id number]    XR chest 1 view    Result Date: 3/27/2025  Interpreted By:  Rustam Araujo, STUDY: XR CHEST 1 VIEW;  3/27/2025 2:05 pm   INDICATION: Signs/Symptoms:Malaise, fatigue.     COMPARISON: 03/11/2025   ACCESSION NUMBER(S): [id number]   ORDERING CLINICIAN: ANN BOYD   FINDINGS: AP portable view of the chest is obtained.  Limited exam due to portable nature. Magnified cardiac silhouette. No infiltrates, effusions or pneumothorax.       1.  No evidence of acute cardiopulmonary process.       MACRO:  None   Signed by: Rustam Araujo 3/27/2025 2:29 PM Dictation workstation:   [id number]    XR chest 1 view    Result Date: 3/11/2025  Interpreted By:  Hernandez Kelley, STUDY: XR CHEST 1 VIEW; 3/11/2025 5:29 am   INDICATION: Signs/Symptoms:follow up pulm edema   COMPARISON: 03/10/2025.   ACCESSION NUMBER(S): [id number]   ORDERING CLINICIAN: OMAIRA VÁZQUEZ   FINDINGS: The study is limited due to rotation, respiratory motion and poor inspiratory effort, with resultant crowding of the pulmonary vasculature. The cardiac silhouette appears mildly prominent, exaggerated by the technique. There is interval significant improvement of bilateral perihilar interstitial infiltrates. There is no pneumothorax or significant effusion. The osseous structures are unchanged.       Limited study. Improving bilateral perihilar interstitial infiltrates, most likely due to resolving pulmonary edema/CHF. Correlate clinically and further follow-up as needed.   Signed by: Hernandez Kelley 3/11/2025 3:02 PM Dictation workstation:   [id number]    Transthoracic Echo (TTE) Complete    Result Date: 3/11/2025           [place] [address]            Phone [phone] TRANSTHORACIC ECHOCARDIOGRAM REPORT Patient Name:       ODALYS Friedman Physician:    [id number] Tomi Moser DO Study Date:         3/11/2025            Ordering Provider:    [id number] FOSTER KNOX MRN/PID:            [id number]             Fellow: Accession#:         [id number]         Nurse: Date of Birth/Age:  1965 / 59      Sonographer:          Nathaniel hernandez                                      ABE Gender Assigned at                      Additional Staff: Birth: Height:             177.80 cm            Admit Date: Weight:             117.93 kg            Admission  Status:     Inpatient -                                                                Routine BSA / BMI:          2.33 m2 / 37.31      Department Location:  [facility] ICU                     kg/m2 Blood Pressure: 146 /67 mmHg Study Type:    TRANSTHORACIC ECHO (TTE) COMPLETE Diagnosis/ICD: Subsequent non ST elevation (NSTEMI) myocardial infarction-I22.2 Indication:    NSTEMI CPT Codes:     Echo Complete w Full [id number] Patient History: Diabetes:          Yes Pertinent History: CAD, Chest Pain, CHF, HTN, Hyperlipidemia, Dyspnea and                    Syncope. LVH, Renal dx V, NSTEMI, DVT. Study Detail: The following Echo studies were performed: 2D, M-Mode, Doppler and               color flow. Technically challenging study due to poor acoustic               windows and body habitus. Definity used as a contrast agent for               endocardial border definition. Total contrast used for this               procedure was 2 mL via IV push. Unable to obtain RV/RA not well               visualized view.  PHYSICIAN INTERPRETATION: Left Ventricle: Left ventricular ejection fraction is normal, by visual estimate at 60-65%. There is mild concentric left ventricular hypertrophy. There are no regional wall motion abnormalities. The left ventricular cavity size is normal. There is mild increased septal and mildly increased posterior left ventricular wall thickness. Spectral Doppler shows a Grade II (pseudonormal pattern) of left ventricular diastolic filling with an elevated left atrial pressure. Left Atrium: The left atrial size is mildly dilated. Right Ventricle: The right ventricle is normal in size. There is normal right ventricular global systolic function. Right Atrium: The right atrial size is normal. Aortic Valve: The aortic valve appears abnormal. There is mild to moderate aortic valve cusp calcification. There is mild to moderate aortic valve thickening. There is evidence of mild to moderate aortic valve  stenosis. The aortic valve dimensionless index is 0.39. There is no evidence of aortic valve regurgitation. The peak instantaneous gradient of the aortic valve is 28 mmHg. The mean gradient of the aortic valve is 15 mmHg. Mitral Valve: The mitral valve is normal in structure. There is mild mitral valve regurgitation. Tricuspid Valve: The tricuspid valve is structurally normal. There is trace tricuspid regurgitation. Pulmonic Valve: The pulmonic valve is structurally normal. There is no indication of pulmonic valve regurgitation. Pericardium: No pericardial effusion noted. Aorta: The aortic root is normal.  CONCLUSIONS:  1. Left ventricular ejection fraction is normal, by visual estimate at 60-65%.  2. Spectral Doppler shows a Grade II (pseudonormal pattern) of left ventricular diastolic filling with an elevated left atrial pressure.  3. There is normal right ventricular global systolic function.  4. Mild to moderate aortic valve stenosis. QUANTITATIVE DATA SUMMARY:  2D MEASUREMENTS:             Normal Ranges: Ao Root s:       3.93 cm IVSd:            1.25 cm     (0.6-1.1cm) LVPWd:           1.26 cm     (0.6-1.1cm) LVIDd:           5.67 cm     (3.9-5.9cm) LVIDs:           3.75 cm LV Mass Index:   130.8 g/m2 LVEDV Index:     58.65 ml/m2 LV % FS          33.9 %  LEFT ATRIUM:                 Normal Ranges: LA Vol A4C:       128.6 ml LA Vol A2C:       65.2 ml LA Vol Index BSA: 41.5 ml/m2  LV SYSTOLIC FUNCTION:                      Normal Ranges: EF-A4C View:    76 % (>=55%) EF-A2C View:    60 % EF-Biplane:     70 % EF-Visual:      63 % LV EF Reported: 63 %  LV DIASTOLIC FUNCTION:           Normal Ranges: MV Peak E:             1.17 m/s  (0.7-1.2 m/s) MV Peak A:             1.16 m/s  (0.42-0.7 m/s) E/A Ratio:             1.01      (1.0-2.2) MV e'                  0.059 m/s (>8.0) MV lateral e'          0.06 m/s MV medial e'           0.05 m/s E/e' Ratio:            19.88     (<8.0)  MITRAL VALVE:          Normal Ranges:  MV DT:        296 msec (150-240msec)  AORTIC VALVE:                      Normal Ranges: AoV Vmax:                2.64 m/s  (<=1.7m/s) AoV Peak P.8 mmHg (<20mmHg) AoV Mean PG:             15.4 mmHg (1.7-11.5mmHg) LVOT Max Cirilo:            1.13 m/s  (<=1.1m/s) AoV VTI:                 66.94 cm  (18-25cm) LVOT VTI:                25.87 cm LVOT Diameter:           2.03 cm   (1.8-2.4cm) AoV Area, VTI:           1.25 cm2  (2.5-5.5cm2) AoV Area,Vmax:           1.39 cm2  (2.5-4.5cm2) AoV Dimensionless Index: 0.39  TRICUSPID VALVE/RVSP:         Normal Ranges: IVC Diam:             2.16 cm  PULMONIC VALVE:          Normal Ranges: PV Max Cirilo:     0.9 m/s  (0.6-0.9m/s) PV Max PG:      3.4 mmHg  [id number] Tomi Moser DO Electronically signed on 3/11/2025 at 11:07:24 AM  ** Final **     ECG 12 lead    Result Date: 3/11/2025  Normal sinus rhythm Nonspecific ST and T wave abnormality QTcB >= 480 msec Abnormal ECG When compared with ECG of 28-OCT-2024 15:17, No significant change was found Confirmed by Brent Ellington ([id number]) on 3/11/2025 10:18:44 AM    ECG 12 Lead    Result Date: 3/11/2025  Normal sinus rhythm Nonspecific ST and T wave abnormality Abnormal ECG No previous ECGs available Confirmed by Brent Ellington ([id number]) on 3/11/2025 9:21:45 AM    CT chest abdomen pelvis wo IV contrast    Result Date: 3/10/2025  STUDY: CT Chest, Abdomen, and Pelvis without IV Contrast; 03/10/2025 3:57 PM INDICATION: Assess for pulmonary infection superimposed on flash pulmonary edema and intra-abdominal infection, status post cholecystectomy.  COMPARISON: XR chest 03/10/2025.  US RUQ abdomen m03/2025. CT abdomen/pelvis 2025.  ACCESSION NUMBER(S): [id number] ORDERING CLINICIAN: KYLE ALANIZ TECHNIQUE: CT of the chest, abdomen, and pelvis was performed.  Contiguous axial images were obtained at 3 mm slice thickness through the chest, abdomen, and pelvis.  Coronal and sagittal reconstructions at 3 mm slice thickness  were performed.  No intravenous contrast was administered.  FINDINGS: Please note that the evaluation of vessels, lymph nodes and organs is limited without intravenous contrast. CHEST: MEDIASTINUM: The heart is normal in size without pericardial effusion.  Coronary artery calcifications are identified.  LUNGS/PLEURA: There is no pleural effusion, pleural thickening, or pneumothorax. The airways are patent. Lungs demonstrate multilobar patchy airspace opacity suggesting acute multilobar pneumonia.  There are small bilateral pleural effusions.  LYMPH NODES: Thoracic lymph nodes are not enlarged. ABDOMEN:  LIVER: No hepatomegaly.  Smooth surface contour.  There is fatty liver morphology.  BILE DUCTS: No intrahepatic or extrahepatic biliary ductal dilatation.  GALLBLADDER: The gallbladder is surgically absent. STOMACH: No abnormalities identified.  PANCREAS: No masses or ductal dilatation.  SPLEEN: No splenomegaly or focal splenic lesion.  ADRENAL GLANDS: No thickening or nodules.  KIDNEYS AND URETERS: There is mild bilateral cortical renal atrophy..  No renal or ureteral calculi.  Left renal cyst is demonstrated.  There are small nonobstructive calculus right kidney.  This right extrarenal pelvis. There is no hydronephrosis or ureterectasis.  PELVIS:  BLADDER: No abnormalities identified.  REPRODUCTIVE ORGANS: No abnormalities identified.  Prostate gland measures 4.9 cm.  BOWEL: No abnormalities identified.  There is mild sigmoid colon diverticulosis without evidence diverticulitis.  There is mild colonic stool.  VESSELS: No abnormalities identified.  Abdominal aorta is normal in caliber.  PERITONEUM/RETROPERITONEUM/LYMPH NODES: No free fluid.  No pneumoperitoneum. There are mildly prominent retroperitoneal lymph nodes and upper abdominal lymph nodes.  ABDOMINAL WALL: No abnormalities identified. SOFT TISSUES: No abnormalities identified.  BONES: No acute fracture or aggressive osseous lesion.  There is disc space  narrowing and endplate degenerative changes in the thoracic and lumbar spine with anterior marginal osteophytes.    Chest: Acute multilobar pneumonia.  Findings greater in the bilateral upper lobes.  Small bilateral pleural effusions. Abdomen: No acute inflammatory changes.  Postoperative changes following cholecystectomy. Small nonobstructive calculus right kidney. Mildly prominent retroperitoneal and upper abdominal lymph nodes. Pelvis: No acute inflammatory changes.  Sigmoid colon diverticulosis without evidence diverticulitis. Signed by Wali Barba DO    XR chest 1 view    Result Date: 3/10/2025  STUDY: Chest Radiograph;  03/10/2025 02:21 PM INDICATION: Evaluate for flash pulmonary edema. COMPARISON: Chest, single portable view obtained on 07/21/2024 at 22:23 hours (Imaging only; report unavailable). ACCESSION NUMBER(S): [id number] ORDERING CLINICIAN: KYLE ALANIZ TECHNIQUE:  Frontal chest was obtained at 14:21 hours. FINDINGS: CARDIOMEDIASTINAL SILHOUETTE: Cardiomediastinal silhouette is top normal to mildly enlarged normal in size and configuration.  LUNGS: There is diffuse accentuation of markings throughout both lungs representing unfavorable change when compared with the prior.  There is a possible focus of infiltrate or atelectasis in the right base as well.  ABDOMEN: No remarkable upper abdominal findings.  BONES: No acute osseous changes.    1.Top normal to mildly enlarged heart and configuration. 2.Diffuse accentuation of markings in both lungs representing unfavorable change when compared with the prior. Findings could be related to pulmonary vascular congestion or other interstitial process. Signed by Nohemy Barbosa DO    CT head wo IV contrast    Result Date: 3/8/2025  Interpreted By:  Mahendra Reyes, STUDY: CT HEAD WO IV CONTRAST; ;  3/8/2025 4:50 pm   INDICATION: Signs/Symptoms:vertigo.   COMPARISON: 09/06/2020   ACCESSION NUMBER(S): [id number]   ORDERING CLINICIAN: KIRK  KELLICI   TECHNIQUE: Contiguous unenhanced axial CT sections are performed from the skull base to the vertex.   FINDINGS: The osseous structures are intact. There is sessile lipoma overlying the right frontal calvarium. There is some mild polyposis along the anterior walls of the maxillary sinuses which are incompletely visualized and mild mucoperiosteal thickening of the ethmoid air cells.   There is moderate generalized parenchymal volume loss with enlargement of the cortical sulci and CSF spaces. There is diffuse hypoattenuation of the cerebral white matter bilaterally which is nonspecific though could represent small-vessel ischemic changes.   There is no sign of parenchymal hematoma or dense extra-axial fluid collection. There is no mass effect or midline shift.       Moderate generalized parenchymal atrophy.   Severe hypoattenuation in the cerebral white matter bilaterally is nonspecific though may reflect small-vessel ischemic changes. The need for further workup should be determined clinically.   No CT evidence of acute intracranial hemorrhage or mass effect.   Paranasal sinusitis as described above.     MACRO: None   Signed by: Mahendra Reyes 3/8/2025 6:43 PM Dictation workstation:   [id number]   Results for orders placed or performed during the hospital encounter of 04/04/25 (from the past 24 hours)   Comprehensive Metabolic Panel   Result Value Ref Range    Glucose 126 (H) 74 - 99 mg/dL    Sodium 135 (L) 136 - 145 mmol/L    Potassium 4.6 3.5 - 5.3 mmol/L    Chloride 108 (H) 98 - 107 mmol/L    Bicarbonate 15 (L) 21 - 32 mmol/L    Anion Gap 17 10 - 20 mmol/L    Urea Nitrogen 47 (H) 6 - 23 mg/dL    Creatinine 7.51 (H) 0.50 - 1.30 mg/dL    eGFR 8 (L) >60 mL/min/1.73m*2    Calcium 9.1 8.6 - 10.3 mg/dL    Albumin 3.3 (L) 3.4 - 5.0 g/dL    Alkaline Phosphatase 66 33 - 136 U/L    Total Protein 6.3 (L) 6.4 - 8.2 g/dL    AST 16 9 - 39 U/L    Bilirubin, Total 0.5 0.0 - 1.2 mg/dL    ALT 15 10 - 52 U/L    C-reactive protein   Result Value Ref Range    C-Reactive Protein 14.35 (H) <1.00 mg/dL   Lactate   Result Value Ref Range    Lactate 1.3 0.4 - 2.0 mmol/L   Blood Culture    Specimen: Peripheral Venipuncture; Blood culture   Result Value Ref Range    Blood Culture Loaded on Instrument - Culture in progress    Blood Culture    Specimen: Peripheral Venipuncture; Blood culture   Result Value Ref Range    Blood Culture Loaded on Instrument - Culture in progress    CBC and Auto Differential   Result Value Ref Range    WBC 17.4 (H) 4.4 - 11.3 x10*3/uL    nRBC 0.0 0.0 - 0.0 /100 WBCs    RBC 3.12 (L) 4.50 - 5.90 x10*6/uL    Hemoglobin 8.3 (L) 13.5 - 17.5 g/dL    Hematocrit 26.9 (L) 41.0 - 52.0 %    MCV 86 80 - 100 fL    MCH 26.6 26.0 - 34.0 pg    MCHC 30.9 (L) 32.0 - 36.0 g/dL    RDW 16.5 (H) 11.5 - 14.5 %    Platelets 396 150 - 450 x10*3/uL    Neutrophils % 90.5 40.0 - 80.0 %    Immature Granulocytes %, Automated 2.6 (H) 0.0 - 0.9 %    Lymphocytes % 3.4 13.0 - 44.0 %    Monocytes % 2.6 2.0 - 10.0 %    Eosinophils % 0.5 0.0 - 6.0 %    Basophils % 0.4 0.0 - 2.0 %    Neutrophils Absolute 15.77 (H) 1.20 - 7.70 x10*3/uL    Immature Granulocytes Absolute, Automated 0.46 0.00 - 0.70 x10*3/uL    Lymphocytes Absolute 0.60 (L) 1.20 - 4.80 x10*3/uL    Monocytes Absolute 0.45 0.10 - 1.00 x10*3/uL    Eosinophils Absolute 0.09 0.00 - 0.70 x10*3/uL    Basophils Absolute 0.07 0.00 - 0.10 x10*3/uL   Sedimentation rate, automated   Result Value Ref Range    Sedimentation Rate 64 (H) 0 - 20 mm/h          Assessment/Plan     59 y.o. M with h/o CAD s/p MI, DM, DVT, HTN, CKD 5, H/O choledocholithiasis, recent pneumonia, C. diff colitis (dx on 3/28/25), presented with fluid overload requiring diuresis, with worsening joint pains, difficulty ambulating and pelvic pain.  Patient found with acute diverticulitis.     - Meant to continue antibiotics as per ID recommendations  - Okay to advance diet as tolerated  - Nephrology consulted  - No plans  for further inpatient GI workup.  Recommend to follow-up as outpatient for colonoscopy in 6 to 8 weeks.  GI will sign off, please call if any questions or further assistance needed        SARAH Vallejo-CNP  I saw and evaluated the patient. I personally obtained the key and critical portions of the history and physical exam or was physically present for key and critical portions performed by the NP. I reviewed the NP's documentation and discussed the patient with the NP. I agree with the NP's medical decision making as documented in the note.

## 2025-04-05 NOTE — H&P
History Of Present Michelle Leonard is a 59 y.o. male with a past medical history of CAD s/p MI, type 2 diabetes mellitus, DVT, hypertension, CKD 5, choledocholithiasis and C. difficile colitis, worsening fluid overload requiring diuresis and now with worsening joint pains and difficulty ambulating.  For the past few days he has been unable to ambulate.  He was seen in the ER a few days ago and a venous duplex ultrasound was done for DVT and there was done and he was placed on Lasix with some improvement in his lower extremity edema.  But now he has persistent pain in his lower extremities.  Yesterday in the ER he had a CT scan of the abdomen or pelvis that showed findings compatible with acute diverticulitis in the upper sigmoid colon.  He had a nonobstructive right renal calculus and a small left pleural effusion with atelectasis.  There was also a tiny right pleural effusion.  He was started on Levaquin and metronidazole in the ER.    Past Medical History  Past Medical History:   Diagnosis Date    Coronary artery disease     Diabetes mellitus (Multi)     DVT (deep venous thrombosis) (Multi)     Hypertension     MI (myocardial infarction) (Multi)         Surgical History  Past Surgical History:   Procedure Laterality Date    AMPUTATION      CHOLECYSTECTOMY  03/05/2025    Laparoscopic Cholecystectomy         Social History  He reports that he has never smoked. He has never used smokeless tobacco. No history on file for alcohol use and drug use.    Family History  No family history on file.     Allergies  Amlodipine besylate    Review of Systems   Constitutional:  Positive for activity change.   HENT: Negative.     Eyes: Negative.    Respiratory: Negative.     Cardiovascular: Negative.    Gastrointestinal:  Positive for abdominal pain.   Endocrine: Negative.    Genitourinary: Negative.    Musculoskeletal:  Positive for arthralgias and myalgias.   Skin: Negative.    Allergic/Immunologic: Negative.   "  Neurological: Negative.    Hematological: Negative.    Psychiatric/Behavioral: Negative.     All other systems reviewed and are negative.       Physical Exam  Vitals and nursing note reviewed.   Constitutional:       Appearance: Normal appearance.   HENT:      Head: Normocephalic.      Right Ear: External ear normal.      Left Ear: External ear normal.      Nose: Nose normal.      Mouth/Throat:      Mouth: Mucous membranes are dry.      Pharynx: Oropharynx is clear.   Eyes:      Extraocular Movements: Extraocular movements intact.      Conjunctiva/sclera: Conjunctivae normal.      Pupils: Pupils are equal, round, and reactive to light.   Cardiovascular:      Rate and Rhythm: Normal rate and regular rhythm.   Pulmonary:      Effort: Pulmonary effort is normal.      Breath sounds: Normal breath sounds.   Abdominal:      General: Abdomen is flat. Bowel sounds are normal.      Palpations: Abdomen is soft.      Tenderness: There is abdominal tenderness.   Musculoskeletal:         General: Normal range of motion.      Right lower leg: Edema present.      Left lower leg: Edema present.   Skin:     General: Skin is warm and dry.   Neurological:      General: No focal deficit present.      Mental Status: He is alert. Mental status is at baseline.   Psychiatric:         Mood and Affect: Mood normal.         Behavior: Behavior normal.          Last Recorded Vitals  Blood pressure 175/88, pulse 91, temperature 37 °C (98.6 °F), temperature source Oral, resp. rate 18, height 1.803 m (5' 11\"), weight 109 kg (240 lb), SpO2 96%.    Relevant Results  Meds:  Scheduled medications  aspirin, 81 mg, oral, Daily  carvedilol, 25 mg, oral, BID  enoxaparin, 30 mg, subcutaneous, q24h  ezetimibe, 10 mg, oral, Nightly  hydrALAZINE, 100 mg, oral, TID  lactobacillus acidophilus, 2 tablet, oral, BID  levoFLOXacin, 500 mg, intravenous, Once  levoFLOXacin, 750 mg, intravenous, q48h  metroNIDAZOLE, 500 mg, intravenous, q8h  pantoprazole, 40 mg, " "oral, Daily before breakfast   Or  pantoprazole, 40 mg, intravenous, Daily before breakfast  torsemide, 60 mg, oral, Daily      Continuous medications     PRN medications  PRN medications: acetaminophen **OR** acetaminophen **OR** acetaminophen, ondansetron **OR** ondansetron, oxyCODONE   Current Outpatient Medications   Medication Instructions    acetaminophen (TYLENOL) 650 mg, oral, Every 6 hours    ascorbic acid/multivit-min (EMERGEN-C ORAL) 1 each, oral, Daily    aspirin 81 mg, oral, Daily    carvedilol (COREG) 25 mg, oral, 2 times daily    cholestyramine light (Prevalite) 4 gram packet 4 g, oral, 2 times daily    ezetimibe (ZETIA) 10 mg, oral, Nightly    hydrALAZINE (APRESOLINE) 100 mg, oral, 3 times daily    lactobacillus acidophilus tablet tablet 2 tablets, oral, 2 times daily    methocarbamol (ROBAXIN) 500 mg, oral, Every 8 hours PRN    multivitamin tablet 1 tablet, oral, Daily    NON FORMULARY 1 each, oral, Daily, \"KIDNEY STUFF\" = KIDNEY SUPPORT FORMULA WITH organic beet powder, organic carrot powder,  defatted wheat germ, bovine kidney powder, kidney bean powder, concentrated flax olea lignans, organic mullen flaxseed    oxyCODONE-acetaminophen (Percocet) 5-325 mg tablet 1 tablet, oral, Every 6 hours PRN    sodium bicarbonate 1,300 mg, oral, 3 times daily    torsemide (DEMADEX) 60 mg, oral, Daily    vancomycin (VANCOCIN) 125 mg, oral, 4 times daily        Labs:  Results for orders placed or performed during the hospital encounter of 04/04/25 (from the past 24 hours)   Comprehensive Metabolic Panel   Result Value Ref Range    Glucose 126 (H) 74 - 99 mg/dL    Sodium 135 (L) 136 - 145 mmol/L    Potassium 4.6 3.5 - 5.3 mmol/L    Chloride 108 (H) 98 - 107 mmol/L    Bicarbonate 15 (L) 21 - 32 mmol/L    Anion Gap 17 10 - 20 mmol/L    Urea Nitrogen 47 (H) 6 - 23 mg/dL    Creatinine 7.51 (H) 0.50 - 1.30 mg/dL    eGFR 8 (L) >60 mL/min/1.73m*2    Calcium 9.1 8.6 - 10.3 mg/dL    Albumin 3.3 (L) 3.4 - 5.0 g/dL    " Alkaline Phosphatase 66 33 - 136 U/L    Total Protein 6.3 (L) 6.4 - 8.2 g/dL    AST 16 9 - 39 U/L    Bilirubin, Total 0.5 0.0 - 1.2 mg/dL    ALT 15 10 - 52 U/L   C-reactive protein   Result Value Ref Range    C-Reactive Protein 14.35 (H) <1.00 mg/dL   Lactate   Result Value Ref Range    Lactate 1.3 0.4 - 2.0 mmol/L   Blood Culture    Specimen: Peripheral Venipuncture; Blood culture   Result Value Ref Range    Blood Culture Loaded on Instrument - Culture in progress    Blood Culture    Specimen: Peripheral Venipuncture; Blood culture   Result Value Ref Range    Blood Culture Loaded on Instrument - Culture in progress    CBC and Auto Differential   Result Value Ref Range    WBC 17.4 (H) 4.4 - 11.3 x10*3/uL    nRBC 0.0 0.0 - 0.0 /100 WBCs    RBC 3.12 (L) 4.50 - 5.90 x10*6/uL    Hemoglobin 8.3 (L) 13.5 - 17.5 g/dL    Hematocrit 26.9 (L) 41.0 - 52.0 %    MCV 86 80 - 100 fL    MCH 26.6 26.0 - 34.0 pg    MCHC 30.9 (L) 32.0 - 36.0 g/dL    RDW 16.5 (H) 11.5 - 14.5 %    Platelets 396 150 - 450 x10*3/uL    Neutrophils % 90.5 40.0 - 80.0 %    Immature Granulocytes %, Automated 2.6 (H) 0.0 - 0.9 %    Lymphocytes % 3.4 13.0 - 44.0 %    Monocytes % 2.6 2.0 - 10.0 %    Eosinophils % 0.5 0.0 - 6.0 %    Basophils % 0.4 0.0 - 2.0 %    Neutrophils Absolute 15.77 (H) 1.20 - 7.70 x10*3/uL    Immature Granulocytes Absolute, Automated 0.46 0.00 - 0.70 x10*3/uL    Lymphocytes Absolute 0.60 (L) 1.20 - 4.80 x10*3/uL    Monocytes Absolute 0.45 0.10 - 1.00 x10*3/uL    Eosinophils Absolute 0.09 0.00 - 0.70 x10*3/uL    Basophils Absolute 0.07 0.00 - 0.10 x10*3/uL   Sedimentation rate, automated   Result Value Ref Range    Sedimentation Rate 64 (H) 0 - 20 mm/h      Imaging:  Imaging  CT abdomen pelvis wo IV contrast    Result Date: 4/5/2025  1.Findings compatible with acute diverticulitis upper sigmoid colon. 2.Nonobstructing right renal calculus. 3.Small left effusion with suspected areas of atelectasis. Tiny right effusion. 4.Prominent coronary  artery calcifications. Signed by Ricardo Haskins DO    XR hip left with pelvis when performed 2 or 3 views    Result Date: 4/4/2025  No evidence of acute fracture.  If clinical suspicion for fracture remains high, CT can be obtained for further evaluation. Signed by Chele Subramanian MD    XR chest 2 views    Result Date: 4/4/2025  1. Chest shows lungs hypoinflated, with expected central bronchovascular crowding; no lung consolidation or lung mass. 2. Lung bases with probable subsegmental atelectasis. 3. No pulmonary venous congestion, borderline cardiomegaly. Signed by Kyle Nava MD    XR hand left 3+ views    Result Date: 4/4/2025  There is a 6 mm soft tissue calcification adjacent to the distal first metacarpal but no fracture or acute bony abnormalities are appreciated.. Signed by Galen Ferris MD    XR knee right 3 views    Result Date: 4/4/2025  No acute bony abnormalities. Signed by Galen Ferris MD     Cardiology, Vascular, and Other Imaging  No other imaging results found for the past 2 days       Assessment/Plan   Acute diverticulitis  Levaquin  Metronidazole  GI consultation    Recent C. difficile colitis  Continue oral vancomycin  ID consultation    Stage V chronic kidney disease  Nephrology consultation he may be nearing dialysis  Continue torsemide 60 mg daily    Metabolic acidosis secondary to CKD  Continue sodium bicarb 1300 mg 3 times a day    Possible pseudogout  Joint pains  Pain control for now  He would not be able to tolerate NSAID    He carries the diagnosis of type 2 diabetes mellitus  Carbohydrate controlled diet  We will check his blood glucose with daily lab    SIRS with neutrophilic leukocytosis  He is on Levaquin and metronidazole for acute diverticulitis    DVT prophylaxis  Heparin 5000 units subcutaneously every 8 hours  SCDs  No Lovenox with CKD 5    I spent 60 minutes in the professional and overall care of this patient.      Alvaro Seth DO

## 2025-04-05 NOTE — CONSULTS
"                                                                                                            ''Infectious Disease Consult Note''        Referred by Dr Wright  Reason For Consult: C.diff colitis        History Of Present Illness:  Pt is a 60 yo M with h/o CKD, choledocholithiasis, recent sepsis and PNA s/p discharged on cefuroxime, C.diff diarrhea (dx on 3/28/25), admitted on 4/4 with leg swelling.on admission her wbc was 17K. CT showed acute sigmoid diverticulitis, nephrolithiasis and small pleural effusion. ID is consulted for management of her c.diff colitis.    Current Antibiotic:  Vancomycin  Levaquin  Flagyl    Medications:  No current facility-administered medications on file prior to encounter.     Current Outpatient Medications on File Prior to Encounter   Medication Sig Dispense Refill    acetaminophen (Tylenol) 325 mg tablet Take 2 tablets (650 mg) by mouth every 6 hours. 15 tablet 0    ascorbic acid/multivit-min (EMERGEN-C ORAL) Take 1 each by mouth once daily.      aspirin 81 mg EC tablet Take 1 tablet (81 mg) by mouth once daily.      carvedilol (Coreg) 25 mg tablet Take 1 tablet (25 mg) by mouth 2 times a day. 180 tablet 3    cholestyramine light (Prevalite) 4 gram packet Take 1 packet (4 g) by mouth 2 times a day for 10 days. 20 packet 0    ezetimibe (Zetia) 10 mg tablet Take 1 tablet (10 mg) by mouth once daily at bedtime. 90 tablet 3    hydrALAZINE (Apresoline) 100 mg tablet Take 1 tablet (100 mg) by mouth 3 times a day. 270 tablet 3    lactobacillus acidophilus tablet tablet Take 2 tablets by mouth 2 times a day. 120 tablet 0    methocarbamol (Robaxin) 500 mg tablet Take 1 tablet (500 mg) by mouth every 8 hours if needed for muscle spasms for up to 5 days. (Patient not taking: Reported on 3/27/2025) 12 tablet 0    multivitamin tablet Take 1 tablet by mouth once daily.      NON FORMULARY Take 1 each by mouth once daily. \"KIDNEY STUFF\" = KIDNEY SUPPORT FORMULA WITH organic beet powder, " organic carrot powder,  defatted wheat germ, bovine kidney powder, kidney bean powder, concentrated flax olea lignans, organic mullen flaxseed      oxyCODONE-acetaminophen (Percocet) 5-325 mg tablet Take 1 tablet by mouth every 6 hours if needed for severe pain (7 - 10) for up to 3 days. 9 tablet 0    sodium bicarbonate 650 mg tablet Take 2 tablets (1,300 mg) by mouth 3 times a day. 180 tablet 1    torsemide (Demadex) 20 mg tablet Take 3 tablets (60 mg) by mouth once daily. 90 tablet 0    vancomycin (Vancocin) 125 mg capsule Take 1 capsule (125 mg) by mouth 4 times a day for 46 doses. (Patient not taking: Reported on 4/2/2025) 46 capsule 0    [DISCONTINUED] lactobacillus acidophilus tablet tablet Take 2 tablets by mouth 2 times a day. 120 tablet 0    [DISCONTINUED] vancomycin (Vancocin) 125 mg capsule Take 1 capsule (125 mg) by mouth 4 times a day for 46 doses. 46 capsule 0     Past Medical History:   Diagnosis Date    Coronary artery disease     Diabetes mellitus (Multi)     DVT (deep venous thrombosis) (Multi)     Hypertension     MI (myocardial infarction) (Multi)      Past Surgical History:   Procedure Laterality Date    AMPUTATION      CHOLECYSTECTOMY  03/05/2025    Laparoscopic Cholecystectomy     Social History     Socioeconomic History    Marital status:      Spouse name: Not on file    Number of children: Not on file    Years of education: Not on file    Highest education level: Not on file   Occupational History    Not on file   Tobacco Use    Smoking status: Never    Smokeless tobacco: Never   Substance and Sexual Activity    Alcohol use: Not on file    Drug use: Not on file    Sexual activity: Not on file   Other Topics Concern    Not on file   Social History Narrative    Not on file     Social Drivers of Health     Financial Resource Strain: Low Risk  (3/27/2025)    Overall Financial Resource Strain (CARDIA)     Difficulty of Paying Living Expenses: Not hard at all   Food Insecurity: No Food  Insecurity (3/27/2025)    Hunger Vital Sign     Worried About Running Out of Food in the Last Year: Never true     Ran Out of Food in the Last Year: Never true   Transportation Needs: No Transportation Needs (3/27/2025)    PRAPARE - Transportation     Lack of Transportation (Medical): No     Lack of Transportation (Non-Medical): No   Physical Activity: Insufficiently Active (3/27/2025)    Exercise Vital Sign     Days of Exercise per Week: 2 days     Minutes of Exercise per Session: 10 min   Stress: Stress Concern Present (3/27/2025)    [employer] [place] [employer] - Occupational Stress Questionnaire     Feeling of Stress : To some extent   Social Connections: Unknown (3/27/2025)    Social Connection and Isolation Panel [NHANES]     Frequency of Communication with Friends and Family: More than three times a week     Frequency of Social Gatherings with Friends and Family: More than three times a week     Attends Confucianist Services: Patient declined     Active Member of Clubs or Organizations: Patient declined     Attends Club or Organization Meetings: Patient declined     Marital Status:    Intimate Partner Violence: Not At Risk (3/27/2025)    Humiliation, Afraid, Rape, and Kick questionnaire     Fear of Current or Ex-Partner: No     Emotionally Abused: No     Physically Abused: No     Sexually Abused: No   Housing Stability: Low Risk  (3/27/2025)    Housing Stability Vital Sign     Unable to Pay for Housing in the Last Year: No     Number of Times Moved in the Last Year: 0     Homeless in the Last Year: No     No family history on file.  Allergies   Allergen Reactions    Amlodipine Besylate Swelling     edema legs         Review of Systems:   General: no fevers, chills  Skin: no rashes or wounds  Head: no headache  ENT: no sore throat   Chest: no chest pain   Resp: no cough, or sob  GI: no nausea, vomiting, + diarrhea   : no dysuria, frequency or hematuria   Ext: leg edema       Physical  "Exam:  BP (!) 164/102   Pulse 91   Temp 37 °C (98.6 °F) (Oral)   Resp 18   Ht 1.803 m (5' 11\")   Wt 109 kg (240 lb)   SpO2 95%   BMI 33.47 kg/m²   General: NAD, nontoxic appearing  Skin: no rashes or wounds  Eyes: no scleral icterus  ENT: no oral thrush or lesions  Resp: lungs CTA b/l  CV:  normal S1/S2, no murmur   Abd: soft, non-tender  Back: no CVA tenderness   Ext: LE edema       Lab:  Lab Results   Component Value Date    WBC 17.4 (H) 04/05/2025    HGB 8.3 (L) 04/05/2025    HCT 26.9 (L) 04/05/2025    MCV 86 04/05/2025     04/05/2025      Results from last 72 hours   Lab Units 04/04/25  1705   SODIUM mmol/L 135*   POTASSIUM mmol/L 4.6   CHLORIDE mmol/L 108*   CO2 mmol/L 15*   BUN mg/dL 47*   CREATININE mg/dL 7.51*   GLUCOSE mg/dL 126*   CALCIUM mg/dL 9.1   ANION GAP mmol/L 17   EGFR mL/min/1.73m*2 8*     Results from last 72 hours   Lab Units 04/04/25  1705   ALK PHOS U/L 66   BILIRUBIN TOTAL mg/dL 0.5   PROTEIN TOTAL g/dL 6.3*   ALT U/L 15   AST U/L 16   ALBUMIN g/dL 3.3*     Estimated Creatinine Clearance: 13 mL/min (A) (by C-G formula based on SCr of 7.51 mg/dL (H)).  C-Reactive Protein   Date/Time Value Ref Range Status   04/04/2025 05:05 PM 14.35 (H) <1.00 mg/dL Final   07/30/2024 02:08 PM 9.10 (H) 0.00 - 2.00 mg/dL Final   07/22/2024 06:19 AM 24.70 (H) 0.00 - 2.00 mg/dL Final     Sedimentation Rate   Date/Time Value Ref Range Status   04/05/2025 02:33 AM 64 (H) 0 - 20 mm/h Final   07/30/2024 02:29 PM 64 (H) 0 - 20 mm/h Final   07/22/2024 06:19 AM 60 (H) 0 - 20 mm/h Final     No results found for: \"HIV1X2\", \"HIVCONF\", \"SESVIK6TX\"  No results found for: \"HCVPCRQUANT\"      Cultures/Micro:  3/28 C.diff PCR/ toxin: positive  4/4 Bcx: IP         Imaging: reviewed       Assessment:  Pt is a 58 yo M with h/o CKD, choledocholithiasis, admitted on 4/4 with leg swelling.    -Acute sigmoid diverticulitis d/t C.diff  -leukocytosis  -recent sepsis and PNA s/p discharged on cefuroxime  -C.diff diarrhea (dx " on 3/28/25)    Plan/Recommendations:  -stop levaquin  -c/w vancomycin Po and flagyl iv  -follow up Bcx  -trend wbc  -c.diff isolation       Please call ID with any concerns or questions.   D/w Pt.    Cheng Garcia MD  [facility] [place]  #[phone]

## 2025-04-06 ENCOUNTER — APPOINTMENT (OUTPATIENT)
Dept: CARDIOLOGY | Facility: HOSPITAL | Age: 60
DRG: 392 | End: 2025-04-06
Payer: COMMERCIAL

## 2025-04-06 VITALS
HEART RATE: 84 BPM | TEMPERATURE: 98.4 F | BODY MASS INDEX: 33.6 KG/M2 | DIASTOLIC BLOOD PRESSURE: 107 MMHG | HEIGHT: 71 IN | WEIGHT: 240 LBS | OXYGEN SATURATION: 96 % | SYSTOLIC BLOOD PRESSURE: 176 MMHG | RESPIRATION RATE: 16 BRPM

## 2025-04-06 LAB
APPEARANCE UR: CLEAR
BACTERIA BLD CULT: NORMAL
BACTERIA BLD CULT: NORMAL
BILIRUB UR STRIP.AUTO-MCNC: NEGATIVE MG/DL
CCP IGG SERPL-ACNC: 1 U/ML
COLOR UR: COLORLESS
GLUCOSE UR STRIP.AUTO-MCNC: ABNORMAL MG/DL
HOLD SPECIMEN: NORMAL
KETONES UR STRIP.AUTO-MCNC: NEGATIVE MG/DL
LEUKOCYTE ESTERASE UR QL STRIP.AUTO: NEGATIVE
MUCOUS THREADS #/AREA URNS AUTO: NORMAL /LPF
NITRITE UR QL STRIP.AUTO: NEGATIVE
PH UR STRIP.AUTO: 5.5 [PH]
PROT UR STRIP.AUTO-MCNC: ABNORMAL MG/DL
RBC # UR STRIP.AUTO: NEGATIVE MG/DL
RBC #/AREA URNS AUTO: NORMAL /HPF
SP GR UR STRIP.AUTO: 1.01
UROBILINOGEN UR STRIP.AUTO-MCNC: NORMAL MG/DL
WBC #/AREA URNS AUTO: NORMAL /HPF

## 2025-04-06 PROCEDURE — 2500000001 HC RX 250 WO HCPCS SELF ADMINISTERED DRUGS (ALT 637 FOR MEDICARE OP): Performed by: NURSE PRACTITIONER

## 2025-04-06 PROCEDURE — 99233 SBSQ HOSP IP/OBS HIGH 50: CPT | Performed by: INTERNAL MEDICINE

## 2025-04-06 PROCEDURE — 2500000004 HC RX 250 GENERAL PHARMACY W/ HCPCS (ALT 636 FOR OP/ED): Performed by: INTERNAL MEDICINE

## 2025-04-06 PROCEDURE — 99223 1ST HOSP IP/OBS HIGH 75: CPT | Performed by: INTERNAL MEDICINE

## 2025-04-06 PROCEDURE — 99232 SBSQ HOSP IP/OBS MODERATE 35: CPT | Performed by: INTERNAL MEDICINE

## 2025-04-06 PROCEDURE — 1200000002 HC GENERAL ROOM WITH TELEMETRY DAILY

## 2025-04-06 PROCEDURE — 93005 ELECTROCARDIOGRAM TRACING: CPT

## 2025-04-06 PROCEDURE — 81001 URINALYSIS AUTO W/SCOPE: CPT | Performed by: PHYSICIAN ASSISTANT

## 2025-04-06 PROCEDURE — 2500000001 HC RX 250 WO HCPCS SELF ADMINISTERED DRUGS (ALT 637 FOR MEDICARE OP): Performed by: INTERNAL MEDICINE

## 2025-04-06 PROCEDURE — 2500000002 HC RX 250 W HCPCS SELF ADMINISTERED DRUGS (ALT 637 FOR MEDICARE OP, ALT 636 FOR OP/ED): Performed by: INTERNAL MEDICINE

## 2025-04-06 RX ORDER — ATORVASTATIN CALCIUM 20 MG/1
20 TABLET, FILM COATED ORAL NIGHTLY
Status: DISPENSED | OUTPATIENT
Start: 2025-04-06

## 2025-04-06 RX ORDER — DIPHENHYDRAMINE HYDROCHLORIDE 50 MG/ML
50 INJECTION, SOLUTION INTRAMUSCULAR; INTRAVENOUS EVERY 5 MIN PRN
Status: ACTIVE | OUTPATIENT
Start: 2025-04-06

## 2025-04-06 RX ADMIN — SODIUM BICARBONATE 1300 MG: 650 TABLET ORAL at 20:58

## 2025-04-06 RX ADMIN — EZETIMIBE 10 MG: 10 TABLET ORAL at 20:58

## 2025-04-06 RX ADMIN — ATORVASTATIN CALCIUM 20 MG: 20 TABLET, FILM COATED ORAL at 20:58

## 2025-04-06 RX ADMIN — HYDRALAZINE HYDROCHLORIDE 100 MG: 25 TABLET ORAL at 14:20

## 2025-04-06 RX ADMIN — SODIUM BICARBONATE 1300 MG: 650 TABLET ORAL at 14:20

## 2025-04-06 RX ADMIN — VANCOMYCIN HYDROCHLORIDE 125 MG: 125 CAPSULE ORAL at 06:05

## 2025-04-06 RX ADMIN — HYDRALAZINE HYDROCHLORIDE 100 MG: 25 TABLET ORAL at 20:58

## 2025-04-06 RX ADMIN — OXYCODONE HYDROCHLORIDE 5 MG: 5 TABLET ORAL at 09:41

## 2025-04-06 RX ADMIN — HYDRALAZINE HYDROCHLORIDE 100 MG: 25 TABLET ORAL at 08:12

## 2025-04-06 RX ADMIN — METRONIDAZOLE 500 MG: 500 INJECTION, SOLUTION INTRAVENOUS at 20:58

## 2025-04-06 RX ADMIN — Medication 2 CAPSULE: at 20:58

## 2025-04-06 RX ADMIN — SODIUM BICARBONATE 1300 MG: 650 TABLET ORAL at 08:11

## 2025-04-06 RX ADMIN — PANTOPRAZOLE SODIUM 40 MG: 40 TABLET, DELAYED RELEASE ORAL at 06:05

## 2025-04-06 RX ADMIN — VANCOMYCIN HYDROCHLORIDE 125 MG: 125 CAPSULE ORAL at 14:20

## 2025-04-06 RX ADMIN — VANCOMYCIN HYDROCHLORIDE 125 MG: 125 CAPSULE ORAL at 17:37

## 2025-04-06 RX ADMIN — HEPARIN SODIUM 5000 UNITS: 5000 INJECTION, SOLUTION INTRAVENOUS; SUBCUTANEOUS at 21:01

## 2025-04-06 RX ADMIN — METRONIDAZOLE 500 MG: 500 INJECTION, SOLUTION INTRAVENOUS at 08:13

## 2025-04-06 RX ADMIN — VANCOMYCIN HYDROCHLORIDE 125 MG: 125 CAPSULE ORAL at 20:58

## 2025-04-06 RX ADMIN — CARVEDILOL 37.5 MG: 25 TABLET, FILM COATED ORAL at 20:58

## 2025-04-06 RX ADMIN — METRONIDAZOLE 500 MG: 500 INJECTION, SOLUTION INTRAVENOUS at 15:57

## 2025-04-06 RX ADMIN — HEPARIN SODIUM 5000 UNITS: 5000 INJECTION, SOLUTION INTRAVENOUS; SUBCUTANEOUS at 15:57

## 2025-04-06 RX ADMIN — Medication 2 CAPSULE: at 08:10

## 2025-04-06 RX ADMIN — CARVEDILOL 25 MG: 25 TABLET, FILM COATED ORAL at 08:11

## 2025-04-06 RX ADMIN — IRON SUCROSE 300 MG: 20 INJECTION, SOLUTION INTRAVENOUS at 11:49

## 2025-04-06 RX ADMIN — ASPIRIN 81 MG: 81 TABLET, COATED ORAL at 08:12

## 2025-04-06 RX ADMIN — TORSEMIDE 60 MG: 20 TABLET ORAL at 08:11

## 2025-04-06 RX ADMIN — CHOLESTYRAMINE LIGHT 4 G: 4 POWDER, FOR SUSPENSION ORAL at 08:12

## 2025-04-06 RX ADMIN — HEPARIN SODIUM 5000 UNITS: 5000 INJECTION, SOLUTION INTRAVENOUS; SUBCUTANEOUS at 08:12

## 2025-04-06 ASSESSMENT — COGNITIVE AND FUNCTIONAL STATUS - GENERAL
MOBILITY SCORE: 18
CLIMB 3 TO 5 STEPS WITH RAILING: A LITTLE
DRESSING REGULAR UPPER BODY CLOTHING: A LITTLE
DAILY ACTIVITIY SCORE: 20
TOILETING: A LITTLE
STANDING UP FROM CHAIR USING ARMS: A LITTLE
HELP NEEDED FOR BATHING: A LITTLE
TURNING FROM BACK TO SIDE WHILE IN FLAT BAD: A LITTLE
MOVING FROM LYING ON BACK TO SITTING ON SIDE OF FLAT BED WITH BEDRAILS: A LITTLE
MOVING TO AND FROM BED TO CHAIR: A LITTLE
DRESSING REGULAR LOWER BODY CLOTHING: A LITTLE
WALKING IN HOSPITAL ROOM: A LITTLE

## 2025-04-06 ASSESSMENT — PAIN SCALES - GENERAL
PAINLEVEL_OUTOF10: 7
PAINLEVEL_OUTOF10: 2
PAINLEVEL_OUTOF10: 7

## 2025-04-06 ASSESSMENT — PAIN DESCRIPTION - LOCATION
LOCATION: NECK
LOCATION: NECK

## 2025-04-06 ASSESSMENT — PAIN DESCRIPTION - DESCRIPTORS: DESCRIPTORS: CRAMPING

## 2025-04-06 NOTE — PROGRESS NOTES
Nephrology Consult Progress Note    Admit Date: 4/4/2025    Interval history:  Still with R knee pain but better    CURRENT MEDICATIONS:    Current Facility-Administered Medications:     acetaminophen (Tylenol) tablet 650 mg, 650 mg, oral, q4h PRN **OR** acetaminophen (Tylenol) oral liquid 650 mg, 650 mg, oral, q4h PRN **OR** acetaminophen (Tylenol) suppository 650 mg, 650 mg, rectal, q4h PRN, Alvaro Eastone, DO    aspirin EC tablet 81 mg, 81 mg, oral, Daily, Alvaro G Salomone, DO, 81 mg at 04/06/25 0812    carvedilol (Coreg) tablet 25 mg, 25 mg, oral, BID, Alvaro G Salomone, DO, 25 mg at 04/06/25 0811    cholestyramine light (Prevalite) 4 gram packet 4 g, 4 g, oral, BID, Alvaro EDMONDSON Salrubene, DO, 4 g at 04/06/25 0812    diphenhydrAMINE (BENADryl) injection 50 mg, 50 mg, intravenous, q5 min PRN, Hong Munoz MD    ezetimibe (Zetia) tablet 10 mg, 10 mg, oral, Nightly, Alvaro Eastone, DO, 10 mg at 04/05/25 2011    heparin (porcine) injection 5,000 Units, 5,000 Units, subcutaneous, q8h, Alvaro Eastone, DO, 5,000 Units at 04/06/25 0812    hydrALAZINE (Apresoline) tablet 100 mg, 100 mg, oral, TID, Alvaro Burnhamomone, DO, 100 mg at 04/06/25 0812    iron sucrose (Venofer) 300 mg in sodium chloride 0.9% 282 mL IV, 300 mg, intravenous, Once, Hong Munoz MD, Last Rate: 188 mL/hr at 04/06/25 1149, 300 mg at 04/06/25 1149    lactobacillus acidophilus capsule 2 capsule, 2 capsule, oral, BID, Alvaro Eastone, DO, 2 capsule at 04/06/25 0810    metroNIDAZOLE (Flagyl) 500 mg in sodium chloride (iso)  mL, 500 mg, intravenous, q8h, Alvaro Eastone DO, Stopped at 04/06/25 0943    ondansetron (Zofran) tablet 4 mg, 4 mg, oral, q8h PRN **OR** ondansetron (Zofran) injection 4 mg, 4 mg, intravenous, q8h PRN, Alvaro Seth DO    oxyCODONE (Roxicodone) immediate release tablet 5 mg, 5 mg, oral, q6h PRN, Alvaro Seth DO, 5 mg at 04/06/25 0941    pantoprazole (ProtoNix) EC tablet 40 mg, 40 mg, oral, Daily before breakfast, 40 mg at  "04/06/25 0605 **OR** pantoprazole (Protonix) injection 40 mg, 40 mg, intravenous, Daily before breakfast, Alvaro EDMONDSON Salomone, DO    sodium bicarbonate tablet 1,300 mg, 1,300 mg, oral, TID, Alvaro G Salomone, DO, 1,300 mg at 04/06/25 0811    torsemide (Demadex) tablet 60 mg, 60 mg, oral, Daily, Alvaro G Salomone, DO, 60 mg at 04/06/25 0811    vancomycin (Vancocin) capsule 125 mg, 125 mg, oral, 4x daily, Alvaro G Salomone, DO, 125 mg at 04/06/25 0605       Intake/Output Summary (Last 24 hours) at 4/6/2025 1232  Last data filed at 4/6/2025 0943  Gross per 24 hour   Intake --   Output 250 ml   Net -250 ml       PHYSICAL EXAM:  /88 (BP Location: Left arm, Patient Position: Lying)   Pulse 80   Temp 37.1 °C (98.8 °F) (Temporal)   Resp 17   Ht 1.803 m (5' 11\")   Wt 109 kg (240 lb)   SpO2 99%   BMI 33.47 kg/m²     Intake/Output Summary (Last 24 hours) at 4/6/2025 1232  Last data filed at 4/6/2025 0943  Gross per 24 hour   Intake --   Output 250 ml   Net -250 ml     Gen: AAO, NAD  Neck: No JVD  Cardiac: RRR  Resp: clear BS  Abd: Soft, non tender, +BS, non distended   Ext: trace LE edema   Neuro: moves 4 ext, no asterixis   Peripheral Pulses: Capillary refill <2secs, strong peripheral pulses.  Skin: Skin color, texture, turgor normal, no suspicious rashes or lesions.        Labs:  Results for orders placed or performed during the hospital encounter of 04/04/25 (from the past 24 hours)   Cyclic citrul peptide antibody, IgG   Result Value Ref Range    Citrulline Antibody, IgG 1 <3 U/mL   Magnesium   Result Value Ref Range    Magnesium 1.96 1.60 - 2.40 mg/dL   Comprehensive Metabolic Panel   Result Value Ref Range    Glucose 148 (H) 74 - 99 mg/dL    Sodium 136 136 - 145 mmol/L    Potassium 3.9 3.5 - 5.3 mmol/L    Chloride 107 98 - 107 mmol/L    Bicarbonate 16 (L) 21 - 32 mmol/L    Anion Gap 17 10 - 20 mmol/L    Urea Nitrogen 61 (H) 6 - 23 mg/dL    Creatinine 7.51 (H) 0.50 - 1.30 mg/dL    eGFR 8 (L) >60 mL/min/1.73m*2    " Calcium 9.0 8.6 - 10.3 mg/dL    Albumin 3.2 (L) 3.4 - 5.0 g/dL    Alkaline Phosphatase 59 33 - 136 U/L    Total Protein 6.5 6.4 - 8.2 g/dL    AST 12 9 - 39 U/L    Bilirubin, Total 0.3 0.0 - 1.2 mg/dL    ALT 14 10 - 52 U/L   Lactate   Result Value Ref Range    Lactate 1.4 0.4 - 2.0 mmol/L   Urinalysis with Reflex Culture and Microscopic   Result Value Ref Range    Color, Urine Colorless (N) Light-Yellow, Yellow, Dark-Yellow    Appearance, Urine Clear Clear    Specific Gravity, Urine 1.008 1.005 - 1.035    pH, Urine 5.5 5.0, 5.5, 6.0, 6.5, 7.0, 7.5, 8.0    Protein, Urine 30 (1+) (A) NEGATIVE, 10 (TRACE), 20 (TRACE) mg/dL    Glucose, Urine 30 (TRACE) (A) Normal mg/dL    Blood, Urine NEGATIVE NEGATIVE mg/dL    Ketones, Urine NEGATIVE NEGATIVE mg/dL    Bilirubin, Urine NEGATIVE NEGATIVE mg/dL    Urobilinogen, Urine Normal Normal mg/dL    Nitrite, Urine NEGATIVE NEGATIVE    Leukocyte Esterase, Urine NEGATIVE NEGATIVE   Urinalysis Microscopic   Result Value Ref Range    WBC, Urine 1-5 1-5, NONE /HPF    RBC, Urine NONE NONE, 1-2, 3-5 /HPF    Mucus, Urine FEW Reference range not established. /LPF        DATA:   Diagnostic tests reviewed for today's visit:    New labs and imaging   RFP, CBC and urine studies seen     Assessment and Plan:  h/o CKD stage 5 under care of Dr Max, CAD s/p MI, type 2 diabetes mellitus, DVT, hypertension, recent choledocholithiasis s/p cholecystectomy, course complicated with C. difficile colitis and discharged on 3/30/25, coming with R knee pain and edema, difficualty to ambulate. CT abd showing acute diverticulitis, very small R pleural effusion, started on Levaquin and metronidazole. Also had 1 dose of prednisone   - CKD stage 5 (baseline Scr ~6.5-7): kidney function ~baseline  Acceptable volume status on daily torsemide and non uremic but nearing dialysis need  Non uremic and deferring any dialysis conversation for now, planning to see Dr Max in a few days   HTN: suboptimal control but  better  AGMA on po bicarbonate   Hb 8.4, iron deficient      PLAN:  - fortunately no need to start dialysis just yet, but soon, will defer additional talks to his usual nephrologist in OP setting  - No need of more aggressive diuretic tx   - 300mg venofer today    Will continue to follow.   I spent 45 minutes in the professional and overall care of this patient.     Signature: Hong Munoz MD

## 2025-04-06 NOTE — PROGRESS NOTES
"Colin Leonard is a 59 y.o. male on day 1 of admission presenting with Polyarthropathy.    Subjective   Still no diarrhea, has tachycardia with exertion. Weakness had recent us of both lower extremities which was negative   Right knee pain    Objective     Physical Exam  Vitals reviewed.   Constitutional:       Appearance: Normal appearance.   HENT:      Head: Normocephalic.      Right Ear: Tympanic membrane normal.      Nose: Nose normal.      Mouth/Throat:      Mouth: Mucous membranes are moist.   Cardiovascular:      Rate and Rhythm: Tachycardia present.   Pulmonary:      Effort: Pulmonary effort is normal.   Abdominal:      General: Abdomen is flat. Bowel sounds are normal.      Palpations: Abdomen is soft.   Musculoskeletal:      Comments: RLE edema   Skin:     General: Skin is warm.      Capillary Refill: Capillary refill takes less than 2 seconds.   Neurological:      General: No focal deficit present.      Mental Status: He is alert.         Last Recorded Vitals  Blood pressure (!) 162/91, pulse 87, temperature 37.3 °C (99.1 °F), temperature source Temporal, resp. rate 14, height 1.803 m (5' 11\"), weight 109 kg (240 lb), SpO2 98%.  Intake/Output last 3 Shifts:  I/O last 3 completed shifts:  In: 400 (3.7 mL/kg) [P.O.:300; IV Piggyback:100]  Out: - (0 mL/kg)   Weight: 108.9 kg     Relevant Results  Results for orders placed or performed during the hospital encounter of 04/04/25 (from the past 24 hours)   Cyclic citrul peptide antibody, IgG   Result Value Ref Range    Citrulline Antibody, IgG 1 <3 U/mL   Magnesium   Result Value Ref Range    Magnesium 1.96 1.60 - 2.40 mg/dL   Comprehensive Metabolic Panel   Result Value Ref Range    Glucose 148 (H) 74 - 99 mg/dL    Sodium 136 136 - 145 mmol/L    Potassium 3.9 3.5 - 5.3 mmol/L    Chloride 107 98 - 107 mmol/L    Bicarbonate 16 (L) 21 - 32 mmol/L    Anion Gap 17 10 - 20 mmol/L    Urea Nitrogen 61 (H) 6 - 23 mg/dL    Creatinine 7.51 (H) 0.50 - 1.30 mg/dL    eGFR " 8 (L) >60 mL/min/1.73m*2    Calcium 9.0 8.6 - 10.3 mg/dL    Albumin 3.2 (L) 3.4 - 5.0 g/dL    Alkaline Phosphatase 59 33 - 136 U/L    Total Protein 6.5 6.4 - 8.2 g/dL    AST 12 9 - 39 U/L    Bilirubin, Total 0.3 0.0 - 1.2 mg/dL    ALT 14 10 - 52 U/L   Lactate   Result Value Ref Range    Lactate 1.4 0.4 - 2.0 mmol/L   Urinalysis with Reflex Culture and Microscopic   Result Value Ref Range    Color, Urine Colorless (N) Light-Yellow, Yellow, Dark-Yellow    Appearance, Urine Clear Clear    Specific Gravity, Urine 1.008 1.005 - 1.035    pH, Urine 5.5 5.0, 5.5, 6.0, 6.5, 7.0, 7.5, 8.0    Protein, Urine 30 (1+) (A) NEGATIVE, 10 (TRACE), 20 (TRACE) mg/dL    Glucose, Urine 30 (TRACE) (A) Normal mg/dL    Blood, Urine NEGATIVE NEGATIVE mg/dL    Ketones, Urine NEGATIVE NEGATIVE mg/dL    Bilirubin, Urine NEGATIVE NEGATIVE mg/dL    Urobilinogen, Urine Normal Normal mg/dL    Nitrite, Urine NEGATIVE NEGATIVE    Leukocyte Esterase, Urine NEGATIVE NEGATIVE   Urinalysis Microscopic   Result Value Ref Range    WBC, Urine 1-5 1-5, NONE /HPF    RBC, Urine NONE NONE, 1-2, 3-5 /HPF    Mucus, Urine FEW Reference range not established. /LPF       Imaging Results  Ct abd/pelvis:  IMPRESSION:  1.Findings compatible with acute diverticulitis upper sigmoid  colon.  2.Nonobstructing right renal calculus.  3.Small left effusion with suspected areas of atelectasis. Tiny  right effusion.  4.Prominent coronary artery calcifications.    Cxr;    IMPRESSION:  1. Chest shows lungs hypoinflated, with expected central  bronchovascular crowding; no lung consolidation or lung mass.  2. Lung bases with probable subsegmental atelectasis.  3. No pulmonary venous congestion, borderline cardiomegaly.    Left hand x ray:  IMPRESSION:  There is a 6 mm soft tissue calcification adjacent to the distal first  metacarpal but no fracture or acute bony abnormalities are  appreciated..    Left hip x ray:  IMPRESSION:  No evidence of acute fracture.  If clinical suspicion  for fracture  remains high, CT can be obtained for further evaluation.    Right knee x ray: negative  Medications:  aspirin, 81 mg, oral, Daily  carvedilol, 25 mg, oral, BID  cholestyramine light, 4 g, oral, BID  ezetimibe, 10 mg, oral, Nightly  heparin (porcine), 5,000 Units, subcutaneous, q8h  hydrALAZINE, 100 mg, oral, TID  iron sucrose, 300 mg, intravenous, Once  lactobacillus acidophilus, 2 capsule, oral, BID  metroNIDAZOLE, 500 mg, intravenous, q8h  pantoprazole, 40 mg, oral, Daily before breakfast   Or  pantoprazole, 40 mg, intravenous, Daily before breakfast  sodium bicarbonate, 1,300 mg, oral, TID  torsemide, 60 mg, oral, Daily  vancomycin, 125 mg, oral, 4x daily       PRN medications: acetaminophen **OR** acetaminophen **OR** acetaminophen, diphenhydrAMINE, ondansetron **OR** ondansetron, oxyCODONE     Assessment/Plan     1.  Sigmoid diverticulitis, related to c diff  -continue flagyl and po vanc  -no diarrhea    2. Generalized weakness  -pt/ot    3. Right knee pain/ left hand snuff box pain  -checking ati ccp, although RA is unlikely  -pain control    4. RLE edema  -recent US negative for DVT    5.CKDV  -nephrology on board    6.HTN  -continue home meds    7. Sinus tachy with exertiona  -expected with recent illness  -? Underlying development of POTS  -will monitor    DVT Prophylaxis:  Heparin subq          Kia Wright MD  [facility]

## 2025-04-06 NOTE — CONSULTS
Inpatient consult to Cardiology  Consult performed by: Venice Rouse, APRN-CNP  Consult ordered by: Alvaro Seth DO  Reason for consult: NSVT  Assessment/Recommendations: Cardiac Telemetry reviewed and rhythm is an atrial tachycardia occurring in the setting of acute stress   -- Increase Carvedilol from 25 mg --> 37.5 mg BID   -- Continue to treat supportively         History Of Present Illness:    Colin Leonard is a 59 y.o. male with PMHx significant for CAD (3/2020 CAC 1014.84), type 2 MI in the setting of PNA, hypertensive urgency, CKD 3/2025 DM2, DVT, CKD V, HTN, choledocholithiasis/acute cholecystitis s/p cholecystectomy 3/6/25, C. difficile colitis 3/2025,  presenting to [facility] 4/4/25 with difficulty ambulating, swelling, worsening joint pain; workup revealed acute sigmoid diverticultis d/t C-diff.  He was started on ATB and admitted to Medicine Service. Cardiology consulted for NSVT.      Patient was recently admitted at [facility] from 3/3/25-3/9/25 choledocholithiasis/acute cholecystitis s/p cholecystectomy 3/6/25.  Readmitted at [facility] from 3/10/25-3/13/25 for pneumonia, type 2 MI,  hypertensive urgency. Seen by Cardiology for type 2 MI which was attributed due to CKD, PNA, hypertensive urgency, hypoxia. HsTrop peaked at 1325. Echo revealed preserved ejection fraction of 60 to 65%, no regional wall motion abnormalities, grade 2 pseudonormal pattern of left ventricular diastolic filling, normal right ventricular global systolic function mild to moderate aortic valve stenosis.  Readmitted to [facility] 3/27/25-3/30/25 with c-diff colitis. ED visit 4/2/25 for RLE leg swelling. Duplex ultrasound negative for DVT. . Stable anemia, renal fx compatible with his advanced CKD. WBC 17.2.  Given dose of Percocet, Torsemide 60 mg PO and discharged home.       On telemetry review today, noted to have 6 beat run of NSVT. Also with periods of Atrial tachycardia with HR up to 130s alternating with  periods of SR. He denies any c/o palpitations. Did endorse some LH/dizziness today. Feels very weak, unable to ambulate per patient. He has been in bed today. Denies any c/o cp, sob.     ED course:   T 37/HR 80/RR 18//82/Pox 98%. CMP revealed TEJA on CKD BUN/Creat 47/7.51 (45/6.79). , K 4.6. Albumin 3.3. Total protein 6.3. CRP 14.25. WBC 17.2. H&H stable, 8/26.9. CT A/P revealed findings compatible with acute diverticulitis, small left pleural effusion, tiny right effusion, nonobstructing right renal calculus, prominent coronary artery calcifications. Right knee x-ray negative for bony abnormalities. Left hip/pelvis x-ray negative for acute fracture. Left hand x-ray with no acute fracture, 6 mm soft tissue calc adjacent to distal first metacarpal. Chest x-ray with hypoinflation, subsegmental atelectasis, borderline cardiomegaly. 2 sets blood cx drawn. Given 20 mg Prednisone PO and admitted to Medicine.         Tele: SR with periods of Atrial tachycardia up to 130s, 1 run of NSVT 6 beats    Outpatient cardiac medications: ASA 81 mg daily, Atorvastatin 20 mg daily, Carvedilol 25 mg BID, Torsemide 60 mg daily    Social history: Denies smoking, alcohol abuse, or illicit drug use.     Pertinent cardiac family history: No sudden cardiac death or premature CAD.       Past Cardiology Tests (Last 3 Years):    EKG 4/6/25:                 EKG 4/5/25:        TTE:   Transthoracic Echo ([place] [address]  Phone [phone]    TRANSTHORACIC ECHOCARDIOGRAM REPORT    Patient Name:       ODALYS Friedman Physician:    [id number] Tomi Moser DO  Study Date:         3/11/2025            Ordering Provider:    [id number] FOSTER KNOX  MRN/PID:            [id number]             Fellow:  Accession#:         [id number]         Nurse:  Date of Birth/Age:  1965 / 59      Sonographer:          Nathaniel hernandez                                       RDCS  Gender Assigned at  M                    Additional Staff:  Birth:  Height:             177.80 cm            Admit Date:  Weight:             117.93 kg            Admission Status:     Inpatient -  Routine  BSA / BMI:          2.33 m2 / 37.31      Department Location:  [facility] [place]  kg/m2  Blood Pressure: 146 /67 mmHg    Study Type:    TRANSTHORACIC ECHO (TTE) COMPLETE  Diagnosis/ICD: Subsequent non ST elevation (NSTEMI) myocardial infarction-I22.2  Indication:    NSTEMI  CPT Codes:     Echo Complete w Full [id number]    Patient History:  Diabetes:          Yes  Pertinent History: CAD, Chest Pain, CHF, HTN, Hyperlipidemia, Dyspnea and  Syncope. LVH, Renal dx V, NSTEMI, DVT.    Study Detail: The following Echo studies were performed: 2D, M-Mode, Doppler and  color flow. Technically challenging study due to poor acoustic  windows and body habitus. Definity used as a contrast agent for  endocardial border definition. Total contrast used for this  procedure was 2 mL via IV push. Unable to obtain RV/RA not well  visualized view.      PHYSICIAN INTERPRETATION:  Left Ventricle: Left ventricular ejection fraction is normal, by visual estimate at 60-65%. There is mild concentric left ventricular hypertrophy. There are no regional wall motion abnormalities. The left ventricular cavity size is normal. There is mild increased septal and mildly increased posterior left ventricular wall thickness. Spectral Doppler shows a Grade II (pseudonormal pattern) of left ventricular diastolic filling with an elevated left atrial pressure.  Left Atrium: The left atrial size is mildly dilated.  Right Ventricle: The right ventricle is normal in size. There is normal right ventricular global systolic function.  Right Atrium: The right atrial size is normal.  Aortic Valve: The aortic valve appears abnormal. There is mild to moderate aortic valve cusp calcification. There is mild to moderate aortic valve thickening. There is  evidence of mild to moderate aortic valve stenosis.  The aortic valve dimensionless index is 0.39. There is no evidence of aortic valve regurgitation. The peak instantaneous gradient of the aortic valve is 28 mmHg. The mean gradient of the aortic valve is 15 mmHg.  Mitral Valve: The mitral valve is normal in structure. There is mild mitral valve regurgitation.  Tricuspid Valve: The tricuspid valve is structurally normal. There is trace tricuspid regurgitation.  Pulmonic Valve: The pulmonic valve is structurally normal. There is no indication of pulmonic valve regurgitation.  Pericardium: No pericardial effusion noted.  Aorta: The aortic root is normal.      CONCLUSIONS:  1. Left ventricular ejection fraction is normal, by visual estimate at 60-65%.  2. Spectral Doppler shows a Grade II (pseudonormal pattern) of left ventricular diastolic filling with an elevated left atrial pressure.  3. There is normal right ventricular global systolic function.  4. Mild to moderate aortic valve stenosis.    QUANTITATIVE DATA SUMMARY:    2D MEASUREMENTS:             Normal Ranges:  Ao Root s:       3.93 cm  IVSd:            1.25 cm     (0.6-1.1cm)  LVPWd:           1.26 cm     (0.6-1.1cm)  LVIDd:           5.67 cm     (3.9-5.9cm)  LVIDs:           3.75 cm  LV Mass Index:   130.8 g/m2  LVEDV Index:     58.65 ml/m2  LV % FS          33.9 %      LEFT ATRIUM:                 Normal Ranges:  LA Vol A4C:       128.6 ml  LA Vol A2C:       65.2 ml  LA Vol Index BSA: 41.5 ml/m2      LV SYSTOLIC FUNCTION:  Normal Ranges:  EF-A4C View:    76 % (>=55%)  EF-A2C View:    60 %  EF-Biplane:     70 %  EF-Visual:      63 %  LV EF Reported: 63 %      LV DIASTOLIC FUNCTION:           Normal Ranges:  MV Peak E:             1.17 m/s  (0.7-1.2 m/s)  MV Peak A:             1.16 m/s  (0.42-0.7 m/s)  E/A Ratio:             1.01      (1.0-2.2)  MV e'                  0.059 m/s (>8.0)  MV lateral e'          0.06 m/s  MV medial e'           0.05 m/s  E/e'  Ratio:            19.88     (<8.0)      MITRAL VALVE:          Normal Ranges:  MV DT:        296 msec (150-240msec)      AORTIC VALVE:                      Normal Ranges:  AoV Vmax:                2.64 m/s  (<=1.7m/s)  AoV Peak P.8 mmHg (<20mmHg)  AoV Mean PG:             15.4 mmHg (1.7-11.5mmHg)  LVOT Max Cirilo:            1.13 m/s  (<=1.1m/s)  AoV VTI:                 66.94 cm  (18-25cm)  LVOT VTI:                25.87 cm  LVOT Diameter:           2.03 cm   (1.8-2.4cm)  AoV Area, VTI:           1.25 cm2  (2.5-5.5cm2)  AoV Area,Vmax:           1.39 cm2  (2.5-4.5cm2)  AoV Dimensionless Index: 0.39      TRICUSPID VALVE/RVSP:         Normal Ranges:  IVC Diam:             2.16 cm      PULMONIC VALVE:          Normal Ranges:  PV Max Cirilo:     0.9 m/s  (0.6-0.9m/s)  PV Max PG:      3.4 mmHg      [id number] Tomi Vladimirmag LANGSTON  Electronically signed on 3/11/2025 at 11:07:24 AM        ** Final **      Results for orders placed during the hospital encounter of 24    Transthoracic Echo (TTE) [place] [address]  Phone [phone]    TRANSTHORACIC ECHOCARDIOGRAM REPORT    Patient Name:      ODALYS Friedman Physician:    [id number] Kade Pedraza DO  Study Date:        2024             Ordering Provider:    [id number] GERMAN KENNEY  MRN/PID:           [id number]              Fellow:  Accession#:        [id number]          Nurse:  Date of Birth/Age: 1965 / 58 years Sonographer:          Vera Wynn RDCS  Gender:            M                     Additional Staff:  Height:            180.34 cm             Admit Date:  Weight:            118.39 kg             Admission Status:     Inpatient -  Routine  BSA / BMI:         2.36 m2 / 36.40 kg/m2 Department Location:  [facility]  Blood Pressure: 147 /88 mmHg    Study Type:    TRANSTHORACIC ECHO (TTE) COMPLETE  Diagnosis/ICD: Atherosclerotic heart disease of native coronary  artery without  angina pectoris-I25.10  Indication:    Dyspnea, weakness fatigue  CPT Codes:     Echo Complete w Full Doppler-[id number]    Patient History:  Pertinent History: LVH, HLD, HTN, CAD, Nstemi, SOB CKD, DM.    Study Detail: The following Echo studies were performed: 2D, M-Mode, color flow  and Doppler. Unable to obtain suprasternal notch view.      PHYSICIAN INTERPRETATION:  Left Ventricle: The left ventricular systolic function is normal, with a visually estimated ejection fraction of 60-65%. There are no regional wall motion abnormalities. The left ventricular cavity size is normal. Left ventricular diastolic filling was indeterminate.  Left Atrium: The left atrium is mildly dilated.  Right Ventricle: The right ventricle is normal in size. There is normal right ventricular global systolic function.  Right Atrium: The right atrium is normal in size.  Aortic Valve: The aortic valve is trileaflet. There is evidence of mild aortic valve stenosis.  The aortic valve dimensionless index is 0.45. There is no evidence of aortic valve regurgitation. The peak instantaneous gradient of the aortic valve is 20.4 mmHg. The mean gradient of the aortic valve is 10.6 mmHg.  Mitral Valve: The mitral valve is normal in structure. There is mild mitral valve regurgitation.  Tricuspid Valve: The tricuspid valve is structurally normal. There is trace tricuspid regurgitation.  Pulmonic Valve: The pulmonic valve is not well visualized. The pulmonic valve regurgitation was not well visualized.  Pericardium: There is no pericardial effusion noted.  Aorta: The aortic root is normal.      CONCLUSIONS:  1. The left ventricular systolic function is normal, with a visually estimated ejection fraction of 60-65%.  2. Left ventricular diastolic filling was indeterminate.  3. There is normal right ventricular global systolic function.  4. Mild aortic valve stenosis.  5. Aortic stenosis mean gradient 10 mm Hg, peak gradient 40 mm Hg and ADÁN 1.43  cm2.  6. Aortic valve dimensionless index 0.43.    QUANTITATIVE DATA SUMMARY:  2D MEASUREMENTS:  Normal Ranges:  LAs:           4.76 cm    (2.7-4.0cm)  IVSd:          1.70 cm    (0.6-1.1cm)  LVPWd:         1.51 cm    (0.6-1.1cm)  LVIDd:         3.86 cm    (3.9-5.9cm)  LVIDs:         2.85 cm  LV Mass Index: 104.8 g/m2  LV % FS        26.2 %    LA VOLUME:  Normal Ranges:  LA Vol A4C:        71.7 ml    (22+/-6mL/m2)  LA Vol A2C:        92.5 ml  LA Vol BP:         86.7 ml  LA Vol Index A4C:  30.4 ml/m2  LA Vol Index A2C:  39.2 ml/m2  LA Vol Index BP:   36.7 ml/m2  LA Area A4C:       22.5 cm2  LA Area A2C:       27.2 cm2  LA Major Axis A4C: 6.0 cm  LA Major Axis A2C: 6.8 cm  LA Volume Index:   36.7 ml/m2  LA Vol A4C:        72.0 ml  LA Vol A2C:        92.0 ml    RA VOLUME BY A/L METHOD:  Normal Ranges:  RA Vol A4C:        39.5 ml    (8.3-19.5ml)  RA Vol Index A4C:  16.7 ml/m2  RA Area A4C:       15.4 cm2  RA Major Axis A4C: 5.1 cm    AORTA MEASUREMENTS:  Normal Ranges:  Ao Sinus, d: 3.30 cm (2.1-3.5cm)  Ao STJ, d:   3.00 cm (1.7-3.4cm)  Asc Ao, d:   3.80 cm (2.1-3.4cm)    LV SYSTOLIC FUNCTION BY 2D PLANIMETRY (MOD):  Normal Ranges:  EF-A4C View:    48 % (>=55%)  EF-A2C View:    65 %  EF-Biplane:     57 %  EF-Visual:      63 %  LV EF Reported: 63 %    LV DIASTOLIC FUNCTION:  Normal Ranges:  MV Peak E:    0.82 m/s  (0.7-1.2 m/s)  MV Peak A:    0.99 m/s  (0.42-0.7 m/s)  E/A Ratio:    0.83      (1.0-2.2)  MV e'         0.071 m/s (>8.0)  MV lateral e' 0.08 m/s  MV medial e'  0.06 m/s  E/e' Ratio:   11.61     (<8.0)    MITRAL VALVE:  Normal Ranges:  MV DT: 221 msec (150-240msec)    AORTIC VALVE:  Normal Ranges:  AoV Vmax:                2.26 m/s  (<=1.7m/s)  AoV Peak P.4 mmHg (<20mmHg)  AoV Mean PG:             10.6 mmHg (1.7-11.5mmHg)  LVOT Max Cirilo:            0.97 m/s  (<=1.1m/s)  AoV VTI:                 46.31 cm  (18-25cm)  LVOT VTI:                21.06 cm  LVOT Diameter:           2.00 cm    (1.8-2.4cm)  AoV Area, VTI:           1.43 cm2  (2.5-5.5cm2)  AoV Area,Vmax:           1.35 cm2  (2.5-4.5cm2)  AoV Dimensionless Index: 0.45      RIGHT VENTRICLE:  TAPSE: 25.1 mm  RV s'  0.15 m/s    TRICUSPID VALVE/RVSP:  Normal Ranges:  IVC Diam: 2.02 cm    AORTA:  Asc Ao Diam 3.83 cm      [id number] Kade Pedraza DO  Electronically signed on 7/22/2024 at 10:49:35 AM        ** Final **    Ejection Fractions:  LV EF   Date/Time Value Ref Range Status   03/11/2025 10:47 AM 63 %    07/22/2024 09:36 AM 63 %      Cath:  No results found for this or any previous visit.    Stress Test:  Results for orders placed in visit on 03/05/20    Nuclear Stress Test    Narrative  MRN: [id number]  Patient Name: ODALYS MELVIN    STUDY:  CARDIAC STRESS/REST INJECTION; CARDIAC STRESS/REST (MYOCARDIAL  PERFUSION/MIBI); PART 2 STRESS OR REST (NO CHARGE); 3/5/2020 8:40 am;  3/5/2020 11:37 am; 3/5/2020 10:00 am    INDICATION:  none. Shortness of breath    COMPARISON:  None.    ACCESSION NUMBER(S):  [id number]; [id number]; [id number]    ORDERING CLINICIAN:  BREANA BOWDEN    TECHNIQUE:  DIVISION OF NUCLEAR MEDICINE  PHARMACOLOGIC STRESS MYOCARDIAL PERFUSION SCAN, ONE DAY PROTOCOL    The patient received an intravenous dose of 11 mCi of Tc-99m Myoview  and resting emission tomographic (SPECT) images of the myocardium  were acquired. The patient then received an intravenous infusion of  0.4 mg regadenoson (Lexiscan) followed by an additional dose of 34.3  mCi of Tc-99m Myoview. Stress phase SPECT images of the myocardium  were then acquired. These included ECG-gated images to assess and  quantify ventricular function.    FINDINGS:  Stress and rest images both demonstrate an area of mild reversibility  along the basal to mid anterior wall. In addition, there is the  suspicion of a fixed defect along the basal inferior wall with  abnormal wall motion and thickening suggestive of prior infarct.    Borderline left ventricular dilatation is noted (ESV 74  mL).    ECG-gated images demonstrate global hypokinesis with an LV ejection  fraction of  36 % (normal above 45 percent).    Impression  1. Suspicion of mild ischemia along the basal to mid anterior wall.  Correlation with clinical suspicion is recommended.  2. Suspicion of a prior infarct along the basal inferior wall.  3. Borderline left ventricular dilatation is noted.  4. Global hypokinesis with an ejection fraction of 36% which is in  the abnormal range.    Images interpreted at [facility].    Cardiac Imaging:  No results found for this or any previous visit.      Past Medical History:  He has a past medical history of Coronary artery disease, Diabetes mellitus (Multi), DVT (deep venous thrombosis) (Multi), Hypertension, and MI (myocardial infarction) (Multi).    Past Surgical History:  He has a past surgical history that includes Amputation and Cholecystectomy (2025).      Social History:  He reports that he has never smoked. He has never used smokeless tobacco. No history on file for alcohol use and drug use.    Family History:  No family history on file.     Allergies:  Amlodipine besylate    ROS:  10 point review of systems including (Constitutional, Eyes, ENMT, Respiratory, Cardiac, Gastrointestinal, Neurological, Psychiatric, and Hematologic) was performed and is otherwise negative.    Objective Data:  Last Recorded Vitals:  Vitals:    25 1900 25 2000 25 0500 25 0700   BP:  (!) 167/93 (!) 160/93 (!) 162/91   BP Location:  Left arm Left arm Left arm   Patient Position:  Sitting Sitting Lying   Pulse: (!) 134 96 87    Resp:  16 14    Temp:  37.2 °C (99 °F) 37.2 °C (99 °F) 37.3 °C (99.1 °F)   TempSrc:  Temporal Temporal Temporal   SpO2:  98% 98% 98%   Weight:       Height:         Medical Gas Therapy: None (Room air)  Weight  Av kg (240 lb)  Min: 109 kg (240 lb)  Max: 109 kg (240 lb)    LABS:  CMP:  Results from last 7 days   Lab Units  "04/05/25  2006 04/05/25  1111 04/04/25  1705 04/02/25  1103   SODIUM mmol/L 136 135* 135* 137   POTASSIUM mmol/L 3.9 4.3 4.6 4.4   CHLORIDE mmol/L 107 109* 108* 115*   CO2 mmol/L 16* 15* 15* 15*   ANION GAP mmol/L 17 15 17 11   BUN mg/dL 61* 56* 47* 45*   CREATININE mg/dL 7.51* 7.43* 7.51* 6.79*   EGFR mL/min/1.73m*2 8* 8* 8* 9*   MAGNESIUM mg/dL 1.96  --   --   --    ALBUMIN g/dL 3.2*  --  3.3*  --    ALT U/L 14  --  15  --    AST U/L 12  --  16  --    BILIRUBIN TOTAL mg/dL 0.3  --  0.5  --      CBC:  Results from last 7 days   Lab Units 04/05/25  1111 04/05/25  0233 04/02/25  1103   WBC AUTO x10*3/uL 13.5* 17.4* 17.2*   HEMOGLOBIN g/dL 8.4* 8.3* 8.6*   HEMATOCRIT % 25.3* 26.9* 28.7*   PLATELETS AUTO x10*3/uL 383 396 396   MCV fL 80 86 90     COAG:     ABO: No results found for: \"ABO\"  HEME/ENDO:  Results from last 7 days   Lab Units 04/05/25  1111   FERRITIN ng/mL 241   IRON SATURATION % 13*      CARDIAC:   Results from last 7 days   Lab Units 04/02/25  1103   BNP pg/mL 597*             Last I/O:    Intake/Output Summary (Last 24 hours) at 4/6/2025 0915  Last data filed at 4/5/2025 1144  Gross per 24 hour   Intake 100 ml   Output --   Net 100 ml     Net IO Since Admission: 400 mL [04/06/25 0915]      Imaging Results:  CT abdomen pelvis wo IV contrast    Result Date: 4/5/2025  STUDY: CT Abdomen and Pelvis without IV Contrast; 04/04/2025 at 11:20 PM INDICATION: Pelvic pain. COMPARISON: XR left hip and pelvis 04/04/25. CT abdomen and pelvis 03/27/25. ACCESSION NUMBER(S): [id number] ORDERING CLINICIAN: JIMENA MAE TECHNIQUE: CT of the abdomen and pelvis was performed.  Contiguous axial images were obtained at 3 mm slice thickness through the abdomen and pelvis. Coronal and sagittal reconstructions at 3 mm slice thickness were performed. No intravenous contrast was administered.  Automated mA/kV exposure control was utilized and patient examination was performed in strict accordance with principles of ALARA. " FINDINGS: Please note that the evaluation of vessels, lymph nodes and organs is limited without intravenous contrast.  LOWER CHEST: No cardiomegaly.  No pericardial effusion.  Lung bases reveals small left effusion with suspected areas of atelectasis.  There is a tiny right effusion.  There is prominent coronary artery calcifications.  ABDOMEN:  LIVER: No hepatomegaly.  Smooth surface contour.  Normal attenuation.  BILE DUCTS: No intrahepatic or extrahepatic biliary ductal dilatation.  GALLBLADDER: The gallbladder is surgically absent.. STOMACH: No abnormalities identified.  PANCREAS: No masses or ductal dilatation.  SPLEEN: No splenomegaly or focal splenic lesion.  ADRENAL GLANDS: No thickening or nodules.  KIDNEYS AND URETERS: Kidneys are normal in size and location.  Nonobstructing midpole right renal calculus is demonstrated.  There is a left renal cyst 5.3 cm.  PELVIS:  BLADDER: No abnormalities identified.  REPRODUCTIVE ORGANS: No abnormalities identified.  BOWEL: Inflammatory changes are demonstrated involving the upper sigmoid colon.  There is a background of diverticular disease.  This is compatible with acute diverticulitis.  Appendix is normal.  Moderate amount of fecal debris is demonstrated of the right colon.  VESSELS: No abnormalities identified.  Abdominal aorta is normal in caliber.  PERITONEUM/RETROPERITONEUM/LYMPH NODES: No free fluid.  No pneumoperitoneum. No lymphadenopathy.  ABDOMINAL WALL: No abnormalities identified. SOFT TISSUES: No abnormalities identified.  BONES: No acute fracture or aggressive osseous lesion.  Advanced disc disease is demonstrated L5-S1.    1.Findings compatible with acute diverticulitis upper sigmoid colon. 2.Nonobstructing right renal calculus. 3.Small left effusion with suspected areas of atelectasis. Tiny right effusion. 4.Prominent coronary artery calcifications. Signed by Ricardo Haskins DO    XR hip left with pelvis when performed 2 or 3 views    Result Date:  4/4/2025  STUDY: Pelvis and Left Hip Radiographs; 04/04/2025 6:27 PM INDICATION: Left hip pain. COMPARISON: CT chest abdomen pelvis 03/10/2025.  CT abdomen pelvis 03/03/2025. ACCESSION NUMBER(S): [id number] ORDERING CLINICIAN: THIAGO PEARSON TECHNIQUE:  AP view of the pelvis and two view(s) of the left hip. FINDINGS:  PELVIS: The pelvic ring is intact.  There is no acute fracture.  Enthesopathy present.  Vascular calcification is noted.  Degenerative changes of the spine.  Limited evaluation of the sacrum/coccyx due to overlying bowel gas. LEFT HIP: No evidence suggest acute fracture or dislocation.  Degenerative changes present.  Os acetabuli noted.    No evidence of acute fracture.  If clinical suspicion for fracture remains high, CT can be obtained for further evaluation. Signed by Chele Subramanian MD    XR chest 2 views    Result Date: 4/4/2025  STUDY: Chest Radiographs;  04/04/2025, 1828 hrs. INDICATION: 59-year-old male with coronary artery disease. COMPARISON: 03/10/2025 CT Chest, Abdomen, and Pelvis, XR Chest. 07/21/2024 XR Chest. ACCESSION NUMBER(S): [id number] ORDERING CLINICIAN: THIAGO PEARSON TECHNIQUE: Frontal and lateral chest. FINDINGS: CARDIOMEDIASTINAL SILHOUETTE: Prominent in size, early cardiomegaly. PULMONARY VASCULATURE: Pulmonary venous vessels defined, no pulmonary venous vascular congestion. LUNGS: No lung consolidation, however there are bilateral basilar small linear opacities, probably subsegmental atelectasis; no lung mass. PLEURA/HEMIDIAPHRAGM: No pneumothorax, no juxtapleural mass. No hemidiaphragm elevation. CHEST: Frontal shows lungs hypo-inflated, with expected central bronchovascular crowding. -=- ABDOMEN: Upper abdomen without bowel obstruction. SKELETON: No fracture, no osseous lytic/blastic lesion.    1. Chest shows lungs hypoinflated, with expected central bronchovascular crowding; no lung consolidation or lung mass. 2. Lung bases with probable subsegmental atelectasis. 3.  No pulmonary venous congestion, borderline cardiomegaly. Signed by Kyle Nava MD    XR hand left 3+ views    Result Date: 4/4/2025  STUDY: Hand Radiographs; 04/04/2025 6:27 PM INDICATION: Swelling left thumb base. COMPARISON: None. ACCESSION NUMBER(S): [id number] ORDERING CLINICIAN: THIAGO PEARSON TECHNIQUE:  Three view(s) of the left hand. FINDINGS:  There is no displaced fracture.  There is a 6 mm soft tissue calcification along the radial aspect of the head of the first metacarpal and along but no fracture or definite acute bony abnormalities are visible.  The alignment is anatomic.  There are some vascular calcifications at the wrist.  No additional soft tissue abnormality is seen.    There is a 6 mm soft tissue calcification adjacent to the distal first metacarpal but no fracture or acute bony abnormalities are appreciated.. Signed by Galen Ferris MD    XR knee right 3 views    Result Date: 4/4/2025  STUDY: Knee Radiographs; 04/04/2025 6:27 PM INDICATION: Right knee pain and swelling. COMPARISON: None. ACCESSION NUMBER(S): [id number] ORDERING CLINICIAN: THIAGO PEARSON TECHNIQUE:  Three view(s) of the right knee. FINDINGS:  There is no displaced fracture.  There is some fragmentation or soft tissue calcification anterior to the tibial tubercle but the knee otherwise is unremarkable.  The alignment is anatomic.  There are some vascular calcifications in the popliteal artery area but no additional soft tissue abnormalities are seen..  There is no joint effusion.    No acute bony abnormalities. Signed by Galen Ferris MD      Inpatient Medications:  Scheduled medications   Medication Dose Route Frequency    aspirin  81 mg oral Daily    carvedilol  25 mg oral BID    cholestyramine light  4 g oral BID    ezetimibe  10 mg oral Nightly    heparin (porcine)  5,000 Units subcutaneous q8h    hydrALAZINE  100 mg oral TID    lactobacillus acidophilus  2 capsule oral BID    metroNIDAZOLE  500 mg intravenous q8h     pantoprazole  40 mg oral Daily before breakfast    Or    pantoprazole  40 mg intravenous Daily before breakfast    sodium bicarbonate  1,300 mg oral TID    torsemide  60 mg oral Daily    vancomycin  125 mg oral 4x daily     PRN medications   Medication    acetaminophen    Or    acetaminophen    Or    acetaminophen    ondansetron    Or    ondansetron    oxyCODONE     Continuous Medications   Medication Dose Last Rate       Outpatient Medications:  Current Outpatient Medications   Medication Instructions    acetaminophen (TYLENOL) 650 mg, oral, Every 6 hours    ascorbic acid/multivit-min (EMERGEN-C ORAL) 1 each, Daily    aspirin 81 mg, Daily    atorvastatin (LIPITOR) 20 mg, Daily    carvedilol (COREG) 25 mg, oral, 2 times daily    cholestyramine light (Prevalite) 4 gram packet 4 g, oral, 2 times daily    ezetimibe (ZETIA) 10 mg, oral, Nightly    hydrALAZINE (APRESOLINE) 100 mg, oral, 3 times daily    lactobacillus acidophilus tablet tablet 2 tablets, oral, 2 times daily    methocarbamol (ROBAXIN) 500 mg, oral, Every 8 hours PRN    multivitamin tablet 1 tablet, Daily    NON FORMULARY 1 each, Daily    sodium bicarbonate 1,300 mg, oral, 3 times daily    torsemide (DEMADEX) 60 mg, oral, Daily    vancomycin (VANCOCIN) 125 mg, oral, 4 times daily       Physical Exam:  General:  Patient is awake, alert, and oriented.  Patient is in no acute distress.  HEENT:  Pupils equal and reactive.  Normocephalic.  Moist mucosa.    Neck:  No thyromegaly.  Normal Jugular Venous Pressure.  Cardiovascular:  Tachycardic rate and regular rhythm.  Normal S1 and S2.  Pulmonary:  Clear to auscultation bilaterally.  Abdomen:  Soft. Non-tender.  Non-distended.  Positive bowel sounds.  Lower Extremities:  2+ pedal pulses. RLE 1+ edema   Neurologic:  Cranial nerves intact.  No focal deficit.   Skin: Skin warm and dry, normal skin turgor.   Psychiatric: Normal affect.     Assessment/Plan   Colin Leonard is a 59 y.o. male with PMHx significant  for CAD (3/2020 CAC 1014.84), type 2 MI in the setting of PNA, hypertensive urgency, CKD 3/2025 DM2, DVT, CKD V, HLD, HTN, choledocholithiasis/acute cholecystitis s/p cholecystectomy 3/6/25, C. difficile colitis 3/2025,  presenting to [facility] 4/4/25 with difficulty ambulating, swelling, worsening joint pain; workup revealed acute sigmoid diverticultis d/t C-diff.  He was started on ATB and admitted to Medicine Service. Cardiology consulted for NSVT.    Outpatient cardiac medications: ASA 81 mg daily,  Carvedilol 25 mg BID, Torsemide 60 mg daily  Cardiologist: Dr. Saravia    #NSVT. 1 run of NSVT, 6 beats noted on telemetry. On Carvedilol 25 mg BID, will plan to up titrate.  No clear anginal symptoms.     #Atrial Tachycardia.   -Noted episodes on telemetry while at rest.   -EKG 4/6/25 revealed , Atach on my interpretation, LVH  -Will plan to up titrate beta blocker, likely exacerbated 2/2 underlying infection     #CAD, 3/2020 CAC score 1014.84  -On ASA  -No clear anginal symptoms.   -TTE 3/11/25: LVEF 60-65%, pseudonormal diastolic fx, normal RV fx, mild to moderate aortic stenosis    #HTN  -Mildly elevated.     #CKD V. Renal following.     #HLD. On ezetimibe. Atorvastatin 20 mg daily held during 3/2025 admit  due mildly elevated liver enzymes when hospitalized   choledocholithiasis/acute cholecystitis s/p cholecystectomy. CMP this admit reveals normal liver enzymes. Will plan to restart statin.     Plan/Recs:  -Increase Carvedilol to 37.5 mg BID.  -Start Atorvastatin 20 mg daily  -Follow up with Dr. Saravia in the next 4-6 weeks on discharge    Further recs per Dr. Romero     Code Status:  Full Code    SARAH Hughes-CNP    ========================================================  Attending Note   ========================================================  Both the JING and I have had a face to face encounter with the patient today. I have examined the patient and edited the documented physical  examination as necessary.  I personally reviewed the patient's recent labs, medications, orders, EKGs, and pertinent cardiac imaging.  I have reviewed the JING's encounter note, approve the JING's documentation and have edited the note to reflect the diagnostic and therapeutic plan.    Colin Leonard is a 59 y.o. male with PMHx significant for CAD (3/2020 CAC 1014.84), type 2 MI in the setting of PNA, hypertensive urgency, CKD 3/2025 DM2, DVT, CKD V, HTN, choledocholithiasis/acute cholecystitis s/p cholecystectomy 3/6/25, C. difficile colitis 3/2025,  presenting to [facility] 4/4/25 with difficulty ambulating, swelling, worsening joint pain; workup revealed acute sigmoid diverticultis d/t C-diff.  He was started on ATB and admitted to Medicine Service. Cardiology consulted for NSVT.      Patient was recently admitted at [facility] from 3/3/25-3/9/25 choledocholithiasis/acute cholecystitis s/p cholecystectomy 3/6/25.  Readmitted at [facility] from 3/10/25-3/13/25 for pneumonia, type 2 MI,  hypertensive urgency. Seen by Cardiology for type 2 MI which was attributed due to CKD, PNA, hypertensive urgency, hypoxia. HsTrop peaked at 1325. Echo revealed preserved ejection fraction of 60 to 65%, no regional wall motion abnormalities, grade 2 pseudonormal pattern of left ventricular diastolic filling, normal right ventricular global systolic function mild to moderate aortic valve stenosis.  Readmitted to [facility] 3/27/25-3/30/25 with c-diff colitis. ED visit 4/2/25 for RLE leg swelling. Duplex ultrasound negative for DVT. . Stable anemia, renal fx compatible with his advanced CKD. WBC 17.2.  Given dose of Percocet, Torsemide 60 mg PO and discharged home.       On telemetry review today, noted to have 6 beat run of NSVT. Also with periods of Atrial tachycardia with HR up to 130s alternating with periods of SR. He denies any c/o palpitations. Did endorse some LH/dizziness today. Feels very weak, unable to ambulate per  patient. He has been in bed today. Denies any c/o cp, sob.     No exacerbating or relieving factors.  Patient denies chest pain and angina.  Pt denies shortness of breath, dyspnea on exertion, orthopnea, and paroxysmal nocturnal dyspnea.  Pt denies worsening lower extremity edema.  Pt denies palpitations or syncope.  No recent falls.  No fever or chills.  No cough.  No change in bowel or bladder habits.  No sick contacts.  No recent travel.     12 point review of systems including (Constitutional, Eyes, ENMT, Respiratory, Cardiac, Gastrointestinal, Neurological, Psychiatric, and Hematologic) was performed and is otherwise negative.    Past medical history:  As above.    Medications were reviewed.    Allergies were reviewed.    Social history:  Patient denies smoking, alcohol abuse, or illicit drug use.    Family history:  No sudden cardiac death or premature coronary artery disease.     General:  Patient is awake, alert, and oriented.  Patient is in no acute distress.  HEENT:  Pupils equal and reactive.  Normocephalic.  Moist mucosa.    Neck:  No thyromegaly.  Normal Jugular Venous Pressure.  Cardiovascular:  Regular rate and rhythm.  Normal S1 and S2.  Pulmonary:  Clear to auscultation bilaterally.  Abdomen:  Soft. Non-tender.   Non-distended.  Positive bowel sounds.  Lower Extremities:  2+ pedal pulses. No LE edema.  Neurologic:  Cranial nerves intact.  No focal deficit.   Skin: Skin warm and dry, normal skin turgor.   Psychiatric: Normal affect.    Vital signs, telemetry, medications, labs, and imaging were reviewed as well.    Colin Leonard is a 59 y.o. male with PMHx significant for CAD (3/2020 CAC 1014.84), type 2 MI in the setting of PNA, hypertensive urgency, CKD 3/2025 DM2, DVT, CKD V, HLD, HTN, choledocholithiasis/acute cholecystitis s/p cholecystectomy 3/6/25, C. difficile colitis 3/2025,  presenting to [facility] 4/4/25 with difficulty ambulating, swelling, worsening joint pain; workup revealed acute  sigmoid diverticultis d/t C-diff.  He was started on ATB and admitted to Medicine Service. Cardiology consulted for NSVT.    Outpatient cardiac medications: ASA 81 mg daily,  Carvedilol 25 mg BID, Torsemide 60 mg daily  Cardiologist: Dr. Saravia    #NSVT. 1 run of NSVT, 6 beats noted on telemetry. On Carvedilol 25 mg BID, will plan to up titrate.  No clear anginal symptoms.     #Atrial Tachycardia.   -Noted episodes on telemetry while at rest.   -EKG 4/6/25 revealed , Atach on my interpretation, LVH  Cardiac Telemetry reviewed and rhythm is an atrial tachycardia occurring in the setting of acute stress   -- Increase Carvedilol from 25 mg --> 37.5 mg BID   -- Continue to treat supportively     #CAD, 3/2020 CAC score 1014.84  -On ASA  -No clear anginal symptoms.   -TTE 3/11/25: LVEF 60-65%, pseudonormal diastolic fx, normal RV fx, mild to moderate aortic stenosis    #HTN  -Mildly elevated.     #CKD V. Renal following.     #HLD. On ezetimibe. Atorvastatin 20 mg daily held during 3/2025 admit  due mildly elevated liver enzymes when hospitalized   choledocholithiasis/acute cholecystitis s/p cholecystectomy. CMP this admit reveals normal liver enzymes. Will plan to restart statin.     Plan/Recs:  -Increase Carvedilol to 37.5 mg BID.  -Start Atorvastatin 20 mg daily  -Follow up with Dr. Saravia in the next 4-6 weeks on discharge        Moy Romero DO   Division of Cardiovascular Medicine  [facility] [place]

## 2025-04-07 ENCOUNTER — PHARMACY VISIT (OUTPATIENT)
Dept: PHARMACY | Facility: CLINIC | Age: 60
End: 2025-04-07
Payer: COMMERCIAL

## 2025-04-07 VITALS
BODY MASS INDEX: 33.6 KG/M2 | HEIGHT: 71 IN | RESPIRATION RATE: 19 BRPM | OXYGEN SATURATION: 99 % | SYSTOLIC BLOOD PRESSURE: 140 MMHG | TEMPERATURE: 98.7 F | HEART RATE: 83 BPM | WEIGHT: 240 LBS | DIASTOLIC BLOOD PRESSURE: 90 MMHG

## 2025-04-07 LAB
ATRIAL RATE: 87 BPM
P AXIS: 43 DEGREES
P OFFSET: 188 MS
P ONSET: 121 MS
PR INTERVAL: 190 MS
Q ONSET: 216 MS
QRS COUNT: 15 BEATS
QRS DURATION: 90 MS
QT INTERVAL: 396 MS
QTC CALCULATION(BAZETT): 476 MS
QTC FREDERICIA: 448 MS
R AXIS: 4 DEGREES
T AXIS: 59 DEGREES
T OFFSET: 414 MS
VENTRICULAR RATE: 87 BPM

## 2025-04-07 PROCEDURE — 2500000001 HC RX 250 WO HCPCS SELF ADMINISTERED DRUGS (ALT 637 FOR MEDICARE OP): Performed by: INTERNAL MEDICINE

## 2025-04-07 PROCEDURE — 2500000004 HC RX 250 GENERAL PHARMACY W/ HCPCS (ALT 636 FOR OP/ED): Performed by: INTERNAL MEDICINE

## 2025-04-07 PROCEDURE — RXMED WILLOW AMBULATORY MEDICATION CHARGE

## 2025-04-07 PROCEDURE — 2500000001 HC RX 250 WO HCPCS SELF ADMINISTERED DRUGS (ALT 637 FOR MEDICARE OP): Performed by: NURSE PRACTITIONER

## 2025-04-07 PROCEDURE — 2500000002 HC RX 250 W HCPCS SELF ADMINISTERED DRUGS (ALT 637 FOR MEDICARE OP, ALT 636 FOR OP/ED): Performed by: INTERNAL MEDICINE

## 2025-04-07 PROCEDURE — 99239 HOSP IP/OBS DSCHRG MGMT >30: CPT | Performed by: INTERNAL MEDICINE

## 2025-04-07 PROCEDURE — 97161 PT EVAL LOW COMPLEX 20 MIN: CPT | Mod: GP

## 2025-04-07 RX ORDER — CARVEDILOL 25 MG/1
50 TABLET ORAL 2 TIMES DAILY
Status: DISCONTINUED | OUTPATIENT
Start: 2025-04-07 | End: 2025-04-07 | Stop reason: HOSPADM

## 2025-04-07 RX ORDER — CARVEDILOL 12.5 MG/1
12.5 TABLET ORAL ONCE
Status: COMPLETED | OUTPATIENT
Start: 2025-04-07 | End: 2025-04-07

## 2025-04-07 RX ORDER — CARVEDILOL 25 MG/1
50 TABLET ORAL 2 TIMES DAILY
Qty: 120 TABLET | Refills: 0 | Status: SHIPPED | OUTPATIENT
Start: 2025-04-07 | End: 2025-05-07

## 2025-04-07 RX ORDER — VANCOMYCIN HYDROCHLORIDE 125 MG/1
125 CAPSULE ORAL 4 TIMES DAILY
Qty: 44 CAPSULE | Refills: 0 | Status: SHIPPED | OUTPATIENT
Start: 2025-04-07 | End: 2025-04-18

## 2025-04-07 RX ADMIN — CARVEDILOL 12.5 MG: 12.5 TABLET, FILM COATED ORAL at 11:06

## 2025-04-07 RX ADMIN — CARVEDILOL 37.5 MG: 25 TABLET, FILM COATED ORAL at 08:34

## 2025-04-07 RX ADMIN — ASPIRIN 81 MG: 81 TABLET, COATED ORAL at 08:34

## 2025-04-07 RX ADMIN — HEPARIN SODIUM 5000 UNITS: 5000 INJECTION, SOLUTION INTRAVENOUS; SUBCUTANEOUS at 08:34

## 2025-04-07 RX ADMIN — CHOLESTYRAMINE LIGHT 4 G: 4 POWDER, FOR SUSPENSION ORAL at 08:34

## 2025-04-07 RX ADMIN — TORSEMIDE 60 MG: 20 TABLET ORAL at 08:34

## 2025-04-07 RX ADMIN — Medication 2 CAPSULE: at 08:34

## 2025-04-07 RX ADMIN — PANTOPRAZOLE SODIUM 40 MG: 40 TABLET, DELAYED RELEASE ORAL at 05:52

## 2025-04-07 RX ADMIN — HYDRALAZINE HYDROCHLORIDE 100 MG: 25 TABLET ORAL at 08:34

## 2025-04-07 RX ADMIN — VANCOMYCIN HYDROCHLORIDE 125 MG: 125 CAPSULE ORAL at 12:19

## 2025-04-07 RX ADMIN — VANCOMYCIN HYDROCHLORIDE 125 MG: 125 CAPSULE ORAL at 05:52

## 2025-04-07 RX ADMIN — SODIUM BICARBONATE 1300 MG: 650 TABLET ORAL at 08:34

## 2025-04-07 ASSESSMENT — PAIN - FUNCTIONAL ASSESSMENT: PAIN_FUNCTIONAL_ASSESSMENT: 0-10

## 2025-04-07 ASSESSMENT — COGNITIVE AND FUNCTIONAL STATUS - GENERAL
MOVING FROM LYING ON BACK TO SITTING ON SIDE OF FLAT BED WITH BEDRAILS: A LITTLE
MOVING TO AND FROM BED TO CHAIR: A LITTLE
DRESSING REGULAR LOWER BODY CLOTHING: A LITTLE
STANDING UP FROM CHAIR USING ARMS: A LITTLE
CLIMB 3 TO 5 STEPS WITH RAILING: A LITTLE
TURNING FROM BACK TO SIDE WHILE IN FLAT BAD: A LITTLE
MOBILITY SCORE: 23
WALKING IN HOSPITAL ROOM: A LITTLE
DAILY ACTIVITIY SCORE: 20
MOBILITY SCORE: 18
CLIMB 3 TO 5 STEPS WITH RAILING: A LITTLE
TOILETING: A LITTLE
DRESSING REGULAR UPPER BODY CLOTHING: A LITTLE
HELP NEEDED FOR BATHING: A LITTLE

## 2025-04-07 ASSESSMENT — PAIN DESCRIPTION - DESCRIPTORS: DESCRIPTORS: ACHING

## 2025-04-07 ASSESSMENT — PAIN SCALES - GENERAL
PAINLEVEL_OUTOF10: 4
PAINLEVEL_OUTOF10: 0 - NO PAIN

## 2025-04-07 ASSESSMENT — ACTIVITIES OF DAILY LIVING (ADL)
ADL_ASSISTANCE: INDEPENDENT
LACK_OF_TRANSPORTATION: NO

## 2025-04-07 NOTE — DISCHARGE SUMMARY
Discharge Diagnosis  C diff colitis, resolving  2. Atrial tachycardia  3. NSVT  4. Right knee pain    Issues Requiring Follow-Up  Pcp  cardiology    Discharge Meds     Medication List      CHANGE how you take these medications     carvedilol 25 mg tablet; Commonly known as: Coreg; Take 2 tablets (50   mg) by mouth 2 times a day.; What changed: how much to take     CONTINUE taking these medications     aspirin 81 mg EC tablet   atorvastatin 20 mg tablet; Commonly known as: Lipitor   ezetimibe 10 mg tablet; Commonly known as: Zetia; Take 1 tablet (10 mg)   by mouth once daily at bedtime.   hydrALAZINE 100 mg tablet; Commonly known as: Apresoline; Take 1 tablet   (100 mg) by mouth 3 times a day.   lactobacillus acidophilus tablet tablet; Take 2 tablets by mouth 2 times   a day.   multivitamin tablet   sodium bicarbonate 650 mg tablet; Take 2 tablets (1,300 mg) by mouth 3   times a day.   torsemide 20 mg tablet; Commonly known as: Demadex; Take 3 tablets (60   mg) by mouth once daily.   vancomycin 125 mg capsule; Commonly known as: Vancocin; Take 1 capsule   (125 mg) by mouth 4 times a day for 11 days.     STOP taking these medications     acetaminophen 325 mg tablet; Commonly known as: Tylenol   cholestyramine light 4 gram packet; Commonly known as: Prevalite   EMERGEN-C ORAL   methocarbamol 500 mg tablet; Commonly known as: Robaxin   NON FORMULARY   oxyCODONE-acetaminophen 5-325 mg tablet; Commonly known as: Percocet       Test Results Pending At Discharge  Pending Labs       Order Current Status    Blood Culture Preliminary result    Blood Culture Preliminary result            Hospital Course  59-year-old gentleman who was recently discharged for C. difficile colitis on oral vancomycin which patient had not been taking re presented to the ED with complaints of right knee pain and left snuffbox pain.  Patient was found to have sigmoid diverticulitis on CT abdomen pelvis which was thought to be secondary to C.  difficile. Patient was resume back on po vancomycin and improved. Patient is being dc on 11 more days of po vanc to complete 14 day course. Patient also had episodes of atrial tachycardia as well as NSVT therefore carvedilol was increased to 50 mg po BID. Patient was stable at the time of discharge.     Time spent more than 30 minutes on discharge      Pertinent Physical Exam At Time of Discharge  Physical Exam  Vitals reviewed.   Constitutional:       Appearance: Normal appearance.   HENT:      Head: Normocephalic.      Right Ear: Tympanic membrane normal.      Nose: Nose normal.      Mouth/Throat:      Mouth: Mucous membranes are moist.   Cardiovascular:      Rate and Rhythm: Normal rate and regular rhythm.   Pulmonary:      Effort: Pulmonary effort is normal.   Abdominal:      General: Abdomen is flat. Bowel sounds are normal.      Palpations: Abdomen is soft.   Skin:     Capillary Refill: Capillary refill takes less than 2 seconds.   Neurological:      General: No focal deficit present.      Mental Status: He is alert.         Outpatient Follow-Up  Future Appointments   Date Time Provider Department Center   4/10/2025 12:00 PM Maurice Ballard MD [id number] [facility]         Kia Wright MD

## 2025-04-07 NOTE — PROGRESS NOTES
Physical Therapy    Physical Therapy Evaluation & Treatment    Patient Name: Colin Leonard  MRN: [id number]  Today's Date: 4/7/2025   Time Calculation  Start Time: 1440  Stop Time: 1451  Time Calculation (min): 11 min  624/624-A    Assessment/Plan   PT Assessment  Rehab Prognosis: Good  Barriers to Discharge Home: No anticipated barriers  Evaluation/Treatment Tolerance: Patient tolerated treatment well  Medical Staff Made Aware: Yes  Strengths: Support of Caregivers, Premorbid level of function, Housing layout  Barriers to Participation: Comorbidities  End of Session Communication: Bedside nurse, Care Coordinator  Assessment Comment: PT eval complete. Pt moving quite well this date, no hands on assist needed for any bed mobility or OOB mobility. Needing occasional verbal cuing, but no need for hands on assist or device for ambulation. Appears to be at or very near baseline, no further acute PT needed, will DC from caseload.    End of Session Patient Position: Alarm off, not on at start of session, Bed, 3 rail up (Nursing aware)  IP OR SWING BED PT PLAN  Inpatient or Swing Bed: Inpatient  PT Plan  PT Plan: PT Eval only  PT Eval Only Reason: Safe to return home  PT Frequency: PT eval only  PT Discharge Recommendations: No further acute PT  PT Recommended Transfer Status: Independent  PT - OK to Discharge: Yes (Per PT POC)    Current Problem:  Patient Active Problem List   Diagnosis    Vitamin D deficiency    Vision loss    Shortness of breath on exertion    Obesity    Myopia    LVH (left ventricular hypertrophy)    Mixed hyperlipidemia    Primary hypertension    Coronary artery disease involving native coronary artery of native heart without angina pectoris    NSTEMI (non-ST elevated myocardial infarction) (Multi)    Fall    Leukocytosis    CKD (chronic kidney disease) stage 5, GFR less than 15 ml/min (Multi)    Type 2 diabetes mellitus    Acute hematogenous osteomyelitis of left foot (Multi)    Osteomyelitis of  ankle or foot, left, acute (Multi)    Choledocholithiasis with acute cholecystitis    Acute calculous cholecystitis    CAD (coronary artery disease)    Right renal stone    Elevated troponin    Encounter for deep vein thrombosis (DVT) prophylaxis    Hypertensive emergency    Pneumonia    Acute hypoxic respiratory failure    Polyarthropathy       Subjective     General Visit Information:  General  Reason for Referral: Impaired Mobility; Pt is a 59 year old male admitted for leg swelling and polyarthropy.  Referred By: MD Adriana  Family/Caregiver Present: No  Prior to Session Communication: Bedside nurse  Patient Position Received: Bed, 3 rail up, Alarm off, not on at start of session  General Comment: Pt supine in bed, pleasant and cooperative, reporting feeling fine, eager for home going, reports L groin stiff from laying, but moving without hands on assist, device use, or instability.    Home Living:  Home Living  Type of Home: House  Lives With: Adult children (Son lives upstairs)  Home Adaptive Equipment: None  Home Layout: Multi-level, Laundry in basement, Able to live on main level with bedroom/bathroom  Home Access: Stairs to enter with rails  Entrance Stairs-Rails: Both (Reports has grab bars on both sides of door.)  Entrance Stairs-Number of Steps: 2  Bathroom Shower/Tub: Walk-in shower  Bathroom Toilet: Handicapped height  Bathroom Equipment: Tub transfer bench, Grab bars in shower  Home Living Comments: Pt reports living on main level, son upstairs and laundry in basement, but pt does not need to manage, stays on ground level.    Prior Level of Function:  Prior Function Per Pt/Caregiver Report  Level of [place]: Independent with ADLs and functional transfers, Independent with homemaking with ambulation  Receives Help From: Family  ADL Assistance: Independent  Homemaking Assistance: Independent  Ambulatory Assistance: Independent  Vocational: Full time employment (works as )  Hand Dominance:  Left  Prior Function Comments: Reports +, MOD I with all ADLs, shares IADLs with son    Precautions:  Precautions  Medical Precautions: Fall precautions    Objective     Pain:  Pain Assessment  Pain Assessment: 0-10  0-10 (Numeric) Pain Score: 4  Pain Type: Acute pain  Pain Location: Groin  Pain Orientation: Left  Pain Descriptors: Aching  Pain Frequency: Other (Comment) (Pt believes brought about by lying too long)  Pain Onset: Ongoing  Clinical Progression: Gradually improving  Pain Interventions: Repositioned, Ambulation/increased activity, Distraction  Response to Interventions: Resting quietly    Cognition:  Cognition  Overall Cognitive Status: Within Functional Limits    General Assessments:  General Observation  General Observation: Pt pleasant and mobile, no acute PT needs at this time.   Activity Tolerance  Endurance: Tolerates 10 - 20 min exercise with multiple rests  Sensation  Light Touch: No apparent deficits  Sensation Comment: Pt denies numbness/tingling  Strength  Strength Comments: WFL for functional transfers  Perception  Inattention/Neglect: Appears intact  Coordination  Movements are Fluid and Coordinated: Yes  Postural Control  Postural Control: Within Functional Limits  Trunk Control: Mild antalgia with gait, but pt able to compensate, no hands on assist or device used  Static Sitting Balance  Static Sitting-Balance Support: Feet supported, No upper extremity supported  Static Sitting-Level of Assistance: Modified independent  Dynamic Sitting Balance  Dynamic Sitting-Balance Support: Feet supported, No upper extremity supported  Dynamic Sitting-Level of Assistance: Modified independent  Dynamic Sitting-Balance: Forward lean, Lateral lean, Trunk control activities  Static Standing Balance  Static Standing-Balance Support: No upper extremity supported  Static Standing-Level of Assistance: Distant supervision  Dynamic Standing Balance  Dynamic Standing-Balance Support: No upper extremity  supported  Dynamic Standing-Level of Assistance: Distant supervision  Dynamic Standing-Balance: Turning (Ambulation)    Functional Assessments:  Bed Mobility  Bed Mobility: Yes  Bed Mobility 1  Bed Mobility 1: Sitting to supine, Supine to sitting  Level of Assistance 1: Modified independent  Bed Mobility Comments 1: No hands on assist, no increased time needs. Pt able to complete without use of handrails, HOB minimally elevated.  Transfers  Transfer: Yes  Transfer 1  Technique 1: Sit to stand, Stand to sit  Transfer Level of Assistance 1: Modified independent  Trials/Comments 1: Increased time and effort, wide LILLY, but no hands on assist or device used, no instability noted.  Ambulation/Gait Training  Ambulation/Gait Training Performed: Yes  Ambulation/Gait Training 1  Surface 1: Level tile  Assistance 1: Distant supervision  Quality of Gait 1: Wide base of support, Diminished heel strike, Antalgic, Decreased step length (Noted antalgia with gait, pt reporting L groin stiff from being in bed too long. No LOB, no hands on assist, no use of device.)  Comments/Distance (ft) 1: 50'  Stairs  Stairs: No (Pt declines, reports comfort with completion at home.)       Extremity/Trunk Assessments:  RUE   RUE : Within Functional Limits  LUE   LUE: Within Functional Limits  RLE   RLE : Within Functional Limits  LLE   LLE : Within Functional Limits    Outcome Measures:  [facility] Basic Mobility  Turning from your back to your side while in a flat bed without using bedrails: None  Moving from lying on your back to sitting on the side of a flat bed without using bedrails: None  Moving to and from bed to chair (including a wheelchair): None  Standing up from a chair using your arms (e.g. wheelchair or bedside chair): None  To walk in hospital room: None  Climbing 3-5 steps with railing: A little  Basic Mobility - Total Score: 23    Goals:  Encounter Problems       Encounter Problems (Active)       Pain - Adult            Education  Documentation  Body Mechanics, taught by Andrez Hurtado, PT at 4/7/2025  5:22 PM.  Learner: Patient  Readiness: Acceptance  Method: Explanation, Demonstration  Response: Verbalizes Understanding, Demonstrated Understanding    Mobility Training, taught by Andrez Hurtado, PT at 4/7/2025  5:22 PM.  Learner: Patient  Readiness: Acceptance  Method: Explanation, Demonstration  Response: Verbalizes Understanding, Demonstrated Understanding    Education Comments  No comments found.

## 2025-04-07 NOTE — PROGRESS NOTES
"Subjective Data:  A couple of Atach episodes overnight rates 130  BP slightly elevated       Objective Data:  Last Recorded Vitals:  Vitals:    25 1620 25 2100 25 0500 25 0832   BP: 161/88 (!) 176/107 131/84 (!) 158/98   BP Location: Left arm Left arm Left arm    Patient Position: Sitting Sitting Lying    Pulse: 82 84 82 89   Resp: 13 16 14 16   Temp: 37.1 °C (98.8 °F) 36.9 °C (98.4 °F) 37 °C (98.6 °F) 36.3 °C (97.4 °F)   TempSrc: Temporal Temporal Temporal Temporal   SpO2: 99% 96% 96% 95%   Weight:       Height:         Medical Gas Therapy: None (Room air)  Weight  Av kg (240 lb)  Min: 109 kg (240 lb)  Max: 109 kg (240 lb)    LABS:  CMP:  Results from last 7 days   Lab Units 25  2006 25  1111 25  1705 25  1103   SODIUM mmol/L 136 135* 135* 137   POTASSIUM mmol/L 3.9 4.3 4.6 4.4   CHLORIDE mmol/L 107 109* 108* 115*   CO2 mmol/L 16* 15* 15* 15*   ANION GAP mmol/L 17 15 17 11   BUN mg/dL 61* 56* 47* 45*   CREATININE mg/dL 7.51* 7.43* 7.51* 6.79*   EGFR mL/min/1.73m*2 8* 8* 8* 9*   MAGNESIUM mg/dL 1.96  --   --   --    ALBUMIN g/dL 3.2*  --  3.3*  --    ALT U/L 14  --  15  --    AST U/L 12  --  16  --    BILIRUBIN TOTAL mg/dL 0.3  --  0.5  --      CBC:  Results from last 7 days   Lab Units 25  1111 25  0233 25  1103   WBC AUTO x10*3/uL 13.5* 17.4* 17.2*   HEMOGLOBIN g/dL 8.4* 8.3* 8.6*   HEMATOCRIT % 25.3* 26.9* 28.7*   PLATELETS AUTO x10*3/uL 383 396 396   MCV fL 80 86 90     COAG:     ABO: No results found for: \"ABO\"  HEME/ENDO:  Results from last 7 days   Lab Units 25  1111   FERRITIN ng/mL 241   IRON SATURATION % 13*      CARDIAC:   Results from last 7 days   Lab Units 25  1103   BNP pg/mL 597*             Last I/O:    Intake/Output Summary (Last 24 hours) at 2025 0942  Last data filed at 2025 0852  Gross per 24 hour   Intake --   Output 2375 ml   Net -2375 ml     Net IO Since Admission: -1,975 mL [25 0942]    "   Imaging Results:  Electrocardiogram, 12-lead PRN ACS symptoms    Result Date: 4/7/2025  Normal sinus rhythm Normal ECG When compared with ECG of 27-MAR-2025 16:00, No significant change was found    CT abdomen pelvis wo IV contrast    Result Date: 4/5/2025  STUDY: CT Abdomen and Pelvis without IV Contrast; 04/04/2025 at 11:20 PM INDICATION: Pelvic pain. COMPARISON: XR left hip and pelvis 04/04/25. CT abdomen and pelvis 03/27/25. ACCESSION NUMBER(S): [id number] ORDERING CLINICIAN: JIMENA MAE TECHNIQUE: CT of the abdomen and pelvis was performed.  Contiguous axial images were obtained at 3 mm slice thickness through the abdomen and pelvis. Coronal and sagittal reconstructions at 3 mm slice thickness were performed. No intravenous contrast was administered.  Automated mA/kV exposure control was utilized and patient examination was performed in strict accordance with principles of ALARA. FINDINGS: Please note that the evaluation of vessels, lymph nodes and organs is limited without intravenous contrast.  LOWER CHEST: No cardiomegaly.  No pericardial effusion.  Lung bases reveals small left effusion with suspected areas of atelectasis.  There is a tiny right effusion.  There is prominent coronary artery calcifications.  ABDOMEN:  LIVER: No hepatomegaly.  Smooth surface contour.  Normal attenuation.  BILE DUCTS: No intrahepatic or extrahepatic biliary ductal dilatation.  GALLBLADDER: The gallbladder is surgically absent.. STOMACH: No abnormalities identified.  PANCREAS: No masses or ductal dilatation.  SPLEEN: No splenomegaly or focal splenic lesion.  ADRENAL GLANDS: No thickening or nodules.  KIDNEYS AND URETERS: Kidneys are normal in size and location.  Nonobstructing midpole right renal calculus is demonstrated.  There is a left renal cyst 5.3 cm.  PELVIS:  BLADDER: No abnormalities identified.  REPRODUCTIVE ORGANS: No abnormalities identified.  BOWEL: Inflammatory changes are demonstrated involving the  upper sigmoid colon.  There is a background of diverticular disease.  This is compatible with acute diverticulitis.  Appendix is normal.  Moderate amount of fecal debris is demonstrated of the right colon.  VESSELS: No abnormalities identified.  Abdominal aorta is normal in caliber.  PERITONEUM/RETROPERITONEUM/LYMPH NODES: No free fluid.  No pneumoperitoneum. No lymphadenopathy.  ABDOMINAL WALL: No abnormalities identified. SOFT TISSUES: No abnormalities identified.  BONES: No acute fracture or aggressive osseous lesion.  Advanced disc disease is demonstrated L5-S1.    1.Findings compatible with acute diverticulitis upper sigmoid colon. 2.Nonobstructing right renal calculus. 3.Small left effusion with suspected areas of atelectasis. Tiny right effusion. 4.Prominent coronary artery calcifications. Signed by Ricardo Haskins DO    XR hip left with pelvis when performed 2 or 3 views    Result Date: 4/4/2025  STUDY: Pelvis and Left Hip Radiographs; 04/04/2025 6:27 PM INDICATION: Left hip pain. COMPARISON: CT chest abdomen pelvis 03/10/2025.  CT abdomen pelvis 03/03/2025. ACCESSION NUMBER(S): [id number] ORDERING CLINICIAN: THIAGO PEARSON TECHNIQUE:  AP view of the pelvis and two view(s) of the left hip. FINDINGS:  PELVIS: The pelvic ring is intact.  There is no acute fracture.  Enthesopathy present.  Vascular calcification is noted.  Degenerative changes of the spine.  Limited evaluation of the sacrum/coccyx due to overlying bowel gas. LEFT HIP: No evidence suggest acute fracture or dislocation.  Degenerative changes present.  Os acetabuli noted.    No evidence of acute fracture.  If clinical suspicion for fracture remains high, CT can be obtained for further evaluation. Signed by Chele Subramanian MD    XR chest 2 views    Result Date: 4/4/2025  STUDY: Chest Radiographs;  04/04/2025, 1828 hrs. INDICATION: 59-year-old male with coronary artery disease. COMPARISON: 03/10/2025 CT Chest, Abdomen, and Pelvis, XR Chest.  07/21/2024 XR Chest. ACCESSION NUMBER(S): [id number] ORDERING CLINICIAN: THIAGO PEARSON TECHNIQUE: Frontal and lateral chest. FINDINGS: CARDIOMEDIASTINAL SILHOUETTE: Prominent in size, early cardiomegaly. PULMONARY VASCULATURE: Pulmonary venous vessels defined, no pulmonary venous vascular congestion. LUNGS: No lung consolidation, however there are bilateral basilar small linear opacities, probably subsegmental atelectasis; no lung mass. PLEURA/HEMIDIAPHRAGM: No pneumothorax, no juxtapleural mass. No hemidiaphragm elevation. CHEST: Frontal shows lungs hypo-inflated, with expected central bronchovascular crowding. -=- ABDOMEN: Upper abdomen without bowel obstruction. SKELETON: No fracture, no osseous lytic/blastic lesion.    1. Chest shows lungs hypoinflated, with expected central bronchovascular crowding; no lung consolidation or lung mass. 2. Lung bases with probable subsegmental atelectasis. 3. No pulmonary venous congestion, borderline cardiomegaly. Signed by Kyle Nava MD    XR hand left 3+ views    Result Date: 4/4/2025  STUDY: Hand Radiographs; 04/04/2025 6:27 PM INDICATION: Swelling left thumb base. COMPARISON: None. ACCESSION NUMBER(S): [id number] ORDERING CLINICIAN: THIAGO PEARSON TECHNIQUE:  Three view(s) of the left hand. FINDINGS:  There is no displaced fracture.  There is a 6 mm soft tissue calcification along the radial aspect of the head of the first metacarpal and along but no fracture or definite acute bony abnormalities are visible.  The alignment is anatomic.  There are some vascular calcifications at the wrist.  No additional soft tissue abnormality is seen.    There is a 6 mm soft tissue calcification adjacent to the distal first metacarpal but no fracture or acute bony abnormalities are appreciated.. Signed by Galen Ferris MD    XR knee right 3 views    Result Date: 4/4/2025  STUDY: Knee Radiographs; 04/04/2025 6:27 PM INDICATION: Right knee pain and swelling. COMPARISON: None.  ACCESSION NUMBER(S): [id number] ORDERING CLINICIAN: THIAGO PEARSON TECHNIQUE:  Three view(s) of the right knee. FINDINGS:  There is no displaced fracture.  There is some fragmentation or soft tissue calcification anterior to the tibial tubercle but the knee otherwise is unremarkable.  The alignment is anatomic.  There are some vascular calcifications in the popliteal artery area but no additional soft tissue abnormalities are seen..  There is no joint effusion.    No acute bony abnormalities. Signed by Galen Ferris MD          Past Cardiology Tests (Last 3 Years):  EKG:  Results for orders placed during the hospital encounter of 04/04/25    Electrocardiogram, 12-lead PRN ACS symptoms (Preliminary)  This result has not been signed. Information might be incomplete.    Narrative  Normal sinus rhythm  Normal ECG  When compared with ECG of 27-MAR-2025 16:00,  No significant change was found      Results for orders placed during the hospital encounter of 03/27/25    ECG 12 lead    Narrative  Normal sinus rhythm  Possible Anterior infarct , age undetermined  Abnormal ECG  When compared with ECG of 10-MAR-2025 19:14,  Nonspecific T wave abnormality now evident in Inferior leads  Nonspecific T wave abnormality, improved in Lateral leads  Confirmed by Aleks Gooden ([id number]) on 3/28/2025 9:15:48 AM      Results for orders placed during the hospital encounter of 03/10/25    ECG 12 Lead    Narrative  Normal sinus rhythm  Nonspecific ST and T wave abnormality  Abnormal ECG  No previous ECGs available  Confirmed by Brent Ellington ([id number]) on 3/11/2025 9:21:45 AM      ECG 12 lead    Narrative  Normal sinus rhythm  Nonspecific ST and T wave abnormality  QTcB >= 480 msec  Abnormal ECG  When compared with ECG of 28-OCT-2024 15:17,  No significant change was found  Confirmed by Brent Ellington ([id number]) on 3/11/2025 10:18:44 AM      Results for orders placed in visit on 10/28/24    ECG 12 lead (Clinic Performed)    Narrative  Normal  sinus rhythm  Possible Anterior infarct (cited on or before 29-JUL-2024)  Abnormal ECG  When compared with ECG of 29-JUL-2024 14:28,  No significant change was found  Confirmed by Mahendra Saravia ([id number]) on 10/29/2024 9:31:31 AM      Results for orders placed during the hospital encounter of 07/21/24    ECG 12 Lead    Narrative  Normal sinus rhythm  Anterior infarct , age undetermined  Abnormal ECG  When compared with ECG of 21-JUL-2024 22:13, (unconfirmed)  Premature ventricular complexes are no longer Present  Vent. rate has decreased BY  55 BPM  QRS duration has decreased  Nonspecific T wave abnormality now evident in Inferior leads  T wave amplitude has decreased in Anterior leads  T wave inversion no longer evident in Lateral leads  Confirmed by Jag Dorsey ([id number]) on 8/2/2024 12:28:11 PM      Electrocardiogram, 12-lead PRN ACS symptoms    Narrative   Poor data quality, interpretation may be adversely affected  Sinus tachycardia with occasional Premature ventricular complexes  Nonspecific intraventricular block  Abnormal ECG    Confirmed by Jag Dorsey ([id number]) on 7/31/2024 5:03:47 PM      Results for orders placed in visit on 04/22/24    ECG 12 lead (Clinic Performed)    Narrative  Normal sinus rhythm  Possible Anterior infarct (cited on or before 03-AUG-2020)  Abnormal ECG  Confirmed by Mahendra Saravia ([id number]) on 4/25/2024 11:56:38 AM    Echo:  Results for orders placed during the hospital encounter of 03/10/25    Transthoracic Echo (TTE) Complete    [place] [address]  Phone [phone]    TRANSTHORACIC ECHOCARDIOGRAM REPORT    Patient Name:       ODALYS Friedman Physician:    [id number] Tomi Moser DO  Study Date:         3/11/2025            Ordering Provider:    [id number] FOSTER KNOX  MRN/PID:            [id number]             Fellow:  Accession#:         [id number]         Nurse:  Date of Birth/Age:  1965 / 59       Sonographer:          Nathaniel hernandez                                      ABE  Gender Assigned at  M                    Additional Staff:  Birth:  Height:             177.80 cm            Admit Date:  Weight:             117.93 kg            Admission Status:     Inpatient -  Routine  BSA / BMI:          2.33 m2 / 37.31      Department Location:  [facility] ICU  kg/m2  Blood Pressure: 146 /67 mmHg    Study Type:    TRANSTHORACIC ECHO (TTE) COMPLETE  Diagnosis/ICD: Subsequent non ST elevation (NSTEMI) myocardial infarction-I22.2  Indication:    NSTEMI  CPT Codes:     Echo Complete w Full [id number]    Patient History:  Diabetes:          Yes  Pertinent History: CAD, Chest Pain, CHF, HTN, Hyperlipidemia, Dyspnea and  Syncope. LVH, Renal dx V, NSTEMI, DVT.    Study Detail: The following Echo studies were performed: 2D, M-Mode, Doppler and  color flow. Technically challenging study due to poor acoustic  windows and body habitus. Definity used as a contrast agent for  endocardial border definition. Total contrast used for this  procedure was 2 mL via IV push. Unable to obtain RV/RA not well  visualized view.      PHYSICIAN INTERPRETATION:  Left Ventricle: Left ventricular ejection fraction is normal, by visual estimate at 60-65%. There is mild concentric left ventricular hypertrophy. There are no regional wall motion abnormalities. The left ventricular cavity size is normal. There is mild increased septal and mildly increased posterior left ventricular wall thickness. Spectral Doppler shows a Grade II (pseudonormal pattern) of left ventricular diastolic filling with an elevated left atrial pressure.  Left Atrium: The left atrial size is mildly dilated.  Right Ventricle: The right ventricle is normal in size. There is normal right ventricular global systolic function.  Right Atrium: The right atrial size is normal.  Aortic Valve: The aortic valve appears abnormal. There is mild to moderate aortic valve cusp  calcification. There is mild to moderate aortic valve thickening. There is evidence of mild to moderate aortic valve stenosis.  The aortic valve dimensionless index is 0.39. There is no evidence of aortic valve regurgitation. The peak instantaneous gradient of the aortic valve is 28 mmHg. The mean gradient of the aortic valve is 15 mmHg.  Mitral Valve: The mitral valve is normal in structure. There is mild mitral valve regurgitation.  Tricuspid Valve: The tricuspid valve is structurally normal. There is trace tricuspid regurgitation.  Pulmonic Valve: The pulmonic valve is structurally normal. There is no indication of pulmonic valve regurgitation.  Pericardium: No pericardial effusion noted.  Aorta: The aortic root is normal.      CONCLUSIONS:  1. Left ventricular ejection fraction is normal, by visual estimate at 60-65%.  2. Spectral Doppler shows a Grade II (pseudonormal pattern) of left ventricular diastolic filling with an elevated left atrial pressure.  3. There is normal right ventricular global systolic function.  4. Mild to moderate aortic valve stenosis.    QUANTITATIVE DATA SUMMARY:    2D MEASUREMENTS:             Normal Ranges:  Ao Root s:       3.93 cm  IVSd:            1.25 cm     (0.6-1.1cm)  LVPWd:           1.26 cm     (0.6-1.1cm)  LVIDd:           5.67 cm     (3.9-5.9cm)  LVIDs:           3.75 cm  LV Mass Index:   130.8 g/m2  LVEDV Index:     58.65 ml/m2  LV % FS          33.9 %      LEFT ATRIUM:                 Normal Ranges:  LA Vol A4C:       128.6 ml  LA Vol A2C:       65.2 ml  LA Vol Index BSA: 41.5 ml/m2      LV SYSTOLIC FUNCTION:  Normal Ranges:  EF-A4C View:    76 % (>=55%)  EF-A2C View:    60 %  EF-Biplane:     70 %  EF-Visual:      63 %  LV EF Reported: 63 %      LV DIASTOLIC FUNCTION:           Normal Ranges:  MV Peak E:             1.17 m/s  (0.7-1.2 m/s)  MV Peak A:             1.16 m/s  (0.42-0.7 m/s)  E/A Ratio:             1.01      (1.0-2.2)  MV e'                  0.059 m/s  (>8.0)  MV lateral e'          0.06 m/s  MV medial e'           0.05 m/s  E/e' Ratio:            19.88     (<8.0)      MITRAL VALVE:          Normal Ranges:  MV DT:        296 msec (150-240msec)      AORTIC VALVE:                      Normal Ranges:  AoV Vmax:                2.64 m/s  (<=1.7m/s)  AoV Peak P.8 mmHg (<20mmHg)  AoV Mean PG:             15.4 mmHg (1.7-11.5mmHg)  LVOT Max Cirilo:            1.13 m/s  (<=1.1m/s)  AoV VTI:                 66.94 cm  (18-25cm)  LVOT VTI:                25.87 cm  LVOT Diameter:           2.03 cm   (1.8-2.4cm)  AoV Area, VTI:           1.25 cm2  (2.5-5.5cm2)  AoV Area,Vmax:           1.39 cm2  (2.5-4.5cm2)  AoV Dimensionless Index: 0.39      TRICUSPID VALVE/RVSP:         Normal Ranges:  IVC Diam:             2.16 cm      PULMONIC VALVE:          Normal Ranges:  PV Max Cirilo:     0.9 m/s  (0.6-0.9m/s)  PV Max PG:      3.4 mmHg      [id number] Tomi Vladimirmag [place]  Electronically signed on 3/11/2025 at 11:07:24 AM        ** Final **      Results for orders placed during the hospital encounter of 24    Transthoracic Echo (TTE) Complete    [place] [address]  Phone [phone]    TRANSTHORACIC ECHOCARDIOGRAM REPORT    Patient Name:      ODALYS Friedman Physician:    [id number] Kade [facility] [place]  Study Date:        2024             Ordering Provider:    [id number] GERMAN KENNEY  MRN/PID:           [id number]              Fellow:  Accession#:        [id number]          Nurse:  Date of Birth/Age: 1965 / 58 years Sonographer:          Vera Wynn RDCS  Gender:            M                     Additional Staff:  Height:            180.34 cm             Admit Date:  Weight:            118.39 kg             Admission Status:     Inpatient -  Routine  BSA / BMI:         2.36 m2 / 36.40 kg/m2 Department Location:  [facility]  Blood Pressure: 147 /88 mmHg    Study Type:    TRANSTHORACIC ECHO  (TTE) COMPLETE  Diagnosis/ICD: Atherosclerotic heart disease of native coronary artery without  angina pectoris-I25.10  Indication:    Dyspnea, weakness fatigue  CPT Codes:     Echo Complete w Full Doppler-[id number]    Patient History:  Pertinent History: LVH, HLD, HTN, CAD, Nstemi, SOB CKD, DM.    Study Detail: The following Echo studies were performed: 2D, M-Mode, color flow  and Doppler. Unable to obtain suprasternal notch view.      PHYSICIAN INTERPRETATION:  Left Ventricle: The left ventricular systolic function is normal, with a visually estimated ejection fraction of 60-65%. There are no regional wall motion abnormalities. The left ventricular cavity size is normal. Left ventricular diastolic filling was indeterminate.  Left Atrium: The left atrium is mildly dilated.  Right Ventricle: The right ventricle is normal in size. There is normal right ventricular global systolic function.  Right Atrium: The right atrium is normal in size.  Aortic Valve: The aortic valve is trileaflet. There is evidence of mild aortic valve stenosis.  The aortic valve dimensionless index is 0.45. There is no evidence of aortic valve regurgitation. The peak instantaneous gradient of the aortic valve is 20.4 mmHg. The mean gradient of the aortic valve is 10.6 mmHg.  Mitral Valve: The mitral valve is normal in structure. There is mild mitral valve regurgitation.  Tricuspid Valve: The tricuspid valve is structurally normal. There is trace tricuspid regurgitation.  Pulmonic Valve: The pulmonic valve is not well visualized. The pulmonic valve regurgitation was not well visualized.  Pericardium: There is no pericardial effusion noted.  Aorta: The aortic root is normal.      CONCLUSIONS:  1. The left ventricular systolic function is normal, with a visually estimated ejection fraction of 60-65%.  2. Left ventricular diastolic filling was indeterminate.  3. There is normal right ventricular global systolic function.  4. Mild aortic valve  stenosis.  5. Aortic stenosis mean gradient 10 mm Hg, peak gradient 40 mm Hg and ADÁN 1.43 cm2.  6. Aortic valve dimensionless index 0.43.    QUANTITATIVE DATA SUMMARY:  2D MEASUREMENTS:  Normal Ranges:  LAs:           4.76 cm    (2.7-4.0cm)  IVSd:          1.70 cm    (0.6-1.1cm)  LVPWd:         1.51 cm    (0.6-1.1cm)  LVIDd:         3.86 cm    (3.9-5.9cm)  LVIDs:         2.85 cm  LV Mass Index: 104.8 g/m2  LV % FS        26.2 %    LA VOLUME:  Normal Ranges:  LA Vol A4C:        71.7 ml    (22+/-6mL/m2)  LA Vol A2C:        92.5 ml  LA Vol BP:         86.7 ml  LA Vol Index A4C:  30.4 ml/m2  LA Vol Index A2C:  39.2 ml/m2  LA Vol Index BP:   36.7 ml/m2  LA Area A4C:       22.5 cm2  LA Area A2C:       27.2 cm2  LA Major Axis A4C: 6.0 cm  LA Major Axis A2C: 6.8 cm  LA Volume Index:   36.7 ml/m2  LA Vol A4C:        72.0 ml  LA Vol A2C:        92.0 ml    RA VOLUME BY A/L METHOD:  Normal Ranges:  RA Vol A4C:        39.5 ml    (8.3-19.5ml)  RA Vol Index A4C:  16.7 ml/m2  RA Area A4C:       15.4 cm2  RA Major Axis A4C: 5.1 cm    AORTA MEASUREMENTS:  Normal Ranges:  Ao Sinus, d: 3.30 cm (2.1-3.5cm)  Ao STJ, d:   3.00 cm (1.7-3.4cm)  Asc Ao, d:   3.80 cm (2.1-3.4cm)    LV SYSTOLIC FUNCTION BY 2D PLANIMETRY (MOD):  Normal Ranges:  EF-A4C View:    48 % (>=55%)  EF-A2C View:    65 %  EF-Biplane:     57 %  EF-Visual:      63 %  LV EF Reported: 63 %    LV DIASTOLIC FUNCTION:  Normal Ranges:  MV Peak E:    0.82 m/s  (0.7-1.2 m/s)  MV Peak A:    0.99 m/s  (0.42-0.7 m/s)  E/A Ratio:    0.83      (1.0-2.2)  MV e'         0.071 m/s (>8.0)  MV lateral e' 0.08 m/s  MV medial e'  0.06 m/s  E/e' Ratio:   11.61     (<8.0)    MITRAL VALVE:  Normal Ranges:  MV DT: 221 msec (150-240msec)    AORTIC VALVE:  Normal Ranges:  AoV Vmax:                2.26 m/s  (<=1.7m/s)  AoV Peak P.4 mmHg (<20mmHg)  AoV Mean PG:             10.6 mmHg (1.7-11.5mmHg)  LVOT Max Cirilo:            0.97 m/s  (<=1.1m/s)  AoV VTI:                 46.31 cm   (18-25cm)  LVOT VTI:                21.06 cm  LVOT Diameter:           2.00 cm   (1.8-2.4cm)  AoV Area, VTI:           1.43 cm2  (2.5-5.5cm2)  AoV Area,Vmax:           1.35 cm2  (2.5-4.5cm2)  AoV Dimensionless Index: 0.45      RIGHT VENTRICLE:  TAPSE: 25.1 mm  RV s'  0.15 m/s    TRICUSPID VALVE/RVSP:  Normal Ranges:  IVC Diam: 2.02 cm    AORTA:  Asc Ao Diam 3.83 cm      [id number] Kade Pedraza DO  Electronically signed on 7/22/2024 at 10:49:35 AM        ** Final **    Ejection Fractions:  LV EF   Date/Time Value Ref Range Status   03/11/2025 10:47 AM 63 %    07/22/2024 09:36 AM 63 %      Cath:  No results found for this or any previous visit.    Stress Test:  Results for orders placed in visit on 03/05/20    Nuclear Stress Test    Narrative  MRN: [id number]  Patient Name: ODALYS MELVIN    STUDY:  CARDIAC STRESS/REST INJECTION; CARDIAC STRESS/REST (MYOCARDIAL  PERFUSION/MIBI); PART 2 STRESS OR REST (NO CHARGE); 3/5/2020 8:40 am;  3/5/2020 11:37 am; 3/5/2020 10:00 am    INDICATION:  none. Shortness of breath    COMPARISON:  None.    ACCESSION NUMBER(S):  [id number]; [id number]; [id number]    ORDERING CLINICIAN:  BREANA BOWDEN    TECHNIQUE:  DIVISION OF NUCLEAR MEDICINE  PHARMACOLOGIC STRESS MYOCARDIAL PERFUSION SCAN, ONE DAY PROTOCOL    The patient received an intravenous dose of 11 mCi of Tc-99m Myoview  and resting emission tomographic (SPECT) images of the myocardium  were acquired. The patient then received an intravenous infusion of  0.4 mg regadenoson (Lexiscan) followed by an additional dose of 34.3  mCi of Tc-99m Myoview. Stress phase SPECT images of the myocardium  were then acquired. These included ECG-gated images to assess and  quantify ventricular function.    FINDINGS:  Stress and rest images both demonstrate an area of mild reversibility  along the basal to mid anterior wall. In addition, there is the  suspicion of a fixed defect along the basal inferior wall with  abnormal wall motion and thickening  suggestive of prior infarct.    Borderline left ventricular dilatation is noted (ESV 74 mL).    ECG-gated images demonstrate global hypokinesis with an LV ejection  fraction of  36 % (normal above 45 percent).    Impression  1. Suspicion of mild ischemia along the basal to mid anterior wall.  Correlation with clinical suspicion is recommended.  2. Suspicion of a prior infarct along the basal inferior wall.  3. Borderline left ventricular dilatation is noted.  4. Global hypokinesis with an ejection fraction of 36% which is in  the abnormal range.    Images interpreted at [facility].    Cardiac Imaging:  No results found for this or any previous visit.      Inpatient Medications:  Scheduled medications   Medication Dose Route Frequency    aspirin  81 mg oral Daily    atorvastatin  20 mg oral Nightly    carvedilol  37.5 mg oral BID    cholestyramine light  4 g oral BID    ezetimibe  10 mg oral Nightly    heparin (porcine)  5,000 Units subcutaneous q8h    hydrALAZINE  100 mg oral TID    lactobacillus acidophilus  2 capsule oral BID    pantoprazole  40 mg oral Daily before breakfast    Or    pantoprazole  40 mg intravenous Daily before breakfast    sodium bicarbonate  1,300 mg oral TID    torsemide  60 mg oral Daily    vancomycin  125 mg oral 4x daily     PRN medications   Medication    acetaminophen    Or    acetaminophen    Or    acetaminophen    diphenhydrAMINE    ondansetron    Or    ondansetron    oxyCODONE     Continuous Medications   Medication Dose Last Rate       Physical Exam:  General:  Patient is awake, alert, and oriented.  Patient is in no acute distress.  HEENT:  Pupils equal and reactive.  Normocephalic.  Moist mucosa.    Neck:  No thyromegaly.  Normal Jugular Venous Pressure.  Cardiovascular:  Regular rate and rhythm.  Normal S1 and S2.  Pulmonary:  Clear to auscultation bilaterally.  Abdomen:  Soft. Non-tender.   Non-distended.  Positive bowel sounds.  Lower  Extremities:  2+ pedal pulses. No LE edema.  Neurologic:  Cranial nerves intact.  No focal deficit.   Skin: Skin warm and dry, normal skin turgor.   Psychiatric: Normal affect.     Assessment/Plan   Colin Leonard is a 59 y.o. male with PMHx significant for CAD (3/2020 CAC 1014.84), type 2 MI in the setting of PNA, hypertensive urgency, CKD 3/2025 DM2, DVT, CKD V, HLD, HTN, choledocholithiasis/acute cholecystitis s/p cholecystectomy 3/6/25, C. difficile colitis 3/2025,  presenting to [facility]/4/[facility] with difficulty ambulating, swelling, worsening joint pain; workup revealed acute sigmoid diverticultis d/t C-diff.  He was started on ATB and admitted to Medicine Service. Cardiology consulted for NSVT.     Outpatient cardiac medications: ASA 81 mg daily,  Carvedilol 25 mg BID, Torsemide 60 mg daily  Cardiologist: Dr. Saravia     #NSVT. 1 run of NSVT, 6 beats noted on telemetry. On Carvedilol 25 mg BID, will plan to up titrate.  No clear anginal symptoms.      #Atrial Tachycardia.   -Noted episodes on telemetry while at rest.   -EKG 4/6/25 revealed , Atach on my interpretation, LVH  Cardiac Telemetry reviewed and rhythm is an atrial tachycardia occurring in the setting of acute stress   -- Increase Carvedilol from 25 mg --> 50mg BID  -- Continue to treat supportively      #CAD, 3/2020 CAC score 1014.84  -On ASA  -No clear anginal symptoms.   -TTE 3/11/25: LVEF 60-65%, pseudonormal diastolic fx, normal RV fx, mild to moderate aortic stenosis     #HTN  -Mildly elevated.      #CKD V. Renal following.      #HLD. On ezetimibe. Atorvastatin 20 mg daily held during 3/2025 admit  due mildly elevated liver enzymes when hospitalized  choledocholithiasis/acute cholecystitis s/p cholecystectomy. CMP this admit reveals normal liver enzymes. Will plan to restart statin.      Plan/Recs:  -Increase Carvedilol to 50 mg BID.  -Start Atorvastatin 20 mg daily  -Follow up with Dr. Saravia in the next 4-6 weeks on discharge       Code Status:  Full Code  Cary PalmerSHIELA santiagoN-CNP    ========================================  Attending Note   ========================================    The JING and I have reviewed the patient's recent labs, medications, orders, EKGs, pertinent cardiac imaging including echocardiogram, and hospital course as well as discussed the case.   I have reviewed the NPP's encounter note, approve the JING's documentation, and have edited the note to reflect the diagnostic and therapeutic plan.    Thank you for allowing me to participate in their care.  Please feel free to call me with any further questions or concerns.      Moy Romero DO   Division of Cardiovascular Medicine  [facility]

## 2025-04-07 NOTE — PROGRESS NOTES
04/07/25 1218   Discharge Planning   Living Arrangements Alone   Support Systems Children;Family members  (brother Buddy at bedside)   Assistance Needed PTA; ADL/IADL, does not use asst device, has a walker, DM2   Type of Residence Private residence  (demo correct)   Number of Stairs to Enter Residence 2   Number of Stairs Within Residence 0   Home or Post Acute Services None   Expected Discharge Disposition Home  (patient denies any further needs)   Does the patient need discharge transport arranged? No  (patient's brother or son will provide transport home)   Financial Resource Strain   How hard is it for you to pay for the very basics like food, housing, medical care, and heating? Not very   Housing Stability   In the last 12 months, was there a time when you were not able to pay the mortgage or rent on time? N   In the past 12 months, how many times have you moved where you were living? 0   At any time in the past 12 months, were you homeless or living in a shelter (including now)? N   Transportation Needs   In the past 12 months, has lack of transportation kept you from medical appointments or from getting medications? no  (PCP listed; upcoming scheduled office visit; patient drives himself to appointments son or brother;pharmacy; [employer])   In the past 12 months, has lack of transportation kept you from meetings, work, or from getting things needed for daily living? No   Stroke Family Assessment   Stroke Family Assessment Needed No   Intensity of Service   Intensity of Service 0-30 min     Care Coordinator Note:  Met patient at bedside to discuss discharge planning needs, and how patient manage health at home PTA;  Plan: Cardiology consulted for NSVT., ID; PO Vancomycin 125mg QID for 14D total course.    Status: Inpatient d/t Polyarthropathy   Payor: [facility]  Disposition: Home no needs anticipated     Ofe HUSAIN, RN [facility]

## 2025-04-07 NOTE — PROGRESS NOTES
04/07/25 1217   [facility] Disability Status   Are you deaf or do you have serious difficulty hearing? N   Are you blind or do you have serious difficulty seeing, even when wearing glasses? N   Because of a physical, mental, or emotional condition, do you have serious difficulty concentrating, remembering, or making decisions? (5 years old or older) N   Do you have serious difficulty walking or climbing stairs? N   Do you have serious difficulty dressing or bathing? N   Because of a physical, mental, or emotional condition, do you have serious difficulty doing errands alone such as visiting the doctor? N        "Subjective   Reason for Visit: Osmar Pond is an 65 y.o. male here for a Medicare Wellness visit.          Reviewed all medications by prescribing practitioner or clinical pharmacist (such as prescriptions, OTCs, herbal therapies and supplements) and documented in the medical record.    HPI Denies family history of colon and prostate cancer. Patient had labs drawn prior to office visit. Last Colonoscopy done in 2023.    Patient Care Team:  Julio Landaverde DO as PCP - General  Julio Landaverde DO as PCP - [employer] PCP  VICENTE Aragon-CNP as Nurse Practitioner (Neurology)     Review of Systems   All other systems reviewed and are negative.      Objective   Vitals:  /60   Pulse 74   Temp 36.7 °C (98 °F)   Ht 1.778 m (5' 10\")   Wt 91.6 kg (202 lb)   SpO2 96%   BMI 28.98 kg/m²       Physical Exam  Constitutional:       Appearance: Normal appearance.   Eyes:      Conjunctiva/sclera: Conjunctivae normal.   Cardiovascular:      Rate and Rhythm: Normal rate and regular rhythm.   Pulmonary:      Effort: Pulmonary effort is normal.      Breath sounds: Normal breath sounds.   Abdominal:      Palpations: Abdomen is soft.   Musculoskeletal:         General: Normal range of motion.   Skin:     General: Skin is warm and dry.   Neurological:      Mental Status: He is alert.   Psychiatric:         Mood and Affect: Mood normal.         Assessment & Plan  Routine general medical examination at a health care facility         Screening for prostate cancer    Orders:    Prostate Spec.Ag,Screen; Future    Encounter for hepatitis C screening test for low risk patient    Orders:    Hepatitis C antibody; Future    Medication management    Orders:    CBC; Future    Screening for diabetes mellitus    Orders:    Hemoglobin A1c; Future    Hyperlipidemia, unspecified hyperlipidemia type    Orders:    Comprehensive metabolic panel; Future    Lipid panel; Future    CAD, multiple vessel    Orders:    " atorvastatin (Lipitor) 40 mg tablet; Take 1 tablet (40 mg) by mouth once daily.    S/P CABG x 4    Orders:    atorvastatin (Lipitor) 40 mg tablet; Take 1 tablet (40 mg) by mouth once daily.    Hypertriglyceridemia    Orders:    atorvastatin (Lipitor) 40 mg tablet; Take 1 tablet (40 mg) by mouth once daily.

## 2025-04-07 NOTE — PROGRESS NOTES
"  INFECTIOUS DISEASE DAILY PROGRESS NOTE    SUBJECTIVE:    No overnight events. No new complaints. Afebrile. No rash/itching/diarrhea. No BM for last 2-3 days. Abd without pain. Tolerating PO diet well.    OBJECTIVE:  VITALS (Last 24 Hours)  /84 (BP Location: Left arm, Patient Position: Lying)   Pulse 82   Temp 37 °C (98.6 °F) (Temporal)   Resp 14   Ht 1.803 m (5' 11\")   Wt 109 kg (240 lb)   SpO2 96%   BMI 33.47 kg/m²     PHYSICAL EXAM:  Gen - NAD, sitting on side of bed  Heart - RRR  Abd - soft, no ttp, no distension  Skin - no rash    ABX: PO Vanc, IV Flagyl    LABS:  Lab Results   Component Value Date    WBC 13.5 (H) 04/05/2025    HGB 8.4 (L) 04/05/2025    HCT 25.3 (L) 04/05/2025    MCV 80 04/05/2025     04/05/2025     Lab Results   Component Value Date    GLUCOSE 148 (H) 04/05/2025    CALCIUM 9.0 04/05/2025     04/05/2025    K 3.9 04/05/2025    CO2 16 (L) 04/05/2025     04/05/2025    BUN 61 (H) 04/05/2025    CREATININE 7.51 (H) 04/05/2025     Results from last 72 hours   Lab Units 04/05/25 2006   ALK PHOS U/L 59   BILIRUBIN TOTAL mg/dL 0.3   PROTEIN TOTAL g/dL 6.5   ALT U/L 14   AST U/L 12   ALBUMIN g/dL 3.2*     Estimated Creatinine Clearance: 13 mL/min (A) (by C-G formula based on SCr of 7.51 mg/dL (H)).    ASSESSMENT/PLAN:    C. Diff Colitis - resolving  CKD - CrCl is about 13    PO Vancomycin 125mg QID for 14D total course. Stopping IV Flagyl since PO intake is fine and abd exam benign.    Monitoring for adverse effects of abx such as rash/itching.    Will sign off. Please call back with questions. Thanks!    Usman Reese MD  [facility]  Office #[phone]      "

## 2025-04-08 ENCOUNTER — PATIENT OUTREACH (OUTPATIENT)
Dept: PRIMARY CARE | Facility: CLINIC | Age: 60
End: 2025-04-08
Payer: COMMERCIAL

## 2025-04-09 ENCOUNTER — PATIENT OUTREACH (OUTPATIENT)
Dept: PRIMARY CARE | Facility: CLINIC | Age: 60
End: 2025-04-09
Payer: COMMERCIAL

## 2025-04-09 LAB
BACTERIA BLD CULT: NORMAL
BACTERIA BLD CULT: NORMAL

## 2025-04-09 NOTE — PROGRESS NOTES
Discharge Facility:[facility]   Discharge Diagnosis:C diff colitis, resolving  2. Atrial tachycardia  3. NSVT  4. Right knee pain  Admission Date:4/5/25  Discharge Date: 4/7/25    PCP Appointment Date:4/10/25  Specialist Appointment Date: Cardiology   Hospital Encounter and Summary Linked: Yes  See discharge assessment below for further details    Discharge Summary by Kia Wright MD (04/07/2025 14:33)   Two attempts were made to reach patient within two business days after discharge. Voicemail left with contact information for patient to call back with any non-emergent questions or concerns.      Lianet Romero LPN

## 2025-04-10 ENCOUNTER — APPOINTMENT (OUTPATIENT)
Dept: PRIMARY CARE | Facility: CLINIC | Age: 60
End: 2025-04-10
Payer: COMMERCIAL

## 2025-04-11 LAB
ATRIAL RATE: 131 BPM
P OFFSET: 190 MS
P ONSET: 143 MS
PR INTERVAL: 152 MS
Q ONSET: 219 MS
QRS COUNT: 21 BEATS
QRS DURATION: 94 MS
QT INTERVAL: 334 MS
QTC CALCULATION(BAZETT): 493 MS
QTC FREDERICIA: 433 MS
R AXIS: -15 DEGREES
T AXIS: 86 DEGREES
T OFFSET: 386 MS
VENTRICULAR RATE: 131 BPM

## 2025-04-25 ENCOUNTER — PATIENT OUTREACH (OUTPATIENT)
Dept: PRIMARY CARE | Facility: CLINIC | Age: 60
End: 2025-04-25
Payer: COMMERCIAL

## 2025-04-25 DIAGNOSIS — I10 BENIGN ESSENTIAL HYPERTENSION: Primary | ICD-10-CM

## 2025-04-25 RX ORDER — HYDRALAZINE HYDROCHLORIDE 100 MG/1
100 TABLET, FILM COATED ORAL 3 TIMES DAILY
Qty: 270 TABLET | Refills: 3 | Status: SHIPPED | OUTPATIENT
Start: 2025-04-25

## 2025-04-25 NOTE — PROGRESS NOTES
Confirmation of at least 2 patient identifiers.    Completed telephonic follow-up with patient approximately 14 days post discharge, no PCP follow up.   Spoke to patient during outreach call.    Patient reports feeling: Improved    Patient has questions or concerns about medications: No    Have all prescribed medications been filled? Yes    Patient has necessary resources to manage their care? Yes    Patient has questions or concerns? No    Next care management follow-up approximately within one month.  Care  information provided to patient.

## 2025-05-06 ENCOUNTER — PATIENT OUTREACH (OUTPATIENT)
Dept: PRIMARY CARE | Facility: CLINIC | Age: 60
End: 2025-05-06
Payer: COMMERCIAL

## 2025-05-15 ENCOUNTER — APPOINTMENT (OUTPATIENT)
Dept: RADIOLOGY | Facility: HOSPITAL | Age: 60
DRG: 392 | End: 2025-05-15
Payer: COMMERCIAL

## 2025-05-15 ENCOUNTER — HOSPITAL ENCOUNTER (INPATIENT)
Facility: HOSPITAL | Age: 60
LOS: 2 days | Discharge: HOME | DRG: 392 | End: 2025-05-18
Attending: STUDENT IN AN ORGANIZED HEALTH CARE EDUCATION/TRAINING PROGRAM | Admitting: STUDENT IN AN ORGANIZED HEALTH CARE EDUCATION/TRAINING PROGRAM
Payer: COMMERCIAL

## 2025-05-15 DIAGNOSIS — E86.0 DEHYDRATION: ICD-10-CM

## 2025-05-15 DIAGNOSIS — D63.1 ANEMIA DUE TO STAGE 5 CHRONIC KIDNEY DISEASE, NOT ON CHRONIC DIALYSIS: ICD-10-CM

## 2025-05-15 DIAGNOSIS — K80.42 CHOLEDOCHOLITHIASIS WITH ACUTE CHOLECYSTITIS: ICD-10-CM

## 2025-05-15 DIAGNOSIS — E16.2 HYPOGLYCEMIA: ICD-10-CM

## 2025-05-15 DIAGNOSIS — N18.5 ANEMIA DUE TO STAGE 5 CHRONIC KIDNEY DISEASE, NOT ON CHRONIC DIALYSIS: ICD-10-CM

## 2025-05-15 DIAGNOSIS — K57.92 ACUTE DIVERTICULITIS: Primary | ICD-10-CM

## 2025-05-15 DIAGNOSIS — R79.89 ELEVATED TROPONIN: ICD-10-CM

## 2025-05-15 DIAGNOSIS — N18.5 CKD (CHRONIC KIDNEY DISEASE) STAGE 5, GFR LESS THAN 15 ML/MIN (MULTI): ICD-10-CM

## 2025-05-15 LAB
ALBUMIN SERPL BCP-MCNC: 3.5 G/DL (ref 3.4–5)
ALP SERPL-CCNC: 64 U/L (ref 33–136)
ALT SERPL W P-5'-P-CCNC: 26 U/L (ref 10–52)
ANION GAP SERPL CALCULATED.3IONS-SCNC: 14 MMOL/L (ref 10–20)
APPEARANCE UR: CLEAR
AST SERPL W P-5'-P-CCNC: 41 U/L (ref 9–39)
BILIRUB SERPL-MCNC: 0.5 MG/DL (ref 0–1.2)
BILIRUB UR STRIP.AUTO-MCNC: NEGATIVE MG/DL
BUN SERPL-MCNC: 80 MG/DL (ref 6–23)
CALCIUM SERPL-MCNC: 9 MG/DL (ref 8.6–10.3)
CARDIAC TROPONIN I PNL SERPL HS: 13 NG/L (ref 0–20)
CHLORIDE SERPL-SCNC: 112 MMOL/L (ref 98–107)
CO2 SERPL-SCNC: 18 MMOL/L (ref 21–32)
COLOR UR: ABNORMAL
CREAT SERPL-MCNC: 7.57 MG/DL (ref 0.5–1.3)
EGFRCR SERPLBLD CKD-EPI 2021: 8 ML/MIN/1.73M*2
FLUAV RNA RESP QL NAA+PROBE: NOT DETECTED
FLUBV RNA RESP QL NAA+PROBE: NOT DETECTED
GLUCOSE SERPL-MCNC: 69 MG/DL (ref 74–99)
GLUCOSE UR STRIP.AUTO-MCNC: ABNORMAL MG/DL
KETONES UR STRIP.AUTO-MCNC: NEGATIVE MG/DL
LACTATE SERPL-SCNC: 1.4 MMOL/L (ref 0.4–2)
LEUKOCYTE ESTERASE UR QL STRIP.AUTO: NEGATIVE
LIPASE SERPL-CCNC: 35 U/L (ref 9–82)
NITRITE UR QL STRIP.AUTO: NEGATIVE
PH UR STRIP.AUTO: 6.5 [PH]
POTASSIUM SERPL-SCNC: 4.2 MMOL/L (ref 3.5–5.3)
PROT SERPL-MCNC: 6.9 G/DL (ref 6.4–8.2)
PROT UR STRIP.AUTO-MCNC: ABNORMAL MG/DL
RBC # UR STRIP.AUTO: NEGATIVE MG/DL
RBC #/AREA URNS AUTO: NORMAL /HPF
SARS-COV-2 RNA RESP QL NAA+PROBE: NOT DETECTED
SODIUM SERPL-SCNC: 140 MMOL/L (ref 136–145)
SP GR UR STRIP.AUTO: 1.01
UROBILINOGEN UR STRIP.AUTO-MCNC: NORMAL MG/DL
WBC #/AREA URNS AUTO: NORMAL /HPF

## 2025-05-15 PROCEDURE — 99291 CRITICAL CARE FIRST HOUR: CPT | Performed by: STUDENT IN AN ORGANIZED HEALTH CARE EDUCATION/TRAINING PROGRAM

## 2025-05-15 PROCEDURE — 87636 SARSCOV2 & INF A&B AMP PRB: CPT | Performed by: STUDENT IN AN ORGANIZED HEALTH CARE EDUCATION/TRAINING PROGRAM

## 2025-05-15 PROCEDURE — 36415 COLL VENOUS BLD VENIPUNCTURE: CPT | Performed by: STUDENT IN AN ORGANIZED HEALTH CARE EDUCATION/TRAINING PROGRAM

## 2025-05-15 PROCEDURE — 81001 URINALYSIS AUTO W/SCOPE: CPT | Performed by: STUDENT IN AN ORGANIZED HEALTH CARE EDUCATION/TRAINING PROGRAM

## 2025-05-15 PROCEDURE — 87040 BLOOD CULTURE FOR BACTERIA: CPT | Mod: WESLAB | Performed by: STUDENT IN AN ORGANIZED HEALTH CARE EDUCATION/TRAINING PROGRAM

## 2025-05-15 PROCEDURE — 96361 HYDRATE IV INFUSION ADD-ON: CPT

## 2025-05-15 PROCEDURE — 74176 CT ABD & PELVIS W/O CONTRAST: CPT

## 2025-05-15 PROCEDURE — 84484 ASSAY OF TROPONIN QUANT: CPT | Performed by: STUDENT IN AN ORGANIZED HEALTH CARE EDUCATION/TRAINING PROGRAM

## 2025-05-15 PROCEDURE — 71046 X-RAY EXAM CHEST 2 VIEWS: CPT

## 2025-05-15 PROCEDURE — 71046 X-RAY EXAM CHEST 2 VIEWS: CPT | Performed by: RADIOLOGY

## 2025-05-15 PROCEDURE — 99285 EMERGENCY DEPT VISIT HI MDM: CPT | Mod: 25 | Performed by: STUDENT IN AN ORGANIZED HEALTH CARE EDUCATION/TRAINING PROGRAM

## 2025-05-15 PROCEDURE — 74176 CT ABD & PELVIS W/O CONTRAST: CPT | Performed by: RADIOLOGY

## 2025-05-15 PROCEDURE — 83690 ASSAY OF LIPASE: CPT | Performed by: STUDENT IN AN ORGANIZED HEALTH CARE EDUCATION/TRAINING PROGRAM

## 2025-05-15 PROCEDURE — 80053 COMPREHEN METABOLIC PANEL: CPT | Performed by: STUDENT IN AN ORGANIZED HEALTH CARE EDUCATION/TRAINING PROGRAM

## 2025-05-15 PROCEDURE — 2500000005 HC RX 250 GENERAL PHARMACY W/O HCPCS: Performed by: STUDENT IN AN ORGANIZED HEALTH CARE EDUCATION/TRAINING PROGRAM

## 2025-05-15 PROCEDURE — 96365 THER/PROPH/DIAG IV INF INIT: CPT

## 2025-05-15 PROCEDURE — 85007 BL SMEAR W/DIFF WBC COUNT: CPT | Performed by: STUDENT IN AN ORGANIZED HEALTH CARE EDUCATION/TRAINING PROGRAM

## 2025-05-15 PROCEDURE — 85027 COMPLETE CBC AUTOMATED: CPT | Performed by: STUDENT IN AN ORGANIZED HEALTH CARE EDUCATION/TRAINING PROGRAM

## 2025-05-15 PROCEDURE — 2500000004 HC RX 250 GENERAL PHARMACY W/ HCPCS (ALT 636 FOR OP/ED): Mod: JZ | Performed by: STUDENT IN AN ORGANIZED HEALTH CARE EDUCATION/TRAINING PROGRAM

## 2025-05-15 PROCEDURE — 83605 ASSAY OF LACTIC ACID: CPT | Performed by: STUDENT IN AN ORGANIZED HEALTH CARE EDUCATION/TRAINING PROGRAM

## 2025-05-15 PROCEDURE — 96375 TX/PRO/DX INJ NEW DRUG ADDON: CPT

## 2025-05-15 RX ORDER — DEXTROSE 50 % IN WATER (D50W) INTRAVENOUS SYRINGE
12.5 ONCE
Status: COMPLETED | OUTPATIENT
Start: 2025-05-15 | End: 2025-05-15

## 2025-05-15 RX ORDER — MORPHINE SULFATE 4 MG/ML
4 INJECTION, SOLUTION INTRAMUSCULAR; INTRAVENOUS ONCE
Status: COMPLETED | OUTPATIENT
Start: 2025-05-15 | End: 2025-05-15

## 2025-05-15 RX ORDER — ONDANSETRON HYDROCHLORIDE 2 MG/ML
4 INJECTION, SOLUTION INTRAVENOUS ONCE
Status: COMPLETED | OUTPATIENT
Start: 2025-05-15 | End: 2025-05-15

## 2025-05-15 RX ADMIN — ONDANSETRON 4 MG: 2 INJECTION, SOLUTION INTRAMUSCULAR; INTRAVENOUS at 22:34

## 2025-05-15 RX ADMIN — DEXTROSE MONOHYDRATE 12.5 G: 25 INJECTION, SOLUTION INTRAVENOUS at 23:45

## 2025-05-15 RX ADMIN — PIPERACILLIN SODIUM AND TAZOBACTAM SODIUM 2.25 G: 2; .25 INJECTION, SOLUTION INTRAVENOUS at 23:47

## 2025-05-15 RX ADMIN — SODIUM CHLORIDE 2190 ML: 900 INJECTION, SOLUTION INTRAVENOUS at 22:33

## 2025-05-15 RX ADMIN — MORPHINE SULFATE 4 MG: 4 INJECTION, SOLUTION INTRAMUSCULAR; INTRAVENOUS at 22:34

## 2025-05-15 ASSESSMENT — PAIN - FUNCTIONAL ASSESSMENT: PAIN_FUNCTIONAL_ASSESSMENT: 0-10

## 2025-05-15 ASSESSMENT — COLUMBIA-SUICIDE SEVERITY RATING SCALE - C-SSRS
2. HAVE YOU ACTUALLY HAD ANY THOUGHTS OF KILLING YOURSELF?: NO
6. HAVE YOU EVER DONE ANYTHING, STARTED TO DO ANYTHING, OR PREPARED TO DO ANYTHING TO END YOUR LIFE?: NO
1. IN THE PAST MONTH, HAVE YOU WISHED YOU WERE DEAD OR WISHED YOU COULD GO TO SLEEP AND NOT WAKE UP?: NO

## 2025-05-15 ASSESSMENT — PAIN SCALES - GENERAL
PAINLEVEL_OUTOF10: 7
PAINLEVEL_OUTOF10: 7

## 2025-05-15 ASSESSMENT — PAIN DESCRIPTION - LOCATION: LOCATION: ABDOMEN

## 2025-05-16 ENCOUNTER — APPOINTMENT (OUTPATIENT)
Dept: CARDIOLOGY | Facility: HOSPITAL | Age: 60
DRG: 392 | End: 2025-05-16
Payer: COMMERCIAL

## 2025-05-16 PROBLEM — K57.92 ACUTE DIVERTICULITIS: Status: ACTIVE | Noted: 2025-05-16

## 2025-05-16 LAB
ABO GROUP (TYPE) IN BLOOD: NORMAL
ANION GAP SERPL CALCULATED.3IONS-SCNC: 14 MMOL/L (ref 10–20)
ANTIBODY SCREEN: NORMAL
ATRIAL RATE: 135 BPM
BASOPHILS # BLD AUTO: 0.02 X10*3/UL (ref 0–0.1)
BASOPHILS # BLD AUTO: 0.03 X10*3/UL (ref 0–0.1)
BASOPHILS # BLD MANUAL: 0 X10*3/UL (ref 0–0.1)
BASOPHILS NFR BLD AUTO: 0.1 %
BASOPHILS NFR BLD AUTO: 0.1 %
BASOPHILS NFR BLD MANUAL: 0 %
BLOOD EXPIRATION DATE: NORMAL
BUN SERPL-MCNC: 76 MG/DL (ref 6–23)
CALCIUM SERPL-MCNC: 8.4 MG/DL (ref 8.6–10.3)
CARDIAC TROPONIN I PNL SERPL HS: 26 NG/L (ref 0–20)
CHLORIDE SERPL-SCNC: 115 MMOL/L (ref 98–107)
CO2 SERPL-SCNC: 15 MMOL/L (ref 21–32)
CREAT SERPL-MCNC: 7.25 MG/DL (ref 0.5–1.3)
DISPENSE STATUS: NORMAL
EGFRCR SERPLBLD CKD-EPI 2021: 8 ML/MIN/1.73M*2
EOSINOPHIL # BLD AUTO: 0.04 X10*3/UL (ref 0–0.7)
EOSINOPHIL # BLD AUTO: 0.27 X10*3/UL (ref 0–0.7)
EOSINOPHIL # BLD MANUAL: 0 X10*3/UL (ref 0–0.7)
EOSINOPHIL NFR BLD AUTO: 0.2 %
EOSINOPHIL NFR BLD AUTO: 1.5 %
EOSINOPHIL NFR BLD MANUAL: 0 %
ERYTHROCYTE [DISTWIDTH] IN BLOOD BY AUTOMATED COUNT: 16.8 % (ref 11.5–14.5)
ERYTHROCYTE [DISTWIDTH] IN BLOOD BY AUTOMATED COUNT: 16.9 % (ref 11.5–14.5)
ERYTHROCYTE [DISTWIDTH] IN BLOOD BY AUTOMATED COUNT: 17.3 % (ref 11.5–14.5)
ERYTHROCYTE [DISTWIDTH] IN BLOOD BY AUTOMATED COUNT: 17.6 % (ref 11.5–14.5)
EST. AVERAGE GLUCOSE BLD GHB EST-MCNC: 108 MG/DL
GLUCOSE BLD MANUAL STRIP-MCNC: 149 MG/DL (ref 74–99)
GLUCOSE SERPL-MCNC: 158 MG/DL (ref 74–99)
HBA1C MFR BLD: 5.4 % (ref ?–5.7)
HCT VFR BLD AUTO: 20.3 % (ref 41–52)
HCT VFR BLD AUTO: 21.4 % (ref 41–52)
HCT VFR BLD AUTO: 21.4 % (ref 41–52)
HCT VFR BLD AUTO: 21.9 % (ref 41–52)
HCT VFR BLD AUTO: 25.1 % (ref 41–52)
HGB BLD-MCNC: 6.4 G/DL (ref 13.5–17.5)
HGB BLD-MCNC: 6.7 G/DL (ref 13.5–17.5)
HGB BLD-MCNC: 6.9 G/DL (ref 13.5–17.5)
HGB BLD-MCNC: 7 G/DL (ref 13.5–17.5)
HGB BLD-MCNC: 7.7 G/DL (ref 13.5–17.5)
HOLD SPECIMEN: NORMAL
IMM GRANULOCYTES # BLD AUTO: 0.04 X10*3/UL (ref 0–0.7)
IMM GRANULOCYTES # BLD AUTO: 0.08 X10*3/UL (ref 0–0.7)
IMM GRANULOCYTES # BLD AUTO: 0.13 X10*3/UL (ref 0–0.7)
IMM GRANULOCYTES NFR BLD AUTO: 0.4 % (ref 0–0.9)
IMM GRANULOCYTES NFR BLD AUTO: 0.6 % (ref 0–0.9)
IMM GRANULOCYTES NFR BLD AUTO: 0.6 % (ref 0–0.9)
LACTATE SERPL-SCNC: 1.2 MMOL/L (ref 0.4–2)
LYMPHOCYTES # BLD AUTO: 0.79 X10*3/UL (ref 1.2–4.8)
LYMPHOCYTES # BLD AUTO: 1.25 X10*3/UL (ref 1.2–4.8)
LYMPHOCYTES # BLD MANUAL: 0.36 X10*3/UL (ref 1.2–4.8)
LYMPHOCYTES NFR BLD AUTO: 3.6 %
LYMPHOCYTES NFR BLD AUTO: 6.8 %
LYMPHOCYTES NFR BLD MANUAL: 5 %
MCH RBC QN AUTO: 27 PG (ref 26–34)
MCH RBC QN AUTO: 27.2 PG (ref 26–34)
MCH RBC QN AUTO: 27.2 PG (ref 26–34)
MCH RBC QN AUTO: 27.7 PG (ref 26–34)
MCHC RBC AUTO-ENTMCNC: 29.9 G/DL (ref 32–36)
MCHC RBC AUTO-ENTMCNC: 30.7 G/DL (ref 32–36)
MCHC RBC AUTO-ENTMCNC: 32 G/DL (ref 32–36)
MCHC RBC AUTO-ENTMCNC: 33 G/DL (ref 32–36)
MCV RBC AUTO: 83 FL (ref 80–100)
MCV RBC AUTO: 87 FL (ref 80–100)
MCV RBC AUTO: 89 FL (ref 80–100)
MCV RBC AUTO: 90 FL (ref 80–100)
MONOCYTES # BLD AUTO: 0.81 X10*3/UL (ref 0.1–1)
MONOCYTES # BLD AUTO: 0.92 X10*3/UL (ref 0.1–1)
MONOCYTES # BLD MANUAL: 0 X10*3/UL (ref 0.1–1)
MONOCYTES NFR BLD AUTO: 4.2 %
MONOCYTES NFR BLD AUTO: 4.4 %
MONOCYTES NFR BLD MANUAL: 0 %
NEUTROPHILS # BLD AUTO: 15.84 X10*3/UL (ref 1.2–7.7)
NEUTROPHILS # BLD AUTO: 20.19 X10*3/UL (ref 1.2–7.7)
NEUTROPHILS # BLD MANUAL: 6.84 X10*3/UL (ref 1.2–7.7)
NEUTROPHILS NFR BLD AUTO: 86.8 %
NEUTROPHILS NFR BLD AUTO: 91.3 %
NEUTS BAND # BLD MANUAL: 0.22 X10*3/UL (ref 0–0.7)
NEUTS BAND NFR BLD MANUAL: 3 %
NEUTS SEG # BLD MANUAL: 6.62 X10*3/UL (ref 1.2–7)
NEUTS SEG NFR BLD MANUAL: 92 %
NRBC BLD-RTO: 0 /100 WBCS (ref 0–0)
OVALOCYTES BLD QL SMEAR: ABNORMAL
PLATELET # BLD AUTO: 238 X10*3/UL (ref 150–450)
PLATELET # BLD AUTO: 256 X10*3/UL (ref 150–450)
PLATELET # BLD AUTO: 269 X10*3/UL (ref 150–450)
PLATELET # BLD AUTO: 290 X10*3/UL (ref 150–450)
POLYCHROMASIA BLD QL SMEAR: ABNORMAL
POTASSIUM SERPL-SCNC: 4.7 MMOL/L (ref 3.5–5.3)
PR INTERVAL: 152 MS
PRODUCT BLOOD TYPE: 5100
PRODUCT CODE: NORMAL
Q ONSET: 216 MS
QRS COUNT: 22 BEATS
QRS DURATION: 94 MS
QT INTERVAL: 328 MS
QTC CALCULATION(BAZETT): 492 MS
QTC FREDERICIA: 429 MS
R AXIS: -1 DEGREES
RBC # BLD AUTO: 2.37 X10*6/UL (ref 4.5–5.9)
RBC # BLD AUTO: 2.46 X10*6/UL (ref 4.5–5.9)
RBC # BLD AUTO: 2.53 X10*6/UL (ref 4.5–5.9)
RBC # BLD AUTO: 2.83 X10*6/UL (ref 4.5–5.9)
RBC MORPH BLD: ABNORMAL
RH FACTOR (ANTIGEN D): NORMAL
SODIUM SERPL-SCNC: 139 MMOL/L (ref 136–145)
T AXIS: 104 DEGREES
T OFFSET: 380 MS
TOTAL CELLS COUNTED BLD: 100
UNIT ABO: NORMAL
UNIT NUMBER: NORMAL
UNIT RH: NORMAL
UNIT VOLUME: 350
VENTRICULAR RATE: 135 BPM
WBC # BLD AUTO: 18.3 X10*3/UL (ref 4.4–11.3)
WBC # BLD AUTO: 22.1 X10*3/UL (ref 4.4–11.3)
WBC # BLD AUTO: 26.7 X10*3/UL (ref 4.4–11.3)
WBC # BLD AUTO: 7.2 X10*3/UL (ref 4.4–11.3)
XM INTEP: NORMAL

## 2025-05-16 PROCEDURE — 85025 COMPLETE CBC W/AUTO DIFF WBC: CPT | Performed by: INTERNAL MEDICINE

## 2025-05-16 PROCEDURE — 99223 1ST HOSP IP/OBS HIGH 75: CPT | Performed by: STUDENT IN AN ORGANIZED HEALTH CARE EDUCATION/TRAINING PROGRAM

## 2025-05-16 PROCEDURE — 85025 COMPLETE CBC W/AUTO DIFF WBC: CPT

## 2025-05-16 PROCEDURE — 2500000002 HC RX 250 W HCPCS SELF ADMINISTERED DRUGS (ALT 637 FOR MEDICARE OP, ALT 636 FOR OP/ED): Performed by: INTERNAL MEDICINE

## 2025-05-16 PROCEDURE — 83036 HEMOGLOBIN GLYCOSYLATED A1C: CPT | Mod: WESLAB | Performed by: INTERNAL MEDICINE

## 2025-05-16 PROCEDURE — 2500000004 HC RX 250 GENERAL PHARMACY W/ HCPCS (ALT 636 FOR OP/ED): Mod: JZ | Performed by: STUDENT IN AN ORGANIZED HEALTH CARE EDUCATION/TRAINING PROGRAM

## 2025-05-16 PROCEDURE — P9040 RBC LEUKOREDUCED IRRADIATED: HCPCS

## 2025-05-16 PROCEDURE — 2500000004 HC RX 250 GENERAL PHARMACY W/ HCPCS (ALT 636 FOR OP/ED): Performed by: INTERNAL MEDICINE

## 2025-05-16 PROCEDURE — 85018 HEMOGLOBIN: CPT | Performed by: INTERNAL MEDICINE

## 2025-05-16 PROCEDURE — 93005 ELECTROCARDIOGRAM TRACING: CPT

## 2025-05-16 PROCEDURE — 96361 HYDRATE IV INFUSION ADD-ON: CPT

## 2025-05-16 PROCEDURE — 84550 ASSAY OF BLOOD/URIC ACID: CPT | Performed by: INTERNAL MEDICINE

## 2025-05-16 PROCEDURE — 99233 SBSQ HOSP IP/OBS HIGH 50: CPT | Performed by: INTERNAL MEDICINE

## 2025-05-16 PROCEDURE — 2500000004 HC RX 250 GENERAL PHARMACY W/ HCPCS (ALT 636 FOR OP/ED): Performed by: STUDENT IN AN ORGANIZED HEALTH CARE EDUCATION/TRAINING PROGRAM

## 2025-05-16 PROCEDURE — 86900 BLOOD TYPING SEROLOGIC ABO: CPT | Performed by: INTERNAL MEDICINE

## 2025-05-16 PROCEDURE — 36430 TRANSFUSION BLD/BLD COMPNT: CPT

## 2025-05-16 PROCEDURE — 36415 COLL VENOUS BLD VENIPUNCTURE: CPT | Performed by: STUDENT IN AN ORGANIZED HEALTH CARE EDUCATION/TRAINING PROGRAM

## 2025-05-16 PROCEDURE — 85027 COMPLETE CBC AUTOMATED: CPT | Performed by: STUDENT IN AN ORGANIZED HEALTH CARE EDUCATION/TRAINING PROGRAM

## 2025-05-16 PROCEDURE — 96375 TX/PRO/DX INJ NEW DRUG ADDON: CPT

## 2025-05-16 PROCEDURE — 83605 ASSAY OF LACTIC ACID: CPT

## 2025-05-16 PROCEDURE — 82947 ASSAY GLUCOSE BLOOD QUANT: CPT

## 2025-05-16 PROCEDURE — 80048 BASIC METABOLIC PNL TOTAL CA: CPT | Performed by: STUDENT IN AN ORGANIZED HEALTH CARE EDUCATION/TRAINING PROGRAM

## 2025-05-16 PROCEDURE — 1200000002 HC GENERAL ROOM WITH TELEMETRY DAILY

## 2025-05-16 PROCEDURE — 85014 HEMATOCRIT: CPT | Performed by: INTERNAL MEDICINE

## 2025-05-16 PROCEDURE — 2500000001 HC RX 250 WO HCPCS SELF ADMINISTERED DRUGS (ALT 637 FOR MEDICARE OP): Performed by: STUDENT IN AN ORGANIZED HEALTH CARE EDUCATION/TRAINING PROGRAM

## 2025-05-16 PROCEDURE — 86923 COMPATIBILITY TEST ELECTRIC: CPT

## 2025-05-16 PROCEDURE — 2500000001 HC RX 250 WO HCPCS SELF ADMINISTERED DRUGS (ALT 637 FOR MEDICARE OP): Performed by: INTERNAL MEDICINE

## 2025-05-16 PROCEDURE — 2500000002 HC RX 250 W HCPCS SELF ADMINISTERED DRUGS (ALT 637 FOR MEDICARE OP, ALT 636 FOR OP/ED): Performed by: STUDENT IN AN ORGANIZED HEALTH CARE EDUCATION/TRAINING PROGRAM

## 2025-05-16 PROCEDURE — 36415 COLL VENOUS BLD VENIPUNCTURE: CPT

## 2025-05-16 RX ORDER — ASPIRIN 81 MG/1
81 TABLET ORAL DAILY
Status: DISCONTINUED | OUTPATIENT
Start: 2025-05-16 | End: 2025-05-18 | Stop reason: HOSPADM

## 2025-05-16 RX ORDER — ATORVASTATIN CALCIUM 20 MG/1
20 TABLET, FILM COATED ORAL DAILY
Status: DISCONTINUED | OUTPATIENT
Start: 2025-05-16 | End: 2025-05-18 | Stop reason: HOSPADM

## 2025-05-16 RX ORDER — VANCOMYCIN HYDROCHLORIDE 125 MG/1
125 CAPSULE ORAL NIGHTLY
Status: DISCONTINUED | OUTPATIENT
Start: 2025-05-16 | End: 2025-05-18 | Stop reason: HOSPADM

## 2025-05-16 RX ORDER — ACETAMINOPHEN 325 MG/1
650 TABLET ORAL EVERY 4 HOURS PRN
Status: DISCONTINUED | OUTPATIENT
Start: 2025-05-16 | End: 2025-05-18 | Stop reason: HOSPADM

## 2025-05-16 RX ORDER — ONDANSETRON 4 MG/1
4 TABLET, FILM COATED ORAL EVERY 8 HOURS PRN
Status: DISCONTINUED | OUTPATIENT
Start: 2025-05-16 | End: 2025-05-18 | Stop reason: HOSPADM

## 2025-05-16 RX ORDER — SODIUM BICARBONATE 650 MG/1
1950 TABLET ORAL 3 TIMES DAILY
Status: DISCONTINUED | OUTPATIENT
Start: 2025-05-16 | End: 2025-05-18 | Stop reason: HOSPADM

## 2025-05-16 RX ORDER — ONDANSETRON HYDROCHLORIDE 2 MG/ML
4 INJECTION, SOLUTION INTRAVENOUS EVERY 8 HOURS PRN
Status: DISCONTINUED | OUTPATIENT
Start: 2025-05-16 | End: 2025-05-18 | Stop reason: HOSPADM

## 2025-05-16 RX ORDER — CALCIUM CARBONATE 200(500)MG
2 TABLET,CHEWABLE ORAL DAILY PRN
COMMUNITY
End: 2025-05-18 | Stop reason: HOSPADM

## 2025-05-16 RX ORDER — EZETIMIBE 10 MG/1
10 TABLET ORAL NIGHTLY
Status: DISCONTINUED | OUTPATIENT
Start: 2025-05-16 | End: 2025-05-18 | Stop reason: HOSPADM

## 2025-05-16 RX ORDER — SODIUM BICARBONATE 650 MG/1
1300 TABLET ORAL 3 TIMES DAILY
Status: DISCONTINUED | OUTPATIENT
Start: 2025-05-16 | End: 2025-05-16

## 2025-05-16 RX ORDER — HEPARIN SODIUM 5000 [USP'U]/ML
5000 INJECTION, SOLUTION INTRAVENOUS; SUBCUTANEOUS EVERY 12 HOURS
Status: DISCONTINUED | OUTPATIENT
Start: 2025-05-16 | End: 2025-05-18 | Stop reason: HOSPADM

## 2025-05-16 RX ORDER — CARVEDILOL 25 MG/1
50 TABLET ORAL 2 TIMES DAILY
Status: DISCONTINUED | OUTPATIENT
Start: 2025-05-16 | End: 2025-05-18 | Stop reason: HOSPADM

## 2025-05-16 RX ORDER — ASPIRIN 81 MG/1
81 TABLET ORAL DAILY
COMMUNITY

## 2025-05-16 RX ORDER — HYDRALAZINE HYDROCHLORIDE 50 MG/1
100 TABLET, FILM COATED ORAL 3 TIMES DAILY
Status: DISCONTINUED | OUTPATIENT
Start: 2025-05-16 | End: 2025-05-17

## 2025-05-16 RX ORDER — TORSEMIDE 20 MG/1
60 TABLET ORAL DAILY
Status: DISCONTINUED | OUTPATIENT
Start: 2025-05-16 | End: 2025-05-18 | Stop reason: HOSPADM

## 2025-05-16 RX ORDER — PROCHLORPERAZINE EDISYLATE 5 MG/ML
5 INJECTION INTRAMUSCULAR; INTRAVENOUS ONCE
Status: COMPLETED | OUTPATIENT
Start: 2025-05-16 | End: 2025-05-16

## 2025-05-16 RX ORDER — ACETAMINOPHEN 650 MG/1
650 SUPPOSITORY RECTAL EVERY 4 HOURS PRN
Status: DISCONTINUED | OUTPATIENT
Start: 2025-05-16 | End: 2025-05-18 | Stop reason: HOSPADM

## 2025-05-16 RX ORDER — POLYETHYLENE GLYCOL 3350 17 G/17G
17 POWDER, FOR SOLUTION ORAL DAILY
Status: DISCONTINUED | OUTPATIENT
Start: 2025-05-16 | End: 2025-05-18 | Stop reason: HOSPADM

## 2025-05-16 RX ORDER — ACETAMINOPHEN 160 MG/5ML
650 SOLUTION ORAL EVERY 4 HOURS PRN
Status: DISCONTINUED | OUTPATIENT
Start: 2025-05-16 | End: 2025-05-18 | Stop reason: HOSPADM

## 2025-05-16 RX ADMIN — SODIUM BICARBONATE 1950 MG: 650 TABLET ORAL at 09:59

## 2025-05-16 RX ADMIN — EZETIMIBE 10 MG: 10 TABLET ORAL at 20:59

## 2025-05-16 RX ADMIN — CARVEDILOL 50 MG: 25 TABLET, FILM COATED ORAL at 20:59

## 2025-05-16 RX ADMIN — PROCHLORPERAZINE EDISYLATE 5 MG: 5 INJECTION INTRAMUSCULAR; INTRAVENOUS at 00:24

## 2025-05-16 RX ADMIN — VANCOMYCIN HYDROCHLORIDE 125 MG: 125 CAPSULE ORAL at 20:59

## 2025-05-16 RX ADMIN — PIPERACILLIN SODIUM AND TAZOBACTAM SODIUM 2.25 G: 2; .25 INJECTION, SOLUTION INTRAVENOUS at 12:20

## 2025-05-16 RX ADMIN — HYDROMORPHONE HYDROCHLORIDE 0.5 MG: 0.5 INJECTION, SOLUTION INTRAMUSCULAR; INTRAVENOUS; SUBCUTANEOUS at 00:25

## 2025-05-16 RX ADMIN — ASPIRIN 81 MG: 81 TABLET, COATED ORAL at 09:59

## 2025-05-16 RX ADMIN — HEPARIN SODIUM 5000 UNITS: 5000 INJECTION, SOLUTION INTRAVENOUS; SUBCUTANEOUS at 05:37

## 2025-05-16 RX ADMIN — SODIUM BICARBONATE 1950 MG: 650 TABLET ORAL at 18:55

## 2025-05-16 RX ADMIN — SODIUM CHLORIDE 1000 ML: 900 INJECTION, SOLUTION INTRAVENOUS at 01:15

## 2025-05-16 RX ADMIN — PIPERACILLIN SODIUM AND TAZOBACTAM SODIUM 2.25 G: 2; .25 INJECTION, SOLUTION INTRAVENOUS at 18:56

## 2025-05-16 RX ADMIN — PIPERACILLIN SODIUM AND TAZOBACTAM SODIUM 2.25 G: 2; .25 INJECTION, SOLUTION INTRAVENOUS at 05:37

## 2025-05-16 RX ADMIN — ATORVASTATIN CALCIUM 20 MG: 20 TABLET, FILM COATED ORAL at 09:59

## 2025-05-16 RX ADMIN — CARVEDILOL 50 MG: 25 TABLET, FILM COATED ORAL at 09:59

## 2025-05-16 SDOH — SOCIAL STABILITY: SOCIAL INSECURITY: WITHIN THE LAST YEAR, HAVE YOU BEEN HUMILIATED OR EMOTIONALLY ABUSED IN OTHER WAYS BY YOUR PARTNER OR EX-PARTNER?: NO

## 2025-05-16 SDOH — ECONOMIC STABILITY: INCOME INSECURITY: IN THE PAST 12 MONTHS HAS THE ELECTRIC, GAS, OIL, OR WATER COMPANY THREATENED TO SHUT OFF SERVICES IN YOUR HOME?: NO

## 2025-05-16 SDOH — HEALTH STABILITY: PHYSICAL HEALTH: ON AVERAGE, HOW MANY MINUTES DO YOU ENGAGE IN EXERCISE AT THIS LEVEL?: 0 MIN

## 2025-05-16 SDOH — SOCIAL STABILITY: SOCIAL INSECURITY: WITHIN THE LAST YEAR, HAVE YOU BEEN AFRAID OF YOUR PARTNER OR EX-PARTNER?: NO

## 2025-05-16 SDOH — ECONOMIC STABILITY: HOUSING INSECURITY: AT ANY TIME IN THE PAST 12 MONTHS, WERE YOU HOMELESS OR LIVING IN A SHELTER (INCLUDING NOW)?: NO

## 2025-05-16 SDOH — SOCIAL STABILITY: SOCIAL INSECURITY: ABUSE: ADULT

## 2025-05-16 SDOH — SOCIAL STABILITY: SOCIAL INSECURITY: WERE YOU ABLE TO COMPLETE ALL THE BEHAVIORAL HEALTH SCREENINGS?: YES

## 2025-05-16 SDOH — ECONOMIC STABILITY: HOUSING INSECURITY: IN THE LAST 12 MONTHS, WAS THERE A TIME WHEN YOU WERE NOT ABLE TO PAY THE MORTGAGE OR RENT ON TIME?: NO

## 2025-05-16 SDOH — SOCIAL STABILITY: SOCIAL INSECURITY: DO YOU FEEL UNSAFE GOING BACK TO THE PLACE WHERE YOU ARE LIVING?: NO

## 2025-05-16 SDOH — ECONOMIC STABILITY: FOOD INSECURITY: WITHIN THE PAST 12 MONTHS, YOU WORRIED THAT YOUR FOOD WOULD RUN OUT BEFORE YOU GOT THE MONEY TO BUY MORE.: NEVER TRUE

## 2025-05-16 SDOH — HEALTH STABILITY: MENTAL HEALTH: HOW OFTEN DO YOU HAVE A DRINK CONTAINING ALCOHOL?: NEVER

## 2025-05-16 SDOH — SOCIAL STABILITY: SOCIAL INSECURITY: DOES ANYONE TRY TO KEEP YOU FROM HAVING/CONTACTING OTHER FRIENDS OR DOING THINGS OUTSIDE YOUR HOME?: NO

## 2025-05-16 SDOH — HEALTH STABILITY: MENTAL HEALTH: HOW OFTEN DO YOU HAVE SIX OR MORE DRINKS ON ONE OCCASION?: NEVER

## 2025-05-16 SDOH — SOCIAL STABILITY: SOCIAL NETWORK: HOW OFTEN DO YOU ATTEND CHURCH OR RELIGIOUS SERVICES?: MORE THAN 4 TIMES PER YEAR

## 2025-05-16 SDOH — HEALTH STABILITY: MENTAL HEALTH
DO YOU FEEL STRESS - TENSE, RESTLESS, NERVOUS, OR ANXIOUS, OR UNABLE TO SLEEP AT NIGHT BECAUSE YOUR MIND IS TROUBLED ALL THE TIME - THESE DAYS?: NOT AT ALL

## 2025-05-16 SDOH — HEALTH STABILITY: PHYSICAL HEALTH: ON AVERAGE, HOW MANY DAYS PER WEEK DO YOU ENGAGE IN MODERATE TO STRENUOUS EXERCISE (LIKE A BRISK WALK)?: 0 DAYS

## 2025-05-16 SDOH — ECONOMIC STABILITY: HOUSING INSECURITY: IN THE PAST 12 MONTHS, HOW MANY TIMES HAVE YOU MOVED WHERE YOU WERE LIVING?: 0

## 2025-05-16 SDOH — SOCIAL STABILITY: SOCIAL INSECURITY: ARE YOU MARRIED, WIDOWED, DIVORCED, SEPARATED, NEVER MARRIED, OR LIVING WITH A PARTNER?: DIVORCED

## 2025-05-16 SDOH — SOCIAL STABILITY: SOCIAL NETWORK: HOW OFTEN DO YOU GET TOGETHER WITH FRIENDS OR RELATIVES?: MORE THAN THREE TIMES A WEEK

## 2025-05-16 SDOH — SOCIAL STABILITY: SOCIAL INSECURITY: HAVE YOU HAD THOUGHTS OF HARMING ANYONE ELSE?: NO

## 2025-05-16 SDOH — ECONOMIC STABILITY: FOOD INSECURITY: HOW HARD IS IT FOR YOU TO PAY FOR THE VERY BASICS LIKE FOOD, HOUSING, MEDICAL CARE, AND HEATING?: NOT VERY HARD

## 2025-05-16 SDOH — SOCIAL STABILITY: SOCIAL NETWORK: HOW OFTEN DO YOU ATTEND MEETINGS OF THE CLUBS OR ORGANIZATIONS YOU BELONG TO?: NEVER

## 2025-05-16 SDOH — SOCIAL STABILITY: SOCIAL INSECURITY: HAVE YOU HAD ANY THOUGHTS OF HARMING ANYONE ELSE?: NO

## 2025-05-16 SDOH — SOCIAL STABILITY: SOCIAL INSECURITY: DO YOU FEEL ANYONE HAS EXPLOITED OR TAKEN ADVANTAGE OF YOU FINANCIALLY OR OF YOUR PERSONAL PROPERTY?: NO

## 2025-05-16 SDOH — ECONOMIC STABILITY: FOOD INSECURITY: WITHIN THE PAST 12 MONTHS, THE FOOD YOU BOUGHT JUST DIDN'T LAST AND YOU DIDN'T HAVE MONEY TO GET MORE.: NEVER TRUE

## 2025-05-16 SDOH — HEALTH STABILITY: MENTAL HEALTH: HOW MANY DRINKS CONTAINING ALCOHOL DO YOU HAVE ON A TYPICAL DAY WHEN YOU ARE DRINKING?: PATIENT DOES NOT DRINK

## 2025-05-16 SDOH — SOCIAL STABILITY: SOCIAL INSECURITY: HAS ANYONE EVER THREATENED TO HURT YOUR FAMILY OR YOUR PETS?: NO

## 2025-05-16 SDOH — SOCIAL STABILITY: SOCIAL INSECURITY: ARE YOU OR HAVE YOU BEEN THREATENED OR ABUSED PHYSICALLY, EMOTIONALLY, OR SEXUALLY BY ANYONE?: NO

## 2025-05-16 SDOH — SOCIAL STABILITY: SOCIAL INSECURITY: ARE THERE ANY APPARENT SIGNS OF INJURIES/BEHAVIORS THAT COULD BE RELATED TO ABUSE/NEGLECT?: NO

## 2025-05-16 SDOH — ECONOMIC STABILITY: TRANSPORTATION INSECURITY: IN THE PAST 12 MONTHS, HAS LACK OF TRANSPORTATION KEPT YOU FROM MEDICAL APPOINTMENTS OR FROM GETTING MEDICATIONS?: NO

## 2025-05-16 ASSESSMENT — COGNITIVE AND FUNCTIONAL STATUS - GENERAL
DAILY ACTIVITIY SCORE: 24
PATIENT BASELINE BEDBOUND: NO
MOBILITY SCORE: 24

## 2025-05-16 ASSESSMENT — ENCOUNTER SYMPTOMS
PSYCHIATRIC NEGATIVE: 1
CHILLS: 0
MUSCULOSKELETAL NEGATIVE: 1
RESPIRATORY NEGATIVE: 1
FEVER: 0
EYES NEGATIVE: 1
DIARRHEA: 0
ALLERGIC/IMMUNOLOGIC NEGATIVE: 1
CONSTIPATION: 0
FATIGUE: 1
ABDOMINAL PAIN: 1
NEUROLOGICAL NEGATIVE: 1
VOMITING: 0
NAUSEA: 0
ENDOCRINE NEGATIVE: 1
CONSTITUTIONAL NEGATIVE: 1

## 2025-05-16 ASSESSMENT — PAIN SCALES - GENERAL
PAINLEVEL_OUTOF10: 0 - NO PAIN
PAINLEVEL_OUTOF10: 0 - NO PAIN

## 2025-05-16 ASSESSMENT — ACTIVITIES OF DAILY LIVING (ADL)
FEEDING YOURSELF: INDEPENDENT
LACK_OF_TRANSPORTATION: NO
ADEQUATE_TO_COMPLETE_ADL: YES
BATHING: INDEPENDENT
LACK_OF_TRANSPORTATION: NO
JUDGMENT_ADEQUATE_SAFELY_COMPLETE_DAILY_ACTIVITIES: YES
TOILETING: INDEPENDENT
HEARING - LEFT EAR: FUNCTIONAL
DRESSING YOURSELF: INDEPENDENT
WALKS IN HOME: INDEPENDENT
HEARING - RIGHT EAR: FUNCTIONAL
GROOMING: INDEPENDENT
PATIENT'S MEMORY ADEQUATE TO SAFELY COMPLETE DAILY ACTIVITIES?: YES

## 2025-05-16 ASSESSMENT — LIFESTYLE VARIABLES
SKIP TO QUESTIONS 9-10: 1
HOW OFTEN DO YOU HAVE A DRINK CONTAINING ALCOHOL: NEVER
SKIP TO QUESTIONS 9-10: 1
AUDIT-C TOTAL SCORE: 0
HOW MANY STANDARD DRINKS CONTAINING ALCOHOL DO YOU HAVE ON A TYPICAL DAY: PATIENT DOES NOT DRINK
AUDIT-C TOTAL SCORE: 0
HOW OFTEN DO YOU HAVE 6 OR MORE DRINKS ON ONE OCCASION: NEVER
AUDIT-C TOTAL SCORE: 0

## 2025-05-16 NOTE — PROGRESS NOTES
05/16/25 1049   [facility] Disability Status   Are you deaf or do you have serious difficulty hearing? N   Are you blind or do you have serious difficulty seeing, even when wearing glasses? N   Because of a physical, mental, or emotional condition, do you have serious difficulty concentrating, remembering, or making decisions? (5 years old or older) N   Do you have serious difficulty walking or climbing stairs? N   Do you have serious difficulty dressing or bathing? N   Because of a physical, mental, or emotional condition, do you have serious difficulty doing errands alone such as visiting the doctor? N

## 2025-05-16 NOTE — CONSULTS
CONSULT: Nephrology SERVICE    SERVICE DATE: 5/16/2025   SERVICE TIME:  11:09 AM    REASON FOR CONSULT: Chronic kidney disease stage V  REQUESTING PHYSICIAN: Dr. Santos  PRIMARY CARE PHYSICIAN: Maurice Ballard MD    ASSESSMENT AND PLAN  59-year-old with numerous medical problems and chronic kidney disease stage V admitted with acute diverticulitis.  1.  Chronic kidney disease stage V  2.  Chronic metabolic acidosis  3.  Essential hypertension  4.  Anemia of chronic kidney disease    He has advanced underlying CKD stage V, but he has no uremic symptoms as yet.  There is no role for hemodialysis.  He does have a worsening acidosis and I will escalate his bicarbonate therapy, which she said he does tolerate.  I would use his home blood pressure medicines, his blood pressure is reasonably stable.  I would hold his diuretics for now.  He probably needs a blood transfusion today, the hemoglobin is below 7.  I can continue with erythropoietin stimulating agents as well.  Trend daily labs, continue supportive care.  I wonder about prophylactic vancomycin orally as he has a history of recent C. difficile colitis.  I will see him tomorrow.  Thank you for the consultation.     SUBJECTIVE  Mr. Leonard is a 59 y.o. man with diabetes, coronary artery disease, hypertension, and CKD stage V admitted to the hospital with diverticulitis, consulted for chronic kidney disease management.  I saw him in my office a few weeks ago.  He has CKD stage V with a GFR that typically runs between 7 and 10.  This is right where he is now.  He said that yesterday he developed sudden onset pain across both lower quadrants that was similar to his acute cholecystitis pain that he had a few weeks ago.  He came to the hospital and had a CT scan demonstrating acute diverticulitis.  He has been commenced on broad-spectrum antibiotics.  He has improvement in his pain with IV narcotics.  He does have some leg swelling.  His blood pressure has been  "stable.  He does take his bicarbonate at home.  He cited no preceding diarrhea, no hematemesis, no hematuria, no hematochezia, no vomiting at all.  He has a poor appetite.    PAST MEDICAL HISTORY: Medical History[1]  PAST SURGICAL HISTORY: Surgical History[2]  FAMILY HISTORY: Family History[3]  SOCIAL HISTORY: Social History[4]  MEDICATIONS:  Scheduled Medications[5]   Continuous Medications[6]   PRN Medications[7]   CURRENT ALLERGIES:   Allergies as of 05/15/2025 - Reviewed 05/15/2025   Allergen Reaction Noted    Amlodipine besylate Swelling 10/14/2011       COMPLETE REVIEW OF SYSTEMS:    Full ROS was negative unless mentioned above    OBJECTIVE  PHYSICAL EXAM  Heart Rate:  []   Temperature:  [36.8 °C (98.3 °F)]   Respirations:  [12-20]   BP: (126-154)/(76-90)   Height:  [177.8 cm (5' 10\")]   Weight:  [113 kg (250 lb)]   Pulse Ox:  [95 %-98 %]    Body mass index is 35.87 kg/m².  This is a chronically ill-appearing white man, obese, no acute distress  No obvious skin rashes  Hearing intact  Phonation intact  Moist mucosa  Normal S1/normal S2  Lungs are clear to auscultation bilaterally  Abdomen is soft, nondistended, diffusely tender in the lower quadrants, positive bowel sounds  No Elmore catheter in place, no suprapubic tenderness to palpation  Trace bilateral lower extremity edema  Moves 4 limbs spontaneously  No obvious joint deformities  No lymphadenopathy  Fatigued affect    DATA:   Labs:  Results for orders placed or performed during the hospital encounter of 05/15/25 (from the past 96 hours)   CBC and Auto Differential   Result Value Ref Range    WBC 7.2 4.4 - 11.3 x10*3/uL    nRBC 0.0 0.0 - 0.0 /100 WBCs    RBC 2.83 (L) 4.50 - 5.90 x10*6/uL    Hemoglobin 7.7 (L) 13.5 - 17.5 g/dL    Hematocrit 25.1 (L) 41.0 - 52.0 %    MCV 89 80 - 100 fL    MCH 27.2 26.0 - 34.0 pg    MCHC 30.7 (L) 32.0 - 36.0 g/dL    RDW 16.8 (H) 11.5 - 14.5 %    Platelets 290 150 - 450 x10*3/uL    Immature Granulocytes %, Automated 0.6 " 0.0 - 0.9 %    Immature Granulocytes Absolute, Automated 0.04 0.00 - 0.70 x10*3/uL   Comprehensive metabolic panel   Result Value Ref Range    Glucose 69 (L) 74 - 99 mg/dL    Sodium 140 136 - 145 mmol/L    Potassium 4.2 3.5 - 5.3 mmol/L    Chloride 112 (H) 98 - 107 mmol/L    Bicarbonate 18 (L) 21 - 32 mmol/L    Anion Gap 14 10 - 20 mmol/L    Urea Nitrogen 80 (H) 6 - 23 mg/dL    Creatinine 7.57 (H) 0.50 - 1.30 mg/dL    eGFR 8 (L) >60 mL/min/1.73m*2    Calcium 9.0 8.6 - 10.3 mg/dL    Albumin 3.5 3.4 - 5.0 g/dL    Alkaline Phosphatase 64 33 - 136 U/L    Total Protein 6.9 6.4 - 8.2 g/dL    AST 41 (H) 9 - 39 U/L    Bilirubin, Total 0.5 0.0 - 1.2 mg/dL    ALT 26 10 - 52 U/L   Lipase   Result Value Ref Range    Lipase 35 9 - 82 U/L   Lactate   Result Value Ref Range    Lactate 1.4 0.4 - 2.0 mmol/L   Troponin I, High Sensitivity, Initial   Result Value Ref Range    Troponin I, High Sensitivity 13 0 - 20 ng/L   Manual Differential   Result Value Ref Range    Neutrophils %, Manual 92.0 40.0 - 80.0 %    Bands %, Manual 3.0 0.0 - 5.0 %    Lymphocytes %, Manual 5.0 13.0 - 44.0 %    Monocytes %, Manual 0.0 2.0 - 10.0 %    Eosinophils %, Manual 0.0 0.0 - 6.0 %    Basophils %, Manual 0.0 0.0 - 2.0 %    Seg Neutrophils Absolute, Manual 6.62 1.20 - 7.00 x10*3/uL    Bands Absolute, Manual 0.22 0.00 - 0.70 x10*3/uL    Lymphocytes Absolute, Manual 0.36 (L) 1.20 - 4.80 x10*3/uL    Monocytes Absolute, Manual 0.00 (L) 0.10 - 1.00 x10*3/uL    Eosinophils Absolute, Manual 0.00 0.00 - 0.70 x10*3/uL    Basophils Absolute, Manual 0.00 0.00 - 0.10 x10*3/uL    Total Cells Counted 100     Neutrophils Absolute, Manual 6.84 1.20 - 7.70 x10*3/uL    RBC Morphology See Below     Polychromasia Mild     Ovalocytes Few    Urinalysis with Reflex Culture and Microscopic   Result Value Ref Range    Color, Urine Light-Yellow Light-Yellow, Yellow, Dark-Yellow    Appearance, Urine Clear Clear    Specific Gravity, Urine 1.012 1.005 - 1.035    pH, Urine 6.5 5.0,  5.5, 6.0, 6.5, 7.0, 7.5, 8.0    Protein, Urine 100 (2+) (A) NEGATIVE, 10 (TRACE), 20 (TRACE) mg/dL    Glucose, Urine 100 (1+) (A) Normal mg/dL    Blood, Urine NEGATIVE NEGATIVE mg/dL    Ketones, Urine NEGATIVE NEGATIVE mg/dL    Bilirubin, Urine NEGATIVE NEGATIVE mg/dL    Urobilinogen, Urine Normal Normal mg/dL    Nitrite, Urine NEGATIVE NEGATIVE    Leukocyte Esterase, Urine NEGATIVE NEGATIVE   Extra Urine Gray Tube   Result Value Ref Range    Extra Tube Hold for add-ons.    Urinalysis Microscopic   Result Value Ref Range    WBC, Urine 1-5 1-5, NONE /HPF    RBC, Urine 1-2 NONE, 1-2, 3-5 /HPF   Sars-CoV-2 and Influenza A/B PCR   Result Value Ref Range    Flu A Result Not Detected Not Detected    Flu B Result Not Detected Not Detected    Coronavirus 2019, PCR Not Detected Not Detected   Troponin, High Sensitivity, 1 Hour   Result Value Ref Range    Troponin I, High Sensitivity 26 (H) 0 - 20 ng/L   ECG 12 lead   Result Value Ref Range    Ventricular Rate 135 BPM    Atrial Rate 135 BPM    ND Interval 152 ms    QRS Duration 94 ms    QT Interval 328 ms    QTC Calculation(Bazett) 492 ms    R Axis -1 degrees    T Axis 104 degrees    QRS Count 22 beats    Q Onset 216 ms    T Offset 380 ms    QTC Fredericia 429 ms   CBC   Result Value Ref Range    WBC 26.7 (H) 4.4 - 11.3 x10*3/uL    nRBC 0.0 0.0 - 0.0 /100 WBCs    RBC 2.46 (L) 4.50 - 5.90 x10*6/uL    Hemoglobin 6.7 (L) 13.5 - 17.5 g/dL    Hematocrit 20.3 (L) 41.0 - 52.0 %    MCV 83 80 - 100 fL    MCH 27.2 26.0 - 34.0 pg    MCHC 33.0 32.0 - 36.0 g/dL    RDW 17.3 (H) 11.5 - 14.5 %    Platelets 256 150 - 450 x10*3/uL   Basic metabolic panel   Result Value Ref Range    Glucose 158 (H) 74 - 99 mg/dL    Sodium 139 136 - 145 mmol/L    Potassium 4.7 3.5 - 5.3 mmol/L    Chloride 115 (H) 98 - 107 mmol/L    Bicarbonate 15 (L) 21 - 32 mmol/L    Anion Gap 14 10 - 20 mmol/L    Urea Nitrogen 76 (H) 6 - 23 mg/dL    Creatinine 7.25 (H) 0.50 - 1.30 mg/dL    eGFR 8 (L) >60 mL/min/1.73m*2     Calcium 8.4 (L) 8.6 - 10.3 mg/dL       SIGNATURE: Boo Max MD PATIENT NAME: Colin Leonard   DATE: May 16, 2025 MRN: [id number]   TIME: 11:09 AM PAGER: [phone]            [1]   Past Medical History:  Diagnosis Date    Coronary artery disease     Diabetes mellitus (Multi)     DVT (deep venous thrombosis) (Multi)     Hypertension     MI (myocardial infarction) (Multi)    [2]   Past Surgical History:  Procedure Laterality Date    AMPUTATION      CHOLECYSTECTOMY  03/05/2025    Laparoscopic Cholecystectomy   [3] No family history on file.  [4]   Social History  Tobacco Use    Smoking status: Never    Smokeless tobacco: Never   [5] aspirin, 81 mg, oral, Daily  atorvastatin, 20 mg, oral, Daily  carvedilol, 50 mg, oral, BID  ezetimibe, 10 mg, oral, Nightly  [Held by provider] heparin (porcine), 5,000 Units, subcutaneous, q12h  [Held by provider] hydrALAZINE, 100 mg, oral, TID  piperacillin-tazobactam, 2.25 g, intravenous, q6h  polyethylene glycol, 17 g, oral, Daily  sodium bicarbonate, 1,950 mg, oral, TID  [Held by provider] torsemide, 60 mg, oral, Daily  [6]    [7] PRN medications: acetaminophen **OR** acetaminophen **OR** acetaminophen, HYDROmorphone, ondansetron **OR** ondansetron

## 2025-05-16 NOTE — CONSULTS
Inpatient consult to Infectious Diseases  Consult performed by: Garry Rodrigez MD  Consult ordered by: Marie Harris MD            Primary MD: Maurice Ballard MD    Reason For Consult  Acute diverticulitis    History Of Present Illness  Colin Leonard is a 59 y.o. male presenting with abdominal pain.  Onset was a couple of days ago, sudden, severe, with interval worsening.  The hospital for further evaluation and management  He reports history of diverticulosis.  CT abdomen pelvis with micro acute sigmoid diverticulitis.  He has history of C. difficile infection, diagnosed on 3/20/2025.  He denies any diarrhea.  He reports history of colonoscopy.  Gastroenterology is on consult.    Past Medical History  He has a past medical history of Coronary artery disease, Diabetes mellitus (Multi), DVT (deep venous thrombosis) (Multi), Hypertension, and MI (myocardial infarction) (Multi).    Surgical History  He has a past surgical history that includes Amputation and Cholecystectomy (03/05/2025).     Social History     Occupational History    Not on file   Tobacco Use    Smoking status: Never    Smokeless tobacco: Never   Substance and Sexual Activity    Alcohol use: Not on file    Drug use: Not on file    Sexual activity: Not on file     Travel History   Travel since 04/16/25    No documented travel since 04/16/25           Family History  Family History[1]  Allergies  Amlodipine besylate     Immunization History   Administered Date(s) Administered    Flu vaccine (IIV4), preservative free *Check age/dose* 09/07/2020    Pfizer Purple Cap SARS-CoV-2 03/31/2021, 04/21/2021    Td vaccine, age 7 years and older (TDVAX) 03/22/2005     Medications  Home medications:  Prescriptions Prior to Admission[2]  Current medications:  Scheduled medications  Scheduled Medications[3]  Continuous medications  Continuous Medications[4]  PRN medications  PRN Medications[5]    Review of Systems   Constitutional:  Negative for chills  "and fever.   Gastrointestinal:  Positive for abdominal pain. Negative for constipation, diarrhea, nausea and vomiting.   All other systems reviewed and are negative.       Objective  Range of Vitals (last 24 hours)  Heart Rate:  []   Temperature:  [36.3 °C (97.3 °F)-36.8 °C (98.3 °F)]   Respirations:  [12-20]   BP: (126-154)/(63-90)   Height:  [177.8 cm (5' 10\")]   Weight:  [113 kg (250 lb)]   Pulse Ox:  [95 %-98 %]   Daily Weight  05/15/25 : 113 kg (250 lb)    Body mass index is 35.87 kg/m².     Physical Exam  Constitutional:       Appearance: Normal appearance.   HENT:      Head: Normocephalic and atraumatic.      Right Ear: External ear normal.      Left Ear: External ear normal.      Nose: Nose normal.   Eyes:      General: No scleral icterus.     Extraocular Movements: Extraocular movements intact.      Conjunctiva/sclera: Conjunctivae normal.   Cardiovascular:      Rate and Rhythm: Regular rhythm.      Heart sounds: Normal heart sounds, S1 normal and S2 normal.   Pulmonary:      Breath sounds: Normal breath sounds and air entry.   Abdominal:      General: Bowel sounds are normal.      Palpations: Abdomen is soft.      Tenderness: There is no abdominal tenderness.   Musculoskeletal:      Cervical back: Normal range of motion and neck supple.      Right lower leg: No edema.      Left lower leg: No edema.   Skin:     General: Skin is warm and dry.   Neurological:      Mental Status: He is alert.   Psychiatric:         Behavior: Behavior normal. Behavior is cooperative.       Relevant Results  Outside Hospital Results    Labs  Results from last 72 hours   Lab Units 05/16/25  1516 05/16/25  1217 05/16/25  0715 05/15/25  2227   WBC AUTO x10*3/uL  --  22.1* 26.7* 7.2   HEMOGLOBIN g/dL 6.9* 6.4* 6.7* 7.7*   HEMATOCRIT % 21.4* 21.4* 20.3* 25.1*   PLATELETS AUTO x10*3/uL  --  269 256 290   NEUTROS PCT AUTO %  --  91.3  --   --    LYMPHO PCT MAN %  --   --   --  5.0   LYMPHS PCT AUTO %  --  3.6  --   --    MONO PCT " "MAN %  --   --   --  0.0   MONOS PCT AUTO %  --  4.2  --   --    EOSINO PCT MAN %  --   --   --  0.0   EOS PCT AUTO %  --  0.2  --   --      Results from last 72 hours   Lab Units 05/16/25  0715 05/15/25  2227   SODIUM mmol/L 139 140   POTASSIUM mmol/L 4.7 4.2   CHLORIDE mmol/L 115* 112*   CO2 mmol/L 15* 18*   BUN mg/dL 76* 80*   CREATININE mg/dL 7.25* 7.57*   GLUCOSE mg/dL 158* 69*   CALCIUM mg/dL 8.4* 9.0   ANION GAP mmol/L 14 14   EGFR mL/min/1.73m*2 8* 8*     Results from last 72 hours   Lab Units 05/15/25  2227   ALK PHOS U/L 64   BILIRUBIN TOTAL mg/dL 0.5   PROTEIN TOTAL g/dL 6.9   ALT U/L 26   AST U/L 41*   ALBUMIN g/dL 3.5     Estimated Creatinine Clearance: 13.5 mL/min (A) (by C-G formula based on SCr of 7.25 mg/dL (H)).  C-Reactive Protein   Date Value Ref Range Status   04/05/2025 12.65 (H) <1.00 mg/dL Final   04/04/2025 14.35 (H) <1.00 mg/dL Final   07/30/2024 9.10 (H) 0.00 - 2.00 mg/dL Final     Sedimentation Rate   Date Value Ref Range Status   04/05/2025 64 (H) 0 - 20 mm/h Final   07/30/2024 64 (H) 0 - 20 mm/h Final   07/22/2024 60 (H) 0 - 20 mm/h Final     No results found for: \"HIV1X2\", \"HIVCONF\", \"FKQGEO8TS\"  No results found for: \"HEPCABINIT\", \"HEPCAB\", \"HCVPCRQUANT\"  Microbiology  Reviewed-blood cultures pending  Imaging  ECG 12 lead  Result Date: 5/16/2025  Sinus tachycardia with Premature atrial complexes with Aberrant conduction Anteroseptal infarct (cited on or before 06-APR-2025) ST & T wave abnormality, consider lateral ischemia Abnormal ECG When compared with ECG of 06-APR-2025 11:27, Premature ventricular complexes are no longer Present Aberrant conduction is now Present Serial changes of Anteroseptal infarct Present Confirmed by Aleks Gooden ([id number]) on 5/16/2025 10:06:44 AM    CT abdomen pelvis wo IV contrast  Result Date: 5/15/2025  Interpreted By:  Serjio Oliver, STUDY: CT ABDOMEN PELVIS WO IV CONTRAST;  5/15/2025 10:41 pm   INDICATION: Signs/Symptoms:Upper abdominal pain across the " entire abdomen, dry heaving, chills.   COMPARISON: None.   ACCESSION NUMBER(S): [id number]   ORDERING CLINICIAN: KEY RYAN   TECHNIQUE: Axial noncontrast CT images of the abdomen and pelvis with coronal and sagittal reconstructed images.   FINDINGS:   LOWER CHEST: Small bilateral pleural effusions with adjacent airspace opacities favored to relate to atelectasis. Superimposed infection not excluded. Aortic root, mitral annular and dense coronary artery calcifications noted. Trace pericardial effusion.   ABDOMEN: Lack of intravenous contrast limits evaluation of vessels and solid organs. LIVER: Within normal limits. BILE DUCTS: Normal caliber. GALLBLADDER: Status post cholecystectomy. PANCREAS: Mild fatty atrophy of the pancreas. SPLEEN: Within normal limits. ADRENALS: Within normal limits.   KIDNEYS, URETERS, URINARY BLADDER:  Kidneys are symmetric in size there several nonobstructing right renal calculi measuring up to 4 mm. No evidence for left renal or bilateral ureteral calculi. No hydronephrosis, hydroureter or perinephric inflammatory changes. There is a 5.2 cm hypodense lesion in the left kidney demonstrating fluid attenuation, incompletely characterized on this unenhanced CT. No bladder calculi. Bladder is underdistended with apparent wall thickening.   VESSELS:  Calcific atherosclerosis of the aortoiliac and branch vessels. No aortic aneurysm. RETROPERITONEUM: No pathologically enlarged lymph nodes.   PELVIS:   REPRODUCTIVE ORGANS: Prostate gland is nonenlarged.   BOWEL: Stomach is partially distended. Visualized loops of bowel are without evidence for obstruction. Moderate stool burden. Scattered colonic diverticula. There is focal colonic wall thickening and inflammatory stranding in the sigmoid colon adjacent to several diverticula concerning for acute diverticulitis. No evidence for microperforation or abscess formation at this time.  Normal appendix. PERITONEUM: No ascites or free air, no  fluid collection. ABDOMINAL WALL: Small umbilical hernia contains fat. BONES: Multilevel degenerative changes of the spine.       Scattered colonic diverticula. There is focal wall thickening and inflammatory stranding in the sigmoid colon adjacent to several diverticula consistent with acute diverticulitis. No evidence for microperforation or abscess formation at this time. Posttreatment follow-up or direct visualization is recommended to ensure complete resolution exclude underlying lesion.   Small bilateral pleural effusions and atelectasis. Superimposed infection not excluded. Correlate clinically.   Nonobstructing right renal calculi.   Mild bladder wall thickening is favored to relate to under distention. Correlate with symptomatology and urinalysis if there is clinical concern for superimposed cystitis.   Additional findings as described above.   MACRO: None   Signed by: Serjio Oliver 5/15/2025 11:22 PM Dictation workstation:   [id number]    XR chest 2 views  Result Date: 5/15/2025  Interpreted By:  Serjio Oliver, STUDY: XR CHEST 2 VIEWS;  5/15/2025 10:45 pm   INDICATION: Signs/Symptoms:Cough, chills, not feeling well for few days, pain across the upper abdomen/lower chest diaphragm area.   COMPARISON: Chest x-ray 04/04/2025.   ACCESSION NUMBER(S): [id number]   ORDERING CLINICIAN: KEY RYAN   FINDINGS: ,   CARDIOMEDIASTINAL SILHOUETTE: Cardiomediastinal silhouette is normal in size and configuration.   LUNGS: Mild blunting of the posterior costophrenic angles likely relate to small layering effusions and atelectasis. Note is made of low lung volumes with bronchovascular crowding. No consolidation or pneumothorax.   ABDOMEN: No remarkable upper abdominal findings.   BONES: Multilevel degenerative changes of the spine.       Hypoventilatory changes with bibasilar atelectasis. Minimal blunting of the posterior costophrenic angles likely relate to small layering effusions as seen on CT.   MACRO: None    Signed by: Serjio Oliver 5/15/2025 11:13 PM Dictation workstation:   [id number]     Assessment/Plan   Acute diverticulitis  Leukocytosis secondary to above  Chronic kidney disease stage V  History of recent C. difficile infection    Continue Zosyn  Follow-up blood cultures  Oral vancomycin, secondary prophylaxis  Monitor response to therapy  Monitor renal function  Monitor temperature and WBC    This is a complex infectious disease issue and the following was performed today (for more details please see the above note): Management decisions reflecting the added complexity (e.g., changes in antimicrobial therapy, infection control strategies).       Garry Rodrigez MD         [1] No family history on file.  [2] (Not in a hospital admission)  [3] aspirin, 81 mg, oral, Daily  atorvastatin, 20 mg, oral, Daily  carvedilol, 50 mg, oral, BID  ezetimibe, 10 mg, oral, Nightly  [Held by provider] heparin (porcine), 5,000 Units, subcutaneous, q12h  [Held by provider] hydrALAZINE, 100 mg, oral, TID  piperacillin-tazobactam, 2.25 g, intravenous, q6h  polyethylene glycol, 17 g, oral, Daily  sodium bicarbonate, 1,950 mg, oral, TID  [Held by provider] torsemide, 60 mg, oral, Daily    [4]    [5] PRN medications: acetaminophen **OR** acetaminophen **OR** acetaminophen, HYDROmorphone, ondansetron **OR** ondansetron

## 2025-05-16 NOTE — H&P
History Of Present Illness  Colin Leonard is a 59 y.o. male CAD s/p MI, type 2 diabetes mellitus, DVT, hypertension, CKD 5, choledocholithiasis and C. difficile colitis, Atrial Tachycardia, presenting with complains of abdominal pain. He started feeling sick few days ago but abdominal pain started today. It was severe in intensity and was in the epigastric region. Not associated with nausea and vomiting. He was eating less due to recent sickness.    He was at [facility] and was discharge on 4/7 after getting treated for c diff colitis.     ED workup:  Glucose 69  Cr 7.57 around baseline  Hb 7.7  Trop 26  Ct abdomen concerning fr acute diverticulitis     Past Medical History  He has a past medical history of Coronary artery disease, Diabetes mellitus (Multi), DVT (deep venous thrombosis) (Multi), Hypertension, and MI (myocardial infarction) (Multi).    Surgical History  He has a past surgical history that includes Amputation and Cholecystectomy (03/05/2025).     Social History  He reports that he has never smoked. He has never used smokeless tobacco. No history on file for alcohol use and drug use.    Family History  Family History[1]     Allergies  Amlodipine besylate    Review of Systems   Constitutional:  Positive for fatigue.   HENT: Negative.     Eyes: Negative.    Gastrointestinal:  Positive for abdominal pain. Negative for constipation, diarrhea, nausea and vomiting.   Genitourinary: Negative.    Musculoskeletal: Negative.    Skin: Negative.    Neurological: Negative.    Psychiatric/Behavioral: Negative.          Physical Exam  Constitutional:       Appearance: He is ill-appearing.   HENT:      Head: Normocephalic and atraumatic.      Nose: Nose normal.      Mouth/Throat:      Mouth: Mucous membranes are dry.      Pharynx: Oropharynx is clear.   Eyes:      Extraocular Movements: Extraocular movements intact.      Conjunctiva/sclera: Conjunctivae normal.      Pupils: Pupils are equal, round, and reactive to  light.   Cardiovascular:      Rate and Rhythm: Normal rate and regular rhythm.      Pulses: Normal pulses.      Heart sounds: Normal heart sounds.   Pulmonary:      Effort: Pulmonary effort is normal.      Breath sounds: Normal breath sounds.   Abdominal:      General: Abdomen is flat. There is no distension.      Tenderness: There is abdominal tenderness. There is guarding.      Comments: Mild tenderness in the epigastric region   Musculoskeletal:         General: Normal range of motion.      Cervical back: Normal range of motion and neck supple.      Right lower leg: Edema present.      Left lower leg: Edema present.   Skin:     General: Skin is warm.      Capillary Refill: Capillary refill takes less than 2 seconds.   Neurological:      General: No focal deficit present.      Mental Status: He is alert and oriented to person, place, and time.   Psychiatric:         Mood and Affect: Mood normal.         Behavior: Behavior normal.         Thought Content: Thought content normal.         Judgment: Judgment normal.          Last Recorded Vitals  /76   Pulse 90   Temp 36.8 °C (98.3 °F) (Oral)   Resp 17   Wt 113 kg (250 lb)   SpO2 95%     Relevant Results             Assessment/Plan   Assessment & Plan  Acute diverticulitis      Recurrent Acute diverticulitis  Zosyn  Pain control  Clear diet for now  GI consultation     Recent C. difficile colitis  Completedoral vancomycin    Stage V chronic kidney disease  Nephrology consultation he may be nearing dialysis  Holding torsemide 60 mg daily ud eot concerns regarding poor po intake and volume resuscitation     Metabolic acidosis secondary to CKD  Continue sodium bicarb 1300 mg 3 times a day    Atrial tachycardia  Cardiology recently increased the beta blockers  Continue beta blockers for now  Telemetry     Low Blood glucose  Recent poor po intake  We will check his blood glucose with daily lab    HLD  Statin  Ezetimibe    DVT: Heparin  Dispo: > 2 days            Tom Candelaria MD         [1] No family history on file.

## 2025-05-16 NOTE — PROGRESS NOTES
05/16/25 1047   Discharge Planning   Living Arrangements Children   Support Systems Children   Type of Residence Private residence   Number of Stairs to Enter Residence 2   Number of Stairs Within Residence 24   Do you have animals or pets at home? No   Who is requesting discharge planning? Provider   Home or Post Acute Services None   Expected Discharge Disposition Home   Does the patient need discharge transport arranged? No   Financial Resource Strain   How hard is it for you to pay for the very basics like food, housing, medical care, and heating? Not very   Housing Stability   In the last 12 months, was there a time when you were not able to pay the mortgage or rent on time? N   In the past 12 months, how many times have you moved where you were living? 0   At any time in the past 12 months, were you homeless or living in a shelter (including now)? N   Transportation Needs   In the past 12 months, has lack of transportation kept you from medical appointments or from getting medications? no   In the past 12 months, has lack of transportation kept you from meetings, work, or from getting things needed for daily living? No   Patient Choice   Patient / Family choosing to utilize agency / facility established prior to hospitalization No   Stroke Family Assessment   Stroke Family Assessment Needed No

## 2025-05-16 NOTE — ED PROVIDER NOTES
HPI   Chief Complaint   Patient presents with    Abdominal Pain       This is a 59-year-old male with a past medical history of stage V CKD, hypertension, hyperlipidemia, diabetes, CAD, anemia of chronic disease, gout presenting to the ED for evaluation of upper abdominal pain across the epigastric area, left upper quadrant, dry heaving, chills and bodyaches.  Has been feeling sick for the past couple of days but the abdominal pain started today, as well as the dry heaving.  After this he was brought to the ED for evaluation.  The patient's son states that he is had a cough and he has been coughing up some sputum for couple of days now as well.  He denies any anterior/substernal chest pain, shortness of breath, dysuria or hematuria, there is no radiation of the pain into the extremities or into the back.  He rates the pain about 5 out of 10 in severity at this time.  He had his gallbladder removed in March of this year.      History provided by:  Patient and medical records   used: No            Patient History   Medical History[1]  Surgical History[2]  Family History[3]  Social History[4]    Physical Exam   ED Triage Vitals [05/15/25 2125]   Temperature Heart Rate Respirations BP   36.8 °C (98.3 °F) (!) 116 20 154/90      Pulse Ox Temp Source Heart Rate Source Patient Position   98 % Oral -- --      BP Location FiO2 (%)     -- --       Physical Exam  General: well developed, well nourished 59-year-old male who is awake and alert, ill-appearing, pale appearing, awake and alert and in no acute distress.  Family at bedside.  Eyes: sclera clear bilaterally  HENT: normocephalic, atraumatic.   CV: Tachycardic, regular rhythm, no murmur, no gallops, or rubs.   Resp: clear to ascultation bilaterally, no wheezes, rales, or rhonchi  GI: abdomen soft, tender in epigastric area and right upper quadrant, mildly tender in left upper quadrant.  No lower abdominal tenderness.  Exam is without rigidity or  guarding to palpation, no peritoneal signs, abdomen is nondistended, no masses palpated  MSK: strength +5/5 to upper and lower extremities bilaterally, no swelling of the extremities.  Psych: appropriate mood and affect, cooperative with exam  Skin: warm, dry, without evidence of rash or abrasions    ED Course & MDM   ED Course as of 05/17/25 0259   u May 15, 2025   2333 CT abdomen pelvis wo IV contrast  There is focal wall thickening and  inflammatory stranding in the sigmoid colon adjacent to several diverticula consistent with acute diverticulitis. No evidence for microperforation or abscess formation at this time.   [NT]   Fri May 16, 2025   0014 EKG on my interpretation shows sinus tachycardia with PACs, rate of 135 bpm.  Normal axis.  QTc 492 ms, OH interval 152.  Morphology unchanged compared to prior EKG from April 6 of this year.  No acute ST elevation or depression, no acute ischemic pattern.  No STEMI. [NT]      ED Course User Index  [NT] Gavin Eli DO         Diagnoses as of 05/17/25 0259   Acute diverticulitis   Hypoglycemia   Dehydration   Elevated troponin                 No data recorded                                 Medical Decision Making  The patient is ill-appearing, awake and alert, in no acute distress on arrival to the ED.  He is pale appearing.  Heart rate 116, he is hypertensive, vitals otherwise normal.  Given concern for possible intra-abdominal infection and sepsis workup was performed as well as x-ray of the chest to rule out pneumonia with his productive cough, abdominal CT to assess for intra-abdominal infection or obstruction.  He was given a 30 cc/kilo bolus of fluid at 2190 mL total.  Morphine and Zofran ordered for control of the patient's symptoms.  EKG and troponin levels ordered to assess for evidence of atypical presentation of ACS which I feel is less likely in this case given his infectious symptoms over the past couple of days.    Sepsis reassessment performed  at 2333, patient was found to have evidence of diverticulitis and was started on IV Zosyn.  He was given Dilaudid as he was still in pain and he remains tachycardic with heart rate in the 120s to 130s.  He is given additional IV fluids, patient troponin level is uptrending also from 13-26.  EKG did not show any evidence of acute ischemia.  Additionally he was found to be hypoglycemic and was given half of an amp of D50 for treatment of this with improvement of his blood sugars.  Given his persistent tachycardia, ongoing pain, and significant dehydration he was admitted to the hospital for further medical management, family agreeable with this plan given that he has not been able to control his pain at home and he remains persistently tachycardic.    XR chest 2 views   Final Result   Hypoventilatory changes with bibasilar atelectasis. Minimal blunting   of the posterior costophrenic angles likely relate to small layering   effusions as seen on CT.        MACRO:   None        Signed by: Serjio Oliver 5/15/2025 11:13 PM   Dictation workstation:   [id number]      CT abdomen pelvis wo IV contrast   Final Result   Scattered colonic diverticula. There is focal wall thickening and   inflammatory stranding in the sigmoid colon adjacent to several   diverticula consistent with acute diverticulitis. No evidence for   microperforation or abscess formation at this time. Posttreatment   follow-up or direct visualization is recommended to ensure complete   resolution exclude underlying lesion.        Small bilateral pleural effusions and atelectasis. Superimposed   infection not excluded. Correlate clinically.        Nonobstructing right renal calculi.        Mild bladder wall thickening is favored to relate to under   distention. Correlate with symptomatology and urinalysis if there is   clinical concern for superimposed cystitis.        Additional findings as described above.        MACRO:   None        Signed by: Serjio  Benito 5/15/2025 11:22 PM   Dictation workstation:   [id number]        Labs Reviewed   CBC WITH AUTO DIFFERENTIAL - Abnormal       Result Value    WBC 7.2      nRBC 0.0      RBC 2.83 (*)     Hemoglobin 7.7 (*)     Hematocrit 25.1 (*)     MCV 89      MCH 27.2      MCHC 30.7 (*)     RDW 16.8 (*)     Platelets 290      Immature Granulocytes %, Automated 0.6      Immature Granulocytes Absolute, Automated 0.04      Narrative:     The previously reported component Neutrophils % is no longer being reported.  The previously reported component Lymphocytes % is no longer being reported.  The previously reported component Monocytes % is no longer being reported.  The previously   reported component Eosinophils % is no longer being reported.  The previously reported component Basophils % is no longer being reported.  The previously reported component Absolute Neutrophils is no longer being reported.  The previously reported   component Absolute Lymphocytes is no longer being reported.  The previously reported component Absolute Monocytes is no longer being reported.  The previously reported component Absolute Eosinophils is no longer being reported.  The previously reported   component Absolute Basophils is no longer being reported.   COMPREHENSIVE METABOLIC PANEL - Abnormal    Glucose 69 (*)     Sodium 140      Potassium 4.2      Chloride 112 (*)     Bicarbonate 18 (*)     Anion Gap 14      Urea Nitrogen 80 (*)     Creatinine 7.57 (*)     eGFR 8 (*)     Calcium 9.0      Albumin 3.5      Alkaline Phosphatase 64      Total Protein 6.9      AST 41 (*)     Bilirubin, Total 0.5      ALT 26     URINALYSIS WITH REFLEX CULTURE AND MICROSCOPIC - Abnormal    Color, Urine Light-Yellow      Appearance, Urine Clear      Specific Gravity, Urine 1.012      pH, Urine 6.5      Protein, Urine 100 (2+) (*)     Glucose, Urine 100 (1+) (*)     Blood, Urine NEGATIVE      Ketones, Urine NEGATIVE      Bilirubin, Urine NEGATIVE      Urobilinogen,  Urine Normal      Nitrite, Urine NEGATIVE      Leukocyte Esterase, Urine NEGATIVE     SERIAL TROPONIN, 1 HOUR - Abnormal    Troponin I, High Sensitivity 26 (*)     Narrative:     Less than 99th percentile of normal range cutoff-  Female and children under 18 years old <14 ng/L; Male <21 ng/L: Negative  Repeat testing should be performed if clinically indicated.     Female and children under 18 years old 14-50 ng/L; Male 21-50 ng/L:  Consistent with possible cardiac damage and possible increased clinical   risk. Serial measurements may help to assess extent of myocardial damage.     >50 ng/L: Consistent with cardiac damage, increased clinical risk and  myocardial infarction. Serial measurements may help assess extent of   myocardial damage.      NOTE: Children less than 1 year old may have higher baseline troponin   levels and results should be interpreted in conjunction with the overall   clinical context.     NOTE: Troponin I testing is performed using a different   testing methodology at [facility] than at other   [facility]. Direct result comparisons should only   be made within the same method.   CBC - Abnormal    WBC 26.7 (*)     nRBC 0.0      RBC 2.46 (*)     Hemoglobin 6.7 (*)     Hematocrit 20.3 (*)     MCV 83      MCH 27.2      MCHC 33.0      RDW 17.3 (*)     Platelets 256     BASIC METABOLIC PANEL - Abnormal    Glucose 158 (*)     Sodium 139      Potassium 4.7      Chloride 115 (*)     Bicarbonate 15 (*)     Anion Gap 14      Urea Nitrogen 76 (*)     Creatinine 7.25 (*)     eGFR 8 (*)     Calcium 8.4 (*)    CBC WITH AUTO DIFFERENTIAL - Abnormal    WBC 22.1 (*)     nRBC 0.0      RBC 2.37 (*)     Hemoglobin 6.4 (*)     Hematocrit 21.4 (*)     MCV 90      MCH 27.0      MCHC 29.9 (*)     RDW 17.6 (*)     Platelets 269      Neutrophils % 91.3      Immature Granulocytes %, Automated 0.6      Lymphocytes % 3.6      Monocytes % 4.2      Eosinophils % 0.2      Basophils % 0.1      Neutrophils  Absolute 20.19 (*)     Immature Granulocytes Absolute, Automated 0.13      Lymphocytes Absolute 0.79 (*)     Monocytes Absolute 0.92      Eosinophils Absolute 0.04      Basophils Absolute 0.03     HEMOGLOBIN AND HEMATOCRIT, BLOOD - Abnormal    Hemoglobin 6.9 (*)     Hematocrit 21.4 (*)    CBC WITH AUTO DIFFERENTIAL - Abnormal    WBC 18.3 (*)     nRBC 0.0      RBC 2.53 (*)     Hemoglobin 7.0 (*)     Hematocrit 21.9 (*)     MCV 87      MCH 27.7      MCHC 32.0      RDW 16.9 (*)     Platelets 238      Neutrophils % 86.8      Immature Granulocytes %, Automated 0.4      Lymphocytes % 6.8      Monocytes % 4.4      Eosinophils % 1.5      Basophils % 0.1      Neutrophils Absolute 15.84 (*)     Immature Granulocytes Absolute, Automated 0.08      Lymphocytes Absolute 1.25      Monocytes Absolute 0.81      Eosinophils Absolute 0.27      Basophils Absolute 0.02     POCT GLUCOSE - Abnormal    POCT Glucose 149 (*)    MANUAL DIFFERENTIAL - Abnormal    Neutrophils %, Manual 92.0      Bands %, Manual 3.0      Lymphocytes %, Manual 5.0      Monocytes %, Manual 0.0      Eosinophils %, Manual 0.0      Basophils %, Manual 0.0      Seg Neutrophils Absolute, Manual 6.62      Bands Absolute, Manual 0.22      Lymphocytes Absolute, Manual 0.36 (*)     Monocytes Absolute, Manual 0.00 (*)     Eosinophils Absolute, Manual 0.00      Basophils Absolute, Manual 0.00      Total Cells Counted 100      Neutrophils Absolute, Manual 6.84      RBC Morphology See Below      Polychromasia Mild      Ovalocytes Few     BLOOD CULTURE - Normal    Blood Culture Loaded on Instrument - Culture in progress     BLOOD CULTURE - Normal    Blood Culture Loaded on Instrument - Culture in progress     LIPASE - Normal    Lipase 35      Narrative:     Venipuncture immediately after or during the administration of Metamizole may lead to falsely low results. Testing should be performed immediately prior to Metamizole dosing.   LACTATE - Normal    Lactate 1.4      Narrative:      Venipuncture immediately after or during the administration of Metamizole may lead to falsely low results. Testing should be performed immediately prior to Metamizole dosing.   SARS-COV-2 AND INFLUENZA A/B PCR - Normal    Flu A Result Not Detected      Flu B Result Not Detected      Coronavirus 2019, PCR Not Detected      Narrative:     This assay is an FDA-cleared, in vitro diagnostic nucleic acid amplification test for the qualitative detection and differentiation of SARS CoV-2/ Influenza A/B from nasopharyngeal specimens collected from individuals with signs and symptoms of respiratory tract infections, and has been validated for use at [facility]. Negative results do not preclude COVID-19/ Influenza A/B infections and should not be used as the sole basis for diagnosis, treatment, or other management decisions. Testing for SARS CoV-2 is recommended only for patients who meet current clinical and/or epidemiological criteria defined by federal, state, or local public health directives.   SERIAL TROPONIN-INITIAL - Normal    Troponin I, High Sensitivity 13      Narrative:     Less than 99th percentile of normal range cutoff-  Female and children under 18 years old <14 ng/L; Male <21 ng/L: Negative  Repeat testing should be performed if clinically indicated.     Female and children under 18 years old 14-50 ng/L; Male 21-50 ng/L:  Consistent with possible cardiac damage and possible increased clinical   risk. Serial measurements may help to assess extent of myocardial damage.     >50 ng/L: Consistent with cardiac damage, increased clinical risk and  myocardial infarction. Serial measurements may help assess extent of   myocardial damage.      NOTE: Children less than 1 year old may have higher baseline troponin   levels and results should be interpreted in conjunction with the overall   clinical context.     NOTE: Troponin I testing is performed using a different   testing methodology at  [facility] than at other   [facility]. Direct result comparisons should only   be made within the same method.   LACTATE - Normal    Lactate 1.2      Narrative:     Venipuncture immediately after or during the administration of Metamizole may lead to falsely low results. Testing should be performed immediately prior to Metamizole dosing.   URINALYSIS MICROSCOPIC WITH REFLEX CULTURE - Normal    WBC, Urine 1-5      RBC, Urine 1-2     URINALYSIS WITH REFLEX CULTURE AND MICROSCOPIC    Narrative:     The following orders were created for panel order Urinalysis with Reflex Culture and Microscopic.  Procedure                               Abnormality         Status                     ---------                               -----------         ------                     Urinalysis with Reflex C...[[id number]]  Abnormal            Final result               Extra Urine Gray Tube[[id number]]                            Final result                 Please view results for these tests on the individual orders.   TROPONIN SERIES- (INITIAL, 1 HR)    Narrative:     The following orders were created for panel order Troponin Series, (0, 1 HR).  Procedure                               Abnormality         Status                     ---------                               -----------         ------                     Troponin I, High Sensiti...[[id number]]  Normal              Final result               Troponin, High Sensitivi...[[id number]]  Abnormal            Final result                 Please view results for these tests on the individual orders.   EXTRA URINE GRAY TUBE    Extra Tube Hold for add-ons.     TYPE AND SCREEN    ABO TYPE O      Rh TYPE POS      ANTIBODY SCREEN NEG     HEMOGLOBIN A1C    Hemoglobin A1C 5.4      Estimated Average Glucose 108      Narrative:     Diagnosis of Diabetes-Adults  Non-Diabetic: < or = 5.6%  Increased risk for developing diabetes: 5.7-6.4%  Diagnostic of diabetes: > or =  6.5%       RENAL FUNCTION PANEL   CBC WITH AUTO DIFFERENTIAL   PREPARE RBC    PRODUCT CODE [id number]      Unit Number [id number]      Unit ABO O      Unit RH POS      XM INTEP COMP      Dispense Status TR      Blood Expiration Date 6/3/2025 11:59:00 PM EDT      PRODUCT BLOOD TYPE 5100      UNIT VOLUME 350           SEP-1 CORE MEASURE DATA    5/15/2025 10:15 PM  Visit Vitals  /81 (BP Location: Right arm, Patient Position: Sitting)   Pulse 76   Temp 36.4 °C (97.5 °F) (Oral)   Resp 16      WBC   Date/Time Value Ref Range Status   05/16/2025 08:12 PM 18.3 (H) 4.4 - 11.3 x10*3/uL Final     Lactate   Date/Time Value Ref Range Status   05/16/2025 03:16 PM 1.2 0.4 - 2.0 mmol/L Final     Creatinine   Date/Time Value Ref Range Status   05/16/2025 07:15 AM 7.25 (H) 0.50 - 1.30 mg/dL Final     Bilirubin, Total   Date/Time Value Ref Range Status   05/15/2025 10:27 PM 0.5 0.0 - 1.2 mg/dL Final     INR   Date/Time Value Ref Range Status   07/22/2024 06:19 AM 1.2 0.9 - 1.2 Final     Platelets   Date/Time Value Ref Range Status   05/16/2025 08:12  150 - 450 x10*3/uL Final        Fluid Resuscitation Rationale: at least 30mL/kg based on entered actual body weight at time of triage    I performed a sepsis reperfusion exam on Colin Leonard on 05/15/25 at 2333      Procedure  Critical Care    Performed by: Gavin Eli DO  Authorized by: Gavin Eli DO    Critical care provider statement:     Critical care time (minutes):  46    Critical care time was exclusive of:  Separately billable procedures and treating other patients    Critical care was necessary to treat or prevent imminent or life-threatening deterioration of the following conditions:  Sepsis and endocrine crisis    Critical care was time spent personally by me on the following activities:  Ordering and performing treatments and interventions, ordering and review of laboratory studies, ordering and review of radiographic studies,  re-evaluation of patient's condition, review of old charts, examination of patient, evaluation of patient's response to treatment, development of treatment plan with patient or surrogate and obtaining history from patient or surrogate    Care discussed with: admitting provider           [1]   Past Medical History:  Diagnosis Date    Coronary artery disease     Diabetes mellitus (Multi)     DVT (deep venous thrombosis) (Multi)     Hypertension     MI (myocardial infarction) (Multi)    [2]   Past Surgical History:  Procedure Laterality Date    AMPUTATION      CHOLECYSTECTOMY  03/05/2025    Laparoscopic Cholecystectomy   [3] No family history on file.  [4]   Social History  Tobacco Use    Smoking status: Never    Smokeless tobacco: Never   Substance Use Topics    Alcohol use: Not on file    Drug use: Not on file        Gavin Eli DO  05/17/25 0304

## 2025-05-16 NOTE — PROGRESS NOTES
05/16/25 1043   Physical Activity   On average, how many days per week do you engage in moderate to strenuous exercise (like a brisk walk)? 0 days   On average, how many minutes do you engage in exercise at this level? 0 min   Financial Resource Strain   How hard is it for you to pay for the very basics like food, housing, medical care, and heating? Not very   Housing Stability   In the last 12 months, was there a time when you were not able to pay the mortgage or rent on time? N   In the past 12 months, how many times have you moved where you were living? 0   At any time in the past 12 months, were you homeless or living in a shelter (including now)? N   Transportation Needs   In the past 12 months, has lack of transportation kept you from medical appointments or from getting medications? no   In the past 12 months, has lack of transportation kept you from meetings, work, or from getting things needed for daily living? No   Food Insecurity   Within the past 12 months, you worried that your food would run out before you got the money to buy more. Never true   Within the past 12 months, the food you bought just didn't last and you didn't have money to get more. Never true   Stress   Do you feel stress - tense, restless, nervous, or anxious, or unable to sleep at night because your mind is troubled all the time - these days? Not at all   Social Connections   In a typical week, how many times do you talk on the phone with family, friends, or neighbors? More than 3   How often do you get together with friends or relatives? More than 3   How often do you attend Jainism or Amish services? More than 4   Do you belong to any clubs or organizations such as Jainism groups, unions, fraternal or athletic groups, or school groups? No   How often do you attend meetings of the clubs or organizations you belong to? Never   Are you , , , , never , or living with a  partner?    Intimate Partner Violence   Within the last year, have you been afraid of your partner or ex-partner? No   Within the last year, have you been humiliated or emotionally abused in other ways by your partner or ex-partner? No   Within the last year, have you been kicked, hit, slapped, or otherwise physically hurt by your partner or ex-partner? No   Within the last year, have you been raped or forced to have any kind of sexual activity by your partner or ex-partner? No   Alcohol Use   Q1: How often do you have a drink containing alcohol? Never   Q2: How many drinks containing alcohol do you have on a typical day when you are drinking? None   Q3: How often do you have six or more drinks on one occasion? Never   Utilities   In the past 12 months has the electric, gas, oil, or water company threatened to shut off services in your home? No   Health Literacy   How often do you need to have someone help you when you read instructions, pamphlets, or other written material from your doctor or pharmacy? Never   Follow-Ups   We make community resources available to all of our patients to assist with everyday needs. We may be able to connect you with those resources. Would you be interested? N

## 2025-05-16 NOTE — PROGRESS NOTES
Pharmacy Medication History Review    Colin Leonard is a 59 y.o. male admitted for Acute diverticulitis. Pharmacy reviewed the patient's [id number]-[id number] medications and allergies for accuracy.    Medications ADDED:  Tums   Medications CHANGED:  Carvedilol 25mg - BID   Medications REMOVED:   None      The list below reflects the updated PTA list. Comments regarding how patient may be taking medications differently can be found in the Admit Orders Activity  Prior to Admission Medications   Prescriptions Last Dose Informant   aspirin 81 mg EC tablet 5/15/2025 Self, family   Sig: Take 1 tablet (81 mg) by mouth once daily.   atorvastatin (Lipitor) 20 mg tablet 5/15/2025 Evening Self, family   Sig: Take 1 tablet (20 mg) by mouth once daily.   calcium carbonate (Tums Extra Strength) 300 mg elemental (750 mg) chewable tablet 5/15/2025 Self, family   Sig: Chew 2 tablets once daily as needed for indigestion or heartburn.   carvedilol (Coreg) 25 mg tablet 5/15/2025 Evening Self, family   Sig: Take 2 tablets (50 mg) by mouth 2 times a day.   Patient taking differently: Take 1 tablet (25 mg) by mouth 2 times a day.   ezetimibe (Zetia) 10 mg tablet Unknown Self, family   Sig: Take 1 tablet (10 mg) by mouth once daily at bedtime.   hydrALAZINE (Apresoline) 100 mg tablet 5/15/2025 Self, family   Sig: Take 1 tablet (100 mg) by mouth 3 times a day.   multivitamin tablet 5/15/2025 Self, family   Sig: Take 1 tablet by mouth once daily.   sodium bicarbonate 650 mg tablet 5/15/2025 Self, family   Sig: Take 2 tablets (1,300 mg) by mouth 3 times a day.   torsemide (Demadex) 20 mg tablet 5/15/2025 Self, family   Sig: Take 3 tablets (60 mg) by mouth once daily.      Facility-Administered Medications: None        The list below reflects the updated allergy list. Please review each documented allergy for additional clarification and justification.  Allergies  Reviewed by Nupur Novak CPhT on 5/16/2025        Severity Reactions  Comments    Amlodipine Besylate Not Specified Swelling edema legs            Pharmacy has been updated to [facility].    Sources used to complete the med history include dispense history, PTA medication list, patient/family interview. Informants are good historians.    Below are additional concerns with the patient's PTA list.  Atorvastatin 20 is reported as taken daily. The dispense history is outdated. Patient reports taking 25mg BID of carvedilol.    Nupur Novak, CPhT-Adv  Please reach out via [employer] Secure Chat for questions

## 2025-05-16 NOTE — ED TRIAGE NOTES
03/25/22 0735   Quick Adds   Type of Visit Initial Occupational Therapy Evaluation   Living Environment   People in Home alone   Current Living Arrangements house   Living Environment Comments multi-level home, pt will be receiving assist from children during recovery   Self-Care   Usual Activity Tolerance good   Current Activity Tolerance good   Equipment Currently Used at Home cane, straight  (FWW, reclining chair lift, comfort toilet)   Fall history within last six months no   Activity/Exercise/Self-Care Comment indep at baseline for ADLs, receives assist from children for finances/bathing. children assit for cooking/cleaning   General Information   Onset of Illness/Injury or Date of Surgery 03/24/22   Referring Physician Dr. Twin Artis   Patient/Family Therapy Goal Statement (OT) go home   Additional Occupational Profile Info/Pertinent History of Current Problem mod   Existing Precautions/Restrictions no hip IR;no hip ADD past midline;90 degree hip flexion;no pivoting or twisting;weight bearing   Right Lower Extremity (Weight-bearing Status) weight-bearing as tolerated (WBAT)   Cognitive Status Examination   Orientation Status orientation to person, place and time   Follows Commands follows multi-step commands   Cognitive Status Comments pt understood/implemented hip precautions after repition of precautions   Visual Perception   Visual Impairment/Limitations WFL   Pain Assessment   Patient Currently in Pain No   Range of Motion Comprehensive   Comment, General Range of Motion WFL for ADLs   Strength Comprehensive (MMT)   Comment, General Manual Muscle Testing (MMT) Assessment WFL for ADLs   Coordination   Upper Extremity Coordination No deficits were identified   Bed Mobility   Bed Mobility sit-supine;supine-sit   Comment (Bed Mobility) mod I   Transfers   Transfers bed-chair transfer;toilet transfer;sit-stand transfer   Transfer Comments mod I for all tfs   Balance   Balance Assessment no deficits were  Pt presents to ED for abdominal pain. Gall bladder removed in march. Abdominal pain began around 1830 shortly after finishing dinner. Pt has endorsed some nausea and dry heaving since dinner. Cramping is across the upper abdomen. Pt denies any change in bowels or urination. Pt took 3 81mg of asa for pain PTA.   identified   Activities of Daily Living   BADL Assessment/Intervention upper body dressing;lower body dressing   Upper Body Dressing Assessment/Training   [place] Level (Upper Body Dressing) modified independence   Lower Body Dressing Assessment/Training   [place] Level (Lower Body Dressing) modified independence   Clinical Impression   Criteria for Skilled Therapeutic Interventions Met (OT) Yes, treatment indicated   OT Diagnosis decr ADL indep   Influenced by the following impairments DOROTEO   OT Problem List-Impairments impacting ADL activity tolerance impaired;post-surgical precautions;mobility   Assessment of Occupational Performance 3-5 Performance Deficits   Identified Performance Deficits LB dressing, func mob, bed mob   Planned Therapy Interventions (OT) ADL retraining;transfer training;bed mobility training   Clinical Decision Making Complexity (OT) moderate complexity   Anticipated Equipment Needs Upon Discharge (OT) dressing equipment;tub bench  (sock aide, reacher)   Risk & Benefits of therapy have been explained evaluation/treatment results reviewed;patient;daughter   OT Discharge Planning   OT Discharge Recommendation (DC Rec) home with assist   OT Rationale for DC Rec pt will receive assistance from children PRN for ADL/IADLs   Therapy Certification   Start of Care Date 03/25/22   Certification date from 03/25/22   Certification date to 04/25/22   Medical Diagnosis DOROTEO   Total Evaluation Time (Minutes)   Total Evaluation Time (Minutes) 15   OT Goals   Therapy Frequency (OT) One time eval and treatment   OT Predicated Duration/Target Date for Goal Attainment 03/25/22   OT Goals Lower Body Dressing;Toilet Transfer/Toileting   OT: Lower Body Dressing Modified independent;Goal Met;Completed;within precautions;using adaptive equipment   OT: Toilet Transfer/Toileting Modified independent;Goal Met;within precautions;Completed

## 2025-05-16 NOTE — PROGRESS NOTES
Colin Leonard is a 59 y.o. male on day 0 of admission presenting with Acute diverticulitis.      Subjective   Patient has been afebrile during the night.  He complained of feeling weak.      Objective     Last Recorded Vitals  /83 (BP Location: Left arm, Patient Position: Lying)   Pulse 70   Temp 36.8 °C (98.3 °F) (Oral)   Resp 12   Wt 113 kg (250 lb)   SpO2 95%   Intake/Output last 3 Shifts:    Intake/Output Summary (Last 24 hours) at 5/16/2025 1151  Last data filed at 5/16/2025 0628  Gross per 24 hour   Intake 2048 ml   Output 100 ml   Net 1948 ml       Admission Weight  Weight: 113 kg (250 lb) (05/15/25 2125)    Daily Weight  05/15/25 : 113 kg (250 lb)    Image Results  ECG 12 lead  Sinus tachycardia with Premature atrial complexes with Aberrant conduction  Anteroseptal infarct (cited on or before 06-APR-2025)  ST & T wave abnormality, consider lateral ischemia  Abnormal ECG  When compared with ECG of 06-APR-2025 11:27,  Premature ventricular complexes are no longer Present  Aberrant conduction is now Present  Serial changes of Anteroseptal infarct Present  Confirmed by Aleks Gooden ([id number]) on 5/16/2025 10:06:44 AM      Physical Exam    General: In moderate distress, cooperating during physical exam, pale sclera  HEENT: Pupils are equal and reactive to light and commendation , oral mucosa moist, no JVD .  Cardiovascular: Normal sinus rhythm, no MRG.  Lungs: Clear to auscultation bilaterally, no wheezing, no crackles, no dullness to percussion.  Abdomen: No hepatosplenomegaly appreciated, soft , not tender, positive bowel sounds, positive bowel movement.  Soft, slight tenderness on left lower quadrant.  Neuro: Alert and oriented x3, strength in upper and lower extremities , sensation intact.  Psych: Patient had great insight was going on  Musculoskeletal: No swelling in lower extremities, no limitation in range of motion.  Vascular: Pulses are intact in upper and lower extremities  Skin: No  petechiae, ecchymosis or other stigmata for dermatology disease.     Assessment and plan    Abdominal pain  Acute diverticulitis  CAT scan abdomen pelvis revealed scattered colonic diverticuli inflammatory stranding in the sigmoid colon consistent with acute diverticulitis.  No evidence of microperforation or abscess at this time.  Continue aggressively with antibiotics.  Patient had leukocytosis.  Monitor close  GI on the case    Chronic kidney disease stage V  Hold diuretics  Nephrology on the case    Anemia  Most likely multifactorial  Patient has chronic kidney disease  H&H worse today, H&H 6.7 and 20.3.  Transfuse 1 unit of RBC.  Consent obtained by the patient  The risk and benefits explained  Okay to to get 1 unit of RBC transfused.  Transfuse for hemoglobin less than 7.    History of C. difficile colitis  Patient completed full course of antibiotics.    Hypertension  Fairly controlled  Continue with current medication.    Leukocytosis  Secondary to acute diverticulitis  Waiting for blood culture    Diabetes mellitus type 2.  Diet controlled  Check hemoglobin A1c    Metabolic acidosis  Secondary to chronic kidney disease  On bicarb   Dr. Max  on the case    Hyperlipidemia    DVT prophylaxis    Transfuse 1 unit of RBC  Waiting for GI to see him today.  Continue with antibiotics   Waiting for blood culture result    Time spent examining the patient and discussing the plan of care 51 minutes  Marie Harris MD

## 2025-05-16 NOTE — CONSULTS
Inpatient consult to gastroenterology  Consult performed by: Lexi Gunn, APRN-CNP  Consult ordered by: Tom Candelaria MD      Reason For Consult  Diverticulitis    History Of Present Illness  Colin Leonard is a 59 y.o. male with h/o CAD s/p MI, type 2 diabetes mellitus, DVT, HTN, CKD 5, H/O  choledocholithiasis, recent pneumonia, C. difficile colitis (dx on 3/28/25), presenting with abdominal pain. , CT A/P was obtained and showed acute diverticulitis. Labs showed leukocytosis at 22.1, normocytic anemia with hgb 6.4.  Elevated BUN/creatinine at 76/7.25. He denies any prior EGD. Last Colonoscopy 1/13/2020 Diverticulosis in the sigmoid colon, Non-bleeding internal hemorrhoids, pathology showed hyperplastic polyp.  Patient seen and evaluated in ER, currently denies any abdominal pain, nausea, vomiting. He denies any black tarry and/or bloody stools.  No reported hematemesis.         Past Medical History  He has a past medical history of Coronary artery disease, Diabetes mellitus (Multi), DVT (deep venous thrombosis) (Multi), Hypertension, and MI (myocardial infarction) (Multi).    Surgical History  He has a past surgical history that includes Amputation and Cholecystectomy (03/05/2025).     Social History  He reports that he has never smoked. He has never used smokeless tobacco. No history on file for alcohol use and drug use.    Family History  Family History[1]     Allergies  Amlodipine besylate    Review of Systems   Constitutional: Negative.    HENT: Negative.     Eyes: Negative.    Respiratory: Negative.     Gastrointestinal:  Positive for abdominal pain.   Endocrine: Negative.    Genitourinary: Negative.    Musculoskeletal: Negative.    Skin: Negative.    Allergic/Immunologic: Negative.    Neurological: Negative.    Psychiatric/Behavioral: Negative.          Physical Exam  HENT:      Head: Normocephalic.      Right Ear: Tympanic membrane normal.      Mouth/Throat:      Mouth: Mucous  "membranes are moist.   Eyes:      Pupils: Pupils are equal, round, and reactive to light.   Cardiovascular:      Rate and Rhythm: Normal rate.   Pulmonary:      Effort: Pulmonary effort is normal.   Abdominal:      Palpations: Abdomen is soft.   Musculoskeletal:         General: Normal range of motion.      Cervical back: Normal range of motion.   Skin:     General: Skin is warm.   Neurological:      General: No focal deficit present.      Mental Status: He is alert.   Psychiatric:         Mood and Affect: Mood normal.          Last Recorded Vitals  Blood pressure 136/83, pulse 70, temperature 36.8 °C (98.3 °F), temperature source Oral, resp. rate 12, height 1.778 m (5' 10\"), weight 113 kg (250 lb), SpO2 95%.    Relevant Results      Scheduled medications  Scheduled Medications[2]  Continuous medications  Continuous Medications[3]  PRN medications  PRN Medications[4]  Results for orders placed or performed during the hospital encounter of 05/15/25 (from the past 24 hours)   CBC and Auto Differential   Result Value Ref Range    WBC 7.2 4.4 - 11.3 x10*3/uL    nRBC 0.0 0.0 - 0.0 /100 WBCs    RBC 2.83 (L) 4.50 - 5.90 x10*6/uL    Hemoglobin 7.7 (L) 13.5 - 17.5 g/dL    Hematocrit 25.1 (L) 41.0 - 52.0 %    MCV 89 80 - 100 fL    MCH 27.2 26.0 - 34.0 pg    MCHC 30.7 (L) 32.0 - 36.0 g/dL    RDW 16.8 (H) 11.5 - 14.5 %    Platelets 290 150 - 450 x10*3/uL    Immature Granulocytes %, Automated 0.6 0.0 - 0.9 %    Immature Granulocytes Absolute, Automated 0.04 0.00 - 0.70 x10*3/uL   Comprehensive metabolic panel   Result Value Ref Range    Glucose 69 (L) 74 - 99 mg/dL    Sodium 140 136 - 145 mmol/L    Potassium 4.2 3.5 - 5.3 mmol/L    Chloride 112 (H) 98 - 107 mmol/L    Bicarbonate 18 (L) 21 - 32 mmol/L    Anion Gap 14 10 - 20 mmol/L    Urea Nitrogen 80 (H) 6 - 23 mg/dL    Creatinine 7.57 (H) 0.50 - 1.30 mg/dL    eGFR 8 (L) >60 mL/min/1.73m*2    Calcium 9.0 8.6 - 10.3 mg/dL    Albumin 3.5 3.4 - 5.0 g/dL    Alkaline Phosphatase 64 " 33 - 136 U/L    Total Protein 6.9 6.4 - 8.2 g/dL    AST 41 (H) 9 - 39 U/L    Bilirubin, Total 0.5 0.0 - 1.2 mg/dL    ALT 26 10 - 52 U/L   Lipase   Result Value Ref Range    Lipase 35 9 - 82 U/L   Lactate   Result Value Ref Range    Lactate 1.4 0.4 - 2.0 mmol/L   Troponin I, High Sensitivity, Initial   Result Value Ref Range    Troponin I, High Sensitivity 13 0 - 20 ng/L   Manual Differential   Result Value Ref Range    Neutrophils %, Manual 92.0 40.0 - 80.0 %    Bands %, Manual 3.0 0.0 - 5.0 %    Lymphocytes %, Manual 5.0 13.0 - 44.0 %    Monocytes %, Manual 0.0 2.0 - 10.0 %    Eosinophils %, Manual 0.0 0.0 - 6.0 %    Basophils %, Manual 0.0 0.0 - 2.0 %    Seg Neutrophils Absolute, Manual 6.62 1.20 - 7.00 x10*3/uL    Bands Absolute, Manual 0.22 0.00 - 0.70 x10*3/uL    Lymphocytes Absolute, Manual 0.36 (L) 1.20 - 4.80 x10*3/uL    Monocytes Absolute, Manual 0.00 (L) 0.10 - 1.00 x10*3/uL    Eosinophils Absolute, Manual 0.00 0.00 - 0.70 x10*3/uL    Basophils Absolute, Manual 0.00 0.00 - 0.10 x10*3/uL    Total Cells Counted 100     Neutrophils Absolute, Manual 6.84 1.20 - 7.70 x10*3/uL    RBC Morphology See Below     Polychromasia Mild     Ovalocytes Few    Urinalysis with Reflex Culture and Microscopic   Result Value Ref Range    Color, Urine Light-Yellow Light-Yellow, Yellow, Dark-Yellow    Appearance, Urine Clear Clear    Specific Gravity, Urine 1.012 1.005 - 1.035    pH, Urine 6.5 5.0, 5.5, 6.0, 6.5, 7.0, 7.5, 8.0    Protein, Urine 100 (2+) (A) NEGATIVE, 10 (TRACE), 20 (TRACE) mg/dL    Glucose, Urine 100 (1+) (A) Normal mg/dL    Blood, Urine NEGATIVE NEGATIVE mg/dL    Ketones, Urine NEGATIVE NEGATIVE mg/dL    Bilirubin, Urine NEGATIVE NEGATIVE mg/dL    Urobilinogen, Urine Normal Normal mg/dL    Nitrite, Urine NEGATIVE NEGATIVE    Leukocyte Esterase, Urine NEGATIVE NEGATIVE   Extra Urine Gray Tube   Result Value Ref Range    Extra Tube Hold for add-ons.    Urinalysis Microscopic   Result Value Ref Range    WBC, Urine  1-5 1-5, NONE /HPF    RBC, Urine 1-2 NONE, 1-2, 3-5 /HPF   Sars-CoV-2 and Influenza A/B PCR   Result Value Ref Range    Flu A Result Not Detected Not Detected    Flu B Result Not Detected Not Detected    Coronavirus 2019, PCR Not Detected Not Detected   Troponin, High Sensitivity, 1 Hour   Result Value Ref Range    Troponin I, High Sensitivity 26 (H) 0 - 20 ng/L   ECG 12 lead   Result Value Ref Range    Ventricular Rate 135 BPM    Atrial Rate 135 BPM    IN Interval 152 ms    QRS Duration 94 ms    QT Interval 328 ms    QTC Calculation(Bazett) 492 ms    R Axis -1 degrees    T Axis 104 degrees    QRS Count 22 beats    Q Onset 216 ms    T Offset 380 ms    QTC Fredericia 429 ms   CBC   Result Value Ref Range    WBC 26.7 (H) 4.4 - 11.3 x10*3/uL    nRBC 0.0 0.0 - 0.0 /100 WBCs    RBC 2.46 (L) 4.50 - 5.90 x10*6/uL    Hemoglobin 6.7 (L) 13.5 - 17.5 g/dL    Hematocrit 20.3 (L) 41.0 - 52.0 %    MCV 83 80 - 100 fL    MCH 27.2 26.0 - 34.0 pg    MCHC 33.0 32.0 - 36.0 g/dL    RDW 17.3 (H) 11.5 - 14.5 %    Platelets 256 150 - 450 x10*3/uL   Basic metabolic panel   Result Value Ref Range    Glucose 158 (H) 74 - 99 mg/dL    Sodium 139 136 - 145 mmol/L    Potassium 4.7 3.5 - 5.3 mmol/L    Chloride 115 (H) 98 - 107 mmol/L    Bicarbonate 15 (L) 21 - 32 mmol/L    Anion Gap 14 10 - 20 mmol/L    Urea Nitrogen 76 (H) 6 - 23 mg/dL    Creatinine 7.25 (H) 0.50 - 1.30 mg/dL    eGFR 8 (L) >60 mL/min/1.73m*2    Calcium 8.4 (L) 8.6 - 10.3 mg/dL   CBC and Auto Differential   Result Value Ref Range    WBC 22.1 (H) 4.4 - 11.3 x10*3/uL    nRBC 0.0 0.0 - 0.0 /100 WBCs    RBC 2.37 (L) 4.50 - 5.90 x10*6/uL    Hemoglobin 6.4 (LL) 13.5 - 17.5 g/dL    Hematocrit 21.4 (L) 41.0 - 52.0 %    MCV 90 80 - 100 fL    MCH 27.0 26.0 - 34.0 pg    MCHC 29.9 (L) 32.0 - 36.0 g/dL    RDW 17.6 (H) 11.5 - 14.5 %    Platelets 269 150 - 450 x10*3/uL    Neutrophils % 91.3 40.0 - 80.0 %    Immature Granulocytes %, Automated 0.6 0.0 - 0.9 %    Lymphocytes % 3.6 13.0 - 44.0 %     Monocytes % 4.2 2.0 - 10.0 %    Eosinophils % 0.2 0.0 - 6.0 %    Basophils % 0.1 0.0 - 2.0 %    Neutrophils Absolute 20.19 (H) 1.20 - 7.70 x10*3/uL    Immature Granulocytes Absolute, Automated 0.13 0.00 - 0.70 x10*3/uL    Lymphocytes Absolute 0.79 (L) 1.20 - 4.80 x10*3/uL    Monocytes Absolute 0.92 0.10 - 1.00 x10*3/uL    Eosinophils Absolute 0.04 0.00 - 0.70 x10*3/uL    Basophils Absolute 0.03 0.00 - 0.10 x10*3/uL        Assessment/Plan     Acute on chronic anemia   Labs demonstrate normocytic anemia with initial Hgb of 7.7, down to 6.4 today.  PRBC ordered. No signs/symptoms of active bleeding. History of chronic kidney disease on erythropoietin. No urgent indication for any scopes at this time.   - Monitor  CBC, CMP  - Transfuse to keep hgb >7  - Protonix 40 mg IV BID     Diverticulitis  -CT abdomen pelvis in ED with diverticulitis without any evidence of perforation or abscess. Abdominal pain has improved.  He remains afebrile. WBC at 22.1   -IV antibiotics as ordered   -Pain control   -Nausea control with Zofran as needed  -Advance diet  -Continue conservative management  -Reassess in the a.m.  - Will need to follow-up and schedule in 4 to 6 weeks.     Lexi Gunn, APRN-CNP               [1] No family history on file.  [2] aspirin, 81 mg, oral, Daily  atorvastatin, 20 mg, oral, Daily  carvedilol, 50 mg, oral, BID  ezetimibe, 10 mg, oral, Nightly  [Held by provider] heparin (porcine), 5,000 Units, subcutaneous, q12h  [Held by provider] hydrALAZINE, 100 mg, oral, TID  piperacillin-tazobactam, 2.25 g, intravenous, q6h  polyethylene glycol, 17 g, oral, Daily  sodium bicarbonate, 1,950 mg, oral, TID  [Held by provider] torsemide, 60 mg, oral, Daily    [3]    [4] PRN medications: acetaminophen **OR** acetaminophen **OR** acetaminophen, HYDROmorphone, ondansetron **OR** ondansetron

## 2025-05-16 NOTE — CARE PLAN
Pt said that his sister Chelo Hernandez is his POA --document not on file  ADOD: 2 days    Pt lives at home with his 19 y/o son in a 2 story home with a basement and 2 steps to climb to enter the home  Pt works [employer], he drives, independent with ADL's  He does not use home 02, cpap, bipap  He was using a walker when he had gout. No falls in the last 6 months  He does not wear glasses or hearing aids,   Denies depression and anxiety.  Dr Rust is his PCP and Dr. Max is his nephrologist. Pt has stage V CKD, no dialysis. He is a diabetic. Other hx includes, HLD, CAD, chronic anemia  He was recently discharged from [facility] on 4/7 with dx of C-Diff  Pt is here with a new dx of diverticulitis  No anticipated discharge needs    DISCHARGE PLAN: HOME WITH SON

## 2025-05-17 ENCOUNTER — APPOINTMENT (OUTPATIENT)
Dept: RADIOLOGY | Facility: HOSPITAL | Age: 60
DRG: 392 | End: 2025-05-17
Payer: COMMERCIAL

## 2025-05-17 LAB
ABO GROUP (TYPE) IN BLOOD: NORMAL
ALBUMIN SERPL BCP-MCNC: 3 G/DL (ref 3.4–5)
ANION GAP SERPL CALCULATED.3IONS-SCNC: 14 MMOL/L (ref 10–20)
ANTIBODY SCREEN: NORMAL
BASOPHILS # BLD AUTO: 0.04 X10*3/UL (ref 0–0.1)
BASOPHILS NFR BLD AUTO: 0.3 %
BLOOD EXPIRATION DATE: NORMAL
BUN SERPL-MCNC: 75 MG/DL (ref 6–23)
CALCIUM SERPL-MCNC: 8.3 MG/DL (ref 8.6–10.3)
CHLORIDE SERPL-SCNC: 111 MMOL/L (ref 98–107)
CO2 SERPL-SCNC: 16 MMOL/L (ref 21–32)
CREAT SERPL-MCNC: 7.3 MG/DL (ref 0.5–1.3)
DISPENSE STATUS: NORMAL
EGFRCR SERPLBLD CKD-EPI 2021: 8 ML/MIN/1.73M*2
EOSINOPHIL # BLD AUTO: 0.43 X10*3/UL (ref 0–0.7)
EOSINOPHIL NFR BLD AUTO: 2.8 %
ERYTHROCYTE [DISTWIDTH] IN BLOOD BY AUTOMATED COUNT: 17 % (ref 11.5–14.5)
GLUCOSE SERPL-MCNC: 108 MG/DL (ref 74–99)
HCT VFR BLD AUTO: 21.8 % (ref 41–52)
HCT VFR BLD AUTO: 24.1 % (ref 41–52)
HGB BLD-MCNC: 6.7 G/DL (ref 13.5–17.5)
HGB BLD-MCNC: 7.5 G/DL (ref 13.5–17.5)
IMM GRANULOCYTES # BLD AUTO: 0.05 X10*3/UL (ref 0–0.7)
IMM GRANULOCYTES NFR BLD AUTO: 0.3 % (ref 0–0.9)
LYMPHOCYTES # BLD AUTO: 1.25 X10*3/UL (ref 1.2–4.8)
LYMPHOCYTES NFR BLD AUTO: 8.1 %
MCH RBC QN AUTO: 27.3 PG (ref 26–34)
MCHC RBC AUTO-ENTMCNC: 30.7 G/DL (ref 32–36)
MCV RBC AUTO: 89 FL (ref 80–100)
MONOCYTES # BLD AUTO: 0.51 X10*3/UL (ref 0.1–1)
MONOCYTES NFR BLD AUTO: 3.3 %
NEUTROPHILS # BLD AUTO: 13.08 X10*3/UL (ref 1.2–7.7)
NEUTROPHILS NFR BLD AUTO: 85.2 %
NRBC BLD-RTO: 0 /100 WBCS (ref 0–0)
PHOSPHATE SERPL-MCNC: 5.1 MG/DL (ref 2.5–4.9)
PLATELET # BLD AUTO: 232 X10*3/UL (ref 150–450)
POTASSIUM SERPL-SCNC: 4.3 MMOL/L (ref 3.5–5.3)
PRODUCT BLOOD TYPE: 5100
PRODUCT CODE: NORMAL
RBC # BLD AUTO: 2.45 X10*6/UL (ref 4.5–5.9)
RH FACTOR (ANTIGEN D): NORMAL
SODIUM SERPL-SCNC: 137 MMOL/L (ref 136–145)
UNIT ABO: NORMAL
UNIT NUMBER: NORMAL
UNIT RH: NORMAL
UNIT VOLUME: 350
URATE SERPL-MCNC: 7.2 MG/DL (ref 4–7.5)
WBC # BLD AUTO: 15.4 X10*3/UL (ref 4.4–11.3)
XM INTEP: NORMAL

## 2025-05-17 PROCEDURE — 99233 SBSQ HOSP IP/OBS HIGH 50: CPT | Performed by: INTERNAL MEDICINE

## 2025-05-17 PROCEDURE — 2500000001 HC RX 250 WO HCPCS SELF ADMINISTERED DRUGS (ALT 637 FOR MEDICARE OP): Performed by: STUDENT IN AN ORGANIZED HEALTH CARE EDUCATION/TRAINING PROGRAM

## 2025-05-17 PROCEDURE — 71250 CT THORAX DX C-: CPT

## 2025-05-17 PROCEDURE — 71250 CT THORAX DX C-: CPT | Performed by: RADIOLOGY

## 2025-05-17 PROCEDURE — P9016 RBC LEUKOCYTES REDUCED: HCPCS

## 2025-05-17 PROCEDURE — 2500000001 HC RX 250 WO HCPCS SELF ADMINISTERED DRUGS (ALT 637 FOR MEDICARE OP): Performed by: INTERNAL MEDICINE

## 2025-05-17 PROCEDURE — 2500000002 HC RX 250 W HCPCS SELF ADMINISTERED DRUGS (ALT 637 FOR MEDICARE OP, ALT 636 FOR OP/ED): Performed by: STUDENT IN AN ORGANIZED HEALTH CARE EDUCATION/TRAINING PROGRAM

## 2025-05-17 PROCEDURE — 99223 1ST HOSP IP/OBS HIGH 75: CPT | Performed by: INTERNAL MEDICINE

## 2025-05-17 PROCEDURE — 86850 RBC ANTIBODY SCREEN: CPT | Performed by: INTERNAL MEDICINE

## 2025-05-17 PROCEDURE — 36415 COLL VENOUS BLD VENIPUNCTURE: CPT | Performed by: INTERNAL MEDICINE

## 2025-05-17 PROCEDURE — 2500000002 HC RX 250 W HCPCS SELF ADMINISTERED DRUGS (ALT 637 FOR MEDICARE OP, ALT 636 FOR OP/ED): Performed by: INTERNAL MEDICINE

## 2025-05-17 PROCEDURE — 85025 COMPLETE CBC W/AUTO DIFF WBC: CPT | Performed by: INTERNAL MEDICINE

## 2025-05-17 PROCEDURE — 85018 HEMOGLOBIN: CPT | Performed by: INTERNAL MEDICINE

## 2025-05-17 PROCEDURE — 2500000004 HC RX 250 GENERAL PHARMACY W/ HCPCS (ALT 636 FOR OP/ED): Performed by: STUDENT IN AN ORGANIZED HEALTH CARE EDUCATION/TRAINING PROGRAM

## 2025-05-17 PROCEDURE — 36430 TRANSFUSION BLD/BLD COMPNT: CPT

## 2025-05-17 PROCEDURE — 2500000004 HC RX 250 GENERAL PHARMACY W/ HCPCS (ALT 636 FOR OP/ED): Performed by: INTERNAL MEDICINE

## 2025-05-17 PROCEDURE — 2500000004 HC RX 250 GENERAL PHARMACY W/ HCPCS (ALT 636 FOR OP/ED): Mod: JZ | Performed by: INTERNAL MEDICINE

## 2025-05-17 PROCEDURE — 1200000002 HC GENERAL ROOM WITH TELEMETRY DAILY

## 2025-05-17 PROCEDURE — 80069 RENAL FUNCTION PANEL: CPT | Performed by: INTERNAL MEDICINE

## 2025-05-17 RX ORDER — HYDRALAZINE HYDROCHLORIDE 25 MG/1
25 TABLET, FILM COATED ORAL 3 TIMES DAILY
Status: DISCONTINUED | OUTPATIENT
Start: 2025-05-17 | End: 2025-05-18 | Stop reason: HOSPADM

## 2025-05-17 RX ORDER — PREDNISONE 20 MG/1
20 TABLET ORAL 2 TIMES DAILY
Status: DISCONTINUED | OUTPATIENT
Start: 2025-05-17 | End: 2025-05-18 | Stop reason: HOSPADM

## 2025-05-17 RX ORDER — PANTOPRAZOLE SODIUM 40 MG/10ML
40 INJECTION, POWDER, LYOPHILIZED, FOR SOLUTION INTRAVENOUS DAILY
Status: DISCONTINUED | OUTPATIENT
Start: 2025-05-17 | End: 2025-05-18 | Stop reason: HOSPADM

## 2025-05-17 RX ADMIN — ASPIRIN 81 MG: 81 TABLET, COATED ORAL at 09:57

## 2025-05-17 RX ADMIN — CARVEDILOL 50 MG: 25 TABLET, FILM COATED ORAL at 09:57

## 2025-05-17 RX ADMIN — HYDRALAZINE HYDROCHLORIDE 25 MG: 25 TABLET ORAL at 20:09

## 2025-05-17 RX ADMIN — EZETIMIBE 10 MG: 10 TABLET ORAL at 20:10

## 2025-05-17 RX ADMIN — VANCOMYCIN HYDROCHLORIDE 125 MG: 125 CAPSULE ORAL at 20:10

## 2025-05-17 RX ADMIN — PIPERACILLIN SODIUM AND TAZOBACTAM SODIUM 2.25 G: 2; .25 INJECTION, SOLUTION INTRAVENOUS at 20:12

## 2025-05-17 RX ADMIN — PIPERACILLIN SODIUM AND TAZOBACTAM SODIUM 2.25 G: 2; .25 INJECTION, SOLUTION INTRAVENOUS at 14:38

## 2025-05-17 RX ADMIN — SODIUM BICARBONATE 1950 MG: 650 TABLET ORAL at 09:57

## 2025-05-17 RX ADMIN — PIPERACILLIN SODIUM AND TAZOBACTAM SODIUM 2.25 G: 2; .25 INJECTION, SOLUTION INTRAVENOUS at 01:21

## 2025-05-17 RX ADMIN — PREDNISONE 20 MG: 20 TABLET ORAL at 10:04

## 2025-05-17 RX ADMIN — PREDNISONE 20 MG: 20 TABLET ORAL at 20:10

## 2025-05-17 RX ADMIN — HYDRALAZINE HYDROCHLORIDE 25 MG: 25 TABLET ORAL at 14:38

## 2025-05-17 RX ADMIN — POLYETHYLENE GLYCOL 3350 17 G: 17 POWDER, FOR SOLUTION ORAL at 10:05

## 2025-05-17 RX ADMIN — SODIUM BICARBONATE 1950 MG: 650 TABLET ORAL at 14:38

## 2025-05-17 RX ADMIN — PANTOPRAZOLE SODIUM 40 MG: 40 INJECTION, POWDER, FOR SOLUTION INTRAVENOUS at 10:00

## 2025-05-17 RX ADMIN — CARVEDILOL 50 MG: 25 TABLET, FILM COATED ORAL at 20:10

## 2025-05-17 RX ADMIN — ATORVASTATIN CALCIUM 20 MG: 20 TABLET, FILM COATED ORAL at 09:57

## 2025-05-17 RX ADMIN — SODIUM BICARBONATE 1950 MG: 650 TABLET ORAL at 20:09

## 2025-05-17 RX ADMIN — PIPERACILLIN SODIUM AND TAZOBACTAM SODIUM 2.25 G: 2; .25 INJECTION, SOLUTION INTRAVENOUS at 05:39

## 2025-05-17 ASSESSMENT — COGNITIVE AND FUNCTIONAL STATUS - GENERAL
DAILY ACTIVITIY SCORE: 24
MOBILITY SCORE: 24

## 2025-05-17 ASSESSMENT — PAIN SCALES - GENERAL
PAINLEVEL_OUTOF10: 0 - NO PAIN

## 2025-05-17 ASSESSMENT — PAIN - FUNCTIONAL ASSESSMENT
PAIN_FUNCTIONAL_ASSESSMENT: 0-10
PAIN_FUNCTIONAL_ASSESSMENT: 0-10

## 2025-05-17 NOTE — CONSULTS
"Inpatient consult to Pulmonology  Consult performed by: Giovani Gagnon MD  Consult ordered by: Marie Harris MD      Pulmonary Consultation Note   Subjective    Colin Leonard is a 59 y.o. year old male admitted on 5/15/2025 with acute diverticulitis    History of Present Illness:  59-year-old male history of coronary disease status post MI, diabetes, DVT, CKD and history of C. difficile colitis.  Presents with complaints of abdominal pain.  Started feeling sick a few days ago.  He was also recently at [facility] and discharged on April 7 for C. difficile colitis.    He is admitted for acute diverticulitis.  Also being treated for anemia secondary to his chronic kidney disease.    He started to complain of some blood-streaked sputum this morning.    Denies fevers or chills.    Past Medical History  He has a past medical history of Coronary artery disease, Diabetes mellitus (Multi), DVT (deep venous thrombosis) (Multi), Hypertension, and MI (myocardial infarction) (Multi).    Surgical History  He has a past surgical history that includes Amputation and Cholecystectomy (03/05/2025).     Social History  He reports that he has never smoked. He has never used smokeless tobacco. No history on file for alcohol use and drug use.    Family History  Family History[1]     Allergies  Amlodipine besylate    Review of Systems   All other systems reviewed and are negative.           Meds    Scheduled medications  Scheduled Medications[2]  Continuous medications  Continuous Medications[3]  PRN medications  PRN Medications[4]     Objective      Last Recorded Vitals  BP (!) 117/92 (BP Location: Right arm, Patient Position: Lying)   Pulse 102   Temp 37 °C (98.6 °F) (Oral)   Resp 16   Wt 113 kg (250 lb)   SpO2 93%     Blood pressure (!) 117/92, pulse 102, temperature 37 °C (98.6 °F), temperature source Oral, resp. rate 16, height 1.778 m (5' 10\"), weight 113 kg (250 lb), SpO2 93%.   Physical Exam   GENERAL: normal appearance. " well nourished. No respiratory distress  HEAD/SINUSES: no sinus tenderness  NECK: no JVD, midline trachea without stridor. Thyroid not enlarged  LYMPH NODES : none felt in the cervical, submandibular or supraclavicular regions  LUNGS: Symmetric chest. Good excursion. Equal breath sounds. no wheezing. no crackles or rhonchi  CARDIAC: normal S1 and S2; no gallops, rubs or murmurs. Regular rate and rhythm  EXTREMITIES: No edema, no varicose veins  NEURO: grossly normal mental status, CN reflexes and motor strength.   SKIN: Skin turgor normal. No rashes or lesions.   PSYCH: Normal affect    Intake/Output Summary (Last 24 hours) at 5/17/2025 1722  Last data filed at 5/17/2025 1425  Gross per 24 hour   Intake 608.33 ml   Output 950 ml   Net -341.67 ml     Labs:   Results from last 72 hours   Lab Units 05/17/25  0512 05/16/25  0715 05/15/25  2227   SODIUM mmol/L 137 139 140   POTASSIUM mmol/L 4.3 4.7 4.2   CHLORIDE mmol/L 111* 115* 112*   CO2 mmol/L 16* 15* 18*   BUN mg/dL 75* 76* 80*   CREATININE mg/dL 7.30* 7.25* 7.57*   GLUCOSE mg/dL 108* 158* 69*   CALCIUM mg/dL 8.3* 8.4* 9.0   ANION GAP mmol/L 14 14 14   EGFR mL/min/1.73m*2 8* 8* 8*   PHOSPHORUS mg/dL 5.1*  --   --       Results from last 72 hours   Lab Units 05/17/25  0512 05/16/25 2012 05/16/25  1516 05/16/25  1217   WBC AUTO x10*3/uL 15.4* 18.3*  --  22.1*   HEMOGLOBIN g/dL 6.7* 7.0* 6.9* 6.4*   HEMATOCRIT % 21.8* 21.9* 21.4* 21.4*   PLATELETS AUTO x10*3/uL 232 238  --  269   NEUTROS PCT AUTO % 85.2 86.8  --  91.3   LYMPHS PCT AUTO % 8.1 6.8  --  3.6   MONOS PCT AUTO % 3.3 4.4  --  4.2   EOS PCT AUTO % 2.8 1.5  --  0.2               Micro/ID:   Lab Results   Component Value Date    BLOODCULT No growth at 1 day 05/15/2025    BLOODCULT No growth at 1 day 05/15/2025     Summary of key imaging results  Chest x-ray 5/15/2025  IMPRESSION:  Hypoventilatory changes with bibasilar atelectasis. Minimal blunting of the posterior costophrenic angles likely relate to small  layering effusions as seen on CT.    Impression   Colin Leonard is a 59 y.o. year old male patient is being seen by the pulmonary service for   Acute diverticulitis  Hemoptysis  Chronic kidney disease stage V  Anemia  History of C. difficile colitis  Hypertension  Diabetes      Recommendations   As follows:  Hemoptysis.  Patient describes blood-streaked sputum.  States he has been having some sinus issues for over a month and feels that some of this may be postnasal drip which she is coughing up.  His hemoglobin is low but is highly doubtful that this degree of anemia will be caused by/minor hemoptysis.  Obtain CT of the chest if there is any parenchymal findings that may explain this.  We did discuss possible bronchoscopy if his symptoms worsen or if diagnostic workup is unrevealing.  However I feel this is not needed at this time and likely can be avoided.  Continue supportive care for diverticulitis as per primary team.  Antibiotics per infectious disease  P.o. vancomycin for C. difficile  Continue with standard prophylaxis measures.  Will follow-up in a.m. regarding CT findings    Giovani Gagnon MD   05/17/25 at 5:22 PM     -Only the Medical problems listed under impression were addressed today.   -Please contact primary team for all other concerns and medical problem  -Thank you for your consult     Disclaimer: Documentation completed with the information available at the time of input. Parts of this note may have been scribed or generated using voice dictation software, Dragon.  Homophonic errors may exist.  Please contact me directly if clarification is needed. The times in the chart may not be reflective of actual patient care times, interventions, or procedures. Documentation occurs after the physical care of the patient.         [1] No family history on file.  [2] aspirin, 81 mg, oral, Daily  atorvastatin, 20 mg, oral, Daily  carvedilol, 50 mg, oral, BID  ezetimibe, 10 mg, oral, Nightly  [Held by  provider] heparin (porcine), 5,000 Units, subcutaneous, q12h  hydrALAZINE, 25 mg, oral, TID  pantoprazole, 40 mg, intravenous, Daily  piperacillin-tazobactam, 2.25 g, intravenous, q6h  polyethylene glycol, 17 g, oral, Daily  predniSONE, 20 mg, oral, BID  sodium bicarbonate, 1,950 mg, oral, TID  [Held by provider] torsemide, 60 mg, oral, Daily  vancomycin, 125 mg, oral, Nightly  [3]    [4] PRN medications: acetaminophen **OR** acetaminophen **OR** acetaminophen, HYDROmorphone, ondansetron **OR** ondansetron

## 2025-05-17 NOTE — CARE PLAN
Over the shift, the patient did not make progress toward the following goals. Barriers to progression include . Recommendations to address these barriers include .      Problem: Pain - Adult  Goal: Verbalizes/displays adequate comfort level or baseline comfort level  5/17/2025 0630 by Baldomero Michele RN  Outcome: Progressing  5/17/2025 0336 by Baldomero Michele RN  Outcome: Progressing     Problem: Safety - Adult  Goal: Free from fall injury  5/17/2025 0630 by Baldomero Michele RN  Outcome: Progressing  5/17/2025 0336 by Baldomero Michele RN  Outcome: Progressing     Problem: Discharge Planning  Goal: Discharge to home or other facility with appropriate resources  5/17/2025 0630 by Baldomero Michele RN  Outcome: Progressing  5/17/2025 0336 by Baldomero Michele RN  Outcome: Progressing     Problem: Chronic Conditions and Co-morbidities  Goal: Patient's chronic conditions and co-morbidity symptoms are monitored and maintained or improved  5/17/2025 0630 by Baldomero Michele RN  Outcome: Progressing  5/17/2025 0336 by Baldomero Michele RN  Outcome: Progressing     Problem: Nutrition  Goal: Nutrient intake appropriate for maintaining nutritional needs  5/17/2025 0630 by Baldomero Michele RN  Outcome: Progressing  5/17/2025 0336 by Baldomero Michele RN  Outcome: Progressing

## 2025-05-17 NOTE — PROGRESS NOTES
Colin Leonard is a 59 y.o. male on day 1 of admission presenting with Acute diverticulitis.      Subjective   Patient stated he is spitting blood.  He denied abdominal pain.  Patient denied to have diarrhea.  He has been afebrile during the night.  Patient is asking for oral nutrition supplement  Patient denied blood in the stool or in urine    Objective     Last Recorded Vitals  /87 (BP Location: Right arm, Patient Position: Sitting)   Pulse 85   Temp 36.8 °C (98.2 °F) (Oral)   Resp 16   Wt 113 kg (250 lb)   SpO2 95%   Intake/Output last 3 Shifts:    Intake/Output Summary (Last 24 hours) at 5/17/2025 0959  Last data filed at 5/17/2025 0550  Gross per 24 hour   Intake 526.67 ml   Output 600 ml   Net -73.33 ml       Admission Weight  Weight: 113 kg (250 lb) (05/15/25 2125)    Daily Weight  05/15/25 : 113 kg (250 lb)    Image Results  ECG 12 lead  Sinus tachycardia with Premature atrial complexes with Aberrant conduction  Anteroseptal infarct (cited on or before 06-APR-2025)  ST & T wave abnormality, consider lateral ischemia  Abnormal ECG  When compared with ECG of 06-APR-2025 11:27,  Premature ventricular complexes are no longer Present  Aberrant conduction is now Present  Serial changes of Anteroseptal infarct Present  Confirmed by Aleks Gooden ([id number]) on 5/16/2025 10:06:44 AM      Physical Exam    General: In moderate distress, cooperating during physical exam, pale sclera  HEENT: Pupils are equal and reactive to light and commendation , oral mucosa moist, no JVD .  Cardiovascular: Normal sinus rhythm, no MRG.  Lungs: Clear to auscultation bilaterally, no wheezing, no crackles, no dullness to percussion.  Abdomen: No hepatosplenomegaly appreciated, soft , not tender, positive bowel sounds, positive bowel movement.  Soft, slight tenderness on left lower quadrant.  Neuro: Alert and oriented x3, strength in upper and lower extremities , sensation intact.  Psych: Patient had great insight was going  on  Musculoskeletal: No swelling in lower extremities, no limitation in range of motion.  Vascular: Pulses are intact in upper and lower extremities  Skin: No petechiae, ecchymosis or other stigmata for dermatology disease.     Assessment and plan      Acute diverticulitis  CT scan abdomen pelvis revealed scattered colonic diverticuli inflammatory stranding in the sigmoid colon consistent with acute diverticulitis.  No evidence of microperforation or abscess at this time.  Continue aggressively with antibiotics.  Patient had leukocytosis.  WBC improved  Monitor close  GI on the case    Hemoptysis  Consult Dr. Gagnon from pulmonary services  I will defer to Dr. Gagnon if he need further images.    Chronic kidney disease stage V  Hold diuretics  Nephrology on the case    Anemia  Most likely multifactorial  Patient has chronic kidney disease  H&H 6.7 and 21.8.  Transfuse 1 unit of RBC.  Monitor close    History of C. difficile colitis  Patient was started on p.o. vancomycin for    Hypertension  Fairly controlled  Continue with current medication.    Leukocytosis improved  Secondary to acute diverticulitis  No growth in blood cultures so far  ID on the case    Diabetes mellitus type 2.  Diet controlled  Hemoglobin A1c 5.4    Metabolic acidosis  Secondary to chronic kidney disease  On bicarb   Dr. Max  on the case    Hyperlipidemia  Gout  Add prednisone    DVT prophylaxis    Transfuse 1 unit of RBC today  Patient  wanted for nutrition supplement  I ordered Nepro    Waiting for consultant to see him today.  Continue with antibiotic     Time spent with the patient 52 minutes    Marie Harris MD

## 2025-05-17 NOTE — PROGRESS NOTES
CONSULT PROGRESS NOTES    SERVICE DATE: 5/17/2025   SERVICE TIME: 10:14 AM    CONSULTING SERVICE: Nephrology    ASSESSMENT AND PLAN   59-year-old with numerous medical problems and chronic kidney disease stage V admitted with acute diverticulitis.  1.  Chronic kidney disease stage V  2.  Chronic metabolic acidosis  3.  Essential hypertension  4.  Anemia of chronic kidney disease     He has advanced underlying CKD stage V, but he has no uremic symptoms as yet.  There is no role for hemodialysis.  He does have a significant acidosis and I have escalated his bicarbonate therapy, which he said he does tolerate.  I would use his home blood pressure medicines, his blood pressure is reasonably stable.  I would hold his diuretics for now.  He will be getting another blood transfusion today.  Trend daily labs, continue supportive care.  I will follow him.    SUBJECTIVE  INTERVAL HPI: He has control of his abdominal pain.  There is no vomiting.  He has no hematochezia.  He has no chest pain or dyspnea.    MEDICATIONS:  Scheduled Medications[1]   Continuous Medications[2]   PRN Medications[3]     OBJECTIVE  PHYSICAL EXAM:   Heart Rate:  [66-85]   Temp:  [36.2 °C (97.2 °F)-36.8 °C (98.2 °F)]   Resp:  [16]   BP: (121-165)/(63-98)   SpO2:  [95 %-100 %]   Body mass index is 35.87 kg/m².  This is a chronically ill-appearing white man, obese, no acute distress  No obvious skin rashes  Hearing intact  Phonation intact  Moist mucosa  Normal S1/normal S2  Lungs are clear to auscultation bilaterally  Abdomen is soft, nondistended, diffusely tender in the lower quadrants, positive bowel sounds  No Elmore catheter in place, no suprapubic tenderness to palpation  Trace bilateral lower extremity edema  Moves 4 limbs spontaneously  No obvious joint deformities  No lymphadenopathy  Fatigued affect    DATA:   Labs:  Results for orders placed or performed during the hospital encounter of 05/15/25 (from the past 96 hours)   CBC and Auto  Differential   Result Value Ref Range    WBC 7.2 4.4 - 11.3 x10*3/uL    nRBC 0.0 0.0 - 0.0 /100 WBCs    RBC 2.83 (L) 4.50 - 5.90 x10*6/uL    Hemoglobin 7.7 (L) 13.5 - 17.5 g/dL    Hematocrit 25.1 (L) 41.0 - 52.0 %    MCV 89 80 - 100 fL    MCH 27.2 26.0 - 34.0 pg    MCHC 30.7 (L) 32.0 - 36.0 g/dL    RDW 16.8 (H) 11.5 - 14.5 %    Platelets 290 150 - 450 x10*3/uL    Immature Granulocytes %, Automated 0.6 0.0 - 0.9 %    Immature Granulocytes Absolute, Automated 0.04 0.00 - 0.70 x10*3/uL   Comprehensive metabolic panel   Result Value Ref Range    Glucose 69 (L) 74 - 99 mg/dL    Sodium 140 136 - 145 mmol/L    Potassium 4.2 3.5 - 5.3 mmol/L    Chloride 112 (H) 98 - 107 mmol/L    Bicarbonate 18 (L) 21 - 32 mmol/L    Anion Gap 14 10 - 20 mmol/L    Urea Nitrogen 80 (H) 6 - 23 mg/dL    Creatinine 7.57 (H) 0.50 - 1.30 mg/dL    eGFR 8 (L) >60 mL/min/1.73m*2    Calcium 9.0 8.6 - 10.3 mg/dL    Albumin 3.5 3.4 - 5.0 g/dL    Alkaline Phosphatase 64 33 - 136 U/L    Total Protein 6.9 6.4 - 8.2 g/dL    AST 41 (H) 9 - 39 U/L    Bilirubin, Total 0.5 0.0 - 1.2 mg/dL    ALT 26 10 - 52 U/L   Lipase   Result Value Ref Range    Lipase 35 9 - 82 U/L   Lactate   Result Value Ref Range    Lactate 1.4 0.4 - 2.0 mmol/L   Blood Culture    Specimen: Peripheral Venipuncture; Blood culture   Result Value Ref Range    Blood Culture Loaded on Instrument - Culture in progress    Blood Culture    Specimen: Peripheral Venipuncture; Blood culture   Result Value Ref Range    Blood Culture Loaded on Instrument - Culture in progress    Troponin I, High Sensitivity, Initial   Result Value Ref Range    Troponin I, High Sensitivity 13 0 - 20 ng/L   Manual Differential   Result Value Ref Range    Neutrophils %, Manual 92.0 40.0 - 80.0 %    Bands %, Manual 3.0 0.0 - 5.0 %    Lymphocytes %, Manual 5.0 13.0 - 44.0 %    Monocytes %, Manual 0.0 2.0 - 10.0 %    Eosinophils %, Manual 0.0 0.0 - 6.0 %    Basophils %, Manual 0.0 0.0 - 2.0 %    Seg Neutrophils Absolute,  Manual 6.62 1.20 - 7.00 x10*3/uL    Bands Absolute, Manual 0.22 0.00 - 0.70 x10*3/uL    Lymphocytes Absolute, Manual 0.36 (L) 1.20 - 4.80 x10*3/uL    Monocytes Absolute, Manual 0.00 (L) 0.10 - 1.00 x10*3/uL    Eosinophils Absolute, Manual 0.00 0.00 - 0.70 x10*3/uL    Basophils Absolute, Manual 0.00 0.00 - 0.10 x10*3/uL    Total Cells Counted 100     Neutrophils Absolute, Manual 6.84 1.20 - 7.70 x10*3/uL    RBC Morphology See Below     Polychromasia Mild     Ovalocytes Few    Urinalysis with Reflex Culture and Microscopic   Result Value Ref Range    Color, Urine Light-Yellow Light-Yellow, Yellow, Dark-Yellow    Appearance, Urine Clear Clear    Specific Gravity, Urine 1.012 1.005 - 1.035    pH, Urine 6.5 5.0, 5.5, 6.0, 6.5, 7.0, 7.5, 8.0    Protein, Urine 100 (2+) (A) NEGATIVE, 10 (TRACE), 20 (TRACE) mg/dL    Glucose, Urine 100 (1+) (A) Normal mg/dL    Blood, Urine NEGATIVE NEGATIVE mg/dL    Ketones, Urine NEGATIVE NEGATIVE mg/dL    Bilirubin, Urine NEGATIVE NEGATIVE mg/dL    Urobilinogen, Urine Normal Normal mg/dL    Nitrite, Urine NEGATIVE NEGATIVE    Leukocyte Esterase, Urine NEGATIVE NEGATIVE   Extra Urine Gray Tube   Result Value Ref Range    Extra Tube Hold for add-ons.    Urinalysis Microscopic   Result Value Ref Range    WBC, Urine 1-5 1-5, NONE /HPF    RBC, Urine 1-2 NONE, 1-2, 3-5 /HPF   Sars-CoV-2 and Influenza A/B PCR   Result Value Ref Range    Flu A Result Not Detected Not Detected    Flu B Result Not Detected Not Detected    Coronavirus 2019, PCR Not Detected Not Detected   Troponin, High Sensitivity, 1 Hour   Result Value Ref Range    Troponin I, High Sensitivity 26 (H) 0 - 20 ng/L   ECG 12 lead   Result Value Ref Range    Ventricular Rate 135 BPM    Atrial Rate 135 BPM    RI Interval 152 ms    QRS Duration 94 ms    QT Interval 328 ms    QTC Calculation(Bazett) 492 ms    R Axis -1 degrees    T Axis 104 degrees    QRS Count 22 beats    Q Onset 216 ms    T Offset 380 ms    QTC Fredericia 429 ms   CBC    Result Value Ref Range    WBC 26.7 (H) 4.4 - 11.3 x10*3/uL    nRBC 0.0 0.0 - 0.0 /100 WBCs    RBC 2.46 (L) 4.50 - 5.90 x10*6/uL    Hemoglobin 6.7 (L) 13.5 - 17.5 g/dL    Hematocrit 20.3 (L) 41.0 - 52.0 %    MCV 83 80 - 100 fL    MCH 27.2 26.0 - 34.0 pg    MCHC 33.0 32.0 - 36.0 g/dL    RDW 17.3 (H) 11.5 - 14.5 %    Platelets 256 150 - 450 x10*3/uL   Basic metabolic panel   Result Value Ref Range    Glucose 158 (H) 74 - 99 mg/dL    Sodium 139 136 - 145 mmol/L    Potassium 4.7 3.5 - 5.3 mmol/L    Chloride 115 (H) 98 - 107 mmol/L    Bicarbonate 15 (L) 21 - 32 mmol/L    Anion Gap 14 10 - 20 mmol/L    Urea Nitrogen 76 (H) 6 - 23 mg/dL    Creatinine 7.25 (H) 0.50 - 1.30 mg/dL    eGFR 8 (L) >60 mL/min/1.73m*2    Calcium 8.4 (L) 8.6 - 10.3 mg/dL   Hemoglobin A1c   Result Value Ref Range    Hemoglobin A1C 5.4 See comment %    Estimated Average Glucose 108 Not Established mg/dL   Prepare RBC: 1 Units   Result Value Ref Range    PRODUCT CODE V9620R35     Unit Number [id number]     Unit ABO O     Unit RH POS     XM INTEP COMP     Dispense Status TR     Blood Expiration Date 6/3/2025 11:59:00 PM EDT     PRODUCT BLOOD TYPE 5100     UNIT VOLUME 350    Type and screen   Result Value Ref Range    ABO TYPE O     Rh TYPE POS     ANTIBODY SCREEN NEG    CBC and Auto Differential   Result Value Ref Range    WBC 22.1 (H) 4.4 - 11.3 x10*3/uL    nRBC 0.0 0.0 - 0.0 /100 WBCs    RBC 2.37 (L) 4.50 - 5.90 x10*6/uL    Hemoglobin 6.4 (LL) 13.5 - 17.5 g/dL    Hematocrit 21.4 (L) 41.0 - 52.0 %    MCV 90 80 - 100 fL    MCH 27.0 26.0 - 34.0 pg    MCHC 29.9 (L) 32.0 - 36.0 g/dL    RDW 17.6 (H) 11.5 - 14.5 %    Platelets 269 150 - 450 x10*3/uL    Neutrophils % 91.3 40.0 - 80.0 %    Immature Granulocytes %, Automated 0.6 0.0 - 0.9 %    Lymphocytes % 3.6 13.0 - 44.0 %    Monocytes % 4.2 2.0 - 10.0 %    Eosinophils % 0.2 0.0 - 6.0 %    Basophils % 0.1 0.0 - 2.0 %    Neutrophils Absolute 20.19 (H) 1.20 - 7.70 x10*3/uL    Immature Granulocytes  Absolute, Automated 0.13 0.00 - 0.70 x10*3/uL    Lymphocytes Absolute 0.79 (L) 1.20 - 4.80 x10*3/uL    Monocytes Absolute 0.92 0.10 - 1.00 x10*3/uL    Eosinophils Absolute 0.04 0.00 - 0.70 x10*3/uL    Basophils Absolute 0.03 0.00 - 0.10 x10*3/uL   Hemoglobin and hematocrit, blood   Result Value Ref Range    Hemoglobin 6.9 (L) 13.5 - 17.5 g/dL    Hematocrit 21.4 (L) 41.0 - 52.0 %   Lactate   Result Value Ref Range    Lactate 1.2 0.4 - 2.0 mmol/L   CBC and Auto Differential   Result Value Ref Range    WBC 18.3 (H) 4.4 - 11.3 x10*3/uL    nRBC 0.0 0.0 - 0.0 /100 WBCs    RBC 2.53 (L) 4.50 - 5.90 x10*6/uL    Hemoglobin 7.0 (L) 13.5 - 17.5 g/dL    Hematocrit 21.9 (L) 41.0 - 52.0 %    MCV 87 80 - 100 fL    MCH 27.7 26.0 - 34.0 pg    MCHC 32.0 32.0 - 36.0 g/dL    RDW 16.9 (H) 11.5 - 14.5 %    Platelets 238 150 - 450 x10*3/uL    Neutrophils % 86.8 40.0 - 80.0 %    Immature Granulocytes %, Automated 0.4 0.0 - 0.9 %    Lymphocytes % 6.8 13.0 - 44.0 %    Monocytes % 4.4 2.0 - 10.0 %    Eosinophils % 1.5 0.0 - 6.0 %    Basophils % 0.1 0.0 - 2.0 %    Neutrophils Absolute 15.84 (H) 1.20 - 7.70 x10*3/uL    Immature Granulocytes Absolute, Automated 0.08 0.00 - 0.70 x10*3/uL    Lymphocytes Absolute 1.25 1.20 - 4.80 x10*3/uL    Monocytes Absolute 0.81 0.10 - 1.00 x10*3/uL    Eosinophils Absolute 0.27 0.00 - 0.70 x10*3/uL    Basophils Absolute 0.02 0.00 - 0.10 x10*3/uL   POCT GLUCOSE   Result Value Ref Range    POCT Glucose 149 (H) 74 - 99 mg/dL   Renal Function Panel   Result Value Ref Range    Glucose 108 (H) 74 - 99 mg/dL    Sodium 137 136 - 145 mmol/L    Potassium 4.3 3.5 - 5.3 mmol/L    Chloride 111 (H) 98 - 107 mmol/L    Bicarbonate 16 (L) 21 - 32 mmol/L    Anion Gap 14 10 - 20 mmol/L    Urea Nitrogen 75 (H) 6 - 23 mg/dL    Creatinine 7.30 (H) 0.50 - 1.30 mg/dL    eGFR 8 (L) >60 mL/min/1.73m*2    Calcium 8.3 (L) 8.6 - 10.3 mg/dL    Phosphorus 5.1 (H) 2.5 - 4.9 mg/dL    Albumin 3.0 (L) 3.4 - 5.0 g/dL   CBC and Auto Differential    Result Value Ref Range    WBC 15.4 (H) 4.4 - 11.3 x10*3/uL    nRBC 0.0 0.0 - 0.0 /100 WBCs    RBC 2.45 (L) 4.50 - 5.90 x10*6/uL    Hemoglobin 6.7 (L) 13.5 - 17.5 g/dL    Hematocrit 21.8 (L) 41.0 - 52.0 %    MCV 89 80 - 100 fL    MCH 27.3 26.0 - 34.0 pg    MCHC 30.7 (L) 32.0 - 36.0 g/dL    RDW 17.0 (H) 11.5 - 14.5 %    Platelets 232 150 - 450 x10*3/uL    Neutrophils % 85.2 40.0 - 80.0 %    Immature Granulocytes %, Automated 0.3 0.0 - 0.9 %    Lymphocytes % 8.1 13.0 - 44.0 %    Monocytes % 3.3 2.0 - 10.0 %    Eosinophils % 2.8 0.0 - 6.0 %    Basophils % 0.3 0.0 - 2.0 %    Neutrophils Absolute 13.08 (H) 1.20 - 7.70 x10*3/uL    Immature Granulocytes Absolute, Automated 0.05 0.00 - 0.70 x10*3/uL    Lymphocytes Absolute 1.25 1.20 - 4.80 x10*3/uL    Monocytes Absolute 0.51 0.10 - 1.00 x10*3/uL    Eosinophils Absolute 0.43 0.00 - 0.70 x10*3/uL    Basophils Absolute 0.04 0.00 - 0.10 x10*3/uL   Prepare RBC: 1 Units   Result Value Ref Range    PRODUCT CODE X4149J70     Unit Number [id number]     Unit ABO O     Unit RH POS     XM INTEP COMP     Dispense Status XM     Blood Expiration Date 6/4/2025 11:59:00 PM EDT     PRODUCT BLOOD TYPE 5100     UNIT VOLUME 350    Type and screen   Result Value Ref Range    ABO TYPE O     Rh TYPE POS     ANTIBODY SCREEN NEG          SIGNATURE: Boo Max MD PATIENT NAME: Colin Leonard   DATE: May 17, 2025 MRN: [id number]   TIME: 10:14 AM PAGER: [phone]            [1] aspirin, 81 mg, oral, Daily  atorvastatin, 20 mg, oral, Daily  carvedilol, 50 mg, oral, BID  ezetimibe, 10 mg, oral, Nightly  [Held by provider] heparin (porcine), 5,000 Units, subcutaneous, q12h  hydrALAZINE, 25 mg, oral, TID  pantoprazole, 40 mg, intravenous, Daily  piperacillin-tazobactam, 2.25 g, intravenous, q6h  polyethylene glycol, 17 g, oral, Daily  predniSONE, 20 mg, oral, BID  sodium bicarbonate, 1,950 mg, oral, TID  [Held by provider] torsemide, 60 mg, oral, Daily  vancomycin, 125 mg, oral,  Nightly  [2]    [3] PRN medications: acetaminophen **OR** acetaminophen **OR** acetaminophen, HYDROmorphone, ondansetron **OR** ondansetron

## 2025-05-17 NOTE — PROGRESS NOTES
Colin Leonard is a 59 y.o. male on day 1 of admission presenting with Acute diverticulitis.    Subjective   Interval History:   Afebrile, no chills  Reported to have hemoptysis, pulmonary on consult  No nausea vomiting or diarrhea  Denies any abdominal pain     Review of Systems   All other systems reviewed and are negative.      Objective   Range of Vitals (last 24 hours)  Heart Rate:  [66-85]   Temp:  [36.2 °C (97.2 °F)-37 °C (98.6 °F)]   Resp:  [16]   BP: (121-176)/(63-98)   SpO2:  [95 %-100 %]   Daily Weight  05/15/25 : 113 kg (250 lb)    Body mass index is 35.87 kg/m².    Physical Exam  Constitutional:       Appearance: Normal appearance.   HENT:      Head: Normocephalic and atraumatic.      Right Ear: External ear normal.      Left Ear: External ear normal.      Nose: Nose normal.   Eyes:      General: No scleral icterus.     Extraocular Movements: Extraocular movements intact.      Conjunctiva/sclera: Conjunctivae normal.   Cardiovascular:      Rate and Rhythm: Regular rhythm.      Heart sounds: Normal heart sounds, S1 normal and S2 normal.   Pulmonary:      Breath sounds: Normal breath sounds and air entry.   Abdominal:      General: Bowel sounds are normal.      Palpations: Abdomen is soft.      Tenderness: There is no abdominal tenderness.   Musculoskeletal:      Cervical back: Normal range of motion and neck supple.      Right lower leg: No edema.      Left lower leg: No edema.   Skin:     General: Skin is warm and dry.   Neurological:      Mental Status: He is alert.   Psychiatric:         Behavior: Behavior normal. Behavior is cooperative.    Antibiotics  piperacillin-tazobactam - 2.25 gram/50 mL  vancomycin - 125 mg    Relevant Results  Labs  Results from last 72 hours   Lab Units 05/17/25  0512 05/16/25 2012 05/16/25  1516 05/16/25  1217   WBC AUTO x10*3/uL 15.4* 18.3*  --  22.1*   HEMOGLOBIN g/dL 6.7* 7.0* 6.9* 6.4*   HEMATOCRIT % 21.8* 21.9* 21.4* 21.4*   PLATELETS AUTO x10*3/uL 232 238  --   269   NEUTROS PCT AUTO % 85.2 86.8  --  91.3   LYMPHS PCT AUTO % 8.1 6.8  --  3.6   MONOS PCT AUTO % 3.3 4.4  --  4.2   EOS PCT AUTO % 2.8 1.5  --  0.2     Results from last 72 hours   Lab Units 05/17/25  0512 05/16/25  0715 05/15/25  2227   SODIUM mmol/L 137 139 140   POTASSIUM mmol/L 4.3 4.7 4.2   CHLORIDE mmol/L 111* 115* 112*   CO2 mmol/L 16* 15* 18*   BUN mg/dL 75* 76* 80*   CREATININE mg/dL 7.30* 7.25* 7.57*   GLUCOSE mg/dL 108* 158* 69*   CALCIUM mg/dL 8.3* 8.4* 9.0   ANION GAP mmol/L 14 14 14   EGFR mL/min/1.73m*2 8* 8* 8*   PHOSPHORUS mg/dL 5.1*  --   --      Results from last 72 hours   Lab Units 05/17/25  0512 05/15/25  2227   ALK PHOS U/L  --  64   BILIRUBIN TOTAL mg/dL  --  0.5   PROTEIN TOTAL g/dL  --  6.9   ALT U/L  --  26   AST U/L  --  41*   ALBUMIN g/dL 3.0* 3.5     Estimated Creatinine Clearance: 13.4 mL/min (A) (by C-G formula based on SCr of 7.3 mg/dL (H)).  C-Reactive Protein   Date Value Ref Range Status   04/05/2025 12.65 (H) <1.00 mg/dL Final   04/04/2025 14.35 (H) <1.00 mg/dL Final   07/30/2024 9.10 (H) 0.00 - 2.00 mg/dL Final     Microbiology  Reviewed-blood cultures pending  Imaging  ECG 12 lead  Result Date: 5/16/2025  Sinus tachycardia with Premature atrial complexes with Aberrant conduction Anteroseptal infarct (cited on or before 06-APR-2025) ST & T wave abnormality, consider lateral ischemia Abnormal ECG When compared with ECG of 06-APR-2025 11:27, Premature ventricular complexes are no longer Present Aberrant conduction is now Present Serial changes of Anteroseptal infarct Present Confirmed by Aleks Gooden ([id number]) on 5/16/2025 10:06:44 AM    CT abdomen pelvis wo IV contrast  Result Date: 5/15/2025  Interpreted By:  Serjio Oliver, STUDY: CT ABDOMEN PELVIS WO IV CONTRAST;  5/15/2025 10:41 pm   INDICATION: Signs/Symptoms:Upper abdominal pain across the entire abdomen, dry heaving, chills.   COMPARISON: None.   ACCESSION NUMBER(S): [id number]   ORDERING CLINICIAN: KEY RYAN    TECHNIQUE: Axial noncontrast CT images of the abdomen and pelvis with coronal and sagittal reconstructed images.   FINDINGS:   LOWER CHEST: Small bilateral pleural effusions with adjacent airspace opacities favored to relate to atelectasis. Superimposed infection not excluded. Aortic root, mitral annular and dense coronary artery calcifications noted. Trace pericardial effusion.   ABDOMEN: Lack of intravenous contrast limits evaluation of vessels and solid organs. LIVER: Within normal limits. BILE DUCTS: Normal caliber. GALLBLADDER: Status post cholecystectomy. PANCREAS: Mild fatty atrophy of the pancreas. SPLEEN: Within normal limits. ADRENALS: Within normal limits.   KIDNEYS, URETERS, URINARY BLADDER:  Kidneys are symmetric in size there several nonobstructing right renal calculi measuring up to 4 mm. No evidence for left renal or bilateral ureteral calculi. No hydronephrosis, hydroureter or perinephric inflammatory changes. There is a 5.2 cm hypodense lesion in the left kidney demonstrating fluid attenuation, incompletely characterized on this unenhanced CT. No bladder calculi. Bladder is underdistended with apparent wall thickening.   VESSELS:  Calcific atherosclerosis of the aortoiliac and branch vessels. No aortic aneurysm. RETROPERITONEUM: No pathologically enlarged lymph nodes.   PELVIS:   REPRODUCTIVE ORGANS: Prostate gland is nonenlarged.   BOWEL: Stomach is partially distended. Visualized loops of bowel are without evidence for obstruction. Moderate stool burden. Scattered colonic diverticula. There is focal colonic wall thickening and inflammatory stranding in the sigmoid colon adjacent to several diverticula concerning for acute diverticulitis. No evidence for microperforation or abscess formation at this time.  Normal appendix. PERITONEUM: No ascites or free air, no fluid collection. ABDOMINAL WALL: Small umbilical hernia contains fat. BONES: Multilevel degenerative changes of the spine.        Scattered colonic diverticula. There is focal wall thickening and inflammatory stranding in the sigmoid colon adjacent to several diverticula consistent with acute diverticulitis. No evidence for microperforation or abscess formation at this time. Posttreatment follow-up or direct visualization is recommended to ensure complete resolution exclude underlying lesion.   Small bilateral pleural effusions and atelectasis. Superimposed infection not excluded. Correlate clinically.   Nonobstructing right renal calculi.   Mild bladder wall thickening is favored to relate to under distention. Correlate with symptomatology and urinalysis if there is clinical concern for superimposed cystitis.   Additional findings as described above.   MACRO: None   Signed by: Serjio Oliver 5/15/2025 11:22 PM Dictation workstation:   [id number]    XR chest 2 views  Result Date: 5/15/2025  Interpreted By:  Serjio Oliver, STUDY: XR CHEST 2 VIEWS;  5/15/2025 10:45 pm   INDICATION: Signs/Symptoms:Cough, chills, not feeling well for few days, pain across the upper abdomen/lower chest diaphragm area.   COMPARISON: Chest x-ray 04/04/2025.   ACCESSION NUMBER(S): [id number]   ORDERING CLINICIAN: KEY RYAN   FINDINGS: ,   CARDIOMEDIASTINAL SILHOUETTE: Cardiomediastinal silhouette is normal in size and configuration.   LUNGS: Mild blunting of the posterior costophrenic angles likely relate to small layering effusions and atelectasis. Note is made of low lung volumes with bronchovascular crowding. No consolidation or pneumothorax.   ABDOMEN: No remarkable upper abdominal findings.   BONES: Multilevel degenerative changes of the spine.       Hypoventilatory changes with bibasilar atelectasis. Minimal blunting of the posterior costophrenic angles likely relate to small layering effusions as seen on CT.   MACRO: None   Signed by: Serjio Oliver 5/15/2025 11:13 PM Dictation workstation:   [id number]      Assessment/Plan   Acute  diverticulitis  Leukocytosis secondary to above, resolving  Hemoptysis  Anemia  Chronic kidney disease stage V  History of recent C. difficile infection     Continue Zosyn  Follow-up blood cultures  Pulmonary follow-up  Monitor hemoglobin/hematocrit  Oral vancomycin, secondary prophylaxis  Monitor response to therapy  Monitor renal function  Monitor temperature and WBC     This is a complex infectious disease issue and the following was performed today (for more details please see the above note): Management decisions reflecting the added complexity (e.g., changes in antimicrobial therapy, infection control strategies).     Garry Rodrigez MD

## 2025-05-17 NOTE — NURSING NOTE
Patient arrived on the floor per stretcher from ED.  No distress or discomfort noted.  Oriented to room.  All needs attended.  Safety measures ensured and call light in reach.   Patient

## 2025-05-17 NOTE — NURSING NOTE
Patient received one unit of PRB and tolerated it well and is going to have ct done today, Patient is easily agitated and stated once the blood was done he was going to leave. He has not said anything yet now that he is done with transfusion. Call light and possessions within reach

## 2025-05-18 VITALS
WEIGHT: 250 LBS | RESPIRATION RATE: 17 BRPM | HEIGHT: 70 IN | BODY MASS INDEX: 35.79 KG/M2 | TEMPERATURE: 98.1 F | HEART RATE: 66 BPM | SYSTOLIC BLOOD PRESSURE: 179 MMHG | OXYGEN SATURATION: 95 % | DIASTOLIC BLOOD PRESSURE: 88 MMHG

## 2025-05-18 LAB
ALBUMIN SERPL BCP-MCNC: 3.1 G/DL (ref 3.4–5)
ANION GAP SERPL CALCULATED.3IONS-SCNC: 15 MMOL/L (ref 10–20)
BACTERIA BLD CULT: NORMAL
BACTERIA BLD CULT: NORMAL
BASOPHILS # BLD AUTO: 0.01 X10*3/UL (ref 0–0.1)
BASOPHILS NFR BLD AUTO: 0.1 %
BUN SERPL-MCNC: 77 MG/DL (ref 6–23)
CALCIUM SERPL-MCNC: 8.6 MG/DL (ref 8.6–10.3)
CHLORIDE SERPL-SCNC: 109 MMOL/L (ref 98–107)
CO2 SERPL-SCNC: 16 MMOL/L (ref 21–32)
CREAT SERPL-MCNC: 7.31 MG/DL (ref 0.5–1.3)
EGFRCR SERPLBLD CKD-EPI 2021: 8 ML/MIN/1.73M*2
EOSINOPHIL # BLD AUTO: 0 X10*3/UL (ref 0–0.7)
EOSINOPHIL NFR BLD AUTO: 0 %
ERYTHROCYTE [DISTWIDTH] IN BLOOD BY AUTOMATED COUNT: 17.1 % (ref 11.5–14.5)
GLUCOSE SERPL-MCNC: 200 MG/DL (ref 74–99)
HCT VFR BLD AUTO: 22.7 % (ref 41–52)
HGB BLD-MCNC: 7.1 G/DL (ref 13.5–17.5)
IMM GRANULOCYTES # BLD AUTO: 0.12 X10*3/UL (ref 0–0.7)
IMM GRANULOCYTES NFR BLD AUTO: 0.8 % (ref 0–0.9)
LYMPHOCYTES # BLD AUTO: 0.39 X10*3/UL (ref 1.2–4.8)
LYMPHOCYTES NFR BLD AUTO: 2.7 %
MCH RBC QN AUTO: 27.1 PG (ref 26–34)
MCHC RBC AUTO-ENTMCNC: 31.3 G/DL (ref 32–36)
MCV RBC AUTO: 87 FL (ref 80–100)
MONOCYTES # BLD AUTO: 0.19 X10*3/UL (ref 0.1–1)
MONOCYTES NFR BLD AUTO: 1.3 %
NEUTROPHILS # BLD AUTO: 13.94 X10*3/UL (ref 1.2–7.7)
NEUTROPHILS NFR BLD AUTO: 95.1 %
NRBC BLD-RTO: 0 /100 WBCS (ref 0–0)
PHOSPHATE SERPL-MCNC: 3.7 MG/DL (ref 2.5–4.9)
PLATELET # BLD AUTO: 234 X10*3/UL (ref 150–450)
POTASSIUM SERPL-SCNC: 4.6 MMOL/L (ref 3.5–5.3)
RBC # BLD AUTO: 2.62 X10*6/UL (ref 4.5–5.9)
SODIUM SERPL-SCNC: 135 MMOL/L (ref 136–145)
WBC # BLD AUTO: 14.7 X10*3/UL (ref 4.4–11.3)

## 2025-05-18 PROCEDURE — 6350000001 HC RX 635 EPOETIN >10,000 UNITS: Mod: JZ | Performed by: INTERNAL MEDICINE

## 2025-05-18 PROCEDURE — 2500000004 HC RX 250 GENERAL PHARMACY W/ HCPCS (ALT 636 FOR OP/ED): Performed by: INTERNAL MEDICINE

## 2025-05-18 PROCEDURE — 99232 SBSQ HOSP IP/OBS MODERATE 35: CPT | Performed by: INTERNAL MEDICINE

## 2025-05-18 PROCEDURE — 80069 RENAL FUNCTION PANEL: CPT | Performed by: INTERNAL MEDICINE

## 2025-05-18 PROCEDURE — 85025 COMPLETE CBC W/AUTO DIFF WBC: CPT | Performed by: INTERNAL MEDICINE

## 2025-05-18 PROCEDURE — 2500000004 HC RX 250 GENERAL PHARMACY W/ HCPCS (ALT 636 FOR OP/ED): Performed by: STUDENT IN AN ORGANIZED HEALTH CARE EDUCATION/TRAINING PROGRAM

## 2025-05-18 PROCEDURE — 2500000001 HC RX 250 WO HCPCS SELF ADMINISTERED DRUGS (ALT 637 FOR MEDICARE OP): Performed by: INTERNAL MEDICINE

## 2025-05-18 PROCEDURE — 2500000004 HC RX 250 GENERAL PHARMACY W/ HCPCS (ALT 636 FOR OP/ED): Mod: JZ | Performed by: INTERNAL MEDICINE

## 2025-05-18 PROCEDURE — 36415 COLL VENOUS BLD VENIPUNCTURE: CPT | Performed by: INTERNAL MEDICINE

## 2025-05-18 PROCEDURE — 99239 HOSP IP/OBS DSCHRG MGMT >30: CPT | Performed by: INTERNAL MEDICINE

## 2025-05-18 PROCEDURE — 2500000001 HC RX 250 WO HCPCS SELF ADMINISTERED DRUGS (ALT 637 FOR MEDICARE OP): Performed by: STUDENT IN AN ORGANIZED HEALTH CARE EDUCATION/TRAINING PROGRAM

## 2025-05-18 RX ORDER — AMOXICILLIN AND CLAVULANATE POTASSIUM 500; 125 MG/1; MG/1
1 TABLET, FILM COATED ORAL DAILY
Qty: 10 TABLET | Refills: 0 | Status: ON HOLD | OUTPATIENT
Start: 2025-05-18 | End: 2025-05-24

## 2025-05-18 RX ORDER — ACETAMINOPHEN 325 MG/1
650 TABLET ORAL EVERY 6 HOURS PRN
Start: 2025-05-18

## 2025-05-18 RX ORDER — ACETAMINOPHEN 500 MG
5 TABLET ORAL NIGHTLY
Status: DISCONTINUED | OUTPATIENT
Start: 2025-05-18 | End: 2025-05-18 | Stop reason: HOSPADM

## 2025-05-18 RX ORDER — VANCOMYCIN HYDROCHLORIDE 125 MG/1
125 CAPSULE ORAL NIGHTLY
Qty: 18 CAPSULE | Refills: 0 | Status: SHIPPED | OUTPATIENT
Start: 2025-05-18 | End: 2025-06-05

## 2025-05-18 RX ADMIN — PREDNISONE 20 MG: 20 TABLET ORAL at 08:04

## 2025-05-18 RX ADMIN — PANTOPRAZOLE SODIUM 40 MG: 40 INJECTION, POWDER, FOR SOLUTION INTRAVENOUS at 08:03

## 2025-05-18 RX ADMIN — ASPIRIN 81 MG: 81 TABLET, COATED ORAL at 08:04

## 2025-05-18 RX ADMIN — PIPERACILLIN SODIUM AND TAZOBACTAM SODIUM 2.25 G: 2; .25 INJECTION, SOLUTION INTRAVENOUS at 05:29

## 2025-05-18 RX ADMIN — HYDRALAZINE HYDROCHLORIDE 25 MG: 25 TABLET ORAL at 08:04

## 2025-05-18 RX ADMIN — CARVEDILOL 50 MG: 25 TABLET, FILM COATED ORAL at 08:04

## 2025-05-18 RX ADMIN — SODIUM BICARBONATE 1950 MG: 650 TABLET ORAL at 08:04

## 2025-05-18 RX ADMIN — ATORVASTATIN CALCIUM 20 MG: 20 TABLET, FILM COATED ORAL at 08:04

## 2025-05-18 RX ADMIN — EPOETIN ALFA-EPBX 40000 UNITS: 40000 INJECTION, SOLUTION INTRAVENOUS; SUBCUTANEOUS at 12:03

## 2025-05-18 RX ADMIN — Medication 5 MG: at 00:14

## 2025-05-18 RX ADMIN — PIPERACILLIN SODIUM AND TAZOBACTAM SODIUM 2.25 G: 2; .25 INJECTION, SOLUTION INTRAVENOUS at 00:00

## 2025-05-18 ASSESSMENT — PAIN SCALES - GENERAL: PAINLEVEL_OUTOF10: 0 - NO PAIN

## 2025-05-18 ASSESSMENT — PAIN - FUNCTIONAL ASSESSMENT: PAIN_FUNCTIONAL_ASSESSMENT: 0-10

## 2025-05-18 NOTE — CARE PLAN
The patient's goals for the shift include  discharge today    The clinical goals for the shift include Provide quiet environment, maintain normal wake/ sleep cycle

## 2025-05-18 NOTE — PROGRESS NOTES
"Pulmonary Daily Progress Note   Subjective    Colin Leonard is a 59 y.o. year old male patient admitted on 5/15/2025 with diverticulitis    Interval History:  No further episodes of hemoptysis  Resting comfortably  Wants to go home    Meds    Scheduled medications  Scheduled Medications[1]  Continuous medications  Continuous Medications[2]  PRN medications  PRN Medications[3]     Objective    Blood pressure 179/88, pulse 66, temperature 36.7 °C (98.1 °F), temperature source Oral, resp. rate 17, height 1.778 m (5' 10\"), weight 113 kg (250 lb), SpO2 95%.   Physical Exam   GENERAL: normal appearance. well nourished. No respiratory distress  HEAD/SINUSES: no sinus tenderness  NECK: no JVD, midline trachea without stridor. Thyroid not enlarged  LYMPH NODES : none felt in the cervical, submandibular or supraclavicular regions  LUNGS: Symmetric chest. Good excursion. Equal breath sounds. no wheezing. no crackles or rhonchi  CARDIAC: normal S1 and S2; no gallops, rubs or murmurs. Regular rate and rhythm  EXTREMITIES: No edema, no varicose veins  NEURO: grossly normal mental status, CN reflexes and motor strength.   SKIN: Skin turgor normal. No rashes or lesions.   PSYCH: Normal affect    Intake/Output Summary (Last 24 hours) at 5/18/2025 1211  Last data filed at 5/18/2025 0839  Gross per 24 hour   Intake 1018.33 ml   Output 750 ml   Net 268.33 ml     Labs:   Results from last 72 hours   Lab Units 05/18/25  0616 05/17/25  0512 05/16/25  0715   SODIUM mmol/L 135* 137 139   POTASSIUM mmol/L 4.6 4.3 4.7   CHLORIDE mmol/L 109* 111* 115*   CO2 mmol/L 16* 16* 15*   BUN mg/dL 77* 75* 76*   CREATININE mg/dL 7.31* 7.30* 7.25*   GLUCOSE mg/dL 200* 108* 158*   CALCIUM mg/dL 8.6 8.3* 8.4*   ANION GAP mmol/L 15 14 14   EGFR mL/min/1.73m*2 8* 8* 8*   PHOSPHORUS mg/dL 3.7 5.1*  --       Results from last 72 hours   Lab Units 05/18/25 0616 05/17/25 2003 05/17/25  0512 05/16/25 2012   WBC AUTO x10*3/uL 14.7*  --  15.4* 18.3* "   HEMOGLOBIN g/dL 7.1* 7.5* 6.7* 7.0*   HEMATOCRIT % 22.7* 24.1* 21.8* 21.9*   PLATELETS AUTO x10*3/uL 234  --  232 238   NEUTROS PCT AUTO % 95.1  --  85.2 86.8   LYMPHS PCT AUTO % 2.7  --  8.1 6.8   MONOS PCT AUTO % 1.3  --  3.3 4.4   EOS PCT AUTO % 0.0  --  2.8 1.5               Micro/ID:   Lab Results   Component Value Date    BLOODCULT No growth at 1 day 05/15/2025    BLOODCULT No growth at 1 day 05/15/2025     Summary of key imaging results from the last 24 hours      Impression   Colin Leonard is a 59 y.o. year old male patient is being seen by the pulmonary service for   Atypical pneumonia   Acute diverticulitis  Hemoptysis  Chronic kidney disease stage V  Anemia  History of C. difficile colitis  Hypertension  Diabetes    Recommendations   As follows:  Hemoptysis.  Patient describes blood-streaked sputum.  States he has been having some sinus issues for over a month and feels that some of this may be postnasal drip which she is coughing up.  His hemoglobin is low but is highly doubtful that this degree of anemia will be caused by/minor hemoptysis.  CT of the chest bilateral infiltrates.  Atypical pneumonia.  Continue supportive care for diverticulitis as per primary team.  Antibiotics per infectious disease.  10 days Augmentin  P.o. vancomycin for C. difficile  Continue with standard prophylaxis measures.  Stable for discharge from pulmonary standpoint    Giovani Gagnon MD   05/18/25 at 12:11 PM     -Only the Medical problems listed under impression were addressed today.   -Please contact primary team for all other concerns and medical problem  -Thank you for your consult       Disclaimer: Documentation completed with the information available at the time of input. Parts of this note may have been scribed or generated using voice dictation software, Dragon.  Homophonic errors may exist.  Please contact me directly if clarification is needed. The times in the chart may not be reflective of actual patient  care times, interventions, or procedures. Documentation occurs after the physical care of the patient.         [1] aspirin, 81 mg, oral, Daily  atorvastatin, 20 mg, oral, Daily  carvedilol, 50 mg, oral, BID  ezetimibe, 10 mg, oral, Nightly  [Held by provider] heparin (porcine), 5,000 Units, subcutaneous, q12h  hydrALAZINE, 25 mg, oral, TID  melatonin, 5 mg, oral, Nightly  pantoprazole, 40 mg, intravenous, Daily  piperacillin-tazobactam, 2.25 g, intravenous, q6h  polyethylene glycol, 17 g, oral, Daily  predniSONE, 20 mg, oral, BID  sodium bicarbonate, 1,950 mg, oral, TID  torsemide, 60 mg, oral, Daily  vancomycin, 125 mg, oral, Nightly  [2]    [3] PRN medications: acetaminophen **OR** acetaminophen **OR** acetaminophen, HYDROmorphone, ondansetron **OR** ondansetron

## 2025-05-18 NOTE — DISCHARGE SUMMARY
Discharge Diagnosis  Acute diverticulitis           Issues Requiring Follow-Up  Acute diverticulitis  Hypertension  Chronic kidney disease stage V  Morbid obesity      Discharge Meds     Medication List      START taking these medications     acetaminophen 325 mg tablet; Commonly known as: Tylenol; Take 2 tablets   (650 mg) by mouth every 6 hours if needed for moderate pain (4 - 6).   amoxicillin-clavulanate 500-125 mg tablet; Commonly known as: Augmentin;   Take 1 tablet by mouth early in the morning. for 10 days.   vancomycin 125 mg capsule; Commonly known as: Vancocin; Take 1 capsule   (125 mg) by mouth once daily at bedtime for 18 days.     CHANGE how you take these medications     carvedilol 25 mg tablet; Commonly known as: Coreg; Take 2 tablets (50   mg) by mouth 2 times a day.; What changed: how much to take     CONTINUE taking these medications     aspirin 81 mg EC tablet   atorvastatin 20 mg tablet; Commonly known as: Lipitor   ezetimibe 10 mg tablet; Commonly known as: Zetia; Take 1 tablet (10 mg)   by mouth once daily at bedtime.   hydrALAZINE 100 mg tablet; Commonly known as: Apresoline; Take 1 tablet   (100 mg) by mouth 3 times a day.   multivitamin tablet   sodium bicarbonate 650 mg tablet; Take 2 tablets (1,300 mg) by mouth 3   times a day.   torsemide 20 mg tablet; Commonly known as: Demadex; Take 3 tablets (60   mg) by mouth once daily.     STOP taking these medications     calcium carbonate 500 mg (200 mg elemental) chewable tablet; Commonly   known as: Tums       Test Results Pending At Discharge  Pending Labs       Order Current Status    Blood Culture Preliminary result    Blood Culture Preliminary result            Hospital Course       Patient is a 59 years old male with past medical history of hypertension, chronic kidney disease stage V, diabetes, history of C. difficile colitis.  Patient presented to [facility] ER complaining of abdominal pain.  CT abdomen pelvis done in ER revealed  scattered colonic diverticuli there is focal wall thickening and inflammatory stranding in the sigmoid colon adjacent to several diverticuli consistent with acute diverticulitis no evidence for microperforation or abscess.  Patient has small bilateral pleural effusion and atelectasis, nonobstructing right renal calculi.  Patient was admitted to hospital for further evaluation treatment.  He was started on Zosyn.  Patient had leukocytosis.  Initially n.p.o., later on patient was started on full liquid diet he tolerated the food well.  Diet was advanced to regular diet with low potassium.  Patient has chronic kidney disease stage V  Dr. Max was consulted consulted from nephrology services.  Patient has anemia of chronic disease.  GI was consulted.  He was transfused with 2 units of RBC during hospitalization.  Discussed with nephrology on case plan to give 1 dose of Retacrit today.  Patient was evaluated by ID as well.  Patient has history of C. difficile colitis.  He was started on vancomycin daily for secondary prophylaxis.  Blood culture no growth.  Patient had intermittent episode of hemoptysis.  Dr. Gagnon was consulted from pulmonary services.  He had CT chest without contrast.  Discussed with pulmonary.  Okay to discharge patient home  After few days in hospital leukocytosis improved.  Patient tolerated the diet well.  Patient will be discharged on Augmentin 500 mg daily for 10 more days, vancomycin 125 mg 1 capsule daily for 18 more days for secondary prophylaxis for C. difficile colitis.  Diuretics has been on hold.  Discussed with nephrology.  Resume torsemide today.  I advised the patient to have CBC and RFP in 5 days.  Patient was advised to follow-up with ID, pulmonary, nephrology, GI and his PCP.  Patient understand importance of following up.  Discussed with all his consultants on the case.  Okay to discharge patient today.  Patient is clinically hemodynamic stable to be discharged  today.    Pertinent Physical Exam At Time of Discharge  Physical Exam  General: In non acute distress, cooperating during physical exam.  HEENT: Pupils are equal and reactive to light and commendation , oral mucosa moist, no JVD oral mucosa is moist.  Cardiovascular: Normal sinus rhythm, no MRG.  Lungs: Clear to auscultation bilaterally, no wheezing, no crackles, no dullness to percussion.  Abdomen: No hepatosplenomegaly appreciated, soft , not tender, positive bowel sounds, positive bowel movement.  Neuro: Alert and oriented x3, strength in upper and lower extremities , sensation intact.  Psych: Patient had great insight was going on  Musculoskeletal: No swelling in lower extremities, no limitation in range of motion.  Vascular: Pulses are intact in upper and lower extremities  Skin: No petechiae, ecchymosis or other stigmata for dermatology disease.   Outpatient Follow-Up    Follow-up with nephrology in 1 week  Follow-up with GI in 2 weeks  Follow-up with pulmonary in 2 weeks  Follow-up with ID in 2 weeks  Follow-up with PCP in 2 weeks    Time spent discharging patient 43 minutes    Marie Harris MD

## 2025-05-18 NOTE — PROGRESS NOTES
Colin Leonard is a 59 y.o. male on day 2 of admission presenting with Acute diverticulitis.    Subjective   Interval History:   Afebrile, no chills  No abdominal pain  No nausea vomiting or diarrhea  Minimal cough  No chest pain     Review of Systems   All other systems reviewed and are negative.      Objective   Range of Vitals (last 24 hours)  Heart Rate:  []   Temp:  [36.7 °C (98.1 °F)-37 °C (98.6 °F)]   Resp:  [16-17]   BP: (117-183)/(87-96)   SpO2:  [93 %-95 %]   Daily Weight  05/15/25 : 113 kg (250 lb)    Body mass index is 35.87 kg/m².    Physical Exam  Constitutional:       Appearance: Normal appearance.   HENT:      Head: Normocephalic and atraumatic.      Right Ear: External ear normal.      Left Ear: External ear normal.      Nose: Nose normal.   Eyes:      General: No scleral icterus.     Extraocular Movements: Extraocular movements intact.      Conjunctiva/sclera: Conjunctivae normal.   Cardiovascular:      Rate and Rhythm: Regular rhythm.      Heart sounds: Normal heart sounds, S1 normal and S2 normal.   Pulmonary:      Breath sounds: Normal breath sounds and air entry.   Abdominal:      General: Bowel sounds are normal.      Palpations: Abdomen is soft.      Tenderness: There is no abdominal tenderness.   Musculoskeletal:      Cervical back: Normal range of motion and neck supple.      Right lower leg: No edema.      Left lower leg: No edema.   Skin:     General: Skin is warm and dry.   Neurological:      Mental Status: He is alert.   Psychiatric:         Behavior: Behavior normal. Behavior is cooperative.    Antibiotics  piperacillin-tazobactam - 2.25 gram/50 mL  vancomycin - 125 mg    Relevant Results  Labs  Results from last 72 hours   Lab Units 05/18/25  0616 05/17/25 2003 05/17/25 0512 05/16/25 2012   WBC AUTO x10*3/uL 14.7*  --  15.4* 18.3*   HEMOGLOBIN g/dL 7.1* 7.5* 6.7* 7.0*   HEMATOCRIT % 22.7* 24.1* 21.8* 21.9*   PLATELETS AUTO x10*3/uL 234  --  232 238   NEUTROS PCT AUTO %  95.1  --  85.2 86.8   LYMPHS PCT AUTO % 2.7  --  8.1 6.8   MONOS PCT AUTO % 1.3  --  3.3 4.4   EOS PCT AUTO % 0.0  --  2.8 1.5     Results from last 72 hours   Lab Units 05/17/25  0512 05/16/25  0715 05/15/25  2227   SODIUM mmol/L 137 139 140   POTASSIUM mmol/L 4.3 4.7 4.2   CHLORIDE mmol/L 111* 115* 112*   CO2 mmol/L 16* 15* 18*   BUN mg/dL 75* 76* 80*   CREATININE mg/dL 7.30* 7.25* 7.57*   GLUCOSE mg/dL 108* 158* 69*   CALCIUM mg/dL 8.3* 8.4* 9.0   ANION GAP mmol/L 14 14 14   EGFR mL/min/1.73m*2 8* 8* 8*   PHOSPHORUS mg/dL 5.1*  --   --      Results from last 72 hours   Lab Units 05/17/25  0512 05/15/25  2227   ALK PHOS U/L  --  64   BILIRUBIN TOTAL mg/dL  --  0.5   PROTEIN TOTAL g/dL  --  6.9   ALT U/L  --  26   AST U/L  --  41*   ALBUMIN g/dL 3.0* 3.5     Estimated Creatinine Clearance: 13.4 mL/min (A) (by C-G formula based on SCr of 7.3 mg/dL (H)).  C-Reactive Protein   Date Value Ref Range Status   04/05/2025 12.65 (H) <1.00 mg/dL Final   04/04/2025 14.35 (H) <1.00 mg/dL Final   07/30/2024 9.10 (H) 0.00 - 2.00 mg/dL Final     Microbiology   reviewed-blood cultures pending  Imaging  ECG 12 lead  Result Date: 5/16/2025  Sinus tachycardia with Premature atrial complexes with Aberrant conduction Anteroseptal infarct (cited on or before 06-APR-2025) ST & T wave abnormality, consider lateral ischemia Abnormal ECG When compared with ECG of 06-APR-2025 11:27, Premature ventricular complexes are no longer Present Aberrant conduction is now Present Serial changes of Anteroseptal infarct Present Confirmed by Aleks Gooden ([id number]) on 5/16/2025 10:06:44 AM    CT abdomen pelvis wo IV contrast  Result Date: 5/15/2025  Interpreted By:  Serjio Oliver, STUDY: CT ABDOMEN PELVIS WO IV CONTRAST;  5/15/2025 10:41 pm   INDICATION: Signs/Symptoms:Upper abdominal pain across the entire abdomen, dry heaving, chills.   COMPARISON: None.   ACCESSION NUMBER(S): [id number]   ORDERING CLINICIAN: KEY RYAN   TECHNIQUE: Axial  noncontrast CT images of the abdomen and pelvis with coronal and sagittal reconstructed images.   FINDINGS:   LOWER CHEST: Small bilateral pleural effusions with adjacent airspace opacities favored to relate to atelectasis. Superimposed infection not excluded. Aortic root, mitral annular and dense coronary artery calcifications noted. Trace pericardial effusion.   ABDOMEN: Lack of intravenous contrast limits evaluation of vessels and solid organs. LIVER: Within normal limits. BILE DUCTS: Normal caliber. GALLBLADDER: Status post cholecystectomy. PANCREAS: Mild fatty atrophy of the pancreas. SPLEEN: Within normal limits. ADRENALS: Within normal limits.   KIDNEYS, URETERS, URINARY BLADDER:  Kidneys are symmetric in size there several nonobstructing right renal calculi measuring up to 4 mm. No evidence for left renal or bilateral ureteral calculi. No hydronephrosis, hydroureter or perinephric inflammatory changes. There is a 5.2 cm hypodense lesion in the left kidney demonstrating fluid attenuation, incompletely characterized on this unenhanced CT. No bladder calculi. Bladder is underdistended with apparent wall thickening.   VESSELS:  Calcific atherosclerosis of the aortoiliac and branch vessels. No aortic aneurysm. RETROPERITONEUM: No pathologically enlarged lymph nodes.   PELVIS:   REPRODUCTIVE ORGANS: Prostate gland is nonenlarged.   BOWEL: Stomach is partially distended. Visualized loops of bowel are without evidence for obstruction. Moderate stool burden. Scattered colonic diverticula. There is focal colonic wall thickening and inflammatory stranding in the sigmoid colon adjacent to several diverticula concerning for acute diverticulitis. No evidence for microperforation or abscess formation at this time.  Normal appendix. PERITONEUM: No ascites or free air, no fluid collection. ABDOMINAL WALL: Small umbilical hernia contains fat. BONES: Multilevel degenerative changes of the spine.       Scattered colonic  diverticula. There is focal wall thickening and inflammatory stranding in the sigmoid colon adjacent to several diverticula consistent with acute diverticulitis. No evidence for microperforation or abscess formation at this time. Posttreatment follow-up or direct visualization is recommended to ensure complete resolution exclude underlying lesion.   Small bilateral pleural effusions and atelectasis. Superimposed infection not excluded. Correlate clinically.   Nonobstructing right renal calculi.   Mild bladder wall thickening is favored to relate to under distention. Correlate with symptomatology and urinalysis if there is clinical concern for superimposed cystitis.   Additional findings as described above.   MACRO: None   Signed by: Serjio Oliver 5/15/2025 11:22 PM Dictation workstation:   [id number]    XR chest 2 views  Result Date: 5/15/2025  Interpreted By:  Serjio Oliver, STUDY: XR CHEST 2 VIEWS;  5/15/2025 10:45 pm   INDICATION: Signs/Symptoms:Cough, chills, not feeling well for few days, pain across the upper abdomen/lower chest diaphragm area.   COMPARISON: Chest x-ray 04/04/2025.   ACCESSION NUMBER(S): [id number]   ORDERING CLINICIAN: KEY RYAN   FINDINGS: ,   CARDIOMEDIASTINAL SILHOUETTE: Cardiomediastinal silhouette is normal in size and configuration.   LUNGS: Mild blunting of the posterior costophrenic angles likely relate to small layering effusions and atelectasis. Note is made of low lung volumes with bronchovascular crowding. No consolidation or pneumothorax.   ABDOMEN: No remarkable upper abdominal findings.   BONES: Multilevel degenerative changes of the spine.       Hypoventilatory changes with bibasilar atelectasis. Minimal blunting of the posterior costophrenic angles likely relate to small layering effusions as seen on CT.   MACRO: None   Signed by: Serjio Oliver 5/15/2025 11:13 PM Dictation workstation:   [id number]        Assessment/Plan   Acute diverticulitis  Leukocytosis  secondary to above, resolving  Hemoptysis, CT chest remarkable for bilateral infiltrates  Anemia  Chronic kidney disease stage V  History of recent C. difficile infection     Continue Zosyn, while inpatient  Follow-up blood cultures  Pulmonary follow-up  Monitor hemoglobin/hematocrit  Oral vancomycin, secondary prophylaxis-total of 21 days  Augmentin to complete total of 14 days, 500/125 p.o. daily for 14 days  Monitor response to therapy  Monitor renal function  Monitor temperature and WBC  Follow-up with pulmonologist post discharge  Plan discussed with primary attending     This is a complex infectious disease issue and the following was performed today (for more details please see the above note): Management decisions reflecting the added complexity (e.g., changes in antimicrobial therapy, infection control strategies).    Garry Rodrigez MD

## 2025-05-18 NOTE — NURSING NOTE
Assumed care of patient, patient is awake sitting up in chair and call light and possessions within reach.

## 2025-05-18 NOTE — CARE PLAN
Over the shift, the patient did not make progress toward the following goals. Barriers to progression include . Recommendations to address these barriers include .      Problem: Pain - Adult  Goal: Verbalizes/displays adequate comfort level or baseline comfort level  5/18/2025 0735 by Baldomero Michele RN  Outcome: Progressing  5/18/2025 0247 by Baldomero Michele RN  Outcome: Progressing     Problem: Safety - Adult  Goal: Free from fall injury  5/18/2025 0735 by Baldomero Michele RN  Outcome: Progressing  5/18/2025 0247 by Baldomero Michele RN  Outcome: Progressing     Problem: Discharge Planning  Goal: Discharge to home or other facility with appropriate resources  5/18/2025 0735 by Baldomero Michele RN  Outcome: Progressing  5/18/2025 0247 by Baldomero Michele RN  Outcome: Progressing     Problem: Chronic Conditions and Co-morbidities  Goal: Patient's chronic conditions and co-morbidity symptoms are monitored and maintained or improved  5/18/2025 0735 by Baldomero Michele RN  Outcome: Progressing  5/18/2025 0247 by Baldomero Michele RN  Outcome: Progressing     Problem: Nutrition  Goal: Nutrient intake appropriate for maintaining nutritional needs  5/18/2025 0735 by Baldomero Michele RN  Outcome: Progressing  5/18/2025 0247 by Baldomero Michele RN  Outcome: Progressing

## 2025-05-18 NOTE — NURSING NOTE
Patient discharged home gave Epogen prior to leaving and IV out and tele off, discharge papers given to patient and explained

## 2025-05-18 NOTE — ASSESSMENT & PLAN NOTE
Acute on chronic anemia   Labs demonstrate normocytic anemia with initial Hgb of 7.7, down to 6.4 today.  PRBC ordered. No signs/symptoms of active bleeding. History of chronic kidney disease on erythropoietin. No urgent indication for any scopes at this time.   - Monitor  CBC, CMP  - Transfuse to keep hgb >7  - Protonix 40 mg IV BID      Diverticulitis  -CT abdomen pelvis in ED with diverticulitis without any evidence of perforation or abscess. Abdominal pain has improved.  He remains afebrile. WBC at 22.1   -IV antibiotics as ordered   -Pain control   -Nausea control with Zofran as needed  -Advance diet  -Continue conservative management  -Reassess in the a.m.  - Will need to follow-up and schedule in 4 to 6 weeks.

## 2025-05-18 NOTE — PROGRESS NOTES
"Colin Leonard is a 59 y.o. male on day 2 of admission presenting with Acute diverticulitis.    Subjective   Patient denies any abdominal pain, nausea, vomiting.  Patient reports he is having soft Bms. No reported diarrhea       Objective     Physical Exam  HENT:      Head: Normocephalic.      Right Ear: Tympanic membrane normal.      Mouth/Throat:      Mouth: Mucous membranes are moist.   Eyes:      Pupils: Pupils are equal, round, and reactive to light.   Cardiovascular:      Rate and Rhythm: Normal rate.      Pulses: Normal pulses.   Pulmonary:      Effort: Pulmonary effort is normal.   Abdominal:      Palpations: Abdomen is soft.   Musculoskeletal:         General: Normal range of motion.      Cervical back: Normal range of motion.   Skin:     General: Skin is warm.   Neurological:      General: No focal deficit present.      Mental Status: He is alert.   Psychiatric:         Mood and Affect: Mood normal.         Last Recorded Vitals  Blood pressure 179/88, pulse 66, temperature 36.7 °C (98.1 °F), temperature source Oral, resp. rate 17, height 1.778 m (5' 10\"), weight 113 kg (250 lb), SpO2 95%.  Intake/Output last 3 Shifts:  I/O last 3 completed shifts:  In: 858.3 (7.6 mL/kg) [Blood:408.3; IV Piggyback:450]  Out: 1350 (11.9 mL/kg) [Urine:1350 (0.3 mL/kg/hr)]  Weight: 113.4 kg     Relevant Results      Scheduled medications  Scheduled Medications[1]  Continuous medications  Continuous Medications[2]  PRN medications  PRN Medications[3]  Results for orders placed or performed during the hospital encounter of 05/15/25 (from the past 24 hours)   Hemoglobin and hematocrit, blood   Result Value Ref Range    Hemoglobin 7.5 (L) 13.5 - 17.5 g/dL    Hematocrit 24.1 (L) 41.0 - 52.0 %   Renal Function Panel   Result Value Ref Range    Glucose 200 (H) 74 - 99 mg/dL    Sodium 135 (L) 136 - 145 mmol/L    Potassium 4.6 3.5 - 5.3 mmol/L    Chloride 109 (H) 98 - 107 mmol/L    Bicarbonate 16 (L) 21 - 32 mmol/L    Anion Gap 15 " 10 - 20 mmol/L    Urea Nitrogen 77 (H) 6 - 23 mg/dL    Creatinine 7.31 (H) 0.50 - 1.30 mg/dL    eGFR 8 (L) >60 mL/min/1.73m*2    Calcium 8.6 8.6 - 10.3 mg/dL    Phosphorus 3.7 2.5 - 4.9 mg/dL    Albumin 3.1 (L) 3.4 - 5.0 g/dL   CBC and Auto Differential   Result Value Ref Range    WBC 14.7 (H) 4.4 - 11.3 x10*3/uL    nRBC 0.0 0.0 - 0.0 /100 WBCs    RBC 2.62 (L) 4.50 - 5.90 x10*6/uL    Hemoglobin 7.1 (L) 13.5 - 17.5 g/dL    Hematocrit 22.7 (L) 41.0 - 52.0 %    MCV 87 80 - 100 fL    MCH 27.1 26.0 - 34.0 pg    MCHC 31.3 (L) 32.0 - 36.0 g/dL    RDW 17.1 (H) 11.5 - 14.5 %    Platelets 234 150 - 450 x10*3/uL    Neutrophils % 95.1 40.0 - 80.0 %    Immature Granulocytes %, Automated 0.8 0.0 - 0.9 %    Lymphocytes % 2.7 13.0 - 44.0 %    Monocytes % 1.3 2.0 - 10.0 %    Eosinophils % 0.0 0.0 - 6.0 %    Basophils % 0.1 0.0 - 2.0 %    Neutrophils Absolute 13.94 (H) 1.20 - 7.70 x10*3/uL    Immature Granulocytes Absolute, Automated 0.12 0.00 - 0.70 x10*3/uL    Lymphocytes Absolute 0.39 (L) 1.20 - 4.80 x10*3/uL    Monocytes Absolute 0.19 0.10 - 1.00 x10*3/uL    Eosinophils Absolute 0.00 0.00 - 0.70 x10*3/uL    Basophils Absolute 0.01 0.00 - 0.10 x10*3/uL                            Assessment & Plan  Acute diverticulitis  Acute on chronic anemia   Labs demonstrate normocytic anemia with initial Hgb of 7.7, down to 6.4 today.  PRBC ordered. No signs/symptoms of active bleeding. History of chronic kidney disease on erythropoietin. No urgent indication for any scopes at this time.   - Monitor  CBC, CMP  - Transfuse to keep hgb >7  - Protonix 40 mg IV BID      Diverticulitis  -CT abdomen pelvis in ED with diverticulitis without any evidence of perforation or abscess. Abdominal pain has improved.  He remains afebrile. WBC at 22.1   -IV antibiotics as ordered   -Pain control   -Nausea control with Zofran as needed  -Advance diet  -Continue conservative management  -Reassess in the a.m.  - Will need to follow-up and schedule in 4 to 6  weeks.     5/18  Overall symptoms have improved, he is tolerating his diet. Leukocytosis improving.  Hgb was 7.1, no overt bleeding noted.  Patient is eager to go home. Antibiotics per SANTI Pugh to [facility] from GI standpoint    Lexi Gunn, APRN-CNP           [1] aspirin, 81 mg, oral, Daily  atorvastatin, 20 mg, oral, Daily  carvedilol, 50 mg, oral, BID  ezetimibe, 10 mg, oral, Nightly  [Held by provider] heparin (porcine), 5,000 Units, subcutaneous, q12h  hydrALAZINE, 25 mg, oral, TID  melatonin, 5 mg, oral, Nightly  pantoprazole, 40 mg, intravenous, Daily  piperacillin-tazobactam, 2.25 g, intravenous, q6h  polyethylene glycol, 17 g, oral, Daily  predniSONE, 20 mg, oral, BID  sodium bicarbonate, 1,950 mg, oral, TID  [Held by provider] torsemide, 60 mg, oral, Daily  vancomycin, 125 mg, oral, Nightly    [2]    [3] PRN medications: acetaminophen **OR** acetaminophen **OR** acetaminophen, HYDROmorphone, ondansetron **OR** ondansetron

## 2025-05-18 NOTE — PROGRESS NOTES
CONSULT PROGRESS NOTES    SERVICE DATE: 5/18/2025   SERVICE TIME: 11:04 AM    CONSULTING SERVICE: Nephrology    ASSESSMENT AND PLAN   59-year-old with numerous medical problems and chronic kidney disease stage V admitted with acute diverticulitis.  1.  Chronic kidney disease stage V  2.  Chronic metabolic acidosis  3.  Essential hypertension  4.  Anemia of chronic kidney disease     He has advanced underlying CKD stage V, but he has no uremic symptoms as yet.  There is no role for hemodialysis.  He does have a significant acidosis and I have escalated his bicarbonate therapy, which he said he does tolerate.  I would use his home blood pressure medicines, his blood pressure is reasonably stable.  Okay to resume his home-going torsemide today.  He received several blood transfusions during this hospital stay, which confers an adequate iron load to him.  Use 40,000 units of Retacrit today x 1.  We discussed possibly continuing with the anemia management program when he discharges.  Trend daily labs, continue supportive care.  Case discussed with Dr. Harris.  No objection to discharge from my perspective.  I have outpatient follow-up with him arranged.    SUBJECTIVE  INTERVAL HPI: He has no further abdominal pain, no chest pain, no dyspnea.  He slept poorly and is very tired and fatigued today.    MEDICATIONS:  Scheduled Medications[1]   Continuous Medications[2]   PRN Medications[3]     OBJECTIVE  PHYSICAL EXAM:   Heart Rate:  []   Temp:  [36.7 °C (98.1 °F)-37 °C (98.6 °F)]   Resp:  [16-17]   BP: (117-183)/(87-96)   SpO2:  [93 %-95 %]   Body mass index is 35.87 kg/m².  This is a chronically ill-appearing white man, obese, no acute distress  No obvious skin rashes  Hearing intact  Phonation intact  Moist mucosa  Normal S1/normal S2  Lungs are clear to auscultation bilaterally  Abdomen is soft, nondistended, diffusely tender in the lower quadrants, positive bowel sounds  No Elmore catheter in place, no suprapubic  tenderness to palpation  Trace bilateral lower extremity edema  Moves 4 limbs spontaneously  No obvious joint deformities  No lymphadenopathy  Fatigued affect    DATA:   Labs:  Results for orders placed or performed during the hospital encounter of 05/15/25 (from the past 96 hours)   CBC and Auto Differential   Result Value Ref Range    WBC 7.2 4.4 - 11.3 x10*3/uL    nRBC 0.0 0.0 - 0.0 /100 WBCs    RBC 2.83 (L) 4.50 - 5.90 x10*6/uL    Hemoglobin 7.7 (L) 13.5 - 17.5 g/dL    Hematocrit 25.1 (L) 41.0 - 52.0 %    MCV 89 80 - 100 fL    MCH 27.2 26.0 - 34.0 pg    MCHC 30.7 (L) 32.0 - 36.0 g/dL    RDW 16.8 (H) 11.5 - 14.5 %    Platelets 290 150 - 450 x10*3/uL    Immature Granulocytes %, Automated 0.6 0.0 - 0.9 %    Immature Granulocytes Absolute, Automated 0.04 0.00 - 0.70 x10*3/uL   Comprehensive metabolic panel   Result Value Ref Range    Glucose 69 (L) 74 - 99 mg/dL    Sodium 140 136 - 145 mmol/L    Potassium 4.2 3.5 - 5.3 mmol/L    Chloride 112 (H) 98 - 107 mmol/L    Bicarbonate 18 (L) 21 - 32 mmol/L    Anion Gap 14 10 - 20 mmol/L    Urea Nitrogen 80 (H) 6 - 23 mg/dL    Creatinine 7.57 (H) 0.50 - 1.30 mg/dL    eGFR 8 (L) >60 mL/min/1.73m*2    Calcium 9.0 8.6 - 10.3 mg/dL    Albumin 3.5 3.4 - 5.0 g/dL    Alkaline Phosphatase 64 33 - 136 U/L    Total Protein 6.9 6.4 - 8.2 g/dL    AST 41 (H) 9 - 39 U/L    Bilirubin, Total 0.5 0.0 - 1.2 mg/dL    ALT 26 10 - 52 U/L   Lipase   Result Value Ref Range    Lipase 35 9 - 82 U/L   Lactate   Result Value Ref Range    Lactate 1.4 0.4 - 2.0 mmol/L   Blood Culture    Specimen: Peripheral Venipuncture; Blood culture   Result Value Ref Range    Blood Culture No growth at 1 day    Blood Culture    Specimen: Peripheral Venipuncture; Blood culture   Result Value Ref Range    Blood Culture No growth at 1 day    Troponin I, High Sensitivity, Initial   Result Value Ref Range    Troponin I, High Sensitivity 13 0 - 20 ng/L   Manual Differential   Result Value Ref Range    Neutrophils %, Manual  92.0 40.0 - 80.0 %    Bands %, Manual 3.0 0.0 - 5.0 %    Lymphocytes %, Manual 5.0 13.0 - 44.0 %    Monocytes %, Manual 0.0 2.0 - 10.0 %    Eosinophils %, Manual 0.0 0.0 - 6.0 %    Basophils %, Manual 0.0 0.0 - 2.0 %    Seg Neutrophils Absolute, Manual 6.62 1.20 - 7.00 x10*3/uL    Bands Absolute, Manual 0.22 0.00 - 0.70 x10*3/uL    Lymphocytes Absolute, Manual 0.36 (L) 1.20 - 4.80 x10*3/uL    Monocytes Absolute, Manual 0.00 (L) 0.10 - 1.00 x10*3/uL    Eosinophils Absolute, Manual 0.00 0.00 - 0.70 x10*3/uL    Basophils Absolute, Manual 0.00 0.00 - 0.10 x10*3/uL    Total Cells Counted 100     Neutrophils Absolute, Manual 6.84 1.20 - 7.70 x10*3/uL    RBC Morphology See Below     Polychromasia Mild     Ovalocytes Few    Urinalysis with Reflex Culture and Microscopic   Result Value Ref Range    Color, Urine Light-Yellow Light-Yellow, Yellow, Dark-Yellow    Appearance, Urine Clear Clear    Specific Gravity, Urine 1.012 1.005 - 1.035    pH, Urine 6.5 5.0, 5.5, 6.0, 6.5, 7.0, 7.5, 8.0    Protein, Urine 100 (2+) (A) NEGATIVE, 10 (TRACE), 20 (TRACE) mg/dL    Glucose, Urine 100 (1+) (A) Normal mg/dL    Blood, Urine NEGATIVE NEGATIVE mg/dL    Ketones, Urine NEGATIVE NEGATIVE mg/dL    Bilirubin, Urine NEGATIVE NEGATIVE mg/dL    Urobilinogen, Urine Normal Normal mg/dL    Nitrite, Urine NEGATIVE NEGATIVE    Leukocyte Esterase, Urine NEGATIVE NEGATIVE   Extra Urine Gray Tube   Result Value Ref Range    Extra Tube Hold for add-ons.    Urinalysis Microscopic   Result Value Ref Range    WBC, Urine 1-5 1-5, NONE /HPF    RBC, Urine 1-2 NONE, 1-2, 3-5 /HPF   Sars-CoV-2 and Influenza A/B PCR   Result Value Ref Range    Flu A Result Not Detected Not Detected    Flu B Result Not Detected Not Detected    Coronavirus 2019, PCR Not Detected Not Detected   Troponin, High Sensitivity, 1 Hour   Result Value Ref Range    Troponin I, High Sensitivity 26 (H) 0 - 20 ng/L   ECG 12 lead   Result Value Ref Range    Ventricular Rate 135 BPM    Atrial  Rate 135 BPM    OR Interval 152 ms    QRS Duration 94 ms    QT Interval 328 ms    QTC Calculation(Bazett) 492 ms    R Axis -1 degrees    T Axis 104 degrees    QRS Count 22 beats    Q Onset 216 ms    T Offset 380 ms    QTC Fredericia 429 ms   CBC   Result Value Ref Range    WBC 26.7 (H) 4.4 - 11.3 x10*3/uL    nRBC 0.0 0.0 - 0.0 /100 WBCs    RBC 2.46 (L) 4.50 - 5.90 x10*6/uL    Hemoglobin 6.7 (L) 13.5 - 17.5 g/dL    Hematocrit 20.3 (L) 41.0 - 52.0 %    MCV 83 80 - 100 fL    MCH 27.2 26.0 - 34.0 pg    MCHC 33.0 32.0 - 36.0 g/dL    RDW 17.3 (H) 11.5 - 14.5 %    Platelets 256 150 - 450 x10*3/uL   Basic metabolic panel   Result Value Ref Range    Glucose 158 (H) 74 - 99 mg/dL    Sodium 139 136 - 145 mmol/L    Potassium 4.7 3.5 - 5.3 mmol/L    Chloride 115 (H) 98 - 107 mmol/L    Bicarbonate 15 (L) 21 - 32 mmol/L    Anion Gap 14 10 - 20 mmol/L    Urea Nitrogen 76 (H) 6 - 23 mg/dL    Creatinine 7.25 (H) 0.50 - 1.30 mg/dL    eGFR 8 (L) >60 mL/min/1.73m*2    Calcium 8.4 (L) 8.6 - 10.3 mg/dL   Hemoglobin A1c   Result Value Ref Range    Hemoglobin A1C 5.4 See comment %    Estimated Average Glucose 108 Not Established mg/dL   Uric Acid   Result Value Ref Range    Uric Acid 7.2 4.0 - 7.5 mg/dL   Prepare RBC: 1 Units   Result Value Ref Range    PRODUCT CODE W5398U18     Unit Number [id number]     Unit ABO O     Unit RH POS     XM INTEP COMP     Dispense Status TR     Blood Expiration Date 6/3/2025 11:59:00 PM EDT     PRODUCT BLOOD TYPE 5100     UNIT VOLUME 350    Type and screen   Result Value Ref Range    ABO TYPE O     Rh TYPE POS     ANTIBODY SCREEN NEG    CBC and Auto Differential   Result Value Ref Range    WBC 22.1 (H) 4.4 - 11.3 x10*3/uL    nRBC 0.0 0.0 - 0.0 /100 WBCs    RBC 2.37 (L) 4.50 - 5.90 x10*6/uL    Hemoglobin 6.4 (LL) 13.5 - 17.5 g/dL    Hematocrit 21.4 (L) 41.0 - 52.0 %    MCV 90 80 - 100 fL    MCH 27.0 26.0 - 34.0 pg    MCHC 29.9 (L) 32.0 - 36.0 g/dL    RDW 17.6 (H) 11.5 - 14.5 %    Platelets 269 150 - 450  x10*3/uL    Neutrophils % 91.3 40.0 - 80.0 %    Immature Granulocytes %, Automated 0.6 0.0 - 0.9 %    Lymphocytes % 3.6 13.0 - 44.0 %    Monocytes % 4.2 2.0 - 10.0 %    Eosinophils % 0.2 0.0 - 6.0 %    Basophils % 0.1 0.0 - 2.0 %    Neutrophils Absolute 20.19 (H) 1.20 - 7.70 x10*3/uL    Immature Granulocytes Absolute, Automated 0.13 0.00 - 0.70 x10*3/uL    Lymphocytes Absolute 0.79 (L) 1.20 - 4.80 x10*3/uL    Monocytes Absolute 0.92 0.10 - 1.00 x10*3/uL    Eosinophils Absolute 0.04 0.00 - 0.70 x10*3/uL    Basophils Absolute 0.03 0.00 - 0.10 x10*3/uL   Hemoglobin and hematocrit, blood   Result Value Ref Range    Hemoglobin 6.9 (L) 13.5 - 17.5 g/dL    Hematocrit 21.4 (L) 41.0 - 52.0 %   Lactate   Result Value Ref Range    Lactate 1.2 0.4 - 2.0 mmol/L   CBC and Auto Differential   Result Value Ref Range    WBC 18.3 (H) 4.4 - 11.3 x10*3/uL    nRBC 0.0 0.0 - 0.0 /100 WBCs    RBC 2.53 (L) 4.50 - 5.90 x10*6/uL    Hemoglobin 7.0 (L) 13.5 - 17.5 g/dL    Hematocrit 21.9 (L) 41.0 - 52.0 %    MCV 87 80 - 100 fL    MCH 27.7 26.0 - 34.0 pg    MCHC 32.0 32.0 - 36.0 g/dL    RDW 16.9 (H) 11.5 - 14.5 %    Platelets 238 150 - 450 x10*3/uL    Neutrophils % 86.8 40.0 - 80.0 %    Immature Granulocytes %, Automated 0.4 0.0 - 0.9 %    Lymphocytes % 6.8 13.0 - 44.0 %    Monocytes % 4.4 2.0 - 10.0 %    Eosinophils % 1.5 0.0 - 6.0 %    Basophils % 0.1 0.0 - 2.0 %    Neutrophils Absolute 15.84 (H) 1.20 - 7.70 x10*3/uL    Immature Granulocytes Absolute, Automated 0.08 0.00 - 0.70 x10*3/uL    Lymphocytes Absolute 1.25 1.20 - 4.80 x10*3/uL    Monocytes Absolute 0.81 0.10 - 1.00 x10*3/uL    Eosinophils Absolute 0.27 0.00 - 0.70 x10*3/uL    Basophils Absolute 0.02 0.00 - 0.10 x10*3/uL   POCT GLUCOSE   Result Value Ref Range    POCT Glucose 149 (H) 74 - 99 mg/dL   Renal Function Panel   Result Value Ref Range    Glucose 108 (H) 74 - 99 mg/dL    Sodium 137 136 - 145 mmol/L    Potassium 4.3 3.5 - 5.3 mmol/L    Chloride 111 (H) 98 - 107 mmol/L     Bicarbonate 16 (L) 21 - 32 mmol/L    Anion Gap 14 10 - 20 mmol/L    Urea Nitrogen 75 (H) 6 - 23 mg/dL    Creatinine 7.30 (H) 0.50 - 1.30 mg/dL    eGFR 8 (L) >60 mL/min/1.73m*2    Calcium 8.3 (L) 8.6 - 10.3 mg/dL    Phosphorus 5.1 (H) 2.5 - 4.9 mg/dL    Albumin 3.0 (L) 3.4 - 5.0 g/dL   CBC and Auto Differential   Result Value Ref Range    WBC 15.4 (H) 4.4 - 11.3 x10*3/uL    nRBC 0.0 0.0 - 0.0 /100 WBCs    RBC 2.45 (L) 4.50 - 5.90 x10*6/uL    Hemoglobin 6.7 (L) 13.5 - 17.5 g/dL    Hematocrit 21.8 (L) 41.0 - 52.0 %    MCV 89 80 - 100 fL    MCH 27.3 26.0 - 34.0 pg    MCHC 30.7 (L) 32.0 - 36.0 g/dL    RDW 17.0 (H) 11.5 - 14.5 %    Platelets 232 150 - 450 x10*3/uL    Neutrophils % 85.2 40.0 - 80.0 %    Immature Granulocytes %, Automated 0.3 0.0 - 0.9 %    Lymphocytes % 8.1 13.0 - 44.0 %    Monocytes % 3.3 2.0 - 10.0 %    Eosinophils % 2.8 0.0 - 6.0 %    Basophils % 0.3 0.0 - 2.0 %    Neutrophils Absolute 13.08 (H) 1.20 - 7.70 x10*3/uL    Immature Granulocytes Absolute, Automated 0.05 0.00 - 0.70 x10*3/uL    Lymphocytes Absolute 1.25 1.20 - 4.80 x10*3/uL    Monocytes Absolute 0.51 0.10 - 1.00 x10*3/uL    Eosinophils Absolute 0.43 0.00 - 0.70 x10*3/uL    Basophils Absolute 0.04 0.00 - 0.10 x10*3/uL   Prepare RBC: 1 Units   Result Value Ref Range    PRODUCT CODE [id number]     Unit Number [id number]     Unit ABO O     Unit RH POS     XM INTEP COMP     Dispense Status TR     Blood Expiration Date 6/4/2025 11:59:00 PM EDT     PRODUCT BLOOD TYPE 5100     UNIT VOLUME 350    Type and screen   Result Value Ref Range    ABO TYPE O     Rh TYPE POS     ANTIBODY SCREEN NEG    Hemoglobin and hematocrit, blood   Result Value Ref Range    Hemoglobin 7.5 (L) 13.5 - 17.5 g/dL    Hematocrit 24.1 (L) 41.0 - 52.0 %   Renal Function Panel   Result Value Ref Range    Glucose 200 (H) 74 - 99 mg/dL    Sodium 135 (L) 136 - 145 mmol/L    Potassium 4.6 3.5 - 5.3 mmol/L    Chloride 109 (H) 98 - 107 mmol/L    Bicarbonate 16 (L) 21 - 32 mmol/L     Anion Gap 15 10 - 20 mmol/L    Urea Nitrogen 77 (H) 6 - 23 mg/dL    Creatinine 7.31 (H) 0.50 - 1.30 mg/dL    eGFR 8 (L) >60 mL/min/1.73m*2    Calcium 8.6 8.6 - 10.3 mg/dL    Phosphorus 3.7 2.5 - 4.9 mg/dL    Albumin 3.1 (L) 3.4 - 5.0 g/dL   CBC and Auto Differential   Result Value Ref Range    WBC 14.7 (H) 4.4 - 11.3 x10*3/uL    nRBC 0.0 0.0 - 0.0 /100 WBCs    RBC 2.62 (L) 4.50 - 5.90 x10*6/uL    Hemoglobin 7.1 (L) 13.5 - 17.5 g/dL    Hematocrit 22.7 (L) 41.0 - 52.0 %    MCV 87 80 - 100 fL    MCH 27.1 26.0 - 34.0 pg    MCHC 31.3 (L) 32.0 - 36.0 g/dL    RDW 17.1 (H) 11.5 - 14.5 %    Platelets 234 150 - 450 x10*3/uL    Neutrophils % 95.1 40.0 - 80.0 %    Immature Granulocytes %, Automated 0.8 0.0 - 0.9 %    Lymphocytes % 2.7 13.0 - 44.0 %    Monocytes % 1.3 2.0 - 10.0 %    Eosinophils % 0.0 0.0 - 6.0 %    Basophils % 0.1 0.0 - 2.0 %    Neutrophils Absolute 13.94 (H) 1.20 - 7.70 x10*3/uL    Immature Granulocytes Absolute, Automated 0.12 0.00 - 0.70 x10*3/uL    Lymphocytes Absolute 0.39 (L) 1.20 - 4.80 x10*3/uL    Monocytes Absolute 0.19 0.10 - 1.00 x10*3/uL    Eosinophils Absolute 0.00 0.00 - 0.70 x10*3/uL    Basophils Absolute 0.01 0.00 - 0.10 x10*3/uL         SIGNATURE: Boo Max MD PATIENT NAME: Colin Leonard   DATE: May 18, 2025 MRN: [id number]   TIME: 11:04 AM PAGER: [phone]            [1] aspirin, 81 mg, oral, Daily  atorvastatin, 20 mg, oral, Daily  carvedilol, 50 mg, oral, BID  ezetimibe, 10 mg, oral, Nightly  [Held by provider] heparin (porcine), 5,000 Units, subcutaneous, q12h  hydrALAZINE, 25 mg, oral, TID  melatonin, 5 mg, oral, Nightly  pantoprazole, 40 mg, intravenous, Daily  piperacillin-tazobactam, 2.25 g, intravenous, q6h  polyethylene glycol, 17 g, oral, Daily  predniSONE, 20 mg, oral, BID  sodium bicarbonate, 1,950 mg, oral, TID  torsemide, 60 mg, oral, Daily  vancomycin, 125 mg, oral, Nightly     [2]    [3] PRN medications: acetaminophen **OR** acetaminophen **OR** acetaminophen,  HYDROmorphone, ondansetron **OR** ondansetron

## 2025-05-18 NOTE — CARE PLAN
The clinical goals for the shift include Provide quiet environment, maintain normal wake/ sleep cycle

## 2025-05-19 ENCOUNTER — HOSPITAL ENCOUNTER (INPATIENT)
Facility: HOSPITAL | Age: 60
LOS: 5 days | Discharge: HOME | End: 2025-05-24
Attending: STUDENT IN AN ORGANIZED HEALTH CARE EDUCATION/TRAINING PROGRAM | Admitting: STUDENT IN AN ORGANIZED HEALTH CARE EDUCATION/TRAINING PROGRAM
Payer: COMMERCIAL

## 2025-05-19 ENCOUNTER — PATIENT OUTREACH (OUTPATIENT)
Dept: PRIMARY CARE | Facility: CLINIC | Age: 60
End: 2025-05-19
Payer: COMMERCIAL

## 2025-05-19 ENCOUNTER — APPOINTMENT (OUTPATIENT)
Dept: RADIOLOGY | Facility: HOSPITAL | Age: 60
DRG: 871 | End: 2025-05-19
Payer: COMMERCIAL

## 2025-05-19 ENCOUNTER — APPOINTMENT (OUTPATIENT)
Dept: CARDIOLOGY | Facility: HOSPITAL | Age: 60
DRG: 871 | End: 2025-05-19
Payer: COMMERCIAL

## 2025-05-19 DIAGNOSIS — R05.1 ACUTE COUGH: ICD-10-CM

## 2025-05-19 DIAGNOSIS — J96.01 ACUTE HYPOXEMIC RESPIRATORY FAILURE: ICD-10-CM

## 2025-05-19 DIAGNOSIS — I47.20 VENTRICULAR TACHYCARDIA (MULTI): ICD-10-CM

## 2025-05-19 DIAGNOSIS — I21.4 NON-ST ELEVATION (NSTEMI) MYOCARDIAL INFARCTION (MULTI): ICD-10-CM

## 2025-05-19 DIAGNOSIS — K57.92 ACUTE DIVERTICULITIS: ICD-10-CM

## 2025-05-19 DIAGNOSIS — R79.89 ELEVATED TROPONIN: ICD-10-CM

## 2025-05-19 DIAGNOSIS — I50.33 ACUTE ON CHRONIC DIASTOLIC HEART FAILURE: ICD-10-CM

## 2025-05-19 DIAGNOSIS — N18.5 CKD (CHRONIC KIDNEY DISEASE) STAGE 5, GFR LESS THAN 15 ML/MIN (MULTI): ICD-10-CM

## 2025-05-19 DIAGNOSIS — J18.9 MULTIFOCAL PNEUMONIA: ICD-10-CM

## 2025-05-19 DIAGNOSIS — I25.10 CORONARY ARTERY DISEASE INVOLVING NATIVE HEART, UNSPECIFIED VESSEL OR LESION TYPE, UNSPECIFIED WHETHER ANGINA PRESENT: ICD-10-CM

## 2025-05-19 DIAGNOSIS — R06.02 SHORTNESS OF BREATH: Primary | ICD-10-CM

## 2025-05-19 PROBLEM — Z86.19 HISTORY OF CLOSTRIDIOIDES DIFFICILE COLITIS: Status: ACTIVE | Noted: 2025-05-19

## 2025-05-19 PROBLEM — A41.9 SEPSIS WITH ACUTE HYPOXIC RESPIRATORY FAILURE: Status: ACTIVE | Noted: 2025-05-19

## 2025-05-19 PROBLEM — R65.20 SEPSIS WITH ACUTE HYPOXIC RESPIRATORY FAILURE: Status: ACTIVE | Noted: 2025-05-19

## 2025-05-19 LAB
ALBUMIN SERPL BCP-MCNC: 3.6 G/DL (ref 3.4–5)
ALP SERPL-CCNC: 72 U/L (ref 33–136)
ALT SERPL W P-5'-P-CCNC: 39 U/L (ref 10–52)
ANION GAP SERPL CALCULATED.3IONS-SCNC: 17 MMOL/L (ref 10–20)
APPEARANCE UR: CLEAR
AST SERPL W P-5'-P-CCNC: 9 U/L (ref 9–39)
BASOPHILS # BLD AUTO: 0.05 X10*3/UL (ref 0–0.1)
BASOPHILS NFR BLD AUTO: 0.2 %
BILIRUB SERPL-MCNC: 0.6 MG/DL (ref 0–1.2)
BILIRUB UR STRIP.AUTO-MCNC: NEGATIVE MG/DL
BNP SERPL-MCNC: 2008 PG/ML (ref 0–99)
BUN SERPL-MCNC: 73 MG/DL (ref 6–23)
CALCIUM SERPL-MCNC: 9.6 MG/DL (ref 8.6–10.3)
CARDIAC TROPONIN I PNL SERPL HS: 736 NG/L (ref 0–20)
CARDIAC TROPONIN I PNL SERPL HS: 744 NG/L (ref 0–20)
CHLORIDE SERPL-SCNC: 111 MMOL/L (ref 98–107)
CO2 SERPL-SCNC: 17 MMOL/L (ref 21–32)
COLOR UR: ABNORMAL
CREAT SERPL-MCNC: 7.39 MG/DL (ref 0.5–1.3)
EGFRCR SERPLBLD CKD-EPI 2021: 8 ML/MIN/1.73M*2
EOSINOPHIL # BLD AUTO: 0.09 X10*3/UL (ref 0–0.7)
EOSINOPHIL NFR BLD AUTO: 0.4 %
ERYTHROCYTE [DISTWIDTH] IN BLOOD BY AUTOMATED COUNT: 17.2 % (ref 11.5–14.5)
FLUAV RNA RESP QL NAA+PROBE: NOT DETECTED
FLUBV RNA RESP QL NAA+PROBE: NOT DETECTED
GLUCOSE SERPL-MCNC: 115 MG/DL (ref 74–99)
GLUCOSE UR STRIP.AUTO-MCNC: ABNORMAL MG/DL
HCT VFR BLD AUTO: 27.9 % (ref 41–52)
HGB BLD-MCNC: 8.9 G/DL (ref 13.5–17.5)
IMM GRANULOCYTES # BLD AUTO: 0.34 X10*3/UL (ref 0–0.7)
IMM GRANULOCYTES NFR BLD AUTO: 1.5 % (ref 0–0.9)
KETONES UR STRIP.AUTO-MCNC: NEGATIVE MG/DL
LACTATE SERPL-SCNC: 1.1 MMOL/L (ref 0.4–2)
LEUKOCYTE ESTERASE UR QL STRIP.AUTO: NEGATIVE
LYMPHOCYTES # BLD AUTO: 0.67 X10*3/UL (ref 1.2–4.8)
LYMPHOCYTES NFR BLD AUTO: 2.9 %
MCH RBC QN AUTO: 27.5 PG (ref 26–34)
MCHC RBC AUTO-ENTMCNC: 31.9 G/DL (ref 32–36)
MCV RBC AUTO: 86 FL (ref 80–100)
MONOCYTES # BLD AUTO: 0.53 X10*3/UL (ref 0.1–1)
MONOCYTES NFR BLD AUTO: 2.3 %
MRSA DNA SPEC QL NAA+PROBE: NOT DETECTED
NEUTROPHILS # BLD AUTO: 21.45 X10*3/UL (ref 1.2–7.7)
NEUTROPHILS NFR BLD AUTO: 92.7 %
NITRITE UR QL STRIP.AUTO: NEGATIVE
NRBC BLD-RTO: 0.1 /100 WBCS (ref 0–0)
PH UR STRIP.AUTO: 5.5 [PH]
PLATELET # BLD AUTO: 333 X10*3/UL (ref 150–450)
POTASSIUM SERPL-SCNC: 4.4 MMOL/L (ref 3.5–5.3)
PROT SERPL-MCNC: 7.2 G/DL (ref 6.4–8.2)
PROT UR STRIP.AUTO-MCNC: ABNORMAL MG/DL
RBC # BLD AUTO: 3.24 X10*6/UL (ref 4.5–5.9)
RBC # UR STRIP.AUTO: NEGATIVE MG/DL
RBC #/AREA URNS AUTO: NORMAL /HPF
RSV RNA RESP QL NAA+PROBE: NOT DETECTED
SARS-COV-2 RNA RESP QL NAA+PROBE: NOT DETECTED
SODIUM SERPL-SCNC: 141 MMOL/L (ref 136–145)
SP GR UR STRIP.AUTO: 1.01
UROBILINOGEN UR STRIP.AUTO-MCNC: NORMAL MG/DL
WBC # BLD AUTO: 23.1 X10*3/UL (ref 4.4–11.3)
WBC #/AREA URNS AUTO: NORMAL /HPF

## 2025-05-19 PROCEDURE — 86235 NUCLEAR ANTIGEN ANTIBODY: CPT | Performed by: STUDENT IN AN ORGANIZED HEALTH CARE EDUCATION/TRAINING PROGRAM

## 2025-05-19 PROCEDURE — 2500000001 HC RX 250 WO HCPCS SELF ADMINISTERED DRUGS (ALT 637 FOR MEDICARE OP): Performed by: STUDENT IN AN ORGANIZED HEALTH CARE EDUCATION/TRAINING PROGRAM

## 2025-05-19 PROCEDURE — 71045 X-RAY EXAM CHEST 1 VIEW: CPT

## 2025-05-19 PROCEDURE — 81001 URINALYSIS AUTO W/SCOPE: CPT | Performed by: STUDENT IN AN ORGANIZED HEALTH CARE EDUCATION/TRAINING PROGRAM

## 2025-05-19 PROCEDURE — 86036 ANCA SCREEN EACH ANTIBODY: CPT | Performed by: STUDENT IN AN ORGANIZED HEALTH CARE EDUCATION/TRAINING PROGRAM

## 2025-05-19 PROCEDURE — 36415 COLL VENOUS BLD VENIPUNCTURE: CPT | Performed by: STUDENT IN AN ORGANIZED HEALTH CARE EDUCATION/TRAINING PROGRAM

## 2025-05-19 PROCEDURE — 83516 IMMUNOASSAY NONANTIBODY: CPT | Performed by: STUDENT IN AN ORGANIZED HEALTH CARE EDUCATION/TRAINING PROGRAM

## 2025-05-19 PROCEDURE — 87205 SMEAR GRAM STAIN: CPT | Mod: WESLAB | Performed by: STUDENT IN AN ORGANIZED HEALTH CARE EDUCATION/TRAINING PROGRAM

## 2025-05-19 PROCEDURE — 99291 CRITICAL CARE FIRST HOUR: CPT | Performed by: STUDENT IN AN ORGANIZED HEALTH CARE EDUCATION/TRAINING PROGRAM

## 2025-05-19 PROCEDURE — 96366 THER/PROPH/DIAG IV INF ADDON: CPT

## 2025-05-19 PROCEDURE — 2500000002 HC RX 250 W HCPCS SELF ADMINISTERED DRUGS (ALT 637 FOR MEDICARE OP, ALT 636 FOR OP/ED): Performed by: STUDENT IN AN ORGANIZED HEALTH CARE EDUCATION/TRAINING PROGRAM

## 2025-05-19 PROCEDURE — 2500000004 HC RX 250 GENERAL PHARMACY W/ HCPCS (ALT 636 FOR OP/ED): Mod: JZ | Performed by: STUDENT IN AN ORGANIZED HEALTH CARE EDUCATION/TRAINING PROGRAM

## 2025-05-19 PROCEDURE — 85025 COMPLETE CBC W/AUTO DIFF WBC: CPT | Performed by: STUDENT IN AN ORGANIZED HEALTH CARE EDUCATION/TRAINING PROGRAM

## 2025-05-19 PROCEDURE — 87899 AGENT NOS ASSAY W/OPTIC: CPT | Mod: WESLAB | Performed by: STUDENT IN AN ORGANIZED HEALTH CARE EDUCATION/TRAINING PROGRAM

## 2025-05-19 PROCEDURE — 87637 SARSCOV2&INF A&B&RSV AMP PRB: CPT | Performed by: STUDENT IN AN ORGANIZED HEALTH CARE EDUCATION/TRAINING PROGRAM

## 2025-05-19 PROCEDURE — 87641 MR-STAPH DNA AMP PROBE: CPT | Performed by: STUDENT IN AN ORGANIZED HEALTH CARE EDUCATION/TRAINING PROGRAM

## 2025-05-19 PROCEDURE — 86038 ANTINUCLEAR ANTIBODIES: CPT | Mod: WESLAB | Performed by: STUDENT IN AN ORGANIZED HEALTH CARE EDUCATION/TRAINING PROGRAM

## 2025-05-19 PROCEDURE — 93005 ELECTROCARDIOGRAM TRACING: CPT

## 2025-05-19 PROCEDURE — 87631 RESP VIRUS 3-5 TARGETS: CPT | Mod: WESLAB | Performed by: STUDENT IN AN ORGANIZED HEALTH CARE EDUCATION/TRAINING PROGRAM

## 2025-05-19 PROCEDURE — 84484 ASSAY OF TROPONIN QUANT: CPT | Performed by: STUDENT IN AN ORGANIZED HEALTH CARE EDUCATION/TRAINING PROGRAM

## 2025-05-19 PROCEDURE — 80053 COMPREHEN METABOLIC PANEL: CPT | Performed by: STUDENT IN AN ORGANIZED HEALTH CARE EDUCATION/TRAINING PROGRAM

## 2025-05-19 PROCEDURE — 71045 X-RAY EXAM CHEST 1 VIEW: CPT | Performed by: STUDENT IN AN ORGANIZED HEALTH CARE EDUCATION/TRAINING PROGRAM

## 2025-05-19 PROCEDURE — 87449 NOS EACH ORGANISM AG IA: CPT | Mod: WESLAB | Performed by: STUDENT IN AN ORGANIZED HEALTH CARE EDUCATION/TRAINING PROGRAM

## 2025-05-19 PROCEDURE — 83605 ASSAY OF LACTIC ACID: CPT | Performed by: STUDENT IN AN ORGANIZED HEALTH CARE EDUCATION/TRAINING PROGRAM

## 2025-05-19 PROCEDURE — 99223 1ST HOSP IP/OBS HIGH 75: CPT | Performed by: STUDENT IN AN ORGANIZED HEALTH CARE EDUCATION/TRAINING PROGRAM

## 2025-05-19 PROCEDURE — 87040 BLOOD CULTURE FOR BACTERIA: CPT | Mod: WESLAB | Performed by: STUDENT IN AN ORGANIZED HEALTH CARE EDUCATION/TRAINING PROGRAM

## 2025-05-19 PROCEDURE — 2060000001 HC INTERMEDIATE ICU ROOM DAILY

## 2025-05-19 PROCEDURE — 96365 THER/PROPH/DIAG IV INF INIT: CPT

## 2025-05-19 PROCEDURE — 96374 THER/PROPH/DIAG INJ IV PUSH: CPT | Mod: 59

## 2025-05-19 PROCEDURE — 83880 ASSAY OF NATRIURETIC PEPTIDE: CPT | Performed by: STUDENT IN AN ORGANIZED HEALTH CARE EDUCATION/TRAINING PROGRAM

## 2025-05-19 RX ORDER — ACETAMINOPHEN 160 MG/5ML
650 SOLUTION ORAL EVERY 4 HOURS PRN
Status: DISCONTINUED | OUTPATIENT
Start: 2025-05-19 | End: 2025-05-24 | Stop reason: HOSPADM

## 2025-05-19 RX ORDER — LORAZEPAM 1 MG/1
1 TABLET ORAL ONCE
Status: DISCONTINUED | OUTPATIENT
Start: 2025-05-20 | End: 2025-05-20

## 2025-05-19 RX ORDER — POLYETHYLENE GLYCOL 3350 17 G/17G
17 POWDER, FOR SOLUTION ORAL DAILY
Status: DISCONTINUED | OUTPATIENT
Start: 2025-05-19 | End: 2025-05-24 | Stop reason: HOSPADM

## 2025-05-19 RX ORDER — FAMOTIDINE 20 MG/1
20 TABLET, FILM COATED ORAL DAILY
Status: DISCONTINUED | OUTPATIENT
Start: 2025-05-19 | End: 2025-05-24 | Stop reason: HOSPADM

## 2025-05-19 RX ORDER — HYDRALAZINE HYDROCHLORIDE 50 MG/1
100 TABLET, FILM COATED ORAL 3 TIMES DAILY
Status: DISCONTINUED | OUTPATIENT
Start: 2025-05-19 | End: 2025-05-24 | Stop reason: HOSPADM

## 2025-05-19 RX ORDER — ACETAMINOPHEN 500 MG
15 TABLET ORAL NIGHTLY
Status: DISCONTINUED | OUTPATIENT
Start: 2025-05-19 | End: 2025-05-24 | Stop reason: HOSPADM

## 2025-05-19 RX ORDER — FAMOTIDINE 10 MG/ML
20 INJECTION, SOLUTION INTRAVENOUS DAILY
Status: DISCONTINUED | OUTPATIENT
Start: 2025-05-19 | End: 2025-05-24 | Stop reason: HOSPADM

## 2025-05-19 RX ORDER — ACETAMINOPHEN 325 MG/1
650 TABLET ORAL EVERY 4 HOURS PRN
Status: DISCONTINUED | OUTPATIENT
Start: 2025-05-19 | End: 2025-05-24 | Stop reason: HOSPADM

## 2025-05-19 RX ORDER — HEPARIN SODIUM 5000 [USP'U]/ML
5000 INJECTION, SOLUTION INTRAVENOUS; SUBCUTANEOUS EVERY 8 HOURS
Status: DISCONTINUED | OUTPATIENT
Start: 2025-05-19 | End: 2025-05-23

## 2025-05-19 RX ORDER — ASPIRIN 81 MG/1
81 TABLET ORAL DAILY
Status: DISCONTINUED | OUTPATIENT
Start: 2025-05-19 | End: 2025-05-24 | Stop reason: HOSPADM

## 2025-05-19 RX ORDER — ONDANSETRON 4 MG/1
4 TABLET, FILM COATED ORAL EVERY 8 HOURS PRN
Status: DISCONTINUED | OUTPATIENT
Start: 2025-05-19 | End: 2025-05-24 | Stop reason: HOSPADM

## 2025-05-19 RX ORDER — EZETIMIBE 10 MG/1
10 TABLET ORAL NIGHTLY
Status: DISCONTINUED | OUTPATIENT
Start: 2025-05-19 | End: 2025-05-24 | Stop reason: HOSPADM

## 2025-05-19 RX ORDER — ATORVASTATIN CALCIUM 20 MG/1
20 TABLET, FILM COATED ORAL NIGHTLY
Status: DISCONTINUED | OUTPATIENT
Start: 2025-05-19 | End: 2025-05-24 | Stop reason: HOSPADM

## 2025-05-19 RX ORDER — VANCOMYCIN HYDROCHLORIDE 125 MG/1
125 CAPSULE ORAL NIGHTLY
Status: DISCONTINUED | OUTPATIENT
Start: 2025-05-20 | End: 2025-05-24 | Stop reason: HOSPADM

## 2025-05-19 RX ORDER — CARVEDILOL 25 MG/1
50 TABLET ORAL 2 TIMES DAILY
Status: DISCONTINUED | OUTPATIENT
Start: 2025-05-19 | End: 2025-05-21

## 2025-05-19 RX ORDER — VANCOMYCIN 2 G/400ML
2 INJECTION, SOLUTION INTRAVENOUS ONCE
Status: COMPLETED | OUTPATIENT
Start: 2025-05-19 | End: 2025-05-19

## 2025-05-19 RX ORDER — SODIUM BICARBONATE 650 MG/1
1300 TABLET ORAL 3 TIMES DAILY
Status: DISCONTINUED | OUTPATIENT
Start: 2025-05-19 | End: 2025-05-24 | Stop reason: HOSPADM

## 2025-05-19 RX ORDER — FUROSEMIDE 10 MG/ML
40 INJECTION INTRAMUSCULAR; INTRAVENOUS EVERY 12 HOURS
Status: DISCONTINUED | OUTPATIENT
Start: 2025-05-19 | End: 2025-05-20

## 2025-05-19 RX ORDER — ACETAMINOPHEN 650 MG/1
650 SUPPOSITORY RECTAL EVERY 4 HOURS PRN
Status: DISCONTINUED | OUTPATIENT
Start: 2025-05-19 | End: 2025-05-24 | Stop reason: HOSPADM

## 2025-05-19 RX ORDER — TORSEMIDE 20 MG/1
60 TABLET ORAL DAILY
Status: DISCONTINUED | OUTPATIENT
Start: 2025-05-19 | End: 2025-05-24 | Stop reason: HOSPADM

## 2025-05-19 RX ORDER — ONDANSETRON HYDROCHLORIDE 2 MG/ML
4 INJECTION, SOLUTION INTRAVENOUS EVERY 8 HOURS PRN
Status: DISCONTINUED | OUTPATIENT
Start: 2025-05-19 | End: 2025-05-24 | Stop reason: HOSPADM

## 2025-05-19 RX ADMIN — PIPERACILLIN SODIUM AND TAZOBACTAM SODIUM 4.5 G: 4; .5 INJECTION, SOLUTION INTRAVENOUS at 10:39

## 2025-05-19 RX ADMIN — CARVEDILOL 50 MG: 25 TABLET, FILM COATED ORAL at 17:11

## 2025-05-19 RX ADMIN — Medication 15 MG: at 17:11

## 2025-05-19 RX ADMIN — MEROPENEM 500 MG: 500 INJECTION, POWDER, FOR SOLUTION INTRAVENOUS at 17:13

## 2025-05-19 RX ADMIN — AZITHROMYCIN MONOHYDRATE 500 MG: 500 INJECTION, POWDER, LYOPHILIZED, FOR SOLUTION INTRAVENOUS at 11:22

## 2025-05-19 RX ADMIN — HYDRALAZINE HYDROCHLORIDE 100 MG: 50 TABLET ORAL at 20:20

## 2025-05-19 RX ADMIN — HYDRALAZINE HYDROCHLORIDE 100 MG: 50 TABLET ORAL at 15:34

## 2025-05-19 RX ADMIN — TORSEMIDE 60 MG: 20 TABLET ORAL at 15:34

## 2025-05-19 RX ADMIN — VANCOMYCIN 2 G: 2 INJECTION, SOLUTION INTRAVENOUS at 10:55

## 2025-05-19 RX ADMIN — FUROSEMIDE 40 MG: 10 INJECTION, SOLUTION INTRAMUSCULAR; INTRAVENOUS at 18:16

## 2025-05-19 RX ADMIN — ATORVASTATIN CALCIUM 20 MG: 20 TABLET, FILM COATED ORAL at 20:20

## 2025-05-19 RX ADMIN — SODIUM CHLORIDE 250 ML: 900 INJECTION, SOLUTION INTRAVENOUS at 10:39

## 2025-05-19 RX ADMIN — SODIUM BICARBONATE 1300 MG: 650 TABLET ORAL at 20:20

## 2025-05-19 RX ADMIN — HEPARIN SODIUM 5000 UNITS: 5000 INJECTION, SOLUTION INTRAVENOUS; SUBCUTANEOUS at 15:39

## 2025-05-19 RX ADMIN — HEPARIN SODIUM 5000 UNITS: 5000 INJECTION, SOLUTION INTRAVENOUS; SUBCUTANEOUS at 21:01

## 2025-05-19 RX ADMIN — EZETIMIBE 10 MG: 10 TABLET ORAL at 20:20

## 2025-05-19 RX ADMIN — FAMOTIDINE 20 MG: 20 TABLET, FILM COATED ORAL at 15:34

## 2025-05-19 RX ADMIN — ASPIRIN 81 MG: 81 TABLET, COATED ORAL at 15:34

## 2025-05-19 RX ADMIN — SODIUM BICARBONATE 1300 MG: 650 TABLET ORAL at 15:34

## 2025-05-19 SDOH — ECONOMIC STABILITY: INCOME INSECURITY: IN THE PAST 12 MONTHS HAS THE ELECTRIC, GAS, OIL, OR WATER COMPANY THREATENED TO SHUT OFF SERVICES IN YOUR HOME?: NO

## 2025-05-19 SDOH — HEALTH STABILITY: PHYSICAL HEALTH: ON AVERAGE, HOW MANY DAYS PER WEEK DO YOU ENGAGE IN MODERATE TO STRENUOUS EXERCISE (LIKE A BRISK WALK)?: 1 DAY

## 2025-05-19 SDOH — ECONOMIC STABILITY: TRANSPORTATION INSECURITY: IN THE PAST 12 MONTHS, HAS LACK OF TRANSPORTATION KEPT YOU FROM MEDICAL APPOINTMENTS OR FROM GETTING MEDICATIONS?: NO

## 2025-05-19 SDOH — SOCIAL STABILITY: SOCIAL NETWORK: HOW OFTEN DO YOU GET TOGETHER WITH FRIENDS OR RELATIVES?: MORE THAN THREE TIMES A WEEK

## 2025-05-19 SDOH — SOCIAL STABILITY: SOCIAL INSECURITY: WITHIN THE LAST YEAR, HAVE YOU BEEN HUMILIATED OR EMOTIONALLY ABUSED IN OTHER WAYS BY YOUR PARTNER OR EX-PARTNER?: NO

## 2025-05-19 SDOH — ECONOMIC STABILITY: FOOD INSECURITY: HOW HARD IS IT FOR YOU TO PAY FOR THE VERY BASICS LIKE FOOD, HOUSING, MEDICAL CARE, AND HEATING?: NOT HARD AT ALL

## 2025-05-19 SDOH — SOCIAL STABILITY: SOCIAL INSECURITY: ARE YOU MARRIED, WIDOWED, DIVORCED, SEPARATED, NEVER MARRIED, OR LIVING WITH A PARTNER?: LIVING WITH PARTNER

## 2025-05-19 SDOH — HEALTH STABILITY: PHYSICAL HEALTH: ON AVERAGE, HOW MANY MINUTES DO YOU ENGAGE IN EXERCISE AT THIS LEVEL?: 30 MIN

## 2025-05-19 SDOH — SOCIAL STABILITY: SOCIAL INSECURITY: HAVE YOU HAD ANY THOUGHTS OF HARMING ANYONE ELSE?: NO

## 2025-05-19 SDOH — SOCIAL STABILITY: SOCIAL INSECURITY: ABUSE: ADULT

## 2025-05-19 SDOH — ECONOMIC STABILITY: FOOD INSECURITY: WITHIN THE PAST 12 MONTHS, YOU WORRIED THAT YOUR FOOD WOULD RUN OUT BEFORE YOU GOT THE MONEY TO BUY MORE.: NEVER TRUE

## 2025-05-19 SDOH — SOCIAL STABILITY: SOCIAL INSECURITY: WITHIN THE LAST YEAR, HAVE YOU BEEN AFRAID OF YOUR PARTNER OR EX-PARTNER?: NO

## 2025-05-19 SDOH — SOCIAL STABILITY: SOCIAL NETWORK: HOW OFTEN DO YOU ATTEND CHURCH OR RELIGIOUS SERVICES?: NEVER

## 2025-05-19 SDOH — SOCIAL STABILITY: SOCIAL INSECURITY: ARE YOU OR HAVE YOU BEEN THREATENED OR ABUSED PHYSICALLY, EMOTIONALLY, OR SEXUALLY BY ANYONE?: NO

## 2025-05-19 SDOH — ECONOMIC STABILITY: HOUSING INSECURITY: AT ANY TIME IN THE PAST 12 MONTHS, WERE YOU HOMELESS OR LIVING IN A SHELTER (INCLUDING NOW)?: NO

## 2025-05-19 SDOH — ECONOMIC STABILITY: HOUSING INSECURITY: IN THE PAST 12 MONTHS, HOW MANY TIMES HAVE YOU MOVED WHERE YOU WERE LIVING?: 0

## 2025-05-19 SDOH — ECONOMIC STABILITY: HOUSING INSECURITY: IN THE LAST 12 MONTHS, WAS THERE A TIME WHEN YOU WERE NOT ABLE TO PAY THE MORTGAGE OR RENT ON TIME?: NO

## 2025-05-19 SDOH — SOCIAL STABILITY: SOCIAL INSECURITY: DO YOU FEEL UNSAFE GOING BACK TO THE PLACE WHERE YOU ARE LIVING?: NO

## 2025-05-19 SDOH — SOCIAL STABILITY: SOCIAL INSECURITY: ARE THERE ANY APPARENT SIGNS OF INJURIES/BEHAVIORS THAT COULD BE RELATED TO ABUSE/NEGLECT?: NO

## 2025-05-19 SDOH — SOCIAL STABILITY: SOCIAL NETWORK: HOW OFTEN DO YOU ATTEND MEETINGS OF THE CLUBS OR ORGANIZATIONS YOU BELONG TO?: NEVER

## 2025-05-19 SDOH — ECONOMIC STABILITY: FOOD INSECURITY: WITHIN THE PAST 12 MONTHS, THE FOOD YOU BOUGHT JUST DIDN'T LAST AND YOU DIDN'T HAVE MONEY TO GET MORE.: NEVER TRUE

## 2025-05-19 SDOH — SOCIAL STABILITY: SOCIAL INSECURITY: DOES ANYONE TRY TO KEEP YOU FROM HAVING/CONTACTING OTHER FRIENDS OR DOING THINGS OUTSIDE YOUR HOME?: NO

## 2025-05-19 SDOH — SOCIAL STABILITY: SOCIAL INSECURITY: HAS ANYONE EVER THREATENED TO HURT YOUR FAMILY OR YOUR PETS?: NO

## 2025-05-19 SDOH — SOCIAL STABILITY: SOCIAL INSECURITY: HAVE YOU HAD THOUGHTS OF HARMING ANYONE ELSE?: NO

## 2025-05-19 SDOH — SOCIAL STABILITY: SOCIAL INSECURITY: WERE YOU ABLE TO COMPLETE ALL THE BEHAVIORAL HEALTH SCREENINGS?: YES

## 2025-05-19 SDOH — SOCIAL STABILITY: SOCIAL INSECURITY: DO YOU FEEL ANYONE HAS EXPLOITED OR TAKEN ADVANTAGE OF YOU FINANCIALLY OR OF YOUR PERSONAL PROPERTY?: NO

## 2025-05-19 ASSESSMENT — ENCOUNTER SYMPTOMS
NAUSEA: 0
FEVER: 0
DIFFICULTY URINATING: 0
FATIGUE: 1
DIARRHEA: 0
CONFUSION: 0
CHILLS: 1
CONSTIPATION: 0
SHORTNESS OF BREATH: 1
VOMITING: 0
COUGH: 1
ABDOMINAL PAIN: 0
TROUBLE SWALLOWING: 0
APPETITE CHANGE: 1

## 2025-05-19 ASSESSMENT — COGNITIVE AND FUNCTIONAL STATUS - GENERAL
WALKING IN HOSPITAL ROOM: A LITTLE
CLIMB 3 TO 5 STEPS WITH RAILING: A LITTLE
MOBILITY SCORE: 20
STANDING UP FROM CHAIR USING ARMS: A LITTLE
MOBILITY SCORE: 18
DAILY ACTIVITIY SCORE: 24
CLIMB 3 TO 5 STEPS WITH RAILING: A LITTLE
DAILY ACTIVITIY SCORE: 24
CLIMB 3 TO 5 STEPS WITH RAILING: A LITTLE
MOVING TO AND FROM BED TO CHAIR: A LITTLE
DAILY ACTIVITIY SCORE: 24
TURNING FROM BACK TO SIDE WHILE IN FLAT BAD: A LITTLE
MOBILITY SCORE: 18
MOVING FROM LYING ON BACK TO SITTING ON SIDE OF FLAT BED WITH BEDRAILS: A LITTLE
PATIENT BASELINE BEDBOUND: NO
MOVING FROM LYING ON BACK TO SITTING ON SIDE OF FLAT BED WITH BEDRAILS: A LITTLE
WALKING IN HOSPITAL ROOM: A LITTLE
WALKING IN HOSPITAL ROOM: A LITTLE
MOVING TO AND FROM BED TO CHAIR: A LITTLE
STANDING UP FROM CHAIR USING ARMS: A LITTLE
MOVING TO AND FROM BED TO CHAIR: A LITTLE
TURNING FROM BACK TO SIDE WHILE IN FLAT BAD: A LITTLE
STANDING UP FROM CHAIR USING ARMS: A LITTLE

## 2025-05-19 ASSESSMENT — LIFESTYLE VARIABLES
HOW OFTEN DO YOU HAVE 6 OR MORE DRINKS ON ONE OCCASION: LESS THAN MONTHLY
HOW OFTEN DO YOU HAVE A DRINK CONTAINING ALCOHOL: MONTHLY OR LESS
AUDIT-C TOTAL SCORE: 2
PRESCIPTION_ABUSE_PAST_12_MONTHS: NO
AUDIT-C TOTAL SCORE: 2
SKIP TO QUESTIONS 9-10: 0
SUBSTANCE_ABUSE_PAST_12_MONTHS: NO
HOW MANY STANDARD DRINKS CONTAINING ALCOHOL DO YOU HAVE ON A TYPICAL DAY: 1 OR 2

## 2025-05-19 ASSESSMENT — ACTIVITIES OF DAILY LIVING (ADL)
TOILETING: INDEPENDENT
GROOMING: INDEPENDENT
GROOMING: INDEPENDENT
BATHING: INDEPENDENT
ASSISTIVE_DEVICE: OXYGEN
BATHING: INDEPENDENT
PATIENT'S MEMORY ADEQUATE TO SAFELY COMPLETE DAILY ACTIVITIES?: YES
DRESSING YOURSELF: INDEPENDENT
HEARING - RIGHT EAR: FUNCTIONAL
JUDGMENT_ADEQUATE_SAFELY_COMPLETE_DAILY_ACTIVITIES: YES
HEARING - LEFT EAR: FUNCTIONAL
PATIENT'S MEMORY ADEQUATE TO SAFELY COMPLETE DAILY ACTIVITIES?: YES
WALKS IN HOME: INDEPENDENT
WALKS IN HOME: INDEPENDENT
ADEQUATE_TO_COMPLETE_ADL: YES
LACK_OF_TRANSPORTATION: NO
HEARING - LEFT EAR: FUNCTIONAL
HEARING - RIGHT EAR: FUNCTIONAL
DRESSING YOURSELF: INDEPENDENT
TOILETING: INDEPENDENT
FEEDING YOURSELF: INDEPENDENT
JUDGMENT_ADEQUATE_SAFELY_COMPLETE_DAILY_ACTIVITIES: YES
ADEQUATE_TO_COMPLETE_ADL: YES
ASSISTIVE_DEVICE: OXYGEN
FEEDING YOURSELF: INDEPENDENT

## 2025-05-19 ASSESSMENT — PAIN SCALES - GENERAL
PAINLEVEL_OUTOF10: 0 - NO PAIN

## 2025-05-19 ASSESSMENT — PAIN - FUNCTIONAL ASSESSMENT: PAIN_FUNCTIONAL_ASSESSMENT: 0-10

## 2025-05-19 ASSESSMENT — COLUMBIA-SUICIDE SEVERITY RATING SCALE - C-SSRS
1. IN THE PAST MONTH, HAVE YOU WISHED YOU WERE DEAD OR WISHED YOU COULD GO TO SLEEP AND NOT WAKE UP?: NO
6. HAVE YOU EVER DONE ANYTHING, STARTED TO DO ANYTHING, OR PREPARED TO DO ANYTHING TO END YOUR LIFE?: NO
2. HAVE YOU ACTUALLY HAD ANY THOUGHTS OF KILLING YOURSELF?: NO

## 2025-05-19 ASSESSMENT — PAIN DESCRIPTION - PROGRESSION: CLINICAL_PROGRESSION: NOT CHANGED

## 2025-05-19 NOTE — PROGRESS NOTES
Pharmacy Medication History Review    Colin Leonard is a 59 y.o. male. Pharmacy reviewed the patient's [id number] medications and allergies for accuracy.    Medications ADDED:  none  Medications CHANGED:  None   Medications REMOVED:   None      The list below reflects the updated PTA list. Comments regarding how patient may be taking medications differently can be found in the Admit Orders Activity  Prior to Admission Medications   Prescriptions Last Dose Informant   acetaminophen (Tylenol) 325 mg tablet Unknown Self, family   Sig: Take 2 tablets (650 mg) by mouth every 6 hours if needed for moderate pain (4 - 6).   amoxicillin-clavulanate (Augmentin) 500-125 mg tablet 5/19/2025 Self, family   Sig: Take 1 tablet by mouth early in the morning. for 10 days.   aspirin 81 mg EC tablet 5/18/2025 Self, Family Member   Sig: Take 1 tablet (81 mg) by mouth once daily.   atorvastatin (Lipitor) 20 mg tablet 5/18/2025 Self, Family Member   Sig: Take 1 tablet (20 mg) by mouth once daily.   carvedilol (Coreg) 25 mg tablet 5/19/2025 Self, Family Member   Sig: Take 2 tablets (50 mg) by mouth 2 times a day.      ezetimibe (Zetia) 10 mg tablet 5/18/2025 Family Member, Self   Sig: Take 1 tablet (10 mg) by mouth once daily at bedtime.   hydrALAZINE (Apresoline) 100 mg tablet 5/19/2025 Self, Family Member   Sig: Take 1 tablet (100 mg) by mouth 3 times a day.   multivitamin tablet Unknown Family Member, Self   Sig: Take 1 tablet by mouth once daily.   sodium bicarbonate 650 mg tablet 5/19/2025 Family Member, Self   Sig: Take 2 tablets (1,300 mg) by mouth 3 times a day.   torsemide (Demadex) 20 mg tablet Unknown Family Member, Self   Sig: Take 3 tablets (60 mg) by mouth once daily.   vancomycin (Vancocin) 125 mg capsule 5/19/2025 Self, family   Sig: Take 1 capsule (125 mg) by mouth once daily at bedtime for 18 days.      Facility-Administered Medications: None        The list below reflects the updated allergy list. Please  review each documented allergy for additional clarification and justification.  Allergies  Reviewed by Nupur Novak CPhT on 5/19/2025        Severity Reactions Comments    Amlodipine Besylate Not Specified Swelling edema legs            Pharmacy has been updated to [facility].    Sources used to complete the med history include dispense history, PTA medication list, patient/family interview. Informants are good historians.    Below are additional concerns with the patient's PTA list.  None. Patient dispense history indicates non-compliance on torsemide.    Nupur Novak CPhT-Adv  Please reach out via [employer] Secure Chat for questions

## 2025-05-19 NOTE — CONSULTS
Critical Care Medicine Consult  Consult requested by Dr Cole  Reason - dyspnea and hypoxia  Admitted on:     5/19/2025 Length of Stay:   0 day(s)     History of Present Illness     Colin Leonard is a 59-year-old male with a history of hypertension, CKD stage 5, type 2 diabetes, and a prior C. difficile colitis, presenting with worsening cough and SOB. He was recently admitted to [facility] from 5/15/25-5/18/25 for acute diverticulitis, during which he was incidentally found to have atypical pneumonia. He was evaluated by GI, nephrology, ID, and pulmonology, treated with IV Zosyn, and discharged on Augmentin. He was not discharged on O2 and does not require supplemental oxygen at baseline.    After discharge, the patient initially felt well but then developed worsening cough, nasal congestion, and SOB. He reports voice hoarseness, abdominal wall discomfort from coughing, and a productive cough with white/yellow sputum and streaks of blood. He denies fever but endorses chills. No chest pain, abdominal pain, nausea, vomiting, constipation, or diarrhea.    ED Course:  In the ED, the patient was noted to be severely hypoxic (SpO2 in the 70s on room air) and was placed on HFNC, improving O2 saturation to the mid-90s. BP was initially elevated but improved with respiratory support. He was afebrile. Labs: Na 141, K 4.4, Cr 7.39, BNP 2008, troponin 25 --> 744 , WBC 23.1, hgb 8.9. MRSA PCR negative. CXR showed worsening multifocal pneumonia. IV Vanc, Zosyn, Azithromycin, and NS IVF bolus were administered.    ROS:  As above    Objective   Previous History     Medical History  As above.    Surgical History  Surgical History[1]  Allergies  RX Allergies[2]    Home Medications  Current Outpatient Medications   Medication Instructions    acetaminophen (TYLENOL) 650 mg, oral, Every 6 hours PRN    amoxicillin-clavulanate (Augmentin) 500-125 mg tablet 1 tablet, oral, Daily    aspirin 81 mg, Daily    atorvastatin (LIPITOR) 20  mg, Daily    carvedilol (COREG) 50 mg, oral, 2 times daily    ezetimibe (ZETIA) 10 mg, oral, Nightly    hydrALAZINE (APRESOLINE) 100 mg, oral, 3 times daily    multivitamin tablet 1 tablet, oral, Daily    sodium bicarbonate 1,300 mg, oral, 3 times daily    torsemide (DEMADEX) 60 mg, oral, Daily    vancomycin (VANCOCIN) 125 mg, oral, Nightly     Social History  History - Tobacco Use - Plain Text[3]    has no history on file for alcohol use.    has no history on file for drug use.     Family History  family history is not on file.    Objective     Vitals:    05/19/25 1435   Weight: 122 kg (268 lb 11.9 oz)   Body mass index is 38.56 kg/m².        5/19/2025     4:00 PM 5/19/2025     4:20 PM 5/19/2025     4:25 PM 5/19/2025     4:30 PM 5/19/2025     4:35 PM 5/19/2025     4:40 PM 5/19/2025     4:45 PM   Vitals   Systolic      123    Diastolic      72    BP Location      Left arm    Heart Rate 78 132 129 134 81 80 82   Temp      37.1 °C (98.7 °F)    Resp      24         Vent settings:       Intake/Output Summary (Last 24 hours) at 5/19/2025 1754  Last data filed at 5/19/2025 1355  Gross per 24 hour   Intake 1000 ml   Output --   Net 1000 ml       GENERAL: normal appearance. well nourished. Mild respiratory distress  HEAD/SINUSES: no sinus tenderness  NECK: no JVD, midline trachea without stridor. Thyroid not enlarged  LYMPH NODES : none felt in the cervical, submandibular or supraclavicular regions  LUNGS: Symmetric chest. Decreased air entry both lungs, no crackles  CARDIAC: normal S1 and S2; no gallops, rubs or murmurs. Regular rate and rhythm  EXTREMITIES: No edema, no varicose veins  NEURO: grossly normal mental status, CN reflexes and motor strength.   SKIN: Skin turgor normal. No rashes or lesions.   PSYCH: Normal affect      Medications     Scheduled Medications:   Scheduled Medications[4]   Continuous Medications:   Continuous Medications[5]   PRN Medications:     Labs     Results from last 72 hours   Lab Units  "05/19/25  1034 05/18/25  0616 05/17/25  0512   GLUCOSE mg/dL 115* 200* 108*   SODIUM mmol/L 141 135* 137   POTASSIUM mmol/L 4.4 4.6 4.3   CHLORIDE mmol/L 111* 109* 111*   CO2 mmol/L 17* 16* 16*   BUN mg/dL 73* 77* 75*   CREATININE mg/dL 7.39* 7.31* 7.30*   EGFR mL/min/1.73m*2 8* 8* 8*   CALCIUM mg/dL 9.6 8.6 8.3*   ALBUMIN g/dL 3.6 3.1* 3.0*   PHOSPHORUS mg/dL  --  3.7 5.1*     Results from last 72 hours   Lab Units 05/19/25  1034   ALK PHOS U/L 72   ALT U/L 39   AST U/L 9   BILIRUBIN TOTAL mg/dL 0.6   PROTEIN TOTAL g/dL 7.2     Results from last 72 hours   Lab Units 05/19/25  1143 05/19/25  1034   TROPHS ng/L 736* 744*     Results from last 72 hours   Lab Units 05/19/25  1034   LACTATE mmol/L 1.1     Results from last 72 hours   Lab Units 05/19/25  1034 05/18/25  0616 05/17/25 2003 05/17/25  0512   WBC AUTO x10*3/uL 23.1* 14.7*  --  15.4*   NRBC AUTO /100 WBCs 0.1* 0.0  --  0.0   RBC AUTO x10*6/uL 3.24* 2.62*  --  2.45*   HEMOGLOBIN g/dL 8.9* 7.1* 7.5* 6.7*   HEMATOCRIT % 27.9* 22.7* 24.1* 21.8*   MCV fL 86 87  --  89   MCH pg 27.5 27.1  --  27.3   MCHC g/dL 31.9* 31.3*  --  30.7*   RDW % 17.2* 17.1*  --  17.0*   PLATELETS AUTO x10*3/uL 333 234  --  232         Lab Results   Component Value Date    BLOODCULT Loaded on Instrument - Culture in progress 05/19/2025    BLOODCULT Loaded on Instrument - Culture in progress 05/19/2025    GRAMSTAIN (A) 03/10/2025     Gram stain indicates specimen contains significant salivary contamination.     No results found for: \"URINECULTURE\"    Imaging and Diagnostic Studies     Recent imaging and diagnostic studies reviewed.  CT chest 5/19/2025   Patchy airspace consolidation throughout bilateral lung fields  with findings more conspicuous in particular left lower lobe.  Correlate with patient's history and concern for multilobar pneumonia  with follow-up to clearing recommended. Alternatively, given  patient's history of hemoptysis component of pulmonary hemorrhage is  not " excluded.     Assessment / Plan     - Acute hypoxic resp failure - sudden worsening of dyspnea and hypoxia with similar CT image, on antibiotics, makes it less likely that it worsening of his previously diagnosed pneumonia.  - Elevated BNP, Elevated Troponin and bilateral pedal edema could point towards a cardiac cause for symptoms  - Volume overload and pulmonary edema can show a similar appearance of GGO on chest CT. Also noticed was a dilated LA on CT chest and recent echo suggesting elevated left sided filling pressures.  - HF PEF (Grade 2 diastolic dysfunction) with elevated LAP on echo 3/11/2025      Plan  - Resp viral panel  - ELI, ANCA panel ordered  - Would recommend diuresis with lasix 40 mg bid iv  - I/O monitoring  - Trend troponin  - Limited echo - (EF, diastology, valves)  - Cardiology consultation  - Will follow      Jhonatan Simms MD  5:53 PM         [1]   Past Surgical History:  Procedure Laterality Date    AMPUTATION      CHOLECYSTECTOMY  03/05/2025    Laparoscopic Cholecystectomy   [2]   Allergies  Allergen Reactions    Amlodipine Besylate Swelling     edema legs   [3]   Social History  Tobacco Use   Smoking Status Never   Smokeless Tobacco Never   [4] aspirin, 81 mg, oral, Daily  atorvastatin, 20 mg, oral, Nightly  carvedilol, 50 mg, oral, BID  ezetimibe, 10 mg, oral, Nightly  famotidine, 20 mg, oral, Daily   Or  famotidine, 20 mg, intravenous, Daily  heparin (porcine), 5,000 Units, subcutaneous, q8h  hydrALAZINE, 100 mg, oral, TID  melatonin, 15 mg, oral, Nightly  meropenem, 500 mg, intravenous, q12h  polyethylene glycol, 17 g, oral, Daily  sodium bicarbonate, 1,300 mg, oral, TID  torsemide, 60 mg, oral, Daily  [START ON 5/20/2025] vancomycin, 125 mg, oral, Nightly     [5]

## 2025-05-19 NOTE — ASSESSMENT & PLAN NOTE
CXR suggestive of worsening bilateral multifocal pneumonia  Consult pulmonology  MRSA PCR negative -> stop IV vanc   Check sputum culture, strep pneumo, and legionella antigens  Was on IV zosyn and discharged on augmentin during prior hospital stay, will start meropenem for now  Low threshold for ID consult if not improving

## 2025-05-19 NOTE — ASSESSMENT & PLAN NOTE
Meets sepsis criteria with tachypnea, hypoxia, leukocytosis, and pneumonia  Blood cultures ordered and pending  Low threshold for ID consult if not improving

## 2025-05-19 NOTE — PROGRESS NOTES
Sepsis Alert @ 1029    -Patient presents to ED with shortness of breath/pneumonia  -HR 93, RR 31  -WBC 23.1, Lactate 1.2    -IV Azithromycin 500 mg given @ 1122  -IV Zosyn 4.5 gm given @ 1039  -IV Vancomycin 2 gm given @ 1055  -IV  mls given @ 1039    Hayde Adams, PharmD

## 2025-05-19 NOTE — H&P
History Of Present Illness  Colin Leonard is a 59 y.o. male hx of HTN, CKD stage 5, type 2 diabetes, hx of c diff colitis presenting with worsening cough and shortness of breath. Patient was recently admitted at [facility] from 5/15/25-5/18/25 for acute diverticulitis. He was incidentally found to have atypical pneumonia as well. He was evaluated by GI, nephrology, ID, and pulmonology. Patient received IV zosyn during his hospital stay and was discharged home on augmentin. He was not discharged on oxygen and does not require supplemental oxygen at baseline. Patient states that he initially felt okay after hospital discharge but then started having worsening cough, nasal congestion, and shortness of breath. He reports voice hoarseness and abdominal wall discomfort from the coughing. The cough is productive with white/yellow-toya sputum and streaks of blood. Denies fever but endorses chills. Denies chest pain, abdominal pain, nausea, vomiting, constipation, and diarrhea.     In the ED, noted to be severely hypoxic with SpO2 in the 70s on room air. Placed on HFNC with improvement in oxygen saturation to the mid 90s. BP initially elevated but improved with as respiratory status improved. Afebrile. Labs notable for Na 141, K 4.4, Cr 7.39, BNP 2008, troponin 744 -> 736, WBC 23.1, hgb 8.9. MRSA PCR negative. CXR showing worsening multifocal pneumonia. Given IV vanc x1, IV zosyn x1, azithromycin 500mg x1, and NS 250cc IVF bolus.     Code status discussed with patient - FULL CODE.      Past Medical History  He has a past medical history of Coronary artery disease, Diabetes mellitus (Multi), DVT (deep venous thrombosis) (Multi), Hypertension, and MI (myocardial infarction) (Multi).    Surgical History  He has a past surgical history that includes Amputation and Cholecystectomy (03/05/2025).     Social History  He reports that he has never smoked. He has never used smokeless tobacco. No history on file for alcohol use and  drug use.    Family History  Family History[1]     Allergies  Amlodipine besylate    Review of Systems   Constitutional:  Positive for appetite change (decreased), chills and fatigue. Negative for fever.   HENT:  Positive for congestion. Negative for trouble swallowing.    Respiratory:  Positive for cough and shortness of breath.    Cardiovascular:  Negative for chest pain and leg swelling.   Gastrointestinal:  Negative for abdominal pain, constipation, diarrhea, nausea and vomiting.   Genitourinary:  Negative for difficulty urinating.   Psychiatric/Behavioral:  Negative for confusion.         Physical Exam  Constitutional:       General: He is not in acute distress.     Appearance: He is ill-appearing. He is not toxic-appearing.   HENT:      Head: Normocephalic.      Mouth/Throat:      Pharynx: Oropharynx is clear.   Eyes:      General: No scleral icterus.  Cardiovascular:      Rate and Rhythm: Normal rate.   Pulmonary:      Effort: No respiratory distress.      Breath sounds: No wheezing.      Comments: White/yellow sputum with streaks of blood in emesis bag. On HFNC  Abdominal:      General: There is no distension.      Tenderness: There is no abdominal tenderness.   Musculoskeletal:      Right lower leg: Edema (trace) present.      Left lower leg: No edema.   Neurological:      Mental Status: He is alert and oriented to person, place, and time.   Psychiatric:         Behavior: Behavior normal.          Last Recorded Vitals  /82 (BP Location: Left arm, Patient Position: Sitting)   Pulse 82   Temp 37.7 °C (99.9 °F) (Oral)   Resp (!) 29   Wt 113 kg (250 lb)   SpO2 100%     Relevant Results             Assessment/Plan   Assessment & Plan  Multifocal pneumonia  CXR suggestive of worsening bilateral multifocal pneumonia  Consult pulmonology  MRSA PCR negative -> stop IV vanc   Check sputum culture, strep pneumo, and legionella antigens  Was on IV zosyn and discharged on augmentin during prior hospital stay,  will start meropenem for now  Low threshold for ID consult if not improving  Sepsis with acute hypoxic respiratory failure  Meets sepsis criteria with tachypnea, hypoxia, leukocytosis, and pneumonia  Blood cultures ordered and pending  Low threshold for ID consult if not improving  Acute hypoxic respiratory failure  Due to above  Wean O2 as able  Primary hypertension  Continue home coreg and hydralazine with hold parameters  CKD (chronic kidney disease) stage 5, GFR less than 15 ml/min (Multi)  Follows with Dr. Max  Continue home torsemide for now  Stable, at baseline  History of Clostridioides difficile colitis  Continue empiric PO vanc while on IV antibiotics    Plan:  Admit to step down  Consult pulmonology  IV meropenem  Follow up infectious workup  May need repeat CT chest, will defer to pulm  Low threshold for ID consult if not improving  Wean O2 as able  FULL CODE       Madalyn Cole MD         [1] No family history on file.

## 2025-05-19 NOTE — ED PROVIDER NOTES
HPI   Chief Complaint   Patient presents with    Shortness of Breath       Patient presents with shortness of breath.  He reports that his shortness of breath started abruptly early this morning.  He has been coughing a lot.  He was discharged from the hospital yesterday after he was admitted for diverticulitis.  He was reportedly told by the pulmonary doctor that he had beginnings of pneumonia at that time.  He has been in the hospital previously for pneumonia.  EMS reports that oxygen saturation was in the mid 70s on room air.  He does not normally use oxygen.              Patient History   Medical History[1]  Surgical History[2]  Family History[3]  Social History[4]    Physical Exam   ED Triage Vitals   Temp Pulse Resp BP   -- -- -- --      SpO2 Temp src Heart Rate Source Patient Position   -- -- -- --      BP Location FiO2 (%)     -- --       Physical Exam  HENT:      Head: Normocephalic.   Cardiovascular:      Rate and Rhythm: Tachycardia present.   Pulmonary:      Comments: Increased work of breathing and increased respiratory rate, patient only speaks in 1-2 word phrases.  Frequent cough with sputum production.  Sputum noted to be blood-tinged.  Musculoskeletal:      Comments: Trace pretibial edema bilateral lower extremities   Neurological:      Mental Status: He is alert.      Comments: Patient is awake and alert, answers questions appropriately.           ED Course & MDM   ED Course as of 05/19/25 1248   Mon May 19, 2025   1106 EKG interpreted by me: Sinus rhythm with PVC, rate 86.  Normal axis.  No significant T wave abnormalities.  Less than 1 mm of ST depression noted in V5 and V6. [ML]      ED Course User Index  [ML] Alvaro Vega MD         Diagnoses as of 05/19/25 1248   Shortness of breath   Acute hypoxemic respiratory failure   Acute cough                 No data recorded     [place] Coma Scale Score: 15 (05/19/25 1027 : Zuly Urban RN)                           Medical Decision Making  SEP-1  CORE MEASURE DATA    5/19/2025 10:29 AM  /82 (BP Location: Left arm, Patient Position: Sitting)   Pulse 82   Temp 37.7 °C (99.9 °F) (Oral)   Resp (!) 29    WBC       Date/Time                Value               Ref Range           Status                05/19/2025 10:34 AM      23.1 (H)            4.4 - 11.3 x10*     Final            ----------  Lactate       Date/Time                Value               Ref Range           Status                05/19/2025 10:34 AM      1.1                 0.4 - 2.0 mmol*     Final            ----------  Creatinine       Date/Time                Value               Ref Range           Status                05/19/2025 10:34 AM      7.39 (H)            0.50 - 1.30 mg*     Final            ----------  Bilirubin, Total       Date/Time                Value               Ref Range           Status                05/19/2025 10:34 AM      0.6                 0.0 - 1.2 mg/dL     Final            ----------  INR       Date/Time                Value               Ref Range           Status                07/22/2024 06:19 AM      1.2                 0.9 - 1.2           Final            ----------  Platelets       Date/Time                Value               Ref Range           Status                05/19/2025 10:34 AM      333                 150 - 450 x10**     Final            ----------     Fluid Resuscitation Rationale: Due to  Concern for Fluid Overload and Concern for Renal Failure, ordered less than 30mL/kg actual body weight. Actual fluid amount given: 250mL    I performed a sepsis reperfusion exam on Colin Leonard on 05/19/25 at 1248.    Patient presents with shortness of breath.  Chest x-ray reveals worsening multifocal pneumonia.  Patient placed on 10 L/min by nasal cannula/bubbler in order to maintain oxygen saturation greater than 90%.  He reports that on this, he feels significantly better.  Patient treated with very cautious IV fluid bolus given his history of  end-stage renal disease as well as some swelling in the legs noted on examination.  He was given broad-spectrum antibiotics for suspected sepsis.  Insetting of cough with mucus production, PE felt to be less likely.  Patient accepted to medicine service for further management of pneumonia/sepsis with acute hypoxemic respiratory failure.  Parts of this chart were completed with dictation software, please excuse any errors in transcription.        Procedure  Critical Care    Performed by: Alvaro Vega MD  Authorized by: Alvaro Vega MD    Critical care provider statement:     Critical care time (minutes):  34    Critical care time was exclusive of:  Separately billable procedures and treating other patients    Critical care was necessary to treat or prevent imminent or life-threatening deterioration of the following conditions:  Respiratory failure and sepsis    Critical care was time spent personally by me on the following activities:  Development of treatment plan with patient or surrogate, evaluation of patient's response to treatment, examination of patient, obtaining history from patient or surrogate, ordering and review of laboratory studies, ordering and performing treatments and interventions, ordering and review of radiographic studies, pulse oximetry, re-evaluation of patient's condition and review of old charts    Care discussed with: admitting provider             [1]   Past Medical History:  Diagnosis Date    Coronary artery disease     Diabetes mellitus (Multi)     DVT (deep venous thrombosis) (Multi)     Hypertension     MI (myocardial infarction) (Multi)    [2]   Past Surgical History:  Procedure Laterality Date    AMPUTATION      CHOLECYSTECTOMY  03/05/2025    Laparoscopic Cholecystectomy   [3] No family history on file.  [4]   Social History  Tobacco Use    Smoking status: Never    Smokeless tobacco: Never   Substance Use Topics    Alcohol use: Not on file    Drug use: Not on file        Alvaro JIMENEZ  MD [place]  05/19/25 5550

## 2025-05-19 NOTE — Clinical Note
Patient ID band present and verified. Patient contact is in the lobby. Contact(s) present: spouse. son

## 2025-05-19 NOTE — PROGRESS NOTES
Colin Leonard is a 59 y.o. male on day 0 of admission presenting with No Principal Problem: There is no principal problem currently on the Problem List. Please update the Problem List and refresh..    Chart opened in error.    Sheba Koch RN

## 2025-05-20 ENCOUNTER — APPOINTMENT (OUTPATIENT)
Dept: CARDIOLOGY | Facility: HOSPITAL | Age: 60
DRG: 871 | End: 2025-05-20
Payer: COMMERCIAL

## 2025-05-20 ENCOUNTER — APPOINTMENT (OUTPATIENT)
Dept: CARDIOLOGY | Facility: HOSPITAL | Age: 60
End: 2025-05-20
Payer: COMMERCIAL

## 2025-05-20 PROBLEM — I50.32 CHRONIC DIASTOLIC HEART FAILURE: Status: ACTIVE | Noted: 2025-05-20

## 2025-05-20 PROBLEM — D63.1 ANEMIA DUE TO STAGE 5 CHRONIC KIDNEY DISEASE, NOT ON CHRONIC DIALYSIS: Status: ACTIVE | Noted: 2024-07-22

## 2025-05-20 LAB
ALBUMIN SERPL BCP-MCNC: 3 G/DL (ref 3.4–5)
ANA PATTERN: ABNORMAL
ANA SER QL HEP2 SUBST: POSITIVE
ANA TITR SER IF: ABNORMAL {TITER}
ANION GAP SERPL CALCULATED.3IONS-SCNC: 16 MMOL/L (ref 10–20)
AORTIC VALVE MEAN GRADIENT: 14 MMHG
AORTIC VALVE PEAK VELOCITY: 2.65 M/S
ATRIAL RATE: 86 BPM
AV PEAK GRADIENT: 28 MMHG
AVA (PEAK VEL): 1.52 CM2
AVA (VTI): 1.58 CM2
BACTERIA BLD CULT: NORMAL
BACTERIA BLD CULT: NORMAL
BASOPHILS # BLD AUTO: 0.05 X10*3/UL (ref 0–0.1)
BASOPHILS NFR BLD AUTO: 0.3 %
BUN SERPL-MCNC: 76 MG/DL (ref 6–23)
CALCIUM SERPL-MCNC: 8.6 MG/DL (ref 8.6–10.3)
CENTROMERE B AB SER-ACNC: <0.2 AI
CHLORIDE SERPL-SCNC: 107 MMOL/L (ref 98–107)
CHROMATIN AB SERPL-ACNC: <0.2 AI
CO2 SERPL-SCNC: 16 MMOL/L (ref 21–32)
CREAT SERPL-MCNC: 7.29 MG/DL (ref 0.5–1.3)
DSDNA AB SER-ACNC: 1 IU/ML
EGFRCR SERPLBLD CKD-EPI 2021: 8 ML/MIN/1.73M*2
EJECTION FRACTION APICAL 4 CHAMBER: 63.9
EJECTION FRACTION: 63 %
ENA JO1 AB SER QL IA: <0.2 AI
ENA RNP AB SER IA-ACNC: 0.3 AI
ENA SCL70 AB SER QL IA: <0.2 AI
ENA SM AB SER IA-ACNC: <0.2 AI
ENA SM+RNP AB SER QL IA: <0.2 AI
ENA SS-A AB SER IA-ACNC: 0.3 AI
ENA SS-B AB SER IA-ACNC: <0.2 AI
EOSINOPHIL # BLD AUTO: 0.21 X10*3/UL (ref 0–0.7)
EOSINOPHIL NFR BLD AUTO: 1.3 %
ERYTHROCYTE [DISTWIDTH] IN BLOOD BY AUTOMATED COUNT: 17.7 % (ref 11.5–14.5)
FERRITIN SERPL-MCNC: 238 NG/ML (ref 20–300)
GLUCOSE BLD MANUAL STRIP-MCNC: 126 MG/DL (ref 74–99)
GLUCOSE BLD MANUAL STRIP-MCNC: 135 MG/DL (ref 74–99)
GLUCOSE BLD MANUAL STRIP-MCNC: 141 MG/DL (ref 74–99)
GLUCOSE SERPL-MCNC: 179 MG/DL (ref 74–99)
HADV DNA SPEC QL NAA+PROBE: NOT DETECTED
HBV CORE AB SER QL: NONREACTIVE
HBV SURFACE AB SER-ACNC: <3.1 MIU/ML
HCT VFR BLD AUTO: 23.3 % (ref 41–52)
HGB BLD-MCNC: 7.4 G/DL (ref 13.5–17.5)
HMPV RNA SPEC QL NAA+PROBE: NOT DETECTED
HOLD SPECIMEN: NORMAL
HPIV1 RNA SPEC QL NAA+PROBE: NOT DETECTED
HPIV2 RNA SPEC QL NAA+PROBE: NOT DETECTED
HPIV3 RNA SPEC QL NAA+PROBE: NOT DETECTED
HPIV4 RNA SPEC QL NAA+PROBE: NOT DETECTED
IMM GRANULOCYTES # BLD AUTO: 0.26 X10*3/UL (ref 0–0.7)
IMM GRANULOCYTES NFR BLD AUTO: 1.7 % (ref 0–0.9)
IRON SATN MFR SERPL: ABNORMAL %
IRON SERPL-MCNC: <10 UG/DL (ref 35–150)
LEFT VENTRICLE INTERNAL DIMENSION DIASTOLE: 5.43 CM (ref 3.5–6)
LEFT VENTRICULAR OUTFLOW TRACT DIAMETER: 2.1 CM
LEGIONELLA AG UR QL: NEGATIVE
LV EJECTION FRACTION BIPLANE: 69 %
LYMPHOCYTES # BLD AUTO: 0.59 X10*3/UL (ref 1.2–4.8)
LYMPHOCYTES NFR BLD AUTO: 3.8 %
MCH RBC QN AUTO: 27.6 PG (ref 26–34)
MCHC RBC AUTO-ENTMCNC: 31.8 G/DL (ref 32–36)
MCV RBC AUTO: 87 FL (ref 80–100)
MITRAL VALVE E/A RATIO: 1.07
MONOCYTES # BLD AUTO: 0.45 X10*3/UL (ref 0.1–1)
MONOCYTES NFR BLD AUTO: 2.9 %
NEUTROPHILS # BLD AUTO: 14.03 X10*3/UL (ref 1.2–7.7)
NEUTROPHILS NFR BLD AUTO: 90 %
NRBC BLD-RTO: 0 /100 WBCS (ref 0–0)
P AXIS: 66 DEGREES
P OFFSET: 174 MS
P ONSET: 139 MS
PHOSPHATE SERPL-MCNC: 3.6 MG/DL (ref 2.5–4.9)
PLATELET # BLD AUTO: 262 X10*3/UL (ref 150–450)
POTASSIUM SERPL-SCNC: 4.1 MMOL/L (ref 3.5–5.3)
PR INTERVAL: 158 MS
Q ONSET: 218 MS
QRS COUNT: 14 BEATS
QRS DURATION: 88 MS
QT INTERVAL: 366 MS
QTC CALCULATION(BAZETT): 437 MS
QTC FREDERICIA: 412 MS
R AXIS: -11 DEGREES
RBC # BLD AUTO: 2.68 X10*6/UL (ref 4.5–5.9)
RHINOVIRUS RNA UPPER RESP QL NAA+PROBE: NOT DETECTED
RIBOSOMAL P AB SER-ACNC: <0.2 AI
RIGHT VENTRICLE PEAK SYSTOLIC PRESSURE: 24 MMHG
S PNEUM AG UR QL: NEGATIVE
SODIUM SERPL-SCNC: 135 MMOL/L (ref 136–145)
T AXIS: 56 DEGREES
T OFFSET: 401 MS
TIBC SERPL-MCNC: ABNORMAL UG/DL
TRICUSPID ANNULAR PLANE SYSTOLIC EXCURSION: 2.1 CM
UIBC SERPL-MCNC: 160 UG/DL (ref 110–370)
VENTRICULAR RATE: 86 BPM
WBC # BLD AUTO: 15.6 X10*3/UL (ref 4.4–11.3)

## 2025-05-20 PROCEDURE — 80069 RENAL FUNCTION PANEL: CPT | Performed by: STUDENT IN AN ORGANIZED HEALTH CARE EDUCATION/TRAINING PROGRAM

## 2025-05-20 PROCEDURE — 93325 DOPPLER ECHO COLOR FLOW MAPG: CPT

## 2025-05-20 PROCEDURE — 36415 COLL VENOUS BLD VENIPUNCTURE: CPT | Performed by: STUDENT IN AN ORGANIZED HEALTH CARE EDUCATION/TRAINING PROGRAM

## 2025-05-20 PROCEDURE — 86704 HEP B CORE ANTIBODY TOTAL: CPT | Mod: WESLAB | Performed by: INTERNAL MEDICINE

## 2025-05-20 PROCEDURE — 99223 1ST HOSP IP/OBS HIGH 75: CPT | Performed by: NURSE PRACTITIONER

## 2025-05-20 PROCEDURE — 93308 TTE F-UP OR LMTD: CPT | Performed by: INTERNAL MEDICINE

## 2025-05-20 PROCEDURE — 2500000001 HC RX 250 WO HCPCS SELF ADMINISTERED DRUGS (ALT 637 FOR MEDICARE OP): Performed by: INTERNAL MEDICINE

## 2025-05-20 PROCEDURE — 93325 DOPPLER ECHO COLOR FLOW MAPG: CPT | Performed by: INTERNAL MEDICINE

## 2025-05-20 PROCEDURE — 2500000004 HC RX 250 GENERAL PHARMACY W/ HCPCS (ALT 636 FOR OP/ED): Mod: JZ | Performed by: INTERNAL MEDICINE

## 2025-05-20 PROCEDURE — 2500000004 HC RX 250 GENERAL PHARMACY W/ HCPCS (ALT 636 FOR OP/ED): Performed by: STUDENT IN AN ORGANIZED HEALTH CARE EDUCATION/TRAINING PROGRAM

## 2025-05-20 PROCEDURE — 2500000004 HC RX 250 GENERAL PHARMACY W/ HCPCS (ALT 636 FOR OP/ED): Mod: JZ | Performed by: STUDENT IN AN ORGANIZED HEALTH CARE EDUCATION/TRAINING PROGRAM

## 2025-05-20 PROCEDURE — 87340 HEPATITIS B SURFACE AG IA: CPT | Mod: WESLAB | Performed by: INTERNAL MEDICINE

## 2025-05-20 PROCEDURE — 36415 COLL VENOUS BLD VENIPUNCTURE: CPT | Performed by: INTERNAL MEDICINE

## 2025-05-20 PROCEDURE — 93321 DOPPLER ECHO F-UP/LMTD STD: CPT | Performed by: INTERNAL MEDICINE

## 2025-05-20 PROCEDURE — 2500000001 HC RX 250 WO HCPCS SELF ADMINISTERED DRUGS (ALT 637 FOR MEDICARE OP): Performed by: STUDENT IN AN ORGANIZED HEALTH CARE EDUCATION/TRAINING PROGRAM

## 2025-05-20 PROCEDURE — 2060000001 HC INTERMEDIATE ICU ROOM DAILY

## 2025-05-20 PROCEDURE — 93005 ELECTROCARDIOGRAM TRACING: CPT

## 2025-05-20 PROCEDURE — 82728 ASSAY OF FERRITIN: CPT | Performed by: STUDENT IN AN ORGANIZED HEALTH CARE EDUCATION/TRAINING PROGRAM

## 2025-05-20 PROCEDURE — 86706 HEP B SURFACE ANTIBODY: CPT | Mod: WESLAB | Performed by: INTERNAL MEDICINE

## 2025-05-20 PROCEDURE — 99233 SBSQ HOSP IP/OBS HIGH 50: CPT | Performed by: STUDENT IN AN ORGANIZED HEALTH CARE EDUCATION/TRAINING PROGRAM

## 2025-05-20 PROCEDURE — 2500000002 HC RX 250 W HCPCS SELF ADMINISTERED DRUGS (ALT 637 FOR MEDICARE OP, ALT 636 FOR OP/ED): Performed by: STUDENT IN AN ORGANIZED HEALTH CARE EDUCATION/TRAINING PROGRAM

## 2025-05-20 PROCEDURE — 82947 ASSAY GLUCOSE BLOOD QUANT: CPT

## 2025-05-20 PROCEDURE — 83540 ASSAY OF IRON: CPT | Performed by: STUDENT IN AN ORGANIZED HEALTH CARE EDUCATION/TRAINING PROGRAM

## 2025-05-20 PROCEDURE — 93010 ELECTROCARDIOGRAM REPORT: CPT | Performed by: INTERNAL MEDICINE

## 2025-05-20 PROCEDURE — 85025 COMPLETE CBC W/AUTO DIFF WBC: CPT | Performed by: STUDENT IN AN ORGANIZED HEALTH CARE EDUCATION/TRAINING PROGRAM

## 2025-05-20 RX ORDER — GUAIFENESIN 600 MG/1
600 TABLET, EXTENDED RELEASE ORAL 2 TIMES DAILY
Status: DISCONTINUED | OUTPATIENT
Start: 2025-05-20 | End: 2025-05-24 | Stop reason: HOSPADM

## 2025-05-20 RX ORDER — CETIRIZINE HYDROCHLORIDE 10 MG/1
10 TABLET ORAL DAILY
Status: DISCONTINUED | OUTPATIENT
Start: 2025-05-20 | End: 2025-05-24 | Stop reason: HOSPADM

## 2025-05-20 RX ORDER — TRAZODONE HYDROCHLORIDE 50 MG/1
50 TABLET ORAL NIGHTLY PRN
Status: DISCONTINUED | OUTPATIENT
Start: 2025-05-20 | End: 2025-05-20

## 2025-05-20 RX ORDER — FUROSEMIDE 10 MG/ML
60 INJECTION INTRAMUSCULAR; INTRAVENOUS ONCE
Status: COMPLETED | OUTPATIENT
Start: 2025-05-20 | End: 2025-05-20

## 2025-05-20 RX ORDER — DIPHENHYDRAMINE HCL 25 MG
25 TABLET ORAL ONCE
Status: COMPLETED | OUTPATIENT
Start: 2025-05-20 | End: 2025-05-20

## 2025-05-20 RX ADMIN — FAMOTIDINE 20 MG: 20 TABLET, FILM COATED ORAL at 09:19

## 2025-05-20 RX ADMIN — SODIUM BICARBONATE 1300 MG: 650 TABLET ORAL at 21:27

## 2025-05-20 RX ADMIN — HEPARIN SODIUM 5000 UNITS: 5000 INJECTION, SOLUTION INTRAVENOUS; SUBCUTANEOUS at 06:10

## 2025-05-20 RX ADMIN — CARVEDILOL 50 MG: 25 TABLET, FILM COATED ORAL at 16:27

## 2025-05-20 RX ADMIN — HYDRALAZINE HYDROCHLORIDE 100 MG: 50 TABLET ORAL at 15:03

## 2025-05-20 RX ADMIN — DIPHENHYDRAMINE HYDROCHLORIDE 25 MG: 25 TABLET ORAL at 03:37

## 2025-05-20 RX ADMIN — EZETIMIBE 10 MG: 10 TABLET ORAL at 21:27

## 2025-05-20 RX ADMIN — MEROPENEM 500 MG: 500 INJECTION, POWDER, FOR SOLUTION INTRAVENOUS at 06:11

## 2025-05-20 RX ADMIN — CETIRIZINE HYDROCHLORIDE 10 MG: 10 TABLET, FILM COATED ORAL at 09:19

## 2025-05-20 RX ADMIN — SODIUM BICARBONATE 1300 MG: 650 TABLET ORAL at 09:22

## 2025-05-20 RX ADMIN — FUROSEMIDE 40 MG: 10 INJECTION, SOLUTION INTRAMUSCULAR; INTRAVENOUS at 06:10

## 2025-05-20 RX ADMIN — HYDRALAZINE HYDROCHLORIDE 100 MG: 50 TABLET ORAL at 09:19

## 2025-05-20 RX ADMIN — IRON SUCROSE 200 MG: 20 INJECTION, SOLUTION INTRAVENOUS at 12:31

## 2025-05-20 RX ADMIN — ATORVASTATIN CALCIUM 20 MG: 20 TABLET, FILM COATED ORAL at 21:27

## 2025-05-20 RX ADMIN — GUAIFENESIN 600 MG: 600 TABLET ORAL at 09:19

## 2025-05-20 RX ADMIN — VANCOMYCIN HYDROCHLORIDE 125 MG: 125 CAPSULE ORAL at 21:27

## 2025-05-20 RX ADMIN — GUAIFENESIN 600 MG: 600 TABLET ORAL at 21:27

## 2025-05-20 RX ADMIN — HEPARIN SODIUM 5000 UNITS: 5000 INJECTION, SOLUTION INTRAVENOUS; SUBCUTANEOUS at 15:02

## 2025-05-20 RX ADMIN — Medication 15 MG: at 21:27

## 2025-05-20 RX ADMIN — SODIUM BICARBONATE 1300 MG: 650 TABLET ORAL at 15:08

## 2025-05-20 RX ADMIN — FUROSEMIDE 60 MG: 10 INJECTION, SOLUTION INTRAMUSCULAR; INTRAVENOUS at 15:02

## 2025-05-20 RX ADMIN — HYDRALAZINE HYDROCHLORIDE 100 MG: 50 TABLET ORAL at 21:27

## 2025-05-20 RX ADMIN — ASPIRIN 81 MG: 81 TABLET, COATED ORAL at 09:18

## 2025-05-20 RX ADMIN — MEROPENEM 500 MG: 500 INJECTION, POWDER, FOR SOLUTION INTRAVENOUS at 17:46

## 2025-05-20 RX ADMIN — HEPARIN SODIUM 5000 UNITS: 5000 INJECTION, SOLUTION INTRAVENOUS; SUBCUTANEOUS at 21:29

## 2025-05-20 RX ADMIN — CARVEDILOL 50 MG: 25 TABLET, FILM COATED ORAL at 09:19

## 2025-05-20 ASSESSMENT — ENCOUNTER SYMPTOMS
SYNCOPE: 0
NEAR-SYNCOPE: 0
PALPITATIONS: 0
SHORTNESS OF BREATH: 1
COUGH: 1
DYSPNEA ON EXERTION: 1

## 2025-05-20 ASSESSMENT — PAIN SCALES - GENERAL
PAINLEVEL_OUTOF10: 0 - NO PAIN

## 2025-05-20 ASSESSMENT — ACTIVITIES OF DAILY LIVING (ADL): LACK_OF_TRANSPORTATION: NO

## 2025-05-20 NOTE — ASSESSMENT & PLAN NOTE
Due to chronic kidney disease  Baseline Hgb ~7.5  Stable at baseline  No overt bleeding  Transfuse Hgb <7

## 2025-05-20 NOTE — CONSULTS
CONSULT: Nephrology SERVICE    SERVICE DATE: 5/20/2025   SERVICE TIME:  10:57 AM    REASON FOR CONSULT: Chronic kidney disease stage V  REQUESTING PHYSICIAN: Gavin Fontana CNP  PRIMARY CARE PHYSICIAN: Maurice Ballard MD    ASSESSMENT AND PLAN  59-year-old male with numerous medical problems including advanced CKD readmitted with concern for worsening pneumonia versus diastolic CHF exacerbation in the context of a GFR of 8.  1.  CKD stage V  2.  Anemia of chronic kidney disease  3.  Essential hypertension  4.  Edema  5.  Chronic metabolic acidosis    Advanced underlying CKD with a GFR of 8.  This is a difficult case and that he really does not have any uremic symptoms other than chronic fatigue, which is multifactorial.  Nor do I have a pressing reason to dialyze him emergently.  The constellation of his acute diverticulitis, multiple recent hospitalizations, acute pneumonia, mild fluid overload, and significant anemia raise the possibility of electively starting dialysis.  This is a legitimate consideration and I discussed this extensively with him.  I do not think he is totally opposed, but again no pressing reason right now.  I like to continue a trial of IV antibiotics, loop diuretic therapy, and anemia management first.    Dose with Lasix 60 mg IV x 1 tonight.  Rather than standing IV diuretic therapy, I would like to diurese him on a day by day basis following his serial serum creatinine levels.  Continue oral bicarbonate therapy as well.  Continue home blood pressure medicines.  Appreciate Cardiologic and pulmonary consultations.   Trend daily labs, continue supportive care.  Case discussed extensively with Dr. Cole and Gavin Fontana CNP.  I will continue to follow him.  Thank you for the consultation.    SUBJECTIVE  Mr. Leonard is a 59 y.o. man with hypertension, diabetes, and advanced CKD admitted to the hospital after recent discharge for diverticulitis, consulted for chronic kidney  "disease management.  Unfortunately, this man has had 6 hospitalizations over the past 3 months.  He has a GFR at baseline around 8.  His GFR has been fairly stable over the past 6 months.  He now has worsening dyspnea.  He supposed to be on torsemide at 60 mg daily.  He said he ran out of this.  He has no major leg swelling, does not note weight gain.  He was really only home for a very brief amount of time and then rehospitalized.  During the last hospitalization, he was treated for acute diverticulitis, but he was also noted to have pneumonia.  He did not require oxygen therapy.  He did receive blood transfusion.  His diuretics were held.  He was supposed to be discharged on diuretics.  He is also supposed to be on oral bicarbonate therapy.  Gave him erythropoietin stimulating agents during the last hospitalization.  He is progressively more anemic, more dyspneic, requiring more oxygen.  He really does not have a ton of peripheral edema.  He is noted to have diastolic heart failure.  He had markedly elevated troponin labs.  He is being evaluated by pulmonology and cardiology.  He denies changes in his urine output.  He has no further abdominal pain.  He has no diarrhea.  He describes no dysgeusia.  He is fatigued and dyspneic.    PAST MEDICAL HISTORY: Medical History[1]  PAST SURGICAL HISTORY: Surgical History[2]  FAMILY HISTORY: Family History[3]  SOCIAL HISTORY: Social History[4]  MEDICATIONS:  Scheduled Medications[5]   Continuous Medications[6]   PRN Medications[7]   CURRENT ALLERGIES:   Allergies as of 05/19/2025 - Reviewed 05/19/2025   Allergen Reaction Noted    Amlodipine besylate Swelling 10/14/2011       COMPLETE REVIEW OF SYSTEMS:    Full ROS was negative unless mentioned above    OBJECTIVE  PHYSICAL EXAM  Heart Rate:  []   Temp:  [36.4 °C (97.6 °F)-37.3 °C (99.1 °F)]   Resp:  [18-29]   BP: (123-158)/(63-82)   Height:  [177.8 cm (5' 10\")]   Weight:  [122 kg (268 lb 11.9 oz)-126 kg (276 lb 10.8 oz)] "   SpO2:  [96 %-100 %]    Body mass index is 39.7 kg/m².  Obese white man, no acute distress  Appropriate affect  No obvious skin rashes  Hearing intact  Phonation intact  Moist mucosa  Harsh systolic murmur  Some rales are present  Abdomen is soft, nondistended, nontender, positive bowel sounds  No Elmore catheter in place, no suprapubic tenderness to palpation  Trace pretibial extremity edema  Moves 4 limbs spontaneously  No obvious joint deformities  No lymphadenopathy    DATA:   Labs:  Results for orders placed or performed during the hospital encounter of 05/19/25 (from the past 96 hours)   CBC and Auto Differential   Result Value Ref Range    WBC 23.1 (H) 4.4 - 11.3 x10*3/uL    nRBC 0.1 (H) 0.0 - 0.0 /100 WBCs    RBC 3.24 (L) 4.50 - 5.90 x10*6/uL    Hemoglobin 8.9 (L) 13.5 - 17.5 g/dL    Hematocrit 27.9 (L) 41.0 - 52.0 %    MCV 86 80 - 100 fL    MCH 27.5 26.0 - 34.0 pg    MCHC 31.9 (L) 32.0 - 36.0 g/dL    RDW 17.2 (H) 11.5 - 14.5 %    Platelets 333 150 - 450 x10*3/uL    Neutrophils % 92.7 40.0 - 80.0 %    Immature Granulocytes %, Automated 1.5 (H) 0.0 - 0.9 %    Lymphocytes % 2.9 13.0 - 44.0 %    Monocytes % 2.3 2.0 - 10.0 %    Eosinophils % 0.4 0.0 - 6.0 %    Basophils % 0.2 0.0 - 2.0 %    Neutrophils Absolute 21.45 (H) 1.20 - 7.70 x10*3/uL    Immature Granulocytes Absolute, Automated 0.34 0.00 - 0.70 x10*3/uL    Lymphocytes Absolute 0.67 (L) 1.20 - 4.80 x10*3/uL    Monocytes Absolute 0.53 0.10 - 1.00 x10*3/uL    Eosinophils Absolute 0.09 0.00 - 0.70 x10*3/uL    Basophils Absolute 0.05 0.00 - 0.10 x10*3/uL   Comprehensive Metabolic Panel   Result Value Ref Range    Glucose 115 (H) 74 - 99 mg/dL    Sodium 141 136 - 145 mmol/L    Potassium 4.4 3.5 - 5.3 mmol/L    Chloride 111 (H) 98 - 107 mmol/L    Bicarbonate 17 (L) 21 - 32 mmol/L    Anion Gap 17 10 - 20 mmol/L    Urea Nitrogen 73 (H) 6 - 23 mg/dL    Creatinine 7.39 (H) 0.50 - 1.30 mg/dL    eGFR 8 (L) >60 mL/min/1.73m*2    Calcium 9.6 8.6 - 10.3 mg/dL    Albumin  3.6 3.4 - 5.0 g/dL    Alkaline Phosphatase 72 33 - 136 U/L    Total Protein 7.2 6.4 - 8.2 g/dL    AST 9 9 - 39 U/L    Bilirubin, Total 0.6 0.0 - 1.2 mg/dL    ALT 39 10 - 52 U/L   B-Type Natriuretic Peptide   Result Value Ref Range    BNP 2,008 (H) 0 - 99 pg/mL   Troponin I, High Sensitivity, Initial   Result Value Ref Range    Troponin I, High Sensitivity 744 (HH) 0 - 20 ng/L   Lactate   Result Value Ref Range    Lactate 1.1 0.4 - 2.0 mmol/L   Blood Culture    Specimen: Peripheral Venipuncture; Blood culture   Result Value Ref Range    Blood Culture Loaded on Instrument - Culture in progress    Blood Culture    Specimen: Peripheral Venipuncture; Blood culture   Result Value Ref Range    Blood Culture Loaded on Instrument - Culture in progress    ECG 12 Lead   Result Value Ref Range    Ventricular Rate 86 BPM    Atrial Rate 86 BPM    IA Interval 158 ms    QRS Duration 88 ms    QT Interval 366 ms    QTC Calculation(Bazett) 437 ms    P Axis 66 degrees    R Axis -11 degrees    T Axis 56 degrees    QRS Count 14 beats    Q Onset 218 ms    P Onset 139 ms    P Offset 174 ms    T Offset 401 ms    QTC Fredericia 412 ms   MRSA Surveillance for Vancomycin De-escalation, PCR    Specimen: Anterior Nares; Swab   Result Value Ref Range    MRSA PCR Not Detected Not Detected   Troponin, High Sensitivity, 1 Hour   Result Value Ref Range    Troponin I, High Sensitivity 736 (HH) 0 - 20 ng/L   Respiratory Culture/Smear    Specimen: SPUTUM; Fluid   Result Value Ref Range    Gram Stain       Gram stain indicates specimen consists of lower respiratory tract secretions.    Gram Stain No predominant organism    Urinalysis with Reflex Culture and Microscopic   Result Value Ref Range    Color, Urine Light-Yellow Light-Yellow, Yellow, Dark-Yellow    Appearance, Urine Clear Clear    Specific Gravity, Urine 1.014 1.005 - 1.035    pH, Urine 5.5 5.0, 5.5, 6.0, 6.5, 7.0, 7.5, 8.0    Protein, Urine 100 (2+) (A) NEGATIVE, 10 (TRACE), 20 (TRACE) mg/dL     Glucose, Urine 150 (2+) (A) Normal mg/dL    Blood, Urine NEGATIVE NEGATIVE mg/dL    Ketones, Urine NEGATIVE NEGATIVE mg/dL    Bilirubin, Urine NEGATIVE NEGATIVE mg/dL    Urobilinogen, Urine Normal Normal mg/dL    Nitrite, Urine NEGATIVE NEGATIVE    Leukocyte Esterase, Urine NEGATIVE NEGATIVE   Extra Urine Gray Tube   Result Value Ref Range    Extra Tube Hold for add-ons.    Legionella Antigen, Urine    Specimen: Clean Catch/Voided; Urine   Result Value Ref Range    L. pneumophila Urine Ag Negative Negative   Streptococcus pneumoniae Antigen, Urine    Specimen: Clean Catch/Voided; Urine   Result Value Ref Range    Streptococcus pneumoniae Ag, Urine Negative Negative   Urinalysis Microscopic   Result Value Ref Range    WBC, Urine 1-5 1-5, NONE /HPF    RBC, Urine 1-2 NONE, 1-2, 3-5 /HPF   Sars-CoV-2, Influenza A/B and RSV PCR   Result Value Ref Range    Coronavirus 2019, PCR Not Detected Not Detected    Flu A Result Not Detected Not Detected    Flu B Result Not Detected Not Detected    RSV PCR Not Detected Not Detected   Parainfluenza PCR   Result Value Ref Range    Parainfluenza 1, PCR Not Detected Not Detected, Invalid    Parainfluenza 2, PCR Not Detected Not Detected, Invalid    Parainfluenza 3, PCR Not Detected Not Detected, Invalid    Parainfluenza 4, PCR Not Detected Not Detected, Invalid   Adenovirus PCR Qual For Respiratory Samples   Result Value Ref Range    Adenovirus PCR, Qual Not Detected Not detected   Rhinovirus PCR, Respiratory Spec   Result Value Ref Range    Rhinovirus PCR, Respiratory Spec Not Detected Not Detected   Metapneumovirus PCR   Result Value Ref Range    Metapneumovirus (Human), PCR Not Detected Not detected   CBC and Auto Differential   Result Value Ref Range    WBC 15.6 (H) 4.4 - 11.3 x10*3/uL    nRBC 0.0 0.0 - 0.0 /100 WBCs    RBC 2.68 (L) 4.50 - 5.90 x10*6/uL    Hemoglobin 7.4 (L) 13.5 - 17.5 g/dL    Hematocrit 23.3 (L) 41.0 - 52.0 %    MCV 87 80 - 100 fL    MCH 27.6 26.0 - 34.0 pg     MCHC 31.8 (L) 32.0 - 36.0 g/dL    RDW 17.7 (H) 11.5 - 14.5 %    Platelets 262 150 - 450 x10*3/uL    Neutrophils % 90.0 40.0 - 80.0 %    Immature Granulocytes %, Automated 1.7 (H) 0.0 - 0.9 %    Lymphocytes % 3.8 13.0 - 44.0 %    Monocytes % 2.9 2.0 - 10.0 %    Eosinophils % 1.3 0.0 - 6.0 %    Basophils % 0.3 0.0 - 2.0 %    Neutrophils Absolute 14.03 (H) 1.20 - 7.70 x10*3/uL    Immature Granulocytes Absolute, Automated 0.26 0.00 - 0.70 x10*3/uL    Lymphocytes Absolute 0.59 (L) 1.20 - 4.80 x10*3/uL    Monocytes Absolute 0.45 0.10 - 1.00 x10*3/uL    Eosinophils Absolute 0.21 0.00 - 0.70 x10*3/uL    Basophils Absolute 0.05 0.00 - 0.10 x10*3/uL   Renal Function Panel   Result Value Ref Range    Glucose 179 (H) 74 - 99 mg/dL    Sodium 135 (L) 136 - 145 mmol/L    Potassium 4.1 3.5 - 5.3 mmol/L    Chloride 107 98 - 107 mmol/L    Bicarbonate 16 (L) 21 - 32 mmol/L    Anion Gap 16 10 - 20 mmol/L    Urea Nitrogen 76 (H) 6 - 23 mg/dL    Creatinine 7.29 (H) 0.50 - 1.30 mg/dL    eGFR 8 (L) >60 mL/min/1.73m*2    Calcium 8.6 8.6 - 10.3 mg/dL    Phosphorus 3.6 2.5 - 4.9 mg/dL    Albumin 3.0 (L) 3.4 - 5.0 g/dL   Iron and TIBC   Result Value Ref Range    Iron <10 (L) 35 - 150 ug/dL    UIBC 160 110 - 370 ug/dL    TIBC      % Saturation     Ferritin   Result Value Ref Range    Ferritin 238 20 - 300 ng/mL   POCT GLUCOSE   Result Value Ref Range    POCT Glucose 141 (H) 74 - 99 mg/dL       SIGNATURE: Boo Max MD PATIENT NAME: Colin Leonard   DATE: May 20, 2025 MRN: [id number]   TIME: 10:57 AM PAGER: [phone]            [1]   Past Medical History:  Diagnosis Date    Coronary artery disease     Diabetes mellitus (Multi)     DVT (deep venous thrombosis) (Multi)     Hypertension     MI (myocardial infarction) (Multi)    [2]   Past Surgical History:  Procedure Laterality Date    AMPUTATION      CHOLECYSTECTOMY  03/05/2025    Laparoscopic Cholecystectomy   [3] No family history on file.  [4]   Social History  Tobacco Use    Smoking  status: Never    Smokeless tobacco: Never   [5] aspirin, 81 mg, oral, Daily  atorvastatin, 20 mg, oral, Nightly  carvedilol, 50 mg, oral, BID  cetirizine, 10 mg, oral, Daily  ezetimibe, 10 mg, oral, Nightly  famotidine, 20 mg, oral, Daily   Or  famotidine, 20 mg, intravenous, Daily  furosemide, 40 mg, intravenous, q12h  guaiFENesin, 600 mg, oral, BID  heparin (porcine), 5,000 Units, subcutaneous, q8h  hydrALAZINE, 100 mg, oral, TID  melatonin, 15 mg, oral, Nightly  meropenem, 500 mg, intravenous, q12h  polyethylene glycol, 17 g, oral, Daily  sodium bicarbonate, 1,300 mg, oral, TID  [Held by provider] torsemide, 60 mg, oral, Daily  vancomycin, 125 mg, oral, Nightly  [6]    [7] PRN medications: acetaminophen **OR** acetaminophen **OR** acetaminophen, ondansetron **OR** ondansetron, traZODone

## 2025-05-20 NOTE — CONSULTS
Inpatient consult to Cardiology  Consult performed by: SARAH Patel-CNP  Consult ordered by: Madalyn Cole MD  Reason for consult: Elevated BNP and troponin        History Of Present Illness:    Colin Leonard is a 59 y.o. male presenting with shortness of breath.  Current with Dr. Saravia.  Past medical history of CAD (3/2020 CAC 1014.84), type 2 MI in the setting of PNA, hypertensive urgency, CKD 3/2025 DM2, DVT, CKD V, HTN, choledocholithiasis/acute cholecystitis s/p cholecystectomy 3/6/25, C. difficile colitis 3/2025.  Patient was recently hospitalized at [facility] for weakness.  Was discharged to home.  Has presented here for worsening cough and shortness of breath.  Chest x-ray with evidence of multifocal pneumonia.  No significant fluid volume overload.  EKG is sinus rhythm with PVCs.  High-sensitivity troponin values of 744 and 736.  proBNP 2008.  Creatinine 7.29 with a hemoglobin of 7.4.  Was recent blood pressure 133/77.  Pulse ox 98% on nasal cannula oxygen.  The patient denies complaints of chest pain or pressure palpitations or feeling of rapid heart rate.  In the emergency department patient received IV antibiotics as well as Benadryl and IV furosemide.  He was admitted on telemetry for further testing and treatment.      Vitals:    05/20/25 0400 05/20/25 0414 05/20/25 0722 05/20/25 0800   BP:  133/77 155/72    BP Location:  Left arm Left arm    Patient Position:  Lying Lying    Pulse: 72   75   Resp:  18 19    Temp:  36.7 °C (98.1 °F) 36.4 °C (97.6 °F)    TempSrc:  Temporal Temporal    SpO2:  98% 99%    Weight:  126 kg (276 lb 10.8 oz)     Height:           Last Labs:  CBC - 5/20/2025:  5:21 AM  15.6 7.4 262    23.3      CMP - 5/20/2025:  5:21 AM  8.6 7.2 9 --- 0.6   3.6 3.0 39 72      PTT - 3/11/2025:  1:31 PM  1.2   12.6 38.7     Troponin I, High Sensitivity   Date/Time Value Ref Range Status   05/19/2025 11:43  () 0 - 20 ng/L Final     Comment:     Previous  result verified on 5/19/2025 1121 on specimen/case [id number] called with component [facility] for procedure Troponin I, High Sensitivity, Initial with value 744 ng/L.   05/19/2025 10:34  (HH) 0 - 20 ng/L Final   05/15/2025 11:54 PM 26 (H) 0 - 20 ng/L Final     BNP   Date/Time Value Ref Range Status   05/19/2025 10:34 AM 2,008 (H) 0 - 99 pg/mL Final   04/02/2025 11:03  (H) 0 - 99 pg/mL Final     Hemoglobin A1C   Date/Time Value Ref Range Status   05/16/2025 07:15 AM 5.4 See comment % Final   03/04/2025 06:06 AM 5.4 See comment % Final     LDL Calculated   Date/Time Value Ref Range Status   08/19/2024 01:43 PM 88 <=99 mg/dL Final     Comment:                                 Near   Borderline      AGE      Desirable  Optimal    High     High     Very High     0-19 Y     0 - 109     ---    110-129   >/= 130     ----    20-24 Y     0 - 119     ---    120-159   >/= 160     ----      >24 Y     0 -  99   100-129  130-159   160-189     >/=190       VLDL   Date/Time Value Ref Range Status   08/19/2024 01:43 PM 22 0 - 40 mg/dL Final   02/01/2022 12:37 PM 23 0 - 40 mg/dL Final   04/30/2020 01:39 PM 28 0 - 40 mg/dL Final   09/24/2019 02:18 PM 41 (H) 0 - 40 mg/dL Final      Results for orders placed or performed during the hospital encounter of 05/19/25 (from the past 24 hours)   CBC and Auto Differential   Result Value Ref Range    WBC 23.1 (H) 4.4 - 11.3 x10*3/uL    nRBC 0.1 (H) 0.0 - 0.0 /100 WBCs    RBC 3.24 (L) 4.50 - 5.90 x10*6/uL    Hemoglobin 8.9 (L) 13.5 - 17.5 g/dL    Hematocrit 27.9 (L) 41.0 - 52.0 %    MCV 86 80 - 100 fL    MCH 27.5 26.0 - 34.0 pg    MCHC 31.9 (L) 32.0 - 36.0 g/dL    RDW 17.2 (H) 11.5 - 14.5 %    Platelets 333 150 - 450 x10*3/uL    Neutrophils % 92.7 40.0 - 80.0 %    Immature Granulocytes %, Automated 1.5 (H) 0.0 - 0.9 %    Lymphocytes % 2.9 13.0 - 44.0 %    Monocytes % 2.3 2.0 - 10.0 %    Eosinophils % 0.4 0.0 - 6.0 %    Basophils % 0.2 0.0 - 2.0 %    Neutrophils Absolute 21.45 (H) 1.20  - 7.70 x10*3/uL    Immature Granulocytes Absolute, Automated 0.34 0.00 - 0.70 x10*3/uL    Lymphocytes Absolute 0.67 (L) 1.20 - 4.80 x10*3/uL    Monocytes Absolute 0.53 0.10 - 1.00 x10*3/uL    Eosinophils Absolute 0.09 0.00 - 0.70 x10*3/uL    Basophils Absolute 0.05 0.00 - 0.10 x10*3/uL   Comprehensive Metabolic Panel   Result Value Ref Range    Glucose 115 (H) 74 - 99 mg/dL    Sodium 141 136 - 145 mmol/L    Potassium 4.4 3.5 - 5.3 mmol/L    Chloride 111 (H) 98 - 107 mmol/L    Bicarbonate 17 (L) 21 - 32 mmol/L    Anion Gap 17 10 - 20 mmol/L    Urea Nitrogen 73 (H) 6 - 23 mg/dL    Creatinine 7.39 (H) 0.50 - 1.30 mg/dL    eGFR 8 (L) >60 mL/min/1.73m*2    Calcium 9.6 8.6 - 10.3 mg/dL    Albumin 3.6 3.4 - 5.0 g/dL    Alkaline Phosphatase 72 33 - 136 U/L    Total Protein 7.2 6.4 - 8.2 g/dL    AST 9 9 - 39 U/L    Bilirubin, Total 0.6 0.0 - 1.2 mg/dL    ALT 39 10 - 52 U/L   B-Type Natriuretic Peptide   Result Value Ref Range    BNP 2,008 (H) 0 - 99 pg/mL   Troponin I, High Sensitivity, Initial   Result Value Ref Range    Troponin I, High Sensitivity 744 (HH) 0 - 20 ng/L   Lactate   Result Value Ref Range    Lactate 1.1 0.4 - 2.0 mmol/L   Blood Culture    Specimen: Peripheral Venipuncture; Blood culture   Result Value Ref Range    Blood Culture Loaded on Instrument - Culture in progress    Blood Culture    Specimen: Peripheral Venipuncture; Blood culture   Result Value Ref Range    Blood Culture Loaded on Instrument - Culture in progress    ECG 12 Lead   Result Value Ref Range    Ventricular Rate 86 BPM    Atrial Rate 86 BPM    MI Interval 158 ms    QRS Duration 88 ms    QT Interval 366 ms    QTC Calculation(Bazett) 437 ms    P Axis 66 degrees    R Axis -11 degrees    T Axis 56 degrees    QRS Count 14 beats    Q Onset 218 ms    P Onset 139 ms    P Offset 174 ms    T Offset 401 ms    QTC Fredericia 412 ms   MRSA Surveillance for Vancomycin De-escalation, PCR    Specimen: Anterior Nares; Swab   Result Value Ref Range    MRSA  PCR Not Detected Not Detected   Troponin, High Sensitivity, 1 Hour   Result Value Ref Range    Troponin I, High Sensitivity 736 (HH) 0 - 20 ng/L   Respiratory Culture/Smear    Specimen: SPUTUM; Fluid   Result Value Ref Range    Gram Stain       Gram stain indicates specimen consists of lower respiratory tract secretions.    Gram Stain No predominant organism    Urinalysis with Reflex Culture and Microscopic   Result Value Ref Range    Color, Urine Light-Yellow Light-Yellow, Yellow, Dark-Yellow    Appearance, Urine Clear Clear    Specific Gravity, Urine 1.014 1.005 - 1.035    pH, Urine 5.5 5.0, 5.5, 6.0, 6.5, 7.0, 7.5, 8.0    Protein, Urine 100 (2+) (A) NEGATIVE, 10 (TRACE), 20 (TRACE) mg/dL    Glucose, Urine 150 (2+) (A) Normal mg/dL    Blood, Urine NEGATIVE NEGATIVE mg/dL    Ketones, Urine NEGATIVE NEGATIVE mg/dL    Bilirubin, Urine NEGATIVE NEGATIVE mg/dL    Urobilinogen, Urine Normal Normal mg/dL    Nitrite, Urine NEGATIVE NEGATIVE    Leukocyte Esterase, Urine NEGATIVE NEGATIVE   Extra Urine Gray Tube   Result Value Ref Range    Extra Tube Hold for add-ons.    Urinalysis Microscopic   Result Value Ref Range    WBC, Urine 1-5 1-5, NONE /HPF    RBC, Urine 1-2 NONE, 1-2, 3-5 /HPF   Sars-CoV-2, Influenza A/B and RSV PCR   Result Value Ref Range    Coronavirus 2019, PCR Not Detected Not Detected    Flu A Result Not Detected Not Detected    Flu B Result Not Detected Not Detected    RSV PCR Not Detected Not Detected   Parainfluenza PCR   Result Value Ref Range    Parainfluenza 1, PCR Not Detected Not Detected, Invalid    Parainfluenza 2, PCR Not Detected Not Detected, Invalid    Parainfluenza 3, PCR Not Detected Not Detected, Invalid    Parainfluenza 4, PCR Not Detected Not Detected, Invalid   Adenovirus PCR Qual For Respiratory Samples   Result Value Ref Range    Adenovirus PCR, Qual Not Detected Not detected   Rhinovirus PCR, Respiratory Spec   Result Value Ref Range    Rhinovirus PCR, Respiratory Spec Not Detected  Not Detected   Metapneumovirus PCR   Result Value Ref Range    Metapneumovirus (Human), PCR Not Detected Not detected   CBC and Auto Differential   Result Value Ref Range    WBC 15.6 (H) 4.4 - 11.3 x10*3/uL    nRBC 0.0 0.0 - 0.0 /100 WBCs    RBC 2.68 (L) 4.50 - 5.90 x10*6/uL    Hemoglobin 7.4 (L) 13.5 - 17.5 g/dL    Hematocrit 23.3 (L) 41.0 - 52.0 %    MCV 87 80 - 100 fL    MCH 27.6 26.0 - 34.0 pg    MCHC 31.8 (L) 32.0 - 36.0 g/dL    RDW 17.7 (H) 11.5 - 14.5 %    Platelets 262 150 - 450 x10*3/uL    Neutrophils % 90.0 40.0 - 80.0 %    Immature Granulocytes %, Automated 1.7 (H) 0.0 - 0.9 %    Lymphocytes % 3.8 13.0 - 44.0 %    Monocytes % 2.9 2.0 - 10.0 %    Eosinophils % 1.3 0.0 - 6.0 %    Basophils % 0.3 0.0 - 2.0 %    Neutrophils Absolute 14.03 (H) 1.20 - 7.70 x10*3/uL    Immature Granulocytes Absolute, Automated 0.26 0.00 - 0.70 x10*3/uL    Lymphocytes Absolute 0.59 (L) 1.20 - 4.80 x10*3/uL    Monocytes Absolute 0.45 0.10 - 1.00 x10*3/uL    Eosinophils Absolute 0.21 0.00 - 0.70 x10*3/uL    Basophils Absolute 0.05 0.00 - 0.10 x10*3/uL   Renal Function Panel   Result Value Ref Range    Glucose 179 (H) 74 - 99 mg/dL    Sodium 135 (L) 136 - 145 mmol/L    Potassium 4.1 3.5 - 5.3 mmol/L    Chloride 107 98 - 107 mmol/L    Bicarbonate 16 (L) 21 - 32 mmol/L    Anion Gap 16 10 - 20 mmol/L    Urea Nitrogen 76 (H) 6 - 23 mg/dL    Creatinine 7.29 (H) 0.50 - 1.30 mg/dL    eGFR 8 (L) >60 mL/min/1.73m*2    Calcium 8.6 8.6 - 10.3 mg/dL    Phosphorus 3.6 2.5 - 4.9 mg/dL    Albumin 3.0 (L) 3.4 - 5.0 g/dL   Iron and TIBC   Result Value Ref Range    Iron <10 (L) 35 - 150 ug/dL    UIBC 160 110 - 370 ug/dL    TIBC      % Saturation     Ferritin   Result Value Ref Range    Ferritin 238 20 - 300 ng/mL   POCT GLUCOSE   Result Value Ref Range    POCT Glucose 141 (H) 74 - 99 mg/dL       Last I/O:  I/O last 3 completed shifts:  In: 1000 (8 mL/kg) [IV Piggyback:1000]  Out: 850 (6.8 mL/kg) [Urine:850 (0.2 mL/kg/hr)]  Weight: 125.5 kg     Past  Cardiology Tests (Last 3 Years):  EKG:  ECG 12 Lead 05/19/2025 (Preliminary): Sinus rhythm with PVCs    Echo:  Transthoracic Echo (TTE) Complete 03/11/2025  Transthoracic Echo (TTE) Complete  Result Date: 3/11/2025           [place] [id number]            Phone [phone] TRANSTHORACIC ECHOCARDIOGRAM REPORT Patient Name:       ODALYS MELVIN    Reading Physician:    [id number] Tomi Moser DO Study Date:         3/11/2025            Ordering Provider:    [id number] FOSTER KNOX MRN/PID:            [id number]             Fellow: Accession#:         [id number]         Nurse: Date of Birth/Age:  1965 / 59      Sonographer:          Nathaniel hernandez RDCS Gender Assigned at  M                    Additional Staff: Birth: Height:             177.80 cm            Admit Date: Weight:             117.93 kg            Admission Status:     Inpatient -                                                                Routine BSA / BMI:          2.33 m2 / 37.31      Department Location:  [facility] ICU                     kg/m2 Blood Pressure: 146 /67 mmHg Study Type:    TRANSTHORACIC ECHO (TTE) COMPLETE Diagnosis/ICD: Subsequent non ST elevation (NSTEMI) myocardial infarction-I22.2 Indication:    NSTEMI CPT Codes:     Echo Complete w Full Doppler-[id number] Patient History: Diabetes:          Yes Pertinent History: CAD, Chest Pain, CHF, HTN, Hyperlipidemia, Dyspnea and                    Syncope. LVH, Renal dx V, NSTEMI, DVT. Study Detail: The following Echo studies were performed: 2D, M-Mode, Doppler and               color flow. Technically challenging study due to poor acoustic               windows and body habitus. Definity used as a contrast agent for               endocardial border definition.  Total contrast used for this               procedure was 2 mL via IV push. Unable to obtain RV/RA not well               visualized view.  PHYSICIAN INTERPRETATION: Left Ventricle: Left ventricular ejection fraction is normal, by visual estimate at 60-65%. There is mild concentric left ventricular hypertrophy. There are no regional wall motion abnormalities. The left ventricular cavity size is normal. There is mild increased septal and mildly increased posterior left ventricular wall thickness. Spectral Doppler shows a Grade II (pseudonormal pattern) of left ventricular diastolic filling with an elevated left atrial pressure. Left Atrium: The left atrial size is mildly dilated. Right Ventricle: The right ventricle is normal in size. There is normal right ventricular global systolic function. Right Atrium: The right atrial size is normal. Aortic Valve: The aortic valve appears abnormal. There is mild to moderate aortic valve cusp calcification. There is mild to moderate aortic valve thickening. There is evidence of mild to moderate aortic valve stenosis. The aortic valve dimensionless index is 0.39. There is no evidence of aortic valve regurgitation. The peak instantaneous gradient of the aortic valve is 28 mmHg. The mean gradient of the aortic valve is 15 mmHg. Mitral Valve: The mitral valve is normal in structure. There is mild mitral valve regurgitation. Tricuspid Valve: The tricuspid valve is structurally normal. There is trace tricuspid regurgitation. Pulmonic Valve: The pulmonic valve is structurally normal. There is no indication of pulmonic valve regurgitation. Pericardium: No pericardial effusion noted. Aorta: The aortic root is normal.  CONCLUSIONS:  1. Left ventricular ejection fraction is normal, by visual estimate at 60-65%.  2. Spectral Doppler shows a Grade II (pseudonormal pattern) of left ventricular diastolic filling with an elevated left atrial pressure.  3. There is normal right ventricular global  systolic function.  4. Mild to moderate aortic valve stenosis. QUANTITATIVE DATA SUMMARY:  2D MEASUREMENTS:             Normal Ranges: Ao Root s:       3.93 cm IVSd:            1.25 cm     (0.6-1.1cm) LVPWd:           1.26 cm     (0.6-1.1cm) LVIDd:           5.67 cm     (3.9-5.9cm) LVIDs:           3.75 cm LV Mass Index:   130.8 g/m2 LVEDV Index:     58.65 ml/m2 LV % FS          33.9 %  LEFT ATRIUM:                 Normal Ranges: LA Vol A4C:       128.6 ml LA Vol A2C:       65.2 ml LA Vol Index BSA: 41.5 ml/m2  LV SYSTOLIC FUNCTION:                      Normal Ranges: EF-A4C View:    76 % (>=55%) EF-A2C View:    60 % EF-Biplane:     70 % EF-Visual:      63 % LV EF Reported: 63 %  LV DIASTOLIC FUNCTION:           Normal Ranges: MV Peak E:             1.17 m/s  (0.7-1.2 m/s) MV Peak A:             1.16 m/s  (0.42-0.7 m/s) E/A Ratio:             1.01      (1.0-2.2) MV e'                  0.059 m/s (>8.0) MV lateral e'          0.06 m/s MV medial e'           0.05 m/s E/e' Ratio:            19.88     (<8.0)  MITRAL VALVE:          Normal Ranges: MV DT:        296 msec (150-240msec)  AORTIC VALVE:                      Normal Ranges: AoV Vmax:                2.64 m/s  (<=1.7m/s) AoV Peak P.8 mmHg (<20mmHg) AoV Mean PG:             15.4 mmHg (1.7-11.5mmHg) LVOT Max Cirilo:            1.13 m/s  (<=1.1m/s) AoV VTI:                 66.94 cm  (18-25cm) LVOT VTI:                25.87 cm LVOT Diameter:           2.03 cm   (1.8-2.4cm) AoV Area, VTI:           1.25 cm2  (2.5-5.5cm2) AoV Area,Vmax:           1.39 cm2  (2.5-4.5cm2) AoV Dimensionless Index: 0.39  TRICUSPID VALVE/RVSP:         Normal Ranges: IVC Diam:             2.16 cm  PULMONIC VALVE:          Normal Ranges: PV Max Cirilo:     0.9 m/s  (0.6-0.9m/s) PV Max PG:      3.4 mmHg  [id number] Tomi Moser DO Electronically signed on 3/11/2025 at 11:07:24 AM  ** Final **     Transthoracic Echo (TTE) Complete  Result Date: 2024           [facility]  [address] [place] [id number]            Phone [phone] TRANSTHORACIC ECHOCARDIOGRAM REPORT Patient Name:      ODALYS NILSA MELVIN     Reading Physician:    [id number] Kade Pedraza DO Study Date:        7/22/2024             Ordering Provider:    [id number] GERMAN KENNEY MRN/PID:           [id number]              Fellow: Accession#:        [id number]          Nurse: Date of Birth/Age: 1965 / 58 years Sonographer:          Vera Wynn RDCS Gender:            M                     Additional Staff: Height:            180.34 cm             Admit Date: Weight:            118.39 kg             Admission Status:     Inpatient -                                                                Routine BSA / BMI:         2.36 m2 / 36.40 kg/m2 Department Location:  [facility] Blood Pressure: 147 /88 mmHg Study Type:    TRANSTHORACIC ECHO (TTE) COMPLETE Diagnosis/ICD: Atherosclerotic heart disease of native coronary artery without                angina pectoris-I25.10 Indication:    Dyspnea, weakness fatigue CPT Codes:     Echo Complete w Full Doppler-[id number] Patient History: Pertinent History: LVH, HLD, HTN, CAD, Nstemi, SOB CKD, DM. Study Detail: The following Echo studies were performed: 2D, M-Mode, color flow               and Doppler. Unable to obtain suprasternal notch view.  PHYSICIAN INTERPRETATION: Left Ventricle: The left ventricular systolic function is normal, with a visually estimated ejection fraction of 60-65%. There are no regional wall motion abnormalities. The left ventricular cavity size is normal. Left ventricular diastolic filling was indeterminate. Left Atrium: The left atrium is mildly dilated. Right Ventricle: The right ventricle is normal in size. There is normal right ventricular global systolic  function. Right Atrium: The right atrium is normal in size. Aortic Valve: The aortic valve is trileaflet. There is evidence of mild aortic valve stenosis. The aortic valve dimensionless index is 0.45. There is no evidence of aortic valve regurgitation. The peak instantaneous gradient of the aortic valve is 20.4 mmHg. The mean gradient of the aortic valve is 10.6 mmHg. Mitral Valve: The mitral valve is normal in structure. There is mild mitral valve regurgitation. Tricuspid Valve: The tricuspid valve is structurally normal. There is trace tricuspid regurgitation. Pulmonic Valve: The pulmonic valve is not well visualized. The pulmonic valve regurgitation was not well visualized. Pericardium: There is no pericardial effusion noted. Aorta: The aortic root is normal.  CONCLUSIONS:  1. The left ventricular systolic function is normal, with a visually estimated ejection fraction of 60-65%.  2. Left ventricular diastolic filling was indeterminate.  3. There is normal right ventricular global systolic function.  4. Mild aortic valve stenosis.  5. Aortic stenosis mean gradient 10 mm Hg, peak gradient 40 mm Hg and ADÁN 1.43 cm2.  6. Aortic valve dimensionless index 0.43. QUANTITATIVE DATA SUMMARY: 2D MEASUREMENTS:                           Normal Ranges: LAs:           4.76 cm    (2.7-4.0cm) IVSd:          1.70 cm    (0.6-1.1cm) LVPWd:         1.51 cm    (0.6-1.1cm) LVIDd:         3.86 cm    (3.9-5.9cm) LVIDs:         2.85 cm LV Mass Index: 104.8 g/m2 LV % FS        26.2 % LA VOLUME:                               Normal Ranges: LA Vol A4C:        71.7 ml    (22+/-6mL/m2) LA Vol A2C:        92.5 ml LA Vol BP:         86.7 ml LA Vol Index A4C:  30.4 ml/m2 LA Vol Index A2C:  39.2 ml/m2 LA Vol Index BP:   36.7 ml/m2 LA Area A4C:       22.5 cm2 LA Area A2C:       27.2 cm2 LA Major Axis A4C: 6.0 cm LA Major Axis A2C: 6.8 cm LA Volume Index:   36.7 ml/m2 LA Vol A4C:        72.0 ml LA Vol A2C:        92.0 ml RA VOLUME BY A/L METHOD:                                Normal Ranges: RA Vol A4C:        39.5 ml    (8.3-19.5ml) RA Vol Index A4C:  16.7 ml/m2 RA Area A4C:       15.4 cm2 RA Major Axis A4C: 5.1 cm AORTA MEASUREMENTS:                      Normal Ranges: Ao Sinus, d: 3.30 cm (2.1-3.5cm) Ao STJ, d:   3.00 cm (1.7-3.4cm) Asc Ao, d:   3.80 cm (2.1-3.4cm) LV SYSTOLIC FUNCTION BY 2D PLANIMETRY (MOD):                      Normal Ranges: EF-A4C View:    48 % (>=55%) EF-A2C View:    65 % EF-Biplane:     57 % EF-Visual:      63 % LV EF Reported: 63 % LV DIASTOLIC FUNCTION:                         Normal Ranges: MV Peak E:    0.82 m/s  (0.7-1.2 m/s) MV Peak A:    0.99 m/s  (0.42-0.7 m/s) E/A Ratio:    0.83      (1.0-2.2) MV e'         0.071 m/s (>8.0) MV lateral e' 0.08 m/s MV medial e'  0.06 m/s E/e' Ratio:   11.61     (<8.0) MITRAL VALVE:                 Normal Ranges: MV DT: 221 msec (150-240msec) AORTIC VALVE:                                    Normal Ranges: AoV Vmax:                2.26 m/s  (<=1.7m/s) AoV Peak P.4 mmHg (<20mmHg) AoV Mean PG:             10.6 mmHg (1.7-11.5mmHg) LVOT Max Cirilo:            0.97 m/s  (<=1.1m/s) AoV VTI:                 46.31 cm  (18-25cm) LVOT VTI:                21.06 cm LVOT Diameter:           2.00 cm   (1.8-2.4cm) AoV Area, VTI:           1.43 cm2  (2.5-5.5cm2) AoV Area,Vmax:           1.35 cm2  (2.5-4.5cm2) AoV Dimensionless Index: 0.45  RIGHT VENTRICLE: TAPSE: 25.1 mm RV s'  0.15 m/s TRICUSPID VALVE/RVSP:                   Normal Ranges: IVC Diam: 2.02 cm AORTA: Asc Ao Diam 3.83 cm  [id number] Kade Pedraza DO Electronically signed on 2024 at 10:49:35 AM  ** Final **     Ejection Fractions:  EF   Date/Time Value Ref Range Status   2025 10:47 AM 63 %    2024 09:36 AM 63 %      Past Medical History:  He has a past medical history of Coronary artery disease, Diabetes mellitus (Multi), DVT (deep venous thrombosis) (Multi), Hypertension, and MI (myocardial infarction) (Multi).    Past Surgical  History:  He has a past surgical history that includes Amputation and Cholecystectomy (03/05/2025).      Social History:  He reports that he has never smoked. He has never used smokeless tobacco. No history on file for alcohol use and drug use.    Family History:  Family History[1]     Allergies:  Amlodipine besylate    Inpatient Medications:  Scheduled Medications[2]  PRN Medications[3]  Continuous Medications[4]  Outpatient Medications:  Current Outpatient Medications   Medication Instructions    acetaminophen (TYLENOL) 650 mg, oral, Every 6 hours PRN    amoxicillin-clavulanate (Augmentin) 500-125 mg tablet 1 tablet, oral, Daily    aspirin 81 mg, Daily    atorvastatin (LIPITOR) 20 mg, Daily    carvedilol (COREG) 50 mg, oral, 2 times daily    ezetimibe (ZETIA) 10 mg, oral, Nightly    hydrALAZINE (APRESOLINE) 100 mg, oral, 3 times daily    multivitamin tablet 1 tablet, oral, Daily    sodium bicarbonate 1,300 mg, oral, 3 times daily    torsemide (DEMADEX) 60 mg, oral, Daily    vancomycin (VANCOCIN) 125 mg, oral, Nightly   Review of Systems   Constitutional: Positive for malaise/fatigue.   Cardiovascular:  Positive for dyspnea on exertion. Negative for chest pain, near-syncope, palpitations and syncope.   Respiratory:  Positive for cough and shortness of breath.    Musculoskeletal:  Positive for muscle weakness.       Physical Exam:  General: alert, oriented x 3, very pleasant, ill-appearing.  HEENT: normal cephalic, atraumatic, no scleral icterus  Neck: No JVD, bruit or thrill, masses or tenderness   Heart: S1/S2, Rate 80, Rhythm irregular, no s3 or s4, 1 out of 6 systolic murmur loudest at apex, no thrill, or heaves at PMI.   Lungs: Lung fields are predominately clear, mildly diminished in bases, conversational dyspnea is appreciated, mild increased work of breathing is noted   abdomen: Overweight, bowel sounds x 4, soft, non-tender Genitourinary: deferred   Extremities: Trace pedal and ankle edema        Assessment/Plan     Multifocal pneumonia: Continues on antibiotics as managed by admitting  Shortness of breath: Partially relative to heart failure.  Continue with IV diuretics  Acute on chronic diastolic heart failure: Stage II diastolic dysfunction torsemide on hold now on IV furosemide.,  Appreciate nephrology input  Coronary artery disease: Continue on statin, Zetia, and aspirin.  Patient had a elevated coronary calcium scoring in 2020, he has had a mildly abnormal stress test recently.  His CT chest reveals significant coronary calcifications now.  I am concerned the patient has advancing coronary artery disease and believe the patient should require cardiac catheterization in the near future.  Will discuss this with Dr. Urban.  Elevated troponin not relative to acute coronary syndrome: In the setting of multifocal pneumonia/shortness of breath with significantly elevated creatinine of 7.29.  Do not believe this is relative to an acute coronary syndrome given elevation with flat trend.  Hyperlipidemia: Continue statin  History of SVT: Has been slowly uptitrated to a significant dose of carvedilol utilizing 50 mg twice daily.  On last hospitalization he had issues with SVT and his carvedilol was increased to 37.5 twice daily.  Is further been uptitrated to 50 mg twice daily.  Will discuss this strong dose with my collaborator this morning  Nonsustained VT: Longest episode lasting approximate 4 seconds and self terminating.  Continues on high-dose beta-blocker.  Given his kidney disease will not initiate amiodarone at this point.  Will discuss further antiarrhythmics with Dr. Urban.  Chronic kidney disease stage V: Following with nephrology, certainly nearing the point where dialysis is being strongly considered.  Have reconsulted Dr. Holt for guidance  Anemia of chronic disease: Recent transfusion few days ago.  Remains anemic.  Managed by admitting  Hypertensive disorder: Blood pressure controlled at  this point.  Continues on hydralazine 100 g 3 times daily as well as carvedilol  Obesity: Managed in the outpatient setting    Overall impression:    5/20: As above.  This patient is a very complex patient.  He has had multiple hospitalizations over the past 6 months and has made no significant improvement.  He continues to have significant shortness of breath and recurrent respiratory issues.  Currently with a multifocal pneumonia.  On antibiotics as managed by admitting and infectious disease.  Has known chronic kidney disease which is worsened over the past 4 to 6 years.  Certainly at the point where I believe he is nearing dialysis necessity.  The patient has been trying to hold off on this as long as he can to continue to working.  From my perspective the patient is having worsening ectopy with continued SVT despite high-dose beta-blocker and occasional nonsustained VT.  He is having no chest pain but had a CAT scan recently with severe coronary calcifications.  He had an elevated calcium scoring approximately 5 years ago.  He had a mildly abnormal stress test recently.  Overall believe the patient should have a cardiac catheterization in the near future as I believe this is likely contributing to the patient's overall arrhythmias.  May also be contributing to the patient's shortness of breath as perhaps an anginal equivalent.  He does not appear to be significantly fluid overloaded but does have acute on chronic diastolic heart failure at this point.  Agree with diuresis, have consulted Dr. Max for further guidance with diuretics and advice concerning cardiac catheterization which I believe he should have in the near future.  His blood pressure is stable on high-dose alpha-blocker and beta-blocker.  He remains anemic, nephrology is helping to manage this.  Believe once the patient is stable from the respiratory perspective we should consider cardiac catheterization.  Will follow with you.      Code  Status:  Full Code    I spent 80 minutes in the professional and overall care of this patient.        Grey Fontana, APRN-CNP         [1] No family history on file.  [2]   Scheduled medications   Medication Dose Route Frequency    aspirin  81 mg oral Daily    atorvastatin  20 mg oral Nightly    carvedilol  50 mg oral BID    ezetimibe  10 mg oral Nightly    famotidine  20 mg oral Daily    Or    famotidine  20 mg intravenous Daily    furosemide  40 mg intravenous q12h    heparin (porcine)  5,000 Units subcutaneous q8h    hydrALAZINE  100 mg oral TID    LORazepam  1 mg oral Once    melatonin  15 mg oral Nightly    meropenem  500 mg intravenous q12h    polyethylene glycol  17 g oral Daily    sodium bicarbonate  1,300 mg oral TID    [Held by provider] torsemide  60 mg oral Daily    vancomycin  125 mg oral Nightly   [3]   PRN medications   Medication    acetaminophen    Or    acetaminophen    Or    acetaminophen    ondansetron    Or    ondansetron   [4]   Continuous Medications   Medication Dose Last Rate

## 2025-05-20 NOTE — PROGRESS NOTES
05/20/25 1410   Discharge Planning   Who is requesting discharge planning? Provider   Home or Post Acute Services None   Expected Discharge Disposition Home     No TCC/SW consult order. No PT/OT order. Anticipate pt will return home no skilled needs at time of discharge.     Please consult TCC/SW if any discharge needs arise.

## 2025-05-20 NOTE — PROGRESS NOTES
Colin Leonard is a 59 y.o. male on day 1 of admission presenting with Multifocal pneumonia.      Subjective   States he had a difficultly sleeping as night which was worsened by cough and nasal congestion. Received benadryl overnight which helped the nasal congestion and he was able to sleep for about 2 hours. States breathing is a better today, not wearing nasal cannula while eating breakfast. The congestion is still very bothersome. Tolerating PO. Denies lower extremity edema.        Objective     Last Recorded Vitals  /72 (BP Location: Left arm, Patient Position: Lying)   Pulse 75   Temp 36.4 °C (97.6 °F) (Temporal)   Resp 19   Wt 126 kg (276 lb 10.8 oz)   SpO2 99%   Intake/Output last 3 Shifts:    Intake/Output Summary (Last 24 hours) at 5/20/2025 0912  Last data filed at 5/20/2025 0722  Gross per 24 hour   Intake 1000 ml   Output 1100 ml   Net -100 ml       Admission Weight  Weight: 113 kg (250 lb) (05/19/25 1030)    Daily Weight  05/20/25 : 126 kg (276 lb 10.8 oz)    Image Results  XR chest 1 view  Narrative: Interpreted By:  Rose Rodriguez,   STUDY:  XR CHEST 1 VIEW; 5/19/2025 11:18 am      INDICATION:  Signs/Symptoms:cough, eval for worsening pneumonia      COMPARISON:  CT chest 05/17/2025      ACCESSION NUMBER(S):  [id number]      ORDERING CLINICIAN:  VALENTINO HERNANDEZ      TECHNIQUE:  Single frontal view of the chest performed.      FINDINGS:  LINES AND DEVICES:  None.      LUNGS:  There is redemonstration of multifocal airspace opacities throughout  both lungs, overall increased when compared to the prior CT. No  pneumothorax or obvious pleural effusion.      CARDIOMEDIASTINAL SILHOUETTE:  The heart size is accentuated by shallow inspiration in portable  technique.      Impression: Worsening bilateral multifocal pneumonia.      MACRO  None      Signed by: Rose Rodriguez 5/19/2025 11:37 AM  Dictation workstation:   [id number]  ECG 12 Lead  Sinus rhythm with occasional Premature ventricular  complexes and Premature atrial complexes  Nonspecific ST and T wave abnormality  Abnormal ECG  When compared with ECG of 16-MAY-2025 00:03,  Premature ventricular complexes are now Present  Vent. rate has decreased BY  49 BPM  Criteria for Anteroseptal infarct are no longer Present  Nonspecific T wave abnormality now evident in Inferior leads  T wave inversion no longer evident in Lateral leads  CT chest wo IV contrast  Narrative: Interpreted By:  Katie Mcclellan,   STUDY:  CT CHEST WO IV CONTRAST; ;  5/17/2025 4:15 pm      INDICATION:  Signs/Symptoms:hemoptysis.          COMPARISON:  03/10/2025      ACCESSION NUMBER(S):  [id number]      ORDERING CLINICIAN:  DEMETRIUS STYLES      TECHNIQUE:  Serial axial unenhanced CT images obtained of the chest. Images  reformatted in the coronal and sagittal projection      All CT examinations are performed with 1 or more of the following  dose reduction techniques: Automated exposure control, adjustment of  mA and/or kv according to patient's size, or use of iterative  reconstruction techniques.      FINDINGS:  Mediastinum demonstrates scattered prominent lymph nodes. For  example, in the right paratracheal location identified measuring 9 mm  in the short axis. Also, precarinal lymph node identified measuring 9  mm in the short axis. Right hilar lymph node identified measuring 12  mm with lymphadenopathy demonstrated. Esophagus is unremarkable      Heart and great vessels demonstrate ascending thoracic aorta to  measure 3.7 cm. Mild vascular calcification of the thoracic aorta. No  cardiomegaly.      Lung parenchyma demonstrates small bilateral basilar effusions. There  is patchy areas of airspace consolidation demonstrated within  bilateral lung fields with slight nodular configuration. With  findings more conspicuous in particular within the left lower lobe in  comparison to CT dated 03/10/2025 with patchy consolidation.      Included portions visualized abdomen demonstrate  postsurgical changes  status post cholecystectomy.      Bilateral gynecomastia is demonstrated.      Visualized osseous structures demonstrate multilevel anterior  osteophyte formation within the thoracic spine with multilevel  endplate Schmorl's node formation in particular lower thoracic spine.  No compression deformity demonstrated.                      Impression: 1. Patchy airspace consolidation throughout bilateral lung fields  with findings more conspicuous in particular left lower lobe.  Correlate with patient's history and concern for multilobar pneumonia  with follow-up to clearing recommended. Alternatively, given  patient's history of hemoptysis component of pulmonary hemorrhage is  not excluded.          MACRO:  None      Signed by: Katie Mcclellan 5/19/2025 8:07 AM  Dictation workstation:   [id number]      Physical Exam  Constitutional:       General: He is not in acute distress.     Appearance: He is ill-appearing. He is not toxic-appearing.   HENT:      Head: Normocephalic.      Mouth/Throat:      Pharynx: Oropharynx is clear.   Eyes:      General: No scleral icterus.  Cardiovascular:      Rate and Rhythm: Normal rate.   Pulmonary:      Effort: No respiratory distress.      Breath sounds: No wheezing.      Comments: Mildly diminished bilaterally but overall clear  Abdominal:      General: There is no distension.      Palpations: Abdomen is soft.   Musculoskeletal:      Right lower leg: No edema.      Left lower leg: No edema.   Neurological:      Mental Status: He is alert and oriented to person, place, and time.   Psychiatric:         Behavior: Behavior normal.         Relevant Results                      Assessment & Plan  Multifocal pneumonia  CXR suggestive of worsening bilateral multifocal pneumonia  Consult pulmonology  MRSA PCR negative -> stop IV vanc   Check sputum culture, strep pneumo, and legionella antigens  Respiratory viral panel negative   Was on IV zosyn and discharged on  augmentin during prior hospital stay, will start meropenem for now  Low threshold for ID consult if not improving  Sepsis with acute hypoxic respiratory failure  Meets sepsis criteria with tachypnea, hypoxia, leukocytosis, and pneumonia  Blood cultures ordered and pending  Low threshold for ID consult if not improving  Acute hypoxic respiratory failure  Due to above  Wean O2 as able  Primary hypertension  Continue home coreg and hydralazine with hold parameters  CKD (chronic kidney disease) stage 5, GFR less than 15 ml/min (Multi)  Nephrology consulted - follows with Dr. Max   Continue home torsemide for now  Stable, at baseline  Anemia  Due to chronic kidney disease  Baseline Hgb ~7.5  Stable at baseline  No overt bleeding  Transfuse Hgb <7  History of Clostridioides difficile colitis  Continue empiric PO vanc while on IV antibiotics  Elevated troponin  Troponin 744 -> 736, flat trending  Low suspicion for ACS, suspect likely demand from respiratory failure  Cardiology consulted   Limited echo ordered   Chronic diastolic heart failure  BNP 2000  Cardiology consulted  Did not appear significantly volume overloaded on exam, low suspicion for acute exacerbation but will await cardiology evaluation     Plan:  BMP reviewed: Cr 7.29, renal function remains stable  CBC reviewed: WBC 15.6, leukocytosis improving. Hgb 7.4, anemia stable  Iron studies ordered -> consistent with anemia of chronic disease, likely due to chronic kidney disease  Awaiting pulmonology and nephrology evaluations today   Diuresis per nephrology   Start antihistamine and mucinex for congestion  Low dose trazodone nightly PRN for sleep  Encourage OOB/ambulation    Case discussed with Isma Fontana CNP from cardiology              Madalyn Cole MD

## 2025-05-20 NOTE — NURSING NOTE
Patient is aware of the fall risk and refused bed alarm....   He stated we have friction.... I told him the risk nof fall.   He understood   normal...

## 2025-05-20 NOTE — ASSESSMENT & PLAN NOTE
Nephrology consulted - follows with Dr. Max   Continue home torsemide for now  Stable, at baseline

## 2025-05-20 NOTE — ASSESSMENT & PLAN NOTE
CXR suggestive of worsening bilateral multifocal pneumonia  Consult pulmonology  MRSA PCR negative -> stop IV vanc   Check sputum culture, strep pneumo, and legionella antigens  Respiratory viral panel negative   Was on IV zosyn and discharged on augmentin during prior hospital stay, will start meropenem for now  Low threshold for ID consult if not improving

## 2025-05-20 NOTE — ASSESSMENT & PLAN NOTE
Troponin 744 -> 736, flat trending  Low suspicion for ACS, suspect likely demand from respiratory failure  Cardiology consulted   Limited echo ordered

## 2025-05-20 NOTE — CARE PLAN
Problem: Pain - Adult  Goal: Verbalizes/displays adequate comfort level or baseline comfort level  5/20/2025 1730 by Sherif Browne RN  Outcome: Progressing  5/20/2025 1730 by Sherif Browne RN  Outcome: Progressing  5/20/2025 1729 by Sherif Browne RN  Outcome: Progressing

## 2025-05-20 NOTE — CARE PLAN
The patient's goals for the shift include  to titrate down oxygen and get some sleep.    The clinical goals for the shift include to remain free of SOB.    Over the shift, the patient did not make progress toward the following goals. Barriers to progression include he had trouble sleeping and was given prn meds to assist . Recommendations to address these barriers include give him meds to assist with his insomnia and group care to decrease amount of sleep intteruptions..

## 2025-05-20 NOTE — ASSESSMENT & PLAN NOTE
BNP 2000  Cardiology consulted  Did not appear significantly volume overloaded on exam, low suspicion for acute exacerbation but will await cardiology evaluation

## 2025-05-20 NOTE — PROGRESS NOTES
"Spiritual Care Visit  Spiritual Care Request    Reason for Visit:  Routine Visit: Introduction     Request Received From:       Focus of Care:  Visited With: Patient         Refer to :          Spiritual Care Assessment    Spiritual Assessment:                      Care Provided:  Intended Effects: Establish rapport and connectedness, Build relationship of care and support, Convey a calming presence, Demonstrate caring and concern, Lessen someone's feelings of isolation, Lessen anxiety  Methods: Offer emotional support  Interventions: Explain  role    Sense of Community and or Bahai Affiliation:  Mosque         Addressed Needs/Concerns and/or Frances Through:          Outcome:  Outcome of Spiritual Care Visit: Identifying spiritual/emotional issues, Comfort/healing presence     Advance Directives:         Spiritual Care Annotation        Annotation:  provided patient support while rounding the Unit.  introduced  services of [facility].   explained the role of the  in providing emotional and spiritual support for patient's and family while in admitted to the hospital.      greeted the patient while rounding, patient indicated that the timing of the visit was \"[place] appoint\" as he called it. Patient has been in and out of the hospital several times since March.  Patient said that he was struggling with his recovery.  Patient's father and son River are very supportive.  During the visit the patient talked about his jer journey and some personal struggles with his family dynamics. This  offered a supportive listening presence.  Patient's medical physician arrived and the visit was concluded.  will follow up when available.     Spiritual care will remain available for support as requested.         "

## 2025-05-21 ENCOUNTER — APPOINTMENT (OUTPATIENT)
Dept: CARDIOLOGY | Facility: HOSPITAL | Age: 60
DRG: 871 | End: 2025-05-21
Payer: COMMERCIAL

## 2025-05-21 PROBLEM — I50.33 ACUTE ON CHRONIC DIASTOLIC HEART FAILURE: Status: ACTIVE | Noted: 2025-05-20

## 2025-05-21 PROBLEM — I47.20 VENTRICULAR TACHYCARDIA (MULTI): Status: ACTIVE | Noted: 2025-05-21

## 2025-05-21 LAB
ALBUMIN SERPL BCP-MCNC: 3.1 G/DL (ref 3.4–5)
ANION GAP SERPL CALCULATED.3IONS-SCNC: 14 MMOL/L (ref 10–20)
BASOPHILS # BLD AUTO: 0.05 X10*3/UL (ref 0–0.1)
BASOPHILS NFR BLD AUTO: 0.4 %
BUN SERPL-MCNC: 77 MG/DL (ref 6–23)
CALCIUM SERPL-MCNC: 9.1 MG/DL (ref 8.6–10.3)
CHLORIDE SERPL-SCNC: 107 MMOL/L (ref 98–107)
CO2 SERPL-SCNC: 19 MMOL/L (ref 21–32)
CREAT SERPL-MCNC: 7.53 MG/DL (ref 0.5–1.3)
EGFRCR SERPLBLD CKD-EPI 2021: 8 ML/MIN/1.73M*2
EOSINOPHIL # BLD AUTO: 0.89 X10*3/UL (ref 0–0.7)
EOSINOPHIL NFR BLD AUTO: 6.5 %
ERYTHROCYTE [DISTWIDTH] IN BLOOD BY AUTOMATED COUNT: 17.6 % (ref 11.5–14.5)
GLUCOSE SERPL-MCNC: 115 MG/DL (ref 74–99)
HBV SURFACE AG SERPL QL IA: NONREACTIVE
HCT VFR BLD AUTO: 26.1 % (ref 41–52)
HGB BLD-MCNC: 8 G/DL (ref 13.5–17.5)
IMM GRANULOCYTES # BLD AUTO: 0.34 X10*3/UL (ref 0–0.7)
IMM GRANULOCYTES NFR BLD AUTO: 2.5 % (ref 0–0.9)
LYMPHOCYTES # BLD AUTO: 1.08 X10*3/UL (ref 1.2–4.8)
LYMPHOCYTES NFR BLD AUTO: 7.9 %
MAGNESIUM SERPL-MCNC: 1.79 MG/DL (ref 1.6–2.4)
MCH RBC QN AUTO: 27.4 PG (ref 26–34)
MCHC RBC AUTO-ENTMCNC: 30.7 G/DL (ref 32–36)
MCV RBC AUTO: 89 FL (ref 80–100)
MONOCYTES # BLD AUTO: 0.62 X10*3/UL (ref 0.1–1)
MONOCYTES NFR BLD AUTO: 4.6 %
NEUTROPHILS # BLD AUTO: 10.63 X10*3/UL (ref 1.2–7.7)
NEUTROPHILS NFR BLD AUTO: 78.1 %
NRBC BLD-RTO: 0.2 /100 WBCS (ref 0–0)
PHOSPHATE SERPL-MCNC: 4.9 MG/DL (ref 2.5–4.9)
PLATELET # BLD AUTO: 274 X10*3/UL (ref 150–450)
POTASSIUM SERPL-SCNC: 4 MMOL/L (ref 3.5–5.3)
RBC # BLD AUTO: 2.92 X10*6/UL (ref 4.5–5.9)
SODIUM SERPL-SCNC: 136 MMOL/L (ref 136–145)
WBC # BLD AUTO: 13.6 X10*3/UL (ref 4.4–11.3)

## 2025-05-21 PROCEDURE — 2500000004 HC RX 250 GENERAL PHARMACY W/ HCPCS (ALT 636 FOR OP/ED): Mod: JZ | Performed by: STUDENT IN AN ORGANIZED HEALTH CARE EDUCATION/TRAINING PROGRAM

## 2025-05-21 PROCEDURE — 93005 ELECTROCARDIOGRAM TRACING: CPT

## 2025-05-21 PROCEDURE — 2060000001 HC INTERMEDIATE ICU ROOM DAILY

## 2025-05-21 PROCEDURE — 2500000001 HC RX 250 WO HCPCS SELF ADMINISTERED DRUGS (ALT 637 FOR MEDICARE OP): Performed by: NURSE PRACTITIONER

## 2025-05-21 PROCEDURE — 99233 SBSQ HOSP IP/OBS HIGH 50: CPT | Performed by: STUDENT IN AN ORGANIZED HEALTH CARE EDUCATION/TRAINING PROGRAM

## 2025-05-21 PROCEDURE — 2500000002 HC RX 250 W HCPCS SELF ADMINISTERED DRUGS (ALT 637 FOR MEDICARE OP, ALT 636 FOR OP/ED): Performed by: STUDENT IN AN ORGANIZED HEALTH CARE EDUCATION/TRAINING PROGRAM

## 2025-05-21 PROCEDURE — 2500000001 HC RX 250 WO HCPCS SELF ADMINISTERED DRUGS (ALT 637 FOR MEDICARE OP): Performed by: STUDENT IN AN ORGANIZED HEALTH CARE EDUCATION/TRAINING PROGRAM

## 2025-05-21 PROCEDURE — 93010 ELECTROCARDIOGRAM REPORT: CPT | Performed by: INTERNAL MEDICINE

## 2025-05-21 PROCEDURE — 2500000002 HC RX 250 W HCPCS SELF ADMINISTERED DRUGS (ALT 637 FOR MEDICARE OP, ALT 636 FOR OP/ED): Performed by: NURSE PRACTITIONER

## 2025-05-21 PROCEDURE — 36415 COLL VENOUS BLD VENIPUNCTURE: CPT | Performed by: STUDENT IN AN ORGANIZED HEALTH CARE EDUCATION/TRAINING PROGRAM

## 2025-05-21 PROCEDURE — 84100 ASSAY OF PHOSPHORUS: CPT | Performed by: STUDENT IN AN ORGANIZED HEALTH CARE EDUCATION/TRAINING PROGRAM

## 2025-05-21 PROCEDURE — 2500000001 HC RX 250 WO HCPCS SELF ADMINISTERED DRUGS (ALT 637 FOR MEDICARE OP): Performed by: INTERNAL MEDICINE

## 2025-05-21 PROCEDURE — 99233 SBSQ HOSP IP/OBS HIGH 50: CPT | Performed by: NURSE PRACTITIONER

## 2025-05-21 PROCEDURE — 83735 ASSAY OF MAGNESIUM: CPT | Performed by: STUDENT IN AN ORGANIZED HEALTH CARE EDUCATION/TRAINING PROGRAM

## 2025-05-21 PROCEDURE — 2500000004 HC RX 250 GENERAL PHARMACY W/ HCPCS (ALT 636 FOR OP/ED): Performed by: INTERNAL MEDICINE

## 2025-05-21 PROCEDURE — 99221 1ST HOSP IP/OBS SF/LOW 40: CPT | Performed by: INTERNAL MEDICINE

## 2025-05-21 PROCEDURE — 85025 COMPLETE CBC W/AUTO DIFF WBC: CPT | Performed by: STUDENT IN AN ORGANIZED HEALTH CARE EDUCATION/TRAINING PROGRAM

## 2025-05-21 RX ORDER — FUROSEMIDE 10 MG/ML
80 INJECTION INTRAMUSCULAR; INTRAVENOUS ONCE
Status: COMPLETED | OUTPATIENT
Start: 2025-05-21 | End: 2025-05-21

## 2025-05-21 RX ORDER — AMLODIPINE BESYLATE 5 MG/1
5 TABLET ORAL DAILY
Status: DISCONTINUED | OUTPATIENT
Start: 2025-05-21 | End: 2025-05-23

## 2025-05-21 RX ORDER — MAGNESIUM SULFATE 1 G/100ML
1 INJECTION INTRAVENOUS ONCE
Status: COMPLETED | OUTPATIENT
Start: 2025-05-21 | End: 2025-05-21

## 2025-05-21 RX ORDER — AMIODARONE HYDROCHLORIDE 200 MG/1
200 TABLET ORAL 2 TIMES DAILY
Status: DISCONTINUED | OUTPATIENT
Start: 2025-05-21 | End: 2025-05-24 | Stop reason: HOSPADM

## 2025-05-21 RX ADMIN — SODIUM BICARBONATE 1300 MG: 650 TABLET ORAL at 20:42

## 2025-05-21 RX ADMIN — GUAIFENESIN 600 MG: 600 TABLET ORAL at 21:49

## 2025-05-21 RX ADMIN — ASPIRIN 81 MG: 81 TABLET, COATED ORAL at 10:32

## 2025-05-21 RX ADMIN — HEPARIN SODIUM 5000 UNITS: 5000 INJECTION, SOLUTION INTRAVENOUS; SUBCUTANEOUS at 05:33

## 2025-05-21 RX ADMIN — MAGNESIUM SULFATE IN DEXTROSE 1 G: 10 INJECTION, SOLUTION INTRAVENOUS at 12:09

## 2025-05-21 RX ADMIN — SODIUM BICARBONATE 1300 MG: 650 TABLET ORAL at 10:34

## 2025-05-21 RX ADMIN — EZETIMIBE 10 MG: 10 TABLET ORAL at 21:49

## 2025-05-21 RX ADMIN — ATORVASTATIN CALCIUM 20 MG: 20 TABLET, FILM COATED ORAL at 21:49

## 2025-05-21 RX ADMIN — AMLODIPINE BESYLATE 5 MG: 5 TABLET ORAL at 20:39

## 2025-05-21 RX ADMIN — AMIODARONE HYDROCHLORIDE 200 MG: 200 TABLET ORAL at 21:49

## 2025-05-21 RX ADMIN — FAMOTIDINE 20 MG: 20 TABLET, FILM COATED ORAL at 10:33

## 2025-05-21 RX ADMIN — MEROPENEM 500 MG: 500 INJECTION, POWDER, FOR SOLUTION INTRAVENOUS at 19:53

## 2025-05-21 RX ADMIN — AMIODARONE HYDROCHLORIDE 200 MG: 200 TABLET ORAL at 10:32

## 2025-05-21 RX ADMIN — CETIRIZINE HYDROCHLORIDE 10 MG: 10 TABLET, FILM COATED ORAL at 10:32

## 2025-05-21 RX ADMIN — HEPARIN SODIUM 5000 UNITS: 5000 INJECTION, SOLUTION INTRAVENOUS; SUBCUTANEOUS at 20:41

## 2025-05-21 RX ADMIN — MEROPENEM 500 MG: 500 INJECTION, POWDER, FOR SOLUTION INTRAVENOUS at 05:34

## 2025-05-21 RX ADMIN — GUAIFENESIN 600 MG: 600 TABLET ORAL at 10:33

## 2025-05-21 RX ADMIN — FUROSEMIDE 80 MG: 10 INJECTION, SOLUTION INTRAMUSCULAR; INTRAVENOUS at 12:20

## 2025-05-21 RX ADMIN — HYDRALAZINE HYDROCHLORIDE 100 MG: 50 TABLET ORAL at 20:42

## 2025-05-21 RX ADMIN — VANCOMYCIN HYDROCHLORIDE 125 MG: 125 CAPSULE ORAL at 21:49

## 2025-05-21 RX ADMIN — CARVEDILOL 37.5 MG: 25 TABLET, FILM COATED ORAL at 19:56

## 2025-05-21 RX ADMIN — IRON SUCROSE 200 MG: 20 INJECTION, SOLUTION INTRAVENOUS at 05:33

## 2025-05-21 RX ADMIN — HYDRALAZINE HYDROCHLORIDE 100 MG: 50 TABLET ORAL at 10:33

## 2025-05-21 RX ADMIN — Medication 15 MG: at 21:49

## 2025-05-21 ASSESSMENT — COGNITIVE AND FUNCTIONAL STATUS - GENERAL
DAILY ACTIVITIY SCORE: 24
MOBILITY SCORE: 24
DAILY ACTIVITIY SCORE: 24
MOBILITY SCORE: 24

## 2025-05-21 ASSESSMENT — PAIN SCALES - GENERAL
PAINLEVEL_OUTOF10: 0 - NO PAIN

## 2025-05-21 ASSESSMENT — PAIN - FUNCTIONAL ASSESSMENT
PAIN_FUNCTIONAL_ASSESSMENT: 0-10
PAIN_FUNCTIONAL_ASSESSMENT: FLACC (FACE, LEGS, ACTIVITY, CRY, CONSOLABILITY)

## 2025-05-21 NOTE — ASSESSMENT & PLAN NOTE
Several episodes of asymptomatic nonsustained ventricular tachycardia on telemetry  Started on amiodarone by cardiology  EP consulted  Will likely need ischemic evaluation  Keep Mag >2 and K >4

## 2025-05-21 NOTE — PROGRESS NOTES
CONSULT PROGRESS NOTES    SERVICE DATE: 5/21/2025   SERVICE TIME: 9:40 AM    CONSULTING SERVICE: Nephrology    ASSESSMENT AND PLAN   59-year-old male with numerous medical problems including advanced CKD readmitted with concern for worsening pneumonia versus diastolic CHF exacerbation in the context of a GFR of 8.  1.  CKD stage V  2.  Anemia of chronic kidney disease  3.  Essential hypertension  4.  Edema  5.  Chronic metabolic acidosis     Advanced underlying CKD with a GFR of 8.  This is a difficult case and that he really does not have any uremic symptoms other than chronic fatigue, which is multifactorial.  Nor do I have a pressing reason to dialyze him emergently.  The constellation of his acute diverticulitis, multiple recent hospitalizations, acute pneumonia, mild fluid overload, and significant anemia raise the possibility of electively starting dialysis.  This is a legitimate consideration and I discussed this extensively with him yesterday.  I do not think he is totally opposed, but again no pressing reason right now.  I like to continue a trial of IV antibiotics, loop diuretic therapy, and anemia management first.  Will begin amiodarone therapy due to his runs of V. tach.  Dose with Lasix 80 mg IV x 1 today.  Rather than standing IV diuretic therapy, I would like to diurese him on a day by day basis following his serial serum creatinine levels.  Continue oral bicarbonate therapy as well.  Continue home blood pressure medicines.  Appreciate Cardiologic and pulmonary consultations.            I will probably redosed him with erythropoietin stimulating agents tomorrow.  Continue IV iron load.  Trend daily labs, continue supportive care.  Case discussed extensively with Dr. Cole and Gavin Fontana CNP.  I will continue to follow him.  No role for hemodialysis initiation today.    SUBJECTIVE  INTERVAL HPI: He feels about the same as yesterday.  He had decent urine output after the Lasix 60 mg IV given  yesterday.  He did not sleep well and is very tired today.  Evidently there were multiple runs of V. tach last night.    MEDICATIONS:  Scheduled Medications[1]   Continuous Medications[2]   PRN Medications[3]     OBJECTIVE  PHYSICAL EXAM:   Heart Rate:  [63-76]   Temp:  [36.1 °C (97 °F)-36.8 °C (98.3 °F)]   Resp:  [17-19]   BP: (133-156)/(65-80)   Weight:  [122 kg (269 lb 6.4 oz)]   SpO2:  [95 %-100 %]   Body mass index is 38.66 kg/m².  Obese white man, no acute distress  Appropriate affect  No obvious skin rashes  Hearing intact  Phonation intact  Moist mucosa  Harsh systolic murmur  Some rales are present  Abdomen is soft, nondistended, nontender, positive bowel sounds  No Elmore catheter in place, no suprapubic tenderness to palpation  Trace pretibial extremity edema  Moves 4 limbs spontaneously  No obvious joint deformities  No lymphadenopathy    DATA:   Labs:  Results for orders placed or performed during the hospital encounter of 05/19/25 (from the past 96 hours)   CBC and Auto Differential   Result Value Ref Range    WBC 23.1 (H) 4.4 - 11.3 x10*3/uL    nRBC 0.1 (H) 0.0 - 0.0 /100 WBCs    RBC 3.24 (L) 4.50 - 5.90 x10*6/uL    Hemoglobin 8.9 (L) 13.5 - 17.5 g/dL    Hematocrit 27.9 (L) 41.0 - 52.0 %    MCV 86 80 - 100 fL    MCH 27.5 26.0 - 34.0 pg    MCHC 31.9 (L) 32.0 - 36.0 g/dL    RDW 17.2 (H) 11.5 - 14.5 %    Platelets 333 150 - 450 x10*3/uL    Neutrophils % 92.7 40.0 - 80.0 %    Immature Granulocytes %, Automated 1.5 (H) 0.0 - 0.9 %    Lymphocytes % 2.9 13.0 - 44.0 %    Monocytes % 2.3 2.0 - 10.0 %    Eosinophils % 0.4 0.0 - 6.0 %    Basophils % 0.2 0.0 - 2.0 %    Neutrophils Absolute 21.45 (H) 1.20 - 7.70 x10*3/uL    Immature Granulocytes Absolute, Automated 0.34 0.00 - 0.70 x10*3/uL    Lymphocytes Absolute 0.67 (L) 1.20 - 4.80 x10*3/uL    Monocytes Absolute 0.53 0.10 - 1.00 x10*3/uL    Eosinophils Absolute 0.09 0.00 - 0.70 x10*3/uL    Basophils Absolute 0.05 0.00 - 0.10 x10*3/uL   Comprehensive Metabolic  Panel   Result Value Ref Range    Glucose 115 (H) 74 - 99 mg/dL    Sodium 141 136 - 145 mmol/L    Potassium 4.4 3.5 - 5.3 mmol/L    Chloride 111 (H) 98 - 107 mmol/L    Bicarbonate 17 (L) 21 - 32 mmol/L    Anion Gap 17 10 - 20 mmol/L    Urea Nitrogen 73 (H) 6 - 23 mg/dL    Creatinine 7.39 (H) 0.50 - 1.30 mg/dL    eGFR 8 (L) >60 mL/min/1.73m*2    Calcium 9.6 8.6 - 10.3 mg/dL    Albumin 3.6 3.4 - 5.0 g/dL    Alkaline Phosphatase 72 33 - 136 U/L    Total Protein 7.2 6.4 - 8.2 g/dL    AST 9 9 - 39 U/L    Bilirubin, Total 0.6 0.0 - 1.2 mg/dL    ALT 39 10 - 52 U/L   B-Type Natriuretic Peptide   Result Value Ref Range    BNP 2,008 (H) 0 - 99 pg/mL   Troponin I, High Sensitivity, Initial   Result Value Ref Range    Troponin I, High Sensitivity 744 (HH) 0 - 20 ng/L   Lactate   Result Value Ref Range    Lactate 1.1 0.4 - 2.0 mmol/L   Blood Culture    Specimen: Peripheral Venipuncture; Blood culture   Result Value Ref Range    Blood Culture No growth at 1 day    Blood Culture    Specimen: Peripheral Venipuncture; Blood culture   Result Value Ref Range    Blood Culture No growth at 1 day    ECG 12 Lead   Result Value Ref Range    Ventricular Rate 86 BPM    Atrial Rate 86 BPM    AZ Interval 158 ms    QRS Duration 88 ms    QT Interval 366 ms    QTC Calculation(Bazett) 437 ms    P Axis 66 degrees    R Axis -11 degrees    T Axis 56 degrees    QRS Count 14 beats    Q Onset 218 ms    P Onset 139 ms    P Offset 174 ms    T Offset 401 ms    QTC Fredericia 412 ms   MRSA Surveillance for Vancomycin De-escalation, PCR    Specimen: Anterior Nares; Swab   Result Value Ref Range    MRSA PCR Not Detected Not Detected   Troponin, High Sensitivity, 1 Hour   Result Value Ref Range    Troponin I, High Sensitivity 736 (HH) 0 - 20 ng/L   Respiratory Culture/Smear    Specimen: SPUTUM; Fluid   Result Value Ref Range    Respiratory Culture/Smear Normal throat gomez     Gram Stain       Gram stain indicates specimen consists of lower respiratory tract  secretions.    Gram Stain No predominant organism    Urinalysis with Reflex Culture and Microscopic   Result Value Ref Range    Color, Urine Light-Yellow Light-Yellow, Yellow, Dark-Yellow    Appearance, Urine Clear Clear    Specific Gravity, Urine 1.014 1.005 - 1.035    pH, Urine 5.5 5.0, 5.5, 6.0, 6.5, 7.0, 7.5, 8.0    Protein, Urine 100 (2+) (A) NEGATIVE, 10 (TRACE), 20 (TRACE) mg/dL    Glucose, Urine 150 (2+) (A) Normal mg/dL    Blood, Urine NEGATIVE NEGATIVE mg/dL    Ketones, Urine NEGATIVE NEGATIVE mg/dL    Bilirubin, Urine NEGATIVE NEGATIVE mg/dL    Urobilinogen, Urine Normal Normal mg/dL    Nitrite, Urine NEGATIVE NEGATIVE    Leukocyte Esterase, Urine NEGATIVE NEGATIVE   Extra Urine Gray Tube   Result Value Ref Range    Extra Tube Hold for add-ons.    Legionella Antigen, Urine    Specimen: Clean Catch/Voided; Urine   Result Value Ref Range    L. pneumophila Urine Ag Negative Negative   Streptococcus pneumoniae Antigen, Urine    Specimen: Clean Catch/Voided; Urine   Result Value Ref Range    Streptococcus pneumoniae Ag, Urine Negative Negative   Urinalysis Microscopic   Result Value Ref Range    WBC, Urine 1-5 1-5, NONE /HPF    RBC, Urine 1-2 NONE, 1-2, 3-5 /HPF   Sars-CoV-2, Influenza A/B and RSV PCR   Result Value Ref Range    Coronavirus 2019, PCR Not Detected Not Detected    Flu A Result Not Detected Not Detected    Flu B Result Not Detected Not Detected    RSV PCR Not Detected Not Detected   Parainfluenza PCR   Result Value Ref Range    Parainfluenza 1, PCR Not Detected Not Detected, Invalid    Parainfluenza 2, PCR Not Detected Not Detected, Invalid    Parainfluenza 3, PCR Not Detected Not Detected, Invalid    Parainfluenza 4, PCR Not Detected Not Detected, Invalid   Adenovirus PCR Qual For Respiratory Samples   Result Value Ref Range    Adenovirus PCR, Qual Not Detected Not detected   Rhinovirus PCR, Respiratory Spec   Result Value Ref Range    Rhinovirus PCR, Respiratory Spec Not Detected Not Detected    Metapneumovirus PCR   Result Value Ref Range    Metapneumovirus (Human), PCR Not Detected Not detected   ELI with Reflex to BOZENA   Result Value Ref Range    ELI Positive (A) Negative    ELI Pattern Homogeneous     ELI Titer 1:160    BOZENA Panel   Result Value Ref Range    Anti-SM <0.2 <1.0 AI    Anti-RNP 0.3 <1.0 AI    Anti-SM/RNP <0.2 <1.0 AI    Anti-SSA 0.3 <1.0 AI    Anti-SSB <0.2 <1.0 AI    Anti-SCL-70 <0.2 <1.0 AI    Anti-DARLING-1 IgG <0.2 <1.0 AI    Anti-Chromatin <0.2 <1.0 AI    Anti-Centromere <0.2 <1.0 AI    ANTI-RIBOSOMAL P <0.2 <1.0 AI    Anti-DNA (DS) 1.0 <5.0 IU/mL   CBC and Auto Differential   Result Value Ref Range    WBC 15.6 (H) 4.4 - 11.3 x10*3/uL    nRBC 0.0 0.0 - 0.0 /100 WBCs    RBC 2.68 (L) 4.50 - 5.90 x10*6/uL    Hemoglobin 7.4 (L) 13.5 - 17.5 g/dL    Hematocrit 23.3 (L) 41.0 - 52.0 %    MCV 87 80 - 100 fL    MCH 27.6 26.0 - 34.0 pg    MCHC 31.8 (L) 32.0 - 36.0 g/dL    RDW 17.7 (H) 11.5 - 14.5 %    Platelets 262 150 - 450 x10*3/uL    Neutrophils % 90.0 40.0 - 80.0 %    Immature Granulocytes %, Automated 1.7 (H) 0.0 - 0.9 %    Lymphocytes % 3.8 13.0 - 44.0 %    Monocytes % 2.9 2.0 - 10.0 %    Eosinophils % 1.3 0.0 - 6.0 %    Basophils % 0.3 0.0 - 2.0 %    Neutrophils Absolute 14.03 (H) 1.20 - 7.70 x10*3/uL    Immature Granulocytes Absolute, Automated 0.26 0.00 - 0.70 x10*3/uL    Lymphocytes Absolute 0.59 (L) 1.20 - 4.80 x10*3/uL    Monocytes Absolute 0.45 0.10 - 1.00 x10*3/uL    Eosinophils Absolute 0.21 0.00 - 0.70 x10*3/uL    Basophils Absolute 0.05 0.00 - 0.10 x10*3/uL   Renal Function Panel   Result Value Ref Range    Glucose 179 (H) 74 - 99 mg/dL    Sodium 135 (L) 136 - 145 mmol/L    Potassium 4.1 3.5 - 5.3 mmol/L    Chloride 107 98 - 107 mmol/L    Bicarbonate 16 (L) 21 - 32 mmol/L    Anion Gap 16 10 - 20 mmol/L    Urea Nitrogen 76 (H) 6 - 23 mg/dL    Creatinine 7.29 (H) 0.50 - 1.30 mg/dL    eGFR 8 (L) >60 mL/min/1.73m*2    Calcium 8.6 8.6 - 10.3 mg/dL    Phosphorus 3.6 2.5 - 4.9 mg/dL     Albumin 3.0 (L) 3.4 - 5.0 g/dL   Iron and TIBC   Result Value Ref Range    Iron <10 (L) 35 - 150 ug/dL    UIBC 160 110 - 370 ug/dL    TIBC      % Saturation     Ferritin   Result Value Ref Range    Ferritin 238 20 - 300 ng/mL   Hepatitis B surface antibody   Result Value Ref Range    Hepatitis B Surface AB <3.1 <10.0 mIU/mL   Hepatitis B core antibody, total   Result Value Ref Range    Hepatitis B Core AB- Total Nonreactive Nonreactive   POCT GLUCOSE   Result Value Ref Range    POCT Glucose 141 (H) 74 - 99 mg/dL   POCT GLUCOSE   Result Value Ref Range    POCT Glucose 135 (H) 74 - 99 mg/dL   Transthoracic Echo (TTE) Limited   Result Value Ref Range    AV pk gillian 2.65 m/s    LVOT diam 2.10 cm    AV mn grad 14 mmHg    MV E/A ratio 1.07     Tricuspid annular plane systolic excursion 2.1 cm    LV Biplane EF 69 %    LV EF 63 %    RVSP 24.0 mmHg    LVIDd 5.43 cm    AV pk grad 28 mmHg    Aortic Valve Area by Continuity of VTI 1.58 cm2    Aortic Valve Area by Continuity of Peak Velocity 1.52 cm2    LV A4C EF 63.9    Electrocardiogram, 12-lead PRN ACS symptoms   Result Value Ref Range    Ventricular Rate 74 BPM    Atrial Rate 74 BPM    MO Interval 160 ms    QRS Duration 90 ms    QT Interval 422 ms    QTC Calculation(Bazett) 468 ms    P Axis 11 degrees    R Axis -7 degrees    T Axis 14 degrees    QRS Count 12 beats    Q Onset 217 ms    P Onset 137 ms    P Offset 194 ms    T Offset 428 ms    QTC Fredericia 452 ms   Hepatitis B surface antigen   Result Value Ref Range    Hepatitis B Surface AG Nonreactive Nonreactive   POCT GLUCOSE   Result Value Ref Range    POCT Glucose 126 (H) 74 - 99 mg/dL   CBC and Auto Differential   Result Value Ref Range    WBC 13.6 (H) 4.4 - 11.3 x10*3/uL    nRBC 0.2 (H) 0.0 - 0.0 /100 WBCs    RBC 2.92 (L) 4.50 - 5.90 x10*6/uL    Hemoglobin 8.0 (L) 13.5 - 17.5 g/dL    Hematocrit 26.1 (L) 41.0 - 52.0 %    MCV 89 80 - 100 fL    MCH 27.4 26.0 - 34.0 pg    MCHC 30.7 (L) 32.0 - 36.0 g/dL    RDW 17.6 (H) 11.5  - 14.5 %    Platelets 274 150 - 450 x10*3/uL    Neutrophils % 78.1 40.0 - 80.0 %    Immature Granulocytes %, Automated 2.5 (H) 0.0 - 0.9 %    Lymphocytes % 7.9 13.0 - 44.0 %    Monocytes % 4.6 2.0 - 10.0 %    Eosinophils % 6.5 0.0 - 6.0 %    Basophils % 0.4 0.0 - 2.0 %    Neutrophils Absolute 10.63 (H) 1.20 - 7.70 x10*3/uL    Immature Granulocytes Absolute, Automated 0.34 0.00 - 0.70 x10*3/uL    Lymphocytes Absolute 1.08 (L) 1.20 - 4.80 x10*3/uL    Monocytes Absolute 0.62 0.10 - 1.00 x10*3/uL    Eosinophils Absolute 0.89 (H) 0.00 - 0.70 x10*3/uL    Basophils Absolute 0.05 0.00 - 0.10 x10*3/uL   Renal Function Panel   Result Value Ref Range    Glucose 115 (H) 74 - 99 mg/dL    Sodium 136 136 - 145 mmol/L    Potassium 4.0 3.5 - 5.3 mmol/L    Chloride 107 98 - 107 mmol/L    Bicarbonate 19 (L) 21 - 32 mmol/L    Anion Gap 14 10 - 20 mmol/L    Urea Nitrogen 77 (H) 6 - 23 mg/dL    Creatinine 7.53 (H) 0.50 - 1.30 mg/dL    eGFR 8 (L) >60 mL/min/1.73m*2    Calcium 9.1 8.6 - 10.3 mg/dL    Phosphorus 4.9 2.5 - 4.9 mg/dL    Albumin 3.1 (L) 3.4 - 5.0 g/dL   Magnesium   Result Value Ref Range    Magnesium 1.79 1.60 - 2.40 mg/dL         SIGNATURE: Boo Max MD PATIENT NAME: Colin Leonard   DATE: May 21, 2025 MRN: [id number]   TIME: 9:40 AM PAGER: [phone]            [1] amiodarone, 200 mg, oral, BID  aspirin, 81 mg, oral, Daily  atorvastatin, 20 mg, oral, Nightly  carvedilol, 37.5 mg, oral, BID  cetirizine, 10 mg, oral, Daily  ezetimibe, 10 mg, oral, Nightly  famotidine, 20 mg, oral, Daily   Or  famotidine, 20 mg, intravenous, Daily  guaiFENesin, 600 mg, oral, BID  heparin (porcine), 5,000 Units, subcutaneous, q8h  hydrALAZINE, 100 mg, oral, TID  iron sucrose, 200 mg, intravenous, Daily  magnesium sulfate, 1 g, intravenous, Once  melatonin, 15 mg, oral, Nightly  meropenem, 500 mg, intravenous, q12h  polyethylene glycol, 17 g, oral, Daily  sodium bicarbonate, 1,300 mg, oral, TID  [Held by provider] torsemide, 60 mg,  oral, Daily  vancomycin, 125 mg, oral, Nightly  [2]    [3] PRN medications: acetaminophen **OR** acetaminophen **OR** acetaminophen, ondansetron **OR** ondansetron

## 2025-05-21 NOTE — ASSESSMENT & PLAN NOTE
Troponin 744 -> 736, flat trending  Low suspicion for ACS, suspect likely demand from respiratory failure  Cardiology consulted   Limited echo ordered - EF 60-65%

## 2025-05-21 NOTE — PROGRESS NOTES
Colin Leonard is a 59 y.o. male on day 2 of admission presenting with Multifocal pneumonia.      Subjective   Was able to get a good night's rest last night and is feeling better today. Denies SOB currently. States the nasal congestion and cough improved with addition of mucinex and antihistamine yesterday. Tolerating PO. Had 2 loose stools yesterday but has not had a BM yet today.        Objective     Last Recorded Vitals  /85   Pulse 72   Temp 36.3 °C (97.3 °F) (Temporal)   Resp 18   Wt 122 kg (269 lb 6.4 oz)   SpO2 96%   Intake/Output last 3 Shifts:    Intake/Output Summary (Last 24 hours) at 5/21/2025 1115  Last data filed at 5/21/2025 1019  Gross per 24 hour   Intake 220 ml   Output 1851 ml   Net -1631 ml       Admission Weight  Weight: 113 kg (250 lb) (05/19/25 1030)    Daily Weight  05/21/25 : 122 kg (269 lb 6.4 oz)    Image Results  Electrocardiogram, 12-lead PRN ACS symptoms  Normal sinus rhythm  Minimal voltage criteria for LVH, may be normal variant ( R in aVL )  Nonspecific ST abnormality  Abnormal ECG  When compared with ECG of 19-MAY-2025 10:48, (unconfirmed)  Premature ventricular complexes are no longer Present  Premature atrial complexes are no longer Present      Physical Exam  Constitutional:       General: He is not in acute distress.     Appearance: He is ill-appearing. He is not toxic-appearing.   HENT:      Head: Normocephalic.      Mouth/Throat:      Pharynx: Oropharynx is clear.   Eyes:      General: No scleral icterus.  Cardiovascular:      Rate and Rhythm: Normal rate.   Pulmonary:      Effort: No respiratory distress.      Breath sounds: No wheezing.      Comments: 4L NC  Abdominal:      General: There is no distension.      Palpations: Abdomen is soft.   Musculoskeletal:      Right lower leg: No edema.      Left lower leg: No edema.   Neurological:      Mental Status: He is alert and oriented to person, place, and time.   Psychiatric:         Behavior: Behavior normal.          Relevant Results                    Assessment & Plan  Multifocal pneumonia  CXR suggestive of worsening bilateral multifocal pneumonia  Consult pulmonology  MRSA PCR negative -> stop IV vanc   Check sputum culture, strep pneumo, and legionella antigens  Respiratory viral panel negative   Was on IV zosyn and discharged on augmentin during prior hospital stay, will start meropenem for now  Low threshold for ID consult if not improving  Sepsis with acute hypoxic respiratory failure  Meets sepsis criteria with tachypnea, hypoxia, leukocytosis, and pneumonia  Blood cultures ordered and pending  Low threshold for ID consult if not improving  Acute hypoxic respiratory failure  Due to above  Wean O2 as able  Primary hypertension  Continue home coreg and hydralazine with hold parameters  CKD (chronic kidney disease) stage 5, GFR less than 15 ml/min (Multi)  Nephrology consulted - follows with Dr. Max   Diuresis per nephrology   Stable, at baseline  Anemia  Due to chronic kidney disease  Baseline Hgb ~7.5  Stable at baseline  No overt bleeding  Transfuse Hgb <7  History of Clostridioides difficile colitis  Continue empiric PO vanc while on IV antibiotics  Elevated troponin  Troponin 744 -> 736, flat trending  Low suspicion for ACS, suspect likely demand from respiratory failure  Cardiology consulted   Limited echo ordered - EF 60-65%  Acute on chronic diastolic heart failure  BNP 2000  Cardiology consulted  Diuresis per nephrology   Ventricular tachycardia (Multi)  Several episodes of asymptomatic nonsustained ventricular tachycardia on telemetry  Started on amiodarone by cardiology  EP consulted  Will likely need ischemic evaluation  Keep Mag >2 and K >4    Plan:  BMP reviewed: Cr 7.53/GFR 8, renal function remains stable  Mag 1.79, mildly low - given Vtach, will give 1g IV mag today  CBC reviewed: WBC 15.6 -> 13.6, improving. Hgb stable at 8  Case discussed with Isma Del Castillo CNP from cardiology - patient  will be started on amiodarone due to nonsustained VT; EP consulted  Case discussed with Dr. Max from nephrology - another dose of IV lasix ordered for today  Await EP evaluation  Await pulmonology evaluation  Continue antibiotics for now -> can likely be de-escalated as respiratory status is improving, will discuss with pulm today  Encourage OOB/ambulation  Low threshold for ICU transfer if episodes of Vtach become more frequent - discussed with rapid RN who is aware              Madalyn Cole MD

## 2025-05-21 NOTE — PROGRESS NOTES
Spiritual Care Visit  Spiritual Care Request    Reason for Visit:  Routine Visit: Follow-up     Request Received From:       Focus of Care:  Visited With: Patient         Refer to :          Spiritual Care Assessment    Spiritual Assessment:                      Care Provided:  Intended Effects: Establish rapport and connectedness, Build relationship of care and support, Convey a calming presence, Demonstrate caring and concern, Lessen someone's feelings of isolation  Methods: Offer emotional support  Interventions: Explain  role    Sense of Community and or Quaker Affiliation:  Yarsanism         Addressed Needs/Concerns and/or Frances Through:  Quaker Encounters  Quaker Needs: Prayer       Outcome:  Outcome of Spiritual Care Visit: Identifying spiritual/emotional issues, Comfort/healing presence     Advance Directives:         Spiritual Care Annotation        Annotation:  provided patient support while rounding the Unit.  introduced  services of [facility].   explained the role of the  in providing emotional and spiritual support for patient's and family while in admitted to the hospital.     greeted the patient who was laying flat in his his hospital bed. Patient was listening to a pod cast.  Patient indicated that he did sleep well last evening. Patient appears tire at this time. Patient is struggling with his extended illnesses. Patient inquiring about spiritual strength,  encouraged the patient to explore these emotions. Patient again indicated his appreciation for the visit.      No spiritual or Restoration needs were expressed. Spiritual care will remain available for support as requested.

## 2025-05-21 NOTE — PROGRESS NOTES
05/21/25 1612   Discharge Planning   Expected Discharge Disposition Home     No TCC/SW consult order. No PT/OT order. Anticipate pt will return home no skilled needs at time of discharge.      Please consult TCC/SW if any discharge needs arise.    Nephrology following.

## 2025-05-21 NOTE — CONSULTS
Inpatient consult to Cardiology  Consult performed by: Brent Ellington MD  Consult ordered by: SARAH Patel-CNP  Reason for consult: SVT and nonsustained ventricular tachycardia  Assessment/Recommendations: Agree with amiodarone for short-term therapy.  Low threshold for cardiac catheterization        History Of Present Illness:    Colin Leonard is a 59 y.o. male with a complex past medical history that includes diastolic heart failure, high suspicion of coronary artery disease with exceedingly elevated coronary calcium CT score, renal dysfunction and atrial tachycardia as well as nonsustained ventricular tachycardia.  This in the setting of preserved LV systolic function and no significant valvular heart disease.  He has not had syncope or near syncope.  He is fairly asymptomatic regarding his arrhythmias which include both atrial tachycardia as well as nonsustained ventricular tachycardia.  He has never had a cardiac catheterization.  There is a strong family history of premature coronary disease in his father who is post CABG at age 60.     Last Recorded Vitals:  Vitals:    05/21/25 1032 05/21/25 1033 05/21/25 1200 05/21/25 1213   BP: 180/85 180/85  164/72   BP Location:    Right arm   Patient Position:    Lying   Pulse: 72 72 67    Resp:    17   Temp:    36.3 °C (97.3 °F)   TempSrc:    Temporal   SpO2:    100%   Weight:       Height:           Last Labs:  CBC - 5/21/2025:  5:38 AM  13.6 8.0 274    26.1      CMP - 5/21/2025:  5:38 AM  9.1 7.2 9 --- 0.6   4.9 3.1 39 72      PTT - 3/11/2025:  1:31 PM  1.2   12.6 38.7     Troponin I, High Sensitivity   Date/Time Value Ref Range Status   05/19/2025 11:43  (HH) 0 - 20 ng/L Final     Comment:     Previous result verified on 5/19/2025 1121 on specimen/case [id number] called with component [facility] for procedure Troponin I, High Sensitivity, Initial with value 744 ng/L.   05/19/2025 10:34  (HH) 0 - 20 ng/L Final   05/15/2025 11:54 PM  26 (H) 0 - 20 ng/L Final     BNP   Date/Time Value Ref Range Status   05/19/2025 10:34 AM 2,008 (H) 0 - 99 pg/mL Final   04/02/2025 11:03  (H) 0 - 99 pg/mL Final     Hemoglobin A1C   Date/Time Value Ref Range Status   05/16/2025 07:15 AM 5.4 See comment % Final   03/04/2025 06:06 AM 5.4 See comment % Final     LDL Calculated   Date/Time Value Ref Range Status   08/19/2024 01:43 PM 88 <=99 mg/dL Final     Comment:                                 Near   Borderline      AGE      Desirable  Optimal    High     High     Very High     0-19 Y     0 - 109     ---    110-129   >/= 130     ----    20-24 Y     0 - 119     ---    120-159   >/= 160     ----      >24 Y     0 -  99   100-129  130-159   160-189     >/=190       VLDL   Date/Time Value Ref Range Status   08/19/2024 01:43 PM 22 0 - 40 mg/dL Final   02/01/2022 12:37 PM 23 0 - 40 mg/dL Final   04/30/2020 01:39 PM 28 0 - 40 mg/dL Final   09/24/2019 02:18 PM 41 (H) 0 - 40 mg/dL Final      Last I/O:  I/O last 3 completed shifts:  In: 460 (3.8 mL/kg) [P.O.:360; IV Piggyback:100]  Out: 2701 (22.1 mL/kg) [Urine:2701 (0.6 mL/kg/hr)]  Weight: 122.2 kg     Past Cardiology Tests (Last 3 Years):  EKG:  Electrocardiogram, 12-lead PRN ACS symptoms 05/20/2025 (Preliminary)      ECG 12 Lead 05/19/2025      ECG 12 lead 05/16/2025      ECG 12 Lead 04/06/2025      Electrocardiogram, 12-lead PRN ACS symptoms 04/05/2025      ECG 12 lead 03/27/2025      ECG 12 Lead 03/10/2025      ECG 12 lead 03/10/2025      ECG 12 lead (Clinic Performed) 10/28/2024      ECG 12 Lead 07/29/2024      Electrocardiogram, 12-lead PRN ACS symptoms 07/21/2024      ECG 12 lead (Clinic Performed) 04/25/2024    Echo:  Transthoracic Echo (TTE) Limited 05/20/2025      Transthoracic Echo (TTE) Complete 03/11/2025      Transthoracic Echo (TTE) Complete 07/22/2024    Ejection Fractions:  EF   Date/Time Value Ref Range Status   05/20/2025 12:09 PM 63 %    03/11/2025 10:47 AM 63 %    07/22/2024 09:36 AM 63 %       Cath:  No results found for this or any previous visit from the past 1095 days.    Stress Test:  No results found for this or any previous visit from the past 1095 days.    Cardiac Imaging:  No results found for this or any previous visit from the past 1095 days.      Past Medical History:  He has a past medical history of Coronary artery disease, Diabetes mellitus (Multi), DVT (deep venous thrombosis) (Multi), Hypertension, and MI (myocardial infarction) (Multi).    Past Surgical History:  He has a past surgical history that includes Amputation and Cholecystectomy (03/05/2025).      Social History:  He reports that he has never smoked. He has never used smokeless tobacco. No history on file for alcohol use and drug use.    Family History:  Family History[1]     Allergies:  Amlodipine besylate    Inpatient Medications:  Scheduled Medications[2]  PRN Medications[3]  Continuous Medications[4]  Outpatient Medications:  Current Outpatient Medications   Medication Instructions    acetaminophen (TYLENOL) 650 mg, oral, Every 6 hours PRN    amoxicillin-clavulanate (Augmentin) 500-125 mg tablet 1 tablet, oral, Daily    aspirin 81 mg, Daily    atorvastatin (LIPITOR) 20 mg, Daily    carvedilol (COREG) 50 mg, oral, 2 times daily    ezetimibe (ZETIA) 10 mg, oral, Nightly    hydrALAZINE (APRESOLINE) 100 mg, oral, 3 times daily    multivitamin tablet 1 tablet, oral, Daily    sodium bicarbonate 1,300 mg, oral, 3 times daily    torsemide (DEMADEX) 60 mg, oral, Daily    vancomycin (VANCOCIN) 125 mg, oral, Nightly         Assessment/Plan   SVT  The patient's tachycardia is most consistent with a long RP tachycardia which likely represents atrial tachycardia.  He is fairly asymptomatic and I would continue high dose beta-blockers for this.  Of course amiodarone which we are giving for his nonsustained ventricular tachycardia will also suppress his arrhythmia fairly well.  I would not recommend catheter-based therapy at this time  for this patient.    Nonsustained ventricular tachycardia  Given the patient's risk factor profile and exceedingly elevated coronary artery calcium CT score I suspect he has significant coronary disease which is contributing to his nonsustained ventricular tachycardia.  He is on maximal doses of beta-blockers and I agree with the utilizing amiodarone at this juncture.  I discussed this in detail with the patient and mention that he would only be on amiodarone for the short-term as we further sort out his coronary anatomy.  He understands that he would likely require dialysis if he has cardiac catheterization.  Peripheral IV 05/19/25 20 G Left;Posterior Hand (Active)   Site Assessment Clean;Dry;Intact 05/20/25 2009   Dressing Status Clean;Dry 05/20/25 2009   Number of days: 2       Peripheral IV 05/19/25 20 G Right Antecubital (Active)   Site Assessment Clean;Dry;Intact 05/20/25 2009   Dressing Status Clean;Dry 05/20/25 2009   Number of days: 2       Code Status:  Full Code    I spent 30 minutes in the professional and overall care of this patient.        Brent Ellington MD       [1] No family history on file.  [2]   Scheduled medications   Medication Dose Route Frequency    amiodarone  200 mg oral BID    aspirin  81 mg oral Daily    atorvastatin  20 mg oral Nightly    carvedilol  37.5 mg oral BID    cetirizine  10 mg oral Daily    ezetimibe  10 mg oral Nightly    famotidine  20 mg oral Daily    Or    famotidine  20 mg intravenous Daily    furosemide  80 mg intravenous Once    guaiFENesin  600 mg oral BID    heparin (porcine)  5,000 Units subcutaneous q8h    hydrALAZINE  100 mg oral TID    iron sucrose  200 mg intravenous Daily    magnesium sulfate  1 g intravenous Once    melatonin  15 mg oral Nightly    meropenem  500 mg intravenous q12h    polyethylene glycol  17 g oral Daily    sodium bicarbonate  1,300 mg oral TID    [Held by provider] torsemide  60 mg oral Daily    vancomycin  125 mg oral Nightly   [3]    PRN medications   Medication    acetaminophen    Or    acetaminophen    Or    acetaminophen    ondansetron    Or    ondansetron   [4]   Continuous Medications   Medication Dose Last Rate

## 2025-05-21 NOTE — PROGRESS NOTES
"Colin Leonard is a 59 y.o. male on day 2 of admission presenting with Multifocal pneumonia.    Subjective   Alert and oriented, overall feeling improved.  Negative approximately 1400 mL over the past 24 hours.  Increasing burden of VT noted.       Objective     Physical Exam  Vitals and nursing note reviewed.   Constitutional:       General: He is not in acute distress.     Appearance: He is obese. He is ill-appearing. He is not toxic-appearing.   HENT:      Head: Normocephalic and atraumatic.      Nose: Nose normal.      Mouth/Throat:      Mouth: Mucous membranes are moist.      Pharynx: Oropharynx is clear.   Cardiovascular:      Rate and Rhythm: Normal rate. Rhythm irregular.      Pulses: Normal pulses.      Heart sounds: Normal heart sounds. No murmur heard.     No friction rub. No gallop.   Pulmonary:      Effort: Pulmonary effort is normal.      Breath sounds: Normal breath sounds.      Comments: Diminished breath sounds in bilateral bases.  Abdominal:      General: Bowel sounds are normal.      Palpations: Abdomen is soft.   Musculoskeletal:         General: Normal range of motion.      Cervical back: Normal range of motion.      Right lower leg: No edema.      Left lower leg: No edema.   Skin:     General: Skin is warm and dry.      Capillary Refill: Capillary refill takes less than 2 seconds.   Neurological:      Mental Status: He is alert and oriented to person, place, and time. Mental status is at baseline.   Psychiatric:         Mood and Affect: Mood normal.         Behavior: Behavior normal.         Thought Content: Thought content normal.         Judgment: Judgment normal.         Last Recorded Vitals  Blood pressure 156/67, pulse 75, temperature 36.3 °C (97.3 °F), temperature source Temporal, resp. rate 18, height 1.778 m (5' 10\"), weight 122 kg (269 lb 6.4 oz), SpO2 96%.  Intake/Output last 3 Shifts:  I/O last 3 completed shifts:  In: 460 (3.8 mL/kg) [P.O.:360; IV Piggyback:100]  Out: 2701 (22.1 " mL/kg) [Urine:2701 (0.6 mL/kg/hr)]  Weight: 122.2 kg     Relevant Results  Results for orders placed or performed during the hospital encounter of 05/19/25 (from the past 24 hours)   POCT GLUCOSE   Result Value Ref Range    POCT Glucose 135 (H) 74 - 99 mg/dL   Transthoracic Echo (TTE) Limited   Result Value Ref Range    AV pk gillian 2.65 m/s    LVOT diam 2.10 cm    AV mn grad 14 mmHg    MV E/A ratio 1.07     Tricuspid annular plane systolic excursion 2.1 cm    LV Biplane EF 69 %    LV EF 63 %    RVSP 24.0 mmHg    LVIDd 5.43 cm    AV pk grad 28 mmHg    Aortic Valve Area by Continuity of VTI 1.58 cm2    Aortic Valve Area by Continuity of Peak Velocity 1.52 cm2    LV A4C EF 63.9    Electrocardiogram, 12-lead PRN ACS symptoms   Result Value Ref Range    Ventricular Rate 74 BPM    Atrial Rate 74 BPM    FL Interval 160 ms    QRS Duration 90 ms    QT Interval 422 ms    QTC Calculation(Bazett) 468 ms    P Axis 11 degrees    R Axis -7 degrees    T Axis 14 degrees    QRS Count 12 beats    Q Onset 217 ms    P Onset 137 ms    P Offset 194 ms    T Offset 428 ms    QTC Fredericia 452 ms   Hepatitis B surface antigen   Result Value Ref Range    Hepatitis B Surface AG Nonreactive Nonreactive   POCT GLUCOSE   Result Value Ref Range    POCT Glucose 126 (H) 74 - 99 mg/dL   CBC and Auto Differential   Result Value Ref Range    WBC 13.6 (H) 4.4 - 11.3 x10*3/uL    nRBC 0.2 (H) 0.0 - 0.0 /100 WBCs    RBC 2.92 (L) 4.50 - 5.90 x10*6/uL    Hemoglobin 8.0 (L) 13.5 - 17.5 g/dL    Hematocrit 26.1 (L) 41.0 - 52.0 %    MCV 89 80 - 100 fL    MCH 27.4 26.0 - 34.0 pg    MCHC 30.7 (L) 32.0 - 36.0 g/dL    RDW 17.6 (H) 11.5 - 14.5 %    Platelets 274 150 - 450 x10*3/uL    Neutrophils % 78.1 40.0 - 80.0 %    Immature Granulocytes %, Automated 2.5 (H) 0.0 - 0.9 %    Lymphocytes % 7.9 13.0 - 44.0 %    Monocytes % 4.6 2.0 - 10.0 %    Eosinophils % 6.5 0.0 - 6.0 %    Basophils % 0.4 0.0 - 2.0 %    Neutrophils Absolute 10.63 (H) 1.20 - 7.70 x10*3/uL    Immature  Granulocytes Absolute, Automated 0.34 0.00 - 0.70 x10*3/uL    Lymphocytes Absolute 1.08 (L) 1.20 - 4.80 x10*3/uL    Monocytes Absolute 0.62 0.10 - 1.00 x10*3/uL    Eosinophils Absolute 0.89 (H) 0.00 - 0.70 x10*3/uL    Basophils Absolute 0.05 0.00 - 0.10 x10*3/uL   Renal Function Panel   Result Value Ref Range    Glucose 115 (H) 74 - 99 mg/dL    Sodium 136 136 - 145 mmol/L    Potassium 4.0 3.5 - 5.3 mmol/L    Chloride 107 98 - 107 mmol/L    Bicarbonate 19 (L) 21 - 32 mmol/L    Anion Gap 14 10 - 20 mmol/L    Urea Nitrogen 77 (H) 6 - 23 mg/dL    Creatinine 7.53 (H) 0.50 - 1.30 mg/dL    eGFR 8 (L) >60 mL/min/1.73m*2    Calcium 9.1 8.6 - 10.3 mg/dL    Phosphorus 4.9 2.5 - 4.9 mg/dL    Albumin 3.1 (L) 3.4 - 5.0 g/dL   Magnesium   Result Value Ref Range    Magnesium 1.79 1.60 - 2.40 mg/dL         Assessment & Plan  Multifocal pneumonia    Primary hypertension    CKD (chronic kidney disease) stage 5, GFR less than 15 ml/min (Multi)    Anemia    Elevated troponin    Acute hypoxic respiratory failure    Shortness of breath    History of Clostridioides difficile colitis    Sepsis with acute hypoxic respiratory failure    Chronic diastolic heart failure    Multifocal pneumonia: Continues on antibiotics as managed by admitting  Shortness of breath: Partially relative to heart failure.  Continue with IV diuretics  Acute on chronic diastolic heart failure: Stage II diastolic dysfunction torsemide on hold now on IV furosemide.,  Appreciate nephrology input  Coronary artery disease: Continue on statin, Zetia, and aspirin.  Patient had a elevated coronary calcium scoring in 2020, he has had a mildly abnormal stress test recently.  His CT chest reveals significant coronary calcifications now.  I am concerned the patient has advancing coronary artery disease and believe the patient should require cardiac catheterization in the near future.  Will discuss this with Dr. Urban.  Elevated troponin not relative to acute coronary syndrome:  In the setting of multifocal pneumonia/shortness of breath with significantly elevated creatinine of 7.29.  Do not believe this is relative to an acute coronary syndrome given elevation with flat trend.  Hyperlipidemia: Continue statin  History of SVT: Has been slowly uptitrated to a significant dose of carvedilol utilizing 50 mg twice daily.  On last hospitalization he had issues with SVT and his carvedilol was increased to 37.5 twice daily.  Is further been uptitrated to 50 mg twice daily.  Will discuss this strong dose with my collaborator this morning  Nonsustained VT: Longest episode lasting approximate 4 seconds and self terminating.  Continues on high-dose beta-blocker.  Given his kidney disease will not initiate amiodarone at this point.  Will discuss further antiarrhythmics with Dr. Urban.  Chronic kidney disease stage V: Following with nephrology, certainly nearing the point where dialysis is being strongly considered.  Have reconsulted Dr. Holt for guidance  Anemia of chronic disease: Recent transfusion few days ago.  Remains anemic.  Managed by admitting  Hypertensive disorder: Blood pressure controlled at this point.  Continues on hydralazine 100 g 3 times daily as well as carvedilol  Obesity: Managed in the outpatient setting     Overall impression:     5/20: As above.  This patient is a very complex patient.  He has had multiple hospitalizations over the past 6 months and has made no significant improvement.  He continues to have significant shortness of breath and recurrent respiratory issues.  Currently with a multifocal pneumonia.  On antibiotics as managed by admitting and infectious disease.  Has known chronic kidney disease which is worsened over the past 4 to 6 years.  Certainly at the point where I believe he is nearing dialysis necessity.  The patient has been trying to hold off on this as long as he can to continue to working.  From my perspective the patient is having worsening ectopy  with continued SVT despite high-dose beta-blocker and occasional nonsustained VT.  He is having no chest pain but had a CAT scan recently with severe coronary calcifications.  He had an elevated calcium scoring approximately 5 years ago.  He had a mildly abnormal stress test recently.  Overall believe the patient should have a cardiac catheterization in the near future as I believe this is likely contributing to the patient's overall arrhythmias.  May also be contributing to the patient's shortness of breath as perhaps an anginal equivalent.  He does not appear to be significantly fluid overloaded but does have acute on chronic diastolic heart failure at this point.  Agree with diuresis, have consulted Dr. Max for further guidance with diuretics and advice concerning cardiac catheterization which I believe he should have in the near future.  His blood pressure is stable on high-dose alpha-blocker and beta-blocker.  He remains anemic, nephrology is helping to manage this.  Believe once the patient is stable from the respiratory perspective we should consider cardiac catheterization.  Will follow with you.    5/21: As above, patient states breathing has improved, continues to require nasal cannula oxygen at 5 L.  Lung fields do sound improved and remains without minimal peripheral edema.  Would continue with diuretics as directed by nephrology.  On the telemetry monitor overnight the patient has had worsening/increasing duration and frequency of ventricular tachycardia.  He continues on his high dose beta-blocker.  He does have a prolonged QTc.  As a precaution I am mildly reducing his beta-blocker from 50 mg twice daily to 37.5 twice daily and adding amiodarone 200 mg twice daily added attempt at arrhythmia reduction.  I am consulting electrophysiology for further recommendations.  Certainly an ischemic evaluation as part of this workup.  Otherwise the patient is chest pain-free.  His blood pressure stable  albeit mildly hypertensive at most recent 156/67.  His creatinine has increased further current of 7.53.  He has diuresed approximately 1400 mL liters over the past 24 hours.  Hemoglobin mildly improved to current of 8.0 with a white blood cell count improved at 13.6.  Overall the patient remains significantly decompensated with high risk for further decompensation.  Would have a low threshold to transfer the patient to the intensive care unit if he continues to have increasing or worsening VT. Would consider lidocaine drip versus amiodarone drip if further sustained VT.  Will follow with you.       I spent 50 minutes in the professional and overall care of this patient.      Grey Fontana, APRN-CNP

## 2025-05-21 NOTE — CARE PLAN
Problem: Pain - Adult  Goal: Verbalizes/displays adequate comfort level or baseline comfort level  Outcome: Progressing     Problem: Safety - Adult  Goal: Free from fall injury  Outcome: Progressing     Problem: Discharge Planning  Goal: Discharge to home or other facility with appropriate resources  Outcome: Progressing     Problem: Chronic Conditions and Co-morbidities  Goal: Patient's chronic conditions and co-morbidity symptoms are monitored and maintained or improved  Outcome: Progressing     Problem: Nutrition  Goal: Nutrient intake appropriate for maintaining nutritional needs  Outcome: Progressing     Problem: Skin  Goal: Decreased wound size/increased tissue granulation at next dressing change  Outcome: Progressing  Goal: Participates in plan/prevention/treatment measures  Outcome: Progressing  Goal: Prevent/manage excess moisture  Outcome: Progressing  Goal: Prevent/minimize sheer/friction injuries  Outcome: Progressing  Goal: Promote/optimize nutrition  Outcome: Progressing  Goal: Promote skin healing  Outcome: Progressing   The patient's goals for the shift include      The clinical goals for the shift include safe    Over the shift, the patient did not make progress toward the following goals. Barriers to progression include . Recommendations to address these barriers include .

## 2025-05-22 LAB
ALBUMIN SERPL BCP-MCNC: 3 G/DL (ref 3.4–5)
ANCA AB PATTERN SER IF-IMP: NORMAL
ANCA IGG TITR SER IF: NORMAL {TITER}
ANION GAP SERPL CALCULATED.3IONS-SCNC: 14 MMOL/L (ref 10–20)
BACTERIA SPEC RESP CULT: NORMAL
BASOPHILS # BLD AUTO: 0.06 X10*3/UL (ref 0–0.1)
BASOPHILS NFR BLD AUTO: 0.4 %
BUN SERPL-MCNC: 78 MG/DL (ref 6–23)
CALCIUM SERPL-MCNC: 9.1 MG/DL (ref 8.6–10.3)
CHLORIDE SERPL-SCNC: 107 MMOL/L (ref 98–107)
CO2 SERPL-SCNC: 19 MMOL/L (ref 21–32)
CREAT SERPL-MCNC: 7.74 MG/DL (ref 0.5–1.3)
EGFRCR SERPLBLD CKD-EPI 2021: 7 ML/MIN/1.73M*2
EOSINOPHIL # BLD AUTO: 1.02 X10*3/UL (ref 0–0.7)
EOSINOPHIL NFR BLD AUTO: 7.5 %
ERYTHROCYTE [DISTWIDTH] IN BLOOD BY AUTOMATED COUNT: 17.6 % (ref 11.5–14.5)
GLUCOSE SERPL-MCNC: 130 MG/DL (ref 74–99)
GRAM STN SPEC: NORMAL
GRAM STN SPEC: NORMAL
HCT VFR BLD AUTO: 26.2 % (ref 41–52)
HGB BLD-MCNC: 8.1 G/DL (ref 13.5–17.5)
IMM GRANULOCYTES # BLD AUTO: 0.37 X10*3/UL (ref 0–0.7)
IMM GRANULOCYTES NFR BLD AUTO: 2.7 % (ref 0–0.9)
LYMPHOCYTES # BLD AUTO: 1.42 X10*3/UL (ref 1.2–4.8)
LYMPHOCYTES NFR BLD AUTO: 10.4 %
MAGNESIUM SERPL-MCNC: 2.03 MG/DL (ref 1.6–2.4)
MCH RBC QN AUTO: 27.3 PG (ref 26–34)
MCHC RBC AUTO-ENTMCNC: 30.9 G/DL (ref 32–36)
MCV RBC AUTO: 88 FL (ref 80–100)
MONOCYTES # BLD AUTO: 0.67 X10*3/UL (ref 0.1–1)
MONOCYTES NFR BLD AUTO: 4.9 %
MYELOPEROXIDASE AB SER-ACNC: 2 AU/ML (ref 0–19)
NEUTROPHILS # BLD AUTO: 10.13 X10*3/UL (ref 1.2–7.7)
NEUTROPHILS NFR BLD AUTO: 74.1 %
NRBC BLD-RTO: 0.1 /100 WBCS (ref 0–0)
PHOSPHATE SERPL-MCNC: 4.7 MG/DL (ref 2.5–4.9)
PLATELET # BLD AUTO: 289 X10*3/UL (ref 150–450)
POTASSIUM SERPL-SCNC: 3.8 MMOL/L (ref 3.5–5.3)
PROTEINASE3 AB SER-ACNC: 0 AU/ML (ref 0–19)
RBC # BLD AUTO: 2.97 X10*6/UL (ref 4.5–5.9)
SODIUM SERPL-SCNC: 136 MMOL/L (ref 136–145)
WBC # BLD AUTO: 13.7 X10*3/UL (ref 4.4–11.3)

## 2025-05-22 PROCEDURE — 2500000002 HC RX 250 W HCPCS SELF ADMINISTERED DRUGS (ALT 637 FOR MEDICARE OP, ALT 636 FOR OP/ED): Performed by: STUDENT IN AN ORGANIZED HEALTH CARE EDUCATION/TRAINING PROGRAM

## 2025-05-22 PROCEDURE — 99233 SBSQ HOSP IP/OBS HIGH 50: CPT | Performed by: STUDENT IN AN ORGANIZED HEALTH CARE EDUCATION/TRAINING PROGRAM

## 2025-05-22 PROCEDURE — 2500000001 HC RX 250 WO HCPCS SELF ADMINISTERED DRUGS (ALT 637 FOR MEDICARE OP): Performed by: STUDENT IN AN ORGANIZED HEALTH CARE EDUCATION/TRAINING PROGRAM

## 2025-05-22 PROCEDURE — 2500000004 HC RX 250 GENERAL PHARMACY W/ HCPCS (ALT 636 FOR OP/ED): Performed by: STUDENT IN AN ORGANIZED HEALTH CARE EDUCATION/TRAINING PROGRAM

## 2025-05-22 PROCEDURE — 83735 ASSAY OF MAGNESIUM: CPT | Performed by: STUDENT IN AN ORGANIZED HEALTH CARE EDUCATION/TRAINING PROGRAM

## 2025-05-22 PROCEDURE — 80069 RENAL FUNCTION PANEL: CPT | Performed by: STUDENT IN AN ORGANIZED HEALTH CARE EDUCATION/TRAINING PROGRAM

## 2025-05-22 PROCEDURE — 2500000004 HC RX 250 GENERAL PHARMACY W/ HCPCS (ALT 636 FOR OP/ED): Mod: JZ | Performed by: INTERNAL MEDICINE

## 2025-05-22 PROCEDURE — 6350000001 HC RX 635 EPOETIN >10,000 UNITS: Performed by: INTERNAL MEDICINE

## 2025-05-22 PROCEDURE — 2500000002 HC RX 250 W HCPCS SELF ADMINISTERED DRUGS (ALT 637 FOR MEDICARE OP, ALT 636 FOR OP/ED): Performed by: NURSE PRACTITIONER

## 2025-05-22 PROCEDURE — 2500000001 HC RX 250 WO HCPCS SELF ADMINISTERED DRUGS (ALT 637 FOR MEDICARE OP): Performed by: NURSE PRACTITIONER

## 2025-05-22 PROCEDURE — 36415 COLL VENOUS BLD VENIPUNCTURE: CPT | Performed by: STUDENT IN AN ORGANIZED HEALTH CARE EDUCATION/TRAINING PROGRAM

## 2025-05-22 PROCEDURE — 85025 COMPLETE CBC W/AUTO DIFF WBC: CPT | Performed by: STUDENT IN AN ORGANIZED HEALTH CARE EDUCATION/TRAINING PROGRAM

## 2025-05-22 PROCEDURE — 99233 SBSQ HOSP IP/OBS HIGH 50: CPT | Performed by: NURSE PRACTITIONER

## 2025-05-22 PROCEDURE — 2060000001 HC INTERMEDIATE ICU ROOM DAILY

## 2025-05-22 RX ORDER — AMOXICILLIN AND CLAVULANATE POTASSIUM 500; 125 MG/1; MG/1
1 TABLET, FILM COATED ORAL DAILY
Status: COMPLETED | OUTPATIENT
Start: 2025-05-22 | End: 2025-05-23

## 2025-05-22 RX ADMIN — AMOXICILLIN AND CLAVULANATE POTASSIUM 1 TABLET: 500; 125 TABLET, FILM COATED ORAL at 10:01

## 2025-05-22 RX ADMIN — CARVEDILOL 37.5 MG: 25 TABLET, FILM COATED ORAL at 17:31

## 2025-05-22 RX ADMIN — SODIUM BICARBONATE 1300 MG: 650 TABLET ORAL at 20:13

## 2025-05-22 RX ADMIN — ASPIRIN 81 MG: 81 TABLET, COATED ORAL at 08:03

## 2025-05-22 RX ADMIN — MEROPENEM 500 MG: 500 INJECTION, POWDER, FOR SOLUTION INTRAVENOUS at 05:09

## 2025-05-22 RX ADMIN — SODIUM BICARBONATE 1300 MG: 650 TABLET ORAL at 15:24

## 2025-05-22 RX ADMIN — HEPARIN SODIUM 5000 UNITS: 5000 INJECTION, SOLUTION INTRAVENOUS; SUBCUTANEOUS at 20:13

## 2025-05-22 RX ADMIN — GUAIFENESIN 600 MG: 600 TABLET ORAL at 20:13

## 2025-05-22 RX ADMIN — HYDRALAZINE HYDROCHLORIDE 100 MG: 50 TABLET ORAL at 15:24

## 2025-05-22 RX ADMIN — SODIUM BICARBONATE 1300 MG: 650 TABLET ORAL at 08:03

## 2025-05-22 RX ADMIN — HYDRALAZINE HYDROCHLORIDE 100 MG: 50 TABLET ORAL at 08:03

## 2025-05-22 RX ADMIN — IRON SUCROSE 200 MG: 20 INJECTION, SOLUTION INTRAVENOUS at 05:09

## 2025-05-22 RX ADMIN — CETIRIZINE HYDROCHLORIDE 10 MG: 10 TABLET, FILM COATED ORAL at 08:03

## 2025-05-22 RX ADMIN — GUAIFENESIN 600 MG: 600 TABLET ORAL at 08:03

## 2025-05-22 RX ADMIN — FAMOTIDINE 20 MG: 20 TABLET, FILM COATED ORAL at 08:03

## 2025-05-22 RX ADMIN — EPOETIN ALFA-EPBX 20000 UNITS: 20000 INJECTION, SOLUTION INTRAVENOUS; SUBCUTANEOUS at 17:31

## 2025-05-22 RX ADMIN — EZETIMIBE 10 MG: 10 TABLET ORAL at 20:13

## 2025-05-22 RX ADMIN — AMIODARONE HYDROCHLORIDE 200 MG: 200 TABLET ORAL at 08:03

## 2025-05-22 RX ADMIN — VANCOMYCIN HYDROCHLORIDE 125 MG: 125 CAPSULE ORAL at 20:13

## 2025-05-22 RX ADMIN — Medication 15 MG: at 20:13

## 2025-05-22 RX ADMIN — CARVEDILOL 37.5 MG: 25 TABLET, FILM COATED ORAL at 08:03

## 2025-05-22 RX ADMIN — AMIODARONE HYDROCHLORIDE 200 MG: 200 TABLET ORAL at 20:13

## 2025-05-22 RX ADMIN — ATORVASTATIN CALCIUM 20 MG: 20 TABLET, FILM COATED ORAL at 20:13

## 2025-05-22 RX ADMIN — HEPARIN SODIUM 5000 UNITS: 5000 INJECTION, SOLUTION INTRAVENOUS; SUBCUTANEOUS at 05:09

## 2025-05-22 RX ADMIN — HYDRALAZINE HYDROCHLORIDE 100 MG: 50 TABLET ORAL at 20:13

## 2025-05-22 RX ADMIN — HEPARIN SODIUM 5000 UNITS: 5000 INJECTION, SOLUTION INTRAVENOUS; SUBCUTANEOUS at 13:35

## 2025-05-22 ASSESSMENT — COGNITIVE AND FUNCTIONAL STATUS - GENERAL
DAILY ACTIVITIY SCORE: 24
MOBILITY SCORE: 24
MOBILITY SCORE: 24
DAILY ACTIVITIY SCORE: 24

## 2025-05-22 ASSESSMENT — PAIN SCALES - GENERAL
PAINLEVEL_OUTOF10: 0 - NO PAIN
PAINLEVEL_OUTOF10: 0 - NO PAIN

## 2025-05-22 NOTE — PROGRESS NOTES
Spiritual Care Visit  Spiritual Care Request    Reason for Visit:  Routine Visit: Introduction     Request Received From:       Focus of Care:  Visited With: Patient         Refer to :          Spiritual Care Assessment    Spiritual Assessment:                      Care Provided:  Intended Effects: Establish rapport and connectedness, Build relationship of care and support  Methods: Offer emotional support    Sense of Community and or Islam Affiliation:  Latter day         Addressed Needs/Concerns and/or Frances Through:  Islam Encounters  Islam Needs: Prayer       Outcome:  Outcome of Spiritual Care Visit: Identifying spiritual/emotional issues, Comfort/healing presence     Advance Directives:         Spiritual Care Annotation        Annotation: Patient received a visit from the Spiritual care volunteer while admitted.  This patient was offered emotional and spiritual support no other needs were expressed. Spiritual care will remain available for support as requested.     Volunter Initial : Wilberto VALENTINE    Reviewed and Submitted by Chaplain Jha

## 2025-05-22 NOTE — PROGRESS NOTES
05/22/25 1727   Discharge Planning   Expected Discharge Disposition Home     Plan is for cardiac cath tomorrow.     Nephrology on consult.     Will continue to follow for home going needs.

## 2025-05-22 NOTE — CARE PLAN
Problem: Pain - Adult  Goal: Verbalizes/displays adequate comfort level or baseline comfort level  Outcome: Progressing     Problem: Safety - Adult  Goal: Free from fall injury  Outcome: Progressing     Problem: Discharge Planning  Goal: Discharge to home or other facility with appropriate resources  Outcome: Progressing     Problem: Chronic Conditions and Co-morbidities  Goal: Patient's chronic conditions and co-morbidity symptoms are monitored and maintained or improved  Outcome: Progressing     Problem: Nutrition  Goal: Nutrient intake appropriate for maintaining nutritional needs  Outcome: Progressing     Problem: Nutrition  Goal: Nutrient intake appropriate for maintaining nutritional needs  Outcome: Progressing     Problem: Skin  Goal: Decreased wound size/increased tissue granulation at next dressing change  Outcome: Progressing  Flowsheets (Taken 5/21/2025 2221)  Decreased wound size/increased tissue granulation at next dressing change:   Promote sleep for wound healing   Protective dressings over bony prominences   Utilize specialty bed per algorithm  Goal: Participates in plan/prevention/treatment measures  Outcome: Progressing  Flowsheets (Taken 5/21/2025 2221)  Participates in plan/prevention/treatment measures:   Discuss with provider PT/OT consult   Elevate heels   Increase activity/out of bed for meals  Goal: Prevent/manage excess moisture  Outcome: Progressing  Flowsheets (Taken 5/21/2025 2221)  Prevent/manage excess moisture:   Cleanse incontinence/protect with barrier cream   Monitor for/manage infection if present   Follow provider orders for dressing changes   Use wicking fabric (obtain order)   Moisturize dry skin  Goal: Prevent/minimize sheer/friction injuries  Outcome: Progressing  Goal: Promote/optimize nutrition  Outcome: Progressing  Goal: Promote skin healing  Outcome: Progressing     Problem: Heart Failure  Goal: Improved gas exchange this shift  Outcome: Progressing  Goal: Improved  urinary output this shift  Outcome: Progressing  Goal: Reduction in peripheral edema within 24 hours  Outcome: Progressing  Goal: Report improvement of dyspnea/breathlessness this shift  Outcome: Progressing  Goal: Weight from fluid excess reduced over 2-3 days, then stabilize  Outcome: Progressing  Goal: Increase self care and/or family involvement in 24 hours  Outcome: Progressing   The patient's goals for the shift include      The clinical goals for the shift include wean oxygen    Over the shift, the patient did not make progress toward the following goals. Barriers to progression include . Recommendations to address these barriers include .

## 2025-05-22 NOTE — PROGRESS NOTES
Colin Leonard is a 59 y.o. male on day 3 of admission presenting with Multifocal pneumonia.      Subjective   States he had difficulty sleeping last night, had a lot to think about. Cough and sinus congestion is improving. He is sitting at the edge of the bed with nasal cannula round his neck and not in his nostrils. Does not appear dyspneic. Tolerating PO but states appetite is somewhat decreased.        Objective     Last Recorded Vitals  /70   Pulse 78   Temp 36.3 °C (97.3 °F) (Temporal)   Resp 18   Wt 123 kg (270 lb 4.5 oz)   SpO2 99%   Intake/Output last 3 Shifts:    Intake/Output Summary (Last 24 hours) at 5/22/2025 0933  Last data filed at 5/22/2025 0900  Gross per 24 hour   Intake 100 ml   Output 850 ml   Net -750 ml       Admission Weight  Weight: 113 kg (250 lb) (05/19/25 1030)    Daily Weight  05/22/25 : 123 kg (270 lb 4.5 oz)    Image Results  ECG 12 lead  Normal sinus rhythm  Nonspecific T wave abnormality  Abnormal ECG  When compared with ECG of 20-MAY-2025 12:10, (unconfirmed)  Nonspecific T wave abnormality, worse in Lateral leads  Electrocardiogram, 12-lead PRN ACS symptoms  Normal sinus rhythm  Minimal voltage criteria for LVH, may be normal variant ( R in aVL )  Nonspecific ST abnormality  Abnormal ECG  When compared with ECG of 19-MAY-2025 10:48, (unconfirmed)  Premature ventricular complexes are no longer Present  Premature atrial complexes are no longer Present      Physical Exam  Constitutional:       General: He is not in acute distress.     Appearance: He is not toxic-appearing.   HENT:      Head: Normocephalic.      Mouth/Throat:      Pharynx: Oropharynx is clear.   Eyes:      General: No scleral icterus.  Cardiovascular:      Rate and Rhythm: Normal rate.   Pulmonary:      Effort: No respiratory distress.      Breath sounds: No wheezing.   Abdominal:      General: There is no distension.      Tenderness: There is no abdominal tenderness.   Musculoskeletal:      Right lower  leg: No edema.      Left lower leg: No edema.   Neurological:      Mental Status: He is alert and oriented to person, place, and time.   Psychiatric:         Behavior: Behavior normal.         Relevant Results                      Assessment & Plan  Multifocal pneumonia  CXR suggestive of worsening bilateral multifocal pneumonia  Consult pulmonology  MRSA PCR negative -> stop IV vanc   Sputum culture, strep pneumo, and legionella antigens negative   Respiratory viral panel negative   Sepsis with acute hypoxic respiratory failure  Meets sepsis criteria with tachypnea, hypoxia, leukocytosis, and pneumonia  Blood cultures negative   Acute hypoxic respiratory failure  Due to above  Wean O2 as able  Primary hypertension  Continue home coreg and hydralazine with hold parameters  CKD (chronic kidney disease) stage 5, GFR less than 15 ml/min (Multi)  Nephrology consulted - follows with Dr. Max   Diuresis per nephrology   Stable, at baseline  Anemia  Due to chronic kidney disease  Baseline Hgb ~7.5  Stable at baseline  No overt bleeding  Transfuse Hgb <7  History of Clostridioides difficile colitis  Continue empiric PO vanc while on IV antibiotics  Elevated troponin  Troponin 744 -> 736, flat trending  Low suspicion for ACS, suspect likely demand from respiratory failure  Cardiology consulted   Limited echo ordered - EF 60-65%  Acute on chronic diastolic heart failure  BNP 2000  Cardiology consulted  Diuresis per nephrology   Ventricular tachycardia (Multi)  Several episodes of asymptomatic nonsustained ventricular tachycardia on telemetry  Started on amiodarone by cardiology  EP consulted  Will likely need ischemic evaluation  Keep Mag >2 and K >4    Spent an hour discussing clinical course with patient and his family yesterday evening regarding pneumonia, Vtach, possible need for cardiac cath, and possible need for HD during this hospitalization. Had lengthy discussion again with patient this morning. Reviewed  "pulmonology recommendations to stop antibiotics as pneumonia workup is negative thus far and his symptoms are improving. Patient states he would prefer to remain on antibiotics for another 48 hours for \"peace of mind\" as he felt like the pneumonia was not adequately addressed during his prior hospitalization which lead him to be re-admitted to the hospital. Discussed with patient that his infectious workup including full respiratory viral panel, strep pneumo Ag, legionella Ag, MRSA PCR, and sputum culture were all negative effectively ruling out active infection. Despite this, patient is still adamant on being antibiotics for another 48hrs. Discussed with Dr. Simms from pulmonology. Will de-escalate antibiotics to PO augmentin for another 2 days and then stop. He also had several questions about the Vtach, use of amiodarone, and undergoing cardiac cath. Recommended that patient direct those questions to the cardiology service.       Plan:  BMP reviewed: Cr 7.52 -> 7.74, mild worsening of renal function  CBC reviewed: WBC 13.7, Hgb 8.1; leukocytosis and anemia stable   Telemetry reviewed, had 2 episodes of nonsustained ventricular tachycardia yesterday evening   Await cardiology and nephrology evaluations today  Continue amiodarone per cardiology/EP  Will likely need ischemic evaluation, timing TBD  If cardiac cath is recommended during this hospitalization, patient will likely need to be initiated on HD, defer to nephrology  Encourage OOB/ambulation  Anticipate discharge home with no needs when cleared by consultants             Madalyn Cole MD      "

## 2025-05-22 NOTE — PROGRESS NOTES
CONSULT PROGRESS NOTES    SERVICE DATE: 5/22/2025   SERVICE TIME: 5:05 PM    CONSULTING SERVICE: Nephrology    ASSESSMENT AND PLAN   59-year-old male with numerous medical problems including advanced CKD readmitted with concern for worsening pneumonia versus diastolic CHF exacerbation in the context of a GFR of 8.  1.  CKD stage V  2.  Anemia of chronic kidney disease  3.  Essential hypertension  4.  Edema  5.  Chronic metabolic acidosis     Advanced underlying CKD with a GFR of 8.  This is a difficult case and that he really does not have any uremic symptoms other than chronic fatigue, which is multifactorial.  There is no reason to dialyze him emergently.  Cardiology is planning on left heart catheterization tomorrow.  He is at elevated risk for contrast nephropathy.  We discussed this risk including strategies to prevent it.  I do not think I would give him IV fluids in preparation for this to reduce the risk, as we just diuresed him fairly with fairly high/escalating doses of Lasix over the past few days.  Hold Lasix today.    Continue oral bicarbonate therapy as well.  Continue home blood pressure medicines.  Appreciate Cardiologic and pulmonary consultations.            I will dose him with erythropoietin stimulating agent today.  Continue IV iron load.  Trend daily labs, continue supportive care.  Case discussed extensively with Gavin Fontana CNP.  Plan on left heart catheterization tomorrow.  Patient is aware of risks.  I will continue to follow him.  No role for hemodialysis initiation at present.    SUBJECTIVE  INTERVAL HPI: Feels no differently than yesterday.  Decent urine output remains.  Blood pressure has been slightly elevated.  He has no major chest pain.  No major dyspnea.  He is coughing up some phlegm.  No fevers.    MEDICATIONS:  Scheduled Medications[1]   Continuous Medications[2]   PRN Medications[3]     OBJECTIVE  PHYSICAL EXAM:   Heart Rate:  [66-78]   Temp:  [35.9 °C (96.6 °F)-37.4  °C (99.3 °F)]   Resp:  [18-19]   BP: (141-174)/()   Weight:  [123 kg (270 lb 4.5 oz)]   SpO2:  [95 %-99 %]   Body mass index is 38.78 kg/m².  Obese white man, no acute distress  Appropriate affect  No obvious skin rashes  Hearing intact  Phonation intact  Moist mucosa  Harsh systolic murmur  Some rales are present  Abdomen is soft, nondistended, nontender, positive bowel sounds  No Elmore catheter in place, no suprapubic tenderness to palpation  Trace pretibial extremity edema  Moves 4 limbs spontaneously  No obvious joint deformities  No lymphadenopathy    DATA:   Labs:  Results for orders placed or performed during the hospital encounter of 05/19/25 (from the past 96 hours)   CBC and Auto Differential   Result Value Ref Range    WBC 23.1 (H) 4.4 - 11.3 x10*3/uL    nRBC 0.1 (H) 0.0 - 0.0 /100 WBCs    RBC 3.24 (L) 4.50 - 5.90 x10*6/uL    Hemoglobin 8.9 (L) 13.5 - 17.5 g/dL    Hematocrit 27.9 (L) 41.0 - 52.0 %    MCV 86 80 - 100 fL    MCH 27.5 26.0 - 34.0 pg    MCHC 31.9 (L) 32.0 - 36.0 g/dL    RDW 17.2 (H) 11.5 - 14.5 %    Platelets 333 150 - 450 x10*3/uL    Neutrophils % 92.7 40.0 - 80.0 %    Immature Granulocytes %, Automated 1.5 (H) 0.0 - 0.9 %    Lymphocytes % 2.9 13.0 - 44.0 %    Monocytes % 2.3 2.0 - 10.0 %    Eosinophils % 0.4 0.0 - 6.0 %    Basophils % 0.2 0.0 - 2.0 %    Neutrophils Absolute 21.45 (H) 1.20 - 7.70 x10*3/uL    Immature Granulocytes Absolute, Automated 0.34 0.00 - 0.70 x10*3/uL    Lymphocytes Absolute 0.67 (L) 1.20 - 4.80 x10*3/uL    Monocytes Absolute 0.53 0.10 - 1.00 x10*3/uL    Eosinophils Absolute 0.09 0.00 - 0.70 x10*3/uL    Basophils Absolute 0.05 0.00 - 0.10 x10*3/uL   Comprehensive Metabolic Panel   Result Value Ref Range    Glucose 115 (H) 74 - 99 mg/dL    Sodium 141 136 - 145 mmol/L    Potassium 4.4 3.5 - 5.3 mmol/L    Chloride 111 (H) 98 - 107 mmol/L    Bicarbonate 17 (L) 21 - 32 mmol/L    Anion Gap 17 10 - 20 mmol/L    Urea Nitrogen 73 (H) 6 - 23 mg/dL    Creatinine 7.39 (H)  0.50 - 1.30 mg/dL    eGFR 8 (L) >60 mL/min/1.73m*2    Calcium 9.6 8.6 - 10.3 mg/dL    Albumin 3.6 3.4 - 5.0 g/dL    Alkaline Phosphatase 72 33 - 136 U/L    Total Protein 7.2 6.4 - 8.2 g/dL    AST 9 9 - 39 U/L    Bilirubin, Total 0.6 0.0 - 1.2 mg/dL    ALT 39 10 - 52 U/L   B-Type Natriuretic Peptide   Result Value Ref Range    BNP 2,008 (H) 0 - 99 pg/mL   Troponin I, High Sensitivity, Initial   Result Value Ref Range    Troponin I, High Sensitivity 744 (HH) 0 - 20 ng/L   Lactate   Result Value Ref Range    Lactate 1.1 0.4 - 2.0 mmol/L   Blood Culture    Specimen: Peripheral Venipuncture; Blood culture   Result Value Ref Range    Blood Culture No growth at 3 days    Blood Culture    Specimen: Peripheral Venipuncture; Blood culture   Result Value Ref Range    Blood Culture No growth at 3 days    ECG 12 Lead   Result Value Ref Range    Ventricular Rate 86 BPM    Atrial Rate 86 BPM    ME Interval 158 ms    QRS Duration 88 ms    QT Interval 366 ms    QTC Calculation(Bazett) 437 ms    P Axis 66 degrees    R Axis -11 degrees    T Axis 56 degrees    QRS Count 14 beats    Q Onset 218 ms    P Onset 139 ms    P Offset 174 ms    T Offset 401 ms    QTC Fredericia 412 ms   MRSA Surveillance for Vancomycin De-escalation, PCR    Specimen: Anterior Nares; Swab   Result Value Ref Range    MRSA PCR Not Detected Not Detected   Troponin, High Sensitivity, 1 Hour   Result Value Ref Range    Troponin I, High Sensitivity 736 (HH) 0 - 20 ng/L   Respiratory Culture/Smear    Specimen: SPUTUM; Fluid   Result Value Ref Range    Respiratory Culture/Smear Normal throat gomez     Gram Stain       Gram stain indicates specimen consists of lower respiratory tract secretions.    Gram Stain No predominant organism    Urinalysis with Reflex Culture and Microscopic   Result Value Ref Range    Color, Urine Light-Yellow Light-Yellow, Yellow, Dark-Yellow    Appearance, Urine Clear Clear    Specific Gravity, Urine 1.014 1.005 - 1.035    pH, Urine 5.5 5.0,  5.5, 6.0, 6.5, 7.0, 7.5, 8.0    Protein, Urine 100 (2+) (A) NEGATIVE, 10 (TRACE), 20 (TRACE) mg/dL    Glucose, Urine 150 (2+) (A) Normal mg/dL    Blood, Urine NEGATIVE NEGATIVE mg/dL    Ketones, Urine NEGATIVE NEGATIVE mg/dL    Bilirubin, Urine NEGATIVE NEGATIVE mg/dL    Urobilinogen, Urine Normal Normal mg/dL    Nitrite, Urine NEGATIVE NEGATIVE    Leukocyte Esterase, Urine NEGATIVE NEGATIVE   Extra Urine Gray Tube   Result Value Ref Range    Extra Tube Hold for add-ons.    Legionella Antigen, Urine    Specimen: Clean Catch/Voided; Urine   Result Value Ref Range    L. pneumophila Urine Ag Negative Negative   Streptococcus pneumoniae Antigen, Urine    Specimen: Clean Catch/Voided; Urine   Result Value Ref Range    Streptococcus pneumoniae Ag, Urine Negative Negative   Urinalysis Microscopic   Result Value Ref Range    WBC, Urine 1-5 1-5, NONE /HPF    RBC, Urine 1-2 NONE, 1-2, 3-5 /HPF   Sars-CoV-2, Influenza A/B and RSV PCR   Result Value Ref Range    Coronavirus 2019, PCR Not Detected Not Detected    Flu A Result Not Detected Not Detected    Flu B Result Not Detected Not Detected    RSV PCR Not Detected Not Detected   Parainfluenza PCR   Result Value Ref Range    Parainfluenza 1, PCR Not Detected Not Detected, Invalid    Parainfluenza 2, PCR Not Detected Not Detected, Invalid    Parainfluenza 3, PCR Not Detected Not Detected, Invalid    Parainfluenza 4, PCR Not Detected Not Detected, Invalid   Adenovirus PCR Qual For Respiratory Samples   Result Value Ref Range    Adenovirus PCR, Qual Not Detected Not detected   Rhinovirus PCR, Respiratory Spec   Result Value Ref Range    Rhinovirus PCR, Respiratory Spec Not Detected Not Detected   Metapneumovirus PCR   Result Value Ref Range    Metapneumovirus (Human), PCR Not Detected Not detected   ANCA-Associated Vasculitis Profile (ANCA,MPO,PR3)   Result Value Ref Range    Myeloperoxidase (MPO) Ab, IgG 2 0 - 19 AU/mL    Serine Proteinase 3 (PR3) Ab, IgG 0 0 - 19 AU/mL    ANCA  IFA Titer <1:20 <1:20    ANCA IFA Pattern None Detected None Detected   ELI with Reflex to BOZENA   Result Value Ref Range    ELI Positive (A) Negative    ELI Pattern Homogeneous     ELI Titer 1:160    BOZENA Panel   Result Value Ref Range    Anti-SM <0.2 <1.0 AI    Anti-RNP 0.3 <1.0 AI    Anti-SM/RNP <0.2 <1.0 AI    Anti-SSA 0.3 <1.0 AI    Anti-SSB <0.2 <1.0 AI    Anti-SCL-70 <0.2 <1.0 AI    Anti-DARLING-1 IgG <0.2 <1.0 AI    Anti-Chromatin <0.2 <1.0 AI    Anti-Centromere <0.2 <1.0 AI    ANTI-RIBOSOMAL P <0.2 <1.0 AI    Anti-DNA (DS) 1.0 <5.0 IU/mL   CBC and Auto Differential   Result Value Ref Range    WBC 15.6 (H) 4.4 - 11.3 x10*3/uL    nRBC 0.0 0.0 - 0.0 /100 WBCs    RBC 2.68 (L) 4.50 - 5.90 x10*6/uL    Hemoglobin 7.4 (L) 13.5 - 17.5 g/dL    Hematocrit 23.3 (L) 41.0 - 52.0 %    MCV 87 80 - 100 fL    MCH 27.6 26.0 - 34.0 pg    MCHC 31.8 (L) 32.0 - 36.0 g/dL    RDW 17.7 (H) 11.5 - 14.5 %    Platelets 262 150 - 450 x10*3/uL    Neutrophils % 90.0 40.0 - 80.0 %    Immature Granulocytes %, Automated 1.7 (H) 0.0 - 0.9 %    Lymphocytes % 3.8 13.0 - 44.0 %    Monocytes % 2.9 2.0 - 10.0 %    Eosinophils % 1.3 0.0 - 6.0 %    Basophils % 0.3 0.0 - 2.0 %    Neutrophils Absolute 14.03 (H) 1.20 - 7.70 x10*3/uL    Immature Granulocytes Absolute, Automated 0.26 0.00 - 0.70 x10*3/uL    Lymphocytes Absolute 0.59 (L) 1.20 - 4.80 x10*3/uL    Monocytes Absolute 0.45 0.10 - 1.00 x10*3/uL    Eosinophils Absolute 0.21 0.00 - 0.70 x10*3/uL    Basophils Absolute 0.05 0.00 - 0.10 x10*3/uL   Renal Function Panel   Result Value Ref Range    Glucose 179 (H) 74 - 99 mg/dL    Sodium 135 (L) 136 - 145 mmol/L    Potassium 4.1 3.5 - 5.3 mmol/L    Chloride 107 98 - 107 mmol/L    Bicarbonate 16 (L) 21 - 32 mmol/L    Anion Gap 16 10 - 20 mmol/L    Urea Nitrogen 76 (H) 6 - 23 mg/dL    Creatinine 7.29 (H) 0.50 - 1.30 mg/dL    eGFR 8 (L) >60 mL/min/1.73m*2    Calcium 8.6 8.6 - 10.3 mg/dL    Phosphorus 3.6 2.5 - 4.9 mg/dL    Albumin 3.0 (L) 3.4 - 5.0 g/dL   Iron  and TIBC   Result Value Ref Range    Iron <10 (L) 35 - 150 ug/dL    UIBC 160 110 - 370 ug/dL    TIBC      % Saturation     Ferritin   Result Value Ref Range    Ferritin 238 20 - 300 ng/mL   Hepatitis B surface antibody   Result Value Ref Range    Hepatitis B Surface AB <3.1 <10.0 mIU/mL   Hepatitis B core antibody, total   Result Value Ref Range    Hepatitis B Core AB- Total Nonreactive Nonreactive   POCT GLUCOSE   Result Value Ref Range    POCT Glucose 141 (H) 74 - 99 mg/dL   POCT GLUCOSE   Result Value Ref Range    POCT Glucose 135 (H) 74 - 99 mg/dL   Transthoracic Echo (TTE) Limited   Result Value Ref Range    AV pk gillian 2.65 m/s    LVOT diam 2.10 cm    AV mn grad 14 mmHg    MV E/A ratio 1.07     Tricuspid annular plane systolic excursion 2.1 cm    LV Biplane EF 69 %    LV EF 63 %    RVSP 24.0 mmHg    LVIDd 5.43 cm    AV pk grad 28 mmHg    Aortic Valve Area by Continuity of VTI 1.58 cm2    Aortic Valve Area by Continuity of Peak Velocity 1.52 cm2    LV A4C EF 63.9    Electrocardiogram, 12-lead PRN ACS symptoms   Result Value Ref Range    Ventricular Rate 74 BPM    Atrial Rate 74 BPM    MT Interval 160 ms    QRS Duration 90 ms    QT Interval 422 ms    QTC Calculation(Bazett) 468 ms    P Axis 11 degrees    R Axis -7 degrees    T Axis 14 degrees    QRS Count 12 beats    Q Onset 217 ms    P Onset 137 ms    P Offset 194 ms    T Offset 428 ms    QTC Fredericia 452 ms   Hepatitis B surface antigen   Result Value Ref Range    Hepatitis B Surface AG Nonreactive Nonreactive   POCT GLUCOSE   Result Value Ref Range    POCT Glucose 126 (H) 74 - 99 mg/dL   CBC and Auto Differential   Result Value Ref Range    WBC 13.6 (H) 4.4 - 11.3 x10*3/uL    nRBC 0.2 (H) 0.0 - 0.0 /100 WBCs    RBC 2.92 (L) 4.50 - 5.90 x10*6/uL    Hemoglobin 8.0 (L) 13.5 - 17.5 g/dL    Hematocrit 26.1 (L) 41.0 - 52.0 %    MCV 89 80 - 100 fL    MCH 27.4 26.0 - 34.0 pg    MCHC 30.7 (L) 32.0 - 36.0 g/dL    RDW 17.6 (H) 11.5 - 14.5 %    Platelets 274 150 - 450  x10*3/uL    Neutrophils % 78.1 40.0 - 80.0 %    Immature Granulocytes %, Automated 2.5 (H) 0.0 - 0.9 %    Lymphocytes % 7.9 13.0 - 44.0 %    Monocytes % 4.6 2.0 - 10.0 %    Eosinophils % 6.5 0.0 - 6.0 %    Basophils % 0.4 0.0 - 2.0 %    Neutrophils Absolute 10.63 (H) 1.20 - 7.70 x10*3/uL    Immature Granulocytes Absolute, Automated 0.34 0.00 - 0.70 x10*3/uL    Lymphocytes Absolute 1.08 (L) 1.20 - 4.80 x10*3/uL    Monocytes Absolute 0.62 0.10 - 1.00 x10*3/uL    Eosinophils Absolute 0.89 (H) 0.00 - 0.70 x10*3/uL    Basophils Absolute 0.05 0.00 - 0.10 x10*3/uL   Renal Function Panel   Result Value Ref Range    Glucose 115 (H) 74 - 99 mg/dL    Sodium 136 136 - 145 mmol/L    Potassium 4.0 3.5 - 5.3 mmol/L    Chloride 107 98 - 107 mmol/L    Bicarbonate 19 (L) 21 - 32 mmol/L    Anion Gap 14 10 - 20 mmol/L    Urea Nitrogen 77 (H) 6 - 23 mg/dL    Creatinine 7.53 (H) 0.50 - 1.30 mg/dL    eGFR 8 (L) >60 mL/min/1.73m*2    Calcium 9.1 8.6 - 10.3 mg/dL    Phosphorus 4.9 2.5 - 4.9 mg/dL    Albumin 3.1 (L) 3.4 - 5.0 g/dL   Magnesium   Result Value Ref Range    Magnesium 1.79 1.60 - 2.40 mg/dL   ECG 12 lead   Result Value Ref Range    Ventricular Rate 76 BPM    Atrial Rate 76 BPM    [place] Interval 176 ms    QRS Duration 88 ms    QT Interval 400 ms    QTC Calculation(Bazett) 450 ms    P Axis 48 degrees    R Axis -4 degrees    T Axis 104 degrees    QRS Count 12 beats    Q Onset 218 ms    P Onset 130 ms    P Offset 191 ms    T Offset 418 ms    QTC Fredericia 432 ms   CBC and Auto Differential   Result Value Ref Range    WBC 13.7 (H) 4.4 - 11.3 x10*3/uL    nRBC 0.1 (H) 0.0 - 0.0 /100 WBCs    RBC 2.97 (L) 4.50 - 5.90 x10*6/uL    Hemoglobin 8.1 (L) 13.5 - 17.5 g/dL    Hematocrit 26.2 (L) 41.0 - 52.0 %    MCV 88 80 - 100 fL    MCH 27.3 26.0 - 34.0 pg    MCHC 30.9 (L) 32.0 - 36.0 g/dL    RDW 17.6 (H) 11.5 - 14.5 %    Platelets 289 150 - 450 x10*3/uL    Neutrophils % 74.1 40.0 - 80.0 %    Immature Granulocytes %, Automated 2.7 (H) 0.0 - 0.9 %     Lymphocytes % 10.4 13.0 - 44.0 %    Monocytes % 4.9 2.0 - 10.0 %    Eosinophils % 7.5 0.0 - 6.0 %    Basophils % 0.4 0.0 - 2.0 %    Neutrophils Absolute 10.13 (H) 1.20 - 7.70 x10*3/uL    Immature Granulocytes Absolute, Automated 0.37 0.00 - 0.70 x10*3/uL    Lymphocytes Absolute 1.42 1.20 - 4.80 x10*3/uL    Monocytes Absolute 0.67 0.10 - 1.00 x10*3/uL    Eosinophils Absolute 1.02 (H) 0.00 - 0.70 x10*3/uL    Basophils Absolute 0.06 0.00 - 0.10 x10*3/uL   Renal Function Panel   Result Value Ref Range    Glucose 130 (H) 74 - 99 mg/dL    Sodium 136 136 - 145 mmol/L    Potassium 3.8 3.5 - 5.3 mmol/L    Chloride 107 98 - 107 mmol/L    Bicarbonate 19 (L) 21 - 32 mmol/L    Anion Gap 14 10 - 20 mmol/L    Urea Nitrogen 78 (H) 6 - 23 mg/dL    Creatinine 7.74 (H) 0.50 - 1.30 mg/dL    eGFR 7 (L) >60 mL/min/1.73m*2    Calcium 9.1 8.6 - 10.3 mg/dL    Phosphorus 4.7 2.5 - 4.9 mg/dL    Albumin 3.0 (L) 3.4 - 5.0 g/dL   Magnesium   Result Value Ref Range    Magnesium 2.03 1.60 - 2.40 mg/dL         SIGNATURE: Boo Max MD PATIENT NAME: Colin Leonard   DATE: May 22, 2025 MRN: [id number]   TIME: 5:05 PM PAGER: [phone]            [1] amiodarone, 200 mg, oral, BID  amLODIPine, 5 mg, oral, Daily  amoxicillin-clavulanate, 1 tablet, oral, Daily  aspirin, 81 mg, oral, Daily  atorvastatin, 20 mg, oral, Nightly  carvedilol, 37.5 mg, oral, BID  cetirizine, 10 mg, oral, Daily  ezetimibe, 10 mg, oral, Nightly  famotidine, 20 mg, oral, Daily   Or  famotidine, 20 mg, intravenous, Daily  guaiFENesin, 600 mg, oral, BID  heparin (porcine), 5,000 Units, subcutaneous, q8h  hydrALAZINE, 100 mg, oral, TID  iron sucrose, 200 mg, intravenous, Daily  melatonin, 15 mg, oral, Nightly  polyethylene glycol, 17 g, oral, Daily  sodium bicarbonate, 1,300 mg, oral, TID  [Held by provider] torsemide, 60 mg, oral, Daily  vancomycin, 125 mg, oral, Nightly     [2]    [3] PRN medications: acetaminophen **OR** acetaminophen **OR** acetaminophen, ondansetron  **OR** ondansetron

## 2025-05-22 NOTE — PROGRESS NOTES
"Colin Leonard is a 59 y.o. male on day 3 of admission presenting with Multifocal pneumonia.    Subjective   Alert and oriented x 3.  States breathing is improved.  Remains in nasal cannula oxygen.  Mild conversational dyspnea noted       Objective     Physical Exam  Vitals and nursing note reviewed.   Constitutional:       General: He is not in acute distress.     Appearance: He is obese. He is ill-appearing. He is not toxic-appearing.   HENT:      Head: Normocephalic and atraumatic.      Nose: Nose normal.      Mouth/Throat:      Mouth: Mucous membranes are moist.      Pharynx: Oropharynx is clear.   Cardiovascular:      Rate and Rhythm: Normal rate and regular rhythm.      Pulses: Normal pulses.      Heart sounds: Murmur heard.      No friction rub. No gallop.   Pulmonary:      Effort: Pulmonary effort is normal.      Breath sounds: Normal breath sounds.   Abdominal:      General: Bowel sounds are normal.      Palpations: Abdomen is soft.   Musculoskeletal:         General: Normal range of motion.      Cervical back: Normal range of motion.      Right lower leg: Edema present.      Left lower leg: Edema present.   Skin:     General: Skin is warm and dry.      Capillary Refill: Capillary refill takes less than 2 seconds.   Neurological:      Mental Status: He is alert and oriented to person, place, and time. Mental status is at baseline.   Psychiatric:         Mood and Affect: Mood normal.         Behavior: Behavior normal.         Thought Content: Thought content normal.         Judgment: Judgment normal.         Last Recorded Vitals  Blood pressure 156/70, pulse 78, temperature 36.3 °C (97.3 °F), temperature source Temporal, resp. rate 18, height 1.778 m (5' 10\"), weight 123 kg (270 lb 4.5 oz), SpO2 99%.  Intake/Output last 3 Shifts:  I/O last 3 completed shifts:  In: 200 (1.6 mL/kg) [IV Piggyback:200]  Out: 1750 (14.3 mL/kg) [Urine:1750 (0.4 mL/kg/hr)]  Weight: 122.6 kg     Relevant Results  Results for " orders placed or performed during the hospital encounter of 05/19/25 (from the past 24 hours)   ECG 12 lead   Result Value Ref Range    Ventricular Rate 76 BPM    Atrial Rate 76 BPM    NJ Interval 176 ms    QRS Duration 88 ms    QT Interval 400 ms    QTC Calculation(Bazett) 450 ms    P Axis 48 degrees    R Axis -4 degrees    T Axis 104 degrees    QRS Count 12 beats    Q Onset 218 ms    P Onset 130 ms    P Offset 191 ms    T Offset 418 ms    QTC Fredericia 432 ms   CBC and Auto Differential   Result Value Ref Range    WBC 13.7 (H) 4.4 - 11.3 x10*3/uL    nRBC 0.1 (H) 0.0 - 0.0 /100 WBCs    RBC 2.97 (L) 4.50 - 5.90 x10*6/uL    Hemoglobin 8.1 (L) 13.5 - 17.5 g/dL    Hematocrit 26.2 (L) 41.0 - 52.0 %    MCV 88 80 - 100 fL    MCH 27.3 26.0 - 34.0 pg    MCHC 30.9 (L) 32.0 - 36.0 g/dL    RDW 17.6 (H) 11.5 - 14.5 %    Platelets 289 150 - 450 x10*3/uL    Neutrophils % 74.1 40.0 - 80.0 %    Immature Granulocytes %, Automated 2.7 (H) 0.0 - 0.9 %    Lymphocytes % 10.4 13.0 - 44.0 %    Monocytes % 4.9 2.0 - 10.0 %    Eosinophils % 7.5 0.0 - 6.0 %    Basophils % 0.4 0.0 - 2.0 %    Neutrophils Absolute 10.13 (H) 1.20 - 7.70 x10*3/uL    Immature Granulocytes Absolute, Automated 0.37 0.00 - 0.70 x10*3/uL    Lymphocytes Absolute 1.42 1.20 - 4.80 x10*3/uL    Monocytes Absolute 0.67 0.10 - 1.00 x10*3/uL    Eosinophils Absolute 1.02 (H) 0.00 - 0.70 x10*3/uL    Basophils Absolute 0.06 0.00 - 0.10 x10*3/uL   Renal Function Panel   Result Value Ref Range    Glucose 130 (H) 74 - 99 mg/dL    Sodium 136 136 - 145 mmol/L    Potassium 3.8 3.5 - 5.3 mmol/L    Chloride 107 98 - 107 mmol/L    Bicarbonate 19 (L) 21 - 32 mmol/L    Anion Gap 14 10 - 20 mmol/L    Urea Nitrogen 78 (H) 6 - 23 mg/dL    Creatinine 7.74 (H) 0.50 - 1.30 mg/dL    eGFR 7 (L) >60 mL/min/1.73m*2    Calcium 9.1 8.6 - 10.3 mg/dL    Phosphorus 4.7 2.5 - 4.9 mg/dL    Albumin 3.0 (L) 3.4 - 5.0 g/dL   Magnesium   Result Value Ref Range    Magnesium 2.03 1.60 - 2.40 mg/dL            Assessment & Plan  Multifocal pneumonia    Primary hypertension    CKD (chronic kidney disease) stage 5, GFR less than 15 ml/min (Multi)    Anemia    Elevated troponin    Acute hypoxic respiratory failure    Shortness of breath    History of Clostridioides difficile colitis    Sepsis with acute hypoxic respiratory failure    Acute on chronic diastolic heart failure    Ventricular tachycardia (Multi)      Multifocal pneumonia: Continues on antibiotics as managed by admitting  Shortness of breath: Partially relative to heart failure.  Continue with IV diuretics  Acute on chronic diastolic heart failure: Stage II diastolic dysfunction torsemide on hold now on IV furosemide.,  Appreciate nephrology input  Coronary artery disease: Continue on statin, Zetia, and aspirin.  Patient had a elevated coronary calcium scoring in 2020, he has had a mildly abnormal stress test recently.  His CT chest reveals significant coronary calcifications now.  I am concerned the patient has advancing coronary artery disease and believe the patient should require cardiac catheterization in the near future.  Will discuss this with Dr. Urban.  Elevated troponin not relative to acute coronary syndrome: In the setting of multifocal pneumonia/shortness of breath with significantly elevated creatinine of 7.29.  Do not believe this is relative to an acute coronary syndrome given elevation with flat trend.  Hyperlipidemia: Continue statin  History of SVT: Has been slowly uptitrated to a significant dose of carvedilol utilizing 50 mg twice daily.  On last hospitalization he had issues with SVT and his carvedilol was increased to 37.5 twice daily.  Is further been uptitrated to 50 mg twice daily.  Will discuss this strong dose with my collaborator this morning  Nonsustained VT: Longest episode lasting approximate 4 seconds and self terminating.  Continues on high-dose beta-blocker.  Given his kidney disease will not initiate amiodarone at  this point.  Will discuss further antiarrhythmics with Dr. Urban.  Chronic kidney disease stage V: Following with nephrology, certainly nearing the point where dialysis is being strongly considered.  Have reconsulted Dr. Holt for guidance  Anemia of chronic disease: Recent transfusion few days ago.  Remains anemic.  Managed by admitting  Hypertensive disorder: Blood pressure controlled at this point.  Continues on hydralazine 100 g 3 times daily as well as carvedilol  Obesity: Managed in the outpatient setting     Overall impression:     5/20: As above.  This patient is a very complex patient.  He has had multiple hospitalizations over the past 6 months and has made no significant improvement.  He continues to have significant shortness of breath and recurrent respiratory issues.  Currently with a multifocal pneumonia.  On antibiotics as managed by admitting and infectious disease.  Has known chronic kidney disease which is worsened over the past 4 to 6 years.  Certainly at the point where I believe he is nearing dialysis necessity.  The patient has been trying to hold off on this as long as he can to continue to working.  From my perspective the patient is having worsening ectopy with continued SVT despite high-dose beta-blocker and occasional nonsustained VT.  He is having no chest pain but had a CAT scan recently with severe coronary calcifications.  He had an elevated calcium scoring approximately 5 years ago.  He had a mildly abnormal stress test recently.  Overall believe the patient should have a cardiac catheterization in the near future as I believe this is likely contributing to the patient's overall arrhythmias.  May also be contributing to the patient's shortness of breath as perhaps an anginal equivalent.  He does not appear to be significantly fluid overloaded but does have acute on chronic diastolic heart failure at this point.  Agree with diuresis, have consulted Dr. Max for further  guidance with diuretics and advice concerning cardiac catheterization which I believe he should have in the near future.  His blood pressure is stable on high-dose alpha-blocker and beta-blocker.  He remains anemic, nephrology is helping to manage this.  Believe once the patient is stable from the respiratory perspective we should consider cardiac catheterization.  Will follow with you.     5/21: As above, patient states breathing has improved, continues to require nasal cannula oxygen at 5 L.  Lung fields do sound improved and remains without minimal peripheral edema.  Would continue with diuretics as directed by nephrology.  On the telemetry monitor overnight the patient has had worsening/increasing duration and frequency of ventricular tachycardia.  He continues on his high dose beta-blocker.  He does have a prolonged QTc.  As a precaution I am mildly reducing his beta-blocker from 50 mg twice daily to 37.5 twice daily and adding amiodarone 200 mg twice daily added attempt at arrhythmia reduction.  I am consulting electrophysiology for further recommendations.  Certainly an ischemic evaluation as part of this workup.  Otherwise the patient is chest pain-free.  His blood pressure stable albeit mildly hypertensive at most recent 156/67.  His creatinine has increased further current of 7.53.  He has diuresed approximately 1400 mL liters over the past 24 hours.  Hemoglobin mildly improved to current of 8.0 with a white blood cell count improved at 13.6.  Overall the patient remains significantly decompensated with high risk for further decompensation.  Would have a low threshold to transfer the patient to the intensive care unit if he continues to have increasing or worsening VT. Would consider lidocaine drip versus amiodarone drip if further sustained VT.  Will follow with you.     5/22: Stable overnight.  Denies complaints of chest pain or pressure palpitations or feeling of rapid heart rate.  States his  respiratory status is improving, despite this he continues on nasal cannula oxygen with shortness of breath with conversation.  On telemetry monitor he did have 1 further episode of VT overnight lasting approximately 8 seconds and self terminating.  Otherwise there was no other significant VT and episodes of PVCs have reduced with the initiation of amiodarone.  His blood pressure is hypertensive this morning again at 156/70 further Ansaid tensive medications been added.  Will continue to trend.  His creatinine continues to rise to current of 7.74.  I spent more than 30 minutes discussing the patient's overall condition with him in his room today.  I also spent time discussing this with his sister at his request.  The patient wishes to be discharged by this weekend to attend his daughter's graduation, however from my perspective I do not believe this is a cerna decision.  Given the patient's VT episodes severe coronary calcifications and worsening creatinine I believe that we should proceed with the cardiac catheterization to determine patient's coronary anatomy and further strategy going forward.  This will most likely push the patient into requiring dialysis which I have informed I feel the patient requires at this point anyways.  From my perspective the patient's respiratory status improves over the weekend ideally would like to perform a cardiac catheterization on Monday.  At this point he does remain chest pain-free.  Will follow with you.    I spent 50 minutes in the professional and overall care of this patient.      SARAH Patel-CNP

## 2025-05-22 NOTE — NURSING NOTE
Spoke with Dr. Ko regarding pt not receiving Amilodipine, Sodium Bicarbonate, Heparin, and Hydralazine at scheduled time.  Ok to give medications and have future med times adjusted.

## 2025-05-22 NOTE — PROGRESS NOTES
Pulmonary medicine Progress Note    Admitted on:     5/19/2025  Length of Stay: 2 day(s)     Interval History     Colin Leonard is a 59-year-old male with a history of hypertension, CKD stage 5, type 2 diabetes, and a prior C. difficile colitis, presenting with worsening cough and SOB. He was recently admitted to [facility] from 5/15/25-5/18/25 for acute diverticulitis, during which he was incidentally found to have atypical pneumonia. He was evaluated by GI, nephrology, ID, and pulmonology, treated with IV Zosyn, and discharged on Augmentin. He was not discharged on O2 and does not require supplemental oxygen at baseline.    After discharge, the patient initially felt well but then developed worsening cough, nasal congestion, and SOB. He reports voice hoarseness, abdominal wall discomfort from coughing, and a productive cough with white/yellow sputum and streaks of blood. He denies fever but endorses chills. No chest pain, abdominal pain, nausea, vomiting, constipation, or diarrhea.    ED Course:  In the ED, the patient was noted to be severely hypoxic (SpO2 in the 70s on room air) and was placed on HFNC, improving O2 saturation to the mid-90s. BP was initially elevated but improved with respiratory support. He was afebrile. Labs: Na 141, K 4.4, Cr 7.39, BNP 2008, troponin 25 --> 744 , WBC 23.1, hgb 8.9. MRSA PCR negative. CXR showed worsening multifocal pneumonia. IV Vanc, Zosyn, Azithromycin, and NS IVF bolus were administered.     5/20: Appears a lot more comfortable, on 4L NC    5/21: Patient in good spirits, has oxygen around his neck but not in his nostrils, appears comfortable without it        Objective   Objective     Vitals:    05/21/25 0359   Weight: 122 kg (269 lb 6.4 oz)   Body mass index is 38.66 kg/m².        5/21/2025    12:00 PM 5/21/2025    12:13 PM 5/21/2025     4:00 PM 5/21/2025     4:18 PM 5/21/2025     7:44 PM 5/21/2025     7:56 PM 5/21/2025     8:00 PM   Vitals   Systolic  164  167 141  167    Diastolic  72  89 124 67    BP Location  Right arm  Right arm Right arm     Heart Rate 67  71  78 70 73   Temp  36.3 °C (97.3 °F)  36.7 °C (98.1 °F) 37.2 °C (99 °F)     Resp  17  18 18          Vent settings:       Intake/Output Summary (Last 24 hours) at 5/21/2025 2025  Last data filed at 5/21/2025 1019  Gross per 24 hour   Intake --   Output 1450 ml   Net -1450 ml       GENERAL: normal appearance. well nourished. No respiratory distress  HEAD/SINUSES: no sinus tenderness  NECK: no JVD, midline trachea without stridor. Thyroid not enlarged  LUNGS: Symmetric chest. Decreased air entry both lungs, no crackles  CARDIAC: normal S1 and S2; no gallops, rubs or murmurs. Regular rate and rhythm  EXTREMITIES: 2+ edema bilaterally, no varicose veins  NEURO: grossly normal mental status  SKIN: Skin turgor normal. No rashes or lesions.   PSYCH: Normal affect    Medications     Scheduled Medications:   Scheduled Medications[1]   Continuous Medications:   Continuous Medications[2]   PRN Medications:     Labs     Results from last 72 hours   Lab Units 05/21/25  0538 05/20/25  0521 05/19/25  1034   GLUCOSE mg/dL 115* 179* 115*   SODIUM mmol/L 136 135* 141   POTASSIUM mmol/L 4.0 4.1 4.4   CHLORIDE mmol/L 107 107 111*   CO2 mmol/L 19* 16* 17*   BUN mg/dL 77* 76* 73*   CREATININE mg/dL 7.53* 7.29* 7.39*   EGFR mL/min/1.73m*2 8* 8* 8*   CALCIUM mg/dL 9.1 8.6 9.6   ALBUMIN g/dL 3.1* 3.0* 3.6   MAGNESIUM mg/dL 1.79  --   --    PHOSPHORUS mg/dL 4.9 3.6  --      Results from last 72 hours   Lab Units 05/19/25  1034   ALK PHOS U/L 72   ALT U/L 39   AST U/L 9   BILIRUBIN TOTAL mg/dL 0.6   PROTEIN TOTAL g/dL 7.2     Results from last 72 hours   Lab Units 05/19/25  1143 05/19/25  1034   TROPHS ng/L 736* 744*     Results from last 72 hours   Lab Units 05/19/25  1034   LACTATE mmol/L 1.1     Results from last 72 hours   Lab Units 05/21/25  0538 05/20/25  0521 05/19/25  1034   WBC AUTO x10*3/uL 13.6* 15.6* 23.1*   NRBC AUTO /100 WBCs 0.2*  "0.0 0.1*   RBC AUTO x10*6/uL 2.92* 2.68* 3.24*   HEMOGLOBIN g/dL 8.0* 7.4* 8.9*   HEMATOCRIT % 26.1* 23.3* 27.9*   MCV fL 89 87 86   MCH pg 27.4 27.6 27.5   MCHC g/dL 30.7* 31.8* 31.9*   RDW % 17.6* 17.7* 17.2*   PLATELETS AUTO x10*3/uL 274 262 333         Lab Results   Component Value Date    BLOODCULT No growth at 2 days 05/19/2025    BLOODCULT No growth at 2 days 05/19/2025    GRAMSTAIN  05/19/2025     Gram stain indicates specimen consists of lower respiratory tract secretions.    GRAMSTAIN No predominant organism 05/19/2025     No results found for: \"URINECULTURE\"    Imaging and Diagnostic Studies     Lab/Radiology/Diagnostic Review:  Results for orders placed or performed during the hospital encounter of 05/19/25 (from the past 24 hours)   CBC and Auto Differential   Result Value Ref Range    WBC 13.6 (H) 4.4 - 11.3 x10*3/uL    nRBC 0.2 (H) 0.0 - 0.0 /100 WBCs    RBC 2.92 (L) 4.50 - 5.90 x10*6/uL    Hemoglobin 8.0 (L) 13.5 - 17.5 g/dL    Hematocrit 26.1 (L) 41.0 - 52.0 %    MCV 89 80 - 100 fL    MCH 27.4 26.0 - 34.0 pg    MCHC 30.7 (L) 32.0 - 36.0 g/dL    RDW 17.6 (H) 11.5 - 14.5 %    Platelets 274 150 - 450 x10*3/uL    Neutrophils % 78.1 40.0 - 80.0 %    Immature Granulocytes %, Automated 2.5 (H) 0.0 - 0.9 %    Lymphocytes % 7.9 13.0 - 44.0 %    Monocytes % 4.6 2.0 - 10.0 %    Eosinophils % 6.5 0.0 - 6.0 %    Basophils % 0.4 0.0 - 2.0 %    Neutrophils Absolute 10.63 (H) 1.20 - 7.70 x10*3/uL    Immature Granulocytes Absolute, Automated 0.34 0.00 - 0.70 x10*3/uL    Lymphocytes Absolute 1.08 (L) 1.20 - 4.80 x10*3/uL    Monocytes Absolute 0.62 0.10 - 1.00 x10*3/uL    Eosinophils Absolute 0.89 (H) 0.00 - 0.70 x10*3/uL    Basophils Absolute 0.05 0.00 - 0.10 x10*3/uL   Renal Function Panel   Result Value Ref Range    Glucose 115 (H) 74 - 99 mg/dL    Sodium 136 136 - 145 mmol/L    Potassium 4.0 3.5 - 5.3 mmol/L    Chloride 107 98 - 107 mmol/L    Bicarbonate 19 (L) 21 - 32 mmol/L    Anion Gap 14 10 - 20 mmol/L    Urea " Nitrogen 77 (H) 6 - 23 mg/dL    Creatinine 7.53 (H) 0.50 - 1.30 mg/dL    eGFR 8 (L) >60 mL/min/1.73m*2    Calcium 9.1 8.6 - 10.3 mg/dL    Phosphorus 4.9 2.5 - 4.9 mg/dL    Albumin 3.1 (L) 3.4 - 5.0 g/dL   Magnesium   Result Value Ref Range    Magnesium 1.79 1.60 - 2.40 mg/dL   ECG 12 lead   Result Value Ref Range    Ventricular Rate 76 BPM    Atrial Rate 76 BPM    AL Interval 176 ms    QRS Duration 88 ms    QT Interval 400 ms    QTC Calculation(Bazett) 450 ms    P Axis 48 degrees    R Axis -4 degrees    T Axis 104 degrees    QRS Count 12 beats    Q Onset 218 ms    P Onset 130 ms    P Offset 191 ms    T Offset 418 ms    QTC Fredericia 432 ms     Imaging  XR chest 1 view  Result Date: 5/19/2025  Worsening bilateral multifocal pneumonia.   MACRO None   Signed by: Rose Rodriguez 5/19/2025 11:37 AM Dictation workstation:   [id number]    CT chest wo IV contrast  Result Date: 5/19/2025  1. Patchy airspace consolidation throughout bilateral lung fields with findings more conspicuous in particular left lower lobe. Correlate with patient's history and concern for multilobar pneumonia with follow-up to clearing recommended. Alternatively, given patient's history of hemoptysis component of pulmonary hemorrhage is not excluded.     MACRO: None   Signed by: Katie Mcclellan 5/19/2025 8:07 AM Dictation workstation:   [id number]    CT abdomen pelvis wo IV contrast  Result Date: 5/15/2025  Scattered colonic diverticula. There is focal wall thickening and inflammatory stranding in the sigmoid colon adjacent to several diverticula consistent with acute diverticulitis. No evidence for microperforation or abscess formation at this time. Posttreatment follow-up or direct visualization is recommended to ensure complete resolution exclude underlying lesion.   Small bilateral pleural effusions and atelectasis. Superimposed infection not excluded. Correlate clinically.   Nonobstructing right renal calculi.   Mild bladder wall thickening  is favored to relate to under distention. Correlate with symptomatology and urinalysis if there is clinical concern for superimposed cystitis.   Additional findings as described above.   MACRO: None   Signed by: Serjio Oliver 5/15/2025 11:22 PM Dictation workstation:   [id number]    XR chest 2 views  Result Date: 5/15/2025  Hypoventilatory changes with bibasilar atelectasis. Minimal blunting of the posterior costophrenic angles likely relate to small layering effusions as seen on CT.   MACRO: None   Signed by: Serjio Oliver 5/15/2025 11:13 PM Dictation workstation:   [id number]      Cardiology, Vascular, and Other Imaging  ECG 12 lead  Result Date: 5/21/2025  Normal sinus rhythm Nonspecific T wave abnormality Abnormal ECG When compared with ECG of 20-MAY-2025 12:10, (unconfirmed) Nonspecific T wave abnormality, worse in Lateral leads    Electrocardiogram, 12-lead PRN ACS symptoms  Result Date: 5/21/2025  Normal sinus rhythm Minimal voltage criteria for LVH, may be normal variant ( R in aVL ) Nonspecific ST abnormality Abnormal ECG When compared with ECG of 19-MAY-2025 10:48, (unconfirmed) Premature ventricular complexes are no longer Present Premature atrial complexes are no longer Present    ECG 12 Lead  Result Date: 5/20/2025  Sinus rhythm with occasional Premature ventricular complexes and Premature atrial complexes Nonspecific ST and T wave abnormality Abnormal ECG When compared with ECG of 16-MAY-2025 00:03, Premature ventricular complexes are now Present Vent. rate has decreased BY  49 BPM Criteria for Anteroseptal infarct are no longer Present Nonspecific T wave abnormality now evident in Inferior leads T wave inversion no longer evident in Lateral leads Confirmed by Jag Dorsey ([id number]) on 5/20/2025 8:45:57 PM    Transthoracic Echo (TTE) Limited  Result Date: 5/20/2025           [place] [id number]            Phone [phone] TRANSTHORACIC ECHOCARDIOGRAM  REPORT Patient Name:       COLIN LINARESISABEL Friedman Physician:    [id number] Colin Urban MD Study Date:         5/20/2025             Ordering Provider:    [id number] KEELEY KENT MRN/PID:            [id number]              Fellow: Accession#:         [id number]          Nurse: Date of Birth/Age:  1965 / 59 years Sonographer:          Vera Coffman RDCS Gender Assigned at  M                     Additional Staff: Birth: Height:             177.80 cm             Admit Date: Weight:             125.19 kg             Admission Status: BSA / BMI:          2.39 m2 / 39.60 kg/m2 Department Location: Blood Pressure: 133 /77 mmHg Study Type:    TRANSTHORACIC ECHO (TTE) LIMITED Diagnosis/ICD: Elevated Troponin-R79.89 Indication:    Elevated Troponin BNP CPT Codes:     Echo Complete w Full [id number] Patient History: Pertinent History: CAD, HTN, Hyperlipidemia and NSTEMI. Study Detail: The following Echo studies were performed: 2D, M-Mode, Doppler and               color flow.  PHYSICIAN INTERPRETATION: Left Ventricle: The left ventricular systolic function is normal, with a visually estimated ejection fraction of 60-65%. There is mild concentric left ventricular hypertrophy. There are no regional wall motion abnormalities. The left ventricular cavity size is normal. There is mild increased septal and mildly increased posterior left ventricular wall thickness. Spectral Doppler shows a normal pattern of left ventricular diastolic filling. There is mild concentric left ventricular hypertrophy. Left Atrium: The left atrial size is mildly dilated. Right Ventricle: The right ventricle is normal in size. There is normal right ventriclar wall thickness. There is normal right ventricular global systolic function. Right Atrium: The right  atrial size is normal. Aortic Valve: The aortic valve is trileaflet. There is mild to moderate aortic valve cusp calcification. There is reduced systolic aortic valve leaflet excursion. There is evidence of mild aortic valve stenosis. The aortic valve dimensionless index is 0.46. There is trace aortic valve regurgitation. The peak instantaneous gradient of the aortic valve is 28 mmHg. The mean gradient of the aortic valve is 14 mmHg. Mitral Valve: The mitral valve is normal in structure. There is normal mitral valve leaflet mobility. There is mild mitral annular calcification. The peak instantaneous gradient of the mitral valve is 5 mmHg. There is mild mitral valve regurgitation. Tricuspid Valve: The tricuspid valve is structurally normal. There is normal tricuspid valve leaflet mobility. There is trace to mild tricuspid regurgitation. Pulmonic Valve: The pulmonic valve is structurally normal. There is trace pulmonic valve regurgitation. Pericardium: No pericardial effusion noted. Aorta: The aortic root is normal. There is no dilatation of the aortic arch. There is mild dilatation of the ascending aorta. There is no dilatation of the aortic root. There is plaque visualized in the ascending aorta, which is classified as a Grade 2 [mild (focal or diffuse) intimal thickening of 2-3 mm] atherosclerosis. Pulmonary Artery: The pulmonary artery is normal in size. The tricuspid regurgitant velocity is 2.29 m/s, and with an estimated right atrial pressure of 3 mmHg, the estimated pulmonary artery pressure is normal with the RVSP at 24.0 mmHg. The estimated PASP is 24 mmHg. Systemic Veins: The inferior vena cava appears normal in size, with IVC inspiratory collapse greater than 50%. In comparison to the previous echocardiogram(s): Compared with study dated 3/11/2025,.  CONCLUSIONS:  1. The left ventricular systolic function is normal, with a visually estimated ejection fraction of 60-65%.  2. There is mild concentric left  ventricular hypertrophy.  3. Mild mitral valve regurgitation.  4. Trace to mild tricuspid regurgitation.  5. Mild aortic valve stenosis.  6. The peak instantaneous gradient of the aortic valve is 28 mmHg.  7. There is plaque visualized in the ascending aorta. QUANTITATIVE DATA SUMMARY:  2D MEASUREMENTS:             Normal Ranges: LAs:             4.20 cm     (2.7-4.0cm) IVSd:            1.24 cm     (0.6-1.1cm) LVPWd:           1.11 cm     (0.6-1.1cm) LVIDd:           5.43 cm     (3.9-5.9cm) LVIDs:           3.33 cm LV Mass Index:   108.3 g/m2 LVEDV Index:     86.80 ml/m2 LV % FS          38.7 %  LEFT ATRIUM:          Normal Ranges: LA Area A4C: 27.0 cm2 LA Area A2C: 24.0 cm2  RIGHT ATRIUM:          Normal Ranges: RA Area A4C:  20.0 cm2  M-MODE MEASUREMENTS:         Normal Ranges: Ao Root:             3.30 cm (2.0-3.7cm) AoV Exc:             1.50 cm (1.5-2.5cm) LAs:                 5.00 cm (2.7-4.0cm)  AORTA MEASUREMENTS:         Normal Ranges: AoV Exc:            1.50 cm (1.5-2.5cm) Asc Ao, d:          4.00 cm (2.1-3.4cm)  LV SYSTOLIC FUNCTION:                      Normal Ranges: EF-A4C View:    64 % (>=55%) EF-A2C View:    74 % EF-Biplane:     69 % EF-Visual:      63 % LV EF Reported: 63 %  LV DIASTOLIC FUNCTION:             Normal Ranges: MV Peak E:             1.14 m/s    (0.7-1.2 m/s) MV Peak A:             1.07 m/s    (0.42-0.7 m/s) E/A Ratio:             1.07        (1.0-2.2) PulmV Sys Cirilo:         36.40 cm/s PulmV Wallis Cirilo:        34.70 cm/s PulmV S/D Cirilo:         1.00 PulmV A Revs Cirilo:      20.60 cm/s PulmV A Revs Dur:      124.00 msec  MITRAL VALVE:          Normal Ranges: MV Vmax:      1.13 m/s (<=1.3m/s) MV peak P.1 mmHg (<5mmHg) MV mean P.0 mmHg (<48mmHg) MV DT:        192 msec (150-240msec)  AORTIC VALVE:                      Normal Ranges: AoV Vmax:                2.65 m/s  (<=1.7m/s) AoV Peak P.1 mmHg (<20mmHg) AoV Mean P.0 mmHg (1.7-11.5mmHg) LVOT Max  Cirilo:            1.16 m/s  (<=1.1m/s) AoV VTI:                 58.20 cm  (18-25cm) LVOT VTI:                26.60 cm LVOT Diameter:           2.10 cm   (1.8-2.4cm) AoV Area, VTI:           1.58 cm2  (2.5-5.5cm2) AoV Area,Vmax:           1.52 cm2  (2.5-4.5cm2) AoV Dimensionless Index: 0.46  RIGHT VENTRICLE: RV Basal 3.31 cm RV Mid   2.73 cm RV Major 8.1 cm TAPSE:   21.4 mm  TRICUSPID VALVE/RVSP:          Normal Ranges: Peak TR Velocity:     2.29 m/s RV Syst Pressure:     24 mmHg  (< 30mmHg) IVC Diam:             1.80 cm  PULMONIC VALVE:          Normal Ranges: PV Max Cirilo:     1.1 m/s  (0.6-0.9m/s) PV Max P.2 mmHg  PULMONARY VEINS: PulmV A Revs Dur: 124.00 msec PulmV A Revs Cirilo: 20.60 cm/s PulmV Wallis Cirilo:   34.70 cm/s PulmV S/D Cirilo:    1.00 PulmV Sys Cirilo:    36.40 cm/s  [id number] Colin Urban MD Electronically signed on 2025 at 12:26:57 PM  ** Final **     ECG 12 lead  Result Date: 2025  Sinus tachycardia with Premature atrial complexes with Aberrant conduction Anteroseptal infarct (cited on or before 2025) ST & T wave abnormality, consider lateral ischemia Abnormal ECG When compared with ECG of 2025 11:27, Premature ventricular complexes are no longer Present Aberrant conduction is now Present Serial changes of Anteroseptal infarct Present Confirmed by Aleks Gooden ([id number]) on 2025 10:06:44 AM           Assessment / Plan       - Acute hypoxic resp failure - sudden worsening of dyspnea and hypoxia with similar CT image, on antibiotics, makes it less likely that it worsening of his previously diagnosed pneumonia.  - Elevated BNP, Elevated Troponin and bilateral pedal edema point towards a cardiac cause for symptoms  - Volume overload and pulmonary edema can show a similar appearance of GGO on chest CT. Also noticed was a dilated LA on CT chest and recent echo suggesting elevated left sided filling pressures.  - HF PEF (Grade 2 diastolic dysfunction) with elevated LAP on echo 3/11/2025         Plan  - Resp culture - negative. Would recommend stopping antibiotics.  - ELI elevated, homogeneous pattern, non specific, ANCA panel pending  - Would recommend diuresis with lasix per nephrology +/- dialysis  - I/O monitoring  - Appreciate Cardiology and nephro input  - Will follow          I have personally interviewed and examined the patient.  I have personally verified elements of the exam listed above. Interval changes or irregularities are as noted.  I have personally and independently reviewed laboratory, radiographic and procedural data.  I have personally reviewed the problem list above and concur. Changes, if any, are noted.  I have personally reviewed the plan list above and concur. Changes, if any, are noted.          Jhonatan Simms MD          [1] amiodarone, 200 mg, oral, BID  amLODIPine, 5 mg, oral, Daily  aspirin, 81 mg, oral, Daily  atorvastatin, 20 mg, oral, Nightly  carvedilol, 37.5 mg, oral, BID  cetirizine, 10 mg, oral, Daily  ezetimibe, 10 mg, oral, Nightly  famotidine, 20 mg, oral, Daily   Or  famotidine, 20 mg, intravenous, Daily  guaiFENesin, 600 mg, oral, BID  heparin (porcine), 5,000 Units, subcutaneous, q8h  hydrALAZINE, 100 mg, oral, TID  iron sucrose, 200 mg, intravenous, Daily  melatonin, 15 mg, oral, Nightly  meropenem, 500 mg, intravenous, q12h  polyethylene glycol, 17 g, oral, Daily  sodium bicarbonate, 1,300 mg, oral, TID  [Held by provider] torsemide, 60 mg, oral, Daily  vancomycin, 125 mg, oral, Nightly  [2]

## 2025-05-22 NOTE — CARE PLAN
The patient's goals for the shift include      The clinical goals for the shift include to wean oxygen and control BP    Over the shift, the patient did not make progress toward the following goals. Barriers to progression include the patient. Recommendations to address these barriers include education.

## 2025-05-22 NOTE — ASSESSMENT & PLAN NOTE
CXR suggestive of worsening bilateral multifocal pneumonia  Consult pulmonology  MRSA PCR negative -> stop IV vanc   Sputum culture, strep pneumo, and legionella antigens negative   Respiratory viral panel negative

## 2025-05-22 NOTE — ASSESSMENT & PLAN NOTE
Meets sepsis criteria with tachypnea, hypoxia, leukocytosis, and pneumonia  Blood cultures negative

## 2025-05-23 DIAGNOSIS — N18.5 CKD (CHRONIC KIDNEY DISEASE) STAGE 5, GFR LESS THAN 15 ML/MIN (MULTI): ICD-10-CM

## 2025-05-23 DIAGNOSIS — N18.5 ANEMIA DUE TO STAGE 5 CHRONIC KIDNEY DISEASE, NOT ON CHRONIC DIALYSIS: ICD-10-CM

## 2025-05-23 DIAGNOSIS — D63.1 ANEMIA DUE TO STAGE 5 CHRONIC KIDNEY DISEASE, NOT ON CHRONIC DIALYSIS: ICD-10-CM

## 2025-05-23 LAB
ATRIAL RATE: 74 BPM
ATRIAL RATE: 76 BPM
BACTERIA BLD CULT: NORMAL
BACTERIA BLD CULT: NORMAL
P AXIS: 11 DEGREES
P AXIS: 48 DEGREES
P OFFSET: 191 MS
P OFFSET: 194 MS
P ONSET: 130 MS
P ONSET: 137 MS
PR INTERVAL: 160 MS
PR INTERVAL: 176 MS
Q ONSET: 217 MS
Q ONSET: 218 MS
QRS COUNT: 12 BEATS
QRS COUNT: 12 BEATS
QRS DURATION: 88 MS
QRS DURATION: 90 MS
QT INTERVAL: 400 MS
QT INTERVAL: 422 MS
QTC CALCULATION(BAZETT): 450 MS
QTC CALCULATION(BAZETT): 468 MS
QTC FREDERICIA: 432 MS
QTC FREDERICIA: 452 MS
R AXIS: -4 DEGREES
R AXIS: -7 DEGREES
T AXIS: 104 DEGREES
T AXIS: 14 DEGREES
T OFFSET: 418 MS
T OFFSET: 428 MS
VENTRICULAR RATE: 74 BPM
VENTRICULAR RATE: 76 BPM

## 2025-05-23 PROCEDURE — C1760 CLOSURE DEV, VASC: HCPCS | Performed by: INTERNAL MEDICINE

## 2025-05-23 PROCEDURE — 2500000004 HC RX 250 GENERAL PHARMACY W/ HCPCS (ALT 636 FOR OP/ED): Performed by: STUDENT IN AN ORGANIZED HEALTH CARE EDUCATION/TRAINING PROGRAM

## 2025-05-23 PROCEDURE — 4A023N7 MEASUREMENT OF CARDIAC SAMPLING AND PRESSURE, LEFT HEART, PERCUTANEOUS APPROACH: ICD-10-PCS | Performed by: INTERNAL MEDICINE

## 2025-05-23 PROCEDURE — 2500000001 HC RX 250 WO HCPCS SELF ADMINISTERED DRUGS (ALT 637 FOR MEDICARE OP): Performed by: NURSE PRACTITIONER

## 2025-05-23 PROCEDURE — 93458 L HRT ARTERY/VENTRICLE ANGIO: CPT | Performed by: INTERNAL MEDICINE

## 2025-05-23 PROCEDURE — 2550000001 HC RX 255 CONTRASTS: Performed by: INTERNAL MEDICINE

## 2025-05-23 PROCEDURE — 2500000002 HC RX 250 W HCPCS SELF ADMINISTERED DRUGS (ALT 637 FOR MEDICARE OP, ALT 636 FOR OP/ED): Performed by: NURSE PRACTITIONER

## 2025-05-23 PROCEDURE — 2780000003 HC OR 278 NO HCPCS: Performed by: INTERNAL MEDICINE

## 2025-05-23 PROCEDURE — 99152 MOD SED SAME PHYS/QHP 5/>YRS: CPT | Performed by: INTERNAL MEDICINE

## 2025-05-23 PROCEDURE — 2500000004 HC RX 250 GENERAL PHARMACY W/ HCPCS (ALT 636 FOR OP/ED): Mod: JZ | Performed by: INTERNAL MEDICINE

## 2025-05-23 PROCEDURE — 99232 SBSQ HOSP IP/OBS MODERATE 35: CPT | Performed by: INTERNAL MEDICINE

## 2025-05-23 PROCEDURE — 2720000007 HC OR 272 NO HCPCS: Performed by: INTERNAL MEDICINE

## 2025-05-23 PROCEDURE — 2500000004 HC RX 250 GENERAL PHARMACY W/ HCPCS (ALT 636 FOR OP/ED): Mod: JZ | Performed by: NURSE PRACTITIONER

## 2025-05-23 PROCEDURE — C1894 INTRO/SHEATH, NON-LASER: HCPCS | Performed by: INTERNAL MEDICINE

## 2025-05-23 PROCEDURE — 2500000001 HC RX 250 WO HCPCS SELF ADMINISTERED DRUGS (ALT 637 FOR MEDICARE OP): Performed by: STUDENT IN AN ORGANIZED HEALTH CARE EDUCATION/TRAINING PROGRAM

## 2025-05-23 PROCEDURE — 2500000004 HC RX 250 GENERAL PHARMACY W/ HCPCS (ALT 636 FOR OP/ED): Performed by: NURSE PRACTITIONER

## 2025-05-23 PROCEDURE — 99233 SBSQ HOSP IP/OBS HIGH 50: CPT | Performed by: STUDENT IN AN ORGANIZED HEALTH CARE EDUCATION/TRAINING PROGRAM

## 2025-05-23 PROCEDURE — B2151ZZ FLUOROSCOPY OF LEFT HEART USING LOW OSMOLAR CONTRAST: ICD-10-PCS | Performed by: INTERNAL MEDICINE

## 2025-05-23 PROCEDURE — 2060000001 HC INTERMEDIATE ICU ROOM DAILY

## 2025-05-23 PROCEDURE — B2111ZZ FLUOROSCOPY OF MULTIPLE CORONARY ARTERIES USING LOW OSMOLAR CONTRAST: ICD-10-PCS | Performed by: INTERNAL MEDICINE

## 2025-05-23 PROCEDURE — 99153 MOD SED SAME PHYS/QHP EA: CPT | Performed by: INTERNAL MEDICINE

## 2025-05-23 PROCEDURE — 99232 SBSQ HOSP IP/OBS MODERATE 35: CPT | Performed by: NURSE PRACTITIONER

## 2025-05-23 PROCEDURE — G0269 OCCLUSIVE DEVICE IN VEIN ART: HCPCS | Performed by: INTERNAL MEDICINE

## 2025-05-23 RX ORDER — HYDRALAZINE HYDROCHLORIDE 20 MG/ML
INJECTION INTRAMUSCULAR; INTRAVENOUS AS NEEDED
Status: DISCONTINUED | OUTPATIENT
Start: 2025-05-23 | End: 2025-05-23 | Stop reason: HOSPADM

## 2025-05-23 RX ORDER — LABETALOL HYDROCHLORIDE 5 MG/ML
INJECTION, SOLUTION INTRAVENOUS AS NEEDED
Status: DISCONTINUED | OUTPATIENT
Start: 2025-05-23 | End: 2025-05-23 | Stop reason: HOSPADM

## 2025-05-23 RX ORDER — MIDAZOLAM HYDROCHLORIDE 1 MG/ML
INJECTION, SOLUTION INTRAMUSCULAR; INTRAVENOUS AS NEEDED
Status: DISCONTINUED | OUTPATIENT
Start: 2025-05-23 | End: 2025-05-23 | Stop reason: HOSPADM

## 2025-05-23 RX ORDER — SODIUM CHLORIDE 9 MG/ML
50 INJECTION, SOLUTION INTRAVENOUS CONTINUOUS
Status: ACTIVE | OUTPATIENT
Start: 2025-05-23 | End: 2025-05-23

## 2025-05-23 RX ORDER — IODIXANOL 320 MG/ML
INJECTION, SOLUTION INTRAVASCULAR AS NEEDED
Status: DISCONTINUED | OUTPATIENT
Start: 2025-05-23 | End: 2025-05-23 | Stop reason: HOSPADM

## 2025-05-23 RX ORDER — HEPARIN SODIUM 5000 [USP'U]/ML
5000 INJECTION, SOLUTION INTRAVENOUS; SUBCUTANEOUS EVERY 12 HOURS SCHEDULED
Status: DISCONTINUED | OUTPATIENT
Start: 2025-05-23 | End: 2025-05-24 | Stop reason: HOSPADM

## 2025-05-23 RX ORDER — FENTANYL CITRATE 50 UG/ML
INJECTION, SOLUTION INTRAMUSCULAR; INTRAVENOUS AS NEEDED
Status: DISCONTINUED | OUTPATIENT
Start: 2025-05-23 | End: 2025-05-23 | Stop reason: HOSPADM

## 2025-05-23 RX ADMIN — AMIODARONE HYDROCHLORIDE 200 MG: 200 TABLET ORAL at 09:50

## 2025-05-23 RX ADMIN — VANCOMYCIN HYDROCHLORIDE 125 MG: 125 CAPSULE ORAL at 20:46

## 2025-05-23 RX ADMIN — GUAIFENESIN 600 MG: 600 TABLET ORAL at 09:50

## 2025-05-23 RX ADMIN — GUAIFENESIN 600 MG: 600 TABLET ORAL at 20:44

## 2025-05-23 RX ADMIN — CARVEDILOL 37.5 MG: 25 TABLET, FILM COATED ORAL at 18:56

## 2025-05-23 RX ADMIN — AMIODARONE HYDROCHLORIDE 200 MG: 200 TABLET ORAL at 20:43

## 2025-05-23 RX ADMIN — HYDRALAZINE HYDROCHLORIDE 100 MG: 50 TABLET ORAL at 18:57

## 2025-05-23 RX ADMIN — FAMOTIDINE 20 MG: 20 TABLET, FILM COATED ORAL at 09:49

## 2025-05-23 RX ADMIN — CARVEDILOL 37.5 MG: 25 TABLET, FILM COATED ORAL at 09:50

## 2025-05-23 RX ADMIN — SODIUM BICARBONATE 1300 MG: 650 TABLET ORAL at 20:46

## 2025-05-23 RX ADMIN — HYDRALAZINE HYDROCHLORIDE 100 MG: 50 TABLET ORAL at 22:50

## 2025-05-23 RX ADMIN — EZETIMIBE 10 MG: 10 TABLET ORAL at 20:44

## 2025-05-23 RX ADMIN — ATORVASTATIN CALCIUM 20 MG: 20 TABLET, FILM COATED ORAL at 20:44

## 2025-05-23 RX ADMIN — CETIRIZINE HYDROCHLORIDE 10 MG: 10 TABLET, FILM COATED ORAL at 09:50

## 2025-05-23 RX ADMIN — Medication 15 MG: at 20:46

## 2025-05-23 RX ADMIN — SODIUM CHLORIDE 50 ML/HR: 900 INJECTION, SOLUTION INTRAVENOUS at 13:43

## 2025-05-23 RX ADMIN — IRON SUCROSE 200 MG: 20 INJECTION, SOLUTION INTRAVENOUS at 06:17

## 2025-05-23 RX ADMIN — HEPARIN SODIUM 5000 UNITS: 5000 INJECTION, SOLUTION INTRAVENOUS; SUBCUTANEOUS at 20:45

## 2025-05-23 RX ADMIN — SODIUM BICARBONATE 1300 MG: 650 TABLET ORAL at 09:49

## 2025-05-23 RX ADMIN — HYDRALAZINE HYDROCHLORIDE 100 MG: 50 TABLET ORAL at 09:50

## 2025-05-23 RX ADMIN — AMOXICILLIN AND CLAVULANATE POTASSIUM 1 TABLET: 500; 125 TABLET, FILM COATED ORAL at 09:49

## 2025-05-23 ASSESSMENT — COGNITIVE AND FUNCTIONAL STATUS - GENERAL
DAILY ACTIVITIY SCORE: 24
MOBILITY SCORE: 24

## 2025-05-23 ASSESSMENT — COLUMBIA-SUICIDE SEVERITY RATING SCALE - C-SSRS
1. IN THE PAST MONTH, HAVE YOU WISHED YOU WERE DEAD OR WISHED YOU COULD GO TO SLEEP AND NOT WAKE UP?: NO
2. HAVE YOU ACTUALLY HAD ANY THOUGHTS OF KILLING YOURSELF?: NO
6. HAVE YOU EVER DONE ANYTHING, STARTED TO DO ANYTHING, OR PREPARED TO DO ANYTHING TO END YOUR LIFE?: NO

## 2025-05-23 ASSESSMENT — PAIN SCALES - GENERAL
PAINLEVEL_OUTOF10: 0 - NO PAIN
PAINLEVEL_OUTOF10: 0 - NO PAIN

## 2025-05-23 ASSESSMENT — PAIN - FUNCTIONAL ASSESSMENT
PAIN_FUNCTIONAL_ASSESSMENT: 0-10
PAIN_FUNCTIONAL_ASSESSMENT: 0-10

## 2025-05-23 ASSESSMENT — PAIN SCALES - WONG BAKER: WONGBAKER_NUMERICALRESPONSE: NO HURT

## 2025-05-23 NOTE — PROGRESS NOTES
Pulmonary medicine Progress Note    Admitted on:     5/19/2025  Length of Stay: 4 day(s)     Interval History     Colin Leonard is a 59-year-old male with a history of hypertension, CKD stage 5, type 2 diabetes, and a prior C. difficile colitis, presenting with worsening cough and SOB. He was recently admitted to [facility] from 5/15/25-5/18/25 for acute diverticulitis, during which he was incidentally found to have atypical pneumonia. He was evaluated by GI, nephrology, ID, and pulmonology, treated with IV Zosyn, and discharged on Augmentin. He was not discharged on O2 and does not require supplemental oxygen at baseline.    After discharge, the patient initially felt well but then developed worsening cough, nasal congestion, and SOB. He reports voice hoarseness, abdominal wall discomfort from coughing, and a productive cough with white/yellow sputum and streaks of blood. He denies fever but endorses chills. No chest pain, abdominal pain, nausea, vomiting, constipation, or diarrhea.    ED Course:  In the ED, the patient was noted to be severely hypoxic (SpO2 in the 70s on room air) and was placed on HFNC, improving O2 saturation to the mid-90s. BP was initially elevated but improved with respiratory support. He was afebrile. Labs: Na 141, K 4.4, Cr 7.39, BNP 2008, troponin 25 --> 744 , WBC 23.1, hgb 8.9. MRSA PCR negative. CXR showed worsening multifocal pneumonia. IV Vanc, Zosyn, Azithromycin, and NS IVF bolus were administered.     5/20: Appears a lot more comfortable, on 4L NC    5/21: Patient in good spirits, has oxygen around his neck but not in his nostrils, appears comfortable without it    5/23: Currently on RA, leg edema is improved.        Objective   Objective     Vitals:    05/23/25 0343   Weight: 119 kg (262 lb 5.6 oz)   Body mass index is 37.64 kg/m².        5/22/2025     8:11 PM 5/22/2025    11:48 PM 5/23/2025    12:00 AM 5/23/2025     3:43 AM 5/23/2025     4:00 AM 5/23/2025     7:24 AM  5/23/2025     8:00 AM   Vitals   Systolic 159 144  168  170    Diastolic 77 57  68  71    BP Location Right arm Right arm  Right arm  Left arm    Heart Rate 74 71 75 71 71  72   Temp 34.8 °C (94.7 °F) 36.5 °C (97.7 °F)  36 °C (96.8 °F)  35.9 °C (96.6 °F)    Resp 18 18  19  18    Weight (lb)    262.35      BMI    37.64 kg/m2      BSA (m2)    2.42 m2           Vent settings:       Intake/Output Summary (Last 24 hours) at 5/23/2025 1053  Last data filed at 5/23/2025 0900  Gross per 24 hour   Intake 120 ml   Output 1925 ml   Net -1805 ml       GENERAL: normal appearance. well nourished. No respiratory distress  HEAD/SINUSES: no sinus tenderness  NECK: no JVD, midline trachea without stridor.   LUNGS: Symmetric chest. Equal air entry both lungs, no crackles  CARDIAC: normal S1 and S2; no gallops, rubs or murmurs. Regular rate and rhythm  EXTREMITIES: 1+ edema bilaterally, improved from prior, no varicose veins  NEURO: grossly normal mental status  SKIN: Skin turgor normal. No rashes or lesions.   PSYCH: Normal affect    Medications     Scheduled Medications:   Scheduled Medications[1]   Continuous Medications:   Continuous Medications[2]   PRN Medications:     Labs     Results from last 72 hours   Lab Units 05/22/25  0513 05/21/25  0538   GLUCOSE mg/dL 130* 115*   SODIUM mmol/L 136 136   POTASSIUM mmol/L 3.8 4.0   CHLORIDE mmol/L 107 107   CO2 mmol/L 19* 19*   BUN mg/dL 78* 77*   CREATININE mg/dL 7.74* 7.53*   EGFR mL/min/1.73m*2 7* 8*   CALCIUM mg/dL 9.1 9.1   ALBUMIN g/dL 3.0* 3.1*   MAGNESIUM mg/dL 2.03 1.79   PHOSPHORUS mg/dL 4.7 4.9                       Results from last 72 hours   Lab Units 05/22/25  0513 05/21/25  0538   WBC AUTO x10*3/uL 13.7* 13.6*   NRBC AUTO /100 WBCs 0.1* 0.2*   RBC AUTO x10*6/uL 2.97* 2.92*   HEMOGLOBIN g/dL 8.1* 8.0*   HEMATOCRIT % 26.2* 26.1*   MCV fL 88 89   MCH pg 27.3 27.4   MCHC g/dL 30.9* 30.7*   RDW % 17.6* 17.6*   PLATELETS AUTO x10*3/uL 289 274         Lab Results   Component  "Value Date    BLOODCULT No growth at 3 days 05/19/2025    BLOODCULT No growth at 3 days 05/19/2025    GRAMSTAIN  05/19/2025     Gram stain indicates specimen consists of lower respiratory tract secretions.    GRAMSTAIN No predominant organism 05/19/2025     No results found for: \"URINECULTURE\"    Imaging and Diagnostic Studies     Lab/Radiology/Diagnostic Review:  No results found for this or any previous visit (from the past 24 hours).    Imaging  XR chest 1 view  Result Date: 5/19/2025  Worsening bilateral multifocal pneumonia.   MACRO None   Signed by: Rose Rodriguez 5/19/2025 11:37 AM Dictation workstation:   [id number]    CT chest wo IV contrast  Result Date: 5/19/2025  1. Patchy airspace consolidation throughout bilateral lung fields with findings more conspicuous in particular left lower lobe. Correlate with patient's history and concern for multilobar pneumonia with follow-up to clearing recommended. Alternatively, given patient's history of hemoptysis component of pulmonary hemorrhage is not excluded.     MACRO: None   Signed by: Katie Mcclellan 5/19/2025 8:07 AM Dictation workstation:   [id number]      Cardiology, Vascular, and Other Imaging  ECG 12 lead  Result Date: 5/23/2025  Normal sinus rhythm Nonspecific T wave abnormality Abnormal ECG When compared with ECG of 20-MAY-2025 12:10, (unconfirmed) Nonspecific T wave abnormality, worse in Lateral leads Confirmed by Jag Dorsey ([id number]) on 5/23/2025 10:36:44 AM    Electrocardiogram, 12-lead PRN ACS symptoms  Result Date: 5/23/2025  Normal sinus rhythm Minimal voltage criteria for LVH, may be normal variant ( R in aVL ) Nonspecific ST abnormality Abnormal ECG When compared with ECG of 19-MAY-2025 10:48, (unconfirmed) Premature ventricular complexes are no longer Present Premature atrial complexes are no longer Present Confirmed by Jag Dorsey ([id number]) on 5/23/2025 10:08:50 AM    ECG 12 Lead  Result Date: 5/20/2025  Sinus rhythm with occasional " Premature ventricular complexes and Premature atrial complexes Nonspecific ST and T wave abnormality Abnormal ECG When compared with ECG of 16-MAY-2025 00:03, Premature ventricular complexes are now Present Vent. rate has decreased BY  49 BPM Criteria for Anteroseptal infarct are no longer Present Nonspecific T wave abnormality now evident in Inferior leads T wave inversion no longer evident in Lateral leads Confirmed by Jag Dorsey ([id number]) on 5/20/2025 8:45:57 PM    Transthoracic Echo (TTE) Limited  Result Date: 5/20/2025           [place] [address]            Phone [phone] TRANSTHORACIC ECHOCARDIOGRAM REPORT Patient Name:       COLIN MELVIN     Reading Physician:    [id number] Colin Urban MD Study Date:         5/20/2025             Ordering Provider:    [id number] KEELEY KENT MRN/PID:            [id number]              Fellow: Accession#:         [id number]          Nurse: Date of Birth/Age:  1965 / 59 years Sonographer:          Vera Coffman                                                                 ABE Gender Assigned at                       Additional Staff: Birth: Height:             177.80 cm             Admit Date: Weight:             125.19 kg             Admission Status: BSA / BMI:          2.39 m2 / 39.60 kg/m2 Department Location: Blood Pressure: 133 /77 mmHg Study Type:    TRANSTHORACIC ECHO (TTE) LIMITED Diagnosis/ICD: Elevated Troponin-R79.89 Indication:    Elevated Troponin BNP CPT Codes:     Echo Complete w Full [id number] Patient History: Pertinent History: CAD, HTN, Hyperlipidemia and NSTEMI. Study Detail: The following Echo studies were performed: 2D, M-Mode, Doppler and               color flow.  PHYSICIAN INTERPRETATION: Left Ventricle: The left ventricular systolic function is normal, with a  visually estimated ejection fraction of 60-65%. There is mild concentric left ventricular hypertrophy. There are no regional wall motion abnormalities. The left ventricular cavity size is normal. There is mild increased septal and mildly increased posterior left ventricular wall thickness. Spectral Doppler shows a normal pattern of left ventricular diastolic filling. There is mild concentric left ventricular hypertrophy. Left Atrium: The left atrial size is mildly dilated. Right Ventricle: The right ventricle is normal in size. There is normal right ventriclar wall thickness. There is normal right ventricular global systolic function. Right Atrium: The right atrial size is normal. Aortic Valve: The aortic valve is trileaflet. There is mild to moderate aortic valve cusp calcification. There is reduced systolic aortic valve leaflet excursion. There is evidence of mild aortic valve stenosis. The aortic valve dimensionless index is 0.46. There is trace aortic valve regurgitation. The peak instantaneous gradient of the aortic valve is 28 mmHg. The mean gradient of the aortic valve is 14 mmHg. Mitral Valve: The mitral valve is normal in structure. There is normal mitral valve leaflet mobility. There is mild mitral annular calcification. The peak instantaneous gradient of the mitral valve is 5 mmHg. There is mild mitral valve regurgitation. Tricuspid Valve: The tricuspid valve is structurally normal. There is normal tricuspid valve leaflet mobility. There is trace to mild tricuspid regurgitation. Pulmonic Valve: The pulmonic valve is structurally normal. There is trace pulmonic valve regurgitation. Pericardium: No pericardial effusion noted. Aorta: The aortic root is normal. There is no dilatation of the aortic arch. There is mild dilatation of the ascending aorta. There is no dilatation of the aortic root. There is plaque visualized in the ascending aorta, which is classified as a Grade 2 [mild (focal or diffuse) intimal  thickening of 2-3 mm] atherosclerosis. Pulmonary Artery: The pulmonary artery is normal in size. The tricuspid regurgitant velocity is 2.29 m/s, and with an estimated right atrial pressure of 3 mmHg, the estimated pulmonary artery pressure is normal with the RVSP at 24.0 mmHg. The estimated PASP is 24 mmHg. Systemic Veins: The inferior vena cava appears normal in size, with IVC inspiratory collapse greater than 50%. In comparison to the previous echocardiogram(s): Compared with study dated 3/11/2025,.  CONCLUSIONS:  1. The left ventricular systolic function is normal, with a visually estimated ejection fraction of 60-65%.  2. There is mild concentric left ventricular hypertrophy.  3. Mild mitral valve regurgitation.  4. Trace to mild tricuspid regurgitation.  5. Mild aortic valve stenosis.  6. The peak instantaneous gradient of the aortic valve is 28 mmHg.  7. There is plaque visualized in the ascending aorta. QUANTITATIVE DATA SUMMARY:  2D MEASUREMENTS:             Normal Ranges: LAs:             4.20 cm     (2.7-4.0cm) IVSd:            1.24 cm     (0.6-1.1cm) LVPWd:           1.11 cm     (0.6-1.1cm) LVIDd:           5.43 cm     (3.9-5.9cm) LVIDs:           3.33 cm LV Mass Index:   108.3 g/m2 LVEDV Index:     86.80 ml/m2 LV % FS          38.7 %  LEFT ATRIUM:          Normal Ranges: LA Area A4C: 27.0 cm2 LA Area A2C: 24.0 cm2  RIGHT ATRIUM:          Normal Ranges: RA Area A4C:  20.0 cm2  M-MODE MEASUREMENTS:         Normal Ranges: Ao Root:             3.30 cm (2.0-3.7cm) AoV Exc:             1.50 cm (1.5-2.5cm) LAs:                 5.00 cm (2.7-4.0cm)  AORTA MEASUREMENTS:         Normal Ranges: AoV Exc:            1.50 cm (1.5-2.5cm) Asc Ao, d:          4.00 cm (2.1-3.4cm)  LV SYSTOLIC FUNCTION:                      Normal Ranges: EF-A4C View:    64 % (>=55%) EF-A2C View:    74 % EF-Biplane:     69 % EF-Visual:      63 % LV EF Reported: 63 %  LV DIASTOLIC FUNCTION:             Normal Ranges: MV Peak E:              1.14 m/s    (0.7-1.2 m/s) MV Peak A:             1.07 m/s    (0.42-0.7 m/s) E/A Ratio:             1.07        (1.0-2.2) PulmV Sys Cirilo:         36.40 cm/s PulmV Wallis Cirilo:        34.70 cm/s PulmV S/D Cirilo:         1.00 PulmV A Revs Cirilo:      20.60 cm/s PulmV A Revs Dur:      124.00 msec  MITRAL VALVE:          Normal Ranges: MV Vmax:      1.13 m/s (<=1.3m/s) MV peak P.1 mmHg (<5mmHg) MV mean P.0 mmHg (<48mmHg) MV DT:        192 msec (150-240msec)  AORTIC VALVE:                      Normal Ranges: AoV Vmax:                2.65 m/s  (<=1.7m/s) AoV Peak P.1 mmHg (<20mmHg) AoV Mean P.0 mmHg (1.7-11.5mmHg) LVOT Max Cirilo:            1.16 m/s  (<=1.1m/s) AoV VTI:                 58.20 cm  (18-25cm) LVOT VTI:                26.60 cm LVOT Diameter:           2.10 cm   (1.8-2.4cm) AoV Area, VTI:           1.58 cm2  (2.5-5.5cm2) AoV Area,Vmax:           1.52 cm2  (2.5-4.5cm2) AoV Dimensionless Index: 0.46  RIGHT VENTRICLE: RV Basal 3.31 cm RV Mid   2.73 cm RV Major 8.1 cm TAPSE:   21.4 mm  TRICUSPID VALVE/RVSP:          Normal Ranges: Peak TR Velocity:     2.29 m/s RV Syst Pressure:     24 mmHg  (< 30mmHg) IVC Diam:             1.80 cm  PULMONIC VALVE:          Normal Ranges: PV Max Cirilo:     1.1 m/s  (0.6-0.9m/s) PV Max P.2 mmHg  PULMONARY VEINS: PulmV A Revs Dur: 124.00 msec PulmV A Revs Cirilo: 20.60 cm/s PulmV Wallis Cirilo:   34.70 cm/s PulmV S/D Cirilo:    1.00 PulmV Sys Cirilo:    36.40 cm/s  [id number] Colin Urban MD Electronically signed on 2025 at 12:26:57 PM  ** Final **            Assessment / Plan       - Acute hypoxic resp failure - sudden worsening of dyspnea and hypoxia with similar CT image, on antibiotics, negative sputum cultures - makes it unlikely that it worsening of his previously diagnosed pneumonia.  - Elevated BNP, Elevated Troponin and bilateral pedal edema point towards a cardiac cause for symptoms  - Volume overload and pulmonary edema can show a similar  appearance of GGO on chest CT. Also noticed was a dilated LA on CT chest and recent echo suggesting elevated left sided filling pressures.  - HF PEF (Grade 2 diastolic dysfunction) with elevated LAP on echo 3/11/2025        Plan  - Resp culture - negative. Would recommend stopping antibiotics.  - ELI elevated, homogeneous pattern, non specific, ANCA panel pending  - Would recommend diuresis with lasix per nephrology +/- dialysis  - I/O monitoring  - Appreciate Cardiology and nephro input  - Will sign off. Please call with questions.          I have personally interviewed and examined the patient.  I have personally verified elements of the exam listed above. Interval changes or irregularities are as noted.  I have personally and independently reviewed laboratory, radiographic and procedural data.  I have personally reviewed the problem list above and concur. Changes, if any, are noted.  I have personally reviewed the plan list above and concur. Changes, if any, are noted.          Jhonatan Simms MD          [1] amiodarone, 200 mg, oral, BID  aspirin, 81 mg, oral, Daily  atorvastatin, 20 mg, oral, Nightly  carvedilol, 37.5 mg, oral, BID  cetirizine, 10 mg, oral, Daily  ezetimibe, 10 mg, oral, Nightly  famotidine, 20 mg, oral, Daily   Or  famotidine, 20 mg, intravenous, Daily  guaiFENesin, 600 mg, oral, BID  heparin (porcine), 5,000 Units, subcutaneous, q12h NHAN  hydrALAZINE, 100 mg, oral, TID  iron sucrose, 200 mg, intravenous, Daily  melatonin, 15 mg, oral, Nightly  polyethylene glycol, 17 g, oral, Daily  sodium bicarbonate, 1,300 mg, oral, TID  [Held by provider] torsemide, 60 mg, oral, Daily  vancomycin, 125 mg, oral, Nightly     [2]

## 2025-05-23 NOTE — PROGRESS NOTES
"Colin Leonard is a 59 y.o. male on day 4 of admission presenting with Multifocal pneumonia.    Subjective   Alert and oriented.  Denies complaints of chest pain or pressure palpitations or feeling of rapid heart rate.  No significant events on telemetry monitor overnight.       Objective     Physical Exam  Vitals and nursing note reviewed.   Constitutional:       General: He is not in acute distress.     Appearance: He is obese. He is ill-appearing. He is not toxic-appearing.   HENT:      Head: Normocephalic and atraumatic.      Nose: Nose normal.      Mouth/Throat:      Mouth: Mucous membranes are moist.      Pharynx: Oropharynx is clear.   Cardiovascular:      Rate and Rhythm: Normal rate and regular rhythm.      Pulses: Normal pulses.      Heart sounds: Normal heart sounds. No murmur heard.     No friction rub. No gallop.   Pulmonary:      Effort: Pulmonary effort is normal.      Breath sounds: Normal breath sounds.      Comments: Room air.,  Patient declined to wear oxygen.   some minimal conversational dyspnea is noted  Abdominal:      General: Bowel sounds are normal.      Palpations: Abdomen is soft.   Musculoskeletal:      Cervical back: Normal range of motion.      Right lower leg: Edema present.      Left lower leg: Edema present.      Comments: Trace pedal and ankle edema noted.   Skin:     General: Skin is warm and dry.      Capillary Refill: Capillary refill takes less than 2 seconds.   Neurological:      Mental Status: He is alert and oriented to person, place, and time. Mental status is at baseline.   Psychiatric:         Mood and Affect: Mood normal.         Behavior: Behavior normal.         Thought Content: Thought content normal.         Judgment: Judgment normal.         Last Recorded Vitals  Blood pressure 170/71, pulse 72, temperature 35.9 °C (96.6 °F), temperature source Temporal, resp. rate 18, height 1.778 m (5' 10\"), weight 119 kg (262 lb 5.6 oz), SpO2 99%.  Intake/Output last 3 " Shifts:  I/O last 3 completed shifts:  In: 320 (2.7 mL/kg) [P.O.:120; IV Piggyback:200]  Out: 2025 (17 mL/kg) [Urine:2025 (0.5 mL/kg/hr)]  Weight: 119 kg     Relevant Results  No results found for this or any previous visit (from the past 24 hours).      Assessment & Plan  Multifocal pneumonia    Primary hypertension    CKD (chronic kidney disease) stage 5, GFR less than 15 ml/min (Multi)    Anemia    Elevated troponin    Acute hypoxic respiratory failure    Shortness of breath    History of Clostridioides difficile colitis    Sepsis with acute hypoxic respiratory failure    Acute on chronic diastolic heart failure    Ventricular tachycardia (Multi)    CAD (coronary artery disease)    Multifocal pneumonia: Continues on antibiotics as managed by admitting  Shortness of breath: Partially relative to heart failure.  Continue with IV diuretics  Acute on chronic diastolic heart failure: Stage II diastolic dysfunction torsemide on hold now on IV furosemide.,  Appreciate nephrology input  Coronary artery disease: Continue on statin, Zetia, and aspirin.  Patient had a elevated coronary calcium scoring in 2020, he has had a mildly abnormal stress test recently.  His CT chest reveals significant coronary calcifications now.  I am concerned the patient has advancing coronary artery disease and believe the patient should require cardiac catheterization in the near future.  Will discuss this with Dr. Urban.  Elevated troponin not relative to acute coronary syndrome: In the setting of multifocal pneumonia/shortness of breath with significantly elevated creatinine of 7.29.  Do not believe this is relative to an acute coronary syndrome given elevation with flat trend.  Hyperlipidemia: Continue statin  History of SVT: Has been slowly uptitrated to a significant dose of carvedilol utilizing 50 mg twice daily.  On last hospitalization he had issues with SVT and his carvedilol was increased to 37.5 twice daily.  Is further been  uptitrated to 50 mg twice daily.  Will discuss this strong dose with my collaborator this morning  Nonsustained VT: Longest episode lasting approximate 4 seconds and self terminating.  Continues on high-dose beta-blocker.  Given his kidney disease will not initiate amiodarone at this point.  Will discuss further antiarrhythmics with Dr. Urban.  Chronic kidney disease stage V: Following with nephrology, certainly nearing the point where dialysis is being strongly considered.  Have reconsulted Dr. Holt for guidance  Anemia of chronic disease: Recent transfusion few days ago.  Remains anemic.  Managed by admitting  Hypertensive disorder: Blood pressure controlled at this point.  Continues on hydralazine 100 g 3 times daily as well as carvedilol  Obesity: Managed in the outpatient setting     Overall impression:     5/20: As above.  This patient is a very complex patient.  He has had multiple hospitalizations over the past 6 months and has made no significant improvement.  He continues to have significant shortness of breath and recurrent respiratory issues.  Currently with a multifocal pneumonia.  On antibiotics as managed by admitting and infectious disease.  Has known chronic kidney disease which is worsened over the past 4 to 6 years.  Certainly at the point where I believe he is nearing dialysis necessity.  The patient has been trying to hold off on this as long as he can to continue to working.  From my perspective the patient is having worsening ectopy with continued SVT despite high-dose beta-blocker and occasional nonsustained VT.  He is having no chest pain but had a CAT scan recently with severe coronary calcifications.  He had an elevated calcium scoring approximately 5 years ago.  He had a mildly abnormal stress test recently.  Overall believe the patient should have a cardiac catheterization in the near future as I believe this is likely contributing to the patient's overall arrhythmias.  May also be  contributing to the patient's shortness of breath as perhaps an anginal equivalent.  He does not appear to be significantly fluid overloaded but does have acute on chronic diastolic heart failure at this point.  Agree with diuresis, have consulted Dr. Max for further guidance with diuretics and advice concerning cardiac catheterization which I believe he should have in the near future.  His blood pressure is stable on high-dose alpha-blocker and beta-blocker.  He remains anemic, nephrology is helping to manage this.  Believe once the patient is stable from the respiratory perspective we should consider cardiac catheterization.  Will follow with you.     5/21: As above, patient states breathing has improved, continues to require nasal cannula oxygen at 5 L.  Lung fields do sound improved and remains without minimal peripheral edema.  Would continue with diuretics as directed by nephrology.  On the telemetry monitor overnight the patient has had worsening/increasing duration and frequency of ventricular tachycardia.  He continues on his high dose beta-blocker.  He does have a prolonged QTc.  As a precaution I am mildly reducing his beta-blocker from 50 mg twice daily to 37.5 twice daily and adding amiodarone 200 mg twice daily added attempt at arrhythmia reduction.  I am consulting electrophysiology for further recommendations.  Certainly an ischemic evaluation as part of this workup.  Otherwise the patient is chest pain-free.  His blood pressure stable albeit mildly hypertensive at most recent 156/67.  His creatinine has increased further current of 7.53.  He has diuresed approximately 1400 mL liters over the past 24 hours.  Hemoglobin mildly improved to current of 8.0 with a white blood cell count improved at 13.6.  Overall the patient remains significantly decompensated with high risk for further decompensation.  Would have a low threshold to transfer the patient to the intensive care unit if he continues to  have increasing or worsening VT. Would consider lidocaine drip versus amiodarone drip if further sustained VT.  Will follow with you.      5/22: Stable overnight.  Denies complaints of chest pain or pressure palpitations or feeling of rapid heart rate.  States his respiratory status is improving, despite this he continues on nasal cannula oxygen with shortness of breath with conversation.  On telemetry monitor he did have 1 further episode of VT overnight lasting approximately 8 seconds and self terminating.  Otherwise there was no other significant VT and episodes of PVCs have reduced with the initiation of amiodarone.  His blood pressure is hypertensive this morning again at 156/70 further Ansaid tensive medications been added.  Will continue to trend.  His creatinine continues to rise to current of 7.74.  I spent more than 30 minutes discussing the patient's overall condition with him in his room today.  I also spent time discussing this with his sister at his request.  The patient wishes to be discharged by this weekend to attend his daughter's graduation, however from my perspective I do not believe this is a cerna decision.  Given the patient's VT episodes severe coronary calcifications and worsening creatinine I believe that we should proceed with the cardiac catheterization to determine patient's coronary anatomy and further strategy going forward.  This will most likely push the patient into requiring dialysis which I have informed I feel the patient requires at this point anyways.  From my perspective the patient's respiratory status improves over the weekend ideally would like to perform a cardiac catheterization on Monday.  At this point he does remain chest pain-free.  Will follow with you.    5/23: Stable overnight.  Denies chest pain.  Patient states he feels as if he is breathing comfortably on room air.  He is taken off his oxygen.  Declining to wear it at this time.  On the telemetry monitor there  have been no significant ectopy overnight.  There is no further VT, there has been no further SVT.  With continue with amiodarone as he is.  Patient states swelling to lower extremities.  Reports he has had this issue in the past with amlodipine.  Will stop amlodipine.  Blood pressure has remained  hypertensive he continues on high dose beta-blocker as well as high-dose hydralazine.  Will discuss further options with Dr. Urban.  Believe the patient would benefit from dialysis for fluid volume removal.  He will go for cardiac catheterization today at approximately 1 PM.  As we have discussed thoroughly with the patient this may cause the patient's creatinine to increase significantly and may require dialysis after this.  The patient states he accepts these risks.  Further recommendations after cardiac catheterization.  Will follow with you.       I spent 35 minutes in the professional and overall care of this patient.      Grey Fontana, SARAH-CNP

## 2025-05-23 NOTE — CARE PLAN
Problem: Pain - Adult  Goal: Verbalizes/displays adequate comfort level or baseline comfort level  Outcome: Progressing     Problem: Safety - Adult  Goal: Free from fall injury  Outcome: Progressing   The patient's goals for the shift include      The clinical goals for the shift include safety, hds    Over the shift, the patient did not make progress toward the following goals. Barriers to progression include  Recommendations to address these barriers include

## 2025-05-23 NOTE — POST-PROCEDURE NOTE
Physician Transition of Care Summary  Invasive Cardiovascular Lab    Procedure Date: 5/23/2025  Attending:    Judy Anderson - Primary  Resident/Fellow/Other Assistant: Surgeons and Role:  * No surgeons found with a matching role *    Indications:   Pre-op Diagnosis      * Elevated troponin [R79.89]     * Ventricular tachycardia (Multi) [I47.20]     * Coronary artery disease involving native heart, unspecified vessel or lesion type, unspecified whether angina present [I25.10]    Post-procedure diagnosis:   Post-op Diagnosis     * Elevated troponin [R79.89]     * Ventricular tachycardia (Multi) [I47.20]     * Coronary artery disease involving native heart, unspecified vessel or lesion type, unspecified whether angina present [I25.10]    Procedure(s):   LHC, With LV  06729 - DC CATH PLMT L HRT & ARTS W/NJX & ANGIO IMG S&I    LULA Stent - Coronary  12027 - DC PRQ TRLUML CORONARY STENT W/ANGIO ONE ART/BRNCH        Procedure Findings:   Patient underwent diagnostic cardiac catheterization using right common femoral artery.    Coronary anatomy findings.  Left main.  Left main is very short vessel heavily calcified without significant coronary disease.    Left anterior descending artery.  Left descending artery is a heavily calcified vessel with severe 70% diffuse disease in the proximal segment and another 70% to 80% mid LAD diffuse disease.  Good target for bypass surgery.    Ramus.  The ramus is a small vessel overall with severe focal lesion of 70% in the midsegment.    Left circumflex.  Left circumflex is left dominant vessel with patent obtuse marginal 1.  There is severe lesion 90% just after obtuse marginal 1 of 90% focal lesion the rest of the distal vessels are without significant coronary disease.    Right coronary artery.  Right coronary artery is a nondominant vessel with severe lesion in the midsegment of 90%.    Left heart catheterization.  Ventricular distal pressure is mildly elevated at 16 mmHg.  LV gram  was not obtained to avoid excessive use of contrast.    Description of the Procedure:   Coronary angiography native vessels.  Left heart catheterization    Complications:   None    Stents/Implants:   Implants       No implant documentation for this case.            Anticoagulation/Antiplatelet Plan:   None    Estimated Blood Loss:   * No values recorded between 5/23/2025  3:37 PM and 5/23/2025  4:18 PM *    Anesthesia: Moderate Sedation Anesthesia Staff: No anesthesia staff entered.    Any Specimen(s) Removed:   No specimens collected during this procedure.    Disposition:   Patient with severe proximal as well as mid LAD separate diffuse disease in addition of ramus which is small vessel likely for bypass but severe disease and mid left dominant circumflex of 90%.  The RCA is nondominant with 80 to 90% mid segment.  Recommend evaluation for CT surgery.      Electronically signed by: Shantelle Anderson MD, 5/23/2025 4:18 PM

## 2025-05-23 NOTE — PROGRESS NOTES
05/23/25 1718   Discharge Planning   Expected Discharge Disposition Home     S/P heart cath today.  Nephrology monitoring labs tomorrow for possible need to initiate dialysis.   Will follow.

## 2025-05-23 NOTE — PROGRESS NOTES
Colin Leonard is a 59 y.o. male on day 4 of admission presenting with Multifocal pneumonia.      Subjective   Denies any chest pain.  No shortness of breath at rest on room air.  No cough.  Denies abdominal pain or diarrhea.       Objective     Last Recorded Vitals  /71 (BP Location: Left arm, Patient Position: Lying)   Pulse 72   Temp 35.9 °C (96.6 °F) (Temporal)   Resp 18   Wt 119 kg (262 lb 5.6 oz)   SpO2 99%   Intake/Output last 3 Shifts:    Intake/Output Summary (Last 24 hours) at 5/23/2025 1037  Last data filed at 5/23/2025 0900  Gross per 24 hour   Intake 120 ml   Output 1925 ml   Net -1805 ml       Admission Weight  Weight: 113 kg (250 lb) (05/19/25 1030)    Daily Weight  05/23/25 : 119 kg (262 lb 5.6 oz)    Image Results  Electrocardiogram, 12-lead PRN ACS symptoms  Normal sinus rhythm  Minimal voltage criteria for LVH, may be normal variant ( R in aVL )  Nonspecific ST abnormality  Abnormal ECG  When compared with ECG of 19-MAY-2025 10:48, (unconfirmed)  Premature ventricular complexes are no longer Present  Premature atrial complexes are no longer Present  Confirmed by Jag Dorsey ([id number]) on 5/23/2025 10:08:50 AM      Physical Exam  Constitutional:       General: He is not in acute distress.     Appearance: He is not toxic-appearing.   HENT:      Head: Normocephalic.      Mouth/Throat:      Pharynx: Oropharynx is clear.   Eyes:      General: No scleral icterus.  Cardiovascular:      Rate and Rhythm: Normal rate.   Pulmonary:      Effort: No respiratory distress.      Breath sounds: No wheezing.   Abdominal:      General: There is no distension.      Tenderness: There is no abdominal tenderness.   Musculoskeletal:      Right lower leg: No edema.      Left lower leg: No edema.   Neurological:      Mental Status: He is alert and oriented to person, place, and time.   Psychiatric:         Behavior: Behavior normal.         Relevant Results                      Assessment & Plan  Multifocal  "pneumonia  CXR suggestive of worsening bilateral multifocal pneumonia  Consult pulmonology  MRSA PCR negative -> stop IV vanc   Sputum culture, strep pneumo, and legionella antigens negative   Respiratory viral panel negative   Sepsis with acute hypoxic respiratory failure  Meets sepsis criteria with tachypnea, hypoxia, leukocytosis, and pneumonia  Blood cultures negative   Acute hypoxic respiratory failure  Due to above  Wean O2 as able  Primary hypertension  Continue home coreg and hydralazine with hold parameters  CKD (chronic kidney disease) stage 5, GFR less than 15 ml/min (Multi)  Nephrology consulted - follows with Dr. Max   Diuresis per nephrology   Stable, at baseline  Anemia  Due to chronic kidney disease  Baseline Hgb ~7.5  Stable at baseline  No overt bleeding  Transfuse Hgb <7  History of Clostridioides difficile colitis  Continue empiric PO vanc while on IV antibiotics  Elevated troponin  Troponin 744 -> 736, flat trending  Low suspicion for ACS, suspect likely demand from respiratory failure  Cardiology consulted   Limited echo ordered - EF 60-65%  Acute on chronic diastolic heart failure  BNP 2000  Cardiology consulted  Diuresis per nephrology   Ventricular tachycardia (Multi)  Several episodes of asymptomatic nonsustained ventricular tachycardia on telemetry  Started on amiodarone by cardiology  EP consulted  Will likely need ischemic evaluation  Keep Mag >2 and K >4  CAD (coronary artery disease)      Spent an hour discussing clinical course with patient and his family yesterday evening regarding pneumonia, Vtach, possible need for cardiac cath, and possible need for HD during this hospitalization. Had lengthy discussion again with patient this morning. Reviewed pulmonology recommendations to stop antibiotics as pneumonia workup is negative thus far and his symptoms are improving. Patient states he would prefer to remain on antibiotics for another 48 hours for \"peace of mind\" as he felt like " the pneumonia was not adequately addressed during his prior hospitalization which lead him to be re-admitted to the hospital. Discussed with patient that his infectious workup including full respiratory viral panel, strep pneumo Ag, legionella Ag, MRSA PCR, and sputum culture were all negative effectively ruling out active infection. Despite this, patient is still adamant on being antibiotics for another 48hrs. Discussed with Dr. Simms from pulmonology. Will de-escalate antibiotics to PO augmentin for another 2 days and then stop. He also had several questions about the Vtach, use of amiodarone, and undergoing cardiac cath. Recommended that patient direct those questions to the cardiology service.       Plan:  BMP reviewed: Cr 7.52 -> 7.74, mild worsening of renal function  CBC reviewed: WBC 13.7, Hgb 8.1; leukocytosis and anemia stable   Telemetry reviewed, had 2 episodes of nonsustained ventricular tachycardia yesterday evening   Await cardiology and nephrology evaluations today  Continue amiodarone per cardiology/EP  Will likely need ischemic evaluation, timing TBD  If cardiac cath is recommended during this hospitalization, patient will likely need to be initiated on HD, defer to nephrology  Encourage OOB/ambulation  Anticipate discharge home with no needs when cleared by consultants    05/23/25:    Patient stable at rest on room air.  His respiratory status improved significantly.  Discussed with pulmonology.  Will repeat chest x-ray in the morning can likely stop antibiotics.  More likely he is acute respiratory failure was related more to flash pulmonary edema/volume overload and leukocytosis could be just reflection of the stress.  Will continue oral Augmentin today.  Repeat CBC tomorrow.  Plan is for cardiac catheterization today.  Will check renal function tomorrow.  Patient is aware that he may require dialysis afterwards.  No indications for emergent dialysis.  Change heparin for DVT prophylaxis to every  12 hours based on patient's creatinine clearance.  Monitor strips, reviewed: No evidence of episodes of V. tach within last 24 hours.  Just few occasional PVCs.  Continue amiodarone.  Continue prophylactic oral vancomycin due to recent C. difficile infection, in March of this year.  Patient denies any diarrhea.         Judy Espinal MD

## 2025-05-23 NOTE — PROGRESS NOTES
Confirmed appt with pt for 7/15/22 with CASSIDY Loredo.   Patient was getting cardiac catheterization at my evaluation.  Monitor for signs of contrast nephropathy with daily labs tomorrow.  Dr. Bangura is available tomorrow.

## 2025-05-24 ENCOUNTER — APPOINTMENT (OUTPATIENT)
Dept: RADIOLOGY | Facility: HOSPITAL | Age: 60
DRG: 871 | End: 2025-05-24
Payer: COMMERCIAL

## 2025-05-24 VITALS
HEIGHT: 70 IN | RESPIRATION RATE: 21 BRPM | DIASTOLIC BLOOD PRESSURE: 59 MMHG | SYSTOLIC BLOOD PRESSURE: 159 MMHG | TEMPERATURE: 97.9 F | HEART RATE: 81 BPM | WEIGHT: 246.03 LBS | OXYGEN SATURATION: 96 % | BODY MASS INDEX: 35.22 KG/M2

## 2025-05-24 PROBLEM — J96.01 ACUTE HYPOXIC RESPIRATORY FAILURE: Status: RESOLVED | Noted: 2025-03-12 | Resolved: 2025-05-24

## 2025-05-24 PROBLEM — Z86.19 HISTORY OF CLOSTRIDIOIDES DIFFICILE COLITIS: Status: RESOLVED | Noted: 2025-05-19 | Resolved: 2025-05-24

## 2025-05-24 PROBLEM — R79.89 ELEVATED TROPONIN: Status: RESOLVED | Noted: 2025-03-04 | Resolved: 2025-05-24

## 2025-05-24 PROBLEM — I47.20 VENTRICULAR TACHYCARDIA (MULTI): Status: RESOLVED | Noted: 2025-05-21 | Resolved: 2025-05-24

## 2025-05-24 PROBLEM — R06.02 SHORTNESS OF BREATH: Status: RESOLVED | Noted: 2025-05-19 | Resolved: 2025-05-24

## 2025-05-24 PROBLEM — R65.20 SEPSIS WITH ACUTE HYPOXIC RESPIRATORY FAILURE: Status: RESOLVED | Noted: 2025-05-19 | Resolved: 2025-05-24

## 2025-05-24 PROBLEM — A41.9 SEPSIS WITH ACUTE HYPOXIC RESPIRATORY FAILURE: Status: RESOLVED | Noted: 2025-05-19 | Resolved: 2025-05-24

## 2025-05-24 PROBLEM — I50.33 ACUTE ON CHRONIC DIASTOLIC HEART FAILURE: Status: RESOLVED | Noted: 2025-05-20 | Resolved: 2025-05-24

## 2025-05-24 PROBLEM — J18.9 MULTIFOCAL PNEUMONIA: Status: RESOLVED | Noted: 2025-03-12 | Resolved: 2025-05-24

## 2025-05-24 PROBLEM — J96.01 SEPSIS WITH ACUTE HYPOXIC RESPIRATORY FAILURE: Status: RESOLVED | Noted: 2025-05-19 | Resolved: 2025-05-24

## 2025-05-24 LAB
ALBUMIN SERPL BCP-MCNC: 3.2 G/DL (ref 3.4–5)
ANION GAP SERPL CALCULATED.3IONS-SCNC: 15 MMOL/L (ref 10–20)
BASOPHILS # BLD AUTO: 0.05 X10*3/UL (ref 0–0.1)
BASOPHILS NFR BLD AUTO: 0.4 %
BUN SERPL-MCNC: 79 MG/DL (ref 6–23)
CALCIUM SERPL-MCNC: 9.3 MG/DL (ref 8.6–10.3)
CHLORIDE SERPL-SCNC: 109 MMOL/L (ref 98–107)
CO2 SERPL-SCNC: 19 MMOL/L (ref 21–32)
CREAT SERPL-MCNC: 7.78 MG/DL (ref 0.5–1.3)
EGFRCR SERPLBLD CKD-EPI 2021: 7 ML/MIN/1.73M*2
EOSINOPHIL # BLD AUTO: 0.74 X10*3/UL (ref 0–0.7)
EOSINOPHIL NFR BLD AUTO: 5.4 %
ERYTHROCYTE [DISTWIDTH] IN BLOOD BY AUTOMATED COUNT: 18.6 % (ref 11.5–14.5)
GLUCOSE SERPL-MCNC: 105 MG/DL (ref 74–99)
HCT VFR BLD AUTO: 27.3 % (ref 41–52)
HGB BLD-MCNC: 8.6 G/DL (ref 13.5–17.5)
IMM GRANULOCYTES # BLD AUTO: 0.45 X10*3/UL (ref 0–0.7)
IMM GRANULOCYTES NFR BLD AUTO: 3.3 % (ref 0–0.9)
LYMPHOCYTES # BLD AUTO: 1.51 X10*3/UL (ref 1.2–4.8)
LYMPHOCYTES NFR BLD AUTO: 10.9 %
MCH RBC QN AUTO: 27.6 PG (ref 26–34)
MCHC RBC AUTO-ENTMCNC: 31.5 G/DL (ref 32–36)
MCV RBC AUTO: 88 FL (ref 80–100)
MONOCYTES # BLD AUTO: 0.74 X10*3/UL (ref 0.1–1)
MONOCYTES NFR BLD AUTO: 5.4 %
NEUTROPHILS # BLD AUTO: 10.3 X10*3/UL (ref 1.2–7.7)
NEUTROPHILS NFR BLD AUTO: 74.6 %
NRBC BLD-RTO: 0.2 /100 WBCS (ref 0–0)
PHOSPHATE SERPL-MCNC: 4.9 MG/DL (ref 2.5–4.9)
PLATELET # BLD AUTO: 316 X10*3/UL (ref 150–450)
POTASSIUM SERPL-SCNC: 4.1 MMOL/L (ref 3.5–5.3)
RBC # BLD AUTO: 3.12 X10*6/UL (ref 4.5–5.9)
SODIUM SERPL-SCNC: 139 MMOL/L (ref 136–145)
WBC # BLD AUTO: 13.8 X10*3/UL (ref 4.4–11.3)

## 2025-05-24 PROCEDURE — 2500000001 HC RX 250 WO HCPCS SELF ADMINISTERED DRUGS (ALT 637 FOR MEDICARE OP): Performed by: NURSE PRACTITIONER

## 2025-05-24 PROCEDURE — 80069 RENAL FUNCTION PANEL: CPT | Performed by: NURSE PRACTITIONER

## 2025-05-24 PROCEDURE — 2500000004 HC RX 250 GENERAL PHARMACY W/ HCPCS (ALT 636 FOR OP/ED): Performed by: NURSE PRACTITIONER

## 2025-05-24 PROCEDURE — 2500000004 HC RX 250 GENERAL PHARMACY W/ HCPCS (ALT 636 FOR OP/ED): Mod: JZ | Performed by: NURSE PRACTITIONER

## 2025-05-24 PROCEDURE — 36415 COLL VENOUS BLD VENIPUNCTURE: CPT | Performed by: NURSE PRACTITIONER

## 2025-05-24 PROCEDURE — 2500000005 HC RX 250 GENERAL PHARMACY W/O HCPCS: Performed by: NURSE PRACTITIONER

## 2025-05-24 PROCEDURE — 99221 1ST HOSP IP/OBS SF/LOW 40: CPT | Performed by: PHYSICIAN ASSISTANT

## 2025-05-24 PROCEDURE — 99232 SBSQ HOSP IP/OBS MODERATE 35: CPT

## 2025-05-24 PROCEDURE — 71046 X-RAY EXAM CHEST 2 VIEWS: CPT

## 2025-05-24 PROCEDURE — 2500000002 HC RX 250 W HCPCS SELF ADMINISTERED DRUGS (ALT 637 FOR MEDICARE OP, ALT 636 FOR OP/ED): Performed by: NURSE PRACTITIONER

## 2025-05-24 PROCEDURE — 71046 X-RAY EXAM CHEST 2 VIEWS: CPT | Performed by: RADIOLOGY

## 2025-05-24 PROCEDURE — 85025 COMPLETE CBC W/AUTO DIFF WBC: CPT | Performed by: NURSE PRACTITIONER

## 2025-05-24 PROCEDURE — 99239 HOSP IP/OBS DSCHRG MGMT >30: CPT | Performed by: INTERNAL MEDICINE

## 2025-05-24 RX ORDER — AMIODARONE HYDROCHLORIDE 200 MG/1
100 TABLET ORAL 2 TIMES DAILY
Qty: 30 TABLET | Refills: 0 | Status: SHIPPED | OUTPATIENT
Start: 2025-05-24

## 2025-05-24 RX ORDER — CARVEDILOL 12.5 MG/1
37.5 TABLET ORAL 2 TIMES DAILY
Qty: 180 TABLET | Refills: 0 | Status: SHIPPED | OUTPATIENT
Start: 2025-05-24 | End: 2025-06-23

## 2025-05-24 RX ORDER — AMOXICILLIN AND CLAVULANATE POTASSIUM 500; 125 MG/1; MG/1
1 TABLET, FILM COATED ORAL DAILY
Start: 2025-05-24 | End: 2025-05-28

## 2025-05-24 RX ADMIN — HEPARIN SODIUM 5000 UNITS: 5000 INJECTION, SOLUTION INTRAVENOUS; SUBCUTANEOUS at 09:04

## 2025-05-24 RX ADMIN — Medication 21 PERCENT: at 07:35

## 2025-05-24 RX ADMIN — ASPIRIN 81 MG: 81 TABLET, COATED ORAL at 09:05

## 2025-05-24 RX ADMIN — GUAIFENESIN 600 MG: 600 TABLET ORAL at 09:05

## 2025-05-24 RX ADMIN — IRON SUCROSE 200 MG: 20 INJECTION, SOLUTION INTRAVENOUS at 05:16

## 2025-05-24 RX ADMIN — HYDRALAZINE HYDROCHLORIDE 100 MG: 50 TABLET ORAL at 09:05

## 2025-05-24 RX ADMIN — CETIRIZINE HYDROCHLORIDE 10 MG: 10 TABLET, FILM COATED ORAL at 09:04

## 2025-05-24 RX ADMIN — CARVEDILOL 37.5 MG: 25 TABLET, FILM COATED ORAL at 09:05

## 2025-05-24 RX ADMIN — FAMOTIDINE 20 MG: 20 TABLET, FILM COATED ORAL at 09:05

## 2025-05-24 RX ADMIN — AMIODARONE HYDROCHLORIDE 200 MG: 200 TABLET ORAL at 09:05

## 2025-05-24 RX ADMIN — SODIUM BICARBONATE 1300 MG: 650 TABLET ORAL at 09:06

## 2025-05-24 ASSESSMENT — COGNITIVE AND FUNCTIONAL STATUS - GENERAL
MOBILITY SCORE: 24
DAILY ACTIVITIY SCORE: 24

## 2025-05-24 ASSESSMENT — PAIN SCALES - GENERAL
PAINLEVEL_OUTOF10: 0 - NO PAIN

## 2025-05-24 NOTE — PROGRESS NOTES
05/24/25 1212   Discharge Planning   Home or Post Acute Services None   Expected Discharge Disposition Home     Cleared by [facility] for home self care today.

## 2025-05-24 NOTE — PROGRESS NOTES
"Colin Leonard is a 59 y.o. male on day 5 of admission presenting with Multifocal pneumonia.    Subjective   Patient sitting at the side of the bed.  Denies chest pain, palpitations, pressure, or tightness.  Denies shortness of breath, on room air.  Patient's sister is at the bedside       Objective     Physical Exam  Vitals and nursing note reviewed.   Constitutional:       General: He is not in acute distress.  HENT:      Head: Normocephalic and atraumatic.      Nose: Nose normal.      Mouth/Throat:      Mouth: Mucous membranes are moist.      Pharynx: Oropharynx is clear.   Eyes:      Extraocular Movements: Extraocular movements intact.   Cardiovascular:      Rate and Rhythm: Normal rate and regular rhythm.      Heart sounds: No murmur heard.     No friction rub. No gallop.      Comments: On telemetry patient is sinus rhythm with heart rates 70s-80s  Pulmonary:      Effort: Pulmonary effort is normal.      Comments: On room air.  All lobes and auscultation are clear/diminished  Abdominal:      General: Bowel sounds are normal.      Palpations: Abdomen is soft.   Musculoskeletal:         General: No swelling. Normal range of motion.      Cervical back: Normal range of motion and neck supple.      Right lower leg: No edema.      Left lower leg: No edema.   Skin:     General: Skin is warm and dry.      Capillary Refill: Capillary refill takes less than 2 seconds.   Neurological:      Mental Status: He is alert and oriented to person, place, and time.   Psychiatric:         Mood and Affect: Mood normal.         Behavior: Behavior normal.         Thought Content: Thought content normal.         Judgment: Judgment normal.         Last Recorded Vitals  Blood pressure 159/59, pulse 77, temperature 36.6 °C (97.9 °F), temperature source Temporal, resp. rate 18, height 1.778 m (5' 10\"), weight 112 kg (246 lb 0.5 oz), SpO2 96%.  Intake/Output last 3 Shifts:  I/O last 3 completed shifts:  In: 387.5 (3.5 mL/kg) [P.O.:240; " I.V.:147.5 (1.3 mL/kg)]  Out: 2420 (21.7 mL/kg) [Urine:2400 (0.6 mL/kg/hr); Blood:20]  Weight: 111.6 kg     Relevant Results  Results for orders placed or performed during the hospital encounter of 05/19/25 (from the past 24 hours)   CBC and Auto Differential   Result Value Ref Range    WBC 13.8 (H) 4.4 - 11.3 x10*3/uL    nRBC 0.2 (H) 0.0 - 0.0 /100 WBCs    RBC 3.12 (L) 4.50 - 5.90 x10*6/uL    Hemoglobin 8.6 (L) 13.5 - 17.5 g/dL    Hematocrit 27.3 (L) 41.0 - 52.0 %    MCV 88 80 - 100 fL    MCH 27.6 26.0 - 34.0 pg    MCHC 31.5 (L) 32.0 - 36.0 g/dL    RDW 18.6 (H) 11.5 - 14.5 %    Platelets 316 150 - 450 x10*3/uL    Neutrophils % 74.6 40.0 - 80.0 %    Immature Granulocytes %, Automated 3.3 (H) 0.0 - 0.9 %    Lymphocytes % 10.9 13.0 - 44.0 %    Monocytes % 5.4 2.0 - 10.0 %    Eosinophils % 5.4 0.0 - 6.0 %    Basophils % 0.4 0.0 - 2.0 %    Neutrophils Absolute 10.30 (H) 1.20 - 7.70 x10*3/uL    Immature Granulocytes Absolute, Automated 0.45 0.00 - 0.70 x10*3/uL    Lymphocytes Absolute 1.51 1.20 - 4.80 x10*3/uL    Monocytes Absolute 0.74 0.10 - 1.00 x10*3/uL    Eosinophils Absolute 0.74 (H) 0.00 - 0.70 x10*3/uL    Basophils Absolute 0.05 0.00 - 0.10 x10*3/uL   Renal Function Panel   Result Value Ref Range    Glucose 105 (H) 74 - 99 mg/dL    Sodium 139 136 - 145 mmol/L    Potassium 4.1 3.5 - 5.3 mmol/L    Chloride 109 (H) 98 - 107 mmol/L    Bicarbonate 19 (L) 21 - 32 mmol/L    Anion Gap 15 10 - 20 mmol/L    Urea Nitrogen 79 (H) 6 - 23 mg/dL    Creatinine 7.78 (H) 0.50 - 1.30 mg/dL    eGFR 7 (L) >60 mL/min/1.73m*2    Calcium 9.3 8.6 - 10.3 mg/dL    Phosphorus 4.9 2.5 - 4.9 mg/dL    Albumin 3.2 (L) 3.4 - 5.0 g/dL        Assessment & Plan  Multifocal pneumonia    Primary hypertension    CKD (chronic kidney disease) stage 5, GFR less than 15 ml/min (Multi)    Anemia    Elevated troponin    Acute hypoxic respiratory failure    Shortness of breath    History of Clostridioides difficile colitis    Sepsis with acute hypoxic  respiratory failure    Acute on chronic diastolic heart failure    Ventricular tachycardia (Multi)    CAD (coronary artery disease)      Multifocal pneumonia: Continues on antibiotics as managed by admitting  Shortness of breath: Partially relative to heart failure.  Continue with IV diuretics  Acute on chronic diastolic heart failure: Stage II diastolic dysfunction torsemide on hold now on IV furosemide.,  Appreciate nephrology input  Coronary artery disease: Continue on statin, Zetia, and aspirin.  Patient had a elevated coronary calcium scoring in 2020, he has had a mildly abnormal stress test recently.  His CT chest reveals significant coronary calcifications now.  I am concerned the patient has advancing coronary artery disease and believe the patient should require cardiac catheterization in the near future.  Will discuss this with Dr. Urban.  Elevated troponin not relative to acute coronary syndrome: In the setting of multifocal pneumonia/shortness of breath with significantly elevated creatinine of 7.29.  Do not believe this is relative to an acute coronary syndrome given elevation with flat trend.  Hyperlipidemia: Continue statin  History of SVT: Has been slowly uptitrated to a significant dose of carvedilol utilizing 50 mg twice daily.  On last hospitalization he had issues with SVT and his carvedilol was increased to 37.5 twice daily.  Is further been uptitrated to 50 mg twice daily.  Will discuss this strong dose with my collaborator this morning  Nonsustained VT: Longest episode lasting approximate 4 seconds and self terminating.  Continues on high-dose beta-blocker.  Given his kidney disease will not initiate amiodarone at this point.  Will discuss further antiarrhythmics with Dr. Urban.  Chronic kidney disease stage V: Following with nephrology, certainly nearing the point where dialysis is being strongly considered.  Have reconsulted Dr. Holt for guidance  Anemia of chronic disease: Recent  transfusion few days ago.  Remains anemic.  Managed by admitting  Hypertensive disorder: Blood pressure controlled at this point.  Continues on hydralazine 100 g 3 times daily as well as carvedilol  Obesity: Managed in the outpatient setting     Overall impression:     5/20: As above.  This patient is a very complex patient.  He has had multiple hospitalizations over the past 6 months and has made no significant improvement.  He continues to have significant shortness of breath and recurrent respiratory issues.  Currently with a multifocal pneumonia.  On antibiotics as managed by admitting and infectious disease.  Has known chronic kidney disease which is worsened over the past 4 to 6 years.  Certainly at the point where I believe he is nearing dialysis necessity.  The patient has been trying to hold off on this as long as he can to continue to working.  From my perspective the patient is having worsening ectopy with continued SVT despite high-dose beta-blocker and occasional nonsustained VT.  He is having no chest pain but had a CAT scan recently with severe coronary calcifications.  He had an elevated calcium scoring approximately 5 years ago.  He had a mildly abnormal stress test recently.  Overall believe the patient should have a cardiac catheterization in the near future as I believe this is likely contributing to the patient's overall arrhythmias.  May also be contributing to the patient's shortness of breath as perhaps an anginal equivalent.  He does not appear to be significantly fluid overloaded but does have acute on chronic diastolic heart failure at this point.  Agree with diuresis, have consulted Dr. Max for further guidance with diuretics and advice concerning cardiac catheterization which I believe he should have in the near future.  His blood pressure is stable on high-dose alpha-blocker and beta-blocker.  He remains anemic, nephrology is helping to manage this.  Believe once the patient is  stable from the respiratory perspective we should consider cardiac catheterization.  Will follow with you.     5/21: As above, patient states breathing has improved, continues to require nasal cannula oxygen at 5 L.  Lung fields do sound improved and remains without minimal peripheral edema.  Would continue with diuretics as directed by nephrology.  On the telemetry monitor overnight the patient has had worsening/increasing duration and frequency of ventricular tachycardia.  He continues on his high dose beta-blocker.  He does have a prolonged QTc.  As a precaution I am mildly reducing his beta-blocker from 50 mg twice daily to 37.5 twice daily and adding amiodarone 200 mg twice daily added attempt at arrhythmia reduction.  I am consulting electrophysiology for further recommendations.  Certainly an ischemic evaluation as part of this workup.  Otherwise the patient is chest pain-free.  His blood pressure stable albeit mildly hypertensive at most recent 156/67.  His creatinine has increased further current of 7.53.  He has diuresed approximately 1400 mL liters over the past 24 hours.  Hemoglobin mildly improved to current of 8.0 with a white blood cell count improved at 13.6.  Overall the patient remains significantly decompensated with high risk for further decompensation.  Would have a low threshold to transfer the patient to the intensive care unit if he continues to have increasing or worsening VT. Would consider lidocaine drip versus amiodarone drip if further sustained VT.  Will follow with you.      5/22: Stable overnight.  Denies complaints of chest pain or pressure palpitations or feeling of rapid heart rate.  States his respiratory status is improving, despite this he continues on nasal cannula oxygen with shortness of breath with conversation.  On telemetry monitor he did have 1 further episode of VT overnight lasting approximately 8 seconds and self terminating.  Otherwise there was no other significant  VT and episodes of PVCs have reduced with the initiation of amiodarone.  His blood pressure is hypertensive this morning again at 156/70 further Ansaid tensive medications been added.  Will continue to trend.  His creatinine continues to rise to current of 7.74.  I spent more than 30 minutes discussing the patient's overall condition with him in his room today.  I also spent time discussing this with his sister at his request.  The patient wishes to be discharged by this weekend to attend his daughter's graduation, however from my perspective I do not believe this is a cerna decision.  Given the patient's VT episodes severe coronary calcifications and worsening creatinine I believe that we should proceed with the cardiac catheterization to determine patient's coronary anatomy and further strategy going forward.  This will most likely push the patient into requiring dialysis which I have informed I feel the patient requires at this point anyways.  From my perspective the patient's respiratory status improves over the weekend ideally would like to perform a cardiac catheterization on Monday.  At this point he does remain chest pain-free.  Will follow with you.     5/23: Stable overnight.  Denies chest pain.  Patient states he feels as if he is breathing comfortably on room air.  He is taken off his oxygen.  Declining to wear it at this time.  On the telemetry monitor there have been no significant ectopy overnight.  There is no further VT, there has been no further SVT.  With continue with amiodarone as he is.  Patient states swelling to lower extremities.  Reports he has had this issue in the past with amlodipine.  Will stop amlodipine.  Blood pressure has remained  hypertensive he continues on high dose beta-blocker as well as high-dose hydralazine.  Will discuss further options with Dr. Urban.  Believe the patient would benefit from dialysis for fluid volume removal.  He will go for cardiac catheterization today at  approximately 1 PM.  As we have discussed thoroughly with the patient this may cause the patient's creatinine to increase significantly and may require dialysis after this.  The patient states he accepts these risks.  Further recommendations after cardiac catheterization.  Will follow with you.     5/24: Patient had a left heart catheterization completed yesterday that showed the Left descending artery is a heavily calcified vessel with severe 70% diffuse disease in the proximal segment and another 70% to 80% mid LAD diffuse disease; ramus has severe focal lesion of 70% in the midsegment; Left Circumflex there is severe lesion 90% just after obtuse marginal 1 of 90% focal lesion the rest of the distal vessels are without significant coronary disease.  Right coronary artery severe lesion in the midsegment of 90%, ventricular distal pressure is mildly elevated at 16 mmHg. with the results of the left heart catheterization patient needs to be seen by cardiothoracic surgeon for CABG procedure consideration  Patient sitting at the side of the bed.  Denies chest pain, palpitations, pressure, or tightness.  Denies shortness of breath, on room air. Vitals this morning show a heart rate of 77, blood pressure 159/59, satting 96% on room air.  On telemetry patient is in normal sinus rhythm with heart rates ranging from 70s-80s. No episode of V.Tach or SVT noted on telemetry. Labs this morning show a potassium of 4.1, bicarb 19, BUN 79, creatinine 7.78, hemoglobin 8.6, hematocrit 27.3.  Nephrology is following for patient's stage V chronic kidney disease, dialysis is still being strongly considered, guidance from nephrology is appreciated. I/O show net output of -832.5 mL.  Torsemide is on hold. Cardiothoracic surgery is consulted and appreciate their recommendations. Continue Amiodarone 100 mg twice daily, aspirin daily, Lipitor 20 mg daily, carvedilol 37.5 mg twice daily, hydralazine 100 mg 3 times daily.  We will continue to  follow along with you.    I spent 35 minutes in the professional and overall care of this patient.      Omayra Cazares, APRN-CNP

## 2025-05-24 NOTE — CARE PLAN
The patient's goals for the shift include      The clinical goals for the shift include Pain Free    Over the shift, the patient did not make progress toward the following goals. Barriers to progression include  Recommendations to address these barriers include   Problem: Pain - Adult  Goal: Verbalizes/displays adequate comfort level or baseline comfort level  Outcome: Progressing     Problem: Safety - Adult  Goal: Free from fall injury  Outcome: Progressing     Problem: Chronic Conditions and Co-morbidities  Goal: Patient's chronic conditions and co-morbidity symptoms are monitored and maintained or improved  Outcome: Progressing

## 2025-05-24 NOTE — NURSING NOTE
Assisted up to bathroom, Voided. Back to bed, No pain, Respiration normal. No changes in assessment.

## 2025-05-24 NOTE — DISCHARGE SUMMARY
Discharge Diagnosis  Multifocal pneumonia  Acute respiratory failure with hypoxemia  CHF, acute on chronic diastolic  Recent diverticulitis  Kidney disease stage V  Attention  Coronary artery disease, multivessel, requiring CABG  History of C. difficile colitis in March 2025           Issues Requiring Follow-Up  Chronic kidney disease stage V  Coronary artery disease, multivessel    Discharge Meds     Medication List      START taking these medications     amiodarone 200 mg tablet; Commonly known as: Pacerone; Take 0.5 tablets   (100 mg) by mouth 2 times a day.     CHANGE how you take these medications     amoxicillin-clavulanate 500-125 mg tablet; Commonly known as: Augmentin;   Take 1 tablet by mouth early in the morning. for 4 days. Take 1 tablet by   mouth early in the morning. for 10 days.; What changed: additional   instructions   carvedilol 12.5 mg tablet; Commonly known as: Coreg; Take 3 tablets   (37.5 mg) by mouth 2 times a day.; What changed: medication strength, how   much to take     CONTINUE taking these medications     acetaminophen 325 mg tablet; Commonly known as: Tylenol; Take 2 tablets   (650 mg) by mouth every 6 hours if needed for moderate pain (4 - 6).   aspirin 81 mg EC tablet   atorvastatin 20 mg tablet; Commonly known as: Lipitor   ezetimibe 10 mg tablet; Commonly known as: Zetia; Take 1 tablet (10 mg)   by mouth once daily at bedtime.   hydrALAZINE 100 mg tablet; Commonly known as: Apresoline; Take 1 tablet   (100 mg) by mouth 3 times a day.   multivitamin tablet   sodium bicarbonate 650 mg tablet; Take 2 tablets (1,300 mg) by mouth 3   times a day.   torsemide 20 mg tablet; Commonly known as: Demadex; Take 3 tablets (60   mg) by mouth once daily.   vancomycin 125 mg capsule; Commonly known as: Vancocin; Take 1 capsule   (125 mg) by mouth once daily at bedtime for 18 days.       Test Results Pending At Discharge  Pending Labs       No current pending labs.            Hospital Course    History:  Colin Leonard is a 59 y.o. male hx of HTN, CKD stage 5, type 2 diabetes, hx of c diff colitis presenting with worsening cough and shortness of breath. Patient was recently admitted at [facility] from 5/15/25-5/18/25 for acute diverticulitis. He was incidentally found to have atypical pneumonia as well. He was evaluated by GI, nephrology, ID, and pulmonology. Patient received IV zosyn during his hospital stay and was discharged home on augmentin. He was not discharged on oxygen and does not require supplemental oxygen at baseline. Patient states that he initially felt okay after hospital discharge but then started having worsening cough, nasal congestion, and shortness of breath. He reports voice hoarseness and abdominal wall discomfort from the coughing. The cough is productive with white/yellow-toya sputum and streaks of blood. Denies fever but endorses chills. Denies chest pain, abdominal pain, nausea, vomiting, constipation, and diarrhea.      In the ED, noted to be severely hypoxic with SpO2 in the 70s on room air. Placed on HFNC with improvement in oxygen saturation to the mid 90s. BP initially elevated but improved with as respiratory status improved. Afebrile. Labs notable for Na 141, K 4.4, Cr 7.39, BNP 2008, troponin 744 -> 736, WBC 23.1, hgb 8.9. MRSA PCR negative. CXR showing worsening multifocal pneumonia. Given IV vanc x1, IV zosyn x1, azithromycin 500mg x1, and NS 250cc IVF bolus.     Initial CAT scan and chest x-ray of the chest demonstrated multifocal infiltrates, suspicious for pneumonia.  Patient was consulted by pulmonologist, nephrologist, cardiologist.  His oxygenation improved very fast.  All infectious workup was negative.  Initial white count was elevated but it improved.  Patient was initially treated with Zosyn and send he was switched to oral Augmentin.  Patient had few episodes of ventricular tachycardia.  Coreg dose was increased.  Patient was evaluated by cardiologist who  suggested EP consult.  Started on amiodarone.  Monitor demonstrates normal sinus rhythm.  Plan is to discharge on 100 mg of amiodarone twice a day.  Renal function remained stable.  No indications for dialysis.  Patient had cardiac catheterization yesterday and he requires open heart surgery.  Patient was evaluated by cardiothoracic surgery, they are planning to perform surgery as outpatient after he finishes treatment with antibiotics.  Today, patient is in stable condition.  I ambulated him on room air and his pulse ox remained above [id number].  He did not experience any shortness of breath or chest discomfort.  He remained in normal sinus rhythm with heart rate around 84.  Patient is stable for discharge.  He will finish oral Augmentin for another 4 days and oral vancomycin he is taking prophylactically because he has a history of C. difficile 3 months ago.  Patient will follow-up with cardiothoracic surgery as outpatient.  He supposed to have chest x-ray in 10 days and BMP in 10 days.  Discharge planning was discussed with patient and his sister at bedside in details.  Total time spent with patient today coordinating discharge, including exam, discussion, paperwork, 43 minutes.      Outpatient Follow-Up  No future appointments.      Judy Espinal MD

## 2025-05-24 NOTE — NURSING NOTE
Pt walked the unit with myself and Dr. Espinal. Vitals remained stable.     Pt discharged home with instructions , saline lock removed with tip intact.

## 2025-05-24 NOTE — CONSULTS
"Reason For Consult  CAD for CABG consideration    History Of Present Illness  Colin Leonard is a 59 y.o. male with history of HTN,DMII,CKD stage V,Anemia of chronic renal disease and acute on chronic CHF. He has had multiple hospitalizations in the past few months. He underwent cholecystectomy in March and contracted c.diff. He was treated and discharged.Returned with abdominal pain and found with acute diverticulitis and pneumonia. He underwent a cardiac cath which reveals severe triple vessel disease. He has had non-sustained VT 8 sec on 5/22. He is being treated with Amio and coreg. No further episodes since 5/22. We have been consulted for CABG     Past Medical History  He has a past medical history of Coronary artery disease, Diabetes mellitus (Multi), DVT (deep venous thrombosis) (Multi), Hypertension, and MI (myocardial infarction) (Multi).     Surgical History  He has a past surgical history that includes Amputation and Cholecystectomy (03/05/2025).Amp left toe,ureter resection.     Social History  He reports that he has never smoked. He has never used smokeless tobacco. No history on file for alcohol use and drug use.    Family History  Family History[1]Father CABG age 60     Allergies  Amlodipine besylate      Physical Exam  Constitutional:       Normal appearance, well-developed, well-nourished.   HENT:      Head: Normocephalic and atraumatic.   Cardiovascular:      Rate and Rhythm: RRR, no murmur, nl S1/S2.      Pulses: Normal, <3 sec cap refill.   Pulmonary:      Clear bilateral breath sounds.   Abdominal:      Bowel sounds present, soft.   Musculoskeletal:         No gross abnormalities.  Skin:     Warm and dry, no rashes.   Neurological:      Alert, no focal deficits.          Last Recorded Vitals  Blood pressure 159/59, pulse 77, temperature 36.6 °C (97.9 °F), temperature source Temporal, resp. rate 18, height 1.778 m (5' 10\"), weight 112 kg (246 lb 0.5 oz), SpO2 96%.    Relevant Results  Cardiac " Cath  Procedure Date: 5/23/2025  Attending:    Judy Anderson - Primary  Resident/Fellow/Other Assistant: Surgeons and Role:  * No surgeons found with a matching role *     Indications:   Pre-op Diagnosis      * Elevated troponin [R79.89]     * Ventricular tachycardia (Multi) [I47.20]     * Coronary artery disease involving native heart, unspecified vessel or lesion type, unspecified whether angina present [I25.10]     Post-procedure diagnosis:   Post-op Diagnosis     * Elevated troponin [R79.89]     * Ventricular tachycardia (Multi) [I47.20]     * Coronary artery disease involving native heart, unspecified vessel or lesion type, unspecified whether angina present [I25.10]     Procedure(s):   LHC, With LV  38093 - DC CATH PLMT L HRT & ARTS W/NJX & ANGIO IMG S&I     LULA Stent - Coronary  36260 - DC PRQ TRLUML CORONARY STENT W/ANGIO ONE ART/BRNCH           Procedure Findings:   Patient underwent diagnostic cardiac catheterization using right common femoral artery.     Coronary anatomy findings.  Left main.  Left main is very short vessel heavily calcified without significant coronary disease.     Left anterior descending artery.  Left descending artery is a heavily calcified vessel with severe 70% diffuse disease in the proximal segment and another 70% to 80% mid LAD diffuse disease.  Good target for bypass surgery.     Ramus.  The ramus is a small vessel overall with severe focal lesion of 70% in the midsegment.     Left circumflex.  Left circumflex is left dominant vessel with patent obtuse marginal 1.  There is severe lesion 90% just after obtuse marginal 1 of 90% focal lesion the rest of the distal vessels are without significant coronary disease.     Right coronary artery.  Right coronary artery is a nondominant vessel with severe lesion in the midsegment of 90%.     Left heart catheterization.  Ventricular distal pressure is mildly elevated at 16 mmHg.  LV gram was not obtained to avoid excessive use of  contrast.     Description of the Procedure:   Coronary angiography native vessels.  Left heart catheterization     Complications:   None     Stents/Implants:   Implants         No implant documentation for this case.              Anticoagulation/Antiplatelet Plan:   None         TRANSTHORACIC ECHOCARDIOGRAM REPORT     Patient Name:       COLIN MELVIN     Reading Physician:    [id number] Colin Urban MD  Study Date:         5/20/2025             Ordering Provider:    [id number] KEELEY BERTO                                                                  YOLI  MRN/PID:            [id number]              Fellow:  Accession#:         [id number]          Nurse:  Date of Birth/Age:  1965 / 59 years Sonographer:          Vera Coffman                                                                  ABE  Gender Assigned at                       Additional Staff:  Birth:  Height:             177.80 cm             Admit Date:  Weight:             125.19 kg             Admission Status:  BSA / BMI:          2.39 m2 / 39.60 kg/m2 Department Location:  Blood Pressure: 133 /77 mmHg     Study Type:    TRANSTHORACIC ECHO (TTE) LIMITED  Diagnosis/ICD: Elevated Troponin-R79.89  Indication:    Elevated Troponin BNP  CPT Codes:     Echo Complete w Full Doppler-[id number]     Patient History:  Pertinent History: CAD, HTN, Hyperlipidemia and NSTEMI.     Study Detail: The following Echo studies were performed: 2D, M-Mode, Doppler and                color flow.        PHYSICIAN INTERPRETATION:  Left Ventricle: The left ventricular systolic function is normal, with a visually estimated ejection fraction of 60-65%. There is mild concentric left ventricular hypertrophy. There are no regional wall motion abnormalities. The left ventricular cavity size is normal. There is mild increased septal and mildly increased posterior left ventricular wall thickness. Spectral Doppler shows a  normal pattern of left ventricular diastolic filling. There is mild concentric left ventricular hypertrophy.  Left Atrium: The left atrial size is mildly dilated.  Right Ventricle: The right ventricle is normal in size. There is normal right ventriclar wall thickness. There is normal right ventricular global systolic function.  Right Atrium: The right atrial size is normal.  Aortic Valve: The aortic valve is trileaflet. There is mild to moderate aortic valve cusp calcification. There is reduced systolic aortic valve leaflet excursion. There is evidence of mild aortic valve stenosis.  The aortic valve dimensionless index is 0.46. There is trace aortic valve regurgitation. The peak instantaneous gradient of the aortic valve is 28 mmHg. The mean gradient of the aortic valve is 14 mmHg.  Mitral Valve: The mitral valve is normal in structure. There is normal mitral valve leaflet mobility. There is mild mitral annular calcification. The peak instantaneous gradient of the mitral valve is 5 mmHg. There is mild mitral valve regurgitation.  Tricuspid Valve: The tricuspid valve is structurally normal. There is normal tricuspid valve leaflet mobility. There is trace to mild tricuspid regurgitation.  Pulmonic Valve: The pulmonic valve is structurally normal. There is trace pulmonic valve regurgitation.  Pericardium: No pericardial effusion noted.  Aorta: The aortic root is normal. There is no dilatation of the aortic arch. There is mild dilatation of the ascending aorta. There is no dilatation of the aortic root. There is plaque visualized in the ascending aorta, which is classified as a Grade 2 [mild (focal or diffuse) intimal thickening of 2-3 mm] atherosclerosis.  Pulmonary Artery: The pulmonary artery is normal in size. The tricuspid regurgitant velocity is 2.29 m/s, and with an estimated right atrial pressure of 3 mmHg, the estimated pulmonary artery pressure is normal with the RVSP at 24.0 mmHg. The estimated PASP is 24  mmHg.  Systemic Veins: The inferior vena cava appears normal in size, with IVC inspiratory collapse greater than 50%.  In comparison to the previous echocardiogram(s): Compared with study dated 3/11/2025,.        CONCLUSIONS:   1. The left ventricular systolic function is normal, with a visually estimated ejection fraction of 60-65%.   2. There is mild concentric left ventricular hypertrophy.   3. Mild mitral valve regurgitation.   4. Trace to mild tricuspid regurgitation.   5. Mild aortic valve stenosis.   6. The peak instantaneous gradient of the aortic valve is 28 mmHg.   7. There is plaque visualized in the ascending aorta.     QUANTITATIVE DATA SUMMARY:     2D MEASUREMENTS:             Normal Ranges:  LAs:             4.20 cm     (2.7-4.0cm)  IVSd:            1.24 cm     (0.6-1.1cm)  LVPWd:           1.11 cm     (0.6-1.1cm)  LVIDd:           5.43 cm     (3.9-5.9cm)  LVIDs:           3.33 cm  LV Mass Index:   108.3 g/m2  LVEDV Index:     86.80 ml/m2  LV % FS          38.7 %        LEFT ATRIUM:          Normal Ranges:  LA Area A4C: 27.0 cm2  LA Area A2C: 24.0 cm2        RIGHT ATRIUM:          Normal Ranges:  RA Area A4C:  20.0 cm2        M-MODE MEASUREMENTS:         Normal Ranges:  Ao Root:             3.30 cm (2.0-3.7cm)  AoV Exc:             1.50 cm (1.5-2.5cm)  LAs:                 5.00 cm (2.7-4.0cm)        AORTA MEASUREMENTS:         Normal Ranges:  AoV Exc:            1.50 cm (1.5-2.5cm)  Asc Ao, d:          4.00 cm (2.1-3.4cm)        LV SYSTOLIC FUNCTION:                       Normal Ranges:  EF-A4C View:    64 % (>=55%)  EF-A2C View:    74 %  EF-Biplane:     69 %  EF-Visual:      63 %  LV EF Reported: 63 %        LV DIASTOLIC FUNCTION:             Normal Ranges:  MV Peak E:             1.14 m/s    (0.7-1.2 m/s)  MV Peak A:             1.07 m/s    (0.42-0.7 m/s)  E/A Ratio:             1.07        (1.0-2.2)  PulmV Sys Cirilo:         36.40 cm/s  PulmV Wallis Cirilo:        34.70 cm/s  PulmV S/D Cirilo:          1.00  PulmV A Revs Cirilo:      20.60 cm/s  PulmV A Revs Dur:      124.00 msec        MITRAL VALVE:          Normal Ranges:  MV Vmax:      1.13 m/s (<=1.3m/s)  MV peak P.1 mmHg (<5mmHg)  MV mean P.0 mmHg (<48mmHg)  MV DT:        192 msec (150-240msec)        AORTIC VALVE:                      Normal Ranges:  AoV Vmax:                2.65 m/s  (<=1.7m/s)  AoV Peak P.1 mmHg (<20mmHg)  AoV Mean P.0 mmHg (1.7-11.5mmHg)  LVOT Max Cirilo:            1.16 m/s  (<=1.1m/s)  AoV VTI:                 58.20 cm  (18-25cm)  LVOT VTI:                26.60 cm  LVOT Diameter:           2.10 cm   (1.8-2.4cm)  AoV Area, VTI:           1.58 cm2  (2.5-5.5cm2)  AoV Area,Vmax:           1.52 cm2  (2.5-4.5cm2)  AoV Dimensionless Index: 0.46        RIGHT VENTRICLE:  RV Basal 3.31 cm  RV Mid   2.73 cm  RV Major 8.1 cm  TAPSE:   21.4 mm        TRICUSPID VALVE/RVSP:          Normal Ranges:  Peak TR Velocity:     2.29 m/s  RV Syst Pressure:     24 mmHg  (< 30mmHg)  IVC Diam:             1.80 cm        PULMONIC VALVE:          Normal Ranges:  PV Max Cirilo:     1.1 m/s  (0.6-0.9m/s)  PV Max P.2 mmHg        PULMONARY VEINS:  PulmV A Revs Dur: 124.00 msec  PulmV A Revs Cirilo: 20.60 cm/s  PulmV Wallis Cirilo:   34.70 cm/s  PulmV S/D Cirilo:    1.00  PulmV Sys Cirilo:    36.40 cm/s        [id number] Colin Urban MD  Electronically signed on 2025 at 12:26:57 PM           ** Final **     Dimensions  Left Ventricle  LV EF   63 %  LV A4C EF   63.9  LVIDd   5.43 cm  LV Biplane EF   69 %  Aortic Valve  Structure  LVOT diam   2.1 cm  Aortic Valve Area by Continuity of Peak Velocity   1.52 cm2  Aortic Valve Area by Continuity of VTI   1.58 cm2  Stenosis  AV mn grad   14 mmHg  AV pk grad   28 mmHg  AV pk cirilo   2.65 m/s  Mitral Valve  Stenosis  MV E/A ratio   1.07  Tricuspid Valve  Structure  Tricuspid annular plane systolic excursion   2.1 cm  Regurgitation  RVSP   24 mmHg  Diastolic Filling  MV E/A  ratio   1.07  Appointments for this Order  Date/Time Provider Department  5/20/25 10:45 AM  - 60 min [facility] ECHO INPATIENT PORTABLE ([facility]) [facility] Nonv1     Signed  Electronically signed by Colin Urban MD on 5/20/25 at 1226 EDT  Scans on Order [id number]    [facility] - Scan on 5/20/2025 12:34 PM   CT scan  TECHNIQUE:  Serial axial unenhanced CT images obtained of the chest. Images  reformatted in the coronal and sagittal projection      All CT examinations are performed with 1 or more of the following  dose reduction techniques: Automated exposure control, adjustment of  mA and/or kv according to patient's size, or use of iterative  reconstruction techniques.      FINDINGS:  Mediastinum demonstrates scattered prominent lymph nodes. For  example, in the right paratracheal location identified measuring 9 mm  in the short axis. Also, precarinal lymph node identified measuring 9  mm in the short axis. Right hilar lymph node identified measuring 12  mm with lymphadenopathy demonstrated. Esophagus is unremarkable      Heart and great vessels demonstrate ascending thoracic aorta to  measure 3.7 cm. Mild vascular calcification of the thoracic aorta. No  cardiomegaly.      Lung parenchyma demonstrates small bilateral basilar effusions. There  is patchy areas of airspace consolidation demonstrated within  bilateral lung fields with slight nodular configuration. With  findings more conspicuous in particular within the left lower lobe in  comparison to CT dated 03/10/2025 with patchy consolidation.      Included portions visualized abdomen demonstrate postsurgical changes  status post cholecystectomy.      Bilateral gynecomastia is demonstrated.      Visualized osseous structures demonstrate multilevel anterior  osteophyte formation within the thoracic spine with multilevel  endplate Schmorl's node formation in particular lower thoracic spine.  No compression deformity demonstrated.                      IMPRESSION:  1.  Patchy airspace consolidation throughout bilateral lung fields  with findings more conspicuous in particular left lower lobe.  Correlate with patient's history and concern for multilobar pneumonia  with follow-up to clearing recommended. Alternatively, given  patient's history of hemoptysis component of pulmonary hemorrhage is  not excluded.          Assessment/Plan     Patient with complex medical history. Admitted with abd pain,+diverticulitus and pneumonia. PMH HTN,DMII,CKD Stage V,Gout,Anemia,CHF,Non sust VT,c.diff(3/2025).  Discussed patient with ,recommend patient recover from diverticulitis and pneumonia.   He is a surgical candidate after he recovers from current medical issues.If he develops cardiac issues and needs emergent intervention stenting is a viable option. Preferably we would like to see the patient in our clinic in a few weeks and discuss revascularization.  Discussed above with patient and his sister,they understand and will follow up with us as an out patient.   I will continue to follow along.    I spent 30 minutes in the professional and overall care of this patient.      Haylee Miller PA-C         [1] No family history on file.

## 2025-05-24 NOTE — NURSING NOTE
Received patient eating his dinner, Sister at bedside.  Report done. Post Heart Catheterization. Dressing to right groin dry and intact. No hematoma, No bleeding noted. Assessment as charted. Will monitor.

## 2025-05-24 NOTE — CARE PLAN
Problem: Pain - Adult  Goal: Verbalizes/displays adequate comfort level or baseline comfort level  Outcome: Progressing     Problem: Safety - Adult  Goal: Free from fall injury  Outcome: Progressing     Problem: Discharge Planning  Goal: Discharge to home or other facility with appropriate resources  Outcome: Progressing     Problem: Chronic Conditions and Co-morbidities  Goal: Patient's chronic conditions and co-morbidity symptoms are monitored and maintained or improved  Outcome: Progressing     Problem: Nutrition  Goal: Nutrient intake appropriate for maintaining nutritional needs  Outcome: Progressing     Problem: Skin  Goal: Decreased wound size/increased tissue granulation at next dressing change  Outcome: Progressing  Goal: Participates in plan/prevention/treatment measures  Outcome: Progressing  Goal: Prevent/manage excess moisture  Outcome: Progressing  Goal: Prevent/minimize sheer/friction injuries  Outcome: Progressing  Goal: Promote/optimize nutrition  Outcome: Progressing  Goal: Promote skin healing  Outcome: Progressing     Problem: Heart Failure  Goal: Improved gas exchange this shift  Outcome: Progressing  Goal: Improved urinary output this shift  Outcome: Progressing  Goal: Reduction in peripheral edema within 24 hours  Outcome: Progressing  Goal: Report improvement of dyspnea/breathlessness this shift  Outcome: Progressing  Goal: Weight from fluid excess reduced over 2-3 days, then stabilize  Outcome: Progressing  Goal: Increase self care and/or family involvement in 24 hours  Outcome: Progressing     Problem: Diabetes  Goal: Achieve decreasing blood glucose levels by end of shift  Outcome: Progressing  Goal: Increase stability of blood glucose readings by end of shift  Outcome: Progressing  Goal: Decrease in ketones present in urine by end of shift  Outcome: Progressing  Goal: Maintain electrolyte levels within acceptable range throughout shift  Outcome: Progressing  Goal: Maintain glucose  levels >70mg/dl to <250mg/dl throughout shift  Outcome: Progressing  Goal: No changes in neurological exam by end of shift  Outcome: Progressing  Goal: Learn about and adhere to nutrition recommendations by end of shift  Outcome: Progressing  Goal: Vital signs within normal range for age by end of shift  Outcome: Progressing  Goal: Increase self care and/or family involovement by end of shift  Outcome: Progressing  Goal: Receive DSME education by end of shift  Outcome: Progressing   The patient's goals for the shift include      The clinical goals for the shift include discharge    Over the shift, the patient did not make progress toward the following goals. Barriers to progression include . Recommendations to address these barriers include .

## 2025-05-26 NOTE — DOCUMENTATION CLARIFICATION NOTE
"    PATIENT:               ODALYS MELVIN  ACCT #:                  [id number]  MRN:                       [id number]  :                       1965  ADMIT DATE:       5/15/2025 10:02 PM  DISCH DATE:        2025 12:30 PM  RESPONDING PROVIDER #:        [id number]          PROVIDER RESPONSE TEXT:    Pneumonia ruled in for this admission    CDI QUERY TEXT:    Clarification        Instruction:    Based on your assessment of the patient and the clinical information, please provide the requested documentation by clicking on the appropriate radio button and enter any additional information if prompted.    Question: Please further clarify the diagnosis of Pneumonia as    When answering this query, please exercise your independent professional judgment. The fact that a question is being asked, does not imply that any particular answer is desired or expected.    The patient's clinical indicators include:  Clinical Information: 59 y.o. year old male admitted on 5/15/2025 with acute diverticulitis    Clinical Indicators:    05/15: CXR: \"Impression: Hypoventilatory changes with bibasilar atelectasis. Minimal blunting of the posterior costophrenic angles likely relate to small layering effusions as seen on CT\"    : CT chest: \"IMPRESSION: Patchy airspace consolidation throughout bilateral lung fields with findings more conspicuous in particular left lower lobe.  Correlate with patient's history and concern for multilobar pneumonia with follow-up to clearing recommended.\"    05/15: ED: \"Given concern for possible intra-abdominal infection and sepsis workup was performed as well as x-ray of the chest to rule out pneumonia with his productive cough,\"    : Dr Gagnon: \" 59 y.o. year old male patient is being seen by the pulmonary service for Atypical pneumonia\"  \"CT of the chest bilateral infiltrates.  Atypical pneumonia.\"    Treatment: Pulmonology consult, ID consult, Zosyn 2.25 gms iv q 6 hrs, Augmentin 500/ 125 mg " po daily for 10 days    Risk Factors: Recent Pna, Acute diverticulitis  Options provided:  -- Pneumonia ruled out after workup  -- Pneumonia ruled in for this admission  -- Other - I will add my own diagnosis  -- Refer to Clinical Documentation Reviewer    Query created by: Curt Corbett on 5/21/2025 2:33 PM      Electronically signed by:  DEMETRIUS STYLES MD 5/26/2025 1:55 AM

## 2025-05-28 ENCOUNTER — PATIENT OUTREACH (OUTPATIENT)
Dept: PRIMARY CARE | Facility: CLINIC | Age: 60
End: 2025-05-28
Payer: COMMERCIAL

## 2025-05-28 NOTE — PROGRESS NOTES
Discharge Facility: [facility]  Discharge Diagnosis: multifocal pneumonia; acute respiratory failure with hypoxemia;  Admission Date: 5/19/25  Discharge Date:  5/24/25    PCP Appointment Date: TBD - tasked to PCP office  Specialist Appointment Date: Cardiac surg 6/3/25  Hospital Encounter and Summary Linked: Yes  ED to Hosp-Admission (Discharged) with Judy Espinal MD; Madalyn Cole MD (05/19/2025)     Two attempts were made to reach patient within two business days after discharge. Left voicemail with contact information for patient to call back with any non-emergent questions or concerns.

## 2025-06-03 ENCOUNTER — APPOINTMENT (OUTPATIENT)
Dept: CARDIAC SURGERY | Facility: CLINIC | Age: 60
End: 2025-06-03
Payer: COMMERCIAL

## 2025-06-03 DIAGNOSIS — N18.5 CKD (CHRONIC KIDNEY DISEASE) STAGE 5, GFR LESS THAN 15 ML/MIN (MULTI): ICD-10-CM

## 2025-06-12 ENCOUNTER — PATIENT OUTREACH (OUTPATIENT)
Dept: PRIMARY CARE | Facility: CLINIC | Age: 60
End: 2025-06-12
Payer: COMMERCIAL

## 2025-06-20 ENCOUNTER — TELEPHONE (OUTPATIENT)
Facility: CLINIC | Age: 60
End: 2025-06-20
Payer: COMMERCIAL

## 2025-06-20 NOTE — TELEPHONE ENCOUNTER
Pt left message that Dr. Anderson did his heart cath and would like a refill on amiodarone. Pt has not been seen here since 2024 and has been seeing Dr. Saravia for cardiology. Called patient and recommended that he call Dr. Saravia for refill. Verbalized understanding.

## 2025-06-24 ENCOUNTER — OFFICE VISIT (OUTPATIENT)
Dept: CARDIAC SURGERY | Facility: CLINIC | Age: 60
End: 2025-06-24
Payer: COMMERCIAL

## 2025-06-24 ENCOUNTER — TELEPHONE (OUTPATIENT)
Dept: CARDIOLOGY | Facility: CLINIC | Age: 60
End: 2025-06-24

## 2025-06-24 VITALS
OXYGEN SATURATION: 99 % | SYSTOLIC BLOOD PRESSURE: 168 MMHG | HEIGHT: 71 IN | BODY MASS INDEX: 35 KG/M2 | HEART RATE: 66 BPM | DIASTOLIC BLOOD PRESSURE: 88 MMHG | WEIGHT: 250 LBS | RESPIRATION RATE: 18 BRPM

## 2025-06-24 DIAGNOSIS — I47.10 SUPRAVENTRICULAR TACHYCARDIA, UNSPECIFIED: Primary | ICD-10-CM

## 2025-06-24 DIAGNOSIS — I25.10 CAD IN NATIVE ARTERY: Primary | ICD-10-CM

## 2025-06-24 PROCEDURE — 3008F BODY MASS INDEX DOCD: CPT | Performed by: THORACIC SURGERY (CARDIOTHORACIC VASCULAR SURGERY)

## 2025-06-24 PROCEDURE — 1036F TOBACCO NON-USER: CPT | Performed by: THORACIC SURGERY (CARDIOTHORACIC VASCULAR SURGERY)

## 2025-06-24 PROCEDURE — 3079F DIAST BP 80-89 MM HG: CPT | Performed by: THORACIC SURGERY (CARDIOTHORACIC VASCULAR SURGERY)

## 2025-06-24 PROCEDURE — 99213 OFFICE O/P EST LOW 20 MIN: CPT | Performed by: THORACIC SURGERY (CARDIOTHORACIC VASCULAR SURGERY)

## 2025-06-24 PROCEDURE — 3044F HG A1C LEVEL LT 7.0%: CPT | Performed by: THORACIC SURGERY (CARDIOTHORACIC VASCULAR SURGERY)

## 2025-06-24 PROCEDURE — 99214 OFFICE O/P EST MOD 30 MIN: CPT | Performed by: THORACIC SURGERY (CARDIOTHORACIC VASCULAR SURGERY)

## 2025-06-24 PROCEDURE — 3077F SYST BP >= 140 MM HG: CPT | Performed by: THORACIC SURGERY (CARDIOTHORACIC VASCULAR SURGERY)

## 2025-06-24 ASSESSMENT — PATIENT HEALTH QUESTIONNAIRE - PHQ9
SUM OF ALL RESPONSES TO PHQ9 QUESTIONS 1 AND 2: 0
2. FEELING DOWN, DEPRESSED OR HOPELESS: NOT AT ALL
1. LITTLE INTEREST OR PLEASURE IN DOING THINGS: NOT AT ALL

## 2025-06-24 ASSESSMENT — ENCOUNTER SYMPTOMS
OCCASIONAL FEELINGS OF UNSTEADINESS: 0
LOSS OF SENSATION IN FEET: 0
DEPRESSION: 0

## 2025-06-24 ASSESSMENT — LIFESTYLE VARIABLES
AUDIT-C TOTAL SCORE: 0
SKIP TO QUESTIONS 9-10: 1
HOW OFTEN DO YOU HAVE A DRINK CONTAINING ALCOHOL: NEVER
HOW MANY STANDARD DRINKS CONTAINING ALCOHOL DO YOU HAVE ON A TYPICAL DAY: PATIENT DOES NOT DRINK
HOW OFTEN DO YOU HAVE SIX OR MORE DRINKS ON ONE OCCASION: NEVER

## 2025-06-24 ASSESSMENT — PAIN SCALES - GENERAL: PAINLEVEL_OUTOF10: 0-NO PAIN

## 2025-06-24 NOTE — PROGRESS NOTES
"Subjective   Colin Leonard is a 59 y.o. male referred by [facility]  for coronary bypass grafting. He reports dyspnea. He denies chest pain and lower extremity edema.      Medical History[1]  Surgical History[2]  Family History[3]    Allergies[4]    Current Medications[5]    Review of systems negative except for esrd.       Objective   Cardiac Studies  Cardiac catheterization revealed anterior ischemia.    EF: 40-50%  Vessels: Triple vessel disease: RCA, LAD, and Circumflex    Physical Exam  General: He is a pleasant male currently in no distress.  /88   Pulse 66   Resp 18   Ht 1.803 m (5' 11\")   Wt 113 kg (250 lb)   SpO2 99%   BMI 34.87 kg/m²    Body mass index is 34.87 kg/m².   HEENT: Normocephalic and atraumatic. PERRLA. EOMs are full. Dentition is unremarkable.  NECK: Supple without thyromegaly, masses, or carotid bruits.  CHEST: Clear.  HEART: Regular rate and rhythm.  ABDOMEN: Soft, flat, nontender without organomegaly or masses.  NEUROLOGIC: Unremarkable.  EXTREMITIES: Unremarkable. Pedal pulses are palpable.    Data Review: Labs:  BMP:     Lab Results   Component Value Date     05/24/2025    K 4.1 05/24/2025     (H) 05/24/2025    CO2 19 (L) 05/24/2025    BUN 79 (H) 05/24/2025    CREATININE 7.78 (H) 05/24/2025    EGFR 7 (L) 05/24/2025         Assessment/Plan   Colin is a 59-year-old male who was sent to our outpatient clinic after being diagnosed with triple-vessel disease in the context of his last admission due to C. difficile colitis and bilateral pneumonia.  After his admission he is recovering quite well and was sent to our outpatient clinic for further evaluation and consideration of elective CABG.  The left heart cath showed triple-vessel disease with a nondominant RCA.  The echocardiogram showed normal left ventricular function.  He had many risk factor including chronic kidney disease with a EGFR of 7 mL.  We extensively discussed with the patient he is indication for CABG, " that I think is class I interim of prognosis and symptom relief and he agreed to proceed.  Interval risk I think the risk or of the surgery in terms of death of stroke is around 1 to 2%, and of course have an increased risk of need either temporary dialysis after the surgery or become a permanent dialysis patient.  Because of that I think the patient should be done at [facility] [place], [place].  We order the rest of the preop test and we will book the surgery in the coming weeks.    Problem List Items Addressed This Visit    None      No orders of the defined types were placed in this encounter.                      [1]   Past Medical History:  Diagnosis Date    Coronary artery disease     Diabetes mellitus (Multi)     DVT (deep venous thrombosis) (Multi)     Hypertension     MI (myocardial infarction) (Multi)    [2]   Past Surgical History:  Procedure Laterality Date    AMPUTATION      CARDIAC CATHETERIZATION N/A 5/23/2025    Procedure: LHC, With LV;  Surgeon: Shantelle Anderson MD;  Location: [facility] Cardiac Cath Lab;  Service: Cardiovascular;  Laterality: N/A;    CARDIAC CATHETERIZATION N/A 5/23/2025    Procedure: LULA Stent - Coronary;  Surgeon: Shantelle Anderson MD;  Location: [facility] Cardiac Cath Lab;  Service: Cardiovascular;  Laterality: N/A;    CHOLECYSTECTOMY  03/05/2025    Laparoscopic Cholecystectomy   [3] No family history on file.  [4]   Allergies  Allergen Reactions    Amlodipine Besylate Swelling     edema legs   [5]   Current Outpatient Medications:     acetaminophen (Tylenol) 325 mg tablet, Take 2 tablets (650 mg) by mouth every 6 hours if needed for moderate pain (4 - 6)., Disp: , Rfl:     amiodarone (Pacerone) 200 mg tablet, Take 0.5 tablets (100 mg) by mouth 2 times a day., Disp: 30 tablet, Rfl: 0    aspirin 81 mg EC tablet, Take 1 tablet (81 mg) by mouth once daily., Disp: , Rfl:     atorvastatin (Lipitor) 20 mg tablet, Take 1 tablet (20 mg) by mouth once daily., Disp: , Rfl:     carvedilol (Coreg) 12.5 mg tablet, Take 3 tablets  (37.5 mg) by mouth 2 times a day., Disp: 180 tablet, Rfl: 0    ezetimibe (Zetia) 10 mg tablet, Take 1 tablet (10 mg) by mouth once daily at bedtime., Disp: 90 tablet, Rfl: 3    hydrALAZINE (Apresoline) 100 mg tablet, Take 1 tablet (100 mg) by mouth 3 times a day., Disp: 270 tablet, Rfl: 3    multivitamin tablet, Take 1 tablet by mouth once daily., Disp: , Rfl:     sodium bicarbonate 650 mg tablet, Take 2 tablets (1,300 mg) by mouth 3 times a day., Disp: 180 tablet, Rfl: 1    torsemide (Demadex) 20 mg tablet, Take 3 tablets (60 mg) by mouth once daily., Disp: 90 tablet, Rfl: 0

## 2025-06-25 DIAGNOSIS — I25.10 ATHEROSCLEROSIS OF NATIVE CORONARY ARTERY OF NATIVE HEART WITHOUT ANGINA PECTORIS: Primary | ICD-10-CM

## 2025-06-26 DIAGNOSIS — I47.20 VENTRICULAR TACHYARRHYTHMIA (MULTI): Primary | ICD-10-CM

## 2025-06-26 RX ORDER — AMIODARONE HYDROCHLORIDE 200 MG/1
200 TABLET ORAL 2 TIMES DAILY
Qty: 60 TABLET | Refills: 1 | Status: SHIPPED | OUTPATIENT
Start: 2025-06-26

## 2025-06-30 ENCOUNTER — ANCILLARY PROCEDURE (OUTPATIENT)
Dept: VASCULAR MEDICINE | Facility: CLINIC | Age: 60
End: 2025-06-30
Payer: COMMERCIAL

## 2025-06-30 DIAGNOSIS — I25.10 ATHEROSCLEROSIS OF NATIVE CORONARY ARTERY OF NATIVE HEART WITHOUT ANGINA PECTORIS: ICD-10-CM

## 2025-06-30 DIAGNOSIS — R09.89 OTHER SPECIFIED SYMPTOMS AND SIGNS INVOLVING THE CIRCULATORY AND RESPIRATORY SYSTEMS: ICD-10-CM

## 2025-06-30 DIAGNOSIS — Z01.818 ENCOUNTER FOR OTHER PREPROCEDURAL EXAMINATION: ICD-10-CM

## 2025-06-30 PROCEDURE — 93880 EXTRACRANIAL BILAT STUDY: CPT | Performed by: SURGERY

## 2025-06-30 PROCEDURE — 93880 EXTRACRANIAL BILAT STUDY: CPT

## 2025-07-15 ENCOUNTER — CLINICAL SUPPORT (OUTPATIENT)
Dept: PREADMISSION TESTING | Facility: HOSPITAL | Age: 60
End: 2025-07-15
Payer: COMMERCIAL

## 2025-07-15 ASSESSMENT — DUKE ACTIVITY SCORE INDEX (DASI)
CAN YOU DO HEAVY WORK AROUND THE HOUSE LIKE SCRUBBING FLOORS OR LIFTING AND MOVING HEAVY FURNITURE: YES
CAN YOU PARTICIPATE IN STRENOUS SPORTS LIKE SWIMMING, SINGLES TENNIS, FOOTBALL, BASKETBALL, OR SKIING: NO
TOTAL_SCORE: 40.2
CAN YOU RUN A SHORT DISTANCE: YES
CAN YOU TAKE CARE OF YOURSELF (EAT, DRESS, BATHE, OR USE TOILET): YES
CAN YOU WALK INDOORS, SUCH AS AROUND YOUR HOUSE: YES
CAN YOU DO LIGHT WORK AROUND THE HOUSE LIKE DUSTING OR WASHING DISHES: YES
CAN YOU CLIMB A FLIGHT OF STAIRS OR WALK UP A HILL: YES
CAN YOU DO YARD WORK LIKE RAKING LEAVES, WEEDING OR PUSHING A MOWER: NO
CAN YOU HAVE SEXUAL RELATIONS: YES
CAN YOU DO MODERATE WORK AROUND THE HOUSE LIKE VACUUMING, SWEEPING FLOORS OR CARRYING GROCERIES: YES
DASI METS SCORE: 7.7
CAN YOU WALK A BLOCK OR TWO ON LEVEL GROUND: YES
CAN YOU PARTICIPATE IN MODERATE RECREATIONAL ACTIVITIES LIKE GOLF, BOWLING, DANCING, DOUBLES TENNIS OR THROWING A BASEBALL OR FOOTBALL: NO

## 2025-07-15 ASSESSMENT — ENCOUNTER SYMPTOMS
EYES NEGATIVE: 1
MUSCULOSKELETAL NEGATIVE: 1
ENDOCRINE NEGATIVE: 1
NECK NEGATIVE: 1
RESPIRATORY NEGATIVE: 1
WOUND: 1
CARDIOVASCULAR NEGATIVE: 1
CONSTITUTIONAL NEGATIVE: 1
GASTROINTESTINAL NEGATIVE: 1
NEUROLOGICAL NEGATIVE: 1

## 2025-07-15 NOTE — SIGNIFICANT EVENT
07/15/25 0931   DASI Activity Score Index   Can you take care of yourself (eat, dress, bathe, or use toilet)?  2.75   Can you walk indoors, such as around your house? 1.75   Can you walk a block or two on level ground?  2.75   Can you climb a flight of stairs or walk up a hill? 5.5   Can you run a short distance? 8   Can you do light work around the house like dusting or washing dishes? 2.7   Can you do moderate work around the house like vacuuming, sweeping floors or carrying groceries? 3.5   Can you do heavy work around the house like scrubbing floors or lifting and moving heavy furniture?  8   Can you do yard work like raking leaves, weeding or pushing a mower? 0   Can you have sexual relations? 5.25   Can you participate in moderate recreational activities like golf, bowling, dancing, doubles tennis or throwing a baseball or football? 0   Can you participate in strenous sports like swimming, singles tennis, football, basketball, or skiing? 0   DASI SCORE 40.2   METS Score (Will be calculated only when all the questions are answered) 7.7

## 2025-07-15 NOTE — CPM/PAT H&P
CPM/PAT Evaluation       Name: Colin Leonard (Colin Leonard)  /Age: 1965/59 y.o.     {[facility] Visit Type:41517}      Chief Complaint: ***    HPI    Medical History[1]    Surgical History[2]    Patient  has no history on file for sexual activity.    Family History[3]    Allergies[4]    Colin Leonard is scheduled for CABG (CORONARY ARTERY BYPASS GRAFT) on 25    **Phone Assessment  Prior to Admission medications    Medication Sig Start Date End Date Taking? Authorizing Provider   acetaminophen (Tylenol) 325 mg tablet Take 2 tablets (650 mg) by mouth every 6 hours if needed for moderate pain (4 - 6). 25   Marie Harris MD   amiodarone (Pacerone) 200 mg tablet Take 1 tablet (200 mg) by mouth 2 times a day. 25   Haylee Miller PA-C   aspirin 81 mg EC tablet Take 1 tablet (81 mg) by mouth once daily.    Historical Provider, MD   atorvastatin (Lipitor) 20 mg tablet Take 1 tablet (20 mg) by mouth once daily.    Historical Provider, MD   carvedilol (Coreg) 12.5 mg tablet Take 3 tablets (37.5 mg) by mouth 2 times a day. 25  Judy Espinal MD   ezetimibe (Zetia) 10 mg tablet Take 1 tablet (10 mg) by mouth once daily at bedtime. 24   Mahendra Saravia MD   hydrALAZINE (Apresoline) 100 mg tablet Take 1 tablet (100 mg) by mouth 3 times a day. 25   Mahendra Saravia MD   multivitamin tablet Take 1 tablet by mouth once daily.    Historical Provider, MD   sodium bicarbonate 650 mg tablet Take 2 tablets (1,300 mg) by mouth 3 times a day. 24   SARAH Olmstead-CNP   torsemide (Demadex) 20 mg tablet Take 3 tablets (60 mg) by mouth once daily. 3/13/25   Madalyn Cole MD        PAT ROS:   Constitutional:   neg    Neuro/Psych:   neg    Eyes:   neg    Ears:   neg    Nose:   neg    Mouth:   neg    Throat:   neg    Neck:   neg    Cardio:   neg    Respiratory:   neg    Endocrine:   neg    GI:   neg    :   neg    Musculoskeletal:   neg    Hematologic:    neg    Skin:   sores/wound (scab to R shin,)      PAT Physical Exam     Airway      Visit Vitals  Smoking Status Never       DASI Risk Score      Flowsheet Row Clinical Support from 7/15/2025 in [facility]   Can you take care of yourself (eat, dress, bathe, or use toilet)?  2.75 filed at 07/15/2025 0931   Can you walk indoors, such as around your house? 1.75 filed at 07/15/2025 0931   Can you walk a block or two on level ground?  2.75 filed at 07/15/2025 0931   Can you climb a flight of stairs or walk up a hill? 5.5 filed at 07/15/2025 0931   Can you run a short distance? 8 filed at 07/15/2025 0931   Can you do light work around the house like dusting or washing dishes? 2.7 filed at 07/15/2025 0931   Can you do moderate work around the house like vacuuming, sweeping floors or carrying groceries? 3.5 filed at 07/15/2025 0931   Can you do heavy work around the house like scrubbing floors or lifting and moving heavy furniture?  8 filed at 07/15/2025 0931   Can you do yard work like raking leaves, weeding or pushing a mower? 0 filed at 07/15/2025 0931   Can you have sexual relations? 5.25 filed at 07/15/2025 0931   Can you participate in moderate recreational activities like golf, bowling, dancing, doubles tennis or throwing a baseball or football? 0 filed at 07/15/2025 0931   Can you participate in strenous sports like swimming, singles tennis, football, basketball, or skiing? 0 filed at 07/15/2025 0931   DASI SCORE 40.2 filed at 07/15/2025 0931   METS Score (Will be calculated only when all the questions are answered) 7.7 filed at 07/15/2025 0931          Sairai DVT Assessment      Flowsheet Row ED to Hosp-Admission (Discharged) from 5/19/2025 in [facility] with Judy Espinal MD and Madalyn Cole MD ED to Hosp-Admission (Discharged) from 5/15/2025 in [address] with Marie Harris MD and Gavin Eli DO   DVT Score (IF A SCORE IS  NOT CALCULATING, MUST SELECT A BMI TO COMPLETE) 5 filed at 2025 1618 6 filed at 2025 0339   Surgical Factors -- Minor surgery planned filed at 2025 0339   BMI (BMI MUST BE CHOSEN) 31-40 (Obesity) filed at 2025 1618 31-40 (Obesity) filed at 2025 0339          Modified Frailty Index    No data to display       [id number] Stroke Risk Points  Current as of a minute ago        N/A 0 to 9 Points:      Last Change: N/A          The [id number] risk score (Nicholas FONG, et al. 2009. © 2010 American College [employer]) quantifies the risk of stroke for a patient with atrial fibrillation. For patients without atrial fibrillation or under the age of 18 this score appears as N/A. Higher score values generally indicate higher risk of stroke.        This score is not applicable to this patient. Components are not calculated.          Revised Cardiac Risk Index    No data to display       Apfel Simplified Score    No data to display       Risk Analysis Index Results This Encounter    No data found in the last 10 encounters.       Prodigy: High Risk  Total Score: 16              Prodigy Age Score      Prodigy Gender Score          ARISCAT Score for Postoperative Pulmonary Complications    No data to display       Abernathy Perioperative Risk for Myocardial Infarction or Cardiac Arrest (KAYCEE)    No data to display     --Testing/Diagnostic:        - EK25  Normal sinus rhythm  Nonspecific T wave abnormality  Abnormal ECG      - Echo: 25  CONCLUSIONS:   1. The left ventricular systolic function is normal, with a visually estimated ejection fraction of 60-65%.   2. There is mild concentric left ventricular hypertrophy.   3. Mild mitral valve regurgitation.   4. Trace to mild tricuspid regurgitation.   5. Mild aortic valve stenosis.   6. The peak instantaneous gradient of the aortic valve is 28 mmHg.   7. There is plaque visualized in the ascending aorta.      - Stress Test:  20  IMPRESSION:  1. Suspicion of mild ischemia along the basal to mid anterior wall.  Correlation with clinical suspicion is recommended.  2. Suspicion of a prior infarct along the basal inferior wall.  3. Borderline left ventricular dilatation is noted.  4. Global hypokinesis with an ejection fraction of 36% which is in  the abnormal range.       - METS: 7.7    - Carotids: 25  CONCLUSIONS:  Right Carotid: Findings are consistent with 50 to 69% stenosis of the right proximal internal carotid artery. Right external carotid artery appears patent with no evidence of stenosis. The right vertebral artery is patent with antegrade flow. No evidence of hemodynamically significant stenosis in the right subclavian artery.  Left Carotid: Findings are consistent with less than 50% stenosis of the left proximal internal carotid artery. Left external carotid artery appears patent with no evidence of stenosis. The left vertebral artery is not visualized. There are elevated velocities in the left subclavian artery that are suggestive of disease.    - Cardiac Cath: 25  CONCLUSIONS:   1. Severe three-vessel coronary artery disease. Recommend evaluation for CABG.      - CT Cardiac Scorin20  IMPRESSION:  1. Coronary artery calcium score of 1014.84.*. Given patient's high  coronary artery calcium, correlation with anatomic or physiologic  coronary artery imaging would be helpful.    - CT Chest: 25  IMPRESSION:  1. Patchy airspace consolidation throughout bilateral lung fields  with findings more conspicuous in particular left lower lobe.  Correlate with patient's history and concern for multilobar pneumonia with follow-up to clearing recommended. Alternatively, given patient's history of hemoptysis component of pulmonary hemorrhage is not excluded.      - Vascular US: 25  IMPRESSION:  No deep venous thrombosis of the bilateral lower extremity.        Patient Specialist/PCP:                                                                                                            Cardiology: Mahendra Saravia 10/28/24 follow up..... h/o coronary artery calcium score of 1014.84, HTN      PCP: Bony Ballard 08/19/24 follow up after recent admission for sepsis due to osteomyelitis of the second toe of left foot. He underwent amputation during the hospitalization in late July   hx of HTN, CKD stage 5, type 2 diabetes, CAD      Nephrology: Boo Max 07/09/25 CKD V, Anemia in chronic kidney disease       GenSx: Enoch Leatha 03/27/25 postop visit s/p laparoscopic cholecystectomy for acute chronic cholecystitis on 3/5/25.       Cardiac Thoracic Sx: Franck Justice 06/24/25 evaluation and consideration of elective CABG. The left heart cath showed triple-vessel disease with a nondominant RCA. The echocardiogram showed normal left ventricular function.         _________________________________________________________  Medication instructions:   Instructed to hold Vitamins, Supplements and Ibuprofen 7 days prior to surgery       Sloane Matson LPN  Preadmission Testing          Assessment and Plan:     {[facility] ASSESSMENT AND PLAN:18035}             [1]   Past Medical History:  Diagnosis Date    CKD (chronic kidney disease)     Stage V    Coronary artery disease     Diabetes mellitus (Multi)     DVT (deep venous thrombosis) (Multi)     Hypertension     MI (myocardial infarction) (Multi)     Pneumonia     Multifocal pneumonia 5/2025    PONV (postoperative nausea and vomiting)     Type 2 diabetes mellitus     no meds controlled with diet   [2]   Past Surgical History:  Procedure Laterality Date    AMPUTATION      CARDIAC CATHETERIZATION N/A 05/23/2025    Procedure: LHC, With LV;  Surgeon: Shantelle Anderson MD;  Location: [facility] Cardiac Cath Lab;  Service: Cardiovascular;  Laterality: N/A;    CARDIAC CATHETERIZATION N/A 05/23/2025    Procedure: LULA Stent - Coronary;  Surgeon: Shantelle Anderson MD;  Location: [facility]  Cardiac Cath Lab;  Service: Cardiovascular;  Laterality: N/A;    CHOLECYSTECTOMY  03/05/2025    Laparoscopic Cholecystectomy    COLONOSCOPY      URETER SURGERY     [3]   Family History  Problem Relation Name Age of Onset    Heart disease Mother      Other (cabg) Mother      Rheumatic fever Mother      Heart disease Father      Stroke Paternal Grandfather      Heart attack Paternal Grandfather     [4]   Allergies  Allergen Reactions    Amlodipine Besylate Swelling     edema legs

## 2025-07-18 DIAGNOSIS — N18.5 CKD (CHRONIC KIDNEY DISEASE) STAGE 5, GFR LESS THAN 15 ML/MIN (MULTI): ICD-10-CM

## 2025-07-21 ENCOUNTER — DOCUMENTATION (OUTPATIENT)
Dept: INFUSION THERAPY | Facility: CLINIC | Age: 60
End: 2025-07-21
Payer: COMMERCIAL

## 2025-07-21 NOTE — PROGRESS NOTES
Patient to be scheduled for venofer infusions.     For Diagnosis: Iron Deficiency Anemia    POCT Urine pregnancy test ordered prior to first infusion?  No   (If NO please order if female pt <60 years of age and with reproductive capability) )    Review of Labs:  Lab Results   Component Value Date    HGB 8.6 (L) 05/24/2025    FERRITIN 238 05/20/2025    IRON <10 (L) 05/20/2025    TIBC  05/20/2025      Comment:      One or more of the analytes used in this calculation is outside of the analytical measurement range.      MCHC 31.5 (L) 05/24/2025    MCV 88 05/24/2025    MCH 27.6 05/24/2025      Lab Results   Component Value Date    IRONSAT  05/20/2025      Comment:      One or more analytes used in this calculation is outside of the analytical measurement range. Calculation cannot be performed.        Do labs confirm diagnosis of iron deficiency? Yes    (If NO please reach out to prescribing provider prior to proceeding)      Okay to schedule for treatment as ordered by prescribing provider.

## 2025-07-22 ENCOUNTER — HOSPITAL ENCOUNTER (OUTPATIENT)
Dept: RADIOLOGY | Facility: HOSPITAL | Age: 60
Discharge: HOME | End: 2025-07-22
Payer: COMMERCIAL

## 2025-07-22 ENCOUNTER — DOCUMENTATION (OUTPATIENT)
Dept: INFUSION THERAPY | Facility: CLINIC | Age: 60
End: 2025-07-22

## 2025-07-22 ENCOUNTER — PRE-ADMISSION TESTING (OUTPATIENT)
Dept: PREADMISSION TESTING | Facility: HOSPITAL | Age: 60
End: 2025-07-22
Payer: COMMERCIAL

## 2025-07-22 VITALS
TEMPERATURE: 98.3 F | SYSTOLIC BLOOD PRESSURE: 170 MMHG | BODY MASS INDEX: 36.52 KG/M2 | WEIGHT: 255.1 LBS | DIASTOLIC BLOOD PRESSURE: 74 MMHG | OXYGEN SATURATION: 98 % | HEIGHT: 70 IN | HEART RATE: 59 BPM

## 2025-07-22 DIAGNOSIS — I25.10 CORONARY ARTERY DISEASE INVOLVING NATIVE CORONARY ARTERY OF NATIVE HEART WITHOUT ANGINA PECTORIS: Primary | ICD-10-CM

## 2025-07-22 DIAGNOSIS — I25.10 ATHEROSCLEROSIS OF NATIVE CORONARY ARTERY OF NATIVE HEART WITHOUT ANGINA PECTORIS: ICD-10-CM

## 2025-07-22 LAB
ABO GROUP (TYPE) IN BLOOD: NORMAL
ALBUMIN SERPL BCP-MCNC: 3.8 G/DL (ref 3.4–5)
ALP SERPL-CCNC: 45 U/L (ref 33–136)
ALT SERPL W P-5'-P-CCNC: 10 U/L (ref 10–52)
ANION GAP SERPL CALC-SCNC: 17 MMOL/L (ref 10–20)
ANTIBODY SCREEN: NORMAL
APTT PPP: 32 SECONDS (ref 26–36)
AST SERPL W P-5'-P-CCNC: 9 U/L (ref 9–39)
BASOPHILS # BLD AUTO: 0.05 X10*3/UL (ref 0–0.1)
BASOPHILS NFR BLD AUTO: 0.7 %
BILIRUB SERPL-MCNC: 0.6 MG/DL (ref 0–1.2)
BUN SERPL-MCNC: 97 MG/DL (ref 6–23)
CALCIUM SERPL-MCNC: 9.5 MG/DL (ref 8.6–10.6)
CHLORIDE SERPL-SCNC: 104 MMOL/L (ref 98–107)
CO2 SERPL-SCNC: 21 MMOL/L (ref 21–32)
CREAT SERPL-MCNC: 10.16 MG/DL (ref 0.5–1.3)
EGFRCR SERPLBLD CKD-EPI 2021: 5 ML/MIN/1.73M*2
EOSINOPHIL # BLD AUTO: 0.33 X10*3/UL (ref 0–0.7)
EOSINOPHIL NFR BLD AUTO: 4.8 %
ERYTHROCYTE [DISTWIDTH] IN BLOOD BY AUTOMATED COUNT: 15.2 % (ref 11.5–14.5)
GLUCOSE SERPL-MCNC: 75 MG/DL (ref 74–99)
HCT VFR BLD AUTO: 24.8 % (ref 41–52)
HGB BLD-MCNC: 7.6 G/DL (ref 13.5–17.5)
IMM GRANULOCYTES # BLD AUTO: 0.03 X10*3/UL (ref 0–0.7)
IMM GRANULOCYTES NFR BLD AUTO: 0.4 % (ref 0–0.9)
INR PPP: 1.1 (ref 0.9–1.1)
LYMPHOCYTES # BLD AUTO: 0.89 X10*3/UL (ref 1.2–4.8)
LYMPHOCYTES NFR BLD AUTO: 13 %
MCH RBC QN AUTO: 29.1 PG (ref 26–34)
MCHC RBC AUTO-ENTMCNC: 30.6 G/DL (ref 32–36)
MCV RBC AUTO: 95 FL (ref 80–100)
MONOCYTES # BLD AUTO: 0.62 X10*3/UL (ref 0.1–1)
MONOCYTES NFR BLD AUTO: 9 %
NEUTROPHILS # BLD AUTO: 4.94 X10*3/UL (ref 1.2–7.7)
NEUTROPHILS NFR BLD AUTO: 72.1 %
NRBC BLD-RTO: 0 /100 WBCS (ref 0–0)
PLATELET # BLD AUTO: 239 X10*3/UL (ref 150–450)
POTASSIUM SERPL-SCNC: 5.2 MMOL/L (ref 3.5–5.3)
PROT SERPL-MCNC: 6.7 G/DL (ref 6.4–8.2)
PROTHROMBIN TIME: 12.5 SECONDS (ref 9.8–12.4)
RBC # BLD AUTO: 2.61 X10*6/UL (ref 4.5–5.9)
RH FACTOR (ANTIGEN D): NORMAL
SODIUM SERPL-SCNC: 137 MMOL/L (ref 136–145)
WBC # BLD AUTO: 6.9 X10*3/UL (ref 4.4–11.3)

## 2025-07-22 PROCEDURE — 71250 CT THORAX DX C-: CPT | Performed by: RADIOLOGY

## 2025-07-22 PROCEDURE — 85610 PROTHROMBIN TIME: CPT

## 2025-07-22 PROCEDURE — 87081 CULTURE SCREEN ONLY: CPT

## 2025-07-22 PROCEDURE — 84075 ASSAY ALKALINE PHOSPHATASE: CPT

## 2025-07-22 PROCEDURE — 36415 COLL VENOUS BLD VENIPUNCTURE: CPT

## 2025-07-22 PROCEDURE — 99205 OFFICE O/P NEW HI 60 MIN: CPT | Performed by: NURSE PRACTITIONER

## 2025-07-22 PROCEDURE — 86850 RBC ANTIBODY SCREEN: CPT

## 2025-07-22 PROCEDURE — 71250 CT THORAX DX C-: CPT

## 2025-07-22 PROCEDURE — 85025 COMPLETE CBC W/AUTO DIFF WBC: CPT

## 2025-07-22 RX ORDER — CHLORHEXIDINE GLUCONATE 40 MG/ML
SOLUTION TOPICAL
Qty: 473 ML | Refills: 0 | Status: SHIPPED | OUTPATIENT
Start: 2025-07-22

## 2025-07-22 RX ORDER — CHLORHEXIDINE GLUCONATE ORAL RINSE 1.2 MG/ML
SOLUTION DENTAL
Qty: 15 ML | Refills: 0 | Status: SHIPPED | OUTPATIENT
Start: 2025-07-22

## 2025-07-22 ASSESSMENT — DUKE ACTIVITY SCORE INDEX (DASI)
CAN YOU HAVE SEXUAL RELATIONS: NO
CAN YOU PARTICIPATE IN MODERATE RECREATIONAL ACTIVITIES LIKE GOLF, BOWLING, DANCING, DOUBLES TENNIS OR THROWING A BASEBALL OR FOOTBALL: YES
CAN YOU WALK INDOORS, SUCH AS AROUND YOUR HOUSE: YES
CAN YOU WALK A BLOCK OR TWO ON LEVEL GROUND: YES
CAN YOU RUN A SHORT DISTANCE: NO
CAN YOU PARTICIPATE IN STRENOUS SPORTS LIKE SWIMMING, SINGLES TENNIS, FOOTBALL, BASKETBALL, OR SKIING: NO
CAN YOU DO LIGHT WORK AROUND THE HOUSE LIKE DUSTING OR WASHING DISHES: YES
CAN YOU DO YARD WORK LIKE RAKING LEAVES, WEEDING OR PUSHING A MOWER: NO
CAN YOU DO MODERATE WORK AROUND THE HOUSE LIKE VACUUMING, SWEEPING FLOORS OR CARRYING GROCERIES: YES
DASI METS SCORE: 5.8
CAN YOU CLIMB A FLIGHT OF STAIRS OR WALK UP A HILL: YES
CAN YOU DO HEAVY WORK AROUND THE HOUSE LIKE SCRUBBING FLOORS OR LIFTING AND MOVING HEAVY FURNITURE: NO
CAN YOU TAKE CARE OF YOURSELF (EAT, DRESS, BATHE, OR USE TOILET): YES
TOTAL_SCORE: 24.95

## 2025-07-22 ASSESSMENT — ENCOUNTER SYMPTOMS
CONSTITUTIONAL NEGATIVE: 1
GASTROINTESTINAL NEGATIVE: 1
ENDOCRINE NEGATIVE: 1
WOUND: 1
MUSCULOSKELETAL NEGATIVE: 1
CARDIOVASCULAR NEGATIVE: 1
RESPIRATORY NEGATIVE: 1
DYSPNEA WITH EXERTION: 1
NEUROLOGICAL NEGATIVE: 1
EYES NEGATIVE: 1
NECK NEGATIVE: 1

## 2025-07-22 ASSESSMENT — LIFESTYLE VARIABLES: SMOKING_STATUS: NONSMOKER

## 2025-07-22 NOTE — PROGRESS NOTES
CLINICAL CLEARANCE FOR OUTPATIENT INJECTION      Patient to be scheduled for New Start of Procrit injections.    For Diagnosis: Anemia due to chronic kidney disease    Dosing is weight based at: FLAT DOSE     For a total dose of 40,000 units every 2 weeks.    Per the Rx, Procrit to be administered ONLY if the Hgb is <= 10.9  If the Hgb is >or equal to 11 we will hold the injection and reschedule pt for next injection for the next week.    Labs..  CBC drawn on:   Lab Results   Component Value Date    WBC 6.9 07/22/2025    HGB 7.6 (L) 07/22/2025    HCT 24.8 (L) 07/22/2025    MCV 95 07/22/2025     07/22/2025      Hgb:   Lab Results   Component Value Date    HGB 7.6 (L) 07/22/2025      Iron Studies completed on:   Lab Results   Component Value Date    IRON <10 (L) 05/20/2025    TIBC  05/20/2025      Comment:      One or more of the analytes used in this calculation is outside of the analytical measurement range.      FERRITIN 238 05/20/2025        hgbto be repeated every 14 days prior to each Procrit injection.    Diagnosis of uncontrolled hypertension? No  (Use is contraindicated in uncontrolled htn)    Any history of heart failure? Yes - Dr. Max is aware and fine with treating this patient.   (Avoid use. Discuss with prescribing provider prior to proceeding    Problem List[1] Medical History[2]   There were no vitals taken for this visit.     Last injection received:  (if continuation)    Due: Now    Okay to schedule for treatment as ordered by prescribing provider.            [1]   Patient Active Problem List  Diagnosis    Vitamin D deficiency    Vision loss    Shortness of breath on exertion    Obesity    Myopia    LVH (left ventricular hypertrophy)    Mixed hyperlipidemia    Primary hypertension    Coronary artery disease involving native coronary artery of native heart without angina pectoris    NSTEMI (non-ST elevated myocardial infarction) (Multi)    Fall    Leukocytosis    CKD (chronic kidney  disease) stage 5, GFR less than 15 ml/min (Multi)    Type 2 diabetes mellitus    Acute hematogenous osteomyelitis of left foot (Multi)    Osteomyelitis of ankle or foot, left, acute (Multi)    Choledocholithiasis with acute cholecystitis    Acute calculous cholecystitis    CAD (coronary artery disease)    Anemia    Right renal stone    Encounter for deep vein thrombosis (DVT) prophylaxis    Hypertensive emergency    Polyarthropathy    Acute diverticulitis    Atherosclerosis of native coronary artery of native heart without angina pectoris   [2]   Past Medical History:  Diagnosis Date    C. difficile colitis     hx of C. difficile colitis in March 2025    CHF (congestive heart failure)     chronic diastolic heart failure    CKD (chronic kidney disease)     Stage V    Coronary artery disease     Diabetes mellitus (Multi)     DVT (deep venous thrombosis) (Multi)     Hypertension     MI (myocardial infarction) (Multi)     Pneumonia     Multifocal pneumonia 5/2025    PONV (postoperative nausea and vomiting)     Type 2 diabetes mellitus     no meds controlled with diet

## 2025-07-22 NOTE — PREPROCEDURE INSTRUCTIONS
Fasting Guidelines    NPO Instructions:    Do not eat any food after midnight the night before your surgery/procedure.  You may have up to 13.5 ounces of clear liquids until TWO hours before your instructed arrival time to the hospital. This includes water, black tea/coffee, (no milk or cream), apple juice, and/or electrolyte drinks (Gatorade).  You may chew gum up to TWO hours before your surgery/procedure.    Additional Instructions:     We have sent a prescription for Hibiclens soap and Peridex mouth wash to your preferred pharmacy.  If you have not already, Please  your prescription and start using as directed before surgery.  Follow the instruction sheet provided to you at your CPM/PAT appointment.    Avoid herbal supplements, multivitamins and NSAIDS (non-steroidal anti-inflammatory drugs) such as Advil, Aleve, Ibuprofen, Naproxen, Excedrin, Meloxicam or Celebrex for at least 7 days prior to surgery. May take Tylenol as needed.    Avoid tobacco and alcohol products for 24 hours prior to surgery.    CONTACT SURGEON'S OFFICE IF YOU DEVELOP:  * Fever = 100.4 F   * New respiratory symptoms (e.g. cough, shortness of breath, respiratory distress, sore throat)  * Recent loss of taste or smell  *Flu like symptoms such as headache, fatigue or gastrointestinal symptoms  * You develop any open sores, shingles, burning or painful urination   AND/OR:  * You no longer wish to have the surgery.  * Any other personal circumstances change that may lead to the need to cancel or defer this surgery.  *You were admitted to any hospital within one week of your planned procedure.    Seven/Six Days before Surgery:  Review your medication instructions, stop indicated medications    Day of Surgery:  Review your medication instructions, take indicated medications  Wear comfortable loose fitting clothing  Do not use moisturizers, creams, lotions or perfume  All jewelry and valuables should be left at home    Tala Hooks  [facility]  [phone]  [phone]  Email-[email or web address]      Patient Information: Pre-Operative Infection Prevention Measures     Why did I have my nose, under my arms, and groin swabbed?  The purpose of the swab is to identify Staphylococcus aureus inside your nose or on your skin.  The swab was sent to the laboratory for culture.  A positive swab/culture for Staphylococcus aureus is called colonization or carriage.      What is Staphylococcus aureus?  Staphylococcus aureus, also known as “staph”, is a germ found on the skin or in the nose of healthy people.  Sometimes Staphylococcus aureus can get into the body and cause an infection.  This can be minor (such as pimples, boils, or other skin problems).  It might also be serious (such as a blood infection, pneumonia, or a surgical site infection).    What is Staphylococcus aureus colonization or carriage?  Colonization or carriage means that a person has the germ but is not sick from it.  These bacteria can be spread on the hands or when breathing or sneezing.    How is Staphylococcus aureus spread?  It is most often spread by close contact with a person or item that carries it.    What happens if my culture is positive for Staphylococcus aureus?  Your doctor/medical team will use this information to guide any antibiotic treatment which may be necessary.  Regardless of the culture results, we will clean the inside of your nose with a betadine swab just before you have your surgery.      Will I get an infection if I have Staphylococcus aureus in my nose or on my skin?  Anyone can get an infection with Staphylococcus aureus.  However, the best way to reduce your risk of infection is to follow the instructions provided to you for the use of your CHG soap and dental rinse.        Patient Information: Oral/Dental Rinse    What is oral/dental rinse?   It is a mouthwash. It is a way of cleaning the mouth with a  germ-killing solution before your surgery.  The solution contains chlorhexidine, commonly known as CHG.   It is used inside the mouth to kill a bacteria known as Staphylococcus aureus.  Let your doctor know if you are allergic to Chlorhexidine.    Why do I need to use CHG oral/dental rinse?  The CHG oral/dental rinse helps to kill a bacteria in your mouth known as Staphylococcus aureus.     This reduces the risk of infection at the surgical site.      Using your CHG oral/dental rinse  STEPS:  Use your CHG oral/dental rinse after you brush your teeth the night before (at bedtime) and the morning of your surgery.  Follow all directions on your prescription label.    Use the cap on the container to measure 15ml   Swish (gargle if you can) the mouthwash in your mouth for at least 30 seconds, (do not swallow) and spit out  After you use your CHG rinse, do not rinse your mouth with water, drink or eat.  Please refer to the prescription label for the appropriate time to resume oral intake      What side effects might I have using the CHG oral/dental rinse?  CHG rinse will stick to plaque on the teeth.  Brush and floss just before use.  Teeth brushing will help avoid staining of plaque during use.      Patient Information: Home Preoperative Antibacterial Shower      What is a home preoperative antibacterial shower?  This shower is a way of cleaning the skin with a germ-killing solution before surgery.  The solution contains chlorhexidine, commonly known as CHG.  CHG is a skin cleanser with germ-killing ability.  Let your doctor know if you are allergic to chlorhexidine.    Why do I need to take a preoperative antibacterial shower?  Skin is not sterile.  It is best to try to make your skin as free of germs as possible before surgery.  Proper cleansing with a germ-killing soap before surgery can lower the number of germs on your skin.  This helps to reduce the risk of infection at the surgical site.  Following the  instructions listed below will help you prepare your skin for surgery.      How do I use the solution?  Steps:  Begin using your CHG soap 5 days before your scheduled surgery on ________________________.    First, wash and rinse your hair using the CHG soap. Keep CHG soap away from ear canals and eyes.  Rinse completely, do not condition.  Hair extensions should be removed.  Wash your face with your normal soap and rinse.    Apply the CHG solution to a clean wet washcloth.  Turn the water off or move away from the water spray to avoid premature rinsing of the CHG soap as you are applying.   Firmly lather your entire body from the neck down.  Do not use on your face.  Pay special attention to the area(s) where your incision(s) will be located unless they are on your face.  Avoid scrubbing your skin too hard.  The important point is to have the CHG soap sit on your skin for 3 minutes.    When the 3 minutes are up, turn on the water and rinse the CHG solution off your body completely.   DO NOT wash with regular soap after you have used the CHG soap solution  Pat yourself dry with a clean, freshly-laundered towel.  DO NOT apply powders, deodorants, or lotions.  Dress in clean, freshly laundered nightclothes.    Be sure to sleep with clean, freshly laundered sheets.  Be aware that CHG will cause stains on fabrics; if you wash them with bleach after use.  Rinse your washcloth and other linens that have contact with CHG completely.  Use only non-chlorine detergents to launder the items used.   The morning of surgery is the fifth day.  Repeat the above steps and dress in clean comfortable clothing     Whom should I contact if I have any questions regarding the use of CHG soap?  Call the [facility] at [phone] if you have any questions.               Preoperative Brain Exercises    What are brain exercises?  A brain exercise is any activity that  engages your thinking (cognitive) skills.    What types of activities are considered brain exercises?  Jigsaw puzzles, crossword puzzles, word jumble, memory games, word search, and many more.  Many can be found free online or on your phone via a mobile lon.    Why should I do brain exercises before my surgery?  More recent research has shown brain exercise before surgery can lower the risk of postoperative delirium (confusion) which can be especially important for older adults.  Patients who did brain exercises for 5 to 10 hours the days before surgery, cut their risk of postoperative delirium in half up to 1 week after surgery.         [facility]    Preoperative Deep Breathing Exercises    Why it is important to do deep breathing exercises before my surgery?  Deep breathing exercises strengthen your breathing muscles.  This helps you to recover after your surgery and decreases the chance of breathing complications.      How are the deep breathing exercises done?  Sit straight with your back supported.  Breathe in deeply and slowly through your nose. Your lower rib cage should expand and your abdomen may move forward.  Hold that breath for 3 to 5 seconds.  Breathe out through pursed lips, slowly and completely.  Rest and repeat 10 times every hour while awake.  Rest longer if you become dizzy or lightheaded.         Patient and Family Education             Ways You Can Help Prevent Blood Clots             This handout explains some simple things you can do to help prevent blood clots.      Blood clots are blockages that can form in the body's veins. When a blood clot forms in your deep veins, it may be called a deep vein thrombosis, or DVT for short. Blood clots can happen in any part of the body where blood flows, but they are most common in the arms and legs. If a piece of a blood clot breaks free and travels to the lungs, it is called a pulmonary embolus (PE). A PE can be a very  serious problem.         Being in the hospital or having surgery can raise your chances of getting a blood clot because you may not be well enough to move around as much as you normally do.         Ways you can help prevent blood clots in the hospital         Wearing SCDs. SCDs stands for Sequential Compression Devices.   SCDs are special sleeves that wrap around your legs  They attach to a pump that fills them with air to gently squeeze your legs every few minutes.   This helps return the blood in your legs to your heart.   SCDs should only be taken off when walking or bathing.   SCDs may not be comfortable, but they can help save your life.               Wearing compression stockings - if your doctor orders them. These special snug fitting stockings gently squeeze your legs to help blood flow.       Walking. Walking helps move the blood in your legs.   If your doctor says it is ok, try walking the halls at least   5 times a day. Ask us to help you get up, so you don't fall.      Taking any blood thinning medicines your doctor orders.        Page 1 of 2     [facility]; 3/23   Ways you can help prevent blood clots at home       Wearing compression stockings - if your doctor orders them. ? Walking - to help move the blood in your legs.       Taking any blood thinning medicines your doctor orders.      Signs of a blood clot or PE      Tell your doctor or nurse know right away if you have of the problems listed below.    If you are at home, seek medical care right away. Call 911 for chest pain or problems breathing.               Signs of a blood clot (DVT) - such as pain,  swelling, redness or warmth in your arm or leg      Signs of a pulmonary embolism (PE) - such as chest     pain or feeling short of breath

## 2025-07-22 NOTE — CPM/PAT H&P
CPM/PAT Evaluation       Name: Colin Leonard (Colin Leonard)  /Age: 1965/59 y.o.     Visit Type:   In-Person       Chief Complaint: perioperative evaluation      HPI  The patient is a 59 year old male with hypertension, hyperlipidemia, CAD, carotid artery stenosis, CKD, valvular disease, DM with recently noted triple vessel disease on heart cath. He is referred today by Dr. Franck Justice for perioperative evaluation in anticipation of CABG on 25.    Medical History[1]    Surgical History[2]    Patient  has no history on file for sexual activity.    Family History[3]    Allergies[4]    Prior to Admission medications   Medication Sig Start Date End Date Taking? Authorizing Provider   acetaminophen (Tylenol) 325 mg tablet Take 2 tablets (650 mg) by mouth every 6 hours if needed for moderate pain (4 - 6). 25   Marie Harris MD   amiodarone (Pacerone) 200 mg tablet Take 1 tablet (200 mg) by mouth 2 times a day. 25   Haylee Miller PA-C   aspirin 81 mg EC tablet Take 1 tablet (81 mg) by mouth once daily.    Historical Provider, MD   atorvastatin (Lipitor) 20 mg tablet Take 1 tablet (20 mg) by mouth once daily.    Historical Provider, MD   carvedilol (Coreg) 12.5 mg tablet Take 3 tablets (37.5 mg) by mouth 2 times a day. 5/24/25 7/15/25  Judy Espinal MD   ezetimibe (Zetia) 10 mg tablet Take 1 tablet (10 mg) by mouth once daily at bedtime. 24   Mahendra Saraiva MD   hydrALAZINE (Apresoline) 100 mg tablet Take 1 tablet (100 mg) by mouth 3 times a day. 25   Mahendra Saravia MD   multivitamin tablet Take 1 tablet by mouth once daily.    Historical Provider, MD   sodium bicarbonate 650 mg tablet Take 2 tablets (1,300 mg) by mouth 3 times a day. 24   SARAH Olmstead-CNP   torsemide (Demadex) 20 mg tablet Take 3 tablets (60 mg) by mouth once daily. 3/13/25   Madalyn Cole MD        PAT ROS:   Constitutional:   neg    Neuro/Psych:   neg    Eyes:   neg   "  Ears:   neg    Nose:   neg    Mouth:   neg    Throat:   neg    Neck:   neg    Cardio:   neg     HUTCHINS  Respiratory:   neg    Endocrine:   neg    GI:   neg    :   neg    Musculoskeletal:   neg    Hematologic:   neg    Skin:   sores/wound (right lower extremity healing wound)      Physical Exam  Vitals reviewed.   Constitutional:       Appearance: Normal appearance.   HENT:      Head: Normocephalic and atraumatic.      Nose: Nose normal.      Mouth/Throat:      Mouth: Mucous membranes are moist.      Pharynx: Oropharynx is clear.     Eyes:      Extraocular Movements: Extraocular movements intact.      Pupils: Pupils are equal, round, and reactive to light.       Cardiovascular:      Rate and Rhythm: Normal rate and regular rhythm.      Pulses: Normal pulses.      Heart sounds: Murmur (RSB/LSB II/VI systolic, non-radiating) heard.   Pulmonary:      Effort: Pulmonary effort is normal.      Breath sounds: Normal breath sounds.     Musculoskeletal:         General: Normal range of motion.      Cervical back: Normal range of motion.     Skin:     General: Skin is warm and dry.     Neurological:      General: No focal deficit present.      Mental Status: He is alert and oriented to person, place, and time.     Psychiatric:         Mood and Affect: Mood normal.         Behavior: Behavior normal.          PAT AIRWAY:   Airway:     Mallampati::  IV    TM distance::  >3 FB    Neck ROM::  Full  normal          Visit Vitals  /74   Pulse 59   Temp 36.8 °C (98.3 °F)   Ht 1.778 m (5' 10\")   Wt 116 kg (255 lb 1.6 oz)   SpO2 98%   BMI 36.60 kg/m²   Smoking Status Never   BSA 2.39 m²       DASI Risk Score      Flowsheet Row Pre-Admission Testing from 7/22/2025 in [facility] Clinical Support from 7/15/2025 in [facility]   Can you take care of yourself (eat, dress, bathe, or use toilet)?  2.75 filed at 07/22/2025 1438 2.75 filed at 07/15/2025 0931   Can you walk indoors, such as around your " house? 1.75 filed at 07/22/2025 1438 1.75 filed at 07/15/2025 0931   Can you walk a block or two on level ground?  2.75 filed at 07/22/2025 1438 2.75 filed at 07/15/2025 0931   Can you climb a flight of stairs or walk up a hill? 5.5 filed at 07/22/2025 1438 5.5 filed at 07/15/2025 0931   Can you run a short distance? 0 filed at 07/22/2025 1438 8 filed at 07/15/2025 0931   Can you do light work around the house like dusting or washing dishes? 2.7 filed at 07/22/2025 1438 2.7 filed at 07/15/2025 0931   Can you do moderate work around the house like vacuuming, sweeping floors or carrying groceries? 3.5 filed at 07/22/2025 1438 3.5 filed at 07/15/2025 0931   Can you do heavy work around the house like scrubbing floors or lifting and moving heavy furniture?  0 filed at 07/22/2025 1438 8 filed at 07/15/2025 0931   Can you do yard work like raking leaves, weeding or pushing a mower? 0 filed at 07/22/2025 1438 0 filed at 07/15/2025 0931   Can you have sexual relations? 0 filed at 07/22/2025 1438 5.25 filed at 07/15/2025 0931   Can you participate in moderate recreational activities like golf, bowling, dancing, doubles tennis or throwing a baseball or football? 6 filed at 07/22/2025 1438 0 filed at 07/15/2025 0931   Can you participate in strenous sports like swimming, singles tennis, football, basketball, or skiing? 0 filed at 07/22/2025 1438 0 filed at 07/15/2025 0931   DASI SCORE 24.95 filed at 07/22/2025 1438 40.2 filed at 07/15/2025 0931   METS Score (Will be calculated only when all the questions are answered) 5.8 filed at 07/22/2025 1438 7.7 filed at 07/15/2025 0931     Caprini DVT Assessment      Flowsheet Row Pre-Admission Testing from 7/22/2025 in [facility] ED to Hosp-Admission (Discharged) from 5/19/2025 in [facility] with Judy Espinal MD and Madalyn Cole MD   DVT Score (IF A SCORE IS NOT CALCULATING, MUST SELECT A BMI TO COMPLETE) 10 filed at 07/22/2025 1511 5  filed at 05/23/2025 1618   Surgical Factors Major surgery planned, lasting over 3 hours filed at 07/22/2025 1511 --   BMI (BMI MUST BE CHOSEN) 31-40 (Obesity) filed at 07/22/2025 1511 31-40 (Obesity) filed at 05/23/2025 1618     Modified Frailty Index    No data to display  [id number] Stroke Risk Points  Current as of just now        N/A 0 to 9 Points:      Last Change: N/A          The [id number] risk score (Lip HETAL, et al. 2009. © 2010 American College [employer]) quantifies the risk of stroke for a patient with atrial fibrillation. For patients without atrial fibrillation or under the age of 18 this score appears as N/A. Higher score values generally indicate higher risk of stroke.        This score is not applicable to this patient. Components are not calculated.          Revised Cardiac Risk Index      Flowsheet Row Pre-Admission Testing from 7/22/2025 in [facility]   High-Risk Surgery (Intraperitoneal, Intrathoracic,Suprainguinal vascular) 1 filed at 07/22/2025 1504   History of ischemic heart disease (History of MI, History of positive exercuse test, Current chest paint considered due to myocardial ischemia, Use of nitrate therapy, ECG with pathological Q Waves) 1 filed at 07/22/2025 1504   History of congestive heart failure (pulmonary edemia, bilateral rales or S3 gallop, Paroxysmal nocturnal dyspnea, CXR showing pulmonary vascular redistribution) 0 filed at 07/22/2025 1504   History of cerebrovascular disease (Prior TIA or stroke) 0 filed at 07/22/2025 1504   Pre-operative insulin treatment 0 filed at 07/22/2025 1504   Pre-operative creatinine>2 mg/dl 0 filed at 07/22/2025 1504   Revised Cardiac Risk Calculator 2 filed at 07/22/2025 1504     Apfel Simplified Score      Flowsheet Row Pre-Admission Testing from 7/22/2025 in [facility]   Smoking status 1 filed at 07/22/2025 1511   History of motion sickness or PONV  0 filed at 07/22/2025 1511   Use of  postoperative opioids 1 filed at 07/22/2025 1511   Gender - Female 0=No filed at 07/22/2025 1511   Apfel Simplified Score Calculator 2 filed at 07/22/2025 1511     Risk Analysis Index Results This Encounter    No data found in the last 10 encounters.       Stop Bang Score      Flowsheet Row Pre-Admission Testing from 7/22/2025 in [facility]   Do you snore loudly? 0 filed at 07/22/2025 1436   Do you often feel tired or fatigued after your sleep? 0 filed at 07/22/2025 1436   Has anyone ever observed you stop breathing in your sleep? 0 filed at 07/22/2025 1436   Do you have or are you being treated for high blood pressure? 1 filed at 07/22/2025 1436   Recent BMI (Calculated) 34.9 filed at 07/22/2025 1436   Is BMI greater than 35 kg/m2? 0=No filed at 07/22/2025 1436   Age older than 50 years old? 1=Yes filed at 07/22/2025 1436   Is your neck circumference greater than 17 inches (Male) or 16 inches (Female)? 1 filed at 07/22/2025 1436   Gender - Male 1=Yes filed at 07/22/2025 1436   STOP-BANG Total Score 4 filed at 07/22/2025 1436     Prodigy: High Risk  Total Score: 23              Prodigy Age Score      Prodigy Gender Score     Prodigy CHF score          ARISCAT Score for Postoperative Pulmonary Complications      Flowsheet Row Pre-Admission Testing from 7/22/2025 in [facility]   Age Calculated Score 3 filed at 07/22/2025 1512   Preoperative SpO2 0 filed at 07/22/2025 1512   Respiratory infection in the last month Either upper or lower (i.e., URI, bronchitis, pneumonia), with fever and antibiotic treatment 0 filed at 07/22/2025 1512   Preoperative anemia (Hgb less than 10 g/dl) 0 filed at 07/22/2025 1512   Surgical incision  24 filed at 07/22/2025 1512   Duration of surgery  23 filed at 07/22/2025 1512   Emergency Procedure  0 filed at 07/22/2025 1512   ARISCAT Total Score  50 filed at 07/22/2025 1512     Michela Perioperative Risk for Myocardial Infarction or Cardiac Arrest (KAYCEE)    No  data to display    Recent Results (from the past 4 weeks)   CBC and Auto Differential    Collection Time: 07/22/25  3:00 PM   Result Value Ref Range    WBC 6.9 4.4 - 11.3 x10*3/uL    nRBC 0.0 0.0 - 0.0 /100 WBCs    RBC 2.61 (L) 4.50 - 5.90 x10*6/uL    Hemoglobin 7.6 (L) 13.5 - 17.5 g/dL    Hematocrit 24.8 (L) 41.0 - 52.0 %    MCV 95 80 - 100 fL    MCH 29.1 26.0 - 34.0 pg    MCHC 30.6 (L) 32.0 - 36.0 g/dL    RDW 15.2 (H) 11.5 - 14.5 %    Platelets 239 150 - 450 x10*3/uL    Neutrophils % 72.1 40.0 - 80.0 %    Immature Granulocytes %, Automated 0.4 0.0 - 0.9 %    Lymphocytes % 13.0 13.0 - 44.0 %    Monocytes % 9.0 2.0 - 10.0 %    Eosinophils % 4.8 0.0 - 6.0 %    Basophils % 0.7 0.0 - 2.0 %    Neutrophils Absolute 4.94 1.20 - 7.70 x10*3/uL    Immature Granulocytes Absolute, Automated 0.03 0.00 - 0.70 x10*3/uL    Lymphocytes Absolute 0.89 (L) 1.20 - 4.80 x10*3/uL    Monocytes Absolute 0.62 0.10 - 1.00 x10*3/uL    Eosinophils Absolute 0.33 0.00 - 0.70 x10*3/uL    Basophils Absolute 0.05 0.00 - 0.10 x10*3/uL   Comprehensive Metabolic Panel    Collection Time: 07/22/25  3:00 PM   Result Value Ref Range    Glucose 75 74 - 99 mg/dL    Sodium 137 136 - 145 mmol/L    Potassium 5.2 3.5 - 5.3 mmol/L    Chloride 104 98 - 107 mmol/L    Bicarbonate 21 21 - 32 mmol/L    Anion Gap 17 10 - 20 mmol/L    Urea Nitrogen 97 (HH) 6 - 23 mg/dL    Creatinine 10.16 (H) 0.50 - 1.30 mg/dL    eGFR 5 (L) >60 mL/min/1.73m*2    Calcium 9.5 8.6 - 10.6 mg/dL    Albumin 3.8 3.4 - 5.0 g/dL    Alkaline Phosphatase 45 33 - 136 U/L    Total Protein 6.7 6.4 - 8.2 g/dL    AST 9 9 - 39 U/L    Bilirubin, Total 0.6 0.0 - 1.2 mg/dL    ALT 10 10 - 52 U/L   Coagulation Screen    Collection Time: 07/22/25  3:00 PM   Result Value Ref Range    Protime 12.5 (H) 9.8 - 12.4 seconds    INR 1.1 0.9 - 1.1    aPTT 32 26 - 36 seconds   Type And Screen Is this order related to pregnancy or an upcoming surgery? Yes; Where will this surgery/delivery be performed? [facility]; What is the date of the surgery? 2025; Has this patient ever had a transfusion? No; Has th...    Collection Time: 25  3:00 PM   Result Value Ref Range    ABO TYPE O     Rh TYPE POS     ANTIBODY SCREEN NEG         Diagnostic Results    - EK25  Normal sinus rhythm  Nonspecific T wave abnormality  Abnormal ECG     - Echo: 25  CONCLUSIONS:   1. The left ventricular systolic function is normal, with a visually estimated ejection fraction of 60-65%.   2. There is mild concentric left ventricular hypertrophy.   3. Mild mitral valve regurgitation.   4. Trace to mild tricuspid regurgitation.   5. Mild aortic valve stenosis.   6. The peak instantaneous gradient of the aortic valve is 28 mmHg.   7. There is plaque visualized in the ascending aorta.     - Stress Test: 20  IMPRESSION:  1. Suspicion of mild ischemia along the basal to mid anterior wall.  Correlation with clinical suspicion is recommended.  2. Suspicion of a prior infarct along the basal inferior wall.  3. Borderline left ventricular dilatation is noted.  4. Global hypokinesis with an ejection fraction of 36% which is in  the abnormal range.    - Carotids: 25  CONCLUSIONS:  Right Carotid: Findings are consistent with 50 to 69% stenosis of the right proximal internal carotid artery. Right external carotid artery appears patent with no evidence of stenosis. The right vertebral artery is patent with antegrade flow. No evidence of hemodynamically significant stenosis in the right subclavian artery.  Left Carotid: Findings are consistent with less than 50% stenosis of the left proximal internal carotid artery. Left external carotid artery appears patent with no evidence of stenosis. The left vertebral artery is not visualized. There are elevated velocities in the left subclavian artery that are suggestive of disease.     - Cardiac Cath: 25  CONCLUSIONS:   1. Severe three-vessel coronary artery disease.  Recommend evaluation for CABG.     - CT Cardiac Scorin20  IMPRESSION:  1. Coronary artery calcium score of 1014.84.*. Given patient's high  coronary artery calcium, correlation with anatomic or physiologic  coronary artery imaging would be helpful.     - CT Chest: 25  IMPRESSION:  1. Patchy airspace consolidation throughout bilateral lung fields  with findings more conspicuous in particular left lower lobe.  Correlate with patient's history and concern for multilobar pneumonia with follow-up to clearing recommended. Alternatively, given patient's history of hemoptysis component of pulmonary hemorrhage is not excluded.     - Vascular US: 25  IMPRESSION:  No deep venous thrombosis of the bilateral lower extremity.    Assessment and Plan:     Anesthesia  The patient notes anesthesia complications in the past related to PONV.    Airway  No documented or reported history of airway difficulty.     Neurology  The patient has no neurological diagnoses or significant findings on chart review, clinical presentation, and evaluation. No grossly apparent neurological perioperative risk. The patient is at increased risk for perioperative stroke secondary to cardiac disease, hypertension , hyperlipidemia, diabetes mellitus, general anesthesia, operative time >2.5 hours, cardiac or emergency surgery. Handouts for preoperative brain exercises given to patient.    HEENT  No diagnoses or significant findings on chart review or clinical presentation and evaluation.    Cardiovascular  The patient has hypertension, hyperlipidemia, CHF, carotid artery stenosis, mild MR, mild AS, CAD managed on carvedilol, hydralazine, torsemide, amiodarone, aspirin and statin (continue all), zetia (hold 24 hours).    METS  The patient's functional capacity is greater than 4 METS.  RCRI  The patient meets 3 or more RCRI criteria and therefore is at high risk (15%) for major adverse cardiac complications.  KAYCEE score which indicates a  1.8% risk of intraoperative or 30-day postoperative MACE (major adverse cardiac event).    He is scheduled for cardiac surgery.  Preoperative evaluation is complete pending results of labs, urine, nares.    Pulmonary  The patient has history of pneumonia and acute respirator failure during hospitalization 5/2025. No acute respiratory complaints. The patient is at increased risk of perioperative pulmonary complications secondary to thoracic surgery, advanced age greater than 60.pneumonia 3-5    STOP BANG 4, which places patient at intermediate risk for having KALE.  ARISCAT 50, High, 42.1% risk of in-hospital postoperative pulmonary complications  PRODIGY 8, intermediate risk of respiratory depression episode.     Patient given PI sheet for preoperative deep breathing exercises.  Encourage  incentive spirometry in the postoperative period as deemed necessary.    Endocrine  The patient has DM currently diet controlled.    Gastrointestinal  No diagnoses or significant findings on chart review or clinical presentation and evaluation.    Eat 10- 0,  self-perceived oropharyngeal dysphagia scale (0-40)     Genitourinary  No diagnoses or significant findings on chart review or clinical presentation and evaluation.    Renal  The patient has a history of chronic kidney disease most consistent with stage 3.  Patient's renal function appears unchanged when compared to prior labs.     The patient is scheduled for coronary revascularization surgery which places patient at higher risk of perioperative renal complications.     The patient has specific risk factors associated with increased risk of perioperative renal complications related to age greater than 55, male gender, hypertension, diabetes mellitus, CKD. DPS 2-3 medium risk for perioperative acute kidney injury. Preventative measures include preoperative hydration. Recommendations to avoid nephrotoxic drugs and carefully monitor fluid status to maintain euvolemia. Use dose  adjusted medications as needed for the underlying level of renal function.    Nephrology: Boo Rosrebecca 07/09/25 CKD V, Anemia in chronic kidney disease     Hematology  The patient has history of anemia H/H 8.6/27.3 5/24/25. Repeat CBC obtained.    Caprini score 10, high risk of perioperative VTE.     Patient instructed to ambulate as soon as possible postoperatively to decrease thromboembolic risk. Initiate mechanical DVT prophylaxis as soon as possible and initiate chemical prophylaxis when deemed safe from a bleeding standpoint post surgery.     Transfusion Evaluation  A type and screen was obtained given the likelihood for perioperative transfusion of blood or blood products.    Musculoskeletal  The patient has OA, low back pain. Uses oral OTC as needed.     ID  The patient has history of sepsis due to osteomyelitis of the second toe of left foot. He underwent amputation during the hospitalization in late July 2024. History of C. difficile colitis in March 2025. MRSA screening obtained. Prescriptions and instructions given for Hibiclens and Peridex.    -Preoperative medication instructions were provided and reviewed with the patient.  Any additional testing or evaluation was explained to the patient.  NPO Instructions were discussed, and the patient's questions were answered prior to conclusion of this encounter. Patient verbalized understanding of preoperative instructions. After Visit Summary given.      Labs ordered and/or reviewed  CBC w/ diff, CMP, Coag, Type and screen, MRSA, and UA    H/H 7.6/24.8 Dr Yolanda Justice messaged regarding results           [1]   Past Medical History:  Diagnosis Date    C. difficile colitis     hx of C. difficile colitis in March 2025    CHF (congestive heart failure)     chronic diastolic heart failure    CKD (chronic kidney disease)     Stage V    Coronary artery disease     Diabetes mellitus (Multi)     DVT (deep venous thrombosis) (Multi)     Hypertension     MI (myocardial  infarction) (Multi)     Pneumonia     Multifocal pneumonia 5/2025    PONV (postoperative nausea and vomiting)     Type 2 diabetes mellitus     no meds controlled with diet   [2]   Past Surgical History:  Procedure Laterality Date    AMPUTATION      CARDIAC CATHETERIZATION N/A 05/23/2025    Procedure: LHC, With LV;  Surgeon: Shantelle Anderson MD;  Location: [facility] Cardiac Cath Lab;  Service: Cardiovascular;  Laterality: N/A;    CARDIAC CATHETERIZATION N/A 05/23/2025    Procedure: LULA Stent - Coronary;  Surgeon: Shantelle Anderson MD;  Location: [facility] Cardiac Cath Lab;  Service: Cardiovascular;  Laterality: N/A;    CHOLECYSTECTOMY  03/05/2025    Laparoscopic Cholecystectomy    COLONOSCOPY      URETER SURGERY     [3]   Family History  Problem Relation Name Age of Onset    Heart disease Mother      Other (cabg) Mother      Rheumatic fever Mother      Heart disease Father      Stroke Paternal Grandfather      Heart attack Paternal Grandfather     [4]   Allergies  Allergen Reactions    Amlodipine Besylate Swelling     edema legs

## 2025-07-23 ENCOUNTER — TRANSCRIBE ORDERS (OUTPATIENT)
Dept: INFUSION THERAPY | Facility: CLINIC | Age: 60
End: 2025-07-23
Payer: COMMERCIAL

## 2025-07-24 ENCOUNTER — INFUSION (OUTPATIENT)
Dept: INFUSION THERAPY | Facility: CLINIC | Age: 60
End: 2025-07-24
Payer: COMMERCIAL

## 2025-07-24 VITALS
BODY MASS INDEX: 36.47 KG/M2 | WEIGHT: 254.2 LBS | SYSTOLIC BLOOD PRESSURE: 124 MMHG | HEART RATE: 62 BPM | RESPIRATION RATE: 16 BRPM | OXYGEN SATURATION: 96 % | DIASTOLIC BLOOD PRESSURE: 70 MMHG | TEMPERATURE: 98.2 F

## 2025-07-24 DIAGNOSIS — N18.5 CKD (CHRONIC KIDNEY DISEASE) STAGE 5, GFR LESS THAN 15 ML/MIN (MULTI): ICD-10-CM

## 2025-07-24 DIAGNOSIS — I25.10 ATHEROSCLEROSIS OF NATIVE CORONARY ARTERY OF NATIVE HEART WITHOUT ANGINA PECTORIS: Primary | ICD-10-CM

## 2025-07-24 LAB — STAPHYLOCOCCUS SPEC CULT: NORMAL

## 2025-07-24 PROCEDURE — 96365 THER/PROPH/DIAG IV INF INIT: CPT | Performed by: NURSE PRACTITIONER

## 2025-07-24 RX ORDER — FAMOTIDINE 10 MG/ML
20 INJECTION, SOLUTION INTRAVENOUS ONCE AS NEEDED
OUTPATIENT
Start: 2025-07-27

## 2025-07-24 RX ORDER — EPINEPHRINE 0.3 MG/.3ML
0.3 INJECTION SUBCUTANEOUS EVERY 5 MIN PRN
OUTPATIENT
Start: 2025-07-27

## 2025-07-24 RX ORDER — ALBUTEROL SULFATE 0.83 MG/ML
3 SOLUTION RESPIRATORY (INHALATION) AS NEEDED
OUTPATIENT
Start: 2025-07-27

## 2025-07-24 RX ORDER — DIPHENHYDRAMINE HYDROCHLORIDE 50 MG/ML
50 INJECTION, SOLUTION INTRAMUSCULAR; INTRAVENOUS AS NEEDED
OUTPATIENT
Start: 2025-07-27

## 2025-07-24 ASSESSMENT — ENCOUNTER SYMPTOMS
DIFFICULTY URINATING: 0
HEADACHES: 0
DIARRHEA: 0
EYE PROBLEMS: 0
LEG SWELLING: 0
BRUISES/BLEEDS EASILY: 0
NAUSEA: 0
FEVER: 0
HEMATURIA: 0
CHILLS: 0
NUMBNESS: 0
ARTHRALGIAS: 0
COUGH: 0
MYALGIAS: 0
APPETITE CHANGE: 0
WHEEZING: 0
DIZZINESS: 0
EXTREMITY WEAKNESS: 0
SHORTNESS OF BREATH: 0
BLOOD IN STOOL: 0
DYSURIA: 0
ABDOMINAL PAIN: 0
VOMITING: 0
CONSTIPATION: 0
LIGHT-HEADEDNESS: 0
FREQUENCY: 0
FATIGUE: 0
WOUND: 1

## 2025-07-24 ASSESSMENT — PAIN SCALES - GENERAL: PAINLEVEL_OUTOF10: 5

## 2025-07-24 NOTE — PATIENT INSTRUCTIONS
Today :Colin Wongjoni had no medications administered during this visit.     For:   1. CKD (chronic kidney disease) stage 5, GFR less than 15 ml/min (Multi)         Your next appointment is due in:  7/25/2025        Please read the  Medication Guide that was given to you and reviewed during todays visit.     (Tell all doctors including dentists that you are taking this medication)     Go to the emergency room or call 911 if:  -You have signs of allergic reaction:   -Rash, hives, itching.   -Swollen, blistered, peeling skin.   -Swelling of face, lips, mouth, tongue or throat.   -Tightness of chest, trouble breathing, swallowing or talking     Call your doctor:  - If IV / injection site gets red, warm, swollen, itchy or leaks fluid or pus.     (Leave dressing on your IV site for at least 2 hours and keep area clean and dry  - If you get sick or have symptoms of infection or are not feeling well for any reason.    (Wash your hands often, stay away from people who are sick)  - If you have side effects from your medication that do not go away or are bothersome.     (Refer to the teaching your nurse gave you for side effects to call your doctor about)    - Common side effects may include:  stuffy nose, headache, feeling tired, muscle aches, upset stomach  - Before receiving any vaccines     - Call the [facility] at   If:  - You get sick, are on antibiotics, have had a recent vaccine, have surgery or dental work and your doctor wants your visit rescheduled.  - You need to cancel and reschedule your visit for any reason. Call at least 2 days before your visit if you need to cancel.   - Your insurance changes before your next visit.    (We will need to get approval from your new insurance. This can take up to two weeks.)     The [facility] is opened Monday thru Friday. We are closed on weekends and holidays.   Voice mail will take your call if the center is closed. If you leave a message  please allow 24 hours for a call back during weekdays. If you leave a message on a weekend/holiday, we will call you back the next business day.    A pharmacist is available Monday - Friday from 8:30AM to 3:30PM to help answer any questions you may have about your prescriptions(s). Please call pharmacy at:    [facility]: [phone]  [facility]: [phone]  [facility]: [phone]

## 2025-07-24 NOTE — PROGRESS NOTES
[facility]   Infusion Clinic Note   Date: 2025   Name: Colin Leonard  : 1965   MRN: [id number]         Reason for Visit: OP Infusion (Venofer 200 mg every 3 days- dose 1 of 5- Retacrit not available today will be given tomorrow.)         Today: We administered iron sucrose (Venofer) 200 mg in sodium chloride 0.9% 110 mL IV.       Ordered By: Boo Max MD       For a Diagnosis of: CKD (chronic kidney disease) stage 5, GFR less than 15 ml/min (Multi)       At today's visit patient accompanied by: Self      Today's Vitals:   Vitals:    25 1001 25 1055 25 1130   BP: 145/73 128/74 124/70   Pulse: 63 59 62   Resp: 18 16 16   Temp: 36.2 °C (97.2 °F) 36.8 °C (98.2 °F) 36.8 °C (98.2 °F)   SpO2: 96%     Weight: 115 kg (254 lb 3.2 oz)     PainSc:   5     PainLoc: Back               Pre - Treatment Checklist:      - Previous reaction to current treatment: no      (Assess patient for the concerns below. Document provider notification as appropriate).  - Active or recent infection with/without current antibiotic use: no  - Recent or planned invasive dental work: n/a  - Recent or planned surgeries: Yes, Scheduled for CABG on 2025- Dr. Max wants 5 doses of venofer to be given prior to surgical date.  - Recently received or plans to receive vaccinations: n/a  - Has treatment related toxicities: n/a        Pain: 0   - Is the pain different from normal: no   - Is prescribing Doctor aware:  no      Labs: Reviewed       Fall Risk Screening: Alyx Fall Risk  History of Falling, Immediate or Within 3 Months: No  Secondary Diagnosis: Yes  Ambulatory Aid: Walks without aid/bedrest/nurse assist  Intravenous Therapy/Heparin Lock: Yes  Gait/Transferring: Normal/bedrest/immobile  Mental Status: Oriented to own ability  Wisdom Fall Risk Score: 35            Review Of Systems:  Review of Systems   Constitutional:  Negative for appetite change, chills, fatigue and  fever.   HENT:   Negative for hearing loss and mouth sores.    Eyes:  Negative for eye problems.   Respiratory:  Negative for cough, shortness of breath and wheezing.    Cardiovascular:  Negative for chest pain and leg swelling.   Gastrointestinal:  Negative for abdominal pain, blood in stool, constipation, diarrhea, nausea and vomiting.   Genitourinary:  Negative for difficulty urinating, dysuria, frequency and hematuria.         Renal function results from 7/22/2025 reviewed.- Creatinine 10.16, BUN 97 GFR 5- Patient reports he is still making urine.    Musculoskeletal:  Negative for arthralgias and myalgias.   Skin:  Positive for wound. Negative for itching and rash.        (+) right medial distal shin wound x 1 month  (+) Scab noted (-) erythema /exudate noted. BLE PVD discolor noted .  Skin dry and flaky.   Neurological:  Negative for dizziness, extremity weakness, headaches, light-headedness and numbness.   Hematological:  Does not bruise/bleed easily.         Infusion Readiness:  - Assessment Concerns Related to Infusion: No  - Provider notified: no      New Patient Education:    NEW PATIENT MEDICATION EDUCATION PT PROVIDED WITH WRITTEN ("[employer] PT EDUCATION SHEET) AND VERBAL EDUCATION REGARDING MEDICATION GIVEN. VERIFIED MEDICATION NAME WITH PATIENT AND DISCUSSED REASON FOR USE. BRIEFLY DISCUSSED HOW MEDICATION WORKS AND EDUCATED ON GOAL OF TREATMENT, FREQUENCY OF TREATMENT, ADVERSE RXN'S AND COMMON SIDE EFFECTS TO MONITOR FOR. INSTRUCTED PT TO ASSURE THAT ALL PROVIDERS INCLUDING DENTISTS ARE AWARE OF MEDICATION RECEIVED. DISCUSSED FLOW OF VISIT AND ORIENTED TO INFUSION CENTER. PT VERBALIZES UNDERSTANDING. CALL LIGHT PROVIDED AND PT AWARE TO ALERT STAFF OF ANY CONCERNS DURING TREATMENT.        Treatment Conditions & Drug Specific Questions:    Iron Sucrose (VENOFER)     (Unless otherwise specified on patient specific therapy plan):    REMINDERS:  May cause transient hypotension. Supine position recommended  for infusion.   Pregnancy test prior to first dose for patients of childbearing age.    Recommended Vitals/Observation:  Vitals: Obtain vital signs before and after each infusion.  Observation: 30 minutes after the infusion is complete. Observation complete.      Lab Results   Component Value Date    IRON <10 (L) 05/20/2025    TIBC  05/20/2025      Comment:      One or more of the analytes used in this calculation is outside of the analytical measurement range.      FERRITIN 238 05/20/2025     Lab Results   Component Value Date    IRONSAT  05/20/2025      Comment:      One or more analytes used in this calculation is outside of the analytical measurement range. Calculation cannot be performed.      Lab Results   Component Value Date    WBC 6.9 07/22/2025    HGB 7.6 (L) 07/22/2025    HCT 24.8 (L) 07/22/2025    MCV 95 07/22/2025     07/22/2025            Weight Based Drug Calculations:    WEIGHT BASED DRUGS: NOT APPLICABLE / FLAT DOSE       Post Treatment: Patient tolerated treatment without issue and was discharged in no apparent distress.   RTC on 7/25/2025.      Note Authored / Patient Cared for By: Agustina Osorio RN

## 2025-07-25 ENCOUNTER — INFUSION (OUTPATIENT)
Dept: INFUSION THERAPY | Facility: CLINIC | Age: 60
End: 2025-07-25
Payer: COMMERCIAL

## 2025-07-25 VITALS
WEIGHT: 258.1 LBS | TEMPERATURE: 97.4 F | RESPIRATION RATE: 18 BRPM | BODY MASS INDEX: 37.03 KG/M2 | OXYGEN SATURATION: 98 % | SYSTOLIC BLOOD PRESSURE: 141 MMHG | DIASTOLIC BLOOD PRESSURE: 69 MMHG | HEART RATE: 60 BPM

## 2025-07-25 DIAGNOSIS — N18.5 CKD (CHRONIC KIDNEY DISEASE) STAGE 5, GFR LESS THAN 15 ML/MIN (MULTI): ICD-10-CM

## 2025-07-25 DIAGNOSIS — I25.118 ATHEROSCLEROTIC HEART DISEASE OF NATIVE CORONARY ARTERY WITH OTHER FORMS OF ANGINA PECTORIS: Primary | ICD-10-CM

## 2025-07-25 RX ORDER — EPINEPHRINE 0.3 MG/.3ML
0.3 INJECTION SUBCUTANEOUS EVERY 5 MIN PRN
OUTPATIENT
Start: 2025-07-27

## 2025-07-25 RX ORDER — DIPHENHYDRAMINE HYDROCHLORIDE 50 MG/ML
50 INJECTION, SOLUTION INTRAMUSCULAR; INTRAVENOUS AS NEEDED
OUTPATIENT
Start: 2025-07-27

## 2025-07-25 RX ORDER — FAMOTIDINE 10 MG/ML
20 INJECTION, SOLUTION INTRAVENOUS ONCE AS NEEDED
OUTPATIENT
Start: 2025-07-27

## 2025-07-25 RX ORDER — ALBUTEROL SULFATE 0.83 MG/ML
3 SOLUTION RESPIRATORY (INHALATION) AS NEEDED
OUTPATIENT
Start: 2025-07-27

## 2025-07-25 ASSESSMENT — PAIN SCALES - GENERAL: PAINLEVEL_OUTOF10: 3

## 2025-07-25 ASSESSMENT — ENCOUNTER SYMPTOMS
RESPIRATORY NEGATIVE: 1
CONSTITUTIONAL NEGATIVE: 1
CONSTIPATION: 1
NEUROLOGICAL NEGATIVE: 1

## 2025-07-25 NOTE — PROGRESS NOTES
[facility]   Infusion Clinic Note   Date: 2025   Name: Colin Leonard  : 1965   MRN: [id number]         Reason for Visit: OP Infusion (Venofer 200 mg dose /)         Today: We administered iron sucrose (Venofer) 200 mg in sodium chloride 0.9% 110 mL IV.       Referring Provider: Boo Max MD    Plan Provider: Boo Max MD      For a Diagnosis of: CKD (chronic kidney disease) stage 5, GFR less than 15 ml/min (Multi)       At today's visit patient accompanied by: Self      Today's Vitals:   Vitals:    25 1106   BP: 141/69   Pulse: 60   Resp: 18   Temp: 36.3 °C (97.4 °F)   SpO2: 98%   Weight: 117 kg (258 lb 1.6 oz)   PainSc:   3   PainLoc: Back             Pre - Treatment Checklist:      - Previous reaction to current treatment: no      (Assess patient for the concerns below. Document provider notification as appropriate).  - Active or recent infection with/without current antibiotic use: no  - Recent or planned invasive dental work: no  - Recent or planned surgeries: yes  - Recently received or plans to receive vaccinations: no  - Has treatment related toxicities: no  - Any chance may be pregnant:  n/a      Pain: 0   - Is the pain different from normal: n/a   - Is prescribing Doctor aware:  n/a      Labs: Reviewed       Fall Risk Screening: Wisdom Fall Risk  History of Falling, Immediate or Within 3 Months: No  Secondary Diagnosis: Yes  Ambulatory Aid: Walks without aid/bedrest/nurse assist  Intravenous Therapy/Heparin Lock: Yes  Gait/Transferring: Normal/bedrest/immobile  Mental Status: Oriented to own ability  Wisdom Fall Risk Score: 35            Review Of Systems:  Review of Systems   Constitutional: Negative.    HENT:  Negative.     Respiratory: Negative.     Gastrointestinal:  Positive for constipation (slight, possibly due to iron infusion. Patient will monitor.).   Genitourinary: Negative.     Skin:           (+) right medial distal shin  wound x 1 month  (+) Scab noted (-) erythema /exudate noted. BLE PVD discolor noted .  Skin dry and flaky.    Neurological: Negative.    Hematological:              Renal function results from 7/22/2025 reviewed.- Creatinine 10.16, BUN 97 GFR 5- Patient reports he is still making urine.           Infusion Readiness:  - Assessment Concerns Related to Infusion: No  - Provider notified: n/a      New Patient Education:    N/A (returning patient for continuation of therapy. Ongoing education provided as needed.)        Treatment Conditions & Drug Specific Questions:    Iron Sucrose (VENOFER)     (Unless otherwise specified on patient specific therapy plan):    REMINDERS:  May cause transient hypotension. Supine position recommended for infusion.   Pregnancy test prior to first dose for patients of childbearing age.    Recommended Vitals/Observation:  Vitals: Obtain vital signs before and after each infusion.  Observation: 30 minutes after the infusion is complete. Observation complete.      Lab Results   Component Value Date    IRON <10 (L) 05/20/2025    TIBC  05/20/2025      Comment:      One or more of the analytes used in this calculation is outside of the analytical measurement range.      FERRITIN 238 05/20/2025     Lab Results   Component Value Date    IRONSAT  05/20/2025      Comment:      One or more analytes used in this calculation is outside of the analytical measurement range. Calculation cannot be performed.      Lab Results   Component Value Date    WBC 6.9 07/22/2025    HGB 7.6 (L) 07/22/2025    HCT 24.8 (L) 07/22/2025    MCV 95 07/22/2025     07/22/2025            Weight Based Drug Calculations:    WEIGHT BASED DRUGS: NOT APPLICABLE / FLAT DOSE       Post Treatment: Patient tolerated treatment without issue and was discharged in no apparent distress.      Note Authored / Patient Cared for By: Chantel Samuel, SARAH-CNP

## 2025-07-29 ENCOUNTER — APPOINTMENT (OUTPATIENT)
Dept: INFUSION THERAPY | Facility: CLINIC | Age: 60
End: 2025-07-29
Payer: COMMERCIAL

## 2025-07-29 VITALS
SYSTOLIC BLOOD PRESSURE: 179 MMHG | OXYGEN SATURATION: 96 % | WEIGHT: 253.4 LBS | TEMPERATURE: 98 F | RESPIRATION RATE: 18 BRPM | HEART RATE: 60 BPM | BODY MASS INDEX: 36.36 KG/M2 | DIASTOLIC BLOOD PRESSURE: 89 MMHG

## 2025-07-29 DIAGNOSIS — N18.5 CKD (CHRONIC KIDNEY DISEASE) STAGE 5, GFR LESS THAN 15 ML/MIN (MULTI): ICD-10-CM

## 2025-07-29 PROCEDURE — 96365 THER/PROPH/DIAG IV INF INIT: CPT | Performed by: NURSE PRACTITIONER

## 2025-07-29 RX ORDER — EPINEPHRINE 0.3 MG/.3ML
0.3 INJECTION SUBCUTANEOUS EVERY 5 MIN PRN
Status: CANCELLED | OUTPATIENT
Start: 2025-07-30

## 2025-07-29 RX ORDER — FAMOTIDINE 10 MG/ML
20 INJECTION, SOLUTION INTRAVENOUS ONCE AS NEEDED
Status: CANCELLED | OUTPATIENT
Start: 2025-08-12

## 2025-07-29 RX ORDER — ALBUTEROL SULFATE 0.83 MG/ML
3 SOLUTION RESPIRATORY (INHALATION) AS NEEDED
Status: CANCELLED | OUTPATIENT
Start: 2025-08-12

## 2025-07-29 RX ORDER — ALBUTEROL SULFATE 0.83 MG/ML
3 SOLUTION RESPIRATORY (INHALATION) AS NEEDED
Status: CANCELLED | OUTPATIENT
Start: 2025-07-30

## 2025-07-29 RX ORDER — FAMOTIDINE 10 MG/ML
20 INJECTION, SOLUTION INTRAVENOUS ONCE AS NEEDED
Status: CANCELLED | OUTPATIENT
Start: 2025-07-30

## 2025-07-29 RX ORDER — DIPHENHYDRAMINE HYDROCHLORIDE 50 MG/ML
50 INJECTION, SOLUTION INTRAMUSCULAR; INTRAVENOUS AS NEEDED
Status: CANCELLED | OUTPATIENT
Start: 2025-08-12

## 2025-07-29 RX ORDER — EPINEPHRINE 0.3 MG/.3ML
0.3 INJECTION SUBCUTANEOUS EVERY 5 MIN PRN
Status: CANCELLED | OUTPATIENT
Start: 2025-08-12

## 2025-07-29 RX ORDER — DIPHENHYDRAMINE HYDROCHLORIDE 50 MG/ML
50 INJECTION, SOLUTION INTRAMUSCULAR; INTRAVENOUS AS NEEDED
Status: CANCELLED | OUTPATIENT
Start: 2025-07-30

## 2025-07-29 ASSESSMENT — ENCOUNTER SYMPTOMS
EYE PROBLEMS: 0
CHILLS: 0
HEMATURIA: 0
HEADACHES: 0
FREQUENCY: 0
FATIGUE: 0
LEG SWELLING: 0
SORE THROAT: 0
EXTREMITY WEAKNESS: 0
CONSTIPATION: 0
ARTHRALGIAS: 0
FEVER: 0
UNEXPECTED WEIGHT CHANGE: 0
DIARRHEA: 0
SHORTNESS OF BREATH: 0
NAUSEA: 0
VOICE CHANGE: 0
COUGH: 1
WHEEZING: 0
BRUISES/BLEEDS EASILY: 0
DYSURIA: 0
MYALGIAS: 0
PALPITATIONS: 0
APPETITE CHANGE: 0
TROUBLE SWALLOWING: 0
LIGHT-HEADEDNESS: 0
NUMBNESS: 0
ABDOMINAL PAIN: 0
BLOOD IN STOOL: 0
VOMITING: 0
DIZZINESS: 0
WOUND: 1

## 2025-07-29 ASSESSMENT — PAIN SCALES - GENERAL: PAINLEVEL_OUTOF10: 0-NO PAIN

## 2025-07-29 NOTE — PROGRESS NOTES
[facility]   Infusion Clinic Note   Date: 2025   Name: Colin Leonard  : 1965   MRN: [id number]         Reason for Visit: OP Infusion (Venofer 200mg dose 3)         Today: We administered iron sucrose (Venofer) 200 mg in sodium chloride 0.9% 110 mL IV.       Referring Provider: Boo Max MD    Plan Provider: MD Boo Cabrera MD      For a Diagnosis of: CKD (chronic kidney disease) stage 5, GFR less than 15 ml/min (Multi)       At today's visit patient accompanied by: Self      Today's Vitals:   Vitals:    25 1207 25 1259 25 1326   BP: 174/75 178/81 179/89   Pulse: 65 60 60   Resp: 20 18 18   Temp: 36.6 °C (97.9 °F) 36.6 °C (97.9 °F) 36.7 °C (98 °F)   SpO2: 96% 98% 96%   Weight: 115 kg (253 lb 6.4 oz)     PainSc: 0-No pain               Pre - Treatment Checklist:      - Previous reaction to current treatment: no      (Assess patient for the concerns below. Document provider notification as appropriate).  - Active or recent infection with/without current antibiotic use: no  - Recent or planned invasive dental work: no  - Recent or planned surgeries: no  - Recently received or plans to receive vaccinations: yes  - Has treatment related toxicities: no  - Any chance may be pregnant:  n/a      Pain: 0   - Is the pain different from normal: n/a   - Is prescribing Doctor aware:  n/a      Labs: Reviewed       Fall Risk Screening: Wisdom Fall Risk  History of Falling, Immediate or Within 3 Months: No  Secondary Diagnosis: No  Ambulatory Aid: Walks without aid/bedrest/nurse assist  Intravenous Therapy/Heparin Lock: Yes  Gait/Transferring: Normal/bedrest/immobile  Mental Status: Oriented to own ability  Wisdom Fall Risk Score: 20            Review Of Systems:  Review of Systems   Constitutional:  Negative for appetite change, chills, fatigue, fever and unexpected weight change.   HENT:   Negative for hearing loss, mouth sores, sore  throat, tinnitus, trouble swallowing and voice change.    Eyes:  Negative for eye problems.   Respiratory:  Positive for cough. Negative for shortness of breath and wheezing.         Allergies    Cardiovascular:  Negative for chest pain, leg swelling and palpitations.   Gastrointestinal:  Negative for abdominal pain, blood in stool, constipation, diarrhea, nausea and vomiting.   Genitourinary:  Negative for dysuria, frequency and hematuria.    Musculoskeletal:  Negative for arthralgias and myalgias.   Skin:  Positive for wound. Negative for itching and rash.        Rightshin    Neurological:  Negative for dizziness, extremity weakness, headaches, light-headedness and numbness.   Hematological:  Does not bruise/bleed easily.         Infusion Readiness:  - Assessment Concerns Related to Infusion: No  - Provider notified: n/a      New Patient Education:    N/A (returning patient for continuation of therapy. Ongoing education provided as needed.)        Treatment Conditions & Drug Specific Questions:       (Unless otherwise specified on patient specific therapy plan):    REMINDERS:  May cause transient hypotension. Supine position recommended for infusion.   Pregnancy test prior to first dose for patients of childbearing age.    Recommended Vitals/Observation:  Vitals: Obtain vital signs before and after each infusion.  Observation: 30 minutes after the infusion is complete. Observation complete.      Lab Results   Component Value Date    IRON <10 (L) 05/20/2025    TIBC  05/20/2025      Comment:      One or more of the analytes used in this calculation is outside of the analytical measurement range.      FERRITIN 238 05/20/2025     Lab Results   Component Value Date    IRONSAT  05/20/2025      Comment:      One or more analytes used in this calculation is outside of the analytical measurement range. Calculation cannot be performed.      Lab Results   Component Value Date    WBC 6.9 07/22/2025    HGB 7.6 (L) 07/22/2025     HCT 24.8 (L) 07/22/2025    MCV 95 07/22/2025     07/22/2025               Weight Based Drug Calculations:    WEIGHT BASED DRUGS: NOT APPLICABLE / FLAT DOSE       Post Treatment: Patient tolerated treatment without issue and was discharged in no apparent distress.   Unable to give retacrit today d/t elevated BP.  Pt states he forgot to take his bp medication.  He said he will take when he leaves here today and will take before coming to his next appt on Thursday so he can receive the retacrit.    Note Authored / Patient Cared for By: Julienne Pedraza RN

## 2025-07-29 NOTE — PATIENT INSTRUCTIONS
Today :We administered iron sucrose (Venofer) 200 mg in sodium chloride 0.9% 110 mL IV.     For:   1. CKD (chronic kidney disease) stage 5, GFR less than 15 ml/min (Multi)         Your next appointment is due in:  7/312025        Please read the  Medication Guide that was given to you and reviewed during todays visit.     (Tell all doctors including dentists that you are taking this medication)     Go to the emergency room or call 911 if:  -You have signs of allergic reaction:   -Rash, hives, itching.   -Swollen, blistered, peeling skin.   -Swelling of face, lips, mouth, tongue or throat.   -Tightness of chest, trouble breathing, swallowing or talking     Call your doctor:  - If IV / injection site gets red, warm, swollen, itchy or leaks fluid or pus.     (Leave dressing on your IV site for at least 2 hours and keep area clean and dry  - If you get sick or have symptoms of infection or are not feeling well for any reason.    (Wash your hands often, stay away from people who are sick)  - If you have side effects from your medication that do not go away or are bothersome.     (Refer to the teaching your nurse gave you for side effects to call your doctor about)    - Common side effects may include:  stuffy nose, headache, feeling tired, muscle aches, upset stomach  - Before receiving any vaccines     - Call [facility] at   If:  - You get sick, are on antibiotics, have had a recent vaccine, have surgery or dental work and your doctor wants your visit rescheduled.  - You need to cancel and reschedule your visit for any reason. Call at least 2 days before your visit if you need to cancel.   - Your insurance changes before your next visit.    (We will need to get approval from your new insurance. This can take up to two weeks.)     [facility] is opened Monday thru Friday. We are closed on weekends and holidays.   Voice mail will take your call if the center is closed. If you leave  a message please allow 24 hours for a call back during weekdays. If you leave a message on a weekend/holiday, we will call you back the next business day.    A pharmacist is available Monday - Friday from 8:30AM to 3:30PM to help answer any questions you may have about your prescriptions(s). Please call pharmacy at:    [facility]: [phone]  [facility]: [phone]  [facility]: [phone]

## 2025-07-30 LAB
APPEARANCE UR: CLEAR
BACTERIA #/AREA URNS HPF: ABNORMAL /HPF
BACTERIA UR CULT: ABNORMAL
BACTERIA UR CULT: ABNORMAL
BILIRUB UR QL STRIP: NEGATIVE
COLOR UR: YELLOW
GLUCOSE UR QL STRIP: ABNORMAL
HGB UR QL STRIP: NEGATIVE
HYALINE CASTS #/AREA URNS LPF: ABNORMAL /LPF
KETONES UR QL STRIP: NEGATIVE
LEUKOCYTE ESTERASE UR QL STRIP: ABNORMAL
NITRITE UR QL STRIP: NEGATIVE
PH UR STRIP: 6.5 [PH] (ref 5–8)
PROT UR QL STRIP: ABNORMAL
RBC #/AREA URNS HPF: ABNORMAL /HPF
SERVICE CMNT-IMP: ABNORMAL
SP GR UR STRIP: 1.01 (ref 1–1.03)
SQUAMOUS #/AREA URNS HPF: ABNORMAL /HPF
WBC #/AREA URNS HPF: ABNORMAL /HPF

## 2025-07-31 ENCOUNTER — APPOINTMENT (OUTPATIENT)
Dept: INFUSION THERAPY | Facility: CLINIC | Age: 60
End: 2025-07-31
Payer: COMMERCIAL

## 2025-07-31 VITALS
RESPIRATION RATE: 18 BRPM | HEART RATE: 59 BPM | WEIGHT: 256.7 LBS | SYSTOLIC BLOOD PRESSURE: 145 MMHG | TEMPERATURE: 98.2 F | OXYGEN SATURATION: 97 % | BODY MASS INDEX: 36.83 KG/M2 | DIASTOLIC BLOOD PRESSURE: 72 MMHG

## 2025-07-31 DIAGNOSIS — I47.20 VENTRICULAR TACHYCARDIA (MULTI): ICD-10-CM

## 2025-07-31 DIAGNOSIS — N18.5 CKD (CHRONIC KIDNEY DISEASE) STAGE 5, GFR LESS THAN 15 ML/MIN (MULTI): ICD-10-CM

## 2025-07-31 DIAGNOSIS — I47.20 VENTRICULAR TACHYARRHYTHMIA (MULTI): ICD-10-CM

## 2025-07-31 PROCEDURE — 96372 THER/PROPH/DIAG INJ SC/IM: CPT | Performed by: NURSE PRACTITIONER

## 2025-07-31 PROCEDURE — 96365 THER/PROPH/DIAG IV INF INIT: CPT | Performed by: NURSE PRACTITIONER

## 2025-07-31 RX ORDER — ALBUTEROL SULFATE 0.83 MG/ML
3 SOLUTION RESPIRATORY (INHALATION) AS NEEDED
OUTPATIENT
Start: 2025-08-12

## 2025-07-31 RX ORDER — AMIODARONE HYDROCHLORIDE 200 MG/1
200 TABLET ORAL 2 TIMES DAILY
Qty: 60 TABLET | Refills: 1 | Status: SHIPPED | OUTPATIENT
Start: 2025-07-31

## 2025-07-31 RX ORDER — EPINEPHRINE 0.3 MG/.3ML
0.3 INJECTION SUBCUTANEOUS EVERY 5 MIN PRN
OUTPATIENT
Start: 2025-08-01

## 2025-07-31 RX ORDER — FAMOTIDINE 10 MG/ML
20 INJECTION, SOLUTION INTRAVENOUS ONCE AS NEEDED
OUTPATIENT
Start: 2025-08-12

## 2025-07-31 RX ORDER — FAMOTIDINE 10 MG/ML
20 INJECTION, SOLUTION INTRAVENOUS ONCE AS NEEDED
OUTPATIENT
Start: 2025-08-01

## 2025-07-31 RX ORDER — ALBUTEROL SULFATE 0.83 MG/ML
3 SOLUTION RESPIRATORY (INHALATION) AS NEEDED
OUTPATIENT
Start: 2025-08-01

## 2025-07-31 RX ORDER — DIPHENHYDRAMINE HYDROCHLORIDE 50 MG/ML
50 INJECTION, SOLUTION INTRAMUSCULAR; INTRAVENOUS AS NEEDED
OUTPATIENT
Start: 2025-08-01

## 2025-07-31 RX ORDER — DIPHENHYDRAMINE HYDROCHLORIDE 50 MG/ML
50 INJECTION, SOLUTION INTRAMUSCULAR; INTRAVENOUS AS NEEDED
OUTPATIENT
Start: 2025-08-12

## 2025-07-31 RX ORDER — EPINEPHRINE 0.3 MG/.3ML
0.3 INJECTION SUBCUTANEOUS EVERY 5 MIN PRN
OUTPATIENT
Start: 2025-08-12

## 2025-07-31 RX ORDER — CARVEDILOL 12.5 MG/1
37.5 TABLET ORAL 2 TIMES DAILY
Qty: 180 TABLET | Refills: 0 | Status: SHIPPED | OUTPATIENT
Start: 2025-07-31 | End: 2025-08-30

## 2025-07-31 ASSESSMENT — ENCOUNTER SYMPTOMS
HEMATURIA: 0
VOICE CHANGE: 0
CHILLS: 0
EXTREMITY WEAKNESS: 0
ARTHRALGIAS: 0
BLOOD IN STOOL: 0
DIARRHEA: 0
ABDOMINAL PAIN: 0
FREQUENCY: 0
NAUSEA: 0
PALPITATIONS: 0
APPETITE CHANGE: 0
DIZZINESS: 0
SORE THROAT: 0
VOMITING: 0
EYE PROBLEMS: 0
WOUND: 0
DYSURIA: 0
SHORTNESS OF BREATH: 0
UNEXPECTED WEIGHT CHANGE: 0
NUMBNESS: 0
HEADACHES: 0
WHEEZING: 0
FEVER: 0
CONSTIPATION: 0
COUGH: 0
MYALGIAS: 0
LIGHT-HEADEDNESS: 0
LEG SWELLING: 0
FATIGUE: 0
BRUISES/BLEEDS EASILY: 0
TROUBLE SWALLOWING: 0

## 2025-07-31 ASSESSMENT — PAIN SCALES - GENERAL: PAINLEVEL_OUTOF10: 0-NO PAIN

## 2025-07-31 NOTE — PATIENT INSTRUCTIONS
Today :We administered iron sucrose (Venofer) 200 mg in sodium chloride 0.9% 110 mL IV.     For:   1. CKD (chronic kidney disease) stage 5, GFR less than 15 ml/min (Multi)         Your next appointment is due in:  8/4/2025     Please read the  Medication Guide that was given to you and reviewed during todays visit.     (Tell all doctors including dentists that you are taking this medication)     Go to the emergency room or call 911 if:  -You have signs of allergic reaction:   -Rash, hives, itching.   -Swollen, blistered, peeling skin.   -Swelling of face, lips, mouth, tongue or throat.   -Tightness of chest, trouble breathing, swallowing or talking     Call your doctor:  - If IV / injection site gets red, warm, swollen, itchy or leaks fluid or pus.     (Leave dressing on your IV site for at least 2 hours and keep area clean and dry  - If you get sick or have symptoms of infection or are not feeling well for any reason.    (Wash your hands often, stay away from people who are sick)  - If you have side effects from your medication that do not go away or are bothersome.     (Refer to the teaching your nurse gave you for side effects to call your doctor about)    - Common side effects may include:  stuffy nose, headache, feeling tired, muscle aches, upset stomach  - Before receiving any vaccines     - Call the [facility] at   If:  - You get sick, are on antibiotics, have had a recent vaccine, have surgery or dental work and your doctor wants your visit rescheduled.  - You need to cancel and reschedule your visit for any reason. Call at least 2 days before your visit if you need to cancel.   - Your insurance changes before your next visit.    (We will need to get approval from your new insurance. This can take up to two weeks.)     [facility] is opened Monday thru Friday. We are closed on weekends and holidays.   Voice mail will take your call if the center is closed. If you leave a  message please allow 24 hours for a call back during weekdays. If you leave a message on a weekend/holiday, we will call you back the next business day.    A pharmacist is available Monday - Friday from 8:30AM to 3:30PM to help answer any questions you may have about your prescriptions(s). Please call pharmacy at:    [facility]: [phone]  [facility]: [phone]  [facility]: [phone]

## 2025-07-31 NOTE — PROGRESS NOTES
[facility]   Infusion Clinic Note   Date: 2025   Name: Colin Leonard  : 1965   MRN: [id number]         Reason for Visit: OP Infusion (Venofer 200mg every 3 days, today is dose 4/5; Retacrit 40,000 units every 14 days)         Today: We administered iron sucrose (Venofer) 200 mg in sodium chloride 0.9% 110 mL IV and epoetin china-epbx.       Referring Provider: Boo Max MD    Plan Provider: Boo Max MD      For a Diagnosis of: CKD (chronic kidney disease) stage 5, GFR less than 15 ml/min (Multi)       At today's visit patient accompanied by: Self      Today's Vitals:   Vitals:    25 1402 25 1500 25 1531   BP: 157/81 149/75 145/72   Pulse: 65 58 59   Resp: 18 17 18   Temp: 36.3 °C (97.4 °F) 36.6 °C (97.9 °F) 36.8 °C (98.2 °F)   SpO2: 100% 99% 97%   Weight: 116 kg (256 lb 11.2 oz)     PainSc: 0-No pain               Pre - Treatment Checklist:      - Previous reaction to current treatment: no      (Assess patient for the concerns below. Document provider notification as appropriate).  - Active or recent infection with/without current antibiotic use: no  - Recent or planned invasive dental work: no  - Recent or planned surgeries: yes - 8/6 CABG   - Recently received or plans to receive vaccinations: no  - Has treatment related toxicities: no  - Any chance may be pregnant:  n/a      Pain: 0   - Is the pain different from normal: no   - Is prescribing Doctor aware:  n/a      Labs: Reviewed       Fall Risk Screening: Wisdom Fall Risk  History of Falling, Immediate or Within 3 Months: No  Secondary Diagnosis: Yes  Ambulatory Aid: Walks without aid/bedrest/nurse assist  Intravenous Therapy/Heparin Lock: No  Gait/Transferring: Normal/bedrest/immobile  Mental Status: Oriented to own ability  Wisdom Fall Risk Score: 15            Review Of Systems:  Review of Systems   Constitutional:  Negative for appetite change, chills, fatigue, fever and  unexpected weight change.   HENT:   Negative for hearing loss, mouth sores, sore throat, tinnitus, trouble swallowing and voice change.    Eyes:  Negative for eye problems.   Respiratory:  Negative for cough, shortness of breath and wheezing.    Cardiovascular:  Negative for chest pain, leg swelling and palpitations.   Gastrointestinal:  Negative for abdominal pain, blood in stool, constipation, diarrhea, nausea and vomiting.   Genitourinary:  Negative for dysuria, frequency and hematuria.    Musculoskeletal:  Negative for arthralgias and myalgias.   Skin:  Negative for itching, rash and wound.   Neurological:  Negative for dizziness, extremity weakness, headaches, light-headedness and numbness.   Hematological:  Does not bruise/bleed easily.         Infusion Readiness:  - Assessment Concerns Related to Infusion: No  - Provider notified: n/a      New Patient Education:    N/A (returning patient for continuation of therapy. Ongoing education provided as needed.)        Treatment Conditions & Drug Specific Questions:    Iron Sucrose (VENOFER)     (Unless otherwise specified on patient specific therapy plan):    REMINDERS:  May cause transient hypotension. Supine position recommended for infusion.   Pregnancy test prior to first dose for patients of childbearing age.    Recommended Vitals/Observation:  Vitals: Obtain vital signs before and after each infusion.  Observation: 30 minutes after the infusion is complete. Observation complete.      Lab Results   Component Value Date    IRON <10 (L) 05/20/2025    TIBC  05/20/2025      Comment:      One or more of the analytes used in this calculation is outside of the analytical measurement range.      FERRITIN 238 05/20/2025     Lab Results   Component Value Date    IRONSAT  05/20/2025      Comment:      One or more analytes used in this calculation is outside of the analytical measurement range. Calculation cannot be performed.      Lab Results   Component Value Date    WBC  6.9 07/22/2025    HGB 7.6 (L) 07/22/2025    HCT 24.8 (L) 07/22/2025    MCV 95 07/22/2025     07/22/2025            Weight Based Drug Calculations:    WEIGHT BASED DRUGS: NOT APPLICABLE / FLAT DOSE       Post Treatment: Patient tolerated treatment without issue and was discharged in no apparent distress.       Retacrit and Venofer administered        Note Authored / Patient Cared for By: Елена Coronado RN

## 2025-08-04 ENCOUNTER — APPOINTMENT (OUTPATIENT)
Dept: INFUSION THERAPY | Facility: CLINIC | Age: 60
End: 2025-08-04
Payer: COMMERCIAL

## 2025-08-04 VITALS
BODY MASS INDEX: 36.85 KG/M2 | TEMPERATURE: 97.7 F | OXYGEN SATURATION: 95 % | HEART RATE: 56 BPM | RESPIRATION RATE: 18 BRPM | SYSTOLIC BLOOD PRESSURE: 125 MMHG | WEIGHT: 256.8 LBS | DIASTOLIC BLOOD PRESSURE: 73 MMHG

## 2025-08-04 DIAGNOSIS — N18.5 CKD (CHRONIC KIDNEY DISEASE) STAGE 5, GFR LESS THAN 15 ML/MIN (MULTI): ICD-10-CM

## 2025-08-04 PROCEDURE — 96365 THER/PROPH/DIAG IV INF INIT: CPT | Performed by: NURSE PRACTITIONER

## 2025-08-04 RX ORDER — DIPHENHYDRAMINE HYDROCHLORIDE 50 MG/ML
50 INJECTION, SOLUTION INTRAMUSCULAR; INTRAVENOUS AS NEEDED
Status: CANCELLED | OUTPATIENT
Start: 2025-08-06

## 2025-08-04 RX ORDER — ALBUTEROL SULFATE 0.83 MG/ML
3 SOLUTION RESPIRATORY (INHALATION) AS NEEDED
Status: CANCELLED | OUTPATIENT
Start: 2025-08-06

## 2025-08-04 RX ORDER — EPINEPHRINE 0.3 MG/.3ML
0.3 INJECTION SUBCUTANEOUS EVERY 5 MIN PRN
Status: CANCELLED | OUTPATIENT
Start: 2025-08-06

## 2025-08-04 RX ORDER — FAMOTIDINE 10 MG/ML
20 INJECTION, SOLUTION INTRAVENOUS ONCE AS NEEDED
Status: CANCELLED | OUTPATIENT
Start: 2025-08-06

## 2025-08-04 ASSESSMENT — ENCOUNTER SYMPTOMS
COUGH: 0
FATIGUE: 0
LIGHT-HEADEDNESS: 0
LEG SWELLING: 0
WHEEZING: 0
HEMATURIA: 0
DIZZINESS: 0
UNEXPECTED WEIGHT CHANGE: 0
FREQUENCY: 0
HEADACHES: 0
MYALGIAS: 0
ARTHRALGIAS: 0
CHILLS: 0
FEVER: 0
NAUSEA: 0
TROUBLE SWALLOWING: 0
PALPITATIONS: 0
VOMITING: 0
NUMBNESS: 0
SHORTNESS OF BREATH: 0
CONSTIPATION: 0
APPETITE CHANGE: 0
SORE THROAT: 0
WOUND: 0
BLOOD IN STOOL: 0
BRUISES/BLEEDS EASILY: 0
DYSURIA: 0
VOICE CHANGE: 0
EXTREMITY WEAKNESS: 0
ABDOMINAL PAIN: 0
DIARRHEA: 0
EYE PROBLEMS: 0

## 2025-08-04 ASSESSMENT — PAIN SCALES - GENERAL: PAINLEVEL_OUTOF10: 0-NO PAIN

## 2025-08-04 NOTE — H&P (VIEW-ONLY)
[facility]   Infusion Clinic Note   Date: 2025   Name: Colin Leonard  : 1965   MRN: [id number]         Reason for Visit: OP Infusion (Venofer dose /)         Today: We administered iron sucrose (Venofer) 200 mg in sodium chloride 0.9% 110 mL IV.       Referring Provider: Boo Max MD    Plan Provider: MD Boo Cabrera MD      For a Diagnosis of: CKD (chronic kidney disease) stage 5, GFR less than 15 ml/min (Multi)       At today's visit patient accompanied by: Self      Today's Vitals:   Vitals:    25 1009 25 1120 25 1142   BP: 158/81 121/70 125/73   Pulse: 61 57 56   Resp: 18 18 18   Temp: 36.3 °C (97.3 °F) 36.4 °C (97.5 °F) 36.5 °C (97.7 °F)   SpO2: 97% 96% 95%   Weight: 116 kg (256 lb 12.8 oz)     PainSc: 0-No pain               Pre - Treatment Checklist:      - Previous reaction to current treatment: no      (Assess patient for the concerns below. Document provider notification as appropriate).  - Active or recent infection with/without current antibiotic use: no  - Recent or planned invasive dental work: no  - Recent or planned surgeries: yes 8/  - Recently received or plans to receive vaccinations: no  - Has treatment related toxicities: no  - Any chance may be pregnant:  n/a      Pain: 0   - Is the pain different from normal: n/a   - Is prescribing Doctor aware:  n/a      Labs: N/A      Fall Risk Screening: Wisdom Fall Risk  History of Falling, Immediate or Within 3 Months: No  Secondary Diagnosis: No  Ambulatory Aid: Walks without aid/bedrest/nurse assist  Intravenous Therapy/Heparin Lock: Yes  Gait/Transferring: Normal/bedrest/immobile  Mental Status: Oriented to own ability  Wisdom Fall Risk Score: 20            Review Of Systems:  Review of Systems   Constitutional:  Negative for appetite change, chills, fatigue, fever and unexpected weight change.   HENT:   Negative for hearing loss, mouth sores, sore  throat, tinnitus, trouble swallowing and voice change.    Eyes:  Negative for eye problems.   Respiratory:  Negative for cough, shortness of breath and wheezing.    Cardiovascular:  Negative for chest pain, leg swelling and palpitations.   Gastrointestinal:  Negative for abdominal pain, blood in stool, constipation, diarrhea, nausea and vomiting.   Genitourinary:  Negative for dysuria, frequency and hematuria.    Musculoskeletal:  Negative for arthralgias and myalgias.   Skin:  Negative for itching, rash and wound.   Neurological:  Negative for dizziness, extremity weakness, headaches, light-headedness and numbness.   Hematological:  Does not bruise/bleed easily.         Infusion Readiness:  - Assessment Concerns Related to Infusion: No  - Provider notified: n/a      New Patient Education:    N/A (returning patient for continuation of therapy. Ongoing education provided as needed.)        Treatment Conditions & Drug Specific Questions:    Iron Sucrose (VENOFER)     (Unless otherwise specified on patient specific therapy plan):    REMINDERS:  May cause transient hypotension. Supine position recommended for infusion.   Pregnancy test prior to first dose for patients of childbearing age.    Recommended Vitals/Observation:  Vitals: Obtain vital signs before and after each infusion.  Observation: 30 minutes after the infusion is complete. Observation complete.      Lab Results   Component Value Date    IRON <10 (L) 05/20/2025    TIBC  05/20/2025      Comment:      One or more of the analytes used in this calculation is outside of the analytical measurement range.      FERRITIN 238 05/20/2025     Lab Results   Component Value Date    IRONSAT  05/20/2025      Comment:      One or more analytes used in this calculation is outside of the analytical measurement range. Calculation cannot be performed.      Lab Results   Component Value Date    WBC 6.9 07/22/2025    HGB 7.6 (L) 07/22/2025    HCT 24.8 (L) 07/22/2025    MCV 95  07/22/2025     07/22/2025            Weight Based Drug Calculations:    WEIGHT BASED DRUGS: NOT APPLICABLE / FLAT DOSE       Post Treatment: Patient tolerated treatment without issue and was discharged in no apparent distress.      Note Authored / Patient Cared for By: Julienne Pedraza RN  _________________________________________________________________________    Note authored and patient cared for by: Julienne Pedraza RN  Note/Encounter reviewed by: Selena CH NP. This provider on site at time of patient infusion. Infusion staff to notify this provider of any questions, concerns, abnormals or issues during infusion.    Final check of medication completed by this JING / or on-site pharmacist with administering nurse using positive identification prior to administration. Final appearance of product checked for accuracy and conformity to the formula of the prepared product. Assured use of correct ingredients, accurate calculations and precise measurements under appropriate conditions and procedures.    No issues reported during today's encounter. Pt. tolerated infusion without difficulty. Pt. not independently evaluated by this provider during today's encounter.  (Selena CH NP)

## 2025-08-04 NOTE — PROGRESS NOTES
[facility]   Infusion Clinic Note   Date: 2025   Name: Colin Leonard  : 1965   MRN: [id number]         Reason for Visit: OP Infusion (Venofer dose /)         Today: We administered iron sucrose (Venofer) 200 mg in sodium chloride 0.9% 110 mL IV.       Referring Provider: Boo Max MD    Plan Provider: MD Boo Cabrera MD      For a Diagnosis of: CKD (chronic kidney disease) stage 5, GFR less than 15 ml/min (Multi)       At today's visit patient accompanied by: Self      Today's Vitals:   Vitals:    25 1009 25 1120 25 1142   BP: 158/81 121/70 125/73   Pulse: 61 57 56   Resp: 18 18 18   Temp: 36.3 °C (97.3 °F) 36.4 °C (97.5 °F) 36.5 °C (97.7 °F)   SpO2: 97% 96% 95%   Weight: 116 kg (256 lb 12.8 oz)     PainSc: 0-No pain               Pre - Treatment Checklist:      - Previous reaction to current treatment: no      (Assess patient for the concerns below. Document provider notification as appropriate).  - Active or recent infection with/without current antibiotic use: no  - Recent or planned invasive dental work: no  - Recent or planned surgeries: yes 8/  - Recently received or plans to receive vaccinations: no  - Has treatment related toxicities: no  - Any chance may be pregnant:  n/a      Pain: 0   - Is the pain different from normal: n/a   - Is prescribing Doctor aware:  n/a      Labs: N/A      Fall Risk Screening: Wisdom Fall Risk  History of Falling, Immediate or Within 3 Months: No  Secondary Diagnosis: No  Ambulatory Aid: Walks without aid/bedrest/nurse assist  Intravenous Therapy/Heparin Lock: Yes  Gait/Transferring: Normal/bedrest/immobile  Mental Status: Oriented to own ability  Wisdom Fall Risk Score: 20            Review Of Systems:  Review of Systems   Constitutional:  Negative for appetite change, chills, fatigue, fever and unexpected weight change.   HENT:   Negative for hearing loss, mouth sores, sore  throat, tinnitus, trouble swallowing and voice change.    Eyes:  Negative for eye problems.   Respiratory:  Negative for cough, shortness of breath and wheezing.    Cardiovascular:  Negative for chest pain, leg swelling and palpitations.   Gastrointestinal:  Negative for abdominal pain, blood in stool, constipation, diarrhea, nausea and vomiting.   Genitourinary:  Negative for dysuria, frequency and hematuria.    Musculoskeletal:  Negative for arthralgias and myalgias.   Skin:  Negative for itching, rash and wound.   Neurological:  Negative for dizziness, extremity weakness, headaches, light-headedness and numbness.   Hematological:  Does not bruise/bleed easily.         Infusion Readiness:  - Assessment Concerns Related to Infusion: No  - Provider notified: n/a      New Patient Education:    N/A (returning patient for continuation of therapy. Ongoing education provided as needed.)        Treatment Conditions & Drug Specific Questions:    Iron Sucrose (VENOFER)     (Unless otherwise specified on patient specific therapy plan):    REMINDERS:  May cause transient hypotension. Supine position recommended for infusion.   Pregnancy test prior to first dose for patients of childbearing age.    Recommended Vitals/Observation:  Vitals: Obtain vital signs before and after each infusion.  Observation: 30 minutes after the infusion is complete. Observation complete.      Lab Results   Component Value Date    IRON <10 (L) 05/20/2025    TIBC  05/20/2025      Comment:      One or more of the analytes used in this calculation is outside of the analytical measurement range.      FERRITIN 238 05/20/2025     Lab Results   Component Value Date    IRONSAT  05/20/2025      Comment:      One or more analytes used in this calculation is outside of the analytical measurement range. Calculation cannot be performed.      Lab Results   Component Value Date    WBC 6.9 07/22/2025    HGB 7.6 (L) 07/22/2025    HCT 24.8 (L) 07/22/2025    MCV 95  07/22/2025     07/22/2025            Weight Based Drug Calculations:    WEIGHT BASED DRUGS: NOT APPLICABLE / FLAT DOSE       Post Treatment: Patient tolerated treatment without issue and was discharged in no apparent distress.      Note Authored / Patient Cared for By: Julienne Pedraza RN  _________________________________________________________________________    Note authored and patient cared for by: Julienne Pedraza RN  Note/Encounter reviewed by: Selena CH NP. This provider on site at time of patient infusion. Infusion staff to notify this provider of any questions, concerns, abnormals or issues during infusion.    Final check of medication completed by this JING / or on-site pharmacist with administering nurse using positive identification prior to administration. Final appearance of product checked for accuracy and conformity to the formula of the prepared product. Assured use of correct ingredients, accurate calculations and precise measurements under appropriate conditions and procedures.    No issues reported during today's encounter. Pt. tolerated infusion without difficulty. Pt. not independently evaluated by this provider during today's encounter.  (Selena CH NP)

## 2025-08-04 NOTE — PATIENT INSTRUCTIONS
Today :We administered iron sucrose (Venofer) 200 mg in sodium chloride 0.9% 110 mL IV.     For:   1. CKD (chronic kidney disease) stage 5, GFR less than 15 ml/min (Multi)         Your next appointment is due in:  FOLLOW UP WITH MD        Please read the  Medication Guide that was given to you and reviewed during todays visit.     (Tell all doctors including dentists that you are taking this medication)     Go to the emergency room or call 911 if:  -You have signs of allergic reaction:   -Rash, hives, itching.   -Swollen, blistered, peeling skin.   -Swelling of face, lips, mouth, tongue or throat.   -Tightness of chest, trouble breathing, swallowing or talking     Call your doctor:  - If IV / injection site gets red, warm, swollen, itchy or leaks fluid or pus.     (Leave dressing on your IV site for at least 2 hours and keep area clean and dry  - If you get sick or have symptoms of infection or are not feeling well for any reason.    (Wash your hands often, stay away from people who are sick)  - If you have side effects from your medication that do not go away or are bothersome.     (Refer to the teaching your nurse gave you for side effects to call your doctor about)    - Common side effects may include:  stuffy nose, headache, feeling tired, muscle aches, upset stomach  - Before receiving any vaccines     - Call the [facility] at   If:  - You get sick, are on antibiotics, have had a recent vaccine, have surgery or dental work and your doctor wants your visit rescheduled.  - You need to cancel and reschedule your visit for any reason. Call at least 2 days before your visit if you need to cancel.   - Your insurance changes before your next visit.    (We will need to get approval from your new insurance. This can take up to two weeks.)     The [facility] is opened Monday thru Friday. We are closed on weekends and holidays.   Voice mail will take your call if the center is closed. If  you leave a message please allow 24 hours for a call back during weekdays. If you leave a message on a weekend/holiday, we will call you back the next business day.    A pharmacist is available Monday - Friday from 8:30AM to 3:30PM to help answer any questions you may have about your prescriptions(s). Please call pharmacy at:    [facility]: [phone]  [facility]: [phone]  [facility]: [phone]

## 2025-08-05 ENCOUNTER — ANESTHESIA EVENT (OUTPATIENT)
Dept: OPERATING ROOM | Facility: HOSPITAL | Age: 60
End: 2025-08-05
Payer: COMMERCIAL

## 2025-08-05 NOTE — PROGRESS NOTES
Pharmacy Medication History Review    Colin Leonard is a 59 y.o. male who is planned to be admitted for Atherosclerosis of native coronary artery of native heart without angina pectoris. Pharmacy called the patient prior to their scheduled procedure and reviewed the patient's [id number] medications for accuracy.    Medications ADDED:  none  Medications CHANGED:  none  Medications REMOVED:   none    Please review updated prior to admission medication list and comments regarding how patient may be taking medications differently by going to Admission tab --> Admission Orders --> Admit Orders / Review prior to admission medications.     Preferred pharmacy, last doses of medications, and allergies to be confirmed with patient by nursing the day of procedure.     Sources [facility] to complete the med history [facility]  Pharmacy dispense history  Patient Interview Moderate historian  Chart Review  Care Everywhere     Below are additional concerns with the patient's PTA list.  Patient states they are taking #1 tablet of atorvastatin 20mg daily. But ran out of medication 5 days ago. L.F. 11-11-24 #30/30d. There is no recent fill history to confirm dosage    Marilyn Brown CPhT  Please reach out via Secure Chat for questions

## 2025-08-05 NOTE — PROGRESS NOTES
Pharmacy Medication History Review    Colin Leonard is a 59 y.o. male who is planned to be admitted for Atherosclerosis of native coronary artery of native heart without angina pectoris. Pharmacy called the patient prior to their scheduled procedure and reviewed the patient's [id number] medications for accuracy.    Medications ADDED:  none  Medications CHANGED:  none  Medications REMOVED:   none    Please review updated prior to admission medication list and comments regarding how patient may be taking medications differently by going to Admission tab --> Admission Orders --> Admit Orders / Review prior to admission medications.     Preferred pharmacy, last doses of medications, and allergies to be confirmed with patient by nursing the day of procedure.     Sources used to complete the med history include:  [email or web address]  Pharmacy dispense history  Patient Interview Moderate historian  Chart Review  Care Everywhere     Below are additional concerns with the patient's PTA list.  Patient states they are taking #1 tablet of atorvastatin 20mg daily. But ran out of medication 5 days ago. L.F. 11-11-24 #30/30d. There is no recent fill history to confirm dosage    Marilyn Brown CPhT  Please reach out via Secure Chat for questions

## 2025-08-06 ENCOUNTER — ANESTHESIA (OUTPATIENT)
Dept: OPERATING ROOM | Facility: HOSPITAL | Age: 60
End: 2025-08-06
Payer: COMMERCIAL

## 2025-08-06 ENCOUNTER — HOSPITAL ENCOUNTER (INPATIENT)
Dept: OPERATING ROOM | Facility: HOSPITAL | Age: 60
Discharge: HOME | End: 2025-08-06
Payer: COMMERCIAL

## 2025-08-06 ENCOUNTER — HOSPITAL ENCOUNTER (INPATIENT)
Facility: HOSPITAL | Age: 60
End: 2025-08-06
Attending: THORACIC SURGERY (CARDIOTHORACIC VASCULAR SURGERY) | Admitting: THORACIC SURGERY (CARDIOTHORACIC VASCULAR SURGERY)
Payer: COMMERCIAL

## 2025-08-06 ENCOUNTER — APPOINTMENT (OUTPATIENT)
Dept: RADIOLOGY | Facility: HOSPITAL | Age: 60
End: 2025-08-06
Payer: COMMERCIAL

## 2025-08-06 ENCOUNTER — HOSPITAL ENCOUNTER (OUTPATIENT)
Dept: OPERATING ROOM | Facility: HOSPITAL | Age: 60
Discharge: HOME | End: 2025-08-06

## 2025-08-06 PROBLEM — J18.9 PNEUMONIA: Status: ACTIVE | Noted: 2025-08-06

## 2025-08-06 PROBLEM — N18.9 CHRONIC RENAL INSUFFICIENCY: Status: ACTIVE | Noted: 2025-08-06

## 2025-08-06 PROBLEM — N18.6 ESRD (END STAGE RENAL DISEASE) (MULTI): Status: ACTIVE | Noted: 2025-08-06

## 2025-08-06 PROBLEM — I25.10 CORONARY ARTERY DISEASE INVOLVING NATIVE HEART, UNSPECIFIED VESSEL OR LESION TYPE, UNSPECIFIED WHETHER ANGINA PRESENT: Status: ACTIVE | Noted: 2025-08-06

## 2025-08-06 LAB
ACT BLD: 109 SEC (ref 82–174)
ACT BLD: 123 SEC (ref 82–174)
ACT BLD: 362 SEC (ref 82–174)
ACT BLD: 378 SEC (ref 82–174)
ACT BLD: 421 SEC (ref 82–174)
CFT FORM KAOLIN IND BLD RES TEG: 0.8 MIN (ref 0.8–2.1)
CFT FORM KAOLIN IND BLD RES TEG: 1 MIN (ref 0.8–2.1)
CFT FORM KAOLIN IND BLD RES TEG: >5 MIN (ref 0.8–2.1)
CLOT ANGLE.KAOLIN INDUCED BLD RES TEG: 76 DEG (ref 63–78)
CLOT ANGLE.KAOLIN INDUCED BLD RES TEG: 80 DEG (ref 63–78)
CLOT ANGLE.KAOLIN INDUCED BLD RES TEG: <39 DEG (ref 63–78)
CLOT INIT KAO IND P HEP NEUT BLD RES TEG: 11.1 MIN (ref 4.3–8.3)
CLOT INIT KAO IND P HEP NEUT BLD RES TEG: 11.2 MIN (ref 4.6–9.1)
CLOT INIT KAO IND P HEP NEUT BLD RES TEG: 8.2 MIN (ref 4.3–8.3)
CLOT INIT KAO IND P HEP NEUT BLD RES TEG: 9.7 MIN (ref 4.6–9.1)
CLOT INIT KAO IND P HEP NEUT BLD RES TEG: >17 MIN (ref 4.3–8.3)
CLOT INIT KAO IND P HEP NEUT BLD RES TEG: >17 MIN (ref 4.6–9.1)
FIBRINOGEN BLD CALC-MCNC: 536 MG/DL (ref 278–581)
FIBRINOGEN BLD CALC-MCNC: 542 MG/DL (ref 278–581)
FIBRINOGEN BLD CALC-MCNC: 741 MG/DL (ref 278–581)
MA KAOLIN BLD RES TEG: 66 MM (ref 52–69)
MA KAOLIN BLD RES TEG: 67 MM (ref 52–69)
MA KAOLIN BLD RES TEG: 70 MM (ref 52–69)
MA KAOLIN+TF BLD RES TEG: 67 MM (ref 52–70)
MA KAOLIN+TF BLD RES TEG: 67 MM (ref 52–70)
MA KAOLIN+TF BLD RES TEG: 71 MM (ref 52–70)
MA TF IND+IIB-IIIA INH BLD RES TEG: 29 MM (ref 15–32)
MA TF IND+IIB-IIIA INH BLD RES TEG: 30 MM (ref 15–32)
MA TF IND+IIB-IIIA INH BLD RES TEG: 41 MM (ref 15–32)
TEST COMMENT: ABNORMAL

## 2025-08-06 PROCEDURE — 2500000004 HC RX 250 GENERAL PHARMACY W/ HCPCS (ALT 636 FOR OP/ED): Performed by: ANESTHESIOLOGIST ASSISTANT

## 2025-08-06 PROCEDURE — 2500000001 HC RX 250 WO HCPCS SELF ADMINISTERED DRUGS (ALT 637 FOR MEDICARE OP): Performed by: ANESTHESIOLOGIST ASSISTANT

## 2025-08-06 PROCEDURE — 2500000002 HC RX 250 W HCPCS SELF ADMINISTERED DRUGS (ALT 637 FOR MEDICARE OP, ALT 636 FOR OP/ED): Performed by: ANESTHESIOLOGIST ASSISTANT

## 2025-08-06 PROCEDURE — 2500000005 HC RX 250 GENERAL PHARMACY W/O HCPCS: Performed by: ANESTHESIOLOGIST ASSISTANT

## 2025-08-06 PROCEDURE — 6360000002 HC RX 636 FACTOR: Performed by: ANESTHESIOLOGIST ASSISTANT

## 2025-08-06 PROCEDURE — 36430 TRANSFUSION BLD/BLD COMPNT: CPT | Performed by: ANESTHESIOLOGIST ASSISTANT

## 2025-08-06 RX ORDER — GLYCOPYRROLATE 0.6MG/3ML
SYRINGE (ML) INTRAVENOUS AS NEEDED
Status: DISCONTINUED | OUTPATIENT
Start: 2025-08-06 | End: 2025-08-06

## 2025-08-06 RX ORDER — MIDAZOLAM HYDROCHLORIDE 2 MG/2ML
INJECTION, SOLUTION INTRAMUSCULAR; INTRAVENOUS AS NEEDED
Status: DISCONTINUED | OUTPATIENT
Start: 2025-08-06 | End: 2025-08-06

## 2025-08-06 RX ORDER — EPINEPHRINE IN 0.9 % SOD CHLOR 4MG/250ML
PLASTIC BAG, INJECTION (ML) INTRAVENOUS CONTINUOUS PRN
Status: DISCONTINUED | OUTPATIENT
Start: 2025-08-06 | End: 2025-08-06

## 2025-08-06 RX ORDER — ALBUTEROL SULFATE 90 UG/1
INHALANT RESPIRATORY (INHALATION) AS NEEDED
Status: DISCONTINUED | OUTPATIENT
Start: 2025-08-06 | End: 2025-08-06

## 2025-08-06 RX ORDER — ATROPINE SULFATE 0.4 MG/ML
INJECTION, SOLUTION ENDOTRACHEAL; INTRAMEDULLARY; INTRAMUSCULAR; INTRAVENOUS; SUBCUTANEOUS AS NEEDED
Status: DISCONTINUED | OUTPATIENT
Start: 2025-08-06 | End: 2025-08-06

## 2025-08-06 RX ORDER — DEXTROSE 50 % IN WATER (D50W) INTRAVENOUS SYRINGE
AS NEEDED
Status: DISCONTINUED | OUTPATIENT
Start: 2025-08-06 | End: 2025-08-06

## 2025-08-06 RX ORDER — PROTAMINE SULFATE 10 MG/ML
INJECTION, SOLUTION INTRAVENOUS AS NEEDED
Status: DISCONTINUED | OUTPATIENT
Start: 2025-08-06 | End: 2025-08-06

## 2025-08-06 RX ORDER — FUROSEMIDE 10 MG/ML
INJECTION INTRAMUSCULAR; INTRAVENOUS AS NEEDED
Status: DISCONTINUED | OUTPATIENT
Start: 2025-08-06 | End: 2025-08-06

## 2025-08-06 RX ORDER — NITROGLYCERIN 40 MG/100ML
INJECTION INTRAVENOUS AS NEEDED
Status: DISCONTINUED | OUTPATIENT
Start: 2025-08-06 | End: 2025-08-06

## 2025-08-06 RX ORDER — HEPARIN SODIUM 1000 [USP'U]/ML
INJECTION, SOLUTION INTRAVENOUS; SUBCUTANEOUS AS NEEDED
Status: DISCONTINUED | OUTPATIENT
Start: 2025-08-06 | End: 2025-08-06

## 2025-08-06 RX ORDER — PROPOFOL 10 MG/ML
INJECTION, EMULSION INTRAVENOUS AS NEEDED
Status: DISCONTINUED | OUTPATIENT
Start: 2025-08-06 | End: 2025-08-06

## 2025-08-06 RX ORDER — CALCIUM CHLORIDE INJECTION 100 MG/ML
INJECTION, SOLUTION INTRAVENOUS AS NEEDED
Status: DISCONTINUED | OUTPATIENT
Start: 2025-08-06 | End: 2025-08-06

## 2025-08-06 RX ORDER — LIDOCAINE HYDROCHLORIDE 20 MG/ML
INJECTION, SOLUTION INFILTRATION; PERINEURAL AS NEEDED
Status: DISCONTINUED | OUTPATIENT
Start: 2025-08-06 | End: 2025-08-06

## 2025-08-06 RX ORDER — METHADONE IN SOD CHLOR,ISO-OSM 10 MG/ML
SYRINGE (ML) INTRAVENOUS AS NEEDED
Status: DISCONTINUED | OUTPATIENT
Start: 2025-08-06 | End: 2025-08-06

## 2025-08-06 RX ORDER — SODIUM CHLORIDE, SODIUM GLUCONATE, SODIUM ACETATE, POTASSIUM CHLORIDE AND MAGNESIUM CHLORIDE 30; 37; 368; 526; 502 MG/100ML; MG/100ML; MG/100ML; MG/100ML; MG/100ML
INJECTION, SOLUTION INTRAVENOUS CONTINUOUS PRN
Status: DISCONTINUED | OUTPATIENT
Start: 2025-08-06 | End: 2025-08-06

## 2025-08-06 RX ORDER — CEFAZOLIN 1 G/1
INJECTION, POWDER, FOR SOLUTION INTRAVENOUS AS NEEDED
Status: DISCONTINUED | OUTPATIENT
Start: 2025-08-06 | End: 2025-08-06

## 2025-08-06 RX ORDER — INSULIN LISPRO 100 [IU]/ML
INJECTION, SOLUTION INTRAVENOUS; SUBCUTANEOUS AS NEEDED
Status: DISCONTINUED | OUTPATIENT
Start: 2025-08-06 | End: 2025-08-06

## 2025-08-06 RX ORDER — FENTANYL CITRATE 50 UG/ML
INJECTION, SOLUTION INTRAMUSCULAR; INTRAVENOUS AS NEEDED
Status: DISCONTINUED | OUTPATIENT
Start: 2025-08-06 | End: 2025-08-06

## 2025-08-06 RX ORDER — ETOMIDATE 2 MG/ML
INJECTION INTRAVENOUS AS NEEDED
Status: DISCONTINUED | OUTPATIENT
Start: 2025-08-06 | End: 2025-08-06

## 2025-08-06 RX ORDER — SODIUM BICARBONATE 1 MEQ/ML
SYRINGE (ML) INTRAVENOUS AS NEEDED
Status: DISCONTINUED | OUTPATIENT
Start: 2025-08-06 | End: 2025-08-06

## 2025-08-06 RX ORDER — NOREPINEPHRINE BITARTRATE 0.03 MG/ML
INJECTION, SOLUTION INTRAVENOUS CONTINUOUS PRN
Status: DISCONTINUED | OUTPATIENT
Start: 2025-08-06 | End: 2025-08-06

## 2025-08-06 RX ORDER — METOPROLOL TARTRATE 1 MG/ML
INJECTION, SOLUTION INTRAVENOUS AS NEEDED
Status: DISCONTINUED | OUTPATIENT
Start: 2025-08-06 | End: 2025-08-06

## 2025-08-06 RX ORDER — ROCURONIUM BROMIDE 10 MG/ML
INJECTION, SOLUTION INTRAVENOUS AS NEEDED
Status: DISCONTINUED | OUTPATIENT
Start: 2025-08-06 | End: 2025-08-06

## 2025-08-06 RX ADMIN — VASOPRESSIN 1 UNITS: 20 INJECTION INTRAVENOUS at 10:58

## 2025-08-06 RX ADMIN — NOREPINEPHRINE BITARTRATE 8 MCG: 1 INJECTION, SOLUTION, CONCENTRATE INTRAVENOUS at 09:34

## 2025-08-06 RX ADMIN — VASOPRESSIN 1 UNITS: 20 INJECTION INTRAVENOUS at 10:18

## 2025-08-06 RX ADMIN — FENTANYL CITRATE 100 MCG: 50 INJECTION, SOLUTION INTRAMUSCULAR; INTRAVENOUS at 11:33

## 2025-08-06 RX ADMIN — SODIUM BICARBONATE 50 MEQ: 84 INJECTION INTRAVENOUS at 11:19

## 2025-08-06 RX ADMIN — FIBRINOGEN HUMAN 1049 MG: 1300 INJECTION, POWDER, LYOPHILIZED, FOR SOLUTION INTRAVENOUS at 12:53

## 2025-08-06 RX ADMIN — ROCURONIUM BROMIDE 100 MG: 10 INJECTION, SOLUTION INTRAVENOUS at 08:44

## 2025-08-06 RX ADMIN — PROPOFOL 30 MG: 10 INJECTION, EMULSION INTRAVENOUS at 08:44

## 2025-08-06 RX ADMIN — NOREPINEPHRINE BITARTRATE 16 MCG: 1 INJECTION, SOLUTION, CONCENTRATE INTRAVENOUS at 09:56

## 2025-08-06 RX ADMIN — MIDAZOLAM HYDROCHLORIDE 1 MG: 2 INJECTION, SOLUTION INTRAMUSCULAR; INTRAVENOUS at 08:18

## 2025-08-06 RX ADMIN — VASOPRESSIN 0.03 UNITS/MIN: 20 INJECTION, SOLUTION INTRAMUSCULAR; SUBCUTANEOUS at 11:25

## 2025-08-06 RX ADMIN — EPINEPHRINE IN SODIUM CHLORIDE 0.03 MCG/KG/MIN: 16 INJECTION INTRAVENOUS at 11:01

## 2025-08-06 RX ADMIN — Medication 0.02 MCG/KG/MIN: at 09:26

## 2025-08-06 RX ADMIN — SODIUM BICARBONATE 50 MEQ: 84 INJECTION INTRAVENOUS at 11:22

## 2025-08-06 RX ADMIN — INSULIN LISPRO 10 UNITS: 100 INJECTION, SOLUTION INTRAVENOUS; SUBCUTANEOUS at 11:20

## 2025-08-06 RX ADMIN — HEPARIN SODIUM 20000 UNITS: 1000 INJECTION, SOLUTION INTRAVENOUS; SUBCUTANEOUS at 10:47

## 2025-08-06 RX ADMIN — NITROGLYCERIN 50 MCG: 10 INJECTION INTRAVENOUS at 11:35

## 2025-08-06 RX ADMIN — Medication 0.2 MG: at 10:58

## 2025-08-06 RX ADMIN — MIDAZOLAM HYDROCHLORIDE 1 MG: 2 INJECTION, SOLUTION INTRAMUSCULAR; INTRAVENOUS at 08:30

## 2025-08-06 RX ADMIN — VASOPRESSIN 1 UNITS: 20 INJECTION INTRAVENOUS at 11:03

## 2025-08-06 RX ADMIN — CALCIUM CHLORIDE 1 G: 100 INJECTION, SOLUTION INTRAVENOUS at 12:18

## 2025-08-06 RX ADMIN — ROCURONIUM BROMIDE 10 MG: 10 INJECTION, SOLUTION INTRAVENOUS at 11:05

## 2025-08-06 RX ADMIN — EPINEPHRINE 16 MCG: 1 INJECTION INTRAMUSCULAR; INTRAVENOUS; SUBCUTANEOUS at 11:01

## 2025-08-06 RX ADMIN — VASOPRESSIN 1 UNITS: 20 INJECTION INTRAVENOUS at 11:16

## 2025-08-06 RX ADMIN — CEFAZOLIN 2 G: 1 INJECTION, POWDER, FOR SOLUTION INTRAMUSCULAR; INTRAVENOUS at 09:19

## 2025-08-06 RX ADMIN — PROTAMINE SULFATE 200 MG: 10 INJECTION, SOLUTION INTRAVENOUS at 11:43

## 2025-08-06 RX ADMIN — NOREPINEPHRINE BITARTRATE 8 MCG: 1 INJECTION, SOLUTION, CONCENTRATE INTRAVENOUS at 09:32

## 2025-08-06 RX ADMIN — EPINEPHRINE 32 MCG: 1 INJECTION INTRAMUSCULAR; INTRAVENOUS; SUBCUTANEOUS at 11:29

## 2025-08-06 RX ADMIN — FUROSEMIDE 20 MG: 10 INJECTION INTRAMUSCULAR; INTRAVENOUS at 11:10

## 2025-08-06 RX ADMIN — SODIUM CHLORIDE, SODIUM GLUCONATE, SODIUM ACETATE, POTASSIUM CHLORIDE AND MAGNESIUM CHLORIDE: 526; 502; 368; 37; 30 INJECTION, SOLUTION INTRAVENOUS at 08:15

## 2025-08-06 RX ADMIN — EPINEPHRINE 16 MCG: 1 INJECTION INTRAMUSCULAR; INTRAVENOUS; SUBCUTANEOUS at 11:17

## 2025-08-06 RX ADMIN — CEFAZOLIN 2 G: 1 INJECTION, POWDER, FOR SOLUTION INTRAMUSCULAR; INTRAVENOUS at 13:19

## 2025-08-06 RX ADMIN — VASOPRESSIN 2 UNITS: 20 INJECTION INTRAVENOUS at 11:29

## 2025-08-06 RX ADMIN — VASOPRESSIN 2 UNITS: 20 INJECTION INTRAVENOUS at 11:30

## 2025-08-06 RX ADMIN — DEXTROSE MONOHYDRATE 12.5 G: 25 INJECTION, SOLUTION INTRAVENOUS at 10:59

## 2025-08-06 RX ADMIN — NOREPINEPHRINE BITARTRATE 8 MCG: 1 INJECTION, SOLUTION, CONCENTRATE INTRAVENOUS at 09:26

## 2025-08-06 RX ADMIN — VASOPRESSIN 2 UNITS: 20 INJECTION INTRAVENOUS at 11:31

## 2025-08-06 RX ADMIN — SODIUM BICARBONATE 50 MEQ: 84 INJECTION INTRAVENOUS at 11:57

## 2025-08-06 RX ADMIN — PROTAMINE SULFATE 30 MG: 10 INJECTION, SOLUTION INTRAVENOUS at 11:56

## 2025-08-06 RX ADMIN — METOPROLOL TARTRATE 2 MG: 5 INJECTION, SOLUTION INTRAVENOUS at 08:44

## 2025-08-06 RX ADMIN — FENTANYL CITRATE 100 MCG: 50 INJECTION, SOLUTION INTRAMUSCULAR; INTRAVENOUS at 13:07

## 2025-08-06 RX ADMIN — NOREPINEPHRINE BITARTRATE 16 MCG: 1 INJECTION, SOLUTION, CONCENTRATE INTRAVENOUS at 11:17

## 2025-08-06 RX ADMIN — ROCURONIUM BROMIDE 30 MG: 10 INJECTION, SOLUTION INTRAVENOUS at 12:49

## 2025-08-06 RX ADMIN — INSULIN LISPRO 10 UNITS: 100 INJECTION, SOLUTION INTRAVENOUS; SUBCUTANEOUS at 10:59

## 2025-08-06 RX ADMIN — LIDOCAINE HYDROCHLORIDE 60 MG: 20 INJECTION, SOLUTION INFILTRATION; PERINEURAL at 08:44

## 2025-08-06 RX ADMIN — VASOPRESSIN 2 UNITS: 20 INJECTION INTRAVENOUS at 11:48

## 2025-08-06 RX ADMIN — FUROSEMIDE 20 MG: 10 INJECTION INTRAMUSCULAR; INTRAVENOUS at 10:47

## 2025-08-06 RX ADMIN — ALBUTEROL SULFATE 4 PUFF: 90 AEROSOL, METERED RESPIRATORY (INHALATION) at 11:12

## 2025-08-06 RX ADMIN — ATROPINE SULFATE 0.4 MG: 0.4 INJECTION, SOLUTION INTRAMUSCULAR; INTRAVENOUS; SUBCUTANEOUS at 11:01

## 2025-08-06 RX ADMIN — VASOPRESSIN 1 UNITS: 20 INJECTION INTRAVENOUS at 10:27

## 2025-08-06 RX ADMIN — ETOMIDATE 16 MG: 2 INJECTION, SOLUTION INTRAVENOUS at 08:44

## 2025-08-06 RX ADMIN — PROPOFOL 20 MG: 10 INJECTION, EMULSION INTRAVENOUS at 14:04

## 2025-08-06 RX ADMIN — NOREPINEPHRINE BITARTRATE 8 MCG: 1 INJECTION, SOLUTION, CONCENTRATE INTRAVENOUS at 09:39

## 2025-08-06 RX ADMIN — VASOPRESSIN 1 UNITS: 20 INJECTION INTRAVENOUS at 10:03

## 2025-08-06 RX ADMIN — SODIUM BICARBONATE 50 MEQ: 84 INJECTION INTRAVENOUS at 11:01

## 2025-08-06 RX ADMIN — FENTANYL CITRATE 400 MCG: 50 INJECTION, SOLUTION INTRAMUSCULAR; INTRAVENOUS at 08:44

## 2025-08-06 RX ADMIN — NOREPINEPHRINE BITARTRATE 16 MCG: 1 INJECTION, SOLUTION, CONCENTRATE INTRAVENOUS at 10:01

## 2025-08-06 RX ADMIN — PROPOFOL 50 MCG/KG/MIN: 10 INJECTION, EMULSION INTRAVENOUS at 13:46

## 2025-08-06 RX ADMIN — FENTANYL CITRATE 100 MCG: 50 INJECTION, SOLUTION INTRAMUSCULAR; INTRAVENOUS at 14:02

## 2025-08-06 RX ADMIN — NOREPINEPHRINE BITARTRATE 16 MCG: 1 INJECTION, SOLUTION, CONCENTRATE INTRAVENOUS at 09:41

## 2025-08-06 RX ADMIN — VASOPRESSIN 2 UNITS: 20 INJECTION INTRAVENOUS at 11:24

## 2025-08-06 RX ADMIN — NITROGLYCERIN 100 MCG: 10 INJECTION INTRAVENOUS at 11:34

## 2025-08-06 RX ADMIN — Medication 10 MG: at 09:42

## 2025-08-06 RX ADMIN — ROCURONIUM BROMIDE 20 MG: 10 INJECTION, SOLUTION INTRAVENOUS at 13:10

## 2025-08-06 ASSESSMENT — PAIN DESCRIPTION - ORIENTATION: ORIENTATION: MID

## 2025-08-06 ASSESSMENT — PAIN - FUNCTIONAL ASSESSMENT
PAIN_FUNCTIONAL_ASSESSMENT: UNABLE TO SELF-REPORT
PAIN_FUNCTIONAL_ASSESSMENT: CPOT (CRITICAL CARE PAIN OBSERVATION TOOL)
PAIN_FUNCTIONAL_ASSESSMENT: 0-10
PAIN_FUNCTIONAL_ASSESSMENT: CPOT (CRITICAL CARE PAIN OBSERVATION TOOL)
PAIN_FUNCTIONAL_ASSESSMENT: 0-10
PAIN_FUNCTIONAL_ASSESSMENT: 0-10

## 2025-08-06 ASSESSMENT — LIFESTYLE VARIABLES
AUDIT-C TOTAL SCORE: 0
HOW OFTEN DO YOU HAVE A DRINK CONTAINING ALCOHOL: NEVER
AUDIT-C TOTAL SCORE: 0
SKIP TO QUESTIONS 9-10: 1
HOW MANY STANDARD DRINKS CONTAINING ALCOHOL DO YOU HAVE ON A TYPICAL DAY: PATIENT DOES NOT DRINK
HOW OFTEN DO YOU HAVE 6 OR MORE DRINKS ON ONE OCCASION: NEVER
SUBSTANCE_ABUSE_PAST_12_MONTHS: NO
PRESCIPTION_ABUSE_PAST_12_MONTHS: NO

## 2025-08-06 ASSESSMENT — ACTIVITIES OF DAILY LIVING (ADL)
HEARING - RIGHT EAR: DIFFICULTY WITH NOISE
BATHING: INDEPENDENT
PATIENT'S MEMORY ADEQUATE TO SAFELY COMPLETE DAILY ACTIVITIES?: YES
WALKS IN HOME: INDEPENDENT
JUDGMENT_ADEQUATE_SAFELY_COMPLETE_DAILY_ACTIVITIES: YES
GROOMING: INDEPENDENT
ADEQUATE_TO_COMPLETE_ADL: YES
TOILETING: INDEPENDENT
HEARING - LEFT EAR: DIFFICULTY WITH NOISE
FEEDING YOURSELF: INDEPENDENT
DRESSING YOURSELF: INDEPENDENT

## 2025-08-06 ASSESSMENT — PAIN SCALES - GENERAL
PAINLEVEL_OUTOF10: 0 - NO PAIN
PAINLEVEL_OUTOF10: 10 - WORST POSSIBLE PAIN
PAINLEVEL_OUTOF10: 0 - NO PAIN

## 2025-08-06 ASSESSMENT — COGNITIVE AND FUNCTIONAL STATUS - GENERAL: PATIENT BASELINE BEDBOUND: NO

## 2025-08-06 ASSESSMENT — PAIN DESCRIPTION - LOCATION: LOCATION: CHEST

## 2025-08-06 NOTE — OP NOTE
CABG (CORONARY ARTERY BYPASS GRAFT) Operative Note     Date: 2025  OR Location: [facility] OR    Name: Colin Leonard, : 1965, Age: 59 y.o., MRN: [id number], Sex: male    Diagnosis  Pre-op Diagnosis      * Atherosclerosis of native coronary artery of native heart without angina pectoris [I25.10] Post-op Diagnosis     * Atherosclerosis of native coronary artery of native heart without angina pectoris [I25.10]     Procedures  CABG (CORONARY ARTERY BYPASS GRAFT)  34315 - NE CORONARY ARTERY BYP W/VEIN & ARTERY GRAFT 2 VEIN  1.  CABG x 2 off-pump with a LIMA to LAD and saphenous vein graft to distal circumflex.  2.  Endoscopic vein harvest.  3.  Right femoral dialysis catheter placement.  4.  Sternal plating.  Surgeons      * Franck Justice - Primary    Resident/Fellow/Other Assistant:  Surgeons and Role:     * Sydnie Escobar PA-C - Assisting     * Domingo Chavez MD - Assisting     * Remberto Lopez MD - Resident - Observing  There was no qualified resident for this opeation    Staff:   Circulator: Kleber Thomas Person: Flaquito  Circulator: Shekhar    Anesthesia Staff: Anesthesiologist: River Ramesh MD  C-AA: SELENE Kiran  Perfusionist: Jim Lang  BRUCE: Rosy Connell    Procedure Summary  Anesthesia: Anesthesia type not filed in the log.  ASA: IV  Estimated Blood Loss: 200 mL  Intra-op Medications:   Administrations occurring from 0745 to 1435 on 25:   Medication Name Total Dose   albuterol inhaler 4 puff   atropine 0.4 mg/mL 0.4 mg   calcium chloride 10% 1 g   ceFAZolin (Ancef) vial 1 g 2 g   dextrose 50 % in water IV syringe 12.5 g   electrolyte-A (Plasmalyte-A) Cannot be calculated   EPINEPHrine (Adrenalin) 16 mcg/mL syringe - compounded 64 mcg   EPINEPHrine (Adrenalin) 4 mg in sodium chloride 0.9% 250 mL (16 mcg/mL) infusion (premix) 0.75 mg   etomidate (Amidate) injection 16 mg   fentaNYL (Sublimaze) injection 50 mcg/mL 500 mcg   furosemide (Lasix) 10 mg/mL 40 mg    glycopyrrolate (Robinul) 0.2 mg/mL injection SYRINGE 0.2 mg   heparin injection 1,000 units/mL 20,000 Units   insulin lispro injection 100 units/mL 20 Units   lidocaine (Xylocaine) injection 2 % 60 mg   methadone (Dolophine) injection 10 mg   metoprolol 5 mg/5 mL 2 mg   midazolam PF (Versed) injection 1 mg/mL 2 mg   nitroglycerin 1 mg in 10 mL D5W IV bolus 150 mcg   norepinephrine (Levophed) 8 mg in sodium chloride 0.9% 250 mL (0.032 mg/mL) infusion (premix) 3.04 mg   norepinephrine (Levophed) 16 mcg/mL syringe for Anesthesia 96 mcg   propofol (Diprivan) injection 10 mg/mL 30 mg   protamine injection 230 mg   rocuronium (ZeMuron) 50 mg/5 mL injection 110 mg   sodium bicaronate syringe 8.4% (1 mEq/mL) 200 mEq   vasopressin (Vasostrict) 20 Units in sodium chloride 0.9% 100 mL (0.2 Units/mL) infusion 1.29 Units   vasopressin (Vasostrict) 1 unit/mL in NS IV bolus 16 Units              Anesthesia Record               Intraprocedure I/O Totals          Intake    PLASMA 600.00 mL    PLATELETS 250.00 mL    PRBC 1050.00 mL    Norepinephrine Drip 0.00 mL    The total shown is the total volume documented since Anesthesia Start was filed.    Epinephrine Drip 0.00 mL    The total shown is the total volume documented since Anesthesia Start was filed.    Vasopressin Drip 0.00 mL    The total shown is the total volume documented since Anesthesia Start was filed.    Cell Saver 240 mL    Total Intake 2140 mL       Output    Urine 467 mL    Est. Blood Loss 250 mL    Total Output 717 mL       Net    Net Volume 1423 mL          Specimen: No specimens collected              Drains and/or Catheters:   Urethral Catheter Temperature probe 14 Fr. (Active)       Tourniquet Times:         Implants:     Findings:   1.There were no pericardial adhesions or effusions.   2. The ascending aorta was soft without evidence of atherosclerosis.   3. The heart was sinus bradycardia and demonstrated normal left ventricular function.    4.The patient had  severe diffuse coronary artery disease, however we were able to find locations on the LAD, and PDA branch of the circumflex that was suitable for grafting.  5.  The conduits were suitable for grafting.  6.  The flow of the graft after the protamine were acceptable.  7.  The OMAR showed moderate aortic stenosis in a trileaflet valve with a mean gradient of 29.  We did talk to Dr. Lopez and Dr. Lester to assess the feasibility of performing a TAVR down the road.  Both agreed that that could be done.    Indications: Colin Leonard is an 59 y.o. male who is having surgery for Atherosclerosis of native coronary artery of native heart without angina pectoris [I25.10]. Colin is a 59-year-old male who was sent to our outpatient clinic after being diagnosed with triple-vessel disease in the context of his last admission due to C. difficile colitis and bilateral pneumonia. After his admission he is recovering quite well and was sent to our outpatient clinic for further evaluation and consideration of elective CABG. The left heart cath showed triple-vessel disease with a nondominant RCA. He had many risk factor including chronic kidney disease with a EGFR of 7 mL. We extensively discussed with the patient he is indication for CABG, that I think is class I interim of prognosis and symptom relief and he agreed to proceed. Interval risk I think the risk or of the surgery in terms of death of stroke is around 1 to 2%, and of course have an increased risk of need either temporary dialysis after the surgery or become a permanent dialysis patient.  With the new findings and the OMAR we discussed with the family the 2 alternative, 1 being a combined aortic valve replacement and CABG or an off-pump surgery plus TAVR in the near future and will decide for the later because of the extreme risk of permanent dialysis if we commit to AVR CABG.    The patient was seen in the preoperative area. The risks, benefits, complications,  treatment options, non-operative alternatives, expected recovery and outcomes were discussed with the patient. The possibilities of reaction to medication, pulmonary aspiration, injury to surrounding structures, bleeding, recurrent infection, the need for additional procedures, failure to diagnose a condition, and creating a complication requiring transfusion or operation were discussed with the patient. The patient concurred with the proposed plan, giving informed consent.  The site of surgery was properly noted/marked if necessary per policy. The patient has been actively warmed in preoperative area. Preoperative antibiotics have been ordered and given within 1 hours of incision. Venous thrombosis prophylaxis are not indicated.    Procedure Details: After informed consent, and positive identification, the patient was brought to the operative theatre. They were placed on the operating room table in the supine position and an arterial monitoring line was placed. They subsequently  underwent induction of anesthesia, and adolfo-tracheal intubation.  A OMAR probe was placed in the esophagus, and central intravenous access obtained. They were then prepped and draped in a sterile surgical fashion.      A surgical time-out was performed with the correct patient, correct procedure, laterality, timing and dosage of antibiotics, availability of blood, administration of beta blocker, fire risk, and sterility of  instruments were all verified, before beginning the case.     A median sternotomy was performed.  The left  internal thoracic arteries was dissected from the chest wall in a skeletonized manner .  In the meantime Saphenous was harvest endoscopically.  The thymus was divided and the pericardium was opened.  The ascending aorta was palpated and it was without evidence of atherosclerosis.  The patient was heparinized.  We performed a coronary artery bypass grafting without the assistance of cardiopulmonary bypass.  We used  the coronary artery stabilizing device and the heart positioner to expose and stabilize each coronaries and  intravascular shunts to obtain hemostasis and prevent ischemia during construction of the anastomoses.  The left internal thoracic artery was anastomosed in situ graft to the LAD  An end-to-side anastomosis was performed using 7-0 Prolene in a running fashion.  Immediately after the flow of the graft was measured.  The exposure of the inferior wall was not very well-tolerated because of the sinus bradycardia, so we placed atrial pacing temporary wires that allow us to perform the bypass.  Reverse length of saphenous vein was anastomosed to the PDA branch of the CX As an end-to-side anastomosis using 7-0 Prolene in a running fashion.  The proximal of the vein was anastomosed to the ascending aorta using, 6-0 Prolene and a 4 mm punch using partial crossclamp   The heparin effect was reversed with protamine.  Hemostasis was obtained, mediastinal and bilateral pleural chest tubes were placed, right ventricular pacemaker wires were placed on the diaphragmatic surface of the RV, and the wounds were closed in the standard fashion.  Reinforced with plates the patient tolerated the procedure well and was brought to the intensive care unit in stable condition.  To sponge counts, instrument counts, and needle counts were reported correct by the circulating nurse.  There were no specimens sent to pathology.  The drains included mediastinal and pleural chest tube.  Sydnie Escobar was the PA assisting the case, she harvest the saphenous vein, was a for assistant and placed the sternal plating patient under my direct supervision  Evidence of Infection: No   Complications:  None; patient tolerated the procedure well.    Disposition: ICU - intubated and critically ill.  Condition: stable                 Additional Details:     Attending Attestation: I was present and scrubbed for the entire procedure.    Franck Guerrero  Vinh  Phone Number: [phone]

## 2025-08-06 NOTE — ANESTHESIA PREPROCEDURE EVALUATION
Patient: Colin Leonard    Procedure Information       Date/Time: 08/06/25 0745    Procedure: CABG (CORONARY ARTERY BYPASS GRAFT)    Location: [facility] OR 19 / Virtual [facility] OR    Surgeons: Franck Justice MD            Relevant Problems   Anesthesia (within normal limits)  Past Surgical History:  No date: AMPUTATION  05/23/2025: CARDIAC CATHETERIZATION; N/A      Comment:  Procedure: LHC, With LV;  Surgeon: Shantelle Anderson MD;                 Location: [facility] Cardiac Cath Lab;  Service: Cardiovascular;               Laterality: N/A;  05/23/2025: CARDIAC CATHETERIZATION; N/A      Comment:  Procedure: LULA Stent - Coronary;  Surgeon: Shantelle Anderson MD;  Location: [facility] Cardiac Cath Lab;  Service:                Cardiovascular;  Laterality: N/A;  03/05/2025: CHOLECYSTECTOMY      Comment:  Laparoscopic Cholecystectomy  No date: COLONOSCOPY  No date: URETER SURGERY        Cardiac  [facility] 5/28/25:    Coronary Angiography:  The coronary circulation is left dominant.     Left Main Coronary Artery:  The left main is short vessel. No significant coronary disease.     Left Anterior Descending Coronary Artery Distribution:  Left anterior descending artery has severe lesion of 50% in the proximal segment. There is another 70 to 80% diffuse disease in the midsegment.     Circumflex Coronary Artery Distribution:  The left circumflex is a large dominant vessel patent in the proximal segment. Just after large obtuse marginal 1 there is severe lesion of 90% focal. Obtuse marginal 1, 2, 3 and PDA are patent. WYATT-3 flow.     Ramus Intermedius:  The ramus is small vessel without significant coronary disease.     Right Coronary Artery Distribution:     Right coronary artery is nondominant vessel with severe lesion in the midsegment of 90%.        Left Ventriculography:  Left ventricular end-diastolic pressure was mildly elevated at 16 mmHg.     EKG May 25 2025:    Normal sinus rhythm  Nonspecific T wave  abnormality  Abnormal ECG  When compared with ECG of 20-MAY-2025 12:10, (unconfirmed)  Nonspecific T wave abnormality, worse in Lateral leads       TTE May 2025:    CONCLUSIONS:   1. The left ventricular systolic function is normal, with a visually estimated ejection fraction of 60-65%.   2. There is mild concentric left ventricular hypertrophy.   3. Mild mitral valve regurgitation.   4. Trace to mild tricuspid regurgitation.   5. Mild aortic valve stenosis.   6. The peak instantaneous gradient of the aortic valve is 28 mmHg.   7. There is plaque visualized in the ascending aorta.        (+) Atherosclerosis of native coronary artery of native heart without angina pectoris   (+) CAD (coronary artery disease)   (+) Coronary artery disease involving native coronary artery of native heart without angina pectoris   (+) Hypertensive emergency   (+) Mixed hyperlipidemia   (+) NSTEMI (non-ST elevated myocardial infarction) (Multi)   (+) Primary hypertension      Pulmonary  Patient admitted to [facility] May 15-18 2025 for bilateral atypical pneumonia      CTA Chest 7/22/25:    IMPRESSION:  1.  Areas of patchy nodular infiltration redemonstrated bilaterally  including involving upper and lower lobes however overall less  pronounced compared to prior. Areas of patchy infiltrates/atelectasis  elsewhere redemonstrated bilaterally. Left lung base  consolidation/atelectasis. Continued close clinical correlation and  follow-up advised and suggest additional short interval follow-up  chest CT in 3 months or sooner if clinically indicated.  2. Mild mediastinal adenopathy redemonstrated similar to prior.  3. Trace bilateral effusions redemonstrated, similar on the left and  improved on the right.  4. Mild aneurysmal dilatation ascending thoracic aorta up to 4.1 cm  similar to prior.  5. Marked coronary artery calcifications. Correlate with coronary  risk factors.  6. Partially imaged junaid hepatis/peripancreatic adenopathy  probably  grossly similar to prior but otherwise incompletely evaluated.     (+) Pneumonia   (+) Shortness of breath on exertion      Neuro (within normal limits)      GI (within normal limits)   Hx acute diverticulitis, admitted to [facility] 5/15/25-5/18/25.       /Renal   (+) Chronic renal insufficiency   (+) ESRD (end stage renal disease) (Multi)   (+) Right renal stone      Liver   (+) Acute calculous cholecystitis   (+) Choledocholithiasis with acute cholecystitis      Endocrine   (+) Obesity   (+) Type 2 diabetes mellitus      Hematology   (+) Anemia      Musculoskeletal (within normal limits)      HEENT   (+) Vision loss      ID   (+) Acute hematogenous osteomyelitis of left foot (Multi)   (+) Osteomyelitis of ankle or foot, left, acute (Multi)   (+) Pneumonia      Skin (within normal limits)      GYN (within normal limits)       Clinical information reviewed:     Meds               NPO Detail:  No data recorded     ALLERGIES:  Allergies[1]     MEDICAL HISTORY:  Medical History[2]     Relevant Problems   Anesthesia (within normal limits)  Past Surgical History:  No date: AMPUTATION  05/23/2025: CARDIAC CATHETERIZATION; N/A      Comment:  Procedure: LHC, With LV;  Surgeon: Shantelle Anderson MD;                 Location: [facility] Cardiac Cath Lab;  Service: Cardiovascular;               Laterality: N/A;  05/23/2025: CARDIAC CATHETERIZATION; N/A      Comment:  Procedure: LULA Stent - Coronary;  Surgeon: Shantelle Anderson MD;  Location: [facility] Cardiac Cath Lab;  Service:                Cardiovascular;  Laterality: N/A;  03/05/2025: CHOLECYSTECTOMY      Comment:  Laparoscopic Cholecystectomy  No date: COLONOSCOPY  No date: URETER SURGERY        Cardiac  [facility] 5/28/25:    Coronary Angiography:  The coronary circulation is left dominant.     Left Main Coronary Artery:  The left main is short vessel. No significant coronary disease.     Left Anterior Descending Coronary Artery Distribution:  Left anterior  descending artery has severe lesion of 50% in the proximal segment. There is another 70 to 80% diffuse disease in the midsegment.     Circumflex Coronary Artery Distribution:  The left circumflex is a large dominant vessel patent in the proximal segment. Just after large obtuse marginal 1 there is severe lesion of 90% focal. Obtuse marginal 1, 2, 3 and PDA are patent. WYATT-3 flow.     Ramus Intermedius:  The ramus is small vessel without significant coronary disease.     Right Coronary Artery Distribution:     Right coronary artery is nondominant vessel with severe lesion in the midsegment of 90%.        Left Ventriculography:  Left ventricular end-diastolic pressure was mildly elevated at 16 mmHg.     EKG May 25 2025:    Normal sinus rhythm  Nonspecific T wave abnormality  Abnormal ECG  When compared with ECG of 20-MAY-2025 12:10, (unconfirmed)  Nonspecific T wave abnormality, worse in Lateral leads       TTE May 2025:    CONCLUSIONS:   1. The left ventricular systolic function is normal, with a visually estimated ejection fraction of 60-65%.   2. There is mild concentric left ventricular hypertrophy.   3. Mild mitral valve regurgitation.   4. Trace to mild tricuspid regurgitation.   5. Mild aortic valve stenosis.   6. The peak instantaneous gradient of the aortic valve is 28 mmHg.   7. There is plaque visualized in the ascending aorta.        (+) Atherosclerosis of native coronary artery of native heart without angina pectoris   (+) CAD (coronary artery disease)   (+) Coronary artery disease involving native coronary artery of native heart without angina pectoris   (+) Hypertensive emergency   (+) Mixed hyperlipidemia   (+) NSTEMI (non-ST elevated myocardial infarction) (Multi)   (+) Primary hypertension      Pulmonary  Patient admitted to [facility] [place], operated by [facility] May 15-18 2025 for bilateral atypical pneumonia      CTA Chest 7/22/25:    IMPRESSION:  1.  Areas of patchy nodular infiltration redemonstrated  bilaterally  including involving upper and lower lobes however overall less  pronounced compared to prior. Areas of patchy infiltrates/atelectasis  elsewhere redemonstrated bilaterally. Left lung base  consolidation/atelectasis. Continued close clinical correlation and  follow-up advised and suggest additional short interval follow-up  chest CT in 3 months or sooner if clinically indicated.  2. Mild mediastinal adenopathy redemonstrated similar to prior.  3. Trace bilateral effusions redemonstrated, similar on the left and  improved on the right.  4. Mild aneurysmal dilatation ascending thoracic aorta up to 4.1 cm  similar to prior.  5. Marked coronary artery calcifications. Correlate with coronary  risk factors.  6. Partially imaged junaid hepatis/peripancreatic adenopathy probably  grossly similar to prior but otherwise incompletely evaluated.     (+) Pneumonia   (+) Shortness of breath on exertion      Neuro (within normal limits)      GI (within normal limits)   Hx acute diverticulitis, admitted to [facility] 5/15/25-5/18/25.       /Renal   (+) Chronic renal insufficiency   (+) ESRD (end stage renal disease) (Multi)   (+) Right renal stone      Liver   (+) Acute calculous cholecystitis   (+) Choledocholithiasis with acute cholecystitis      Endocrine   (+) Obesity   (+) Type 2 diabetes mellitus      Hematology   (+) Anemia      Musculoskeletal (within normal limits)      HEENT   (+) Vision loss      ID   (+) Acute hematogenous osteomyelitis of left foot (Multi)   (+) Osteomyelitis of ankle or foot, left, acute (Multi)   (+) Pneumonia      Skin (within normal limits)      GYN (within normal limits)        SURGICAL HISTORY:  Surgical History[3]     MEDICATIONS:  Current Outpatient Medications   Medication Instructions    acetaminophen (TYLENOL) 650 mg, oral, Every 6 hours PRN    amiodarone (PACERONE) 200 mg, oral, 2 times daily    aspirin 81 mg, Daily    atorvastatin (LIPITOR) 20 mg, Daily    carvedilol  "(COREG) 37.5 mg, oral, 2 times daily    chlorhexidine (Hibiclens) 4 % external liquid Use as directed daily preoperatively    chlorhexidine (Peridex) 0.12 % solution Swish and spit with 15ml of solution the night before and morning of surgery. Do not swallow.    ezetimibe (ZETIA) 10 mg, oral, Nightly    hydrALAZINE (APRESOLINE) 100 mg, oral, 3 times daily    multivitamin tablet 1 tablet, Daily    sodium bicarbonate 1,300 mg, oral, 3 times daily    torsemide (DEMADEX) 60 mg, oral, Daily        VITALS:      8/4/2025    11:42 AM 8/4/2025    11:20 AM 8/4/2025    10:09 AM   Vitals   Systolic 125 121 158   Diastolic 73 70 81   BP Location   Left arm   Heart Rate 56 57 61   Temp 36.5 °C (97.7 °F) 36.4 °C (97.5 °F) 36.3 °C (97.3 °F)   Resp 18 18 18   Weight (lb)   256.8   BMI   36.85 kg/m2   BSA (m2)   2.39 m2       LABS:   BMP   Lab Results   Component Value Date    GLUCOSE 75 07/22/2025    CALCIUM 9.5 07/22/2025     07/22/2025    K 5.2 07/22/2025    CO2 21 07/22/2025     07/22/2025    BUN 97 (HH) 07/22/2025    CREATININE 10.16 (H) 07/22/2025   , BMP Extended      No lab exists for component: \"LABALBU\" , LFT   Lab Results   Component Value Date    ALT 10 07/22/2025    AST 9 07/22/2025    ALKPHOS 45 07/22/2025    BILITOT 0.6 07/22/2025   , MELD MELD 3.0: 19 at 7/22/2025  3:00 PM  MELD-Na: 21 at 7/22/2025  3:00 PM  Calculated from:  Serum Creatinine: 10.16 mg/dL (Using max of 3 mg/dL) at 7/22/2025  3:00 PM  Serum Sodium: 137 mmol/L at 7/22/2025  3:00 PM  Total Bilirubin: 0.6 mg/dL (Using min of 1 mg/dL) at 7/22/2025  3:00 PM  Serum Albumin: 3.8 g/dL (Using max of 3.5 g/dL) at 7/22/2025  3:00 PM  INR(ratio): 1.1 at 7/22/2025  3:00 PM  Age at listing (hypothetical): 59 years  Sex: Male at 7/22/2025  3:00 PM  , CBC  Lab Results   Component Value Date    WBC 6.9 07/22/2025    HGB 7.6 (L) 07/22/2025    HCT 24.8 (L) 07/22/2025    MCV 95 07/22/2025     07/22/2025          , CBC Extended       Latest Ref Rng & " Units 7/22/2025     3:00 PM 5/24/2025     6:42 AM 5/22/2025     5:13 AM 5/21/2025     5:38 AM 5/20/2025     5:21 AM 5/19/2025    10:34 AM 5/18/2025     6:16 AM   CBC   Hb 13.5 - 17.5 g/dL 7.6  8.6  8.1  8.0  7.4  8.9  7.1    Hct 41.0 - 52.0 % 24.8  27.3  26.2  26.1  23.3  27.9  22.7    MCV 80 - 100 fL 95  88  88  89  87  86  87    RDW 11.5 - 14.5 % 15.2  18.6  17.6  17.6  17.7  17.2  17.1      , Coags   Lab Results   Component Value Date/Time    PROTIME 12.5 (H) 07/22/2025 1500    INR 1.1 07/22/2025 1500    APTT 32 07/22/2025 1500      , Coags Extendied   , A1C   Lab Results   Component Value Date    HGBA1C 5.4 05/16/2025   , ABG   , ABG Extended          IMAGES:  EKG          Encounter Date: 05/19/25   ECG 12 lead   Result Value    Ventricular Rate 76    Atrial Rate 76    DC Interval 176    QRS Duration 88    QT Interval 400    QTC Calculation(Bazett) 450    P Axis 48    R Axis -4    T Axis 104    QRS Count 12    Q Onset 218    P Onset 130    P Offset 191    T Offset 418    QTC Fredericia 432    Narrative    Normal sinus rhythm  Nonspecific T wave abnormality  Abnormal ECG  When compared with ECG of 20-MAY-2025 12:10, (unconfirmed)  Nonspecific T wave abnormality, worse in Lateral leads  Confirmed by Jag Dorsey ([id number]) on 5/23/2025 10:36:44 AM      , ECHO         Transthoracic Echo (TTE) Limited With Doppler And Color 05/20/2025    [place] [address]  Phone [phone]    TRANSTHORACIC ECHOCARDIOGRAM REPORT    Patient Name:       COLIN Friedman Physician:    [id number] Colin Urban MD  Study Date:         5/20/2025             Ordering Provider:    [id number] KEELEY KENT  MRN/PID:            [id number]              Fellow:  Accession#:         [id number]          Nurse:  Date of Birth/Age:  1965 / 59 years Sonographer:          Vera Coffman  RUST  Gender Assigned at  M                     Additional Staff:  Birth:  Height:              177.80 cm             Admit Date:  Weight:             125.19 kg             Admission Status:  BSA / BMI:          2.39 m2 / 39.60 kg/m2 Department Location:  Blood Pressure: 133 /77 mmHg    Study Type:    TRANSTHORACIC ECHO (TTE) LIMITED  Diagnosis/ICD: Elevated Troponin-R79.89  Indication:    Elevated Troponin BNP  CPT Codes:     Echo Complete w Full [id number]    Patient History:  Pertinent History: CAD, HTN, Hyperlipidemia and NSTEMI.    Study Detail: The following Echo studies were performed: 2D, M-Mode, Doppler and  color flow.      PHYSICIAN INTERPRETATION:  Left Ventricle: The left ventricular systolic function is normal, with a visually estimated ejection fraction of 60-65%. There is mild concentric left ventricular hypertrophy. There are no regional wall motion abnormalities. The left ventricular cavity size is normal. There is mild increased septal and mildly increased posterior left ventricular wall thickness. Spectral Doppler shows a normal pattern of left ventricular diastolic filling. There is mild concentric left ventricular hypertrophy.  Left Atrium: The left atrial size is mildly dilated.  Right Ventricle: The right ventricle is normal in size. There is normal right ventriclar wall thickness. There is normal right ventricular global systolic function.  Right Atrium: The right atrial size is normal.  Aortic Valve: The aortic valve is trileaflet. There is mild to moderate aortic valve cusp calcification. There is reduced systolic aortic valve leaflet excursion. There is evidence of mild aortic valve stenosis.  The aortic valve dimensionless index is 0.46. There is trace aortic valve regurgitation. The peak instantaneous gradient of the aortic valve is 28 mmHg. The mean gradient of the aortic valve is 14 mmHg.  Mitral Valve: The mitral valve is normal in structure. There is normal mitral valve leaflet mobility. There is mild mitral annular calcification. The peak instantaneous gradient of the  mitral valve is 5 mmHg. There is mild mitral valve regurgitation.  Tricuspid Valve: The tricuspid valve is structurally normal. There is normal tricuspid valve leaflet mobility. There is trace to mild tricuspid regurgitation.  Pulmonic Valve: The pulmonic valve is structurally normal. There is trace pulmonic valve regurgitation.  Pericardium: No pericardial effusion noted.  Aorta: The aortic root is normal. There is no dilatation of the aortic arch. There is mild dilatation of the ascending aorta. There is no dilatation of the aortic root. There is plaque visualized in the ascending aorta, which is classified as a Grade 2 [mild (focal or diffuse) intimal thickening of 2-3 mm] atherosclerosis.  Pulmonary Artery: The pulmonary artery is normal in size. The tricuspid regurgitant velocity is 2.29 m/s, and with an estimated right atrial pressure of 3 mmHg, the estimated pulmonary artery pressure is normal with the RVSP at 24.0 mmHg. The estimated PASP is 24 mmHg.  Systemic Veins: The inferior vena cava appears normal in size, with IVC inspiratory collapse greater than 50%.  In comparison to the previous echocardiogram(s): Compared with study dated 3/11/2025,.      CONCLUSIONS:  1. The left ventricular systolic function is normal, with a visually estimated ejection fraction of 60-65%.  2. There is mild concentric left ventricular hypertrophy.  3. Mild mitral valve regurgitation.  4. Trace to mild tricuspid regurgitation.  5. Mild aortic valve stenosis.  6. The peak instantaneous gradient of the aortic valve is 28 mmHg.  7. There is plaque visualized in the ascending aorta.    QUANTITATIVE DATA SUMMARY:    2D MEASUREMENTS:             Normal Ranges:  LAs:             4.20 cm     (2.7-4.0cm)  IVSd:            1.24 cm     (0.6-1.1cm)  LVPWd:           1.11 cm     (0.6-1.1cm)  LVIDd:           5.43 cm     (3.9-5.9cm)  LVIDs:           3.33 cm  LV Mass Index:   108.3 g/m2  LVEDV Index:     86.80 ml/m2  LV % FS          38.7  %      LEFT ATRIUM:          Normal Ranges:  LA Area A4C: 27.0 cm2  LA Area A2C: 24.0 cm2      RIGHT ATRIUM:          Normal Ranges:  RA Area A4C:  20.0 cm2      M-MODE MEASUREMENTS:         Normal Ranges:  Ao Root:             3.30 cm (2.0-3.7cm)  AoV Exc:             1.50 cm (1.5-2.5cm)  LAs:                 5.00 cm (2.7-4.0cm)      AORTA MEASUREMENTS:         Normal Ranges:  AoV Exc:            1.50 cm (1.5-2.5cm)  Asc Ao, d:          4.00 cm (2.1-3.4cm)      LV SYSTOLIC FUNCTION:  Normal Ranges:  EF-A4C View:    64 % (>=55%)  EF-A2C View:    74 %  EF-Biplane:     69 %  EF-Visual:      63 %  LV EF Reported: 63 %      LV DIASTOLIC FUNCTION:             Normal Ranges:  MV Peak E:             1.14 m/s    (0.7-1.2 m/s)  MV Peak A:             1.07 m/s    (0.42-0.7 m/s)  E/A Ratio:             1.07        (1.0-2.2)  PulmV Sys Cirilo:         36.40 cm/s  PulmV Wallis Cirilo:        34.70 cm/s  PulmV S/D Cirilo:         1.00  PulmV A Revs Cirilo:      20.60 cm/s  PulmV A Revs Dur:      124.00 msec      MITRAL VALVE:          Normal Ranges:  MV Vmax:      1.13 m/s (<=1.3m/s)  MV peak P.1 mmHg (<5mmHg)  MV mean P.0 mmHg (<48mmHg)  MV DT:        192 msec (150-240msec)      AORTIC VALVE:                      Normal Ranges:  AoV Vmax:                2.65 m/s  (<=1.7m/s)  AoV Peak P.1 mmHg (<20mmHg)  AoV Mean P.0 mmHg (1.7-11.5mmHg)  LVOT Max Cirilo:            1.16 m/s  (<=1.1m/s)  AoV VTI:                 58.20 cm  (18-25cm)  LVOT VTI:                26.60 cm  LVOT Diameter:           2.10 cm   (1.8-2.4cm)  AoV Area, VTI:           1.58 cm2  (2.5-5.5cm2)  AoV Area,Vmax:           1.52 cm2  (2.5-4.5cm2)  AoV Dimensionless Index: 0.46      RIGHT VENTRICLE:  RV Basal 3.31 cm  RV Mid   2.73 cm  RV Major 8.1 cm  TAPSE:   21.4 mm      TRICUSPID VALVE/RVSP:          Normal Ranges:  Peak TR Velocity:     2.29 m/s  RV Syst Pressure:     24 mmHg  (< 30mmHg)  IVC Diam:             1.80 cm      PULMONIC  VALVE:          Normal Ranges:  PV Max Cirilo:     1.1 m/s  (0.6-0.9m/s)  PV Max P.2 mmHg      PULMONARY VEINS:  PulmV A Revs Dur: 124.00 msec  PulmV A Revs Cirilo: 20.60 cm/s  PulmV Wallis Cirilo:   34.70 cm/s  PulmV S/D Cirilo:    1.00  PulmV Sys Cirilo:    36.40 cm/s      [id number] Colin Urban MD  Electronically signed on 2025 at 12:26:57 PM        ** Final **    , CARDIAC CATH        Left Heart Catheterization with Coronaries and Left Ventriculography, Left Heart Catheterization with Coronaries and Left Ventriculography 2025    [place] [address]  Phone [phone]    Cardiovascular Catheterization Report    Patient Name:      COLIN MELVIN     Performing Physician:  [id number] Shantelle Anderson MD  Study Date:        2025             Verifying Physician:   [id number] Anderson MD  MRN/PID:           [id number]              Cardiologist/Co-Scrub:  Accession#:        [id number]          Ordering Provider:     [id number] GERMAN KENNEY  Date of Birth/Age: 1965 / 59 years Cardiologist:  Gender:            M                     Fellow:  Encounter#:        [id number]            Surgeon:      Study: Left Heart Cath      Indications:  COLIN MELVIN is a 59 year old male who presents with dyslipidemia, hypertension and a chest pain assessment of atypical angina. NSTE - ACS. Patient admitted with multifocal pneumonia shortness of breath and elevated troponin. Patient with recurrent episodes of shortness of breath and stage IV-V chronic kidney disease. After recurrent hospitalization for shortness of breath and occasional chest pain decision was made to proceed with cardiac catheterization. Discussed at length with patient risks of contrast-induced nephropathy. Patient is agreeable to proceed.    Procedure Description:  After infiltration with 2% Lidocaine, the right femoral artery was cannulated with a modified Seldinger technique. Subsequently a 5  Icelandic sheath was placed in the right femoral artery. Selective coronary catheterization was performed using a 5 Fr catheter(s) exchanged over a guide wire to cannulate the coronary arteries. A 4 tip catheter was used for left coronary injections. A 4 tip catheter was used for right coronary injections.  After completion of the procedure, femoral artery angiography was performed. This demonstrated a common femoral artery puncture appropriate for closure. A Mynx (Access) vascular closure device was placed per protocol.    Coronary Angiography:  The coronary circulation is left dominant.    Left Main Coronary Artery:  The left main is short vessel. No significant coronary disease.    Left Anterior Descending Coronary Artery Distribution:  Left anterior descending artery has severe lesion of 50% in the proximal segment. There is another 70 to 80% diffuse disease in the midsegment.    Circumflex Coronary Artery Distribution:  The left circumflex is a large dominant vessel patent in the proximal segment. Just after large obtuse marginal 1 there is severe lesion of 90% focal. Obtuse marginal 1, 2, 3 and PDA are patent. WYATT-3 flow.    Ramus Intermedius:  The ramus is small vessel without significant coronary disease.    Right Coronary Artery Distribution:    Right coronary artery is nondominant vessel with severe lesion in the midsegment of 90%.      Left Ventriculography:  Left ventricular end-diastolic pressure was mildly elevated at 16 mmHg. No gradient across aortic valve. LV gram was not obtained avoid excessive use of contrast.    Hemo Personnel:  +----------------+---------+  Name            Duty       +----------------+---------+  Shantelle Anderson MD 1  +----------------+---------+      Hemodynamic Pressures:    +----+-------------------+---------+------------+-------------+------+---------+  Site     Date Time       Phase    Systolic    Diastolic    ED  Mean mmHg                            Name       mmHg        mmHg      mmHg            +----+-------------------+---------+------------+-------------+------+---------+    AO  5/23/2025 3:44:45 AIR REST           0            0            -97                       PM                                                   +----+-------------------+---------+------------+-------------+------+---------+    AO  5/23/2025 4:02:24 AIR REST         146           92            118                       PM                                                   +----+-------------------+---------+------------+-------------+------+---------+    LV  5/23/2025 4:06:18 AIR REST         195          -12    17                                PM                                                   +----+-------------------+---------+------------+-------------+------+---------+    LV  5/23/2025 4:06:29 AIR REST         193          -11    16                                PM                                                   +----+-------------------+---------+------------+-------------+------+---------+   LVp  5/23/2025 4:06:36 AIR REST         192          -14    16                                PM                                                   +----+-------------------+---------+------------+-------------+------+---------+   AOp  5/23/2025 4:06:46 AIR REST         182           80            155                       PM                                                   +----+-------------------+---------+------------+-------------+------+---------+    AO  5/23/2025 4:13:38 AIR REST           0            0              0                       PM                                                   +----+-------------------+---------+------------+-------------+------+---------+      Cardiac Cath Post Procedure Notes:  Post Procedure Diagnosis: Triple vessel  disease.  Blood Loss:               Estimated blood loss during the procedure was 10cc  mls.  Specimens Removed:        Number of specimen(s) removed: none.    ____________________________________________________________________________________  CONCLUSIONS:  1. Severe three-vessel coronary artery disease. Recommend evaluation for CABG.    ICD 10 Codes:  Non ST elevation (NSTEMI) myocardial infarction-I21.4    CPT Codes:  Left Heart Cath (visualization of coronaries) and LV-42916; Moderate Sedation Services initial 15 minutes patient >5 years-83136    [id number] Shantelle Anderson MD  Performing Physician  Electronically signed by [id number] Shantelle Anderson MD on 6/6/2025 at 3:42:24 PM          ** Final **   , CXR       XR chest 2 views 05/24/2025    Narrative  Interpreted By:  Candelario Easley,  STUDY:  XR CHEST 2 VIEWS;  5/24/2025 9:26 am    INDICATION:  Signs/Symptoms:B/L pneumonia.      COMPARISON:  05/19/2020    ACCESSION NUMBER(S):  [id number]    ORDERING CLINICIAN:  NOEMI MESA    FINDINGS:          CARDIOMEDIASTINAL SILHOUETTE:  Cardiomediastinal silhouette is normal in size and configuration.    LUNGS:  Extensive multifocal airspace disease in the lungs appears improved  from the prior. Small effusions present..    ABDOMEN:  No remarkable upper abdominal findings.    BONES:  No acute osseous changes.    Impression  1.  Interval mild improvement of extensive multifocal bilateral  airspace disease. Continued follow-up to resolution is advised        MACRO:  None    Signed by: Candelario Easley 5/25/2025 11:11 AM  Dictation workstation:   [id number]    , CT Head/Neck       CT head wo IV contrast 03/08/2025    Narrative  Interpreted By:  Mahendra Reyes,  STUDY:  CT HEAD WO IV CONTRAST; ;  3/8/2025 4:50 pm    INDICATION:  Signs/Symptoms:vertigo.    COMPARISON:  09/06/2020    ACCESSION NUMBER(S):  [id number]    ORDERING CLINICIAN:  KIRK BARBER    TECHNIQUE:  Contiguous unenhanced axial CT sections are performed  from the skull  base to the vertex.    FINDINGS:  The osseous structures are intact. There is sessile lipoma overlying  the right frontal calvarium. There is some mild polyposis along the  anterior walls of the maxillary sinuses which are incompletely  visualized and mild mucoperiosteal thickening of the ethmoid air  cells.    There is moderate generalized parenchymal volume loss with  enlargement of the cortical sulci and CSF spaces. There is diffuse  hypoattenuation of the cerebral white matter bilaterally which is  nonspecific though could represent small-vessel ischemic changes.    There is no sign of parenchymal hematoma or dense extra-axial fluid  collection. There is no mass effect or midline shift.    Impression  Moderate generalized parenchymal atrophy.    Severe hypoattenuation in the cerebral white matter bilaterally is  nonspecific though may reflect small-vessel ischemic changes. The  need for further workup should be determined clinically.    No CT evidence of acute intracranial hemorrhage or mass effect.    Paranasal sinusitis as described above.      MACRO:  None    Signed by: Mahendra Reyes 3/8/2025 6:43 PM  Dictation workstation:   [id number]    , CT Chest        CT chest wo IV contrast 07/22/2025    Narrative  Interpreted By:  Tomi Mahmood,  STUDY:  CT CHEST WO IV CONTRAST;  7/22/2025 11:55 am    INDICATION:  Signs/Symptoms:cad.    COMPARISON:  05/17/2025    ACCESSION NUMBER(S):  [id number]    ORDERING CLINICIAN:  MONROE WILEY    TECHNIQUE:  Helical data acquisition of the chest was obtained without IV  contrast material.  Images were reformatted in axial, coronal, and  sagittal planes.    FINDINGS:  LUNGS AND AIRWAYS:  Mild diffuse bronchial wall thickening. Trace bilateral effusions  similar on the left and improved on the right since prior. Patchy  nodular infiltration involving upper and lower lobes bilaterally  redemonstrated but overall less pronounced compared to prior.  Associated  nodular components remain but overall are decreased and  less conspicuous. Patchy bibasilar infiltrates/atelectasis elsewhere  redemonstrated and some mild left lung base consolidation atelectasis.    MEDIASTINUM AND SCOUT, LOWER NECK AND AXILLA:  The visualized thyroid gland is within normal limits.    Mildly prominent nonspecific paratracheal nodes up to 1 cm short axis  and subcarinal nodes up to 8 mm short axis redemonstrated. Limited  hilar assessment without IV contrast. Similar mildly prominent  nonspecific axillary nodes.    Esophagus stable in appearance.    HEART AND VESSELS:  The thoracic aorta is stable in course and caliber with prominence  ascending segment up to 4.1 cm .  Moderate-to-marked vascular  calcifications.    Main pulmonary artery and its branches are stable in caliber.    Marked coronary artery calcifications are seen. The study is not  optimized for evaluation of coronary arteries.    The heart appears enlarged.    Small pericardial effusion.    UPPER ABDOMEN:  Prominent junaid hepatis/peripancreatic nodes up to at least 1.7 cm  short axis probably similar to prior but incompletely included.  Partially imaged kidneys demonstrate 5.3 cm low-density left renal  lesion likely a cyst but incompletely characterized. Lobulated renal  contour partially imaged suboptimally evaluated without IV contrast.  Vascular calcifications abdominal aorta and branch vessels. Prior  cholecystectomy.    CHEST WALL AND OSSEOUS STRUCTURES:  Bones appear demineralized. Multilevel degenerative changes  visualized spine. Multilevel bridging anterior osteophytes can be  seen in the setting of ankylosing spondylitis and/or DISH. Marked  bilateral gynecomastia redemonstrated.    Impression  1.  Areas of patchy nodular infiltration redemonstrated bilaterally  including involving upper and lower lobes however overall less  pronounced compared to prior. Areas of patchy infiltrates/atelectasis  elsewhere redemonstrated  bilaterally. Left lung base  consolidation/atelectasis. Continued close clinical correlation and  follow-up advised and suggest additional short interval follow-up  chest CT in 3 months or sooner if clinically indicated.  2. Mild mediastinal adenopathy redemonstrated similar to prior.  3. Trace bilateral effusions redemonstrated, similar on the left and  improved on the right.  4. Mild aneurysmal dilatation ascending thoracic aorta up to 4.1 cm  similar to prior.  5. Marked coronary artery calcifications. Correlate with coronary  risk factors.  6. Partially imaged junaid hepatis/peripancreatic adenopathy probably  grossly similar to prior but otherwise incompletely evaluated.  7. Additional findings as above.    MACRO:  None    Signed by: Tomi Mahmood 7/24/2025 3:19 PM  Dictation workstation:   [id number]   , CT Abdomin     No results found for this or any previous visit from the past 730 days.    SOCIAL:  Tobacco Use History[4]   Social History     Substance and Sexual Activity   Alcohol Use Not Currently      Social History     Substance and Sexual Activity   Drug Use Never        NPO STATUS:  No data recorded    Clinical Areas Reviewed:     Meds               Anesthesia Assessment:    Background as per EMR review:    HPI  The patient is a 59 year old male with hypertension, hyperlipidemia, CAD, carotid artery stenosis, CKD, valvular disease, DM with recently noted triple vessel disease on heart cath. He is referred today by Dr. Franck Justice for perioperative evaluation in anticipation of CABG on 8/6/25.         Physical Exam    Airway  Mallampati: II  TM distance: >3 FB  Neck ROM: full  Mouth opening: 3 or more finger widths     Cardiovascular - normal exam  Rhythm: regular     Dental    Pulmonary - normal exam   Abdominal - normal examAbdomen: soft  Bowel sounds: normal           Anesthesia Plan    History of general anesthesia?: yes  History of complications of general anesthesia?: no    ASA 4     general      intravenous induction   Postoperative pain plan includes opioids.  Anesthetic plan and risks discussed with patient.  Use of blood products discussed with patient who consented to blood products.    Plan discussed with CAA and attending.                 [1]   Allergies  Allergen Reactions    Amlodipine Besylate Swelling     edema legs   [2]   Past Medical History:  Diagnosis Date    C. difficile colitis     hx of C. difficile colitis in March 2025    CHF (congestive heart failure)     chronic diastolic heart failure    CKD (chronic kidney disease)     Stage V    Coronary artery disease     Diabetes mellitus (Multi)     DVT (deep venous thrombosis) (Multi)     Hypertension     MI (myocardial infarction) (Multi)     Pneumonia     Multifocal pneumonia 5/2025    PONV (postoperative nausea and vomiting)     Type 2 diabetes mellitus     no meds controlled with diet   [3]   Past Surgical History:  Procedure Laterality Date    AMPUTATION      CARDIAC CATHETERIZATION N/A 05/23/2025    Procedure: LHC, With LV;  Surgeon: Shantelle Anderson MD;  Location: [facility];  Service: Cardiovascular;  Laterality: N/A;    CARDIAC CATHETERIZATION N/A 05/23/2025    Procedure: LULA Stent - Coronary;  Surgeon: Shantelle Anderson MD;  Location: [facility] Cardiac Cath Lab;  Service: Cardiovascular;  Laterality: N/A;    CHOLECYSTECTOMY  03/05/2025    Laparoscopic Cholecystectomy    COLONOSCOPY      URETER SURGERY     [4]   Social History  Tobacco Use   Smoking Status Never   Smokeless Tobacco Never

## 2025-08-06 NOTE — ANESTHESIA POSTPROCEDURE EVALUATION
Patient: Colin Leonard    Procedure Summary       Date: 08/06/25 Room / Location: [facility] OR 19 / [facility]    Anesthesia Start: 0815 Anesthesia Stop: 1445    Procedure: OFF PUMP CABG; LIMA-LAD, SVG- DISTAL OM Diagnosis:       Atherosclerosis of native coronary artery of native heart without angina pectoris      (Atherosclerosis of native coronary artery of native heart without angina pectoris [I25.10])    Surgeons: Franck Justice MD Responsible Provider: River Ramesh MD    Anesthesia Type: general ASA Status: 4            Anesthesia Type: general    Vitals Value Taken Time   /75 08/06/25 14:30   Temp 35.3 °C (95.54 °F) 08/06/25 14:39   Pulse 70 08/06/25 14:45   Resp 16 08/06/25 14:45   SpO2 99 % 08/06/25 14:45   Vitals shown include unfiled device data.    Anesthesia Post Evaluation    Patient location during evaluation: ICU  Patient participation: complete - patient cannot participate  Level of consciousness: sedated  Pain management: adequate  Airway patency: patent  Cardiovascular status: acceptable and hemodynamically stable  Respiratory status: acceptable and intubated  Hydration status: acceptable  Postoperative Nausea and Vomiting: none        There were no known notable events for this encounter.

## 2025-08-06 NOTE — Clinical Note
Pt tolerated procedure w/o difficulty.  VSS. Still under anesthesia care. R groin closed @0945 w [employer]; RLE flat/HOB<30 x 3h until 1245. Report at BS to CTICU LEROY Rodriguez.

## 2025-08-06 NOTE — BRIEF OP NOTE
Date: 2025  OR Location: [facility] OR    Name: Colin Leonard, : 1965, Age: 59 y.o., MRN: [id number], Sex: male    Diagnosis  Pre-op Diagnosis      * Atherosclerosis of native coronary artery of native heart without angina pectoris [I25.10] Post-op Diagnosis     * Atherosclerosis of native coronary artery of native heart without angina pectoris [I25.10]     Procedures  OFF PUMP CABG; LIMA-LAD, SVG- DISTAL OM  62[address] - MO CORONARY ARTERY BYP W/VEIN & ARTERY GRAFT 2 VEIN  Median Sternotomy  Off Pump CABG x 2 (LIMA to LAD, SVG to distal CX)  Sternal Plating with Sternal Lock     Chest Tubes/Drains: 1 mediastinal chest tubes, one left pleural chest tube       Temporary Pacing Wires: Atrial and Ventricular Temporary pacing wires    -Settings: 90AAI   -Underlying Rhythm:Sinus Nav    Permanent pacer/ICD: No   -Preoperative settings:    -Intra-op/ Postoperative settings:    Sternotomy performed by: Yolanda Justice    Conduit Harvested by: Shawn    Sternal Wires placed by: Shawn    Arm/Leg/Groin Closure/Cutdown performed by: Shawn    Cardio Pulmonary Bypass Time: Off Pump  Cross-clamp Time: Off Pump  Circulatory Arrest: No Time:     Is patient candidate for Emergency Re-sternotomy? Yes   -If yes, POD #10 is - 25      Surgeons      * Franck Justice - Primary    Resident/Fellow/Other Assistant:  Surgeons and Role:     * Sydnie Escobar PA-C - Assisting     * Domingo Chavez MD - Assisting     * Remberto Lopez MD - Resident - Observing  [facility]  Staff:   Circulator: Kleber Thomas Person: Flaquito  Circulator: Shekhar    Anesthesia Staff: Anesthesiologist: River Ramesh MD  C-AA: SELENE Kiran  Perfusionist: Jim Lang  BRUCE: Rosy Connell    Procedure Summary  Anesthesia: Anesthesia type not filed in the log.  ASA: IV  Estimated Blood Loss: 250mL  Intra-op Medications:   Administrations occurring from 0745 to 1435 on 25:   Medication Name Total Dose   papaverine injection 60 mg    vancomycin (Vancocin) vial for injection 6 g   electrolyte-A (Plasmalyte-A) solution 1,000 mL   heparin 1,000 unit/mL injection 10,000 Units   albuterol inhaler 4 puff   atropine 0.4 mg/mL 0.4 mg   calcium chloride 10% 1 g   ceFAZolin (Ancef) vial 1 g 4 g   dextrose 50 % in water IV syringe 12.5 g   electrolyte-A (Plasmalyte-A) Cannot be calculated   EPINEPHrine (Adrenalin) 16 mcg/mL syringe - compounded 64 mcg   EPINEPHrine (Adrenalin) 4 mg in sodium chloride 0.9% 250 mL (16 mcg/mL) infusion (premix) 0.71 mg   etomidate (Amidate) injection 16 mg   fentaNYL (Sublimaze) injection 50 mcg/mL 700 mcg   fibrinogen concentrate (Riastap) injection 1,049 mg   furosemide (Lasix) 10 mg/mL 40 mg   glycopyrrolate (Robinul) 0.2 mg/mL injection SYRINGE 0.2 mg   heparin injection 1,000 units/mL 20,000 Units   insulin lispro injection 100 units/mL 20 Units   lidocaine (Xylocaine) injection 2 % 60 mg   methadone (Dolophine) injection 10 mg   metoprolol 5 mg/5 mL 2 mg   midazolam PF (Versed) injection 1 mg/mL 2 mg   nitroglycerin 1 mg in 10 mL D5W IV bolus 150 mcg   norepinephrine (Levophed) 8 mg in sodium chloride 0.9% 250 mL (0.032 mg/mL) infusion (premix) 2.4 mg   norepinephrine (Levophed) 16 mcg/mL syringe for Anesthesia 96 mcg   propofol (Diprivan) injection 10 mg/mL 335.18 mg   protamine injection 230 mg   rocuronium (ZeMuron) 50 mg/5 mL injection 160 mg   sodium bicaronate syringe 8.4% (1 mEq/mL) 200 mEq   vasopressin (Vasostrict) 20 Units in sodium chloride 0.9% 100 mL (0.2 Units/mL) infusion 1.29 Units   vasopressin (Vasostrict) 1 unit/mL in NS IV bolus 16 Units              Anesthesia Record               Intraprocedure I/O Totals          Intake    PLASMA 900.00 mL    PLATELETS 500.00 mL    PRBC 1400.00 mL    electrolyte-A (Plasmalyte-A) 1400.00 mL    Norepinephrine Drip 0.00 mL    The total shown is the total volume documented since Anesthesia Start was filed.    Epinephrine Drip 0.00 mL    The total shown is the total  volume documented since Anesthesia Start was filed.    Vasopressin Drip 0.00 mL    The total shown is the total volume documented since Anesthesia Start was filed.    Cell Saver 730 mL    Total Intake 4930 mL       Output    Urine 844 mL    Est. Blood Loss 250 mL    Total Output 1094 mL       Net    Net Volume 3836 mL          Specimen: No specimens collected               Findings: CAD    Complications:  None; patient tolerated the procedure well.     Disposition: ICU - intubated and hemodynamically stable.  Condition: stable  Specimens Collected: No specimens collected  Attending Attestation: I was present and scrubbed for the key portions of the procedure.    Franck Justice  Phone Number: [phone]

## 2025-08-06 NOTE — Clinical Note
Pt into Angio rm 3 from CTICU accompanied by anesth, CTICU RN, and RT. Levo, Propofol, KVO gtts infusing. Chest tubes x3 to suction. Pt ID'd and moved to table for prep.

## 2025-08-06 NOTE — Clinical Note
RIJ tunneled HD catheter placed. Patient received a total of 100mcg fentanyl IVP during case. New catheter aspirated, flushed, heparin loaded, capped, locked, sutured in place and CHG tegaderm applied. No visible bleeding or hematoma at RIJ sites. VSS throughout procedure.

## 2025-08-06 NOTE — ANESTHESIA PROCEDURE NOTES
Airway  Date/Time: 8/6/2025 8:47 AM  Reason: elective      Staffing  Performed: BRUCE   Authorized by: River Ramesh MD    Performed by: SELENE Kiran  Patient location during procedure: OR    Patient Condition  Indications for airway management: anesthesia  Sedation level: deep     Final Airway Details   Preoxygenated: yes  Final airway type: endotracheal airway  Successful airway: ETT   Successful intubation technique: video laryngoscopy  Blade size: #4  ETT size (mm): 7.5  Cormack-Lehane Classification: grade I - full view of glottis  Placement verified by: chest auscultation and capnometry   Measured from: lips  ETT to lips (cm): 23  Number of attempts at approach: 1    Additional Comments  [facility]

## 2025-08-06 NOTE — H&P
CTICU History & Physical    Subjective   HPI:  Colin Leonard is a 59 y.o. male hx of HTN, CKD stage 5, chronic anemia, type 2 diabetes, hx of c diff colitis who was admitted 5/19-5/24 for shortness of breathe was incidentally found to have atypical pneumonia which he was treated for. He was found to have elevated trop and BNP on that admission with a few episodes of reported VT which required increased dose of coreg. He underwent LHC 5/22/25 demonstrated MVCAD and decision was made to discharge and let him recover from his pneumonia treated with Augmentin and oral vanco prophyllacticaly due to h/o cdiff. and bring back for CABG. He is now s/p off pump CABGx with Dr Yolanda Justice on 8/6/25     Cardiac Testing:      TTE: 5/19  CONCLUSIONS:   1. The left ventricular systolic function is normal, with a visually estimated ejection fraction of 60-65%.   2. There is mild concentric left ventricular hypertrophy.   3. Mild mitral valve regurgitation.   4. Trace to mild tricuspid regurgitation.   5. Mild aortic valve stenosis.   6. The peak instantaneous gradient of the aortic valve is 28 mmHg.   7. There is plaque visualized in the ascending aorta.        LHC:  5/22 with triple vessel CAD     Procedure/Surgeon: CABG Yolanda Justice  1.  CABG x 2 off-pump with a LIMA to LAD and saphenous vein graft to distal circumflex.  2.  Endoscopic vein harvest.  3.  Right femoral dialysis catheter placement.  4.  Sternal plating.  Frontliner/Anesthesia: Domitila  Out of OR Time (document on ventilator card): 1430     OR Course/Issues: hyperkalemia requiring multiple K cocktains and bicarb pushes     Echo Pre/Post: pre 55%, normal RV, mild-mod MR, mod/sev AR. Post: EF 50% rv unchanged remains with AR  Chest Tubes/Drains: 1 med and 1 left pleural  Temporary wires location/setting: A/V wires      Fluids  Crystalloid: 1.8L  Cellsaver: 730mL  Products: FFP: 3u, RBC: 4u, Platelet: 10 pack  EBL: 200 mL  UOP: 460 mL     Anesthesia  Intubation: MAC 4 Grade  1  Intravenous Access: right femoral trialyis, right internal jugular MAC  Benzodiazepine dose/last administration: 2mg midazolam total  Opioid dose/last administration: 700mcg fentanyl total, 10mg Methadeone  NMB dose/last administration: 160mg rocuronium total  TOF/ reversal given: 4 twitches on arrival, no NMB given  Antibiotic time: 0919     [Medical History]    [Medical History]  Past Medical History       Diagnosis Date    C. difficile colitis       hx of C. difficile colitis in March 2025    CHF (congestive heart failure)       chronic diastolic heart failure    CKD (chronic kidney disease)       Stage V    Coronary artery disease      Diabetes mellitus (Multi)      DVT (deep venous thrombosis) (Multi)      Hypertension      MI (myocardial infarction) (Multi)      Pneumonia       Multifocal pneumonia 5/2025    PONV (postoperative nausea and vomiting)      Type 2 diabetes mellitus       no meds controlled with diet     [Surgical History]    [Surgical History]  Past Surgical History        Procedure Laterality Date    AMPUTATION        CARDIAC CATHETERIZATION N/A 05/23/2025     Procedure: LHC, With LV;  Surgeon: Shantelle Anderson MD;  Location: [facility] Cardiac [facility] Lab;  Service: Cardiovascular;  Laterality: N/A;    CARDIAC CATHETERIZATION N/A 05/23/2025     Procedure: LULA Stent - Coronary;  Surgeon: Shantelle Anderson MD;  Location: [facility] Cardiac Cath Lab;  Service: Cardiovascular;  Laterality: N/A;    CHOLECYSTECTOMY   03/05/2025     Laparoscopic Cholecystectomy    COLONOSCOPY        URETER SURGERY         [Prescriptions Prior to Admission]    [Prescriptions Prior to Admission]          Medications Prior to Admission   Medication Sig Dispense Refill Last Dose/Taking    amiodarone (Pacerone) 200 mg tablet Take 1 tablet (200 mg) by mouth 2 times a day. 60 tablet 1      aspirin 81 mg EC tablet Take 1 tablet (81 mg) by mouth once daily.     5/18/2025    atorvastatin (Lipitor) 20 mg tablet Take 1 tablet (20 mg) by mouth once  daily.     5/18/2025    carvedilol (Coreg) 12.5 mg tablet Take 3 tablets (37.5 mg) by mouth 2 times a day. 180 tablet 0      chlorhexidine (Hibiclens) 4 % external liquid Use as directed daily preoperatively 473 mL 0      chlorhexidine (Peridex) 0.12 % solution Swish and spit with 15ml of solution the night before and morning of surgery. Do not swallow. 15 mL 0      ezetimibe (Zetia) 10 mg tablet Take 1 tablet (10 mg) by mouth once daily at bedtime. 90 tablet 3 5/18/2025    hydrALAZINE (Apresoline) 100 mg tablet Take 1 tablet (100 mg) by mouth 3 times a day. 270 tablet 3 5/23/2025    multivitamin tablet Take 1 tablet by mouth once daily.     Unknown    sodium bicarbonate 650 mg tablet Take 2 tablets (1,300 mg) by mouth 3 times a day. 180 tablet 1 5/23/2025    torsemide (Demadex) 20 mg tablet Take 3 tablets (60 mg) by mouth once daily. 90 tablet 0 Unknown    acetaminophen (Tylenol) 325 mg tablet Take 2 tablets (650 mg) by mouth every 6 hours if needed for moderate pain (4 - 6).     Unknown     Amlodipine besylate  [Social History]    [Social History]       Tobacco Use    Smoking status: Never    Smokeless tobacco: Never   Vaping Use    Vaping status: Never Used   Substance Use Topics    Alcohol use: Not Currently    Drug use: Never     [Family History]    [Family History]         Problem Relation Name Age of Onset    Heart disease Mother        Other (cabg) Mother        Rheumatic fever Mother        Heart disease Father        Stroke Paternal Grandfather        Heart attack Paternal Grandfather              Medical History[1]  Surgical History[2]  Prescriptions Prior to Admission[3]  Amlodipine besylate  Social History[4]  Family History[5]      Objective   Vitals:  Most Recent:  Vitals:    08/06/25 0717   BP: 178/85   Pulse: 63   Resp: 18   Temp: 36.5 °C (97.7 °F)   SpO2: 96%       24hr Min/Max:  Temp  Min: 36.5 °C (97.7 °F)  Max: 36.5 °C (97.7 °F)  Pulse  Min: 63  Max: 63  BP  Min: 178/85  Max: 178/85  Resp  Min:  "18  Max: 18  SpO2  Min: 96 %  Max: 96 %    I/O:  No intake/output data recorded.    LDA:  CVC 08/06/25 Double lumen Right Internal jugular (Active)   Placement Date/Time: 08/06/25 (c) 0919   Hand Hygiene Performed Prior to CVC Insertion: Yes  Site Prep: Chlorhexidine   Site Prep Agent has Completely Dried Before Insertion: Yes  All 5 Sterile Barriers Used (Gloves, Gown, Cap, Mask, Large Sterile Madeline...   Number of days: 0       Arterial Line 08/06/25 Left Brachial (Active)   Placement Date/Time: 08/06/25 (c) 0836   Size: 20 G  Orientation: Left  Location: Brachial  Securement Method: Transparent dressing  Patient Tolerance: Tolerated well   Number of days: 0       ETT  7.5 mm (Active)   Placement Date/Time: 08/06/25 (c) 0847   Mask Ventilation: Vent by mask + OA or adjuvant +/- NMBA  Technique: Video laryngoscopy  ETT Type: ETT - single  Single Lumen Tube Size: 7.5 mm  Blade Size: 4  Grade View: Full view of the glottis  Airway Inser...   Number of days: 0       Physical Exam:   - CONSTITUTION: intubated and sedated  - CARDIOVASCULAR: AAI pace on arrival, underlying sinus bradycardia in the 50s, pacer set to AI 70  - RESPIRATORY: intubated with equal chest expansion, diminished thoroughout  - GI: obese, soft non distended  - : thornton in place with yellow urine  - EXTREMITIES: cool, right ankle area with scabbed area on arrival, left leg ace in place with good distal perfusion, right groin trialysis line in place with occlusive dressing in place  - SKIN: cool and dry  - PSYCHIATRIC: unable to asses    Lab Review:            No lab exists for component: \"LABALBU\"      Results from last 7 days   Lab Units 08/06/25  0843   POCT PH, ARTERIAL pH 7.37*   POCT PCO2, ARTERIAL mm Hg 29*   POCT PO2, ARTERIAL mm Hg 112*   POCT HCO3 CALCULATED, ARTERIAL mmol/L 16.8*   POCT BASE EXCESS, ARTERIAL mmol/L -7.6*       Most recent labs and imaging reviewed.    Daily Risk Screen  Intubated: yes--> plan for extubation tonight  Central " line:--> yes, resucitation and HD  Elmore: yes I&Os    Assessment/Plan     Assessment:   Colin Leonard is a 59 y.o. male hx of HTN, CKD stage 5, chronic anemia, type 2 diabetes, hx of c diff colitis who was admitted 5/19-5/24 for shortness of breathe was incidentally found to have atypical pneumonia which he was treated for. He was found to have elevated trop and BNP on that admission with a few episodes of reported VT which required increased dose of coreg. He underwent LHC 5/22/25 demonstrated MVCAD and decision was made to discharge and let him recover from his pneumonia treated with Augmentin and oral vanco prophyllacticaly due to h/o cdiff. and bring back for CABG. He is now s/p CABGx with Dr Yolanda Justice on 8/6/2     Plan:  NEURO: Patient is intubated and sedated on propofol infusion. Acute post operative pain.   -->  - Serial neuro and pain assessments   - Continue propofol until NMB reversal, then daily sedation vacation at minimum   - NMB reversal when normothermic and hemodynamically stable.    - Scheduled Tylenol   - PRN oxycodone  - PRN dilaudid for pain   - PT Consult, OOB to chair as tolerated, chair position if not tolerated   - CAM ICU score qshift  - Sleep/wake cycle hygiene     CV:  CAD, HTN SVT/NSVT, Chronic diastolic HF stage II on admission 5/2025 requiring up titration of coreg. now status post CABG. Pre-55% with mod/sever AI normal RV post- 50% unchanged RV. A/V epicardial wires set AAI @ 70.-->  - Patients baseline -180 per family, will allow for permissive hypertension in the setting of chronic hypertension, goal -150s  - Volume resuscitate as clinically indicated  - Maintain epicardial wires set AAI @ 70  - High intensity statin and zetia  - Will need structural heart eval in future for TAVR eval, this can be done at discharge and not needed as inpatient unless clinical indication  - Will need plavix prior to discharge-> very oozy will wait on starting  - Hold home coreg,  torsemide and hydralazine     PULM:  recent pnuemonia completed course of Augmentin outpatient. Currently intubated on ventilator. Chest tubes left pleural and mediastinal with minimal serousang drainage mediastinal intermittent air leak.-->  - F/u post op CXR  - Once reversed, wean ventilator settings towards CPAP & extubation  - Wean FiO2 maintaining SpO2 >92%.   - IS q1h and OOB to chair when extubated  - Chest tubes to wall suction.     GI:  h/o cdiff colitis  OG in place.-->  - Continue PPI until extubated  - NPO, will perform bedside swallow eval post extubation   - Colace/senna BID and miralax BID        :  CSA-TEJA Risk Score med.  Baseline CKD IV previous admision Cr ~7 baseline PAT Cr 10. Thornton in place and making adequate UOP. Intra-op significant hyperkalemia requiring multiple K cocktails and bicarb pushes-->  - Continue thornton catheter for strict I/Os.  - Nephrology consulted --> appreciate recs  - RFP as clinically indicated  - Replete electrolytes per CTICU protocol     ENDO: DM2 not on home mede,  A1c: 5.4-->  - Maintain BG <180, insulin per CTICU protocol     HEME: Anemia of chronic disease  Acute on chronic blood loss anemia-->    - Monitor drain output volume and characteristics  - CBC, coags, post op and as clinically indicated  - SQH tomorrow if appropriate  - SCDs for DVT prophylaxis  - Last type and screen: 8/6     ID:  Afebrile, no current indications of infection. MRSA negative.-->  - Trend temp q4h  - Periop cefazolin x 48hrs     Skin:  No active skin issues.  - preventative Mepilex dressings in place on sacrum and heels  - change preventative Mepilex weekly or more frequently as indicated (when moist/soiled)   - every shift skin assessment per nursing and weekly ICU skin rounds  - moisture barrier to be applied with diane care  - active skin problems addressed with nursing on daily rounds     Proph:  SCDs  PPI     G:  Line  Right IJ MAC w Minimac placed 8/6  Right Groin trialysis line  8/6  Left brachial a-line placed 8/6     F: Family: will update at bedside postoperatively.     A,B,C,D,E,F,G: reviewed     I personally spent 70 minutes of critical care time directly and personally managing the patient exclusive of separately billable procedures.     Dispo: CTICU care for now.    CTICU TEAM PHONE [phone]         [1]   Past Medical History:  Diagnosis Date    C. difficile colitis     hx of C. difficile colitis in March 2025    CHF (congestive heart failure)     chronic diastolic heart failure    CKD (chronic kidney disease)     Stage V    Coronary artery disease     Diabetes mellitus (Multi)     DVT (deep venous thrombosis) (Multi)     Hypertension     MI (myocardial infarction) (Multi)     Pneumonia     Multifocal pneumonia 5/2025    PONV (postoperative nausea and vomiting)     Type 2 diabetes mellitus     no meds controlled with diet   [2]   Past Surgical History:  Procedure Laterality Date    AMPUTATION      CARDIAC CATHETERIZATION N/A 05/23/2025    Procedure: LHC, With LV;  Surgeon: Shantelle Anderson MD;  Location: [facility] Cardiac Cath Lab;  Service: Cardiovascular;  Laterality: N/A;    CARDIAC CATHETERIZATION N/A 05/23/2025    Procedure: LULA Stent - Coronary;  Surgeon: Shantelle Anderson MD;  Location: [facility] Cardiac Cath Lab;  Service: Cardiovascular;  Laterality: N/A;    CHOLECYSTECTOMY  03/05/2025    Laparoscopic Cholecystectomy    COLONOSCOPY      URETER SURGERY     [3]   Medications Prior to Admission   Medication Sig Dispense Refill Last Dose/Taking    amiodarone (Pacerone) 200 mg tablet Take 1 tablet (200 mg) by mouth 2 times a day. 60 tablet 1 8/6/2025 Morning    aspirin 81 mg EC tablet Take 1 tablet (81 mg) by mouth once daily.   8/6/2025 Morning    atorvastatin (Lipitor) 20 mg tablet Take 1 tablet (20 mg) by mouth once daily.   Past Week    carvedilol (Coreg) 12.5 mg tablet Take 3 tablets (37.5 mg) by mouth 2 times a day. 180 tablet 0 8/6/2025 Morning    chlorhexidine (Hibiclens) 4 % external  liquid Use as directed daily preoperatively 473 mL 0 8/6/2025 Morning    chlorhexidine (Peridex) 0.12 % solution Swish and spit with 15ml of solution the night before and morning of surgery. Do not swallow. 15 mL 0 8/6/2025 Morning    ezetimibe (Zetia) 10 mg tablet Take 1 tablet (10 mg) by mouth once daily at bedtime. 90 tablet 3 8/5/2025    hydrALAZINE (Apresoline) 100 mg tablet Take 1 tablet (100 mg) by mouth 3 times a day. 270 tablet 3 8/5/2025    multivitamin tablet Take 1 tablet by mouth once daily.   Past Week    sodium bicarbonate 650 mg tablet Take 2 tablets (1,300 mg) by mouth 3 times a day. 180 tablet 1 8/6/2025 Morning    torsemide (Demadex) 20 mg tablet Take 3 tablets (60 mg) by mouth once daily. 90 tablet 0 8/6/2025 Morning    acetaminophen (Tylenol) 325 mg tablet Take 2 tablets (650 mg) by mouth every 6 hours if needed for moderate pain (4 - 6).   Unknown   [4]   Social History  Tobacco Use    Smoking status: Never    Smokeless tobacco: Never   Vaping Use    Vaping status: Never Used   Substance Use Topics    Alcohol use: Not Currently    Drug use: Never   [5]   Family History  Problem Relation Name Age of Onset    Heart disease Mother      Other (cabg) Mother      Rheumatic fever Mother      Heart disease Father      Stroke Paternal Grandfather      Heart attack Paternal Grandfather

## 2025-08-06 NOTE — ANESTHESIA PROCEDURE NOTES
Introducer:    Date/Time: 8/6/2025 9:19 AM    A central venous line was placed in the OR for the following indication(s): central venous access and CVP monitoring.  Staffing  Performed: CAA and BRUCE   Authorized by: River Ramesh MD    Performed by: SELENE Kiran    Sterility preparation included the following: provider hand hygiene performed prior to central venous catheter insertion, all 5 sterile barriers used (gloves, gown, cap, mask, large sterile drape) during central venous catheter insertion, antiseptic used during central venous catheter insertion and skin prep agent completely dried prior to procedure.  The patient was placed in Trendelenburg position.    Right internal jugular vein was prepped.    The site was prepped with Chlorhexidine.  Size: 9 Fr   Length: 11.5  Number of Lumens: double lumen      During the procedure, the following specific steps were taken: target vein identified, needle advanced into vein and blood aspirated and guidewire advanced into vein.  Seldinger technique used.    Procedure performed using ultrasound guidance.  Sterile gel and probe cover used in ultrasound-guided central venous catheter insertion.    Intravenous verification was obtained by ultrasound, venous blood return and manometry.      Post insertion care included: all ports aspirated, all ports flushed easily, guidewire removed intact, Biopatch applied, line sutured in place and dressing applied.    During the procedure the patient experienced: patient tolerated procedure well with no complications.             images stored in chart

## 2025-08-06 NOTE — CONSULTS
"NEPHROLOGY NEW CONSULT NOTE   Colin Leonard   59 y.o.    @WT@  MRN[facility]: [id number][facility]    Reason for consult: CKD5 and dialysis initiation    HPI:  Colin Leonard is a 59 year old male with essential hypertension, hyperlipidemia, CAD, carotid artery stenosis, CKD5 (unclear etiology), hyperphosphatemia, chronic metabolic acidosis,  anemia in CKD, valvular disease, DM with recently noted triple vessel disease on heart cath, admitted s/p elective off pump CABG x 2 (LIMA to LAD, SVG to distal CX) on 8/6, with nephrology consulted for CKD 5 and initiation of dialysis.     Nephrologic History:  - Follows with Dr. Boo Max at [address]  - On 4/23: Dr. Max noted that patient has baseline of creatinine in mid sixes for several years now. He is a slow progress or and is reasonably compensated. Bicarb tablets TID were initiated in this visit. Some talk about access planning but patient was slightly resistant.  - On 7/9: Dialysis dependency was discussed though has been living at a GFR of less than 10 for some time, with more recent GFR being 6. Also plan for epo and iron to be given prior to CABG.  His acidosis was managed with bicarbonate therapy, albeit a little bit low. His phosphorus was elevated. Plan was for eventual access planning, but CABG took precedence.   - Plan was to administer epoetin china (retacrit) for kidney disease 7/29 but not given due to high BP (pt did not take BP meds prior to appointment). On 7/31 and 8/4, received ironsucrose (venofer) 200 mg in Nacl 0l9%.  - Last US from notes in 2022 with normal sized kidneys though kidney has significantly progressed since then.      In The ER: /85   Pulse 63   Temp 36.5 °C (97.7 °F) (Temporal)   Resp 18   Ht 1.778 m (5' 10\")   Wt 116 kg (256 lb 9.9 oz)   SpO2 96%   BMI 36.82 kg/m²      Medical History[1]   Surgical History[2]   Family History[3]  Social History     Socioeconomic History    " Marital status:      Spouse name: Not on file    Number of children: Not on file    Years of education: Not on file    Highest education level: Not on file   Occupational History    Not on file   Tobacco Use    Smoking status: Never    Smokeless tobacco: Never   Vaping Use    Vaping status: Never Used   Substance and Sexual Activity    Alcohol use: Not Currently    Drug use: Never    Sexual activity: Not on file   Other Topics Concern    Not on file   Social History Narrative    Not on file     Social Drivers of Health     Financial Resource Strain: Low Risk  (5/20/2025)    Overall Financial Resource Strain (CARDIA)     Difficulty of Paying Living Expenses: Not very hard   Food Insecurity: No Food Insecurity (5/19/2025)    Hunger Vital Sign     Worried About Running Out of Food in the Last Year: Never true     Ran Out of Food in the Last Year: Never true   Transportation Needs: No Transportation Needs (5/20/2025)    PRAPARE - Transportation     Lack of Transportation (Medical): No     Lack of Transportation (Non-Medical): No   Physical Activity: Insufficiently Active (5/19/2025)    Exercise Vital Sign     Days of Exercise per Week: 1 day     Minutes of Exercise per Session: 30 min   Stress: No Stress Concern Present (5/19/2025)    [employer] - Occupational Stress Questionnaire     Feeling of Stress : Only a little   Recent Concern: Stress - Stress Concern Present (3/27/2025)    [employer] - Occupational Stress Questionnaire     Feeling of Stress : To some extent   Social Connections: Moderately Isolated (5/19/2025)    Social Connection and Isolation Panel     Frequency of Communication with Friends and Family: More than three times a week     Frequency of Social Gatherings with Friends and Family: More than three times a week     Attends Holiness Services: Never     Active Member of Clubs or Organizations: No     Attends Club or Organization  Meetings: Never     Marital Status: Living with partner   Intimate Partner Violence: Not At Risk (5/19/2025)    Humiliation, Afraid, Rape, and Kick questionnaire     Fear of Current or Ex-Partner: No     Emotionally Abused: No     Physically Abused: No     Sexually Abused: No   Housing Stability: Low Risk  (5/20/2025)    Housing Stability Vital Sign     Unable to Pay for Housing in the Last Year: No     Number of Times Moved in the Last Year: 0     Homeless in the Last Year: No       Allergies[4]     Prescriptions Prior to Admission[5]     Meds:   insulin lispro, 0-15 Units, q4h      electrolyte-A  EPINEPHrine  norepinephrine  propofol      dextrose, 25 g, q15 min PRN   Or  glucagon, 1 mg, q15 min PRN  electrolyte-A, , Continuous PRN  heparin, , PRN  papaverine, , PRN  vancomycin, , PRN        Vitals:    08/06/25 0717   BP: 178/85   Pulse: 63   Resp: 18   Temp: 36.5 °C (97.7 °F)   SpO2: 96%        No intake/output data recorded.   Weight change:      General appearance: Patient intubated and sedated on propofol  Heart: RRR  Lungs: Patient intubated though diminished breath sounds bilaterally, hard to assess with patient intubated  Abdomen: soft, nt/nd  Extremities: no edema bilat  Neuro: Not assessed due to patient being intubated  ACCESS:   - PIV  - Arterial Line  - CVC  - Fem line      ASSESSMENT:  Colin Leonard is a 59 year old male with essential hypertension, hyperlipidemia, CAD, carotid artery stenosis, CKD5 (unclear etiology), hyperphosphatemia, chronic metabolic acidosis,  anemia in CKD, valvular disease, DM with recently noted triple vessel disease on heart cath, admitted s/p elective off pump CABG x 2 (LIMA to LAD, SVG to distal CX) on 8/6, with nephrology consulted for CKD 5 and initiation of dialysis.   Given ongoing discussion for dialysis between patient and his outpatient nephrologist, as well as electrolyte abnormalities noted during surgery and need for volume control post-CABG, patient is  currently a candidate for CVVH.  Given advanced CKD at baseline, it is possible that he may need long-term dialysis.    Patient's current indication for dialysis is volume control post-CABG.     #CKD5/ESRD  :: Baseline Creatinine at around 6.5  :: Creatinine on admission: 9.38  :: Electrolytes: Na 144, K 5.1, Ca 8.3. Phos 6.1  :: Acid base status: Bicarb 22  :: Patient hemodynamically stable post CABG  :: Intra-op had hyperkalemia on blood gas to 6.8 requiring multiple K cocktails and bicarb, current K at 5.1.   :: Femoral Trialysis line placed in surgery     Recommendations:  - Indication for dialysis: Volume control post-CABG  - Plan to start CVVH today  - Please obtain RFP at least q12hr  - Avoid nephrotoxins, contrast if possible  - strict Is/Os  - Renal dosing for medications for latest eGFR, follow medication trough as appropriate  - Avoid hypotension/rapid fluctuations in BPs    Joey Arroyo, PGY1    Discussed with attending nephrologist           [1]   Past Medical History:  Diagnosis Date    C. difficile colitis     hx of C. difficile colitis in March 2025    CHF (congestive heart failure)     chronic diastolic heart failure    CKD (chronic kidney disease)     Stage V    Coronary artery disease     Diabetes mellitus (Multi)     DVT (deep venous thrombosis) (Multi)     Hypertension     MI (myocardial infarction) (Multi)     Pneumonia     Multifocal pneumonia 5/2025    PONV (postoperative nausea and vomiting)     Type 2 diabetes mellitus     no meds controlled with diet   [2]   Past Surgical History:  Procedure Laterality Date    AMPUTATION      CARDIAC CATHETERIZATION N/A 05/23/2025    Procedure: LHC, With LV;  Surgeon: Shantelle Anderson MD;  Location: [facility];  Service: Cardiovascular;  Laterality: N/A;    CARDIAC CATHETERIZATION N/A 05/23/2025    Procedure: LULA Stent - Coronary;  Surgeon: Shantelle Anderson MD;  Location: [facility];  Service: Cardiovascular;  Laterality: N/A;     CHOLECYSTECTOMY  03/05/2025    Laparoscopic Cholecystectomy    COLONOSCOPY      URETER SURGERY     [3]   Family History  Problem Relation Name Age of Onset    Heart disease Mother      Other (cabg) Mother      Rheumatic fever Mother      Heart disease Father      Stroke Paternal Grandfather      Heart attack Paternal Grandfather     [4]   Allergies  Allergen Reactions    Amlodipine Besylate Swelling     edema legs   [5]   Medications Prior to Admission   Medication Sig Dispense Refill Last Dose/Taking    amiodarone (Pacerone) 200 mg tablet Take 1 tablet (200 mg) by mouth 2 times a day. 60 tablet 1 8/6/2025 Morning    aspirin 81 mg EC tablet Take 1 tablet (81 mg) by mouth once daily.   8/6/2025 Morning    atorvastatin (Lipitor) 20 mg tablet Take 1 tablet (20 mg) by mouth once daily.   Past Week    carvedilol (Coreg) 12.5 mg tablet Take 3 tablets (37.5 mg) by mouth 2 times a day. 180 tablet 0 8/6/2025 Morning    ezetimibe (Zetia) 10 mg tablet Take 1 tablet (10 mg) by mouth once daily at bedtime. 90 tablet 3 8/5/2025    hydrALAZINE (Apresoline) 100 mg tablet Take 1 tablet (100 mg) by mouth 3 times a day. 270 tablet 3 8/5/2025    multivitamin tablet Take 1 tablet by mouth once daily.   Past Week    sodium bicarbonate 650 mg tablet Take 2 tablets (1,300 mg) by mouth 3 times a day. 180 tablet 1 8/6/2025 Morning    torsemide (Demadex) 20 mg tablet Take 3 tablets (60 mg) by mouth once daily. 90 tablet 0 8/6/2025 Morning    acetaminophen (Tylenol) 325 mg tablet Take 2 tablets (650 mg) by mouth every 6 hours if needed for moderate pain (4 - 6).   Unknown

## 2025-08-06 NOTE — ANESTHESIA PROCEDURE NOTES
Peripheral IV  Date/Time: 8/6/2025 8:48 AM      Placement  Needle size: 14 G  Laterality: left  Location: wrist  Site prep: alcohol  Technique: anatomical landmarks  Attempts: 1

## 2025-08-06 NOTE — ANESTHESIA PROCEDURE NOTES
Arterial Line:    Date/Time: 8/6/2025 8:36 AM    Staffing  Performed: CAA and BRUCE   Authorized by: River Ramesh MD    Performed by: SELENE Kiran    An arterial line was placed.   Procedure performed using ultrasound guidance.in the OR for the following indication(s): continuous blood pressure monitoring and blood sampling needed.    A 20 gauge (size), 12 cm (length), Angiocath (type) catheter was placed into the Left brachial artery, secured by Tegaderm,   Seldinger technique used.  Events:  patient tolerated procedure well with no complications.

## 2025-08-07 ENCOUNTER — APPOINTMENT (OUTPATIENT)
Dept: RADIOLOGY | Facility: HOSPITAL | Age: 60
End: 2025-08-07
Payer: COMMERCIAL

## 2025-08-07 ENCOUNTER — APPOINTMENT (OUTPATIENT)
Dept: RADIOLOGY | Facility: HOSPITAL | Age: 60
DRG: 235 | End: 2025-08-07
Payer: COMMERCIAL

## 2025-08-07 SDOH — SOCIAL STABILITY: SOCIAL NETWORK: HOW OFTEN DO YOU GET TOGETHER WITH FRIENDS OR RELATIVES?: TWICE A WEEK

## 2025-08-07 SDOH — SOCIAL STABILITY: SOCIAL NETWORK: HOW OFTEN DO YOU ATTEND CHURCH OR RELIGIOUS SERVICES?: NEVER

## 2025-08-07 SDOH — ECONOMIC STABILITY: FOOD INSECURITY: WITHIN THE PAST 12 MONTHS, YOU WORRIED THAT YOUR FOOD WOULD RUN OUT BEFORE YOU GOT THE MONEY TO BUY MORE.: NEVER TRUE

## 2025-08-07 SDOH — HEALTH STABILITY: PHYSICAL HEALTH: ON AVERAGE, HOW MANY MINUTES DO YOU ENGAGE IN EXERCISE AT THIS LEVEL?: 0 MIN

## 2025-08-07 SDOH — HEALTH STABILITY: MENTAL HEALTH: HOW OFTEN DO YOU HAVE SIX OR MORE DRINKS ON ONE OCCASION?: NEVER

## 2025-08-07 SDOH — HEALTH STABILITY: MENTAL HEALTH: HOW OFTEN DO YOU HAVE A DRINK CONTAINING ALCOHOL?: 2-4 TIMES A MONTH

## 2025-08-07 SDOH — SOCIAL STABILITY: SOCIAL INSECURITY: WITHIN THE LAST YEAR, HAVE YOU BEEN HUMILIATED OR EMOTIONALLY ABUSED IN OTHER WAYS BY YOUR PARTNER OR EX-PARTNER?: NO

## 2025-08-07 SDOH — SOCIAL STABILITY: SOCIAL INSECURITY: ARE YOU MARRIED, WIDOWED, DIVORCED, SEPARATED, NEVER MARRIED, OR LIVING WITH A PARTNER?: DIVORCED

## 2025-08-07 SDOH — SOCIAL STABILITY: SOCIAL NETWORK: IN A TYPICAL WEEK, HOW MANY TIMES DO YOU TALK ON THE PHONE WITH FAMILY, FRIENDS, OR NEIGHBORS?: THREE TIMES A WEEK

## 2025-08-07 SDOH — ECONOMIC STABILITY: FOOD INSECURITY: WITHIN THE PAST 12 MONTHS, THE FOOD YOU BOUGHT JUST DIDN'T LAST AND YOU DIDN'T HAVE MONEY TO GET MORE.: NEVER TRUE

## 2025-08-07 SDOH — SOCIAL STABILITY: SOCIAL NETWORK: HOW OFTEN DO YOU ATTEND MEETINGS OF THE CLUBS OR ORGANIZATIONS YOU BELONG TO?: NEVER

## 2025-08-07 SDOH — SOCIAL STABILITY: SOCIAL INSECURITY: WITHIN THE LAST YEAR, HAVE YOU BEEN AFRAID OF YOUR PARTNER OR EX-PARTNER?: NO

## 2025-08-07 SDOH — HEALTH STABILITY: MENTAL HEALTH: HOW MANY DRINKS CONTAINING ALCOHOL DO YOU HAVE ON A TYPICAL DAY WHEN YOU ARE DRINKING?: 1 OR 2

## 2025-08-07 SDOH — ECONOMIC STABILITY: INCOME INSECURITY: IN THE PAST 12 MONTHS HAS THE ELECTRIC, GAS, OIL, OR WATER COMPANY THREATENED TO SHUT OFF SERVICES IN YOUR HOME?: NO

## 2025-08-07 SDOH — HEALTH STABILITY: PHYSICAL HEALTH: ON AVERAGE, HOW MANY DAYS PER WEEK DO YOU ENGAGE IN MODERATE TO STRENUOUS EXERCISE (LIKE A BRISK WALK)?: 0 DAYS

## 2025-08-07 ASSESSMENT — PAIN SCALES - GENERAL
PAINLEVEL_OUTOF10: 3
PAINLEVEL_OUTOF10: 7
PAINLEVEL_OUTOF10: 8
PAINLEVEL_OUTOF10: 6
PAINLEVEL_OUTOF10: 3
PAINLEVEL_OUTOF10: 9
PAINLEVEL_OUTOF10: 0 - NO PAIN
PAINLEVEL_OUTOF10: 8
PAINLEVEL_OUTOF10: 0 - NO PAIN
PAINLEVEL_OUTOF10: 0 - NO PAIN
PAINLEVEL_OUTOF10: 5 - MODERATE PAIN
PAINLEVEL_OUTOF10: 8

## 2025-08-07 ASSESSMENT — COGNITIVE AND FUNCTIONAL STATUS - GENERAL
EATING MEALS: A LITTLE
MOVING TO AND FROM BED TO CHAIR: A LITTLE
CLIMB 3 TO 5 STEPS WITH RAILING: TOTAL
WALKING IN HOSPITAL ROOM: TOTAL
TOILETING: A LOT
DRESSING REGULAR UPPER BODY CLOTHING: A LITTLE
PERSONAL GROOMING: A LITTLE
STANDING UP FROM CHAIR USING ARMS: A LOT
TURNING FROM BACK TO SIDE WHILE IN FLAT BAD: A LOT
HELP NEEDED FOR BATHING: A LOT
DAILY ACTIVITIY SCORE: 15
DRESSING REGULAR LOWER BODY CLOTHING: A LOT
MOVING FROM LYING ON BACK TO SITTING ON SIDE OF FLAT BED WITH BEDRAILS: A LOT
MOBILITY SCORE: 11

## 2025-08-07 ASSESSMENT — ACTIVITIES OF DAILY LIVING (ADL)
BATHING_ASSISTANCE: MODERATE
ADL_ASSISTANCE: INDEPENDENT
LACK_OF_TRANSPORTATION: NO
ADL_ASSISTANCE: INDEPENDENT

## 2025-08-07 ASSESSMENT — LIFESTYLE VARIABLES
SKIP TO QUESTIONS 9-10: 1
AUDIT-C TOTAL SCORE: 2

## 2025-08-07 ASSESSMENT — PAIN DESCRIPTION - DESCRIPTORS
DESCRIPTORS: DISCOMFORT
DESCRIPTORS: DISCOMFORT
DESCRIPTORS: ACHING;DISCOMFORT

## 2025-08-07 ASSESSMENT — PAIN DESCRIPTION - LOCATION
LOCATION: CHEST
LOCATION: CHEST

## 2025-08-07 NOTE — PROGRESS NOTES
Occupational Therapy    Evaluation and Treatment    Patient Name: Colin Leonard  MRN: [id number]  Today's Date: 8/7/2025  Room: 15/15  Time Calculation  Start Time: 1126  Stop Time: 1159  Time Calculation (min): 33 min    Assessment  IP OT Assessment  OT Assessment: Pt is a 59 year old male who demonstrates decreased strength, balance, activity tolerance, and cognition, which impedes occupational performance.  Prognosis: Good  Barriers to Discharge Home: Physical needs, Cognition needs  Cognition Needs: Medication and/or medical management daily assist needed, Insight of patient limited regarding functional ability/needs  Physical Needs: Intermittent mobility assistance needed, Intermittent ADL assistance needed, High falls risk due to function or environment  Evaluation/Treatment Tolerance: Patient tolerated treatment well  Medical Staff Made Aware: Yes  End of Session Communication: Bedside nurse  End of Session Patient Position: Up in chair, Alarm off, not on at start of session    Plan:  Inpatient Plan  Treatment Interventions: ADL retraining, Functional transfer training, UE strengthening/ROM, Endurance training, Cognitive reorientation, Patient/family training, Neuromuscular reeducation, Equipment evaluation/education, Compensatory technique education  OT Frequency: 3 times per week  OT Discharge Recommendations: Low intensity level of continued care  Equipment Recommended upon Discharge:  (TBD)  OT Recommended Transfer Status: Assist of 2  OT - OK to Discharge: Yes  OT Assessment  OT Assessment Results: Decreased ADL status, Decreased cognition, Decreased safe judgment during ADL, Decreased endurance, Decreased functional mobility, Decreased IADLs  Prognosis: Good  Evaluation/Treatment Tolerance: Patient tolerated treatment well  Medical Staff Made Aware: Yes    Subjective   Current Problem:  1. Coronary artery disease involving native heart, unspecified vessel or lesion type, unspecified whether angina  present  Anesthesia Intraoperative Transesophageal Echocardiogram    Anesthesia Intraoperative Transesophageal Echocardiogram    Anesthesia Intraoperative Transesophageal Echocardiogram    Anesthesia Intraoperative Transesophageal Echocardiogram      2. Atherosclerosis of native coronary artery of native heart without angina pectoris          General:  Reason for Referral: CABG x2 8/6  Past Medical History Relevant to Rehab: hx of HTN, CKD stage 5, chronic anemia, type 2 diabetes, hx of c diff colitis who was admitted 5/19-5/24 for shortness of breathe was incidentally found to have atypical pneumonia  Co-Treatment: PT  Co-Treatment Reason: Femoral CVVH, decreased activity tolerance  Prior to Session Communication: Bedside nurse  Patient Position Received: Bed, 3 rail up, Alarm off, not on at start of session  Family/Caregiver Present: Yes  Caregiver Feedback: Son and brother at bedside  General Comment: Pt supine in bed on arrival, agreeable with increased time to arouse and engage. On 6 L NC, femoral CVVH, external pacer 60 AAI     Precautions:  Medical Precautions: Fall precautions, Cardiac precautions  Post-Surgical Precautions: Move in the Tube  Precautions Comment: -150    Vital Signs:   08/07/25 1126 08/07/25 1159   Vital Signs   Vitals Session Pre OT Post OT   Heart Rate 60 60   Resp 19 14   SpO2 100 % 100 %   /81 110/84   MAP (mmHg) 105 96     Pain:  Pain Assessment  Pain Assessment: 0-10  0-10 (Numeric) Pain Score: 8  Pain Type: Surgical pain  Pain Location: Incision  Pain Interventions: Repositioned, Distraction, Emotional support, Therapeutic presence  Response to Interventions: Sleeping    Lines/Tubes/Drains:  CVC 08/06/25 Double lumen Right Internal jugular (Active)   Number of days: 1       Arterial Line 08/06/25 Left Brachial (Active)   Number of days: 1       Urethral Catheter Temperature probe 14 Fr. (Active)   Number of days: 1       Chest Tube 1 Mediastinal (Active)   Number of days:  1       Chest Tube Left Pleural (Active)   Number of days: 1       Hemodialysis Cath 08/06/25 Right Femoral (Active)   Number of days: 0         Objective   Cognition:  Overall Cognitive Status: Impaired  Orientation Level: Oriented X4  Following Commands: Follows one step commands with repetition  Cognition Comments: Low arousal, delayed information processing. Eyes closed majority of session.  Attention: Exceptions to WFL  Sustained Attention: Impaired  Processing Speed: Delayed     Confusion Assessment Method (CAM)  Acute Onset and Fluctuating Course (1A): Yes  Acute Onset and Fluctuating Course (1B): Yes  Inattention (2): Yes  Disorganized Thinking (3): Yes  Rate Patient's Level of Consciousness (4): Lethargic (Drowsy, easily aroused), Yes  Delirium Present: Yes     Home Living:  Type of Home: House  Lives With:  (Son)  Home Adaptive Equipment: Walker rolling or standard  Home Layout: One level  Home Access: Stairs to enter with rails  Entrance Stairs-Rails: Both  Entrance Stairs-Number of Steps: 2  Bathroom Shower/Tub: Walk-in shower  Bathroom Equipment: Grab bars in shower, Built-in shower seat     Prior Function:  Level of [place]: Independent with ADLs and functional transfers, Independent with homemaking with ambulation  ADL Assistance: Independent  Homemaking Assistance: Independent  Ambulatory Assistance: Independent  Vocational: Full time employment ()     ADL:  Eating Assistance: Stand by  Eating Deficit: Setup, Verbal cueing  Grooming Assistance: Stand by  Grooming Deficit: Setup, Verbal cueing  Bathing Assistance: Moderate  UE Dressing Assistance: Minimal  LE Dressing Assistance: Moderate  Toileting Assistance with Device: Moderate  ADL Comments: Increased assist 2/2 arousal level.    Activity Tolerance:  Endurance: Tolerates 10 - 20 min exercise with multiple rests  Early Mobility/Exercise Safety Screen: Proceed with mobilization - No exclusion criteria met    Bed Mobility/Transfers: Bed  Mobility  Bed Mobility: Yes  Bed Mobility 1  Bed Mobility 1: Supine to sitting  Level of Assistance 1: Minimum assistance, +2  Bed Mobility Comments 1: Draw sheet, HOB elevated   and Transfers  Transfer: Yes  Transfer 1  Transfer From 1: Sit to, Stand to  Transfer to 1: Stand, Sit  Transfer Level of Assistance 1: Minimum assistance, +2, Arm in arm assistance  Trials/Comments 1: x2 trials  Transfers 2  Transfer From 2: Bed to  Transfer to 2: Chair with arms  Transfer Level of Assistance 2: Minimum assistance  Trials/Comments 2: cues for sequencing    Sensation:  Light Touch: No apparent deficits    Strength:  Strength Comments: Physically deconditioned; UEs 3/5 assessed via observation during functional task.     Coordination:  Movements are Fluid and Coordinated: No     Hand Function:  Hand Function  Gross Grasp: Functional    Extremities:   RUE   RUE : Within Functional Limits,   LUE   LUE: Within Functional Limits,       Treatment Completed on Evaluation    Therapy/Activity:     Therapeutic Activity  Therapeutic Activity Performed: Yes  Therapeutic Activity 1: EOB sitting ~8 minutes. Initially min A, progressed to CS with cues.  Therapeutic Activity 2: x2 bouts of prolonged standing (~5 minutes total) including static standing with large LILLY and marches in place with minimal foot clearance. Min A x2 provided  Therapeutic Activity 3: Increased time and repetition required 2/2 pt arousal level and delayed information processing speed.    Outcome Measures: [facility] Daily Activity  Putting on and taking off regular lower body clothing: A lot  Bathing (including washing, rinsing, drying): A lot  Putting on and taking off regular upper body clothing: A little  Toileting, which includes using toilet, bedpan or urinal: A lot  Taking care of personal grooming such as brushing teeth: A little  Eating Meals: A little  Daily Activity - Total Score: 15        ICU Mobility Screen  Early Mobility/Exercise Safety Screen: Proceed  with mobilization - No exclusion criteria met  ICU Mobility Scale: Transferring bed to chair,          Education Documentation  Body Mechanics, taught by Elmira Hutchison OT at 8/7/2025  3:27 PM.  Learner: Family, Patient  Readiness: Acceptance  Method: Explanation, Demonstration  Response: Needs Reinforcement  Comment: OT goals/POC    Precautions, taught by Elmira Hutchison OT at 8/7/2025  3:27 PM.  Learner: Family, Patient  Readiness: Acceptance  Method: Explanation, Demonstration  Response: Needs Reinforcement  Comment: OT goals/POC    ADL Training, taught by Elmira Hutchison OT at 8/7/2025  3:27 PM.  Learner: Family, Patient  Readiness: Acceptance  Method: Explanation, Demonstration  Response: Needs Reinforcement  Comment: OT goals/POC    Education Comments  No comments found.        Goals:   Encounter Problems       Encounter Problems (Active)       ADLs       Patient will perform UB and LB bathing with stand by assist level of assistance.       Start:  08/07/25    Expected End:  08/21/25            Patient with complete upper body dressing with modified independent level of assistance donning and doffing all UE clothes       Start:  08/07/25    Expected End:  08/21/25            Patient with complete lower body dressing with mod I level of assistance donning and doffing all LE clothes  with PRN adaptive equipment       Start:  08/07/25    Expected End:  08/21/25            Patient will complete toileting including hygiene clothing management/hygiene with stand by assist level of assistance.       Start:  08/07/25    Expected End:  08/21/25               COGNITION/SAFETY       Pt will maintain MITT precautions with 100% carryover during functional tasks, ADLs, and mobility without cueing.        Start:  08/07/25    Expected End:  08/21/25            Patient will participate in cognitive activities to demonstrate WFL score on further cognitive assessments        Start:  08/07/25     Expected End:  08/21/25               MOBILITY       Patient will perform Functional mobility max Household distances with modified independent level of assistance and least restrictive device in order to improve safety and functional mobility.       Start:  08/07/25    Expected End:  08/21/25               TRANSFERS       Patient will perform bed mobility modified independent level of assistance in order to improve safety and independence with mobility       Start:  08/07/25    Expected End:  08/21/25            Patient will complete functional transfer to toilet with least restrictive device with modified independent level of assistance.       Start:  08/07/25    Expected End:  08/21/25 08/07/25 at 3:28 PM   Elmira Hutchison, OT   Rehab Office: [phone]

## 2025-08-07 NOTE — PROGRESS NOTES
Physical Therapy    Physical Therapy Evaluation & Treatment    Patient Name: Colin Leonard  MRN: [id number]  Department: [facility]  Room: 15/15-A  Today's Date: 8/7/2025   Time Calculation  Start Time: 1125  Stop Time: 1159  Time Calculation (min): 34 min    Assessment/Plan   PT Assessment  PT Assessment Results: Decreased strength, Decreased endurance, Impaired balance, Decreased mobility, Decreased cognition, Pain  Rehab Prognosis: Good  Barriers to Discharge Home: Cognition needs  Cognition Needs: Cognition-related high falls risk  Evaluation/Treatment Tolerance: Patient tolerated treatment well, Patient limited by fatigue, Patient limited by pain  Medical Staff Made Aware: Yes  End of Session Communication: Bedside nurse  Assessment Comment: Pt demonstrated ability to complete bed mobility, sit<>stand transfer and bed>chair transfer with minAx1-2.  Pt lethargic/drowsy during session.  Vitals stable with mobility.  End of Session Patient Position: Up in chair, Alarm off, not on at start of session   IP OR SWING BED PT PLAN  Inpatient or Swing Bed: Inpatient  PT Plan  Treatment/Interventions: Bed mobility, Gait training, Transfer training, Stair training, Balance training, Strengthening, Endurance training, Therapeutic exercise, Therapeutic activity  PT Plan: Ongoing PT  PT Frequency: 5 times per week  PT Discharge Recommendations: Low intensity level of continued care  PT Recommended Transfer Status: Assist x1, Assistive device  PT - OK to Discharge: Yes    Subjective     PT Visit Info:  PT Received On: 08/07/25  General Visit Information:  General  Reason for Referral: CABG x2 8/6  Past Medical History Relevant to Rehab: HTN, CKD stage 5, chronic anemia, type 2 diabetes, hx of c diff colitis who was admitted 5/19-5/24 for shortness of breathe was incidentally found to have atypical pneumonia  Family/Caregiver Present: Yes  Caregiver Feedback: Son and brother at bedside  Co-Treatment: OT  Co-Treatment Reason:  Femoral CVVH, decreased activity tolerance  Prior to Session Communication: Bedside nurse  Patient Position Received: Bed, 3 rail up, Alarm off, not on at start of session  Preferred Learning Style: auditory, verbal  General Comment: Pt supine in bed and agreeable with increased time to arouse and to engage.  Home Living:  Home Living  Type of Home: House  Lives With:  (Son)  Home Adaptive Equipment: Walker rolling or standard  Home Layout: One level  Home Access: Stairs to enter with rails  Entrance Stairs-Rails: Both  Entrance Stairs-Number of Steps: 2  Bathroom Shower/Tub: Walk-in shower  Bathroom Equipment: Grab bars in shower, Built-in shower seat  Prior Level of Function:  Prior Function Per Pt/Caregiver Report  Level of Diana: Independent with ADLs and functional transfers, Independent with homemaking with ambulation  ADL Assistance: Independent  Homemaking Assistance: Independent  Ambulatory Assistance: Independent  Vocational: Full time employment  Precautions:  Precautions  Hearing/Visual Limitations: Eyes closed throughout majority of session with cues to open, hearing appears WFL  Medical Precautions: Cardiac precautions, Fall precautions, Oxygen therapy device and L/min, Chest tube (CT x2, 6L)  Post-Surgical Precautions: Move in the Tube  Precautions Comment: -150s, AAI @ 60 (ori underlying)     08/07/25 1127 08/07/25 1158   Vital Signs   Vitals Session Pre PT Post PT   Heart Rate 60 60   Resp 17 17   SpO2 100 % 100 %   /80 110/83   MAP (mmHg) 105 95   BP Method Arterial line Arterial line   Patient Position Lying Sitting   Vital Signs Comment  --  Vitals stable with functional mobility     Objective   Pain:  Pain Assessment  Pain Assessment: 0-10  0-10 (Numeric) Pain Score: 8  Pain Type: Surgical pain  Pain Location: Chest  Cognition:  Cognition  Overall Cognitive Status: Impaired  Orientation Level: Oriented X4  Following Commands: Follows one step commands with  repetition  Cognition Comments: Low arousal, delayed information processing.    General Assessments:  General Observation  General Observation: Tele, kevan, CVP [place], CVVH, CVC     Activity Tolerance  Endurance: Tolerates 10 - 20 min exercise with multiple rests  Early Mobility/Exercise Safety Screen: Proceed with mobilization - No exclusion criteria met    Sensation  Light Touch: No apparent deficits (Post-op)    Strength  Strength Comments: BLEs at least 3/5 shown through functional mobility  Coordination  Movements are Fluid and Coordinated: No    Postural Control  Postural Control: Within Functional Limits    Static Sitting Balance  Static Sitting-Balance Support: Bilateral upper extremity supported, Feet supported  Static Sitting-Level of Assistance: Contact guard    Static Standing Balance  Static Standing-Balance Support: Bilateral upper extremity supported  Static Standing-Level of Assistance: Minimum assistance (x2)  Static Standing-Comment/Number of Minutes: B arm in arm  Functional Assessments:  ADL  ADL's Addressed:  (Refer to OTs note for additional information)    Bed Mobility  Bed Mobility: Yes  Bed Mobility 1  Bed Mobility 1: Supine to sitting  Level of Assistance 1: Minimum assistance, +2  Bed Mobility Comments 1: HOB elevated, TAPs utilized, cues for sequencing (log roll)    Transfers  Transfer: Yes  Transfer 1  Transfer From 1: Sit to, Stand to  Transfer to 1: Stand, Sit  Technique 1: Sit to stand, Stand to sit  Transfer Device 1:  (B arm in arm)  Transfer Level of Assistance 1: Minimum assistance, +2  Trials/Comments 1: Cues for hand placement; x2 trials from EOB  Transfers 2  Transfer From 2: Bed to  Transfer to 2: Chair with arms  Technique 2: Lateral, To left  Transfer Device 2:  (B arm in arm)  Transfer Level of Assistance 2: Minimum assistance  Trials/Comments 2: ~4-5 lateral steps to complete, wide LILLY, cues for sequencing, minimal foot clearance    Ambulation/Gait  Training  Ambulation/Gait Training Performed:  (Unable to complete d/t cognition and CVVH.  Refer to transfer section for additional information)    Extremity/Trunk Assessments:  RLE   RLE : Within Functional Limits  LLE   LLE : Within Functional Limits  Treatments:  Therapeutic Activity  Therapeutic Activity Performed: Yes  Therapeutic Activity 1: Increased time for skilled ICU line/tube management for safe functional mobility.  Therapeutic Activity 2: Pt sat EOB ~8 min with minAx1 initially and able to progress to CGA.  Pt demonstrating good posture and no instability.  Therapeutic Activity 3: x2 bouts of static standing ~5-6 min with minAx2, intermittent posterior assistance with cues to adjust.  Pt with wide LILLY.  x2 bouts of standing marches with minimal foot clearance.  Outcome Measures:  [facility] Basic Mobility  Turning from your back to your side while in a flat bed without using bedrails: A lot  Moving from lying on your back to sitting on the side of a flat bed without using bedrails: A lot  Moving to and from bed to chair (including a wheelchair): A little  Standing up from a chair using your arms (e.g. wheelchair or bedside chair): A lot  To walk in hospital room: Total  Climbing 3-5 steps with railing: Total  Basic Mobility - Total Score: 11    FSS-ICU  Ambulation: Unable to attempt due to weakness  Rolling: Moderate assistance (performs 50 - 74% of task)  Sitting: Supervision or set-up only  Transfer Sit-to-Stand: Moderate assistance (performs 50 - 74% of task)  Transfer Supine-to-Sit: Moderate assistance (performs 50 - 74% of task)  Total Score: 14    Early Mobility/Exercise Safety Screen: Proceed with mobilization - No exclusion criteria met  ICU Mobility Scale: Transferring bed to chair [5]    Encounter Problems       Encounter Problems (Active)       Balance       Pt will demonstrated ability to score at least 24/28 on the Tinetti balance assessment tool to ensure safety upon D/C.  (Progressing)        Start:  08/07/25    Expected End:  08/21/25               Mobility       Pt will demonstrated ability to ambulate >/=300ft with proper form and no balance deficits for safe home going.   (Progressing)       Start:  08/07/25    Expected End:  08/21/25            Pt will demonstrate ability to ascend/descend at least 2 stairs with unilateral rail and no balance deficits for safe home going.  (Progressing)       Start:  08/07/25    Expected End:  08/21/25               PT Transfers       Pt will be able to complete 5X STS from lowest bed height with UE assist and RW  <30 seconds with stable vitals and RPD </=3/10 and RPE </=13/20 for improved functional mobility  (Progressing)       Start:  08/07/25    Expected End:  08/21/25                   Education Documentation  Precautions, taught by Natalia Stevens PT at 8/7/2025  4:04 PM.  Learner: Patient  Readiness: Acceptance  Method: Explanation, Demonstration  Response: Needs Reinforcement  Comment: MITT    Body Mechanics, taught by Natalia Stevens PT at 8/7/2025  4:04 PM.  Learner: Patient  Readiness: Acceptance  Method: Explanation, Demonstration  Response: Needs Reinforcement  Comment: MITT    Mobility Training, taught by Natalia Stevens, PT at 8/7/2025  4:04 PM.  Learner: Patient  Readiness: Acceptance  Method: Explanation, Demonstration  Response: Needs Reinforcement  Comment: MITT    Education Comments  No comments found.

## 2025-08-07 NOTE — CARE PLAN
Problem: Pain - Adult  Goal: Verbalizes/displays adequate comfort level or baseline comfort level  Outcome: Progressing     Problem: Safety - Adult  Goal: Free from fall injury  Outcome: Progressing     Problem: Chronic Conditions and Co-morbidities  Goal: Patient's chronic conditions and co-morbidity symptoms are monitored and maintained or improved  Outcome: Progressing     Problem: Nutrition  Goal: Nutrient intake appropriate for maintaining nutritional needs  Outcome: Progressing     Problem: Skin  Goal: Participates in plan/prevention/treatment measures  Outcome: Progressing  Goal: Prevent/manage excess moisture  Outcome: Progressing  Goal: Prevent/minimize sheer/friction injuries  Outcome: Progressing  Goal: Promote/optimize nutrition  Outcome: Progressing  Goal: Promote skin healing  Outcome: Progressing

## 2025-08-07 NOTE — PROGRESS NOTES
Colin Leonard   59 erik    @WT@  MRN/Room: [id number]    Subjective:   Overnight patient became hyperkalemic to 5.9 on ABG. S/p insulin push with potassium back down to 5.2.  Patient started on lokelma 10 g q8hrs    Patient seen this morning. Now extubated with brother and son at bedside.     Objective:     Meds:   aspirin, 81 mg, Daily  atorvastatin, 80 mg, Nightly  ceFAZolin, 2 g, q12h  ezetimibe, 10 mg, Nightly  heparin, 5,000 Units, q8h  insulin lispro, 0-15 Units, q4h  lidocaine, 1 patch, q24h  polyethylene glycol, 17 g, Daily  sennosides-docusate sodium, 2 tablet, BID  sodium zirconium cyclosilicate, 10 g, q8h      lactated Ringer's  lactated Ringer's, Last Rate: 5 mL/hr (08/07/25 1200)      alteplase, 2 mg, PRN  dextrose, 25 g, q15 min PRN   Or  glucagon, 1 mg, q15 min PRN  HYDROmorphone, 0.2 mg, q2h PRN  magnesium sulfate, 2 g, q6h PRN  magnesium sulfate, 4 g, q6h PRN  naloxone, 0.2 mg, q5 min PRN  oxyCODONE, 10 mg, q4h PRN  oxyCODONE, 5 mg, q4h PRN  oxygen, 1 Dose, Continuous - O2/gases        Vitals:    08/07/25 1300   BP:    Pulse: (!) 48   Resp: 19   Temp:    SpO2: 99%          Intake/Output Summary (Last 24 hours) at 8/7/2025 1306  Last data filed at 8/7/2025 1200  Gross per 24 hour   Intake 5364.87 ml   Output 6446 ml   Net -1081.13 ml     Physical Exam:  General appearance: Patient not in any acute distress  Heart: RR, patient bradycardic  Lungs: Not in respiratory distress. Normal pulm effort   Abdomen: soft, nt/nd  Extremities: no edema bilat  ACCESS:   - PIV  - Arterial Line  - CVC  - Fem line    Blood Labs:  Results for orders placed or performed during the hospital encounter of 08/06/25 (from the past 24 hours)   Prepare Plasma: 2 Units   Result Value Ref Range    PRODUCT CODE A9992V62     Unit Number [id number]     Unit ABO O     Unit RH POS     Dispense Status TR     Blood Expiration Date 8/9/2025  4:21:00 PM EDT     PRODUCT BLOOD TYPE 5100     UNIT VOLUME 321     PRODUCT CODE  E7668K53     Unit Number [id number]     Unit ABO O     Unit RH POS     Dispense Status RE     Blood Expiration Date 8/10/2025 12:25:00 AM EDT     PRODUCT BLOOD TYPE 5100     UNIT VOLUME 288    Blood Gas Arterial Full Panel Unsolicited   Result Value Ref Range    POCT pH, Arterial 7.30 (L) 7.38 - 7.42 pH    POCT pCO2, Arterial 44 (H) 38 - 42 mm Hg    POCT pO2, Arterial 163 (H) 85 - 95 mm Hg    POCT SO2, Arterial 99 94 - 100 %    POCT Oxy Hemoglobin, Arterial 98.0 94.0 - 98.0 %    POCT Hematocrit Calculated, Arterial 25.0 (L) 41.0 - 52.0 %    POCT Sodium, Arterial 139 136 - 145 mmol/L    POCT Potassium, Arterial 5.5 (H) 3.5 - 5.3 mmol/L    POCT Chloride, Arterial 108 (H) 98 - 107 mmol/L    POCT Ionized Calcium, Arterial 1.19 1.10 - 1.33 mmol/L    POCT Glucose, Arterial 136 (H) 74 - 99 mg/dL    POCT Lactate, Arterial 1.5 0.4 - 2.0 mmol/L    POCT Base Excess, Arterial -4.6 (L) -2.0 - 3.0 mmol/L    POCT HCO3 Calculated, Arterial 21.6 (L) 22.0 - 26.0 mmol/L    POCT Hemoglobin, Arterial 8.4 (L) 13.5 - 17.5 g/dL    POCT Anion Gap, Arterial 15 10 - 25 mmo/L    Patient Temperature 37.0 degrees Celsius    FiO2 70 %   Coox Panel, Arterial Unsolicited   Result Value Ref Range    POCT Hemoglobin, Arterial 8.4 (L) 13.5 - 17.5 g/dL    POCT Oxy Hemoglobin, Arterial 98.0 94.0 - 98.0 %    POCT Carboxyhemoglobin, Arterial 0.8 %    POCT Methemoglobin, Arterial 0.4 0.0 - 1.5 %    POCT Deoxy Hemoglobin, Arterial 0.9 0.0 - 5.0 %   Magnesium   Result Value Ref Range    Magnesium 2.41 (H) 1.60 - 2.40 mg/dL   Coagulation Screen   Result Value Ref Range    Protime 13.7 (H) 9.8 - 12.4 seconds    INR 1.2 (H) 0.9 - 1.1    aPTT 29 26 - 36 seconds   Fibrinogen   Result Value Ref Range    Fibrinogen 284 200 - 400 mg/dL   CBC   Result Value Ref Range    WBC 9.4 4.4 - 11.3 x10*3/uL    nRBC 0.0 0.0 - 0.0 /100 WBCs    RBC 2.62 (L) 4.50 - 5.90 x10*6/uL    Hemoglobin 8.0 (L) 13.5 - 17.5 g/dL    Hematocrit 25.1 (L) 41.0 - 52.0 %    MCV 96 80 - 100 fL     MCH 30.5 26.0 - 34.0 pg    MCHC 31.9 (L) 32.0 - 36.0 g/dL    RDW 15.5 (H) 11.5 - 14.5 %    Platelets 196 150 - 450 x10*3/uL   Renal Function Panel   Result Value Ref Range    Glucose 115 (H) 74 - 99 mg/dL    Sodium 144 136 - 145 mmol/L    Potassium 5.1 3.5 - 5.3 mmol/L    Chloride 109 (H) 98 - 107 mmol/L    Bicarbonate 22 21 - 32 mmol/L    Anion Gap 18 10 - 20 mmol/L    Urea Nitrogen 87 (H) 6 - 23 mg/dL    Creatinine 9.38 (H) 0.50 - 1.30 mg/dL    eGFR 6 (L) >60 mL/min/1.73m*2    Calcium 8.3 (L) 8.6 - 10.6 mg/dL    Phosphorus 6.1 (H) 2.5 - 4.9 mg/dL    Albumin 2.9 (L) 3.4 - 5.0 g/dL   Blood Gas Arterial Full Panel   Result Value Ref Range    POCT pH, Arterial 7.36 (L) 7.38 - 7.42 pH    POCT pCO2, Arterial 38 38 - 42 mm Hg    POCT pO2, Arterial 83 (L) 85 - 95 mm Hg    POCT SO2, Arterial 98 94 - 100 %    POCT Oxy Hemoglobin, Arterial 95.6 94.0 - 98.0 %    POCT Hematocrit Calculated, Arterial 27.0 (L) 41.0 - 52.0 %    POCT Sodium, Arterial 140 136 - 145 mmol/L    POCT Potassium, Arterial      POCT Chloride, Arterial 110 (H) 98 - 107 mmol/L    POCT Ionized Calcium, Arterial 1.19 1.10 - 1.33 mmol/L    POCT Glucose, Arterial 118 (H) 74 - 99 mg/dL    POCT Lactate, Arterial 1.2 0.4 - 2.0 mmol/L    POCT Base Excess, Arterial -3.6 (L) -2.0 - 3.0 mmol/L    POCT HCO3 Calculated, Arterial 21.5 (L) 22.0 - 26.0 mmol/L    POCT Hemoglobin, Arterial 9.0 (L) 13.5 - 17.5 g/dL    POCT Anion Gap, Arterial      Patient Temperature 37.0 degrees Celsius    FiO2 50 %   Blood Gas Arterial Full Panel   Result Value Ref Range    POCT pH, Arterial 7.36 (L) 7.38 - 7.42 pH    POCT pCO2, Arterial 37 (L) 38 - 42 mm Hg    POCT pO2, Arterial 87 85 - 95 mm Hg    POCT SO2, Arterial 98 94 - 100 %    POCT Oxy Hemoglobin, Arterial 96.2 94.0 - 98.0 %    POCT Hematocrit Calculated, Arterial 25.0 (L) 41.0 - 52.0 %    POCT Sodium, Arterial 140 136 - 145 mmol/L    POCT Potassium, Arterial 5.7 (H) 3.5 - 5.3 mmol/L    POCT Chloride, Arterial 110 (H) 98 - 107  mmol/L    POCT Ionized Calcium, Arterial 1.19 1.10 - 1.33 mmol/L    POCT Glucose, Arterial 113 (H) 74 - 99 mg/dL    POCT Lactate, Arterial 0.6 0.4 - 2.0 mmol/L    POCT Base Excess, Arterial -4.1 (L) -2.0 - 3.0 mmol/L    POCT HCO3 Calculated, Arterial 20.9 (L) 22.0 - 26.0 mmol/L    POCT Hemoglobin, Arterial 8.4 (L) 13.5 - 17.5 g/dL    POCT Anion Gap, Arterial 15 10 - 25 mmo/L    Patient Temperature 37.0 degrees Celsius    FiO2 50 %   Prepare Platelets: 1 Units   Result Value Ref Range    PRODUCT CODE [id number]     Unit Number [id number]     Unit ABO O     Unit RH POS     Dispense Status IS     Blood Expiration Date 8/6/2025 11:59:00 PM EDT     PRODUCT BLOOD TYPE 5100     UNIT VOLUME 227    CBC   Result Value Ref Range    WBC 7.9 4.4 - 11.3 x10*3/uL    nRBC 0.0 0.0 - 0.0 /100 WBCs    RBC 2.50 (L) 4.50 - 5.90 x10*6/uL    Hemoglobin 7.4 (L) 13.5 - 17.5 g/dL    Hematocrit 24.1 (L) 41.0 - 52.0 %    MCV 96 80 - 100 fL    MCH 29.6 26.0 - 34.0 pg    MCHC 30.7 (L) 32.0 - 36.0 g/dL    RDW 15.6 (H) 11.5 - 14.5 %    Platelets 179 150 - 450 x10*3/uL   POCT GLUCOSE   Result Value Ref Range    POCT Glucose 78 74 - 99 mg/dL   CBC   Result Value Ref Range    WBC 8.3 4.4 - 11.3 x10*3/uL    nRBC 0.0 0.0 - 0.0 /100 WBCs    RBC 2.65 (L) 4.50 - 5.90 x10*6/uL    Hemoglobin 7.8 (L) 13.5 - 17.5 g/dL    Hematocrit 25.2 (L) 41.0 - 52.0 %    MCV 95 80 - 100 fL    MCH 29.4 26.0 - 34.0 pg    MCHC 31.0 (L) 32.0 - 36.0 g/dL    RDW 15.8 (H) 11.5 - 14.5 %    Platelets 189 150 - 450 x10*3/uL   Blood Gas Arterial Full Panel   Result Value Ref Range    POCT pH, Arterial 7.31 (L) 7.38 - 7.42 pH    POCT pCO2, Arterial 42 38 - 42 mm Hg    POCT pO2, Arterial 72 (L) 85 - 95 mm Hg    POCT SO2, Arterial 96 94 - 100 %    POCT Oxy Hemoglobin, Arterial 93.6 (L) 94.0 - 98.0 %    POCT Hematocrit Calculated, Arterial 26.0 (L) 41.0 - 52.0 %    POCT Sodium, Arterial 140 136 - 145 mmol/L    POCT Potassium, Arterial 4.9 3.5 - 5.3 mmol/L    POCT Chloride, Arterial  111 (H) 98 - 107 mmol/L    POCT Ionized Calcium, Arterial 1.15 1.10 - 1.33 mmol/L    POCT Glucose, Arterial 89 74 - 99 mg/dL    POCT Lactate, Arterial 0.6 0.4 - 2.0 mmol/L    POCT Base Excess, Arterial -4.8 (L) -2.0 - 3.0 mmol/L    POCT HCO3 Calculated, Arterial 21.1 (L) 22.0 - 26.0 mmol/L    POCT Hemoglobin, Arterial 8.6 (L) 13.5 - 17.5 g/dL    POCT Anion Gap, Arterial 13 10 - 25 mmo/L    Patient Temperature 37.0 degrees Celsius    FiO2 40 %   Prepare RBC: 1 Units   Result Value Ref Range    PRODUCT CODE [id number]     Unit Number [id number]     Unit ABO O     Unit RH POS     XM INTEP COMP     Dispense Status TR     Blood Expiration Date 9/2/2025 11:59:00 PM EDT     PRODUCT BLOOD TYPE 5100     UNIT VOLUME 350    Blood Gas Arterial Full Panel   Result Value Ref Range    POCT pH, Arterial 7.35 (L) 7.38 - 7.42 pH    POCT pCO2, Arterial 37 (L) 38 - 42 mm Hg    POCT pO2, Arterial 89 85 - 95 mm Hg    POCT SO2, Arterial 99 94 - 100 %    POCT Oxy Hemoglobin, Arterial 96.3 94.0 - 98.0 %    POCT Hematocrit Calculated, Arterial 26.0 (L) 41.0 - 52.0 %    POCT Sodium, Arterial 139 136 - 145 mmol/L    POCT Potassium, Arterial 4.9 3.5 - 5.3 mmol/L    POCT Chloride, Arterial 111 (H) 98 - 107 mmol/L    POCT Ionized Calcium, Arterial 1.12 1.10 - 1.33 mmol/L    POCT Glucose, Arterial 95 74 - 99 mg/dL    POCT Lactate, Arterial 0.5 0.4 - 2.0 mmol/L    POCT Base Excess, Arterial -4.8 (L) -2.0 - 3.0 mmol/L    POCT HCO3 Calculated, Arterial 20.4 (L) 22.0 - 26.0 mmol/L    POCT Hemoglobin, Arterial 8.5 (L) 13.5 - 17.5 g/dL    POCT Anion Gap, Arterial 13 10 - 25 mmo/L    Patient Temperature 37.0 degrees Celsius    FiO2 36 %   Magnesium   Result Value Ref Range    Magnesium 2.34 1.60 - 2.40 mg/dL   Renal Function Panel   Result Value Ref Range    Glucose 125 (H) 74 - 99 mg/dL    Sodium 140 136 - 145 mmol/L    Potassium 5.7 (H) 3.5 - 5.3 mmol/L    Chloride 108 (H) 98 - 107 mmol/L    Bicarbonate 24 21 - 32 mmol/L    Anion Gap 14 10 - 20  mmol/L    Urea Nitrogen 76 (H) 6 - 23 mg/dL    Creatinine 8.43 (H) 0.50 - 1.30 mg/dL    eGFR 7 (L) >60 mL/min/1.73m*2    Calcium 8.1 (L) 8.6 - 10.6 mg/dL    Phosphorus 5.7 (H) 2.5 - 4.9 mg/dL    Albumin 3.2 (L) 3.4 - 5.0 g/dL   CBC and Auto Differential   Result Value Ref Range    WBC 7.8 4.4 - 11.3 x10*3/uL    nRBC 0.0 0.0 - 0.0 /100 WBCs    RBC 2.97 (L) 4.50 - 5.90 x10*6/uL    Hemoglobin 8.9 (L) 13.5 - 17.5 g/dL    Hematocrit 28.2 (L) 41.0 - 52.0 %    MCV 95 80 - 100 fL    MCH 30.0 26.0 - 34.0 pg    MCHC 31.6 (L) 32.0 - 36.0 g/dL    RDW 15.8 (H) 11.5 - 14.5 %    Platelets 179 150 - 450 x10*3/uL    Neutrophils % 87.3 40.0 - 80.0 %    Immature Granulocytes %, Automated 0.5 0.0 - 0.9 %    Lymphocytes % 2.9 13.0 - 44.0 %    Monocytes % 7.3 2.0 - 10.0 %    Eosinophils % 1.7 0.0 - 6.0 %    Basophils % 0.3 0.0 - 2.0 %    Neutrophils Absolute 6.84 1.20 - 7.70 x10*3/uL    Immature Granulocytes Absolute, Automated 0.04 0.00 - 0.70 x10*3/uL    Lymphocytes Absolute 0.23 (L) 1.20 - 4.80 x10*3/uL    Monocytes Absolute 0.57 0.10 - 1.00 x10*3/uL    Eosinophils Absolute 0.13 0.00 - 0.70 x10*3/uL    Basophils Absolute 0.02 0.00 - 0.10 x10*3/uL   BUN   Result Value Ref Range    Urea Nitrogen 76 (H) 6 - 23 mg/dL   Creatinine, Serum   Result Value Ref Range    Creatinine 8.43 (H) 0.50 - 1.30 mg/dL    eGFR 7 (L) >60 mL/min/1.73m*2   Electrolyte panel   Result Value Ref Range    Sodium 140 136 - 145 mmol/L    Potassium 5.7 (H) 3.5 - 5.3 mmol/L    Chloride 108 (H) 98 - 107 mmol/L    Bicarbonate 24 21 - 32 mmol/L    Anion Gap 14 10 - 20 mmol/L   Calcium, ionized   Result Value Ref Range    POCT Calcium, Ionized 1.13 1.1 - 1.33 mmol/L   Phosphorus   Result Value Ref Range    Phosphorus 5.7 (H) 2.5 - 4.9 mg/dL   Blood Gas Arterial Full Panel   Result Value Ref Range    POCT pH, Arterial 7.26 (L) 7.38 - 7.42 pH    POCT pCO2, Arterial 49 (H) 38 - 42 mm Hg    POCT pO2, Arterial 96 (H) 85 - 95 mm Hg    POCT SO2, Arterial 99 94 - 100 %    POCT  Oxy Hemoglobin, Arterial 95.8 94.0 - 98.0 %    POCT Hematocrit Calculated, Arterial 29.0 (L) 41.0 - 52.0 %    POCT Sodium, Arterial 137 136 - 145 mmol/L    POCT Potassium, Arterial 5.9 (H) 3.5 - 5.3 mmol/L    POCT Chloride, Arterial 109 (H) 98 - 107 mmol/L    POCT Ionized Calcium, Arterial 1.16 1.10 - 1.33 mmol/L    POCT Glucose, Arterial 125 (H) 74 - 99 mg/dL    POCT Lactate, Arterial 0.5 0.4 - 2.0 mmol/L    POCT Base Excess, Arterial -5.0 (L) -2.0 - 3.0 mmol/L    POCT HCO3 Calculated, Arterial 22.0 22.0 - 26.0 mmol/L    POCT Hemoglobin, Arterial 9.6 (L) 13.5 - 17.5 g/dL    POCT Anion Gap, Arterial 12 10 - 25 mmo/L    Patient Temperature 37.0 degrees Celsius    FiO2 40 %   Renal Function Panel   Result Value Ref Range    Glucose 147 (H) 74 - 99 mg/dL    Sodium 140 136 - 145 mmol/L    Potassium 5.2 3.5 - 5.3 mmol/L    Chloride 107 98 - 107 mmol/L    Bicarbonate 25 21 - 32 mmol/L    Anion Gap 13 10 - 20 mmol/L    Urea Nitrogen 71 (H) 6 - 23 mg/dL    Creatinine 7.39 (H) 0.50 - 1.30 mg/dL    eGFR 8 (L) >60 mL/min/1.73m*2    Calcium 8.3 (L) 8.6 - 10.6 mg/dL    Phosphorus 5.4 (H) 2.5 - 4.9 mg/dL    Albumin 3.1 (L) 3.4 - 5.0 g/dL   POCT GLUCOSE   Result Value Ref Range    POCT Glucose 141 (H) 74 - 99 mg/dL   POCT GLUCOSE   Result Value Ref Range    POCT Glucose 73 (L) 74 - 99 mg/dL              ASSESSMENT:  Colin Leonard is a 59 year old male with essential hypertension, hyperlipidemia, CAD, carotid artery stenosis, CKD5 (unclear etiology), hyperphosphatemia, chronic metabolic acidosis,  anemia in CKD, valvular disease, DM with recently noted triple vessel disease on heart cath, admitted s/p elective off pump CABG x 2 (LIMA to LAD, SVG to distal CX) on 8/6, with nephrology consulted for CKD 5 and initiation of dialysis.   Given ongoing discussion for dialysis between patient and his outpatient nephrologist, as well as electrolyte abnormalities noted during surgery and need for volume control post-CABG, patient is  currently a candidate for dialysis. Additionally, given patient's advanced CKD at baseline, it is possible that he may need long-term dialysis.    With regards to modality of dialysis, CVVH was used yesterday, however, patient became hyperkalemic to 5.9 on ABG. S/p insulin push with potassium back down to 5.2. As such, given that patient is off pressors and hemodynamically stable, plan to stop CVVH and start SLED this evening with plan to possibly transition to HD over next few days.    Patient's current indication for dialysis is volume control post-CABG and electrolyte balance.      #CKD5/ESRD  :: Baseline Creatinine at around 6.5  :: Creatinine on admission: 9.38  :: Electrolytes: Na 140, K 5.2, Ca 8.3. Phos 5.4  :: Acid base status: Bicarb 25  :: Patient hemodynamically stable post CABG  :: Intra-op had hyperkalemia on blood gas to 6.8 requiring multiple K cocktails and bicarb, current K at 5.1.   :: Had hyperkalemia to 5.9 on ABG (8/6), s/p insulin bolus and potassium came down to 5.2  :: Patient on CVHH from 8/6 evening to 8/7 midday  :: Transitioning to SLED tonight  :: Femoral Trialysis line placed in surgery      Recommendations:  - Indication for dialysis: Volume control post-CABG  - Plan to start SLED tonight  - Please obtain RFP at least q12hr  - Agree with lokelma to keep potassium level under control  - Avoid nephrotoxins, contrast if possible  - strict Is/Os  - Renal dosing for medications for latest eGFR, follow medication trough as appropriate  - Avoid hypotension/rapid fluctuations in BPs  Joey Arroyo. PGY1  Daytime / Weekend Renal Pager [phone]  After 7 pm Emergencies [phone] Pager [phone]

## 2025-08-07 NOTE — CARE PLAN
Problem: Pain - Adult  Goal: Verbalizes/displays adequate comfort level or baseline comfort level  Outcome: Progressing     Problem: Safety - Adult  Goal: Free from fall injury  Outcome: Progressing     Problem: Discharge Planning  Goal: Discharge to home or other facility with appropriate resources  Outcome: Progressing     Problem: Chronic Conditions and Co-morbidities  Goal: Patient's chronic conditions and co-morbidity symptoms are monitored and maintained or improved  Outcome: Progressing     Problem: Nutrition  Goal: Nutrient intake appropriate for maintaining nutritional needs  Outcome: Progressing     Problem: Skin  Goal: Decreased wound size/increased tissue granulation at next dressing change  Outcome: Progressing  Flowsheets (Taken 8/6/2025 2319)  Decreased wound size/increased tissue granulation at next dressing change:   Promote sleep for wound healing   Protective dressings over bony prominences  Goal: Participates in plan/prevention/treatment measures  Outcome: Progressing  Flowsheets (Taken 8/6/2025 2319)  Participates in plan/prevention/treatment measures: Elevate heels  Goal: Prevent/manage excess moisture  Outcome: Progressing  Goal: Prevent/minimize sheer/friction injuries  Outcome: Progressing  Flowsheets (Taken 8/6/2025 2319)  Prevent/minimize sheer/friction injuries:   Use pull sheet   HOB 30 degrees or less   Turn/reposition every 2 hours/use positioning/transfer devices   Complete micro-shifts as needed if patient unable. Adjust patient position to relieve pressure points, not a full turn  Goal: Promote/optimize nutrition  Outcome: Progressing  Goal: Promote skin healing  Outcome: Progressing  Flowsheets (Taken 8/6/2025 2319)  Promote skin healing:   Turn/reposition every 2 hours/use positioning/transfer devices   Protective dressings over bony prominences   Assess skin/pad under line(s)/device(s)   Rotate device position/do not position patient on device

## 2025-08-07 NOTE — PROGRESS NOTES
08/07/25 1020   Discharge Planning   Living Arrangements Children   Support Systems Children   Assistance Needed IPTA / No ADs / drives / employed / no home O2 / no HD   Type of Residence Private residence   Number of Stairs to Enter Residence 3   Number of Stairs Within Residence 0  (main floor bed and bath)   Do you have animals or pets at home? No   Who is requesting discharge planning? Provider   Does the patient need discharge transport arranged? No   Financial Resource Strain   How hard is it for you to pay for the very basics like food, housing, medical care, and heating? Not hard   Housing Stability   In the last 12 months, was there a time when you were not able to pay the mortgage or rent on time? N   In the past 12 months, how many times have you moved where you were living? 0   Transportation Needs   In the past 12 months, has lack of transportation kept you from medical appointments or from getting medications? no   In the past 12 months, has lack of transportation kept you from meetings, work, or from getting things needed for daily living? No   Patient Choice   Patient / Family choosing to utilize agency / facility established prior to hospitalization No   Stroke Family Assessment   Stroke Family Assessment Needed No   Intensity of Service   Intensity of Service 0-30 min     CT SURGERY  08/06 CABGx 2with Dr Yolanda Justice     DC PLAN  As per surgery carepath; [facility] for discharge.    PAYOR   [facility]    PT/OT   ( ) awaiting     COMPLETED  (X) DC/SDOH assessments completed with patient and brother (Buddy) at bedside        Verified EPIC data: address / PCP / pharmacy / contacts listed (6)     Kinjal Moran (LSW, MSW)

## 2025-08-08 ENCOUNTER — APPOINTMENT (OUTPATIENT)
Dept: CARDIOLOGY | Facility: HOSPITAL | Age: 60
End: 2025-08-08
Payer: COMMERCIAL

## 2025-08-08 ENCOUNTER — ANESTHESIA EVENT (OUTPATIENT)
Dept: RADIOLOGY | Facility: HOSPITAL | Age: 60
End: 2025-08-08
Payer: COMMERCIAL

## 2025-08-08 ENCOUNTER — APPOINTMENT (OUTPATIENT)
Dept: RADIOLOGY | Facility: HOSPITAL | Age: 60
End: 2025-08-08
Payer: COMMERCIAL

## 2025-08-08 ENCOUNTER — ANESTHESIA (OUTPATIENT)
Dept: RADIOLOGY | Facility: HOSPITAL | Age: 60
End: 2025-08-08
Payer: COMMERCIAL

## 2025-08-08 PROCEDURE — 2500000004 HC RX 250 GENERAL PHARMACY W/ HCPCS (ALT 636 FOR OP/ED)

## 2025-08-08 PROCEDURE — 2500000004 HC RX 250 GENERAL PHARMACY W/ HCPCS (ALT 636 FOR OP/ED): Performed by: NURSE ANESTHETIST, CERTIFIED REGISTERED

## 2025-08-08 RX ORDER — PHENYLEPHRINE HCL IN 0.9% NACL 0.4MG/10ML
SYRINGE (ML) INTRAVENOUS AS NEEDED
Status: DISCONTINUED | OUTPATIENT
Start: 2025-08-08 | End: 2025-08-08

## 2025-08-08 RX ORDER — ROCURONIUM BROMIDE 10 MG/ML
INJECTION, SOLUTION INTRAVENOUS AS NEEDED
Status: DISCONTINUED | OUTPATIENT
Start: 2025-08-08 | End: 2025-08-08

## 2025-08-08 RX ORDER — FENTANYL CITRATE 50 UG/ML
INJECTION, SOLUTION INTRAMUSCULAR; INTRAVENOUS CONTINUOUS PRN
Status: DISCONTINUED | OUTPATIENT
Start: 2025-08-08 | End: 2025-08-08

## 2025-08-08 RX ADMIN — SODIUM CHLORIDE: 9 INJECTION, SOLUTION INTRAVENOUS at 08:58

## 2025-08-08 RX ADMIN — ROCURONIUM BROMIDE 30 MG: 10 INJECTION, SOLUTION INTRAVENOUS at 09:21

## 2025-08-08 RX ADMIN — NOREPINEPHRINE BITARTRATE 16 MCG: 1 INJECTION, SOLUTION, CONCENTRATE INTRAVENOUS at 10:12

## 2025-08-08 RX ADMIN — Medication 120 MCG: at 09:32

## 2025-08-08 RX ADMIN — SUGAMMADEX 400 MG: 100 INJECTION, SOLUTION INTRAVENOUS at 10:22

## 2025-08-08 RX ADMIN — PROPOFOL 20 MCG/KG/MIN: 10 INJECTION, EMULSION INTRAVENOUS at 08:58

## 2025-08-08 RX ADMIN — NOREPINEPHRINE BITARTRATE 32 MCG: 1 INJECTION, SOLUTION, CONCENTRATE INTRAVENOUS at 09:14

## 2025-08-08 ASSESSMENT — PAIN - FUNCTIONAL ASSESSMENT
PAIN_FUNCTIONAL_ASSESSMENT: 0-10
PAIN_FUNCTIONAL_ASSESSMENT: CPOT (CRITICAL CARE PAIN OBSERVATION TOOL)
PAIN_FUNCTIONAL_ASSESSMENT: 0-10
PAIN_FUNCTIONAL_ASSESSMENT: 0-10
PAIN_FUNCTIONAL_ASSESSMENT: CPOT (CRITICAL CARE PAIN OBSERVATION TOOL)
PAIN_FUNCTIONAL_ASSESSMENT: CPOT (CRITICAL CARE PAIN OBSERVATION TOOL)

## 2025-08-08 ASSESSMENT — PAIN SCALES - GENERAL: PAINLEVEL_OUTOF10: 0 - NO PAIN

## 2025-08-08 NOTE — PROCEDURES
Atrial and ventricular wires cut at skin level due to need for emergent MRI. Patient intubated and sedated. No family at bedside. Will discuss instructions to notify radiology of retained epicardial wires prior to any other MRI procedures, and to notify Dr. Yolanda Justice of any visible wires or s/s infection when at bedside after MRI.

## 2025-08-08 NOTE — SIGNIFICANT EVENT
"ECMO Device Issue:    When transferring from Bed space 5 to Bed 1, the ECMO developed an error code and stopped functioning. The ECMO device was then \"hand-cranked\" to maintain flow at 3.5L while a new machine was set up. Patient transferred to new pump with any major hemodynamic compromise. We have contacted the company to investigate.     I have updated the HF Team.    Tomer Vasquez MD  "

## 2025-08-08 NOTE — PROGRESS NOTES
Colin Leonard   59 yrita    @WT@  MRN/Room: [id number]/15/15-A    Subjective:   Patient started on SLED, for about 8 hours. Became unresponsive overnight, including to pain. BAT was called with CTA c/f basilar artery occlusion vs high grade occlusion. Patient was HDUS (MAPs 50s and c/f airway protection and was intubated). Plan was for mechanical thrombectomy, however no LVO so became a diagnostic cerebral angiogram which showed basilar artery with severe stenosis, likely 2/2 atherosclerotic disease.    Patient seen this morning. Intubated, with no family at bedside currently.      Objective:     Meds:   albumin human, ,   amiodarone, ,   aspirin, 81 mg, Daily  atorvastatin, 80 mg, Nightly  ceFAZolin, 2 g, q12h  ezetimibe, 10 mg, Nightly  heparin, 5,000 Units, q8h  insulin lispro, 0-15 Units, q4h  lidocaine, 1 patch, q24h  phenylephrine in NS, ,   polyethylene glycol, 17 g, Daily  sennosides-docusate sodium, 2 tablet, BID  sodium zirconium cyclosilicate, 10 g, q8h      heparin  norepinephrine, Last Rate: 0.12 mcg/kg/min (08/08/25 1230)  vasopressin      albumin human, ,   alteplase, 2 mg, PRN  amiodarone, ,   dextrose, 25 g, q15 min PRN   Or  glucagon, 1 mg, q15 min PRN  HYDROmorphone, 0.2 mg, q2h PRN  magnesium sulfate, 2 g, q6h PRN  magnesium sulfate, 4 g, q6h PRN  naloxone, 0.2 mg, q5 min PRN  ondansetron, 4 mg, q4h PRN  oxyCODONE, 10 mg, q4h PRN  oxyCODONE, 5 mg, q4h PRN  oxygen, 1 Dose, Continuous - O2/gases  oxygen, 1 Dose, Continuous - O2/gases  phenylephrine in NS, ,         Vitals:    08/08/25 1330   BP:    Pulse: 100   Resp: 18   Temp:    SpO2: 100%          Intake/Output Summary (Last 24 hours) at 8/8/2025 1335  Last data filed at 8/8/2025 1313  Gross per 24 hour   Intake 529.93 ml   Output 1665 ml   Net -1135.07 ml       Physical Exam:  General appearance: Patient not in any acute distress, intubated  Heart: RRR  Lungs: Bilateral chest expansion, currently intubated  Abdomen: soft, nt/nd  Extremities:  no edema bilat  ACCESS:   - PIV  - Arterial Line  - CVC  - Fem line    Blood Labs:  Results for orders placed or performed during the hospital encounter of 08/06/25 (from the past 24 hours)   BUN   Result Value Ref Range    Urea Nitrogen 62 (H) 6 - 23 mg/dL   Creatinine, Serum   Result Value Ref Range    Creatinine 6.34 (H) 0.50 - 1.30 mg/dL    eGFR 9 (L) >60 mL/min/1.73m*2   Electrolyte panel   Result Value Ref Range    Sodium 138 136 - 145 mmol/L    Potassium 5.0 3.5 - 5.3 mmol/L    Chloride 103 98 - 107 mmol/L    Bicarbonate 25 21 - 32 mmol/L    Anion Gap 15 10 - 20 mmol/L   Calcium, ionized   Result Value Ref Range    POCT Calcium, Ionized 1.23 1.1 - 1.33 mmol/L   Phosphorus   Result Value Ref Range    Phosphorus 5.3 (H) 2.5 - 4.9 mg/dL   Renal Function Panel   Result Value Ref Range    Glucose 73 (L) 74 - 99 mg/dL    Sodium 138 136 - 145 mmol/L    Potassium 5.0 3.5 - 5.3 mmol/L    Chloride 103 98 - 107 mmol/L    Bicarbonate 25 21 - 32 mmol/L    Anion Gap 15 10 - 20 mmol/L    Urea Nitrogen 62 (H) 6 - 23 mg/dL    Creatinine 6.34 (H) 0.50 - 1.30 mg/dL    eGFR 9 (L) >60 mL/min/1.73m*2    Calcium 9.2 8.6 - 10.6 mg/dL    Phosphorus 5.3 (H) 2.5 - 4.9 mg/dL    Albumin 3.3 (L) 3.4 - 5.0 g/dL   Magnesium   Result Value Ref Range    Magnesium 2.17 1.60 - 2.40 mg/dL   CBC   Result Value Ref Range    WBC 8.8 4.4 - 11.3 x10*3/uL    nRBC 0.0 0.0 - 0.0 /100 WBCs    RBC 3.30 (L) 4.50 - 5.90 x10*6/uL    Hemoglobin 10.0 (L) 13.5 - 17.5 g/dL    Hematocrit 31.9 (L) 41.0 - 52.0 %    MCV 97 80 - 100 fL    MCH 30.3 26.0 - 34.0 pg    MCHC 31.3 (L) 32.0 - 36.0 g/dL    RDW 16.5 (H) 11.5 - 14.5 %    Platelets 196 150 - 450 x10*3/uL   POCT GLUCOSE   Result Value Ref Range    POCT Glucose 51 (L) 74 - 99 mg/dL   POCT GLUCOSE   Result Value Ref Range    POCT Glucose 57 (L) 74 - 99 mg/dL   POCT GLUCOSE   Result Value Ref Range    POCT Glucose 59 (L) 74 - 99 mg/dL   POCT GLUCOSE   Result Value Ref Range    POCT Glucose 56 (L) 74 - 99 mg/dL    POCT GLUCOSE   Result Value Ref Range    POCT Glucose 113 (H) 74 - 99 mg/dL   POCT GLUCOSE   Result Value Ref Range    POCT Glucose 109 (H) 74 - 99 mg/dL   Magnesium   Result Value Ref Range    Magnesium 1.82 1.60 - 2.40 mg/dL   CBC   Result Value Ref Range    WBC 9.7 4.4 - 11.3 x10*3/uL    nRBC 0.0 0.0 - 0.0 /100 WBCs    RBC 3.03 (L) 4.50 - 5.90 x10*6/uL    Hemoglobin 9.3 (L) 13.5 - 17.5 g/dL    Hematocrit 27.0 (L) 41.0 - 52.0 %    MCV 89 80 - 100 fL    MCH 30.7 26.0 - 34.0 pg    MCHC 34.4 32.0 - 36.0 g/dL    RDW 16.6 (H) 11.5 - 14.5 %    Platelets 172 150 - 450 x10*3/uL   Renal Function Panel   Result Value Ref Range    Glucose 93 74 - 99 mg/dL    Sodium 132 (L) 136 - 145 mmol/L    Potassium 3.7 3.5 - 5.3 mmol/L    Chloride 97 (L) 98 - 107 mmol/L    Bicarbonate 25 21 - 32 mmol/L    Anion Gap 14 10 - 20 mmol/L    Urea Nitrogen 27 (H) 6 - 23 mg/dL    Creatinine 3.23 (H) 0.50 - 1.30 mg/dL    eGFR 21 (L) >60 mL/min/1.73m*2    Calcium 8.2 (L) 8.6 - 10.6 mg/dL    Phosphorus 3.2 2.5 - 4.9 mg/dL    Albumin 3.2 (L) 3.4 - 5.0 g/dL   BUN   Result Value Ref Range    Urea Nitrogen 27 (H) 6 - 23 mg/dL   Creatinine, Serum   Result Value Ref Range    Creatinine 3.23 (H) 0.50 - 1.30 mg/dL    eGFR 21 (L) >60 mL/min/1.73m*2   Electrolyte panel   Result Value Ref Range    Sodium 132 (L) 136 - 145 mmol/L    Potassium 3.7 3.5 - 5.3 mmol/L    Chloride 97 (L) 98 - 107 mmol/L    Bicarbonate 25 21 - 32 mmol/L    Anion Gap 14 10 - 20 mmol/L   Calcium, ionized   Result Value Ref Range    POCT Calcium, Ionized 1.10 1.1 - 1.33 mmol/L   Phosphorus   Result Value Ref Range    Phosphorus 3.2 2.5 - 4.9 mg/dL   Blood Gas Arterial Full Panel   Result Value Ref Range    POCT pH, Arterial 7.39 7.38 - 7.42 pH    POCT pCO2, Arterial 40 38 - 42 mm Hg    POCT pO2, Arterial 62 (L) 85 - 95 mm Hg    POCT SO2, Arterial 94 94 - 100 %    POCT Oxy Hemoglobin, Arterial 91.1 (L) 94.0 - 98.0 %    POCT Hematocrit Calculated, Arterial 29.0 (L) 41.0 - 52.0 %    POCT  Sodium, Arterial 129 (L) 136 - 145 mmol/L    POCT Potassium, Arterial 4.0 3.5 - 5.3 mmol/L    POCT Chloride, Arterial      POCT Ionized Calcium, Arterial 1.10 1.10 - 1.33 mmol/L    POCT Glucose, Arterial 106 (H) 74 - 99 mg/dL    POCT Lactate, Arterial 0.7 0.4 - 2.0 mmol/L    POCT Base Excess, Arterial -0.7 -2.0 - 3.0 mmol/L    POCT HCO3 Calculated, Arterial 24.2 22.0 - 26.0 mmol/L    POCT Hemoglobin, Arterial 9.8 (L) 13.5 - 17.5 g/dL    POCT Anion Gap, Arterial      Patient Temperature 37.0 degrees Celsius    FiO2 38 %   Renal Function Panel   Result Value Ref Range    Glucose 115 (H) 74 - 99 mg/dL    Sodium 131 (L) 136 - 145 mmol/L    Potassium 4.0 3.5 - 5.3 mmol/L    Chloride 95 (L) 98 - 107 mmol/L    Bicarbonate 26 21 - 32 mmol/L    Anion Gap 14 10 - 20 mmol/L    Urea Nitrogen 22 6 - 23 mg/dL    Creatinine 3.16 (H) 0.50 - 1.30 mg/dL    eGFR 22 (L) >60 mL/min/1.73m*2    Calcium 8.6 8.6 - 10.6 mg/dL    Phosphorus 3.7 2.5 - 4.9 mg/dL    Albumin 3.4 3.4 - 5.0 g/dL   Blood Gas Arterial Full Panel   Result Value Ref Range    POCT pH, Arterial 7.41 7.38 - 7.42 pH    POCT pCO2, Arterial 36 (L) 38 - 42 mm Hg    POCT pO2, Arterial 80 (L) 85 - 95 mm Hg    POCT SO2, Arterial 98 94 - 100 %    POCT Oxy Hemoglobin, Arterial 95.6 94.0 - 98.0 %    POCT Hematocrit Calculated, Arterial 27.0 (L) 41.0 - 52.0 %    POCT Sodium, Arterial 127 (L) 136 - 145 mmol/L    POCT Potassium, Arterial 4.4 3.5 - 5.3 mmol/L    POCT Chloride, Arterial 95 (L) 98 - 107 mmol/L    POCT Ionized Calcium, Arterial 1.11 1.10 - 1.33 mmol/L    POCT Glucose, Arterial 147 (H) 74 - 99 mg/dL    POCT Lactate, Arterial 0.8 0.4 - 2.0 mmol/L    POCT Base Excess, Arterial -1.6 -2.0 - 3.0 mmol/L    POCT HCO3 Calculated, Arterial 22.8 22.0 - 26.0 mmol/L    POCT Hemoglobin, Arterial 9.1 (L) 13.5 - 17.5 g/dL    POCT Anion Gap, Arterial 14 10 - 25 mmo/L    Patient Temperature 37.0 degrees Celsius    FiO2 50 %   POCT GLUCOSE   Result Value Ref Range    POCT Glucose 153 (H) 74  - 99 mg/dL              ASSESSMENT:  Colin Leonard is a 59 year old male with essential hypertension, hyperlipidemia, CAD, carotid artery stenosis, CKD5 (unclear etiology), hyperphosphatemia, chronic metabolic acidosis,  anemia in CKD, valvular disease, DM with recently noted triple vessel disease on heart cath, admitted s/p elective off pump CABG x 2 (LIMA to LAD, SVG to distal CX) on 8/6, with nephrology consulted for CKD 5 and initiation of dialysis.   Given ongoing discussion for dialysis between patient and his outpatient nephrologist, as well as electrolyte abnormalities noted during surgery and need for volume control post-CABG, patient is currently a candidate for dialysis. Additionally, given patient's advanced CKD at baseline, it is possible that he may need long-term dialysis.     Currently, given patient's hemodynamic instability and new pressor requirement with new severe basilar stenosis, can hold off on dialysis until ICU team feels patient is stable enough to start back up again. When ICU team deems patient stable, can start him on CVVH.        #CKD5/ESRD  :: Baseline Creatinine at around 6.5  :: Creatinine on admission: 9.38  :: Electrolytes: Na 131, K 4.0, Ca 8.6. Phos 3.7  :: Acid base status: Bicarb 26  :: Patient s/p CABG  :: Intra-op had hyperkalemia on blood gas to 6.8 requiring multiple K cocktails and bicarb, current K at 5.1.   :: Had hyperkalemia to 5.9 on ABG (8/6), s/p insulin bolus and potassium came down to 5.2  :: Patient on CVHH from 8/6 evening to 8/7 midday  :: Patient received SLED 8/7  :: On 8/7, BAT was called with CTA c/f basilar artery occlusion vs high grade occlusion. Patient was HDUS (MAPs 50s and c/f airway protection and was intubated). Plan was for mechanical thrombectomy, however no LVO so became a diagnostic cerebral angiogram which showed basilar artery with severe stenosis, likely 2/2 atherosclerotic disease.       Recommendations:  - Indication for dialysis:  Volume control post-CABG  - Likely need to restart CVVH when stable from neuro standpoint  - Please obtain RFP at least q12hr  - Agree with lokelma to keep potassium level under control  - Avoid nephrotoxins, contrast if possible  - strict Is/Os  - Renal dosing for medications for latest eGFR, follow medication trough as appropriate  - Avoid hypotension/rapid fluctuations in BPs    Joey Arroyo. PGY1  Daytime / Weekend Renal Pager [phone]  After 7 pm Emergencies [phone] Pager [phone]

## 2025-08-08 NOTE — PRE-PROCEDURE NOTE
Pre-Procedure H&P     Provider Assessment:  Diagnosis/Reason for Procedure: LVO  Procedure: Mechanical thrombectomy  Medications Reviewed:   yes   Prophylatic Antibiotics Needed:   no    Neuro status: Patient with NIH 31, hemodynamically unstable requiring pressors prior to procedure, concern for basilar occlusion  Mouth Opening OK: Unable to assess   Neck Flexibility OK: Unable to assess   Sedation Plan: General anesthesia  COVID-19 Risk Consent:  Surgeon has reviewed key risks related to the risk of tova COVID-19 and if they contract COVID-19 what the risks are.

## 2025-08-08 NOTE — PROGRESS NOTES
"CTICU Progress Note  Colin Wongjoni/[id number]    Admit Date: 8/6/2025  Hospital Length of Stay: 2   ICU Length of Stay: 1d 18h   CT SURGEON:     SUBJECTIVE:   Hemodynamically stable off vasopressors/vasoactives.   Hyperkalemia persisting postop and given K cocktail and started CVVH.     MEDICATIONS  Infusions:  norepinephrine, Last Rate: 0.1 mcg/kg/min (08/08/25 0810)  propofol      Scheduled:  albumin human, ,   amiodarone, ,   aspirin, 81 mg, Daily  atorvastatin, 80 mg, Nightly  ceFAZolin, 2 g, q12h  ezetimibe, 10 mg, Nightly  heparin, 5,000 Units, q8h  insulin lispro, 0-15 Units, q4h  lidocaine, 1 patch, q24h  phenylephrine in NS, ,   polyethylene glycol, 17 g, Daily  sennosides-docusate sodium, 2 tablet, BID  sodium zirconium cyclosilicate, 10 g, q8h      PRN:  albumin human, ,   alteplase, 2 mg, PRN  amiodarone, ,   dextrose, 25 g, q15 min PRN   Or  glucagon, 1 mg, q15 min PRN  HYDROmorphone, 0.2 mg, q2h PRN  magnesium sulfate, 2 g, q6h PRN  magnesium sulfate, 4 g, q6h PRN  naloxone, 0.2 mg, q5 min PRN  ondansetron, 4 mg, q4h PRN  oxyCODONE, 10 mg, q4h PRN  oxyCODONE, 5 mg, q4h PRN  oxygen, 1 Dose, Continuous - O2/gases  phenylephrine in NS, ,         PHYSICAL EXAM:   Visit Vitals  /84   Pulse (!) 111   Temp 36.1 °C (97 °F) (Temporal)   Resp 16   Ht 1.778 m (5' 10\")   Wt 122 kg (268 lb 15.4 oz)   SpO2 100%   BMI 38.59 kg/m²   Smoking Status Never   BSA 2.45 m²     Wt Readings from Last 5 Encounters:   08/07/25 122 kg (268 lb 15.4 oz)   08/04/25 116 kg (256 lb 12.8 oz)   07/31/25 116 kg (256 lb 11.2 oz)   07/29/25 115 kg (253 lb 6.4 oz)   07/25/25 117 kg (258 lb 1.6 oz)     INTAKE/OUTPUT:  I/O last 3 completed shifts:  In: 1218 (10 mL/kg) [I.V.:768 (6.3 mL/kg); Blood:350; IV Piggyback:100]  Out: 3654 (30 mL/kg) [Urine:960 (0.2 mL/kg/hr); Other:2014; Chest Tube:680]  Weight: 122 kg          Vent settings:  Vent Mode: Volume control/assist control  FiO2 (%):  [50 %-60 %] 60 %  S RR:  [16] 16  S VT:  [580 " mL] 580 mL  PEEP/CPAP (cm H2O):  [8 cm H20] 8 cm H20  MAP (cm H2O):  [14] 14    Physical Exam:   Physical Exam  Constitutional:       General: He is not in acute distress.     Appearance: He is not ill-appearing.      Comments: Critically ill elderly male laying in bed intubated    HENT:      Head: Normocephalic and atraumatic.     Eyes:      Extraocular Movements: Extraocular movements intact.      Conjunctiva/sclera: Conjunctivae normal.      Pupils: Pupils are equal, round, and reactive to light.      Comments: Pupils constricted equal, round, and reactive to light      Cardiovascular:      Rate and Rhythm: Normal rate. Rhythm irregular.      Pulses: Normal pulses.      Comments: Afib rate 90-100s   Normotensive to hypertensive   Pulmonary:      Effort: Pulmonary effort is normal. No respiratory distress.      Comments: Orally intubated AC/VC   Appropriate oxygenation and ventilation  Symmetrical chest expansion   1MS, 1LPL, and 1RPL chest tubes to -20cm suction with serous output and no air leaks noted   Abdominal:      General: There is no distension.      Palpations: Abdomen is soft.      Tenderness: There is no abdominal tenderness.   Genitourinary:     Comments: Elmore in place with clear, yellow urine     Musculoskeletal:         General: Swelling present. Normal range of motion.      Comments: Mild generalized edema      Skin:     General: Skin is dry.      Capillary Refill: Capillary refill takes less than 2 seconds.      Comments: All wounds clean and dressed appropriately   Extremities slightly cool and pale but pulses palpable.   Core warm.      Neurological:      Comments: +Corneal, +cough, +gag (weak)  Patient opens his eyes to name and blinks on command and to questions   Squeezed bilateral hands    Psychiatric:         Mood and Affect: Mood normal.         Behavior: Behavior normal.          Daily Risk Screen  Intubated: Yes, for mechanical ventilatory support   Central line: Yes, plan to remove if  remains off pressors   Elmore: Yes, for accurate I/O monitoring in critically ill     Images: All images reviewed     Invasive Hemodynamics:    Most Recent Range Past 24hrs   BP (Art) 126/73 Arterial Line BP 1  Min: 71/62  Max: 135/71   MAP(Art) 90 mmHg Arterial Line MAP 1 (mmHg)   Min: 66 mmHg  Max: 103 mmHg       Assessment/Plan   Assessment:   Colin Leonard is a 59 y.o. male hx of HTN, CKD stage 5, chronic anemia, type 2 diabetes, hx of c diff colitis who was admitted 5/19-5/24 for shortness of breathe was incidentally found to have atypical pneumonia which he was treated for. He was found to have elevated trop and BNP on that admission with a few episodes of reported VT which required increased dose of coreg. He underwent LHC 5/22/25 demonstrated MVCAD and decision was made to discharge and let him recover from his pneumonia treated with Augmentin and oral vanco prophyllacticaly due to h/o cdiff. and bring back for CABG. He is now s/p CABGx 2with Dr Yolanda Justice on 8/6/2     Plan:  NEURO:  No significant PMHx. On 8/8, around 0500, patient went into Afib and when team went in to treat found patient unresponsive without reflexes. CT scan which demonstrated significant atherosclerotic disease that portrayed diminished contrast enhancement in the bilateral vertebral and basilar arteries L>R. Allowed permissive HTN and as day progressed, patient neuro status improved and now following commands. Patient opens eyes to name and squeezes upper extremities when asked. Blinks yes to questions. Acute post operative pain adequately controlled on current regimen.  -->  - Serial neuro and pain assessments   - Permissive HTN --> SBP goal 150-160   - Neurology following, appreciate recs   - Scheduled Tylenol   - PRN oxycodone  - PRN dilaudid for pain   - No sedation as to not cloud mental status   - PT Consult, OOB to chair as tolerated  - CAM ICU score qshift  - Sleep/wake cycle hygiene     CV:  CAD, HTN SVT/NSVT, Chronic  diastolic HF stage II on admission 5/2025 requiring up titration of coreg. now status post CABG. Pre-55% with mod/sever AI normal RV post- 50% unchanged RV. Patients baseline -180 per family. On 8/8, Afib w/ RVR around 0500 and treated with amio. Currently, Afib rate 90-100s and permissive HTN on levo and vaso. A/V epicardial wires cut this morning when preparing for possible MRI. -->  - Permissive hypertension SBP goal 150-160s   - Will need structural heart eval in future for TAVR eval, this can be done at discharge and not needed as inpatient unless clinical indication  - Will need plavix prior to discharge  - Atorvastatin 80mg   - Zetia 10mg nightly   - Hold home coreg, torsemide and hydralazine     PULM:  Recent pnuemonia completed course of Augmentin outpatient. While neuro status declined 8/8, patient experienced respiratory distress and reintubated. Currently intubated on ventilator, AC/VC with appropriate oxygenation/ventilation and symmetrical chest expansion. MS and LPL chest tubes -20cm suction with serosanguinous drainage with intermittent air leaks in both. CXR demonstrates acute cardiogenic pulmonary edema, postop atelectasis, and worsening right apical pneumothorax. RPL chest tube placed by fellow surgeon. Repeat CXR demonstrated resolvement of pneumothorax. -->  - Daily CXR  - Wean FiO2 maintaining SpO2 >92%.   - IS q1h and OOB to chair   - Chest tubes to suction      GI:  h/o cdiff colitis, treated (March 2025). Tolerated diet yesterday but reintubated. -->  - Restart PPI   - NPO  - Assess tomorrow about possibly starting TF   - Colace/senna BID and miralax BID     :  CSA-TEJA Risk Score med.  Baseline CKD V previous admision Cr ~7 baseline PAT Cr 10. Thornton in place and oliguric making ~0-5ml/hr UOP. SLED overnight with -1L. Hypervolemic on exam. Hyponatremia likely due to hypervolemia. -->  - Continue thornton catheter for strict I/Os.  - Nephrology following, appreciate recs 8/8:   -->  Indication for dialysis: Volume control post-CABG  --> Likely need to restart CVVH when stable from neuro standpoint  --> Please obtain RFP at least q12hr  --> Agree with lokelma to keep potassium level under control  --> Avoid nephrotoxins, contrast if possible  --> strict Is/Os  --> Renal dosing for medications for latest eGFR, follow medication trough as appropriate  --> Avoid hypotension/rapid fluctuations in BPs  - RFP as clinically indicated  - Replete electrolytes per CTICU protocol     ENDO: DM2 not on home meds,  A1c: 5.4. Stress hyperglycemia is adequately controlled on current regimen. -->  - Maintain BG <180  - Insulin per CTICU protocol     HEME: Anemia of chronic disease.  Acute on chronic blood loss anemia, stable. No s/s of bleeding or coagulopathy. Chest tube output is serosanguinous. -->    - Monitor drain output volume and characteristics  - CBC PRN   - ASA daily   - Start heparin infusion, ok with Dr. Yolanda Justice and neurology   - SCDs for DVT prophylaxis  - Last type and screen: 8/6     ID:  Afebrile, no current indications of infection. MRSA negative.-->  - Trend temp q4h  - Periop cefazolin x 48hrs     Skin:  No active skin issues.  - preventative Mepilex dressings in place on sacrum and heels  - change preventative Mepilex weekly or more frequently as indicated (when moist/soiled)   - every shift skin assessment per nursing and weekly ICU skin rounds  - moisture barrier to be applied with diane care  - active skin problems addressed with nursing on daily rounds     Proph:  SCDs  PPI  Heparin gtt      G:  Line  Right IJ MAC w Minimac placed 8/6   Right Groin trialysis line 8/6  Left brachial a-line placed 8/6  Elmore placed 8/6   PIVs      F: Family: will update at bedside postoperatively.    The indications and risks/benefits of non-violent/non self-destructive restraints were discussed.      A,B,C,D,E,F,G: reviewed     I personally spent 75 minutes of critical care time directly and personally  managing the patient exclusive of separately billable procedures.     Peggy Birmingham PA-C   Dispo: CTICU care for now.    CTICU TEAM PHONE [phone]

## 2025-08-08 NOTE — PROGRESS NOTES
Occupational Therapy    Communication Note    Patient Name: Colin Leonard  MRN: [id number]  Today's Date: 8/8/2025   Room: 15/15-A    Discipline: Occupational Therapy      Missed Visit Reason:  (0807: c/f stroke this AM. Pt now intubated and sedated with NIHSS 31 pending further work up. Not appropriate for OT services at this time.)      08/08/25 at 8:08 AM   Elmira Hutchison, OT   Rehab Office: [phone]

## 2025-08-08 NOTE — PROGRESS NOTES
Colin Leonard is a 59 y.o. male on day 2 of admission presenting with Atherosclerosis of native coronary artery of native heart without angina pectoris.    Subjective   Patient discussed in morning handover, patient examined and chart reviewed. Meds, labs and radiology reviewed during rapid and full interdisciplinary rounds this morning.    I have seen the patient either independently or with an associated resident physician or advanced practice provider.    Patient Summary and Daily Plan:    Surgeon: Yolanda Justice  DOS: 8/6    Procedure: CABG x 2 off-pump with a LIMA to LAD and saphenous vein graft to distal circumflex;  Right femoral dialysis catheter placement.    Airway: grade I - full view of glottis   EF: 60-65%    FULL Code  Emergency Sternotomy: Y; Exp POD#10 - Aug. 16, 2025    Overnight Events: This morning, patient was determined to have an altered level of consciousness that was a marked change (see also the significant event note from today). [facility] urgently called and patient transported for CT/CTA brain.  On arrival this morning, during handover, the day team was informed of the over night events. Concurrently we were made aware that the patient was becoming hemodynamically less stable and intermittently desaturating. We rapidly went to the CT room to assess. Patient was hypotensive, received small boluses of norepinephrine, and we had concerns for the patient's ability to protect his away. Though it was recognized that there was a need to go urgently to the angio suite, it was deemed safest to bring the patient back to the CTICU to intubate the patient. Further, we expeditiously placed a right chest tube to drain the R pneumothorax before sending the patient to the Angio suite. In addition, pacing wires cut in case the patient required a MRI.    Assessment/Plan     Today's Plan:  Angio +/- thrombectomy for Basilar Artery stroke - discussed with Neurology and Cardiac Surgery  Plan to target a higher  "MAP  Will anticoagulate if required  Keep PaO2 > 80, PaCO2 normal range, avoid hypo/hyperglycemia  S/P CABG  Will place NG tube for ASA, clopidogrel and statin  TEJA  Nephrology to assess    DISPO: CTICU    Patient Details and Course in Hospital    HPI: Patient admitted in May SOB for atypical pneumonia. In addition, found to have elevated trop and BNP on that admission with a few episodes of reported VT. LHC (5/22/25) demonstrated MVCAD. Patient was treated for pneumonia and brought back as an outpatient for CABG procedure.      PMH:  CAD  SVT/NSVT  Chronic diastolic HF   type 2 diabetes  HTN  CKD stage 5   Chronic anemia  Recent admissions (over the past few months) for pneumonia (per family)  hx of c diff colitis     Objective     Last Recorded Vitals  Blood pressure 110/84, pulse 106, temperature 36.1 °C (97 °F), temperature source Temporal, resp. rate 14, height 1.778 m (5' 10\"), weight 122 kg (268 lb 15.4 oz), SpO2 100%.    Invasive Hemodynamics:    Most Recent Range Past 24hrs   BP (Art) 136/75 Arterial Line BP 1  Min: 88/56  Max: 136/75   MAP(Art) 96 mmHg Arterial Line MAP 1 (mmHg)   Min: 66 mmHg  Max: 103 mmHg   RA/CVP   No data recorded   PA   No data recorded   PA(mean)   No data recorded   CO   No data recorded   CI   No data recorded   Mixed Venous   No data recorded   SVR    No data recorded       Intake/Output last 3 Shifts:  I/O last 3 completed shifts:  In: 1218 (10 mL/kg) [I.V.:768 (6.3 mL/kg); Blood:350; IV Piggyback:100]  Out: 3654 (30 mL/kg) [Urine:960 (0.2 mL/kg/hr); Other:2014; Chest Tube:680]  Weight: 122 kg     After patient returned from angio  Physical Exam  Constitutional:       General: He is sleeping.      Interventions: He is intubated.     Cardiovascular:      Rate and Rhythm: Tachycardia present. Rhythm irregularly irregular.      Comments: In atrial fibrillation    Infusions:  Norepi: 0.06    Lines:   RIJ CVL  L brachial art. Line  R femoral trialysis catheter  Pulmonary:      " Effort: He is intubated.      Comments: Vent Mode: Volume control/assist control  FiO2 (%):  [50 %-60 %] 60 %  S RR:  [16] 16  S VT:  [580 mL] 580 mL  PEEP/CPAP (cm H2O):  [8 cm H20] 8 cm H20  MAP (cm H2O):  [14] 14    Abdominal:      Palpations: Abdomen is soft.      Tenderness: There is no abdominal tenderness.   Genitourinary:     Comments: Elmore in situ: clear urine    Neurological:      Mental Status: He is lethargic.      Comments: RASS: -3 - -4  CAM-ICU: Unable to determine at present.     Once sedation was stopped, patient was blinking and coughing.      ICU Liberation Bundle:    A-Analgesia: Tylenol and opioids at lowest effective dose  B-SBT: DMC at present   C-RASS Target: 0  D-CAM: to be determined  E-Mobilization Plan: Stage I  F - Family Last Update: updated by the team    Daily Checklist:  HOB > 30 degrees: Achieved  Feeding: NPO at present;   Thromboprophylaxis: Heparin (system) and SCDs  Ulcer Prophylaxis: PPI  Glucose Control: Target 110-180  Line to removed: The indications and risks/benefits of non-violent/non self-destructive restraints were discussed.   Bowel Care: Bowel regime ordered  De-escalation of Antibiotics: Completed routine anti-microbial prophylaxis    ADDENDUM:  See Neurology's note. 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 Team spend >1 hrs and updated family, with myself following the angiogram. We have emphasized that there is a large amount of brain at risk, while there are some early signs of brainstem function, there is a potential for a poor prognosis.     ADDENDUM: After a few hours of driving the MAP up with vasopressors, patient appears to open his eye and focus on family's voice. Will plan to update the surgeon.    I spent 144 mins minutes in the professional and overall care of this patient.    This critically ill patient continues to be at-risk for clinically significant deterioration / failure due to the above mentioned dysfunctional,  unstable organ systems.  I have personally identified and managed all complex critical care issues to prevent aforementioned clinical deterioration.  Critical care time is spent at bedside and/or the immediate area and has included, but is not limited to, the review of diagnostic tests, labs, radiographs, serial assessments of hemodynamics, respiratory status, ventilatory management, and family updates.  Time spent in procedures and teaching are reported separately.    Tomer Vasquez MD

## 2025-08-08 NOTE — ADDENDUM NOTE
Addendum  created 08/08/25 1307 by SARAH Abebe-SANYA    Intraprocedure Meds edited, Orders acknowledged in Narrator

## 2025-08-08 NOTE — PROCEDURES
Intubation    Date/Time: 8/8/2025 7:47 AM    Performed by: Lianet Gonzalez MD  Authorized by: Tomer Vasquez MD    Consent:     Consent obtained:  Emergent situation  Universal protocol:     Patient identity confirmed:  Arm band and hospital-assigned identification number  Pre-procedure details:     Indications: airway protection and altered consciousness      Patient status:  Unresponsive    Look externally: large tongue      Obstruction: none      Neck mobility: normal      Pharmacologic strategy: RSI      Induction agents:  Etomidate    Paralytics:  Rocuronium  Procedure details:     Preoxygenation:  Bag valve mask    CPR in progress: no      Number of attempts:  1  Successful intubation attempt details:     Intubation method:  Oral    Intubation technique: video assisted      Laryngoscope blade:  Mac 4    Bougie used: no      Grade view: I      Tube size (mm):  7.5    Tube type:  Cuffed    Tube visualized through cords: yes    Placement assessment:     ETT at teeth/gumline (cm):  23    Tube secured with:  ETT dockery    Breath sounds:  Equal    Placement verification: chest rise, colorimetric ETCO2 and equal breath sounds    Post-procedure details:     Procedure completion:  Tolerated

## 2025-08-08 NOTE — CONSULTS
Consults    CTICU consulted Neurology for c/f Mid Basilar artery occulsion., Originally presented at [facility] 0612.     History Of Present Illness  Colin Leonard is a 59 y.o. male  with hx of HTN, CKD stage 5, chronic anemia, type 2 diabetes, hx of c diff colitis who was admitted 5/19-5/24 for shortness of breathe was incidentally found to have atypical pneumonia which he was treated for. He was found to have elevated trop and BNP on that admission with a few episodes of reported VT which required increased dose of coreg. He underwent LHC 5/22/25 demonstrated MVCAD and decision was made to discharge and let him recover from his pneumonia treated with Augmentin and oral vanco prophyllacticaly due to h/o cdiff. and bring back for CABG. He is now s/p CABGx 2with Dr Yolanda Justice on 8/6. Patient underwent CVVH post surgery till 1300 08/07. Started first round of SLED 2000 08/07 to 0400 08/08.     Originally presented as a [facility] 0612 08/08, LNW 2300 08/07 (alert, following commands, sitting up in chair, able to eat, communicate needs, moving x4, no focal deficits). NIHSS 31, MRS 0 (confirmed by wife on phone). CTH unremarkable. CTA c/f Basilar artery occulsion vs high grade stenosis. Neuro Angio notified ~0645. CTP likely not accurate given it's showing hypoperfusion in anterior circulation territories bilaterally. Patient was brought from CT back to CTICU to intubate due to HDUS (MAP 50s and c/f protecting airway during IR). Of note, [facility] called 0612, on review of tele patient went into afib at 0557. Not anticoagulated.     Last known well: 0612    Had stroke symptoms resolved at time of presentation: No    Current antiplatelet/anticoagulant use: Aspirin and Dvt ppx LMWH    Prior Functional Status (Modified Beatrice Scale):  0 The patient has no residual symptoms.    Stroke Risk Factors:  Hypertension, Diabetes Mellitus, Atrial Fibrillation, and H/O CAD    Past Medical History  Medical History[1]  Surgical History  Surgical  "History[2]  Social History  Social History[3]  Allergies  Amlodipine besylate  Home Medications  Prescriptions Prior to Admission[4]    Review of Systems  Neurological Exam  NEUROLOGICAL:  A limited neurological examination was performed d/t patient's comatose level of consciousness.    Cognition & [place] Coma Scale:      - Patient is laying in bed with eyes closed. Eyes are closed with verbal, tactile, and noxious stimuli. Does not follow commands.     - GCS 5    Cranial Nerves & Reflexes:  Eyes are closed. When eyelids are opened, pupils are aligned and gaze remains stationary in primary position, is roving. VOR absent    Pupillary (II;III): intact, sluggish  Corneal: (V;VII): intact, sluggish  VOR/Doll Eyes (VIII; III,IV,VI): absent  Gag (IX;X): Absent    Sensorimotor:    Bulk: Normal   Tone: Normal   Adventitious Movements: Absent                             Triple flex b/l LE with extreme nox stim   UE b/l flaccid     Reflexes: Absent  Warren's: absent  Toes: downgoing bilaterally with plantar stimulation using the Babinski maneuver  Ankle Clonus: absent     Physical Exam  Last Recorded Vitals  Blood pressure 110/84, pulse (!) 116, temperature 36.1 °C (97 °F), temperature source Temporal, resp. rate 22, height 1.778 m (5' 10\"), weight 122 kg (268 lb 15.4 oz), SpO2 93%.    NIHSS  NIHSS    1a  Level of consciousness: 3=responds only with reflex motor or automatic effects or totally unresponsive, flaccid, areflexic   1b. LOC questions:  2=Performs neither task correctly   1c. LOC commands: 2=Performs neither task correctly   2.  Best Gaze: 0=normal   3. Visual: 3=Bilateral hemianopia (blind including cortical blindness)   4. Facial Palsy: 0=Normal symmetric movement   5a. Motor Left Arm: 4=No movement   5b.  Motor Right Arm: 4=No movement   6a. Motor Left Leg: 3=No effort against gravity, limb falls   6b  Motor Right Leg:  3=No effort against gravity, limb falls   7. Limb Ataxia: 0=Absent   8.  Sensory: 2=Severe " to total sensory loss; patient is not aware of being touched in face, arm, leg   9. Best Language:  3=Mute, global aphasia; no usable speech or auditory comprehension   10. Dysarthria: 2= unintelligible    11. Extinction and Inattention: 0=No abnormality          Total:   31          Relevant Results    Results from last 72 hours   Lab Units 08/08/25  0138 08/07/25  1414   WBC AUTO x10*3/uL 9.7 8.8   NRBC AUTO /100 WBCs 0.0 0.0   RBC AUTO x10*6/uL 3.03* 3.30*   HEMOGLOBIN g/dL 9.3* 10.0*   HEMATOCRIT % 27.0* 31.9*   MCV fL 89 97   MCH pg 30.7 30.3   MCHC g/dL 34.4 31.3*   RDW % 16.6* 16.5*   PLATELETS AUTO x10*3/uL 172 196      Results from last 72 hours   Lab Units 08/08/25  0602 08/08/25  0138 08/07/25  1414   GLUCOSE mg/dL 115* 93 73*   SODIUM mmol/L 131* 132*  132* 138  138   POTASSIUM mmol/L 4.0 3.7  3.7 5.0  5.0   CHLORIDE mmol/L 95* 97*  97* 103  103   CO2 mmol/L 26 25  25 25  25   ANION GAP mmol/L 14 14  14 15  15   BUN mg/dL 22 27*  27* 62*  62*   CREATININE mg/dL 3.16* 3.23*  3.23* 6.34*  6.34*   EGFR mL/min/1.73m*2 22* 21*  21* 9*  9*   CALCIUM mg/dL 8.6 8.2* 9.2   PHOSPHORUS mg/dL 3.7 3.2  3.2 5.3*  5.3*   ALBUMIN g/dL 3.4 3.2* 3.3*   MAGNESIUM mg/dL  --  1.82 2.17   POCT CALCIUM IONIZED (MMOL/L) IN BLOOD mmol/L  --  1.10 1.23      Results from last 72 hours   Lab Units 08/06/25  1449   PROTIME seconds 13.7*   INR  1.2*   APTT seconds 29                Tu Coma Scale  Best Eye Response: Spontaneous  Best Verbal Response: Oriented  Best Motor Response: Follows commands  [place] Coma Scale Score: 15                No echocardiogram results found for the past 14 days        BNP   Date/Time Value Ref Range Status   05/19/2025 10:34 AM 2,008 (H) 0 - 99 pg/mL Final        I have personally reviewed the following imaging results:   Imaging  XR abdomen 1 view  Result Date: 8/8/2025  1.  Mild gaseous distention of several loops of bowel throughout the abdomen in an overall nonobstructive  bowel gas pattern. Moderate amount of stool within the colon. 2. Medical devices as detailed above.   I personally reviewed the images/study and resident's interpretation and I agree with the findings as stated by Leelee Brar MD (resident radiologist). This study was analyzed and interpreted at [place], [place].   MACRO: None     Dictation workstation:   [id number]    XR chest 1 view  Result Date: 8/7/2025  1. Stable small-to-moderate right apical pneumothorax. Persistent low lung volumes and bibasilar atelectasis.   I personally reviewed the images/study and resident's interpretation and I agree with the findings as stated by Leelee Brar MD (resident radiologist). This study was analyzed and interpreted at [place], [place].   MACRO: None     Dictation workstation:   [id number]    XR chest 1 view  Addendum Date: 8/7/2025  Interpreted By:  Brent Ralph, ADDENDUM: There is a stable small to moderate right apical pneumothorax with bibasilar atelectasis.   Signed by: Brent Ralph 8/7/2025 4:38 PM   -------- ORIGINAL REPORT -------- Dictation workstation:   [id number]    Result Date: 8/7/2025  1.  There are low lung volumes with bibasilar and perihilar atelectasis/ postop median sternotomy changes without pneumothorax.     Signed by: Brent Ralph 8/7/2025 2:21 PM Dictation workstation:   [id number]    XR chest 1 view  Result Date: 8/7/2025  1.  Slight interval increase in right apical pneumothorax. Postoperative atelectasis with slight interval improvement in perihilar edema.     Signed by: Brent Ralph 8/7/2025 10:14 AM Dictation workstation:   [id number]    XR chest 1 view  Result Date: 8/7/2025  1. Small right apical pneumothorax. 2. Mild pulmonary edema with bilateral atelectasis and pleural effusions.   I personally reviewed the images/study and I agree with the findings as stated by Jeniffer  MD Kailee (resident).   MACRO: Jeniffer Dugan discussed the significance and urgency of this critical finding by EPIC secure chat with  Ritesh Navarrete DO on 8/7/2025 at 1:28 am.  (**-RCF-**) Findings:  See findings.   Signed by: Brent Ralph 8/7/2025 10:13 AM Dictation workstation:   [id number]    XR chest 1 view  Result Date: 8/6/2025  1. Status post median sternotomy. No pneumothorax following procedure. 2. Endotracheal tube with tip terminating 2.1 cm from the annel. Consider retraction. 3. Low lung volumes with bibasilar atelectasis. 4. Other medical devices as noted above.   I personally reviewed the images/study and I agree with the findings as stated. This study was interpreted by radiology resident Neal Rdz D.O. at [place], [place].   Signed by: Nas Romero 8/6/2025 4:24 PM Dictation workstation:   [id number]      Cardiology, Vascular, and Other Imaging  No other imaging results found for the past 7 days    05/19/25 TTE:  CONCLUSIONS:   1. The left ventricular systolic function is normal, with a visually estimated ejection fraction of 60-65%.   2. There is mild concentric left ventricular hypertrophy.   3. Mild mitral valve regurgitation.   4. Trace to mild tricuspid regurgitation.   5. Mild aortic valve stenosis.   6. The peak instantaneous gradient of the aortic valve is 28 mmHg.   7. There is plaque visualized in the ascending aorta.       Stroke Alert CT/MRI review: Was interpreted as it was being performed     IV Thrombolysis IV Thrombolysis Checklist      IV Thrombolysis Given: No; Thrombolysis contraindication reason: Time from Last Known Well (or stroke onset) is >4.5 hours  and History of Recent Major Trauma or Surgery not involving head within 14 days; (Class IIb recommendation)        Assessment/Plan   Assessment & Plan  Atherosclerosis of native coronary artery of native heart without angina pectoris    ESRD (end stage renal  disease) (Multi)    Chronic renal insufficiency    Pneumonia    Coronary artery disease involving native heart, unspecified vessel or lesion type, unspecified whether angina present          Colin Leonard is a 59 y.o. Left-handed male presenting as a [facility] c/f mid basilar occlusion vs high-grade stenosis. Hx of HTN, CKD stage 5, chronic anemia, type 2 diabetes, hx of c diff colitis now s/p CABGx 2with Dr Yolanda Justice on 8/6/25. LNW 2300 08/07 (alert, following commands, sitting up in chair, able to eat, communicate needs, moving x4, no focal deficits). NIHSS 31, MRS 0 (confirmed by wife on phone). CTH unremarkable. CTA c/f Basilar artery occulsion vs high grade stenosis. Neuro Angio notified ~0645. Patient underwent intubation in CTICU. Now pending Neuro intervention. Further recommendations will follow after plan for thrombectomy.       Recommendations:  - More recommendations following MT  - MRI stroke protocol when able    Type: Ischemic stroke  Subtype/etiology: Large Artery occlusion vs stenosis   Vessels involved: Basilar  Neurological manifestations: Comatose   NIHSS (worst at presentation): 31   Antiplatelet/antithrombotic plan for stroke prevention: Per [facility] protocol- DAPT (ASA & Plavix) for 90 days then ASA monotherapy indefinitely  VTE prophylaxis: Heparin subcutaneous     Vascular Risk Factor Modification Goals:  Lipid Goals: education on healthy diet and statin therapy to maintain or achieve goal LDL-cholesterol < 70mg. Atorvastatin 80mg..  Blood pressure goals: avoid hypotension SBP <100 that could worsen cerebral perfusion. Ischemic stroke post-thrombectomy Sys less than 160..  Glucose Goals: early treatment of hyperglycemia to goal glucose 140-180 mg/dl with long-term goal A1c < 7%.  NPO until nurse bedside swallow assessment.  Consider focused stroke order set.  Smoking Cessation and Education.  Assessment for Rehabilitation needs.  Patient and family education on signs and symptoms of stroke,  calling 911, healthy strategies for stroke prevention.           Pending further recommendations post intervention.     Mahendra Brock DO   PGY2 Neurology         [1]   Past Medical History:  Diagnosis Date    C. difficile colitis     hx of C. difficile colitis in March 2025    CHF (congestive heart failure)     chronic diastolic heart failure    CKD (chronic kidney disease)     Stage V    Coronary artery disease     Diabetes mellitus (Multi)     DVT (deep venous thrombosis) (Multi)     Hypertension     MI (myocardial infarction) (Multi)     Pneumonia     Multifocal pneumonia 5/2025    PONV (postoperative nausea and vomiting)     Type 2 diabetes mellitus     no meds controlled with diet   [2]   Past Surgical History:  Procedure Laterality Date    AMPUTATION      CARDIAC CATHETERIZATION N/A 05/23/2025    Procedure: LHC, With LV;  Surgeon: Shantelle Anderson MD;  Location: [facility];  Service: Cardiovascular;  Laterality: N/A;    CARDIAC CATHETERIZATION N/A 05/23/2025    Procedure: LULA Stent - Coronary;  Surgeon: Shantelle Anderson MD;  Location: [facility];  Service: Cardiovascular;  Laterality: N/A;    CHOLECYSTECTOMY  03/05/2025    Laparoscopic Cholecystectomy    COLONOSCOPY      URETER SURGERY     [3]   Social History  Tobacco Use    Smoking status: Never    Smokeless tobacco: Never   Vaping Use    Vaping status: Never Used   Substance Use Topics    Alcohol use: Yes     Alcohol/week: 1.0 - 2.0 standard drink of alcohol     Types: 1 - 2 Standard drinks or equivalent per week    Drug use: Never   [4]   Medications Prior to Admission   Medication Sig Dispense Refill Last Dose/Taking    amiodarone (Pacerone) 200 mg tablet Take 1 tablet (200 mg) by mouth 2 times a day. 60 tablet 1 8/6/2025 Morning    aspirin 81 mg EC tablet Take 1 tablet (81 mg) by mouth once daily.   8/6/2025 Morning    atorvastatin (Lipitor) 20 mg tablet Take 1 tablet (20 mg) by mouth once daily.   Past Week    carvedilol (Coreg)  12.5 mg tablet Take 3 tablets (37.5 mg) by mouth 2 times a day. 180 tablet 0 8/6/2025 Morning    ezetimibe (Zetia) 10 mg tablet Take 1 tablet (10 mg) by mouth once daily at bedtime. 90 tablet 3 8/5/2025    hydrALAZINE (Apresoline) 100 mg tablet Take 1 tablet (100 mg) by mouth 3 times a day. 270 tablet 3 8/5/2025    multivitamin tablet Take 1 tablet by mouth once daily.   Past Week    sodium bicarbonate 650 mg tablet Take 2 tablets (1,300 mg) by mouth 3 times a day. 180 tablet 1 8/6/2025 Morning    torsemide (Demadex) 20 mg tablet Take 3 tablets (60 mg) by mouth once daily. 90 tablet 0 8/6/2025 Morning    acetaminophen (Tylenol) 325 mg tablet Take 2 tablets (650 mg) by mouth every 6 hours if needed for moderate pain (4 - 6).   Unknown

## 2025-08-08 NOTE — ANESTHESIA POSTPROCEDURE EVALUATION
Patient: Colin Leonard    Procedure Summary       Date: 08/08/25 Room / Location: [facility]    Anesthesia Start: 0858 Anesthesia Stop: 1028    Procedure: IR INTERVENTION NEURO THROMBECTOMY Diagnosis: (LVO)    Scheduled Providers: Mayte Cooley MD; SELENE Samayoa Responsible Provider: Mayte Cooley MD    Anesthesia Type: general ASA Status: 4 - Emergent            Anesthesia Type: general    Vitals Value Taken Time   BP 94/67 08/08/25 11:09   Temp 36 08/08/25 11:09   Pulse 104 08/08/25 11:08   Resp 16 08/08/25 11:08   SpO2 100 % 08/08/25 11:08   Vitals shown include unfiled device data.    Anesthesia Post Evaluation    Patient location during evaluation: ICU  Patient participation: complete - patient cannot participate  Level of consciousness: sedated  Pain management: adequate  Airway patency: patent  Cardiovascular status: acceptable  Respiratory status: acceptable  Hydration status: acceptable  Postoperative Nausea and Vomiting: none        There were no known notable events for this encounter.

## 2025-08-08 NOTE — SIGNIFICANT EVENT
Significant event    Colin Leonard is a 59 y.o. male hx of HTN, CKD stage 5, chronic anemia, type 2 diabetes, hx of c diff colitis who was admitted 5/19-5/24 for shortness of breathe was incidentally found to have atypical pneumonia which he was treated for. He was found to have elevated trop and BNP on that admission with a few episodes of reported VT which required increased dose of coreg. He underwent LHC 5/22/25 demonstrated MVCAD and decision was made to discharge and let him recover from his pneumonia treated with Augmentin and oral vanco prophyllacticaly due to h/o cdiff. and bring back for CABG. He is now s/p CABGx with Dr Yolanda Justice on 8/6/25 and is now POD2      0545: Patient went to A-fib with RVR, when nurse went in the room she found the patient unresponsive A&O x0.  Last known normal was @2100 (8/7) when patient was A&O x 4.  Resident physician on service was immediately notified, and came to assess bedside.  Upon assessment patient was noted to be unresponsive to all stimuli including pain, however pupils were reacting normally bilaterally to light.  At this time patient was hemodynamically stable in no acute distress.  Attending physician was called who came and assessed bedside immediately, it was decided that a BAT should be called. BAT team came and assessed patient and recommended immediate CTA and CT head to assess for any acute abnormalities.  CT surgery team (Dr. Payal Chavez and Dr. Yolanda Justice) were notified immediately with Dr. Domingo Chavez being present during the time of the incident.  Family was also notified about current situation and advised to come in.    Patient was taken down to CT urgently with BAT team, nursing staff, and resident physician.  While in CT patient had acute desaturation down to 78% while transferring to CT bed.  After being repositioned patient recovered saturation was breathing spontaneously and remained hemodynamically stable.  Shortly after CT patient's MAP  slowly dropped into the high 50s, 2 doses of phenylephrine (2mL) were given, senior resident was called as was daytime attending physician.  Levo drip was started due to increasing pressor requirement.  CT showed possible basilar occlusion, so CT head perfusion was ordered.  CT perfusion showed likely basilar occlusion.  At this time senior resident and attending physician were present, the decision was made to take patient to MRI to further assess need for emergent intervention but would require cutting of epicardial wires and RSI in order to protect airway.    Patient was transferred back to the floor where he was intubated via RSI and a right sided chest tube was placed by CT surgery fellow to address previously known pneumothorax while going back to MRI.  Patient remains hemodynamically stable and on a ventilator while waiting to go for further imaging/intervention at the discretion of neurological team.    Andrez Onofre MD  Resident PGY-1, Anesthesiology

## 2025-08-08 NOTE — SIGNIFICANT EVENT
Rapid Response Nurse Note: Rapid Response    Pager time: [phone]  Arrival time: 606  Event end time: 720  Location: [facility] 15  [] Triage by phone or secure messaging    Rapid response initiated by:  [] Rapid response RN [] Family [] Nursing Supervisor [] Physician   [] RADAR auto page [] Sepsis auto-page [x] RN [] RT   [] NP/PA [] Other:     Primary reason for call:   [x] BAT [] New CPAP/BiPAP [] Bleeding [] Change in mental status   [] Chest pain [] Code blue [] FiO2 >/= 50% [] HR </= 40 bpm   [] HR >/= 130 bpm [] Hyperglycemia [] Hypoglycemia [] RADAR    [] RR </= 8 bpm [] RR >/= 30 bpm [] SBP </= 90 mmHg [] SpO2 < 90%   [] Seizure [] Sepsis [] Shortness of breath  [] Staff concern: see comments     Initial VS and/or RADAR VS: see VS flowsheet  Providers present at bedside (if applicable): CTICU providers x2, Stroke Team ( Dr. Mahendra Nunn, Dr. Hazel Rosales), CTICU Rns x2    Name of ICU Provider contacted (if applicable): CTICU Attending    Interventions:  [] None [] ABG/VBG [] Assist w/ICU transfer [] BAT paged    [] Bag mask [] Blood [] Cardioversion [] Code Blue   [] Code blue for intubation [] Code status changed [] Chest x-ray [] EKG   [] IV fluid/bolus [] KUB x-ray [] Labs/cultures [x] Medication   [] Nebulizer treatment [] NIPPV (CPAP/BiPAP) [] Oxygen [] Oral airway   [] Peripheral IV [] Palliative care consult [x] CT/MRI [] Sepsis protocol    [] Suctioned [] Other:     Outcome:  [] Coded and  [] Code blue for intubation [] Coded and transferred to ICU []  on division   [] Remained on division (no change) [] Remained on division + additional monitoring [] Remained in ED [] Transferred to ED   [] Transferred to ICU [] Transferred to inpatient status [] Transferred for interventions (procedure) [] Transferred to ICU stepdown    [] Transferred to surgery [] Transferred to telemetry [] Sepsis protocol [] STEMI protocol   [x] Stroke protocol-returned to unit [x] Bedside nurse instructed to  page rapid response for any concerns or acute change in condition/VS     Additional Comments: Rapid response nurse responding to BAT Code Stroke for patient POD 2 for OHS and reported by bedside nurse to be neurologically intact at bedtime but was noted to be unarousable even to noxious stimuli early this morning on rounds. Blood glucose per ABG at 0558: 106. Patient transported down to CT for multiple scans with determination by stroke team that stat MRI was needed due to location of the stroke. Patient was then transported back to unit without adverse events for additional interventions by primary team in preparation for MRI.

## 2025-08-08 NOTE — PROGRESS NOTES
CT SURGERY  08/06 CABGx 2with Dr Yolanda Justice     ICU TREATMENT PLAN   08/08 c/f stroke this AM. Pt now intubated and sedated with NIHSS 31 pending further work up    DC PLAN  TBD - Care Transitions is following to develop a safe & supportive discharge plan in collaboration with multidisciplinary team (&) patient/family/significant others.      PAYOR   Medical [facility]    PT/OT   08/07 = low     COMPLETED  (X) DC/SDOH assessments completed with patient and brother (Buddy) at bedside        Verified EPIC data: address / PCP / pharmacy / contacts listed (6)     Kinjal Moran (LSW, MSW)

## 2025-08-08 NOTE — SIGNIFICANT EVENT
Brief Post staffing Update Note     Patient discussed on rounds and seen at bedside.    Impression: Severe Basilar Stenosis    Recommendations:  -Start low dose heparin drip  -Increase systolic blood pressure (SBP) to a target range of 180-200 mmHg until clinical improvement is observed. If the patient improves at an SBP below 180 mmHg, maintain the pressure at the level that provides clinical benefit. The primary goal is to achieve and sustain the SBP that results in clinical improvement.  -Obtain CTH after 8 hours.    Lima Olivo MD  PGY-3 Neurology    Patient was discussed with the attending physician Dr. Dior.

## 2025-08-08 NOTE — ANESTHESIA PREPROCEDURE EVALUATION
Patient: Colin Leonard    Procedure Information       Anesthesia Start Date/Time: 08/08/25 0858    Scheduled providers: Mayte Cooley MD; SELENE Samayoa    Procedure: IR INTERVENTION NEURO THROMBECTOMY    Location: [facility] ([facility]            Relevant Problems   Cardiac   (+) Atherosclerosis of native coronary artery of native heart without angina pectoris   (+) CAD (coronary artery disease)   (+) Coronary artery disease involving native coronary artery of native heart without angina pectoris   (+) Coronary artery disease involving native heart, unspecified vessel or lesion type, unspecified whether angina present   (+) Hypertensive emergency   (+) Mixed hyperlipidemia   (+) NSTEMI (non-ST elevated myocardial infarction) (Multi)   (+) Primary hypertension      Pulmonary   (+) Pneumonia   (+) Shortness of breath on exertion      /Renal   (+) Chronic renal insufficiency   (+) ESRD (end stage renal disease) (Multi)   (+) Right renal stone      Liver   (+) Acute calculous cholecystitis   (+) Choledocholithiasis with acute cholecystitis      Endocrine   (+) Obesity   (+) Type 2 diabetes mellitus      Hematology   (+) Anemia      HEENT   (+) Vision loss      ID   (+) Acute hematogenous osteomyelitis of left foot (Multi)   (+) Osteomyelitis of ankle or foot, left, acute (Multi)   (+) Pneumonia       Clinical information reviewed:   Tobacco  Allergies  Meds   Med Hx  Surg Hx   Fam Hx  Soc Hx        NPO Detail:  No data recorded     Physical Exam    Airway  Mallampati: unable to assess     Cardiovascular    Dental    Pulmonary    Abdominal            Anesthesia Plan    History of general anesthesia?: unknown/emergency  History of complications of general anesthesia?: unknown/emergency    ASA 4 - emergent     general       Plan discussed with SELENE.

## 2025-08-08 NOTE — PROCEDURES
Pre-Procedure Verification and Time Out:  Procedure Location: procedure area  HUDDLE - Pre-procedure Verification:  completed  TIME OUT - Final Verification:  completed immediately prior to procedure start  DEBRIEF: completed    Complications:  None; patient tolerated the procedure well.     Disposition: CT ICU  Condition: stable  Specimens Collected: No specimens collected    General Information:   Anesthesia/ sedation: general anesthesia provided by anesthesia team  Indication(s)/Pre - Procedure Diagnoses: LVO  Post-Procedure Diagnosis: stenosis  Procedure Name: Intended mechanical thrombectomy but given no LVO became a Diagnostic Cerebral Angiogram  Procedure performed by: Melchor Chase MD  Assistant(s): Terence  Estimated Blood Loss (mL): 10  Specimen: no  Informed Consent: consent obtained and in chart     Last Known Normal:   Access: 8 Fr Sheath R CFA   Closure: StarClose  Vessels injected: R subclavian, R vert  Groin puncture time: 0913  Findings: diseased R vertebral artery, distal L vertebral artery, basilar artery with severe stenosis likely secondary to atherosclerotic disease, no occlusion, no thrombectomy performed.  Initial Thrombectomy Time: n/a  Additional Thrombectomy Times (Times for all device passes): n/a  Revascularization Time: n/a  Mechanical Thrombectomy Device: n/a  TICI Pre: 0  TICI Post: n/a  Post-procedure BP parameters: per stroke team and CT ICU  Full report to follow in PACS.     Patient was prepped and draped in sterile fashion  Sterile prep: Chlorhexidine   Positioning: Supine  Medications given during procedure: 8ml topical lidocaine

## 2025-08-09 ENCOUNTER — APPOINTMENT (OUTPATIENT)
Dept: RADIOLOGY | Facility: HOSPITAL | Age: 60
End: 2025-08-09
Payer: COMMERCIAL

## 2025-08-09 ENCOUNTER — APPOINTMENT (OUTPATIENT)
Dept: RADIOLOGY | Facility: HOSPITAL | Age: 60
DRG: 235 | End: 2025-08-09
Payer: COMMERCIAL

## 2025-08-09 ASSESSMENT — PAIN - FUNCTIONAL ASSESSMENT
PAIN_FUNCTIONAL_ASSESSMENT: CPOT (CRITICAL CARE PAIN OBSERVATION TOOL)
PAIN_FUNCTIONAL_ASSESSMENT: 0-10
PAIN_FUNCTIONAL_ASSESSMENT: CPOT (CRITICAL CARE PAIN OBSERVATION TOOL)
PAIN_FUNCTIONAL_ASSESSMENT: 0-10
PAIN_FUNCTIONAL_ASSESSMENT: CPOT (CRITICAL CARE PAIN OBSERVATION TOOL)
PAIN_FUNCTIONAL_ASSESSMENT: 0-10
PAIN_FUNCTIONAL_ASSESSMENT: CPOT (CRITICAL CARE PAIN OBSERVATION TOOL)
PAIN_FUNCTIONAL_ASSESSMENT: 0-10
PAIN_FUNCTIONAL_ASSESSMENT: 0-10
PAIN_FUNCTIONAL_ASSESSMENT: CPOT (CRITICAL CARE PAIN OBSERVATION TOOL)

## 2025-08-09 ASSESSMENT — PAIN DESCRIPTION - DESCRIPTORS: DESCRIPTORS: DISCOMFORT

## 2025-08-09 ASSESSMENT — PAIN SCALES - GENERAL
PAINLEVEL_OUTOF10: 0 - NO PAIN
PAINLEVEL_OUTOF10: 6
PAINLEVEL_OUTOF10: 0 - NO PAIN
PAINLEVEL_OUTOF10: 2
PAINLEVEL_OUTOF10: 0 - NO PAIN

## 2025-08-09 NOTE — PROGRESS NOTES
"Colin Leonard is a 59 y.o. male on day 3 of admission presenting with Atherosclerosis of native coronary artery of native heart without angina pectoris.    Subjective   Pt seen today, overnight started following commands on higher BP, responds appropriately to examiner, somnolent       Objective     Last Recorded Vitals  Blood pressure 110/84, pulse (!) 111, temperature 36.3 °C (97.3 °F), temperature source Temporal, resp. rate 17, height 1.778 m (5' 10\"), weight 122 kg (268 lb 15.4 oz), SpO2 99%.  Oxygen Therapy  Pulse Ox (24 hr min): 96  Medical Gas Therapy: Supplemental oxygen  Medical Gas Delivery Method: Endotracheal tube  O2 Flow Rate (L/min): 6 L/min FiO2 (%): 40 %    On vaso, levo, SBP 160s  - overnight SBP mostly 150s, MAPs 100-110s  No sedation  Intubated, on CPAP    Opens eyes to voice, oriented at least to place and person, follows commands in all extremities  Pupils 4mm sluggish but reactive  EOM grossly intact  Face symmetric  ETT in place, reportedly cough intact    No effort against gravity but follows commands distally in all extremities    [facility] NIHSS:   NIH Stroke Scale:     1A. Level of Consciousness:  Arouses to minor stimulation (+1)    1B. Ask Month and Age:  1 question right (+1)    1C. Blink Eyes & Squeeze Hands:  Performs both tasks (0)    2. Best Gaze:  Normal (0)    3. Visual:  No visual loss (0)    4. Facial Palsy:  Normal symmetry (0)    5A. Motor - Left Arm:  No effort against gravity (+3)    5B. Motor - Right Arm:  No effort against gravity (+3)    6A. Motor - Left Leg:  No effort against gravity (+3)    6B. Motor - Right Leg:  No effort against gravity (+3)    7. Limb Ataxia:  No ataxia (0)    8. Sensory Loss:  Normal (no sensory loss) (0)    9. Best Language:  Normal (no aphasia) (0)    10. Dysarthia:  Intubated or other barrier (UN)    11. Extinction and Inattention:  No abnormality (0)    NIH Stroke Scale:  14       Physical Exam  Neurological Exam    Assessment & " Plan  Atherosclerosis of native coronary artery of native heart without angina pectoris    ESRD (end stage renal disease) (Multi)    Chronic renal insufficiency    Pneumonia    Coronary artery disease involving native heart, unspecified vessel or lesion type, unspecified whether angina present      Colin Leonard is a 59 y.o. Left-handed male presenting as a BAT c/f mid basilar occlusion vs high-grade stenosis. Hx of HTN, CKD stage 5, chronic anemia, type 2 diabetes, hx of c diff colitis now s/p CABGx 2with Dr Yolanda Justice on 8/6/25. LKW 2300 08/07 (alert, following commands, sitting up in chair, able to eat, communicate needs, moving x4, no focal deficits). BAT called for coma, NIHSS 31, MRS 0 (confirmed by wife on phone). CTH unremarkable. CTA c/f Basilar artery occulsion vs high grade stenosis. CTP showed diffusion hypoperfusion in posterior fossa and watershed cerebral hemispheres but no core infarct. Angio revealed multifocal vert and basilar stenosis, no occlusion, no intervention done. Recommended increasing pressure until exam improves.    Updates 8/9:  - exam improved significantly with -160s with levo and vaso; opens eyes to voice, follows commands in all extremities  - heparin assay 0.1-0.2    Recommendations:  Obtain CTH if stable to assess infarct burden if any  Maintain current SBP and MAP goals (150-160, >100 MAP), will continue to monitor closely   Continue heparin drip    Recommendations are not final until staffed with Dr. Dior.    Hank Rod MD  Neurology PGY-4  Stroke team pager [phone]

## 2025-08-09 NOTE — PROGRESS NOTES
Colin Leonard is a 59 y.o. male on day 3 of admission presenting with Atherosclerosis of native coronary artery of native heart without angina pectoris.      Subjective   For repeat CTH. CVVH on hold at present. Trialysis line in place.       Objective        Vitals 24HR  Heart Rate:  []   Temp:  [36 °C (96.8 °F)-36.3 °C (97.3 °F)]   Resp:  [11-23]   SpO2:  [96 %-100 %]   Oxygen Therapy  Pulse Ox (24 hr min): 96  Medical Gas Therapy: Supplemental oxygen  Medical Gas Delivery Method: Endotracheal tube  O2 Flow Rate (L/min): 6 L/min FiO2 (%): 40 %    Intake/Output last 3 Shifts:    Intake/Output Summary (Last 24 hours) at 8/9/2025 1337  Last data filed at 8/9/2025 1000  Gross per 24 hour   Intake 1156.51 ml   Output 200 ml   Net 956.51 ml       Physical Exam  NAD, on VENT  CTABL  Median Stenotomy scar healing.  Abd soft, nt, nd  Trace edema b/l    Relevant Results  Lab Results   Component Value Date    WBC 12.9 (H) 08/09/2025    HGB 8.7 (L) 08/09/2025    HCT 27.8 (L) 08/09/2025    MCV 94 08/09/2025     08/09/2025     Lab Results   Component Value Date    GLUCOSE 181 (H) 08/09/2025    CALCIUM 8.9 08/09/2025     (L) 08/09/2025    K 4.5 08/09/2025    CO2 23 08/09/2025    CL 93 (L) 08/09/2025    BUN 36 (H) 08/09/2025    CREATININE 5.21 (H) 08/09/2025         Assessment & Plan  Atherosclerosis of native coronary artery of native heart without angina pectoris    ESRD (end stage renal disease) (Multi)    Chronic renal insufficiency    Pneumonia    Coronary artery disease involving native heart, unspecified vessel or lesion type, unspecified whether angina present    Mr. Leonard is a 59M with history of CKD5, CAD s/p CABG on 8/6, and developed post-surgery TEJA on CKD requiring dialysis initiation. 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 Nephrology following for Dialysis care.    #CKDV/ESKD  -Cr on admission 9.4  -Etiology of CKD is not clear.  -Received CVVH  8/6-8/7  -SLED 8/7pm  -BAT on 8/7.    Plan:  -Resume CVVH today once returns from CT scan  -Will assess RRT needs daily.    Discussed with Dr. Rowland.    Enoch Myles MD  PGY-5 Nephrology Fellow      Enoch Myles MD

## 2025-08-09 NOTE — PROGRESS NOTES
Colin Leonard is a 59 y.o. male on day 3 of admission presenting with Atherosclerosis of native coronary artery of native heart without angina pectoris.    Subjective   Patient discussed in morning handover, patient examined and chart reviewed. Meds, labs and radiology reviewed during rapid and full interdisciplinary rounds this morning.    I have seen the patient either independently or with an associated resident physician or advanced practice provider.    Patient Summary and Daily Plan:    Surgeon: Yolanda Justice  DOS: 8/6    Procedure: CABG x 2 off-pump with a LIMA to LAD and saphenous vein graft to distal circumflex;  Right femoral dialysis catheter placement.    Airway: grade I - full view of glottis   EF: 60-65%    FULL Code  Emergency Sternotomy: Y; Exp POD#10 - Aug. 16, 2025    Overnight Events: Much improved neuro status with maintain MAP > 90 overnight. Remains on vasopressors and is in atrial fibrillation    Assessment/Plan     Today's Plan:  Angio +/- thrombectomy for Basilar Artery stroke - discussed with Neurology and Cardiac Surgery  Neurology to re-assess today, in particular if repeat imaging is required  Maintain on heparin and ASA  Continue to target a higher MAP  Keep PaO2 > 80, PaCO2 normal range, avoid hypo/hyperglycemia  S/P CABG  ASA, clopidogrel and statin  TEJA with some hypervolemia  Nephrology to assess on need for CRRT or SLED  Atrial fibrillation  Amiodarone infusion for rate control.    DISPO: CTICU    Patient Details and Course in Hospital    HPI: Patient admitted in May SOB for atypical pneumonia. In addition, found to have elevated trop and BNP on that admission with a few episodes of reported VT. [facility] (5/22/25) demonstrated MVCAD. Patient was treated for pneumonia and brought back as an outpatient for CABG procedure.      PMH:  CAD  SVT/NSVT  Chronic diastolic HF   type 2 diabetes  HTN  CKD stage 5   Chronic anemia  Recent admissions (over the past few months) for pneumonia (per  "family)  Hx of c diff colitis    Course in Hospital:  8/8 - patient deteriorated to a Coma secondary to basilar artery stenosis. Neuro status improved by the end of the day with driving the MAP > 90.     Objective     Last Recorded Vitals  Blood pressure 110/84, pulse (!) 113, temperature 36.3 °C (97.3 °F), temperature source Temporal, resp. rate 19, height 1.778 m (5' 10\"), weight 122 kg (268 lb 15.4 oz), SpO2 98%.    Invasive Hemodynamics:    Most Recent Range Past 24hrs   BP (Art) 152/95 Arterial Line BP 1  Min: 98/58  Max: 167/89   MAP(Art) 115 mmHg Arterial Line MAP 1 (mmHg)   Min: 71 mmHg  Max: 124 mmHg   RA/CVP   No data recorded   PA   No data recorded   PA(mean)   No data recorded   CO   No data recorded   CI   No data recorded   Mixed Venous   No data recorded   SVR    No data recorded     Intake/Output last 3 Shifts:  I/O last 3 completed shifts:  In: 1482.8 (12.2 mL/kg) [I.V.:1282.8 (10.5 mL/kg); IV Piggyback:200]  Out: 1740 (14.3 mL/kg) [Urine:110 (0 mL/kg/hr); Other:1000; Chest Tube:630]  Weight: 122 kg     After patient returned from angio  Physical Exam  Constitutional:       Interventions: He is intubated.     Eyes:      Pupils: Pupils are equal, round, and reactive to light.       Cardiovascular:      Rate and Rhythm: Tachycardia present. Rhythm irregularly irregular.      Comments: In atrial fibrillation    Infusions:  Norepi: 0.06  Vaso:    Lines:   RIJ CVL  L brachial art. Line  R femoral trialysis catheter  Pulmonary:      Effort: He is intubated.      Comments: Vent Mode: Volume control/assist control  FiO2 (%):  [50 %-60 %] 60 %  S RR:  [16] 16  S VT:  [580 mL] 580 mL  PEEP/CPAP (cm H2O):  [8 cm H20] 8 cm H20  MAP (cm H2O):  [14] 14    Abdominal:      Palpations: Abdomen is soft.      Tenderness: There is no abdominal tenderness.   Genitourinary:     Comments: [place] in situ: clear urine    Neurological:      General: No focal deficit present.      Mental Status: He is easily aroused.      " Comments: RASS: 0 - -1  CAM-ICU: Unable to determine at present.     Will respond to instructions   Psychiatric:         Behavior: Behavior is cooperative.       ICU Liberation Bundle:    A-Analgesia: Tylenol and opioids at lowest effective dose  B-SBT: pending  C-RASS Target: 0  D-CAM: to be determined  E-Mobilization Plan: Stage I-III  F - Family Last Update: to be updated by the team during rounds. All questions appeared to be answered adequately.    Daily Checklist:  HOB > 30 degrees: Achieved  Feeding: NPO at present for extubation  Thromboprophylaxis: Heparin (system) and SCDs  Ulcer Prophylaxis: PPI  Glucose Control: Target 110-180  Line to removed: The indications and risks/benefits of non-violent/non self-destructive restraints were discussed.   Bowel Care: Bowel regime ordered  De-escalation of Antibiotics: Completed routine anti-microbial prophylaxis    I spent 44 mins minutes in the professional and overall care of this patient.    This critically ill patient continues to be at-risk for clinically significant deterioration / failure due to the above mentioned dysfunctional, unstable organ systems.  I have personally identified and managed all complex critical care issues to prevent aforementioned clinical deterioration.  Critical care time is spent at bedside and/or the immediate area and has included, but is not limited to, the review of diagnostic tests, labs, radiographs, serial assessments of hemodynamics, respiratory status, ventilatory management, and family updates.  Time spent in procedures and teaching are reported separately.    Tomer Vasquez MD

## 2025-08-09 NOTE — PROGRESS NOTES
"CTICU Progress Note  Colin Leonard/[id number]    Admit Date: 8/6/2025  Hospital Length of Stay: 3   ICU Length of Stay: 2d 17h   CT SURGEON:     SUBJECTIVE:   Patient on CPAP this morning, on norepinephrine and vasopressin infusions to maintain goal BP for cerebral perfusion, alert and following commands in all extremities.    MEDICATIONS  Infusions:  heparin, Last Rate: 1,600 Units/hr (08/09/25 0600)  norepinephrine, Last Rate: 0.1 mcg/kg/min (08/09/25 0600)  vasopressin, Last Rate: 0.04 Units/min (08/09/25 0600)      Scheduled:  acetaminophen, 325 mg, q8h  aspirin, 81 mg, Daily  atorvastatin, 80 mg, Nightly  ezetimibe, 10 mg, Nightly  insulin lispro, 0-15 Units, q4h  lidocaine, 1 patch, q24h  pantoprazole, 40 mg, Daily  polyethylene glycol, 17 g, Daily  sennosides-docusate sodium, 2 tablet, BID      PRN:  alteplase, 2 mg, PRN  dextrose, 25 g, q15 min PRN   Or  glucagon, 1 mg, q15 min PRN  HYDROmorphone, 0.2 mg, q2h PRN  magnesium sulfate, 2 g, q6h PRN  magnesium sulfate, 4 g, q6h PRN  naloxone, 0.2 mg, q5 min PRN  ondansetron, 4 mg, q4h PRN  oxyCODONE, 10 mg, q4h PRN  oxyCODONE, 5 mg, q4h PRN  oxygen, 1 Dose, Continuous - O2/gases        PHYSICAL EXAM:   Visit Vitals  /84   Pulse (!) 113   Temp 36.3 °C (97.3 °F) (Temporal)   Resp 19   Ht 1.778 m (5' 10\")   Wt 122 kg (268 lb 15.4 oz)   SpO2 98%   BMI 38.59 kg/m²   Smoking Status Never   BSA 2.45 m²     Wt Readings from Last 5 Encounters:   08/07/25 122 kg (268 lb 15.4 oz)   08/04/25 116 kg (256 lb 12.8 oz)   07/31/25 116 kg (256 lb 11.2 oz)   07/29/25 115 kg (253 lb 6.4 oz)   07/25/25 117 kg (258 lb 1.6 oz)     INTAKE/OUTPUT:  I/O last 3 completed shifts:  In: 1482.8 (12.2 mL/kg) [I.V.:1282.8 (10.5 mL/kg); IV Piggyback:200]  Out: 1740 (14.3 mL/kg) [Urine:110 (0 mL/kg/hr); Other:1000; Chest Tube:630]  Weight: 122 kg          Vent settings:  Vent Mode: Pressure support  FiO2 (%):  [40 %-60 %] 40 %  S RR:  [16] 16  S VT:  [580 mL] 580 mL  PEEP/CPAP (cm H2O):  " [8 cm H20] 8 cm H20  DE SUP:  [5 cm H20] 5 cm H20  MAP (cm H2O):  [13-17] 13    Physical Exam:   Physical Exam  Constitutional:       General: He is not in acute distress.     Comments: Critically ill elderly male laying in bed intubated    HENT:      Head: Normocephalic and atraumatic.     Eyes:      Conjunctiva/sclera: Conjunctivae normal.      Pupils: Pupils are equal, round, and reactive to light.      Comments: Pupils constricted equal, round, and reactive to light      Cardiovascular:      Rate and Rhythm: Normal rate. Rhythm irregular.      Pulses: Normal pulses.      Comments: Afib rate 90-100s   Normotensive to hypertensive   Pulmonary:      Effort: Pulmonary effort is normal.      Breath sounds: Normal breath sounds.      Comments: Orally intubated AC/VC   Appropriate oxygenation and ventilation  Symmetrical chest expansion   1MS, 1LPL, and 1RPL chest tubes to -20cm suction with serous output and no air leaks noted   Abdominal:      General: There is no distension.      Palpations: Abdomen is soft.      Tenderness: There is no abdominal tenderness.   Genitourinary:     Comments: Elmore in place with clear, yellow urine     Musculoskeletal:         General: Swelling present. No deformity.      Comments: Mild generalized edema      Skin:     General: Skin is dry.      Capillary Refill: Capillary refill takes less than 2 seconds.      Comments: All wounds clean and dressed appropriately   Extremities slightly cool and pale but pulses palpable.   Core warm.      Neurological:      Mental Status: He is alert.      Comments: Patient opens his eyes to name and squeezed bilateral hands to command and wiggles toes to command. Raises arms anti-gravity spontaneously.   Psychiatric:         Mood and Affect: Mood normal.         Behavior: Behavior normal.          Daily Risk Screen  Intubated: Yes, for mechanical ventilatory support   Central line: Yes  Elmore: Yes, for accurate I/O monitoring in critically ill      Images: All images reviewed     Invasive Hemodynamics:    Most Recent Range Past 24hrs   BP (Art) 152/95 Arterial Line BP 1  Min: 98/58  Max: 167/89   MAP(Art) 115 mmHg Arterial Line MAP 1 (mmHg)   Min: 71 mmHg  Max: 124 mmHg       Assessment/Plan   Assessment:   Colin Leonard is a 59 y.o. male hx of HTN, CKD stage 5, chronic anemia, type 2 diabetes, hx of c diff colitis who was admitted 5/19-5/24 for shortness of breathe was incidentally found to have atypical pneumonia which he was treated for. He was found to have elevated trop and BNP on that admission with a few episodes of reported VT which required increased dose of coreg. He underwent LHC 5/22/25 demonstrated MVCAD and decision was made to discharge and let him recover from his pneumonia treated with Augmentin and oral vanco prophyllacticaly due to h/o cdiff. and bring back for CABG. He is now s/p CABGx 2with Dr Yolanda Justice on 8/6/2     Plan:  NEURO:  No significant PMHx. On 8/8, around 0500, patient went into Afib and when team went in to treat found patient unresponsive without reflexes. CT scan which demonstrated significant atherosclerotic disease that portrayed diminished contrast enhancement in the bilateral vertebral and basilar arteries L>R. Allowed permissive HTN and as day progressed, patient neuro status improved and now alert, following commands. Patient opens eyes to name and squeezes upper extremities when asked. Blinks yes to questions. Acute post operative pain adequately controlled on current regimen.  -->  - Serial neuro and pain assessments   - Permissive HTN --> SBP goal 150-160, MAP >100  - Neurology following, appreciate recs, will obtain repeat CTH today  - Scheduled Tylenol   - PRN oxycodone  - PRN dilaudid for pain   - No sedation as to not cloud mental status   - PT Consult, OOB to chair as tolerated  - CAM ICU score qshift  - Sleep/wake cycle hygiene     CV:  CAD, HTN SVT/NSVT, Chronic diastolic HF stage II on admission  5/2025 requiring up titration of coreg. now status post CABG. Pre-55% with mod/sever AI normal RV post- 50% unchanged RV. Patients baseline -180 per family. On 8/8, Afib w/ RVR around 0500 and treated with amio. Currently, Afib rate 100s and permissive HTN on levo and vaso. A/V epicardial wires cut 8/8 for MRI. -->  - Add amiodarone infusion today for rate control  - Permissive hypertension SBP goal 150-160s, currently requiring norepi & vasopressin to achieve goal  - Will need structural heart eval in future for TAVR eval, this can be done at discharge and not needed as inpatient unless clinical indication  - Will need plavix prior to discharge  - Atorvastatin 80mg   - Zetia 10mg nightly   - Hold home coreg, torsemide and hydralazine     PULM:  Recent pnuemonia completed course of Augmentin outpatient. While neuro status declined 8/8, patient experienced respiratory distress and reintubated. Currently intubated on ventilator, AC/VC with appropriate oxygenation/ventilation and symmetrical chest expansion. MS and LPL chest tubes -20cm suction with serosanguinous drainage with intermittent air leaks in both. RPL chest tube placed 8/8 for pneumothorax, repeat CXR demonstrated resolvement of pneumothorax. -->  - Daily CXR  - Wean FiO2 maintaining SpO2 >92%.   - IS q1h and OOB to chair   - Chest tubes to suction, consider removal after return from CTH  - CPAP this AM, consider weaning trial after CTH based on results      GI:  h/o cdiff colitis, treated (March 2025). Tolerated diet yesterday but reintubated. -->  - PPI   - NPO  - Assess about possibly starting TF if unable to extubate  - Colace/senna BID and miralax BID     :  CSA-TEJA Risk Score med.  Baseline CKD V previous admision Cr ~7 baseline PAT Cr 10. Thornton in place and oliguric making ~0-5ml/hr UOP. Hypervolemic on exam. Hyponatremia likely due to hypervolemia. -->  - Continue thornton catheter for strict I/Os.  - Nephrology following, appreciate recs 8/8:    --> Indication for dialysis: Volume control post-CABG  --> Likely need to start CVVH today  --> Please obtain RFP at least q12hr  --> strict Is/Os  --> Renal dosing for medications for latest eGFR, follow medication trough as appropriate  --> Avoid hypotension/rapid fluctuations in BPs  - Replete electrolytes per CTICU protocol     ENDO: DM2 not on home meds,  A1c: 5.4. Stress hyperglycemia is adequately controlled on current regimen. -->  - Maintain BG <180  - Insulin per CTICU protocol     HEME: Anemia of chronic disease.  Acute on chronic blood loss anemia, stable. No s/s of bleeding or coagulopathy. Chest tube output is serosanguinous. -->    - Monitor drain output volume and characteristics  - CBC PRN   - ASA daily   - Heparin infusion, ok with Dr. Yolanda Justice and neurology   - SCDs for DVT prophylaxis  - Last type and screen: 8/6     ID:  Afebrile, no current indications of infection. MRSA negative.-->  - Trend temp q4h  - Periop cefazolin x 48hrs     Skin:  No active skin issues.  - preventative Mepilex dressings in place on sacrum and heels  - change preventative Mepilex weekly or more frequently as indicated (when moist/soiled)   - every shift skin assessment per nursing and weekly ICU skin rounds  - moisture barrier to be applied with diane care  - active skin problems addressed with nursing on daily rounds     Proph:  SCDs  PPI  Heparin gtt      G:  Line  Right IJ MAC w [place]im[place] placed 8/6   Right Groin trialysis line 8/6  Left brachial a-line placed 8/6  Elmore placed 8/6   PIVs      F: Family: will update at bedside postoperatively.    The indications and risks/benefits of non-violent/non self-destructive restraints were discussed.      A,B,C,D,E,F,G: reviewed     I personally spent 75 minutes of critical care time directly and personally managing the patient exclusive of separately billable procedures.     Rosy Conklin, APRN-CNP, DNP  Dispo: CTICU care for now.    CTICU TEAM PHONE [phone]

## 2025-08-09 NOTE — CONSULTS
"Nutrition Initial Assessment:   Nutrition Assessment    Reason for Assessment: Enteral assessment/recommendation (TF)    Patient is a 59 y.o. male presenting s/p CABG x2 on 8/6. Post op course complicated by basilar artery stenosis, was reintubated on 8/8. Propofol noted at 1.74 ml/hr-- providing negligible calories, on pressors as well. On CVVH.      Nutrition History:  Food and Nutrient History: Pt intubated and sedated.       Anthropometrics:  Height: 177.8 cm (5' 10\")   Weight: 122 kg (268 lb 15.4 oz)   BMI (Calculated): 38.59  IBW/kg (Dietitian Calculated): 75.5 kg  Weight History:   Wt Readings from Last 20 Encounters:   08/07/25 122 kg (268 lb 15.4 oz)   08/04/25 116 kg (256 lb 12.8 oz)   07/31/25 116 kg (256 lb 11.2 oz)   07/29/25 115 kg (253 lb 6.4 oz)   07/25/25 117 kg (258 lb 1.6 oz)   07/24/25 115 kg (254 lb 3.2 oz)   07/22/25 116 kg (255 lb 1.6 oz)   06/24/25 113 kg (250 lb)   05/24/25 112 kg (246 lb 0.5 oz)   05/15/25 113 kg (250 lb)   04/04/25 109 kg (240 lb)   04/02/25 112 kg (247 lb)   03/28/25 116 kg (256 lb 9.9 oz)   03/13/25 103 kg (226 lb 14.4 oz)   03/04/25 115 kg (254 lb 10.1 oz)   10/28/24 114 kg (252 lb 6.4 oz)   08/19/24 122 kg (268 lb)   07/24/24 122 kg (268 lb 15.4 oz)   04/22/24 123 kg (270 lb 6.4 oz)   04/04/22 118 kg (261 lb)   Weight Change %:  Weight History / % Weight Change: wt stable, noted fluctuations/wt gain that are likely 2/2 fluid    Nutrition Focused Physical Exam Findings:    Subcutaneous Fat Loss:   Defer Subcutaneous Fat Loss Assessment: Defer all  Defer All Reason: not appropriate at present time  Muscle Wasting:  Defer Muscle Wasting Assessment: Defer all  Defer All Reason: not appropriate at present time  Edema:  Edema Location: generalized non pitting  Physical Findings:  Skin:  (surgical incision to sternum, leg)    Nutrition Significant Labs:  BG POCT trend:   Results from last 7 days   Lab Units 08/09/25  1352 08/09/25  0408 08/09/25  0044 08/08/25  2132 " 08/08/25  1325   POCT GLUCOSE mg/dL 211* 175* 180* 196* 153*    , Renal Lab Trend:   Results from last 7 days   Lab Units 08/09/25  0040 08/08/25  1558 08/08/25  0602 08/08/25  0138   POTASSIUM mmol/L 4.5 4.4 4.0 3.7  3.7   PHOSPHORUS mg/dL 6.4* 5.6* 3.7 3.2  3.2   SODIUM mmol/L 129* 129* 131* 132*  132*   MAGNESIUM mg/dL 2.47* 2.46*  --  1.82   EGFR mL/min/1.73m*2 12* 16* 22* 21*  21*   BUN mg/dL 36* 30* 22 27*  27*   CREATININE mg/dL 5.21* 4.00* 3.16* 3.23*  3.23*        Nutrition Specific Medications:  Scheduled medications  docusate sodium, 100 mg, nasoduodenal tube, BID  insulin lispro, 0-15 Units, subcutaneous, q4h  pantoprazole, 40 mg, intravenous, Daily  [START ON 8/10/2025] polyethylene glycol, 17 g, nasoduodenal tube, Daily  senna, 5 mL, nasoduodenal tube, BID    Continuous medications  amiodarone, 1 mg/min, Last Rate: 1 mg/min (08/09/25 1045)   Followed by  amiodarone, 0.5 mg/min  heparin, 0-4,000 Units/hr, Last Rate: 1,600 Units/hr (08/09/25 1010)  norepinephrine, 0-0.12 mcg/kg/min (Dosing Weight), Last Rate: 0.1 mcg/kg/min (08/09/25 1245)  PrismaSol 4/2.5, 2,800 mL/hr  vasopressin, 0-0.04 Units/min, Last Rate: 0.04 Units/min (08/09/25 1324)      I/O:   Last BM Date:  (AWAITING POSTOP BM); Stool Appearance: Unable to assess (08/06/25 1430)    Dietary Orders (From admission, onward)       Start     Ordered    08/09/25 1259  Enteral feeding with NPO Nepro; 10  Diet effective now        Question Answer Comment   Tube feeding formula: Nepro    Tube feeding continuous rate (mL/hr): 10        08/09/25 1314                     Estimated Needs:   Total Energy Estimated Needs in 24 hours (kCal): 1600 kCal  Method for Estimating Needs: 14 kcal/kg x 116 kg  Total Protein Estimated Needs in 24 Hours (g):  (110-150)  Method for Estimating 24 Hour Protein Needs: 1.5-2.0 g/kg IBW  Total Fluid Estimated Needs in 24 Hours (mL):  (per team)          Nutrition Diagnosis   Malnutrition Diagnosis  Patient has  Malnutrition Diagnosis: No    Nutrition Diagnosis  Patient has Nutrition Diagnosis: Yes  Diagnosis Status (1): New  Nutrition Diagnosis 1: Increased nutrient needs  Related to (1): increased metabolic demand  As Evidenced by (1): s/p CABG, critical illness.       Nutrition Interventions/Recommendations   Nutrition prescription for enteral nutrition    Nutrition Recommendations:  Individualized Nutrition Prescription Provided for :   Start Pivot 1.5 @ 10 ml/hr increase by 10 ml q8h to goal rate of 40 ml/hr + 2 pkt prostat.   Free H20 flush per team.   Does not need a renal formula while on CVVH-- can reconsult once pt transitions to iHD for updated recs if needed.    Nutrition Interventions/Goals:   Enteral Intake: Management of composition of enteral nutrition, Management of delivery rate of enteral nutrition  Goal: Pivot 1.5 @ 40 ml/hr + 2pkt prostat = 1640 kcal, 125 g protein, 720 ml free H20        Nutrition Monitoring and Evaluation   Enteral and Parenteral Nutrition Intake Determination: Enteral nutrition intake - Tolerate TF at goal rate         Electrolyte and Renal Panel: Electrolytes within normal limits  Glucose/Endocrine Profile: Glucose within normal limits - ICU (140-180 mg/dL)         Goal Status: New goal(s) identified    Time Spent (min): 45 minutes

## 2025-08-09 NOTE — CARE PLAN
Problem: Safety - Adult  Goal: Free from fall injury  Outcome: Progressing     Problem: Discharge Planning  Goal: Discharge to home or other facility with appropriate resources  Outcome: Progressing     Problem: Skin  Goal: Participates in plan/prevention/treatment measures  Outcome: Progressing  Goal: Prevent/manage excess moisture  Outcome: Progressing  Goal: Prevent/minimize sheer/friction injuries  Outcome: Progressing  Goal: Promote/optimize nutrition  Outcome: Progressing  Goal: Promote skin healing  Outcome: Progressing     Problem: General Stroke  Goal: Demonstrate improvement in neurological exam throughout the shift  Outcome: Progressing   While patient is more drowsy than this morning, patient has increased strength in extremities, continues to nod appropriately, and was able to nod to orientation questions today etc.  Problem: ICU Stroke  Goal: Maintain patent airway throughout shift  Outcome: Progressing

## 2025-08-10 ENCOUNTER — APPOINTMENT (OUTPATIENT)
Dept: RADIOLOGY | Facility: HOSPITAL | Age: 60
DRG: 235 | End: 2025-08-10
Payer: COMMERCIAL

## 2025-08-10 ASSESSMENT — PAIN SCALES - GENERAL
PAINLEVEL_OUTOF10: 0 - NO PAIN

## 2025-08-10 NOTE — PROGRESS NOTES
Colin Leonard is a 59 y.o. male on day 4 of admission presenting with Atherosclerosis of native coronary artery of native heart without angina pectoris.      Subjective   On CVVH tolerating well. On pressors for 150 SBP goal for brainstem perfusion.       Objective        Vitals 24HR  Heart Rate:  []   Temp:  [34.2 °C (93.6 °F)-36.1 °C (97 °F)]   Resp:  [10-28]   SpO2:  [95 %-100 %]   Oxygen Therapy  Pulse Ox (24 hr min): 95  Medical Gas Therapy: Supplemental oxygen  Medical Gas Delivery Method: Endotracheal tube  O2 Flow Rate (L/min): 6 L/min FiO2 (%): 40 %    Intake/Output last 3 Shifts:    Intake/Output Summary (Last 24 hours) at 8/10/2025 1325  Last data filed at 8/10/2025 1200  Gross per 24 hour   Intake 2041.7 ml   Output 1633 ml   Net 408.7 ml       Physical Exam  NAD, on VENT  CTABL  Median Stenotomy scar healing.  Abd soft, nt, nd  Trace edema b/l    Relevant Results  Lab Results   Component Value Date    WBC 14.5 (H) 08/10/2025    HGB 8.3 (L) 08/10/2025    HCT 25.3 (L) 08/10/2025    MCV 92 08/10/2025     08/10/2025     Lab Results   Component Value Date    GLUCOSE 182 (H) 08/10/2025    CALCIUM 8.8 08/10/2025     (L) 08/10/2025    K 4.4 08/10/2025    CO2 24 08/10/2025    CL 95 (L) 08/10/2025    BUN 41 (H) 08/10/2025    CREATININE 4.68 (H) 08/10/2025         Assessment & Plan  Atherosclerosis of native coronary artery of native heart without angina pectoris    ESRD (end stage renal disease) (Multi)    Chronic renal insufficiency    Pneumonia    Coronary artery disease involving native heart, unspecified vessel or lesion type, unspecified whether angina present    Mr. Leonard is a 59M with history of CKD5, CAD s/p CABG on 8/6, and developed post-surgery TEJA on CKD requiring dialysis initiation. 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 Nephrology following for Dialysis care.    #CKDV/ESKD  -Cr on admission 9.4  -Etiology of CKD is not clear.  -Received CVVH  8/6-8/7  -SLED 8/7pm  -BAT on 8/7.  -CVVH resumed 8/9-    Plan:  -Continue CVVH per submitted orders  -Will Follow    Discussed with Dr. Rowland.    Enoch Myles MD  PGY-5 Nephrology Fellow      Enoch Myles MD

## 2025-08-10 NOTE — PROGRESS NOTES
"Colin Leonard is a 59 y.o. male on day 4 of admission presenting with Atherosclerosis of native coronary artery of native heart without angina pectoris.    Subjective   Pt seen today, more awake today, on precedex anticipating extubation; pt slightly hypothermic with CVVH so placed on warming blanket     Objective     Last Recorded Vitals  Blood pressure 110/84, pulse 80, temperature 35.3 °C (95.5 °F), resp. rate 12, height 1.778 m (5' 10\"), weight 122 kg (268 lb 15.4 oz), SpO2 98%.  Oxygen Therapy  Pulse Ox (24 hr min): 95  Medical Gas Therapy: Supplemental oxygen  Medical Gas Delivery Method: Endotracheal tube  O2 Flow Rate (L/min): 6 L/min FiO2 (%): 40 %    On vaso 0.04, SBP 150s  - overnight SBP between 130-170s, MAPs >100  Precedex 0.2  Intubated, on CPAP    Opens eyes to voice, oriented at least to place and person, follows commands in all extremities  Pupils 4mm sluggish but reactive  EOM intact  Face symmetric at rest and activation   ETT in place, reportedly cough intact    No effort against gravity but follows commands distally in all extremities; restrained    [facility] NIHSS:   NIH Stroke Scale:     1A. Level of Consciousness:  Arouses to minor stimulation (+1)    1B. Ask Month and Age:  1 question right (+1)    1C. Blink Eyes & Squeeze Hands:  Performs both tasks (0)    2. Best Gaze:  Normal (0)    3. Visual:  No visual loss (0)    4. Facial Palsy:  Normal symmetry (0)    5A. Motor - Left Arm:  No effort against gravity (+3)    5B. Motor - Right Arm:  No effort against gravity (+3)    6A. Motor - Left Leg:  No effort against gravity (+3)    6B. Motor - Right Leg:  No effort against gravity (+3)    7. Limb Ataxia:  No ataxia (0)    8. Sensory Loss:  Normal (no sensory loss) (0)    9. Best Language:  Normal (no aphasia) (0)    10. Dysarthia:  Intubated or other barrier (UN)    11. Extinction and Inattention:  No abnormality (0)    NIH Stroke Scale:  14       Physical Exam  Neurological Exam    Assessment & " Plan  Atherosclerosis of native coronary artery of native heart without angina pectoris    ESRD (end stage renal disease) (Multi)    Chronic renal insufficiency    Pneumonia    Coronary artery disease involving native heart, unspecified vessel or lesion type, unspecified whether angina present      Colin Leonard is a 59 y.o. Left-handed male presenting as a BAT c/f mid basilar occlusion vs high-grade stenosis. Hx of HTN, CKD stage 5, chronic anemia, type 2 diabetes, hx of c diff colitis now s/p CABGx 2with Dr Yolanda Justice on 8/6/25. LKW 2300 08/07 (alert, following commands, sitting up in chair, able to eat, communicate needs, moving x4, no focal deficits). BAT called for coma, NIHSS 31, MRS 0 (confirmed by wife on phone). CTH unremarkable. CTA c/f Basilar artery occulsion vs high grade stenosis. CTP showed diffusion hypoperfusion in posterior fossa and watershed cerebral hemispheres but no core infarct. Angio revealed multifocal vert and basilar stenosis, no occlusion, no intervention done. Recommended increasing pressure until exam improves.    Updates 8/9:  - exam improved significantly with -160s with levo and vaso; opens eyes to voice, follows commands in all extremities  - heparin assay 0.1-0.2    Updates 8/10:  - exam continues to improve, SBPs 140-160s, MAP>100, on vaso, anticipating extubation   - heparin assay 0.5    Recommendations:  Will re-examine post extubation, hold off further imaging at this time  Stop heparin drip  Start plavix 75mg until follow up  OK to wean pressors and monitor exam, if worsens would likely need longer pressor support    Seen and discussed with Dr. Dior.    Hank Rod MD  Neurology PGY-4  Stroke team pager [phone]    -----------------------------------    Post extubation exam 3pm    On vaso 0.04, levo 0.1, -150s    GENERAL:  No distress, alert, interactive and cooperative.     NEURO EXAM:  MENTAL STATE:   Alert and oriented to self, month, place. Language  testing was normal for comprehension, repetition, expression, and naming. Follows complex commands. Overall slow to respond.     CRANIAL NERVES:   CN 2   Visual fields full to confrontation.  CN 3, 4, 6    Pupils round, 3 mm in diameter, equally reactive to light. Lids symmetric; no ptosis. EOMs normal alignment, limited vertical gaze, slow overall, unreliable convergence.   No nystagmus.   CN 5   Facial sensation intact bilaterally.   CN 7   Normal and symmetric facial strength. Nasolabial folds symmetric.   CN 8   Hearing intact to conversation  CN 9   Cannot visualize palate, no dysarthria .   CN 11   Normal strength of shoulder shrug and neck turning.   CN 12   Tongue midline, with normal bulk and strength; no fasciculations.     MOTOR:   Muscle bulk: Normal throughout.  Muscle tone: Normal in both upper and lower extremities.  Movements: No fasciculations, tremors or other abnormal movement.    Deltoid: R5 L5  Biceps: R5 L5    Triceps: R5 L5  : R5 L5    Hip Flex: R5 L5  Knee Flex: R5 L5  Knee Ext: R5 L5  DorsiFlex: R5 L5  PlantarFlex: R5 L5    Plantar Response: equivocal bilaterally, no clonus   No Warren/Tromner    SENSORY:  LT intact in all extremities   No extinction    COORDINATION: Finger-Nose-Finger: slow with some inaccurate aiming but not overt ataxia    Overall examination does not show definite neurologic deficits, it is limited iso encephalopathy due to recent extubation and ICU stay.    Recommendations as above.

## 2025-08-10 NOTE — PROGRESS NOTES
Physical Therapy                 Therapy Communication Note    Patient Name: Colin Leonard  MRN: [id number]  Department: [facility] CT  Room: 15/15-A  Today's Date: 8/10/2025     Discipline: Physical Therapy    Missed Visit: PT Missed Visit: Yes     Missed Visit Reason: Missed Visit Reason:  (Pt extubated, will hold for x2 hours prior to PT eval.)    Missed Time: Talia Freeman, PT, DPT

## 2025-08-10 NOTE — CARE PLAN
The patient's goals for the shift include    Problem: Pain - Adult  Goal: Verbalizes/displays adequate comfort level or baseline comfort level  Outcome: Progressing     Problem: Safety - Adult  Goal: Free from fall injury  Outcome: Progressing     Problem: Skin  Goal: Decreased wound size/increased tissue granulation at next dressing change  Outcome: Progressing     Problem: General Stroke  Goal: Maintain BP within ordered limits throughout shift  Outcome: Progressing

## 2025-08-10 NOTE — PROGRESS NOTES
"CTICU Progress Note  Colin Padillacristin/[id number]    Admit Date: 8/6/2025  Hospital Length of Stay: 4   ICU Length of Stay: 3d 17h   CT SURGEON:     SUBJECTIVE:   Patient on CPAP this morning, on norepinephrine and vasopressin infusions to maintain goal BP for cerebral perfusion, hypothermic likely due to CVVH without blood warmer, placed on ovi hugger.     MEDICATIONS  Infusions:  amiodarone, Last Rate: 0.5 mg/min (08/10/25 0700)  dexmedeTOMIDine, Last Rate: 0.2 mcg/kg/hr (08/10/25 0700)  heparin, Last Rate: 1,800 Units/hr (08/10/25 0700)  norepinephrine, Last Rate: 0.08 mcg/kg/min (08/10/25 0704)  PrismaSol 4/2.5, Last Rate: 2,800 mL/hr (08/09/25 1849)  vasopressin, Last Rate: 0.04 Units/min (08/10/25 0600)      Scheduled:  acetaminophen, 325 mg, q8h  aspirin, 81 mg, Daily  atorvastatin, 80 mg, Nightly  docusate sodium, 100 mg, BID  ezetimibe, 10 mg, Nightly  insulin lispro, 0-15 Units, q4h  pantoprazole, 40 mg, Daily  polyethylene glycol, 17 g, Daily  senna, 5 mL, BID      PRN:  alteplase, 2 mg, PRN  dextrose, 25 g, q15 min PRN   Or  glucagon, 1 mg, q15 min PRN  HYDROmorphone, 0.2 mg, q2h PRN  magnesium sulfate, 2 g, q6h PRN  magnesium sulfate, 4 g, q6h PRN  naloxone, 0.2 mg, q5 min PRN  ondansetron, 4 mg, q4h PRN  oxyCODONE, 10 mg, q4h PRN  oxyCODONE, 5 mg, q4h PRN  oxygen, 1 Dose, Continuous - O2/gases        PHYSICAL EXAM:   Visit Vitals  /84   Pulse 71   Temp 34.5 °C (94.1 °F)   Resp 10   Ht 1.778 m (5' 10\")   Wt 122 kg (268 lb 15.4 oz)   SpO2 100%   BMI 38.59 kg/m²   Smoking Status Never   BSA 2.45 m²     Wt Readings from Last 5 Encounters:   08/07/25 122 kg (268 lb 15.4 oz)   08/04/25 116 kg (256 lb 12.8 oz)   07/31/25 116 kg (256 lb 11.2 oz)   07/29/25 115 kg (253 lb 6.4 oz)   07/25/25 117 kg (258 lb 1.6 oz)     INTAKE/OUTPUT:  I/O last 3 completed shifts:  In: 2614.1 (21.4 mL/kg) [I.V.:2379.1 (19.5 mL/kg); NG/GT:235]  Out: 1085 (8.9 mL/kg) [Other:875; Chest Tube:210]  Weight: 122 kg          Vent " settings:  Vent Mode: Pressure support  FiO2 (%):  [40 %] 40 %  S RR:  [16] 16  PEEP/CPAP (cm H2O):  [8 cm H20] 8 cm H20  MS SUP:  [5 cm H20] 5 cm H20  MAP (cm H2O):  [6.6-7.3] 7.3    Physical Exam:   Physical Exam  Constitutional:       General: He is not in acute distress.     Comments: Critically ill elderly male laying in bed intubated    HENT:      Head: Normocephalic and atraumatic.     Eyes:      Conjunctiva/sclera: Conjunctivae normal.      Pupils: Pupils are equal, round, and reactive to light.      Comments: Pupils constricted equal, round, and reactive to light      Cardiovascular:      Rate and Rhythm: Normal rate. Rhythm irregular.      Pulses: Normal pulses.      Comments: Afib rate 90-100s   Normotensive to hypertensive   Pulmonary:      Effort: Pulmonary effort is normal.      Breath sounds: Normal breath sounds.      Comments: Orally intubated   Appropriate oxygenation and ventilation  Symmetrical chest expansion     Abdominal:      General: There is no distension.      Palpations: Abdomen is soft.      Tenderness: There is no abdominal tenderness.   Genitourinary:     Comments: Elmore in place with clear, yellow urine     Musculoskeletal:         General: Swelling present. No deformity.      Comments: Mild generalized edema      Skin:     General: Skin is dry.      Capillary Refill: Capillary refill takes less than 2 seconds.      Comments: All wounds clean and dressed appropriately   Extremities slightly cool and pale but pulses palpable.   Core warm.      Neurological:      Mental Status: He is alert.      Comments: Patient opens his eyes to name and squeezed bilateral hands to command and wiggles toes to command. Raises arms anti-gravity spontaneously.   Psychiatric:         Mood and Affect: Mood normal.         Behavior: Behavior normal.          Daily Risk Screen  Intubated: Yes, for mechanical ventilatory support   Central line: Yes  Elmore: Yes, for accurate I/O monitoring in critically ill      Images: All images reviewed     Invasive Hemodynamics:    Most Recent Range Past 24hrs   BP (Art) 169/98 Arterial Line BP 1  Min: 129/86  Max: 178/105   MAP(Art) 126 mmHg Arterial Line MAP 1 (mmHg)   Min: 99 mmHg  Max: 133 mmHg       Assessment/Plan   Assessment:   Colin Leonard is a 59 y.o. male hx of HTN, CKD stage 5, chronic anemia, type 2 diabetes, hx of c diff colitis who was admitted 5/19-5/24 for shortness of breathe was incidentally found to have atypical pneumonia which he was treated for. He was found to have elevated trop and BNP on that admission with a few episodes of reported VT which required increased dose of coreg. He underwent LHC 5/22/25 demonstrated MVCAD and decision was made to discharge and let him recover from his pneumonia treated with Augmentin and oral vanco prophyllacticaly due to h/o cdiff. and bring back for CABG. He is now s/p CABGx 2with Dr Yolanda Justice on 8/6/2     Plan:  NEURO:  No significant PMHx. On 8/8, around 0500, patient went into Afib and when team went in to treat found patient unresponsive without reflexes. CT scan which demonstrated significant atherosclerotic disease that portrayed diminished contrast enhancement in the bilateral vertebral and basilar arteries L>R. Allowed permissive HTN and as day progressed, patient neuro status improved and now alert, following commands. Patient opens eyes to name and squeezes upper extremities when asked. Blinks yes to questions. Acute post operative pain adequately controlled on current regimen.  -->  - Serial neuro and pain assessments   - Permissive HTN --> SBP goal 150-160, MAP >100  - Neurology following, appreciate recs  - Scheduled Tylenol   - PRN oxycodone  - PRN dilaudid for pain   - Low dose precedex if needed  - PT Consult, OOB to chair as tolerated  - CAM ICU score qshift  - Sleep/wake cycle hygiene     CV:  CAD, HTN SVT/NSVT, Chronic diastolic HF stage II on admission 5/2025 requiring up titration of coreg. now  status post CABG. Pre-55% with mod/sever AI normal RV post- 50% unchanged RV. Patients baseline -180 per family. On 8/8, Afib w/ RVR around 0500 and treated with amio. Currently, Afib rate 100s and permissive HTN on levo and vaso. A/V epicardial wires cut 8/8 for MRI. -->  - Amiodarone infusion @ 0.5, consider switch to PO tomorrow  - Permissive hypertension SBP goal 150-160s, currently requiring norepi & vasopressin to achieve goal  - Will need structural heart eval in future for TAVR eval, this can be done at discharge and not needed as inpatient unless clinical indication  - Will need plavix prior to discharge  - Atorvastatin 80mg   - Zetia 10mg nightly   - Hold home coreg, torsemide and hydralazine     PULM:  Recent pnuemonia completed course of Augmentin outpatient. While neuro status declined 8/8, patient experienced respiratory distress and reintubated. Currently intubated on ventilator, AC/VC with appropriate oxygenation/ventilation and symmetrical chest expansion. MS and LPL chest tubes -20cm suction with serosanguinous drainage with intermittent air leaks in both. RPL chest tube placed 8/8 for pneumothorax, repeat CXR demonstrated resolvement of pneumothorax. CTs removed 8/9-->  - Daily CXR  - Wean FiO2 maintaining SpO2 >92%.   - IS q1h and OOB to chair   - CPAP this AM, weaning trial      GI:  h/o cdiff colitis, treated (March 2025). Tolerated diet yesterday but reintubated. -->  - PPI   - Dobbhoff placed yesterday, TF overnight, hold today for MRI and possible extubation  - Colace/senna BID and miralax BID     :  CSA-TEJA Risk Score med.  Baseline CKD V previous admision Cr ~7 baseline PAT Cr 10. Hyponatremia likely due to hypervolemia. CVVH started 8/9-->  - Continue thornton catheter for strict I/Os.  - Nephrology following, appreciate recs 8/8:   --> Indication for dialysis: Volume control post-CABG  --> Continue CVVH today, goal -1L  --> Please obtain RFP at least q12hr  --> strict Is/Os  -->  Renal dosing for medications for latest eGFR, follow medication trough as appropriate  --> Avoid hypotension/rapid fluctuations in BPs  - Replete electrolytes per CTICU protocol     ENDO: DM2 not on home meds,  A1c: 5.4. Stress hyperglycemia is adequately controlled on current regimen. -->  - Maintain BG <180  - Insulin per CTICU protocol     HEME: Anemia of chronic disease.  Acute on chronic blood loss anemia, stable. No s/s of bleeding or coagulopathy. -->    - Monitor drain output volume and characteristics  - CBC PRN   - ASA daily   - Heparin infusion, ok with Dr. Yolanda Justice and neurology   - SCDs for DVT prophylaxis  - Last type and screen: send today     ID:  Afebrile, no current indications of infection. MRSA negative.-->  - Trend temp q4h  - Periop cefazolin completed     Skin:  No active skin issues.  - preventative Mepilex dressings in place on sacrum and heels  - change preventative Mepilex weekly or more frequently as indicated (when moist/soiled)   - every shift skin assessment per nursing and weekly ICU skin rounds  - moisture barrier to be applied with diane care  - active skin problems addressed with nursing on daily rounds     Proph:  SCDs  PPI  Heparin gtt      G:  Line  Right IJ MAC w [place] placed 8/6   Right Groin trialysis line 8/6  Left brachial a-line placed 8/6  PIVs      F: Family: will update at bedside postoperatively.    The indications and risks/benefits of non-violent/non self-destructive restraints were discussed.      A,B,C,D,E,F,G: reviewed     I personally spent 75 minutes of critical care time directly and personally managing the patient exclusive of separately billable procedures.     SARAH Azar-CNP, DNP  Dispo: CTICU care for now.    CTICU TEAM PHONE [phone]

## 2025-08-10 NOTE — PROGRESS NOTES
Colin Leonard is a 59 y.o. male on day 4 of admission presenting with Atherosclerosis of native coronary artery of native heart without angina pectoris.    Subjective   Patient discussed in morning handover, patient examined and chart reviewed. Meds, labs and radiology reviewed during rapid and full interdisciplinary rounds this morning.    I have seen the patient either independently or with an associated resident physician or advanced practice provider.    Patient Summary and Daily Plan:    Surgeon: Yolanda Justice  DOS: 8/6    Procedure: CABG x 2 off-pump with a LIMA to LAD and saphenous vein graft to distal circumflex;  Right femoral dialysis catheter placement.    Airway: grade I - full view of glottis   EF: 60-65%    FULL Code  Emergency Sternotomy: Y; Exp POD#10 - Aug. 16, 2025    Overnight Events: Stable neuro status with maintaining MAP > 90 overnight. Remains on vasopressors and in controlled rate atrial fibrillation. Patient agitated overnight such that low dose dexmedetomidine required. Patient temp decreased overnight (likely secondary to CRRT circuit) requiring warming blanket    Assessment/Plan     Today's Plan:  Basilar artery critical stenosis  Neurology to re-assess today; will clarify if MRI is desired  Maintain on heparin (now therapeutic)  Continue to target a higher MAP  Keep PaO2 > 80, PaCO2 normal range, avoid hypo/hyperglycemia, target normothermia  Planning extubation today  S/P CABG  ASA and statin; will need eventually need clopidogrel  TEJA with some hypervolemia  Nephrology to assess  Atrial fibrillation  Amiodarone infusion for rate control.    DISPO: CTICU    Patient Details and Course in Hospital    HPI: Patient admitted in May SOB for atypical pneumonia. In addition, found to have elevated trop and BNP on that admission with a few episodes of reported VT. [facility] (5/22/25) demonstrated MVCAD. Patient was treated for pneumonia and brought back as an outpatient for CABG procedure.   "    PMH:  CAD  SVT/NSVT  Chronic diastolic HF   type 2 diabetes  HTN  CKD stage 5   Chronic anemia  Recent admissions (over the past few months) for pneumonia (per family)  Hx of c diff colitis    Course in Hospital:  8/8 - patient deteriorated to a Coma secondary to basilar artery stenosis. Neuro status improved by the end of the day with driving the MAP > 90.     Objective     Last Recorded Vitals  Blood pressure 110/84, pulse 71, temperature 34.5 °C (94.1 °F), resp. rate 10, height 1.778 m (5' 10\"), weight 122 kg (268 lb 15.4 oz), SpO2 100%.    Invasive Hemodynamics:    Most Recent Range Past 24hrs   BP (Art) 169/98 Arterial Line BP 1  Min: 129/86  Max: 178/105   MAP(Art) 126 mmHg Arterial Line MAP 1 (mmHg)   Min: 99 mmHg  Max: 133 mmHg   RA/CVP   No data recorded   PA   No data recorded   PA(mean)   No data recorded   CO   No data recorded   CI   No data recorded   Mixed Venous   No data recorded   SVR    No data recorded     Intake/Output last 3 Shifts:  I/O last 3 completed shifts:  In: 2614.1 (21.4 mL/kg) [I.V.:2379.1 (19.5 mL/kg); NG/GT:235]  Out: 1085 (8.9 mL/kg) [Other:875; Chest Tube:210]  Weight: 122 kg     After patient returned from angio  Physical Exam  Constitutional:       Interventions: He is intubated.     Eyes:      Pupils: Pupils are equal, round, and reactive to light.       Cardiovascular:      Rate and Rhythm: Tachycardia present. Rhythm irregularly irregular.      Comments: In atrial fibrillation    Infusions:  Norepi: 0.08  Vaso: 0.04  Amio: 0.5    Lines:   RIJ CVL  L brachial art. line  R femoral trialysis catheter  Pulmonary:      Effort: Pulmonary effort is normal. He is intubated.      Breath sounds: Examination of the right-lower field reveals decreased breath sounds. Examination of the left-lower field reveals decreased breath sounds. Decreased breath sounds present.      Comments: Vent Mode: Pressure support  FiO2 (%):  [40 %] 40 %  S RR:  [16] 16  PEEP/CPAP (cm H2O):  [8 cm H20] 8 " cm H20  MN SUP:  [5 cm H20] 5 cm H20  MAP (cm H2O):  [6.6-7.3] 7.3  Abdominal:      General: Bowel sounds are decreased.      Palpations: Abdomen is soft.      Tenderness: There is no abdominal tenderness. There is no guarding or rebound.   Genitourinary:     Comments: On CRRT overnight    Skin:     Comments: No issues at present.     Neurological:      General: No focal deficit present.      Mental Status: He is easily aroused.      Comments: RASS: -2 at present (will stop dex and re-assess)  CAM-ICU: positive overnight    Will respond to instructions. Upper body stronger than lower limbs   Psychiatric:         Behavior: Behavior is cooperative.       ICU Liberation Bundle:    A-Analgesia: Tylenol and opioids at lowest effective dose  B-SBT: pending  C-RASS Target: 0  D-CAM: to be determined  E-Mobilization Plan: Stage I-III  F - Family Last Update: Family members update throughout the day yesterday. All questions appeared to be answered adequately. Family updated at bedside this morning    Daily Checklist:  HOB > 30 degrees: Achieved  Feeding: Enteral feeds started yesterday; will make NPO for possible extubation  Thromboprophylaxis: Heparin (systemic) and SCDs  Ulcer Prophylaxis: PPI  Glucose Control: Target 110-180  Line to removed: The indications and risks/benefits of non-violent/non self-destructive restraints were discussed.   Bowel Care: Bowel regime ordered  De-escalation of Antibiotics: None at present    I spent 48 mins minutes in the professional and overall care of this patient.    This critically ill patient continues to be at-risk for clinically significant deterioration / failure due to the above mentioned dysfunctional, unstable organ systems.  I have personally identified and managed all complex critical care issues to prevent aforementioned clinical deterioration.  Critical care time is spent at bedside and/or the immediate area and has included, but is not limited to, the review of diagnostic  tests, labs, radiographs, serial assessments of hemodynamics, respiratory status, ventilatory management, and family updates.  Time spent in procedures and teaching are reported separately.    Tomer Vasquez MD

## 2025-08-10 NOTE — PROGRESS NOTES
Occupational Therapy                 Therapy Communication Note    Patient Name: Colin Leonard  MRN: [id number]  Department: [facility]  Room: 15/15-A  Today's Date: 8/10/2025     Discipline: Occupational Therapy    Missed Visit: OT Missed Visit: Yes     Missed Visit Reason: Pt extubated around 12:15 PM, will defer OT.    Missed Time: Attempt

## 2025-08-11 ENCOUNTER — APPOINTMENT (OUTPATIENT)
Dept: RADIOLOGY | Facility: HOSPITAL | Age: 60
End: 2025-08-11
Payer: COMMERCIAL

## 2025-08-11 ENCOUNTER — APPOINTMENT (OUTPATIENT)
Dept: RADIOLOGY | Facility: HOSPITAL | Age: 60
DRG: 235 | End: 2025-08-11
Payer: COMMERCIAL

## 2025-08-11 ASSESSMENT — COGNITIVE AND FUNCTIONAL STATUS - GENERAL
HELP NEEDED FOR BATHING: A LOT
MOVING FROM LYING ON BACK TO SITTING ON SIDE OF FLAT BED WITH BEDRAILS: TOTAL
PERSONAL GROOMING: A LITTLE
DRESSING REGULAR UPPER BODY CLOTHING: A LOT
MOBILITY SCORE: 8
STANDING UP FROM CHAIR USING ARMS: A LOT
DRESSING REGULAR LOWER BODY CLOTHING: TOTAL
MOVING TO AND FROM BED TO CHAIR: A LOT
EATING MEALS: A LITTLE
WALKING IN HOSPITAL ROOM: TOTAL
TURNING FROM BACK TO SIDE WHILE IN FLAT BAD: TOTAL
TOILETING: TOTAL
CLIMB 3 TO 5 STEPS WITH RAILING: TOTAL
DAILY ACTIVITIY SCORE: 12

## 2025-08-11 ASSESSMENT — PAIN SCALES - GENERAL
PAINLEVEL_OUTOF10: 8
PAINLEVEL_OUTOF10: 4
PAINLEVEL_OUTOF10: 4
PAINLEVEL_OUTOF10: 0 - NO PAIN
PAINLEVEL_OUTOF10: 1
PAINLEVEL_OUTOF10: 6
PAINLEVEL_OUTOF10: 1
PAINLEVEL_OUTOF10: 0 - NO PAIN
PAINLEVEL_OUTOF10: 2
PAINLEVEL_OUTOF10: 0 - NO PAIN
PAINLEVEL_OUTOF10: 0 - NO PAIN
PAINLEVEL_OUTOF10: 8
PAINLEVEL_OUTOF10: 0 - NO PAIN

## 2025-08-11 ASSESSMENT — ACTIVITIES OF DAILY LIVING (ADL)
ADL_ASSISTANCE: INDEPENDENT
ADLS_ADDRESSED: NO
ADL_ASSISTANCE: INDEPENDENT
BATHING_ASSISTANCE: MODERATE

## 2025-08-11 ASSESSMENT — PAIN - FUNCTIONAL ASSESSMENT
PAIN_FUNCTIONAL_ASSESSMENT: 0-10

## 2025-08-11 NOTE — PROGRESS NOTES
Colin Leonard is a 59 y.o. male on day 5 of admission presenting with Atherosclerosis of native coronary artery of native heart without angina pectoris.      Subjective   On CVVH Tolerating well  On Pressers for Perfusion  Lethargic         Objective        Vitals 24HR  Heart Rate:  [59-94]   Temp:  [34.2 °C (93.6 °F)-36.1 °C (97 °F)]   Resp:  [12-25]   SpO2:  [82 %-100 %]   Oxygen Therapy  Pulse Ox (24 hr min): 82  Medical Gas Therapy: Supplemental oxygen  Medical Gas Delivery Method: Endotracheal tube  O2 Flow Rate (L/min): 6 L/min FiO2 (%): 40 %    Intake/Output last 3 Shifts:    Intake/Output Summary (Last 24 hours) at 8/11/2025 1358  Last data filed at 8/11/2025 1300  Gross per 24 hour   Intake 2022.17 ml   Output 2759 ml   Net -736.83 ml       Physical Exam  On NC  Comfortable  Median Stenotomy.  Trace edema b/l    Relevant Results  Lab Results   Component Value Date    WBC 12.1 (H) 08/11/2025    HGB 7.8 (L) 08/11/2025    HCT 23.7 (L) 08/11/2025    MCV 90 08/11/2025     08/11/2025     Lab Results   Component Value Date    GLUCOSE 216 (H) 08/11/2025    CALCIUM 8.4 (L) 08/11/2025     (L) 08/11/2025    K 4.3 08/11/2025    CO2 25 08/11/2025    CL 98 08/11/2025    BUN 32 (H) 08/11/2025    CREATININE 3.33 (H) 08/11/2025         Assessment & Plan  Atherosclerosis of native coronary artery of native heart without angina pectoris    ESRD (end stage renal disease) (Multi)    Chronic renal insufficiency    Pneumonia    Coronary artery disease involving native heart, unspecified vessel or lesion type, unspecified whether angina present    Mr. Leonard is a 59M with history of CKD5, CAD s/p CABG on 8/6, and developed post-surgery TEJA on CKD requiring dialysis initiation. Course c/b basilar stroke, No intervention, Neurology following. Nephrology following for Dialysis care.    #CKDV/ESKD  - Cr on admission 9.4  - Etiology of CKD is not clear.  - Received CVVH 8/6-8/7  - SLED 8/7pm  - CVVH resumed  8/9-8/11    #Possible Stroke 8/7 (Basilar Artery occlusion?), Imaging showed no infarction, no interventions    RECOMMENDATIONS:  - Stop CVVH  - Regular Evaluation for indications for RRT      Babita Edmondson MD  PGY-5 Nephrology Fellow

## 2025-08-11 NOTE — PROGRESS NOTES
Occupational Therapy    Re-Evaluation/Treatment    Patient Name: Colin Leonard  MRN: [id number]  Department: [facility]  Room: 15/15-A  Today's Date: 08/11/25  Time Calculation  Start Time: 0839 (split session [id number])  Stop Time: 1035 (split session [id number])  Time Calculation (min): 46 min     Assessment:  OT Assessment: Pt is a 59 year old male who demonstrates decreased strength, balance, activity tolerance, coordination, and cognition, which impedes occupational performance.  Prognosis: Good  Barriers to Discharge Home: Physical needs, Cognition needs, Caregiver assistance  Caregiver Assistance: Caregiver assistance needed per identified barriers - however, level of patient's required assistance exceeds assistance available at home  Cognition Needs: Medication and/or medical management daily assist needed, Insight of patient limited regarding functional ability/needs  Physical Needs: Intermittent ADL assistance needed, High falls risk due to function or environment, 24hr mobility assistance needed, Stair navigation into home limited by function/safety  Evaluation/Treatment Tolerance: Patient tolerated treatment well  Medical Staff Made Aware: Yes  End of Session Communication: Bedside nurse  End of Session Patient Position: Bed, 3 rail up, Alarm off, not on at start of session (session 2)  OT Assessment Results: Decreased ADL status, Decreased cognition, Decreased safe judgment during ADL, Decreased endurance, Decreased functional mobility, Decreased fine motor control, Decreased gross motor control, Decreased IADLs, Visual deficit, Decreased trunk control for functional activities  Prognosis: Good  Evaluation/Treatment Tolerance: Patient tolerated treatment well  Medical Staff Made Aware: Yes    Plan:  Treatment Interventions: ADL retraining, Functional transfer training, UE strengthening/ROM, Endurance training, Cognitive reorientation, Patient/family training, Equipment evaluation/education, Neuromuscular  reeducation, Visual perceptual retraining, Fine motor coordination activities, Compensatory technique education  OT Frequency: 4 times per week (During this acute inpatient hospitalization)  OT Discharge Recommendations: High intensity level of continued care (Based on current functional status and rehab potential, patient is anticipated to tolerate and benefit from 5 or more days per week of skilled rehabilitative therapy after discharge from this acute inpatient hospitalization.)  Equipment Recommended upon Discharge:  (TBD)  OT Recommended Transfer Status: Assist of 2, Maximum assist  OT - OK to Discharge: Yes  Treatment Interventions: ADL retraining, Functional transfer training, UE strengthening/ROM, Endurance training, Cognitive reorientation, Patient/family training, Equipment evaluation/education, Neuromuscular reeducation, Visual perceptual retraining, Fine motor coordination activities, Compensatory technique education    Subjective   Current Problem:  1. Coronary artery disease involving native heart, unspecified vessel or lesion type, unspecified whether angina present  Anesthesia Intraoperative Transesophageal Echocardiogram    Anesthesia Intraoperative Transesophageal Echocardiogram    Anesthesia Intraoperative Transesophageal Echocardiogram    Anesthesia Intraoperative Transesophageal Echocardiogram      2. Atherosclerosis of native coronary artery of native heart without angina pectoris        3. NSTEMI (non-ST elevated myocardial infarction) (Multi)  Intubation    Intubation      4. Basilar artery stenosis  Follow Up In Neurology        OT Visit Info:  OT Received On: 08/11/25    General Visit Info:   General  Reason for Referral: CABG x2 8/6. Re-evaluation: found unresponsive 8/8, BAT called. NIHSS 31. Pt reintubated. CTH unremarkable. CTA c/f Basilar artery occulsion vs high grade stenosis. CTP showed diffusion hypoperfusion in posterior fossa and watershed cerebral hemispheres but no core  infarct. Angio revealed multifocal vert and basilar stenosis, no occlusion, no intervention done. Extubated 8/10. NIHSS 3 on 8/11  Past Medical History Relevant to Rehab: HTN, CKD stage 5, chronic anemia, type 2 diabetes, hx of c diff colitis who was admitted 5/19-5/24 for shortness of breathe was incidentally found to have atypical pneumonia  Co-Treatment: PT  Co-Treatment Reason: Femoral CVVH, significant change in status with anticipated AMPAC<10  Prior to Session Communication: Bedside nurse  Patient Position Received: Bed, 3 rail up, Alarm off, not on at start of session  General Comment: Pt supine in bed on arrival, agreeable to participate. On .04 vaso .11 levo for perfusion, .5 amio, femoral CVVH. 4L NC. Split session [id number] and [id number] to return pt to bed.     Precautions:  Medical Precautions: Cardiac precautions, Fall precautions, Oxygen therapy device and L/min  Post-Surgical Precautions: Move in the Tube  Precautions Comment: Goal -160 OR MAP >100 per neuro    Vitals    08/11/25 0839 08/11/25 0900 08/11/25 0915   Vital Signs   Vitals Session Pre OT  --  Post OT   Heart Rate 94 89 95   Resp 16 19 12   SpO2 99 % (!) 82 % 98 %   BP (!) 136/92  --  132/80   MAP (mmHg) 110  --  96   Vital Signs Comment  --   --  During: MAP decreased to high 80s-low 90s and SBP 120s with activity. No change in mental status. RN in room and aware.   Vital Signs Comment: During: MAP decreased to high 80s-low 90s and SBP 120s with activity. No change in mental status. RN in room and aware.     Pain:  Pain Assessment  Pain Assessment: 0-10  0-10 (Numeric) Pain Score: 8  Pain Location: Back  Pain Interventions: Repositioned, Emotional support, Distraction  Response to Interventions: Sleeping    Objective   Cognition:  Overall Cognitive Status: Impaired  Orientation Level: Oriented X4  Following Commands: Follows one step commands with repetition  Cognition Comments: Deficits in working memory and sustained attention  noted throughout session  Insight: Moderate  Processing Speed: Delayed     Confusion Assessment Method (CAM)  Acute Onset and Fluctuating Course (1A): Yes  Acute Onset and Fluctuating Course (1B): Yes  Inattention (2): No  Disorganized Thinking (3): No  Rate Patient's Level of Consciousness (4): Lethargic (Drowsy, easily aroused), Yes  Delirium Present: No     Home Living:  Type of Home: House  Lives With:  (Son)  Home Adaptive Equipment: Walker rolling or standard  Home Layout: One level  Home Access: Stairs to enter with rails  Entrance Stairs-Rails: Both  Entrance Stairs-Number of Steps: 2  Bathroom Shower/Tub: Walk-in shower  Bathroom Equipment: Grab bars in shower, Built-in shower seat    Prior Function:  Level of [place]: Independent with ADLs and functional transfers, Independent with homemaking with ambulation  ADL Assistance: Independent  Homemaking Assistance: Independent  Ambulatory Assistance: Independent  Vocational: Full time employment ()  Hand Dominance: Left     ADL:  Eating Assistance: Stand by  Eating Deficit: Setup, Verbal cueing, Supervision/safety, Increased time to complete  Grooming Assistance: Minimal  Bathing Assistance: Moderate  UE Dressing Assistance: Moderate  LE Dressing Assistance: Total  Toileting Assistance with Device: Total    Activities of Daily Living:   Toileting  Toileting Level of Assistance: Dependent  Toileting Comments: In standing with max A x2 for balance    Activity Tolerance:  Endurance: Tolerates 10 - 20 min exercise with multiple rests  Early Mobility/Exercise Safety Screen: Proceed with mobilization - No exclusion criteria met     Bed Mobility/Transfers: Bed Mobility  Bed Mobility: Yes  Bed Mobility 1  Bed Mobility 1: Supine to sitting  Level of Assistance 1: Maximum assistance, +2  Bed Mobility Comments 1: Draw sheet, HOB elevated, cues for sequencing    Transfers  Transfer: Yes  Transfer 1  Transfer From 1: Sit to, Stand to  Transfer to 1: Stand,  Sit  Transfer Level of Assistance 1: Maximum assistance, +2, Arm in arm assistance  Trials/Comments 1: x2 trials    Therapy/Activity: Therapeutic Activity  Therapeutic Activity Performed: Yes  Therapeutic Activity 1: EOB sitting a total of 15 minutes. Initially min-mod A but progressed to max A with fatigue. Pt with persistent lateral lean to right at EOB. Aware of being off balance, but unable to maintain midline without persistent cueing and assistance.  Therapeutic Activity 2: Attempted stand pivot to chair with max A x2. Pt with heavy lateral lean to right, unable to advance to LLE despite multiple attempts. Progressed to x2 lateral scoots with draw sheet under hips and mod A x2.  Therapeutic Activity 3: Max A x2 for x2 lateral scoots from chair with drop arm back into bed with arm in arm assist and draw sheet under hips.  Therapeutic Activity 4: Dependent x2 for sit to supine transfer at end of session     Vision:   Vision - Complex Assessment  Vision Comments: Decreased smoothness tracking, may be more related to attention deficits. Pt denies any changes in vision.    Sensation:  Light Touch: No apparent deficits    Strength:  Strength Comments: BUE WFL    Coordination:  Movements are Fluid and Coordinated: No  Upper Body Coordination: Dysmetria (L>R), decreased motor planning bilaterally.     Hand Function:  Hand Function  Gross Grasp: Functional    Extremities:   RUE   RUE : Within Functional Limits  LUE   LUE: Within Functional Limits      Outcome Measures: [facility] Daily Activity  Putting on and taking off regular lower body clothing: Total  Bathing (including washing, rinsing, drying): A lot  Putting on and taking off regular upper body clothing: A lot  Toileting, which includes using toilet, bedpan or urinal: Total  Taking care of personal grooming such as brushing teeth: A little  Eating Meals: A little  Daily Activity - Total Score: 12   Early Mobility/Exercise Safety Screen: Proceed with mobilization -  No exclusion criteria met  ICU Mobility Scale: Transferring bed to chair [5]    Education Documentation  Body Mechanics, taught by Elmira Hutchison, OT at 8/11/2025  2:23 PM.  Learner: Family, Patient  Readiness: Acceptance  Method: Demonstration, Explanation  Response: Needs Reinforcement  Comment: Son verbalized understanding, pt needs reinforcement 2/2 mental status    Precautions, taught by Elmira Hutchison, OT at 8/11/2025  2:23 PM.  Learner: Family, Patient  Readiness: Acceptance  Method: Demonstration, Explanation  Response: Needs Reinforcement  Comment: Son verbalized understanding, pt needs reinforcement 2/2 mental status    ADL Training, taught by Elmira Hutchison, OT at 8/11/2025  2:23 PM.  Learner: Family, Patient  Readiness: Acceptance  Method: Demonstration, Explanation  Response: Needs Reinforcement  Comment: Son verbalized understanding, pt needs reinforcement 2/2 mental status    Education Comments  No comments found.        OP EDUCATION:       Goals:  Encounter Problems       Encounter Problems (Active)       ADLs       Patient will perform UB and LB bathing with minimal level of assistance.       Start:  08/07/25    Expected End:  08/25/25            Patient with complete upper body dressing with SBA level of assistance donning and doffing all UE clothes       Start:  08/07/25    Expected End:  08/25/25            Patient with complete lower body dressing with minimal level of assistance donning and doffing all LE clothes  with PRN adaptive equipment       Start:  08/07/25    Expected End:  08/25/25            Patient will complete toileting including hygiene clothing management/hygiene with minimal level of assistance.       Start:  08/07/25    Expected End:  08/25/25               COGNITION/SAFETY       Pt will maintain MITT precautions with 100% carryover during functional tasks, ADLs, and mobility without cueing.        Start:  08/07/25    Expected End:  08/25/25             Patient will participate in cognitive activities to demonstrate WFL score on further cognitive assessments        Start:  08/07/25    Expected End:  08/25/25               MOBILITY       Patient will perform Functional mobility min Household distances with moderate level of assistance and least restrictive device in order to improve safety and functional mobility.       Start:  08/07/25    Expected End:  08/25/25               TRANSFERS       Patient will perform bed mobility with minimal level of assistance in order to improve safety and independence with mobility       Start:  08/07/25    Expected End:  08/25/25            Patient will complete functional transfer to toilet with least restrictive device with moderate level of assistance.       Start:  08/07/25    Expected End:  08/25/25
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
## 2025-08-11 NOTE — PROGRESS NOTES
"CTICU Progress Note  Colin Wongjoni/[id number]    Admit Date: 8/6/2025  Hospital Length of Stay: 5   ICU Length of Stay: 4d 16h   CT SURGEON:     SUBJECTIVE:   Patient on CPAP this morning, on norepinephrine and vasopressin infusions to maintain goal BP for cerebral perfusion, hypothermic likely due to CVVH without blood warmer, placed on ovi hugger.     MEDICATIONS  Infusions:  amiodarone, Last Rate: 0.5 mg/min (08/11/25 0700)  norepinephrine, Last Rate: 0.11 mcg/kg/min (08/11/25 0700)  PrismaSol 4/2.5, Last Rate: 2,800 mL/hr (08/09/25 1849)  vasopressin, Last Rate: 0.04 Units/min (08/11/25 0700)      Scheduled:  acetaminophen, 325 mg, q8h  aspirin, 81 mg, Daily  atorvastatin, 80 mg, Nightly  clopidogrel, 75 mg, Daily  docusate sodium, 100 mg, BID  ezetimibe, 10 mg, Nightly  insulin lispro, 0-15 Units, q4h  melatonin, 5 mg, Daily  pantoprazole, 40 mg, Daily  polyethylene glycol, 17 g, Daily  senna, 5 mL, BID      PRN:  alteplase, 2 mg, PRN  dextrose, 25 g, q15 min PRN   Or  glucagon, 1 mg, q15 min PRN  HYDROmorphone, 0.2 mg, q2h PRN  magnesium sulfate, 2 g, q6h PRN  magnesium sulfate, 4 g, q6h PRN  naloxone, 0.2 mg, q5 min PRN  ondansetron, 4 mg, q4h PRN  oxyCODONE, 10 mg, q4h PRN  oxyCODONE, 5 mg, q4h PRN  oxygen, 1 Dose, Continuous - O2/gases  prochlorperazine, 10 mg, q6h PRN   Or  prochlorperazine, 10 mg, q6h PRN   Or  prochlorperazine, 25 mg, q12h PRN        PHYSICAL EXAM:   Visit Vitals  /84   Pulse 86   Temp 35.5 °C (95.9 °F)   Resp 18   Ht 1.778 m (5' 10\")   Wt 122 kg (268 lb 15.4 oz)   SpO2 100%   BMI 38.59 kg/m²   Smoking Status Never   BSA 2.45 m²     Wt Readings from Last 5 Encounters:   08/07/25 122 kg (268 lb 15.4 oz)   08/04/25 116 kg (256 lb 12.8 oz)   07/31/25 116 kg (256 lb 11.2 oz)   07/29/25 115 kg (253 lb 6.4 oz)   07/25/25 117 kg (258 lb 1.6 oz)     INTAKE/OUTPUT:  I/O last 3 completed shifts:  In: 3034.5 (24.9 mL/kg) [I.V.:2379.5 (19.5 mL/kg); NG/GT:655]  Out: 4234 (34.7 mL/kg) " [Urine:365 (0.1 mL/kg/hr); Other:3869]  Weight: 122 kg          Vent settings:  Vent Mode: Pressure support  FiO2 (%):  [40 %] 40 %  S RR:  [16] 16  PEEP/CPAP (cm H2O):  [8 cm H20] 8 cm H20  ME SUP:  [5 cm H20] 5 cm H20  MAP (cm H2O):  [9.3-10] 9.3    Physical Exam:   Physical Exam  Constitutional:       General: He is not in acute distress.     Comments: Critically ill elderly male laying in bed intubated    HENT:      Head: Normocephalic and atraumatic.      Nose: Nose normal.      Mouth/Throat:      Mouth: Mucous membranes are moist.      Pharynx: Oropharynx is clear.     Eyes:      Conjunctiva/sclera: Conjunctivae normal.      Pupils: Pupils are equal, round, and reactive to light.      Comments: Pupils constricted equal, round, and reactive to light      Cardiovascular:      Rate and Rhythm: Normal rate. Rhythm irregular.      Pulses: Normal pulses.      Heart sounds: Normal heart sounds.      Comments: Afib rate 90-100s   Normotensive to hypertensive   Pulmonary:      Effort: Pulmonary effort is normal.      Breath sounds: Normal breath sounds.      Comments: 4L NC   Appropriate oxygenation and ventilation  Symmetrical chest expansion     Abdominal:      General: There is no distension.      Palpations: Abdomen is soft.      Tenderness: There is no abdominal tenderness.   Genitourinary:     Comments: Elmore in place with clear, yellow urine     Musculoskeletal:      Cervical back: Normal range of motion.     Skin:     General: Skin is warm and dry.      Capillary Refill: Capillary refill takes less than 2 seconds.      Comments: All wounds clean and dressed appropriately    Warm pulses palpable.        Neurological:      Mental Status: He is alert.      Comments: Awake and oriented to person, place and time (confused on date but month and year correct). Followed all commands. No focal deficits. CAM -    Psychiatric:         Mood and Affect: Mood normal.         Behavior: Behavior normal.          Daily Risk  Screen  Intubated: Yes, for mechanical ventilatory support   Central line: Yes  Elmore: Yes, for accurate I/O monitoring in critically ill     Images: All images reviewed     Invasive Hemodynamics:    Most Recent Range Past 24hrs   BP (Art) 140/83 Arterial Line BP 1  Min: 126/77  Max: 159/94   MAP(Art) 103 mmHg Arterial Line MAP 1 (mmHg)   Min: 94 mmHg  Max: 119 mmHg       Assessment/Plan   Assessment:   Colin Leonard is a 59 y.o. male hx of HTN, CKD stage 5, chronic anemia, type 2 diabetes, hx of c diff colitis who was admitted 5/19-5/24 for shortness of breathe was incidentally found to have atypical pneumonia which he was treated for. He was found to have elevated trop and BNP on that admission with a few episodes of reported VT which required increased dose of coreg. He underwent LHC 5/22/25 demonstrated MVCAD and decision was made to discharge and let him recover from his pneumonia treated with Augmentin and oral vanco prophyllacticaly due to h/o cdiff. and bring back for CABG. He is now s/p CABGx 2with Dr Yolanda Justice on 8/6/2     Plan:  NEURO:  No significant PMHx. On 8/8, around 0500, patient went into Afib and when team went in to treat found patient unresponsive without reflexes. CT scan which demonstrated significant atherosclerotic disease that portrayed diminished contrast enhancement in the bilateral vertebral and basilar arteries L>R. Allowed permissive HTN and as day progressed, patient neuro status improved and now alert, following commands. Patient awake oriented to person, place and month/year. No focal deficit. Acute post operative pain adequately controlled on current regimen. Patient eager to get OOBTC.   -->  - Serial neuro and pain assessments   - Permissive HTN --> SBP goal 150-160, MAP >100, see CV  - Neurology following, appreciate recs  - Scheduled Tylenol   - PRN oxycodone  - PRN dilaudid for pain   - PT Consult, OOB to chair as tolerated  - CAM ICU score qshift  - Sleep/wake cycle  hygiene, nightly melatonin      CV:  CAD, HTN SVT/NSVT, Chronic diastolic HF stage II on admission 5/2025 requiring up titration of coreg. now status post CABG. Pre-55% with mod/sever AI normal RV post- 50% unchanged RV. Patients baseline -180 per family. On 8/8, Afib w/ RVR around 0500 and treated with amio. Currently, Afib rate 80-90s and permissive HTN on levo and vaso. Remains on norepi 0.11 mcg/kg/min and vaso 0.04 mcg/kg/min. A/V epicardial wires cut 8/8 for MRI. -->  - Amiodarone infusion @ 0.5, start 400 mg BID and turn off infusion this afternoon   - Permissive hypertension SBP goal 150-160s, currently requiring norepi & vasopressin to achieve goal - ok to wean pressors and closely monitor neuro exam and avoid hypotension   - Will need structural heart eval in future for TAVR eval, this can be done at discharge and not needed as inpatient unless clinical indication  - continue plavix   - continue Atorvastatin 80mg   - continue Zetia 10mg nightly   - Hold home coreg, torsemide and hydralazine     PULM:  Recent pnuemonia completed course of Augmentin outpatient. While neuro status declined 8/8, patient experienced respiratory distress and reintubated. Currently extubated (8/10) on NC with appropriate oxygenation/ventilation and symmetrical chest expansion. MS and LPL chest tubes from OR, RPL chest tube placed 8/8 for pneumothorax, repeat CXR demonstrated resolvement of pneumothorax. All CTs removed 8/9-->  - Daily CXR  - Wean FiO2 maintaining SpO2 >92%.   - IS q1h and OOB to chair      GI:  h/o cdiff colitis, treated (March 2025). Failed swallow eval and dobhoff placed 8/10. Tolerating TF, advanced to goal this am. BM overnight.  -->  - PPI   - continue tube feeds at goal  - Colace/senna BID and miralax BID     :  CSA-TEJA Risk Score med.  Baseline CKD V previous admision Cr ~7 baseline PAT Cr 10. Hyponatremia likely due to hypervolemia. CVVH started 8/9. Net negative 1.4 L over last 24 hours. Catarina  placed overnight for urinary retention, of note per handoff has enlarged prostate and placement was painful.-->  - Continue thornton catheter for strict I/Os.  - Nephrology following, appreciate recs 8/8:   --> Indication for dialysis: Volume control post-CABG  --> discontinue CVVH, reassess need for dialysis in am   --> Please obtain RFP at least q12hr  --> strict Is/Os  --> Renal dosing for medications for latest eGFR, follow medication trough as appropriate  --> Avoid hypotension/rapid fluctuations in BPs  - Replete electrolytes per CTICU protocol     ENDO: DM2 not on home meds,  A1c: 5.4. Stress hyperglycemia is adequately controlled on current regimen. -->  - Maintain BG <180  - Insulin per CTICU protocol     HEME: Anemia of chronic disease.  Acute on chronic blood loss anemia, stable. No s/s of bleeding or coagulopathy. Heparin infusion stopped on 8/10 and plavix started -->    - Monitor drain output volume and characteristics  - CBC PRN   - ASA daily   - continue plavix   - SCDs for DVT prophylaxis, start SQH   - Last type and screen: 8/10     ID:  Afebrile, no current indications of infection. MRSA negative.-->  - Trend temp q4h  - Periop cefazolin completed     Skin:  Leg wound noted on admission, photo in chart.   - preventative Mepilex dressings in place on sacrum and heels  - change preventative Mepilex weekly or more frequently as indicated (when moist/soiled)   - every shift skin assessment per nursing and weekly ICU skin rounds  - moisture barrier to be applied with diane care  - active skin problems addressed with nursing on daily rounds     Proph:  SCDs  PPI  Heparin gtt     Restraints not indicated.     F: Family: updated at bedside by Dr. Yolanda Justice     G:  Line  Right IJ MAC w [place] placed 8/6   Right Groin trialysis line 8/6  Left brachial a-line placed 8/6  PIVs         A,B,C,D,E,F,G: reviewed     I personally spent 61 minutes of critical care time directly and personally managing the patient  exclusive of separately billable procedures.     Mary Daugherty, APRN-CNP  Dispo: CTICU care for now.    CTICU TEAM PHONE [phone]

## 2025-08-11 NOTE — PROGRESS NOTES
CT SURGERY  08/06 CABGx 2with Dr Yolanda Justice     ICU TREATMENT PLAN   08/10 extubated   developed post-surgery TEJA on CKD requiring dialysis initiation. 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 Nephrology following for Dialysis care.    DC PLAN  AR: [facility]    * precert needed    PAYOR   [facility]    COMPLETED  (X) 08/11 - AR list provided to patient with son bedside. Obtained FOC for [facility].       Kinjal Moran (LSW, MSW)

## 2025-08-11 NOTE — PROGRESS NOTES
59 year old male who initially presented with SOB.  He was found to have an atypical PNA at this time for which he was treated.  He was also found to have an NSTEMI.  LHC at this time demonstrated MVCAD.  Due to his active PNA it was decided to have him return for CABG following treatment of his PNA.  He was therefore discharged on antibiotics with plans for CABG following his treatment course.  He is now s/p CABG x 2 on 8/6.  Other pertinent medical history includes CKD (baseline sCr 7-8), CDiff Colitis (March 2025, Treated)      Plan:  NEURO:  No significant PMHx. He is A&Ox3, albeit sleepy in the setting of precedex infusion overnight.  The precedex has been off for some time now.  We will continue to monitor as the precedex was only recently shut off and is likely the cause of his sleepiness.  Acute post operative pain adequately controlled on current regimen.      CV:   s/p CABG x 2 (LIMA-LAD, SVG-LCx).  History of chronic HTN (baseline -180) and some carotid artery stenosis.  We will aim for higher BP goals in the setting of uncontrolled HTN at baseline. Currently AAI @ 70 with underlying sinus ori in the 30s.  This may be secondary to precedex.  Continue to monitor.  Asa and statin started.  Plavix pending.  Hold beta blocker in the setting of bradycardia.  Off all vasoactive infusions.  Currently maintaining MAP > 85.      PULM:  Recent admission for atypical pneumonia treated with Augmentin, now resolved. Extubated overnight.  Oxygenating well.  OOBTC once appropriate.      GI:  h/o CDiff colitis, treated (March 2025). Failed 1st swallow post extubation. -->  - Repeat bedside swallow eval when more awake     :  Baseline CKD V previous (admision sCr >7? sCr at PAT was 10). Thornton in place and making ~15-40ml/hr. Intra-op hyperkalemia requiring multiple K cocktails and bicarb pushes. Overnight on CVVH and hyperkalemic requiring K cocktail and change in CVVH bath.  Continue thornton catheter for strict  I/Os.  Follow up nephro recs for CVVH today.      ENDO: DM2 not on home meds,  A1c: 5.4. Stress hyperglycemia is adequately controlled on the current regimen     HEME: No s/s of bleeding or coagulopathy. Chest tube output is serosanguinous.  Asa daily.  SubQ heparin.  Plavix prior to discharge.    ID:  Afebrile, no current indications of infection. MRSA negative.  Periop cefazolin x 48hrs.    Due to the high probability of life threatening clinical decompensation, the patient required critical care time evaluating and managing this patient.  Critical care time included obtaining a history, examining the patient, ordering and reviewing studies, discussing, developing, and implementing a management plan, evaluating the patient's response to treatment, and discussion with other care team providers. I saw and evaluated the patient myself. I reviewed the provider's documentation and discussed the patient with the provider. Critical care time was performed exclusive of billable procedures.    Critical Care Time: 35 minutes      Jeb Romero MD

## 2025-08-11 NOTE — PROGRESS NOTES
"Colin Leonard is a 59 y.o. male on day 5 of admission presenting with Atherosclerosis of native coronary artery of native heart without angina pectoris.    Subjective   Pt seen today post-extubation. Able to follow commands. Still on pressors.    Objective     Last Recorded Vitals  Blood pressure 110/84, pulse 79, temperature 36 °C (96.8 °F), temperature source Temporal, resp. rate 15, height 1.778 m (5' 10\"), weight 122 kg (268 lb 15.4 oz), SpO2 100%.  Oxygen Therapy  Pulse Ox (24 hr min): 82  Medical Gas Therapy: Supplemental oxygen  Medical Gas Delivery Method: Endotracheal tube  O2 Flow Rate (L/min): 6 L/min FiO2 (%): 40 %    On vaso 0.04, NE 0.11  - overnight SBP between 120-140s    General Appearance:  No distress, alert, interactive and cooperative. Hoarse and soft voice.     Mental State: Orientation was normal to time (2025), place () and person (son at bedside).     Cranial Nerves:   CN 2: Visual fields full to confrontation.   CN 3, 4, 6: Lids symmetric; no ptosis. Eyes in midline, able to move to right and left.  CN 5: Facial sensation intact bilaterally.   CN 7: Normal and symmetric facial strength. Nasolabial folds symmetric.   CN 8: Hearing intact to voice    Motor: Muscle bulk and tone were normal in both upper and lower extremities. No fasciculations, tremor or other abnormal movements were present.   LUE: no drift  RUE: Mild drift  LLE: no drift  RLE: mild drift    Sensory: In both upper and lower extremities, sensation was intact to light touch    Coordination: On RUE, finger-nose-finger was intact without dysmetria or overshoot. LUE patient off target.       NIHSS:   NIH Stroke Scale:     1A. Level of Consciousness:  Alert (keenly responsive) (0)    1B. Ask Month and Age:  Both questions right (0)    1C. Blink Eyes & Squeeze Hands:  Performs both tasks (0)    2. Best Gaze:  Normal (0)    3. Visual:  No visual loss (0)    4. Facial Palsy:  Normal symmetry (0)    5A. Motor - Left Arm:  No " drift (0)    5B. Motor - Right Arm:  Drift (+1)    6A. Motor - Left Leg:  No drift (0)    6B. Motor - Right Leg:  Drift (+1)    7. Limb Ataxia:  Ataxia in 1 limb (+1)    8. Sensory Loss:  Normal (no sensory loss) (0)    9. Best Language:  Normal (no aphasia) (0)    10. Dysarthia:  Normal (0)    11. Extinction and Inattention:  No abnormality (0)    NIH Stroke Scale:  3       Physical Exam  Neurological Exam    Assessment & Plan  Atherosclerosis of native coronary artery of native heart without angina pectoris    ESRD (end stage renal disease) (Multi)    Chronic renal insufficiency    Pneumonia    Coronary artery disease involving native heart, unspecified vessel or lesion type, unspecified whether angina present      Colin Leonard is a 59 y.o. Left-handed male presenting as a BAT c/f mid basilar occlusion vs high-grade stenosis. Hx of HTN, CKD stage 5, chronic anemia, type 2 diabetes, hx of c diff colitis now s/p CABGx 2with Dr Yolanda Justice on 8/6/25. LKW 2300 08/07 (alert, following commands, sitting up in chair, able to eat, communicate needs, moving x4, no focal deficits). BAT called for coma, NIHSS 31, MRS 0 (confirmed by wife on phone). CTH unremarkable. CTA c/f Basilar artery occulsion vs high grade stenosis. CTP showed diffusion hypoperfusion in posterior fossa and watershed cerebral hemispheres but no core infarct. Angio revealed multifocal vert and basilar stenosis, no occlusion, no intervention done.    After starting pressors, exam now improving. OK to       Updates 8/9:  - exam improved significantly with -160s with levo and vaso; opens eyes to voice, follows commands in all extremities  - heparin assay 0.1-0.2    Updates 8/10:  - exam continues to improve, SBPs 140-160s, MAP>100, on vaso, anticipating extubation   - heparin assay 0.5    Update 8/11:  - exam continues to improve, now extubated, SBPs 120-140s, on vaso and levo    Recommendations:  Continue DAPT (ASA and plavix) until follow up  OK  to wean pressors and monitor exam, if worsens would likely need longer pressor support  Neurology has arranged a follow up with Dr. Dior in 1 month  Stroke will sign off at this time, please reach out with further questions.     Seen and discussed with Dr. Stacy Negro MD  Neurology PGY-3  Stroke team pager [phone]

## 2025-08-11 NOTE — PROGRESS NOTES
59 year old male who initially presented with SOB.  He was found to have an atypical PNA at this time for which he was treated.  He was also found to have an NSTEMI.  LHC at this time demonstrated MVCAD.  Due to his active PNA it was decided to have him return for CABG following treatment of his PNA.  He was therefore discharged on antibiotics with plans for CABG following his treatment course.  He is now s/p CABG x 2 on 8/6.   Patient had been progressing well post operatively until early morning of 8/8.  He went into Afib RVR and when his nurse went in to assess the situation he was found to be unresponsive.  BAT was called.  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  He returned to CTICU on heparin and Asa, switched to Asa and Plavix per neuro.  MAP goals >100.  Mental status back to baseline by the evening of 8/8.  Amio gtt started for Afib RVR.      He is now A&Ox3.  No focal deficits noted this AM.  Neuro exam continues to improve.  He is now extubated on nasal cannula.  He is requiring vasopressor support to maintain higher BP goals.  We will attempt to wean infusions as tolerated while monitoring his  neuro exam.  He is maintained on amio gtt for RVR.  We will start PO amio and transition to oral.  Failed his swallow eval.  Dobhoff tube in place with TF at goal.  Nephro is following for his CKD.  Currently on CVV.  We will run him closer to even fluid balance as he is still requiring high vasoactive support.  Appreciate nephro recs.      Due to the high probability of life threatening clinical decompensation, the patient required critical care time evaluating and managing this patient.  Critical care time included obtaining a history, examining the patient, ordering and reviewing studies, discussing, developing, and implementing a management plan, evaluating the patient's response to treatment, and discussion with other care team providers. I saw  and evaluated the patient myself. I reviewed the provider's documentation and discussed the patient with the provider. Critical care time was performed exclusive of billable procedures.    Critical Care Time: 35  minutes      Jeb Romero MD

## 2025-08-11 NOTE — PROGRESS NOTES
Physical Therapy    Physical Therapy Re-Evaluation & Treatment    Patient Name: Colin Leonard  MRN: [id number]  Department: [facility]  Room: 15/15-A  Today's Date: 8/11/2025   Time Calculation  1st session: 0838-0913  2nd session: 1025-1035  Time Calculation (min): 45 min    Assessment/Plan   PT Assessment  PT Assessment Results: Decreased strength, Decreased endurance, Impaired balance, Decreased mobility, Decreased cognition, Pain  Rehab Prognosis: Good  Barriers to Discharge Home: Caregiver assistance, Physical needs  Caregiver Assistance: Caregiver assistance needed per identified barriers - however, level of patient's required assistance exceeds assistance available at home  Cognition Needs: Cognition-related high falls risk  Physical Needs: High falls risk due to function or environment  Evaluation/Treatment Tolerance: Patient tolerated treatment well, Patient limited by fatigue  Medical Staff Made Aware: Yes  End of Session Communication: Bedside nurse  Assessment Comment: Pt demonstrated ability to complete bed mobility, sit<>stand transfer and bed>chair transfer with modAx2 via lateral scoot.  Pt lethargic/drowsy during session.  Vitals stable with mobility.  End of Session Patient Position: Up in chair, Alarm off, not on at start of session (End of 1st session)   IP OR SWING BED PT PLAN  Inpatient or Swing Bed: Inpatient  PT Plan  Treatment/Interventions: Bed mobility, Transfer training, Gait training, Balance training, Neuromuscular re-education, Strengthening, Endurance training, Therapeutic exercise, Therapeutic activity  PT Plan: Ongoing PT  PT Frequency: 5 times per week  PT Discharge Recommendations: High intensity level of continued care  Equipment Recommended upon Discharge: Wheeled walker  PT Recommended Transfer Status: Assist x2, Assistive device  PT - OK to Discharge: Yes    Subjective     PT Visit Info:  PT Received On: 08/11/25  General Visit Information:  General  Reason for Referral: PT  re-evaluation: found unresponsive 8/8, BAT called. NIHSS 31. Pt reintubated. CTH unremarkable. CTA c/f Basilar artery occulsion vs high grade stenosis. CTP showed diffusion hypoperfusion in posterior fossa and watershed cerebral hemispheres but no core infarct. Angio revealed multifocal vert and basilar stenosis, no occlusion, no intervention done. Extubated 8/10. NIHSS 3 on 8/11  Past Medical History Relevant to Rehab: HTN, CKD stage 5, chronic anemia, type 2 diabetes, hx of c diff colitis who was admitted 5/19-5/24 for shortness of breathe was incidentally found to have atypical pneumonia  Family/Caregiver Present: No  Co-Treatment: OT  Co-Treatment Reason: Femoral CVVH, significant change in status with anticipated AMPAC<10  Prior to Session Communication: Bedside nurse  Patient Position Received: Bed, 3 rail up, Alarm off, not on at start of session  Preferred Learning Style: auditory, verbal  General Comment: Pt awake, alert and willing to participate in therapy session this date. (Returned to room [id number] for chair>bed/bed mobility.)  Home Living:  Home Living  Type of Home: House  Lives With:  (Son)  Home Adaptive Equipment: Walker rolling or standard  Home Layout: One level  Home Access: Stairs to enter with rails  Entrance Stairs-Rails: Both  Entrance Stairs-Number of Steps: 2  Bathroom Shower/Tub: Walk-in shower  Bathroom Equipment: Grab bars in shower, Built-in shower seat  Prior Level of Function:  Prior Function Per Pt/Caregiver Report  Level of [place]: Independent with ADLs and functional transfers, Independent with homemaking with ambulation  ADL Assistance: Independent  Homemaking Assistance: Independent  Ambulatory Assistance: Independent  Vocational: Full time employment  Precautions:  Precautions  Hearing/Visual Limitations: WFL  Medical Precautions: Cardiac precautions, Fall precautions, Oxygen therapy device and L/min (4L)  Post-Surgical Precautions: Move in the Tube  Precautions  Comment: -160 or MAP >100 - Per RN     25 0838   Vital Signs   Vitals Session Pre PT   Heart Rate 85   Resp 15   SpO2 99 %   /88   MAP (mmHg) 110   BP Method Arterial line   Patient Position Lying      25 0913   Vital Signs   Vitals Session Post PT   Heart Rate 95   Resp 14   SpO2 96 %   /79   MAP (mmHg) 97   BP Method Arterial line   Patient Position Sitting  (End of 1st session)   Vital Signs Comment MAP in the 90s throughout session     Objective   Pain:  Pain Assessment  Pain Assessment: 0-10  0-10 (Numeric) Pain Score: 8  Pain Type: Chronic pain  Pain Location: Back  Cognition:  Cognition  Overall Cognitive Status: Impaired  Orientation Level: Oriented X4  Following Commands: Follows one step commands with repetition  Cognition Comments: Drowsy throughout session    General Assessments:  General Observation  General Observation: CVC, tele, kevan, CVVH, , .11 levo, .05 vaso, .5 amio    Activity Tolerance  Endurance: Tolerates 10 - 20 min exercise with multiple rests  Early Mobility/Exercise Safety Screen: Proceed with mobilization - No exclusion criteria met    Sensation  Light Touch: No apparent deficits    Strength  Strength Comments: BLEs at least 3/5 shown through functional transfers  Coordination  Movements are Fluid and Coordinated: No    Postural Control  Postural Control: Impaired    Static Sitting Balance  Static Sitting-Balance Support: Bilateral upper extremity supported, Feet supported  Static Sitting-Level of Assistance:  (Min-maxAx1)    Static Standing Balance  Static Standing-Balance Support: Bilateral upper extremity supported  Static Standing-Level of Assistance:  (Mod-maxAx2)  Functional Assessments:  ADL  ADL's Addressed: No (Refer to OTs note for additional information)    Bed Mobility  Bed Mobility: Yes  Bed Mobility 1  Bed Mobility 1: Supine to sitting  Level of Assistance 1: Maximum assistance, +2  Bed Mobility Comments 1: HOB elevated, cues for  sequencing, TAPS utilized    Transfers  Transfer: Yes  Transfer 1  Transfer From 1: Sit to, Stand to  Transfer to 1: Stand, Sit  Technique 1: Sit to stand, Stand to sit  Transfer Device 1:  (B arm in arm)  Transfer Level of Assistance 1: Maximum assistance, +2  Trials/Comments 1: x2 trials from EOB;  Cues for hand placement, B knees blocked, wide LILLY  Transfers 2  Transfer From 2: Bed to  Transfer to 2: Chair with arms  Technique 2: Lateral, To right  Transfer Device 2:  (B arm in arm)  Transfer Level of Assistance 2: Moderate assistance, +2  Trials/Comments 2: x2 lateral scoots to complete transfer with TAPs utilized at hips.    Ambulation/Gait Training  Ambulation/Gait Training Performed:  (Unable to complete d/t weakness and coordination/initiation deficits.)    Extremity/Trunk Assessments:  RLE   RLE : Within Functional Limits  LLE   LLE : Within Functional Limits  Treatments:  Therapeutic Activity  Therapeutic Activity Performed: Yes  Therapeutic Activity 1: Increased time for skilled ICU line/tube management for safe functional mobility. (1st/2nd session)  Therapeutic Activity 2: Pt sat EOB ~15 min with inital min-modAx1 however with increased time upright, pt requiring maxAx1 d/t R lateral instability.  Pt with diffculty adjusting posture and finding midline.  Kyphotic posture. (1st session)  Therapeutic Activity 3: Attempted to complete lateral side steps to chair however pt with strong R lateral lean, wide LILLY and inability to advance LEs with total assist x2, B arm in arm (1st session)  Therapeutic Activity 4: Returned to room to complet chair>bed transfer: maxAx2 via latearl scoot x3 with TAPs and sit>supine transfer: depAx2; Boost towards HOB: depAx2 with use of TAPs  Outcome Measures:  [facility] Basic Mobility  Turning from your back to your side while in a flat bed without using bedrails: Total  Moving from lying on your back to sitting on the side of a flat bed without using bedrails: Total  Moving to and  from bed to chair (including a wheelchair): A lot  Standing up from a chair using your arms (e.g. wheelchair or bedside chair): A lot  To walk in hospital room: Total  Climbing 3-5 steps with railing: Total  Basic Mobility - Total Score: 8    FSS-ICU  Ambulation: Unable to attempt due to weakness  Rolling: Maximal assistance (performs 25% - 49% of task)  Sitting: Moderate assistance (performs 50 - 74% of task)  Transfer Sit-to-Stand: Maximal assistance (performs 25% - 49% of task)  Transfer Supine-to-Sit: Total assistance (performs 25% or requires another person)  Total Score: 8    Early Mobility/Exercise Safety Screen: Proceed with mobilization - No exclusion criteria met  ICU Mobility Scale: Transferring bed to chair [5]    Encounter Problems       Encounter Problems (Active)       Balance       Pt will demonstrate ability to complete sitting static/dynamic balance activities with bilateral UE support and no LOB for increase in safety up D/C.  (Progressing)       Start:  08/11/25    Expected End:  08/25/25               Mobility       Pt will demonstrate ability to complete ther ex activities to improve functional strength for increase in independence upon DC.  (Progressing)       Start:  08/11/25    Expected End:  08/25/25            Pt will demonstrated ability to complete ~8 forward/backwards steps with proper form, LRAD, minAx2 and no balance deficits for safe DC.   (Progressing)       Start:  08/11/25    Expected End:  08/25/25               PT Transfers       Pt will demonstrated ability to complete bed mobility and sit<>stand transfers with min assistance x2 and use of assistive device to safely DC. (Progressing)       Start:  08/11/25    Expected End:  08/25/25                   Education Documentation  Precautions, taught by Natalia Stevens, PT at 8/11/2025  3:14 PM.  Learner: Patient  Readiness: Acceptance  Method: Explanation, Demonstration  Response: Needs Reinforcement  Comment: Refer to note for  additional information    Body Mechanics, taught by Natalia Stevens, PT at 8/11/2025  3:14 PM.  Learner: Patient  Readiness: Acceptance  Method: Explanation, Demonstration  Response: Needs Reinforcement  Comment: Refer to note for additional information    Mobility Training, taught by Natalia Stevens, PT at 8/11/2025  3:14 PM.  Learner: Patient  Readiness: Acceptance  Method: Explanation, Demonstration  Response: Needs Reinforcement  Comment: Refer to note for additional information    Education Comments  No comments found.

## 2025-08-12 ENCOUNTER — APPOINTMENT (OUTPATIENT)
Dept: RADIOLOGY | Facility: HOSPITAL | Age: 60
DRG: 235 | End: 2025-08-12
Payer: COMMERCIAL

## 2025-08-12 ASSESSMENT — PAIN SCALES - GENERAL
PAINLEVEL_OUTOF10: 0 - NO PAIN
PAINLEVEL_OUTOF10: 0 - NO PAIN
PAINLEVEL_OUTOF10: 2
PAINLEVEL_OUTOF10: 0 - NO PAIN
PAINLEVEL_OUTOF10: 8
PAINLEVEL_OUTOF10: 0 - NO PAIN
PAINLEVEL_OUTOF10: 0 - NO PAIN

## 2025-08-12 ASSESSMENT — PAIN - FUNCTIONAL ASSESSMENT
PAIN_FUNCTIONAL_ASSESSMENT: 0-10

## 2025-08-12 ASSESSMENT — COGNITIVE AND FUNCTIONAL STATUS - GENERAL
CLIMB 3 TO 5 STEPS WITH RAILING: TOTAL
DRESSING REGULAR LOWER BODY CLOTHING: TOTAL
TOILETING: TOTAL
WALKING IN HOSPITAL ROOM: TOTAL
PERSONAL GROOMING: A LITTLE
DRESSING REGULAR UPPER BODY CLOTHING: A LOT
DAILY ACTIVITIY SCORE: 12
TURNING FROM BACK TO SIDE WHILE IN FLAT BAD: A LOT
MOBILITY SCORE: 10
MOVING TO AND FROM BED TO CHAIR: A LOT
MOVING FROM LYING ON BACK TO SITTING ON SIDE OF FLAT BED WITH BEDRAILS: A LOT
EATING MEALS: A LITTLE
HELP NEEDED FOR BATHING: A LOT
STANDING UP FROM CHAIR USING ARMS: A LOT

## 2025-08-12 ASSESSMENT — PAIN DESCRIPTION - LOCATION: LOCATION: STERNUM

## 2025-08-12 NOTE — PROGRESS NOTES
Colin Leonard is a 59 y.o. male on day 6 of admission presenting with Atherosclerosis of native coronary artery of native heart without angina pectoris.      Subjective   No CVVH  Low dose Levophed  No major events         Objective        Vitals 24HR  Heart Rate:  [55-60]   Temp:  [36.2 °C (97.2 °F)-36.7 °C (98.1 °F)]   Resp:  [13-28]   BP: (126-131)/(59-64)   SpO2:  [96 %-100 %]   Oxygen Therapy  Pulse Ox (24 hr min): 96  Medical Gas Therapy: Supplemental oxygen  Medical Gas Delivery Method: Nasal cannula  O2 Flow Rate (L/min): 6 L/min FiO2 (%): 40 %    Intake/Output last 3 Shifts:    Intake/Output Summary (Last 24 hours) at 8/12/2025 1207  Last data filed at 8/12/2025 1100  Gross per 24 hour   Intake 1442.24 ml   Output 350 ml   Net 1092.24 ml       Physical Exam  On NC  Comfortable  Median Stenotomy.  Trace edema b/l    Relevant Results  Lab Results   Component Value Date    WBC 9.7 08/12/2025    HGB 7.2 (L) 08/12/2025    HCT 23.7 (L) 08/12/2025    MCV 96 08/12/2025     08/12/2025     Lab Results   Component Value Date    GLUCOSE 177 (H) 08/12/2025    CALCIUM 8.2 (L) 08/12/2025     (L) 08/12/2025    K 4.5 08/12/2025    CO2 25 08/12/2025    CL 98 08/12/2025    BUN 44 (H) 08/12/2025    CREATININE 4.21 (H) 08/12/2025         Assessment & Plan  Atherosclerosis of native coronary artery of native heart without angina pectoris    ESRD (end stage renal disease) (Multi)    Chronic renal insufficiency    Pneumonia    Coronary artery disease involving native heart, unspecified vessel or lesion type, unspecified whether angina present    Mr. Leonard is a 59M with history of CKD5, CAD s/p CABG on 8/6, and developed post-surgery TEJA on CKD requiring dialysis initiation. Course c/b basilar stroke, No intervention, Neurology following. Nephrology following for Dialysis care.    #CKDV/ESKD  - Cr on admission 9.4  - Etiology of CKD is not clear.  - Received CVVH 8/6-8/7  - SLED 8/7pm  - CVVH resumed  8/9-8/11    #Anemia  - In part due to CKD    #Possible Stroke 8/7 (Basilar Artery occlusion?), Imaging showed no infarction, no interventions        RECOMMENDATIONS:  - No indications for RRT on assessment  - Tentative plan for iHD tomorrow  - Iron Profile  - Regular Evaluation for indications for RRT      Babita Edmondson MD  PGY-5 Nephrology Fellow

## 2025-08-12 NOTE — PROGRESS NOTES
"Colin Leonard is a 59 y.o. male on day 6 of admission presenting with Atherosclerosis of native coronary artery of native heart without angina pectoris.      Objective     Physical Exam    Last Recorded Vitals  Blood pressure 130/57, pulse 58, temperature 36.5 °C (97.7 °F), temperature source Temporal, resp. rate 16, height 1.778 m (5' 10\"), weight 122 kg (268 lb 15.4 oz), SpO2 94%.  Intake/Output last 3 Shifts:  I/O last 3 completed shifts:  In: 2777.9 (22.8 mL/kg) [I.V.:1297.9 (10.6 mL/kg); NG/GT:1480]  Out: 2371 (19.4 mL/kg) [Urine:610 (0.1 mL/kg/hr); Other:1761]  Weight: 122 kg       Assessment & Plan  Atherosclerosis of native coronary artery of native heart without angina pectoris    ESRD (end stage renal disease) (Multi)    Chronic renal insufficiency    Pneumonia    Coronary artery disease involving native heart, unspecified vessel or lesion type, unspecified whether angina present    58 y/o male history of HTN, CKD5, DM2, C. Difficile, recent pneumonia who underwent OPCABGx2 (LIMA-LAD, SVG-OM) on 8/6/25.      Postoperative issues have included encephalopathy/tia related to bilateral vertebral/basilar artery atherosclerotic disease with high grade stenosis - improved with permissive hypertension, TEJA requiring CRRT, atrial fibrillation, generalized weakness.    1) S/P OPCABX2  2) Multifocal vertebral and basilar artery stenosis  3) Encephalopathy/Coma (resolved)  4) TEJA on CKD5  5) Acute blood loss anemia  6) Atrial fibrillation  7) DM2  8) Hx of PNA  9) Hx of C diff    Start midodrine today and wean off NE  1 RBC for anemia  Holding dialysis per nephrology - will pull femoral dialysis line  Heparin, plavix, aspirin    Due to the high probability of life threatening clinical decompensation, the patient required critical care time evaluating and managing this patient.  Critical care time included obtaining a history, examining the patient, ordering and reviewing studies, discussing, developing, and " implementing a management plan, evaluating the patient's response to treatment, and discussion with other care team providers. I saw and evaluated the patient myself. I reviewed the provider's documentation and discussed the patient with the provider. Critical care time was performed exclusive of billable procedures.    Critical Care Time: 40 minutes   Pete Ku MD

## 2025-08-12 NOTE — PROGRESS NOTES
Physical Therapy    Physical Therapy Treatment    Patient Name: Colin Leonard  MRN: [id number]  Department: [facility]  Room: 15/15-A  Today's Date: 8/12/2025  Time Calculation  Start Time: 1036  Stop Time: 1102  Time Calculation (min): 26 min    Assessment/Plan   PT Assessment  PT Assessment Results: Decreased strength, Decreased endurance, Impaired balance, Decreased mobility, Decreased cognition, Pain  Rehab Prognosis: Good  Barriers to Discharge Home: Caregiver assistance, Physical needs  Caregiver Assistance: Caregiver assistance needed per identified barriers - however, level of patient's required assistance exceeds assistance available at home  Cognition Needs: Cognition-related high falls risk  Physical Needs: High falls risk due to function or environment  Evaluation/Treatment Tolerance: Patient tolerated treatment well  Medical Staff Made Aware: Yes  End of Session Communication: Bedside nurse  End of Session Patient Position: Up in chair, Alarm off, not on at start of session  PT Plan  Inpatient/Swing Bed or Outpatient: Inpatient  PT Plan  Treatment/Interventions: Bed mobility, Transfer training, Gait training, Balance training, Neuromuscular re-education, Strengthening, Endurance training, Therapeutic exercise, Therapeutic activity  PT Plan: Ongoing PT  PT Frequency: 5 times per week  PT Discharge Recommendations: High intensity level of continued care  Equipment Recommended upon Discharge: Wheeled walker  PT Recommended Transfer Status: Assist x2, Assistive device  PT - OK to Discharge: Yes    PT Visit Info:  PT Received On: 08/12/25  Response to Previous Treatment: Patient with no complaints from previous session.     General Visit Information:   General  Family/Caregiver Present: No  Prior to Session Communication: Bedside nurse  Patient Position Received: Bed, 3 rail up, Alarm off, not on at start of session  Preferred Learning Style: auditory, verbal  General Comment: Pt awake, alert and willing to  participate in PT session.  Pt completed bed mobility, sit<>stand transfer and bed>chair transfer.  Varied assistance provided with functional mobility.  No instability noted, vitals stable. (Tele, alne, CVP, , [place], CVC)    Subjective   Precautions:  Precautions  Medical Precautions: Cardiac precautions, Fall precautions  Post-Surgical Precautions: Move in the Tube  Precautions Comment: Goal -160 OR MAP >100 per neuro     25 1036   Vital Signs   Vitals Session Pre PT   Heart Rate 56   Resp 17   SpO2 98 %   /62   MAP (mmHg) 82   BP Method Arterial line   Patient Position Lying      25 1102   Vital Signs   Vitals Session Post PT   Heart Rate 59   Resp 15   SpO2 100 %   /79   MAP (mmHg) 92   BP Method Arterial line   Patient Position Sitting   Vital Signs Comment Vitals stable with mobility     Objective   Pain:  Pain Assessment  Pain Assessment: 0-10  0-10 (Numeric) Pain Score: 0 - No pain  Cognition:  Cognition  Overall Cognitive Status: Impaired  Orientation Level: Oriented X4  Following Commands: Follows one step commands with increased time  Activity Tolerance:  Activity Tolerance  Endurance: Tolerates 10 - 20 min exercise with multiple rests  Early Mobility/Exercise Safety Screen: Proceed with mobilization - No exclusion criteria met  Treatments:  Therapeutic Activity  Therapeutic Activity Performed: Yes  Therapeutic Activity 1: Increased time for skilled ICU line/tube management for safe functional mobility  Therapeutic Activity 2: EOB sitting ~8 min prior to transfer to chair.  Pt able to hold trunk upright with CGA-minAx1.  Kyphotic posture noted, mild instability.  Therapeutic Activity 3: Static standing ~45 sec with modAx2, B arm in arm.  Wide LILLY, kyphotic posture.  Therapeutic Activity 4: Assisted with washing and brushing pts hair and washing pts face at end of session in chair.    Bed Mobility  Bed Mobility: Yes  Bed Mobility 1  Bed Mobility 1: Supine to sitting  Level  of Assistance 1: Moderate assistance, +2  Bed Mobility Comments 1: HOB elevated, cues for hand placement/sequencing    Ambulation/Gait Training  Ambulation/Gait Training Performed:  (Unable to complete d/t LE weakness.  Refer to transfer section for additional information)  Transfers  Transfer: Yes  Transfer 1  Transfer From 1: Sit to, Stand to  Transfer to 1: Stand, Sit  Technique 1: Sit to stand, Stand to sit  Transfer Device 1:  (B arm in arm)  Transfer Level of Assistance 1: Maximum assistance, +2  Trials/Comments 1: Cues for hand placement  Transfers 2  Transfer From 2: Bed to  Transfer to 2: Chair with arms  Technique 2: Lateral, To right  Transfer Device 2:  (B arm in arm)  Transfer Level of Assistance 2: Moderate assistance, +2  Trials/Comments 2: ~3-4 lateral steps to complete transfer.  Wide LILLY, minimal foot clearance    Stairs  Stairs: No    Outcome Measures:  [facility] Basic Mobility  Turning from your back to your side while in a flat bed without using bedrails: A lot  Moving from lying on your back to sitting on the side of a flat bed without using bedrails: A lot  Moving to and from bed to chair (including a wheelchair): A lot  Standing up from a chair using your arms (e.g. wheelchair or bedside chair): A lot  To walk in hospital room: Total  Climbing 3-5 steps with railing: Total  Basic Mobility - Total Score: 10    FSS-ICU  Ambulation: Unable to attempt due to weakness  Rolling: Moderate assistance (performs 50 - 74% of task)  Sitting: Minimal assistance (performs 75% or more of task)  Transfer Sit-to-Stand: Maximal assistance (performs 25% - 49% of task)  Transfer Supine-to-Sit: Maximal assistance (performs 25% - 49% of task)  Total Score: 11    Early Mobility/Exercise Safety Screen: Proceed with mobilization - No exclusion criteria met  ICU Mobility Scale: Transferring bed to chair [5]    Education Documentation  Precautions, taught by Natalia Stevens PT at 8/12/2025  2:53 PM.  Learner:  Patient  Readiness: Acceptance  Method: Explanation, Demonstration  Response: Needs Reinforcement    Body Mechanics, taught by Natalia Stevens PT at 8/12/2025  2:53 PM.  Learner: Patient  Readiness: Acceptance  Method: Explanation, Demonstration  Response: Needs Reinforcement    Mobility Training, taught by Natalia Stevens, PT at 8/12/2025  2:53 PM.  Learner: Patient  Readiness: Acceptance  Method: Explanation, Demonstration  Response: Needs Reinforcement    Education Comments  No comments found.    EDUCATION:       Encounter Problems       Encounter Problems (Active)       Balance       Pt will demonstrate ability to complete sitting static/dynamic balance activities with bilateral UE support and no LOB for increase in safety up D/C.  (Progressing)       Start:  08/11/25    Expected End:  08/25/25               Mobility       Pt will demonstrate ability to complete ther ex activities to improve functional strength for increase in independence upon DC.  (Progressing)       Start:  08/11/25    Expected End:  08/25/25            Pt will demonstrated ability to complete ~8 forward/backwards steps with proper form, LRAD, minAx2 and no balance deficits for safe DC.   (Progressing)       Start:  08/11/25    Expected End:  08/25/25               PT Transfers       Pt will demonstrated ability to complete bed mobility and sit<>stand transfers with min assistance x2 and use of assistive device to safely DC. (Progressing)       Start:  08/11/25    Expected End:  08/25/25

## 2025-08-12 NOTE — PROCEDURES
Speech-Language Pathology    SLP Adult Inpatient FEES Evaluation    Patient Name: Colin Leonard  MRN: [id number]  Today's Date: 8/12/2025   Time Calculation  Start Time: 1100  Stop Time: 1130  Time Calculation (min): 30 min             Assessment/Impression:    #Pharyngeal swallow impairment  - Pt presented with pharyngeal swallow impairments, moderate in degree, primarily characterized by incoordination of the pharyngeal stage resulting in incomplete glottic closure and inefficiency in bolus transit through the pharynx. Ultimately, mistiming of the pharyngeal stage results in several episodes of laryngeal penetration/aspiration w/ thin liquids, and consistent VF contact w/ mildly thick liquids.   Toleration of solid, puree, and moderately thick liquids. Recommended easy-to-chew solids, moderately thick liquids. Refer below for additional details re: endoscopic exam. Will plan for repeat imaging- MBS- in 1-2 weels.         Plan:  Inpatient/Swing Bed or Outpatient: Inpatient  SLP Plan: Skilled SLP  SLP Frequency: 2x per week  Duration: 2 weeks  SLP Discharge Recommendations: Other (Comment) (TBD)  SLP - OK to Discharge: Yes        Subjective   Exam completed w/ pt upright in chair. Scope passed via left nares  Lora Ruiz SLP assisting w/ feeding pt.       General Visit Information:  Patient Class: Inpatient  Living Environment: Home  Arrival: Independent  Reason for Referral: swallow evaluation  Prior Level of Function: WFL  Date of Onset: 08/06/25  Base[employer] Diet: regular  Current Diet : NPO        Oral Phase:   ? Premature pharyngeal entry d/t reduced posterior seal. Mastication overall intact/complete/timely        Pharyngeal Phase:   - consistently noted thin liquids reaching the supraglottic space and likely VF contact prior to pharyngeal stage onset. This was also noted w/ mildly thick liquids to lesser degree. Presumed aspiration of thin liquids w/ delayed/inconsistent cough response. Mildly thick liquids  w/ several penetration w/ ejection however, VF stasis was noted w/ repeated presentations. Pharyngeal stasis mild-mod, vallecular, most notable w/ puree and solid textures        Laryngopharynx:  - VF movement symmetrical, reaching midline. Mild fullness/hypertrophy of the arytenoids and IA space. Suspected granulation tissue along posterior aspect of TVF               Inpatient Education:  Extensive education provided to patient and/or Caregiver regarding current swallow function, recommendations/results, and POC. Demonstrated verbal understanding and were agreeable w/ the details/recommendations reviewed.    Encounter Problems       Encounter Problems (Active)       Swallowing       LTG - Patient will tolerate the least restrictive diet without overt difficulty by time of discharge. (Progressing)       Start:  08/11/25    Expected End:  08/26/25            SLP Goal 1 (Progressing)       Start:  08/11/25    Expected End:  08/26/25       STG - Patient will tolerate therapeutic trials of recommended consistency without clinical signs and symptoms of aspiration on 100% of trials              Swallowing       STG - Patient will complete effortable swallows 30-40 times per session w/ moderately thick liquids       Start:  08/12/25    Expected End:  08/26/25            SLP Goal 2       Start:  08/12/25    Expected End:  08/26/25       Pt will complete laryngeal elevation exercises of at least 10-15 reps w/ min cues

## 2025-08-12 NOTE — PROGRESS NOTES
Occupational Therapy    Occupational Therapy Treatment    Name: Colin Leonard  MRN: [id number]  : 1965  Date: 25  Room: 15/15      Time Calculation  Start Time: 1235  Stop Time: 1258  Time Calculation (min): 23 min    Assessment:  Barriers to Discharge Home: Physical needs, Cognition needs, Caregiver assistance  Caregiver Assistance: Caregiver assistance needed per identified barriers - however, level of patient's required assistance exceeds assistance available at home  Cognition Needs: Medication and/or medical management daily assist needed, Insight of patient limited regarding functional ability/needs  Physical Needs: Intermittent ADL assistance needed, High falls risk due to function or environment, 24hr mobility assistance needed, Stair navigation into home limited by function/safety  Evaluation/Treatment Tolerance: Patient limited by fatigue  Medical Staff Made Aware: Yes  End of Session Communication: Bedside nurse  End of Session Patient Position: Bed, 3 rail up, Alarm off, not on at start of session    Plan:  Treatment Interventions: ADL retraining, Functional transfer training, UE strengthening/ROM, Endurance training, Cognitive reorientation, Patient/family training, Equipment evaluation/education, Neuromuscular reeducation, Visual perceptual retraining, Fine motor coordination activities, Compensatory technique education  OT Frequency: 4 times per week (During this acute inpatient hospitalization)  OT Discharge Recommendations: High intensity level of continued care (Based on current functional status and rehab potential, patient is anticipated to tolerate and benefit from 5 or more days per week of skilled rehabilitative therapy after discharge from this acute inpatient hospitalization.)  Equipment Recommended upon Discharge:  (TBD)  OT Recommended Transfer Status: Assist of 2, Maximum assist  OT - OK to Discharge: Yes    Subjective   General:  OT Last Visit  OT Received On:  08/12/25  Prior to Session Communication: Bedside nurse  Patient Position Received: Up in chair, Alarm off, not on at start of session  Family/Caregiver Present: Yes  General Comment: Pt seated in chair on arrival, requesting to return to bed. On .01 levo     Precautions:  Medical Precautions: Cardiac precautions, Fall precautions  Post-Surgical Precautions: Move in the Tube  Precautions Comment: Goal -140    Vitals:   Date/Time Vitals Session Patient Position Pulse Resp SpO2 BP MAP (mmHg)    08/12/25 1235 Pre OT  --  58  17  96 %  --  --     08/12/25 1258 Post OT  --  57  15  96 %  130/57  82     08/12/25 1300 --  --  58  15  98 %  --  --     08/12/25 1400 --  --  57  16  95 %  --  --      Lines/Tubes/Drains:  CVC 08/06/25 Double lumen Right Internal jugular (Active)   Number of days: 6       Arterial Line 08/06/25 Left Brachial (Active)   Number of days: 6       Urethral Catheter 16 Fr. (Active)   Number of days: 1       NG/OG/Feeding Tube Small bore feeding tube Right nostril (Active)   Number of days: 3       Hemodialysis Cath 08/06/25 Right Femoral (Active)   Number of days: 5       Cognition:  Overall Cognitive Status: Impaired  Orientation Level: Disoriented to time, Disoriented to situation (Reports month as July, oriented to year)  Following Commands: Follows one step commands with repetition  Cognition Comments: Continued drowsiness, moderately impulsive with mobility  Attention: Exceptions to WFL  Sustained Attention: Impaired  Insight: Moderate  Impulsive: Moderately  Processing Speed: Delayed    Pain Assessment:  Pain Assessment  Pain Assessment:  (Endorses pain from hiccuping, did not quantify)     Objective   Activities of Daily Living:    Toileting  Toileting Level of Assistance: Dependent  Where Assessed: Bed level  Toileting Comments: with rolls     Bed Mobility/Transfers:   Bed Mobility  Bed Mobility: Yes  Bed Mobility 1  Bed Mobility 1: Sitting to supine  Level of Assistance 1: Dependent,  +2  Transfers  Transfer: Yes  Transfer 1  Transfer From 1: Sit to  Transfer to 1: Stand  Transfer Level of Assistance 1: Moderate assistance, +2, Arm in arm assistance  Transfers 2  Transfer From 2: Chair with drop arm to  Transfer to 2: Bed  Technique 2: Stand pivot  Transfer Level of Assistance 2: Maximum assistance, +2  Trials/Comments 2: cues for sequencing and safety      Therapy/Activity:   Therapeutic Activity  Therapeutic Activity Performed: Yes  Therapeutic Activity 1: EOB sitting ~3 minutes with min A for balance  Therapeutic Activity 2: Rolls L and R for perineal hygiene with max A     Outcome Measures:  [facility] Daily Activity  Putting on and taking off regular lower body clothing: Total  Bathing (including washing, rinsing, drying): A lot  Putting on and taking off regular upper body clothing: A lot  Toileting, which includes using toilet, bedpan or urinal: Total  Taking care of personal grooming such as brushing teeth: A little  Eating Meals: A little  Daily Activity - Total Score: 12  ICU Mobility Screen  ICU Mobility Scale: Transferring bed to chair     Education Documentation  Body Mechanics, taught by Elmira Hutchison OT at 8/12/2025  2:21 PM.  Learner: Patient  Readiness: Acceptance  Method: Explanation, Demonstration  Response: Needs Reinforcement    Precautions, taught by Elmira Hutchison OT at 8/12/2025  2:21 PM.  Learner: Patient  Readiness: Acceptance  Method: Explanation, Demonstration  Response: Needs Reinforcement    ADL Training, taught by Elmira Hutchison OT at 8/12/2025  2:21 PM.  Learner: Patient  Readiness: Acceptance  Method: Explanation, Demonstration  Response: Needs Reinforcement    Body Mechanics, taught by Elmira Hutchison OT at 8/11/2025  2:23 PM.  Learner: Family, Patient  Readiness: Acceptance  Method: Demonstration, Explanation  Response: Needs Reinforcement  Comment: Son verbalized understanding, pt needs reinforcement 2/2 mental  status    Precautions, taught by Elmira Hutchison OT at 8/11/2025  2:23 PM.  Learner: Family, Patient  Readiness: Acceptance  Method: Demonstration, Explanation  Response: Needs Reinforcement  Comment: Son verbalized understanding, pt needs reinforcement 2/2 mental status    ADL Training, taught by Elmira Hutchison, OT at 8/11/2025  2:23 PM.  Learner: Family, Patient  Readiness: Acceptance  Method: Demonstration, Explanation  Response: Needs Reinforcement  Comment: Son verbalized understanding, pt needs reinforcement 2/2 mental status    Education Comments  No comments found.        Goals:  Encounter Problems       Encounter Problems (Active)       ADLs       Patient will perform UB and LB bathing with minimal level of assistance. (Progressing)       Start:  08/07/25    Expected End:  08/25/25            Patient with complete upper body dressing with SBA level of assistance donning and doffing all UE clothes (Progressing)       Start:  08/07/25    Expected End:  08/25/25            Patient with complete lower body dressing with minimal level of assistance donning and doffing all LE clothes  with PRN adaptive equipment (Progressing)       Start:  08/07/25    Expected End:  08/25/25            Patient will complete toileting including hygiene clothing management/hygiene with minimal level of assistance. (Progressing)       Start:  08/07/25    Expected End:  08/25/25               COGNITION/SAFETY       Pt will maintain MITT precautions with 100% carryover during functional tasks, ADLs, and mobility without cueing.  (Progressing)       Start:  08/07/25    Expected End:  08/25/25            Patient will participate in cognitive activities to demonstrate WFL score on further cognitive assessments  (Progressing)       Start:  08/07/25    Expected End:  08/25/25               MOBILITY       Patient will perform Functional mobility min Household distances with moderate level of assistance and least  restrictive device in order to improve safety and functional mobility. (Progressing)       Start:  08/07/25    Expected End:  08/25/25               TRANSFERS       Patient will perform bed mobility with minimal level of assistance in order to improve safety and independence with mobility (Progressing)       Start:  08/07/25    Expected End:  08/25/25            Patient will complete functional transfer to toilet with least restrictive device with moderate level of assistance. (Progressing)       Start:  08/07/25    Expected End:  08/25/25 08/12/25 at 2:22 PM   Elmira Hutchison, OT   [phone]

## 2025-08-12 NOTE — PROGRESS NOTES
"CTICU Progress Note  Colin Wongjoni/[id number]    Admit Date: 8/6/2025  Hospital Length of Stay: 6   ICU Length of Stay: 5d 17h   CT SURGEON:     SUBJECTIVE:   Remains hemodynamically stable on 0.01mcg/kg/min and 4L NC.     MEDICATIONS  Infusions:  norepinephrine, Last Rate: 0.01 mcg/kg/min (08/12/25 0700)      Scheduled:  acetaminophen, 325 mg, q8h  amiodarone, 400 mg, BID  aspirin, 81 mg, Daily  atorvastatin, 80 mg, Nightly  clopidogrel, 75 mg, Daily  [Held by provider] docusate sodium, 100 mg, BID  ezetimibe, 10 mg, Nightly  heparin, 5,000 Units, q8h  insulin glargine, 20 Units, q24h  insulin lispro, 0-15 Units, q4h  melatonin, 5 mg, Daily  [Held by provider] polyethylene glycol, 17 g, Daily  [Held by provider] senna, 5 mL, BID  traZODone, 25 mg, Nightly      PRN:  alteplase, 2 mg, PRN  dextrose, 12.5 g, q15 min PRN  dextrose, 25 g, q15 min PRN   Or  glucagon, 1 mg, q15 min PRN  dextrose, 25 g, q15 min PRN  glucagon, 1 mg, q15 min PRN  glucagon, 1 mg, q15 min PRN  heparin flush, 10 Units, PRN  magnesium sulfate, 2 g, q6h PRN  magnesium sulfate, 4 g, q6h PRN  naloxone, 0.2 mg, q5 min PRN  ondansetron, 4 mg, q4h PRN  oxyCODONE, 10 mg, q4h PRN  oxyCODONE, 5 mg, q4h PRN  oxygen, 1 Dose, Continuous - O2/gases  prochlorperazine, 10 mg, q6h PRN   Or  prochlorperazine, 10 mg, q6h PRN   Or  prochlorperazine, 25 mg, q12h PRN        PHYSICAL EXAM:   Visit Vitals  /80   Pulse 58   Temp 36.3 °C (97.3 °F) (Temporal)   Resp 19   Ht 1.778 m (5' 10\")   Wt 122 kg (268 lb 15.4 oz)   SpO2 100%   BMI 38.59 kg/m²   Smoking Status Never   BSA 2.45 m²     Wt Readings from Last 5 Encounters:   08/07/25 122 kg (268 lb 15.4 oz)   08/04/25 116 kg (256 lb 12.8 oz)   07/31/25 116 kg (256 lb 11.2 oz)   07/29/25 115 kg (253 lb 6.4 oz)   07/25/25 117 kg (258 lb 1.6 oz)     INTAKE/OUTPUT:  I/O last 3 completed shifts:  In: 2777.9 (22.8 mL/kg) [I.V.:1297.9 (10.6 mL/kg); NG/GT:1480]  Out: 2371 (19.4 mL/kg) [Urine:610 (0.1 mL/kg/hr); " Other:1761]  Weight: 122 kg          Vent settings:       Physical Exam:   Physical Exam  Constitutional:       General: He is not in acute distress.     Comments: Critically ill elderly male laying in bed intubated    HENT:      Head: Normocephalic and atraumatic.      Nose: Nose normal.      Mouth/Throat:      Mouth: Mucous membranes are moist.      Pharynx: Oropharynx is clear.     Eyes:      Conjunctiva/sclera: Conjunctivae normal.      Pupils: Pupils are equal, round, and reactive to light.      Comments: Pupils constricted equal, round, and reactive to light      Cardiovascular:      Rate and Rhythm: Normal rate. Rhythm irregular.      Pulses: Normal pulses.      Heart sounds: Normal heart sounds.      Comments: Afib rate 90-100s   Normotensive to hypertensive   Pulmonary:      Effort: Pulmonary effort is normal.      Breath sounds: Normal breath sounds.      Comments: 4L NC   Appropriate oxygenation and ventilation  Symmetrical chest expansion     Abdominal:      General: There is no distension.      Palpations: Abdomen is soft.      Tenderness: There is no abdominal tenderness.   Genitourinary:     Comments: Elmore in place with clear, yellow urine     Musculoskeletal:      Cervical back: Normal range of motion.     Skin:     General: Skin is warm and dry.      Capillary Refill: Capillary refill takes less than 2 seconds.      Comments: All wounds clean and dressed appropriately    Warm pulses palpable.        Neurological:      Mental Status: He is alert.      Comments: Awake and oriented to person, place and time (confused on date but month and year correct). Followed all commands. No focal deficits. CAM -    Psychiatric:         Mood and Affect: Mood normal.         Behavior: Behavior normal.          Daily Risk Screen  Intubated: No  Central line: Yes, for vasoactive medication administration   Elmore: Yes, for accurate I/O monitoring in critically ill     Images: All images reviewed     Invasive  Hemodynamics:    Most Recent Range Past 24hrs   BP (Art) 124/58 Arterial Line BP 1  Min: 94/48  Max: 161/82   MAP(Art) 81 mmHg Arterial Line MAP 1 (mmHg)   Min: 65 mmHg  Max: 150 mmHg       Assessment/Plan   Assessment:   Colin Leonard is a 59 y.o. male hx of HTN, CKD stage 5, chronic anemia, type 2 diabetes, hx of c diff colitis who was admitted 5/19-5/24 for shortness of breathe was incidentally found to have atypical pneumonia which he was treated for. He was found to have elevated trop and BNP on that admission with a few episodes of reported VT which required increased dose of coreg. He underwent LHC 5/22/25 demonstrated MVCAD and decision was made to discharge and let him recover from his pneumonia treated with Augmentin and oral vanco prophyllacticaly due to h/o cdiff. and bring back for CABG. He is now s/p CABGx 2with Dr Yolanda Justice on 8/6/2     Plan:  NEURO:  No significant PMHx. On 8/8, around 0500, patient went into Afib and when team went in to treat found patient unresponsive without reflexes. CT scan which demonstrated significant atherosclerotic disease that portrayed diminished contrast enhancement in the bilateral vertebral and basilar arteries L>R. Allowed permissive HTN and as day progressed, patient neuro status improved and now alert, following commands. Patient awake oriented to person, place and month/year. No obvious focal deficit. Acute post operative pain adequately controlled on current regimen. Patient OOBTC with PT.   -->  - Serial neuro and pain assessments   - Permissive HTN --> SBP goal 120-140  - Neurology following, appreciate recs  - Scheduled Tylenol   - PRN oxycodone  - Discontinue PRN dilaudid   - PT Consult, OOB to chair as tolerated  - Melatonin 10mg nightly   - Start trazadone 25mg nightly   - CAM ICU score qshift  - Sleep/wake cycle hygiene      CV:  CAD, HTN SVT/NSVT, Chronic diastolic HF stage II on admission 5/2025 requiring up titration of coreg. now status post CABG.  Pre-55% with mod/sever AI normal RV post- 50% unchanged RV. Patients baseline -180 per family. On 8/8, Afib w/ RVR around 0500 and treated with amio. Currently, SB rate 50-60s and SBP 120s on levo 0.01mcg/kg/min. A/V epicardial wires cut 8/8 for MRI. -->  - Amiodarone 400 mg PO BID   - Goal -140s  - Wean off levo to SBP goal   - Monitor neuro exam and avoid hypotension   - Will need structural heart eval in future for TAVR eval, this can be done at discharge and not needed as inpatient unless clinical indication  - Plavix daily   - Atorvastatin 80mg nightly  - Zetia 10mg nightly   - Hold home coreg, torsemide and hydralazine     PULM:  Recent pnuemonia completed course of Augmentin outpatient. While neuro status declined 8/8, patient experienced respiratory distress and reintubated. Extubated (8/10) and currently 4L NC with appropriate oxygenation/ventilation and symmetrical chest expansion. RPL chest tube placed 8/8 for pneumothorax. MS, LPL, and RPL chest tubes removed 8/9. CXR demonstrates increased pulmonary edema and postop atelectasis. -->  - Daily CXR  - Wean FiO2 maintaining SpO2 >92%.   - IS q1h and OOB to chair      GI:  H/o cdiff colitis, treated (March 2025). Failed swallow eval and dobhoff placed 8/10. Tolerating TF at goal. BM x5 overnight.  -->  - PPI   - Continue TF at goal   - Swallow evaluation today --> FEES  - Hold colace/senna BID and miralax BID      :  CSA-TEJA Risk Score med.  Baseline CKD V previous admision Cr ~7 baseline PAT Cr 10. Hyponatremia likely due to hypervolemia. CVVH started 8/9 and clotted overnight 8/12. Net +1.3L over last 24 hours. Thornton placed 8/11 for urinary retention, of note per handoff has enlarged prostate and placement was painful. -->  - Continue thornton catheter for strict I/Os.  - Nephrology following, appreciate recs 8/8:   --> Indication for dialysis: Volume control post-CABG  --> Reassess need for dialysis tomorrow   --> Please obtain RFP at least  q12hr  --> strict Is/Os  --> Renal dosing for medications for latest eGFR, follow medication trough as appropriate  --> Avoid hypotension/rapid fluctuations in BPs  - Replete electrolytes per CTICU protocol  - Start tamsulosin     ENDO: DM2 not on home meds,  A1c: 5.4. Stress hyperglycemia is adequately controlled on current regimen. Received 55u insulin of exogenous insulin in 24hrs. -->  - Maintain BG <180  - Insulin per CTICU protocol  - Start lantus 20u daily      HEME: Anemia of chronic disease.  Acute on chronic blood loss anemia, stable. No s/s of bleeding or coagulopathy. Heparin infusion stopped on 8/10 and plavix started. Hgb this morning 7.2 and still on some vasopressors, ordered one unit PRBC. -->    - Monitor drain output volume and characteristics  - CBC PRN   - ASA daily   - Plavix daily   - SQH q8h  - SCDs for DVT prophylaxis  - Last type and screen: 8/10     ID:  Afebrile, no current indications of infection. MRSA negative. Periop cefazolin completed. -->  - Trend temp q4h     Skin: Leg wound noted on admission, photo in chart.   - preventative Mepilex dressings in place on sacrum and heels  - change preventative Mepilex weekly or more frequently as indicated (when moist/soiled)   - every shift skin assessment per nursing and weekly ICU skin rounds  - moisture barrier to be applied with diane care  - active skin problems addressed with nursing on daily rounds     Proph:  SCDs  PPI  SQH     Restraints not indicated.     F: Family: updated at bedside by Dr. Yolanda Justice     G:  Line  Right IJ MAC w [place] placed 8/6   Right Groin trialysis line 8/6 - Plan to remove   Left brachial a-line placed 8/6  Elmore placed 8/11  PIVs      A,B,C,D,E,F,G: reviewed     I personally spent 65 minutes of critical care time directly and personally managing the patient exclusive of separately billable procedures.     Peggy Birmingham PA-C  Dispo: CTICU care for now.    CTICU TEAM PHONE [phone]

## 2025-08-12 NOTE — PROGRESS NOTES
Inpatient  Speech-Language Pathology Treatment     Patient Name: Colin Leonard  MRN: [id number]  Today's Date: 8/12/2025  Time Calculation  Start Time: 0858  Stop Time: 0914  Time Calculation (min): 16 min           Assessment/Plan:  #Suspected dysphagia  - Clinical swallow exam remains ? For aspiration and dysphagia considering his overt s/sx following 3 oz protocol. Considering his clinical scenario, additional testing w/ FEES is recommended. Pt and team agreeable. Will complete later this date.          Recommendations:  NPO   FEES later today w/ additional goals to follow    Plan:  Inpatient/Swing Bed or Outpatient: Inpatient  SLP Frequency: 2x per week  Duration: 2 weeks  SLP Discharge Recommendations: Other (Comment) (TBD)  SLP - OK to Discharge: Yes      Subjective   Pt received resting in bed. Pleasant during exam. NGT in nares   Arrival: Independent  Prior to Session Communication: Bedside nurse        Objective   - positioned w/ RN upright in bed  - Presented ice chips, thin liquids x straw x 2; gisell protocol  - Delayed coughing x 1 following Gisell administration, frequent pausing noted during successive sips.         Encounter Problems       Encounter Problems (Active)       Swallowing       LTG - Patient will tolerate the least restrictive diet without overt difficulty by time of discharge. (Progressing)       Start:  08/11/25    Expected End:  08/25/25            SLP Goal 1 (Progressing)       Start:  08/11/25    Expected End:  08/25/25       STG - Patient will tolerate therapeutic trials of recommended consistency without clinical signs and symptoms of aspiration on 100% of trials                    Inpatient Education:  Extensive education provided to patient and/or Caregiver regarding current swallow function, recommendations/results, and POC. Demonstrated verbal understanding and were agreeable w/ the details/recommendations reviewed.

## 2025-08-13 ENCOUNTER — APPOINTMENT (OUTPATIENT)
Dept: RADIOLOGY | Facility: HOSPITAL | Age: 60
End: 2025-08-13
Payer: COMMERCIAL

## 2025-08-13 ENCOUNTER — APPOINTMENT (OUTPATIENT)
Dept: CARDIOLOGY | Facility: HOSPITAL | Age: 60
End: 2025-08-13
Payer: COMMERCIAL

## 2025-08-13 ASSESSMENT — PAIN - FUNCTIONAL ASSESSMENT
PAIN_FUNCTIONAL_ASSESSMENT: 0-10

## 2025-08-13 ASSESSMENT — PAIN SCALES - GENERAL
PAINLEVEL_OUTOF10: 0 - NO PAIN
PAINLEVEL_OUTOF10: 5 - MODERATE PAIN

## 2025-08-13 NOTE — CONSULTS
"Nutrition Follow Up Assessment:   Nutrition Assessment    Reason for Assessment: Provider consult order (verbal)    Significant events since initial nutrition eval:   8/08) BAT called, re-intubated, pt found to have R pneumo s/p drainage  8/10) extubated  8/11) CVVH stopped  8/12) Passed for diet per SLP rec    Passed for easy to chew + moderately thick diet 8/12. However poor intakes so far. Still has dobhoff NG - last on Pivot 1.5 @ 40mls/hr thru last evening 8/12. Discussed cyclic TF with family at bedside & team. Pt expressing no feeling of hunger prompted.       Anthropometrics:  Height: 177.8 cm (5' 10\")   Weight: 120 kg (264 lb 5.3 oz)   BMI (Calculated): 37.93  IBW/kg (Dietitian Calculated): 75.5 kg                            Nutrition Focused Physical Exam Findings:  Subcutaneous Fat Loss:   Triceps: Well nourished (ample fat tissue) (visual)  Muscle Wasting:  Pectoralis (Clavicular Region): Well nourished (clavicle not visible)  Deltoid/Trapezius: Well nourished (rounded appearance at arm, shoulder, neck)  Edema:  Edema Location: non-pitting  Physical Findings:  Skin: Positive (surgical sites)    Nutrition Significant Labs:  CBC Trend:   Results from last 7 days   Lab Units 08/13/25  0251 08/12/25  1155 08/12/25  0008 08/11/25  1215   WBC AUTO x10*3/uL 9.9 9.2 9.7 12.1*   RBC AUTO x10*6/uL 2.76* 2.67* 2.47* 2.63*   HEMOGLOBIN g/dL 8.4* 8.0* 7.2* 7.8*   HEMATOCRIT % 26.2* 24.8* 23.7* 23.7*   MCV fL 95 93 96 90   PLATELETS AUTO x10*3/uL 168 150 154 196    , A1C:  Lab Results   Component Value Date    HGBA1C 5.4 05/16/2025   , BG POCT trend:   Results from last 7 days   Lab Units 08/13/25  1134 08/13/25  0815 08/13/25  0322 08/12/25  2338 08/12/25  1952   POCT GLUCOSE mg/dL 118* 132* 126* 125* 144*    , Liver Function Trend:    , Renal Lab Trend:   Results from last 7 days   Lab Units 08/13/25  0251 08/12/25  1153 08/12/25  0008 08/11/25  1215   POTASSIUM mmol/L 4.5 4.2 4.5 4.3   PHOSPHORUS mg/dL 2.8 2.9 3.1 " 2.8   SODIUM mmol/L 132* 131* 130* 131*   MAGNESIUM mg/dL 2.56*  --  2.51* 2.51*   EGFR mL/min/1.73m*2 11* 13* 15* 20*   BUN mg/dL 67* 53* 44* 32*   CREATININE mg/dL 5.63* 4.76* 4.21* 3.33*    , Lipid Panel:   Lab Results   Component Value Date    CHOL 143 08/19/2024    HDL 32.7 08/19/2024    CHHDL 4.4 08/19/2024    LDLF 43 02/01/2022    VLDL 22 08/19/2024    TRIG 110 08/19/2024    , Vit D:   Lab Results   Component Value Date    VITD25 33 02/15/2022        Nutrition Specific Medications:  Scheduled medications  Scheduled Medications[1]  Continuous medications  Continuous Medications[2]  PRN medications  PRN Medications[3]      I/O:   Last BM Date: 08/12/25; Stool Appearance: Loose, Soft (08/12/25 0137)    4 BMs out 8/11 thru 8/12  1 BM out 8/10 thru 8/11    Dietary Orders (From admission, onward)       Start     Ordered    08/13/25 1142  Enteral feeding with NPO TwoCal HN; ND (nasoduodenal tube); 2410-5860; 70; Every 4 hours  Diet effective now        Comments: Advance every 4hrs as tolerated to goal of 40ml/hr   Question Answer Comment   Tube feeding formula: TwoCal HN    Feeding route: ND (nasoduodenal tube)    Tube feeding cyclic (start / stop time): 1333-8575    Tube feeding cyclic rate (mL/hr): 70    Flush frequency: Every 4 hours        08/13/25 1142    08/13/25 0934  Oral nutritional supplements  Until discontinued        Question Answer Comment   Deliver with Lunch    Deliver with Dinner    Select supplement: Magic Cup        08/13/25 0934                     Estimated Needs:   Total Energy Estimated Needs in 24 hours (kCal): 2200 kCal  Method for Estimating Needs: 30 kcals/kg x ideal BW  Total Protein Estimated Needs in 24 Hours (g): 110 g  Method for Estimating 24 Hour Protein Needs: 1.5 gm/kg x ideal BW  Total Fluid Estimated Needs in 24 Hours (mL):  (per team)           Nutrition Diagnosis   Malnutrition Diagnosis  Patient has Malnutrition Diagnosis: No    Nutrition Diagnosis  Patient has Nutrition  Diagnosis: Yes  Diagnosis Status (1): Active  Nutrition Diagnosis 1: Increased nutrient needs  Related to (1): increased metabolic demand  As Evidenced by (1): s/p CABG, recovery from critical illness.       Nutrition Interventions/Recommendations   Nutrition prescription for oral nutrition, Nutrition prescription for enteral nutrition    Nutrition Recommendations:  Diet per speech therapy recs.     Transition to cyclic feeding regimen,  TwoCal HN @ 70mls/hr x 16 hours for 100% est needs.   Flush with 30-60mls pre & post TF regimen.   Reduce time to over 8 hours, keeping goal rate @ 70mls/hr to meet ~50% est needs as intakes begin to improve.     Trial ONS with meals.     Nutrition Interventions/Goals:   Meals and Snacks: Texture-modified diet  Goal: per SLP rec -- no therapeutic diet additions (i.e. low salt/fat, carb controlled)  Enteral Intake: Management of concentration of enteral nutrition, Management of delivery rate of enteral nutrition  Goal: TwoCal HN @ 70 x 16 hours = 1120mls, 2240 kcals, 94gm protein per day ; @ 70 x 8 hours = 560mls, 1120 kcals, 47gm protien per day  Medical Food Supplement: Commercial food medical food supplement therapy  Goal: Magic cup with lunch and dinner for 290 kcals & 9gm protein each          Nutrition Monitoring and Evaluation   Estimated Energy Intake: Energy intake 50 -75% of estimated energy needs  Enteral and Parenteral Nutrition Intake Determination: Enteral nutrition intake - Tolerate TF at goal rate         Electrolyte and Renal Panel: Electrolytes within normal limits  Glucose/Endocrine Profile: Glucose within normal limits ( mg/dL)         Goal Status: Goal(s) discontinued    Time Spent (min): 45 minutes              [1] amiodarone, 400 mg, oral, BID  aspirin, 81 mg, nasoduodenal tube, Daily  atorvastatin, 80 mg, nasoduodenal tube, Nightly  bumetanide, 2 mg, intravenous, q12h  clopidogrel, 75 mg, oral, Daily  ezetimibe, 10 mg, nasoduodenal tube,  Nightly  heparin, 5,000 Units, subcutaneous, q8h  [Held by provider] insulin glargine, 20 Units, subcutaneous, q24h  insulin lispro, 0-10 Units, subcutaneous, TID AC  midodrine, 15 mg, oral, q8h  [Held by provider] polyethylene glycol, 17 g, nasoduodenal tube, Daily  silodosin, 4 mg, nasogastric tube, Daily  traZODone, 25 mg, oral, Nightly     [2]    [3] PRN medications: acetaminophen, alteplase, dextrose, dextrose **OR** glucagon, dextrose, glucagon, glucagon, heparin flush, magnesium sulfate, magnesium sulfate, naloxone, ondansetron, oxyCODONE, oxyCODONE, oxygen, prochlorperazine **OR** prochlorperazine **OR** prochlorperazine

## 2025-08-13 NOTE — PROGRESS NOTES
Speech-Language Pathology                             Therapy Communication Note    Patient Name: Colin Leonard  MRN: [id number]  Department: [facility]  Room: 15/15-A  Today's Date: 8/13/2025     Discipline: Speech Language Pathology    Missed Time: Attempt    Comment:  Follow-up this afternoon. Pt refusing to participate with SLP at this time. Provided encouragement but continued to refuse at this time.

## 2025-08-13 NOTE — PROGRESS NOTES
CT SURGERY  08/06 CABGx 2with Dr Yolanda Justice     Postoperative issues have included encephalopathy/tia related to bilateral vertebral/basilar artery atherosclerotic disease with high grade stenosis - improved with permissive hypertension, TEJA requiring CRRT, atrial fibrillation, generalized weakness.     DC PLAN  AR: [facility]    * precert needed    PAYOR   [facility]    COMPLETED  (X) 08/11 - AR list provided to patient with son bedside. Obtained FOC for [facility].       Kinjal Moran (LSW, MSW)

## 2025-08-13 NOTE — PROGRESS NOTES
"Physical Therapy                 Therapy Communication Note    Patient Name: Colin Leonard  MRN: [id number]  Department: [facility]  Room: 15/15-A  Today's Date: 8/13/2025     Discipline: Physical Therapy    Missed Visit: PT Missed Visit: Yes     Missed Visit Reason: Missed Visit Reason: Patient refused (PT present [id number] attempting to encourage pt to participate in session. Pt adamantly refusing stating \"if you don't stop yelling at me I'm going to freak out\". RN aware.)    Missed Time: Attempt    Comment:      08/13/25 at 2:26 PM - Radha Ziegler, PT    "

## 2025-08-13 NOTE — SIGNIFICANT EVENT
"Stroke Updated Assessment and Plan + Sign off     Re-examination patient after weaning off pressors    Patient pressure at SBP 146mmHg    [facility] NIHSS:   NIH Stroke Scale:     1A. Level of Consciousness:  Alert (keenly responsive) (0)    1B. Ask Month and Age:  Both questions right (0)    1C. Blink Eyes & Squeeze Hands:  Performs both tasks (0)    2. Best Gaze:  Normal (0)    3. Visual:  No visual loss (0)    4. Facial Palsy:  Normal symmetry (0)    5A. Motor - Left Arm:  No drift (0)    5B. Motor - Right Arm:  No drift (0)    6A. Motor - Left Leg:  No drift (0)    6B. Motor - Right Leg:  No drift (0)    7. Limb Ataxia:  No ataxia (0)    8. Sensory Loss:  Normal (no sensory loss) (0)    9. Best Language:  Mild-moderate aphasia (+1)    10. Dysarthia:  Mild-moderate dysarthria (+1)    11. Extinction and Inattention:  No abnormality (0)    NIH Stroke Scale:  2    General Appearance:  No distress, alert (just wake up from sleep), interactive and cooperative.     Mental State: Orientation was normal to time (August), and age. Touch right ear with left thumb when asked to touch left ear with right thumb (LR confusion). Some perseverance (calling finger nail \"knuckle\").     Cranial Nerves:   CN 2: Visual fields full to confrontation. Pupils round, 4 mm in diameter, equally reactive to light. No visual extinction.  CN 3, 4, 6: Lids symmetric; no ptosis. EOMs normal alignment, full range with normal saccades, pursuit and convergence.   No nystagmus.   CN 5: Facial sensation intact bilaterally.   CN 7: Normal and symmetric facial strength. Nasolabial folds symmetric.   CN 8: Hearing intact to voice  CN 9: Palate elevates symmetrically.   CN 11: Normal strength of shoulder shrug and neck turning.   CN 12: Tongue midline, with normal bulk and strength; no fasciculations.     Motor: Muscle bulk and tone were normal in both upper and lower extremities. No fasciculations, tremor or other abnormal movements were present. "     Strength: R L  Shoulder Abd 5 5  Elbow Flex  5 5  Elbow Ext  5 5    5 5  Hip flexion 5 5  Knee Ext      5 5  Knee Flex    4* 4* * Patient with limited understanding what to do  DorsiFlex    5 5  PlantarFlex 5 5    Sensory: In both upper and lower extremities, sensation was intact to light touch.    Coordination: In both upper extremities, finger-nose-finger was intact without dysmetria or overshoot. In both lower extremities, heel-to-shin was intact.     Gait: Deferred    Colin Leonard is a 59 y.o. Left-handed male presenting as a BAT c/f mid basilar occlusion vs high-grade stenosis. Hx of HTN, CKD stage 5, chronic anemia, type 2 diabetes, hx of c diff colitis now s/p CABGx 2with Dr Yolanda Justice on 8/6/25. LKW 2300 08/07 (alert, following commands, sitting up in chair, able to eat, communicate needs, moving x4, no focal deficits). BAT called for coma, NIHSS 31, MRS 0 (confirmed by wife on phone). CTH unremarkable. CTA c/f Basilar artery occulsion vs high grade stenosis. CTP showed diffusion hypoperfusion in posterior fossa and watershed cerebral hemispheres but no core infarct. Angio revealed multifocal vert and basilar stenosis, no occlusion, no intervention done.     Impression: Multifocal vertebral and basilar stenosis      Recommendation:   - Continue DAPT (ASA and plavix) at least 90 days, tentative plan to transition to plavix monotherapy but would defer to outpatient stroke neurologist   (Discussed with family at bedside, patient is good at taking his meds. Unable to clarify the compliance for ASA before the stroke called due to patient agitation)  - Neurology has arranged a follow up with Dr. Dior in 1 month  - Stroke will sign off at this time, please reach out with further questions.     The patient was discussed with the attending physician Dr. Stacy Negro MD PhD  Neurology resident, PGY-3

## 2025-08-13 NOTE — PROGRESS NOTES
"Colin Leonard is a 59 y.o. male on day 7 of admission presenting with Atherosclerosis of native coronary artery of native heart without angina pectoris.    Subjective   Did not sleep well overnight. Some confusion this morning.        Objective     Physical Exam    Last Recorded Vitals  Blood pressure 130/57, pulse 60, temperature 36.7 °C (98.1 °F), resp. rate 18, height 1.778 m (5' 10\"), weight 120 kg (264 lb 5.3 oz), SpO2 95%.  Intake/Output last 3 Shifts:  I/O last 3 completed shifts:  In: 1312.7 (10.9 mL/kg) [I.V.:132.7 (1.1 mL/kg); NG/GT:1180]  Out: 960 (8 mL/kg) [Urine:960 (0.2 mL/kg/hr)]  Weight: 119.9 kg     Relevant Results             This patient has a central line   Reason for the central line remaining today? Line unnecessary, will be removed today    This patient has a urinary catheter   Reason for the urinary catheter remaining today? critically ill patient who need accurate urinary output measurements           Assessment & Plan  Atherosclerosis of native coronary artery of native heart without angina pectoris    ESRD (end stage renal disease) (Multi)    Chronic renal insufficiency    Pneumonia    Coronary artery disease involving native heart, unspecified vessel or lesion type, unspecified whether angina present    58 y/o male history of HTN, CKD5, DM2, C. Difficile, recent pneumonia who underwent OPCABGx2 (LIMA-LAD, SVG-OM) on 8/6/25.       Postoperative issues have included encephalopathy/tia related to bilateral vertebral/basilar artery atherosclerotic disease with high grade stenosis - improved with permissive hypertension, TEJA requiring CRRT, atrial fibrillation, generalized weakness.     1) S/P OPCABX2  2) Multifocal vertebral and basilar artery stenosis  3) Encephalopathy/Coma (resolved)  4) TEJA on CKD5. Mild hyponatremia today to 132. Bicarbonate appropriate. Holding sodium bicarbonate tabs- continue to monitor trend.   5) Acute blood loss anemia  6) Atrial fibrillation  7) DM2  8) Hx of " PNA  9) Hx of C diff     BP acceptable on midodrine today. Remove central line.   Holding dialysis per nephrology - bumex IV today, making adequate urine.   Heparin, plavix, aspirin     Due to the high probability of life threatening clinical decompensation, the patient required critical care time evaluating and managing this patient.  Critical care time included obtaining a history, examining the patient, ordering and reviewing studies, discussing, developing, and implementing a management plan, evaluating the patient's response to treatment, and discussion with other care team providers. I saw and evaluated the patient myself. I reviewed the provider's documentation and discussed the patient with the provider. Critical care time was performed exclusive of billable procedures.    Critical care time: 42 minutes      I spent 42 minutes in the professional and overall care of this patient.      Sydnie Romero MD

## 2025-08-13 NOTE — PROGRESS NOTES
Colin Leonard is a 59 y.o. male on day 7 of admission presenting with Atherosclerosis of native coronary artery of native heart without angina pectoris.      Subjective   Making more urine  Dialysis Catheter removed           Objective        Vitals 24HR  Heart Rate:  [55-62]   Temp:  [35.9 °C (96.6 °F)-36.7 °C (98.1 °F)]   Resp:  [13-22]   BP: (130)/(57)   Weight:  [120 kg (264 lb 5.3 oz)]   SpO2:  [81 %-100 %]   Oxygen Therapy  Pulse Ox (24 hr min): 81  Medical Gas Therapy: Supplemental oxygen  Medical Gas Delivery Method: Nasal cannula  O2 Flow Rate (L/min): 6 L/min FiO2 (%): 40 %    Intake/Output last 3 Shifts:    Intake/Output Summary (Last 24 hours) at 8/13/2025 1142  Last data filed at 8/13/2025 1131  Gross per 24 hour   Intake 362.14 ml   Output 820 ml   Net -457.86 ml       Physical Exam  On RA  Comfortable  Median Stenotomy.    Relevant Results  Lab Results   Component Value Date    WBC 9.9 08/13/2025    HGB 8.4 (L) 08/13/2025    HCT 26.2 (L) 08/13/2025    MCV 95 08/13/2025     08/13/2025     Lab Results   Component Value Date    GLUCOSE 132 (H) 08/13/2025    CALCIUM 8.2 (L) 08/13/2025     (L) 08/13/2025    K 4.5 08/13/2025    CO2 25 08/13/2025    CL 98 08/13/2025    BUN 67 (H) 08/13/2025    CREATININE 5.63 (H) 08/13/2025         Assessment & Plan  Atherosclerosis of native coronary artery of native heart without angina pectoris    ESRD (end stage renal disease) (Multi)    Chronic renal insufficiency    Pneumonia    Coronary artery disease involving native heart, unspecified vessel or lesion type, unspecified whether angina present    Mr. Leonard is a 59M with history of CKD5, CAD s/p CABG on 8/6, and developed post-surgery TEJA on CKD requiring dialysis initiation. Course c/b basilar stroke, No intervention, Neurology following. Nephrology following for Dialysis care.    #CKDV/ESKD  - Cr on admission 9.4  - Etiology of CKD is not clear.  - Received CVVH 8/6-8/7  - SLED 8/7pm  - CVVH  resumed 8/9-8/11  - Access: Non (Femoral Dialysis Catheter Removed)    #Anemia  - In part due to CKD    #Possible Stroke 8/7 (Basilar Artery occlusion?), Imaging showed no infarction, no interventions      RECOMMENDATIONS:  - No indications for RRT on assessment  - Loop diuretics for volume management as indicated  - Iron Profile  - Regular Evaluation for indications for RRT      Babita Edmondson MD  PGY-5 Nephrology Fellow

## 2025-08-13 NOTE — PROGRESS NOTES
Inpatient  Speech-Language Pathology Treatment     Patient Name: Colin Leonard  MRN: [id number]  Today's Date: 8/13/2025  Time Calculation  Start Time: 0930  Stop Time: 0949  Time Calculation (min): 19 min           Assessment/Plan:  #oropharyngeal impairment  - Recommended easy-to-chew diet, mod thick liquids on 8/12.   - Ongoing assessment/diet analysis this morning- pt w/ ? Increased drowsiness and needing repeated cues to participate in exam. Pt safely accepting PO w/o overt s/sx of aspiration however given change in mentation and  nurse report from previous evening indicating coughing w/ liquids, recommended NPO at this time until further assessment could be completed. Updated RN and CNP       Recommendations:  NPO   ContinueTF via corpak  SLP will follow-up this PM    Plan:  Inpatient/Swing Bed or Outpatient: Inpatient  SLP Frequency: 2x per week  Duration: 2 weeks  SLP Discharge Recommendations: Other (Comment) (TBD)  SLP - OK to Discharge: Yes      Subjective   Pt resting in bed. Awake, oriented x self, place, month, and year.   Sister Kathleen at bedside        Objective   - consumed ~ 2- 3 ounces of orange Gatorade; 4 bites of puree  - Showed decent bolus acceptance and bolus collection. No pocketing however, mastication was extended w/ solid textures needing liquid wash to clear.        Encounter Problems       Encounter Problems (Active)       Swallowing       LTG - Patient will tolerate the least restrictive diet without overt difficulty by time of discharge. (Progressing)       Start:  08/11/25    Expected End:  08/26/25            SLP Goal 1 (Progressing)       Start:  08/11/25    Expected End:  08/26/25       STG - Patient will tolerate therapeutic trials of recommended consistency without clinical signs and symptoms of aspiration on 100% of trials              Swallowing       STG - Patient will complete effortable swallows 30-40 times per session w/ moderately thick liquids       Start:  08/12/25     Expected End:  08/26/25            SLP Goal 2       Start:  08/12/25    Expected End:  08/26/25       Pt will complete laryngeal elevation exercises of at least 10-15 reps w/ min cues                      Inpatient Education:  Extensive education provided to patient and/or Caregiver regarding current swallow function, recommendations/results, and POC. Demonstrated verbal understanding and were agreeable w/ the details/recommendations reviewed.

## 2025-08-13 NOTE — PROGRESS NOTES
"CTICU Progress Note  Colin Wongjoni/[id number]    Admit Date: 8/6/2025  Hospital Length of Stay: 7   ICU Length of Stay: 6d 17h   CT SURGEON:     SUBJECTIVE:   Continued poor sleep    MEDICATIONS  Infusions:  norepinephrine, Last Rate: Stopped (08/12/25 1300)      Scheduled:  acetaminophen, 325 mg, q8h  amiodarone, 400 mg, BID  aspirin, 81 mg, Daily  atorvastatin, 80 mg, Nightly  clopidogrel, 75 mg, Daily  [Held by provider] docusate sodium, 100 mg, BID  ezetimibe, 10 mg, Nightly  heparin, 5,000 Units, q8h  insulin glargine, 20 Units, q24h  insulin lispro, 0-15 Units, q4h  melatonin, 5 mg, Daily  midodrine, 15 mg, q8h  [Held by provider] polyethylene glycol, 17 g, Daily  [Held by provider] senna, 5 mL, BID  silodosin, 4 mg, Daily  traZODone, 25 mg, Nightly      PRN:  alteplase, 2 mg, PRN  dextrose, 12.5 g, q15 min PRN  dextrose, 25 g, q15 min PRN   Or  glucagon, 1 mg, q15 min PRN  dextrose, 25 g, q15 min PRN  glucagon, 1 mg, q15 min PRN  glucagon, 1 mg, q15 min PRN  heparin flush, 10 Units, PRN  magnesium sulfate, 2 g, q6h PRN  magnesium sulfate, 4 g, q6h PRN  naloxone, 0.2 mg, q5 min PRN  ondansetron, 4 mg, q4h PRN  oxyCODONE, 10 mg, q4h PRN  oxyCODONE, 5 mg, q4h PRN  oxygen, 1 Dose, Continuous - O2/gases  prochlorperazine, 10 mg, q6h PRN   Or  prochlorperazine, 10 mg, q6h PRN   Or  prochlorperazine, 25 mg, q12h PRN        PHYSICAL EXAM:   Visit Vitals  /57   Pulse 58   Temp 36.3 °C (97.3 °F) (Temporal)   Resp 19   Ht 1.778 m (5' 10\")   Wt 120 kg (264 lb 5.3 oz)   SpO2 91%   BMI 37.93 kg/m²   Smoking Status Never   BSA 2.43 m²     Wt Readings from Last 5 Encounters:   08/13/25 120 kg (264 lb 5.3 oz)   08/04/25 116 kg (256 lb 12.8 oz)   07/31/25 116 kg (256 lb 11.2 oz)   07/29/25 115 kg (253 lb 6.4 oz)   07/25/25 117 kg (258 lb 1.6 oz)     INTAKE/OUTPUT:  I/O last 3 completed shifts:  In: 1312.7 (10.9 mL/kg) [I.V.:132.7 (1.1 mL/kg); NG/GT:1180]  Out: 960 (8 mL/kg) [Urine:960 (0.2 mL/kg/hr)]  Weight: 119.9 kg  "     Physical Exam:   - CONSTITUTION: younger appearing male in ICU bed  - Neuro: Oriented x3 but confused in conversation. Followed command in all extremities. CN grossly intact.   - CARDIOVASCULAR: NSR. No epicardial wires  - RESPIRATORY: diminished. Intermittently tachypneic.   - GI: obese, soft non distended, hypoactive BS  - : thornton in place with yellow urine  - EXTREMITIES: warm, well perfused. No edema  - SKIN: cool and dry  - PSYCHIATRIC: agitated at times    Daily Risk Screen  Intubated: No  Central line: Yes, for vasoactive medication administration   Thornton: Yes, for accurate I/O monitoring in critically ill     Images: All images reviewed     Invasive Hemodynamics:    Most Recent Range Past 24hrs   BP (Art) 149/62 Arterial Line BP 1  Min: 107/98  Max: 152/64   MAP(Art) 194 mmHg Arterial Line MAP 1 (mmHg)   Min: 72 mmHg  Max: 233 mmHg       Assessment/Plan   Assessment:   Colin Leonard is a 59 y.o. male hx of HTN, CKD stage 5, chronic anemia, type 2 diabetes, hx of c diff colitis who was admitted 5/19-5/24 for shortness of breathe was incidentally found to have atypical pneumonia which he was treated for. He was found to have elevated trop and BNP on that admission with a few episodes of reported VT which required increased dose of coreg. He underwent LHC 5/22/25 demonstrated MVCAD and decision was made to discharge and let him recover from his pneumonia treated with Augmentin and oral vanco prophyllacticaly due to h/o cdiff. and bring back for CABG. He is now s/p CABGx 2 with Dr Yolanda Justice on 8/6/2.  Postop course c/b unresponsive episode in setting of hypotension and found to have bilateral vertebral and basilar artery stenosis, pneumothorax, and TEJA on CKD requiring dialysis.      Plan:  NEURO:  Hx of insomnia. 8/8 episode of unresponsive episode in setting of hypotension in setting of afib and found to have bilateral vertebral and basilar artery stenosis. CT perfusion showed diffusion hypoperfusion  in posterior fossa and watershed cerebral hemispheres but no core infarct.  Exam improved with permissive HTN. Neuro intact today. CAM negative but agitated with confusion in conversation. Poor sleep despite receiving melatonin and trazodone. Previously OOB to chair, refusing today. Acceptable pain control.  -->  - Serial neuro and pain assessments   - Permissive HTN with -140  - Neurology following, appreciate recs  - PRN tylenol and oxycodone  - PT Consult, OOB to chair as tolerated. Encourage PT  - stop melatonin. Has not been effective as outpatient  - cont trazodone 25mg nightly   - CAM ICU score qshift  - Sleep/wake cycle hygiene   - will need outpatient neuro follow up in 1 month. May transition to plavix monotherapy from a neuro perspective. Stroke team signed off     CV:  CAD, HTN SVT/NSVT, Chronic diastolic HF stage II on admission 5/2025 requiring up titration of coreg. Now status post CABG x2. EF 50-55, NML RV, AI. Baseline -180 per family. Postop afib converted on amio. NSR/SB. A/V epicardial wires cut 8/8 for MRI. -->  - Amiodarone 400 mg PO BID. Will calculate amio load  - holding metoprolol for now to avoid hypotension  - continue midodrine  - Will need structural heart eval in future for TAVR eval, this can be done at discharge and not needed as inpatient unless clinical indication  - continue ASA/plavix daily   - Atorvastatin 80mg nightly  - Zetia 10mg nightly   - Hold home coreg, torsemide and hydralazine     PULM:  Recent pnuemonia completed course of Augmentin outpatient. While neuro status declined 8/8, patient experienced respiratory distress and reintubated. Extubated (8/10). RPL chest tube placed 8/8 for pneumothorax, now resolved and no evidence of pneumo. Stable RA. -->  - Daily CXR  - Wean FiO2 maintaining SpO2 >92%.   - IS q1h and OOB to chair      GI:  H/o cdiff colitis, treated (March 2025). Previously tolerated TF. Passed for modified diet with FEES with SLP. BM  yesterday.  -->  - continue oral diet  - will give nocturnal TF to supplement  - Hold colace/senna BID and miralax BID with recent diarrhea     :  Hx of BPH with painful thornton placement. CSA-TEJA Risk Score med.  Baseline CKD V previous admision Cr ~7 baseline PAT Cr 10. Hyponatremia likely due to hypervolemia. TEJA requiring CVVH and SLE. Now making appropriate urine on bumex. Thornton placed 8/11 for urinary retention. -->  - Continue thornton catheter for strict I/Os for now. Will attempt removal tomorrow  - continue tamsulosin  - Nephrology following. Will continue with bumex 2mg IV BID. Will likely need iHD soon  - Replete electrolytes per CTICU protocol. Holding K     ENDO: DM2 not on home meds,  A1c: 5.4. Improved glycemic control. -->  - Maintain BG <180  - continue SSI q4h  - holding lantus for now with changing oral intake. Will likely need to resume but determining dosing     HEME: Anemia of chronic disease.  Acute on chronic blood loss anemia, stable. -->    - CBC daily  - ASA daily   - Plavix daily   - SQH q8h  - SCDs for DVT prophylaxis  - Last type and screen: 8/10     ID:  Afebrile, no current indications of infection. MRSA negative. Periop cefazolin completed. -->  - Trend temp q4h     Skin: Leg wound noted on admission, photo in chart.   - preventative Mepilex dressings in place on sacrum and heels  - change preventative Mepilex weekly or more frequently as indicated (when moist/soiled)   - every shift skin assessment per nursing and weekly ICU skin rounds  - moisture barrier to be applied with diane care  - active skin problems addressed with nursing on daily rounds     Proph:  SCDs  PPI    Restraints not indicated.     F: Family: updated sister and son at bedside    G:  Line  Right IJ MAC w [place] placed 8/6, dc. Plan to obtain midline   Left brachial a-line placed 8/6, dc  Tohrnton placed 8/11, will dc tomorrow  22g PIV- difficult to place     A,B,C,D,E,F,G: reviewed     I personally spent 55 minutes of  critical care time directly and personally managing the patient exclusive of separately billable procedures.     Amaya Youngblood, APRN-CNP  Dispo: CTICU care for now.    CTICU TEAM PHONE [phone]

## 2025-08-14 ENCOUNTER — APPOINTMENT (OUTPATIENT)
Dept: RADIOLOGY | Facility: HOSPITAL | Age: 60
End: 2025-08-14
Payer: COMMERCIAL

## 2025-08-14 ENCOUNTER — APPOINTMENT (OUTPATIENT)
Dept: CARDIOLOGY | Facility: HOSPITAL | Age: 60
DRG: 235 | End: 2025-08-14
Payer: COMMERCIAL

## 2025-08-14 ASSESSMENT — COGNITIVE AND FUNCTIONAL STATUS - GENERAL
MOVING TO AND FROM BED TO CHAIR: A LOT
TURNING FROM BACK TO SIDE WHILE IN FLAT BAD: A LOT
MOBILITY SCORE: 10
STANDING UP FROM CHAIR USING ARMS: A LOT
WALKING IN HOSPITAL ROOM: TOTAL
MOVING FROM LYING ON BACK TO SITTING ON SIDE OF FLAT BED WITH BEDRAILS: A LOT
CLIMB 3 TO 5 STEPS WITH RAILING: TOTAL

## 2025-08-14 ASSESSMENT — PAIN - FUNCTIONAL ASSESSMENT
PAIN_FUNCTIONAL_ASSESSMENT: 0-10

## 2025-08-14 ASSESSMENT — PAIN SCALES - GENERAL
PAINLEVEL_OUTOF10: 0 - NO PAIN

## 2025-08-14 NOTE — PROGRESS NOTES
Colin Leonard is a 59 y.o. male on day 8 of admission presenting with Atherosclerosis of native coronary artery of native heart without angina pectoris.      Subjective   Making good urine with bumex           Objective        Vitals 24HR  Heart Rate:  [59-65]   Temp:  [36.7 °C (98.1 °F)-37 °C (98.6 °F)]   Resp:  [18-24]   BP: (114-175)/(70-82)   Weight:  [117 kg (258 lb 6.1 oz)]   SpO2:  [90 %-96 %]        Intake/Output last 3 Shifts:    Intake/Output Summary (Last 24 hours) at 8/14/2025 1121  Last data filed at 8/14/2025 0900  Gross per 24 hour   Intake 690 ml   Output 1615 ml   Net -925 ml       Physical Exam  On RA  Comfortable  Median Stenotomy.    Relevant Results  Lab Results   Component Value Date    WBC 14.1 (H) 08/14/2025    HGB 8.8 (L) 08/14/2025    HCT 27.7 (L) 08/14/2025    MCV 94 08/14/2025     08/14/2025     Lab Results   Component Value Date    GLUCOSE 108 (H) 08/14/2025    CALCIUM 8.5 (L) 08/14/2025     (L) 08/14/2025    K 4.8 08/14/2025    CO2 24 08/14/2025    CL 99 08/14/2025    BUN 83 (H) 08/14/2025    CREATININE 7.12 (H) 08/14/2025         Assessment & Plan  Atherosclerosis of native coronary artery of native heart without angina pectoris    ESRD (end stage renal disease) (Multi)    Chronic renal insufficiency    Pneumonia    Coronary artery disease involving native heart, unspecified vessel or lesion type, unspecified whether angina present    Mr. Leonard is a 59M with history of CKD5, CAD s/p CABG on 8/6, and developed post-surgery TEJA on CKD requiring dialysis initiation. Course c/b basilar stroke, No intervention, Neurology following. Nephrology following for Dialysis care.    #CKDV/ESKD  - Cr on admission 9.4  - Etiology of CKD is not clear.  - Received CVVH 8/6-8/7  - SLED 8/7pm  - CVVH resumed 8/9-8/11  - Access: Non (Femoral Dialysis Catheter Removed)    #Anemia  - In part due to CKD    #Possible Stroke 8/7 (Basilar Artery occlusion?), Imaging showed no infarction, no  interventions      RECOMMENDATIONS:  - No indications for RRT on assessment  - Loop diuretics for volume management as indicated  - Iron Profile  - Regular Evaluation for indications for RRT      Babita Edmondson MD  PGY-5 Nephrology Fellow

## 2025-08-14 NOTE — PROGRESS NOTES
Inpatient  Speech-Language Pathology Treatment     Patient Name: Colin Leonard  MRN: [id number]  Today's Date: 8/14/2025  Time Calculation  Start Time: 1130  Stop Time: 1140  Time Calculation (min): 10 min           Assessment/Plan:  #oropharyngeal dysphagia  - Pt briefly awaking and answering basis orientation questions however, attention is brief and eyes closed for most of visit. Reviewed pt's FEES results and diet recs w/ pt's family and reasoning for holding PO given his decreased alertness at this time         Recommendations:  NPO  Has corpak for TF  Frequent, aggressive oral care as tolerated to improve infection control, as well as to reduce dental plaque and bacteria on oropharyngeal surfaces which may increase the risk nosocomial infections, including pneumonia.   OK for small amounts of ice chips (one at a time, 10x/hour) for oral comfort and to prevent swallow disuse atrophy, but only after aggressive oral care to avoid colonization of bacteria within the oral cavity.  SLP will continue to follow for ongoing assessment. RN and team in agreement w/ plan     Plan:  Inpatient/Swing Bed or Outpatient: Inpatient  SLP Frequency: 2x per week  Duration: 2 weeks  SLP Discharge Recommendations: Other (Comment) (TBD)  SLP - OK to Discharge: Yes      Subjective   Pt resting upright in chair. Brother and son present            Objective          Comments:   Caregivers were provided eduction on dysphagia management extensively. They asked appropriate questions and were agreeable to POC.        Encounter Problems       Encounter Problems (Active)       Swallowing       LTG - Patient will tolerate the least restrictive diet without overt difficulty by time of discharge. (Progressing)       Start:  08/11/25    Expected End:  08/26/25            SLP Goal 1 (Progressing)       Start:  08/11/25    Expected End:  08/26/25       STG - Patient will tolerate therapeutic trials of recommended consistency without clinical  signs and symptoms of aspiration on 100% of trials              Swallowing       STG - Patient will complete effortable swallows 30-40 times per session w/ moderately thick liquids       Start:  08/12/25    Expected End:  08/26/25            SLP Goal 2       Start:  08/12/25    Expected End:  08/26/25       Pt will complete laryngeal elevation exercises of at least 10-15 reps w/ min cues                    Inpatient Education:  Extensive education provided to patient and/or Caregiver regarding current swallow function, recommendations/results, and POC. Demonstrated verbal understanding and were agreeable w/ the details/recommendations reviewed.

## 2025-08-14 NOTE — PROGRESS NOTES
CTICU Progress Note  Colin Leonard/[id number]    Admit Date: 8/6/2025  Hospital Length of Stay: 8   ICU Length of Stay: 8d   CT SURGEON:     SUBJECTIVE:   Continued agitation this AM but overall more somnolent. Per nursing, patient was quiet and appeared to be sleeping overnight. Patient perceives he was awake and yelling           Assessment/Plan   Assessment:   Colin Leonard is a 59 y.o. male hx of HTN, CKD stage 5, chronic anemia, type 2 diabetes, hx of c diff colitis who was admitted 5/19-5/24 for shortness of breathe was incidentally found to have atypical pneumonia which he was treated for. He was found to have elevated trop and BNP on that admission with a few episodes of reported VT which required increased dose of coreg. He underwent LHC 5/22/25 demonstrated MVCAD and decision was made to discharge and let him recover from his pneumonia treated with Augmentin and oral vanco prophyllacticaly due to h/o cdiff. and bring back for CABG. He is now s/p CABGx 2 with Dr Yolanda Justice on 8/6/2.  Postop course c/b unresponsive episode in setting of hypotension and found to have bilateral vertebral and basilar artery stenosis, pneumothorax, and TEJA on CKD requiring dialysis.      Plan:  NEURO:  Hx of insomnia. 8/8 episode of unresponsive episode in setting of hypotension in setting of afib and found to have bilateral vertebral and basilar artery stenosis. CTP showed diffusion hypoperfusion in posterior fossa and watershed cerebral hemispheres but no core infarct. Exam improved with permissive HTN. Non focal exam today. Multifactorial delirium/encephalopathy. Answering CAM questions correctly but not completing in full exam and confused in discussion. Less agitated and more somnolent. Inconsistent reports of sleep overnight. OOB to chair today. Acceptable pain control.  -->  - Serial neuro and pain assessments   - Avoid hypotension, maintaining SBP at least >120  - Neurology following, appreciate recs  - PRN tylenol    - stop prn oxycodone  - stop night time trazodone should it be contributing to somnolence  - PT Consult, OOB to chair as tolerated. Encourage PT  - CAM ICU score qshift  - Sleep/wake cycle hygiene   - will need outpatient neuro follow up in 1 month. May transition to plavix monotherapy from a neuro perspective. Stroke team signed off     CV:  CAD, HTN SVT/NSVT, Chronic diastolic HF stage II on admission 5/2025 requiring up titration of coreg. Now status post CABG x2. EF 50-55, NML RV, significant AI per sign out. Baseline -180 per family. Postop afib converted on amio. NSR/SB. Normotensive to hypertensive with midodrine held overnight. A/V epicardial wires cut 8/8 for MRI. -->  - Amiodarone 400 mg PO BID. ~5GM load  - start metop 12.5mg BID. Will not uptitrate to avoid hypotension. Will hold if SBP below neuro goals  - stopping midodrine  - Will need structural heart eval in future for TAVR eval, this can be done at discharge and not needed as inpatient unless clinical indication. Repeating ECHO today  - continue ASA/plavix daily   - Atorvastatin 80mg nightly  - Zetia 10mg nightly   - Hold home coreg, torsemide and hydralazine     PULM:  Recent pnuemonia completed course of Augmentin outpatient. While neuro status declined 8/8, patient experienced respiratory distress and reintubated. Extubated (8/10). RPL chest tube placed 8/8 for pneumothorax, now resolved and no evidence of pneumo. Stable RA. CXR with persisting acute pulmonary edema and bilateral pleural effusion with poor pulmonary toilet.  -->  - Daily CXR  - Wean FiO2 maintaining SpO2 >92%.   - IS q1h and OOB to chair   - Increase diuresis     GI:  H/o cdiff colitis, treated (March 2025).  Tolerated supplemental TF overnight. Passed for modified diet with FEES with SLP but more somnolent today and inappropriate for oral diet. Having regular BMs -->  - holding oral diet for now. Appreciate SLP following  - change nocturnal TF to continuous  - Hold  colace/senna BID and miralax BID with recent diarrhea. Will reschedule if not having BMs     :  Hx of BPH with painful thornton placement. CSA-TEJA Risk Score med.  Baseline CKD V previous admision Cr ~7 baseline PAT Cr 10. Hyponatremia likely due to hypervolemia. TEJA requiring CVVH and SLED, last 8/11. Now making appropriate urine on bumex but still hypervolemic. No urgent indication for HD but creatinine and BUN uptrending. Baseline BUN as outpatient 80-90's without uremia.  Thornton placed 8/11 for urinary retention. -->  - dc thornton today  - continue tamsulosin  - Nephrology following.   - Increase bumex to 2mg q8h to be more negative  - Holding on HD. If mental status worsens, may need to have dialysis.   - Replete electrolytes per CTICU protocol. Holding K     ENDO: DM2 not on home meds,  A1c: 5.4. Acceptable glycemic control but not on continuous TF. -->  - Maintain BG <180  - continue SSI q4h  - will reevaluate lantus dosing if on stable TF      HEME: Anemia of chronic disease.  Acute on chronic blood loss anemia, stable. -->    - CBC daily  - ASA daily   - Plavix daily   - SQH q8h  - SCDs for DVT prophylaxis  - Last type and screen: 8/10     ID: MRSA negative. Periop cefazolin completed. Afebrile but uptrending leukocytosis. Worsening mental status.  -->  - Trend temp q4h  - pan cx  - dc lines  - starting zosyn and sending MRSA cx     Skin: Leg wound noted on admission, photo in chart.   - preventative Mepilex dressings in place on sacrum and heels  - change preventative Mepilex weekly or more frequently as indicated (when moist/soiled)   - every shift skin assessment per nursing and weekly ICU skin rounds  - moisture barrier to be applied with diane care  - active skin problems addressed with nursing on daily rounds      I have discussed the case with the resident/advanced practice provider. I have personally performed a history, physical exam, and my own medical decision making. I have reviewed the note and  agree with the findings and plan with the following exceptions as identified below. I have personally provided 39 minutes of critical care time exclusive of time spent on separately billable procedures. Time includes review of laboratory data, radiology results, discussion with consultants, family and monitoring for potential decompensation. Interventions were performed as documented above.      Family/Surrogate updated with plan of care.  Code status addressed/up to date.

## 2025-08-14 NOTE — PROGRESS NOTES
Physical Therapy    Physical Therapy Treatment    Patient Name: Colin Leonard  MRN: [id number]  Today's Date: 8/14/2025  Room: 15/15  Time Calculation  Start Time: 0910  Stop Time: 0950  Time Calculation (min): 40 min       Assessment/Plan   PT Plan  Treatment/Interventions: Bed mobility, Transfer training, Gait training, Balance training, Neuromuscular re-education, Strengthening, Endurance training, Therapeutic exercise, Therapeutic activity  PT Plan: Ongoing PT  PT Frequency for Current Admission: 5 times per week during this acute inpatient hospitalization  PT Discharge Recommendations: High intensity level of continued care  Equipment Recommended upon Discharge: Wheeled walker  PT Recommended Transfer Status: Assist x2  PT - OK to Discharge: Yes    General Visit Information:   Reason for Referral: CABG x2 8/6. Re-evaluation: found unresponsive 8/8, BAT called. NIHSS 31. Pt reintubated. CTH unremarkable. CTA c/f Basilar artery occulsion vs high grade stenosis. CTP showed diffusion hypoperfusion in posterior fossa and watershed cerebral hemispheres but no core infarct. Angio revealed multifocal vert and basilar stenosis, no occlusion, no intervention done. Extubated 8/10. NIHSS 3 on 8/11  Past Medical History Relevant to Rehab: HTN, CKD stage 5, chronic anemia, type 2 diabetes, hx of c diff colitis who was admitted 5/19-5/24 for shortness of breathe was incidentally found to have atypical pneumonia  Prior to Session Communication: Bedside nurse  Patient Position Received: Bed, 3 rail up, Alarm off, caregiver present  Caregiver Feedback: brother arrived during session, passively supportive   Subjective: Patient is alert, agreeable to PT.  Lethargic throughout session and requiring increased time for all activity.    Precautions:  Precautions  Medical Precautions: Fall precautions, Cardiac precautions  Post-Surgical Precautions: Move in the Tube  Vital Signs:   Date/Time Vitals Session Patient Position Pulse  Resp SpO2 BP MAP (mmHg)    08/14/25 0950 --  --  62  --  95 %  160/82  --     08/14/25 1000 --  --  61  22  95 %  160/82  105     08/14/25 1100 --  --  59  20  96 %  114/71  84        Objective   Pain:  Pain Assessment  0-10 (Numeric) Pain Score: 0 - No pain (post 0)  Cognition:  Cognition  Overall Cognitive Status:  (lethargic)  Orientation Level:  (oriented to person, place, disoriented to martinez)  Lines/Tubes/Drains:  NG/OG/Feeding Tube Small bore feeding tube Right nostril (Active)   Number of days: 5        Continuous Medications/Drips:  Continuous Medications[1]    PT Treatments:  Therapeutic Exercise  Therapeutic Exercise Performed: Yes  Therapeutic Exercise Activity 1: BLE PF, DF, heel slide, knee extension AAROM 10 x 1  Therapeutic Exercise Activity 2: BUE GH flexion 5 x 1  Therapeutic Activity  Therapeutic Activity 1: EOB 10 minutes     Bed Mobility 1  Bed Mobility 1: Supine to sitting  Level of Assistance 1: Moderate assistance  Bed Mobility Comments 1: HOB 30, TAPS, increased time     Transfers  Transfer: Yes  Transfer 1  Transfer From 1: Sit to  Transfer to 1: Stand  Transfer Device 1:  (2 HHA)  Transfer Level of Assistance 1: Maximum assistance  Trials/Comments 1: x3  Transfers 2  Transfer From 2: Stand to  Transfer to 2: Sit  Transfer Device 2:  (2 HHA)  Transfer Level of Assistance 2: Maximum assistance  Trials/Comments 2: x3  Transfers 3  Transfer From 3: Bed to  Transfer to 3: Chair with arms  Transfer Device 3:  (2 HHA)  Transfer Level of Assistance 3: Maximum assistance  Trials/Comments 3: x1             Outcome Measures:  [facility] Basic Mobility  Turning from your back to your side while in a flat bed without using bedrails: A lot  Moving from lying on your back to sitting on the side of a flat bed without using bedrails: A lot  Moving to and from bed to chair (including a wheelchair): A lot  Standing up from a chair using your arms (e.g. wheelchair or bedside chair): A lot  To walk in hospital room:  Total  Climbing 3-5 steps with railing: Total  Basic Mobility - Total Score: 10           FSS-ICU  Ambulation: Unable to attempt due to weakness  Rolling: Moderate assistance (performs 50 - 74% of task)  Sitting: Minimal assistance (performs 75% or more of task)  Transfer Sit-to-Stand: Maximal assistance (performs 25% - 49% of task)  Transfer Supine-to-Sit: Moderate assistance (performs 50 - 74% of task)  Total Score: 12  ICU Mobility Screen  ICU Mobility Scale: Transferring bed to chair             Education Documentation  Precautions, taught by Fermin Nolasco PT at 8/14/2025 11:26 AM.  Learner: Patient  Readiness: Acceptance  Method: Explanation  Response: Needs Reinforcement  Comment: POC review    Body Mechanics, taught by Fermin Nolasco PT at 8/14/2025 11:26 AM.  Learner: Patient  Readiness: Acceptance  Method: Explanation  Response: Needs Reinforcement  Comment: POC review    Mobility Training, taught by Fermin Nolasco PT at 8/14/2025 11:26 AM.  Learner: Patient  Readiness: Acceptance  Method: Explanation  Response: Needs Reinforcement  Comment: POC review    Education Comments  No comments found.          OP EDUCATION:       Encounter Problems       Encounter Problems (Active)       Balance       Pt will demonstrate ability to complete sitting static/dynamic balance activities with bilateral UE support and no LOB for increase in safety up D/C.  (Progressing)       Start:  08/11/25    Expected End:  08/25/25               Mobility       Pt will demonstrate ability to complete ther ex activities to improve functional strength for increase in independence upon DC.  (Progressing)       Start:  08/11/25    Expected End:  08/25/25            Pt will demonstrated ability to complete ~8 forward/backwards steps with proper form, LRAD, minAx2 and no balance deficits for safe DC.   (Progressing)       Start:  08/11/25    Expected End:  08/25/25               PT Transfers       Pt will demonstrated ability to  complete bed mobility and sit<>stand transfers with min assistance x2 and use of assistive device to safely DC. (Progressing)       Start:  08/11/25    Expected End:  08/25/25                   Assessment: Patient is progressing Well with therapy this date.  Patient able to complete multiple bed level there-ex, EOB activity and multiple standing trials.  Continues to have L lateral lean in sitting and standing, improved with positioning.  Requiring increased time for all activity and frequently suctioning self mouth.  Improved alertness and vocalization at end of session while upright.  Patient remains appropriate for High intensity therapy when medically appropriate for discharge from acute stay.  Will continue to follow.      08/14/25 at 11:27 AM   Fermin Nolasco, PT   Rehab Office: [phone]                 [1]

## 2025-08-14 NOTE — PROGRESS NOTES
"CTICU Progress Note  Colin Leonard/[id number]    Admit Date: 8/6/2025  Hospital Length of Stay: 8   ICU Length of Stay: 7d 17h   CT SURGEON:     SUBJECTIVE:   Continued agitation this AM but overall more somnolent. Per nursing, patient was quiet and appeared to be sleeping overnight. Patient perceives he was awake and yelling     MEDICATIONS  Infusions:       Scheduled:  amiodarone, 400 mg, BID  aspirin, 81 mg, Daily  atorvastatin, 80 mg, Nightly  bumetanide, 2 mg, q12h  clopidogrel, 75 mg, Daily  ezetimibe, 10 mg, Nightly  heparin, 5,000 Units, q8h  [Held by provider] insulin glargine, 20 Units, q24h  insulin lispro, 0-10 Units, q4h  lidocaine, 5 mL, Once  [Held by provider] polyethylene glycol, 17 g, Daily  tamsulosin, 0.4 mg, Daily  traZODone, 50 mg, Nightly      PRN:  acetaminophen, 650 mg, q6h PRN  alteplase, 2 mg, PRN  dextrose, 12.5 g, q15 min PRN  dextrose, 25 g, q15 min PRN   Or  glucagon, 1 mg, q15 min PRN  dextrose, 25 g, q15 min PRN  glucagon, 1 mg, q15 min PRN  glucagon, 1 mg, q15 min PRN  heparin flush, 10 Units, PRN  magnesium sulfate, 2 g, q6h PRN  magnesium sulfate, 4 g, q6h PRN  naloxone, 0.2 mg, q5 min PRN  ondansetron, 4 mg, q4h PRN  oxyCODONE, 2.5 mg, q4h PRN  oxyCODONE, 5 mg, q4h PRN  prochlorperazine, 10 mg, q6h PRN   Or  prochlorperazine, 10 mg, q6h PRN   Or  prochlorperazine, 25 mg, q12h PRN  sodium chloride 0.9%, 10 mL, PRN  sodium chloride 0.9%, 10 mL, PRN        PHYSICAL EXAM:   Visit Vitals  /70   Pulse 64   Temp 37 °C (98.6 °F)   Resp 19   Ht 1.778 m (5' 10\")   Wt 117 kg (258 lb 6.1 oz)   SpO2 93%   BMI 37.07 kg/m²   Smoking Status Never   BSA 2.4 m²     Wt Readings from Last 5 Encounters:   08/14/25 117 kg (258 lb 6.1 oz)   08/04/25 116 kg (256 lb 12.8 oz)   07/31/25 116 kg (256 lb 11.2 oz)   07/29/25 115 kg (253 lb 6.4 oz)   07/25/25 117 kg (258 lb 1.6 oz)     INTAKE/OUTPUT:  I/O last 3 completed shifts:  In: 570 (4.9 mL/kg) [NG/GT:570]  Out: 1945 (16.6 mL/kg) [Urine:1945 (0.5 " mL/kg/hr)]  Weight: 117.2 kg      Physical Exam:   - CONSTITUTION: younger appearing male in ICU bed  - Neuro: Oriented x3 and negative on parts of CAM ICU score but will not engage with full score. More somnolent. Followed command in all extremities. CN grossly intact.   - CARDIOVASCULAR: NSR. No epicardial wires  - RESPIRATORY: diminished. Intermittently tachypneic.   - GI: obese, soft non distended, hypoactive BS. Dobhoff in place with TF  - : thornton in place with yellow urine  - EXTREMITIES: warm, well perfused. No edema  - SKIN: cool and dry  - PSYCHIATRIC: somnolent, less agitated    Daily Risk Screen  Intubated: No  Central line: dc  Thornton: dc    Images: All images reviewed     Invasive Hemodynamics:    Most Recent Range Past 24hrs   BP (Art) 147/65 Arterial Line BP 1  Min: 139/65  Max: 150/66   MAP(Art) 93 mmHg Arterial Line MAP 1 (mmHg)   Min: 91 mmHg  Max: 95 mmHg       Assessment/Plan   Assessment:   Colin Leonard is a 59 y.o. male hx of HTN, CKD stage 5, chronic anemia, type 2 diabetes, hx of c diff colitis who was admitted 5/19-5/24 for shortness of breathe was incidentally found to have atypical pneumonia which he was treated for. He was found to have elevated trop and BNP on that admission with a few episodes of reported VT which required increased dose of coreg. He underwent LHC 5/22/25 demonstrated MVCAD and decision was made to discharge and let him recover from his pneumonia treated with Augmentin and oral vanco prophyllacticaly due to h/o cdiff. and bring back for CABG. He is now s/p CABGx 2 with Dr Yolanda Justice on 8/6/2.  Postop course c/b unresponsive episode in setting of hypotension and found to have bilateral vertebral and basilar artery stenosis, pneumothorax, and TEJA on CKD requiring dialysis.      Plan:  NEURO:  Hx of insomnia. 8/8 episode of unresponsive episode in setting of hypotension in setting of afib and found to have bilateral vertebral and basilar artery stenosis. CTP showed  diffusion hypoperfusion in posterior fossa and watershed cerebral hemispheres but no core infarct. Exam improved with permissive HTN. Non focal exam today. Multifactorial delirium/encephalopathy. Answering CAM questions correctly but not completing in full exam and confused in discussion. Less agitated and more somnolent. Inconsistent reports of sleep overnight. OOB to chair today. Acceptable pain control.  -->  - Serial neuro and pain assessments   - Avoid hypotension, maintaining SBP at least >120  - Neurology following, appreciate recs  - PRN tylenol   - stop prn oxycodone  - stop night time trazodone should it be contributing to somnolence  - PT Consult, OOB to chair as tolerated. Encourage PT  - CAM ICU score qshift  - Sleep/wake cycle hygiene   - will need outpatient neuro follow up in 1 month. May transition to plavix monotherapy from a neuro perspective. Stroke team signed off     CV:  CAD, HTN SVT/NSVT, Chronic diastolic HF stage II on admission 5/2025 requiring up titration of coreg. Now status post CABG x2. EF 50-55, NML RV, significant AI/AS per sign out. Baseline -180 per family. Postop afib converted on amio. NSR/SB. Normotensive to hypertensive with midodrine held overnight. A/V epicardial wires cut 8/8 for MRI. -->  - Amiodarone 400 mg PO BID. ~5GM load  - start metop 12.5mg BID. Will not uptitrate to avoid hypotension. Will hold if SBP below neuro goals  - stopping midodrine  - Will need structural heart eval in future for TAVR eval, this can be done at discharge and not needed as inpatient unless clinical indication. Repeating ECHO today  - continue ASA/plavix daily   - Atorvastatin 80mg nightly  - Zetia 10mg nightly   - Hold home coreg, torsemide and hydralazine     PULM:  Recent pnuemonia completed course of Augmentin outpatient. While neuro status declined 8/8, patient experienced respiratory distress and reintubated. Extubated (8/10). RPL chest tube placed 8/8 for pneumothorax, now  resolved and no evidence of pneumo. Stable RA. CXR with persisting acute pulmonary edema and bilateral pleural effusion with poor pulmonary toilet.  -->  - Daily CXR  - Wean FiO2 maintaining SpO2 >92%.   - IS q1h and OOB to chair   - Increase diuresis     GI:  H/o cdiff colitis, treated (March 2025).  Tolerated supplemental TF overnight. Passed for modified diet with FEES with SLP but more somnolent today and inappropriate for oral diet. Having regular BMs -->  - holding oral diet for now. Appreciate SLP following  - change nocturnal TF to continuous  - Hold colace/senna BID and miralax BID with recent diarrhea. Will reschedule if not having BMs     :  Hx of BPH with painful thornton placement. CSA-TEJA Risk Score med.  Baseline CKD V previous admision Cr ~7 baseline PAT Cr 10. Hyponatremia likely due to hypervolemia. TEJA requiring CVVH and SLED, last 8/11. Now making appropriate urine on bumex but still hypervolemic. No urgent indication for HD but creatinine and BUN uptrending. Baseline BUN as outpatient 80-90's without uremia.  Thornton placed 8/11 for urinary retention. -->  - dc thornton today  - continue tamsulosin  - Nephrology following.   - Increase bumex to 2mg q8h to be more negative  - Holding on HD. If mental status worsens, may need to have dialysis.   - Replete electrolytes per CTICU protocol. Holding K     ENDO: DM2 not on home meds,  A1c: 5.4. Acceptable glycemic control but not on continuous TF. -->  - Maintain BG <180  - continue SSI q4h  - will reevaluate lantus dosing if on stable TF      HEME: Anemia of chronic disease.  Acute on chronic blood loss anemia, stable. -->    - CBC daily  - ASA daily   - Plavix daily   - SQH q8h  - SCDs for DVT prophylaxis  - Last type and screen: 8/10     ID: MRSA negative. Periop cefazolin completed. Afebrile but uptrending leukocytosis. Worsening mental status.  -->  - Trend temp q4h  - pan cx  - dc lines  - starting zosyn and sending MRSA cx     Skin: Leg wound noted on  admission, photo in chart.   - preventative Mepilex dressings in place on sacrum and heels  - change preventative Mepilex weekly or more frequently as indicated (when moist/soiled)   - every shift skin assessment per nursing and weekly ICU skin rounds  - moisture barrier to be applied with diane care  - active skin problems addressed with nursing on daily rounds     Proph:  SCDs  PPI  SQH    Restraints not indicated.     F: Family: updated sister and son at bedside    G:  Line  Right IJ MAC w [place] placed 8/6, dc.   [place] placed 8/11, dc  22g PIV  16g PIV     A,B,C,D,E,F,G: reviewed     I personally spent 55 minutes of critical care time directly and personally managing the patient exclusive of separately billable procedures.     Amaya Youngblood, APRN-CNP  Dispo: CTICU care for now.    CTICU TEAM PHONE [phone]

## 2025-08-15 ENCOUNTER — APPOINTMENT (OUTPATIENT)
Dept: RADIOLOGY | Facility: HOSPITAL | Age: 60
DRG: 235 | End: 2025-08-15
Payer: COMMERCIAL

## 2025-08-15 ENCOUNTER — APPOINTMENT (OUTPATIENT)
Dept: RADIOLOGY | Facility: HOSPITAL | Age: 60
End: 2025-08-15
Payer: COMMERCIAL

## 2025-08-15 ASSESSMENT — PAIN - FUNCTIONAL ASSESSMENT
PAIN_FUNCTIONAL_ASSESSMENT: 0-10

## 2025-08-15 ASSESSMENT — PAIN SCALES - GENERAL
PAINLEVEL_OUTOF10: 0 - NO PAIN
PAINLEVEL_OUTOF10: 1
PAINLEVEL_OUTOF10: 3
PAINLEVEL_OUTOF10: 0 - NO PAIN

## 2025-08-15 ASSESSMENT — COGNITIVE AND FUNCTIONAL STATUS - GENERAL
TURNING FROM BACK TO SIDE WHILE IN FLAT BAD: A LOT
CLIMB 3 TO 5 STEPS WITH RAILING: TOTAL
MOVING TO AND FROM BED TO CHAIR: A LOT
MOBILITY SCORE: 10
STANDING UP FROM CHAIR USING ARMS: A LOT
MOVING FROM LYING ON BACK TO SITTING ON SIDE OF FLAT BED WITH BEDRAILS: A LOT
WALKING IN HOSPITAL ROOM: TOTAL

## 2025-08-15 ASSESSMENT — PAIN DESCRIPTION - LOCATION: LOCATION: BACK

## 2025-08-15 ASSESSMENT — PAIN DESCRIPTION - ORIENTATION: ORIENTATION: LOWER

## 2025-08-15 ASSESSMENT — PAIN DESCRIPTION - DESCRIPTORS: DESCRIPTORS: ACHING

## 2025-08-15 NOTE — CARE PLAN
The patient's goals for the shift include: Rest    The clinical goals for the shift include:     Problem: Safety - Adult  Goal: Free from fall injury  Outcome: Progressing     Problem: Chronic Conditions and Co-morbidities  Goal: Patient's chronic conditions and co-morbidity symptoms are monitored and maintained or improved  Outcome: Progressing     Problem: Nutrition  Goal: Nutrient intake appropriate for maintaining nutritional needs  Outcome: Progressing     Problem: General Stroke  Goal: Establish a mutual long term goal with patient by discharge  Outcome: Progressing  Goal: Demonstrate improvement in neurological exam throughout the shift  Outcome: Progressing  Goal: Maintain BP within ordered limits throughout shift  Outcome: Progressing  Goal: Participate in treatment (ie., meds, therapy) throughout shift  Outcome: Progressing  Goal: No symptoms of aspiration throughout shift  Outcome: Progressing  Goal: No symptoms of hemorrhage throughout shift  Outcome: Progressing  Goal: Tolerate enteral feeding throughout shift  Outcome: Progressing  Goal: Decreased nausea/vomiting throughout shift  Outcome: Progressing  Goal: Controlled blood glucose throughout shift  Outcome: Progressing  Goal: Out of bed three times today  Outcome: Progressing

## 2025-08-15 NOTE — PROGRESS NOTES
Physical Therapy    Physical Therapy Treatment    Patient Name: Colin Leonard  MRN: [id number]  Today's Date: 8/15/2025  Room: 15/15A  Time Calculation  Start Time: 1010  Stop Time: 1050  Time Calculation (min): 40 min       Assessment/Plan   PT Plan  Treatment/Interventions: Bed mobility, Transfer training, Gait training, Balance training, Neuromuscular re-education, Strengthening, Endurance training, Therapeutic exercise, Therapeutic activity  PT Plan: Ongoing PT  PT Frequency for Current Admission: 5 times per week during this acute inpatient hospitalization  PT Discharge Recommendations: High intensity level of continued care  Equipment Recommended upon Discharge: Wheeled walker  PT Recommended Transfer Status: Assist x2  PT - OK to Discharge: Yes    General Visit Information:   Reason for Referral: CABG x2 8/6. Re-evaluation: found unresponsive 8/8, BAT called. NIHSS 31. Pt reintubated. CTH unremarkable. CTA c/f Basilar artery occulsion vs high grade stenosis. CTP showed diffusion hypoperfusion in posterior fossa and watershed cerebral hemispheres but no core infarct. Angio revealed multifocal vert and basilar stenosis, no occlusion, no intervention done. Extubated 8/10. NIHSS 3 on 8/11  Past Medical History Relevant to Rehab: HTN, CKD stage 5, chronic anemia, type 2 diabetes, hx of c diff colitis who was admitted 5/19-5/24 for shortness of breathe was incidentally found to have atypical pneumonia  Prior to Session Communication: Bedside nurse  Patient Position Received: Bed, 3 rail up, Alarm off, caregiver present  Family/Caregiver Present: Yes  Caregiver Feedback: sister   Subjective: Patient is alert, lethargic agreeable to PT.    Precautions:  Precautions  Medical Precautions: Fall precautions, Cardiac precautions  Post-Surgical Precautions: Move in the Tube  Vital Signs:   Date/Time Vitals Session Patient Position Pulse Resp SpO2 BP MAP (mmHg)    08/15/25 1000 --  --  62  20  94 %  146/68  90      08/15/25 1010 --  --  60  --  95 %  146/68  --     08/15/25 1050 --  --  66  95  --  110/62  --     08/15/25 1100 --  --  61  20  94 %  103/62  75        Objective   Pain:  Pain Assessment  0-10 (Numeric) Pain Score: 0 - No pain (post 0, in no visible distress, minimally conversave)  Cognition:  Cognition  Overall Cognitive Status: Impaired  Orientation Level:  (Oriented to person, place, disoriented to date  and  birthday initially)  Lines/Tubes/Drains:  Urethral Catheter Double-lumen (Active)   Number of days: 0       NG/OG/Feeding Tube Small bore feeding tube Right nostril (Active)   Number of days: 6       External Urinary Catheter (Active)   Number of days: 1        Continuous Medications/Drips:  Continuous Medications[1]    PT Treatments:  Therapeutic Exercise  Therapeutic Exercise Activity 1: BLE PF, DF, heel slide, knee extension AAROM 10 x 1  Therapeutic Exercise Activity 2: BUE GH flexion 5 x 1  Therapeutic Activity  Therapeutic Activity 1: EOB 15 minutes CGA  Therapeutic Activity 2: Static stand 20s x 2     Bed Mobility 1  Bed Mobility 1: Supine to sitting  Level of Assistance 1: Moderate assistance  Bed Mobility Comments 1: HOB 30, TAPS     Transfer 1  Transfer From 1: Sit to  Transfer to 1: Stand  Transfer Device 1:  (2 HHA)  Transfer Level of Assistance 1: Moderate assistance  Trials/Comments 1: x3  Transfers 2  Transfer From 2: Stand to  Transfer to 2: Sit  Transfer Device 2:  (2 HHA)  Transfer Level of Assistance 2: Moderate assistance  Trials/Comments 2: x3  Transfers 3  Transfer From 3: Bed to  Transfer to 3: Chair with arms  Technique 3:  (stand step)  Transfer Device 3:  (2 HHA)  Transfer Level of Assistance 3: Moderate assistance  Trials/Comments 3: x1             Outcome Measures:  [facility] Basic Mobility  Turning from your back to your side while in a flat bed without using bedrails: A lot  Moving from lying on your back to sitting on the side of a flat bed without using bedrails: A lot  Moving  to and from bed to chair (including a wheelchair): A lot  Standing up from a chair using your arms (e.g. wheelchair or bedside chair): A lot  To walk in hospital room: Total  Climbing 3-5 steps with railing: Total  Basic Mobility - Total Score: 10           FSS-ICU  Ambulation: Unable to attempt due to weakness  Rolling: Moderate assistance (performs 50 - 74% of task)  Sitting: Minimal assistance (performs 75% or more of task)  Transfer Sit-to-Stand: Maximal assistance (performs 25% - 49% of task)  Transfer Supine-to-Sit: Moderate assistance (performs 50 - 74% of task)  Total Score: 12  ICU Mobility Screen  ICU Mobility Scale: Transferring bed to chair             Education Documentation  Precautions, taught by Fermin Nolasco PT at 8/15/2025 11:22 AM.  Learner: Patient  Readiness: Acceptance  Method: Explanation  Response: Needs Reinforcement  Comment: POC review    Body Mechanics, taught by Fermin Nolasco PT at 8/15/2025 11:22 AM.  Learner: Patient  Readiness: Acceptance  Method: Explanation  Response: Needs Reinforcement  Comment: POC review    Mobility Training, taught by Fermin Nolasco PT at 8/15/2025 11:22 AM.  Learner: Patient  Readiness: Acceptance  Method: Explanation  Response: Needs Reinforcement  Comment: POC review    Education Comments  No comments found.          OP EDUCATION:       Encounter Problems       Encounter Problems (Active)       Balance       Pt will demonstrate ability to complete sitting static/dynamic balance activities with bilateral UE support and no LOB for increase in safety up D/C.  (Progressing)       Start:  08/11/25    Expected End:  08/25/25               Mobility       Pt will demonstrate ability to complete ther ex activities to improve functional strength for increase in independence upon DC.  (Progressing)       Start:  08/11/25    Expected End:  08/25/25            Pt will demonstrated ability to complete ~8 forward/backwards steps with proper form, LRAD, minAx2  and no balance deficits for safe DC.   (Progressing)       Start:  08/11/25    Expected End:  08/25/25               PT Transfers       Pt will demonstrated ability to complete bed mobility and sit<>stand transfers with min assistance x2 and use of assistive device to safely DC. (Progressing)       Start:  08/11/25    Expected End:  08/25/25                   Assessment: Patient is progressing Well with therapy this date.  Patient able to complete multiple bed level there-ex, standing, and transfer trials.  Continues to be lethargic and difficulty with maintaining eyes open.  Able to complete multiple stands  with improved stand step on transfer.  Limited verbalizations throughout activity with intermittent gestures.  Patient remains appropriate for HIGH intensity therapy when medically appropriate for discharge from acute stay.  Will continue to follow.      08/15/25 at 11:23 AM   Fermin Nolasco, PT   Rehab Office: [phone]                 [1]

## 2025-08-15 NOTE — PROGRESS NOTES
CT SURGERY  08/06 CABGx 2with Dr Yolanda Justice     Postoperative issues have included encephalopathy/tia related to bilateral vertebral/basilar artery atherosclerotic disease with high grade stenosis - improved with permissive hypertension, TEJA requiring CRRT, atrial fibrillation, generalized weakness.     DC PLAN  AR: [facility]    * precert needed    PAYOR   [facility]    COMPLETED  (X) 08/11 - AR list provided to patient with son bedside. Obtained FOC for [facility].       Kinjal Moran (LSW, MSW)

## 2025-08-15 NOTE — PROGRESS NOTES
"CTICU Progress Note  Colin Leonard/[id number]    Admit Date: 8/6/2025  Hospital Length of Stay: 9   ICU Length of Stay: 8d 17h   CT SURGEON:     SUBJECTIVE:   Did not sleep well overnight.  Difficult to get to cooperate this morning with answering questions and participating in assessments. With encouragement he was able to answer questions Aox4, CAM negative.     MEDICATIONS  Infusions:       Scheduled:  amiodarone, 400 mg, BID  aspirin, 81 mg, Daily  atorvastatin, 80 mg, Nightly  bumetanide, 2 mg, q8h  clopidogrel, 75 mg, Daily  ezetimibe, 10 mg, Nightly  heparin, 5,000 Units, q8h  insulin lispro, 0-10 Units, q4h  metoprolol tartrate, 12.5 mg, BID  perflutren lipid microspheres, 0.5-10 mL of dilution, Once in imaging  piperacillin-tazobactam, 2.25 g, q8h  sodium chloride, 1 spray, 4x daily  sulfur hexafluoride microsphr, 2 mL, Once in imaging  tamsulosin, 0.4 mg, Daily      PRN:  acetaminophen, 650 mg, q6h PRN  alteplase, 2 mg, PRN  dextrose, 12.5 g, q15 min PRN  dextrose, 25 g, q15 min PRN   Or  glucagon, 1 mg, q15 min PRN  dextrose, 25 g, q15 min PRN  glucagon, 1 mg, q15 min PRN  glucagon, 1 mg, q15 min PRN  heparin flush, 10 Units, PRN  magnesium sulfate, 2 g, q6h PRN  magnesium sulfate, 4 g, q6h PRN  naloxone, 0.2 mg, q5 min PRN  ondansetron, 4 mg, q4h PRN        PHYSICAL EXAM:   Visit Vitals  /72   Pulse 63   Temp 36.2 °C (97.2 °F) (Axillary)   Resp 18   Ht 1.778 m (5' 10\")   Wt 120 kg (263 lb 10.7 oz)   SpO2 93%   BMI 37.83 kg/m²   Smoking Status Never   BSA 2.43 m²     Wt Readings from Last 5 Encounters:   08/15/25 120 kg (263 lb 10.7 oz)   08/04/25 116 kg (256 lb 12.8 oz)   07/31/25 116 kg (256 lb 11.2 oz)   07/29/25 115 kg (253 lb 6.4 oz)   07/25/25 117 kg (258 lb 1.6 oz)     INTAKE/OUTPUT:  I/O last 3 completed shifts:  In: 1705 (14.3 mL/kg) [P.O.:100; NG/GT:1455; IV Piggyback:150]  Out: 1440 (12 mL/kg) [Urine:1440 (0.3 mL/kg/hr)]  Weight: 119.6 kg      Physical Exam:   - CONSTITUTION: younger " appearing male in ICU bed  - Neuro: Oriented x3 and negative on parts of CAM ICU score but will not engage with full score. More somnolent. Followed command in all extremities. CN grossly intact.   - CARDIOVASCULAR: NSR. No epicardial wires  - RESPIRATORY: diminished. Intermittently tachypneic.   - GI: obese, soft non distended, hypoactive BS. Dobhoff in place with TF  - : thornton in place with yellow urine  - EXTREMITIES: warm, well perfused. No edema  - SKIN: cool and dry  - PSYCHIATRIC: somnolent, less agitated    Daily Risk Screen  Intubated: No  Central line: no  Thornton: no    Images:   8/15  Acute cardiogenic pulmonary edema. Bilateral pleural effusions. No pneumo  Invasive Hemodynamics:    Most Recent Range Past 24hrs   BP (Art) 147/65 No data recorded   MAP(Art) 93 mmHg No data recorded       Assessment/Plan   Assessment:   Colin Leonard is a 59 y.o. male hx of HTN, CKD stage 5, chronic anemia, type 2 diabetes, hx of c diff colitis who was admitted 5/19-5/24 for shortness of breathe was incidentally found to have atypical pneumonia which he was treated for. He was found to have elevated trop and BNP on that admission with a few episodes of reported VT which required increased dose of coreg. He underwent LHC 5/22/25 demonstrated MVCAD and decision was made to discharge and let him recover from his pneumonia treated with Augmentin and oral vanco prophyllacticaly due to h/o cdiff. and bring back for CABG. He is now s/p CABGx 2 with Dr Yolanda Justice on 8/6/2.  Postop course c/b unresponsive episode in setting of hypotension and found to have bilateral vertebral and basilar artery stenosis, pneumothorax, and TEJA on CKD requiring dialysis.      Plan:  NEURO:  Hx of insomnia. 8/8 episode of unresponsive episode in setting of hypotension in setting of afib and found to have bilateral vertebral and basilar artery stenosis. CTP showed diffusion hypoperfusion in posterior fossa and watershed cerebral hemispheres but no  core infarct. Exam improved with permissive HTN. Non focal exam today. Multifactorial delirium/encephalopathy. Answering CAM questions correctly but not completing in full exam and confused in discussion. Less agitated and more somnolent. Inconsistent reports of sleep overnight. OOB to chair today. Acceptable pain control.  -->  - Serial neuro and pain assessments   - Avoid hypotension, maintaining SBP at least >120  - PRN tylenol   - Restart trazadone 25mg nightly  - PT Consult, OOB to chair as tolerated. Encourage PT  - Send ammonia, vitamin b12, folate, TSH to rule out metabolic causes of confusion.   - CAM ICU score qshift  - Sleep/wake cycle hygiene   - will need outpatient neuro follow up in 1 month. May transition to plavix monotherapy from a neuro perspective. Stroke team signed off     CV:  CAD, HTN SVT/NSVT, Chronic diastolic HF stage II on admission 5/2025 requiring up titration of coreg. Now status post CABG x2. EF 50-55, NML RV, significant AI/AS per sign out. Baseline -180 per family. Postop afib converted on amio. NSR/SB. Normotensive to hypertensive with midodrine held overnight. A/V epicardial wires cut 8/8 for MRI. -->  - Amiodarone 400 mg PO BID. ~5GM load  - Continue metop 12.5mg BID. Will not uptitrate to avoid hypotension. Will hold if SBP below neuro goals  - stopping midodrine  - Will need structural heart eval in future for TAVR eval, this can be done at discharge and not needed as inpatient unless clinical indication.   - continue ASA/plavix daily   - Atorvastatin 80mg nightly  - Zetia 10mg nightly   - Hold home coreg, torsemide and hydralazine     PULM:  Recent pnuemonia completed course of Augmentin outpatient. While neuro status declined 8/8, patient experienced respiratory distress and reintubated. Extubated (8/10). RPL chest tube placed 8/8 for pneumothorax, now resolved and no evidence of pneumo. Stable RA. CXR with persisting acute pulmonary edema and bilateral pleural  effusion with poor pulmonary toilet.  -->  - Daily CXR  - Wean FiO2 maintaining SpO2 >92%.   - IS q1h and OOB to chair   - Continue diuresis  - Bronchial hygiene      GI:  H/o cdiff colitis, treated (March 2025).  Tolerated supplemental TF overnight. Passed for modified diet with FEES with SLP but was somnolent yesterday with frequent coughing and concerns for aspiration. Having regular BMs -->  - holding oral diet for now. Appreciate SLP following  - change nocturnal TF to continuous  - Hold colace/senna BID and miralax BID with recent diarrhea. Will reschedule if not having BMs     :  Hx of BPH with painful tohrnton placement. CSA-TEJA Risk Score med.  Baseline CKD V previous admision Cr ~7 baseline PAT Cr 10. Hyponatremia likely due to hypervolemia. TEJA requiring CVVH and SLED, last 8/11. Now making appropriate urine on bumex but still hypervolemic. No urgent indication for HD but creatinine and BUN uptrending. Baseline BUN as outpatient 80-90's without uremia.  Thornton placed 8/11 for urinary retention. -->  - replace thornton for incontinence and concern for skin breakdown  - continue tamsulosin  - Nephrology following-Plan for IHD Monday. Plan for tunneled dialysis line Monday morning.    - Continue bumex to 2mg q8h to be more negative unless neprhology says otherwiese  - Replete electrolytes per CTICU protocol. Holding K     ENDO: DM2 not on home meds,  A1c: 5.4. Acceptable glycemic control but not on continuous TF. -->  - Maintain BG <180  - continue SSI q4h     HEME: Anemia of chronic disease.  Acute on chronic blood loss anemia, stable. -->    - CBC daily  - ASA daily   - Plavix daily   - SQH q8h  - SCDs for DVT prophylaxis  - Last type and screen: 8/14     ID: MRSA negative. Periop cefazolin completed. Afebrile but uptrending leukocytosis. Worsening mental status.  -->  - Trend temp q4h  - pan cx-follow up  - Continue zosyn and sending MRSA cx     Skin: Leg wound noted on admission, photo in chart.   -  preventative Mepilex dressings in place on sacrum and heels  - change preventative Mepilex weekly or more frequently as indicated (when moist/soiled)   - every shift skin assessment per nursing and weekly ICU skin rounds  - moisture barrier to be applied with diane care  - active skin problems addressed with nursing on daily rounds     Proph:  SCDs  PPI  SQH    Restraints not indicated.     F: Family: updated sister and son at bedside    G:  Line  Right IJ MAC w [place] placed 8/6, dc.   [place] placed 8/11, dc  22g PIV  16g PIV     A,B,C,D,E,F,G: reviewed     I personally spent 45 minutes of critical care time directly and personally managing the patient exclusive of separately billable procedures.     Magdalena Rivera, APRN-CNP  Dispo: CTICU care for now.    CTICU TEAM PHONE [phone]

## 2025-08-15 NOTE — PROGRESS NOTES
Colin Leonard is a 59 y.o. male on day 9 of admission presenting with Atherosclerosis of native coronary artery of native heart without angina pectoris.      Subjective   Good urine output with poor clearance            Objective        Vitals 24HR  Heart Rate:  [56-72]   Temp:  [36 °C (96.8 °F)-37.1 °C (98.8 °F)]   Resp:  [16-23]   BP: (114-187)/()   Weight:  [120 kg (263 lb 10.7 oz)]   SpO2:  [90 %-97 %]        Intake/Output last 3 Shifts:    Intake/Output Summary (Last 24 hours) at 8/15/2025 1048  Last data filed at 8/15/2025 1000  Gross per 24 hour   Intake 1105 ml   Output 1050 ml   Net 55 ml       Physical Exam  On RA  Comfortable  Sleepy  Median Stenotomy.  Symmetrical chest expansion  No JVD  No significant peripheral edema    Relevant Results  Lab Results   Component Value Date    WBC 13.8 (H) 08/15/2025    HGB 9.0 (L) 08/15/2025    HCT 28.0 (L) 08/15/2025    MCV 94 08/15/2025     08/15/2025     Lab Results   Component Value Date    GLUCOSE 148 (H) 08/15/2025    CALCIUM 8.8 08/15/2025     (L) 08/15/2025    K 5.0 08/15/2025    CO2 25 08/15/2025    CL 97 (L) 08/15/2025     (HH) 08/15/2025    CREATININE 8.60 (H) 08/15/2025         Assessment & Plan  Atherosclerosis of native coronary artery of native heart without angina pectoris    ESRD (end stage renal disease) (Multi)    Chronic renal insufficiency    Pneumonia    Coronary artery disease involving native heart, unspecified vessel or lesion type, unspecified whether angina present    Mr. Leonard is a 59M with history of CKD5, CAD s/p CABG on 8/6, and developed post-surgery TEJA on CKD requiring dialysis initiation. Course c/b basilar stroke, No intervention, Neurology following. Nephrology following for Dialysis care.    #CKDV/ESKD  - Cr on admission 9.4  - Etiology of CKD is not clear.  - Received CVVH 8/6-8/7  - SLED 8/7pm  - CVVH resumed 8/9-8/11  - Access: Non (Femoral Dialysis Catheter Removed)    #Anemia  - In part due to  CKD    #Possible Stroke 8/7 (Basilar Artery occlusion?), Imaging showed no infarction, no interventions      RECOMMENDATIONS:  - No indications for RRT on assessment  - Loop diuretics for volume management as indicated  - Arrange for Tunneled Dialysis Catheter placement  - Regular assessment for Indications for RRT  - Iron Profile  - Regular Evaluation for indications for RRT      Babita Edmondson MD  PGY-5 Nephrology Fellow

## 2025-08-15 NOTE — PROGRESS NOTES
"Colin Leonard is a 59 y.o. male on day 9 of admission presenting with Atherosclerosis of native coronary artery of native heart without angina pectoris.    Subjective   Did not sleep well overnight.        Objective     Physical Exam    Last Recorded Vitals  Blood pressure 139/64, pulse 57, temperature 36 °C (96.8 °F), temperature source Temporal, resp. rate 19, height 1.778 m (5' 10\"), weight 120 kg (263 lb 10.7 oz), SpO2 94%.  Intake/Output last 3 Shifts:  I/O last 3 completed shifts:  In: 1705 (14.3 mL/kg) [P.O.:100; NG/GT:1455; IV Piggyback:150]  Out: 1440 (12 mL/kg) [Urine:1440 (0.3 mL/kg/hr)]  Weight: 119.6 kg     Relevant Results               This patient has a urinary catheter   Reason for the urinary catheter remaining today? incontinent patients with an open sacral wound or perineal wounds     Assessment & Plan  Atherosclerosis of native coronary artery of native heart without angina pectoris    ESRD (end stage renal disease) (Multi)    Chronic renal insufficiency    Pneumonia    Coronary artery disease involving native heart, unspecified vessel or lesion type, unspecified whether angina present    60 y/o male history of HTN, CKD5, DM2, C. Difficile, recent pneumonia who underwent OPCABGx2 (LIMA-LAD, SVG-OM) on 8/6/25.       Postoperative issues have included encephalopathy/tia related to bilateral vertebral/basilar artery atherosclerotic disease with high grade stenosis - improved with permissive hypertension, TEJA requiring CRRT, atrial fibrillation, generalized weakness.     1) S/P OPCABX2  2) Multifocal vertebral and basilar artery stenosis  3) Encephalopathy/Coma (resolved)  4) TEJA on CKD5. Holding sodium bicarbonate tabs- continue to monitor trend. Nephrology following. No indication for dialysis today.  5) Acute blood loss anemia  6) Atrial fibrillation  7) DM2  8) Hx of PNA  9) Hx of C diff     BP acceptable today.  Holding dialysis per nephrology - bumex IV today, making adequate urine. " Nephrology requesting tunneled HD line placement. IR consult.   Heparin, plavix, aspirin     Due to the high probability of life threatening clinical decompensation, the patient required critical care time evaluating and managing this patient.  Critical care time included obtaining a history, examining the patient, ordering and reviewing studies, discussing, developing, and implementing a management plan, evaluating the patient's response to treatment, and discussion with other care team providers. I saw and evaluated the patient myself. I reviewed the provider's documentation and discussed the patient with the provider. Critical care time was performed exclusive of billable procedures.     Critical care time: 42 minutes      I spent 42 minutes in the professional and overall care of this patient.      Sydnie Romero MD

## 2025-08-15 NOTE — PROGRESS NOTES
Inpatient  Speech-Language Pathology Treatment     Patient Name: Colin Leonard  MRN: [id number]  Today's Date: 8/15/2025  Time Calculation  Start Time: 1135  Stop Time: 1149  Time Calculation (min): 14 min           Assessment/Plan:  #oropharyngeal dysphagia  - Pt tolerating therapeutic trials of safe consistencies, however, mentation remained inconsistent and pt becoming frustrated w/ encouragement to participate. Reviewed pt's FEES results and diet recs w/ pt'ssister and reasoning for holding PO given his decreased alertness at this time and underlying swallow impairments.          Recommendations:  NPO. Has corpak for TF  Frequent, aggressive oral care as tolerated to improve infection control, as well as to reduce dental plaque and bacteria on oropharyngeal surfaces which may increase the risk nosocomial infections, including pneumonia.   OK for small amounts of ice chips (one at a time, 10x/hour) for oral comfort and to prevent swallow disuse atrophy, but only after aggressive oral care to avoid colonization of bacteria within the oral cavity.  Will continue per POC    Plan:  Inpatient/Swing Bed or Outpatient: Inpatient  SLP TX Plan: Continue Plan of Care  SLP Plan: Skilled SLP  SLP Frequency: 2x per week  Duration: 2 weeks  SLP Discharge Recommendations: Other (Comment) (TBD)  SLP - OK to Discharge: Yes      Subjective   Pt sitting in chair. Sister at bedside  Cortrak  in nares   Prior to Session Communication: Bedside nurse        Objective       Therapeutic Swallow:  Ice chips x 5  Mod thick x straw x 1-2 ounces  Pt sitting upright in chair but remained taciturn during tx. Often not answering questions or becoming frustrated/agitated w/ any questions posed.   Orally pt demonstrated adequate appearing bolus prep, anterior containment, and timely appearing swallow onset. No overt s/s of aspiration were noted however, pt alertness and participation waxed.    Encounter Problems       Encounter Problems  (Active)       Swallowing       LTG - Patient will tolerate the least restrictive diet without overt difficulty by time of discharge. (Progressing)       Start:  08/11/25    Expected End:  08/26/25            SLP Goal 1 (Progressing)       Start:  08/11/25    Expected End:  08/26/25       STG - Patient will tolerate therapeutic trials of recommended consistency without clinical signs and symptoms of aspiration on 100% of trials              Swallowing       STG - Patient will complete effortable swallows 30-40 times per session w/ moderately thick liquids (Not Progressing)       Start:  08/12/25    Expected End:  08/26/25            SLP Goal 2 (Not Progressing)       Start:  08/12/25    Expected End:  08/26/25       Pt will complete laryngeal elevation exercises of at least 10-15 reps w/ min cues                      Inpatient Education:  Extensive education provided to patient and/or Caregiver regarding current swallow function, recommendations/results, and POC. Demonstrated verbal understanding and were agreeable w/ the details/recommendations reviewed.

## 2025-08-16 ENCOUNTER — APPOINTMENT (OUTPATIENT)
Dept: RADIOLOGY | Facility: HOSPITAL | Age: 60
DRG: 235 | End: 2025-08-16
Payer: COMMERCIAL

## 2025-08-16 ASSESSMENT — PAIN SCALES - GENERAL
PAINLEVEL_OUTOF10: 0 - NO PAIN

## 2025-08-16 ASSESSMENT — PAIN - FUNCTIONAL ASSESSMENT
PAIN_FUNCTIONAL_ASSESSMENT: 0-10

## 2025-08-16 NOTE — PROGRESS NOTES
"CTICU Progress Note  Colin NILSA Wongjoni/[id number]    Admit Date: 8/6/2025  Hospital Length of Stay: 10   ICU Length of Stay: 9d 18h   CT SURGEON:     SUBJECTIVE:   Aox3, following commands, neuro intact, cranial nerve with no deficits, complains of nausea     MEDICATIONS  Infusions:       Scheduled:  amiodarone, 400 mg, BID  aspirin, 81 mg, Daily  atorvastatin, 80 mg, Nightly  bumetanide, 2 mg, q8h  clopidogrel, 75 mg, Daily  ezetimibe, 10 mg, Nightly  heparin, 5,000 Units, q8h  insulin lispro, 0-10 Units, q4h  metoprolol tartrate, 12.5 mg, BID  perflutren lipid microspheres, 0.5-10 mL of dilution, Once in imaging  piperacillin-tazobactam, 2.25 g, q8h  sodium chloride, 1 spray, 4x daily  sulfur hexafluoride microsphr, 2 mL, Once in imaging  tamsulosin, 0.4 mg, Daily  thiamine, 500 mg, TID   Followed by  [START ON 8/18/2025] thiamine, 100 mg, Daily  traZODone, 25 mg, Nightly      PRN:  acetaminophen, 650 mg, q6h PRN  alteplase, 2 mg, PRN  dextrose, 12.5 g, q15 min PRN  dextrose, 25 g, q15 min PRN   Or  glucagon, 1 mg, q15 min PRN  dextrose, 25 g, q15 min PRN  glucagon, 1 mg, q15 min PRN  glucagon, 1 mg, q15 min PRN  heparin flush, 10 Units, PRN  magnesium sulfate, 2 g, q6h PRN  magnesium sulfate, 4 g, q6h PRN  naloxone, 0.2 mg, q5 min PRN  ondansetron, 4 mg, q4h PRN        PHYSICAL EXAM:   Visit Vitals  /74   Pulse 63   Temp 36.1 °C (97 °F) (Temporal)   Resp 22   Ht 1.778 m (5' 10\")   Wt 120 kg (263 lb 10.7 oz)   SpO2 94%   BMI 37.83 kg/m²   Smoking Status Never   BSA 2.43 m²     Wt Readings from Last 5 Encounters:   08/15/25 120 kg (263 lb 10.7 oz)   08/04/25 116 kg (256 lb 12.8 oz)   07/31/25 116 kg (256 lb 11.2 oz)   07/29/25 115 kg (253 lb 6.4 oz)   07/25/25 117 kg (258 lb 1.6 oz)     INTAKE/OUTPUT:  I/O last 3 completed shifts:  In: 1555 (13 mL/kg) [P.O.:100; NG/GT:1305; IV Piggyback:150]  Out: 1920 (16.1 mL/kg) [Urine:1920 (0.4 mL/kg/hr)]  Weight: 119.6 kg      Physical Exam:   - CONSTITUTION: younger " appearing male in ICU bed  - Neuro: Oriented x3 and negative on parts of CAM ICU score but will not engage with full score. More somnolent. Followed command in all extremities. CN grossly intact.   - CARDIOVASCULAR: NSR. No epicardial wires  - RESPIRATORY: diminished. Intermittently tachypneic.   - GI: obese, soft non distended, hypoactive BS. Dobhoff in place with TF  - : thornton in place with yellow urine  - EXTREMITIES: warm, well perfused. No edema  - SKIN: cool and dry  - PSYCHIATRIC: somnolent, less agitated    Daily Risk Screen  Intubated: No  Central line: no  Thornton: yes 08/15 replaced  for incontinence and concern for skin breakdown    Images:   8/16 CXR  Acute cardiogenic pulmonary edema. Bilateral pleural effusions. No pneumo    Invasive Hemodynamics:    Most Recent Range Past 24hrs   BP (Art) 147/65 No data recorded   MAP(Art) 93 mmHg No data recorded       Assessment/Plan   Assessment:   Colin Leonard is a 59 y.o. male hx of HTN, CKD stage 5, chronic anemia, type 2 diabetes, hx of c diff colitis who was admitted 5/19-5/24 for shortness of breathe was incidentally found to have atypical pneumonia which he was treated for. He was found to have elevated trop and BNP on that admission with a few episodes of reported VT which required increased dose of coreg. He underwent LHC 5/22/25 demonstrated MVCAD and decision was made to discharge and let him recover from his pneumonia treated with Augmentin and oral vanco prophyllacticaly due to h/o cdiff. and bring back for CABG. He is now s/p CABGx 2 with Dr Yolanda Justice on 8/6/2.  Postop course c/b unresponsive episode in setting of hypotension and found to have bilateral vertebral and basilar artery stenosis, pneumothorax, and TEJA on CKD requiring dialysis.      8/2: CABGx 2 with Dr Yolanda Justice on 8/6/2.  8/6: CKD5 and dialysis initiation   8/7: SLED initiated   8/8: BAT consult for stroke 6:12 am. Diagnostic cerebral angiogram with neuro IR found diseased R  vertebral artery, distal L vertebral artery, basilar artery with severe stenosis likely secondary to atherosclerotic disease, no occlusion, no thrombectomy performed.     Plan:  NEURO:  Hx of insomnia. 8/8 episode of unresponsive episode in setting of hypotension in setting of afib and found to have bilateral vertebral and basilar artery stenosis. CTP showed diffusion hypoperfusion in posterior fossa and watershed cerebral hemispheres but no core infarct. Exam improved with permissive HTN. Non focal exam today. Multifactorial delirium/encephalopathy. Answering CAM questions correctly but not completing in full exam and confused in discussion. Less agitated and more somnolent. Inconsistent reports of sleep overnight. OOB to chair today. Acceptable pain control.  -->  - Serial neuro and pain assessments   - Avoid hypotension, maintaining SBP at least >120  - PRN tylenol   - Restart trazadone 25mg nightly  - PT Consult, OOB to chair as tolerated. Encourage PT  - sent ammonia, vitamin b12, folate, TSH to rule out metabolic causes of confusion: high B12, HIGH TSH but normal free thyroxine, normal folate   - CAM ICU score qshift  - Sleep/wake cycle hygiene   - will need outpatient Follow-up with neurology in 1 month with Dr. Dior   - Continue DAPT at least 90 days, tentative plan to transition to plavix monotherapy but would defer to outpatient stroke neurologist .       CV:  CAD, HTN SVT/NSVT, Chronic diastolic HF stage II on admission 5/2025 requiring up titration of coreg. Now status post CABG x2. EF 50-55, NML RV, significant AI/AS per sign out. Baseline -180 per family. Postop afib converted on amio. NSR/SB. Normotensive to hypertensive with midodrine held overnight. A/V epicardial wires cut 8/8 for MRI. -->  - Amiodarone 400 mg PO BID. ~5GM load  - Continue metop 12.5mg BID. Will not uptitrate to avoid hypotension. Will hold if SBP below neuro goals  - midodrine stopped  - Will need structural heart eval in  future for TAVR eval, this can be done at discharge and not needed as inpatient unless clinical indication.   - continue ASA/plavix daily   - Atorvastatin 80mg nightly  - Zetia 10mg nightly   - Hold home coreg, torsemide and hydralazine     PULM:  Recent pnuemonia completed course of Augmentin outpatient. While neuro status declined 8/8, patient experienced respiratory distress and reintubated. Extubated (8/10). RPL chest tube placed 8/8 for pneumothorax, now resolved and no evidence of pneumo. Stable RA. CXR with persisting acute pulmonary edema and bilateral pleural effusion with poor pulmonary toilet.  -->  - Daily CXR  - IS q1h and OOB to chair   - Continue diuresis: Bumex 2 mg q 8 hrs  - Bronchial hygiene      GI:  H/o cdiff colitis, treated (March 2025).  Tolerated supplemental TF overnight. Passed for modified diet with FEES with SLP but was somnolent yesterday with frequent coughing and concerns for aspiration. Having regular BMs -->  - holding oral diet for now. Appreciate SLP following  #oropharyngeal dysphagia  - Pt tolerating therapeutic trials of safe consistencies, however, mentation remained inconsistent and pt becoming frustrated w/ encouragement to participate. Reviewed pt's FEES results and diet recs w/ pt'ssister and reasoning for holding PO given his decreased alertness at this time and underlying swallow impairments.   - TF to continuous: @ 40 ml/hr now  - Hold colace/senna BID and miralax BID with recent diarrhea. Will reschedule if not having BMs     :  Hx of BPH with painful thornton placement. CSA-TEJA Risk Score med.  Baseline CKD V previous admision Cr ~7 baseline PAT Cr 10. Hyponatremia likely due to hypervolemia. TEJA requiring CVVH and SLED, last 8/11. Now making appropriate urine on bumex but still hypervolemic. No urgent indication for HD but creatinine and BUN uptrending. Baseline BUN as outpatient 80-90's without uremia.  Thornton placed 8/11 for urinary retention. -->  - thornton replaced   for incontinence and concern for skin breakdown  - continue tamsulosin  - Nephrology following-Plan for IHD Monday. Plan for Tunneled Dialysis Catheter Monday morning.    - Continue bumex to 2mg q8h to be more negative unless neprhology says otherrenee  - Tamsulosin 0.4 daily  - Replete electrolytes per CTICU protocol. Holding K     ENDO: DM2 not on home meds,  A1c: 5.4. Acceptable glycemic control but not on continuous TF. -->  - Maintain BG <180  - continue SSI q4h     HEME: Anemia of chronic disease.  Acute on chronic blood loss anemia, stable. -->    - CBC daily  - ASA/Plavix daily   - SQH q8h  - SCDs for DVT prophylaxis  - Last type and screen: 8/14     ID: MRSA negative. Periop cefazolin completed. Afebrile but uptrending leukocytosis. Worsening mental status.  -->  - Trend temp q4h  - pan cx-follow up  - Continue zosyn (5 Days) pending Bl cx and sending MRSA cx     Skin: Leg wound noted on admission, photo in chart.   - preventative Mepilex dressings in place on sacrum and heels  - change preventative Mepilex weekly or more frequently as indicated (when moist/soiled)   - every shift skin assessment per nursing and weekly ICU skin rounds  - moisture barrier to be applied with diane care  - active skin problems addressed with nursing on daily rounds     Proph:  SCDs/SQH    Restraints not indicated.     F: Family: updated sister and son at bedside    G:  Line  Right IJ MAC w Minimac placed 8/6, dcd 08/15  Elmore placed 8/11, dcd 08/15  22g PIV  16g PIV     A,B,C,D,E,F,G: reviewed    Dispo: CTICU care for now.       Varsha Gutierrez MD  CTICU TEAM PHONE [phone]

## 2025-08-16 NOTE — PROGRESS NOTES
Colin Leonard is a 59 y.o. male on day 10 of admission presenting with Atherosclerosis of native coronary artery of native heart without angina pectoris.      Subjective   Good urine output  Poor clearance           Objective        Vitals 24HR  Heart Rate:  [57-64]   Temp:  [36.1 °C (97 °F)-36.3 °C (97.3 °F)]   Resp:  [14-22]   BP: (124-171)/(64-87)   SpO2:  [90 %-96 %]        Intake/Output last 3 Shifts:    Intake/Output Summary (Last 24 hours) at 8/16/2025 1352  Last data filed at 8/16/2025 1300  Gross per 24 hour   Intake 635 ml   Output 1380 ml   Net -745 ml       Physical Exam  On RA  Comfortable  Sleepy but oriented alert and responsive  Median Stenotomy.  Symmetrical chest expansion  No JVD  No significant peripheral edema    Relevant Results  Lab Results   Component Value Date    WBC 13.6 (H) 08/16/2025    HGB 9.0 (L) 08/16/2025    HCT 27.4 (L) 08/16/2025    MCV 89 08/16/2025     08/16/2025     Lab Results   Component Value Date    GLUCOSE 135 (H) 08/16/2025    CALCIUM 9.0 08/16/2025     08/16/2025    K 5.3 08/16/2025    CO2 23 08/16/2025    CL 97 (L) 08/16/2025     (HH) 08/16/2025    CREATININE 9.46 (H) 08/16/2025         Assessment & Plan  Atherosclerosis of native coronary artery of native heart without angina pectoris    ESRD (end stage renal disease) (Multi)    Chronic renal insufficiency    Pneumonia    Coronary artery disease involving native heart, unspecified vessel or lesion type, unspecified whether angina present    Mr. Leonard is a 59M with history of CKD5, CAD s/p CABG on 8/6, and developed post-surgery TEJA on CKD requiring dialysis initiation. Course c/b basilar stroke, No intervention, Neurology following. Nephrology following for Dialysis care.    #CKDV/ESKD  - Cr on admission 9.4  - Etiology of CKD is not clear.  - Received CVVH 8/6-8/7  - SLED 8/7pm  - CVVH resumed 8/9-8/11  - Access: Non (Femoral Dialysis Catheter Removed)  - Last RRT was CVVH stopped 8/11  - On  Bumex    #No evidence of Uremia  - Tube Feeds as well as Renal Impairment    #Anemia  - In part due to CKD    #Possible Stroke 8/7 (Basilar Artery occlusion?), Imaging showed no infarction, no interventions    #Urine Retention, Elmore inserted    RECOMMENDATIONS:  - No indications for RRT on assessment  - Loop diuretics for volume management as indicated  - Use Nepro for Tube Feeds  - Lokelma Once Daily 10g  - Repeat K level in the evening  - Arrange for Tunneled Dialysis Catheter placement  - Regular assessment for Indications for RRT  - Iron Profile  - Regular Evaluation for indications for RRT      Babita Edmondson MD  PGY-5 Nephrology Fellow

## 2025-08-16 NOTE — CARE PLAN
The clinical goals for the shift include pt to remain hds throughout shift      Problem: Skin  Goal: Decreased wound size/increased tissue granulation at next dressing change  Outcome: Progressing  Goal: Participates in plan/prevention/treatment measures  Outcome: Progressing  Goal: Prevent/manage excess moisture  Outcome: Progressing  Goal: Prevent/minimize sheer/friction injuries  Outcome: Progressing  Goal: Promote/optimize nutrition  Outcome: Progressing  Goal: Promote skin healing  Outcome: Progressing

## 2025-08-17 ASSESSMENT — PAIN - FUNCTIONAL ASSESSMENT
PAIN_FUNCTIONAL_ASSESSMENT: 0-10

## 2025-08-17 ASSESSMENT — PAIN SCALES - GENERAL
PAINLEVEL_OUTOF10: 0 - NO PAIN

## 2025-08-17 NOTE — CARE PLAN
The patient's goals for the shift include: Rest    The clinical goals for the shift include pt to remain hds throughout shift      Problem: Safety - Adult  Goal: Free from fall injury  Outcome: Progressing     Problem: Pain - Adult  Goal: Verbalizes/displays adequate comfort level or baseline comfort level  Outcome: Progressing     Problem: Nutrition  Goal: Nutrient intake appropriate for maintaining nutritional needs  Outcome: Progressing     Problem: General Stroke  Goal: Establish a mutual long term goal with patient by discharge  Outcome: Progressing  Goal: Demonstrate improvement in neurological exam throughout the shift  Outcome: Progressing  Goal: Maintain BP within ordered limits throughout shift  Outcome: Progressing  Goal: Participate in treatment (ie., meds, therapy) throughout shift  Outcome: Progressing  Goal: No symptoms of aspiration throughout shift  Outcome: Progressing  Goal: No symptoms of hemorrhage throughout shift  Outcome: Progressing  Goal: Tolerate enteral feeding throughout shift  Outcome: Progressing  Goal: Controlled blood glucose throughout shift  Outcome: Progressing  Goal: Out of bed three times today  Outcome: Progressing     Problem: ESRD: Dialysis, CAPD  Goal: Electrolytes restored to baseline  Outcome: Progressing  Goal: Fatigue of chronic illness managed  Outcome: Progressing  Goal: Reports no abdominal pain/distension  Outcome: Progressing  Goal: Reports no shortness of breath  Outcome: Progressing

## 2025-08-17 NOTE — PROGRESS NOTES
"CTICU Progress Note  Colin Leonard/[id number]    Admit Date: 8/6/2025  Hospital Length of Stay: 11   ICU Length of Stay: 10d 19h   CT SURGEON:  Dr. Yolanda Justice    SUBJECTIVE:   Ms. Colin Leonard is a 59 y.o. male POD# 11 now from off pump CABG x 2 with a LIMA to LAD and SVG to distal circumflex. Postop course c/b unresponsive episode in setting of hypotension and found to have bilateral vertebral and basilar artery stenosis, and TEJA on CKD requiring dialysis.    This am he is in the CTICU, Aox3, following commands, neuro non focal, no cranial nerve deficits.    HDS making good UOP on diuresis Bumex q 8hr, K and sCr are rising, with nephrology is following.     Afebrile, downtrending WCC 13.4 from 14.4. Blood cx with no growth x2 days. Will continue zosyn in the meantime.     MEDICATIONS  Infusions:       Scheduled:  amiodarone, 400 mg, BID  aspirin, 81 mg, Daily  atorvastatin, 80 mg, Nightly  bumetanide, 2 mg, q8h  clopidogrel, 75 mg, Daily  ezetimibe, 10 mg, Nightly  heparin, 5,000 Units, q8h  insulin lispro, 0-10 Units, q4h  metoprolol tartrate, 12.5 mg, BID  piperacillin-tazobactam, 2.25 g, q8h  sodium chloride, 1 spray, 4x daily  sodium zirconium cyclosilicate, 10 g, TID  tamsulosin, 0.4 mg, Daily  thiamine, 500 mg, TID   Followed by  [START ON 8/18/2025] thiamine, 100 mg, Daily  traZODone, 25 mg, Nightly      PRN:  acetaminophen, 650 mg, q6h PRN  alteplase, 2 mg, PRN  dextrose, 12.5 g, q15 min PRN  dextrose, 25 g, q15 min PRN   Or  glucagon, 1 mg, q15 min PRN  dextrose, 25 g, q15 min PRN  glucagon, 1 mg, q15 min PRN  glucagon, 1 mg, q15 min PRN  heparin flush, 10 Units, PRN  magnesium sulfate, 2 g, q6h PRN  magnesium sulfate, 4 g, q6h PRN  naloxone, 0.2 mg, q5 min PRN  ondansetron, 4 mg, q4h PRN        PHYSICAL EXAM:   Visit Vitals  /63   Pulse 66   Temp 36.3 °C (97.3 °F) (Skin)   Resp 18   Ht 1.778 m (5' 10\")   Wt 118 kg (261 lb 3.9 oz)   SpO2 96%   BMI 37.48 kg/m²   Smoking Status Never   BSA 2.42 " m²     Wt Readings from Last 5 Encounters:   08/17/25 118 kg (261 lb 3.9 oz)   08/04/25 116 kg (256 lb 12.8 oz)   07/31/25 116 kg (256 lb 11.2 oz)   07/29/25 115 kg (253 lb 6.4 oz)   07/25/25 117 kg (258 lb 1.6 oz)     INTAKE/OUTPUT:  I/O last 3 completed shifts:  In: 1865 (15.7 mL/kg) [NG/GT:1715; IV Piggyback:150]  Out: 1910 (16.1 mL/kg) [Urine:1910 (0.4 mL/kg/hr)]  Weight: 118.5 kg      Physical Exam:   - CONSTITUTION: younger appearing male in ICU bed  - Neuro: Oriented x3 somnolent. Followed command in all extremities. CN grossly intact.   - CARDIOVASCULAR: NSR. No epicardial wires  - RESPIRATORY: diminished. Intermittently tachypneic.   - GI: obese, soft non distended, hypoactive BS. Dobhoff in place with TF  - : thornton in place with yellow urine  - EXTREMITIES: warm, well perfused. No edema  - SKIN: cool and dry  - PSYCHIATRIC: somnolent, less agitated    Daily Risk Screen  Intubated: No  Central line: no  Thornton: yes 08/15 replaced for incontinence and concern for skin breakdown    Images:   8/16 CXR  Acute cardiogenic pulmonary edema. Bilateral pleural effusions. No pneumo    Assessment/Plan   Assessment:   Colin Leonard is a 59 y.o. male hx of HTN, CKD stage 5, chronic anemia, type 2 diabetes, hx of c diff colitis who was admitted 5/19-5/24 for SOB was incidentally found to have atypical pneumonia which he was treated for. He was found to have elevated trop and BNP on that admission with a few episodes of reported VT which required increased dose of coreg. He underwent LHC 5/22/25 demonstrated MVCAD and decision was made to discharge and let him recover from his pneumonia treated with Augmentin and oral vanco prophyllacticaly due to h/o cdiff. and bring back for CABG. He is now s/p CABGx 2 with Dr Yolanda Justice on 8/6/25.  Postop course c/b unresponsive episode in setting of hypotension and found to have bilateral vertebral and basilar artery stenosis, pneumothorax, and TJEA on CKD requiring dialysis.       8/6: CABGx 2 with Dr Yolanda Justice on 8/6/2.  8/6: CKD5 and dialysis initiation   8/7: SLED initiated   8/8: BAT consult for stroke 6:12 am. Diagnostic cerebral angiogram with neuro IR found diseased R vertebral artery, distal L vertebral artery, basilar artery with severe stenosis likely secondary to atherosclerotic disease, no occlusion, no thrombectomy performed.     Plan:  NEURO:  Hx of insomnia. 8/8 episode of unresponsive episode in setting of hypotension in setting of afib and found to have bilateral vertebral and basilar artery stenosis. CTP showed diffusion hypoperfusion in posterior fossa and watershed cerebral hemispheres but no core infarct. Exam improved with permissive HTN. Non focal exam today. Multifactorial delirium/encephalopathy. Answering CAM questions correctly but not completing in full exam and confused in discussion. Less agitated and more somnolent. Inconsistent reports of sleep overnight. OOB to chair today. Acceptable pain control.  -->  - Serial neuro and pain assessments   - Avoid hypotension, maintaining SBP at least >120  - PRN tylenol   - Restart trazadone 25mg nightly  - PT Consult, OOB to chair as tolerated. Encourage PT  - sent ammonia, vitamin b12, folate, TSH to rule out metabolic causes of confusion: high B12, HIGH TSH but normal free thyroxine, normal folate   - CAM ICU score qshift  - Sleep/wake cycle hygiene   - will need outpatient Follow-up with neurology in 1 month with Dr. Dior   - Continue DAPT at least 90 days, tentative plan to transition to plavix monotherapy but would defer to outpatient stroke neurologist .       CV:  CAD, HTN SVT/NSVT, Chronic diastolic HF stage II on admission 5/2025 requiring up titration of coreg. Now status post CABG x2. EF 50-55, NML RV, significant AI/AS per sign out. Baseline -180 per family. Postop afib converted on amio. NSR/SB. Normotensive to hypertensive with midodrine held overnight. A/V epicardial wires cut 8/8 for MRI. -->  -  Amiodarone 400 mg PO BID. ~5GM load  - Continue metop 12.5mg BID. Will not uptitrate to avoid hypotension. Will hold if SBP below neuro goals  - midodrine stopped  - Will need structural heart eval in future for TAVR eval, this can be done at discharge and not needed as inpatient unless clinical indication.   - continue ASA/plavix daily   - Atorvastatin 80mg nightly  - Zetia 10mg nightly   - Hold home coreg, torsemide and hydralazine     PULM:  Recent pnuemonia completed course of Augmentin outpatient. While neuro status declined 8/8, patient experienced respiratory distress and reintubated. Extubated (8/10). RPL chest tube placed 8/8 for pneumothorax, now resolved and no evidence of pneumo. Stable RA. CXR with persisting acute pulmonary edema and bilateral pleural effusion with poor pulmonary toilet.  -->  - Daily CXR  - IS q1h and OOB to chair   - Continue diuresis: Bumex 2 mg q 8 hrs  - Bronchial hygiene      GI:  H/o cdiff colitis, treated (March 2025).  Tolerated supplemental TF overnight. Passed for modified diet with FEES with SLP but was somnolent yesterday with frequent coughing and concerns for aspiration. Having regular BMs -->  - holding oral diet for now. Appreciate SLP following  #oropharyngeal dysphagia  - Pt tolerating therapeutic trials of safe consistencies, however, mentation remained inconsistent and pt becoming frustrated w/ encouragement to participate. Reviewed pt's FEES results and diet recs w/ pt'ssister and reasoning for holding PO given his decreased alertness at this time and underlying swallow impairments.   - TF to continuous: @ 40 ml/hr now  - Hold colace/senna BID and miralax BID with recent diarrhea. Will reschedule if not having BMs  - NPO @MN    :  Hx of BPH with painful thornton placement. CSA-TEJA Risk Score med.  Baseline CKD V previous admision Cr ~7 baseline PAT Cr 10. Hyponatremia likely due to hypervolemia. TEJA requiring CVVH and SLED, last 8/11. Now making appropriate urine  on bumex but still hypervolemic. No urgent indication for HD but creatinine and BUN uptrending. Baseline BUN as outpatient 80-90's without uremia.  Thornton placed 8/11 for urinary retention. -->  - thornton replaced  for incontinence and concern for skin breakdown  - continue tamsulosin  - Nephrology following-Plan for IHD Monday. Plan for Tunneled Dialysis Catheter Monday morning.    - Continue bumex to 2mg q8h to be more negative unless neprhology says otherwiese  - Tamsulosin 0.4 daily  - Replete electrolytes per CTICU protocol. Holding K     ENDO: DM2 not on home meds,  A1c: 5.4. Acceptable glycemic control but not on continuous TF. -->  - Maintain BG <180  - continue SSI q4h     HEME: Anemia of chronic disease.  Acute on chronic blood loss anemia, stable. -->    - CBC daily  - ASA/Plavix daily   - SQH q8h  - SCDs for DVT prophylaxis  - Last type and screen: 8/14     ID: MRSA negative. Periop cefazolin completed. Afebrile but uptrending leukocytosis. Worsening mental status.  -->  - Trend temp q4h  - pan cx-follow up  - Continue zosyn (5 Days) pending Bl cx and sending MRSA cx     Skin: Leg wound noted on admission, photo in chart.   - preventative Mepilex dressings in place on sacrum and heels  - change preventative Mepilex weekly or more frequently as indicated (when moist/soiled)   - every shift skin assessment per nursing and weekly ICU skin rounds  - moisture barrier to be applied with diane care  - active skin problems addressed with nursing on daily rounds     Proph:  SCDs/SQH    Restraints not indicated.     F: Family: updated sister and son at bedside    G:  Line  Thornton placed 8/11, dcd 08/15  22g PIV  16g PIV     A,B,C,D,E,F,G: reviewed    Dispo: CTICU care for now.       Varsha Gutierrez MD  CTICU TEAM PHONE [phone]

## 2025-08-17 NOTE — PROGRESS NOTES
Colin Leonard is a 59 y.o. male on day 11 of admission presenting with Atherosclerosis of native coronary artery of native heart without angina pectoris.      Subjective   Good urine output poor clearance           Objective        Vitals 24HR  Heart Rate:  [58-69]   Temp:  [36.1 °C (97 °F)-36.3 °C (97.3 °F)]   Resp:  [14-29]   BP: (111-163)/()   Weight:  [118 kg (261 lb 3.9 oz)]   SpO2:  [85 %-98 %]        Intake/Output last 3 Shifts:    Intake/Output Summary (Last 24 hours) at 8/17/2025 1218  Last data filed at 8/17/2025 1200  Gross per 24 hour   Intake 1095 ml   Output 935 ml   Net 160 ml       Physical Exam  On NC  Comfortable  Sleepy but responsive  Median Stenotomy.  Symmetrical chest expansion  No JVD  Trace LL Edema    Relevant Results  Lab Results   Component Value Date    WBC 13.4 (H) 08/17/2025    HGB 8.7 (L) 08/17/2025    HCT 28.2 (L) 08/17/2025    MCV 95 08/17/2025     08/17/2025     Lab Results   Component Value Date    GLUCOSE 150 (H) 08/17/2025    CALCIUM 8.9 08/17/2025     08/17/2025    K 5.7 (H) 08/17/2025    CO2 23 08/17/2025    CL 97 (L) 08/17/2025     (HH) 08/17/2025    CREATININE 10.83 (H) 08/17/2025         Assessment & Plan  Atherosclerosis of native coronary artery of native heart without angina pectoris    ESRD (end stage renal disease) (Multi)    Chronic renal insufficiency    Pneumonia    Coronary artery disease involving native heart, unspecified vessel or lesion type, unspecified whether angina present    Mr. Leonard is a 59M with history of CKD5, CAD s/p CABG on 8/6, and developed post-surgery TEJA on CKD requiring dialysis initiation. Course c/b basilar stroke, No intervention, Neurology following. Nephrology following for Dialysis care.    #CKDV/ESKD  - Cr on admission 9.4  - Etiology of CKD is not clear.  - Received CVVH 8/6-8/7  - SLED 8/7pm  - CVVH resumed 8/9-8/11  - Access: Non (Femoral Dialysis Catheter Removed)  - Last RRT was CVVH stopped 8/11  -  On Bumex    #Azotemia, No evidence of Uremia  - Tube Feeds as well as Renal Impairment    #Anemia  - In part due to CKD  - Tsat 17%    #Possible Stroke 8/7 (Basilar Artery occlusion?), Imaging showed no infarction, no interventions    #Urine Retention, Elmore inserted    RECOMMENDATIONS:  - No indications for RRT on assessment  - Loop diuretics for volume management as indicated  - Use Nepro for Tube Feeds  - Lokelma 10g PO TID   - Repeat K level in the evening  - For Tunneled Dialysis Catheter by IR, to be Arranged by Primary Team  - Regular Evaluation for indications for RRT      Babita Edmondson MD  PGY-5 Nephrology Fellow   Orbital.../Triceps.../Buccal...

## 2025-08-18 ENCOUNTER — APPOINTMENT (OUTPATIENT)
Dept: RADIOLOGY | Facility: HOSPITAL | Age: 60
End: 2025-08-18
Payer: COMMERCIAL

## 2025-08-18 ENCOUNTER — APPOINTMENT (OUTPATIENT)
Dept: DIALYSIS | Facility: HOSPITAL | Age: 60
End: 2025-08-18
Payer: COMMERCIAL

## 2025-08-18 ASSESSMENT — COGNITIVE AND FUNCTIONAL STATUS - GENERAL
DAILY ACTIVITIY SCORE: 10
PERSONAL GROOMING: A LOT
MOBILITY SCORE: 10
STANDING UP FROM CHAIR USING ARMS: A LOT
MOVING FROM LYING ON BACK TO SITTING ON SIDE OF FLAT BED WITH BEDRAILS: A LOT
WALKING IN HOSPITAL ROOM: TOTAL
DRESSING REGULAR UPPER BODY CLOTHING: A LOT
DRESSING REGULAR LOWER BODY CLOTHING: TOTAL
HELP NEEDED FOR BATHING: A LOT
MOVING TO AND FROM BED TO CHAIR: A LOT
EATING MEALS: A LOT
CLIMB 3 TO 5 STEPS WITH RAILING: TOTAL
TOILETING: TOTAL
TURNING FROM BACK TO SIDE WHILE IN FLAT BAD: A LOT

## 2025-08-18 ASSESSMENT — PAIN - FUNCTIONAL ASSESSMENT
PAIN_FUNCTIONAL_ASSESSMENT: CPOT (CRITICAL CARE PAIN OBSERVATION TOOL)
PAIN_FUNCTIONAL_ASSESSMENT: 0-10

## 2025-08-18 ASSESSMENT — PAIN SCALES - GENERAL
PAINLEVEL_OUTOF10: 0 - NO PAIN
PAINLEVEL_OUTOF10: 2
PAINLEVEL_OUTOF10: 0 - NO PAIN

## 2025-08-18 ASSESSMENT — ACTIVITIES OF DAILY LIVING (ADL): HOME_MANAGEMENT_TIME_ENTRY: 24

## 2025-08-18 NOTE — CONSULTS
Wound Care Consult     Visit Date: 8/18/2025      Patient Name: Colin Leonard         MRN: [id number]             Reason for Consult: leg wound POA, chronic per sister at bedside       Wound History: Ms. Colin Leonard is a 59 y.o. male POD# 11 now from off pump CABG x 2 with a LIMA to LAD and SVG to distal circumflex. Postop course c/b unresponsive episode in setting of hypotension and found to have bilateral vertebral and basilar artery stenosis, and TEJA on CKD requiring dialysis.     Assessment:  Wound 08/08/25 Diabetic Ulcer Tibial Anterior;Right (Active)   Date First Assessed/Time First Assessed: 08/08/25 2311   Present on Original Admission: Yes  Primary Wound Type: Diabetic Ulcer  Location: Tibial  Wound Location Orientation: Anterior;Right      Assessments 8/18/2025 12:26 PM   Wound Image     Site Assessment Black;Red   Non-staged Wound Description Full thickness   Wound Length (cm) 1.5 cm   Wound Width (cm) 2.7 cm   Wound Surface Area (cm^2) 3.18 cm^2   Wound Depth (cm) 0 cm   Wound Volume (cm^3) 0 cm^3   State of Healing Non-healing   Margins Well-defined edges   Drainage Description None   Drainage Amount None   Dressing Foam;Wound gel   Dressing Changed New       No associated orders.       Patient with lower leg wound, chronic since June per sister at bedside. States he hit his leg and has just been putting ointment on it with regular band aid. Cleaned with vashe and gauze, applied Medi-honey sheet to wound bed, covered with foam. Recommend changing every there day. Educated sister on how to change dressing and what supplies to use when discharged home.       Wound Plan:     Right leg: clean with vashe and gauze, apply Medi-honey sheet ( #[id number]) to wound bed, cover with foam dressing.     Sign off. Please reconsult WOD team for new skin injury.    Primary team please see pended orders for recommendations.     Kaylynn Angel RN  8/18/2025  5:51 PM

## 2025-08-18 NOTE — CARE PLAN
The patient's goals for the shift include; Rest    The clinical goals for the shift include pt to remain hds throughout shift    Problem: ESRD: Dialysis, CAPD  Goal: Electrolytes restored to baseline  Outcome: Progressing  Goal: Fatigue of chronic illness managed  Outcome: Progressing  Goal: Follows dialysis treatment plan  Outcome: Progressing  Goal: Follows fluid restrictions  Outcome: Progressing  Goal: Increase self care/family involvement  Outcome: Progressing  Goal: Participates in bowel regimen (CAPD)  Outcome: Progressing  Goal: Reports no abdominal pain/distension  Outcome: Progressing  Goal: Reports no shortness of breath  Outcome: Progressing    Goal: Participates in plan/prevention/treatment measures  Outcome: Progressing  Goal: Prevent/manage excess moisture  Outcome: Progressing  Goal: Prevent/minimize sheer/friction injuries  Outcome: Progressing  Goal: Promote/optimize nutrition  Outcome: Progressing    Problem: Safety - Adult  Goal: Free from fall injury  Outcome: Progressing     Problem: Nutrition  Goal: Nutrient intake appropriate for maintaining nutritional needs  Outcome: Progressing     Problem: Chronic Conditions and Co-morbidities  Goal: Patient's chronic conditions and co-morbidity symptoms are monitored and maintained or improved  Outcome: Progressing

## 2025-08-18 NOTE — PROCEDURES
Central Line    Date/Time: 8/18/2025 3:55 PM    Performed by: Varsha Gutierrez MD  Authorized by: Varsha Gutierrez MD    Consent:     Consent obtained:  Verbal    Consent given by:  Patient    Risks, benefits, and alternatives were discussed: yes      Risks discussed:  Pneumothorax, bleeding and infection    Alternatives discussed:  No treatment  Universal protocol:     Procedure explained and questions answered to patient or proxy's satisfaction: yes      Relevant documents present and verified: yes      Test results available: yes      Imaging studies available: yes      Required blood products, implants, devices, and special equipment available: yes      Site/side marked: yes      Immediately prior to procedure, a time out was called: yes      Patient identity confirmed:  Verbally with patient and arm band  Pre-procedure details:     Indication(s): central venous access      Hand hygiene: Hand hygiene performed prior to insertion      Sterile barrier technique: All elements of maximal sterile technique followed      Skin preparation:  Chlorhexidine    Skin preparation agent: Skin preparation agent completely dried prior to procedure    Sedation:     Sedation type:  None  Anesthesia:     Anesthesia method:  Local infiltration    Local anesthetic:  Lidocaine 1% WITH epi  Procedure details:     Location:  R subclavian    Patient position:  Trendelenburg    Catheter size:  13 Fr    Landmarks identified: yes      Number of attempts:  1    Successful placement: yes    Post-procedure details:     Post-procedure:  Dressing applied and line sutured    Assessment:  Blood return through all ports    Procedure completion:  Tolerated

## 2025-08-18 NOTE — PROGRESS NOTES
Occupational Therapy    Occupational Therapy Treatment    Name: Colin Leonard  MRN: [id number]  : 1965  Date: 25  Room: 15/15      Time Calculation  Start Time: 1417  Stop Time: 1441  Time Calculation (min): 24 min    Assessment:  Barriers to Discharge Home: Physical needs, Cognition needs, Caregiver assistance  Caregiver Assistance: Caregiver assistance needed per identified barriers - however, level of patient's required assistance exceeds assistance available at home  Cognition Needs: Medication and/or medical management daily assist needed, Insight of patient limited regarding functional ability/needs  Physical Needs: Intermittent ADL assistance needed, High falls risk due to function or environment, 24hr mobility assistance needed, Stair navigation into home limited by function/safety  Evaluation/Treatment Tolerance: Patient limited by fatigue  Medical Staff Made Aware: Yes  End of Session Communication: Bedside nurse  End of Session Patient Position: Bed, 3 rail up, Alarm off, not on at start of session    Plan:  Treatment Interventions: ADL retraining, Functional transfer training, UE strengthening/ROM, Endurance training, Cognitive reorientation, Patient/family training, Equipment evaluation/education, Neuromuscular reeducation, Visual perceptual retraining, Fine motor coordination activities, Compensatory technique education  OT Frequency for Current Admission: 4 times per week during this acute inpatient hospitalization (During this acute inpatient hospitalization)  OT Discharge Recommendations: High intensity level of continued care (Based on current functional status and rehab potential, patient is anticipated to tolerate and benefit from 5 or more days per week of skilled rehabilitative therapy after discharge from this acute inpatient hospitalization.)  Equipment Recommended upon Discharge:  (TBD)  OT Recommended Transfer Status: Assist of 2, Maximum assist  OT - OK to Discharge:  Yes    Subjective   General:  OT Last Visit  OT Received On: 08/18/25  Prior to Session Communication: Bedside nurse  Patient Position Received: Bed, 3 rail up, Alarm off, not on at start of session  Family/Caregiver Present: Yes  Caregiver Feedback: Family at bedside, active in care  General Comment: Pt supine in bed on arrival, mildly restless pulling at NC and dobhoff but redirectable. Mixed arousal level throughout session, limiting ability to progress mobility safely. Goal of session to redirect attention/restlessness while pt pending HD line at baseline with plan to progress mobility further following HD.     Precautions:  Medical Precautions: Fall precautions, Oxygen therapy device and L/min  Post-Surgical Precautions: Move in the Tube  Precautions Comment: SBP>120    Vitals:   Date/Time Vitals Session Patient Position Pulse Resp SpO2 BP MAP (mmHg)    08/18/25 1400 --  --  62  18  97 %  138/67  89     08/18/25 1441 Post OT  --  62  19  97 %  123/63  80      Lines/Tubes/Drains:  Urethral Catheter Double-lumen (Active)   Number of days: 3       NG/OG/Feeding Tube Small bore feeding tube Right nostril (Active)   Number of days: 9       External Urinary Catheter (Active)   Number of days: 4       Cognition:  Overall Cognitive Status: Impaired  Orientation Level: Disoriented to time, Disoriented to situation  Following Commands: Follows one step commands with increased time (and repetition with ~50% accuracy)  Cognition Comments: Hypoactive throughout session, CAM-ICU (+)  Sustained Attention: Impaired  Impulsive: Moderately  Processing Speed: Delayed    Pain Assessment:  Pain Assessment  Pain Assessment:  (reports right hip pain, unable to elaborate.)     Objective   Activities of Daily Living:      Grooming  Grooming Comments: Engaged in oral care, face washing, and hair care seated in chair position. Able to partially assist with all tasks, but difficulty maintaining attention, requiring mod A for  thoroughness.      Bed Mobility/Transfers:   Bed Mobility  Bed Mobility: Yes  Bed Mobility 1  Bed Mobility Comments 1: Pt boosted and depenendently placed in chair position with use of bed controls.      Therapy/Activity:   Therapeutic Activity  Therapeutic Activity Performed: Yes  Therapeutic Activity 1: Increased time reorienting pt and repositioning pt for comfort     Outcome Measures:  [facility] Daily Activity  Putting on and taking off regular lower body clothing: Total  Bathing (including washing, rinsing, drying): A lot  Putting on and taking off regular upper body clothing: A lot  Toileting, which includes using toilet, bedpan or urinal: Total  Taking care of personal grooming such as brushing teeth: A lot  Eating Meals: A lot  Daily Activity - Total Score: 10  ICU Mobility Screen  ICU Mobility Scale: Sitting in bed, exercises in bed     Education Documentation  Body Mechanics, taught by Elmira Hutchison OT at 8/18/2025  2:59 PM.  Learner: Patient  Readiness: Acceptance  Method: Explanation, Demonstration  Response: Needs Reinforcement    Precautions, taught by Elmira Hutchison OT at 8/18/2025  2:59 PM.  Learner: Patient  Readiness: Acceptance  Method: Explanation, Demonstration  Response: Needs Reinforcement    ADL Training, taught by Elmira Hutchison OT at 8/18/2025  2:59 PM.  Learner: Patient  Readiness: Acceptance  Method: Explanation, Demonstration  Response: Needs Reinforcement    Education Comments  No comments found.        Goals:  Encounter Problems       Encounter Problems (Active)       ADLs       Patient will perform UB and LB bathing with minimal level of assistance. (Not Progressing)       Start:  08/07/25    Expected End:  08/25/25            Patient with complete upper body dressing with SBA level of assistance donning and doffing all UE clothes (Not Progressing)       Start:  08/07/25    Expected End:  08/25/25            Patient with complete lower body dressing with  minimal level of assistance donning and doffing all LE clothes  with PRN adaptive equipment (Not Progressing)       Start:  08/07/25    Expected End:  08/25/25            Patient will complete toileting including hygiene clothing management/hygiene with minimal level of assistance. (Not Progressing)       Start:  08/07/25    Expected End:  08/25/25               COGNITION/SAFETY       Pt will maintain MITT precautions with 100% carryover during functional tasks, ADLs, and mobility without cueing.  (Not Progressing)       Start:  08/07/25    Expected End:  08/25/25            Patient will participate in cognitive activities to demonstrate WFL score on further cognitive assessments  (Not Progressing)       Start:  08/07/25    Expected End:  08/25/25               MOBILITY       Patient will perform Functional mobility min Household distances with moderate level of assistance and least restrictive device in order to improve safety and functional mobility. (Not Progressing)       Start:  08/07/25    Expected End:  08/25/25               TRANSFERS       Patient will perform bed mobility with minimal level of assistance in order to improve safety and independence with mobility (Not Progressing)       Start:  08/07/25    Expected End:  08/25/25            Patient will complete functional transfer to toilet with least restrictive device with moderate level of assistance. (Not Progressing)       Start:  08/07/25    Expected End:  08/25/25 08/18/25 at 3:00 PM   Elmira Hutchison, OT   [phone]

## 2025-08-18 NOTE — PROGRESS NOTES
Colin Leonard is a 59 y.o. male on day 12 of admission presenting with Atherosclerosis of native coronary artery of native heart without angina pectoris.      Subjective   Good urine output poor clearance           Objective        Vitals 24HR  Heart Rate:  [64-70]   Temp:  [35.9 °C (96.6 °F)-36.3 °C (97.3 °F)]   Resp:  [16-25]   BP: (122-161)/(53-75)   Weight:  [118 kg (260 lb 9.3 oz)]   SpO2:  [91 %-97 %]   Oxygen Therapy  Pulse Ox (24 hr min): 91  Medical Gas Therapy: Supplemental oxygen  Medical Gas Delivery Method: Nasal cannula  O2 Flow Rate (L/min): 6 L/min FiO2 (%): 40 %    Intake/Output last 3 Shifts:    Intake/Output Summary (Last 24 hours) at 8/18/2025 1251  Last data filed at 8/18/2025 1200  Gross per 24 hour   Intake 650 ml   Output 1145 ml   Net -495 ml       Physical Exam  On NC  Comfortable  Sleepy but responsive  Median Stenotomy  Symmetrical chest expansion  No JVD  Trace LL Edema    Relevant Results  Lab Results   Component Value Date    WBC 16.0 (H) 08/18/2025    HGB 8.6 (L) 08/18/2025    HCT 26.6 (L) 08/18/2025    MCV 91 08/18/2025     08/18/2025     Lab Results   Component Value Date    GLUCOSE 128 (H) 08/18/2025    CALCIUM 9.2 08/18/2025     08/18/2025    K 5.1 08/18/2025    CO2 23 08/18/2025    CL 96 (L) 08/18/2025     (HH) 08/18/2025    CREATININE 11.22 (H) 08/18/2025         Assessment & Plan  Atherosclerosis of native coronary artery of native heart without angina pectoris    ESRD (end stage renal disease) (Multi)    Chronic renal insufficiency    Pneumonia    Coronary artery disease involving native heart, unspecified vessel or lesion type, unspecified whether angina present    Mr. Leonard is a 59M with history of CKD5, CAD s/p CABG on 8/6, and developed post-surgery TEJA on CKD requiring dialysis initiation. Course c/b basilar stroke, No intervention, Neurology following. Nephrology following for Dialysis care.    #CKDV/ESKD  - Cr on admission 9.4  - Etiology of  CKD is not clear.  - Received CVVH 8/6-8/7  - SLED 8/7pm  - CVVH resumed 8/9-8/11  - Access: Non (Femoral Dialysis Catheter Removed)  - Last RRT was CVVH stopped 8/11  - On Bumex  - For TDC then iHD 8/18    #Azotemia, No evidence of Uremia  - Tube Feeds as well as Renal Impairment    #Anemia  - In part due to CKD  - Tsat 17%    #Possible Stroke 8/7 (Basilar Artery occlusion?), Imaging showed no infarction, no interventions    #Urine Retention, Elmore inserted    RECOMMENDATIONS:  - Proceed with TDC Insertion then iHD as per order  - Will Need iHD 3 times Weekly, MWF for now  - Please ask SW to Arrange for iHD outpatient   - Can stop Lokelma and medical therapies once iHD is started  - Diuretics for Volume Control  - Replace Iron once Infection is controlled      Babita Edmondson MD  PGY-5 Nephrology Fellow

## 2025-08-18 NOTE — PROGRESS NOTES
Physical Therapy    Physical Therapy Treatment    Patient Name: Colin Leonard  MRN: [id number]  Today's Date: 8/18/2025  Room: 15/15  Time Calculation  Start Time: 1015  Stop Time: 1040  Time Calculation (min): 25 min       Assessment/Plan   PT Plan  Treatment/Interventions: Bed mobility, Transfer training, Gait training, Balance training, Neuromuscular re-education, Strengthening, Endurance training, Therapeutic exercise, Therapeutic activity  PT Plan: Ongoing PT  PT Frequency for Current Admission: 5 times per week during this acute inpatient hospitalization  PT Discharge Recommendations: High intensity level of continued care  Equipment Recommended upon Discharge: Wheeled walker  PT Recommended Transfer Status: Total assist  PT - OK to Discharge: Yes    General Visit Information:   Reason for Referral: CABG x2 8/6. Re-evaluation: found unresponsive 8/8, BAT called. NIHSS 31. Pt reintubated. CTH unremarkable. CTA c/f Basilar artery occulsion vs high grade stenosis. CTP showed diffusion hypoperfusion in posterior fossa and watershed cerebral hemispheres but no core infarct. Angio revealed multifocal vert and basilar stenosis, no occlusion, no intervention done. Extubated 8/10. NIHSS 3 on 8/11  Past Medical History Relevant to Rehab: HTN, CKD stage 5, chronic anemia, type 2 diabetes, hx of c diff colitis who was admitted 5/19-5/24 for shortness of breathe was incidentally found to have atypical pneumonia  Prior to Session Communication: Bedside nurse  Patient Position Received: Bed, 3 rail up, Alarm off, not on at start of session  Family/Caregiver Present: Yes  Caregiver Feedback: sister and brother   Subjective: Patient is alert, agreeable to PT.  Improved alertness with eyes open and more talking today but noted increased WOB and fatigue.  Precautions:  Precautions  Medical Precautions: Fall precautions, Oxygen therapy device and L/min  Post-Surgical Precautions: Move in the Tube  Vital Signs:   Date/Time  Vitals Session Patient Position Pulse Resp SpO2 BP MAP (mmHg)    08/18/25 1000 --  --  64  22  91 %  145/66  91     08/18/25 1015 --  --  66  --  93 %  145/66  --     08/18/25 1040 --  --  70  --  94 %  142/72  --     08/18/25 1100 --  --  66  25  95 %  142/72  93        Objective   Pain:  Pain Assessment  0-10 (Numeric) Pain Score: 0 - No pain (post 0)  Cognition:  Cognition  Overall Cognitive Status: Impaired  Orientation Level:  (oriented x 3 with increased time, disoriented to date)  Lines/Tubes/Drains:  Urethral Catheter Double-lumen (Active)   Number of days: 3       NG/OG/Feeding Tube Small bore feeding tube Right nostril (Active)   Number of days: 9       External Urinary Catheter (Active)   Number of days: 4        Continuous Medications/Drips:  Continuous Medications[1]    PT Treatments:  Therapeutic Exercise  Therapeutic Exercise Activity 1: Attempted BLE PF, DF, heel slide trials with increased time and poor capacity this date, deferred further attempts        Bed Mobility  Bed Mobility: Yes  Bed Mobility 1  Bed Mobility 1: Supine to sitting  Level of Assistance 1: Maximum assistance  Bed Mobility Comments 1: HOB 30, TAPS  Bed Mobility 2  Bed Mobility  2: Sitting to supine  Level of Assistance 2: Maximum assistance  Bed Mobility Comments 2: LE assist  Bed Mobility 3  Bed Mobility 3: Sitting to supine  Level of Assistance 3: Maximum assistance  Bed Mobility Comments 3: TAPS  Bed Mobility 4  Bed Mobility 4: Rolling left  Level of Assistance 4: Maximum assistance  Bed Mobility Comments 4: TAPS     Transfer 1  Transfer From 1: Sit to  Transfer to 1: Stand  Transfer Device 1:  (2 HHA)  Transfer Level of Assistance 1: Maximum assistance  Trials/Comments 1: x1  Transfers 2  Transfer From 2: Stand to  Transfer to 2: Sit  Transfer Device 2:  (2 HHA)  Transfer Level of Assistance 2: Maximum assistance  Trials/Comments 2: x1             Outcome Measures:  [facility] Basic Mobility  Turning from your back to your side  while in a flat bed without using bedrails: A lot  Moving from lying on your back to sitting on the side of a flat bed without using bedrails: A lot  Moving to and from bed to chair (including a wheelchair): A lot  Standing up from a chair using your arms (e.g. wheelchair or bedside chair): A lot  To walk in hospital room: Total  Climbing 3-5 steps with railing: Total  Basic Mobility - Total Score: 10                            Education Documentation  Precautions, taught by Fermin Nolasco PT at 8/18/2025 11:42 AM.  Learner: Patient  Readiness: Acceptance  Method: Explanation  Response: Needs Reinforcement  Comment: limited by cognition    Body Mechanics, taught by Fermin Nolasco PT at 8/18/2025 11:42 AM.  Learner: Patient  Readiness: Acceptance  Method: Explanation  Response: Needs Reinforcement  Comment: limited by cognition    Mobility Training, taught by Fermin Nolasco PT at 8/18/2025 11:42 AM.  Learner: Patient  Readiness: Acceptance  Method: Explanation  Response: Needs Reinforcement  Comment: limited by cognition    Education Comments  No comments found.          OP EDUCATION:       Encounter Problems       Encounter Problems (Active)       Balance       Pt will demonstrate ability to complete sitting static/dynamic balance activities with bilateral UE support and no LOB for increase in safety up D/C.  (Progressing)       Start:  08/11/25    Expected End:  08/25/25               Mobility       Pt will demonstrate ability to complete ther ex activities to improve functional strength for increase in independence upon DC.  (Progressing)       Start:  08/11/25    Expected End:  08/25/25            Pt will demonstrated ability to complete ~8 forward/backwards steps with proper form, LRAD, minAx2 and no balance deficits for safe DC.   (Progressing)       Start:  08/11/25    Expected End:  08/25/25               PT Transfers       Pt will demonstrated ability to complete bed mobility and sit<>stand  transfers with min assistance x2 and use of assistive device to safely DC. (Progressing)       Start:  08/11/25    Expected End:  08/25/25                   Assessment: Patient is progressing Fairly with therapy this date.  Patient with difficulty completing formal there-ex with command following and assistance, deferred and trials standing and additionally required increased assistance for standing.  Deferred further trials 2/2 increased fatigue and bowel incontinence.  Patient remains appropriate for HIGH intensity therapy when medically appropriate for discharge from acute stay.  Will continue to follow.      08/18/25 at 11:43 AM   Fermin Nolasco, PT   Rehab Office: [phone]                 [1]

## 2025-08-18 NOTE — CARE PLAN
Problem: Pain - Adult  Goal: Verbalizes/displays adequate comfort level or baseline comfort level  Outcome: Progressing     Problem: Safety - Adult  Goal: Free from fall injury  Outcome: Progressing     Problem: Discharge Planning  Goal: Discharge to home or other facility with appropriate resources  Outcome: Progressing     Problem: Chronic Conditions and Co-morbidities  Goal: Patient's chronic conditions and co-morbidity symptoms are monitored and maintained or improved  Outcome: Progressing     Problem: Nutrition  Goal: Nutrient intake appropriate for maintaining nutritional needs  Outcome: Progressing     Problem: Skin  Goal: Decreased wound size/increased tissue granulation at next dressing change  Outcome: Progressing  Goal: Participates in plan/prevention/treatment measures  Outcome: Progressing  Goal: Prevent/manage excess moisture  Outcome: Progressing  Goal: Prevent/minimize sheer/friction injuries  Outcome: Progressing  Goal: Promote/optimize nutrition  Outcome: Progressing  Goal: Promote skin healing  Outcome: Progressing     Problem: General Stroke  Goal: Establish a mutual long term goal with patient by discharge  Outcome: Progressing  Goal: Demonstrate improvement in neurological exam throughout the shift  Outcome: Progressing  Goal: Maintain BP within ordered limits throughout shift  Outcome: Progressing  Goal: Participate in treatment (ie., meds, therapy) throughout shift  Outcome: Progressing  Goal: No symptoms of aspiration throughout shift  Outcome: Progressing  Goal: No symptoms of hemorrhage throughout shift  Outcome: Progressing  Goal: Tolerate enteral feeding throughout shift  Outcome: Progressing  Goal: Decreased nausea/vomiting throughout shift  Outcome: Progressing  Goal: Controlled blood glucose throughout shift  Outcome: Progressing  Goal: Out of bed three times today  Outcome: Progressing     Problem: ICU Stroke  Goal: Maintain ICP within ordered limits throughout shift  Outcome:  Progressing  Goal: Tolerate EVD clamping trial throughout shift  Outcome: Progressing  Goal: Tolerate ventilator weaning trial during shift  Outcome: Progressing  Goal: Maintain patent airway throughout shift  Outcome: Progressing  Goal: Achieve/maintain targeted sodium level throughout shift  Outcome: Progressing     Problem: ESRD: Dialysis, CAPD  Goal: Electrolytes restored to baseline  Outcome: Progressing  Goal: Fatigue of chronic illness managed  Outcome: Progressing  Goal: Follows dialysis treatment plan  Outcome: Progressing  Goal: Follows fluid restrictions  Outcome: Progressing  Goal: Increase self care/family involvement  Outcome: Progressing  Goal: Participates in bowel regimen (CAPD)  Outcome: Progressing  Goal: Reports no abdominal pain/distension  Outcome: Progressing  Goal: Reports no shortness of breath  Outcome: Progressing

## 2025-08-18 NOTE — PROGRESS NOTES
CTICU Progress Note  Colin Leonard/[id number]    Admit Date: 8/6/2025  Hospital Length of Stay: 12   ICU Length of Stay: 11d 20h   CT SURGEON:  Dr. Yolanda Justice    SUBJECTIVE:   Ms. Colin Leonard is a 59 y.o. male POD# 12 now from off pump CABG x 2 with a LIMA to LAD and SVG to distal circumflex. Postop course c/b unresponsive episode in setting of hypotension and found to have bilateral vertebral and basilar artery stenosis, and TEJA on CKD requiring dialysis.    This am he is in the CTICU, Aox3, following commands, neuro non focal, no cranial nerve deficits.    HDS making good UOP on diuresis Bumex q 8hr, K stable 5.1 on TID lokelma,  BUN & sCr are rising, TDC  with IR planned today and then HD today.      Afebrile, uptrending WCC 16 from 12. Blood cx with no growth x3 days. Will continue zosyn x 5 days  (8/14 -8/18 ).    MEDICATIONS  Infusions:       Scheduled:  [START ON 8/19/2025] amiodarone, 200 mg, Daily  aspirin, 81 mg, Daily  atorvastatin, 80 mg, Nightly  bumetanide, 2 mg, q8h  clopidogrel, 75 mg, Daily  ezetimibe, 10 mg, Nightly  heparin, 5,000 Units, q8h  insulin lispro, 0-10 Units, q4h  metoprolol tartrate, 12.5 mg, BID  piperacillin-tazobactam, 2.25 g, q8h  polyethylene glycol, 17 g, Daily  sennosides-docusate sodium, 2 tablet, Nightly  sodium chloride, 1 spray, 4x daily  sodium zirconium cyclosilicate, 10 g, TID  tamsulosin, 0.4 mg, Daily  thiamine, 100 mg, Daily  traZODone, 25 mg, Nightly      PRN:  acetaminophen, 650 mg, q6h PRN  alteplase, 2 mg, PRN  dextrose, 12.5 g, q15 min PRN  dextrose, 25 g, q15 min PRN   Or  glucagon, 1 mg, q15 min PRN  dextrose, 25 g, q15 min PRN  glucagon, 1 mg, q15 min PRN  glucagon, 1 mg, q15 min PRN  heparin flush, 10 Units, PRN  magnesium sulfate, 2 g, q6h PRN  magnesium sulfate, 4 g, q6h PRN  naloxone, 0.2 mg, q5 min PRN  ondansetron, 4 mg, q4h PRN  oxygen, 1 Dose, Continuous - O2/gases        PHYSICAL EXAM:   Visit Vitals  /66   Pulse 64   Temp 36.2 °C (97.2  "°F) (Temporal)   Resp 22   Ht 1.778 m (5' 10\")   Wt 118 kg (260 lb 9.3 oz)   SpO2 91%   BMI 37.39 kg/m²   Smoking Status Never   BSA 2.41 m²     Wt Readings from Last 5 Encounters:   08/18/25 118 kg (260 lb 9.3 oz)   08/04/25 116 kg (256 lb 12.8 oz)   07/31/25 116 kg (256 lb 11.2 oz)   07/29/25 115 kg (253 lb 6.4 oz)   07/25/25 117 kg (258 lb 1.6 oz)     INTAKE/OUTPUT:  I/O last 3 completed shifts:  In: 1750 (14.8 mL/kg) [NG/GT:1550; IV Piggyback:200]  Out: 1490 (12.6 mL/kg) [Urine:1490 (0.4 mL/kg/hr)]  Weight: 118.2 kg      Physical Exam:   - CONSTITUTION: younger appearing male in ICU bed  - Neuro: Oriented x3 somnolent. Followed command in all extremities. CN grossly intact.   - CARDIOVASCULAR: NSR. No epicardial wires  - RESPIRATORY: diminished. Intermittently tachypneic.   - GI: obese, soft non distended, hypoactive BS. Dobhoff in place with TF  - : thornton in place with yellow urine  - EXTREMITIES: warm, well perfused. No edema  - SKIN: cool and dry  - PSYCHIATRIC: somnolent, less agitated    Daily Risk Screen  Intubated: No  Central line: no  Thornton: yes 08/15 replaced for incontinence and concern for skin breakdown    Images:   8/16 CXR  Acute cardiogenic pulmonary edema. Bilateral pleural effusions. No pneumo    Assessment/Plan   Assessment:   Colin Leonard is a 59 y.o. male hx of HTN, CKD stage 5, chronic anemia, type 2 diabetes, hx of c diff colitis who was admitted 5/19-5/24 for SOB was incidentally found to have atypical pneumonia which he was treated for. He was found to have elevated trop and BNP on that admission with a few episodes of reported VT which required increased dose of coreg. He underwent LHC 5/22/25 demonstrated MVCAD and decision was made to discharge and let him recover from his pneumonia treated with Augmentin and oral vanco prophyllacticaly due to h/o cdiff. and bring back for CABG. He is now s/p CABGx 2 with Dr Yolanda Justice on 8/6/25.  Postop course c/b unresponsive episode in " setting of hypotension and found to have bilateral vertebral and basilar artery stenosis, pneumothorax, and TEJA on CKD requiring dialysis.      8/6: CABGx 2 with Dr Yolanda Justice on 8/6/2.  8/6: CKD5 and dialysis initiation   8/7: SLED initiated   8/8: BAT consult for stroke 6:12 am. Diagnostic cerebral angiogram with neuro IR found diseased R vertebral artery, distal L vertebral artery, basilar artery with severe stenosis likely secondary to atherosclerotic disease, no occlusion, no thrombectomy performed.   8/18: Plan for IR to place TDC and get his first HD today.    Plan:  NEURO:  Hx of insomnia. 8/8 episode of unresponsive episode in setting of hypotension in setting of afib and found to have bilateral vertebral and basilar artery stenosis. CTP showed diffusion hypoperfusion in posterior fossa and watershed cerebral hemispheres but no core infarct. Exam improved with permissive HTN. Non focal exam today. Multifactorial delirium/encephalopathy. Answering CAM questions correctly but not completing in full exam and confused in discussion. Less agitated and more somnolent. Inconsistent reports of sleep overnight. OOB to chair today. Acceptable pain control.  -->  - Serial neuro and pain assessments   - Avoid hypotension, maintaining SBP at least >120  - PRN tylenol   - continue trazadone 25mg nightly  - PT Consult, OOB to chair as tolerated. Encourage PT  - sent ammonia, vitamin b12, folate, TSH to rule out metabolic causes of confusion: high B12, HIGH TSH but normal free thyroxine, normal folate, normal ammonia  - CAM ICU score qshift  - Sleep/wake cycle hygiene   - will need outpatient Follow-up with neurology in 1 month with Dr. Dior   - Continue DAPT at least 90 days, tentative plan to transition to plavix monotherapy but would defer to outpatient stroke neurologist .       CV:  CAD, HTN SVT/NSVT, Chronic diastolic HF stage II on admission 5/2025 requiring up titration of coreg. Now status post CABG x2. EF 50-55,  NML RV, significant AI/AS per sign out. Baseline -180 per family. Postop afib converted on amio. NSR/SB. Normotensive to hypertensive with midodrine held overnight. A/V epicardial wires cut 8/8 for MRI. -->  - Amiodarone , loaded with ~5GM, then now 400 mg PO daily.   - Continue metop 12.5mg BID. Will not uptitrate to avoid hypotension. Will hold if SBP below neuro goals  - midodrine stopped  - Will need structural heart eval in future for TAVR eval, this can be done at discharge and not needed as inpatient unless clinical indication.   - continue ASA/plavix daily   - Atorvastatin 80mg/ Zetia 10mg nightly   - Hold home coreg, torsemide and hydralazine     PULM:  Recent pnuemonia completed course of Augmentin outpatient. While neuro status declined 8/8, patient experienced respiratory distress and reintubated. Extubated (8/10). RPL chest tube placed 8/8 for pneumothorax, now resolved and no evidence of pneumo. Stable RA. CXR with persisting acute pulmonary edema and bilateral pleural effusion with poor pulmonary toilet.  -->  - Daily CXR  - IS q1h and OOB to chair   - Continue diuresis: Bumex 2 mg q 8 hrs  - Bronchial hygiene      GI:  H/o cdiff colitis, treated (March 2025).  Tolerated supplemental TF overnight. Passed for modified diet with FEES with SLP but was somnolent yesterday with frequent coughing and concerns for aspiration. Having regular BMs -->  - holding oral diet for now. Appreciate SLP following  #oropharyngeal dysphagia  - Pt tolerating therapeutic trials of safe consistencies, however, mentation remained inconsistent and pt becoming frustrated w/ encouragement to participate. Reviewed pt's FEES results and diet recs w/ pt'ssister and reasoning for holding PO given his decreased alertness at this time and underlying swallow impairments.   - TF to continuous: @ 40 ml/hr now  - Hold colace/senna BID and miralax BID with recent diarrhea. Will reschedule if not having BMs  - NPO @MN    :  Joselito of  BPH with painful thornton placement. CSA-TEJA Risk Score med.  Baseline CKD V previous admision Cr ~7 baseline PAT Cr 10. Hyponatremia likely due to hypervolemia. TEJA requiring CVVH and SLED, last 8/11. Now making appropriate urine on bumex but still hypervolemic. No urgent indication for HD but creatinine and BUN uptrending. Baseline BUN as outpatient 80-90's without uremia.  Thornton placed 8/11 for urinary retention. -->  - thornton replaced  for incontinence and concern for skin breakdown  - continue tamsulosin  - Nephrology following-Plan for IHD today. Plan for Tunneled Dialysis Catheter this morning.    - Continue bumex to 2mg q8h to be more negative unless neprhology says otherwiese  - Tamsulosin 0.4 daily  - Replete electrolytes per CTICU protocol. Holding K     ENDO: DM2 not on home meds,  A1c: 5.4. Acceptable glycemic control but not on continuous TF. -->  - Maintain BG <180  - continue SSI q4h     HEME: Anemia of chronic disease.  Acute on chronic blood loss anemia, stable. -->    - CBC daily  - ASA/Plavix daily   - SQH q8h  - SCDs for DVT prophylaxis  - Last type and screen: 8/14     ID: MRSA negative. Periop cefazolin completed. Afebrile but uptrending leukocytosis. Worsening mental status.  -->  - Trend temp q4h  - pan cx-follow up  - Continue zosyn (5 Days) pending Bl cx and sending MRSA cx     Skin: Leg wound noted on admission, photo in chart.   - preventative Mepilex dressings in place on sacrum and heels  - change preventative Mepilex weekly or more frequently as indicated (when moist/soiled)   - every shift skin assessment per nursing and weekly ICU skin rounds  - moisture barrier to be applied with diane care  - active skin problems addressed with nursing on daily rounds     Proph:  SCDs/SQH    Restraints not indicated.     F: Family: updated sister and son at bedside    G:  Line  Thornton placed 08/15  22g PIV  16g PIV     A,B,C,D,E,F,G: reviewed    Dispo: CTICU care for now.  might transfer to the floor  today      Varsha Gutierrez MD  CTICU TEAM PHONE [phone]

## 2025-08-18 NOTE — CONSULTS
Nutrition Note:   Nutrition Assessment    Reason for Assessment: Enteral assessment/recommendation (TF)    Made NPO by speech and tube feeds changed to continuous by team end of last week. First was ordered TwoCal HN then changed to Nepro d/t hyperkalemia. NPO today pending HD tunneled line placement.       Nutrition Significant Labs:  CBC Trend:   Results from last 7 days   Lab Units 08/18/25  0152 08/17/25  1455 08/17/25  0256 08/16/25  1409   WBC AUTO x10*3/uL 16.0* 12.0* 13.4* 14.4*   RBC AUTO x10*6/uL 2.91* 3.42* 2.96* 3.17*   HEMOGLOBIN g/dL 8.6* 10.2* 8.7* 9.2*   HEMATOCRIT % 26.6* 32.5* 28.2* 30.0*   MCV fL 91 95 95 95   PLATELETS AUTO x10*3/uL 315 290 319 311    , A1C:  Lab Results   Component Value Date    HGBA1C 5.4 05/16/2025   , BG POCT trend:   Results from last 7 days   Lab Units 08/18/25  0907 08/18/25  0609 08/18/25  0156 08/17/25  2139 08/17/25  1839   POCT GLUCOSE mg/dL 100* 85 120* 130* 141*    , Liver Function Trend:   Results from last 7 days   Lab Units 08/15/25  0154   ALK PHOS U/L 49   AST U/L 14   ALT U/L 3*   BILIRUBIN TOTAL mg/dL 0.5    , Renal Lab Trend:   Results from last 7 days   Lab Units 08/18/25  0152 08/17/25  1455 08/17/25  0256 08/16/25  2040   POTASSIUM mmol/L 5.1 5.4* 5.7* 5.6*   PHOSPHORUS mg/dL 6.7* 6.4* 6.4* 5.9*   SODIUM mmol/L 138 139 137 136   MAGNESIUM mg/dL 3.27*  --  3.20*  --    EGFR mL/min/1.73m*2 5* 5* 5* 6*   BUN mg/dL 152* 142* 134* 128*   CREATININE mg/dL 11.22* 11.01* 10.83* 9.73*    , Vit D:   Lab Results   Component Value Date    VITD25 33 02/15/2022        Nutrition Specific Medications:  Scheduled medications  Scheduled Medications[1]  Continuous medications  Continuous Medications[2]  PRN medications  PRN Medications[3]      I/O:   Last BM Date: 08/18/25; Stool Appearance: Loose (08/18/25 1200)    Dietary Orders (From admission, onward)       Start     Ordered    08/18/25 0001  NPO Diet Except: Other (specify); Additional Details: hold tube feeds.;  Effective midnight  Diet effective midnight        Question Answer Comment   Except: Other (specify)    Additional Details hold tube feeds.        08/17/25 1000                     Estimated Needs:   Total Energy Estimated Needs in 24 hours (kCal): 2200 kCal  Method for Estimating Needs: 30 kcals/kg x ideal BW  Total Protein Estimated Needs in 24 Hours (g): 110 g  Method for Estimating 24 Hour Protein Needs: 1.5 gm/kg x ideal BW  Total Fluid Estimated Needs in 24 Hours (mL):  (per team)           Nutrition Diagnosis   Malnutrition Diagnosis  Patient has Malnutrition Diagnosis: No    Nutrition Diagnosis  Patient has Nutrition Diagnosis: Yes  Diagnosis Status (1): Active  Nutrition Diagnosis 1: Increased nutrient needs  Related to (1): increased metabolic demand  As Evidenced by (1): s/p CABG, recovery from critical illness.  Additional Nutrition Diagnosis: Diagnosis 2  Diagnosis Status (2): New  Nutrition Diagnosis 2: Swallowing difficulty  Related to (2): post-op events  As Evidenced by (2): NPO with dobhoff + TF's for nutrition       Nutrition Interventions/Recommendations   Nutrition prescription for enteral nutrition    Nutrition Recommendations:  Nepro goal @ 50 mls/hr continuous + give one pkt Prostat AWC daily    Nutrition Interventions/Goals:   Goal: Nepro @ 50 + one prostat = 2224 kcals and 114gm protein      Education Documentation  No documentation found.            Nutrition Monitoring and Evaluation   Enteral and Parenteral Nutrition Intake Determination: Enteral nutrition intake - To meet > 75% estimated energy needs                        Time Spent (min): 20 minutes            [1] [START ON 8/19/2025] amiodarone, 200 mg, oral, Daily  aspirin, 81 mg, nasoduodenal tube, Daily  atorvastatin, 80 mg, nasoduodenal tube, Nightly  bumetanide, 2 mg, intravenous, q8h  clopidogrel, 75 mg, oral, Daily  ezetimibe, 10 mg, nasoduodenal tube, Nightly  heparin, 5,000 Units, subcutaneous, q8h  insulin lispro, 0-10  Units, subcutaneous, q4h  metoprolol tartrate, 12.5 mg, oral, BID  piperacillin-tazobactam, 2.25 g, intravenous, q8h  polyethylene glycol, 17 g, oral, Daily  sennosides-docusate sodium, 2 tablet, oral, Nightly  sodium chloride, 1 spray, Each Nostril, 4x daily  sodium zirconium cyclosilicate, 10 g, oral, TID  tamsulosin, 0.4 mg, oral, Daily  thiamine, 100 mg, oral, Daily  traZODone, 25 mg, oral, Nightly     [2]    [3] PRN medications: acetaminophen, alteplase, dextrose, dextrose **OR** glucagon, dextrose, glucagon, glucagon, heparin flush, magnesium sulfate, magnesium sulfate, naloxone, ondansetron, oxygen

## 2025-08-18 NOTE — PROGRESS NOTES
Speech-Language Pathology                 Therapy Communication Note    Patient Name: Colin Leonard  MRN: [id number]  Department: [facility]  Room: 15/15-A  Today's Date: 8/18/2025     Discipline: Speech Language Pathology      Missed Time: Attempt    Comment:    Pt drowsy this AM w/ respiratory changes. Currently has NGT for nutrition. SLP will follow-up as able

## 2025-08-19 ASSESSMENT — ACTIVITIES OF DAILY LIVING (ADL): HOME_MANAGEMENT_TIME_ENTRY: 10

## 2025-08-19 ASSESSMENT — COGNITIVE AND FUNCTIONAL STATUS - GENERAL
PERSONAL GROOMING: A LOT
DAILY ACTIVITIY SCORE: 10
DRESSING REGULAR LOWER BODY CLOTHING: TOTAL
DRESSING REGULAR UPPER BODY CLOTHING: A LOT
EATING MEALS: A LOT
HELP NEEDED FOR BATHING: A LOT
TOILETING: TOTAL

## 2025-08-19 ASSESSMENT — PAIN - FUNCTIONAL ASSESSMENT
PAIN_FUNCTIONAL_ASSESSMENT: UNABLE TO SELF-REPORT
PAIN_FUNCTIONAL_ASSESSMENT: UNABLE TO SELF-REPORT
PAIN_FUNCTIONAL_ASSESSMENT: 0-10

## 2025-08-19 ASSESSMENT — PAIN SCALES - GENERAL: PAINLEVEL_OUTOF10: 0 - NO PAIN

## 2025-08-19 NOTE — CARE PLAN
Problem: Pain - Adult  Goal: Verbalizes/displays adequate comfort level or baseline comfort level  8/19/2025 0918 by Paulina Enrique RN  Outcome: Progressing  8/18/2025 2015 by Paulnia Enrique RN  Outcome: Progressing     Problem: Safety - Adult  Goal: Free from fall injury  8/19/2025 0918 by Paulina Enrique RN  Outcome: Progressing  8/18/2025 2015 by Paulina Enrique RN  Outcome: Progressing     Problem: Discharge Planning  Goal: Discharge to home or other facility with appropriate resources  8/19/2025 0918 by Paulina Enrique RN  Outcome: Progressing  8/18/2025 2015 by Paulina Enrique RN  Outcome: Progressing     Problem: Chronic Conditions and Co-morbidities  Goal: Patient's chronic conditions and co-morbidity symptoms are monitored and maintained or improved  8/19/2025 0918 by Paulina Enrique RN  Outcome: Progressing  8/18/2025 2015 by Paulina Enrique RN  Outcome: Progressing     Problem: Nutrition  Goal: Nutrient intake appropriate for maintaining nutritional needs  8/19/2025 0918 by Paulina Enrique RN  Outcome: Progressing  8/18/2025 2015 by Paulina Enrique RN  Outcome: Progressing     Problem: Skin  Goal: Decreased wound size/increased tissue granulation at next dressing change  8/19/2025 0918 by Paulina Enrique RN  Outcome: Progressing  8/18/2025 2015 by Paulina Enrique RN  Outcome: Progressing  Goal: Participates in plan/prevention/treatment measures  8/19/2025 0918 by Paulina Enrique RN  Outcome: Progressing  8/18/2025 2015 by Paulina Enrique RN  Outcome: Progressing  Goal: Prevent/manage excess moisture  8/19/2025 0918 by Paulina Enrique RN  Outcome: Progressing  8/18/2025 2015 by Paulina Enrique RN  Outcome: Progressing  Goal: Prevent/minimize sheer/friction injuries  8/19/2025 0918 by Paulina Enrique RN  Outcome: Progressing  8/18/2025 2015 by Paulina Enrique RN  Outcome: Progressing  Goal: Promote/optimize nutrition  8/19/2025 0918 by Paulina Enrique RN  Outcome:  Progressing  8/18/2025 2015 by Paulina Enrique RN  Outcome: Progressing  Goal: Promote skin healing  8/19/2025 0918 by Paulina Enrique RN  Outcome: Progressing  8/18/2025 2015 by Paulina Enrique RN  Outcome: Progressing     Problem: General Stroke  Goal: Establish a mutual long term goal with patient by discharge  8/19/2025 0918 by Paulina Enrique RN  Outcome: Progressing  8/18/2025 2015 by Paulina Enrique RN  Outcome: Progressing  Goal: Demonstrate improvement in neurological exam throughout the shift  8/19/2025 0918 by Paulina Enrique RN  Outcome: Progressing  8/18/2025 2015 by Paulina Enrique RN  Outcome: Progressing  Goal: Maintain BP within ordered limits throughout shift  8/19/2025 0918 by Paulina Enrique RN  Outcome: Progressing  8/18/2025 2015 by Paulina Enrique RN  Outcome: Progressing  Goal: Participate in treatment (ie., meds, therapy) throughout shift  8/19/2025 0918 by Paulina Enrique RN  Outcome: Progressing  8/18/2025 2015 by Paulina Enrique RN  Outcome: Progressing  Goal: No symptoms of aspiration throughout shift  8/19/2025 0918 by Paulina Enrique RN  Outcome: Progressing  8/18/2025 2015 by Paulina Enrique RN  Outcome: Progressing  Goal: No symptoms of hemorrhage throughout shift  8/19/2025 0918 by Paulina Enrique RN  Outcome: Progressing  8/18/2025 2015 by Paulina Enrique RN  Outcome: Progressing  Goal: Tolerate enteral feeding throughout shift  8/19/2025 0918 by Paulina Enrique RN  Outcome: Progressing  8/18/2025 2015 by Paulina Enrique RN  Outcome: Progressing  Goal: Decreased nausea/vomiting throughout shift  8/19/2025 0918 by Paulina Enrique RN  Outcome: Progressing  8/18/2025 2015 by Paulina Enrique RN  Outcome: Progressing  Goal: Controlled blood glucose throughout shift  8/19/2025 0918 by Paulina Enrique RN  Outcome: Progressing  8/18/2025 2015 by Paulina Enrique RN  Outcome: Progressing  Goal: Out of bed three times today  8/19/2025 0918 by Paulina Enrique RN  Outcome:  Progressing  8/18/2025 2015 by Paulina Enrique RN  Outcome: Progressing     Problem: ICU Stroke  Goal: Maintain ICP within ordered limits throughout shift  8/19/2025 0918 by Paulina Enrique RN  Outcome: Progressing  8/18/2025 2015 by Paulina Enrique RN  Outcome: Progressing  Goal: Tolerate EVD clamping trial throughout shift  8/19/2025 0918 by Paulina Enrique RN  Outcome: Progressing  8/18/2025 2015 by Paulina Enrique RN  Outcome: Progressing  Goal: Tolerate ventilator weaning trial during shift  8/19/2025 0918 by Paulina Enrique RN  Outcome: Progressing  8/18/2025 2015 by Paulina Enrique RN  Outcome: Progressing  Goal: Maintain patent airway throughout shift  8/19/2025 0918 by Paulina Enrique RN  Outcome: Progressing  8/18/2025 2015 by Paulina Enrique RN  Outcome: Progressing  Goal: Achieve/maintain targeted sodium level throughout shift  8/19/2025 0918 by Paulina Enrique RN  Outcome: Progressing  8/18/2025 2015 by Paulina Enrique RN  Outcome: Progressing     Problem: ESRD: Dialysis, CAPD  Goal: Electrolytes restored to baseline  8/19/2025 0918 by Paulina Enrique RN  Outcome: Progressing  8/18/2025 2015 by Paulina Enrique RN  Outcome: Progressing  Goal: Fatigue of chronic illness managed  8/19/2025 0918 by Paulina Enrique RN  Outcome: Progressing  8/18/2025 2015 by Paulina Enrique RN  Outcome: Progressing  Goal: Follows dialysis treatment plan  8/19/2025 0918 by Paulina Enrique RN  Outcome: Progressing  8/18/2025 2015 by Paulina Enrique RN  Outcome: Progressing  Goal: Follows fluid restrictions  8/19/2025 0918 by Paulina Enrique RN  Outcome: Progressing  8/18/2025 2015 by Paulina Enrique RN  Outcome: Progressing  Goal: Increase self care/family involvement  8/19/2025 0918 by Paulina Enrique RN  Outcome: Progressing  8/18/2025 2015 by Paulina Enrique RN  Outcome: Progressing  Goal: Participates in bowel regimen (CAPD)  8/19/2025 0918 by Paulina Enrique, RN  Outcome: Progressing  8/18/2025 2015  by Paulina Enrique, RN  Outcome: Progressing  Goal: Reports no abdominal pain/distension  8/19/2025 0918 by Paulina Enrique RN  Outcome: Progressing  8/18/2025 2015 by Paulina Enrique RN  Outcome: Progressing  Goal: Reports no shortness of breath  8/19/2025 0918 by Paulina Enrique RN  Outcome: Progressing  8/18/2025 2015 by Paulina Enrique RN  Outcome: Progressing

## 2025-08-19 NOTE — PROGRESS NOTES
08/18 Nephrology proceeding with iHD. AR, [facility] unable to support patient on iHD. Spoke with patient's brother (Buddy) at bedside. Patient not oriented and lethargic. Informed Buddy that since patient to start iHD, AR - [facility] cannot support with iHD. Other options reviewed with Buddy and choice for AR, [facility]. During rounds, asked NP for PM&R who will see patient when appropriate to gauge whether patient might qualify for AR.      Kinjal Moran (LSW, MSW)

## 2025-08-19 NOTE — CONSULTS
PM&R Consult Note  Patient: Colin Leonard   Age/sex: 59 y.o.   Medical Record #: [id number]     Consulting physician: Dr. 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    PM&R was consulted for IRF appropriateness.    HPI:   Colin Leonard is a 59 y.o. year old male patient with hx of HTN, CKD stage 5, chronic anemia, type 2 diabetes, previous c diff colitis admitted 5/19-5/24 and treated for atypical pneumonia. That admission- found to have elevated trop and BNP, as well as episodes of VT which required increased dose of coreg. Underwent LHC on 5/22/25 which demonstrated MVCAD; decision made to discharge and then bring back for CABG after recovering from that admission. He is now s/p CABGx 2 with Dr Yolanda Justice on 8/6/25. Postop course c/b unresponsive episode in setting of hypotension (BAT called 8/8)- likely 2/2 newly diagnosed bilateral vertebral and basilar artery stenosis, pneumothorax, and TEJA on CKD requiring dialysis.      Procedures performed on/related to this admission:  -CABG x2 on 8/6  -Attempted MT on 8/8 due to c/f basilar artery occlusion; not performed due to no LVO noted, only severe stenosis of b/l vertebral and basilar arteries.    Home Situation/ Supports  Lives in: House  Stairs to enter: 2 with b/l rails  Stairs in home: none  Bed level: one level  Bathroom level: one level, WIS, grab bars in shower, built-in shower seat  Lives with: son  Who can help at home and how often: son works and goes to school so wouldn't be able to help     Prior Level of Function  Mobility: Independent  ADLs: Independent  Assistive Devices: None  - Works as an  at baseline    Physical Therapy  Last seen: 8/20  Bed mobility: ModA   Transfers: ModA x2 with 2HHA and walker  Ambulation: [place]; (shuffle, minimal foot clearance, decreased step length); 4  steps   Basic Mobility - Total Score: 10     Occupational Therapy  Last seen: 8/20  Putting on and taking off regular lower body clothing: A lot  Bathing (including washing, rinsing, drying): A lot  Putting on and taking off regular upper body clothing: A lot  Toileting, which includes using toilet, bedpan or urinal: Total  Taking care of personal grooming such as brushing teeth: A little  Eating Meals: A little  Daily Activity - Total Score: 13    Speech Therapy   Last seen: 8/15  Evaluation: Continue NPO with tube feeds. Recommend MBSS tomorrow, 8/21. Continue SLP services.  -Given pt's history of dysphagia and silent aspiration, will need instrumental testing to support diet recs. Pt showing improvement in mentation today and subjectively tolerating PO challenges at bedside.     REVIEW OF SYSTEMS  Constitutional: Denies fever, chills, fatigue, appetite/weight change  HEENT: Denies hearing loss, tinnitus, voice change  Cardio: Admits to left side costochondral chest pain, denies leg swelling, palpitations  Respiratory: Denies SOB, admits to productive cough with clear sputum, denies wheezing  GI: Denies Abd pain, constipation, diarrhea, N/V  : Denies dysuria, urgency, decreased urination  MSK: Denies arthralgia, myalgia, joint swelling  Neuro: Denies numbness, dizziness, light-headedness  Skin: Denies color change, wound, rash  Psych: Denies anxiety, depression, hallucinations, agitation, sleep disturbance    OBJECTIVE    Patient Vitals for the past 24 hrs:   BP Temp Temp src Pulse Resp SpO2   08/20/25 0700 143/68 -- -- 65 19 97 %   08/20/25 0600 140/70 -- -- 64 25 95 %   08/20/25 0500 (!) 140/96 -- -- 62 16 95 %   08/20/25 0400 126/63 -- -- 61 18 96 %   08/20/25 0300 116/63 -- -- 62 (!) 38 96 %   08/20/25 0200 123/65 -- -- 62 18 97 %   08/20/25 0100 141/70 -- -- 68 18 (!) 89 %   08/20/25 0000 126/63 -- -- 62 19 97 %   08/19/25 2300 124/89 -- -- 68 18 95 %   08/19/25 2200 121/68 -- -- 69 23 96 %   08/19/25 2100  147/61 -- -- 68 17 97 %   08/19/25 2000 -- 36 °C (96.8 °F) Temporal 71 20 95 %   08/19/25 1900 126/79 -- -- 71 20 94 %   08/19/25 1800 155/69 -- -- 67 15 92 %   08/19/25 1700 157/65 -- -- 67 20 99 %   08/19/25 1625 -- -- -- -- -- 97 %   08/19/25 1600 148/71 36.7 °C (98.1 °F) Temporal 65 19 97 %   08/19/25 1530 149/81 -- -- 65 20 98 %   08/19/25 1500 132/88 -- -- 67 18 97 %   08/19/25 1430 (!) 139/103 -- -- 67 20 96 %   08/19/25 1400 127/86 -- -- 67 22 96 %   08/19/25 1330 163/74 -- -- 71 20 94 %   08/19/25 1320 147/68 -- -- 91 (!) 29 96 %   08/19/25 1300 148/83 36.3 °C (97.3 °F) Temporal 72 23 92 %   08/19/25 1200 (!) 139/91 36.4 °C (97.5 °F) Temporal 69 22 95 %   08/19/25 1100 159/71 -- -- 69 23 90 %   08/19/25 1000 156/74 -- -- 71 22 95 %   08/19/25 0901 158/75 -- -- 73 24 92 %   08/19/25 0900 158/75 -- -- 73 (!) 30 91 %   08/19/25 0832 162/73 -- -- 72 22 91 %   08/19/25 0800 130/64 36.3 °C (97.3 °F) Temporal 72 22 93 %       Lab Results   Component Value Date    WBC 14.1 (H) 08/20/2025    HGB 7.7 (L) 08/20/2025    HCT 24.8 (L) 08/20/2025    MCV 96 08/20/2025     08/20/2025        Lab Results   Component Value Date    CREATININE 6.31 (H) 08/20/2025    BUN 61 (H) 08/20/2025     08/20/2025    K 4.2 08/20/2025     08/20/2025    CO2 28 08/20/2025        PHYSICAL EXAM  GENERAL: Awake and lethargic at times, well-developed, well-nourished, No acute distress  HEENT - NC/AT, NG tube in place  CARDIOVASCULAR: extremities warm, well perfused  RESPIRATORY: no conversational dyspnea, symmetrical chest rise, no coarse breath sounds, CTAB  ABDOMEN: Soft, non-tender, normal bowel sounds, no distention  MSK: No visible deformities or local swelling. Pain to palpation over the left costochondral junction.  SKIN: Normal color, warm, dry. Incision on midline of sternum without erythema or dehiscence.   Psych: Cooperative, affect appropriate, conversational, good-eye contact    NEURO: A&O x 4  RUE strength: 5/5  elbow flexors, 5/5 elbow extensors, 5/5 wrist extensors, 5/5 finger flexors, 5/5 finger abductors   LUE strength: 5/5 elbow flexors, 5/5 elbow extensors, 5/5 wrist extensors, 5/5 finger flexors, 5/5 finger abductors   RLE strength: 5-/5 hip flexors, 5/5 knee extensors, 5/5 ankle dorsiflexors, 5/5 EHL, 5/5 ankle plantar flexors  LLE strength: 5-/5 hip flexors, 5/5 knee extensors, 5/5 ankle dorsiflexors, 5/5 EHL, 5/5 ankle plantar flexors  Sensation - intact to light touch in bilateral upper and lower extremities.   Reflexes - 2+ biceps, 2+ brachioradialis, 2+ patellar and 0 Achilles reflexes bilaterally    IMPRESSION:  Colin Leonard is a 59 y.o. 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      Recommendations:  # Cerebellar Watershed Infarct 2/2 Basilar Artery Stenosis  - DAPT for 90 days then monotherapy  - Spasticity: None appreciated at this time.  -  Secondary stroke prevention including smoking cessation, close glucose and BP controlled if applicable  - PO: NPO with tube feeds; MBSS planned for 8/21  - DVT prophylaxis with heparin 5,000u q8h.     # CABGx2  # Post-op afib  # HTN  # Diastolic CHF  - TTE 8/14: LVEF 55-60%, moderate-severe aortic valve calcification with stenosis   - Continue amiodarone 400mg daily, metoprolol tartrate 12.5mg bid, DAPT daily, atorvastatin and zetia  - Continue to hold home coreg, torsemide and hydralazine     # TEJA on CKD stage V  - Initially requiring CVVH and SLED, now tolerating iHD since 8/18  - Continue IV bumex 2mg q8h  - Per IR- will not place tunneled line until patient on RNF    #Leukocytosis of Undetermined Etiology  - WBC persistently elevated since 8/14 (range from 12-16); 14.1 on 8/20  - Remains afebrile  - Negative blood cultures and urine cultures negative  - Treated with zosyn for 5 days; DC on 8/19    # Pain  - Currently: tylenol 650mg q6h and lidocaine patch over left anterior chest  - Continue to  monitor and adjust regimen as needed     # Neurogenic Bladder:    - Thornton cath in place since 8/15  - Please Stat lock thornton to proximal medial thigh or lower abdomen to prevent bladder neck trauma.  - Recommend removing thornton cath and begin intermittent straight cath q4hrs w/ goal of cath volumes <500mL  - If thornton removed, check for urinary retention with bladder scans q4h and straight cath for any volume >400cc.  - If fails initial TOV, recommend starting Tamsulosin 0.4mg daily for urinary retention for at least 5 days prior to next TOV attempt.     # Neurogenic Bowel:  - Last BM: 8/19 x3-4  - Currently: miralax daily and diane-colace 2 tabs nightly  - Recommend holding miralax due to multiple bowel movements per day the last couple days  - Goal of one BM daily.  - GI stress ppx when NPO.    # Impaired mobility and Impaired independence with ADLs and I/ADLs  - Continue PT, working on bed mobility, transfers, balance, endurance, strength, gait, eval for appropriate assistive device  - Continue OT, working on functional cognition, functional mobility, upper limb strength/coordination, balance, endurance, eval for appropriate adaptive equipment, ADLs, and I/ADLs.    # Dispo  - Patient would likely benefit from high intensity therapy at discharge at an acute rehab facility. However, this patient is not medically stable enough at this time to officially recommend acute rehab level therapy at discharge. As long as patient continues to improve medically and continues to do well in PT/OT/SLP, would likely consider recommending acute rehab at discharge. We will continue to follow for official recommendations when medically stable.   - Agree with MBSS on 8/21 to see if diet can be advanced  - For questions, please contact via [employer]      We will follow along with you. Thank you for the consult.    Elly Ruiz, DO  Physical Medicine and Rehabilitation PGY-3  Available via [employer]    Supervisory note:  Seen with Dr Haskins  Sara, resident.  I saw and evaluated the patient. I personally obtained the key and critical portions of the history and physical exam. I reviewed the resident's documentation and discussed the patient with the resident. I agree with the resident's medical decision making as documented in the resident's note.     Subcortical/basilar infarct after CABG, no focal deficit.   We'll need to talk with Son / caregivers when closer to d/c.  He's so weak/debilitated that I think he'll need 24hr supervision even after our typical 10-14 day stay at rehab, so may be more appropriate for SNF-level rehab. But we really try to accept stroke patients (60% rule compliant diagnoses) so we'll follow and re-evaluate for acute rehab as status improves.        Mahendra Dickey M.D, FAAPMR, R-MSK  Chief, Division of PM&R  Board Certified in PM&R, Sports Medicine and Musculoskeletal Ultrasound

## 2025-08-19 NOTE — PROGRESS NOTES
Communication Note    Patient Name: Colin Leonard  MRN: [id number]  Today's Date: 8/19/2025   Room: 15/15A    Discipline: Physical Therapy      PT Missed Visit: Yes  Missed Visit Reason:  (PT intervention attempted, patient currently receiving bedside HD.  Will monitor and follow up as appropriate.)      08/19/25 at 2:40 PM   Fermin Nolasco, PT   Rehab Office: [phone]

## 2025-08-19 NOTE — PROGRESS NOTES
Colin Leonard is a 59 y.o. male on day 13 of admission presenting with Atherosclerosis of native coronary artery of native heart without angina pectoris.      Subjective   Had HD Tolerated           Objective        Vitals 24HR  Heart Rate:  []   Temp:  [36 °C (96.8 °F)-36.4 °C (97.5 °F)]   Resp:  [11-30]   BP: (123-166)/()   SpO2:  [90 %-100 %]   Oxygen Therapy  Pulse Ox (24 hr min): 90  Medical Gas Therapy: Supplemental oxygen  Medical Gas Delivery Method: Nasal cannula  O2 Flow Rate (L/min): 6 L/min FiO2 (%): 40 %    Intake/Output last 3 Shifts:    Intake/Output Summary (Last 24 hours) at 8/19/2025 1137  Last data filed at 8/19/2025 1100  Gross per 24 hour   Intake 790 ml   Output 1215 ml   Net -425 ml       Physical Exam  On NC  Comfortable  Sleepy but responsive  Median Stenotomy  Symmetrical chest expansion  No JVD  Trace LL Edema    Relevant Results  Lab Results   Component Value Date    WBC 14.7 (H) 08/19/2025    HGB 8.5 (L) 08/19/2025    HCT 26.6 (L) 08/19/2025    MCV 94 08/19/2025     08/19/2025     Lab Results   Component Value Date    GLUCOSE 111 (H) 08/19/2025    CALCIUM 8.9 08/19/2025     08/19/2025    K 4.3 08/19/2025    CO2 26 08/19/2025    CL 99 08/19/2025    BUN 80 (H) 08/19/2025    CREATININE 7.73 (H) 08/19/2025         Assessment & Plan  Atherosclerosis of native coronary artery of native heart without angina pectoris    ESRD (end stage renal disease) (Multi)    Chronic renal insufficiency    Pneumonia    Coronary artery disease involving native heart, unspecified vessel or lesion type, unspecified whether angina present    Mr. Leonard is a 59M with history of CKD5, CAD s/p CABG on 8/6, and developed post-surgery TEJA on CKD requiring dialysis initiation. Course c/b basilar stroke, No intervention, Neurology following. Nephrology following for Dialysis care.    #CKDV/ESKD  - Cr on admission 9.4  - Etiology of CKD is not clear.  - Received CVVH 8/6-8/7  - SLED 8/7pm  -  CVVH resumed 8/9-8/11  - Access: Right Subclavian  - New Temp Dialysis Catheter then iHD 8/18    #Azotemia, No evidence of Uremia  - Tube Feeds as well as Renal Impairment    #Anemia  - In part due to CKD  - Tsat 17%    #Possible Stroke 8/7 (Basilar Artery occlusion?), Imaging showed no infarction, no interventions    #Urine Retention, Elmore inserted    RECOMMENDATIONS:  - No indication for RRT on assessment  - Tentative plan for iHD MW  - Please ask SW to Arrange for iHD outpatient   - Can stop Lokelma and medical therapies once iHD is started  - Diuretics for Volume Control  - Replace Iron once Infection is controlled      Babita Edmondson MD  PGY-5 Nephrology Fellow

## 2025-08-19 NOTE — CARE PLAN
The patient's goals for the shift include        Problem: Pain - Adult  Goal: Verbalizes/displays adequate comfort level or baseline comfort level  Outcome: Progressing     Problem: Safety - Adult  Goal: Free from fall injury  Outcome: Progressing     Problem: Chronic Conditions and Co-morbidities  Goal: Patient's chronic conditions and co-morbidity symptoms are monitored and maintained or improved  Outcome: Progressing     Problem: Nutrition  Goal: Nutrient intake appropriate for maintaining nutritional needs  Outcome: Progressing     Problem: Skin  Goal: Decreased wound size/increased tissue granulation at next dressing change  Outcome: Progressing  Goal: Participates in plan/prevention/treatment measures  Outcome: Progressing  Goal: Prevent/manage excess moisture  Outcome: Progressing  Goal: Prevent/minimize sheer/friction injuries  Outcome: Progressing  Goal: Promote/optimize nutrition  Outcome: Progressing  Goal: Promote skin healing  Outcome: Progressing     Problem: General Stroke  Goal: No symptoms of aspiration throughout shift  Outcome: Progressing  Goal: No symptoms of hemorrhage throughout shift  Outcome: Progressing  Goal: Tolerate enteral feeding throughout shift  Outcome: Progressing  Goal: Decreased nausea/vomiting throughout shift  Outcome: Progressing  Goal: Controlled blood glucose throughout shift  Outcome: Progressing     Problem: ESRD: Dialysis, CAPD  Goal: Electrolytes restored to baseline  Outcome: Progressing  Goal: Fatigue of chronic illness managed  Outcome: Not Progressing  Goal: Follows dialysis treatment plan  Outcome: Progressing  Goal: Follows fluid restrictions  Outcome: Progressing       Problem: ESRD: Dialysis, CAPD  Goal: Fatigue of chronic illness managed  Outcome: Not Progressing

## 2025-08-19 NOTE — CARE PLAN
Problem: Pain - Adult  Goal: Verbalizes/displays adequate comfort level or baseline comfort level  8/18/2025 2015 by Paulina Enrique RN  Outcome: Progressing  8/18/2025 1351 by Paulina Enrique RN  Outcome: Progressing     Problem: Safety - Adult  Goal: Free from fall injury  8/18/2025 2015 by Paulina Enrique RN  Outcome: Progressing  8/18/2025 1351 by Paulina Enrique RN  Outcome: Progressing     Problem: Discharge Planning  Goal: Discharge to home or other facility with appropriate resources  8/18/2025 2015 by Paulina Enrique RN  Outcome: Progressing  8/18/2025 1351 by Paulina Enrique RN  Outcome: Progressing     Problem: Chronic Conditions and Co-morbidities  Goal: Patient's chronic conditions and co-morbidity symptoms are monitored and maintained or improved  8/18/2025 2015 by Paulina Enrique RN  Outcome: Progressing  8/18/2025 1351 by Paulina Enrique RN  Outcome: Progressing     Problem: Nutrition  Goal: Nutrient intake appropriate for maintaining nutritional needs  8/18/2025 2015 by Paulina Enrique RN  Outcome: Progressing  8/18/2025 1351 by Paulina Enrique RN  Outcome: Progressing     Problem: Skin  Goal: Decreased wound size/increased tissue granulation at next dressing change  8/18/2025 2015 by Paulina Enrique RN  Outcome: Progressing  8/18/2025 1351 by Paulina Enrique RN  Outcome: Progressing  Goal: Participates in plan/prevention/treatment measures  8/18/2025 2015 by Paulina Enrique RN  Outcome: Progressing  8/18/2025 1351 by Paulina Enrique RN  Outcome: Progressing  Goal: Prevent/manage excess moisture  8/18/2025 2015 by Paulina Enirque RN  Outcome: Progressing  8/18/2025 1351 by Paulina Enrique RN  Outcome: Progressing  Goal: Prevent/minimize sheer/friction injuries  8/18/2025 2015 by Paulina Enrique RN  Outcome: Progressing  8/18/2025 1351 by Paulina Enrique RN  Outcome: Progressing  Goal: Promote/optimize nutrition  8/18/2025 2015 by Paulina Enrique RN  Outcome:  Progressing  8/18/2025 1351 by Paulina Enrique RN  Outcome: Progressing  Goal: Promote skin healing  8/18/2025 2015 by Paulina Enrique RN  Outcome: Progressing  8/18/2025 1351 by Paulina Enrique RN  Outcome: Progressing     Problem: General Stroke  Goal: Establish a mutual long term goal with patient by discharge  8/18/2025 2015 by Paulina Enrique RN  Outcome: Progressing  8/18/2025 1351 by Paulina Enrique RN  Outcome: Progressing  Goal: Demonstrate improvement in neurological exam throughout the shift  8/18/2025 2015 by Paulina Enrique RN  Outcome: Progressing  8/18/2025 1351 by Paulina Enrique RN  Outcome: Progressing  Goal: Maintain BP within ordered limits throughout shift  8/18/2025 2015 by Paulina Enrique RN  Outcome: Progressing  8/18/2025 1351 by Paulina Enrique RN  Outcome: Progressing  Goal: Participate in treatment (ie., meds, therapy) throughout shift  8/18/2025 2015 by Paulina Enrique RN  Outcome: Progressing  8/18/2025 1351 by Paulina Enrique RN  Outcome: Progressing  Goal: No symptoms of aspiration throughout shift  8/18/2025 2015 by Paulina Enrique RN  Outcome: Progressing  8/18/2025 1351 by Paulina Enrique RN  Outcome: Progressing  Goal: No symptoms of hemorrhage throughout shift  8/18/2025 2015 by Paulina Enrique RN  Outcome: Progressing  8/18/2025 1351 by Paulina Enrique RN  Outcome: Progressing  Goal: Tolerate enteral feeding throughout shift  8/18/2025 2015 by Paulina Enrique RN  Outcome: Progressing  8/18/2025 1351 by Paulina Enrique RN  Outcome: Progressing  Goal: Decreased nausea/vomiting throughout shift  8/18/2025 2015 by Paulina Enrique RN  Outcome: Progressing  8/18/2025 1351 by Paulina Enrique RN  Outcome: Progressing  Goal: Controlled blood glucose throughout shift  8/18/2025 2015 by Paulina Enrique RN  Outcome: Progressing  8/18/2025 1351 by Paulina Enrique RN  Outcome: Progressing  Goal: Out of bed three times today  8/18/2025 2015 by Paulina Enrique RN  Outcome:  Progressing  8/18/2025 1351 by Paulina Enrique RN  Outcome: Progressing     Problem: ICU Stroke  Goal: Maintain ICP within ordered limits throughout shift  8/18/2025 2015 by Paulina Enrique RN  Outcome: Progressing  8/18/2025 1351 by Paulina Enrique RN  Outcome: Progressing  Goal: Tolerate EVD clamping trial throughout shift  8/18/2025 2015 by Paulina Enrique RN  Outcome: Progressing  8/18/2025 1351 by Paulina Enrique RN  Outcome: Progressing  Goal: Tolerate ventilator weaning trial during shift  8/18/2025 2015 by Paulina Enrique RN  Outcome: Progressing  8/18/2025 1351 by aPulina Enrique RN  Outcome: Progressing  Goal: Maintain patent airway throughout shift  8/18/2025 2015 by Paulina Enrique RN  Outcome: Progressing  8/18/2025 1351 by Paulina Enrique RN  Outcome: Progressing  Goal: Achieve/maintain targeted sodium level throughout shift  8/18/2025 2015 by Paulina Enrique RN  Outcome: Progressing  8/18/2025 1351 by Paulina Enrique RN  Outcome: Progressing     Problem: ESRD: Dialysis, CAPD  Goal: Electrolytes restored to baseline  8/18/2025 2015 by Paulina Enrique RN  Outcome: Progressing  8/18/2025 1351 by Paulina Enrique RN  Outcome: Progressing  Goal: Fatigue of chronic illness managed  8/18/2025 2015 by Paulina Enrique RN  Outcome: Progressing  8/18/2025 1351 by Paulina Enrique RN  Outcome: Progressing  Goal: Follows dialysis treatment plan  8/18/2025 2015 by Paulina Enrique RN  Outcome: Progressing  8/18/2025 1351 by Paulina Enrique RN  Outcome: Progressing  Goal: Follows fluid restrictions  8/18/2025 2015 by Paulina Enrique RN  Outcome: Progressing  8/18/2025 1351 by Paulina Enrique RN  Outcome: Progressing  Goal: Increase self care/family involvement  8/18/2025 2015 by Paulina Enrique RN  Outcome: Progressing  8/18/2025 1351 by Paulina Enrique RN  Outcome: Progressing  Goal: Participates in bowel regimen (CAPD)  8/18/2025 2015 by Paulina Enrique, RN  Outcome: Progressing  8/18/2025 0683  by Paulina Enrique, RN  Outcome: Progressing  Goal: Reports no abdominal pain/distension  8/18/2025 2015 by Paulina Enrique RN  Outcome: Progressing  8/18/2025 1351 by Paulina Enrique RN  Outcome: Progressing  Goal: Reports no shortness of breath  8/18/2025 2015 by Paulina Enrique RN  Outcome: Progressing  8/18/2025 1351 by Paulina Enrique RN  Outcome: Progressing

## 2025-08-19 NOTE — PROGRESS NOTES
Occupational Therapy    Occupational Therapy Treatment    Name: Colin Leonard  MRN: [id number]  : 1965  Date: 25  Room: 15/15      Time Calculation  Start Time: 832  Stop Time: 901  Time Calculation (min): 29 min    Assessment:  Barriers to Discharge Home: Physical needs, Cognition needs, Caregiver assistance  Caregiver Assistance: Caregiver assistance needed per identified barriers - however, level of patient's required assistance exceeds assistance available at home  Cognition Needs: Medication and/or medical management daily assist needed, Insight of patient limited regarding functional ability/needs  Physical Needs: Intermittent ADL assistance needed, High falls risk due to function or environment, 24hr mobility assistance needed, Stair navigation into home limited by function/safety  Evaluation/Treatment Tolerance: Patient limited by fatigue  Medical Staff Made Aware: Yes  End of Session Communication: Bedside nurse  End of Session Patient Position: Bed, 3 rail up, Alarm off, caregiver present    Plan:  Treatment Interventions: ADL retraining, Functional transfer training, UE strengthening/ROM, Endurance training, Cognitive reorientation, Patient/family training, Equipment evaluation/education, Neuromuscular reeducation, Visual perceptual retraining, Fine motor coordination activities, Compensatory technique education  OT Frequency for Current Admission: 4 times per week during this acute inpatient hospitalization (During this acute inpatient hospitalization)  OT Discharge Recommendations: High intensity level of continued care (Based on current functional status and rehab potential, patient is anticipated to tolerate and benefit from 5 or more days per week of skilled rehabilitative therapy after discharge from this acute inpatient hospitalization.)  Equipment Recommended upon Discharge:  (TBD)  OT Recommended Transfer Status: Assist of 2, Maximum assist  OT - OK to Discharge:  Yes    Subjective   General:  OT Last Visit  OT Received On: 08/19/25  Prior to Session Communication: Bedside nurse  Patient Position Received: Bed, 3 rail up, Alarm on  Family/Caregiver Present: Yes  Caregiver Feedback: Brother present at end of session  General Comment: Pt supine in bed on arrival, agreeable to participate but drowsy. On 2L NC '    Precautions:  Medical Precautions: Fall precautions, Oxygen therapy device and L/min, Cardiac precautions  Post-Surgical Precautions: Move in the Tube  Precautions Comment: SBP>120    Vitals:   Date/Time Vitals Session Patient Position Pulse Resp SpO2 BP MAP (mmHg)    08/19/25 0832 Pre OT  --  72  22  91 %  162/73  96     08/19/25 0900 --  --  73  30  91 %  158/75  96     08/19/25 0901 Post OT  --  73  24  92 %  158/75  96     08/19/25 1000 --  --  71  22  95 %  156/74  99      Lines/Tubes/Drains:  CVC 08/18/25 (Active)   Number of days: 0       Urethral Catheter Double-lumen (Active)   Number of days: 4       NG/OG/Feeding Tube Small bore feeding tube Right nostril (Active)   Number of days: 10       External Urinary Catheter (Active)   Number of days: 5       Hemodialysis Cath 08/18/25 Double lumen Right Non-tunneled catheter Subclavian (Active)   Number of days: 0       Cognition:  Overall Cognitive Status: Impaired  Orientation Level: Disoriented to place, Disoriented to time, Disoriented to situation (reports year as 2015 and place as a [facility] in [place])  Following Commands: Follows one step commands with increased time (and repetition with ~50% accuracy)  Cognition Comments: Low arousal, speaking nonsensically when verbalizing.  Insight: Severe  Impulsive: Moderately  Processing Speed: Delayed    Pain Assessment:  Pain Assessment  Pain Assessment:  (Endorses pain but unable to elaborate)     Objective   Activities of Daily Living:   Toileting  Toileting Level of Assistance: Dependent  Where Assessed: Bed level  Toileting Comments: with rolls L and R    Bed  Mobility/Transfers:   Bed Mobility  Bed Mobility: Yes  Bed Mobility 1  Bed Mobility 1: Supine to sitting  Level of Assistance 1: Maximum assistance  Bed Mobility Comments 1: HOB elevated, draw sheet, max cues for attention to task  Bed Mobility 2  Bed Mobility  2: Sitting to supine  Level of Assistance 2: Dependent, +2  Bed Mobility Comments 2: Draw sheet  Bed Mobility 3  Bed Mobility 3: Rolling right, Rolling left  Level of Assistance 3: Maximum assistance  Transfers  Transfer: No    Therapy/Activity:   Therapeutic Activity  Therapeutic Activity Performed: Yes  Therapeutic Activity 1: EOB sitting ~7 minutes. Fluctuated from min A-max A with posterior and lateral lean to left. Confused at EOB when given corrections for postural adjustments, speaking nonsensically.  Therapeutic Activity 2: Educated brother on reorientation strategies to use during visit.    Outcome Measures:  [facility] Daily Activity  Putting on and taking off regular lower body clothing: Total  Bathing (including washing, rinsing, drying): A lot  Putting on and taking off regular upper body clothing: A lot  Toileting, which includes using toilet, bedpan or urinal: Total  Taking care of personal grooming such as brushing teeth: A lot  Eating Meals: A lot  Daily Activity - Total Score: 10  ICU Mobility Screen  ICU Mobility Scale: Sitting over edge of bed     Education Documentation  Body Mechanics, taught by Elmira Hutchison OT at 8/19/2025 10:22 AM.  Learner: Family, Patient  Readiness: Acceptance  Method: Explanation, Demonstration  Response: Needs Reinforcement    Precautions, taught by Elmira Hutchison OT at 8/19/2025 10:22 AM.  Learner: Family, Patient  Readiness: Acceptance  Method: Explanation, Demonstration  Response: Needs Reinforcement    ADL Training, taught by Elmira Hutchison OT at 8/19/2025 10:22 AM.  Learner: Family, Patient  Readiness: Acceptance  Method: Explanation, Demonstration  Response: Needs  Reinforcement    Body Mechanics, taught by Elmira Hutchison OT at 8/18/2025  2:59 PM.  Learner: Patient  Readiness: Acceptance  Method: Explanation, Demonstration  Response: Needs Reinforcement    Precautions, taught by Elmira Hutchison, OT at 8/18/2025  2:59 PM.  Learner: Patient  Readiness: Acceptance  Method: Explanation, Demonstration  Response: Needs Reinforcement    ADL Training, taught by Elmira Hutchison, OT at 8/18/2025  2:59 PM.  Learner: Patient  Readiness: Acceptance  Method: Explanation, Demonstration  Response: Needs Reinforcement    Education Comments  No comments found.        Goals:  Encounter Problems       Encounter Problems (Active)       ADLs       Patient will perform UB and LB bathing with minimal level of assistance. (Not Progressing)       Start:  08/07/25    Expected End:  08/25/25            Patient with complete upper body dressing with SBA level of assistance donning and doffing all UE clothes (Not Progressing)       Start:  08/07/25    Expected End:  08/25/25            Patient with complete lower body dressing with minimal level of assistance donning and doffing all LE clothes  with PRN adaptive equipment (Not Progressing)       Start:  08/07/25    Expected End:  08/25/25            Patient will complete toileting including hygiene clothing management/hygiene with minimal level of assistance. (Not Progressing)       Start:  08/07/25    Expected End:  08/25/25               COGNITION/SAFETY       Pt will maintain MITT precautions with 100% carryover during functional tasks, ADLs, and mobility without cueing.  (Not Progressing)       Start:  08/07/25    Expected End:  08/25/25            Patient will participate in cognitive activities to demonstrate WFL score on further cognitive assessments  (Not Progressing)       Start:  08/07/25    Expected End:  08/25/25               MOBILITY       Patient will perform Functional mobility min Household distances with  moderate level of assistance and least restrictive device in order to improve safety and functional mobility. (Not Progressing)       Start:  08/07/25    Expected End:  08/25/25               TRANSFERS       Patient will perform bed mobility with minimal level of assistance in order to improve safety and independence with mobility (Not Progressing)       Start:  08/07/25    Expected End:  08/25/25            Patient will complete functional transfer to toilet with least restrictive device with moderate level of assistance. (Not Progressing)       Start:  08/07/25    Expected End:  08/25/25 08/19/25 at 10:23 AM   Elmira Hutchison, OT   [phone]

## 2025-08-19 NOTE — PROGRESS NOTES
"CTICU Progress Note  Colin Leonard/[id number]    Admit Date: 8/6/2025  Hospital Length of Stay: 13   ICU Length of Stay: 12d 23h   CT SURGEON:  Dr. Yolanda Justice    SUBJECTIVE:   No acute issues overnight. This am, patient sleepy but arousable- will answer questions on repeat prompting and can follow commands.    MEDICATIONS     Scheduled:  amiodarone, 200 mg, Daily  aspirin, 81 mg, Daily  atorvastatin, 80 mg, Nightly  bumetanide, 2 mg, q8h  clopidogrel, 75 mg, Daily  ezetimibe, 10 mg, Nightly  heparin, 5,000 Units, q8h  insulin lispro, 0-10 Units, q4h  melatonin, 6 mg, Nightly  metoprolol tartrate, 12.5 mg, BID  polyethylene glycol, 17 g, Daily  sennosides-docusate sodium, 2 tablet, Nightly  sodium chloride, 1 spray, 4x daily  tamsulosin, 0.4 mg, Daily  thiamine, 100 mg, Daily      PRN:  acetaminophen, 650 mg, q6h PRN  alteplase, 2 mg, PRN  dextrose, 12.5 g, q15 min PRN  dextrose, 25 g, q15 min PRN   Or  glucagon, 1 mg, q15 min PRN  dextrose, 25 g, q15 min PRN  glucagon, 1 mg, q15 min PRN  glucagon, 1 mg, q15 min PRN  heparin flush, 10 Units, PRN  magnesium sulfate, 2 g, q6h PRN  magnesium sulfate, 4 g, q6h PRN  naloxone, 0.2 mg, q5 min PRN  ondansetron, 4 mg, q4h PRN  oxygen, 1 Dose, Continuous - O2/gases      PHYSICAL EXAM:   Visit Vitals  /86   Pulse 67   Temp 36.3 °C (97.3 °F) (Temporal)   Resp 22   Ht 1.778 m (5' 10\")   Wt 118 kg (260 lb 9.3 oz)   SpO2 96%   BMI 37.39 kg/m²   Smoking Status Never   BSA 2.41 m²     Wt Readings from Last 5 Encounters:   08/18/25 118 kg (260 lb 9.3 oz)   08/04/25 116 kg (256 lb 12.8 oz)   07/31/25 116 kg (256 lb 11.2 oz)   07/29/25 115 kg (253 lb 6.4 oz)   07/25/25 117 kg (258 lb 1.6 oz)     INTAKE/OUTPUT:  I/O last 3 completed shifts:  In: 980 (8.3 mL/kg) [I.V.:400 (3.4 mL/kg); Other:200; NG/GT:330; IV Piggyback:50]  Out: 1875 (15.9 mL/kg) [Urine:1475 (0.3 mL/kg/hr); Other:400]  Weight: 118.2 kg      Physical Exam:   - CONSTITUTION: middle aged man laying in bed  - Neuro: " Somnolent but arousable. Followed command in all extremities. CN grossly intact.   - CARDIOVASCULAR: Normal rate and rhythm. No epicardial wires  - RESPIRATORY: Diminished. Intermittently tachypneic.   - GI: obese, soft non distended. Dobhoff in place  - : thornton in place with yellow urine  - EXTREMITIES: warm, well perfused. No edema  - SKIN: cool and dry  - PSYCHIATRIC: somnolent, less agitated    Daily Risk Screen  Thornton: yes 08/15 replaced for incontinence and concern for skin breakdown    Assessment/Plan   Assessment:  Colin Leonard is a 59 y.o. male hx of HTN, CKD stage 5, chronic anemia, type 2 diabetes, previous c diff colitis admitted 5/19-5/24 and treated for atypical pneumonia. That admission- found to have elevated trop and BNP, as well as episodes of VT which required increased dose of coreg. Underwent LHC 5/22/25 which demonstrated MVCAD; decision made to discharge and then bring back for CABG after recovering from that admission. He is now s/p CABGx 2 with Dr Yolanda Justice on 8/6/25. Postop course c/b unresponsive episode in setting of hypotension (BAT called 8/8)- likely 2/2 newly diagnosed bilateral vertebral and basilar artery stenosis, pneumothorax, and TEJA on CKD requiring dialysis.     Plan:  NEURO: Hx of insomnia. 8/8 BAT called for unresponsive episode I/s/o hypotension while in afib, found to have bilateral vertebral and basilar artery stenosis. CT showed diffusion hypoperfusion in posterior fossa and watershed cerebral hemispheres but no core infarct. Neuro exam improved with permissive HTN. Neurology consulted. Since that time, has had multifactorial delirium/encephalopathy. Today, hypoactive- less agitated and more somnolent. Acceptable pain control. B12, TSH, folate, ammonia collected, ruled out metabolic sources of confusion. -->  - Serial neuro and pain assessments   - Avoid hypotension, maintaining SBP at least >120  - PRN tylenol   - Stop trazodone, concern for lingering sedation in  daytime  - If agitated at night, can use low dose Precedex infusion  - Goal to improve sleep/wake cycle hygiene  - PT Consult, OOBTC as tolerated. Encourage PT during daytime  - CAM ICU score qshift  - Will need neurology follow-up outpatient follow-up 1 month post discharge with Dr. Dior   - For stroke prevention: continue DAPT at least 90 days, tentative plan to transition to plavix monotherapy but would defer to outpatient stroke neurologist .       CV: CAD, HTN SVT/NSVT, Chronic diastolic HF stage II on admission 5/2025 requiring up titration of coreg. S/p CABG x2 8/6. Baseline -180 per family. Episode of postop afib in CTICU- converted with amiodarone. Hypertensive overnight, SBP 150s-160s. A/V epicardial wires cut 8/8 for MRI. TTE 8/14: LVEF 55-60%, moderate-severe aortic valve calcification with stenosis. -->  - Amiodarone, completed 5g load, continue 400 mg PO daily.   - Continue metop 12.5mg BID. Not uptitrating to avoid hypotension (maintain SBP>120).  - Will need outpatient structural heart appointment for TAVR eval (severe aortic stenosis)  - Continue ASA/plavix daily   - Atorvastatin 80mg/ Zetia 10mg nightly   - Holding home coreg, torsemide and hydralazine     PULM: Recent PNA treated outpatient with Augmentin. Reintubated 8/8 in setting of neuro decompensation. Extubated 8/10. R pleural chest tube placed 8/8 for pneumothorax, removed 8/10. CXR 8/18 with persistent pulmonary edema, rosas pleural effusion- similar to previous. On 2L NC today. -->  - CXR when necessary  - IS q1h and OOB to chair   - Continue diuresis: Bumex 2 mg q 8 hrs  - Bronchial hygiene      GI: H/o c diff colitis, treated 3/25. Previously passed SLP exam with modified diet after FEES, however episodes of somnolence since with episodes of coughing concerning for aspiration. Having regular Bms- multiple in last 24 hrs. -->  - SLP evaluation 8/18: oropharyngeal dysphagia- tolerating therapeutic trials of safe consistencies,  however, recommend NPO while having somnolence I/s/o already compromised swallowing  - TF to continuous: @ 40 ml/hr now  - Holding colace/senna BID, miralax BID with recent diarrhea.    : Hx of BPH, painful thornton placement. CSA-TEJA Risk Score med.  Baseline CKD V, baseline Cr ~7. Additional TEJA this admission requiring CVVH and SLED, now s/p first dialysis session 8/18 with removal of ~400mL. Making appropriate urine on bumex. Baseline BUN ~80-90s while outpatient. Thornton replaced 8/15 iso incontinence, concern for skin breakdown. -->  - continue tamsulosin  - Nephrology following: appreciate recs    - Continue bumex to 2mg q8h unless change in nephrology recommendation  - First iHD session 8/18 via non-tunneled subclavian dialysis line.   - Per IR- will not place tunneled line until patient on floor- reach back out when transferred to floor  - Replete electrolytes per CTICU protocol, hold K     ENDO: T2DM- no home meds. A1c: 5.4. Acceptable glycemic control. -->  - Maintain BG <180  - continue SSI q4h     HEME: Anemia of chronic disease. Acute on chronic blood loss anemia, stable (Hgb 8.5 today) -->    - CBC daily  - ASA/Plavix daily   - SQH, SCDs for DVT prophylaxis  - Last type and screen: 8/18     ID: MRSA negative. Periop cefazolin completed. Afebrile but uptrending leukocytosis. Treated with zosyn x5d after worsened mental status. No improvement in leukocytosis, Blood cultures, urine cultures negative. -->  - Trend temp q4h  - Discontinue Zosyn today     Skin: Leg wound noted on admission, photo in chart.   - Wound care following  - preventative Mepilex dressings in place on sacrum and heels  - change preventative Mepilex weekly or more frequently as indicated (when moist/soiled)   - every shift skin assessment per nursing and weekly ICU skin rounds  - moisture barrier to be applied with diane care  - active skin problems addressed with nursing on daily rounds     Proph:  SCDs/SQH     F: Family: updated at  bedside    G:  Lines  Elmore placed 08/15  22g PIV  16g PIV     A,B,C,D,E,F,G: reviewed    Dispo: CTICU care for now     Lianet Gonzalez MD  CTICU TEAM PHONE [phone]

## 2025-08-20 ASSESSMENT — COGNITIVE AND FUNCTIONAL STATUS - GENERAL
HELP NEEDED FOR BATHING: A LOT
STANDING UP FROM CHAIR USING ARMS: A LOT
DAILY ACTIVITIY SCORE: 12
DRESSING REGULAR LOWER BODY CLOTHING: A LOT
EATING MEALS: A LITTLE
MOVING TO AND FROM BED TO CHAIR: A LOT
PERSONAL GROOMING: A LITTLE
MOBILITY SCORE: 12
STANDING UP FROM CHAIR USING ARMS: A LOT
TURNING FROM BACK TO SIDE WHILE IN FLAT BAD: A LOT
MOVING TO AND FROM BED TO CHAIR: A LOT
DAILY ACTIVITIY SCORE: 13
HELP NEEDED FOR BATHING: A LOT
CLIMB 3 TO 5 STEPS WITH RAILING: TOTAL
TURNING FROM BACK TO SIDE WHILE IN FLAT BAD: A LOT
DRESSING REGULAR UPPER BODY CLOTHING: A LOT
HELP NEEDED FOR BATHING: A LOT
TOILETING: A LOT
WALKING IN HOSPITAL ROOM: A LOT
DRESSING REGULAR LOWER BODY CLOTHING: A LOT
CLIMB 3 TO 5 STEPS WITH RAILING: TOTAL
EATING MEALS: A LOT
STANDING UP FROM CHAIR USING ARMS: A LOT
DRESSING REGULAR LOWER BODY CLOTHING: A LOT
WALKING IN HOSPITAL ROOM: TOTAL
TOILETING: A LOT
MOBILITY SCORE: 10
DRESSING REGULAR UPPER BODY CLOTHING: A LOT
DAILY ACTIVITIY SCORE: 12
MOVING FROM LYING ON BACK TO SITTING ON SIDE OF FLAT BED WITH BEDRAILS: A LITTLE
DRESSING REGULAR UPPER BODY CLOTHING: A LOT
MOVING FROM LYING ON BACK TO SITTING ON SIDE OF FLAT BED WITH BEDRAILS: A LITTLE
EATING MEALS: A LOT
MOBILITY SCORE: 12
MOVING FROM LYING ON BACK TO SITTING ON SIDE OF FLAT BED WITH BEDRAILS: A LOT
WALKING IN HOSPITAL ROOM: A LOT
TURNING FROM BACK TO SIDE WHILE IN FLAT BAD: A LOT
MOVING TO AND FROM BED TO CHAIR: A LOT
TOILETING: TOTAL
CLIMB 3 TO 5 STEPS WITH RAILING: TOTAL
PERSONAL GROOMING: A LOT
PERSONAL GROOMING: A LOT

## 2025-08-20 ASSESSMENT — PAIN DESCRIPTION - DESCRIPTORS: DESCRIPTORS: ACHING

## 2025-08-20 ASSESSMENT — PAIN SCALES - GENERAL
PAINLEVEL_OUTOF10: 8
PAINLEVEL_OUTOF10: 8
PAINLEVEL_OUTOF10: 0 - NO PAIN
PAINLEVEL_OUTOF10: 3
PAINLEVEL_OUTOF10: 8
PAINLEVEL_OUTOF10: 5 - MODERATE PAIN

## 2025-08-20 ASSESSMENT — PAIN - FUNCTIONAL ASSESSMENT
PAIN_FUNCTIONAL_ASSESSMENT: 0-10

## 2025-08-20 NOTE — PROGRESS NOTES
"CTICU Progress Note  Colin Leonard/[id number]    Admit Date: 8/6/2025  Hospital Length of Stay: 14   ICU Length of Stay: 13d 19h   CT SURGEON:  Dr. Yolanda Justice    SUBJECTIVE:   No acute issues overnight. This am, Aox3, following commands, OOB in a chair and stood up with PT, took some steps.  MEDICATIONS     Scheduled:  amiodarone, 200 mg, Daily  aspirin, 81 mg, Daily  atorvastatin, 80 mg, Nightly  bumetanide, 2 mg, q8h  clopidogrel, 75 mg, Daily  ezetimibe, 10 mg, Nightly  heparin, 5,000 Units, q8h  insulin lispro, 0-10 Units, q4h  melatonin, 6 mg, Nightly  metoprolol tartrate, 12.5 mg, BID  polyethylene glycol, 17 g, Daily  sennosides-docusate sodium, 2 tablet, Nightly  sodium chloride, 1 spray, 4x daily  tamsulosin, 0.4 mg, Daily  thiamine, 100 mg, Daily      PRN:  acetaminophen, 650 mg, q6h PRN  alteplase, 2 mg, PRN  dextrose, 12.5 g, q15 min PRN  dextrose, 25 g, q15 min PRN   Or  glucagon, 1 mg, q15 min PRN  dextrose, 25 g, q15 min PRN  glucagon, 1 mg, q15 min PRN  glucagon, 1 mg, q15 min PRN  heparin flush, 10 Units, PRN  magnesium sulfate, 2 g, q6h PRN  magnesium sulfate, 4 g, q6h PRN  naloxone, 0.2 mg, q5 min PRN  ondansetron, 4 mg, q4h PRN  oxygen, 1 Dose, Continuous - O2/gases      PHYSICAL EXAM:   Visit Vitals  /71   Pulse 67   Temp 36 °C (96.8 °F) (Temporal)   Resp 20   Ht 1.778 m (5' 10\")   Wt 118 kg (260 lb 9.3 oz)   SpO2 93%   BMI 37.39 kg/m²   Smoking Status Never   BSA 2.41 m²     Wt Readings from Last 5 Encounters:   08/18/25 118 kg (260 lb 9.3 oz)   08/04/25 116 kg (256 lb 12.8 oz)   07/31/25 116 kg (256 lb 11.2 oz)   07/29/25 115 kg (253 lb 6.4 oz)   07/25/25 117 kg (258 lb 1.6 oz)     INTAKE/OUTPUT:  I/O last 3 completed shifts:  In: 1869.7 (15.8 mL/kg) [I.V.:694.7 (5.9 mL/kg); Other:400; NG/GT:775]  Out: 1775 (15 mL/kg) [Urine:975 (0.2 mL/kg/hr); Other:800]  Weight: 118.2 kg      Physical Exam:   - CONSTITUTION: middle aged man laying in bed  - Neuro: AOx3. Followed command in all " extremities. CN grossly intact.   - CARDIOVASCULAR: Normal rate and rhythm. No epicardial wires  - RESPIRATORY: Diminished. Intermittently tachypneic.   - GI: obese, soft non distended. Dobhoff in place  - : thornton in place with yellow urine  - EXTREMITIES: warm, well perfused. No edema  - SKIN: cool and dry  - PSYCHIATRIC: less agitated    Daily Risk Screen  Thornton: yes 08/15 replaced for incontinence and concern for skin breakdown    Assessment/Plan   Assessment:  Colin Leonard is a 59 y.o. male hx of HTN, CKD stage 5, chronic anemia, type 2 diabetes, previous c diff colitis admitted 5/19-5/24 and treated for atypical pneumonia. That admission- found to have elevated trop and BNP, as well as episodes of VT which required increased dose of coreg. Underwent LHC 5/22/25 which demonstrated MVCAD; decision made to discharge and then bring back for CABG after recovering from that admission. He is now s/p CABGx 2 with Dr Yolanda Justice on 8/6/25. Postop course c/b unresponsive episode in setting of hypotension (BAT called 8/8)- likely 2/2 newly diagnosed bilateral vertebral and basilar artery stenosis, pneumothorax, and TEJA on CKD requiring dialysis.     Plan:  NEURO: Hx of insomnia. 8/8 BAT called for unresponsive episode I/s/o hypotension while in afib, found to have bilateral vertebral and basilar artery stenosis. CT showed diffusion hypoperfusion in posterior fossa and watershed cerebral hemispheres but no core infarct. Neuro exam improved with permissive HTN. Neurology consulted. Since that time, has had multifactorial delirium/encephalopathy. Today, hypoactive- less agitated. Acceptable pain control -->  - Serial neuro and pain assessments   - Avoid hypotension, maintaining SBP at least >120  - PRN tylenol   - Stop trazodone, concern for lingering sedation in daytime  - If agitated at night, can use low dose Precedex infusion  - Goal to improve sleep/wake cycle hygiene  - PT Consult, OOBTC as tolerated. Encourage PT  during daytime  - CAM ICU score qshift  - Will need neurology follow-up outpatient follow-up 1 month post discharge with Dr. Dior   - For stroke prevention: continue DAPT at least 90 days, tentative plan to transition to plavix monotherapy but would defer to outpatient stroke neurologist.       CV: CAD, HTN SVT/NSVT, Chronic diastolic HF stage II on admission 5/2025 requiring up titration of coreg. S/p CABG x2 8/6. Baseline -180 per family. Episode of postop afib in CTICU- converted with amiodarone. Hypertensive overnight, SBP 150s-160s. A/V epicardial wires cut 8/8 for MRI. TTE 8/14: LVEF 55-60%, moderate-severe aortic valve calcification with stenosis. -->  - Amiodarone, completed 5g load, continue 200 mg PO daily.   - Continue metop 12.5mg BID. Not uptitrating to avoid hypotension (maintain SBP>120).  - Will need outpatient structural heart appointment for TAVR eval (severe aortic stenosis)  - Continue ASA/plavix daily   - Atorvastatin 80mg/ Zetia 10mg nightly   - Holding home coreg, torsemide and hydralazine     PULM: Recent PNA treated outpatient with Augmentin. Reintubated 8/8 in setting of neuro decompensation. Extubated 8/10. R pleural chest tube placed 8/8 for pneumothorax, removed 8/10. CXR 8/18 with persistent pulmonary edema, rosas pleural effusion- similar to previous. On 2L NC today. -->  - CXR when necessary  - IS q1h and OOB to chair   - Continue diuresis: PO Bumex 2 mg q 8 hrs  - Bronchial hygiene      GI: H/o c diff colitis, treated 3/25. Previously passed SLP exam with modified diet after FEES, however episodes of somnolence since with episodes of coughing concerning for aspiration. Having regular Bms- multiple in last 24 hrs. -->  - SLP evaluation 8/18: oropharyngeal dysphagia- tolerating therapeutic trials of safe consistencies, however, recommend NPO while having somnolence I/s/o already compromised swallowing  - TF to continuous: @ 40 ml/hr now  - Holding colace/senna BID, miralax BID  with recent diarrhea.  - MBS tomorrow to eval for dysphagia      : Hx of BPH, painful thornton placement. CSA-TEJA Risk Score med.  Baseline CKD V, baseline Cr ~7. Additional TEJA this admission requiring CVVH and SLED, now s/p first dialysis session 8/18 with removal of ~400mL. Making appropriate urine on bumex. Baseline BUN ~80-90s while outpatient. Thornton replaced 8/15 iso incontinence, concern for skin breakdown. -->  - continue tamsulosin  - Nephrology following: appreciate recs    - Continue PO bumex to 2mg q8h unless change in nephrology recommendation  - First iHD session 8/18 via non-tunneled subclavian dialysis line.   - Per IR- will not place tunneled line until patient on floor- reach back out when transferred to floor  - Replete electrolytes per CTICU protocol, hold K     ENDO: T2DM- no home meds. A1c: 5.4. Acceptable glycemic control. -->  - Maintain BG <180  - continue SSI q4h     HEME: Anemia of chronic disease. Acute on chronic blood loss anemia, stable (Hgb 8.5 today) -->    - CBC daily  - ASA/Plavix daily   - SQH, SCDs for DVT prophylaxis  - Last type and screen: 8/18     ID: MRSA negative. Periop cefazolin completed. Afebrile but uptrending leukocytosis. Treated with zosyn x5d after worsened mental status. No improvement in leukocytosis, Blood cultures, urine cultures negative. -->  - Trend temp q4h     Skin: Leg wound noted on admission, photo in chart.   - Wound care following  - preventative Mepilex dressings in place on sacrum and heels  - change preventative Mepilex weekly or more frequently as indicated (when moist/soiled)   - every shift skin assessment per nursing and weekly ICU skin rounds  - moisture barrier to be applied with diane care  - active skin problems addressed with nursing on daily rounds     Proph:  SCDs/SQH     F: Family: updated at bedside    G:  Lines  Thornton placed 08/15  22g PIV  16g PIV     A,B,C,D,E,F,G: reviewed    Dispo: CTICU care for now     Vasrha Gutierrez MD  CTICU  TEAM PHONE [phone]

## 2025-08-20 NOTE — PROGRESS NOTES
Occupational Therapy    Occupational Therapy Treatment    Name: Colin Leonard  MRN: [id number]  : 1965  Date: 25  Room: 15/15      Time Calculation  Start Time: 911  Stop Time: 944  Time Calculation (min): 33 min    Assessment:  Barriers to Discharge Home: Physical needs, Cognition needs, Caregiver assistance  Caregiver Assistance: Caregiver assistance needed per identified barriers - however, level of patient's required assistance exceeds assistance available at home  Cognition Needs: Medication and/or medical management daily assist needed, Insight of patient limited regarding functional ability/needs  Physical Needs: Intermittent ADL assistance needed, High falls risk due to function or environment, 24hr mobility assistance needed, Stair navigation into home limited by function/safety  Evaluation/Treatment Tolerance: Patient tolerated treatment well  Medical Staff Made Aware: Yes  End of Session Communication: Bedside nurse  End of Session Patient Position: Up in chair, Alarm on    Plan:  Treatment Interventions: ADL retraining, Functional transfer training, UE strengthening/ROM, Endurance training, Cognitive reorientation, Patient/family training, Equipment evaluation/education, Neuromuscular reeducation, Visual perceptual retraining, Fine motor coordination activities, Compensatory technique education  OT Frequency for Current Admission: 4 times per week during this acute inpatient hospitalization (During this acute inpatient hospitalization)  OT Discharge Recommendations: High intensity level of continued care (Based on current functional status and rehab potential, patient is anticipated to tolerate and benefit from 5 or more days per week of skilled rehabilitative therapy after discharge from this acute inpatient hospitalization.)  Equipment Recommended upon Discharge:  (TBD)  OT Recommended Transfer Status: Assist of 2, Maximum assist  OT - OK to Discharge: Yes    Subjective    General:  OT Last Visit  OT Received On: 08/20/25  Co-Treatment:  (partial co tx for mobility 2/2 AMPAC<10)  Prior to Session Communication: Bedside nurse  Patient Position Received:  (EOB with PT)  Family/Caregiver Present: No  General Comment: Pt seated EOB on arrival, agreeable to participate. On 4 L NC.     Precautions:  Medical Precautions: Fall precautions, Cardiac precautions, Oxygen therapy device and L/min  Post-Surgical Precautions: Move in the Tube  Precautions Comment: SBP>120    Vitals:   08/20/25 0911 08/20/25 0928 08/20/25 0944   Vital Signs   Vitals Session Pre OT  --  Post OT   Heart Rate 69 70 69   Resp 23  --  18   SpO2 97 % 94 % 97 %   /71 150/75 144/81   MAP (mmHg) 94  --  99     Lines/Tubes/Drains:  CVC 08/18/25 (Active)   Number of days: 1       Urethral Catheter Double-lumen (Active)   Number of days: 5       NG/OG/Feeding Tube Small bore feeding tube Right nostril (Active)   Number of days: 11       External Urinary Catheter (Active)   Number of days: 6       Hemodialysis Cath 08/18/25 Double lumen Right Non-tunneled catheter Subclavian (Active)   Number of days: 1       Cognition:  Overall Cognitive Status: Impaired  Orientation Level:  (Following being reoriented by PT, pt oriented x3. Disoriented to situation and reports not being able to recall past couple weeks.)  Following Commands: Follows one step commands with increased time  Cognition Comments: Drowsy, but much improved from previous sessions. In chair, pt scored a 8/28 on SBT, indicating questionable cogntiive impairment.  Sustained Attention: Impaired  Memory: Exceptions to WFL  Short-Term Memory: Impaired  Working Memory: Impaired  Processing Speed: Delayed    Pain Assessment:  Pain Assessment  Pain Assessment: 0-10  0-10 (Numeric) Pain Score: 8  Pain Location: Chest  Pain Orientation: Left  Pain Interventions: Repositioned, Ambulation/increased activity, Distraction, Therapeutic presence  Response to Interventions:  Resting quietly     Objective   Activities of Daily Living:   Grooming  Grooming Level of Assistance: Setup  Grooming Where Assessed: Chair  Grooming Comments: Oral care seated in chair. Cues for sequencing and termination     Bed Mobility/Transfers:   Bed Mobility  Bed Mobility: No  Transfers  Transfer: Yes  Transfer 1  Transfer From 1: Sit to, Stand to  Transfer to 1: Stand, Sit  Transfer Level of Assistance 1: Moderate assistance, +2  Trials/Comments 1: x1 trial with arm in arm assist, x1 trial with walker with cues for hand placement and postural adjustments. Lateral lean to left noted. Poor eccentric control with return to sitting  Transfers 2  Transfer From 2: Bed to  Transfer to 2: Chair with arms  Transfer Level of Assistance 2: Moderate assistance, +2  Trials/Comments 2: cues for posture      Therapy/Activity:   Therapeutic Activity  Therapeutic Activity Performed: Yes  Therapeutic Activity 1: EOB sitting ~5 minutes with CS for safety. Lateral lean left present  Therapeutic Activity 2: Forward/backward steps with FWW and min A x2 with cues for posture    Outcome Measures:  [facility] Daily Activity  Putting on and taking off regular lower body clothing: A lot  Bathing (including washing, rinsing, drying): A lot  Putting on and taking off regular upper body clothing: A lot  Toileting, which includes using toilet, bedpan or urinal: Total  Taking care of personal grooming such as brushing teeth: A little  Eating Meals: A little  Daily Activity - Total Score: 13  ICU Mobility Screen  ICU Mobility Scale: Transferring bed to chair     Education Documentation  Body Mechanics, taught by Elmira Hutchison OT at 8/20/2025 12:07 PM.  Learner: Patient  Readiness: Acceptance  Method: Explanation, Demonstration  Response: Needs Reinforcement    Precautions, taught by Elmira Hutchison OT at 8/20/2025 12:07 PM.  Learner: Patient  Readiness: Acceptance  Method: Explanation, Demonstration  Response: Needs  Reinforcement    ADL Training, taught by Elmira Hutchison OT at 8/20/2025 12:07 PM.  Learner: Patient  Readiness: Acceptance  Method: Explanation, Demonstration  Response: Needs Reinforcement    Education Comments  No comments found.        Goals:  Encounter Problems       Encounter Problems (Active)       ADLs       Patient will perform UB and LB bathing with minimal level of assistance. (Progressing)       Start:  08/07/25    Expected End:  08/25/25            Patient with complete upper body dressing with SBA level of assistance donning and doffing all UE clothes (Progressing)       Start:  08/07/25    Expected End:  08/25/25            Patient with complete lower body dressing with minimal level of assistance donning and doffing all LE clothes  with PRN adaptive equipment (Progressing)       Start:  08/07/25    Expected End:  08/25/25            Patient will complete toileting including hygiene clothing management/hygiene with minimal level of assistance. (Progressing)       Start:  08/07/25    Expected End:  08/25/25               COGNITION/SAFETY       Pt will maintain MITT precautions with 100% carryover during functional tasks, ADLs, and mobility without cueing.  (Progressing)       Start:  08/07/25    Expected End:  08/25/25            Patient will participate in cognitive activities to demonstrate WFL score on further cognitive assessments  (Progressing)       Start:  08/07/25    Expected End:  08/25/25               MOBILITY       Patient will perform Functional mobility min Household distances with moderate level of assistance and least restrictive device in order to improve safety and functional mobility. (Progressing)       Start:  08/07/25    Expected End:  08/25/25               TRANSFERS       Patient will perform bed mobility with minimal level of assistance in order to improve safety and independence with mobility (Progressing)       Start:  08/07/25    Expected End:  08/25/25             Patient will complete functional transfer to toilet with least restrictive device with moderate level of assistance. (Progressing)       Start:  08/07/25    Expected End:  08/25/25 08/20/25 at 12:08 PM   Elmira Hutchison, OT   [phone]

## 2025-08-20 NOTE — CARE PLAN
The patient's goals for the shift include      The clinical goals for the shift include Pt will remain HDS thoughout this shift      Problem: Pain - Adult  Goal: Verbalizes/displays adequate comfort level or baseline comfort level  Outcome: Progressing     Problem: Safety - Adult  Goal: Free from fall injury  Outcome: Progressing     Problem: Discharge Planning  Goal: Discharge to home or other facility with appropriate resources  Outcome: Progressing     Problem: Chronic Conditions and Co-morbidities  Goal: Patient's chronic conditions and co-morbidity symptoms are monitored and maintained or improved  Outcome: Progressing     Problem: Nutrition  Goal: Nutrient intake appropriate for maintaining nutritional needs  Outcome: Progressing     Problem: Skin  Goal: Decreased wound size/increased tissue granulation at next dressing change  Outcome: Progressing  Goal: Participates in plan/prevention/treatment measures  Outcome: Progressing  Goal: Prevent/manage excess moisture  Outcome: Progressing  Goal: Prevent/minimize sheer/friction injuries  Outcome: Progressing  Goal: Promote/optimize nutrition  Outcome: Progressing  Goal: Promote skin healing  Outcome: Progressing     Problem: General Stroke  Goal: Establish a mutual long term goal with patient by discharge  Outcome: Progressing  Goal: Demonstrate improvement in neurological exam throughout the shift  Outcome: Progressing  Goal: Maintain BP within ordered limits throughout shift  Outcome: Progressing  Goal: Participate in treatment (ie., meds, therapy) throughout shift  Outcome: Progressing  Goal: No symptoms of aspiration throughout shift  Outcome: Progressing  Goal: No symptoms of hemorrhage throughout shift  Outcome: Progressing  Goal: Tolerate enteral feeding throughout shift  Outcome: Progressing  Goal: Decreased nausea/vomiting throughout shift  Outcome: Progressing  Goal: Controlled blood glucose throughout shift  Outcome: Progressing  Goal: Out of bed three  times today  Outcome: Progressing     Problem: ICU Stroke  Goal: Achieve/maintain targeted sodium level throughout shift  Outcome: Progressing     Problem: ESRD: Dialysis, CAPD  Goal: Electrolytes restored to baseline  Outcome: Progressing  Goal: Fatigue of chronic illness managed  Outcome: Progressing  Goal: Follows dialysis treatment plan  Outcome: Progressing  Goal: Follows fluid restrictions  Outcome: Progressing  Goal: Increase self care/family involvement  Outcome: Progressing  Goal: Participates in bowel regimen (CAPD)  Outcome: Progressing  Goal: Reports no abdominal pain/distension  Outcome: Progressing  Goal: Reports no shortness of breath  Outcome: Progressing     Problem: Diabetes  Goal: Achieve decreasing blood glucose levels by end of shift  Outcome: Progressing  Goal: Increase stability of blood glucose readings by end of shift  Outcome: Progressing  Goal: Decrease in ketones present in urine by end of shift  Outcome: Progressing  Goal: Maintain electrolyte levels within acceptable range throughout shift  Outcome: Progressing  Goal: Maintain glucose levels >70mg/dl to <250mg/dl throughout shift  Outcome: Progressing  Goal: No changes in neurological exam by end of shift  Outcome: Progressing  Goal: Learn about and adhere to nutrition recommendations by end of shift  Outcome: Progressing  Goal: Vital signs within normal range for age by end of shift  Outcome: Progressing  Goal: Increase self care and/or family involovement by end of shift  Outcome: Progressing  Goal: Receive DSME education by end of shift  Outcome: Progressing     Problem: Pain  Goal: Takes deep breaths with improved pain control throughout the shift  Outcome: Progressing  Goal: Turns in bed with improved pain control throughout the shift  Outcome: Progressing  Goal: Walks with improved pain control throughout the shift  Outcome: Progressing  Goal: Performs ADL's with improved pain control throughout shift  Outcome: Progressing  Goal:  Participates in PT with improved pain control throughout the shift  Outcome: Progressing  Goal: Free from opioid side effects throughout the shift  Outcome: Progressing  Goal: Free from acute confusion related to pain meds throughout the shift  Outcome: Progressing

## 2025-08-20 NOTE — PROGRESS NOTES
Speech-Language Pathology    SLP Adult Inpatient Speech-Language Cognition    Patient Name: Colin Leonard  MRN: [id number]  Today's Date: 8/20/2025   Time Calculation  Start Time: 0952  Stop Time: 1014  Time Calculation (min): 22 min         SLP Assessment:   #cognition  Pt presented w/ cognitive impairments in the areas of executive functioning and attention specifically, working memory, thought organization and sequencing. This was evident during mental math task and clock drawing tasks (refer in below report for additional details). Did show intact expressive and receptive language function as well as appropriate syntax and grapheme structure. Pt will continue    #dysphagia  Given pt's history of dysphagia and silent aspiration, will need instrumental testing to support diet recs. Pt showing improvement in mentation today and subjectively tolerating PO challenges at bedside. Remains at increased risk for aspiration and related complications. Will plan to complete MBS on 8/21 barring any clinical changes. Updated RN and primary team MD  - NPAZUCENA, continued TF  - Ice chips permitted w/ oral care routine  - MBS 8/21    SLP Plan:  Plan  Inpatient/Swing Bed or Outpatient: Inpatient  SLP TX Plan: Continue Plan of Care  SLP Plan: Skilled SLP  SLP Frequency: 2x per week  Duration: 2 weeks  SLP Discharge Recommendations: aggressive SLP tx at 5x/week  SLP - OK to Discharge: Yes      Subjective   Resting in bed.     SLP Visit Info:  SLP Received On: 08/20/25      General Visit Information:  General Information  Arrival: Independent  Reason for Referral: swallow evaluation  Referred By: refer to consult order tab  Prior to Session Communication: Bedside nurse      Objective       Cognition:  Cognition  Overall Cognitive Status: Impaired      Divergent naming: 15 items in 60 seconds  Orientation:  - month 100%  - year 100%  - location 100%  - date 100%  Mental math:  - 50%      Dysphagia tx:  - Thin x straw x 3 sips  - Thin x  straw 3 oz sips  - Showed decent labial seal and no oral stasis  - noted throat clear/coughing w/ successive sips.         Encounter Problems       Encounter Problems (Active)       Swallowing       LTG - Patient will tolerate the least restrictive diet without overt difficulty by time of discharge. (Progressing)       Start:  08/11/25    Expected End:  08/26/25            SLP Goal 1 (Progressing)       Start:  08/11/25    Expected End:  08/26/25       STG - Patient will tolerate therapeutic trials of recommended consistency without clinical signs and symptoms of aspiration on 100% of trials              Swallowing       STG - Patient will complete effortable swallows 30-40 times per session w/ moderately thick liquids (Not Progressing)       Start:  08/12/25    Expected End:  08/26/25            SLP Goal 2 (Not Progressing)       Start:  08/12/25    Expected End:  08/26/25       Pt will complete laryngeal elevation exercises of at least 10-15 reps w/ min cues

## 2025-08-20 NOTE — SIGNIFICANT EVENT
Rapid Response Nurse Note: RADAR alert: Score = 7    Pager time:   Arrival time:   Event end time:   Location: [place] [id number]  [] Triage by phone or secure messaging    Rapid response initiated by:  [] Rapid response RN [] Family [] Nursing Supervisor [] Physician   [x] RADAR auto page [] Sepsis auto-page [] RN [] RT   [] NP/PA [] Other:     Primary reason for call:   [] BAT [] New CPAP/BiPAP [] Bleeding [] Change in mental status   [] Chest pain [] Code blue [] FiO2 >/= 50% [] HR </= 40 bpm   [] HR >/= 130 bpm [] Hyperglycemia [] Hypoglycemia [x] RADAR    [] RR </= 8 bpm [] RR >/= 30 bpm [] SBP </= 90 mmHg [] SpO2 < 90%   [] Seizure [] Sepsis [] Shortness of breath  [] Staff concern: see comments     Initial VS and/or RADAR VS: HR 70; RR 28; /68; SPO2 92%.    Interventions:  [x] None [] ABG/VBG [] Assist w/ICU transfer [] BAT paged    [] Bag mask [] Blood [] Cardioversion [] Code Blue   [] Code blue for intubation [] Code status changed [] Chest x-ray [] EKG   [] IV fluid/bolus [] KUB x-ray [] Labs/cultures [] Medication   [] Nebulizer treatment [] NIPPV (CPAP/BiPAP) [] Oxygen [] Oral airway   [] Peripheral IV [] Palliative care consult [] CT/MRI [] Sepsis protocol    [] Suctioned [] Other:     Outcome:  [] Coded and  [] Code blue for intubation [] Coded and transferred to ICU []  on division   [x] Remained on division (no change) [] Remained on division + additional monitoring [] Remained in ED [] Transferred to ED   [] Transferred to ICU [] Transferred to inpatient status [] Transferred for interventions (procedure) [] Transferred to ICU stepdown    [] Transferred to surgery [] Transferred to telemetry [] Sepsis protocol [] STEMI protocol   [] Stroke protocol [x] Bedside nurse instructed to page rapid response for any concerns or acute change in condition/VS     Additional Comments: RADAR auto page received for above vitals.  On my arrival, pt sitting up in chair in no acute distress  with pulse ox 96%, RR 20. Pt recently arrived out of ICU. Per bedside RN, no acute concerns at this time.Please page rapid response for any concerns for deterioration or assistance needed.

## 2025-08-20 NOTE — CARE PLAN
The patient's goals for the shift include        Problem: Pain - Adult  Goal: Verbalizes/displays adequate comfort level or baseline comfort level  Outcome: Progressing     Problem: Safety - Adult  Goal: Free from fall injury  Outcome: Progressing     Problem: Chronic Conditions and Co-morbidities  Goal: Patient's chronic conditions and co-morbidity symptoms are monitored and maintained or improved  Outcome: Progressing     Problem: Nutrition  Goal: Nutrient intake appropriate for maintaining nutritional needs  Outcome: Progressing     Problem: Skin  Goal: Decreased wound size/increased tissue granulation at next dressing change  Outcome: Progressing

## 2025-08-20 NOTE — PROGRESS NOTES
Physical Therapy    Physical Therapy Treatment    Patient Name: Colin Leonard  MRN: [id number]  Today's Date: 8/20/2025  Room: 15/15  Time Calculation  Start Time: 0901  Stop Time: 0928  Time Calculation (min): 27 min       Assessment/Plan   PT Plan  Treatment/Interventions: Bed mobility, Transfer training, Gait training, Balance training, Neuromuscular re-education, Strengthening, Endurance training, Therapeutic exercise, Therapeutic activity  PT Plan: Ongoing PT  PT Frequency for Current Admission: 5 times per week during this acute inpatient hospitalization  PT Discharge Recommendations: High intensity level of continued care  Equipment Recommended upon Discharge: Wheeled walker  PT Recommended Transfer Status: Assist x2  PT - OK to Discharge: Yes    General Visit Information:   Reason for Referral: CABG x2 8/6. Re-evaluation: found unresponsive 8/8, BAT called. NIHSS 31. Pt reintubated. CTH unremarkable. CTA c/f Basilar artery occulsion vs high grade stenosis. CTP showed diffusion hypoperfusion in posterior fossa and watershed cerebral hemispheres but no core infarct. Angio revealed multifocal vert and basilar stenosis, no occlusion, no intervention done. Extubated 8/10. NIHSS 3 on 8/11  Past Medical History Relevant to Rehab: HTN, CKD stage 5, chronic anemia, type 2 diabetes, hx of c diff colitis who was admitted 5/19-5/24 for shortness of breathe was incidentally found to have atypical pneumonia  Prior to Session Communication: Bedside nurse  Patient Position Received: Bed, 3 rail up, Alarm off, not on at start of session   Subjective: Patient is alert, agreeable to PT.  Improved interaction this date but complaining of pain more and what is taking him so long to get better.  Precautions:  Precautions  Medical Precautions: Fall precautions, Cardiac precautions, Oxygen therapy device and L/min  Post-Surgical Precautions: Move in the Tube  Vital Signs:   Date/Time Vitals Session Patient Position Pulse Resp  SpO2 BP MAP (mmHg)    08/20/25 0900 --  --  67  20  93 %  164/71  94     08/20/25 0901 --  --  72  --  93 %  164/75  --     08/20/25 0928 --  --  70  --  94 %  150/75  --     08/20/25 1000 --  --  69  18  98 %  128/68  87        Objective   Pain:  Pain Assessment  0-10 (Numeric) Pain Score: 8 (post 8)  Pain Location: Chest (back)  Pain Orientation: Mid  Pain Interventions: Repositioned (RN to administer medication)  Cognition:  Cognition  Overall Cognitive Status: Impaired  Orientation Level:  (oriented to self.  place with choices, disoriented to situation and time)  Lines/Tubes/Drains:  CVC 08/18/25 (Active)   Number of days: 1       Urethral Catheter Double-lumen (Active)   Number of days: 5       NG/OG/Feeding Tube Small bore feeding tube Right nostril (Active)   Number of days: 11       External Urinary Catheter (Active)   Number of days: 6       Hemodialysis Cath 08/18/25 Double lumen Right Non-tunneled catheter Subclavian (Active)   Number of days: 1        Continuous Medications/Drips:  Continuous Medications[1]    PT Treatments:  Therapeutic Exercise  Therapeutic Exercise Activity 1: BLE  PF, DF, single leg heel  slide, 5 x 2  Therapeutic Exercise Activity 2: BUE target 3 x 1 semi-reynolds  Therapeutic Activity  Therapeutic Activity 1: EOB 7 minutes     Bed Mobility 1  Bed Mobility 1: Supine to sitting  Level of Assistance 1: Moderate assistance  Bed Mobility Comments 1: HOB 30, TAPS  Ambulation/Gait Training  Ambulation/Gait Training Performed: Yes  Ambulation/Gait Training 1  Surface 1: Level tile  Device 1: Rolling walker  Assistance 1: Minimum assistance  Quality of Gait 1:  (shuffle, minimal foot clearance, decreased step length)  Comments/Distance (ft) 1: 4 steps  Transfers  Transfer: Yes  Transfer 1  Transfer From 1: Sit to  Transfer to 1: Stand  Transfer Device 1:  (2 HHA)  Transfer Level of Assistance 1: Moderate assistance, +2  Trials/Comments 1: x1  Transfers 2  Transfer From 2: Stand to  Transfer  to 2: Sit  Transfer Device 2:  (2 HHA)  Transfer Level of Assistance 2: Moderate assistance, +2  Trials/Comments 2: x1  Transfers 3  Transfer From 3: Bed to  Transfer to 3: Chair with arms  Transfer Device 3:  (2 HH)  Transfer Level of Assistance 3: Moderate assistance, +2  Trials/Comments 3: x1  Transfers 4  Transfer From 4: Sit to  Transfer to 4: Stand  Transfer Device 4: Walker  Transfer Level of Assistance 4: Moderate assistance, +2  Trials/Comments 4: x1  Transfers 5  Transfer From 5: Stand to  Transfer to 5: Sit  Transfer Device 5: Walker  Transfer Level of Assistance 5: Moderate assistance, +2  Trials/Comments 5: x1             Outcome Measures:  [facility] Basic Mobility  Turning from your back to your side while in a flat bed without using bedrails: A lot  Moving from lying on your back to sitting on the side of a flat bed without using bedrails: A lot  Moving to and from bed to chair (including a wheelchair): A lot  Standing up from a chair using your arms (e.g. wheelchair or bedside chair): A lot  To walk in hospital room: Total  Climbing 3-5 steps with railing: Total  Basic Mobility - Total Score: 10           FSS-ICU  Ambulation: Unable to attempt due to weakness  Rolling: Moderate assistance (performs 50 - 74% of task)  Sitting: Minimal assistance (performs 75% or more of task)  Transfer Sit-to-Stand: Maximal assistance (performs 25% - 49% of task)  Transfer Supine-to-Sit: Moderate assistance (performs 50 - 74% of task)  Total Score: 12  ICU Mobility Screen  ICU Mobility Scale: Transferring bed to chair             Education Documentation  Precautions, taught by Fermin Nolasco PT at 8/20/2025 10:12 AM.  Learner: Patient  Readiness: Acceptance  Method: Explanation  Response: Needs Reinforcement  Comment: POC,  reorientation    Body Mechanics, taught by Fermin Nolasco PT at 8/20/2025 10:12 AM.  Learner: Patient  Readiness: Acceptance  Method: Explanation  Response: Needs Reinforcement  Comment: POC,   reorientation    Mobility Training, taught by Fermin Nolasco PT at 8/20/2025 10:12 AM.  Learner: Patient  Readiness: Acceptance  Method: Explanation  Response: Needs Reinforcement  Comment: POC,  reorientation    Education Comments  No comments found.          OP EDUCATION:       Encounter Problems       Encounter Problems (Active)       Balance       Pt will demonstrate ability to complete sitting static/dynamic balance activities with bilateral UE support and no LOB for increase in safety up D/C.  (Progressing)       Start:  08/11/25    Expected End:  08/25/25               Mobility       Pt will demonstrate ability to complete ther ex activities to improve functional strength for increase in independence upon DC.  (Progressing)       Start:  08/11/25    Expected End:  08/25/25            Pt will demonstrated ability to complete ~8 forward/backwards steps with proper form, LRAD, minAx2 and no balance deficits for safe DC.   (Progressing)       Start:  08/11/25    Expected End:  08/25/25               PT Transfers       Pt will demonstrated ability to complete bed mobility and sit<>stand transfers with min assistance x2 and use of assistive device to safely DC. (Progressing)       Start:  08/11/25    Expected End:  08/25/25                   Assessment: Patient is progressing Well with therapy this date.  Patient able to complete increased bed level there-ex, EOB, and standing trials this date.  Improved midline after transfer.  Difficulty with achieving full erect posture in stand despite verbal, tactile, and physical cues and assistance.  Transfer completed with OT overlap for safety with high acuity and low [facility] patient.  Patient remains appropriate for High intensity therapy when medically appropriate for discharge from acute stay.  Will continue to follow.      08/20/25 at 10:13 AM   Fermin Nolasco PT   Rehab Office: [phone]                 [1]

## 2025-08-21 ENCOUNTER — APPOINTMENT (OUTPATIENT)
Dept: RADIOLOGY | Facility: HOSPITAL | Age: 60
End: 2025-08-21
Payer: COMMERCIAL

## 2025-08-21 ASSESSMENT — PAIN DESCRIPTION - DESCRIPTORS
DESCRIPTORS: ACHING

## 2025-08-21 ASSESSMENT — PAIN - FUNCTIONAL ASSESSMENT
PAIN_FUNCTIONAL_ASSESSMENT: 0-10

## 2025-08-21 ASSESSMENT — COGNITIVE AND FUNCTIONAL STATUS - GENERAL
CLIMB 3 TO 5 STEPS WITH RAILING: TOTAL
WALKING IN HOSPITAL ROOM: A LOT
TOILETING: A LOT
PERSONAL GROOMING: A LOT
DAILY ACTIVITIY SCORE: 12
MOVING TO AND FROM BED TO CHAIR: A LOT
DRESSING REGULAR LOWER BODY CLOTHING: A LOT
MOVING FROM LYING ON BACK TO SITTING ON SIDE OF FLAT BED WITH BEDRAILS: A LITTLE
TURNING FROM BACK TO SIDE WHILE IN FLAT BAD: A LOT
STANDING UP FROM CHAIR USING ARMS: A LOT
HELP NEEDED FOR BATHING: A LOT
DRESSING REGULAR UPPER BODY CLOTHING: A LOT
MOBILITY SCORE: 12
EATING MEALS: A LOT

## 2025-08-21 ASSESSMENT — ACTIVITIES OF DAILY LIVING (ADL): LACK_OF_TRANSPORTATION: NO

## 2025-08-21 ASSESSMENT — PAIN SCALES - GENERAL
PAINLEVEL_OUTOF10: 6
PAINLEVEL_OUTOF10: 5 - MODERATE PAIN

## 2025-08-21 NOTE — PROGRESS NOTES
08/21/25 1600   Discharge Planning   Living Arrangements Children   Support Systems Children   Type of Residence Private residence   Number of Stairs to Enter Residence 3   Number of Stairs Within Residence 0   Who is requesting discharge planning? Provider   Home or Post Acute Services Post acute facilities (Rehab/SNF/etc)   Expected Discharge Disposition Rehab   Does the patient need discharge transport arranged? Yes   [facility] coordination needed? Yes   Financial Resource Strain   How hard is it for you to pay for the very basics like food, housing, medical care, and heating? Not hard   Housing Stability   In the last 12 months, was there a time when you were not able to pay the mortgage or rent on time? N   At any time in the past 12 months, were you homeless or living in a shelter (including now)? N   Transportation Needs   In the past 12 months, has lack of transportation kept you from medical appointments or from getting medications? no   In the past 12 months, has lack of transportation kept you from meetings, work, or from getting things needed for daily living? No     Transitional Care Coordinator Note: Patient discussed with medical team, per medical team patient is not medically ready.   Discharge dispo:  [facility]ANT[facility], Following.  Patient will need Precert ADOD 4-5 Days.  Neftali Luz RN  Transitional Care Coordinator

## 2025-08-21 NOTE — PROGRESS NOTES
"CARDIAC SURGERY DAILY PROGRESS NOTE    Colin Leonard is a 59 y.o. male hx of HTN, CKD stage 5, chronic anemia, type 2 diabetes, hx of c diff colitis who was admitted 5/19-5/24 for shortness of breathe was incidentally found to have atypical pneumonia which he was treated for. He was found to have elevated trop and BNP on that admission with a few episodes of reported VT which required increased dose of coreg. He underwent LHC 5/22/25 demonstrated MVCAD and decision was made to discharge and let him recover from his pneumonia treated with Augmentin and oral vanco prophyllacticaly due to h/o cdiff. and bring back for CABG. He is now s/p CABGx 2 with Dr Yolanda Justice on 8/6/2. Postop course c/b unresponsive episode in setting of hypotension and found to have bilateral vertebral and basilar artery stenosis, pneumothorax, and TEJA on CKD requiring dialysis.     Operations ad Procedures with Dr. Yolanda Justice on 8/6:   1. Median Sternotomy  2. Off Pump CABG x 2 (LIMA to LAD, SVG to distal CX)  3. Sternal Plating with Sternal Lock     CTICU course: Prolonged ICU stay  - Encephalopathy, unresponsive episode POD #2; BAT --> angio --> basilar, bilat vertebral stenoses, no obstruction, no structural brain injury; Likely hypoactive delirium.  - Afib; Acute resp failure, re-intubation during unresponsive episode;   - Acute on chronic renal failure - dialysis started; R subclavian temporary HD catheter 8/18; HD 8/18 and 8/19  - Leukocytosis  - 5 d course Zosyn    Transferred to LT3 on 8/20.    Interval History:   No acute events overnight. On 4L NC and receiving HD this morning.    SUBJECTIVE:  No complaints this morning.    Objective   /66 (BP Location: Right arm, Patient Position: Lying) Comment: Simultaneous filing. User may not have seen previous data.  Pulse 84   Temp 35.7 °C (96.3 °F) (Temporal)   Resp 21   Ht 1.778 m (5' 10\")   Wt 111 kg (245 lb 8 oz)   SpO2 98%   BMI 35.23 kg/m²   0-10 (Numeric) Pain Score: 5 - " Moderate pain   3 Day Weight Change: -2.281 kg (-5 lb 0.4 oz) per day    Intake and Output    Intake/Output Summary (Last 24 hours) at 2025 0834  Last data filed at 2025 0719  Gross per 24 hour   Intake 1210 ml   Output 515 ml   Net 695 ml       Physical Exam  Physical Exam  Constitutional:       General: He is not in acute distress.     Comments: Deconditioned     Cardiovascular:      Rate and Rhythm: Normal rate and regular rhythm.      Heart sounds: Normal heart sounds.      Comments: NSR 60s  Pulmonary:      Effort: No accessory muscle usage or respiratory distress.      Comments: Diminished throughout. 4L NC  Abdominal:      General: There is no distension.      Palpations: Abdomen is soft.      Tenderness: There is no abdominal tenderness.      Comments: LBM    Genitourinary:     Comments: Elmore in place draining clear yellow urine    Musculoskeletal:      Right lower le+ Pitting Edema present.      Left lower le+ Pitting Edema present.     Skin:     General: Skin is warm and dry.      Comments: Warm and dry. Midsternal incision clean, dry, and intact     Neurological:      General: No focal deficit present.      Mental Status: He is alert, oriented to person, place, and time and easily aroused.      Comments: Disoriented to situation   Psychiatric:         Attention and Perception: He is inattentive.      Comments: Able to be reoriented       Medications  Scheduled medications  Scheduled Medications[1]Continuous medications  Continuous Medications[2]PRN medications  PRN Medications[3]    Labs  Results for orders placed or performed during the hospital encounter of 25 (from the past 24 hours)   POCT GLUCOSE   Result Value Ref Range    POCT Glucose 136 (H) 74 - 99 mg/dL   POCT GLUCOSE   Result Value Ref Range    POCT Glucose 159 (H) 74 - 99 mg/dL   POCT GLUCOSE   Result Value Ref Range    POCT Glucose 143 (H) 74 - 99 mg/dL   POCT GLUCOSE   Result Value Ref Range    POCT Glucose 147  (H) 74 - 99 mg/dL   Magnesium   Result Value Ref Range    Magnesium 2.66 (H) 1.60 - 2.40 mg/dL   CBC   Result Value Ref Range    WBC 16.0 (H) 4.4 - 11.3 x10*3/uL    nRBC 0.0 0.0 - 0.0 /100 WBCs    RBC 2.62 (L) 4.50 - 5.90 x10*6/uL    Hemoglobin 7.6 (L) 13.5 - 17.5 g/dL    Hematocrit 25.5 (L) 41.0 - 52.0 %    MCV 97 80 - 100 fL    MCH 29.0 26.0 - 34.0 pg    MCHC 29.8 (L) 32.0 - 36.0 g/dL    RDW 15.5 (H) 11.5 - 14.5 %    Platelets 342 150 - 450 x10*3/uL   Renal Function Panel   Result Value Ref Range    Glucose 138 (H) 74 - 99 mg/dL    Sodium 138 136 - 145 mmol/L    Potassium 4.1 3.5 - 5.3 mmol/L    Chloride 99 98 - 107 mmol/L    Bicarbonate 27 21 - 32 mmol/L    Anion Gap 16 10 - 20 mmol/L    Urea Nitrogen 78 (H) 6 - 23 mg/dL    Creatinine 7.55 (H) 0.50 - 1.30 mg/dL    eGFR 8 (L) >60 mL/min/1.73m*2    Calcium 9.5 8.6 - 10.6 mg/dL    Phosphorus 6.4 (H) 2.5 - 4.9 mg/dL    Albumin 2.8 (L) 3.4 - 5.0 g/dL   POCT GLUCOSE   Result Value Ref Range    POCT Glucose 149 (H) 74 - 99 mg/dL     IMAGING/ DIAGNOSTIC TESTING:  I have personally reviewed the following test result(s):    8/14 TTE  CONCLUSIONS:   1. Left ventricular ejection fraction is normal by visual estimate at 55-60%.   2. Spectral Doppler shows a Grade I (impaired relaxation pattern) of left ventricular diastolic filling with normal left atrial filling pressure.   3. Abnormal septal motion consistent with post-operative status.   4. No left ventricular thrombus visualized.   5. There is moderately increased septal thickness.   6. There is reduced right ventricular systolic function.   7. The left atrial size is moderately dilated.   8. There is moderate to severe aortic valve cusp calcification.   9. Normal aortic root.  10. Compared with study dated 8/6/2025, the prior study was an intra-operative OMAR. This was a limited study to evaluate LVEF. The aortic valve appears mod-severely calcified and the gradients on the OMAR were consistent with severe AS, they were  not assessed on this study.    8/18 XR chest 1 view  IMPRESSION:  Bibasilar pleural effusion and atelectasis/consolidation and  interstitial pulmonary edema, not significantly different from prior  study.    8/21 XR chest 1 view  IMPRESSION:  1. Similar right worse than left bibasilar atelectasis and  bronchovascular crowding.  2. Small bilateral pleural effusions, similar to prior.  3. Medical devices as above.    IMPRESSION & PLAN:  POD # 15 s/p CABGx 2 with Dr Yolanda Justice on 8/6.  - Increase activity/ ambulation; PT/OT  - Encourage IS, C/DB; respiratory therapy; wean O2 as taty   - Cardiac rehab referral   - Continue cardiac meds: ASA, BB, statin  - continue Plavis per Dr. Yolanda Justice  - Pain and anticonstipation meds  - Chest tubes removed  - 2v CXR when transitioned out of stepdown  - Epicardial wires previously cut for urgent MRI while in ICU  - Tele until discharge  - Optimize nutrition and electrolytes    Rhythm  - Tele: NSR 60s  - Continue metoprolol 12.5mg BID and amiodarone 200mg daily  - Adjust medications as tolerated    Acute Blood Loss Anemia   Recent Labs     08/21/25  0615 08/20/25  0553 08/19/25  0442 08/18/25  0152 08/17/25  1455 08/17/25  0256 08/16/25  1409   HGB 7.6* 7.7* 8.5* 8.6* 10.2* 8.7* 9.2*   HCT 25.5* 24.8* 26.6* 26.6* 32.5* 28.2* 30.0*   - MV, PO Iron x1mo  - Daily labs, transfuse as indicated    Platelets  Recent Labs     08/21/25  0615 08/20/25  0553 08/19/25  0442 08/18/25  0152 08/17/25  1455 08/17/25  0256 08/16/25  1409    335 327 315 290 319 311   - Etiology likely postop/CPB related  - Continue to trend with daily CBCs    Volume/Electrolyte Status: Preop wt Weight: 116 kg (256 lb 9.9 oz)   Vitals:    08/21/25 0149   Weight: 111 kg (245 lb 8 oz)     - Weights: Preop wt: 116kg, 8/21: 111kg  - Replete electrolytes for hypokalemia/hypomagnesemia/hypophosphatemia as needed  - Daily weights and strict I&Os  - Daily RFP while admitted    Leukocytosis  Recent Labs     08/21/25  0615  25  0553 25  0442 25  0152 25  1455 25  0256 25  1409   WBC 16.0* 14.1* 14.7* 16.0* 12.0* 13.4* 14.4*     Temp (36hrs), Av.1 °C (97 °F), Min:35.7 °C (96.3 °F), Max:36.4 °C (97.6 °F)     - aggressive pulmonary hygiene  - monitor for s/s infection  - likely atelectasis/ postoperative in etiology  - daily CBC to follow    ESRD: 545ml urine output in last 24 hours.  - Nephrology following, appreciate recs  - Plan for HD today  - Renal diet  - IR consulted  for tunneled HD line. Scheduled for  (NPO at midnight and subcutaneous heparin held).  - Discontinue Bumex 2mg tonight and start Torsemide 40mg daily. Discussed with Nephrology    Acute urinary retention: Thornton in place  - Hold flomax while NPO. May resume if passed MBS. (silodosin contraindicated in HD)  - Continue thornton for now    HLD: On home atorvastatin 20mg and ezetimibe 10mg  - Continue atorvastatin 80mg and ezetimibe 20mg nightly    Dysphagia: Corpak in place  - SLP following, appreciate recs  - MBS on  now cleared for pureed diet and moderately thickened liquids    Respiratory insufficiency: On 4L NC  - Respiratory therapy, bronchopulmonary hygiene TID, IS  - Continuous pulse oximetry  - Wean Fio2 as tolerated    VTE Prophylaxis: SCDs/TEDs, ambulation, SQ heparin  Code Status: Full Code    Dispo  - PT/OT recs: high intensity  - Anticipate discharge 3-4 days to facility pending tolerance of new HD, fluid management, resolution of hypoactive delirium  - Will continue to assess discharge needs    SARAH Trejo-CNP  Cardiac Surgery [facility]  [facility]  Team Phone [phone]    2025  8:34 AM       [1] acetaminophen, 650 mg, nasoduodenal tube, q6h  amiodarone, 200 mg, oral, Daily  aspirin, 81 mg, nasoduodenal tube, Daily  atorvastatin, 80 mg, nasoduodenal tube, Nightly  bumetanide, 2 mg, nasogastric tube, q8h  clopidogrel, 75 mg, oral, Daily  ezetimibe, 10 mg, nasoduodenal tube,  Nightly  heparin, 5,000 Units, subcutaneous, q8h  insulin lispro, 0-10 Units, subcutaneous, q6h  lidocaine, 1 patch, transdermal, Daily  melatonin, 6 mg, oral, Nightly  metoprolol tartrate, 12.5 mg, oral, BID  polyethylene glycol, 17 g, oral, Daily  sennosides-docusate sodium, 2 tablet, oral, Nightly  sodium chloride, 1 spray, Each Nostril, 4x daily  tamsulosin, 0.4 mg, oral, Daily  thiamine, 100 mg, oral, Daily  [2]    [3] PRN medications: alteplase, dextrose, dextrose **OR** glucagon, dextrose, glucagon, glucagon, heparin flush, naloxone, ondansetron, oxygen

## 2025-08-21 NOTE — PROGRESS NOTES
Colin Leonard is a 59 y.o. male on day 15 of admission presenting with Atherosclerosis of native coronary artery of native heart without angina pectoris.      Subjective   Seen on iHD tolerating well  Transferred to Stepdown           Objective        Vitals 24HR  Heart Rate:  [63-84]   Temp:  [35.7 °C (96.3 °F)-36.4 °C (97.6 °F)]   Resp:  [17-28]   BP: (116-174)/(59-74)   Weight:  [111 kg (245 lb 8 oz)]   SpO2:  [92 %-100 %]   Oxygen Therapy  Pulse Ox (24 hr min): 92  Medical Gas Therapy: Supplemental oxygen  Medical Gas Delivery Method: Nasal cannula  O2 Flow Rate (L/min): 4 L/min FiO2 (%): 40 %    Intake/Output last 3 Shifts:    Intake/Output Summary (Last 24 hours) at 8/21/2025 1143  Last data filed at 8/21/2025 1059  Gross per 24 hour   Intake 1860 ml   Output 1480 ml   Net 380 ml       Physical Exam  On NC  Comfortable  Sleepy but responsive  Median Stenotomy  Symmetrical chest expansion  No JVD  Trace LL Edema  Access: R Subclavian Trialysis    Relevant Results  Lab Results   Component Value Date    WBC 16.0 (H) 08/21/2025    HGB 7.6 (L) 08/21/2025    HCT 25.5 (L) 08/21/2025    MCV 97 08/21/2025     08/21/2025     Lab Results   Component Value Date    GLUCOSE 138 (H) 08/21/2025    CALCIUM 9.5 08/21/2025     08/21/2025    K 4.1 08/21/2025    CO2 27 08/21/2025    CL 99 08/21/2025    BUN 78 (H) 08/21/2025    CREATININE 7.55 (H) 08/21/2025         Assessment & Plan  Atherosclerosis of native coronary artery of native heart without angina pectoris    ESRD (end stage renal disease) (Multi)    Chronic renal insufficiency    Pneumonia    Coronary artery disease involving native heart, unspecified vessel or lesion type, unspecified whether angina present    Mr. Leonard is a 59M with history of CKD5, CAD s/p CABG on 8/6, and developed post-surgery TEJA on CKD requiring dialysis initiation. Course c/b basilar stroke, No intervention, Neurology following. Nephrology following for Dialysis  care.    #CKDV/ESKD  - Cr on admission 9.4  - Etiology of CKD is not clear.  - Received CVVH 8/6-8/7  - SLED 8/7pm  - CVVH resumed 8/9-8/11  - Access: Right Subclavian  - iHD TTS    #Azotemia, No evidence of Uremia  - Tube Feeds as well as Renal Impairment    #Anemia  - In part due to CKD  - Tsat 17%    #Possible Stroke 8/7 (Basilar Artery occlusion?), Imaging showed no infarction, no interventions    #Urine Retention, Elmore inserted    RECOMMENDATIONS:  - Tentative plan for iHD TTS Now  - Please ask SW to Arrange for iHD outpatient   - Please arrange for Tunneled Dialysis Catheter insertion  - Replace Iron once Infection is controlled      Babita Edmondson MD  PGY-5 Nephrology Fellow

## 2025-08-21 NOTE — HOSPITAL COURSE
Colin Leonard is a 59 y.o. male hx of HTN, CKD stage 5, chronic anemia, type 2 diabetes, hx of c diff colitis who was admitted 5/19-5/24 for shortness of breathe was incidentally found to have atypical pneumonia which he was treated for. He was found to have elevated trop and BNP on that admission with a few episodes of reported VT which required increased dose of coreg. He underwent C 5/22/25 demonstrated MVCAD and decision was made to discharge and let him recover from his pneumonia treated with Augmentin and oral vanco prophyllacticaly due to h/o cdiff. and bring back for CABG. He is now s/p CABGx 2 with Dr Yolanda Justice on 8/6/2. Postop course c/b unresponsive episode in setting of hypotension and found to have bilateral vertebral and basilar artery stenosis, pneumothorax, and TEJA on CKD requiring dialysis.      Operations ad Procedures with Dr. Yolanda Justice on 8/6:   1. Median Sternotomy  2. Off Pump CABG x 2 (LIMA to LAD, SVG to distal CX)  3. Sternal Plating with Sternal Lock      CTICU course: Prolonged ICU stay  - Encephalopathy, unresponsive episode POD #2; BAT --> angio --> basilar, bilat vertebral stenoses, no obstruction, no structural brain injury; Likely hypoactive delirium.  - Afib; Acute resp failure, re-intubation during unresponsive episode;   - Acute on chronic renal failure - dialysis started; R subclavian temporary HD catheter 8/18; HD 8/18 and 8/19  - Leukocytosis  - 5 d course Zosyn     Transferred to LT3 on 8/20.      Floor Course:  - Patient was diuresed for fluid volume overload post cardiac surgery; Preop weight: ***kg, discharge wt: ***kg  - On ASA, statin, BB, *** by discharge  - Epicardial wires CUT on 8/8/25  - Telemetry at discharge ***  - 2v CXR done ***  - Postop echo done ***    CKD V/ESRD: Pt initiated on HD on 8/6/25 ***    Post op dysphagia: ***    Delirium --> Pt had post op delirium, now improved ***       - Cardiac rehab referral was placed  - PM&R consulted on 8/20 ***   -  PT recs {PT recs for dc:72438}  - Anticipate discharge to {discharge disposition:96277}    Discharged on ***    On day of discharge, vital signs were stable and no acute distress was noted. All questions were answered. After VS and labs were reviewed it was determined the patient was stable for discharge.   Hospital day of discharge management- spent >30 minutes coordinating the discharge and counseling/educating patient and family regarding discharge instructions.   ==================================    Past Medical History       Diagnosis Date    C. difficile colitis       hx of C. difficile colitis in March 2025    CHF (congestive heart failure)       chronic diastolic heart failure    CKD (chronic kidney disease)       Stage V    Coronary artery disease      Diabetes mellitus (Multi)      DVT (deep venous thrombosis) (Multi)      Hypertension      MI (myocardial infarction) (Multi)      Pneumonia       Multifocal pneumonia 5/2025    PONV (postoperative nausea and vomiting)      Type 2 diabetes mellitus       no meds controlled with diet     Past Surgical History        Procedure Laterality Date    AMPUTATION        CARDIAC CATHETERIZATION N/A 05/23/2025     Procedure: LHC, With LV;  Surgeon: Shantelle Anderson MD;  Location: [facility] Cardiac Cath Lab;  Service: Cardiovascular;  Laterality: N/A;    CARDIAC CATHETERIZATION N/A 05/23/2025     Procedure: LULA Stent - Coronary;  Surgeon: Shantelle Anderson MD;  Location: [facility] Cardiac Cath Lab;  Service: Cardiovascular;  Laterality: N/A;    CHOLECYSTECTOMY   03/05/2025     Laparoscopic Cholecystectomy    COLONOSCOPY        URETER SURGERY                   Medications Prior to Admission   Medication Sig Dispense Refill Last Dose/Taking    amiodarone (Pacerone) 200 mg tablet Take 1 tablet (200 mg) by mouth 2 times a day. 60 tablet 1 8/6/2025 Morning    aspirin 81 mg EC tablet Take 1 tablet (81 mg) by mouth once daily.     8/6/2025 Morning    atorvastatin (Lipitor) 20 mg tablet Take  1 tablet (20 mg) by mouth once daily.     Past Week    carvedilol (Coreg) 12.5 mg tablet Take 3 tablets (37.5 mg) by mouth 2 times a day. 180 tablet 0 8/6/2025 Morning    chlorhexidine (Hibiclens) 4 % external liquid Use as directed daily preoperatively 473 mL 0 8/6/2025 Morning    chlorhexidine (Peridex) 0.12 % solution Swish and spit with 15ml of solution the night before and morning of surgery. Do not swallow. 15 mL 0 8/6/2025 Morning    ezetimibe (Zetia) 10 mg tablet Take 1 tablet (10 mg) by mouth once daily at bedtime. 90 tablet 3 8/5/2025    hydrALAZINE (Apresoline) 100 mg tablet Take 1 tablet (100 mg) by mouth 3 times a day. 270 tablet 3 8/5/2025    multivitamin tablet Take 1 tablet by mouth once daily.     Past Week    sodium bicarbonate 650 mg tablet Take 2 tablets (1,300 mg) by mouth 3 times a day. 180 tablet 1 8/6/2025 Morning    torsemide (Demadex) 20 mg tablet Take 3 tablets (60 mg) by mouth once daily. 90 tablet 0 8/6/2025 Morning    acetaminophen (Tylenol) 325 mg tablet Take 2 tablets (650 mg) by mouth every 6 hours if needed for moderate pain (4 - 6).     Unknown     Allergies: Amlodipine besylate      Social History       Tobacco Use    Smoking status: Never    Smokeless tobacco: Never   Vaping Use    Vaping status: Never Used   Substance Use Topics    Alcohol use: Not Currently    Drug use: Never         Family History         Problem Relation Name Age of Onset    Heart disease Mother        Other (cabg) Mother        Rheumatic fever Mother        Heart disease Father        Stroke Paternal Grandfather        Heart attack Paternal Grandfather          History         Problem Relation Name Age of Onset    Heart disease Mother        Other (cabg) Mother        Rheumatic fever Mother        Heart disease Father        Stroke Paternal Grandfather        Heart attack Paternal Grandfather

## 2025-08-21 NOTE — CONSULTS
Wound Care Consult     Visit Date: 8/21/2025      Patient Name: Colin Leonard         MRN: [id number]             Reason for Consult: assessment of sacum/buttocks         Assessment:  Wound 08/08/25 Diabetic Ulcer Tibial Anterior;Right (Active)   Date First Assessed/Time First Assessed: 08/08/25 2311   Present on Original Admission: Yes  Primary Wound Type: Diabetic Ulcer  Location: Tibial  Wound Location Orientation: Anterior;Right      Assessments 8/21/2025  3:10 PM   Present on Admission to Healthcare Facility Yes   Wound Image     Site Assessment Red;Purple   Non-staged Wound Description Full thickness   Shape Oval   Wound Length (cm) 1.8 cm   Wound Width (cm) 3 cm   Wound Surface Area (cm^2) 4.24 cm^2   Wound Depth (cm) 0.4 cm   Wound Volume (cm^3) 1.131 cm^3   State of Healing Non-healing   Margins Well-defined edges   Drainage Description Serous   Drainage Amount Small   Dressing Hydrofiber;Silicone border dressing   Dressing Changed Changed   Dressing Status Clean;Dry       Active Orders   Date Order Priority Status Authorizing Provider   08/19/25 0224 Wound Care Diabetic Ulcer Anterior;Right Tibial Routine Active SARAH Ruiz-CNP     - Wound Complexity:    Simple     - Cleanse with:    Wound Cleanser     - Apply::    Medihoney     - Cover with::    Foam (Mepilex Transfer)       Wound 08/20/25 Irritant Contact Dermatitis Perspiration Sacrum (Active)   Date First Assessed/Time First Assessed: 08/20/25 1633   Present on Original Admission: No  Primary Wound Type: Irritant Contact Dermatitis  Secondary Wound Type - Irritant Contact Dermatitis: Perspiration  Location: Sacrum      Assessments 8/21/2025  3:14 PM   Wound Image     Site Assessment Red;Pink   Johanne-Wound Assessment Moist    Non-staged Wound Description Partial thickness   Shape generalized   State of Healing Healing ridge   Margins Well-defined edges   Treatments Cleansed;Site care   Drainage Description None   Drainage Amount None    Dressing Hydrophilic;Moisture barrier;Silicone border dressing   Dressing Changed New   Dressing Status Clean;Dry       Active Orders   Date Order Priority Status Authorizing Provider   08/20/25 6895 Inpatient Consult to Wound and Ostomy Nurse Routine Active Alisha SON SARAH He-CNP     - Reason for Consult?:    Wound     The wound care team came to bedside to assess the patient's RLE wound and sacrum wound. The patient's RLE wound is red, moist and full thickness at this time. The wound was cleansed with vashe wound cleanser and gently pat dry. A strip of medihoney Alginate was applied to the wound bed and covered with a mepilex border dressing. The patient's sacrum/buttocks wound is red, pink, blanchable and moist at this time. The wound appears to be a combination of moisture associated skin damage and friction. The wound was cleansed with vashe and dressed with triad cream and mepilex border dressing.         Wound Plan: Recommendations: Daily   Sacrum/buttocks: Cleanse the wound with vashe wound cleanser and gently pat dry   Gently spread Triad evenly over the area of application to the thickness of a dime. To remove, Use pH-balanced wound cleanser to soften Triad. Gently wipe to remove without scrubbing. In the perineal area, reapply Triad after each episode of incontinence.  Cover with a mepilex border dressing     Continue to turn the patient every 2 hours   Utilize an air mattress, wedges and pillows to offload the patient's bony prominences     Continue set recommendations for RLE wound  Right leg: clean with vashe and gauze, apply Medi-honey sheet ( #[id number]) to wound bed, cover with foam dressing.     Mane Dunn RN-BC, CWON  8/21/2025  4:34 PM

## 2025-08-21 NOTE — PROCEDURES
Speech-Language Pathology    Inpatient Modified Barium Swallow Study    Patient Name: Colin Leonard  MRN: [id number]  : 1965  Today's Date: 25  Time Calculation  Start Time: 952  Stop Time: 4  Time Calculation (min): 22 min        Modified Barium Swallow Study completed. Informed verbal consent obtained prior to completion of exam. The study was completed per protocol with various liquid barium consistencies, pudding, solids .  A 1.9 cm or .75 inch (outer diameter) ring was placed on the chin in the lateral view.    SLP: Merlin Negron CCC-SLP   Contact info: [employer] secure chat      Reason for Referral: Repeat instrumental study  Patient Hx: s/p CABGx 2 with Dr Yolanda Justice on . Postop course c/b unresponsive episode in setting of hypotension and found to have bilateral vertebral and basilar artery stenosis, pneumothorax, and TEJA on CKD requiring dialysis.   Respiratory Status: Room air  Current diet: NPO w/ corpak      DIET RECOMMENDATIONS:   - Pureed (IDDSI Level 4)  - Moderately/honey thick liquids (IDDSI Level 3)    STRATEGIES:  - Small bites  - Medications whole in puree or as best tolerated  - Complete oral care frequently throughout the day  - Full supervision with meals; One to one assist with meals      SLP PLAN:  Skilled SLP Services: Skilled SLP intervention for dysphagia is warranted.  SLP Frequency: 2x per week  Duration:  Treatment/Interventions:   - Oropharyngeal exercises  - Bolus trials  - Compensatory strategy training  - Diet tolerance/advancement  - Patient/caregiver education    Discussed POC: Patient  Discussed Risks/Benefits: Yes  Patient/Caregiver Agreeable: Yes      Education Provided: Results and recommendations per MBSS, with video review; recommendations and POC at this time. Verbal understanding and agreement given on all accounts.     Treatment Provided Today: 4927-3182:  ST provided extensive education and training to pt/pt family regarding anatomy/physiology of  swallow function, risk factors of aspiration/aspiration pna & how to mitigate factors, diet modifications, and the use of compensatory swallow strategies to promote pt safety. The caregiver and pt were grateful and verbalized understanding and asked appropriate questions on 100% of opportunities.     Repeat Study: Per MD or treating SLP       Mechanics of the Swallow Summary:  ORAL PHASE:  Lip Closure - No labial escape/anterior loss of bolus   Tongue Control During Bolus Hold - Posterior escape of less than half of the bolus   Bolus prep/mastication - Slow prolonged mastication with complete re-collection necessary   Bolus transport/lingual motion - Slowed Tongue Motion for A-P movement of the bolus   Oral residue - Trace residue lining oral structures     PHARYNGEAL PHASE:  Initiation of pharyngeal swallow - Bolus head at pit of pyriforms   Soft palate elevation - No bolus between soft palate/pharyngeal wall   Laryngeal elevation - Complete superior movement of thyroid cartilage with contact of arytenoids to epiglottic petiole   Anterior hyoid excursion - Complete anterior movement   Epiglottic movement - Complete inversion    Laryngeal vestibule closure - Incomplete - narrow column of air/contrast in laryngeal vestibule   Pharyngeal stripping wave - Complete  Pharyngeal contraction (A/P view) - Not tested       Pharyngoesophageal segment opening - Complete distension and complete duration/no obstruction of flow of bolus   Tongue base retraction - No bolus between tongue base and posterior pharyngeal wall   Pharyngeal residue - Complete pharyngeal clearance     ESOPHAGEAL PHASE:  Esophageal clearance - Not evaluated       SLP Impressions with Severity Rating:   Pt presents with mild oropharyngeal dysphagia upon completion of modified barium swallow study this date. Swallowing physiology is detailed above. Impairments impacting airway protection and efficiency include  primarily characterized by impaired control at  the oral stage that results in incoordination of the oral-pharyngeal transfer, premature pharyngeal entry, as well as suspected delayed onset of the pharyngeal swallow. This resulted in aspiration during the swallow w/ thin and mildly thick liquids w/ cough response to aspiration.      *Of note: The A-P bolus follow-through is not intended to be utilized as a diagnostic assessment of the esophagus, rather a tool to observe the biomechanical aspects of the swallow continuum and to inform the need for further evaluation by medical specialists, as applicable.       OUTCOME MEASURES:  Functional Oral Intake Scale  Functional Oral Intake Scale: Level 5        total oral diet with multiple consistencies, but requires special preparations and compensations       Rosenbek's Penetration Aspiration Scale  Thin Liquids: 7. OVERT ASPIRATION, material is not cleared - contrast passes glottis, visible residue, W/pt response  Nectar Thick Liquids: 7. OVERT ASPIRATION, material is not cleared - contrast passes glottis, visible residue, W/pt response  Honey Thick Liquids: 1. NO ASPIRATION & NO PENETRATION - no aspiration, contrast does not enter airway  Puree: 1. NO ASPIRATION & NO PENETRATION - no aspiration, contrast does not enter airway  Solids: 1. NO ASPIRATION & NO PENETRATION - no aspiration, contrast does not enter airway

## 2025-08-21 NOTE — PROGRESS NOTES
Physical Therapy                 Therapy Communication Note    Patient Name: Colin Leonard  MRN: [id number]  Department: [facility] [id number]  Room: [address] [place] [address]  Today's Date: 8/21/2025     Discipline: Physical Therapy    Missed Visit: PT Missed Visit: Yes     Missed Visit Reason: Missed Visit Reason:  (On dialysis)    Missed Time: Attempt 1005    Comment:

## 2025-08-22 ASSESSMENT — PAIN - FUNCTIONAL ASSESSMENT
PAIN_FUNCTIONAL_ASSESSMENT: 0-10

## 2025-08-22 ASSESSMENT — COGNITIVE AND FUNCTIONAL STATUS - GENERAL
WALKING IN HOSPITAL ROOM: A LOT
DAILY ACTIVITIY SCORE: 16
MOBILITY SCORE: 13
DRESSING REGULAR LOWER BODY CLOTHING: A LOT
MOVING TO AND FROM BED TO CHAIR: A LOT
DRESSING REGULAR UPPER BODY CLOTHING: A LOT
TOILETING: A LOT
TOILETING: A LOT
MOVING TO AND FROM BED TO CHAIR: A LOT
MOVING TO AND FROM BED TO CHAIR: A LOT
MOBILITY SCORE: 10
EATING MEALS: A LITTLE
HELP NEEDED FOR BATHING: A LOT
CLIMB 3 TO 5 STEPS WITH RAILING: TOTAL
PERSONAL GROOMING: A LITTLE
STANDING UP FROM CHAIR USING ARMS: A LOT
EATING MEALS: A LITTLE
WALKING IN HOSPITAL ROOM: A LOT
CLIMB 3 TO 5 STEPS WITH RAILING: TOTAL
STANDING UP FROM CHAIR USING ARMS: A LOT
TURNING FROM BACK TO SIDE WHILE IN FLAT BAD: A LITTLE
DAILY ACTIVITIY SCORE: 13
DAILY ACTIVITIY SCORE: 16
CLIMB 3 TO 5 STEPS WITH RAILING: TOTAL
TOILETING: TOTAL
MOVING FROM LYING ON BACK TO SITTING ON SIDE OF FLAT BED WITH BEDRAILS: A LITTLE
HELP NEEDED FOR BATHING: A LITTLE
MOBILITY SCORE: 13
DRESSING REGULAR LOWER BODY CLOTHING: A LOT
STANDING UP FROM CHAIR USING ARMS: A LOT
DRESSING REGULAR UPPER BODY CLOTHING: A LITTLE
EATING MEALS: A LITTLE
HELP NEEDED FOR BATHING: A LITTLE
MOVING FROM LYING ON BACK TO SITTING ON SIDE OF FLAT BED WITH BEDRAILS: A LITTLE
PERSONAL GROOMING: A LITTLE
DRESSING REGULAR UPPER BODY CLOTHING: A LITTLE
WALKING IN HOSPITAL ROOM: TOTAL
DRESSING REGULAR LOWER BODY CLOTHING: A LOT
MOVING FROM LYING ON BACK TO SITTING ON SIDE OF FLAT BED WITH BEDRAILS: A LOT
TURNING FROM BACK TO SIDE WHILE IN FLAT BAD: A LOT
TURNING FROM BACK TO SIDE WHILE IN FLAT BAD: A LITTLE
PERSONAL GROOMING: A LITTLE

## 2025-08-22 ASSESSMENT — PAIN SCALES - GENERAL
PAINLEVEL_OUTOF10: 3
PAINLEVEL_OUTOF10: 7
PAINLEVEL_OUTOF10: 7
PAINLEVEL_OUTOF10: 5 - MODERATE PAIN
PAINLEVEL_OUTOF10: 6
PAINLEVEL_OUTOF10: 4
PAINLEVEL_OUTOF10: 4
PAINLEVEL_OUTOF10: 6

## 2025-08-22 ASSESSMENT — PAIN DESCRIPTION - DESCRIPTORS
DESCRIPTORS: ACHING;SORE

## 2025-08-22 ASSESSMENT — ACTIVITIES OF DAILY LIVING (ADL): HOME_MANAGEMENT_TIME_ENTRY: 12

## 2025-08-22 ASSESSMENT — PAIN DESCRIPTION - ORIENTATION: ORIENTATION: LEFT;RIGHT;MID

## 2025-08-22 ASSESSMENT — PAIN DESCRIPTION - LOCATION: LOCATION: INCISION

## 2025-08-22 NOTE — PROGRESS NOTES
Physical Therapy    Physical Therapy Treatment    Patient Name: Colin Leonard  MRN: [id number]  Department: [facility] [id number]  Room: [address] [place] [address]  Today's Date: 8/22/2025  Time Calculation  Start Time: 0919  Stop Time: 1008  Time Calculation (min): 39 min billable time, +10min out of room obtaining HEP         Assessment/Plan   PT Assessment  PT Assessment Results: Decreased strength, Decreased endurance, Impaired balance, Decreased mobility, Decreased cognition, Pain  Rehab Prognosis: Good  Barriers to Discharge Home: Caregiver assistance, Physical needs  Caregiver Assistance: Caregiver assistance needed per identified barriers - however, level of patient's required assistance exceeds assistance available at home  Cognition Needs: Cognition-related high falls risk  Physical Needs: High falls risk due to function or environment, Ambulating household distances limited by function/safety, Stair navigation to access bath limited by function/safety, 24hr mobility assistance needed, 24hr ADL assistance needed  Evaluation/Treatment Tolerance: Patient tolerated treatment well  Medical Staff Made Aware: Yes  End of Session Communication: Bedside nurse, Physician  Assessment Comment: Pt requires modAx2 and max cues for safe mobility this date, unable to complete functional ambulation. Pt remains appropriate for high intensity therapy. Discussed frequency with supervising PT, in agreement that pt would benefit from daily PT while in house.  End of Session Patient Position: Up in chair, Alarm on  PT Plan  Inpatient/Swing Bed or Outpatient: Inpatient  PT Plan  Treatment/Interventions: Bed mobility, Transfer training, Gait training, Balance training, Neuromuscular re-education, Strengthening, Endurance training, Therapeutic exercise, Therapeutic activity  PT Plan: Ongoing PT  PT Frequency for Current Admission: Daily during this acute inpatient hospitalization  PT Discharge Recommendations: High intensity level of continued  care  Equipment Recommended upon Discharge: Wheeled walker  PT Recommended Transfer Status: Assist x2  PT - OK to Discharge: Yes    PT Visit Info:  PT Received On: 08/22/25  Response to Previous Treatment: Patient with no complaints from previous session.     General Visit Information:   General  Family/Caregiver Present: Yes  Caregiver Feedback: Sister present, sister very concerned regarding pt therapy schedule and frequency  Co-Treatment: OT  Co-Treatment Reason: Partial co-tx for safe mobility due to fluctuating AMPAC of 8-10, hx of agressive behaviors and pt requires skilled assist x2 for safe transfer to chair and initiation of gait training  Prior to Session Communication: Bedside nurse, Physician  Patient Position Received: Bed, 3 rail up, Alarm on  General Comment: Pt supine in bed, lethargic but agreeable to therapy    Subjective   Precautions:  Precautions  Medical Precautions: Cardiac precautions, Fall precautions, Oxygen therapy device and L/min (NGT)  Post-Surgical Precautions: Move in the Tube     Date/Time Vitals Session Patient Position Pulse Resp SpO2 BP MAP (mmHg)    08/22/25 0919 During PT  --  77  --  95 %  --  --      Objective   Pain:  Pain Assessment  Pain Assessment: 0-10  0-10 (Numeric) Pain Score: 7  Pain Type: Surgical pain  Pain Location: Incision  Pain Interventions: Emotional support (RN aware, pt medicated prior to therapy)  Cognition:  Cognition  Orientation Level: Oriented X4  Following Commands: Follows one step commands with increased time    Treatments:  Therapeutic Exercise  Therapeutic Exercise Performed: Yes  Therapeutic Exercise Activity 1: Supine AP, heel slides, seated AP, LAQs, hip flex, pt provided with printout for HEP  Therapeutic Exercise Activity 2: IS 10x </= 500cc         Therapeutic Activity  Therapeutic Activity Performed: Yes  Therapeutic Activity 1: Significant time, max cues, Akira to scoot to EOB and to scoot fwd in chair in prep for transfer training    Bed  Mobility  Bed Mobility: Yes  Bed Mobility 1  Bed Mobility 1: Supine to sitting  Level of Assistance 1: Moderate assistance, +2, Maximum verbal cues, Maximum tactile cues    Ambulation/Gait Training  Ambulation/Gait Training Performed: Yes  Ambulation/Gait Training 1  Surface 1: Level tile  Device 1: Rolling walker  Assistance 1: Maximum assistance  Comments/Distance (ft) 1: Alt LE marching 5x/LE with maxA, max encouragement to attempt ambulation, pt taking 2 steps fwd with modAx2, then reports need to sit, requires maxA and max cues to step bkwd for safe return to chair  Transfers  Transfer: Yes  Transfer 1  Transfer From 1: Sit to, Stand to  Transfer to 1: Sit  Technique 1: Sit to stand, Stand to sit  Transfer Device 1: Walker  Transfer Level of Assistance 1: Moderate assistance, +2, Maximum verbal cues, Maximum tactile cues (max cues for LE and UE placement, ant weight shift and press)  Transfers 2  Transfer From 2: Bed to  Transfer to 2: Chair with arms  Technique 2: Stand pivot  Transfer Device 2: Walker  Transfer Level of Assistance 2: Moderate assistance, +2    Outcome Measures:     [facility] Basic Mobility  Turning from your back to your side while in a flat bed without using bedrails: A lot  Moving from lying on your back to sitting on the side of a flat bed without using bedrails: A lot  Moving to and from bed to chair (including a wheelchair): A lot  Standing up from a chair using your arms (e.g. wheelchair or bedside chair): A lot  To walk in hospital room: Total  Climbing 3-5 steps with railing: Total  Basic Mobility - Total Score: 10    Education Documentation  Precautions, taught by Venita Trimble PTA at 8/22/2025 10:37 AM.  Learner: Family, Patient  Readiness: Acceptance  Method: Explanation, Demonstration  Response: Verbalizes Understanding, Demonstrated Understanding  Comment: HEP, precautions, safe mobility, PT POC    Body Mechanics, taught by Venita Trimble PTA at 8/22/2025 10:37 AM.  Learner:  Family, Patient  Readiness: Acceptance  Method: Explanation, Demonstration  Response: Verbalizes Understanding, Demonstrated Understanding  Comment: HEP, precautions, safe mobility, PT POC    Mobility Training, taught by Venita Trimble PTA at 8/22/2025 10:37 AM.  Learner: Family, Patient  Readiness: Acceptance  Method: Explanation, Demonstration  Response: Verbalizes Understanding, Demonstrated Understanding  Comment: HEP, precautions, safe mobility, PT POC    Education Comments  No comments found.        OP EDUCATION:       Encounter Problems       Encounter Problems (Active)       Balance       Pt will demonstrate ability to complete sitting static/dynamic balance activities with bilateral UE support and no LOB for increase in safety up D/C.  (Progressing)       Start:  08/11/25    Expected End:  08/25/25               Mobility       Pt will demonstrate ability to complete ther ex activities to improve functional strength for increase in independence upon DC.  (Progressing)       Start:  08/11/25    Expected End:  08/25/25            Pt will demonstrated ability to complete ~8 forward/backwards steps with proper form, LRAD, minAx2 and no balance deficits for safe DC.   (Progressing)       Start:  08/11/25    Expected End:  08/25/25               PT Transfers       Pt will demonstrated ability to complete bed mobility and sit<>stand transfers with min assistance x2 and use of assistive device to safely DC. (Progressing)       Start:  08/11/25    Expected End:  08/25/25                 ISRAEL Pink

## 2025-08-22 NOTE — PROGRESS NOTES
Occupational Therapy    OT Treatment    Patient Name: Colin Leonard  MRN: [id number]  Department: [facility] [address]  Room: [address] [place] [address]  Today's Date: 8/22/2025  Time Calculation  Start Time: 0920  Stop Time: 0954  Time Calculation (min): 34 min      Assessment:  Medical Staff Made Aware: Yes  End of Session Communication: Bedside nurse  End of Session Patient Position: Up in chair, Alarm on  OT Assessment Results: Decreased ADL status, Decreased cognition, Decreased safe judgment during ADL, Decreased endurance, Decreased functional mobility, Decreased fine motor control, Decreased gross motor control, Decreased IADLs, Visual deficit, Decreased trunk control for functional activities  Medical Staff Made Aware: Yes  Strengths: Attitude of self  Barriers to Participation: Comorbidities  Plan:  Treatment Interventions: ADL retraining, Functional transfer training, UE strengthening/ROM, Endurance training, Cognitive reorientation, Patient/family training, Equipment evaluation/education, Neuromuscular reeducation, Visual perceptual retraining, Fine motor coordination activities, Compensatory technique education  OT Frequency for Current Admission: 4 times per week during this acute inpatient hospitalization (During this acute inpatient hospitalization)  OT Discharge Recommendations: High intensity level of continued care (Based on current functional status and rehab potential, patient is anticipated to tolerate and benefit from 5 or more days per week of skilled rehabilitative therapy after discharge from this acute inpatient hospitalization.)  Equipment Recommended upon Discharge:  (TBD)  OT Recommended Transfer Status: Assist of 2, Maximum assist  OT - OK to Discharge: Yes  Treatment Interventions: ADL retraining, Functional transfer training, UE strengthening/ROM, Endurance training, Cognitive reorientation, Patient/family training, Equipment evaluation/education, Neuromuscular reeducation, Visual perceptual retraining, Fine  motor coordination activities, Compensatory technique education    Subjective   OT Visit Info:  OT Received On: 08/22/25  General Visit Info:  General  Family/Caregiver Present: Yes  Caregiver Feedback: Sister present, sister very concerned regarding pt therapy schedule and frequency  Co-Treatment: PT  Co-Treatment Reason: Partial co-tx for safe mobility due to fluctuating AMPAC of 8-10, hx of agressive behaviors and pt requires skilled assist x2 for safe transfer to chair and initiation of gait training  Prior to Session Communication: Bedside nurse, Physician  Patient Position Received: Bed, 3 rail up, Alarm on  General Comment: Pt supine in bed upon arrival, slightly lethargic but agreeable to OT  Precautions:  Medical Precautions: Cardiac precautions, Fall precautions, Oxygen therapy device and L/min  Post-Surgical Precautions: Move in the Tube    Pain:  Pain Assessment  Pain Assessment: 0-10  0-10 (Numeric) Pain Score: 7  Pain Type: Surgical pain, Acute pain  Pain Location: Incision  Pain Interventions: Repositioned, Distraction    Objective    Cognition:  Cognition  Overall Cognitive Status: Impaired  Orientation Level: Oriented X4  Following Commands: Follows one step commands with increased time (and repitition)  Cognition Comments: delayed infromation precessing, required repitition and extended to follow VCs  Attention: Exceptions to WFL  Sustained Attention: Impaired  Insight: Moderate  Impulsive: Moderately  Processing Speed: Delayed  Coordination:  Movements are Fluid and Coordinated: No  Upper Body Coordination: bradykinesia, decreased motor planning bilaterally.  Activities of Daily Living:      UE Dressing  UE Dressing Level of Assistance: Moderate assistance  UE Dressing Where Assessed: Edge of bed  UE Dressing Comments: donned gown over back seated EOB mod A, extended time to complete 2/2 impaired command following    LE Dressing  LE Dressing: Yes  Sock Level of Assistance: Maximum assistance  LE  Dressing Where Assessed: Chair  LE Dressing Comments: adjusted socks over B feet seated max A    Bed Mobility/Transfers: Bed Mobility  Bed Mobility: Yes  Bed Mobility 1  Bed Mobility 1: Supine to sitting  Level of Assistance 1: Moderate assistance, +2, Maximum verbal cues, Maximum tactile cues  Bed Mobility Comments 1: HOB slightly elevated, VCs for sequencing and log roll, extended time to complete    Transfers  Transfer: Yes  Transfer 1  Transfer From 1: Sit to, Stand to  Transfer to 1: Stand, Sit  Technique 1: Sit to stand, Stand to sit  Transfer Device 1: Walker  Transfer Level of Assistance 1: Moderate assistance, +2, Maximum verbal cues, Maximum tactile cues  Trials/Comments 1: 2x trials, max VCs for UE and LE positioning    Functional Mobility:  Functional Mobility  Functional Mobility Performed: Yes  Functional Mobility 1  Surface 1: Level tile  Device 1: Rolling walker  Assistance 1: Moderate assistance, Maximum verbal cues (x2)  Comments 1: bed > chair, chair forward/ backward ~2 steps mod A x2 FWW, max VCs for walker management, safety, and positioning, pt required max encouragement for mobility participation  Sitting Balance:  Static Sitting Balance  Static Sitting-Balance Support: Feet supported  Static Sitting-Level of Assistance: Close supervision  Dynamic Sitting Balance  Dynamic Sitting-Balance Support: Feet supported  Dynamic Sitting-Level of Assistance: Close supervision  Standing Balance:  Static Standing Balance  Static Standing-Balance Support: Bilateral upper extremity supported  Static Standing-Level of Assistance: Moderate assistance (x2)  Dynamic Standing Balance  Dynamic Standing-Balance Support: Bilateral upper extremity supported  Dynamic Standing-Level of Assistance: Moderate assistance (x2)  Modalities:  Modalities Used: No    Therapy/Activity: Therapeutic Exercise  Therapeutic Exercise Performed: Yes  Therapeutic Exercise Activity 1: 1x10 BUE open/close grasp with resistance  sponge    Therapeutic Activity  Therapeutic Activity Performed: Yes  Therapeutic Activity 1: bed mobility supine > sit, 2x STS, functional mobility, extended time to complete, max VCs throughout for positioning, safety, and sequencing  Therapeutic Activity 2: pt provided with resistance sponges and educated on use to improve grasp    Outcome Measures:[facility] Daily Activity  Putting on and taking off regular lower body clothing: A lot  Bathing (including washing, rinsing, drying): A lot  Putting on and taking off regular upper body clothing: A lot  Toileting, which includes using toilet, bedpan or urinal: Total  Taking care of personal grooming such as brushing teeth: A little  Eating Meals: A little  Daily Activity - Total Score: 13    Education Documentation  Home Exercise Program, taught by Shadi Morelos OT at 8/22/2025 12:18 PM.  Learner: Family, Patient  Readiness: Acceptance  Method: Explanation  Response: Verbalizes Understanding  Comment: OT POC, ADLs, resistance sponges, MITT prec    Body Mechanics, taught by Shadi Morelos OT at 8/22/2025 12:18 PM.  Learner: Family, Patient  Readiness: Acceptance  Method: Explanation  Response: Verbalizes Understanding  Comment: OT POC, ADLs, resistance sponges, MITT prec    Precautions, taught by Shadi Morelos OT at 8/22/2025 12:18 PM.  Learner: Family, Patient  Readiness: Acceptance  Method: Explanation  Response: Verbalizes Understanding  Comment: OT POC, ADLs, resistance sponges, MITT prec    ADL Training, taught by Shadi Morelos OT at 8/22/2025 12:18 PM.  Learner: Family, Patient  Readiness: Acceptance  Method: Explanation  Response: Verbalizes Understanding  Comment: OT POC, ADLs, resistance sponges, MITT prec    Education Comments  No comments found.      Goals:  Encounter Problems       Encounter Problems (Active)       ADLs       Patient will perform UB and LB bathing with minimal level of assistance. (Progressing)       Start:  08/07/25    Expected End:  08/25/25             Patient with complete upper body dressing with SBA level of assistance donning and doffing all UE clothes (Progressing)       Start:  08/07/25    Expected End:  08/25/25            Patient with complete lower body dressing with minimal level of assistance donning and doffing all LE clothes  with PRN adaptive equipment (Progressing)       Start:  08/07/25    Expected End:  08/25/25            Patient will complete toileting including hygiene clothing management/hygiene with minimal level of assistance. (Progressing)       Start:  08/07/25    Expected End:  08/25/25               COGNITION/SAFETY       Pt will maintain MITT precautions with 100% carryover during functional tasks, ADLs, and mobility without cueing.  (Progressing)       Start:  08/07/25    Expected End:  08/25/25            Patient will participate in cognitive activities to demonstrate WFL score on further cognitive assessments  (Progressing)       Start:  08/07/25    Expected End:  08/25/25               MOBILITY       Patient will perform Functional mobility min Household distances with moderate level of assistance and least restrictive device in order to improve safety and functional mobility. (Progressing)       Start:  08/07/25    Expected End:  08/25/25               TRANSFERS       Patient will perform bed mobility with minimal level of assistance in order to improve safety and independence with mobility (Progressing)       Start:  08/07/25    Expected End:  08/25/25            Patient will complete functional transfer to toilet with least restrictive device with moderate level of assistance. (Progressing)       Start:  08/07/25    Expected End:  08/25/25                 BRIDGETT Jurado/BRADEN

## 2025-08-22 NOTE — PROGRESS NOTES
PM&R Progress Note  Patient: Colin Leonard   Age/sex: 59 y.o.   Medical Record #: [id number]     Consulting physician: Dr. 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    PM&R was consulted for IRF appropriateness.    HPI:   Coiln Leonard is a 59 y.o. year old male patient with hx of HTN, CKD stage 5, chronic anemia, type 2 diabetes, previous c diff colitis admitted 5/19-5/24 and treated for atypical pneumonia. That admission- found to have elevated trop and BNP, as well as episodes of VT which required increased dose of coreg. Underwent LHC on 5/22/25 which demonstrated MVCAD; decision made to discharge and then bring back for CABG after recovering from that admission. He is now s/p CABGx 2 with Dr Yolanda Justice on 8/6/25. Postop course c/b unresponsive episode in setting of hypotension (BAT called 8/8)- likely 2/2 newly diagnosed bilateral vertebral and basilar artery stenosis, pneumothorax, and TEJA on CKD requiring dialysis.      Procedures performed on/related to this admission:  -CABG x2 on 8/6  -Attempted MT on 8/8 due to c/f basilar artery occlusion; not performed due to no LVO noted, only severe stenosis of b/l vertebral and basilar arteries.    Home Situation/ Supports  Lives in: House  Stairs to enter: 2 with b/l rails  Stairs in home: none  Bed level: one level  Bathroom level: one level, WIS, grab bars in shower, built-in shower seat  Lives with: son  Who can help at home and how often: son works and goes to school so wouldn't be able to help     Prior Level of Function  Mobility: Independent  ADLs: Independent  Assistive Devices: None  - Works as an  at baseline    Physical Therapy  Last seen: 8/22  Bed mobility: ModA x2  Transfers: ModA x2 with walker  Ambulation: MaxA with rolling walker; Alt LE marching 5x/LE with maxA, max encouragement  to attempt ambulation, pt taking 2 steps fwd with modAx2, then reports need to sit, requires maxA and max cues to step bkwd for safe return to chair   Basic Mobility - Total Score: 10     Occupational Therapy  Last seen: 8/22  Putting on and taking off regular lower body clothing: A lot  Bathing (including washing, rinsing, drying): A lot  Putting on and taking off regular upper body clothing: A lot  Toileting, which includes using toilet, bedpan or urinal: Total  Taking care of personal grooming such as brushing teeth: A little  Eating Meals: A little  Daily Activity - Total Score: 13    Speech Therapy   Last seen: 8/22  Evaluation: Advanced diet to pureed and mod/honey thick liquids.    REVIEW OF SYSTEMS  Constitutional: Denies fever, chills, fatigue, appetite/weight change  HEENT: Denies hearing loss, tinnitus, voice change  Cardio: Admits to left side costochondral chest pain, denies leg swelling, palpitations  Respiratory: Admits to SOB w/ and w/o oxygen via NC (satting in low 90s w/o and mid 90s on 2L), cough, denies wheezing  GI: Denies Abd pain, constipation, diarrhea, N/V  : Denies dysuria, urgency, decreased urination  MSK: Denies arthralgia, myalgia, joint swelling  Neuro: Denies numbness, dizziness, light-headedness  Skin: Denies color change, wound, rash  Psych: Denies anxiety, depression, hallucinations, agitation, sleep disturbance    OBJECTIVE    Patient Vitals for the past 24 hrs:   BP Temp Temp src Pulse Resp SpO2   08/22/25 1000 144/69 -- -- 65 14 97 %   08/22/25 0919 -- -- -- 77 -- 95 %   08/22/25 0832 155/64 -- -- 65 -- --   08/22/25 0800 155/64 35.9 °C (96.6 °F) Temporal 62 20 98 %   08/22/25 0600 160/73 -- -- 64 20 --   08/22/25 0400 149/78 -- -- 63 18 96 %   08/22/25 0200 153/56 -- -- 66 16 96 %   08/22/25 0000 178/78 -- -- 70 23 95 %   08/21/25 2200 162/72 -- -- 69 24 94 %   08/21/25 2000 134/63 36.6 °C (97.9 °F) Temporal 68 23 97 %   08/21/25 1800 145/74 -- -- 67 23 97 %   08/21/25 1600  150/69 36.2 °C (97.2 °F) Temporal 66 22 94 %   08/21/25 1400 138/64 -- -- 76 17 97 %   08/21/25 1200 175/72 35.9 °C (96.6 °F) Temporal 73 24 95 %       Lab Results   Component Value Date    WBC 14.9 (H) 08/22/2025    HGB 7.9 (L) 08/22/2025    HCT 26.1 (L) 08/22/2025    MCV 97 08/22/2025     08/22/2025        Lab Results   Component Value Date    CREATININE 5.78 (H) 08/22/2025    BUN 54 (H) 08/22/2025     08/22/2025    K 4.2 08/22/2025     08/22/2025    CO2 27 08/22/2025        PHYSICAL EXAM  GENERAL: Awake and lethargic at times, well-developed, well-nourished, No acute distress  HEENT - NC/AT, NG tube in place  CARDIOVASCULAR: extremities warm, well perfused  RESPIRATORY: no conversational dyspnea, symmetrical chest rise, no coarse breath sounds, CTAB  ABDOMEN: Soft, non-tender, normal bowel sounds, no distention  MSK: No visible deformities or local swelling. Pain to palpation over the left costochondral junction.  SKIN: Normal color, warm, dry. Incision on midline of sternum without erythema or dehiscence.   Psych: Cooperative, affect appropriate, conversational, good-eye contact    NEURO: A&O x 4  RUE strength: 5/5 elbow flexors, 5/5 elbow extensors, 5/5 wrist extensors, 5/5 finger flexors, 5/5 finger abductors   LUE strength: 5/5 elbow flexors, 5/5 elbow extensors, 5/5 wrist extensors, 5/5 finger flexors, 5/5 finger abductors   RLE strength: 5-/5 hip flexors, 5/5 knee extensors, 5/5 ankle dorsiflexors, 5/5 EHL, 5/5 ankle plantar flexors  LLE strength: 5-/5 hip flexors, 5/5 knee extensors, 5/5 ankle dorsiflexors, 5/5 EHL, 5/5 ankle plantar flexors  Sensation - intact to light touch in bilateral upper and lower extremities.   Reflexes - 2+ biceps, 2+ brachioradialis, 2+ patellar and 0 Achilles reflexes bilaterally    IMPRESSION:  oClin Leonard is a 59 y.o. 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      Recommendations:  #  Acute Symptomatic Basilar Artery Stenosis, likely ischemic event  - DAPT for 90 days then monotherapy  - Spasticity: None appreciated at this time.  -  Secondary stroke prevention including smoking cessation, close glucose and BP controlled if applicable  - PO: pureed with mod/honey thick liquids  - DVT prophylaxis with heparin 5,000u q8h.     # CABGx2  # Post-op afib  # HTN  # Diastolic CHF  - TTE 8/14: LVEF 55-60%, moderate-severe aortic valve calcification with stenosis   - Continue amiodarone 400mg daily, metoprolol tartrate 12.5mg bid, DAPT daily, atorvastatin and zetia  - Continue to hold home coreg, torsemide and hydralazine     # TEJA on CKD stage V  - Initially requiring CVVH and SLED, now tolerating iHD since 8/18  - Continue oral bumex 2mg q8h and torsemide 40mg daily  - Will need tunneled dialysis catheter prior to DC    #Leukocytosis of Undetermined Etiology  - WBC persistently elevated since 8/14 (range from 12-16); 14.9 on 8/22  - Remains afebrile  - Negative blood cultures and urine cultures negative  - Treated with zosyn for 5 days; DC on 8/19    # Pain  - Currently: tylenol 650mg q6h and lidocaine patch over left anterior chest  - Continue to monitor and adjust regimen as needed     # Neurogenic Bladder:    - Thornton cath in place since 8/15  - Please Stat lock thornton to proximal medial thigh or lower abdomen to prevent bladder neck trauma.  - Recommend removing thornton cath and begin intermittent straight cath q4hrs w/ goal of cath volumes <500mL  - If thornton removed, check for urinary retention with bladder scans q4h and straight cath for any volume >400cc.  - If fails initial TOV, recommend starting Tamsulosin 0.4mg daily for urinary retention for at least 5 days prior to next TOV attempt.     # Neurogenic Bowel:  - Last BM: 8/19 x3-4  - Currently: miralax daily and diane-colace 2 tabs nightly  - Recommend holding miralax due to multiple bowel movements per day the last couple days  - Goal of one BM  daily.  - GI stress ppx when NPO.    # Impaired mobility and Impaired independence with ADLs and I/ADLs  - Continue PT, working on bed mobility, transfers, balance, endurance, strength, gait, eval for appropriate assistive device  - Continue OT, working on functional cognition, functional mobility, upper limb strength/coordination, balance, endurance, eval for appropriate adaptive equipment, ADLs, and I/ADLs.    # Dispo  - Patient is not a candidate for discharge to acute rehab at this time. At the rate that he is progressing in his therapy sessions and no one being available to assist at home 24/7, it is unlikely that he would be able to go home at [facility] level after a short stay at acute rehab. Would be more appropriate for discharge to SNF for continued therapy and iHD.   - Will need tunneled dialysis catheter prior to DC per nephrology recs  - For questions, please contact via [employer]      We will follow along with you. Thank you for the consult.    Elly Ruiz,   Physical Medicine and Rehabilitation PGY-3  Available via [employer]

## 2025-08-22 NOTE — CARE PLAN
Transitional Care Coordinator Note: Patient discussed with medical team, per medical team patient is not medically ready.   Discharge dispo:  [facility], [place]. PM&R Will Re-eval Patient today.  ADOD Next week.  Neftali Luz RN  Transitional Care Coordinator

## 2025-08-22 NOTE — PROGRESS NOTES
"CARDIAC SURGERY DAILY PROGRESS NOTE    Colin Leonard is a 59 y.o. male hx of HTN, CKD stage 5, chronic anemia, type 2 diabetes, hx of c diff colitis who was admitted 5/19-5/24 for shortness of breathe was incidentally found to have atypical pneumonia which he was treated for. He was found to have elevated trop and BNP on that admission with a few episodes of reported VT which required increased dose of coreg. He underwent LHC 5/22/25 demonstrated MVCAD and decision was made to discharge and let him recover from his pneumonia treated with Augmentin and oral vanco prophyllacticaly due to h/o cdiff. and bring back for CABG. He is now s/p CABGx 2 with Dr Yolanda Justice on 8/6/2. Postop course c/b unresponsive episode in setting of hypotension and found to have bilateral vertebral and basilar artery stenosis, pneumothorax, and TEJA on CKD requiring dialysis.     Operations ad Procedures with Dr. Yolanda Justice on 8/6:   1. Median Sternotomy  2. Off Pump CABG x 2 (LIMA to LAD, SVG to distal CX)  3. Sternal Plating with Sternal Lock     CTICU course: Prolonged ICU stay  - Encephalopathy, unresponsive episode POD #2; BAT --> angio --> basilar, bilat vertebral stenoses, no obstruction, no structural brain injury; Likely hypoactive delirium.  - Afib; Acute resp failure, re-intubation during unresponsive episode;   - Acute on chronic renal failure - dialysis started; R subclavian temporary HD catheter 8/18; HD 8/18 and 8/19  - Leukocytosis  - 5 d course Zosyn    Transferred to LT3 on 8/20.    Interval History:   No acute events overnight. On 3L NC.    SUBJECTIVE:  No complaints this morning.    Objective   /69 (BP Location: Left arm, Patient Position: Sitting)   Pulse 65   Temp 35.9 °C (96.6 °F) (Temporal)   Resp 14   Ht 1.778 m (5' 10\")   Wt 111 kg (245 lb 8 oz)   SpO2 97%   BMI 35.23 kg/m²   0-10 (Numeric) Pain Score: 7   3 Day Weight Change: Unable to Calculate    Intake and Output    Intake/Output Summary (Last 24 " hours) at 2025 1201  Last data filed at 2025 2350  Gross per 24 hour   Intake 600 ml   Output 400 ml   Net 200 ml       Physical Exam  Physical Exam  Constitutional:       General: He is not in acute distress.     Comments: Deconditioned     Cardiovascular:      Rate and Rhythm: Normal rate and regular rhythm.      Heart sounds: Normal heart sounds.      Comments: NSR 60s  Pulmonary:      Effort: No accessory muscle usage or respiratory distress.      Comments: Diminished throughout. 2L NC  Abdominal:      General: There is no distension.      Palpations: Abdomen is soft.      Tenderness: There is no abdominal tenderness.      Comments: LBM    Genitourinary:     Comments: Elmore in place draining clear yellow urine    Musculoskeletal:      Right lower le+ Pitting Edema present.      Left lower le+ Pitting Edema present.     Skin:     General: Skin is warm and dry.      Comments: Warm and dry. Midsternal incision clean, dry, and intact     Neurological:      General: No focal deficit present.      Mental Status: He is alert, oriented to person, place, and time and easily aroused.      Comments: Disoriented to situation   Psychiatric:         Mood and Affect: Mood normal.         Cognition and Memory: Memory is impaired.       Medications  Scheduled medications  Scheduled Medications[1]Continuous medications  Continuous Medications[2]PRN medications  PRN Medications[3]    Labs  Results for orders placed or performed during the hospital encounter of 25 (from the past 24 hours)   POCT GLUCOSE   Result Value Ref Range    POCT Glucose 135 (H) 74 - 99 mg/dL   POCT GLUCOSE   Result Value Ref Range    POCT Glucose 135 (H) 74 - 99 mg/dL   Magnesium   Result Value Ref Range    Magnesium 2.55 (H) 1.60 - 2.40 mg/dL   CBC   Result Value Ref Range    WBC 14.9 (H) 4.4 - 11.3 x10*3/uL    nRBC 0.0 0.0 - 0.0 /100 WBCs    RBC 2.68 (L) 4.50 - 5.90 x10*6/uL    Hemoglobin 7.9 (L) 13.5 - 17.5 g/dL    Hematocrit  26.1 (L) 41.0 - 52.0 %    MCV 97 80 - 100 fL    MCH 29.5 26.0 - 34.0 pg    MCHC 30.3 (L) 32.0 - 36.0 g/dL    RDW 15.2 (H) 11.5 - 14.5 %    Platelets 354 150 - 450 x10*3/uL   Renal Function Panel   Result Value Ref Range    Glucose 89 74 - 99 mg/dL    Sodium 138 136 - 145 mmol/L    Potassium 4.2 3.5 - 5.3 mmol/L    Chloride 101 98 - 107 mmol/L    Bicarbonate 27 21 - 32 mmol/L    Anion Gap 14 10 - 20 mmol/L    Urea Nitrogen 54 (H) 6 - 23 mg/dL    Creatinine 5.78 (H) 0.50 - 1.30 mg/dL    eGFR 10 (L) >60 mL/min/1.73m*2    Calcium 9.4 8.6 - 10.6 mg/dL    Phosphorus 4.8 2.5 - 4.9 mg/dL    Albumin 2.8 (L) 3.4 - 5.0 g/dL   POCT GLUCOSE   Result Value Ref Range    POCT Glucose 100 (H) 74 - 99 mg/dL     IMAGING/ DIAGNOSTIC TESTING:  I have personally reviewed the following test result(s):    8/14 TTE  CONCLUSIONS:   1. Left ventricular ejection fraction is normal by visual estimate at 55-60%.   2. Spectral Doppler shows a Grade I (impaired relaxation pattern) of left ventricular diastolic filling with normal left atrial filling pressure.   3. Abnormal septal motion consistent with post-operative status.   4. No left ventricular thrombus visualized.   5. There is moderately increased septal thickness.   6. There is reduced right ventricular systolic function.   7. The left atrial size is moderately dilated.   8. There is moderate to severe aortic valve cusp calcification.   9. Normal aortic root.  10. Compared with study dated 8/6/2025, the prior study was an intra-operative OMAR. This was a limited study to evaluate LVEF. The aortic valve appears mod-severely calcified and the gradients on the OMAR were consistent with severe AS, they were not assessed on this study.    8/18 XR chest 1 view  IMPRESSION:  Bibasilar pleural effusion and atelectasis/consolidation and  interstitial pulmonary edema, not significantly different from prior  study.    8/21 XR chest 1 view  IMPRESSION:  1. Similar right worse than left bibasilar  atelectasis and  bronchovascular crowding.  2. Small bilateral pleural effusions, similar to prior.  3. Medical devices as above.    IMPRESSION & PLAN:  POD #16 s/p CABGx 2 with Dr Yolanda Justice on .  - Increase activity/ ambulation; PT/OT  - Encourage IS, C/DB; respiratory therapy; wean O2 as taty   - Cardiac rehab referral   - Continue cardiac meds: ASA, BB, statin  - continue Plavis per Dr. Yolanda Justice  - Pain and anticonstipation meds  - Chest tubes removed  - 2v CXR when transitioned out of stepdown  - Epicardial wires previously cut for urgent MRI while in ICU  - Tele until discharge  - Optimize nutrition and electrolytes    Rhythm  - Tele: NSR 60s  - Continue metoprolol 12.5mg BID and amiodarone 200mg daily  - Adjust medications as tolerated    Acute Blood Loss Anemia   Recent Labs     25  0615 25  0615 25  0553 25  0442 25  0152 25  1455 25  0256   HGB 7.9* 7.6* 7.7* 8.5* 8.6* 10.2* 8.7*   HCT 26.1* 25.5* 24.8* 26.6* 26.6* 32.5* 28.2*   - MV, PO Iron x1mo  - Daily labs, transfuse as indicated    Platelets  Recent Labs     25  0615 25  0615 25  0553 25  0442 25  0152 25  1455 25  0256    342 335 327 315 290 319   - Etiology likely postop/CPB related  - Continue to trend with daily CBCs    Volume/Electrolyte Status: Preop wt Weight: 116 kg (256 lb 9.9 oz)   Vitals:    25 0149   Weight: 111 kg (245 lb 8 oz)     - Weights: Preop wt: 116kg, : 111kg  - Replete electrolytes for hypokalemia/hypomagnesemia/hypophosphatemia as needed  - Daily weights and strict I&Os  - Daily RFP while admitted    Leukocytosis  Recent Labs     25  0615 25  0615 25  0553 25  0442 25  0152 25  1455 25  0256   WBC 14.9* 16.0* 14.1* 14.7* 16.0* 12.0* 13.4*     Temp (36hrs), Av.2 °C (97.1 °F), Min:35.7 °C (96.3 °F), Max:36.6 °C (97.9 °F)     - Aggressive pulmonary hygiene  - Monitor for s/s  infection  - Daily CBC to follow    ESRD: 525ml urine output in last 24 hours. Last HD session on 8/21.  - Nephrology following, appreciate recs  - Renal diet  - IR consulted 8/21 for tunneled HD line. Holding off for the weekend and trending WBC with tentative plan to place line Monday.  - Start Torsemide 40mg daily    Acute urinary retention: Thornton in placed 8/15. Silodosin contraindicated in renal failure. Has been unable to take PO meds consistently.  - Continue Tamsulosin 0.4mg daily  - Continue thornton for now    HLD: On home atorvastatin 20mg and ezetimibe 10mg  - Continue atorvastatin 80mg and ezetimibe 20mg nightly    Dysphagia: Corpak in place.  - SLP following, appreciate recs  - MBS on 8/21 now cleared for pureed diet and moderately thickened liquids  - Cyclic nepro tube feeds 50ml/hr 7898-3194 with PO renal moderately thickened, pureed diet  - Calorie count    Respiratory insufficiency: On 2L NC.  - Respiratory therapy, bronchopulmonary hygiene TID, IS  - Continuous pulse oximetry  - Wean Fio2 as tolerated    Wounds: Right tibial diabetic ulcer and sacral contact dermititis  - Wound care consult, appreciate recs    VTE Prophylaxis: SCDs/TEDs, ambulation, SQ heparin  Code Status: Full Code    Dispo  - PT/OT recs: high intensity  - Anticipate discharge 3-4 days to facility pending tolerance of new HD, fluid management, resolution of hypoactive delirium  - Will continue to assess discharge needs    SARAH Trejo-CNP  Cardiac Surgery [facility]  [facility]  Team Phone [phone]    8/22/2025  12:01 PM         [1] acetaminophen, 650 mg, nasogastric tube, q6h  [Held by provider] acetaminophen, 650 mg, oral, q6h  amiodarone, 200 mg, oral, Daily  aspirin, 81 mg, oral, Daily  atorvastatin, 80 mg, oral, Nightly  clopidogrel, 75 mg, oral, Daily  ezetimibe, 10 mg, oral, Nightly  heparin, 5,000 Units, subcutaneous, q8h  insulin lispro, 0-10 Units, subcutaneous, q6h  lidocaine, 1 patch, transdermal,  Daily  melatonin, 6 mg, oral, Nightly  metoprolol tartrate, 12.5 mg, oral, BID  nystatin, 4 mL, Swish & Spit, TID  polyethylene glycol, 17 g, oral, Daily  sennosides-docusate sodium, 2 tablet, oral, Nightly  sodium chloride, 1 spray, Each Nostril, 4x daily  tamsulosin, 0.4 mg, oral, Daily  thiamine, 100 mg, oral, Daily  torsemide, 40 mg, oral, Daily     [2]    [3] PRN medications: alteplase, dextrose, dextrose **OR** glucagon, dextrose, glucagon, glucagon, guaiFENesin, heparin flush, naloxone, ondansetron, oxygen

## 2025-08-22 NOTE — CONSULTS
Nutrition Calorie Count:   Nutrition Assessment    Reason for Assessment: Calorie count      Nutrition Events since last eval:  Food and Nutrient History: Pt passed MBS for puree diet + mod thick liquids yesterday 8/21 -- now NPO this morning pending HD cath placement. Still has dobhoff - discussed nocturnal TF's meeting ~50% est needs, allow pt to eat during day time hours. Will follow up with moon cnt results early next week.        Estimated Needs:   Total Energy Estimated Needs in 24 hours (kCal): 2200 kCal  Method for Estimating Needs: 30 kcals/kg x ideal BW  Total Protein Estimated Needs in 24 Hours (g): 110 g  Method for Estimating 24 Hour Protein Needs: 1.5 gm/kg x ideal BW  Total Fluid Estimated Needs in 24 Hours (mL):  (per team)                Nutrition Interventions/Recommendations   Nutrition prescription for enteral nutrition, Nutrition prescription for oral nutrition    Nutrition Recommendations:  1) Diet texture per SLP.   Suggest no therapeutic diet (i.e renal/carb controlled) as puree diet is limited at baseline    2) Mod thick, low K+ ONS ordered, as below.      3) Transition to nocturnal TF's meeting ~50% est needs.   Nepro @ 50 mls/hr x 12 hours overnight.  Flush with 30-60mls pre & post TF cycle + add FWF per team.    Nutrition Interventions/Goals:   Meals and Snacks: Texture-modified diet  Goal: as above  Enteral Intake: Management of schedule of enteral nutrition  Goal: Nepro @ 50 x 12 hours = 1062 kcals & 49gm protein  Medical Food Supplement: Commercial food medical food supplement therapy  Goal: Macic cup BID (290 kcals, 9 gm pro each) and Gelatein Plus (160 kcals & 20gm pro each) --- both low in potassium  [place] food menu provided, listed available thickened liquids from kitchen.        Nutrition Monitoring and Evaluation   Estimated Energy Intake: Other criteria  Criteria: >50-66% est needs met by mouth before decide to pull dobhoff with nocturnal feeds.  Enteral and Parenteral  Nutrition Intake Determination: Enteral nutrition intake - Tolerate TF at goal rate         Electrolyte and Renal Panel: Electrolytes within normal limits  Glucose/Endocrine Profile: Glucose within normal limits ( mg/dL)         Goal Status: New goal(s) identified    Time Spent (min): 30 minutes

## 2025-08-22 NOTE — PROGRESS NOTES
Inpatient  Speech-Language Pathology Treatment     Patient Name: Colin Leonard  MRN: [id number]  Today's Date: 8/22/2025  Time Calculation  Start Time: 1020  Stop Time: 1037  Time Calculation (min): 17 min           Assessment/Plan:  #oropharyngeal dysphagia  - Targeted dysphagia exercises this morning. Pt performing w/ assistance. Educated on daily exercise routine w/ pt and family.  - Nursing reported pt having difficulty w/ meds whole but improved toleration w/ meds crushed.          DIET RECOMMENDATIONS:   - Pureed (IDDSI Level 4)  - Moderately/honey thick liquids (IDDSI Level 3)     STRATEGIES:  - Small bites  - Medications crushed in puree or as best tolerated  - Complete oral care frequently throughout the day  - Full supervision with meals; One to one assist with meals            Plan:  Inpatient/Swing Bed or Outpatient: Inpatient  SLP TX Plan: Continue Plan of Care  SLP Plan: Skilled SLP  SLP Frequency: 2x per week  Duration: 2 weeks  SLP Discharge Recommendations: Other (Comment) (TBD)  SLP - OK to Discharge: Yes      Subjective   Resting in chair. Sister Kathleen present.   Pt NPO for procedure today   Prior to Session Communication: Bedside nurse        Objective       Therapeutic Swallow:  Therapeutic Swallow Intervention : Pharyngeal Strengthening Techniques  Pharyngeal Strengthening Techniques: Chin Tuck Against Resistance  Swallow Comments: CTAR was completed 15-20 reps w/ min cues. Instruction on exercise routine/regimen was provided w/ Cg and Pt, showing appropriate understanding to 100% acc verbally              Encounter Problems       Encounter Problems (Active)       Swallowing       LTG - Patient will tolerate the least restrictive diet without overt difficulty by time of discharge. (Progressing)       Start:  08/11/25    Expected End:  08/26/25            SLP Goal 1 (Progressing)       Start:  08/11/25    Expected End:  08/26/25       STG - Patient will tolerate therapeutic trials of  recommended consistency without clinical signs and symptoms of aspiration on 100% of trials              Swallowing       STG - Patient will complete effortable swallows 30-40 times per session w/ moderately thick liquids (Progressing)       Start:  08/12/25    Expected End:  08/26/25            SLP Goal 2 (Progressing)       Start:  08/12/25    Expected End:  08/26/25       Pt will complete suprahyoid exercises of at least 10-15 reps w/ min cues              Swallowing       STG - Patient/Family will verbalize/demonstrate comprehension of dysphagia education, strategies, recommendations and POC To 80% acc. With min cues  (Progressing)       Start:  08/21/25    Expected End:  08/29/25                   Inpatient Education:  Extensive education provided to patient and/or Caregiver regarding current swallow function, recommendations/results, and POC. Demonstrated verbal understanding and were agreeable w/ the details/recommendations reviewed.

## 2025-08-23 ENCOUNTER — APPOINTMENT (OUTPATIENT)
Dept: DIALYSIS | Facility: HOSPITAL | Age: 60
End: 2025-08-23
Payer: COMMERCIAL

## 2025-08-23 ENCOUNTER — APPOINTMENT (OUTPATIENT)
Dept: RADIOLOGY | Facility: HOSPITAL | Age: 60
End: 2025-08-23
Payer: COMMERCIAL

## 2025-08-23 ASSESSMENT — PAIN SCALES - GENERAL
PAINLEVEL_OUTOF10: 2
PAINLEVEL_OUTOF10: 3
PAINLEVEL_OUTOF10: 4
PAINLEVEL_OUTOF10: 2
PAINLEVEL_OUTOF10: 5 - MODERATE PAIN

## 2025-08-23 ASSESSMENT — COGNITIVE AND FUNCTIONAL STATUS - GENERAL
MOVING FROM LYING ON BACK TO SITTING ON SIDE OF FLAT BED WITH BEDRAILS: A LITTLE
MOVING TO AND FROM BED TO CHAIR: A LITTLE
WALKING IN HOSPITAL ROOM: A LOT
DAILY ACTIVITIY SCORE: 18
PERSONAL GROOMING: A LITTLE
CLIMB 3 TO 5 STEPS WITH RAILING: A LOT
TOILETING: A LITTLE
DRESSING REGULAR LOWER BODY CLOTHING: A LOT
HELP NEEDED FOR BATHING: A LITTLE
STANDING UP FROM CHAIR USING ARMS: A LITTLE
DRESSING REGULAR UPPER BODY CLOTHING: A LITTLE
MOBILITY SCORE: 16
TURNING FROM BACK TO SIDE WHILE IN FLAT BAD: A LITTLE

## 2025-08-23 ASSESSMENT — PAIN - FUNCTIONAL ASSESSMENT
PAIN_FUNCTIONAL_ASSESSMENT: 0-10

## 2025-08-23 ASSESSMENT — PAIN DESCRIPTION - LOCATION: LOCATION: STERNUM

## 2025-08-23 NOTE — PROGRESS NOTES
Physical Therapy                 Therapy Communication Note    Patient Name: Colin Leonard  MRN: [id number]  Department: [facility]  Room: 3030/3030-A  Today's Date: 8/23/2025     Discipline: Physical Therapy    Missed Visit: PT Missed Visit: Yes     Missed Visit Reason: Missed Visit Reason:  (pt off floor at dialysis. will re-attempt as schedule permits)    Missed Time: Attempt    Comment:

## 2025-08-23 NOTE — PROGRESS NOTES
Nephrology Follow-up Note   Patient ID: Colin Leonard is a 59 y.o. male.  Seen and examined during hemodialysis. Labs and events reviewed. Pt of Dr Max      Subjective:   Feels OK, no c/o CP/SOB/F/C/Abd pain  No c/o related to HD except that it is long     Problem List[1]    Scheduled medications:  Scheduled Medications[2]  Continuous Medications[3]  PRN Medications[4]   Dietary Orders (From admission, onward)       Start     Ordered    08/25/25 0001  NPO Diet Except: Sips with meds; Effective midnight  Diet effective midnight        Question:  Except:  Answer:  Sips with meds    08/23/25 0754    08/22/25 1125  Enteral feeding WITH diet order Diet type: Renal; Potassium restriction: Potassium Restricted 2 gm (50mEq); Sodium restriction: 2 - 3 grams Sodium; Texture: Pureed 4; Fluid consistency: Moderately thick 3; Tube feeding formula: Nepro; 2000/0800; 5...  Continuous        Question Answer Comment   Diet type Renal    Potassium restriction: Potassium Restricted 2 gm (50mEq)    Sodium restriction: 2 - 3 grams Sodium    Texture Pureed 4    Fluid consistency Moderately thick 3    Tube feeding formula: Nepro    Feeding route: NG (nasogastric tube)    Tube feeding cyclic (start / stop time): 2000/0800    Tube feeding cyclic rate (mL/hr): 50        08/22/25 1126    08/22/25 0913  Oral nutritional supplements  Until discontinued        Question Answer Comment   Deliver with Lunch    Deliver with Dinner    Select supplement: Magic Cup        08/22/25 0912    08/22/25 0913  Oral nutritional supplements  Until discontinued        Question Answer Comment   Deliver with Breakfast    Select supplement: Gelatein Plus        08/22/25 0912    08/21/25 1414  Calorie count  Once         08/21/25 1413                    Heart Rate:  [65-67]   Temp:  [36 °C (96.8 °F)-36.6 °C (97.9 °F)]   Resp:  [17-21]   BP: (132-179)/(66-90)   Weight:  [112 kg (247 lb 11.2 oz)-114 kg (251 lb 1.6 oz)]   SpO2:  [93 %-100 %]    Weight: 116 kg  (256 lb 9.9 oz)   Gen: alert, NAD  HEENT: NC/AT  Neck: supple  Pulm: clear ant b/l   CVS: RRR, no rub  Abd: S/NT/ND  LE: trace edema , no cyanosis   Dialysis acces:  right tempIJ HD cath      Lab Results   Component Value Date    WBC 12.5 (H) 08/23/2025    HGB 7.9 (L) 08/23/2025    HCT 27.3 (L) 08/23/2025     (H) 08/23/2025     08/23/2025     Lab Results   Component Value Date    GLUCOSE 118 (H) 08/23/2025    CALCIUM 9.5 08/23/2025     08/23/2025    K 4.7 08/23/2025    CO2 25 08/23/2025     08/23/2025    BUN 69 (H) 08/23/2025    CREATININE 7.14 (H) 08/23/2025     Results from last 72 hours   Lab Units 08/23/25  0647   ALBUMIN g/dL 2.9*   GLUCOSE mg/dL 118*   HEMOGLOBIN g/dL 7.9*   WBC AUTO x10*3/uL 12.5*      Results from last 72 hours   Lab Units 08/23/25  0647   SODIUM mmol/L 137   POTASSIUM mmol/L 4.7   CO2 mmol/L 25   BUN mg/dL 69*   CREATININE mg/dL 7.14*   PHOSPHORUS mg/dL 6.0*   CALCIUM mg/dL 9.5        Assessment   CKD5 admitted for CABG developed post-surgery TEJA ; has likely transitioned to ESRD    Plan   Plan HD per submitted orders, UF 1L  as tolerated  MBD - Phosphorus binder: when able to take po add Sevelamer 800 mg TID AC  Anemia of CKD: please add epogen 7500 units x 3 per week   Access: temp cath as above - will need tunneled catheter placed prior to discharge   BP: acceptable during treatment   Daily renal MVI   Continue 3 x per week hemodialysis; SW to ensure availability of o/p HD chair at discharge: we are looking for 4 hrs HD x 3 per week ; primary nephrologist  Dr Max - at a facility closer to his home.   Nan Jaimes MD MPH           [1]   Patient Active Problem List  Diagnosis    Vitamin D deficiency    Vision loss    Shortness of breath on exertion    Obesity    Myopia    LVH (left ventricular hypertrophy)    Mixed hyperlipidemia    Primary hypertension    Coronary artery disease involving native coronary artery of native heart without angina pectoris     NSTEMI (non-ST elevated myocardial infarction) (Multi)    Fall    Leukocytosis    CKD (chronic kidney disease) stage 5, GFR less than 15 ml/min (Multi)    Type 2 diabetes mellitus    Acute hematogenous osteomyelitis of left foot (Multi)    Osteomyelitis of ankle or foot, left, acute (Multi)    Choledocholithiasis with acute cholecystitis    Acute calculous cholecystitis    CAD (coronary artery disease)    Anemia    Right renal stone    Encounter for deep vein thrombosis (DVT) prophylaxis    Hypertensive emergency    Polyarthropathy    Acute diverticulitis    Atherosclerosis of native coronary artery of native heart without angina pectoris    ESRD (end stage renal disease) (Multi)    Chronic renal insufficiency    Pneumonia    Coronary artery disease involving native heart, unspecified vessel or lesion type, unspecified whether angina present   [2] acetaminophen, 650 mg, nasogastric tube, q6h  amiodarone, 200 mg, oral, Daily  aspirin, 81 mg, oral, Daily  atorvastatin, 80 mg, oral, Nightly  clopidogrel, 75 mg, oral, Daily  ezetimibe, 10 mg, oral, Nightly  heparin, 5,000 Units, subcutaneous, q8h  insulin lispro, 0-10 Units, subcutaneous, q6h  lidocaine, 1 patch, transdermal, Daily  melatonin, 6 mg, oral, Nightly  metoprolol tartrate, 12.5 mg, oral, BID  nystatin, 4 mL, Swish & Spit, TID  polyethylene glycol, 17 g, oral, Daily  sennosides-docusate sodium, 2 tablet, oral, Nightly  sodium chloride, 1 spray, Each Nostril, 4x daily  tamsulosin, 0.4 mg, oral, Daily  thiamine, 100 mg, oral, Daily  torsemide, 40 mg, oral, Daily  [3]    [4] PRN medications: alteplase, dextrose, dextrose **OR** glucagon, dextrose, glucagon, glucagon, guaiFENesin, heparin flush, naloxone, ondansetron, oxygen

## 2025-08-23 NOTE — PROGRESS NOTES
"CARDIAC SURGERY DAILY PROGRESS NOTE    Colin Leonard is a 59 y.o. male hx of HTN, CKD stage 5, chronic anemia, type 2 diabetes, hx of c diff colitis who was admitted 5/19-5/24 for shortness of breathe was incidentally found to have atypical pneumonia which he was treated for. He was found to have elevated trop and BNP on that admission with a few episodes of reported VT which required increased dose of coreg. He underwent LHC 5/22/25 demonstrated MVCAD and decision was made to discharge and let him recover from his pneumonia treated with Augmentin and oral vanco prophyllacticaly due to h/o cdiff. and bring back for CABG. He is now s/p CABGx 2 with Dr Yolanda Justice on 8/6/2. Postop course c/b unresponsive episode in setting of hypotension and found to have bilateral vertebral and basilar artery stenosis, pneumothorax, and TEJA on CKD requiring dialysis.     Operations ad Procedures with Dr. Yolanda Justice on 8/6:   1. Median Sternotomy  2. Off Pump CABG x 2 (LIMA to LAD, SVG to distal CX)  3. Sternal Plating with Sternal Lock     CTICU course: Prolonged ICU stay  - Encephalopathy, unresponsive episode POD #2; BAT --> angio --> basilar, bilat vertebral stenoses, no obstruction, no structural brain injury; Likely hypoactive delirium.  - Afib; Acute resp failure, re-intubation during unresponsive episode;   - Acute on chronic renal failure - dialysis started; R subclavian temporary HD catheter 8/18; HD 8/18 and 8/19  - Leukocytosis  - 5 d course Zosyn    Transferred to LT3 on 8/20.    ======================  Interval History:   No acute events overnight  Pt getting HD this AM     SUBJECTIVE:  Pt c/o on incisional pain and SOB. Pt says he has no appetite. Pt reports sleeping well last night     Objective   /80   Pulse 67   Temp 36 °C (96.8 °F) (Temporal)   Resp 17   Ht 1.778 m (5' 10\")   Wt 114 kg (251 lb 1.6 oz)   SpO2 93%   BMI 36.03 kg/m²   0-10 (Numeric) Pain Score: 4   3 Day Weight Change: Unable to " Calculate    Intake and Output    Intake/Output Summary (Last 24 hours) at 8/23/2025 1103  Last data filed at 8/23/2025 0530  Gross per 24 hour   Intake 500 ml   Output 250 ml   Net 250 ml       Physical Exam  Physical Exam  Vitals and nursing note reviewed.   Constitutional:       General: He is not in acute distress.     Comments: Deconditioned  Appears fatigued    HENT:      Head: Normocephalic.      Nose:      Comments: Dobhoff in right nares      Mouth/Throat:      Mouth: Mucous membranes are moist.     Eyes:      Conjunctiva/sclera: Conjunctivae normal.       Cardiovascular:      Rate and Rhythm: Normal rate and regular rhythm.      Pulses: Normal pulses.      Heart sounds: Normal heart sounds.      Comments: NSR 60s  Wires cut on 8/8  Pulmonary:      Effort: No accessory muscle usage or respiratory distress.      Comments: Diminished throughout. 3L NC  Abdominal:      General: There is no distension.      Palpations: Abdomen is soft.      Tenderness: There is no abdominal tenderness.      Comments: LBM 8/21   Genitourinary:     Comments: Elmore in place draining clear yellow urine    Musculoskeletal:      Cervical back: Neck supple.      Right lower leg: Edema (trace) present.      Left lower leg: Edema (trace) present.      Comments: MONTERROSO, generalized weakness      Skin:     General: Skin is warm and dry.      Comments: Warm and dry. Midsternal incision clean, dry, and intact     Neurological:      General: No focal deficit present.      Mental Status: He is alert and oriented to person, place, and time.      Comments: Disoriented to situation  Slow to respond    Psychiatric:         Mood and Affect: Affect is flat.         Behavior: Behavior is cooperative.         Cognition and Memory: Memory is impaired.       Medications  Scheduled medications  Scheduled Medications[1]Continuous medications  Continuous Medications[2]PRN medications  PRN Medications[3]    Labs  Results for orders placed or performed during  the hospital encounter of 08/06/25 (from the past 24 hours)   POCT GLUCOSE   Result Value Ref Range    POCT Glucose 99 74 - 99 mg/dL   POCT GLUCOSE   Result Value Ref Range    POCT Glucose 94 74 - 99 mg/dL   POCT GLUCOSE   Result Value Ref Range    POCT Glucose 97 74 - 99 mg/dL   POCT GLUCOSE   Result Value Ref Range    POCT Glucose 121 (H) 74 - 99 mg/dL   Magnesium   Result Value Ref Range    Magnesium 2.86 (H) 1.60 - 2.40 mg/dL   CBC   Result Value Ref Range    WBC 12.5 (H) 4.4 - 11.3 x10*3/uL    nRBC 0.0 0.0 - 0.0 /100 WBCs    RBC 2.64 (L) 4.50 - 5.90 x10*6/uL    Hemoglobin 7.9 (L) 13.5 - 17.5 g/dL    Hematocrit 27.3 (L) 41.0 - 52.0 %     (H) 80 - 100 fL    MCH 29.9 26.0 - 34.0 pg    MCHC 28.9 (L) 32.0 - 36.0 g/dL    RDW 15.3 (H) 11.5 - 14.5 %    Platelets 325 150 - 450 x10*3/uL   Renal Function Panel   Result Value Ref Range    Glucose 118 (H) 74 - 99 mg/dL    Sodium 137 136 - 145 mmol/L    Potassium 4.7 3.5 - 5.3 mmol/L    Chloride 100 98 - 107 mmol/L    Bicarbonate 25 21 - 32 mmol/L    Anion Gap 17 10 - 20 mmol/L    Urea Nitrogen 69 (H) 6 - 23 mg/dL    Creatinine 7.14 (H) 0.50 - 1.30 mg/dL    eGFR 8 (L) >60 mL/min/1.73m*2    Calcium 9.5 8.6 - 10.6 mg/dL    Phosphorus 6.0 (H) 2.5 - 4.9 mg/dL    Albumin 2.9 (L) 3.4 - 5.0 g/dL     IMAGING/ DIAGNOSTIC TESTING:  I have personally reviewed the following test result(s):    8/14 TTE  CONCLUSIONS:   1. Left ventricular ejection fraction is normal by visual estimate at 55-60%.   2. Spectral Doppler shows a Grade I (impaired relaxation pattern) of left ventricular diastolic filling with normal left atrial filling pressure.   3. Abnormal septal motion consistent with post-operative status.   4. No left ventricular thrombus visualized.   5. There is moderately increased septal thickness.   6. There is reduced right ventricular systolic function.   7. The left atrial size is moderately dilated.   8. There is moderate to severe aortic valve cusp calcification.   9.  Normal aortic root.  10. Compared with study dated 8/6/2025, the prior study was an intra-operative OMAR. This was a limited study to evaluate LVEF. The aortic valve appears mod-severely calcified and the gradients on the OMAR were consistent with severe AS, they were not assessed on this study.    8/18 XR chest 1 view  IMPRESSION:  Bibasilar pleural effusion and atelectasis/consolidation and  interstitial pulmonary edema, not significantly different from prior  study.    8/21 XR chest 1 view  IMPRESSION:  1. Similar right worse than left bibasilar atelectasis and  bronchovascular crowding.  2. Small bilateral pleural effusions, similar to prior.  3. Medical devices as above.    ===============  IMPRESSION & PLAN:  ===============  POD #17 s/p CABGx 2 with Dr Yolanda Justice   - Increase activity/ ambulation; PT/OT  - Encourage IS, C/DB; respiratory therapy; wean O2 as taty   - Cardiac rehab referral   - Continue cardiac meds: ASA, BB, statin  - continue Plavix per Dr. Yolanda Justice  - Pain and anticonstipation meds  - Chest tubes removed  - 2v CXR when transitioned out of stepdown  - Epicardial wires previously cut for urgent MRI while in ICU  - Tele until discharge  - Optimize nutrition and electrolytes    Rhythm  - Tele: NSR 60s  - episode of postop Afib in ICU -> converted with Amio (completed 5g load); continue Amio 200 mg PO daily  - epicardial wires cut on 8/8   - Adjust medications as tolerated  - 8/23: switched metop to coreg for better bp control       Acute Blood Loss Anemia   Recent Labs     08/23/25  0647 08/22/25  0615 08/21/25  0615 08/20/25  0553 08/19/25  0442 08/18/25  0152 08/17/25  1455   HGB 7.9* 7.9* 7.6* 7.7* 8.5* 8.6* 10.2*   HCT 27.3* 26.1* 25.5* 24.8* 26.6* 26.6* 32.5*   - MV, PO Iron x1mo  - Daily labs, transfuse as indicated    Platelets  Recent Labs     08/23/25  0647 08/22/25  0615 08/21/25  0615 08/20/25  0553 08/19/25  0442 08/18/25  0152 08/17/25  1455    354 342 335 327 315 290   -  Etiology likely postop/CPB related  - Continue to trend with daily CBCs    Volume/Electrolyte Status: Preop wt Weight: 116 kg (256 lb 9.9 oz)   ESRD  baseline SCr 9.38  Vitals:    25 0530   Weight: 114 kg (251 lb 1.6 oz)     Creatinine   Date Value Ref Range Status   2025 7.14 (H) 0.50 - 1.30 mg/dL Final   2025 5.78 (H) 0.50 - 1.30 mg/dL Final   2025 7.55 (H) 0.50 - 1.30 mg/dL Final   - Weights: 114 kg, 112, 111  - Replete electrolytes for hypokalemia/hypomagnesemia/hypophosphatemia as needed  - Daily weights and strict I&Os  - Daily RFP while admitted  - Nephrology following, appreciate recs  - Renal diet  - IR consulted  for tunneled HD line. Holding off for the weekend and trending WBC with tentative plan to place line Monday.  - Start Torsemide 40mg daily  - : Pt made 600 ml urine in last 24 hrs HD today; HD today       Leukocytosis  Recent Labs     25  0647 25  0615 25  0615 25  0553 25  0442 25  0152 25  1455   WBC 12.5* 14.9* 16.0* 14.1* 14.7* 16.0* 12.0*     Temp (36hrs), Av.2 °C (97.2 °F), Min:35.9 °C (96.6 °F), Max:36.6 °C (97.9 °F)     - Aggressive pulmonary hygiene  - Monitor for s/s infection  - Daily CBC to follow      Acute urinary retention: Thornton in placed 8/15. Silodosin contraindicated in renal failure. Has been unable to take PO meds consistently.  - Continue Tamsulosin 0.4mg daily  - Continue thornton for now      Hyperlipidemia: home meds: atorvastatin 20mg and ezetimibe 10mg  Lab Results   Component Value Date    CHOL 143 2024    LDLCALC 88 2024    HDL 32.7 2024    VLDL 22 2024    TRIG 110 2024    NHDL 110 2024   - continue home meds   - follow up lipid panel with PCP/ cardiologist for ongoing lipid management        Dysphagia: Corpak in place.  - SLP following, appreciate recs  - MBS on  now cleared for pureed diet and moderately thickened liquids  - Cyclic nepro tube feeds  50ml/hr [id number] with PO renal moderately thickened, pureed diet  - Calorie count      Respiratory insufficiency: On 2-3L NC.  - Respiratory therapy, bronchopulmonary hygiene TID, IS  - Continuous pulse oximetry  - Wean O2 as tolerated     Neuro  Post-op Delirium/Encephalopathy    Vertebral and basilar artery stenosis    - For multifactorial delirium/ encephalopathy   - sleep/ wake cycle hygiene  - PT/OT ordered OOBTC as tolerated. Encourage PT during daytime  - BAT called on 8/8 in ICU for unresponsive episode --> found to have bilateral vertebral and basilar artery stenosis    - CTA revealed multifocal vert and basilar stenosis, no occlusion, no intervention done.  - Neurology consulted   Recommendations 8/11:  Continue DAPT (ASA and plavix) until follow up  Neurology has arranged a follow up with Dr. Dior in 1 month  Stroke will sign off at this time, please reach out with further questions      Wounds: Right tibial diabetic ulcer and sacral contact dermititis  - Wound care consult, appreciate recs    VTE Prophylaxis: SCDs/TEDs, ambulation, SQ heparin  Code Status: Full Code    Dispo  - PT/OT recs: high intensity  - Anticipate discharge 3-4 days to facility pending tolerance of new HD, fluid management, resolution of hypoactive delirium  - PM&R following patient -> recommending SNF at discharge  - Will continue to assess discharge needs    SARAH Hammer-CNP  Cardiac Surgery [facility]  [facility]  Team Phone [phone]    8/23/2025  11:03 AM           [1] acetaminophen, 650 mg, nasogastric tube, q6h  amiodarone, 200 mg, oral, Daily  aspirin, 81 mg, oral, Daily  atorvastatin, 80 mg, oral, Nightly  clopidogrel, 75 mg, oral, Daily  ezetimibe, 10 mg, oral, Nightly  heparin, 5,000 Units, subcutaneous, q8h  insulin lispro, 0-10 Units, subcutaneous, q6h  lidocaine, 1 patch, transdermal, Daily  melatonin, 6 mg, oral, Nightly  metoprolol tartrate, 12.5 mg, oral, BID  nystatin, 4 mL, Swish & Spit,  TID  polyethylene glycol, 17 g, oral, Daily  sennosides-docusate sodium, 2 tablet, oral, Nightly  sodium chloride, 1 spray, Each Nostril, 4x daily  tamsulosin, 0.4 mg, oral, Daily  thiamine, 100 mg, oral, Daily  torsemide, 40 mg, oral, Daily     [2]    [3] PRN medications: alteplase, dextrose, dextrose **OR** glucagon, dextrose, glucagon, glucagon, guaiFENesin, heparin flush, naloxone, ondansetron, oxygen

## 2025-08-24 ASSESSMENT — COGNITIVE AND FUNCTIONAL STATUS - GENERAL
TURNING FROM BACK TO SIDE WHILE IN FLAT BAD: A LITTLE
TOILETING: A LITTLE
STANDING UP FROM CHAIR USING ARMS: A LOT
DRESSING REGULAR UPPER BODY CLOTHING: A LITTLE
DRESSING REGULAR LOWER BODY CLOTHING: A LITTLE
MOVING FROM LYING ON BACK TO SITTING ON SIDE OF FLAT BED WITH BEDRAILS: A LITTLE
DAILY ACTIVITIY SCORE: 18
CLIMB 3 TO 5 STEPS WITH RAILING: A LOT
PERSONAL GROOMING: A LITTLE
HELP NEEDED FOR BATHING: A LITTLE
WALKING IN HOSPITAL ROOM: A LOT
MOVING TO AND FROM BED TO CHAIR: A LOT
MOBILITY SCORE: 14
EATING MEALS: A LITTLE

## 2025-08-24 ASSESSMENT — PAIN SCALES - GENERAL
PAINLEVEL_OUTOF10: 5 - MODERATE PAIN
PAINLEVEL_OUTOF10: 5 - MODERATE PAIN
PAINLEVEL_OUTOF10: 7

## 2025-08-24 ASSESSMENT — PAIN - FUNCTIONAL ASSESSMENT
PAIN_FUNCTIONAL_ASSESSMENT: 0-10
PAIN_FUNCTIONAL_ASSESSMENT: 0-10
PAIN_FUNCTIONAL_ASSESSMENT: UNABLE TO SELF-REPORT
PAIN_FUNCTIONAL_ASSESSMENT: 0-10

## 2025-08-24 NOTE — PROGRESS NOTES
"CARDIAC SURGERY DAILY PROGRESS NOTE    Colin Leonard is a 59 y.o. male hx of HTN, CKD stage 5, chronic anemia, type 2 diabetes, hx of c diff colitis who was admitted 5/19-5/24 for shortness of breathe was incidentally found to have atypical pneumonia which he was treated for. He was found to have elevated trop and BNP on that admission with a few episodes of reported VT which required increased dose of coreg. He underwent LHC 5/22/25 demonstrated MVCAD and decision was made to discharge and let him recover from his pneumonia treated with Augmentin and oral vanco prophyllacticaly due to h/o cdiff. and bring back for CABG. He is now s/p CABGx 2 with Dr Yolanda Justice on 8/6/2. Postop course c/b unresponsive episode in setting of hypotension and found to have bilateral vertebral and basilar artery stenosis, pneumothorax, and TEJA on CKD requiring dialysis.     Operations ad Procedures with Dr. Yolanda Justice on 8/6:   1. Median Sternotomy  2. Off Pump CABG x 2 (LIMA to LAD, SVG to distal CX)  3. Sternal Plating with Sternal Lock     CTICU course: Prolonged ICU stay  - Encephalopathy, unresponsive episode POD #2; BAT --> angio --> basilar, bilat vertebral stenoses, no obstruction, no structural brain injury; Likely hypoactive delirium.  - Afib; Acute resp failure, re-intubation during unresponsive episode;   - Acute on chronic renal failure - dialysis started; R subclavian temporary HD catheter 8/18; HD 8/18 and 8/19  - Leukocytosis  - 5 d course Zosyn    Transferred to LT3 on 8/20.    ======================  Interval History:   No acute events overnight      SUBJECTIVE:  Pt sitting on side of bed this AM. On 2L NC. c/o on incisional pain and some SOB. Pt says he has no appetite.     Objective   /72 (BP Location: Right arm, Patient Position: Lying)   Pulse 66   Temp 35.7 °C (96.3 °F) (Temporal)   Resp 17   Ht 1.778 m (5' 10\")   Wt 112 kg (247 lb 1.6 oz)   SpO2 97%   BMI 35.46 kg/m²   0-10 (Numeric) Pain Score: 5 - " Moderate pain   3 Day Weight Change: 0.242 kg (8.5 oz) per day    Intake and Output    Intake/Output Summary (Last 24 hours) at 8/24/2025 1038  Last data filed at 8/24/2025 0900  Gross per 24 hour   Intake 1355 ml   Output 2025 ml   Net -670 ml       Physical Exam  Physical Exam  Vitals and nursing note reviewed.   Constitutional:       General: He is not in acute distress.     Appearance: He is obese.      Comments: Deconditioned  Appears fatigued    HENT:      Head: Normocephalic.      Nose:      Comments: Dobhoff in right nares      Mouth/Throat:      Mouth: Mucous membranes are moist.     Eyes:      Conjunctiva/sclera: Conjunctivae normal.       Cardiovascular:      Rate and Rhythm: Normal rate and regular rhythm.      Pulses: Normal pulses.      Heart sounds: Normal heart sounds.      Comments: NSR 60s  Wires cut on 8/8  Pulmonary:      Effort: Pulmonary effort is normal. No accessory muscle usage or respiratory distress.      Comments: Diminished throughout. 2L NC  Abdominal:      General: There is no distension.      Palpations: Abdomen is soft.      Tenderness: There is no abdominal tenderness.      Comments: LBM 8/21   Genitourinary:     Comments: Elmore in place draining clear yellow urine    Musculoskeletal:      Cervical back: Neck supple.      Right lower leg: Edema (trace) present.      Left lower leg: Edema (trace) present.      Comments: MONTERROSO, generalized weakness      Skin:     General: Skin is warm and dry.      Comments: Warm and dry. Midsternal incision clean, dry, and intact     Neurological:      General: No focal deficit present.      Mental Status: He is alert and oriented to person, place, and time.      Comments: Disoriented to situation  Slow to respond    Psychiatric:         Mood and Affect: Affect is flat.         Behavior: Behavior is cooperative.         Cognition and Memory: Memory is impaired.       Medications  Scheduled medications  Scheduled Medications[1]Continuous  medications  Continuous Medications[2]PRN medications  PRN Medications[3]    Labs  Results for orders placed or performed during the hospital encounter of 08/06/25 (from the past 24 hours)   POCT GLUCOSE   Result Value Ref Range    POCT Glucose 98 74 - 99 mg/dL   POCT GLUCOSE   Result Value Ref Range    POCT Glucose 117 (H) 74 - 99 mg/dL   POCT GLUCOSE   Result Value Ref Range    POCT Glucose 116 (H) 74 - 99 mg/dL   POCT GLUCOSE   Result Value Ref Range    POCT Glucose 118 (H) 74 - 99 mg/dL   Magnesium   Result Value Ref Range    Magnesium 2.55 (H) 1.60 - 2.40 mg/dL   CBC   Result Value Ref Range    WBC 13.5 (H) 4.4 - 11.3 x10*3/uL    nRBC 0.0 0.0 - 0.0 /100 WBCs    RBC 2.64 (L) 4.50 - 5.90 x10*6/uL    Hemoglobin 7.8 (L) 13.5 - 17.5 g/dL    Hematocrit 25.3 (L) 41.0 - 52.0 %    MCV 96 80 - 100 fL    MCH 29.5 26.0 - 34.0 pg    MCHC 30.8 (L) 32.0 - 36.0 g/dL    RDW 15.0 (H) 11.5 - 14.5 %    Platelets 305 150 - 450 x10*3/uL   Renal Function Panel   Result Value Ref Range    Glucose 122 (H) 74 - 99 mg/dL    Sodium 137 136 - 145 mmol/L    Potassium 4.6 3.5 - 5.3 mmol/L    Chloride 100 98 - 107 mmol/L    Bicarbonate 29 21 - 32 mmol/L    Anion Gap 13 10 - 20 mmol/L    Urea Nitrogen 42 (H) 6 - 23 mg/dL    Creatinine 5.47 (H) 0.50 - 1.30 mg/dL    eGFR 11 (L) >60 mL/min/1.73m*2    Calcium 9.6 8.6 - 10.6 mg/dL    Phosphorus 4.8 2.5 - 4.9 mg/dL    Albumin 2.8 (L) 3.4 - 5.0 g/dL     *Note: Due to a large number of results and/or encounters for the requested time period, some results have not been displayed. A complete set of results can be found in Results Review.     IMAGING/ DIAGNOSTIC TESTING:  I have personally reviewed the following test result(s):    8/14 TTE  CONCLUSIONS:   1. Left ventricular ejection fraction is normal by visual estimate at 55-60%.   2. Spectral Doppler shows a Grade I (impaired relaxation pattern) of left ventricular diastolic filling with normal left atrial filling pressure.   3. Abnormal septal motion  consistent with post-operative status.   4. No left ventricular thrombus visualized.   5. There is moderately increased septal thickness.   6. There is reduced right ventricular systolic function.   7. The left atrial size is moderately dilated.   8. There is moderate to severe aortic valve cusp calcification.   9. Normal aortic root.  10. Compared with study dated 8/6/2025, the prior study was an intra-operative OMAR. This was a limited study to evaluate LVEF. The aortic valve appears mod-severely calcified and the gradients on the OMAR were consistent with severe AS, they were not assessed on this study.    8/18 XR chest 1 view  IMPRESSION:  Bibasilar pleural effusion and atelectasis/consolidation and  interstitial pulmonary edema, not significantly different from prior  study.    8/21 XR chest 1 view  IMPRESSION:  1. Similar right worse than left bibasilar atelectasis and  bronchovascular crowding.  2. Small bilateral pleural effusions, similar to prior.  3. Medical devices as above.    ===============  IMPRESSION & PLAN:  ===============  POD #18 s/p CABGx 2 with Dr Yolanda Justice   - Increase activity/ ambulation; PT/OT  - Encourage IS, C/DB; respiratory therapy; wean O2 as taty   - Cardiac rehab referral   - Continue cardiac meds: ASA, BB, statin  - continue Plavix per Dr. Yolanda Justice  - Pain and anticonstipation meds  - Chest tubes removed  - 2v CXR when transitioned out of stepdown  - Epicardial wires previously cut for urgent MRI while in ICU  - Tele until discharge  - Optimize nutrition and electrolytes    Rhythm  - Tele: NSR 60s  - episode of postop Afib in ICU -> converted with Amio (completed 5g load); continue Amio 200 mg PO daily  - epicardial wires cut on 8/8   - Adjust medications as tolerated  - 8/23: switched metop to coreg for better bp control --> increased Coreg to 6.25 mg 8/24      Acute Blood Loss Anemia   Recent Labs     08/24/25  0651 08/23/25  0647 08/22/25  0615 08/21/25  0615 08/20/25  0553  25  0442 25  0152   HGB 7.8* 7.9* 7.9* 7.6* 7.7* 8.5* 8.6*   HCT 25.3* 27.3* 26.1* 25.5* 24.8* 26.6* 26.6*   - MV, PO Iron x1mo  - Daily labs, transfuse as indicated      Platelets  Recent Labs     25  0651 25  0647 25  0615 25  0615 25  0553 25  0442 25  0152    325 354 342 335 327 315   - Etiology likely postop/CPB related  - Continue to trend with daily CBCs      Volume/Electrolyte Status: Preop wt Weight: 116 kg (256 lb 9.9 oz)   ESRD  baseline SCr 9.38  Vitals:    25   Weight: 112 kg (247 lb 1.6 oz)     Creatinine   Date Value Ref Range Status   2025 5.47 (H) 0.50 - 1.30 mg/dL Final   2025 7.14 (H) 0.50 - 1.30 mg/dL Final   2025 5.78 (H) 0.50 - 1.30 mg/dL Final   - Weights: 112 kg, 114 , 112, 111  - Replete electrolytes for hypokalemia/hypomagnesemia/hypophosphatemia as needed;   - Daily weights and strict I&Os  - Daily RFP while admitted  - Nephrology following, appreciate recs  - Renal diet  - IR consulted  for tunneled HD line. Holding off for the weekend and trending WBC with tentative plan to place line Monday.  - Start Torsemide 40mg daily  - : Pt made 600 ml urine in last 24 hrs HD today; HD today         Leukocytosis  Recent Labs     25  0651 25  0647 25  0615 25  0615 25  0553 25  0442 25  0152   WBC 13.5* 12.5* 14.9* 16.0* 14.1* 14.7* 16.0*     Temp (36hrs), Av.1 °C (97 °F), Min:35.7 °C (96.3 °F), Max:36.7 °C (98.1 °F)     - Aggressive pulmonary hygiene  - Monitor for s/s infection  - Daily CBC to follow      Acute urinary retention: Thornton in placed 8/15. Silodosin contraindicated in renal failure. Has been unable to take PO meds consistently.  - Continue Tamsulosin 0.4mg daily  - Continue thornton for now  - : will dc thornton at midnight     Hyperlipidemia: home meds: atorvastatin 20mg and ezetimibe 10mg  Lab Results   Component Value Date    CHOL 143  08/19/2024    LDLCALC 88 08/19/2024    HDL 32.7 08/19/2024    VLDL 22 08/19/2024    TRIG 110 08/19/2024    NHDL 110 08/19/2024   - continue home meds   - follow up lipid panel with PCP/ cardiologist for ongoing lipid management        Dysphagia: Corpak in place.  - SLP following, appreciate recs  - MBS on 8/21 now cleared for pureed diet and moderately thickened liquids  - Cyclic nepro tube feeds 50ml/hr 5543-5570 with PO renal moderately thickened, pureed diet  - Calorie count  - encouraging PO intake      Respiratory insufficiency: On 2-3L NC.  - Respiratory therapy, bronchopulmonary hygiene TID, IS  - Continuous pulse oximetry  - Wean O2 as tolerated       Neuro  Post-op Delirium/Encephalopathy    Vertebral and basilar artery stenosis    - For multifactorial delirium/ encephalopathy   - sleep/ wake cycle hygiene  - PT/OT ordered OOBTC as tolerated. Encourage PT during daytime  - BAT called on 8/8 in ICU for unresponsive episode --> found to have bilateral vertebral and basilar artery stenosis    - CTA revealed multifocal vert and basilar stenosis, no occlusion, no intervention done.  - Neurology consulted   Recommendations 8/11:  Continue DAPT (ASA and plavix) until follow up  Neurology has arranged a follow up with Dr. Dior in 1 month  Stroke will sign off at this time, please reach out with further questions      Wounds: Right tibial diabetic ulcer and sacral contact dermititis  - Wound care consult, appreciate recs    VTE Prophylaxis: SCDs/TEDs, ambulation, SQ heparin  Code Status: Full Code    Dispo  - PT/OT recs: high intensity  - Anticipate discharge 2-3 days to facility pending tolerance of new HD, fluid management, resolution of hypoactive delirium  - PM&R following patient -> recommending SNF at discharge  - Will continue to assess discharge needs    SARAH Hammer-CNP  Cardiac Surgery [facility]  [facility]  Team Phone [phone]    8/24/2025  10:38 AM             [1] acetaminophen,  650 mg, nasogastric tube, q6h  amiodarone, 200 mg, oral, Daily  aspirin, 81 mg, oral, Daily  atorvastatin, 80 mg, oral, Nightly  carvedilol, 6.25 mg, oral, BID  clopidogrel, 75 mg, oral, Daily  ezetimibe, 10 mg, oral, Nightly  heparin, 5,000 Units, subcutaneous, q8h  insulin lispro, 0-10 Units, subcutaneous, q6h  lidocaine, 1 patch, transdermal, Daily  melatonin, 6 mg, oral, Nightly  nystatin, 4 mL, Swish & Spit, TID  polyethylene glycol, 17 g, oral, Daily  sennosides-docusate sodium, 2 tablet, oral, Nightly  sodium chloride, 1 spray, Each Nostril, 4x daily  tamsulosin, 0.4 mg, oral, Daily  thiamine, 100 mg, oral, Daily  torsemide, 40 mg, oral, Daily     [2]    [3] PRN medications: alteplase, bisacodyl, dextrose, dextrose **OR** glucagon, dextrose, glucagon, glucagon, guaiFENesin, heparin flush, naloxone, ondansetron, oxygen

## 2025-08-25 ENCOUNTER — APPOINTMENT (OUTPATIENT)
Dept: RADIOLOGY | Facility: HOSPITAL | Age: 60
End: 2025-08-25
Payer: COMMERCIAL

## 2025-08-25 ASSESSMENT — PAIN - FUNCTIONAL ASSESSMENT
PAIN_FUNCTIONAL_ASSESSMENT: 0-10

## 2025-08-25 ASSESSMENT — PAIN SCALES - GENERAL
PAINLEVEL_OUTOF10: 0 - NO PAIN

## 2025-08-25 ASSESSMENT — COGNITIVE AND FUNCTIONAL STATUS - GENERAL
STANDING UP FROM CHAIR USING ARMS: A LITTLE
MOBILITY SCORE: 15
MOVING TO AND FROM BED TO CHAIR: A LITTLE
MOVING FROM LYING ON BACK TO SITTING ON SIDE OF FLAT BED WITH BEDRAILS: A LITTLE
CLIMB 3 TO 5 STEPS WITH RAILING: TOTAL
WALKING IN HOSPITAL ROOM: A LITTLE
TURNING FROM BACK TO SIDE WHILE IN FLAT BAD: A LOT

## 2025-08-25 NOTE — PROGRESS NOTES
"CARDIAC SURGERY DAILY PROGRESS NOTE    Colin Leonard is a 59 y.o. male hx of HTN, CKD stage 5, chronic anemia, type 2 diabetes, hx of c diff colitis who was admitted 5/19-5/24 for shortness of breathe was incidentally found to have atypical pneumonia which he was treated for. He was found to have elevated trop and BNP on that admission with a few episodes of reported VT which required increased dose of coreg. He underwent LHC 5/22/25 demonstrated MVCAD and decision was made to discharge and let him recover from his pneumonia treated with Augmentin and oral vanco prophyllacticaly due to h/o cdiff. and bring back for CABG. He is now s/p CABGx 2 with Dr Yolanda Justice on 8/6/2. Postop course c/b unresponsive episode in setting of hypotension and found to have bilateral vertebral and basilar artery stenosis, pneumothorax, and TEJA on CKD requiring dialysis.     Operations ad Procedures with Dr. Yolanda Justice on 8/6:   1. Median Sternotomy  2. Off Pump CABG x 2 (LIMA to LAD, SVG to distal CX)  3. Sternal Plating with Sternal Lock     CTICU course: Prolonged ICU stay  - Encephalopathy, unresponsive episode POD #2; BAT --> angio --> basilar, bilat vertebral stenoses, no obstruction, no structural brain injury; Likely hypoactive delirium.  - Afib  - Acute resp failure, re-intubation during unresponsive episode  - Acute on chronic renal failure - dialysis started; R subclavian temporary HD catheter 8/18; HD 8/18 and 8/19  - Leukocytosis  - 5 d course Zosyn    Transferred to LT3 on 8/20.    ======================  Interval History:   No acute events overnight    SUBJECTIVE:  No complaints; said he had no issues while getting his tunneled HD line     Objective   /77 (BP Location: Right arm, Patient Position: Lying)   Pulse 60   Temp 36.6 °C (97.9 °F) (Temporal)   Resp 16   Ht 1.778 m (5' 10\")   Wt 110 kg (241 lb 12.8 oz)   SpO2 97%   BMI 34.69 kg/m²   0-10 (Numeric) Pain Score: 0 - No pain   3 Day Weight Change: Unable " to Calculate    Intake and Output    Intake/Output Summary (Last 24 hours) at 8/25/2025 1546  Last data filed at 8/25/2025 0500  Gross per 24 hour   Intake 720 ml   Output 400 ml   Net 320 ml       Physical Exam  Physical Exam  Vitals and nursing note reviewed.   Constitutional:       General: He is not in acute distress.     Appearance: He is obese.      Comments: Deconditioned  Appears fatigued   Sitting up in chair  Sister at bedside   HENT:      Head: Normocephalic and atraumatic.      Nose:      Comments: Dobhoff in right nares      Mouth/Throat:      Mouth: Mucous membranes are moist.     Eyes:      Conjunctiva/sclera: Conjunctivae normal.       Cardiovascular:      Rate and Rhythm: Normal rate and regular rhythm.      Pulses: Normal pulses.      Heart sounds: Normal heart sounds.      Comments: NSR 1st degree 60s, occ PVCs, some SB 50s during night  Wires cut on 8/8  Pulmonary:      Effort: Pulmonary effort is normal. No accessory muscle usage or respiratory distress.      Comments: Diminished throughout. 2L NC  Abdominal:      General: There is no distension.      Palpations: Abdomen is soft.      Tenderness: There is no abdominal tenderness.      Comments: LBM 8/21   Genitourinary:     Comments: Voiding post thornton removal    Musculoskeletal:      Cervical back: Neck supple.      Right lower leg: Edema (trace) present.      Left lower leg: Edema (trace) present.      Comments: MONTERROSO, generalized weakness      Skin:     General: Skin is warm and dry.      Comments: midsternal chest incision C/D/I, well approximated, no s/s infection   R chest tunnel HD cath (8/25) CD&I    Temp R chest HD cath removed 8/25; was oozing post tunneled line placement     Neurological:      General: No focal deficit present.      Mental Status: He is alert and oriented to person, place, and time.      Comments: Disoriented to situation  Slow to respond, but appropriate   Psychiatric:         Mood and Affect: Affect is flat.          Behavior: Behavior is cooperative.         Cognition and Memory: Memory is impaired.       Medications  Scheduled medications  Scheduled Medications[1]Continuous medications  Continuous Medications[2]PRN medications  PRN Medications[3]    Labs  Results for orders placed or performed during the hospital encounter of 08/06/25 (from the past 24 hours)   POCT GLUCOSE   Result Value Ref Range    POCT Glucose 156 (H) 74 - 99 mg/dL   POCT GLUCOSE   Result Value Ref Range    POCT Glucose 125 (H) 74 - 99 mg/dL   POCT GLUCOSE   Result Value Ref Range    POCT Glucose 140 (H) 74 - 99 mg/dL   Magnesium   Result Value Ref Range    Magnesium 2.84 (H) 1.60 - 2.40 mg/dL   CBC   Result Value Ref Range    WBC 13.3 (H) 4.4 - 11.3 x10*3/uL    nRBC 0.0 0.0 - 0.0 /100 WBCs    RBC 2.93 (L) 4.50 - 5.90 x10*6/uL    Hemoglobin 8.7 (L) 13.5 - 17.5 g/dL    Hematocrit 29.6 (L) 41.0 - 52.0 %     (H) 80 - 100 fL    MCH 29.7 26.0 - 34.0 pg    MCHC 29.4 (L) 32.0 - 36.0 g/dL    RDW 15.2 (H) 11.5 - 14.5 %    Platelets 361 150 - 450 x10*3/uL   Renal Function Panel   Result Value Ref Range    Glucose 128 (H) 74 - 99 mg/dL    Sodium 138 136 - 145 mmol/L    Potassium 4.2 3.5 - 5.3 mmol/L    Chloride 100 98 - 107 mmol/L    Bicarbonate 24 21 - 32 mmol/L    Anion Gap 18 10 - 20 mmol/L    Urea Nitrogen 53 (H) 6 - 23 mg/dL    Creatinine 6.78 (H) 0.50 - 1.30 mg/dL    eGFR 9 (L) >60 mL/min/1.73m*2    Calcium 10.1 8.6 - 10.6 mg/dL    Phosphorus 5.6 (H) 2.5 - 4.9 mg/dL    Albumin 3.3 (L) 3.4 - 5.0 g/dL   POCT GLUCOSE   Result Value Ref Range    POCT Glucose 88 74 - 99 mg/dL     *Note: Due to a large number of results and/or encounters for the requested time period, some results have not been displayed. A complete set of results can be found in Results Review.     IMAGING/ DIAGNOSTIC TESTING:  I have personally reviewed the following test result(s):      IR CVC tunneled 8/25    XR chest 1 view 08/23/2025  Impression  1.  Interval improvement in right basilar  aeration. Otherwise, stable small bilateral pleural effusions with bibasilar atelectasis.    8/14 TTE  CONCLUSIONS:   1. Left ventricular ejection fraction is normal by visual estimate at 55-60%.   2. Spectral Doppler shows a Grade I (impaired relaxation pattern) of left ventricular diastolic filling with normal left atrial filling pressure.   3. Abnormal septal motion consistent with post-operative status.   4. No left ventricular thrombus visualized.   5. There is moderately increased septal thickness.   6. There is reduced right ventricular systolic function.   7. The left atrial size is moderately dilated.   8. There is moderate to severe aortic valve cusp calcification.   9. Normal aortic root.  10. Compared with study dated 8/6/2025, the prior study was an intra-operative OMAR. This was a limited study to evaluate LVEF. The aortic valve appears mod-severely calcified and the gradients on the OMAR were consistent with severe AS, they were not assessed on this study.    ===============  IMPRESSION & PLAN:  ===============  POD #19 s/p CABGx 2 with Dr Yolanda Justice on 8/6  - Increase activity/ ambulation; PT/OT  - Encourage IS, C/DB; respiratory therapy; wean O2 as taty   - Cardiac rehab referral   - Continue cardiac meds: ASA, Plavix, BB, statin, Zetia  - continue Plavix per Dr. Yolanda Justice  - Pain and anticonstipation meds  - 2v CXR ordered for 8/26  - Epicardial wires previously cut for urgent MRI while in ICU  - Tele until discharge  - Optimize nutrition and electrolytes    Rhythm - h/o SVT/NSVT; episode of postop Afib in ICU -> converted with Amio (completed 5g load)  - Tele: NSR 1st degree 60s, occ PVCs, occ SB 50s with sleep  - continue Amio 200 mg PO daily  - epicardial wires cut on 8/8  - Adjust medications as tolerated  - 8/23: switched metop to coreg for better bp control --> increased Coreg to 6.25 mg 8/24      Acute Blood Loss Anemia   Recent Labs     08/25/25  0651 08/24/25  0651 08/23/25  0647  25  0615 25  0615 25  0553 25  0442   HGB 8.7* 7.8* 7.9* 7.9* 7.6* 7.7* 8.5*   HCT 29.6* 25.3* 27.3* 26.1* 25.5* 24.8* 26.6*   - renal MV  - Epo as per Renal  - Daily labs, transfuse as indicated    Platelets  Recent Labs     25  0651 25  0651 25  0647 25  0615 25  0615 25  0553 25  0442    305 325 354 342 335 327   - Continue to trend with daily CBCs      Volume/Electrolyte Status: Preop wt Weight: 116 kg (256 lb 9.9 oz)   ESRD,  h/o CKD5 -  baseline SCr 9.38  Vitals:    25 0639   Weight: 110 kg (241 lb 12.8 oz)     Creatinine   Date Value Ref Range Status   2025 6.78 (H) 0.50 - 1.30 mg/dL Final   2025 5.47 (H) 0.50 - 1.30 mg/dL Final   2025 7.14 (H) 0.50 - 1.30 mg/dL Final   - Weights: 110, 112 kg, 114 , 112, 111  - Daily weights and strict I&Os  - Daily RFP while admitted  - Nephrology following, appreciate recs  - Renal diet  - IR consulted  for tunneled HD line. Held off for the weekend and trending WBC with tentative plan to place line  => tunneled HD line placed   - removed temp HD catheter   - continue Torsemide 40mg daily  -  added Epo, Sevelamer, nephrocap as per Renal recs  - HD as per Renal, last     Leukocytosis  Recent Labs     25  0651 25  0651 25  0647 25  0615 25  0615 25  0553 25  0442   WBC 13.3* 13.5* 12.5* 14.9* 16.0* 14.1* 14.7*   Temp (36hrs), Av.3 °C (97.3 °F), Min:35.7 °C (96.3 °F), Max:36.6 °C (97.9 °F)  - Aggressive pulmonary hygiene  - Monitor for s/s infection  - 8/15 MRSA negative,  MRSA, urine, blood NEGATIVE  - Daily CBC to follow    Neuro  Post-op Delirium/Encephalopathy    Vertebral and basilar artery stenosis    - For multifactorial delirium/ encephalopathy   - sleep/ wake cycle hygiene  - PT/OT ordered OOBTC as tolerated. Encourage PT during daytime  - BAT called on  in ICU for unresponsive episode -->  found to have bilateral vertebral and basilar artery stenosis    - CTA revealed multifocal vert and basilar stenosis, no occlusion, no intervention done.  - Neurology consulted   Recommendations :  - Continue DAPT (ASA and plavix) at least 90 days, tentative plan to transition to plavix monotherapy but would defer to outpatient stroke neurologist   (Discussed with family at bedside, patient is good at taking his meds. Unable to clarify the compliance for ASA before the stroke called due to patient agitation)  - Neurology has arranged a follow up with Dr. Dior in 1 month  - Stroke will sign off at this time, please reach out with further questions.    Dysphagia: Corpak in place.  - SLP following, appreciate recs  - MBS on  now cleared for pureed diet and moderately thickened liquids  - Cyclic nepro tube feeds 50ml/hr 4200-1192 with PO renal moderately thickened, pureed diet  - Calorie count ->  Nutrition said not meeting 50 % calories so need to continue noct TF  - encouraging PO intake    Acute urinary retention: Thornton placed 8/15. Silodosin contraindicated in renal failure. Had been unable to take PO meds consistently.  - Continue Tamsulosin 0.4mg daily  - : will dc thornton at midnight   -  voiding post thornton removal  - monitor for retention    Hypertension: home meds: coreg 37.5 mg BID, hydralazine 100 mg TID, torsemide 60 mg daily  In CTICU, neuro exam improved with permissive HTN (found to have bilateral vertebral and basilar artery stenosis. CT showed diffusion hypoperfusion in posterior fossa and watershed cerebral hemispheres but no core infarct )  Systolic (24hrs), Av , Min:122 , Max:191   - continue coreg: currently 6.25 mg BID  - continue torsemide 40 mg daily  -  added hydralazine 10 mg TID with hold parameter for SBP <130  - additional antihypertensives as needed     Hyperlipidemia: home meds: atorvastatin 20mg and ezetimibe 10mg  Lab Results   Component Value Date    CHOL 143  08/19/2024    LDLCALC 88 08/19/2024    HDL 32.7 08/19/2024    VLDL 22 08/19/2024    TRIG 110 08/19/2024    NHDL 110 08/19/2024   - continue atorva 80 mg and zetia  - follow up lipid panel with PCP/ cardiologist for ongoing lipid management    H/o DM 2: home meds: none  Lab Results   Component Value Date    HGBA1C 5.4 05/16/2025     Results from last 7 days   Lab Units 08/25/25  1237 08/25/25  0629 08/24/25  2334 08/24/25  1830 08/24/25  1203 08/24/25  0613 08/23/25  2307   POCT GLUCOSE mg/dL 88 140* 125* 156* 106* 118* 116*   -accuchecks and SSI    Wounds: Right tibial diabetic ulcer and sacral contact dermititis  - Wound care consult, appreciate recs    VTE Prophylaxis: SCDs/TEDs, ambulation, SQ heparin  Code Status: Full Code    Dispo  - PT/OT recs: high intensity  - Anticipate discharge 2-3 days to facility pending Renal/volume status, ability to meet calories needs without dobhoff/TF  - PM&R following patient -> recommending SNF at discharge  - 8/25 looking at other Rehabs per family request  - Will continue to assess discharge needs    SARAH Ruiz-CNP  Cardiac Surgery [facility]  [facility] ([facility]  Team Phone [phone]                 [1] acetaminophen, 650 mg, oral, q8h  amiodarone, 200 mg, oral, Daily  aspirin, 81 mg, oral, Daily  atorvastatin, 80 mg, oral, Nightly  carvedilol, 6.25 mg, oral, BID  clopidogrel, 75 mg, oral, Daily  [START ON 8/26/2025] epoetin china or biosimilar, 7,500 Units, subcutaneous, Once per day on Monday Wednesday Friday  ezetimibe, 10 mg, oral, Nightly  heparin, 5,000 Units, subcutaneous, q8h  insulin lispro, 0-10 Units, subcutaneous, q6h  lidocaine, 1 patch, transdermal, Daily  melatonin, 6 mg, oral, Nightly  nystatin, 4 mL, Swish & Spit, TID  polyethylene glycol, 17 g, oral, Daily  sennosides-docusate sodium, 2 tablet, oral, Nightly  sevelamer carbonate, 800 mg, oral, TID  sodium chloride, 1 spray, Each Nostril, 4x daily  tamsulosin, 0.4 mg, oral, Daily  thiamine, 100  mg, oral, Daily  torsemide, 40 mg, oral, Daily  vitamin B complex-vitamin C-folic acid, 1 capsule, oral, Daily     [2]    [3] PRN medications: alteplase, bisacodyl, dextrose, dextrose **OR** glucagon, dextrose, glucagon, glucagon, guaiFENesin, heparin flush, naloxone, ondansetron, oxygen

## 2025-08-25 NOTE — POST-PROCEDURE NOTE
Interventional Radiology Brief Postprocedure Note    Attending: Sid Mcelroy MD       Diagnosis:   1. Coronary artery disease involving native heart, unspecified vessel or lesion type, unspecified whether angina present  Anesthesia Intraoperative Transesophageal Echocardiogram    Anesthesia Intraoperative Transesophageal Echocardiogram    Anesthesia Intraoperative Transesophageal Echocardiogram    Anesthesia Intraoperative Transesophageal Echocardiogram      2. Atherosclerosis of native coronary artery of native heart without angina pectoris        3. NSTEMI (non-ST elevated myocardial infarction) (Multi)  Intubation    Intubation      4. Basilar artery stenosis  Follow Up In Neurology      5. S/P CABG (coronary artery bypass graft)  Transthoracic Echo (TTE) Limited    Transthoracic Echo (TTE) Limited      6. Atherosclerotic heart disease of native coronary artery with unspecified angina pectoris  Anesthesia Intraoperative Transesophageal Echocardiogram      7. Nonrheumatic aortic (valve) stenosis  Anesthesia Intraoperative Transesophageal Echocardiogram      8. ESRD (end stage renal disease) (Multi)  Central Line    Central Line            Description of procedure: Image guided placement of tunneled right Internal jugular vein catheter.  Catheter ready for use.     Anesthesia:  Minimal    Complications: None    Estimated Blood Loss: minimal      See detailed result report with images in PACS.    The patient tolerated the procedure well without incident or complication and is in stable condition.

## 2025-08-25 NOTE — PROGRESS NOTES
Occupational Therapy                 Therapy Communication Note    Patient Name: Colin Leonard  MRN: [id number]  Department: [facility] ANGIO  Room: [address] [place] [address]-A  Today's Date: 8/25/2025     Discipline: Occupational Therapy    Missed Visit: OT Missed Visit: Yes     Missed Visit Reason: Missed Visit Reason: Patient in a medical procedure (pt off the floor at angio)    Missed Time: Attempt    Shadi Morelos, OTR/L

## 2025-08-25 NOTE — PROGRESS NOTES
Physical Therapy    Physical Therapy Treatment    Patient Name: Colin Leonard  MRN: [id number]  Today's Date: 8/25/2025  Room: [address] [place] [address]  Time Calculation  Start Time: 1525  Stop Time: 1620  Time Calculation (min): 55 min       Assessment/Plan   PT Plan  Treatment/Interventions: Bed mobility, Transfer training, Gait training, Balance training, Neuromuscular re-education, Strengthening, Endurance training, Therapeutic exercise, Therapeutic activity  PT Plan: Ongoing PT  PT Frequency for Current Admission: Daily during this acute inpatient hospitalization  PT Discharge Recommendations: High intensity level of continued care  Equipment Recommended upon Discharge: Wheeled walker  PT Recommended Transfer Status: Assist x1  PT - OK to Discharge: Yes    General Visit Information:   Reason for Referral: CABG x2 8/6. Re-evaluation: found unresponsive 8/8, BAT called. NIHSS 31. Pt reintubated. CTH unremarkable. CTA c/f Basilar artery occulsion vs high grade stenosis. CTP showed diffusion hypoperfusion in posterior fossa and watershed cerebral hemispheres but no core infarct. Angio revealed multifocal vert and basilar stenosis, no occlusion, no intervention done. Extubated 8/10. NIHSS 3 on 8/11  Past Medical History Relevant to Rehab: HTN, CKD stage 5, chronic anemia, type 2 diabetes, hx of c diff colitis who was admitted 5/19-5/24 for shortness of breathe was incidentally found to have atypical pneumonia  Prior to Session Communication: Bedside nurse  Patient Position Received: Bed, 3 rail up, Alarm off, caregiver present  Caregiver Feedback: sister and son   Subjective: Patient is alert, agreeable to PT.  States he was able to walk to the door and back with nursing staff over the weekend.  Precautions:  Precautions  Medical Precautions: Fall precautions, Cardiac precautions  Post-Surgical Precautions: Move in the Tube  Vital Signs:   Date/Time Vitals Session Patient Position Pulse Resp SpO2 BP MAP (mmHg)    08/25/25 1525  --  --  65  --  --  --  --        Objective   Pain:  Pain Assessment  0-10 (Numeric) Pain Score: 0 - No pain (post 0, endorses some chest incisional discomfort)  Cognition:  Cognition  Orientation Level: Oriented X4  Following Commands: Follows multistep commands with increased time  Lines/Tubes/Drains:  NG/OG/Feeding Tube Small bore feeding tube Right nostril (Active)   Number of days: 16       Hemodialysis Cath 08/25/25 Right Tunneled catheter (Active)   Number of days: 0     Medical Gas Therapy: Supplemental oxygen  Medical Gas Delivery Method: Nasal cannula  Continuous Medications/Drips:  Continuous Medications[1]    PT Treatments:     Therapeutic Activity  Therapeutic Activity 1: Tinetti Trials x 1  Therapeutic Activity 2: TUG x 3     Bed Mobility 2  Bed Mobility  2: Supine to sitting  Level of Assistance 2: Moderate assistance  Bed Mobility Comments 2: HOB 30, modified rol  Ambulation/Gait Training 1  Surface 1: Level tile  Device 1: Rolling walker  Assistance 1: Contact guard  Quality of Gait 1:  (narrow LILLY, flexed posture, decreased step length, decreased gait speed)  Comments/Distance (ft) 1: 20' x 3, 50' x 1  Transfer 1  Transfer From 1: Sit to  Transfer to 1: Stand  Transfer Device 1: Walker  Transfer Level of Assistance 1: Minimum assistance  Trials/Comments 1: x9 (mod a x 5 trials, min A x 4 trials)  Transfers 2  Transfer From 2: Stand to  Transfer to 2: Sit  Transfer Device 2: Walker  Transfer Level of Assistance 2: Minimum assistance  Trials/Comments 2: x9  Transfers 3  Transfer From 3: Bed to  Transfer to 3: Chair with arms  Transfer Device 3: Walker  Transfer Level of Assistance 3: Minimum assistance  Trials/Comments 3: x1  Transfers 4  Transfer From 4: Chair with arms to  Transfer to 4: Chair with arms  Transfer Device 4: Walker  Transfer Level of Assistance 4: Minimum assistance  Trials/Comments 4: x5             Outcome Measures:  [facility] Basic Mobility  Turning from your back to your side while  in a flat bed without using bedrails: A little  Moving from lying on your back to sitting on the side of a flat bed without using bedrails: A lot  Moving to and from bed to chair (including a wheelchair): A little  Standing up from a chair using your arms (e.g. wheelchair or bedside chair): A little  To walk in hospital room: A little  Climbing 3-5 steps with railing: Total  Basic Mobility - Total Score: 15                 Tinetti  Sitting Balance: Leans or slides in chair  Arises: Unable without help  Attempts to Arise: Unable without help  Immediate Standing Balance (First 5 Seconds): Steady but uses walker or other support  Standing Balance: Steady but wide stance, uses cane or other support  Nudged: Begins to fall  Eyes Closed: Unsteady  Turned 360 Degrees: Steadiness: Unsteady (Grabs, staggers)  Turned 360 Degrees: Continuity of Steps: Discontinuous steps  Sitting Down: Uses arms or not a smooth motion  Balance Score: 3  Initiation of Gait: Any hesitancy or multiple attempts to start  Step Height: R Swing Foot: Right foot does not clear floor completely with step  Step Length: R Swing Foot: Does not pass left stance foot with step  Step Height: L Swing Foot: Left foot does not clear floor completely with step  Step Length: L Swing Foot: Does not pass right stance foot with step  Step Symmetry: Right and left step length not equal (Estimate)  Step Continuity: Steps appear continuous  Path: Mild/moderate deviation or uses walking aid  Trunk: Marked sway or uses walking aid  Walking Time: Heels apart  Gait Score: 2  Total Score: 5  Timed Up and Go Test  How many seconds did it take to complete the 5 tasks?: 52 seconds       Education Documentation  Precautions, taught by Fermin Nolasco PT at 8/25/2025  4:32 PM.  Learner: Family, Patient  Readiness: Acceptance  Method: Explanation  Response: Needs Reinforcement  Comment: POC review, falls risk reviews    Body Mechanics, taught by Fermin Nolasco PT at  8/25/2025  4:32 PM.  Learner: Family, Patient  Readiness: Acceptance  Method: Explanation  Response: Needs Reinforcement  Comment: POC review, falls risk reviews    Mobility Training, taught by Fermin Nolasco PT at 8/25/2025  4:32 PM.  Learner: Family, Patient  Readiness: Acceptance  Method: Explanation  Response: Needs Reinforcement  Comment: POC review, falls risk reviews    Education Comments  No comments found.          OP EDUCATION:       Encounter Problems       Encounter Problems (Active)       Balance       Pt will demonstrate ability to complete sitting static/dynamic balance activities with bilateral UE support and no LOB for increase in safety up D/C.  (Progressing)       Start:  08/11/25    Expected End:  08/25/25               Mobility       Pt will demonstrate ability to complete ther ex activities to improve functional strength for increase in independence upon DC.  (Progressing)       Start:  08/11/25    Expected End:  08/25/25            Pt will demonstrated ability to complete ~8 forward/backwards steps with proper form, LRAD, minAx2 and no balance deficits for safe DC.   (Progressing)       Start:  08/11/25    Expected End:  08/25/25               PT Transfers       Pt will demonstrated ability to complete bed mobility and sit<>stand transfers with min assistance x2 and use of assistive device to safely DC. (Progressing)       Start:  08/11/25    Expected End:  08/25/25                   Assessment: Patient is progressing Well with therapy this date.  Patient able to complete multiple standing and gait trials.  Improved mechanics with sit to stand and gait pattern with progression to short distance ambulation.  Remains high falls risk base don Tinetti score 5 (<24) and TUG score 52 seconds (>12s).  Patient remains appropriate for HIGH intensity therapy when medically appropriate for discharge from acute stay.  Will continue to follow.      08/25/25 at 4:32 PM   Fermin Nolasco, PT   Rehab  Office: [phone]                 [1]

## 2025-08-25 NOTE — PROGRESS NOTES
Nutrition Assessment    Reason for Assessment: Calorie count    Nutrition Intake Since Last RDN Visit:  Food and Nutrient History: Pt off floor for HD line placement this morning -- NPO since midnight. Sister at bedside, discussed calorie count/PO intake this weekend. Reported pt ate all of his mac & cheese, OJ, magic cup and bites of apple sauce to her knowledge last night for dinner. Unsure breakfast but does know he had a coffee & oatmeal which he enjoyed despite minimal intake. Noted pt not feeling hungry. Reviwed menu at bedside & can schedule meals ahead of time with pt. Explained tube feeds are meeting ~50% est needs.       Nutrition Intake (last 3 days)    Date/Time Diet Type Percent Meals Eaten (%) Appetite Fluid Restrictions Nutritional Supplements Nutritional Supplement Intake   08/24/25 2045 Renal;Tube feeding -- -- -- -- --   08/24/25 1833 -- 50 (dinner) -- -- -- --   08/23/25 2019 Tube feeding;Pureed -- -- -- -- --   08/23/25 1850 -- 50 -- -- -- --   08/23/25 1300 -- 25 (oatmeal)  -- -- -- --   08/23/25 1230 -- -- Fair -- -- --   08/22/25 1240 Other (Comment) 25 -- -- -- --   08/22/25 0800 NPO -- -- -- -- --       Dietary Orders (From admission, onward)       Start     Ordered    08/25/25 0001  NPO Diet Except: Sips with meds; Effective midnight  Diet effective midnight        Question:  Except:  Answer:  Sips with meds    08/23/25 0754    08/22/25 0913  Oral nutritional supplements  Until discontinued        Question Answer Comment   Deliver with Lunch    Deliver with Dinner    Select supplement: Magic Cup        08/22/25 0912    08/22/25 0913  Oral nutritional supplements  Until discontinued        Question Answer Comment   Deliver with Breakfast    Select supplement: Gelatein Plus        08/22/25 0912    08/21/25 1414  Calorie count  Once         08/21/25 1413                     Estimated Needs:   Total Energy Estimated Needs in 24 hours (kCal): 2200 kCal  Method for Estimating Needs: 30 kcals/kg x  ideal BW  Total Protein Estimated Needs in 24 Hours (g): 110 g  Method for Estimating 24 Hour Protein Needs: 1.5 gm/kg x ideal BW  Total Fluid Estimated Needs in 24 Hours (mL):  (per team)           8/24/25  Calorie Count  Total Calories (kcals): 615 kcals  Total Protein (g): 24 g  Calorie Count Results: (8/24) 50% dinner = reported 100% mac & cheese (275 kcals & 15 gm pro), magic cup (290 kcals & 9gm pro) & thick juice (50 kcals)   = 29.5% estimated kcal needs & 22% est protein needs    8/23/25  Calorie Count  Total Calories (kcals): 544 kcals  Total Protein (g): 20 g  Calorie Count Results: (8/23) 25% oatmeal (33 kcals) : 50% meal @ 1850, per HT: puree turkey meal, applesauce, julián pudding, cranb juice (366 kcals & 15.5 gm pro) & magic cup (145 kcals & 4.5 gm pro)  = 25% estimated kcal needs & 18% est protein needs.       Nutrition Interventions/Recommendations   Nutrition prescription for oral nutrition, Nutrition prescription for enteral nutrition    Nutrition Recommendations:  Completion of calorie count -- eating ~25-33% est needs by mouth so far, including ONS.     Goal for pt to be meeting ~50% est needs by mouth to wean off tube feeds.     Rec continue nocturnal TF for time being.     Nutrition Interventions/Goals:   Meals and Snacks: Texture-modified diet  Goal: per SLP recommendation  Enteral Intake: Management of schedule of enteral nutrition  Goal: Nepro @ 50 x 12 hours = 1062 kcals & 49gm protein  Medical Food Supplement: Commercial food medical food supplement therapy  Goal: increase Macic cup TID (290 kcals, 9 gm pro each) and Gelatein Plus (160 kcals & 20gm pro each) --- both low in potassium                   Time Spent (min): 60 minutes

## 2025-08-25 NOTE — PRE-PROCEDURE NOTE
Interventional Radiology Preprocedure Note    Indication for procedure: The primary encounter diagnosis was Coronary artery disease involving native heart, unspecified vessel or lesion type, unspecified whether angina present. Diagnoses of Atherosclerosis of native coronary artery of native heart without angina pectoris, NSTEMI (non-ST elevated myocardial infarction) (Multi), Basilar artery stenosis, S/P CABG (coronary artery bypass graft), Atherosclerotic heart disease of native coronary artery with unspecified angina pectoris, Nonrheumatic aortic (valve) stenosis, and ESRD (end stage renal disease) (Multi) were also pertinent to this visit.    Patient presents for HD catheter placement.     Relevant review of systems: NA    Relevant Labs:   Lab Results   Component Value Date    CREATININE 6.78 (H) 08/25/2025    EGFR 9 (L) 08/25/2025    INR 1.2 (H) 08/06/2025    PROTIME 13.7 (H) 08/06/2025       Planned Sedation/Anesthesia: None    Airway assessment: normal    Directed physical examination:    -     Mallampati: III (soft and hard palate and base of uvula visible)    ASA Score: ASA 3 - Patient with moderate systemic disease with functional limitations    Benefits, risks and alternatives of procedure and planned sedation have been discussed with the patient and/or their representative. All questions answered and they agree to proceed.

## 2025-08-25 NOTE — PROGRESS NOTES
Spiritual Care Visit  Spiritual Care Request    Reason for Visit:   Spiritual and emotional support        Request Received From:  Referral         Care Provided:    Patient and sister share emotions associated with patient surgery and post surgery complications.    Patient reports he prays continually throughout the day for help and to say thank you.   Patient initiates prayer with  and his sister.    A range of emotions are shared and reflection on what good may yet come from the experience of being a patient.       Sense of Community and or Samaritan Affiliation:  Roman Catholic

## 2025-08-26 ENCOUNTER — APPOINTMENT (OUTPATIENT)
Dept: DIALYSIS | Facility: HOSPITAL | Age: 60
End: 2025-08-26
Payer: COMMERCIAL

## 2025-08-26 ENCOUNTER — APPOINTMENT (OUTPATIENT)
Dept: RADIOLOGY | Facility: HOSPITAL | Age: 60
End: 2025-08-26
Payer: COMMERCIAL

## 2025-08-26 ASSESSMENT — PAIN - FUNCTIONAL ASSESSMENT
PAIN_FUNCTIONAL_ASSESSMENT: 0-10

## 2025-08-26 ASSESSMENT — COGNITIVE AND FUNCTIONAL STATUS - GENERAL
HELP NEEDED FOR BATHING: A LOT
TURNING FROM BACK TO SIDE WHILE IN FLAT BAD: A LITTLE
CLIMB 3 TO 5 STEPS WITH RAILING: A LITTLE
DRESSING REGULAR LOWER BODY CLOTHING: A LOT
WALKING IN HOSPITAL ROOM: A LITTLE
DRESSING REGULAR UPPER BODY CLOTHING: A LITTLE
CLIMB 3 TO 5 STEPS WITH RAILING: TOTAL
MOVING TO AND FROM BED TO CHAIR: A LITTLE
MOVING FROM LYING ON BACK TO SITTING ON SIDE OF FLAT BED WITH BEDRAILS: A LITTLE
TOILETING: A LOT
MOBILITY SCORE: 16
STANDING UP FROM CHAIR USING ARMS: A LITTLE
DAILY ACTIVITIY SCORE: 16
MOBILITY SCORE: 22
DAILY ACTIVITIY SCORE: 24
WALKING IN HOSPITAL ROOM: A LITTLE
PERSONAL GROOMING: A LITTLE

## 2025-08-26 ASSESSMENT — PAIN DESCRIPTION - DESCRIPTORS
DESCRIPTORS: ACHING
DESCRIPTORS: SORE

## 2025-08-26 ASSESSMENT — PAIN DESCRIPTION - ORIENTATION: ORIENTATION: MID

## 2025-08-26 ASSESSMENT — PAIN DESCRIPTION - LOCATION: LOCATION: HEAD

## 2025-08-26 ASSESSMENT — PAIN SCALES - GENERAL
PAINLEVEL_OUTOF10: 5 - MODERATE PAIN
PAINLEVEL_OUTOF10: 0 - NO PAIN
PAINLEVEL_OUTOF10: 4
PAINLEVEL_OUTOF10: 4
PAINLEVEL_OUTOF10: 0 - NO PAIN
PAINLEVEL_OUTOF10: 0 - NO PAIN

## 2025-08-26 NOTE — PROGRESS NOTES
Inpatient  Speech-Language Pathology Treatment     Patient Name: Colin Leonard  MRN: [id number]  Today's Date: 8/26/2025  Time Calculation  Start Time: 0940  Stop Time: 1007  Time Calculation (min): 27 min           Assessment/Plan:  #oropharyngeal dysphagia  -SLP follow-up for diet analysis and upgrade trials of regular solid textures.   - Overall, pt's mentation was significantly improved, feeding self, and tolerating regular solids w/o overt s/sx of aspiration and sufficient management at the oral stage  - Education was provided on ongoing dysphagia tx and diet modifications- pt and caregiver verbalized understanding.          DIET RECOMMENDATIONS:   - regular solids (IDDSI Level 4)  - Moderately/honey thick liquids (IDDSI Level 3)     STRATEGIES:  - Small bites  - Medications crushed in puree or as best tolerated  - Complete oral care frequently throughout the day  - Full supervision with meals; One to one assist with meals  - repeat MBS in 1-2 weeks      Plan:  Inpatient/Swing Bed or Outpatient: Inpatient  SLP TX Plan: Continue Plan of Care  SLP Plan: Skilled SLP  SLP Frequency: 2x per week  Duration: 2 weeks  SLP Discharge Recommendations: Other (Comment) (TBD)  SLP - OK to Discharge: Yes      Subjective   Pt received resting in bed. Receiving HD. A/O x 4. Pleasant during visit   Prior to Session Communication: Bedside nurse        Objective     Pt's meal tray was set-up and pt was re-positioned for feeding.   Consumed majority of puree Jordanian toast as well as several ounces of moderately thick liquids. Showed sufficient oral clearance, no loss of bolus anteriorly. Fed self with minimal assistance but benefited from encouragement. Noted timely swallow onset across all challenges including upgrade challenges of regular solids (crackers). No overt s/sx of aspiration during visit.   Reviewed dysphagia exercise routine- pt able to complete independently at this point.           Encounter Problems       Encounter  Problems (Active)       Swallowing       LTG - Patient will tolerate the least restrictive diet without overt difficulty by time of discharge. (Progressing)       Start:  08/11/25    Expected End:  08/26/25            SLP Goal 1 (Progressing)       Start:  08/11/25    Expected End:  08/26/25       STG - Patient will tolerate therapeutic trials of recommended consistency without clinical signs and symptoms of aspiration on 100% of trials              Swallowing       STG - Patient will complete effortable swallows 30-40 times per session w/ moderately thick liquids (Not Progressing)       Start:  08/12/25    Expected End:  08/26/25            SLP Goal 2 (Not Progressing)       Start:  08/12/25    Expected End:  08/26/25       Pt will complete suprahyoid exercises of at least 10-15 reps w/ min cues              Swallowing       STG - Patient/Family will verbalize/demonstrate comprehension of dysphagia education, strategies, recommendations and POC To 80% acc. With min cues  (Progressing)       Start:  08/21/25    Expected End:  08/29/25                     Inpatient Education:  Extensive education provided to patient and/or Caregiver regarding current swallow function, recommendations/results, and POC. Demonstrated verbal understanding and were agreeable w/ the details/recommendations reviewed.

## 2025-08-26 NOTE — PROGRESS NOTES
Colin Leonard is a 59 y.o. male on day 20 of admission presenting with Atherosclerosis of native coronary artery of native heart without angina pectoris.      Subjective   Had HD           Objective        Vitals 24HR  Heart Rate:  [64-67]   Temp:  [36.1 °C (97 °F)-37.1 °C (98.8 °F)]   Resp:  [16-18]   BP: (126-176)/(70-89)   Weight:  [111 kg (245 lb 12.8 oz)]   SpO2:  [92 %-97 %]   Oxygen Therapy  Pulse Ox (24 hr min): 92  Medical Gas Therapy: Supplemental oxygen  Medical Gas Delivery Method: Nasal cannula  O2 Flow Rate (L/min): 2 L/min FiO2 (%): 40 %    Intake/Output last 3 Shifts:    Intake/Output Summary (Last 24 hours) at 8/26/2025 1506  Last data filed at 8/26/2025 1320  Gross per 24 hour   Intake 990 ml   Output 0 ml   Net 990 ml       Physical Exam  On NC  Comfortable  Median Stenotomy  Symmetrical chest expansion  No JVD  Trace LL Edema  Access: R TDC    Relevant Results  Lab Results   Component Value Date    WBC 11.8 (H) 08/26/2025    HGB 8.0 (L) 08/26/2025    HCT 25.8 (L) 08/26/2025    MCV 95 08/26/2025     08/26/2025     Lab Results   Component Value Date    GLUCOSE 145 (H) 08/26/2025    CALCIUM 9.7 08/26/2025     08/26/2025    K 4.3 08/26/2025    CO2 24 08/26/2025     08/26/2025    BUN 67 (H) 08/26/2025    CREATININE 7.83 (H) 08/26/2025         Assessment & Plan  Atherosclerosis of native coronary artery of native heart without angina pectoris    ESRD (end stage renal disease) (Multi)    Chronic renal insufficiency    Pneumonia    Coronary artery disease involving native heart, unspecified vessel or lesion type, unspecified whether angina present    Mr. Leonard is a 59M with history of CKD5, CAD s/p CABG on 8/6, and developed post-surgery TEJA on CKD requiring dialysis initiation. Course c/b basilar stroke, No intervention, Neurology following. Nephrology following for Dialysis care.    #CKDV/ESKD  - Cr on admission 9.4  - Etiology of CKD is not clear.  - Received CVVH 8/6-8/7  -  SLED 8/7pm  - CVVH resumed 8/9-8/11  - Access: Right TDC  - iHD TTS    #Azotemia, No evidence of Uremia  - Tube Feeds as well as Renal Impairment    #Anemia  - In part due to CKD  - Tsat 17%  - Retacrit 7500 with HD    #Possible Stroke 8/7 (Basilar Artery occlusion?), Imaging showed no infarction, no interventions    #Urine Retention, Elmore inserted    RECOMMENDATIONS:  - iHD TTS   - Please ask SW to Arrange for iHD outpatient   - Replace Iron once Infection is controlled    Babita Edmondson MD  PGY-5 Nephrology Fellow

## 2025-08-26 NOTE — PROGRESS NOTES
Occupational Therapy                 Therapy Communication Note    Patient Name: Colin Leonard  MRN: [id number]  Department: [place] [address]  Room: [address] [place] [address]  Today's Date: 8/26/2025     Discipline: Occupational Therapy    Missed Visit: OT Missed Visit: Yes     Missed Visit Reason: Missed Visit Reason: Patient in a medical procedure (HD until 1, will re-att as able)    Missed Time: Attempt    Shadi Morelos, OTR/L

## 2025-08-26 NOTE — PROGRESS NOTES
Physical Therapy    Physical Therapy Treatment    Patient Name: Colin Leonard  MRN: [id number]  Today's Date: 8/26/2025  Room: [address] [place], [place] [address]  Time Calculation  Start Time: 1515  Stop Time: 1545  Time Calculation (min): 30 min       Assessment/Plan   PT Plan  Treatment/Interventions: Bed mobility, Transfer training, Gait training, Balance training, Neuromuscular re-education, Strengthening, Endurance training, Therapeutic exercise, Therapeutic activity  PT Plan: Ongoing PT  PT Frequency for Current Admission: Daily during this acute inpatient hospitalization  PT Discharge Recommendations: High intensity level of continued care  Equipment Recommended upon Discharge: Wheeled walker  PT Recommended Transfer Status: Assist x1, Assistive device  PT - OK to Discharge: Yes    General Visit Information:   Reason for Referral: CABG x2 8/6. Re-evaluation: found unresponsive 8/8, BAT called. NIHSS 31. Pt reintubated. CTH unremarkable. CTA c/f Basilar artery occulsion vs high grade stenosis. CTP showed diffusion hypoperfusion in posterior fossa and watershed cerebral hemispheres but no core infarct. Angio revealed multifocal vert and basilar stenosis, no occlusion, no intervention done. Extubated 8/10. NIHSS 3 on 8/11  Past Medical History Relevant to Rehab: HTN, CKD stage 5, chronic anemia, type 2 diabetes, hx of c diff colitis who was admitted 5/19-5/24 for shortness of breathe was incidentally found to have atypical pneumonia  Prior to Session Communication: Bedside nurse  Patient Position Received: Up in chair  Family/Caregiver Present: Yes  Caregiver Feedback: sister and laywer friend   Subjective: Patient is alert, agreeable to PT.  Has had HD today, swallow assessment, two meals afterwards, and OT session prior to intervention.  In good spirits and more alert  Precautions:  Precautions  Medical Precautions: Fall precautions, Cardiac precautions  Post-Surgical Precautions: Move in the Tube  Vital Signs:   Date/Time Vitals  Session Patient Position Pulse Resp SpO2 BP MAP (mmHg)    08/26/25 1515 --  --  70  --  --  --  --        Objective   Pain:  Pain Assessment  0-10 (Numeric) Pain Score: 4 (post 4)  Pain Type: Acute pain  Pain Location: Incision  Pain Orientation: Mid  Pain Descriptors: Sore  Pain Interventions: Repositioned  Cognition:  Cognition  Orientation Level: Oriented X4  Lines/Tubes/Drains:  NG/OG/Feeding Tube Small bore feeding tube Right nostril (Active)   Number of days: 17       Hemodialysis Cath 08/25/25 Right Tunneled catheter (Active)   Number of days: 1        Continuous Medications/Drips:  Continuous Medications[1]    PT Treatments:      Ambulation/Gait Training 1  Surface 1: Level tile  Device 1: Rolling walker  Assistance 1: Contact guard  Comments/Distance (ft) 1: 90' x 6  Transfer 1  Transfer From 1: Sit to  Transfer to 1: Stand  Transfer Device 1: Walker  Transfer Level of Assistance 1: Minimum assistance  Trials/Comments 1: x8  Transfers 2  Transfer From 2: Stand to  Transfer to 2: Sit  Transfer Device 2: Walker  Transfer Level of Assistance 2: Minimum assistance  Trials/Comments 2: x8  Transfers 3  Transfer From 3: Chair with arms to  Transfer to 3: Chair with arms  Transfer Device 3: Walker  Transfer Level of Assistance 3: Minimum assistance  Trials/Comments 3: x6     Outcome Measures:  [facility] Basic Mobility  Turning from your back to your side while in a flat bed without using bedrails: A little  Moving from lying on your back to sitting on the side of a flat bed without using bedrails: A little  Moving to and from bed to chair (including a wheelchair): A little  Standing up from a chair using your arms (e.g. wheelchair or bedside chair): A little  To walk in hospital room: A little  Climbing 3-5 steps with railing: Total  Basic Mobility - Total Score: 16     Education Documentation  Precautions, taught by Fermin Nolasco, PT at 8/26/2025  4:07 PM.  Learner: Patient  Readiness: Acceptance  Method:  Explanation  Response: Needs Reinforcement  Comment: POC review    Body Mechanics, taught by Fermin Nolasco PT at 8/26/2025  4:07 PM.  Learner: Patient  Readiness: Acceptance  Method: Explanation  Response: Needs Reinforcement  Comment: POC review    Mobility Training, taught by Fermin Nolasco PT at 8/26/2025  4:07 PM.  Learner: Patient  Readiness: Acceptance  Method: Explanation  Response: Needs Reinforcement  Comment: POC review    Education Comments  No comments found.          OP EDUCATION:       Encounter Problems       Encounter Problems (Active)       PT Problem       Patient will complete supine to sit and sit to supine Supervision  (Progressing)       Start:  08/26/25    Expected End:  09/09/25            Patient will perform sit<>stand transfer with LRAD, and Supervision  (Progressing)       Start:  08/26/25    Expected End:  09/09/25            Patient will ambulate >150' with LRAD and Supervision  (Progressing)       Start:  08/26/25    Expected End:  09/09/25            Patient will complete Tinetti Balance Assesment with a score equal to or better than 18 to reduce falls risk.    (Progressing)       Start:  08/26/25    Expected End:  09/09/25                  Encounter Problems (Resolved)       Balance       Pt will demonstrate ability to complete sitting static/dynamic balance activities with bilateral UE support and no LOB for increase in safety up D/C.  (Met)       Start:  08/11/25    Expected End:  09/09/25    Resolved:  08/26/25            Mobility       Pt will demonstrate ability to complete ther ex activities to improve functional strength for increase in independence upon DC.  (Met)       Start:  08/11/25    Expected End:  09/09/25    Resolved:  08/26/25         Pt will demonstrated ability to complete ~8 forward/backwards steps with proper form, LRAD, minAx2 and no balance deficits for safe DC.   (Met)       Start:  08/11/25    Expected End:  09/09/25    Resolved:  08/26/25             PT Transfers       Pt will demonstrated ability to complete bed mobility and sit<>stand transfers with min assistance x2 and use of assistive device to safely DC. (Met)       Start:  08/11/25    Expected End:  09/09/25    Resolved:  08/26/25                Assessment: Patient is progressing Well with therapy this date.  Patient able to increase ambulation distance this date.  Focus on endurance and sit-stand mechanics.  Required consistent therapeutic rest breaks between 75-90' with encouragement to achieve 90' distance.  Intermittent COG outside of RW with assist to maintain.  Patient remains appropriate for HIGH intensity therapy when medically appropriate for discharge from acute stay.  Will continue to follow.      08/26/25 at 4:09 PM   Fermin Nolasco, PT   Rehab Office: [phone]                 [1]

## 2025-08-26 NOTE — PROGRESS NOTES
Occupational Therapy    OT Treatment    Patient Name: Colin Leonard  MRN: [id number]  Department: [place] [id number]  Room: [address] [place] [address]  Today's Date: 8/26/2025  Time Calculation  Start Time: 1409  Stop Time: 1435  Time Calculation (min): 26 min        Assessment:  Evaluation/Treatment Tolerance: Patient tolerated treatment well  Medical Staff Made Aware: Yes  End of Session Communication: Bedside nurse  End of Session Patient Position: Up in chair, Alarm on  Evaluation/Treatment Tolerance: Patient tolerated treatment well  Medical Staff Made Aware: Yes  Plan:  Treatment Interventions: ADL retraining, Functional transfer training, UE strengthening/ROM, Endurance training, Cognitive reorientation, Patient/family training, Equipment evaluation/education, Neuromuscular reeducation, Visual perceptual retraining, Fine motor coordination activities, Compensatory technique education  OT Frequency for Current Admission: 4 times per week during this acute inpatient hospitalization (During this acute inpatient hospitalization)  OT Discharge Recommendations: High intensity level of continued care (Based on current functional status and rehab potential, patient is anticipated to tolerate and benefit from 5 or more days per week of skilled rehabilitative therapy after discharge from this acute inpatient hospitalization.)  Equipment Recommended upon Discharge:  (TBD)  OT Recommended Transfer Status: Assist of 2, Maximum assist  OT - OK to Discharge: Yes  Treatment Interventions: ADL retraining, Functional transfer training, UE strengthening/ROM, Endurance training, Cognitive reorientation, Patient/family training, Equipment evaluation/education, Neuromuscular reeducation, Visual perceptual retraining, Fine motor coordination activities, Compensatory technique education    Subjective   OT Visit Info:  OT Received On: 08/26/25  General Visit Info:  General  Family/Caregiver Present: Yes  Caregiver Feedback: sister present and  "supportive  Prior to Session Communication: Bedside nurse  Patient Position Received: Bed, 3 rail up (seated EOB)  General Comment: Pt seated EOB upon arrival, pt demonstrated increase arousal and engagement this session  Precautions:  Hearing/Visual Limitations: WFL  Medical Precautions: Fall precautions, Cardiac precautions  Post-Surgical Precautions: Move in the Tube     Date/Time Vitals Session Patient Position Pulse Resp SpO2 BP MAP (mmHg)               08/26/25 1409 During OT  Sitting  --  --  92 %  --  --      Pain:  Pain Assessment  Pain Assessment: 0-10  0-10 (Numeric) Pain Score: 5 - Moderate pain  Pain Type: Acute pain, Surgical pain  Pain Location: Incision  Pain Interventions: Distraction    Objective    Cognition:  Cognition  Overall Cognitive Status: Impaired  Orientation Level:  (reported \"28th\" as date, reoriented to exact date, reported place)  Following Commands: Follows one step commands with increased time  Cognition Comments: increased arousal this date, SBT score: 4  Memory: Exceptions to WFL (delayed recall 3/5)  Insight: Mild  Impulsive: Mildly  Processing Speed: Delayed    Activities of Daily Living:      LE Dressing  LE Dressing Comments: pt and family educated on use of long shoe horn for LE dressing    Bed Mobility/Transfers: Bed Mobility  Bed Mobility: No    Transfers  Transfer: Yes  Transfer 1  Transfer From 1: Sit to, Stand to  Transfer to 1: Stand, Sit  Technique 1: Sit to stand, Stand to sit  Transfer Device 1: Walker  Transfer Level of Assistance 1: Minimum assistance, Minimal verbal cues  Trials/Comments 1: bed elevated, VCs for hand placement  Transfers 2  Transfer From 2: Sit to, Stand to  Transfer to 2: Stand, Sit  Technique 2: Sit to stand, Stand to sit  Transfer Device 2: Walker  Transfer Level of Assistance 2: Moderate assistance, Minimal verbal cues  Trials/Comments 2: VCs for hand placement    Functional Mobility:  Functional Mobility  Functional Mobility Performed: " Yes  Functional Mobility 1  Surface 1: Level tile  Device 1: Rolling walker  Assistance 1: Minimum assistance, Minimal verbal cues  Comments 1: min household distance bed > into hallway min A FWW, 2x trials, extended seated rest break required  Sitting Balance:  Static Sitting Balance  Static Sitting-Balance Support: Feet supported  Static Sitting-Level of Assistance: Independent  Dynamic Sitting Balance  Dynamic Sitting-Balance Support: Feet supported  Dynamic Sitting-Level of Assistance: Distant supervision  Standing Balance:  Static Standing Balance  Static Standing-Balance Support: Bilateral upper extremity supported  Static Standing-Level of Assistance: Minimum assistance  Dynamic Standing Balance  Dynamic Standing-Balance Support: Bilateral upper extremity supported  Dynamic Standing-Level of Assistance: Minimum assistance  Modalities:  Modalities Used: No    Therapy/Activity:      Therapeutic Activity  Therapeutic Activity Performed: Yes  Therapeutic Activity 1: 2x STS, functional mobility bed > into hallway 2x trials, extended seated rest break required    Outcome Measures:[facility] Daily Activity  Putting on and taking off regular lower body clothing: A lot  Bathing (including washing, rinsing, drying): A lot  Putting on and taking off regular upper body clothing: A little  Toileting, which includes using toilet, bedpan or urinal: A lot  Taking care of personal grooming such as brushing teeth: A little  Eating Meals: None  Daily Activity - Total Score: 16     and OT Adult Other Outcome Measures  Short Blessed Test (SBT): 4    Patient scored 4 on SBT (Short Blessed Test) indicatin-4 considered normal cognition.    Deficits noted in the following areas executive function and memory.    This interpretation should not be used a medical diagnosis and further medical assessment would be required.     Education Documentation  Body Mechanics, taught by Shadi Morelos OT at 2025  3:06 PM.  Learner: Family,  Patient  Readiness: Acceptance  Method: Explanation  Response: Verbalizes Understanding  Comment: long shoe horn, MITT, OT POC    Precautions, taught by Shadi Morelos OT at 8/26/2025  3:06 PM.  Learner: Family, Patient  Readiness: Acceptance  Method: Explanation  Response: Verbalizes Understanding  Comment: long shoe horn, MITT, OT POC    ADL Training, taught by Shadi Morelos OT at 8/26/2025  3:06 PM.  Learner: Family, Patient  Readiness: Acceptance  Method: Explanation  Response: Verbalizes Understanding  Comment: long shoe horn, MITT, OT POC    Education Comments  No comments found.      Goals:  Encounter Problems       Encounter Problems (Active)       ADLs       Patient will perform UB and LB bathing with minimal level of assistance. (Progressing)       Start:  08/07/25    Expected End:  09/09/25            Patient with complete upper body dressing with SBA level of assistance donning and doffing all UE clothes (Progressing)       Start:  08/07/25    Expected End:  09/09/25            Patient with complete lower body dressing with minimal level of assistance donning and doffing all LE clothes  with PRN adaptive equipment (Progressing)       Start:  08/07/25    Expected End:  09/09/25            Patient will complete toileting including hygiene clothing management/hygiene with minimal level of assistance. (Progressing)       Start:  08/07/25    Expected End:  09/09/25               COGNITION/SAFETY       Pt will maintain MITT precautions with 100% carryover during functional tasks, ADLs, and mobility without cueing.  (Progressing)       Start:  08/07/25    Expected End:  09/09/25            Patient will participate in cognitive activities to demonstrate WFL score on further cognitive assessments  (Progressing)       Start:  08/07/25    Expected End:  09/09/25               MOBILITY       Patient will perform Functional mobility min Household distances with moderate level of assistance and least restrictive  device in order to improve safety and functional mobility. (Not met)       Start:  25    Expected End:  25    Resolved:  25    Updated to: Patient will perform Functional mobility max Household distances with SBA and least restrictive device in order to improve safety and functional mobility.    Update reason:  goals          Patient will perform Functional mobility max Household distances with SBA and least restrictive device in order to improve safety and functional mobility. (Progressing)       Start:  25    Expected End:  25                   TRANSFERS       Patient will perform bed mobility with minimal level of assistance in order to improve safety and independence with mobility (Progressing)       Start:  25    Expected End:  25            Patient will complete functional transfer to toilet with least restrictive device with moderate level of assistance. (Progressing)       Start:  25    Expected End:  25                 BRIDGETT Jurado/BRADEN

## 2025-08-26 NOTE — PROCEDURES
"DIALYSIS NOTE:    Seen during hemodialysis, undergoing treatment per submitted orders: 3 K, 2.5 Ca, 3.5  hours. Fluid removal 1.5 liters, as tolerated (keep SBP> 100mmHg).     /74 (BP Location: Right arm, Patient Position: Lying)   Pulse 70 Comment: post 72  Temp 37.1 °C (98.8 °F) (Temporal)   Resp 18   Ht 1.778 m (5' 10\")   Wt 111 kg (245 lb 12.8 oz)   SpO2 92%   BMI 35.27 kg/m²     Additional Recommendations:    Scheduled medications  Scheduled Medications[1]  Continuous medications  Continuous Medications[2]  PRN medications  PRN Medications[3]    Recent Results (from the past 24 hours)   POCT GLUCOSE    Collection Time: 08/25/25 11:23 PM   Result Value Ref Range    POCT Glucose 122 (H) 74 - 99 mg/dL   POCT GLUCOSE    Collection Time: 08/26/25  6:47 AM   Result Value Ref Range    POCT Glucose 145 (H) 74 - 99 mg/dL   Magnesium    Collection Time: 08/26/25  6:55 AM   Result Value Ref Range    Magnesium 3.11 (H) 1.60 - 2.40 mg/dL   CBC    Collection Time: 08/26/25  6:55 AM   Result Value Ref Range    WBC 11.8 (H) 4.4 - 11.3 x10*3/uL    nRBC 0.0 0.0 - 0.0 /100 WBCs    RBC 2.71 (L) 4.50 - 5.90 x10*6/uL    Hemoglobin 8.0 (L) 13.5 - 17.5 g/dL    Hematocrit 25.8 (L) 41.0 - 52.0 %    MCV 95 80 - 100 fL    MCH 29.5 26.0 - 34.0 pg    MCHC 31.0 (L) 32.0 - 36.0 g/dL    RDW 15.0 (H) 11.5 - 14.5 %    Platelets 307 150 - 450 x10*3/uL   Renal Function Panel    Collection Time: 08/26/25  6:55 AM   Result Value Ref Range    Glucose 145 (H) 74 - 99 mg/dL    Sodium 138 136 - 145 mmol/L    Potassium 4.3 3.5 - 5.3 mmol/L    Chloride 101 98 - 107 mmol/L    Bicarbonate 24 21 - 32 mmol/L    Anion Gap 17 10 - 20 mmol/L    Urea Nitrogen 67 (H) 6 - 23 mg/dL    Creatinine 7.83 (H) 0.50 - 1.30 mg/dL    eGFR 7 (L) >60 mL/min/1.73m*2    Calcium 9.7 8.6 - 10.6 mg/dL    Phosphorus 5.9 (H) 2.5 - 4.9 mg/dL    Albumin 3.0 (L) 3.4 - 5.0 g/dL   POCT GLUCOSE    Collection Time: 08/26/25  1:20 PM   Result Value Ref Range    POCT Glucose 122 " (H) 74 - 99 mg/dL   POCT GLUCOSE    Collection Time: 08/26/25  4:48 PM   Result Value Ref Range    POCT Glucose 159 (H) 74 - 99 mg/dL            [1] acetaminophen, 650 mg, oral, q6h NHAN  amiodarone, 200 mg, oral, Daily  aspirin, 81 mg, oral, Daily  atorvastatin, 80 mg, oral, Nightly  carvedilol, 6.25 mg, oral, BID  clopidogrel, 75 mg, oral, Daily  epoetin [place] or biosimilar, 7,500 Units, subcutaneous, Once per day on Monday Wednesday Friday  ezetimibe, 10 mg, oral, Nightly  heparin, 5,000 Units, subcutaneous, q8h  hydrALAZINE, 25 mg, oral, TID  insulin lispro, 0-10 Units, subcutaneous, q6h  lidocaine, 1 patch, transdermal, Daily  melatonin, 6 mg, oral, Nightly  nystatin, 4 mL, Swish & Spit, TID  polyethylene glycol, 17 g, oral, Daily  sennosides-docusate sodium, 2 tablet, oral, Nightly  sevelamer carbonate, 800 mg, oral, TID  sodium chloride, 1 spray, Each Nostril, 4x daily  tamsulosin, 0.4 mg, oral, Daily  thiamine, 100 mg, oral, Daily  torsemide, 40 mg, oral, Daily  vitamin B complex-vitamin C-folic acid, 1 capsule, oral, Daily  [2]    [3] PRN medications: alteplase, bisacodyl, dextrose, dextrose **OR** glucagon, dextrose, glucagon, glucagon, guaiFENesin, heparin flush, naloxone, ondansetron, oxygen

## 2025-08-26 NOTE — PROGRESS NOTES
"CARDIAC SURGERY DAILY PROGRESS NOTE    Colin Leonard is a 59 y.o. male hx of HTN, CKD stage 5, chronic anemia, type 2 diabetes, hx of c diff colitis who was admitted 5/19-5/24 for shortness of breathe was incidentally found to have atypical pneumonia which he was treated for. He was found to have elevated trop and BNP on that admission with a few episodes of reported VT which required increased dose of coreg. He underwent LHC 5/22/25 demonstrated MVCAD and decision was made to discharge and let him recover from his pneumonia treated with Augmentin and oral vanco prophyllacticaly due to h/o cdiff. and bring back for CABG. He is now s/p CABGx 2 with Dr Yolanda Justice on 8/6/2. Postop course c/b unresponsive episode in setting of hypotension and found to have bilateral vertebral and basilar artery stenosis, pneumothorax, and TEJA on CKD requiring dialysis.     Operations ad Procedures with Dr. Yolanda Justice on 8/6:   1. Median Sternotomy  2. Off Pump CABG x 2 (LIMA to LAD, SVG to distal CX)  3. Sternal Plating with Sternal Lock     CTICU course: Prolonged ICU stay  - Encephalopathy, unresponsive episode POD #2; BAT --> angio --> basilar, bilat vertebral stenoses, no obstruction, no structural brain injury; Likely hypoactive delirium.  - Afib  - Acute resp failure, re-intubation during unresponsive episode  - Acute on chronic renal failure - dialysis started; R subclavian temporary HD catheter 8/18; HD 8/18 and 8/19  - Leukocytosis  - 5 d course Zosyn    Transferred to LT3 on 8/20.    ======================  Interval History:   No acute events overnight    SUBJECTIVE:  Doing well, working with PT, hoping the corpak can be removed today    Objective   /79   Pulse 65   Temp 36.4 °C (97.5 °F) (Temporal)   Resp 16   Ht 1.778 m (5' 10\")   Wt 111 kg (245 lb 12.8 oz)   SpO2 94%   BMI 35.27 kg/m²   0-10 (Numeric) Pain Score: 4   3 Day Weight Change: -0.287 kg (-10.1 oz) per day    Intake and Output    Intake/Output " Summary (Last 24 hours) at 8/26/2025 1429  Last data filed at 8/26/2025 1320  Gross per 24 hour   Intake 990 ml   Output 0 ml   Net 990 ml       Physical Exam  Physical Exam  Vitals and nursing note reviewed.   Constitutional:       General: He is not in acute distress.     Appearance: He is obese.      Comments: Deconditioned  Appears fatigued   Sitting up in chair  Sister at bedside   HENT:      Head: Normocephalic and atraumatic.      Nose:      Comments: Dobhoff in right nares      Mouth/Throat:      Mouth: Mucous membranes are moist.     Eyes:      Conjunctiva/sclera: Conjunctivae normal.       Cardiovascular:      Rate and Rhythm: Normal rate and regular rhythm.      Pulses: Normal pulses.      Heart sounds: Normal heart sounds.      Comments: NSR 1st degree 60s, occ PVCs, some SB 50s during night  Wires cut on 8/8  Pulmonary:      Effort: Pulmonary effort is normal. No accessory muscle usage or respiratory distress.      Comments: On RA  Diminished lung sounds bilaterally   Abdominal:      General: There is no distension.      Palpations: Abdomen is soft.      Tenderness: There is no abdominal tenderness.      Comments: LBM 8/21   Genitourinary:     Comments: Voiding post thornton removal    Musculoskeletal:      Cervical back: Neck supple.      Right lower leg: Edema (trace) present.      Left lower leg: Edema (trace) present.      Comments: MONTERROSO, generalized weakness      Skin:     General: Skin is warm and dry.      Comments: midsternal chest incision C/D/I, well approximated, no s/s infection   R chest tunnel HD cath (8/25) CD&I     Neurological:      General: No focal deficit present.      Mental Status: He is alert and oriented to person, place, and time.      Comments: Disoriented to situation  Slow to respond, but appropriate   Psychiatric:         Mood and Affect: Affect is flat.         Behavior: Behavior is cooperative.         Cognition and Memory: Memory is impaired.       Medications  Scheduled  medications  Scheduled Medications[1]Continuous medications  Continuous Medications[2]PRN medications  PRN Medications[3]    Labs  Results for orders placed or performed during the hospital encounter of 08/06/25 (from the past 24 hours)   POCT GLUCOSE   Result Value Ref Range    POCT Glucose 117 (H) 74 - 99 mg/dL   POCT GLUCOSE   Result Value Ref Range    POCT Glucose 122 (H) 74 - 99 mg/dL   POCT GLUCOSE   Result Value Ref Range    POCT Glucose 145 (H) 74 - 99 mg/dL   Magnesium   Result Value Ref Range    Magnesium 3.11 (H) 1.60 - 2.40 mg/dL   CBC   Result Value Ref Range    WBC 11.8 (H) 4.4 - 11.3 x10*3/uL    nRBC 0.0 0.0 - 0.0 /100 WBCs    RBC 2.71 (L) 4.50 - 5.90 x10*6/uL    Hemoglobin 8.0 (L) 13.5 - 17.5 g/dL    Hematocrit 25.8 (L) 41.0 - 52.0 %    MCV 95 80 - 100 fL    MCH 29.5 26.0 - 34.0 pg    MCHC 31.0 (L) 32.0 - 36.0 g/dL    RDW 15.0 (H) 11.5 - 14.5 %    Platelets 307 150 - 450 x10*3/uL   Renal Function Panel   Result Value Ref Range    Glucose 145 (H) 74 - 99 mg/dL    Sodium 138 136 - 145 mmol/L    Potassium 4.3 3.5 - 5.3 mmol/L    Chloride 101 98 - 107 mmol/L    Bicarbonate 24 21 - 32 mmol/L    Anion Gap 17 10 - 20 mmol/L    Urea Nitrogen 67 (H) 6 - 23 mg/dL    Creatinine 7.83 (H) 0.50 - 1.30 mg/dL    eGFR 7 (L) >60 mL/min/1.73m*2    Calcium 9.7 8.6 - 10.6 mg/dL    Phosphorus 5.9 (H) 2.5 - 4.9 mg/dL    Albumin 3.0 (L) 3.4 - 5.0 g/dL   POCT GLUCOSE   Result Value Ref Range    POCT Glucose 122 (H) 74 - 99 mg/dL     *Note: Due to a large number of results and/or encounters for the requested time period, some results have not been displayed. A complete set of results can be found in Results Review.     IMAGING/ DIAGNOSTIC TESTING:  I have personally reviewed the following test result(s):      XR chest 1 view 08/23/2025   IMPRESSION:  1.  Interval improvement in right basilar aeration. Otherwise, stable  small bilateral pleural effusions with bibasilar atelectasis.    Transthoracic Echo (TTE) Complete  8/14/2025   CONCLUSIONS:   1. Left ventricular ejection fraction is normal by visual estimate at 55-60%.   2. Spectral Doppler shows a Grade I (impaired relaxation pattern) of left ventricular diastolic filling with normal left atrial filling pressure.   3. Abnormal septal motion consistent with post-operative status.   4. No left ventricular thrombus visualized.   5. There is moderately increased septal thickness.   6. There is reduced right ventricular systolic function.   7. The left atrial size is moderately dilated.   8. There is moderate to severe aortic valve cusp calcification.   9. Normal aortic root.  10. Compared with study dated 8/6/2025, the prior study was an intra-operative OMAR. This was a limited study to evaluate LVEF. The aortic valve appears mod-severely calcified and the gradients on the OMAR were consistent with severe AS, they were not assessed on this study.    ===============  IMPRESSION & PLAN:  ===============  POD #20 s/p CABGx 2 with Dr Yolanda Justice on 8/6  - Increase activity/ ambulation; PT/OT  - Encourage IS, C/DB; respiratory therapy; wean O2 as taty   - Cardiac rehab referral   - Continue cardiac meds: ASA, Plavix, BB, statin, Zetia  - continue Plavix per Dr. Yolanda Justice  - Pain and anticonstipation meds  - 2v CXR ordered for 8/26  - Epicardial wires previously cut for urgent MRI while in ICU  - Tele until discharge  - Optimize nutrition and electrolytes    Rhythm - h/o SVT/NSVT; episode of postop Afib in ICU -> converted with Amio (completed 5g load)  - Tele: NSR 1st degree 60s, occ PVCs, occ SB 50s with sleep  - 8/23: switched metop to coreg for better bp control --> increased Coreg to 6.25 mg 8/24  - continue Amio 200 mg PO daily  - continue BB   - epicardial wires cut on 8/8  - Adjust medications as tolerated      Acute Blood Loss Anemia   Recent Labs     08/26/25  0655 08/25/25  0651 08/24/25  0651 08/23/25  0647 08/22/25  0615 08/21/25  0615 08/20/25  0553   HGB 8.0* 8.7* 7.8* 7.9*  7.9* 7.6* 7.7*   HCT 25.8* 29.6* 25.3* 27.3* 26.1* 25.5* 24.8*   - renal MV  - Epo as per Renal  - Daily labs, transfuse as indicated    Platelets  Recent Labs     25  0655 25  0651 25  0651 25  0647 25  0615 25  0615 25  0553    [phone] 354 342 335   - Continue to trend with daily CBCs      Volume/Electrolyte Status: Preop wt Weight: 116 kg (256 lb 9.9 oz)   ESRD,  h/o CKD5 -  baseline SCr 9.38  Vitals:    25 0100   Weight: 111 kg (245 lb 12.8 oz)     Creatinine   Date Value Ref Range Status   2025 7.83 (H) 0.50 - 1.30 mg/dL Final   2025 6.78 (H) 0.50 - 1.30 mg/dL Final   2025 5.47 (H) 0.50 - 1.30 mg/dL Final   - Weights: 111, 110, 112 kg, 114 , 112, 111  - Daily weights and strict I&Os  - Daily RFP while admitted  - Nephrology following, appreciate recs  - Renal diet  - IR consulted  for tunneled HD line. Held off for the weekend and trending WBC with tentative plan to place line  => tunneled HD line placed   - removed temp HD catheter   - continue Torsemide 40mg daily  -  added Epo, Sevelamer, nephrocap as per Renal recs  - HD as per Renal -> ,     Leukocytosis -> resolving  Recent Labs     25  0655 25  0651 25  0651 25  0647 25  0615 25  0615 25  0553   WBC 11.8* 13.3* 13.5* 12.5* 14.9* 16.0* 14.1*   Temp (36hrs), Av.5 °C (97.7 °F), Min:36.1 °C (97 °F), Max:37.1 °C (98.8 °F)  - Aggressive pulmonary hygiene  - Monitor for s/s infection  - 8/15 MRSA negative,  MRSA, urine, blood NEGATIVE  - Daily CBC to follow    Neuro  Post-op Delirium/Encephalopathy    Vertebral and basilar artery stenosis    - For multifactorial delirium/ encephalopathy   - sleep/ wake cycle hygiene  - PT/OT ordered OOBTC as tolerated. Encourage PT during daytime  - BAT called on  in ICU for unresponsive episode --> found to have bilateral vertebral and basilar artery stenosis    -  CTA revealed multifocal vert and basilar stenosis, no occlusion, no intervention done.  - Neurology consulted   Recommendations :  - Continue DAPT (ASA and plavix) at least 90 days, tentative plan to transition to plavix monotherapy but would defer to outpatient stroke neurologist   (Discussed with family at bedside, patient is good at taking his meds. Unable to clarify the compliance for ASA before the stroke called due to patient agitation)  - Neurology has arranged a follow up with Dr. Dior in 1 month  - Stroke will sign off at this time, please reach out with further questions.    Dysphagia: Corpak in place.  - SLP following, appreciate recs  - MBS on  now cleared for pureed diet and moderately thickened liquids  transitioned to regular renal diet  - Cyclic nepro tube feeds 50ml/hr 0972-2155 with PO renal moderately thickened  - Calorie count ->  Nutrition said not meeting 50 % calories so need to continue noct TF  - encouraging PO intake    Acute urinary retention: Thornton placed 8/15. Silodosin contraindicated in renal failure. Had been unable to take PO meds consistently.  - Continue Tamsulosin 0.4mg daily  - : will dc thornton at midnight   -  voiding post thornton removal  - monitor for retention    Hypertension: home meds: coreg 37.5 mg BID, hydralazine 100 mg TID, torsemide 60 mg daily  In CTICU, neuro exam improved with permissive HTN (found to have bilateral vertebral and basilar artery stenosis. CT showed diffusion hypoperfusion in posterior fossa and watershed cerebral hemispheres but no core infarct )  Systolic (24hrs), Av , Min:126 , Max:176   - continue coreg: currently 6.25 mg BID  - continue torsemide 40 mg daily  -  added hydralazine 10 mg TID with hold parameter for SBP <130  - Avoid hypotension, maintain SBP at least >120  - additional antihypertensives as needed     Hyperlipidemia: home meds: atorvastatin 20mg and ezetimibe 10mg  Lab Results   Component Value Date     CHOL 143 08/19/2024    LDLCALC 88 08/19/2024    HDL 32.7 08/19/2024    VLDL 22 08/19/2024    TRIG 110 08/19/2024    NHDL 110 08/19/2024   - continue atorva 80 mg and zetia  - follow up lipid panel with PCP/ cardiologist for ongoing lipid management    H/o DM 2: home meds: none  Lab Results   Component Value Date    HGBA1C 5.4 05/16/2025     Results from last 7 days   Lab Units 08/26/25  1320 08/26/25  0647 08/25/25  2323 08/25/25  1706 08/25/25  1237 08/25/25  0629 08/24/25  2334   POCT GLUCOSE mg/dL 122* 145* 122* 117* 88 140* 125*   -accuchecks and SSI    Wounds: Right tibial diabetic ulcer and sacral contact dermititis  - Wound care consult, appreciate recs    VTE Prophylaxis: SCDs/TEDs, ambulation, SQ heparin  Code Status: Full Code    Dispo  - PT/OT recs: high intensity  - Anticipate discharge 2-3 days to facility pending Renal/volume status, ability to meet calories needs without dobhoff/TF  - PM&R following patient -> recommending SNF at discharge  - 8/25 looking at other Rehabs per family request  - Will continue to assess discharge needs    Yanci Hooks PA-C  Cardiac Surgery [facility]  [facility]  Team Phone [phone]                     [1] acetaminophen, 650 mg, oral, q6h NHAN  amiodarone, 200 mg, oral, Daily  aspirin, 81 mg, oral, Daily  atorvastatin, 80 mg, oral, Nightly  carvedilol, 6.25 mg, oral, BID  clopidogrel, 75 mg, oral, Daily  epoetin [place] or biosimilar, 7,500 Units, subcutaneous, Once per day on Monday Wednesday Friday  ezetimibe, 10 mg, oral, Nightly  heparin, 5,000 Units, subcutaneous, q8h  hydrALAZINE, 25 mg, oral, TID  insulin lispro, 0-10 Units, subcutaneous, q6h  lidocaine, 1 patch, transdermal, Daily  melatonin, 6 mg, oral, Nightly  nystatin, 4 mL, Swish & Spit, TID  polyethylene glycol, 17 g, oral, Daily  sennosides-docusate sodium, 2 tablet, oral, Nightly  sevelamer carbonate, 800 mg, oral, TID  sodium chloride, 1 spray, Each Nostril, 4x daily  tamsulosin, 0.4 mg,  oral, Daily  thiamine, 100 mg, oral, Daily  torsemide, 40 mg, oral, Daily  vitamin B complex-vitamin C-folic acid, 1 capsule, oral, Daily     [2]    [3] PRN medications: alteplase, bisacodyl, dextrose, dextrose **OR** glucagon, dextrose, glucagon, glucagon, guaiFENesin, heparin flush, naloxone, ondansetron, oxygen

## 2025-08-26 NOTE — CARE PLAN
The patient's goals for the shift include  to rest     The clinical goals for the shift include pt will remain HDS throughout this shift      Problem: Pain - Adult  Goal: Verbalizes/displays adequate comfort level or baseline comfort level  Outcome: Progressing     Problem: Safety - Adult  Goal: Free from fall injury  Outcome: Progressing     Problem: Discharge Planning  Goal: Discharge to home or other facility with appropriate resources  Outcome: Progressing     Problem: Chronic Conditions and Co-morbidities  Goal: Patient's chronic conditions and co-morbidity symptoms are monitored and maintained or improved  Outcome: Progressing     Problem: Nutrition  Goal: Nutrient intake appropriate for maintaining nutritional needs  Outcome: Progressing     Problem: Skin  Goal: Decreased wound size/increased tissue granulation at next dressing change  Outcome: Progressing  Goal: Participates in plan/prevention/treatment measures  Outcome: Progressing  Goal: Prevent/manage excess moisture  Outcome: Progressing  Goal: Prevent/minimize sheer/friction injuries  Outcome: Progressing  Goal: Promote/optimize nutrition  Outcome: Progressing  Goal: Promote skin healing  Outcome: Progressing     Problem: General Stroke  Goal: Establish a mutual long term goal with patient by discharge  Outcome: Progressing  Goal: Demonstrate improvement in neurological exam throughout the shift  Outcome: Progressing  Goal: Maintain BP within ordered limits throughout shift  Outcome: Progressing  Goal: Participate in treatment (ie., meds, therapy) throughout shift  Outcome: Progressing  Goal: No symptoms of aspiration throughout shift  Outcome: Progressing  Goal: No symptoms of hemorrhage throughout shift  Outcome: Progressing  Goal: Tolerate enteral feeding throughout shift  Outcome: Progressing  Goal: Decreased nausea/vomiting throughout shift  Outcome: Progressing  Goal: Controlled blood glucose throughout shift  Outcome: Progressing  Goal: Out of  bed three times today  Outcome: Progressing     Problem: ICU Stroke  Goal: Achieve/maintain targeted sodium level throughout shift  Outcome: Progressing     Problem: ESRD: Dialysis, CAPD  Goal: Electrolytes restored to baseline  Outcome: Progressing  Goal: Fatigue of chronic illness managed  Outcome: Progressing  Goal: Follows dialysis treatment plan  Outcome: Progressing  Goal: Follows fluid restrictions  Outcome: Progressing  Goal: Increase self care/family involvement  Outcome: Progressing  Goal: Participates in bowel regimen (CAPD)  Outcome: Progressing  Goal: Reports no abdominal pain/distension  Outcome: Progressing  Goal: Reports no shortness of breath  Outcome: Progressing     Problem: Diabetes  Goal: Achieve decreasing blood glucose levels by end of shift  Outcome: Progressing  Goal: Increase stability of blood glucose readings by end of shift  Outcome: Progressing  Goal: Decrease in ketones present in urine by end of shift  Outcome: Progressing  Goal: Maintain electrolyte levels within acceptable range throughout shift  Outcome: Progressing  Goal: Maintain glucose levels >70mg/dl to <250mg/dl throughout shift  Outcome: Progressing  Goal: No changes in neurological exam by end of shift  Outcome: Progressing  Goal: Learn about and adhere to nutrition recommendations by end of shift  Outcome: Progressing  Goal: Vital signs within normal range for age by end of shift  Outcome: Progressing  Goal: Increase self care and/or family involovement by end of shift  Outcome: Progressing  Goal: Receive DSME education by end of shift  Outcome: Progressing     Problem: Pain  Goal: Takes deep breaths with improved pain control throughout the shift  Outcome: Progressing  Goal: Turns in bed with improved pain control throughout the shift  Outcome: Progressing  Goal: Walks with improved pain control throughout the shift  Outcome: Progressing  Goal: Performs ADL's with improved pain control throughout shift  Outcome:  Progressing  Goal: Participates in PT with improved pain control throughout the shift  Outcome: Progressing  Goal: Free from opioid side effects throughout the shift  Outcome: Progressing  Goal: Free from acute confusion related to pain meds throughout the shift  Outcome: Progressing

## 2025-08-27 ENCOUNTER — APPOINTMENT (OUTPATIENT)
Dept: RADIOLOGY | Facility: HOSPITAL | Age: 60
End: 2025-08-27
Payer: COMMERCIAL

## 2025-08-27 ASSESSMENT — COGNITIVE AND FUNCTIONAL STATUS - GENERAL
MOVING FROM LYING ON BACK TO SITTING ON SIDE OF FLAT BED WITH BEDRAILS: A LITTLE
TURNING FROM BACK TO SIDE WHILE IN FLAT BAD: A LITTLE
STANDING UP FROM CHAIR USING ARMS: A LITTLE
CLIMB 3 TO 5 STEPS WITH RAILING: A LOT
TOILETING: A LITTLE
MOBILITY SCORE: 16
WALKING IN HOSPITAL ROOM: A LITTLE
HELP NEEDED FOR BATHING: A LITTLE
DAILY ACTIVITIY SCORE: 19
MOBILITY SCORE: 18
STANDING UP FROM CHAIR USING ARMS: A LITTLE
MOVING TO AND FROM BED TO CHAIR: A LITTLE
DRESSING REGULAR LOWER BODY CLOTHING: A LITTLE
WALKING IN HOSPITAL ROOM: A LITTLE
CLIMB 3 TO 5 STEPS WITH RAILING: TOTAL
PERSONAL GROOMING: A LITTLE
DRESSING REGULAR UPPER BODY CLOTHING: A LITTLE
MOVING TO AND FROM BED TO CHAIR: A LITTLE
TURNING FROM BACK TO SIDE WHILE IN FLAT BAD: A LITTLE

## 2025-08-27 ASSESSMENT — PAIN SCALES - GENERAL
PAINLEVEL_OUTOF10: 2
PAINLEVEL_OUTOF10: 5 - MODERATE PAIN
PAINLEVEL_OUTOF10: 0 - NO PAIN
PAINLEVEL_OUTOF10: 0 - NO PAIN

## 2025-08-27 ASSESSMENT — PAIN - FUNCTIONAL ASSESSMENT
PAIN_FUNCTIONAL_ASSESSMENT: 0-10

## 2025-08-27 ASSESSMENT — PAIN DESCRIPTION - DESCRIPTORS: DESCRIPTORS: SORE;ACHING

## 2025-08-27 NOTE — CONSULTS
"Nutrition Assessment    Reason for Assessment: Provider consult order (verbal)    Recent events since last RDN visit:  8/26) s/p iHD session > plan to discharge on TTS schedule per Nephro notes    Nutrition Intake Since Last RDN Visit:  Energy Intake: Fair 50-75 %  Food and Nutrient History: SLP upgraded diet texture to regular yesterday, has eaten >/= 50% x past 3 meals. Remains on moderatly thick liquids. Encouraged intake of thickened beverages for maintaining adequate hydration. Agree with removal of dobohoff for supplemental TF's.       Anthropometrics:  Height: 177.8 cm (5' 10\")   Weight: 110 kg (241 lb 14.4 oz)   BMI (Calculated): 34.71  IBW/kg (Dietitian Calculated): 75.5 kg                         Weight Change % This Admission:  Weight History / % Weight Change: down ~10-12 kgs from highest measurement over hosp course, suspect mostly fluid    Nutrition Focused Physical Exam Findings:  Edema:  Edema Location: +1 BLE  Physical Findings:  Skin:  (surgical, RLE and sacral wounds)    Nutrition Significant Labs:  CBC Trend:   Results from last 7 days   Lab Units 08/27/25  0648 08/26/25  0655 08/25/25  0651 08/24/25  0651   WBC AUTO x10*3/uL 12.3* 11.8* 13.3* 13.5*   RBC AUTO x10*6/uL 2.70* 2.71* 2.93* 2.64*   HEMOGLOBIN g/dL 8.0* 8.0* 8.7* 7.8*   HEMATOCRIT % 26.3* 25.8* 29.6* 25.3*   MCV fL 97 95 101* 96   PLATELETS AUTO x10*3/uL 346 307 361 305    , A1C:  Lab Results   Component Value Date    HGBA1C 5.4 05/16/2025   , BG POCT trend:   Results from last 7 days   Lab Units 08/27/25  0744 08/27/25  0143 08/26/25  2053 08/26/25  1648 08/26/25  1320   POCT GLUCOSE mg/dL 136* 132* 113* 159* 122*    , Liver Function Trend:    , Renal Lab Trend:   Results from last 7 days   Lab Units 08/27/25  0648 08/26/25  0655 08/25/25  0651 08/24/25  0651   POTASSIUM mmol/L 4.2 4.3 4.2 4.6   PHOSPHORUS mg/dL 4.2 5.9* 5.6* 4.8   SODIUM mmol/L 138 138 138 137   MAGNESIUM mg/dL 2.57* 3.11* 2.84* 2.55*   EGFR mL/min/1.73m*2 12* 7* 9* " 11*   BUN mg/dL 41* 67* 53* 42*   CREATININE mg/dL 5.29* 7.83* 6.78* 5.47*       Nutrition Specific Medications:  nystatin, 4 mL, Swish & Spit, TID  polyethylene glycol, 17 g, oral, Daily  sennosides-docusate sodium, 2 tablet, oral, Nightly  sevelamer carbonate, 800 mg, oral, TID  thiamine, 100 mg, oral, Daily  vitamin B complex-vitamin C-folic acid, 1 capsule, oral, Daily    I/O:   Last BM Date: 08/27/25; Stool Appearance: Soft (08/27/25 0819)    Dietary Orders (From admission, onward)       Start     Ordered    08/26/25 1015  Enteral feeding WITH diet order Diet type: Renal; Potassium restriction: Potassium Restricted 2 gm (50mEq); Sodium restriction: 2 - 3 grams Sodium; Fluid consistency: Moderately thick 3; Tube feeding formula: Nepro; 2000/0800; 50  Continuous        Question Answer Comment   Diet type Renal    Potassium restriction: Potassium Restricted 2 gm (50mEq)    Sodium restriction: 2 - 3 grams Sodium    Fluid consistency Moderately thick 3    Tube feeding formula: Nepro    Feeding route: NG (nasogastric tube)    Tube feeding cyclic (start / stop time): 2000/0800    Tube feeding cyclic rate (mL/hr): 50        08/26/25 1015    08/25/25 1105  Oral nutritional supplements  Until discontinued        Question Answer Comment   Deliver with All meals    Select supplement: Magic Cup        08/25/25 1104                     Estimated Needs:   Total Energy Estimated Needs in 24 hours (kCal): 2200 kCal  Method for Estimating Needs: 30 kcals/kg x ideal BW  Total Protein Estimated Needs in 24 Hours (g): 110 g  Method for Estimating 24 Hour Protein Needs: 1.5 gm/kg x ideal BW  Total Fluid Estimated Needs in 24 Hours (mL):  (per team)           Nutrition Diagnosis   Malnutrition Diagnosis  Patient has Malnutrition Diagnosis: No    Nutrition Diagnosis  Patient has Nutrition Diagnosis: Yes  Diagnosis Status (1): Active  Nutrition Diagnosis 1: Increased nutrient needs  Related to (1): increased metabolic demand  As  Evidenced by (1): s/p CABG, recovery from critical illness.    Diagnosis Status (2): Active  Nutrition Diagnosis 2: Swallowing difficulty  Related to (2): post-op events  As Evidenced by (2): diet advanced to regular texture, remains on moderately thick liquids    Additional Assessment Information (2):   8/27) Intakes much improved with diet texture upgrade, plan for dobhoff removal, continue to encourage fluid intake given increased risk inadequate fluid intake with thickened liquids       Nutrition Interventions/Recommendations   Nutrition prescription for oral nutrition    Nutrition Recommendations:  OK with removal dobhoff NG.     Continue Magic cups for add kcals & pro, low in potassium.    Nutrition Interventions/Goals:   Medical Food Supplement: Commercial beverage medical food supplement therapy  Goal: Magic cup TID for 290 kcals and 9gm protein each > discontinue Gelatein Plus d/t pt dislike       Nutrition Monitoring and Evaluation   Estimated Energy Intake: Energy intake 50 -75% of estimated energy needs         Electrolyte and Renal Panel: Electrolytes within normal limits  Glucose/Endocrine Profile: Glucose within normal limits ( mg/dL)         Goal Status: Goal(s) achieved    Time Spent (min): 30 minutes

## 2025-08-27 NOTE — PROGRESS NOTES
"CARDIAC SURGERY DAILY PROGRESS NOTE    Colin Leonard is a 59 y.o. male hx of HTN, CKD stage 5, chronic anemia, type 2 diabetes, hx of c diff colitis who was admitted 5/19-5/24 for shortness of breathe was incidentally found to have atypical pneumonia which he was treated for. He was found to have elevated trop and BNP on that admission with a few episodes of reported VT which required increased dose of coreg. He underwent LHC 5/22/25 demonstrated MVCAD and decision was made to discharge and let him recover from his pneumonia treated with Augmentin and oral vanco prophyllacticaly due to h/o cdiff. and bring back for CABG. He is now s/p CABGx 2 with Dr Yolanda Justice on 8/6/2. Postop course c/b unresponsive episode in setting of hypotension and found to have bilateral vertebral and basilar artery stenosis, pneumothorax, and TEJA on CKD requiring dialysis.     Operations ad Procedures with Dr. Yolanda Justice on 8/6:   1. Median Sternotomy  2. Off Pump CABG x 2 (LIMA to LAD, SVG to distal CX)  3. Sternal Plating with Sternal Lock     CTICU course: Prolonged ICU stay  - Encephalopathy, unresponsive episode POD #2; BAT --> angio --> basilar, bilat vertebral stenoses, no obstruction, no structural brain injury; Likely hypoactive delirium.  - Afib  - Acute resp failure, re-intubation during unresponsive episode  - Acute on chronic renal failure - dialysis started; R subclavian temporary HD catheter 8/18; HD 8/18 and 8/19  - Leukocytosis  - 5 d course Zosyn    Transferred to LT3 on 8/20.    ======================  Interval History:   No acute events overnight    SUBJECTIVE:  Doing well, worked with PT this morning,  Happy that the dobhoff is going to be removed.     Objective   /79   Pulse 65   Temp 36.4 °C (97.5 °F) (Temporal)   Resp 16   Ht 1.778 m (5' 10\")   Wt 111 kg (245 lb 12.8 oz)   SpO2 94%   BMI 35.27 kg/m²   0-10 (Numeric) Pain Score: 4   3 Day Weight Change: -0.287 kg (-10.1 oz) per day    Intake and " Output    Intake/Output Summary (Last 24 hours) at 8/26/2025 1429  Last data filed at 8/26/2025 1320  Gross per 24 hour   Intake 990 ml   Output 0 ml   Net 990 ml       Physical Exam  Physical Exam  Vitals and nursing note reviewed.   Constitutional:       General: He is not in acute distress.     Appearance: He is obese.      Comments:      HENT:      Head: Normocephalic and atraumatic.      Mouth/Throat:      Mouth: Mucous membranes are moist.   Eyes:      Conjunctiva/sclera: Conjunctivae normal.   Cardiovascular:      Rate and Rhythm: Normal rate and regular rhythm.      Pulses: Normal pulses.      Heart sounds: Normal heart sounds.      Comments: NSR 1st degree 60s-70, occ PVCs  Sternum stable  Wires cut on 8/8  Pulmonary:      Effort: Pulmonary effort is normal. No accessory muscle usage or respiratory distress.      Comments: On RA  Diminished lung sounds bilaterally   Abdominal:      General: There is no distension.      Palpations: Abdomen is soft.      Tenderness: There is no abdominal tenderness.      Comments: LBM 8/27   Genitourinary:     Comments: Voiding post thornton removal  Musculoskeletal:      Cervical back: Neck supple.      Right lower leg: Edema (trace) present.      Left lower leg: Edema (trace) present.      Comments: MONTERROSO, generalized weakness    Skin:     General: Skin is warm and dry.      Comments: midsternal chest incision C/D/I, well approximated, no s/s infection   R chest tunnel HD cath (8/25) CD&I   Neurological:      General: No focal deficit present.      Mental Status: He is alert and oriented to person, place, and time.      Comments: Can be slow to respond, but appropriate   Psychiatric:         Mood and Affect: Affect is flat.         Behavior: Behavior is cooperative.         Cognition and Memory: Memory is impaired.       Medications  Scheduled medications  Scheduled Medications[1]Continuous medications  Continuous Medications[2]PRN medications  PRN  Medications[3]    Labs  Results for orders placed or performed during the hospital encounter of 08/06/25 (from the past 24 hours)   POCT GLUCOSE   Result Value Ref Range    POCT Glucose 117 (H) 74 - 99 mg/dL   POCT GLUCOSE   Result Value Ref Range    POCT Glucose 122 (H) 74 - 99 mg/dL   POCT GLUCOSE   Result Value Ref Range    POCT Glucose 145 (H) 74 - 99 mg/dL   Magnesium   Result Value Ref Range    Magnesium 3.11 (H) 1.60 - 2.40 mg/dL   CBC   Result Value Ref Range    WBC 11.8 (H) 4.4 - 11.3 x10*3/uL    nRBC 0.0 0.0 - 0.0 /100 WBCs    RBC 2.71 (L) 4.50 - 5.90 x10*6/uL    Hemoglobin 8.0 (L) 13.5 - 17.5 g/dL    Hematocrit 25.8 (L) 41.0 - 52.0 %    MCV 95 80 - 100 fL    MCH 29.5 26.0 - 34.0 pg    MCHC 31.0 (L) 32.0 - 36.0 g/dL    RDW 15.0 (H) 11.5 - 14.5 %    Platelets 307 150 - 450 x10*3/uL   Renal Function Panel   Result Value Ref Range    Glucose 145 (H) 74 - 99 mg/dL    Sodium 138 136 - 145 mmol/L    Potassium 4.3 3.5 - 5.3 mmol/L    Chloride 101 98 - 107 mmol/L    Bicarbonate 24 21 - 32 mmol/L    Anion Gap 17 10 - 20 mmol/L    Urea Nitrogen 67 (H) 6 - 23 mg/dL    Creatinine 7.83 (H) 0.50 - 1.30 mg/dL    eGFR 7 (L) >60 mL/min/1.73m*2    Calcium 9.7 8.6 - 10.6 mg/dL    Phosphorus 5.9 (H) 2.5 - 4.9 mg/dL    Albumin 3.0 (L) 3.4 - 5.0 g/dL   POCT GLUCOSE   Result Value Ref Range    POCT Glucose 122 (H) 74 - 99 mg/dL     *Note: Due to a large number of results and/or encounters for the requested time period, some results have not been displayed. A complete set of results can be found in Results Review.     IMAGING/ DIAGNOSTIC TESTING:  I have personally reviewed the following test result(s):      XR chest 1 view 08/23/2025   IMPRESSION:  1.  Interval improvement in right basilar aeration. Otherwise, stable  small bilateral pleural effusions with bibasilar atelectasis.    Transthoracic Echo (TTE) Complete 8/14/2025   CONCLUSIONS:   1. Left ventricular ejection fraction is normal by visual estimate at 55-60%.   2.  Spectral Doppler shows a Grade I (impaired relaxation pattern) of left ventricular diastolic filling with normal left atrial filling pressure.   3. Abnormal septal motion consistent with post-operative status.   4. No left ventricular thrombus visualized.   5. There is moderately increased septal thickness.   6. There is reduced right ventricular systolic function.   7. The left atrial size is moderately dilated.   8. There is moderate to severe aortic valve cusp calcification.   9. Normal aortic root.  10. Compared with study dated 8/6/2025, the prior study was an intra-operative OMAR. This was a limited study to evaluate LVEF. The aortic valve appears mod-severely calcified and the gradients on the OMAR were consistent with severe AS, they were not assessed on this study.    ===============  IMPRESSION & PLAN:  ===============  POD #21 s/p CABGx 2 with Dr Yolanda Justice on 8/6  - Increase activity/ ambulation; PT/OT  - Encourage IS, C/DB; respiratory therapy; wean O2 as taty   - Cardiac rehab referral   - Continue cardiac meds: ASA, Plavix, BB, statin, Zetia  - continue Plavix per Dr. Yolanda Justice  - Pain and anticonstipation meds  - 2v CXR ordered for 8/26, needs to be completed  - Epicardial wires previously cut for urgent MRI while in ICU  - Tele until discharge  - Optimize nutrition and electrolytes    Rhythm - h/o SVT/NSVT; episode of postop Afib in ICU -> converted with Amio (completed 5g load)  - Tele: NSR 1st degree 60s, occ PVCs, occ SB 50s with sleep  - 8/23: switched metop to coreg for better bp control --> increased Coreg to 6.25 mg 8/24  - continue Amio 200 mg PO daily  - continue BB   - epicardial wires cut on 8/8  - Adjust medications as tolerated      Acute Blood Loss Anemia   Recent Labs     08/26/25  0655 08/25/25  0651 08/24/25  0651 08/23/25  0647 08/22/25  0615 08/21/25  0615 08/20/25  0553   HGB 8.0* 8.7* 7.8* 7.9* 7.9* 7.6* 7.7*   HCT 25.8* 29.6* 25.3* 27.3* 26.1* 25.5* 24.8*   - renal MV  - Epo as per  Renal  - Daily labs, transfuse as indicated    Platelets  Recent Labs     25  0655 25  0651 25  0651 25  0647 25  0615 25  0615 25  0553    [phone]   - Continue to trend with daily CBCs      Volume/Electrolyte Status: Preop wt Weight: 116 kg (256 lb 9.9 oz)   ESRD,  h/o CKD5 -  baseline SCr 9.38  Vitals:    25 0100   Weight: 111 kg (245 lb 12.8 oz)     Creatinine   Date Value Ref Range Status   2025 7.83 (H) 0.50 - 1.30 mg/dL Final   2025 6.78 (H) 0.50 - 1.30 mg/dL Final   2025 5.47 (H) 0.50 - 1.30 mg/dL Final   - Weights: 110, 111, 110, 112 kg, 114 , 112, 111  - Daily weights and strict I&Os  - Daily RFP while admitted  - Nephrology following, appreciate recs  - Renal diet  - IR consulted  for tunneled HD line. Held off for the weekend and trending WBC with tentative plan to place line  => tunneled HD line placed , removed temp HD catheter   - continue Torsemide 40mg daily  -  added Epo, Sevelamer, nephrocap as per Renal recs  - HD as per Renal -> ,   - : Patient currently on TTS dialysis schedule, needs to be MWF for acute rehab.  Discussed with renal, can have a shorter session of dialysis tomorrow and then again on     Leukocytosis -> downtrending  Recent Labs     25  0655 25  0651 25  0651 25  0647 25  0615 25  0615 25  0553   WBC 11.8* 13.3* 13.5* 12.5* 14.9* 16.0* 14.1*   Temp (36hrs), Av.5 °C (97.7 °F), Min:36.1 °C (97 °F), Max:37.1 °C (98.8 °F)  - Aggressive pulmonary hygiene  - Monitor for s/s infection  - 8/15 MRSA negative,  MRSA, urine, blood NEGATIVE  - Daily CBC to follow    Neuro  Post-op Delirium/Encephalopathy    Vertebral and basilar artery stenosis    - For multifactorial delirium/ encephalopathy   - sleep/ wake cycle hygiene  - PT/OT ordered OOBTC as tolerated. Encourage PT during daytime  - BAT called on  in ICU  for unresponsive episode --> found to have bilateral vertebral and basilar artery stenosis    - CTA revealed multifocal vert and basilar stenosis, no occlusion, no intervention done.  - Neurology consulted   Recommendations :  - Continue DAPT (ASA and plavix) at least 90 days, tentative plan to transition to plavix monotherapy but would defer to outpatient stroke neurologist   (Discussed with family at bedside, patient is good at taking his meds. Unable to clarify the compliance for ASA before the stroke called due to patient agitation)  - Neurology has arranged a follow up with Dr. Dior in 1 month  - Stroke will sign off at this time, please reach out with further questions.    Dysphagia: Corpak in place.  - SLP and nutrition following, appreciate recs  - MBS on   - : Corpak removed cyclic nepro tube feeds stopped   - Diet: Regular solids renal diet with moderately thick liquids   - SLP updated recs  - Small bites  - Medications crushed in puree or as best tolerated  - Complete oral care frequently throughout the day  - Full supervision with meals; One to one assist with meals  - repeat MBS in 1-2 weeks  - Nutrition updated recs:   - OK with removal dobhoff NG.    - Continue Magic cups for add kcals & pro, low in potassium.  - encouraging PO intake    Acute urinary retention: Thornton placed 8/15. Silodosin contraindicated in renal failure. Had been unable to take PO meds consistently at that time  - Continue Tamsulosin 0.4mg daily  - : will dc thornton at midnight   -  voiding post thornton removal  - monitor for retention    Hypertension: home meds: coreg 37.5 mg BID, hydralazine 100 mg TID, torsemide 60 mg daily  In CTICU, neuro exam improved with permissive HTN (found to have bilateral vertebral and basilar artery stenosis. CT showed diffusion hypoperfusion in posterior fossa and watershed cerebral hemispheres but no core infarct )  Systolic (24hrs), Av , Min:126 , Max:176   - continue coreg:  currently 6.25 mg BID  - continue torsemide 40 mg daily  - 8/25 added hydralazine 10 mg TID with hold parameter for SBP <130, 8/26: increased to 25mg TID, 8/27: increased to 50mg TID   - Avoid hypotension, maintain SBP at least >120  - additional antihypertensives as needed     Hyperlipidemia: home meds: atorvastatin 20mg and ezetimibe 10mg  Lab Results   Component Value Date    CHOL 143 08/19/2024    LDLCALC 88 08/19/2024    HDL 32.7 08/19/2024    VLDL 22 08/19/2024    TRIG 110 08/19/2024    NHDL 110 08/19/2024   - continue atorva 80 mg and zetia  - follow up lipid panel with PCP/ cardiologist for ongoing lipid management    H/o DM 2: home meds: none  Lab Results   Component Value Date    HGBA1C 5.4 05/16/2025     Results from last 7 days   Lab Units 08/26/25  1320 08/26/25  0647 08/25/25  2323 08/25/25  1706 08/25/25  1237 08/25/25  0629 08/24/25  2334   POCT GLUCOSE mg/dL 122* 145* 122* 117* 88 140* 125*   -accuchecks and SSI    Wounds: Right tibial diabetic ulcer and sacral contact dermititis  - Wound care consult, appreciate recs    VTE Prophylaxis: SCDs/TEDs, ambulation, SQ heparin  Code Status: Full Code    Dispo  - PT/OT recs: high intensity  - Anticipate discharge 1-2 days to facility pending precert, post op diagnostics, and clinical progression  - Will continue to assess discharge needs    Yanci Hooks PA-C  Cardiac Surgery [facility]  [facility]  Team Phone [phone]                       [1] acetaminophen, 650 mg, oral, q6h NHAN  amiodarone, 200 mg, oral, Daily  aspirin, 81 mg, oral, Daily  atorvastatin, 80 mg, oral, Nightly  carvedilol, 6.25 mg, oral, BID  clopidogrel, 75 mg, oral, Daily  epoetin [place] or biosimilar, 7,500 Units, subcutaneous, Once per day on Monday Wednesday Friday  ezetimibe, 10 mg, oral, Nightly  heparin, 5,000 Units, subcutaneous, q8h  hydrALAZINE, 25 mg, oral, TID  insulin lispro, 0-10 Units, subcutaneous, q6h  lidocaine, 1 patch, transdermal, Daily  melatonin, 6  mg, oral, Nightly  nystatin, 4 mL, Swish & Spit, TID  polyethylene glycol, 17 g, oral, Daily  sennosides-docusate sodium, 2 tablet, oral, Nightly  sevelamer carbonate, 800 mg, oral, TID  sodium chloride, 1 spray, Each Nostril, 4x daily  tamsulosin, 0.4 mg, oral, Daily  thiamine, 100 mg, oral, Daily  torsemide, 40 mg, oral, Daily  vitamin B complex-vitamin C-folic acid, 1 capsule, oral, Daily     [2]    [3] PRN medications: alteplase, bisacodyl, dextrose, dextrose **OR** glucagon, dextrose, glucagon, glucagon, guaiFENesin, heparin flush, naloxone, ondansetron, oxygen

## 2025-08-27 NOTE — CARE PLAN
The patient's goals for the shift include  to rest     The clinical goals for the shift include Pt will remain HDS throughout this shift      Problem: Pain - Adult  Goal: Verbalizes/displays adequate comfort level or baseline comfort level  Outcome: Progressing     Problem: Safety - Adult  Goal: Free from fall injury  Outcome: Progressing     Problem: Discharge Planning  Goal: Discharge to home or other facility with appropriate resources  Outcome: Progressing     Problem: Chronic Conditions and Co-morbidities  Goal: Patient's chronic conditions and co-morbidity symptoms are monitored and maintained or improved  Outcome: Progressing     Problem: Nutrition  Goal: Nutrient intake appropriate for maintaining nutritional needs  Outcome: Progressing     Problem: Skin  Goal: Decreased wound size/increased tissue granulation at next dressing change  Outcome: Progressing  Goal: Participates in plan/prevention/treatment measures  Outcome: Progressing  Goal: Prevent/manage excess moisture  Outcome: Progressing  Goal: Prevent/minimize sheer/friction injuries  Outcome: Progressing  Goal: Promote/optimize nutrition  Outcome: Progressing  Goal: Promote skin healing  Outcome: Progressing     Problem: General Stroke  Goal: Establish a mutual long term goal with patient by discharge  Outcome: Progressing  Goal: Demonstrate improvement in neurological exam throughout the shift  Outcome: Progressing  Goal: Maintain BP within ordered limits throughout shift  Outcome: Progressing  Goal: Participate in treatment (ie., meds, therapy) throughout shift  Outcome: Progressing  Goal: No symptoms of aspiration throughout shift  Outcome: Progressing  Goal: No symptoms of hemorrhage throughout shift  Outcome: Progressing  Goal: Tolerate enteral feeding throughout shift  Outcome: Progressing  Goal: Decreased nausea/vomiting throughout shift  Outcome: Progressing  Goal: Controlled blood glucose throughout shift  Outcome: Progressing  Goal: Out of  bed three times today  Outcome: Progressing     Problem: ICU Stroke  Goal: Achieve/maintain targeted sodium level throughout shift  Outcome: Progressing     Problem: ESRD: Dialysis, CAPD  Goal: Electrolytes restored to baseline  Outcome: Progressing  Goal: Fatigue of chronic illness managed  Outcome: Progressing  Goal: Follows dialysis treatment plan  Outcome: Progressing  Goal: Follows fluid restrictions  Outcome: Progressing  Goal: Increase self care/family involvement  Outcome: Progressing  Goal: Participates in bowel regimen (CAPD)  Outcome: Progressing  Goal: Reports no abdominal pain/distension  Outcome: Progressing  Goal: Reports no shortness of breath  Outcome: Progressing     Problem: Diabetes  Goal: Achieve decreasing blood glucose levels by end of shift  Outcome: Progressing  Goal: Increase stability of blood glucose readings by end of shift  Outcome: Progressing  Goal: Decrease in ketones present in urine by end of shift  Outcome: Progressing  Goal: Maintain electrolyte levels within acceptable range throughout shift  Outcome: Progressing  Goal: Maintain glucose levels >70mg/dl to <250mg/dl throughout shift  Outcome: Progressing  Goal: No changes in neurological exam by end of shift  Outcome: Progressing  Goal: Learn about and adhere to nutrition recommendations by end of shift  Outcome: Progressing  Goal: Vital signs within normal range for age by end of shift  Outcome: Progressing  Goal: Increase self care and/or family involovement by end of shift  Outcome: Progressing  Goal: Receive DSME education by end of shift  Outcome: Progressing     Problem: Pain  Goal: Takes deep breaths with improved pain control throughout the shift  Outcome: Progressing  Goal: Turns in bed with improved pain control throughout the shift  Outcome: Progressing  Goal: Walks with improved pain control throughout the shift  Outcome: Progressing  Goal: Performs ADL's with improved pain control throughout shift  Outcome:  Progressing  Goal: Participates in PT with improved pain control throughout the shift  Outcome: Progressing  Goal: Free from opioid side effects throughout the shift  Outcome: Progressing  Goal: Free from acute confusion related to pain meds throughout the shift  Outcome: Progressing     Problem: Fall/Injury  Goal: Not fall by end of shift  Outcome: Progressing  Goal: Be free from injury by end of the shift  Outcome: Progressing  Goal: Verbalize understanding of personal risk factors for fall in the hospital  Outcome: Progressing  Goal: Verbalize understanding of risk factor reduction measures to prevent injury from fall in the home  Outcome: Progressing  Goal: Use assistive devices by end of the shift  Outcome: Progressing  Goal: Pace activities to prevent fatigue by end of the shift  Outcome: Progressing

## 2025-08-27 NOTE — PROGRESS NOTES
Physical Therapy    Physical Therapy Treatment    Patient Name: Colin Leonard  MRN: [id number]  Department: [facility] [address]  Room: [address] [place] [address]  Today's Date: 8/27/2025  Time Calculation  Start Time: 0819  Stop Time: 0846  Time Calculation (min): 27 min         Assessment/Plan   PT Assessment  PT Assessment Results: Decreased strength, Decreased endurance, Impaired balance, Decreased mobility  Rehab Prognosis: Good  Barriers to Discharge Home: Caregiver assistance, Physical needs  Caregiver Assistance: Caregiver assistance needed per identified barriers - however, level of patient's required assistance exceeds assistance available at home  Cognition Needs: Cognition-related high falls risk  Physical Needs: High falls risk due to function or environment, Ambulating household distances limited by function/safety, Stair navigation to access bath limited by function/safety, 24hr mobility assistance needed, 24hr ADL assistance needed  Evaluation/Treatment Tolerance:  (Tx limited by dizziness and elevated BP)  Medical Staff Made Aware: Yes  End of Session Communication: Bedside nurse  Assessment Comment: Pt progressing with PT, able to transfer with Akira, ambulating away from then return to chair with 2WW and CGA. Pt remains appropriate for high intensity therapy  End of Session Patient Position: Up in chair, Alarm off, not on at start of session  PT Plan  Inpatient/Swing Bed or Outpatient: Inpatient  PT Plan  Treatment/Interventions: Bed mobility, Transfer training, Gait training, Balance training, Neuromuscular re-education, Strengthening, Endurance training, Therapeutic exercise, Therapeutic activity  PT Plan: Ongoing PT  PT Frequency for Current Admission: Daily during this acute inpatient hospitalization  PT Discharge Recommendations: High intensity level of continued care  Equipment Recommended upon Discharge: Wheeled walker  PT Recommended Transfer Status: Assist x1, Assistive device  PT - OK to Discharge: Yes    PT  Visit Info:  PT Received On: 08/27/25  Response to Previous Treatment: Patient with no complaints from previous session.     General Visit Information:   General  Prior to Session Communication: Bedside nurse  Patient Position Received: Up in chair, Alarm off, not on at start of session  General Comment: Pt up in chair, pleasant and agreeable to therapy    Subjective   Precautions:  Precautions  Medical Precautions: Cardiac precautions, Fall precautions  Post-Surgical Precautions: Move in the Tube  Precautions Comment: NGT     Date/Time Vitals Session Patient Position Pulse Resp SpO2 BP MAP (mmHg)    08/27/25 0819 --  --  71  --  97 %  171/83  --                 Vital Signs Comment: Pt c/o dizziness after ambulation. Vitals assessed and RN notified of hypertension and symptoms     Objective   Pain:  Pain Assessment  Pain Assessment: 0-10  0-10 (Numeric) Pain Score: 2  Pain Type: Acute pain  Pain Location: Incision  Pain Interventions: Emotional support  Cognition:  Cognition  Orientation Level: Oriented X4    Treatments:     Therapeutic Activity  Therapeutic Activity Performed: Yes  Therapeutic Activity 1: MaxA to don shoes, LB clothing at start of session    Ambulation/Gait Training  Ambulation/Gait Training Performed: Yes  Ambulation/Gait Training 1  Surface 1: Level tile  Device 1: Rolling walker  Gait Support Devices: Gait belt  Assistance 1: Contact guard  Quality of Gait 1: Narrow base of support, Forward flexed posture  Comments/Distance (ft) 1: 30'x1, further ambulation deferred due to dizziness and elevated BP  Transfers  Transfer: Yes  Transfer 1  Transfer From 1: Sit to, Stand to  Transfer to 1: Sit  Technique 1: Sit to stand, Stand to sit  Transfer Device 1: Walker, Gait belt  Transfer Level of Assistance 1: Minimum assistance  Trials/Comments 1: x3    Outcome Measures:     [facility] Basic Mobility  Turning from your back to your side while in a flat bed without using bedrails: A little  Moving from lying on  your back to sitting on the side of a flat bed without using bedrails: A little  Moving to and from bed to chair (including a wheelchair): A little  Standing up from a chair using your arms (e.g. wheelchair or bedside chair): A little  To walk in hospital room: A little  Climbing 3-5 steps with railing: Total  Basic Mobility - Total Score: 16    Education Documentation  Precautions, taught by Venita Trimble PTA at 8/27/2025  9:11 AM.  Learner: Patient  Readiness: Acceptance  Method: Explanation, Demonstration  Response: Verbalizes Understanding, Needs Reinforcement  Comment: precautions, HEP, safe mobility    Body Mechanics, taught by Venita Trimble PTA at 8/27/2025  9:11 AM.  Learner: Patient  Readiness: Acceptance  Method: Explanation, Demonstration  Response: Verbalizes Understanding, Needs Reinforcement  Comment: precautions, HEP, safe mobility    Mobility Training, taught by Venita Trimble PTA at 8/27/2025  9:11 AM.  Learner: Patient  Readiness: Acceptance  Method: Explanation, Demonstration  Response: Verbalizes Understanding, Needs Reinforcement  Comment: precautions, HEP, safe mobility    Education Comments  No comments found.        OP EDUCATION:       Encounter Problems       Encounter Problems (Active)       PT Problem       Patient will complete supine to sit and sit to supine Supervision  (Progressing)       Start:  08/26/25    Expected End:  09/09/25            Patient will perform sit<>stand transfer with LRAD, and Supervision  (Progressing)       Start:  08/26/25    Expected End:  09/09/25            Patient will ambulate >150' with LRAD and Supervision  (Progressing)       Start:  08/26/25    Expected End:  09/09/25            Patient will complete Tinetti Balance Assesment with a score equal to or better than 18 to reduce falls risk.    (Progressing)       Start:  08/26/25    Expected End:  09/09/25                   ISRAEL Pink

## 2025-08-27 NOTE — PROGRESS NOTES
Speech-Language Pathology                 Therapy Communication Note    Patient Name: Colin Leonard  MRN: [id number]  Department: [place] [address]  Room: [address] [place] [address]  Today's Date: 8/27/2025     Discipline: Speech Language Pathology      Missed Time: Attempt    Comment:  Attempted visit. Pt eating breakfast at this time. Will follow-up as able.

## 2025-08-27 NOTE — CARE PLAN
Transitional Care Coordinator Note: Patient discussed with medical team, per medical team patient is not medically ready.   Discharge dispo: [facility] has accepted, Pending Precert. PM&R Will see Patient today.  ADOD End of Week.  Neftali Luz RN  Transitional Care Coordinator     Update: 4pm  [facility] can no longer accept Patient they discovered they are [facility].    Update: 8/28  [facility] has accepted Patient after updated PM&R Consult.  Submitted Precert Today.   Precert Reference # [id number]

## 2025-08-27 NOTE — PROGRESS NOTES
PM&R Consult Note  Patient: Colin Leonard   Age/sex: 59 y.o.   Medical Record #: [id number]       Consulting physician: Mahendra Dickey M.D.    See prior c/s for details     Chief complaint:   Impairments and acitivities limitations in ADLs, mobility, functional communication, and cognition secondary to     HPI:   Colin Leonard is a 59 y.o. year old male patient with history of CKD5, CAD s/p CABG on 8/6, and developed post-surgery TEJA on CKD requiring dialysis initiation. Course c/b basilar stroke, No intervention, Neurology following. Nephrology following for Dialysis care.      PM&R was consulted for recommendations and for IRF appropriateness.    Present Status: Stable   Medical History[1]     Surgical History[2]     Family History[3]     RX Allergies[4]     Scheduled Medications[5]     PRN Medications[6]     Social History:  Social supports: , 24 hour assistance will t be available from son and other family  Home situation: first floor setup available 2-3 step entry     Functional History:  Home DME: none   Premorbid ADL's: independent   Premorbid Mobility: independent   Present:   Physical Therapy:  Amb 30' min A 8@7    Occupational Therapy: 8/26  AMPAC 16  Min A for most, Mod for sit-stand    Speech Therapy:     Review of Systems   reviewed 10 point review, as patient able, somewhat limited by history nonpertinent except for HPI    Physical Exam   General: Pleasant, straightforward, WDWN individual.  Mental Status: Pleasant, direct, appropriate mood and affect  Resp: breathing is unlabored without audible wheeze  Vascular: Normal pedal and radial pulses, no cyanosis, no venous stasis changes    Lymph: No LAD, no lymphedema   Skin: No overlying skin change, ecchymosis, or erythema.      MSK:  ROM adequate -stiff sh and hips b  , nojoint deformity or effusions  Neuro: A&O x 3   Speech  normal  RUE strength: 4/5 elbow flexors, 4/5 elbow extensors, 4/5 wrist extensors, 5/5 finger flexors, 5/5 finger  abductors    LUE strength: 4/5 elbow flexors, 4/5 elbow extensors, 4/5 wrist extensors, 5/5 finger flexors, 4/5 finger abductors    RLE strength: 4/5 hip flexors, 5/5 knee extensors, 5/5 ankle dorsiflexors, 5/5 EHL, 5/5 ankle plantar flexors   LLE strength: 4/5 hip flexors, 5/5 knee extensors, 4/5 ankle dorsiflexors, 5/5 EHL, 5/5 ankle plantar flexors   Sensation - intact to light touch in bilateral upper and lower extremities    Tone   Normal  Clonus: 0 0        Lab Results   Component Value Date    WBC 12.3 (H) 08/27/2025    HGB 8.0 (L) 08/27/2025    HCT 26.3 (L) 08/27/2025    MCV 97 08/27/2025     08/27/2025        Lab Results   Component Value Date    CREATININE 5.29 (H) 08/27/2025    BUN 41 (H) 08/27/2025     08/27/2025    K 4.2 08/27/2025     08/27/2025    CO2 27 08/27/2025        IMPRESSION:  Colin Leonard is a 59 y.o. year old male patient with history of CKD5, CAD s/p CABG on 8/6, and developed post-surgery TEJA on CKD requiring dialysis initiation. Course c/b basilar stroke, No intervention, Neurology following. Nephrology following for Dialysis care.,  PM&R was consulted for recommendations and for IRF appropriateness.    Recommendations:  # Impaired mobility and Impaired independence with ADLs and I/ADLs  - Continue PT and  OT     Bowels - finally had large BM yesterday    # Dispo  -MUCH IMPROVED - discussed with pt son who can assist at home - only 2-3 step.  Patient would benefit from an acute inpatient rehab stay to improve functional outcome of impaired mobility, ADL's, transfers, and self-care. Patient would be an excellent candidate for acute rehabilitation once medically stable and is able and motivated to participate in 3 hours/day of therapy.   - Patient would benefit from medical follow up at least three times a week to address and monitor pain, BP, complications and other comorbidities , and we have internal medicine consultation available daily if needed at rehab.    Patient was independent in mobility and ADLs at baseline prior to this incident. Acute rehab is appropriate to assist in returning to PLOF and living situation.  - Post rehab destination: anticipated home   - Must be off IV pain meds prior to transfer  - For questions, please contact via Haiku    Estimated length of stay is 10 days with discharge home at mod [facility] level.       We will follow along with you.  Thank you for the consult.  Dr AVINASH Womack  will be covering on Mondays and  Dr Salvador Islas on Fridays. There is no resident on service this month.    Mahendra Dickey M.D, FAAPMR, R-MSK  Chief, Division of PM&R  Board Certified in PM&R, Sports Medicine and Musculoskeletal Ultrasound  Available via Haiku         [1]   Past Medical History:  Diagnosis Date    C. difficile colitis     hx of C. difficile colitis in March 2025    CHF (congestive heart failure)     chronic diastolic heart failure    CKD (chronic kidney disease)     Stage V    Coronary artery disease     Diabetes mellitus (Multi)     DVT (deep venous thrombosis) (Multi)     Hypertension     MI (myocardial infarction) (Multi)     Pneumonia     Multifocal pneumonia 5/2025    PONV (postoperative nausea and vomiting)     Type 2 diabetes mellitus     no meds controlled with diet   [2]   Past Surgical History:  Procedure Laterality Date    AMPUTATION      CARDIAC CATHETERIZATION N/A 05/23/2025    Procedure: LHC, With LV;  Surgeon: Shantelle Anderson MD;  Location: [facility] Card[facility];  Service: Cardiovascular;  Laterality: N/A;    CARDIAC CATHETERIZATION N/A 05/23/2025    Procedure: LULA Stent - Coronary;  Surgeon: Shantelle Anderson MD;  Location: [facility] Card[facility] Cath [facility];  Service: Cardiovascular;  Laterality: N/A;    CHOLECYSTECTOMY  03/05/2025    Laparoscopic Cholecystectomy    COLONOSCOPY      URETER SURGERY     [3]   Family History  Problem Relation Name Age of Onset    Heart disease Mother      Other (cabg) Mother      Rheumatic fever Mother       Heart disease Father      Stroke Paternal Grandfather      Heart attack Paternal Grandfather     [4]   Allergies  Allergen Reactions    Amlodipine Besylate Swelling     edema legs   [5] acetaminophen, 650 mg, oral, q6h NHAN  amiodarone, 200 mg, oral, Daily  aspirin, 81 mg, oral, Daily  atorvastatin, 80 mg, oral, Nightly  carvedilol, 6.25 mg, oral, BID  clopidogrel, 75 mg, oral, Daily  epoetin china or biosimilar, 7,500 Units, subcutaneous, Once per day on Monday Wednesday Friday  ezetimibe, 10 mg, oral, Nightly  heparin, 5,000 Units, subcutaneous, q8h  hydrALAZINE, 50 mg, oral, TID  insulin lispro, 0-10 Units, subcutaneous, TID AC  lidocaine, 1 patch, transdermal, Daily  melatonin, 6 mg, oral, Nightly  nystatin, 4 mL, Swish & Spit, TID  polyethylene glycol, 17 g, oral, Daily  sennosides-docusate sodium, 2 tablet, oral, Nightly  sevelamer carbonate, 800 mg, oral, TID  sodium chloride, 1 spray, Each Nostril, 4x daily  tamsulosin, 0.4 mg, oral, Daily  thiamine, 100 mg, oral, Daily  torsemide, 40 mg, oral, Daily  vitamin B complex-vitamin C-folic acid, 1 capsule, oral, Daily  [6] PRN medications: alteplase, bisacodyl, dextrose, dextrose **OR** glucagon, dextrose, glucagon, glucagon, guaiFENesin, heparin flush, naloxone, ondansetron, oxygen

## 2025-08-28 ENCOUNTER — APPOINTMENT (OUTPATIENT)
Dept: CARDIOLOGY | Facility: HOSPITAL | Age: 60
End: 2025-08-28
Payer: COMMERCIAL

## 2025-08-28 ASSESSMENT — COGNITIVE AND FUNCTIONAL STATUS - GENERAL
MOVING TO AND FROM BED TO CHAIR: A LITTLE
DRESSING REGULAR LOWER BODY CLOTHING: A LITTLE
CLIMB 3 TO 5 STEPS WITH RAILING: A LOT
MOVING TO AND FROM BED TO CHAIR: A LITTLE
PERSONAL GROOMING: A LITTLE
DRESSING REGULAR UPPER BODY CLOTHING: A LITTLE
HELP NEEDED FOR BATHING: A LITTLE
DRESSING REGULAR LOWER BODY CLOTHING: TOTAL
TURNING FROM BACK TO SIDE WHILE IN FLAT BAD: A LITTLE
HELP NEEDED FOR BATHING: A LOT
DRESSING REGULAR LOWER BODY CLOTHING: A LITTLE
TOILETING: A LITTLE
DAILY ACTIVITIY SCORE: 20
TOILETING: A LITTLE
STANDING UP FROM CHAIR USING ARMS: A LITTLE
STANDING UP FROM CHAIR USING ARMS: A LITTLE
MOBILITY SCORE: 18
TURNING FROM BACK TO SIDE WHILE IN FLAT BAD: A LITTLE
MOBILITY SCORE: 18
TOILETING: A LOT
DRESSING REGULAR UPPER BODY CLOTHING: A LITTLE
DRESSING REGULAR UPPER BODY CLOTHING: A LITTLE
DAILY ACTIVITIY SCORE: 20
DAILY ACTIVITIY SCORE: 15
CLIMB 3 TO 5 STEPS WITH RAILING: A LOT
WALKING IN HOSPITAL ROOM: A LITTLE
HELP NEEDED FOR BATHING: A LITTLE
WALKING IN HOSPITAL ROOM: A LITTLE

## 2025-08-28 ASSESSMENT — PAIN SCALES - GENERAL
PAINLEVEL_OUTOF10: 5 - MODERATE PAIN
PAINLEVEL_OUTOF10: 5 - MODERATE PAIN
PAINLEVEL_OUTOF10: 0 - NO PAIN
PAINLEVEL_OUTOF10: 5 - MODERATE PAIN

## 2025-08-28 ASSESSMENT — PAIN - FUNCTIONAL ASSESSMENT
PAIN_FUNCTIONAL_ASSESSMENT: 0-10

## 2025-08-28 ASSESSMENT — ACTIVITIES OF DAILY LIVING (ADL): HOME_MANAGEMENT_TIME_ENTRY: 8

## 2025-08-28 ASSESSMENT — PAIN DESCRIPTION - DESCRIPTORS
DESCRIPTORS: SORE
DESCRIPTORS: SORE

## 2025-08-28 NOTE — PROGRESS NOTES
Occupational Therapy    OT Treatment    Patient Name: Colin Leonard  MRN: [id number]  Department: [facility] [place] [address]  Room: [address] [place] [address]  Today's Date: 8/28/2025  Time Calculation  Start Time: 1435  Stop Time: 1502  Time Calculation (min): 27 min        Assessment:  OT Assessment: Patient making fair progress towards OT goals as evidenced by increased assist for functional LB tasks due to generalized weakness/pain. Patient demonstrated increased independence with functional transfers, min a, and required increased assist for functional mobility cga-min a to ensure safe navigation. Patient would benefit from continued skilled OT services to address deficits.  Medical Staff Made Aware: Yes  End of Session Communication: Bedside nurse  End of Session Patient Position: Alarm off, not on at start of session (EOB)  Medical Staff Made Aware: Yes  Plan:  Treatment Interventions: ADL retraining, Functional transfer training, UE strengthening/ROM, Endurance training, Cognitive reorientation, Patient/family training, Equipment evaluation/education, Neuromuscular reeducation, Visual perceptual retraining, Fine motor coordination activities, Compensatory technique education  OT Frequency for Current Admission: 4 times per week during this acute inpatient hospitalization (During this acute inpatient hospitalization)  OT Discharge Recommendations: High intensity level of continued care (Based on current functional status and rehab potential, patient is anticipated to tolerate and benefit from 5 or more days per week of skilled rehabilitative therapy after discharge from this acute inpatient hospitalization.)  Equipment Recommended upon Discharge:  (TBD)  OT Recommended Transfer Status: Assist of 2, Maximum assist  OT - OK to Discharge: Yes  Treatment Interventions: ADL retraining, Functional transfer training, UE strengthening/ROM, Endurance training, Cognitive reorientation, Patient/family training, Equipment evaluation/education,  Neuromuscular reeducation, Visual perceptual retraining, Fine motor coordination activities, Compensatory technique education    Subjective   OT Visit Info:  OT Received On: 08/28/25  General Visit Info:  General  Family/Caregiver Present: Yes  Caregiver Feedback: sister  Prior to Session Communication: Bedside nurse  Patient Position Received: Bed, 3 rail up, Alarm off, not on at start of session  General Comment: RN clearted patient for OOB activity. pt. supine upon arrival with sister present, pt. motivated for therapy.  Precautions:  Medical Precautions: Cardiac precautions, Fall precautions  Post-Surgical Precautions: Move in the Tube       08/28/25 1435   Vital Signs   Heart Rate 74  (walking)       Pain:  Pain Assessment  Pain Assessment: 0-10  0-10 (Numeric) Pain Score: 5 - Moderate pain  Pain Type: Surgical pain  Pain Location: Chest  Pain Interventions: Repositioned    Objective    Cognition:  Cognition  Orientation Level: Oriented X4  Following Commands: Follows one step commands without difficulty  Cognition Comments: pt. increased time for processing.  Attention: Within Functional Limits  Insight: Moderate  Impulsive: Mildly    Activities of Daily Living:      LE Dressing  LE Dressing: Yes  Sock Level of Assistance: Dependent  LE Dressing Where Assessed: Edge of bed, Other (Comment) (standing EOB)  LE Dressing Comments: pt. required max A to biju pants seated eob, pt. able to facilitate positioning of pants and biju over RLE with increased time and use of figure-4, required assist for LLE, pt. able to biju to knees in seated position. pt. then required total assist to doff/biju socks eob and total assist to biju shoes and ensure proper positioning.  pt. then completed stand and required total assist to biju over hips due to requiring bilat ue support on 2ww.       Functional Standing Tolerance:     Bed Mobility/Transfers: Bed Mobility  Bed Mobility: Yes  Bed Mobility 1  Bed Mobility 1: Supine to  sitting  Level of Assistance 1: Contact guard  Bed Mobility Comments 1: increased time to complete and cues for proper hand placement and sequencing of movements for log roll.    Transfers  Transfer: Yes  Transfer 1  Transfer From 1: Sit to  Transfer to 1: Stand  Technique 1: Sit to stand  Transfer Device 1: Walker  Transfer Level of Assistance 1: Minimum assistance  Trials/Comments 1: x1 EOB, x1 armed chair; cues for proper hand placement and ensuring proper positioning prior to stands.  Transfers 2  Transfer From 2: Stand to  Transfer to 2: Sit  Technique 2: Stand to sit  Transfer Device 2: Walker  Transfer Level of Assistance 2: Minimum assistance  Trials/Comments 2: x1 armed chair, x1 EOB; cues for controlled descent and reaching back.         Functional Mobility:  Functional Mobility  Functional Mobility Performed: Yes  Functional Mobility 1  Surface 1: Level tile  Device 1: Rolling walker  Functional Mobility Support Devices: Other (Comment) (chair follow)  Assistance 1: Contact guard, Minimum assistance  Comments 1: pt. completed x2 functional bouts of functional mobility in hallway with chair follow. pt. required cues for upright posture/gaze and ensuring proper 2ww proximity. pt. completed x1 bout and required ~4 minute seated rest break and completed x1 to EOB.  Sitting Balance:  Static Sitting Balance  Static Sitting-Balance Support: Feet supported  Static Sitting-Level of Assistance: Close supervision  Static Sitting-Comment/Number of Minutes: no overt lob noted in unsupported sitting  Dynamic Sitting Balance  Dynamic Sitting-Balance Support: Feet supported  Dynamic Sitting-Level of Assistance: Close supervision  Dynamic Sitting-Comments: no overt lob noted during LB tasks.  Standing Balance:  Static Standing Balance  Static Standing-Balance Support: Bilateral upper extremity supported  Static Standing-Level of Assistance: Contact guard  Dynamic Standing Balance  Dynamic Standing-Balance Support:  Bilateral upper extremity supported  Dynamic Standing-Level of Assistance: Contact guard, Minimum assistance    Therapy/Activity:      Therapeutic Activity  Therapeutic Activity Performed: Yes  Therapeutic Activity 1: pt. completed functional LB dressing and required max a to biju pants and total assist for socks and shoes. Required increased assist functional task due to generalized weakness, facilitated engagement in tasks but required increased assist.  Therapeutic Activity 2: engaged patient in two bouts of functional mobility this session with use of 2ww and required one seated rest break during session. Increased time to complete and required cga-min a for ensuring safe navigation during functional mobility in hallway.    Outcome Measures:[facility] Daily Activity  Putting on and taking off regular lower body clothing: Total  Bathing (including washing, rinsing, drying): A lot  Putting on and taking off regular upper body clothing: A little  Toileting, which includes using toilet, bedpan or urinal: A lot  Taking care of personal grooming such as brushing teeth: A little  Eating Meals: None  Daily Activity - Total Score: 15        Goals:  Encounter Problems       Encounter Problems (Active)       ADLs       Patient will perform UB and LB bathing with minimal level of assistance. (Progressing)       Start:  08/07/25    Expected End:  09/09/25            Patient with complete upper body dressing with SBA level of assistance donning and doffing all UE clothes (Progressing)       Start:  08/07/25    Expected End:  09/09/25            Patient with complete lower body dressing with minimal level of assistance donning and doffing all LE clothes  with PRN adaptive equipment (Progressing)       Start:  08/07/25    Expected End:  09/09/25            Patient will complete toileting including hygiene clothing management/hygiene with minimal level of assistance. (Progressing)       Start:  08/07/25    Expected End:  09/09/25                COGNITION/SAFETY       Pt will maintain MITT precautions with 100% carryover during functional tasks, ADLs, and mobility without cueing.  (Progressing)       Start:  25    Expected End:  25            Patient will participate in cognitive activities to demonstrate WFL score on further cognitive assessments  (Progressing)       Start:  25    Expected End:  25               MOBILITY       Patient will perform Functional mobility min Household distances with moderate level of assistance and least restrictive device in order to improve safety and functional mobility. (Not met)       Start:  25    Expected End:  25    Resolved:  25    Updated to: Patient will perform Functional mobility max Household distances with SBA and least restrictive device in order to improve safety and functional mobility.    Update reason:  goals          Patient will perform Functional mobility max Household distances with SBA and least restrictive device in order to improve safety and functional mobility. (Progressing)       Start:  25    Expected End:  25                   TRANSFERS       Patient will perform bed mobility with minimal level of assistance in order to improve safety and independence with mobility (Progressing)       Start:  25    Expected End:  25            Patient will complete functional transfer to toilet with least restrictive device with moderate level of assistance. (Progressing)       Start:  25    Expected End:  25                     BRIDGETT Jorgensen/BRADEN

## 2025-08-28 NOTE — PROGRESS NOTES
CARDIAC SURGERY DAILY PROGRESS NOTE    Colin Leonard is a 59 y.o. male hx of HTN, CKD stage 5, chronic anemia, type 2 diabetes, hx of c diff colitis who was admitted 5/19-5/24 for shortness of breathe was incidentally found to have atypical pneumonia which he was treated for. He was found to have elevated trop and BNP on that admission with a few episodes of reported VT which required increased dose of coreg. He underwent LHC 5/22/25 demonstrated MVCAD and decision was made to discharge and let him recover from his pneumonia treated with Augmentin and oral vanco prophyllacticaly due to h/o cdiff. and bring back for CABG. He is now s/p CABGx 2 with Dr Yolanda Justice on 8/6/2. Postop course c/b unresponsive episode in setting of hypotension and found to have bilateral vertebral and basilar artery stenosis, pneumothorax, and TEJA on CKD requiring dialysis.     Operations ad Procedures with Dr. Yolanda Justice on 8/6:   1. Median Sternotomy  2. Off Pump CABG x 2 (LIMA to LAD, SVG to distal CX)  3. Sternal Plating with Sternal Lock     CTICU course: Prolonged ICU stay  - Encephalopathy, unresponsive episode POD #2; BAT --> angio --> basilar, bilat vertebral stenoses, no obstruction, no structural brain injury; Likely hypoactive delirium.  - Afib  - Acute resp failure, re-intubation during unresponsive episode  - Acute on chronic renal failure - dialysis started; R subclavian temporary HD catheter 8/18; HD 8/18 and 8/19  - Leukocytosis  - 5 d course Zosyn    Transferred to LT3 on 8/20.    ======================  Interval History:   8/28: overnight bradycardic episode on tele, HR 20s-30s, hemodynamically stable. Returned to NSR HR 60s. No epicardial wires so transcutaneous pacemaker in place.  Initially plan to return to CTICU for closer monitoring.  Seen by CTICU team in the AM, will stay on T3 for now, if additional bradycardiac episodes occur will got back to CTICU.     SUBJECTIVE:  Feeling tired this AM, otherwise doing well.   "Pain well controlled.      Objective   /79 (BP Location: Right arm, Patient Position: Lying)   Pulse 64   Temp 35.9 °C (96.6 °F) (Temporal)   Resp 24   Ht 1.778 m (5' 10\")   Wt 109 kg (240 lb 4.8 oz)   SpO2 95%   BMI 34.48 kg/m²   0-10 (Numeric) Pain Score: 0 - No pain   3 Day Weight Change: -0.227 kg (-8 oz) per day    Intake and Output    Intake/Output Summary (Last 24 hours) at 8/28/2025 1100  Last data filed at 8/28/2025 1000  Gross per 24 hour   Intake 540 ml   Output 200 ml   Net 340 ml       Physical Exam  Physical Exam  Vitals and nursing note reviewed.   Constitutional:       General: He is not in acute distress.     Appearance: He is obese.      Comments:      HENT:      Head: Normocephalic and atraumatic.      Mouth/Throat:      Mouth: Mucous membranes are moist.   Eyes:      Conjunctiva/sclera: Conjunctivae normal.   Cardiovascular:      Rate and Rhythm: Normal rate and regular rhythm.      Pulses: Normal pulses.      Heart sounds: Normal heart sounds.      Comments: NSR 1st degree 60s-70, occ PVCs  Sternum stable  Wires cut on 8/8  Pulmonary:      Effort: Pulmonary effort is normal. No accessory muscle usage or respiratory distress.      Comments: On RA  Diminished lung sounds bilaterally   Abdominal:      General: There is no distension.      Palpations: Abdomen is soft.      Tenderness: There is no abdominal tenderness.      Comments: LBM 8/27   Genitourinary:     Comments: Voiding post thornton removal  Musculoskeletal:      Cervical back: Neck supple.      Right lower leg: Edema (trace) present.      Left lower leg: Edema (trace) present.      Comments: MONTERROSO, generalized weakness    Skin:     General: Skin is warm and dry.      Comments: midsternal chest incision C/D/I, well approximated, no s/s infection   R chest tunnel HD cath (8/25) CD&I   Neurological:      General: No focal deficit present.      Mental Status: He is alert and oriented to person, place, and time.      Comments: Can be " slow to respond, but appropriate   Psychiatric:         Mood and Affect: Affect is flat.         Behavior: Behavior is cooperative.         Cognition and Memory: Memory is impaired.       Medications  Scheduled medications  Scheduled Medications[1]Continuous medications  Continuous Medications[2]PRN medications  PRN Medications[3]    Labs  Results for orders placed or performed during the hospital encounter of 08/06/25 (from the past 24 hours)   POCT GLUCOSE   Result Value Ref Range    POCT Glucose 170 (H) 74 - 99 mg/dL   POCT GLUCOSE   Result Value Ref Range    POCT Glucose 85 74 - 99 mg/dL   POCT GLUCOSE   Result Value Ref Range    POCT Glucose 128 (H) 74 - 99 mg/dL   POCT GLUCOSE   Result Value Ref Range    POCT Glucose 132 (H) 74 - 99 mg/dL   Magnesium   Result Value Ref Range    Magnesium 2.81 (H) 1.60 - 2.40 mg/dL   CBC   Result Value Ref Range    WBC 11.5 (H) 4.4 - 11.3 x10*3/uL    nRBC 0.0 0.0 - 0.0 /100 WBCs    RBC 2.71 (L) 4.50 - 5.90 x10*6/uL    Hemoglobin 7.9 (L) 13.5 - 17.5 g/dL    Hematocrit 26.4 (L) 41.0 - 52.0 %    MCV 97 80 - 100 fL    MCH 29.2 26.0 - 34.0 pg    MCHC 29.9 (L) 32.0 - 36.0 g/dL    RDW 15.4 (H) 11.5 - 14.5 %    Platelets 338 150 - 450 x10*3/uL   Renal Function Panel   Result Value Ref Range    Glucose 114 (H) 74 - 99 mg/dL    Sodium 137 136 - 145 mmol/L    Potassium 5.2 3.5 - 5.3 mmol/L    Chloride 104 98 - 107 mmol/L    Bicarbonate 25 21 - 32 mmol/L    Anion Gap 13 10 - 20 mmol/L    Urea Nitrogen 52 (H) 6 - 23 mg/dL    Creatinine 6.85 (H) 0.50 - 1.30 mg/dL    eGFR 9 (L) >60 mL/min/1.73m*2    Calcium 9.7 8.6 - 10.6 mg/dL    Phosphorus 5.1 (H) 2.5 - 4.9 mg/dL    Albumin 3.1 (L) 3.4 - 5.0 g/dL   BLOOD GAS VENOUS   Result Value Ref Range    POCT pH, Venous 7.41 7.33 - 7.43 pH    POCT pCO2, Venous 39 (L) 41 - 51 mm Hg    POCT pO2, Venous 31 (L) 35 - 45 mm Hg    POCT SO2, Venous 55 45 - 75 %    POCT Oxy Hemoglobin, Venous 54.0 45.0 - 75.0 %    POCT Base Excess, Venous 0.1 -2.0 - 3.0 mmol/L     POCT HCO3 Calculated, Venous 24.7 22.0 - 26.0 mmol/L    Patient Temperature 37.0 degrees Celsius    FiO2 21 %   Lactate   Result Value Ref Range    Lactate 1.0 0.4 - 2.0 mmol/L   Electrocardiogram 12-lead PRN for arrhythmia   Result Value Ref Range    Ventricular Rate 63 BPM    Atrial Rate 63 BPM    ID Interval 204 ms    QRS Duration 110 ms    QT Interval 490 ms    QTC Calculation(Bazett) 501 ms    P Axis 50 degrees    R Axis 11 degrees    T Axis 130 degrees    QRS Count 10 beats    Q Onset 212 ms    P Onset 110 ms    P Offset 173 ms    T Offset 457 ms    QTC Fredericia 498 ms   Electrocardiogram 12-lead PRN for arrhythmia   Result Value Ref Range    Ventricular Rate 67 BPM    Atrial Rate 67 BPM    ID Interval 204 ms    QRS Duration 112 ms    QT Interval 494 ms    QTC Calculation(Bazett) 521 ms    P Axis 28 degrees    R Axis -8 degrees    T Axis 133 degrees    QRS Count 11 beats    Q Onset 210 ms    P Onset 108 ms    P Offset 171 ms    T Offset 457 ms    QTC Fredericia 512 ms   POCT GLUCOSE   Result Value Ref Range    POCT Glucose 90 74 - 99 mg/dL     *Note: Due to a large number of results and/or encounters for the requested time period, some results have not been displayed. A complete set of results can be found in Results Review.     IMAGING/ DIAGNOSTIC TESTING:  I have personally reviewed the following test result(s):      XR chest 2 views 08/27/2025   IMPRESSION:  1. Interval improvement in bilateral lung aeration with reduced  volume loss bilaterally.  2. Right-greater-than-left pleural effusion with associated  atelectasis/consolidation as detailed above.    XR chest 1 view 08/23/2025   IMPRESSION:  1.  Interval improvement in right basilar aeration. Otherwise, stable  small bilateral pleural effusions with bibasilar atelectasis.    Transthoracic Echo (TTE) Complete 8/14/2025   CONCLUSIONS:   1. Left ventricular ejection fraction is normal by visual estimate at 55-60%.   2. Spectral Doppler shows a Grade I  (impaired relaxation pattern) of left ventricular diastolic filling with normal left atrial filling pressure.   3. Abnormal septal motion consistent with post-operative status.   4. No left ventricular thrombus visualized.   5. There is moderately increased septal thickness.   6. There is reduced right ventricular systolic function.   7. The left atrial size is moderately dilated.   8. There is moderate to severe aortic valve cusp calcification.   9. Normal aortic root.  10. Compared with study dated 8/6/2025, the prior study was an intra-operative OMAR. This was a limited study to evaluate LVEF. The aortic valve appears mod-severely calcified and the gradients on the OMAR were consistent with severe AS, they were not assessed on this study.    ===============  IMPRESSION & PLAN:  ===============  POD #22 s/p CABGx 2 with Dr Yolanda Justice on 8/6  - Increase activity/ ambulation; PT/OT  - Encourage IS, C/DB; respiratory therapy; wean O2 as taty   - Cardiac rehab referral   - Continue cardiac meds: ASA, Plavix, statin, Zetia, BB currently held  - continue Plavix per Dr. Yolanda Justice  - Pain and anticonstipation meds  - 2v CXR 8/28  - Epicardial wires previously cut for urgent MRI while in ICU  - Tele until discharge  - Optimize nutrition and electrolytes    Rhythm - h/o SVT/NSVT; episode of postop Afib in ICU -> converted with Amio (completed 5g load)  - Tele: NSR 1st degree HR 60s, bradycardic episode overnight HR 20s-30s (amio and coreg held)  - 8/23: switched metop to coreg for better bp control --> increased Coreg to 6.25 mg 8/24  - continue to hold amiodarone 200mg daily and coreg for now   8/28: EP consulted, appreciate recs  - Patient remained asymptomatic throughout the procedure  - ? Underlying sleep apnea as possible etiology  - EKG in the morning remains unchanged from prior  - EP will sign off. Please don't hesitate to reach out with any questions or concerns.    - epicardial wires cut on 8/8  - Adjust medications  as tolerated    Acute Blood Loss Anemia   Recent Labs     25  0520 25  0648 25  0655 25  0651 25  0651 25  0647 25  0615   HGB 7.9* 8.0* 8.0* 8.7* 7.8* 7.9* 7.9*   HCT 26.4* 26.3* 25.8* 29.6* 25.3* 27.3* 26.1*   - renal MV  - Epo as per Renal  - Daily labs, transfuse as indicated    Platelets  Recent Labs     25  0520 25  0648 25  0655 25  0651 25  0651 25  0647 25  0615    346 307 361 305 325 354   - Continue to trend with daily CBCs      Volume/Electrolyte Status: Preop wt Weight: 116 kg (256 lb 9.9 oz)   ESRD,  h/o CKD5 -  baseline SCr 9.38  Vitals:    25 0032   Weight: 109 kg (240 lb 4.8 oz)     Creatinine   Date Value Ref Range Status   2025 6.85 (H) 0.50 - 1.30 mg/dL Final   2025 5.29 (H) 0.50 - 1.30 mg/dL Final   2025 7.83 (H) 0.50 - 1.30 mg/dL Final   - Weights: 109, 110, 111, 110, 112 kg, 114 , 112, 111  - Daily weights and strict I&Os  - Daily RFP while admitted  - Nephrology following, appreciate recs  - Renal diet  - IR consulted  for tunneled HD line. Held off for the weekend and trending WBC with tentative plan to place line  => tunneled HD line placed , removed temp HD catheter   - continue Torsemide 40mg daily  -  added Epo, Sevelamer, nephrocap as per Renal recs  - HD as per Renal -> ,   - : Patient currently on TTS dialysis schedule, needs to be MWF for acute rehab.  Discussed with renal, can have a shorter session of dialysis tomorrow and then again on   - : did not have dialysis today, discussed with renal will schedule for tomorrow     Leukocytosis -> downtrending  Recent Labs     25  0520 25  0648 25  0655 25  0651 25  0651 25  0647 25  0615   WBC 11.5* 12.3* 11.8* 13.3* 13.5* 12.5* 14.9*   Temp (36hrs), Av.3 °C (97.4 °F), Min:35.9 °C (96.6 °F), Max:36.9 °C (98.4 °F)  - afebrile, no s/s of  infection  - Aggressive pulmonary hygiene  - Monitor for s/s infection  - 8/15 MRSA negative, 8/14 MRSA, urine, blood NEGATIVE  - Daily CBC to follow    Neuro  Post-op Delirium/Encephalopathy    Vertebral and basilar artery stenosis    - For multifactorial delirium/ encephalopathy   - sleep/ wake cycle hygiene  - PT/OT ordered OOBTC as tolerated. Encourage PT during daytime  - BAT called on 8/8 in ICU for unresponsive episode --> found to have bilateral vertebral and basilar artery stenosis    - CTA revealed multifocal vert and basilar stenosis, no occlusion, no intervention done.  - Neurology consulted   Recommendations 8/13:  - Continue DAPT (ASA and plavix) at least 90 days, tentative plan to transition to plavix monotherapy but would defer to outpatient stroke neurologist   (Discussed with family at bedside, patient is good at taking his meds. Unable to clarify the compliance for ASA before the stroke called due to patient agitation)  - Neurology has arranged a follow up with Dr. Dior in 1 month  - Stroke will sign off at this time, please reach out with further questions.    Dysphagia:  - SLP and nutrition following, appreciate recs  - MBS on 8/21  - 8/27: Corpak removed cyclic nepro tube feeds stopped   - Diet: Regular solids renal diet with moderately thick liquids   - SLP updated recs  - Small bites  - Medications crushed in puree or as best tolerated  - Complete oral care frequently throughout the day  - Full supervision with meals; One to one assist with meals  - repeat MBS in 1-2 weeks  - Nutrition updated recs:   - OK with removal dobhoff NG.    - Continue Magic cups for add kcals & pro, low in potassium.  - encouraging PO intake    Acute urinary retention: Thornton placed 8/15. Silodosin contraindicated in renal failure. Had been unable to take PO meds consistently at that time  - Continue Tamsulosin 0.4mg daily  - 8/24: will dc thornton at midnight   - 8/25 voiding post thornton removal  - monitor for  retention    Hypertension: home meds: coreg 37.5 mg BID, hydralazine 100 mg TID, torsemide 60 mg daily  In CTICU, neuro exam improved with permissive HTN (found to have bilateral vertebral and basilar artery stenosis. CT showed diffusion hypoperfusion in posterior fossa and watershed cerebral hemispheres but no core infarct )  Systolic (24hrs), Av , Min:143 , Max:200   - coreg on hold  - continue torsemide 40 mg daily  -  added hydralazine 10 mg TID with hold parameter for SBP <130, : increased to 25mg TID, : increased to 50mg TID   - PRN Hydral for SBP >180  - Avoid hypotension, maintain SBP at least >120  - additional antihypertensives as needed     Hyperlipidemia: home meds: atorvastatin 20mg and ezetimibe 10mg  Lab Results   Component Value Date    CHOL 143 2024    LDLCALC 88 2024    HDL 32.7 2024    VLDL 22 2024    TRIG 110 2024    NHDL 110 2024   - continue atorva 80 mg and zetia  - follow up lipid panel with PCP/ cardiologist for ongoing lipid management    H/o DM 2: home meds: none  Lab Results   Component Value Date    HGBA1C 5.4 2025     Results from last 7 days   Lab Units 25  0827 25  0448 25  2211 25  1621 25  1152 25  0744 25  0143   POCT GLUCOSE mg/dL 90 132* 128* 85 170* 136* 132*   -accuchecks and SSI    Wounds: Right tibial diabetic ulcer and sacral contact dermititis  - Wound care consult, appreciate recs    VTE Prophylaxis: SCDs/TEDs, ambulation, SQ heparin  Code Status: Full Code    Dispo  - PT/OT recs: high intensity, PM&R updated recs to acute rehab -> precet for [facility] pending  - Anticipate discharge in 2-3 days pending stable rhythm/EP recs, renal/volume status, and clinical progression  - Will continue to assess discharge needs    Yanci Hooks PA-C  Cardiac Surgery [facility]  [facility]  Team Phone [phone]                         [1] acetaminophen, 650 mg, oral,  q6h NHAN  [Held by provider] amiodarone, 200 mg, oral, Daily  aspirin, 81 mg, oral, Daily  atorvastatin, 80 mg, oral, Nightly  [Held by provider] carvedilol, 12.5 mg, oral, BID  clopidogrel, 75 mg, oral, Daily  epoetin [place] or biosimilar, 7,500 Units, subcutaneous, Once per day on Monday Wednesday Friday  ezetimibe, 10 mg, oral, Nightly  heparin, 5,000 Units, subcutaneous, q8h  hydrALAZINE, 50 mg, oral, TID  insulin lispro, 0-10 Units, subcutaneous, TID AC  lidocaine, 1 patch, transdermal, Daily  melatonin, 6 mg, oral, Nightly  nystatin, 4 mL, Swish & Spit, TID  polyethylene glycol, 17 g, oral, Daily  sennosides-docusate sodium, 2 tablet, oral, Nightly  sevelamer carbonate, 800 mg, oral, TID  sodium chloride, 1 spray, Each Nostril, 4x daily  tamsulosin, 0.4 mg, oral, Daily  thiamine, 100 mg, oral, Daily  torsemide, 40 mg, oral, Daily  vitamin B complex-vitamin C-folic acid, 1 capsule, oral, Daily     [2]    [3] PRN medications: alteplase, bisacodyl, dextrose, dextrose **OR** glucagon, dextrose, glucagon, glucagon, guaiFENesin, heparin flush, hydrALAZINE, naloxone, ondansetron, oxygen

## 2025-08-28 NOTE — CONSULTS
"Inpatient consult to Electrophysiology  Consult performed by: Kathrine Mendoza MD  Consult ordered by: Yanci Hooks PA-C  Reason for consult: Bradycardic episode on tele      Reason for Consult:    Subjective   59 y.o. male w/ with PMHx of HTN, anemia, T2DM, CAD s/p CABGx 2 (LIMA - LAD, SVG to distal Lcx) with Dr Yolanda Justice on 8/6/2. Postop course c/b unresponsive episode in setting of hypotension. BAT called and he was found to have bilateral vertebral and basilar artery stenosis, pneumothorax, and TEJA on CKD requiring dialysis. Patient had a bradycardic episode overnight (on tele) for which EP is consulted (Tele strips available, no EKG from episode).     On interview patient states he was sleeping and asymptomatic during this episode around 4AM. He was awoken by the nursing staff and care team when the bradycardia episode was observed. He denies felling any new chest pain, shortness of breath, lightheadedness, dizziness, or feeling like he was going to pass out. States he's feeling well and continues to work with PT on his post-op recovery.     Review of Systems  Pertinent items are noted in HPI.    EKG 5AM 8/28      EKG 8/13/25      Tele episodes:                   Objective   Visit Vitals  /73   Pulse 64   Temp 36.3 °C (97.3 °F) (Temporal)   Resp 16   Ht 1.778 m (5' 10\")   Wt 109 kg (240 lb 4.8 oz)   SpO2 96%   BMI 34.48 kg/m²   Smoking Status Never   BSA 2.32 m²        Physical Exam  General: awake, alert, no acute distress  HEENT: no scleral icterus, no JVD  CV: RRR, midline incision healing well  Resp: CTA b/l, no wheezes, rales, or rhonchi  Abd: soft, NT/ND  LE: no edema, no cyanosis  Neuo: grossly normal  Psych: pleasant      Lab Review   Lab Results   Component Value Date     08/28/2025    K 5.2 08/28/2025     08/28/2025    CO2 25 08/28/2025    BUN 52 (H) 08/28/2025    CREATININE 6.85 (H) 08/28/2025    GLUCOSE 114 (H) 08/28/2025    CALCIUM 9.7 08/28/2025     No results found for: " "\"CKTOTAL\", \"CKMB\", \"CKMBINDEX\", \"TROPONINI\"  Lab Results   Component Value Date    WBC 11.5 (H) 08/28/2025    HGB 7.9 (L) 08/28/2025    HCT 26.4 (L) 08/28/2025    MCV 97 08/28/2025     08/28/2025       Troponin I, High Sensitivity   Date/Time Value Ref Range Status   05/19/2025 11:43  (HH) 0 - 20 ng/L Final     Comment:     Previous result verified on 5/19/2025 1121 on specimen/case [id number] called with component [facility] for procedure Troponin I, High Sensitivity, Initial with value 744 ng/L.   05/19/2025 10:34  (HH) 0 - 20 ng/L Final   05/15/2025 11:54 PM 26 (H) 0 - 20 ng/L Final     BNP   Date/Time Value Ref Range Status   05/19/2025 10:34 AM 2,008 (H) 0 - 99 pg/mL Final   04/02/2025 11:03  (H) 0 - 99 pg/mL Final   03/10/2025 01:22 PM 2,726 (H) 0 - 99 pg/mL Final     LDL   Date/Time Value Ref Range Status   02/01/2022 12:37 PM 43 0 - 99 mg/dL Final     Comment:     .                           NEAR      BORD      AGE      DESIRABLE  OPTIMAL    HIGH     HIGH     VERY HIGH     0-19 Y     0 - 109     ---    110-129   >/= 130     ----    20-24 Y     0 - 119     ---    120-159   >/= 160     ----      >24 Y     0 -  99   100-129  130-159   160-189     >/=190  .     04/30/2020 01:39 PM 30 0 - 99 mg/dL Final     Comment:     .                           NEAR      BORD      AGE      DESIRABLE  OPTIMAL    HIGH     HIGH     VERY HIGH     0-19 Y     0 - 109     ---    110-129   >/= 130     ----    20-24 Y     0 - 119     ---    120-159   >/= 160     ----      >24 Y     0 -  99   100-129  130-159   160-189     >/=190  .       Triglycerides   Date/Time Value Ref Range Status   08/19/2024 01:43  0 - 149 mg/dL Final     Comment:        Age         Desirable   Borderline High   High     Very High   0 D-90 D    19 - 174         ----         ----        ----  91 D- 9 Y     0 -  74        75 -  99     >/= 100      ----    10-19 Y     0 -  89        90 - 129     >/= 130      ----    20-24 Y     0 - " 114       115 - 149     >/= 150      ----         >24 Y     0 - 149       150 - 199    200- 499    >/= 500    Venipuncture immediately after or during the administration of Metamizole may lead to falsely low results. Testing should be performed immediately prior to Metamizole dosing.   02/01/2022 12:37  0 - 149 mg/dL Final     Comment:     .      AGE      DESIRABLE   BORDERLINE HIGH   HIGH     VERY HIGH   0 D-90 D    19 - 174         ----         ----        ----  91 D- 9 Y     0 -  74        75 -  99     >/= 100      ----    10-19 Y     0 -  89        90 - 129     >/= 130      ----    20-24 Y     0 - 114       115 - 149     >/= 150      ----         >24 Y     0 - 149       150 - 199    200- 499    >/= 500  .   Venipuncture immediately after or during the    administration of Metamizole may lead to falsely   low results. Testing should be performed immediately   prior to Metamizole dosing.     04/30/2020 01:39  0 - 149 mg/dL Final     Comment:     .      AGE      DESIRABLE   BORDERLINE HIGH   HIGH     VERY HIGH   0 D-90 D    19 - 174         ----         ----        ----  91 D- 9 Y     0 -  74        75 -  99     >/= 100      ----    10-19 Y     0 -  89        90 - 129     >/= 130      ----    20-24 Y     0 - 114       115 - 149     >/= 150      ----         >24 Y     0 - 149       150 - 199    200- 499    >/= 500  .   Venipuncture immediately after or during the    administration of Metamizole may lead to falsely   low results. Testing should be performed immediately   prior to Metamizole dosing.           Echocardiogram:   Recent Labs     08/14/25  1615 08/06/25  0632 05/20/25  1209 03/11/25  1047 07/22/24  0936   EF 58 53 63 63 63   LVIDD 4.60  --  5.43 5.67 3.86   RV  --   --  24.0  --   --    RVFRWALLPKSP 7.51  --   --   --  14.80   TAPSE 1.4  --  2.1  --  2.5   Transthoracic Echo (TTE) Complete With Contrast 03/11/2025    PHYSICIAN INTERPRETATION:  Left Ventricle: Left ventricular ejection fraction  is normal, by visual estimate at 60-65%. There is mild concentric left ventricular hypertrophy. There are no regional wall motion abnormalities. The left ventricular cavity size is normal. There is mild increased septal and mildly increased posterior left ventricular wall thickness. Spectral Doppler shows a Grade II (pseudonormal pattern) of left ventricular diastolic filling with an elevated left atrial pressure.  Left Atrium: The left atrial size is mildly dilated.  Right Ventricle: The right ventricle is normal in size. There is normal right ventricular global systolic function.  Right Atrium: The right atrial size is normal.  Aortic Valve: The aortic valve appears abnormal. There is mild to moderate aortic valve cusp calcification. There is mild to moderate aortic valve thickening. There is evidence of mild to moderate aortic valve stenosis.  The aortic valve dimensionless index is 0.39. There is no evidence of aortic valve regurgitation. The peak instantaneous gradient of the aortic valve is 28 mmHg. The mean gradient of the aortic valve is 15 mmHg.  Mitral Valve: The mitral valve is normal in structure. There is mild mitral valve regurgitation.  Tricuspid Valve: The tricuspid valve is structurally normal. There is trace tricuspid regurgitation.  Pulmonic Valve: The pulmonic valve is structurally normal. There is no indication of pulmonic valve regurgitation.  Pericardium: No pericardial effusion noted.  Aorta: The aortic root is normal.      CONCLUSIONS:  1. Left ventricular ejection fraction is normal, by visual estimate at 60-65%.  2. Spectral Doppler shows a Grade II (pseudonormal pattern) of left ventricular diastolic filling with an elevated left atrial pressure.  3. There is normal right ventricular global systolic function.  4. Mild to moderate aortic valve stenosis.    Coronary Angiography:   Left Heart Catheterization with Coronaries and Left Ventriculography, Left Heart Catheterization with  Coronaries and Left Ventriculography 05/23/2025    Coronary Angiography:  The coronary circulation is left dominant.    Left Main Coronary Artery:  The left main is short vessel. No significant coronary disease.    Left Anterior Descending Coronary Artery Distribution:  Left anterior descending artery has severe lesion of 50% in the proximal segment. There is another 70 to 80% diffuse disease in the midsegment.    Circumflex Coronary Artery Distribution:  The left circumflex is a large dominant vessel patent in the proximal segment. Just after large obtuse marginal 1 there is severe lesion of 90% focal. Obtuse marginal 1, 2, 3 and PDA are patent. WYATT-3 flow.    Ramus Intermedius:  The ramus is small vessel without significant coronary disease.    Right Coronary Artery Distribution:    Right coronary artery is nondominant vessel with severe lesion in the midsegment of 90%.      Left Ventriculography:  Left ventricular end-diastolic pressure was mildly elevated at 16 mmHg. No gradient across aortic valve. LV gram was not obtained avoid excessive use of contrast.      Right Heart Cath: No results found for this or any previous visit from the past 1800 days.    Cardiac Scoring: No results found for this or any previous visit from the past 1800 days.    Cardiac MRI: No results found for this or any previous visit from the past 1800 days.    Nuclear:No results found for this or any previous visit from the past 1800 days.    Metabolic Stress: No results found for this or any previous visit from the past 1800 days.      Assessment:  59 y.o. male w/ with PMHx of HTN, anemia, T2DM, CAD s/p CABGx 2 (LIMA - LAD, SVG to distal Lcx) with Dr Yolanda Justice on 8/6/2. Postop course c/b unresponsive episode in setting of hypotension. BAT called and he was found to have bilateral vertebral and basilar artery stenosis, pneumothorax, and TEJA on CKD requiring dialysis. Patient had a bradycardic episode overnight (on tele) for which EP is  consulted (Tele strips available, no EKG from episode). EKG from this morning shows patient in normal sinus with 1st degree AV block and incomplete LBBB similarly to prior. Tele review shows episodes of Sinus ori while patient was asleep with episodes of PACs and PVCs. He remained asymptomatic throughout the episode.     Recommendations:  #Sinus ori (asymptomatic)  - Patient remained asymptomatic throughout the procedure  - ? Underlying sleep apnea as possible etiology  - EKG in the morning remains unchanged from prior  - EP will sign off. Please don't hesitate to reach out with any questions or concerns.     Kathrine Mendoza MD  PGY-5 Cardiovascular Medicine Fellow    Thank you for the opportunity to contribute to the care of this patient. Above recommendations discussed with Dr. Samayoa.     If further questions arise, please page the EP consult pager at [phone] on weekdays 7AM - 6PM and weekends 7AM - 2PM, or at [phone] at all other times. The EP device nurse can be reached at pager [phone] during regular business hours M-F.

## 2025-08-28 NOTE — PROGRESS NOTES
Colin Leonard is a 59 y.o. male on day 22 of admission presenting with Atherosclerosis of native coronary artery of native heart without angina pectoris.      Subjective   Developed Bradycardia           Objective        Vitals 24HR  Heart Rate:  [59-70]   Temp:  [35.9 °C (96.6 °F)-36.3 °C (97.3 °F)]   Resp:  [15-24]   BP: (135-200)/(65-85)   Weight:  [109 kg (240 lb 4.8 oz)]   SpO2:  [92 %-96 %]        Intake/Output last 3 Shifts:    Intake/Output Summary (Last 24 hours) at 8/28/2025 1540  Last data filed at 8/28/2025 1000  Gross per 24 hour   Intake 540 ml   Output 200 ml   Net 340 ml       Physical Exam  On RA  Comfortable  Median Stenotomy  Symmetrical chest expansion  No JVD  Trace LL Edema  Access: R TDC    Relevant Results  Lab Results   Component Value Date    WBC 11.5 (H) 08/28/2025    HGB 7.9 (L) 08/28/2025    HCT 26.4 (L) 08/28/2025    MCV 97 08/28/2025     08/28/2025     Lab Results   Component Value Date    GLUCOSE 114 (H) 08/28/2025    CALCIUM 9.7 08/28/2025     08/28/2025    K 5.2 08/28/2025    CO2 25 08/28/2025     08/28/2025    BUN 52 (H) 08/28/2025    CREATININE 6.85 (H) 08/28/2025         Assessment & Plan  Atherosclerosis of native coronary artery of native heart without angina pectoris    ESRD (end stage renal disease) (Multi)    Chronic renal insufficiency    Pneumonia    Coronary artery disease involving native heart, unspecified vessel or lesion type, unspecified whether angina present    Mr. Leonard is a 59M with history of CKD5, CAD s/p CABG on 8/6, and developed post-surgery TEJA on CKD requiring dialysis initiation. Course c/b basilar stroke, No intervention, Neurology following. Nephrology following for Dialysis care.    #CKDV/ESKD  - Cr on admission 9.4  - Etiology of CKD is not clear.  - Received CVVH 8/6-8/7  - SLED 8/7pm  - CVVH resumed 8/9-8/11  - Access: Right TDC  - iHD TTS    #Azotemia, No evidence of Uremia  - Tube Feeds as well as Renal  Impairment    #Anemia  - In part due to CKD  - Tsat 17%  - Retacrit 7500 with HD    #Possible Stroke 8/7 (Basilar Artery occlusion?), Imaging showed no infarction, no interventions    #Urine Retention, Elmore inserted    RECOMMENDATIONS:  - No indication for RRT on assessment  - Tentative plan for iHD MWF   - Please Arrange for iHD outpatient   - Replace Iron once Infection is controlled    Babita Edmondson MD  PGY-5 Nephrology Fellow

## 2025-08-28 NOTE — PROGRESS NOTES
Physical Therapy                 Therapy Communication Note    Patient Name: Colin Leonard  MRN: [id number]  Department: [place] [address]  Room: [address] [place] [address]  Today's Date: 8/28/2025     Discipline: Physical Therapy    Missed Visit: PT Missed Visit: Yes     Missed Visit Reason: Missed Visit Reason: Patient placed on medical hold (Per RN pt being monitored, bradycardic to 20's overnight, not appropriate for PT at this time.)    Missed Time: Attempt 838    Comment:

## 2025-08-28 NOTE — CARE PLAN
Problem: Pain - Adult  Goal: Verbalizes/displays adequate comfort level or baseline comfort level  Outcome: Progressing     Problem: Safety - Adult  Goal: Free from fall injury  Outcome: Progressing     Problem: Discharge Planning  Goal: Discharge to home or other facility with appropriate resources  Outcome: Progressing     Problem: Chronic Conditions and Co-morbidities  Goal: Patient's chronic conditions and co-morbidity symptoms are monitored and maintained or improved  Outcome: Progressing     Problem: Nutrition  Goal: Nutrient intake appropriate for maintaining nutritional needs  Outcome: Progressing     Problem: Skin  Goal: Promote/optimize nutrition  Outcome: Progressing     Problem: General Stroke  Goal: Demonstrate improvement in neurological exam throughout the shift  Outcome: Progressing     Problem: ICU Stroke  Goal: Achieve/maintain targeted sodium level throughout shift  Outcome: Progressing     Problem: ESRD: Dialysis, CAPD  Goal: Increase self care/family involvement  Outcome: Progressing     Problem: Diabetes  Goal: Achieve decreasing blood glucose levels by end of shift  Outcome: Progressing     Problem: Pain  Goal: Turns in bed with improved pain control throughout the shift  Outcome: Progressing     Problem: Fall/Injury  Goal: Not fall by end of shift  Outcome: Progressing

## 2025-08-28 NOTE — SIGNIFICANT EVENT
Surgeon: Dr. Yolanda justice      Significant Event:    Colin Leonard is a 59 y.o. male with s/p CABGx 2 with Dr Yolanda Justice on 8/6/2. Postop course c/b unresponsive episode in setting of hypotension and found to have bilateral vertebral and basilar artery stenosis, pneumothorax, and TEJA on CKD requiring dialysis.      Operations ad Procedures with Dr. Yolanda Justice on 8/6:   1. Median Sternotomy  2. Off Pump CABG x 2 (LIMA to LAD, SVG to distal CX)  3. Sternal Plating with Sternal Lock      CTICU course: Prolonged ICU stay  - Encephalopathy, unresponsive episode POD #2; BAT --> angio --> basilar, bilat vertebral stenoses, no obstruction, no structural brain injury; Likely hypoactive delirium.  - Afib  - Acute resp failure, re-intubation during unresponsive episode  - Acute on chronic renal failure - dialysis started; R subclavian temporary HD catheter 8/18; HD 8/18 and 8/19  - Leukocytosis  - 5 d course Zosyn    Significant event:    I was called at 04:45 am, about a bradycardia event on this patient, when reviewing bedside, he was somnolent without complains and stable hemodynamically. I talked with the patient about return to CTICU for safety reasons and that he would have to be evaluated by EP. He comply with the orientation and agreed with the transfer.     The patient do not have the epicardial pacemaker wires anymore.    After discussion with CTICU attending () that was in agreement that the patient needs support, the fellow came to evaluate the patient and they requested to keep the patient in the floor for now, while they are finishing to take care of an admission.    We kept the patient with the external leads for transcutaneous pacemaker in stand while we wait for the transfer to CTICU.    Family History[1]     RX Allergies[2]     Current Outpatient Medications   Medication Instructions    acetaminophen (TYLENOL) 650 mg, oral, Every 6 hours PRN    amiodarone (PACERONE) 200 mg, oral, 2 times daily     aspirin 81 mg, Daily    atorvastatin (LIPITOR) 20 mg, Daily    carvedilol (COREG) 37.5 mg, oral, 2 times daily    ezetimibe (ZETIA) 10 mg, oral, Nightly    hydrALAZINE (APRESOLINE) 100 mg, oral, 3 times daily    multivitamin tablet 1 tablet, Daily    sodium bicarbonate 1,300 mg, oral, 3 times daily    torsemide (DEMADEX) 60 mg, oral, Daily        Vitals:    08/28/25 0447   BP: (!) 182/79   Pulse: 59   Resp: 18   Temp:    SpO2:         Physical Exam:     General:  Alert, calm, well-developed , somnolence, sternotomy scar.  HEENT:  Pupils equal, round, reactive to light and accommodation. Extraocular movements   Neck:  Supple, without lymphadenopathy or thyromegaly. No carotid bruits.  Lymph:  No axillary, cervical, supraclavicular, pre-auricular, submental, or occipital lymphadenopathy,  Cardiovascular:  Regular rate and rhythm, with normal S1 and S2. Aortic murmurs 3/6, no rubs or gallops. No JVD. 2+ pulses bilaterally - dorsalis pedis and radial.  Lungs:  lung clear. No wheezes. No accessory muscle use or cyanosis. No tenderness to palpation.  Abdomen:  Normoactive bowel sounds. Soft, flat, non-tender, and non-distended. No hepatosplenomegaly; liver span approximately 10 cm.  Skin:  Warm, dry, well-perfused. No rashes or other lesions.  Extremities:  2+ pulses in upper and lower extremities. No lower extremity pain or edema; legs are symmetric in appearance.  Neuro:  Alert and oriented to person, place, and time. Able to communicate well. 5/5 strength in all extremities bilaterally. Sensation intact in all extremities. Normal gait.        Last Labs:  eGFR Cre: 12 at 8/27/2025  6:48 AM  Calculated from:  Serum Creatinine: 5.29 mg/dL at 8/27/2025  6:48 AM  Age: 59 years  Sex: Male at 8/27/2025  6:48 AM  Calculated using the CKD-EPI Creatinine Equation (2021)    CBC - 8/27/2025:  6:48 AM  12.3 8.0 346    26.3      BMP  138  101  41                  ----------------<132     4.2  27  5.29     CMP - 8/27/2025:  6:48  AM  9.7 5.5 14 --- 0.5   4.2 3.0 3 49      PTT - 8/6/2025:  2:49 PM  1.2   13.7 29     Troponin I, High Sensitivity   Date/Time Value Ref Range Status   05/19/2025 11:43  (HH) 0 - 20 ng/L Final     Comment:     Previous result verified on 5/19/2025 1121 on specimen/case [id number] called with component [facility] for procedure Troponin I, High Sensitivity, Initial with value 744 ng/L.     BNP   Date/Time Value Ref Range Status   05/19/2025 10:34 AM 2,008 (H) 0 - 99 pg/mL Final                  [1]   Family History  Problem Relation Name Age of Onset    Heart disease Mother      Other (cabg) Mother      Rheumatic fever Mother      Heart disease Father      Stroke Paternal Grandfather      Heart attack Paternal Grandfather     [2]   Allergies  Allergen Reactions    Amlodipine Besylate Swelling     edema legs

## 2025-08-28 NOTE — PROGRESS NOTES
Inpatient  Speech-Language Pathology Treatment     Patient Name: Colin Leonard  MRN: [id number]  Today's Date: 8/28/2025  Time Calculation  Start Time: 1000  Stop Time: 1015  Time Calculation (min): 15 min           Assessment/Plan:  #oropharyngeal dysphagia  -SLP follow-up for education and demonstration of dysphagia tx modality (EMST). Pt demonstrated independence with device. SLP to provide instruction hand out  - Education was provided on ongoing dysphagia tx and diet modifications- pt and caregiver verbalized understanding.          DIET RECOMMENDATIONS:   - regular solids (IDDSI Level 4)  - Moderately/honey thick liquids (IDDSI Level 3)     STRATEGIES:  - Small bites  - Medications crushed in puree or as best tolerated  - Complete oral care frequently throughout the day  - Full supervision with meals; One to one assist with meals  - repeat MBS in 1-2 weeks      Plan:  Inpatient/Swing Bed or Outpatient: Inpatient  SLP TX Plan: Continue Plan of Care  SLP Plan: Skilled SLP  SLP Frequency: 2x per week  Duration: 2 weeks  SLP Discharge Recommendations: Other (Comment) (TBD)  SLP - OK to Discharge: Yes      Subjective   Pt received resting in bed. Sister present in room. Pt pleasant during visit.             Objective         Comments:   Targeted EMST education and treatment. Pt was provided device and rationale for its use. Therapy instructions were provided and pt then demonstrated on the lowest setting x 10-12 reps. Cues provided to remind pt take pause between reps.        Encounter Problems       Encounter Problems (Active)       Swallowing       LTG - Patient will tolerate the least restrictive diet without overt difficulty by time of discharge. (Progressing)       Start:  08/11/25    Expected End:  09/11/25            SLP Goal 1 (Progressing)       Start:  08/11/25    Expected End:  09/11/25       STG - Patient will tolerate therapeutic trials of recommended consistency without clinical signs and symptoms  of aspiration on 100% of trials              Swallowing       STG - Patient will complete effortable swallows 30-40 times per session w/ moderately thick liquids (Not Progressing)       Start:  08/12/25    Expected End:  09/11/25            SLP Goal 2 (Not Progressing)       Start:  08/12/25    Expected End:  09/11/25       Pt will complete suprahyoid exercises of at least 10-15 reps w/ min cues              Swallowing       STG - Patient/Family will verbalize/demonstrate comprehension of dysphagia education, strategies, recommendations and POC To 80% acc. With min cues  (Progressing)       Start:  08/21/25    Expected End:  09/11/25                   Inpatient Education:  Extensive education provided to patient and/or Caregiver regarding current swallow function, recommendations/results, and POC. Demonstrated verbal understanding and were agreeable w/ the details/recommendations reviewed.

## 2025-08-28 NOTE — CARE PLAN
Transitional Care Coordinator Note: Patient discussed with medical team, per medical team patient is not medically ready. Pending Resolving of Bradycardia.  Discharge dispo: [facility], [facility] was Approved. ADOD 1-2 Days.  Neftali Luz RN  Transitional Care Coordinator

## 2025-08-28 NOTE — PROGRESS NOTES
Occupational Therapy                 Therapy Communication Note    Patient Name: Colin Leonard  MRN: [id number]  Department: [facility]  Room: [address] [place] [address]  Today's Date: 8/28/2025     Discipline: Occupational Therapy    Missed Visit: OT Missed Visit: Yes     Missed Visit Reason: Missed Visit Reason: Patient placed on medical hold (Patient being transferred to ICU due to bradycardia overnight. Hold OT at this time.)    Missed Time: Attempt    BRIDGETT Jorgensen/L

## 2025-08-29 ENCOUNTER — APPOINTMENT (OUTPATIENT)
Dept: DIALYSIS | Facility: HOSPITAL | Age: 60
End: 2025-08-29
Payer: COMMERCIAL

## 2025-08-29 ASSESSMENT — COGNITIVE AND FUNCTIONAL STATUS - GENERAL
DRESSING REGULAR UPPER BODY CLOTHING: A LITTLE
MOVING TO AND FROM BED TO CHAIR: A LITTLE
MOVING TO AND FROM BED TO CHAIR: A LITTLE
WALKING IN HOSPITAL ROOM: A LITTLE
TURNING FROM BACK TO SIDE WHILE IN FLAT BAD: A LITTLE
HELP NEEDED FOR BATHING: A LITTLE
HELP NEEDED FOR BATHING: A LITTLE
TURNING FROM BACK TO SIDE WHILE IN FLAT BAD: A LITTLE
DRESSING REGULAR UPPER BODY CLOTHING: A LITTLE
STANDING UP FROM CHAIR USING ARMS: A LITTLE
DAILY ACTIVITIY SCORE: 20
TOILETING: A LITTLE
MOBILITY SCORE: 18
DRESSING REGULAR LOWER BODY CLOTHING: A LITTLE
CLIMB 3 TO 5 STEPS WITH RAILING: A LOT
WALKING IN HOSPITAL ROOM: A LITTLE
CLIMB 3 TO 5 STEPS WITH RAILING: A LOT
STANDING UP FROM CHAIR USING ARMS: A LITTLE
TOILETING: A LITTLE
MOBILITY SCORE: 18
DRESSING REGULAR LOWER BODY CLOTHING: A LITTLE
DAILY ACTIVITIY SCORE: 20

## 2025-08-29 ASSESSMENT — PAIN - FUNCTIONAL ASSESSMENT
PAIN_FUNCTIONAL_ASSESSMENT: 0-10
PAIN_FUNCTIONAL_ASSESSMENT: 0-10

## 2025-08-29 ASSESSMENT — PAIN SCALES - GENERAL
PAINLEVEL_OUTOF10: 0 - NO PAIN
PAINLEVEL_OUTOF10: 0 - NO PAIN

## 2025-08-29 NOTE — CARE PLAN
Met with pt and introduced myself as care coordinator with Care Transitions Team for discharge planning. Pt is agreeable to discharge to home. Sister Chelo Notified. Pt is aware of the  of time of 630 pm with [facility] . Transport info given to the Nursing Floor. MD is aware of Pt discharge along with the Nurse. Pt is leaving [facility] with [facility]. Pt reported no safety concerns at home.  Pt's address, phone number, and contact information was verified. Discharge Planning: Pt is discharging to [facility], after Dialysis today.

## 2025-08-29 NOTE — PROGRESS NOTES
Speech-Language Pathology                 Therapy Communication Note    Patient Name: Colin Leonard  MRN: [id number]  Department: [facility] [place] [facility]  Room: 3030/3030-A  Today's Date: 8/29/2025     Discipline: Speech Language Pathology    Missed Time: Attempt    Comment:  Pt currently in HD and requesting to rest at this time. SLP will follow-up as able

## 2025-08-29 NOTE — PROGRESS NOTES
Physical Therapy                 Therapy Communication Note    Patient Name: Colin Leonard  MRN: [id number]  Department: [place] [address]  Room: [address] [place] [address]  Today's Date: 8/29/2025     Discipline: Physical Therapy    Missed Visit: PT Missed Visit: Yes     Missed Visit Reason: Missed Visit Reason:  (on dialysis)    Missed Time: Attempt 1404    Comment:

## 2025-08-29 NOTE — PROGRESS NOTES
Colin Leonard is a 59 y.o. male on day 23 of admission presenting with Atherosclerosis of native coronary artery of native heart without angina pectoris.      Subjective   For iHD then Discharge           Objective        Vitals 24HR  Heart Rate:  [63-81]   Temp:  [36.4 °C (97.5 °F)-36.8 °C (98.2 °F)]   Resp:  [16-23]   BP: (106-166)/(59-87)   Weight:  [109 kg (239 lb 14.4 oz)]   SpO2:  [93 %-100 %]   Oxygen Therapy  Pulse Ox (24 hr min): 93  Medical Gas Therapy: Supplemental oxygen  Medical Gas Delivery Method: Nasal cannula  O2 Flow Rate (L/min): 2 L/min FiO2 (%): 40 %    Intake/Output last 3 Shifts:    Intake/Output Summary (Last 24 hours) at 8/29/2025 1434  Last data filed at 8/29/2025 1252  Gross per 24 hour   Intake 660 ml   Output 300 ml   Net 360 ml       Physical Exam  On NC  Comfortable  Median Stenotomy  Symmetrical chest expansion  No JVD  Trace LL Edema  Access: R TDC    Relevant Results  Lab Results   Component Value Date    WBC 12.1 (H) 08/29/2025    HGB 7.9 (L) 08/29/2025    HCT 27.3 (L) 08/29/2025     (H) 08/29/2025     08/29/2025     Lab Results   Component Value Date    GLUCOSE 108 (H) 08/29/2025    CALCIUM 9.5 08/29/2025     08/29/2025    K 5.6 (H) 08/29/2025    CO2 23 08/29/2025     08/29/2025    BUN 64 (H) 08/29/2025    CREATININE 8.82 (H) 08/29/2025         Assessment & Plan  Atherosclerosis of native coronary artery of native heart without angina pectoris    ESRD (end stage renal disease) (Multi)    Chronic renal insufficiency    Pneumonia    Coronary artery disease involving native heart, unspecified vessel or lesion type, unspecified whether angina present    Mr. Leonard is a 59M with history of CKD5, CAD s/p CABG on 8/6, and developed post-surgery TEJA on CKD requiring dialysis initiation. Course c/b basilar stroke, No intervention, Neurology following. Nephrology following for Dialysis care.    #CKDV/ESKD  - Cr on admission 9.4  - Etiology of CKD is not  clear.  - Received CVVH 8/6-8/7  - SLED 8/7pm  - CVVH resumed 8/9-8/11  - Access: Right TDC  - iHD TTS    #Azotemia, No evidence of Uremia  - Tube Feeds as well as Renal Impairment    #Anemia  - In part due to CKD  - Tsat 17%  - Retacrit 7500 with HD    #Possible Stroke 8/7 (Basilar Artery occlusion?), Imaging showed no infarction, no interventions  #Urine Retention, Elmore inserted    RECOMMENDATIONS:  - iHD Today then continue MWF  - To continue iHD outpatient     Babita Edmondson MD  PGY-5 Nephrology Fellow

## 2025-08-29 NOTE — DISCHARGE INSTRUCTIONS
Don't forget to “Keep Your Move in the Tube!!”     Please refer to the “Move in the Tube” handout.  -- Load bearing activities can be completed if you are “staying in the tube.” If you are attempting load bearing activities, let pain be your guide with when trying an activity “out of the tube”. If an activity hurts or is uncomfortable go back to doing it while you stay “in the tube”. There is no time limit to “stay in the tube”.     -- Non-load bearing activities, which are your activities of daily living, can be completed with your arms “out of the tube” as long as you remain pain free. Some activities of daily living examples are dressing, personal care, showering, washing hair, and toilet hygiene.     Don't forget to KEEP YOUR MOVE IN THE TUBE and think of a T. John dinosaur!      Additional Post-Operative Instructions:  - Remember to use your Incentive spirometer 10x/hr while awake. Remember to cough and deep breath.  - No NSAIDs (common over-the-counter NSAIDs are ibuprofen/Motrin/Advil, naproxen/Naprosyn/Aleve) for 3 months after cardiac surgery; if NSAIDs needed after 3 months, clear use with cardiologist before starting.     - If you have a dressing on your incision that looks like a piece of clear tape (Dermabond Prineo), please leave this in place for at least 2 weeks, although it may last longer.  You should shower and wash over it while it is in place.  After 2 weeks, you may remove it using vaseline.      Your home medications may have changed after surgery. Carefully compare this list with your prescription bottles at home and set aside any medications you are told to not take so you do not confuse them. Do not dispose of any medications until your follow-up, since your doctors may restart some at your follow-up appointments. It is important to bring a complete, current list of your medications to any medical appointments or hospitalizations.    For any questions about your discharge, please call the  cardiac surgery office at [phone]

## 2025-08-29 NOTE — CARE PLAN
Problem: Pain - Adult  Goal: Verbalizes/displays adequate comfort level or baseline comfort level  Outcome: Progressing     Problem: Safety - Adult  Goal: Free from fall injury  Outcome: Progressing     Problem: Discharge Planning  Goal: Discharge to home or other facility with appropriate resources  Outcome: Progressing     Problem: Chronic Conditions and Co-morbidities  Goal: Patient's chronic conditions and co-morbidity symptoms are monitored and maintained or improved  Outcome: Progressing     Problem: Nutrition  Goal: Nutrient intake appropriate for maintaining nutritional needs  Outcome: Progressing     Problem: Skin  Goal: Prevent/minimize sheer/friction injuries  Outcome: Progressing     Problem: General Stroke  Goal: No symptoms of aspiration throughout shift  Outcome: Progressing     Problem: ICU Stroke  Goal: Achieve/maintain targeted sodium level throughout shift  Outcome: Progressing     Problem: ESRD: Dialysis, CAPD  Goal: Increase self care/family involvement  Outcome: Progressing     Problem: Diabetes  Goal: Achieve decreasing blood glucose levels by end of shift  Outcome: Progressing     Problem: Pain  Goal: Takes deep breaths with improved pain control throughout the shift  Outcome: Progressing     Problem: Fall/Injury  Goal: Not fall by end of shift  Outcome: Progressing

## 2025-08-29 NOTE — DISCHARGE SUMMARY
Discharge Diagnosis  Atherosclerosis of native coronary artery of native heart without angina pectoris           Issues Requiring Follow-Up  PCP  Cardiology   Nephrology   CTS Dr. Yolanda Justice   Neuro stroke     Discharge Meds     Medication List      START taking these medications     clopidogrel 75 mg tablet; Commonly known as: Plavix; Take 1 tablet (75   mg) by mouth once daily.; Start taking on: August 30, 2025   epoetin china-epbx 10,000 unit/mL injection; Commonly known as: Retacrit;   Inject 0.75 mL (7,500 Units) under the skin 3 times a week.; Start taking   on: September 1, 2025   heparin flush 10 unit/mL injection; 5 mL (50 Units) by intra-catheter   route if needed for line care.   lidocaine 4 % patch; Place 1 patch over 12 hours on the skin once daily.   Remove & discard patch within 12 hours or as directed by MD.; Start taking   on: August 30, 2025   melatonin 3 mg tablet; Take 2 tablets (6 mg) by mouth once daily at   bedtime.   nystatin 100,000 unit/mL suspension; Commonly known as: Mycostatin;   Swish and spit 4 mL (400,000 Units) 3 times a day for 18 doses.   polyethylene glycol 17 gram packet; Commonly known as: Glycolax,   Miralax; Take 17 g by mouth once daily.; Start taking on: August 30, 2025   sennosides-docusate sodium 8.6-50 mg tablet; Commonly known as:   Johanne-Colace; Take 2 tablets by mouth as needed at bedtime for   constipation.   sevelamer carbonate 800 mg tablet; Commonly known as: Renvela; Take 1   tablet (800 mg) by mouth 3 times daily (morning, midday, late afternoon).   Swallow tablet whole; do not crush, break, or chew.   sodium chloride 0.65 % nasal spray; Commonly known as: Ocean; Administer   1 spray into each nostril 4 times a day.   tamsulosin 0.4 mg 24 hr capsule; Commonly known as: Flomax; Take 1   capsule (0.4 mg) by mouth once daily. Do not crush, chew, or split.; Start   taking on: August 30, 2025   thiamine 100 mg tablet; Commonly known as: Vitamin B-1; Take 1 tablet   (100  mg) by mouth once daily.; Start taking on: August 30, 2025   vitamin B complex-vitamin C-folic acid 1 mg capsule; Commonly known as:   Nephrocaps; Take 1 capsule by mouth once daily.; Start taking on: August 30, 2025     CHANGE how you take these medications     atorvastatin 80 mg tablet; Commonly known as: Lipitor; Take 1 tablet (80   mg) by mouth once daily at bedtime.; What changed: medication strength,   how much to take, when to take this   hydrALAZINE 50 mg tablet; Commonly known as: Apresoline; Take 1 tablet   (50 mg) by mouth 3 times a day.; What changed: medication strength, how   much to take   torsemide 40 mg tablet; Take 40 mg by mouth once daily.; Start taking   on: August 30, 2025; What changed: medication strength, how much to take     CONTINUE taking these medications     acetaminophen 325 mg tablet; Commonly known as: Tylenol; Take 2 tablets   (650 mg) by mouth every 6 hours if needed for moderate pain (4 - 6).   aspirin 81 mg EC tablet   ezetimibe 10 mg tablet; Commonly known as: Zetia; Take 1 tablet (10 mg)   by mouth once daily at bedtime.     STOP taking these medications     amiodarone 200 mg tablet; Commonly known as: Pacerone   carvedilol 12.5 mg tablet; Commonly known as: Coreg   multivitamin tablet   sodium bicarbonate 650 mg tablet       Test Results Pending At Discharge  Pending Labs       Order Current Status    Type and screen Collected (08/06/25 1311)            Hospital Course  Colin Leonard is a 59 y.o. male hx of HTN, CKD stage 5, chronic anemia, type 2 diabetes, hx of c diff colitis who was admitted 5/19-5/24 for shortness of breathe was incidentally found to have atypical pneumonia which he was treated for. He was found to have elevated trop and BNP on that admission with a few episodes of reported VT which required increased dose of coreg. He underwent LHC 5/22/25 demonstrated MVCAD and decision was made to discharge and let him recover from his pneumonia treated with  Augmentin and oral vanco prophyllacticaly due to h/o cdiff. and bring back for CABG. He is now s/p CABGx 2 with Dr Yolanda Justice on 8/6/2. Postop course c/b unresponsive episode in setting of hypotension and found to have bilateral vertebral and basilar artery stenosis, pneumothorax, and TEJA on CKD requiring dialysis.      Operations ad Procedures with Dr. Yolanda Justice on 8/6:   1. Median Sternotomy  2. Off Pump CABG x 2 (LIMA to LAD, SVG to distal CX)  3. Sternal Plating with Sternal Lock      CTICU course: Prolonged ICU stay  - Encephalopathy, unresponsive episode POD #2; BAT --> angio --> basilar, bilat vertebral stenoses, no obstruction, no structural brain injury; Likely hypoactive delirium.  - Afib; Acute resp failure, re-intubation during unresponsive episode;   - Acute on chronic renal failure - dialysis started; R subclavian temporary HD catheter 8/18; HD 8/18 and 8/19  - Leukocytosis  - 5 d course Zosyn     Transferred to LT3 on 8/20.    #CABGx 2 with Dr Yolanda Justice on 8/6  Floor Course:  - Patient was diuresed for fluid volume overload post> HD 2/2 renal failure> fluid removal via HD  cardiac surgery; Preop weight: 116 kg, discharge wt: 109kg  - Continue cardiac meds: ASA, Plavix, statin, Zetia  -BB discontinue due to bradycardia   - continue Plavix per Dr. Yolanda Justice  - Epicardial wires CUT on 8/8/25  - Telemetry at discharge NSR  - 2v CXR done 8/25/25  - Postop echo done 8/14/25    CKD V/ESRD: Pt initiated on HD on 8/6/25 MWF tolerating well has R tunneled HD line placed 8/25   - continue Torsemide 40mg daily  - 8/25 added Epo, Sevelamer, nephrocaps    Post op a.fib cb bradycardia  Resolved - now NSR off amio and BB iso bradycardia     Post-op Delirium/Encephalopathy  - resolved  Vertebral and basilar artery stenosis    - Continue DAPT (ASA and plavix) at least 90 days, tentative plan to transition to plavix monotherapy but would defer to outpatient stroke neurologist   - Neurology has arranged a follow up with  Dr. Dior in 1 month       - Cardiac rehab referral was placed  - PM&R consulted on 8/20   - PT recs acute rehab  - Anticipate discharge to acute rehab facility    Discharged on 08/29/25      On day of discharge, vital signs were stable and no acute distress was noted. All questions were answered. After VS and labs were reviewed it was determined the patient was stable for discharge.   Hospital day of discharge management- spent >30 minutes coordinating the discharge and counseling/educating patient and family regarding discharge instructions.   ==================================    Past Medical History       Diagnosis Date    C. difficile colitis       hx of C. difficile colitis in March 2025    CHF (congestive heart failure)       chronic diastolic heart failure    CKD (chronic kidney disease)       Stage V    Coronary artery disease      Diabetes mellitus (Multi)      DVT (deep venous thrombosis) (Multi)      Hypertension      MI (myocardial infarction) (Multi)      Pneumonia       Multifocal pneumonia 5/2025    PONV (postoperative nausea and vomiting)      Type 2 diabetes mellitus       no meds controlled with diet     Past Surgical History        Procedure Laterality Date    AMPUTATION        CARDIAC CATHETERIZATION N/A 05/23/2025     Procedure: LHC, With LV;  Surgeon: Shantelle Anderson MD;  Location: [facility] Cardiac Cath Lab;  Service: Cardiovascular;  Laterality: N/A;    CARDIAC CATHETERIZATION N/A 05/23/2025     Procedure: LULA Stent - Coronary;  Surgeon: Shantelle Anderson MD;  Location: [facility] Cardiac Cath Lab;  Service: Cardiovascular;  Laterality: N/A;    CHOLECYSTECTOMY   03/05/2025     Laparoscopic Cholecystectomy    COLONOSCOPY        URETER SURGERY                   Medications Prior to Admission   Medication Sig Dispense Refill Last Dose/Taking    amiodarone (Pacerone) 200 mg tablet Take 1 tablet (200 mg) by mouth 2 times a day. 60 tablet 1 8/6/2025 Morning    aspirin 81 mg EC tablet Take 1 tablet (81 mg) by  mouth once daily.     8/6/2025 Morning    atorvastatin (Lipitor) 20 mg tablet Take 1 tablet (20 mg) by mouth once daily.     Past Week    carvedilol (Coreg) 12.5 mg tablet Take 3 tablets (37.5 mg) by mouth 2 times a day. 180 tablet 0 8/6/2025 Morning    chlorhexidine (Hibiclens) 4 % external liquid Use as directed daily preoperatively 473 mL 0 8/6/2025 Morning    chlorhexidine (Peridex) 0.12 % solution Swish and spit with 15ml of solution the night before and morning of surgery. Do not swallow. 15 mL 0 8/6/2025 Morning    ezetimibe (Zetia) 10 mg tablet Take 1 tablet (10 mg) by mouth once daily at bedtime. 90 tablet 3 8/5/2025    hydrALAZINE (Apresoline) 100 mg tablet Take 1 tablet (100 mg) by mouth 3 times a day. 270 tablet 3 8/5/2025    multivitamin tablet Take 1 tablet by mouth once daily.     Past Week    sodium bicarbonate 650 mg tablet Take 2 tablets (1,300 mg) by mouth 3 times a day. 180 tablet 1 8/6/2025 Morning    torsemide (Demadex) 20 mg tablet Take 3 tablets (60 mg) by mouth once daily. 90 tablet 0 8/6/2025 Morning    acetaminophen (Tylenol) 325 mg tablet Take 2 tablets (650 mg) by mouth every 6 hours if needed for moderate pain (4 - 6).     Unknown     Allergies: Amlodipine besylate      Social History       Tobacco Use    Smoking status: Never    Smokeless tobacco: Never   Vaping Use    Vaping status: Never Used   Substance Use Topics    Alcohol use: Not Currently    Drug use: Never         Family History         Problem Relation Name Age of Onset    Heart disease Mother        Other (cabg) Mother        Rheumatic fever Mother        Heart disease Father        Stroke Paternal Grandfather        Heart attack Paternal Grandfather           Pertinent Physical Exam At Time of Discharge  Physical Exam  Vitals and nursing note reviewed.   Constitutional:       General: He is not in acute distress.     Appearance: Normal appearance. He is not ill-appearing.   Cardiovascular:      Rate and Rhythm: Normal rate  and regular rhythm.      Heart sounds: No murmur heard.  Pulmonary:      Effort: No respiratory distress.      Breath sounds: No wheezing or rales.   Abdominal:      General: There is no distension.      Tenderness: There is no abdominal tenderness. There is no guarding.   Musculoskeletal:      Right lower leg: No edema.      Left lower leg: No edema.   Neurological:      General: No focal deficit present.      Mental Status: He is alert and oriented to person, place, and time.   Psychiatric:         Mood and Affect: Mood normal.         Outpatient Follow-Up  Future Appointments   Date Time Provider Department Center   9/9/2025  1:45 PM Franck Justice MD [id number]         Yary De Jesus DO  Discussed with attending surgeon   Discussed with EP

## 2025-08-30 ENCOUNTER — LAB REQUISITION (OUTPATIENT)
Dept: LAB | Facility: HOSPITAL | Age: 60
End: 2025-08-30
Payer: COMMERCIAL

## 2025-08-30 LAB
ALBUMIN SERPL BCP-MCNC: 3.1 G/DL (ref 3.4–5)
ANION GAP SERPL CALC-SCNC: 15 MMOL/L (ref 10–20)
ANION GAP SERPL CALC-SCNC: 15 MMOL/L (ref 10–20)
BUN SERPL-MCNC: 41 MG/DL (ref 6–23)
BUN SERPL-MCNC: 41 MG/DL (ref 6–23)
CALCIUM SERPL-MCNC: 9.2 MG/DL (ref 8.6–10.3)
CALCIUM SERPL-MCNC: 9.2 MG/DL (ref 8.6–10.3)
CHLORIDE SERPL-SCNC: 101 MMOL/L (ref 98–107)
CHLORIDE SERPL-SCNC: 101 MMOL/L (ref 98–107)
CO2 SERPL-SCNC: 27 MMOL/L (ref 21–32)
CO2 SERPL-SCNC: 27 MMOL/L (ref 21–32)
CREAT SERPL-MCNC: 5.67 MG/DL (ref 0.5–1.3)
CREAT SERPL-MCNC: 5.67 MG/DL (ref 0.5–1.3)
EGFRCR SERPLBLD CKD-EPI 2021: 11 ML/MIN/1.73M*2
EGFRCR SERPLBLD CKD-EPI 2021: 11 ML/MIN/1.73M*2
ERYTHROCYTE [DISTWIDTH] IN BLOOD BY AUTOMATED COUNT: 15.8 % (ref 11.5–14.5)
GLUCOSE SERPL-MCNC: 160 MG/DL (ref 74–99)
GLUCOSE SERPL-MCNC: 160 MG/DL (ref 74–99)
HCT VFR BLD AUTO: 24.8 % (ref 41–52)
HGB BLD-MCNC: 7.3 G/DL (ref 13.5–17.5)
MAGNESIUM SERPL-MCNC: 2.57 MG/DL (ref 1.6–2.4)
MCH RBC QN AUTO: 29.6 PG (ref 26–34)
MCHC RBC AUTO-ENTMCNC: 29.4 G/DL (ref 32–36)
MCV RBC AUTO: 100 FL (ref 80–100)
NRBC BLD-RTO: 0 /100 WBCS (ref 0–0)
PHOSPHATE SERPL-MCNC: 4.4 MG/DL (ref 2.5–4.9)
PLATELET # BLD AUTO: 297 X10*3/UL (ref 150–450)
POTASSIUM SERPL-SCNC: 5 MMOL/L (ref 3.5–5.3)
POTASSIUM SERPL-SCNC: 5 MMOL/L (ref 3.5–5.3)
RBC # BLD AUTO: 2.47 X10*6/UL (ref 4.5–5.9)
SODIUM SERPL-SCNC: 138 MMOL/L (ref 136–145)
SODIUM SERPL-SCNC: 138 MMOL/L (ref 136–145)
WBC # BLD AUTO: 12.9 X10*3/UL (ref 4.4–11.3)

## 2025-08-30 PROCEDURE — 80048 BASIC METABOLIC PNL TOTAL CA: CPT

## 2025-08-30 PROCEDURE — 85027 COMPLETE CBC AUTOMATED: CPT

## 2025-08-30 PROCEDURE — 83735 ASSAY OF MAGNESIUM: CPT

## 2025-08-30 PROCEDURE — 80069 RENAL FUNCTION PANEL: CPT

## 2025-08-30 NOTE — CARE PLAN
The patient's goals for the shift include discharge    The clinical goals for the shift include discharge    Problem: Discharge Planning  Goal: Discharge to home or other facility with appropriate resources  Outcome: Progressing     Problem: Chronic Conditions and Co-morbidities  Goal: Patient's chronic conditions and co-morbidity symptoms are monitored and maintained or improved  Outcome: Progressing     Problem: Nutrition  Goal: Nutrient intake appropriate for maintaining nutritional needs  Outcome: Progressing     Problem: Skin  Goal: Decreased wound size/increased tissue granulation at next dressing change  Outcome: Progressing  Goal: Participates in plan/prevention/treatment measures  Outcome: Progressing  Goal: Prevent/manage excess moisture  Outcome: Progressing  Goal: Prevent/minimize sheer/friction injuries  Outcome: Progressing  Goal: Promote/optimize nutrition  Outcome: Progressing  Goal: Promote skin healing  Outcome: Progressing     Problem: General Stroke  Goal: Establish a mutual long term goal with patient by discharge  Outcome: Progressing  Goal: Demonstrate improvement in neurological exam throughout the shift  Outcome: Progressing  Goal: Maintain BP within ordered limits throughout shift  Outcome: Progressing  Goal: Participate in treatment (ie., meds, therapy) throughout shift  Outcome: Progressing  Goal: No symptoms of aspiration throughout shift  Outcome: Progressing  Goal: No symptoms of hemorrhage throughout shift  Outcome: Progressing  Goal: Tolerate enteral feeding throughout shift  Outcome: Progressing  Goal: Decreased nausea/vomiting throughout shift  Outcome: Progressing  Goal: Controlled blood glucose throughout shift  Outcome: Progressing  Goal: Out of bed three times today  Outcome: Progressing     Problem: ESRD: Dialysis, CAPD  Goal: Electrolytes restored to baseline  Outcome: Progressing  Goal: Fatigue of chronic illness managed  Outcome: Progressing  Goal: Follows dialysis  treatment plan  Outcome: Progressing  Goal: Follows fluid restrictions  Outcome: Progressing  Goal: Increase self care/family involvement  Outcome: Progressing  Goal: Participates in bowel regimen (CAPD)  Outcome: Progressing  Goal: Reports no abdominal pain/distension  Outcome: Progressing  Goal: Reports no shortness of breath  Outcome: Progressing     Problem: Diabetes  Goal: Achieve decreasing blood glucose levels by end of shift  Outcome: Progressing  Goal: Increase stability of blood glucose readings by end of shift  Outcome: Progressing  Goal: Decrease in ketones present in urine by end of shift  Outcome: Progressing  Goal: Maintain electrolyte levels within acceptable range throughout shift  Outcome: Progressing  Goal: Maintain glucose levels >70mg/dl to <250mg/dl throughout shift  Outcome: Progressing  Goal: No changes in neurological exam by end of shift  Outcome: Progressing  Goal: Learn about and adhere to nutrition recommendations by end of shift  Outcome: Progressing  Goal: Vital signs within normal range for age by end of shift  Outcome: Progressing  Goal: Increase self care and/or family involovement by end of shift  Outcome: Progressing  Goal: Receive DSME education by end of shift  Outcome: Progressing     Problem: Pain  Goal: Takes deep breaths with improved pain control throughout the shift  Outcome: Progressing  Goal: Turns in bed with improved pain control throughout the shift  Outcome: Progressing  Goal: Walks with improved pain control throughout the shift  Outcome: Progressing  Goal: Performs ADL's with improved pain control throughout shift  Outcome: Progressing  Goal: Participates in PT with improved pain control throughout the shift  Outcome: Progressing  Goal: Free from opioid side effects throughout the shift  Outcome: Progressing  Goal: Free from acute confusion related to pain meds throughout the shift  Outcome: Progressing     Problem: Fall/Injury  Goal: Not fall by end of  shift  Outcome: Progressing  Goal: Be free from injury by end of the shift  Outcome: Progressing  Goal: Verbalize understanding of personal risk factors for fall in the hospital  Outcome: Progressing  Goal: Verbalize understanding of risk factor reduction measures to prevent injury from fall in the home  Outcome: Progressing  Goal: Use assistive devices by end of the shift  Outcome: Progressing  Goal: Pace activities to prevent fatigue by end of the shift  Outcome: Progressing

## 2025-08-31 ENCOUNTER — LAB REQUISITION (OUTPATIENT)
Dept: LAB | Facility: HOSPITAL | Age: 60
End: 2025-08-31
Payer: COMMERCIAL

## 2025-09-01 ENCOUNTER — LAB REQUISITION (OUTPATIENT)
Dept: LAB | Facility: HOSPITAL | Age: 60
End: 2025-09-01
Payer: COMMERCIAL

## 2025-09-01 LAB
ALBUMIN SERPL BCP-MCNC: 2.9 G/DL (ref 3.4–5)
ANION GAP SERPL CALC-SCNC: 16 MMOL/L (ref 10–20)
ANION GAP SERPL CALC-SCNC: 16 MMOL/L (ref 10–20)
BUN SERPL-MCNC: 71 MG/DL (ref 6–23)
BUN SERPL-MCNC: 71 MG/DL (ref 6–23)
CALCIUM SERPL-MCNC: 9.4 MG/DL (ref 8.6–10.3)
CALCIUM SERPL-MCNC: 9.4 MG/DL (ref 8.6–10.3)
CHLORIDE SERPL-SCNC: 103 MMOL/L (ref 98–107)
CHLORIDE SERPL-SCNC: 104 MMOL/L (ref 98–107)
CO2 SERPL-SCNC: 24 MMOL/L (ref 21–32)
CO2 SERPL-SCNC: 25 MMOL/L (ref 21–32)
CREAT SERPL-MCNC: 9.16 MG/DL (ref 0.5–1.3)
CREAT SERPL-MCNC: 9.25 MG/DL (ref 0.5–1.3)
EGFRCR SERPLBLD CKD-EPI 2021: 6 ML/MIN/1.73M*2
EGFRCR SERPLBLD CKD-EPI 2021: 6 ML/MIN/1.73M*2
ERYTHROCYTE [DISTWIDTH] IN BLOOD BY AUTOMATED COUNT: 16 % (ref 11.5–14.5)
GLUCOSE SERPL-MCNC: 134 MG/DL (ref 74–99)
GLUCOSE SERPL-MCNC: 136 MG/DL (ref 74–99)
HCT VFR BLD AUTO: 23.1 % (ref 41–52)
HGB BLD-MCNC: 7.1 G/DL (ref 13.5–17.5)
MAGNESIUM SERPL-MCNC: 2.71 MG/DL (ref 1.6–2.4)
MCH RBC QN AUTO: 29.5 PG (ref 26–34)
MCHC RBC AUTO-ENTMCNC: 30.7 G/DL (ref 32–36)
MCV RBC AUTO: 96 FL (ref 80–100)
NRBC BLD-RTO: 0 /100 WBCS (ref 0–0)
PHOSPHATE SERPL-MCNC: 5.6 MG/DL (ref 2.5–4.9)
PLATELET # BLD AUTO: 347 X10*3/UL (ref 150–450)
POTASSIUM SERPL-SCNC: 5 MMOL/L (ref 3.5–5.3)
POTASSIUM SERPL-SCNC: 5.1 MMOL/L (ref 3.5–5.3)
RBC # BLD AUTO: 2.41 X10*6/UL (ref 4.5–5.9)
SODIUM SERPL-SCNC: 139 MMOL/L (ref 136–145)
SODIUM SERPL-SCNC: 139 MMOL/L (ref 136–145)
WBC # BLD AUTO: 11.2 X10*3/UL (ref 4.4–11.3)

## 2025-09-01 PROCEDURE — 85027 COMPLETE CBC AUTOMATED: CPT

## 2025-09-01 PROCEDURE — 83735 ASSAY OF MAGNESIUM: CPT

## 2025-09-01 PROCEDURE — 80069 RENAL FUNCTION PANEL: CPT

## 2025-09-01 PROCEDURE — 80048 BASIC METABOLIC PNL TOTAL CA: CPT

## 2025-09-02 PROBLEM — Z74.09 IMPAIRED MOBILITY AND ACTIVITIES OF DAILY LIVING: Status: ACTIVE | Noted: 2025-09-02

## 2025-09-02 PROBLEM — Z95.1 S/P CABG (CORONARY ARTERY BYPASS GRAFT): Status: ACTIVE | Noted: 2025-09-02

## 2025-09-02 PROBLEM — G89.18 ACUTE POST-OPERATIVE PAIN: Status: ACTIVE | Noted: 2025-09-02

## 2025-09-02 PROBLEM — Z78.9 IMPAIRED MOBILITY AND ACTIVITIES OF DAILY LIVING: Status: ACTIVE | Noted: 2025-09-02

## 2025-09-02 LAB
ERYTHROCYTE [DISTWIDTH] IN BLOOD BY AUTOMATED COUNT: 16.2 % (ref 11.5–14.5)
HCT VFR BLD AUTO: 28.7 % (ref 41–52)
HGB BLD-MCNC: 8.6 G/DL (ref 13.5–17.5)
MCH RBC QN AUTO: 30.2 PG (ref 26–34)
MCHC RBC AUTO-ENTMCNC: 30 G/DL (ref 32–36)
MCV RBC AUTO: 101 FL (ref 80–100)
NRBC BLD-RTO: 0 /100 WBCS (ref 0–0)
PLATELET # BLD AUTO: 378 X10*3/UL (ref 150–450)
RBC # BLD AUTO: 2.85 X10*6/UL (ref 4.5–5.9)
WBC # BLD AUTO: 12 X10*3/UL (ref 4.4–11.3)

## 2025-09-02 PROCEDURE — 85027 COMPLETE CBC AUTOMATED: CPT

## 2025-09-03 ENCOUNTER — LAB REQUISITION (OUTPATIENT)
Dept: LAB | Facility: HOSPITAL | Age: 60
End: 2025-09-03

## 2025-09-03 PROCEDURE — 87040 BLOOD CULTURE FOR BACTERIA: CPT

## 2025-09-03 PROCEDURE — 87075 CULTR BACTERIA EXCEPT BLOOD: CPT

## 2025-09-04 ENCOUNTER — LAB REQUISITION (OUTPATIENT)
Dept: LAB | Facility: HOSPITAL | Age: 60
End: 2025-09-04
Payer: COMMERCIAL

## 2025-09-04 LAB
APPEARANCE UR: CLEAR
BILIRUB UR STRIP.AUTO-MCNC: NEGATIVE MG/DL
COLOR UR: ABNORMAL
GLUCOSE UR STRIP.AUTO-MCNC: ABNORMAL MG/DL
HOLD SPECIMEN: NORMAL
KETONES UR STRIP.AUTO-MCNC: NEGATIVE MG/DL
LEUKOCYTE ESTERASE UR QL STRIP.AUTO: NEGATIVE
NITRITE UR QL STRIP.AUTO: NEGATIVE
PH UR STRIP.AUTO: 8 [PH]
PROT UR STRIP.AUTO-MCNC: ABNORMAL MG/DL
RBC # UR STRIP.AUTO: NEGATIVE MG/DL
RBC #/AREA URNS AUTO: NORMAL /HPF
SP GR UR STRIP.AUTO: 1.01
UROBILINOGEN UR STRIP.AUTO-MCNC: NORMAL MG/DL
WBC #/AREA URNS AUTO: NORMAL /HPF

## 2025-09-04 PROCEDURE — 81001 URINALYSIS AUTO W/SCOPE: CPT

## 2025-09-05 ENCOUNTER — LAB REQUISITION (OUTPATIENT)
Dept: LAB | Facility: HOSPITAL | Age: 60
End: 2025-09-05
Payer: COMMERCIAL

## 2025-09-05 LAB
ALBUMIN SERPL BCP-MCNC: 3.1 G/DL (ref 3.4–5)
ANION GAP SERPL CALC-SCNC: 17 MMOL/L (ref 10–20)
BUN SERPL-MCNC: 51 MG/DL (ref 6–23)
CALCIUM SERPL-MCNC: 9.4 MG/DL (ref 8.6–10.3)
CHLORIDE SERPL-SCNC: 97 MMOL/L (ref 98–107)
CO2 SERPL-SCNC: 28 MMOL/L (ref 21–32)
CREAT SERPL-MCNC: 8 MG/DL (ref 0.5–1.3)
EGFRCR SERPLBLD CKD-EPI 2021: 7 ML/MIN/1.73M*2
ERYTHROCYTE [DISTWIDTH] IN BLOOD BY AUTOMATED COUNT: 17.1 % (ref 11.5–14.5)
GLUCOSE SERPL-MCNC: 94 MG/DL (ref 74–99)
HCT VFR BLD AUTO: 24 % (ref 41–52)
HGB BLD-MCNC: 7.7 G/DL (ref 13.5–17.5)
MAGNESIUM SERPL-MCNC: 2.44 MG/DL (ref 1.6–2.4)
MCH RBC QN AUTO: 30.8 PG (ref 26–34)
MCHC RBC AUTO-ENTMCNC: 32.1 G/DL (ref 32–36)
MCV RBC AUTO: 96 FL (ref 80–100)
NRBC BLD-RTO: 0 /100 WBCS (ref 0–0)
PHOSPHATE SERPL-MCNC: 6.3 MG/DL (ref 2.5–4.9)
PLATELET # BLD AUTO: 340 X10*3/UL (ref 150–450)
POTASSIUM SERPL-SCNC: 4.5 MMOL/L (ref 3.5–5.3)
RBC # BLD AUTO: 2.5 X10*6/UL (ref 4.5–5.9)
SODIUM SERPL-SCNC: 137 MMOL/L (ref 136–145)
WBC # BLD AUTO: 11.7 X10*3/UL (ref 4.4–11.3)

## 2025-09-05 PROCEDURE — 83735 ASSAY OF MAGNESIUM: CPT

## 2025-09-05 PROCEDURE — 80069 RENAL FUNCTION PANEL: CPT

## 2025-09-05 PROCEDURE — 85027 COMPLETE CBC AUTOMATED: CPT

## 2025-09-05 PROCEDURE — 82728 ASSAY OF FERRITIN: CPT

## 2025-09-06 ENCOUNTER — LAB REQUISITION (OUTPATIENT)
Dept: LAB | Facility: HOSPITAL | Age: 60
End: 2025-09-06
Payer: COMMERCIAL

## 2025-09-06 LAB — FERRITIN SERPL-MCNC: 1145 NG/ML (ref 20–300)

## 2025-09-07 LAB
BACTERIA BLD CULT: NORMAL
BACTERIA BLD CULT: NORMAL

## (undated) DEVICE — SUTURE, VICRYL, 3-0, 27 IN, SH

## (undated) DEVICE — SHEATH, PINNACLE, W/.038 GW 10CM, 5FR INTRODUCER, 2.5 CM DIALATOR

## (undated) DEVICE — EXTENSION SET, IV, MACROBORE, 30IN, NON DEHP

## (undated) DEVICE — GOWN, SURGICAL, SIRUS, NON REINFORCED, LARGE

## (undated) DEVICE — TUBE SET, PNEUMOLAR HEATED, SMOKE EVACU, HIGH-FLOW

## (undated) DEVICE — SLEEVE, VASO PRESS, CALF GARMENT, MEDIUM, GREEN

## (undated) DEVICE — ELECTRODE, EZ CLEAN LAPAROSCOPIC L-WIRE, 33CM, DISP

## (undated) DEVICE — BLADE, SAW, SAGITTAL, MICRO, 9.4 X 25.4 X 0.38 MM, STAINLESS STEEL, STERILE

## (undated) DEVICE — SOLUTION, INJECTION, USP, LACTATED RINGERS, LIFECARE, 1000ML

## (undated) DEVICE — SYRINGE, 50 CC, LUER LOCK

## (undated) DEVICE — SUTURE, PROLENE 4-0, 18IN, PS2, BLUE MONO

## (undated) DEVICE — ASSEMBLY, STRYKER FLOW 2, SUCTION IRRIGATOR, WITH TIP

## (undated) DEVICE — SUTURE, PROLENE, 3-0, 30 IN, SH

## (undated) DEVICE — GLOVE, SURGICAL, PROTEXIS PI , 7.0, PF, LF

## (undated) DEVICE — Device

## (undated) DEVICE — SOLUTION, IRRIGATION, X RX SODIUM CHL 0.9%, 1000ML BTL

## (undated) DEVICE — TUBING, SUCTION, 6MM X 10, CLEAN N-COND

## (undated) DEVICE — NEEDLE, HYPODERMIC, PROEDGE, 22G X 1.5, BLACK

## (undated) DEVICE — WOUND VAC KIT, W/CANNISTER, 500ML, 5/PK

## (undated) DEVICE — GLOVE, SURGICAL, PROTEXIS PI BLUE W/NEUTHERA, 7.5, PF, LF

## (undated) DEVICE — BANDAGE, GAUZE, COTTON, STERILE, BULKEE II, 4.5IN X 4.1YD

## (undated) DEVICE — SUTURE, VICRYL, 4-0, 27 IN, PS-2, UNDYED

## (undated) DEVICE — SYRINGE, 10 CC, LUER LOCK

## (undated) DEVICE — CARE KIT, LAPAROSCOPIC, ADVANCED

## (undated) DEVICE — CAUTERY, PENCIL, PUSH BUTTON, SMOKE EVAC, 70MM

## (undated) DEVICE — CLIP, ENDO APPLIER LIGAMAX 5MM

## (undated) DEVICE — NEEDLE, HYPODERMIC, NEEDLE PRO, 25G X 1.5, ORANGE

## (undated) DEVICE — RETRIEVER, ENDOPOUCH, SPEC BAG

## (undated) DEVICE — PACK, ANGIO P2, CUSTOM, LAKE

## (undated) DEVICE — DRAPE, TIBURON W/ADHESIVE, 19 X 30

## (undated) DEVICE — DRAPE, SHEET, U, W/ADHESIVE STRIP, IMPERVIOUS, 60 X 70 IN, DISPOSABLE, LF, STERILE

## (undated) DEVICE — DRESSING, ABDOMINAL, WET PRUF, TENDERSORB, 5 X 9 IN, STERILE

## (undated) DEVICE — SPONGE, HEMOSTATIC, CELLULOSE, SURGICEL, 4 X 8 IN

## (undated) DEVICE — DRAPE, C-ARM IMAGE

## (undated) DEVICE — COVER, EQUIPMENT, ZERO GRAVITY

## (undated) DEVICE — CATHETER, ANGIO, IMPULSE, FL4, 5 FR X 100 CM

## (undated) DEVICE — TROCAR SYSTEM, BALLOON, KII GELPORT, 12 X 100MM

## (undated) DEVICE — BANDAGE, COMPRESSION, EZE-BAND, 4 X 11 YDS

## (undated) DEVICE — SOLUTION, IRRIGATION, USP, SODIUM CHLORIDE 0.9%, 3000 ML, BAG

## (undated) DEVICE — MICROINTRODUCER KIT, VSI, 4FR X 40CM, STIFFEN

## (undated) DEVICE — CATHETER, ANGIO, IMPULSE, FR4, 5 FR X 100 CM

## (undated) DEVICE — ADHESIVE, SKIN, DERMABOND ADVANCED, 15CM, PEN-STYLE

## (undated) DEVICE — KIT, MANIFOLD NAMIC, STANDARD, LHK,PRM,3V,ON,DT,PS,CMSQ,S10

## (undated) DEVICE — SLEEVE, KII, Z-THREAD, 5X100CM

## (undated) DEVICE — APPLICATOR, CHLORAPREP, W/ORANGE TINT, 26ML

## (undated) DEVICE — GUIDEWIRE, J TIP, 3 MM, 0.035 IN X 150 CM, PTFE

## (undated) DEVICE — SUTURE, MONOCRYL, 4-0, 27 IN, PS-2, UNDYED

## (undated) DEVICE — NEEDLE, HYPODERMIC, MONOJECT, 25 G X 1.5 IN, LUER LOCK HUB, RED

## (undated) DEVICE — DRAPE, SHEET, LARGE, 70 X 85IN, STERILE

## (undated) DEVICE — SPONGE, GAUZE, AVANT, STERILE, NONWOVEN, 4PLY, 4 X 4, STANDARD

## (undated) DEVICE — SPONGE KIT, BLACK SPIRAL, WOUND VAC

## (undated) DEVICE — DRAPE, SHEET, EXTREMITY, W/ARM BOARD COVERS, 87 X 106 X 128 IN, DISPOSABLE, LF, STERILE

## (undated) DEVICE — GLOVE, SURGICAL, PROTEXIS PI , 7.5, PF, LF

## (undated) DEVICE — TROCAR, KII OPTICAL BLADELESS 5MM Z THREAD 100MM LNGTH

## (undated) DEVICE — HANDPIECE, INTERPULSE, W/RETRACTABLE COAX FAN, STERILE

## (undated) DEVICE — CLOSURE DEVICE, VASCULAR, MYNX, 5FR

## (undated) DEVICE — SOLUTION, IRRIGATION, USP, SODIUM CHLORIDE 0.9%, .9 NACL, 1000 ML, BAG

## (undated) DEVICE — IRRIGATOR, WOUND, HYDRO SURG PLUS, W/O TIP, DISP